# Patient Record
Sex: MALE | Race: OTHER | HISPANIC OR LATINO | Employment: FULL TIME | URBAN - METROPOLITAN AREA
[De-identification: names, ages, dates, MRNs, and addresses within clinical notes are randomized per-mention and may not be internally consistent; named-entity substitution may affect disease eponyms.]

---

## 2017-10-18 ENCOUNTER — GENERIC CONVERSION - ENCOUNTER (OUTPATIENT)
Dept: OTHER | Facility: OTHER | Age: 57
End: 2017-10-18

## 2018-01-18 NOTE — MISCELLANEOUS
Please contact our office at your convenience  It is important that we speak to you  REGARDING:   Porfavor contacte a nuestra oficina  Es muy  importante que hablemos con usted   SOBRE:          Scheduling an Appointment/ Programar roman Izzy          Rescheduling an Appointment/ Re-programar  roman Izzy     Cancelling an Appointment/Cancelar villalobos Izzy     Your Lab/ X-ray Results/Resultados         Updating your Phone number or Address/ Actualizar villalobos [employer]           X Verify your Primary Providor/ Verificar villalobos Proveedor primario     Other/Otro:    Please Contact Our Office to Schedule Your Appointment  It  is Very Important That You See Your Provider for your  Routine Medical Care  If you are seeing a New Providor,  Please Contact our Office so we can update our Records  Thank You  Comuníquese con nuestra oficina para programar villalobos izzy  Es Muy Importante que usted meg villalobos proveedor  para villalobos   [address] [facility] Floor de [place]  Si usted esta viendo un Proveedor  [place], [place] con [place] Antolin oficina para actualizar   nuestro registros  Linsey

## 2019-02-27 LAB
LEFT EYE DIABETIC RETINOPATHY: NORMAL
RIGHT EYE DIABETIC RETINOPATHY: NORMAL

## 2019-03-02 ENCOUNTER — HOSPITAL ENCOUNTER (EMERGENCY)
Facility: HOSPITAL | Age: 59
Discharge: HOME | End: 2019-03-02
Attending: EMERGENCY MEDICINE
Payer: COMMERCIAL

## 2019-03-02 VITALS
DIASTOLIC BLOOD PRESSURE: 83 MMHG | WEIGHT: 174.16 LBS | OXYGEN SATURATION: 98 % | SYSTOLIC BLOOD PRESSURE: 152 MMHG | TEMPERATURE: 98.2 F | BODY MASS INDEX: 28.11 KG/M2 | HEART RATE: 81 BPM | RESPIRATION RATE: 17 BRPM

## 2019-03-02 DIAGNOSIS — G89.29 CHRONIC PAIN OF LEFT LOWER EXTREMITY: Primary | ICD-10-CM

## 2019-03-02 DIAGNOSIS — M79.605 CHRONIC PAIN OF LEFT LOWER EXTREMITY: Primary | ICD-10-CM

## 2019-03-02 DIAGNOSIS — F41.9 ANXIETY: ICD-10-CM

## 2019-03-02 PROCEDURE — 99283 EMERGENCY DEPT VISIT LOW MDM: CPT

## 2019-03-02 RX ORDER — HYDROXYZINE HYDROCHLORIDE 25 MG/1
25 TABLET, FILM COATED ORAL ONCE
Status: COMPLETED | OUTPATIENT
Start: 2019-03-02 | End: 2019-03-02

## 2019-03-02 RX ORDER — ACETAMINOPHEN 325 MG/1
650 TABLET ORAL ONCE
Status: COMPLETED | OUTPATIENT
Start: 2019-03-02 | End: 2019-03-02

## 2019-03-02 RX ORDER — HYDROXYZINE HYDROCHLORIDE 25 MG/1
25 TABLET, FILM COATED ORAL EVERY 6 HOURS PRN
Qty: 10 TABLET | Refills: 0 | Status: SHIPPED | OUTPATIENT
Start: 2019-03-02 | End: 2019-04-03 | Stop reason: SDUPTHER

## 2019-03-02 RX ADMIN — HYDROXYZINE HYDROCHLORIDE 25 MG: 25 TABLET ORAL at 09:39

## 2019-03-02 RX ADMIN — ACETAMINOPHEN 650 MG: 325 TABLET, FILM COATED ORAL at 09:39
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
## 2019-03-02 NOTE — ED NOTES
Dr Manav Solis spoke with pts sister and will be coming in to take pt home     Chiki Willard, [address] [facility]  03/02/19 5793

## 2019-04-03 PROBLEM — D50.0 IRON DEFICIENCY ANEMIA DUE TO CHRONIC BLOOD LOSS: Status: ACTIVE | Noted: 2019-03-01

## 2019-04-03 PROBLEM — Z79.01 CHRONIC ANTICOAGULATION: Status: ACTIVE | Noted: 2019-03-01

## 2019-04-03 PROBLEM — S22.081D CLOSED STABLE BURST FRACTURE OF TWELFTH THORACIC VERTEBRA WITH ROUTINE HEALING: Status: ACTIVE | Noted: 2019-03-01

## 2019-04-03 PROBLEM — S72.22XS CLOSED LEFT SUBTROCHANTERIC FEMUR FRACTURE, SEQUELA: Status: ACTIVE | Noted: 2019-03-01

## 2019-04-03 PROBLEM — S72.002A CLOSED FRACTURE OF LEFT HIP (HCC): Status: ACTIVE | Noted: 2018-06-06

## 2019-04-03 PROBLEM — F32.9 MAJOR DEPRESSION, CHRONIC: Status: ACTIVE | Noted: 2019-03-01
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
## 2019-04-03 PROBLEM — E11.3552 STABLE PROLIFERATIVE DIABETIC RETINOPATHY OF LEFT EYE ASSOCIATED WITH TYPE 2 DIABETES MELLITUS (HCC): Status: ACTIVE | Noted: 2019-03-01

## 2019-04-03 PROBLEM — N40.0 BENIGN PROSTATIC HYPERPLASIA WITHOUT LOWER URINARY TRACT SYMPTOMS: Status: ACTIVE | Noted: 2019-03-01

## 2019-04-03 PROBLEM — E78.2 MIXED HYPERLIPIDEMIA: Status: ACTIVE | Noted: 2019-03-01

## 2019-04-03 PROBLEM — K21.9 GERD WITHOUT ESOPHAGITIS: Status: ACTIVE | Noted: 2019-03-01

## 2019-04-04 ENCOUNTER — TELEPHONE (OUTPATIENT)
Dept: FAMILY MEDICINE CLINIC | Facility: CLINIC | Age: 59
End: 2019-04-04

## 2019-04-04 ENCOUNTER — TELEPHONE (OUTPATIENT)
Dept: OTHER | Facility: OTHER | Age: 59
End: 2019-04-04

## 2019-04-05 ENCOUNTER — TELEPHONE (OUTPATIENT)
Dept: FAMILY MEDICINE CLINIC | Facility: CLINIC | Age: 59
End: 2019-04-05

## 2019-04-05 ENCOUNTER — TELEPHONE (OUTPATIENT)
Dept: PAIN MEDICINE | Facility: CLINIC | Age: 59
End: 2019-04-05

## 2019-04-05 ENCOUNTER — TRANSCRIBE ORDERS (OUTPATIENT)
Dept: LAB | Facility: HOSPITAL | Age: 59
End: 2019-04-05

## 2019-04-05 ENCOUNTER — OFFICE VISIT (OUTPATIENT)
Dept: LAB | Facility: HOSPITAL | Age: 59
End: 2019-04-05
Payer: COMMERCIAL

## 2019-04-05 DIAGNOSIS — E78.00 PURE HYPERCHOLESTEROLEMIA: ICD-10-CM

## 2019-04-05 DIAGNOSIS — Z98.84 BARIATRIC SURGERY STATUS: ICD-10-CM

## 2019-04-05 DIAGNOSIS — F11.20 OPIOID TYPE DEPENDENCE, CONTINUOUS (HCC): ICD-10-CM

## 2019-04-05 DIAGNOSIS — E66.3 SEVERELY OVERWEIGHT: ICD-10-CM

## 2019-04-05 DIAGNOSIS — E29.1 3-OXO-5 ALPHA-STEROID DELTA 4-DEHYDROGENASE DEFICIENCY: ICD-10-CM

## 2019-04-05 DIAGNOSIS — F11.20 OPIOID TYPE DEPENDENCE, CONTINUOUS (HCC): Primary | ICD-10-CM

## 2019-04-05 DIAGNOSIS — E11.649 UNCONTROLLED TYPE 2 DIABETES MELLITUS WITH HYPOGLYCEMIA, UNSPECIFIED HYPOGLYCEMIA COMA STATUS (HCC): ICD-10-CM

## 2019-04-05 DIAGNOSIS — I10 ESSENTIAL HYPERTENSION, MALIGNANT: ICD-10-CM

## 2019-04-05 LAB
ATRIAL RATE: 67 BPM
P AXIS: 61 DEGREES
PR INTERVAL: 136 MS
QRS AXIS: 63 DEGREES
QRSD INTERVAL: 94 MS
QT INTERVAL: 420 MS
QTC INTERVAL: 443 MS
T WAVE AXIS: 69 DEGREES
VENTRICULAR RATE: 67 BPM

## 2019-04-05 PROCEDURE — 93005 ELECTROCARDIOGRAM TRACING: CPT

## 2019-04-05 PROCEDURE — 93010 ELECTROCARDIOGRAM REPORT: CPT | Performed by: INTERNAL MEDICINE

## 2019-04-10 ENCOUNTER — APPOINTMENT (OUTPATIENT)
Dept: LAB | Facility: HOSPITAL | Age: 59
End: 2019-04-10
Payer: COMMERCIAL

## 2019-04-10 DIAGNOSIS — E29.1 3-OXO-5 ALPHA-STEROID DELTA 4-DEHYDROGENASE DEFICIENCY: ICD-10-CM

## 2019-04-10 DIAGNOSIS — I10 ESSENTIAL HYPERTENSION, MALIGNANT: ICD-10-CM

## 2019-04-10 DIAGNOSIS — Z98.84 BARIATRIC SURGERY STATUS: ICD-10-CM

## 2019-04-10 DIAGNOSIS — F11.20 OPIOID TYPE DEPENDENCE, CONTINUOUS (HCC): ICD-10-CM

## 2019-04-10 DIAGNOSIS — E11.65 UNCONTROLLED TYPE 2 DIABETES MELLITUS WITH HYPERGLYCEMIA (HCC): Primary | ICD-10-CM

## 2019-04-10 DIAGNOSIS — E11.649 UNCONTROLLED TYPE 2 DIABETES MELLITUS WITH HYPOGLYCEMIA, UNSPECIFIED HYPOGLYCEMIA COMA STATUS (HCC): ICD-10-CM

## 2019-04-10 DIAGNOSIS — E78.00 PURE HYPERCHOLESTEROLEMIA: ICD-10-CM

## 2019-04-10 DIAGNOSIS — E66.3 SEVERELY OVERWEIGHT: ICD-10-CM

## 2019-04-10 LAB
25(OH)D3 SERPL-MCNC: 16.5 NG/ML (ref 30–100)
ALBUMIN SERPL BCP-MCNC: 3.5 G/DL (ref 3.5–5)
ALP SERPL-CCNC: 129 U/L (ref 46–116)
ALT SERPL W P-5'-P-CCNC: 22 U/L (ref 12–78)
ANION GAP SERPL CALCULATED.3IONS-SCNC: 3 MMOL/L (ref 4–13)
AST SERPL W P-5'-P-CCNC: 11 U/L (ref 5–45)
BASOPHILS # BLD AUTO: 0.03 THOUSANDS/ΜL (ref 0–0.1)
BASOPHILS NFR BLD AUTO: 1 % (ref 0–1)
BILIRUB SERPL-MCNC: 0.51 MG/DL (ref 0.2–1)
BUN SERPL-MCNC: 11 MG/DL (ref 5–25)
CALCIUM SERPL-MCNC: 8.5 MG/DL (ref 8.3–10.1)
CHLORIDE SERPL-SCNC: 99 MMOL/L (ref 100–108)
CHOLEST SERPL-MCNC: 132 MG/DL (ref 50–200)
CO2 SERPL-SCNC: 32 MMOL/L (ref 21–32)
CREAT SERPL-MCNC: 0.87 MG/DL (ref 0.6–1.3)
CREAT UR-MCNC: 214 MG/DL
EOSINOPHIL # BLD AUTO: 0.07 THOUSAND/ΜL (ref 0–0.61)
EOSINOPHIL NFR BLD AUTO: 1 % (ref 0–6)
ERYTHROCYTE [DISTWIDTH] IN BLOOD BY AUTOMATED COUNT: 12.9 % (ref 11.6–15.1)
EST. AVERAGE GLUCOSE BLD GHB EST-MCNC: 177 MG/DL
GFR SERPL CREATININE-BSD FRML MDRD: 96 ML/MIN/1.73SQ M
GLUCOSE P FAST SERPL-MCNC: 188 MG/DL (ref 65–99)
HBA1C MFR BLD: 7.8 % (ref 4.2–6.3)
HCT VFR BLD AUTO: 37 % (ref 36.5–49.3)
HDLC SERPL-MCNC: 73 MG/DL (ref 40–60)
HGB BLD-MCNC: 12.2 G/DL (ref 12–17)
IMM GRANULOCYTES # BLD AUTO: 0.02 THOUSAND/UL (ref 0–0.2)
IMM GRANULOCYTES NFR BLD AUTO: 0 % (ref 0–2)
LDLC SERPL CALC-MCNC: 51 MG/DL (ref 0–100)
LYMPHOCYTES # BLD AUTO: 0.93 THOUSANDS/ΜL (ref 0.6–4.47)
LYMPHOCYTES NFR BLD AUTO: 14 % (ref 14–44)
MCH RBC QN AUTO: 28.1 PG (ref 26.8–34.3)
MCHC RBC AUTO-ENTMCNC: 33 G/DL (ref 31.4–37.4)
MCV RBC AUTO: 85 FL (ref 82–98)
MICROALBUMIN UR-MCNC: 4950 MG/L (ref 0–20)
MICROALBUMIN/CREAT 24H UR: 2313 MG/G CREATININE (ref 0–30)
MONOCYTES # BLD AUTO: 0.29 THOUSAND/ΜL (ref 0.17–1.22)
MONOCYTES NFR BLD AUTO: 4 % (ref 4–12)
NEUTROPHILS # BLD AUTO: 5.2 THOUSANDS/ΜL (ref 1.85–7.62)
NEUTS SEG NFR BLD AUTO: 80 % (ref 43–75)
NONHDLC SERPL-MCNC: 59 MG/DL
NRBC BLD AUTO-RTO: 0 /100 WBCS
PLATELET # BLD AUTO: 278 THOUSANDS/UL (ref 149–390)
PMV BLD AUTO: 11.2 FL (ref 8.9–12.7)
POTASSIUM SERPL-SCNC: 4.7 MMOL/L (ref 3.5–5.3)
PROT SERPL-MCNC: 7.7 G/DL (ref 6.4–8.2)
RBC # BLD AUTO: 4.34 MILLION/UL (ref 3.88–5.62)
SODIUM SERPL-SCNC: 134 MMOL/L (ref 136–145)
TRIGL SERPL-MCNC: 40 MG/DL
TSH SERPL DL<=0.05 MIU/L-ACNC: 0.54 UIU/ML (ref 0.36–3.74)
WBC # BLD AUTO: 6.54 THOUSAND/UL (ref 4.31–10.16)

## 2019-04-10 PROCEDURE — 85025 COMPLETE CBC W/AUTO DIFF WBC: CPT

## 2019-04-10 PROCEDURE — 82043 UR ALBUMIN QUANTITATIVE: CPT

## 2019-04-10 PROCEDURE — 82306 VITAMIN D 25 HYDROXY: CPT

## 2019-04-10 PROCEDURE — 84443 ASSAY THYROID STIM HORMONE: CPT

## 2019-04-10 PROCEDURE — 80053 COMPREHEN METABOLIC PANEL: CPT

## 2019-04-10 PROCEDURE — 36415 COLL VENOUS BLD VENIPUNCTURE: CPT

## 2019-04-10 PROCEDURE — 82570 ASSAY OF URINE CREATININE: CPT

## 2019-04-10 PROCEDURE — 80061 LIPID PANEL: CPT

## 2019-04-10 PROCEDURE — 83036 HEMOGLOBIN GLYCOSYLATED A1C: CPT

## 2019-05-03 ENCOUNTER — OFFICE VISIT (OUTPATIENT)
Dept: FAMILY MEDICINE CLINIC | Facility: CLINIC | Age: 59
End: 2019-05-03
Payer: COMMERCIAL

## 2019-05-03 ENCOUNTER — TELEPHONE (OUTPATIENT)
Dept: FAMILY MEDICINE CLINIC | Facility: CLINIC | Age: 59
End: 2019-05-03

## 2019-05-03 VITALS
SYSTOLIC BLOOD PRESSURE: 150 MMHG | HEART RATE: 86 BPM | HEIGHT: 67 IN | WEIGHT: 170.8 LBS | BODY MASS INDEX: 26.81 KG/M2 | DIASTOLIC BLOOD PRESSURE: 92 MMHG | OXYGEN SATURATION: 97 % | RESPIRATION RATE: 12 BRPM

## 2019-05-03 DIAGNOSIS — S72.22XS CLOSED LEFT SUBTROCHANTERIC FEMUR FRACTURE, SEQUELA: ICD-10-CM

## 2019-05-03 DIAGNOSIS — E11.65 UNCONTROLLED TYPE 2 DIABETES MELLITUS WITH HYPERGLYCEMIA (HCC): Primary | ICD-10-CM

## 2019-05-03 DIAGNOSIS — Z12.11 SCREENING FOR COLON CANCER: ICD-10-CM

## 2019-05-03 DIAGNOSIS — E78.2 MIXED HYPERLIPIDEMIA: ICD-10-CM

## 2019-05-03 DIAGNOSIS — M48.07 SPINAL STENOSIS OF LUMBOSACRAL REGION: ICD-10-CM

## 2019-05-03 DIAGNOSIS — N40.0 BENIGN PROSTATIC HYPERPLASIA WITHOUT LOWER URINARY TRACT SYMPTOMS: ICD-10-CM

## 2019-05-03 DIAGNOSIS — F41.9 ANXIETY: ICD-10-CM

## 2019-05-03 DIAGNOSIS — E11.3552 STABLE PROLIFERATIVE DIABETIC RETINOPATHY OF LEFT EYE ASSOCIATED WITH TYPE 2 DIABETES MELLITUS (HCC): ICD-10-CM

## 2019-05-03 DIAGNOSIS — I10 BENIGN ESSENTIAL HYPERTENSION: ICD-10-CM

## 2019-05-03 DIAGNOSIS — F31.9 BIPOLAR AFFECTIVE DISORDER, REMISSION STATUS UNSPECIFIED (HCC): ICD-10-CM

## 2019-05-03 PROBLEM — F11.10 NARCOTIC ABUSE (HCC): Status: ACTIVE | Noted: 2018-06-06

## 2019-05-03 PROCEDURE — 3008F BODY MASS INDEX DOCD: CPT | Performed by: NURSE PRACTITIONER

## 2019-05-03 PROCEDURE — 99214 OFFICE O/P EST MOD 30 MIN: CPT | Performed by: NURSE PRACTITIONER

## 2019-05-03 PROCEDURE — 1036F TOBACCO NON-USER: CPT | Performed by: NURSE PRACTITIONER

## 2019-05-03 RX ORDER — HYDROXYZINE HYDROCHLORIDE 25 MG/1
25 TABLET, FILM COATED ORAL EVERY 6 HOURS PRN
Qty: 90 TABLET | Refills: 1 | Status: SHIPPED | OUTPATIENT
Start: 2019-05-03 | End: 2020-01-03 | Stop reason: SDUPTHER

## 2019-05-03 RX ORDER — SEMAGLUTIDE 1.34 MG/ML
INJECTION, SOLUTION SUBCUTANEOUS
Refills: 2 | COMMUNITY
Start: 2019-04-24

## 2019-05-06 ENCOUNTER — TELEPHONE (OUTPATIENT)
Dept: FAMILY MEDICINE CLINIC | Facility: CLINIC | Age: 59
End: 2019-05-06

## 2019-05-14 ENCOUNTER — TELEPHONE (OUTPATIENT)
Dept: FAMILY MEDICINE CLINIC | Facility: CLINIC | Age: 59
End: 2019-05-14

## 2019-05-14 ENCOUNTER — TELEPHONE (OUTPATIENT)
Dept: OTHER | Facility: OTHER | Age: 59
End: 2019-05-14

## 2019-05-15 ENCOUNTER — TELEPHONE (OUTPATIENT)
Dept: OTHER | Facility: OTHER | Age: 59
End: 2019-05-15

## 2019-06-13 ENCOUNTER — TELEPHONE (OUTPATIENT)
Dept: FAMILY MEDICINE CLINIC | Facility: CLINIC | Age: 59
End: 2019-06-13

## 2019-07-12 ENCOUNTER — TRANSCRIBE ORDERS (OUTPATIENT)
Dept: LAB | Facility: HOSPITAL | Age: 59
End: 2019-07-12

## 2019-07-12 ENCOUNTER — APPOINTMENT (OUTPATIENT)
Dept: LAB | Facility: HOSPITAL | Age: 59
End: 2019-07-12
Payer: COMMERCIAL

## 2019-07-12 DIAGNOSIS — Z79.899 ENCOUNTER FOR LONG-TERM (CURRENT) USE OF OTHER MEDICATIONS: ICD-10-CM

## 2019-07-12 DIAGNOSIS — Z79.899 ENCOUNTER FOR LONG-TERM (CURRENT) USE OF OTHER MEDICATIONS: Primary | ICD-10-CM

## 2019-07-12 LAB
ALBUMIN SERPL BCP-MCNC: 3.6 G/DL (ref 3.5–5)
ALP SERPL-CCNC: 92 U/L (ref 46–116)
ALT SERPL W P-5'-P-CCNC: 23 U/L (ref 12–78)
ANION GAP SERPL CALCULATED.3IONS-SCNC: 4 MMOL/L (ref 4–13)
AST SERPL W P-5'-P-CCNC: 13 U/L (ref 5–45)
BASOPHILS # BLD AUTO: 0.04 THOUSANDS/ΜL (ref 0–0.1)
BASOPHILS NFR BLD AUTO: 0 % (ref 0–1)
BILIRUB SERPL-MCNC: 0.25 MG/DL (ref 0.2–1)
BUN SERPL-MCNC: 23 MG/DL (ref 5–25)
CALCIUM SERPL-MCNC: 8.7 MG/DL (ref 8.3–10.1)
CHLORIDE SERPL-SCNC: 99 MMOL/L (ref 100–108)
CHOLEST SERPL-MCNC: 127 MG/DL (ref 50–200)
CO2 SERPL-SCNC: 31 MMOL/L (ref 21–32)
CREAT SERPL-MCNC: 0.85 MG/DL (ref 0.6–1.3)
EOSINOPHIL # BLD AUTO: 0.06 THOUSAND/ΜL (ref 0–0.61)
EOSINOPHIL NFR BLD AUTO: 1 % (ref 0–6)
ERYTHROCYTE [DISTWIDTH] IN BLOOD BY AUTOMATED COUNT: 13.4 % (ref 11.6–15.1)
GFR SERPL CREATININE-BSD FRML MDRD: 97 ML/MIN/1.73SQ M
GLUCOSE P FAST SERPL-MCNC: 109 MG/DL (ref 65–99)
HCT VFR BLD AUTO: 38.5 % (ref 36.5–49.3)
HDLC SERPL-MCNC: 60 MG/DL (ref 40–60)
HGB BLD-MCNC: 12.2 G/DL (ref 12–17)
IMM GRANULOCYTES # BLD AUTO: 0.07 THOUSAND/UL (ref 0–0.2)
IMM GRANULOCYTES NFR BLD AUTO: 1 % (ref 0–2)
LDLC SERPL CALC-MCNC: 59 MG/DL (ref 0–100)
LYMPHOCYTES # BLD AUTO: 1.57 THOUSANDS/ΜL (ref 0.6–4.47)
LYMPHOCYTES NFR BLD AUTO: 14 % (ref 14–44)
MCH RBC QN AUTO: 28 PG (ref 26.8–34.3)
MCHC RBC AUTO-ENTMCNC: 31.7 G/DL (ref 31.4–37.4)
MCV RBC AUTO: 89 FL (ref 82–98)
MONOCYTES # BLD AUTO: 0.9 THOUSAND/ΜL (ref 0.17–1.22)
MONOCYTES NFR BLD AUTO: 8 % (ref 4–12)
NEUTROPHILS # BLD AUTO: 8.36 THOUSANDS/ΜL (ref 1.85–7.62)
NEUTS SEG NFR BLD AUTO: 76 % (ref 43–75)
NONHDLC SERPL-MCNC: 67 MG/DL
NRBC BLD AUTO-RTO: 0 /100 WBCS
PLATELET # BLD AUTO: 284 THOUSANDS/UL (ref 149–390)
PMV BLD AUTO: 11.5 FL (ref 8.9–12.7)
POTASSIUM SERPL-SCNC: 4.7 MMOL/L (ref 3.5–5.3)
PROT SERPL-MCNC: 7.4 G/DL (ref 6.4–8.2)
RBC # BLD AUTO: 4.35 MILLION/UL (ref 3.88–5.62)
SODIUM SERPL-SCNC: 134 MMOL/L (ref 136–145)
T4 SERPL-MCNC: 9.9 UG/DL (ref 4.7–13.3)
TRIGL SERPL-MCNC: 40 MG/DL
TSH SERPL DL<=0.05 MIU/L-ACNC: 1.66 UIU/ML (ref 0.36–3.74)
VALPROATE SERPL-MCNC: 82 UG/ML (ref 50–100)
WBC # BLD AUTO: 11 THOUSAND/UL (ref 4.31–10.16)

## 2019-07-12 PROCEDURE — 36415 COLL VENOUS BLD VENIPUNCTURE: CPT

## 2019-07-12 PROCEDURE — 85025 COMPLETE CBC W/AUTO DIFF WBC: CPT

## 2019-07-12 PROCEDURE — 80061 LIPID PANEL: CPT

## 2019-07-12 PROCEDURE — 80053 COMPREHEN METABOLIC PANEL: CPT

## 2019-07-12 PROCEDURE — 80164 ASSAY DIPROPYLACETIC ACD TOT: CPT

## 2019-07-12 PROCEDURE — 80307 DRUG TEST PRSMV CHEM ANLYZR: CPT

## 2019-07-12 PROCEDURE — 84443 ASSAY THYROID STIM HORMONE: CPT

## 2019-07-12 PROCEDURE — 84436 ASSAY OF TOTAL THYROXINE: CPT

## 2019-07-16 LAB
AMPHETAMINES UR QL SCN: NEGATIVE NG/ML
BARBITURATES UR QL SCN: NEGATIVE NG/ML
BENZODIAZ UR QL: NEGATIVE NG/ML
BZE UR QL: NEGATIVE NG/ML
CANNABINOIDS UR QL SCN: NEGATIVE NG/ML
METHADONE UR QL SCN: POSITIVE
OPIATES UR QL: NEGATIVE NG/ML
PCP UR QL: NEGATIVE NG/ML
PROPOXYPH UR QL SCN: NEGATIVE NG/ML

## 2019-07-18 ENCOUNTER — HOSPITAL ENCOUNTER (EMERGENCY)
Facility: HOSPITAL | Age: 59
Discharge: HOME | End: 2019-07-18
Attending: EMERGENCY MEDICINE
Payer: COMMERCIAL

## 2019-07-18 VITALS
SYSTOLIC BLOOD PRESSURE: 143 MMHG | BODY MASS INDEX: 26.63 KG/M2 | TEMPERATURE: 99.1 F | HEART RATE: 88 BPM | DIASTOLIC BLOOD PRESSURE: 75 MMHG | WEIGHT: 170 LBS | RESPIRATION RATE: 18 BRPM | OXYGEN SATURATION: 97 %

## 2019-07-18 DIAGNOSIS — M54.9 CHRONIC BACK PAIN: Primary | ICD-10-CM

## 2019-07-18 DIAGNOSIS — G89.29 CHRONIC BACK PAIN: Primary | ICD-10-CM

## 2019-07-18 PROCEDURE — 99283 EMERGENCY DEPT VISIT LOW MDM: CPT

## 2019-07-18 PROCEDURE — 99283 EMERGENCY DEPT VISIT LOW MDM: CPT | Performed by: EMERGENCY MEDICINE

## 2019-07-18 RX ORDER — NAPROXEN 250 MG/1
500 TABLET ORAL ONCE
Status: COMPLETED | OUTPATIENT
Start: 2019-07-18 | End: 2019-07-18

## 2019-07-18 RX ORDER — DIAZEPAM 2 MG/1
2 TABLET ORAL ONCE
Status: COMPLETED | OUTPATIENT
Start: 2019-07-18 | End: 2019-07-18

## 2019-07-18 RX ADMIN — NAPROXEN 500 MG: 250 TABLET ORAL at 18:13

## 2019-07-18 RX ADMIN — DIAZEPAM 2 MG: 2 TABLET ORAL at 18:13
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
## 2019-07-18 NOTE — ED PROVIDER NOTES
History  Chief Complaint   Patient presents with    Back Pain     Patient presents to ED after "pain management shots (to lower back) on Monday are not providing any relief" PT c/o headaches, upper/lower back pain, not being able to sleep, and fevers     61-year-old male with a past medical history significant for chronic back pain presents today reporting a worsening of his normal back pain  Saw pain management 4 days ago and had injections   States he was okay Monday and Tuesday, but started having worsening pain yesterday  Reports having trouble sleeping  Has been taking Aleve without relief  History provided by:  Patient  Back Pain   Location:  Generalized  Quality:  Aching and stiffness  Stiffness is present:  Unable to specify  Radiates to:  Does not radiate  Pain severity:  Severe  Pain is:  Same all the time  Duration:  2 days  Timing:  Constant  Progression:  Waxing and waning  Chronicity:  Chronic  Context comment:  See HPI  Worsened by: Movement  Ineffective treatments:  NSAIDs  Associated symptoms: no abdominal pain, no abdominal swelling, no bladder incontinence, no bowel incontinence, no chest pain, no fever, no numbness, no paresthesias, no perianal numbness and no weakness    Risk factors: not obese        Prior to Admission Medications   Prescriptions Last Dose Informant Patient Reported? Taking?    Continuous Blood Gluc Sensor (FREESTYLE STEVEN 14 DAY SENSOR) MISC   No Yes   Si applicator by Does not apply route every 14 (fourteen) days   ONE TOUCH ULTRA TEST test strip   No Yes   Sig: Test twice daily   ONETOUCH DELICA LANCETS 26H MISC   Yes Yes   Sig: USE TWICE A DAY (ICD 10 E11 9)   OZEMPIC 1 MG/DOSE SOPN   Yes No   Sig: INJECT 1MG SUBCUTANEOUS EVERY WEEK   UNKNOWN TO PATIENT   Yes Yes   amphetamine-dextroamphetamine (ADDERALL XR) 30 MG 24 hr capsule Not Taking at Unknown time  Yes No   Sig: Take 60 mg by mouth   amphetamine-dextroamphetamine (ADDERALL) 30 MG tablet Not Taking at Unknown time  Yes No   Sig:     apixaban (ELIQUIS) 5 mg Not Taking at Unknown time  Yes No   Sig: Take by mouth   atorvastatin (LIPITOR) 10 mg tablet   No No   Sig: Take 1 tablet (10 mg total) by mouth daily for 30 days   cholecalciferol (VITAMIN D3) 1,000 units tablet   No Yes   Sig: Take 1 tablet (1,000 Units total) by mouth daily for 30 days   clonazePAM (KlonoPIN) 2 mg tablet Not Taking at Unknown time  Yes No   Sig: Take 1 tablet by mouth 3 (three) times a day   divalproex sodium (DEPAKOTE) 500 mg EC tablet   No Yes   Sig: Take 1 tablet (500 mg total) by mouth every 8 (eight) hours   ferrous sulfate 325 (65 Fe) mg tablet   No Yes   Sig: Take 1 tablet (325 mg total) by mouth 3 (three) times a day for 30 days   fluticasone (FLONASE) 50 mcg/act nasal spray   No Yes   Si spray into each nostril daily   glipiZIDE (GLUCOTROL) 5 mg tablet   No No   Sig: Take 1 tablet (5 mg total) by mouth daily for 30 days   glycerin-hypromellose- (ARTIFICIAL TEARS) 0 2-0 2-1 % SOLN Not Taking at Unknown time  Yes No   Sig: Apply to eye   hydrOXYzine HCL (ATARAX) 25 mg tablet   No No   Sig: Take 1 tablet (25 mg total) by mouth every 6 (six) hours as needed for anxiety for up to 30 days   insulin glargine (LANTUS SOLOSTAR) 100 units/mL injection pen Not Taking at Unknown time  Yes No   Sig: Inject under the skin   insulin glargine (LANTUS) 100 units/mL subcutaneous injection Not Taking at Unknown time  Yes No   Sig: Inject 24 Units under the skin   insulin lispro (HUMALOG) 100 units/mL injection Not Taking at Unknown time  Yes No   Sig: Inject 12 Units under the skin   lidocaine (LIDODERM) 5 % Not Taking at Unknown time  Yes No   Sig: APPLY ONE PATCH TO AFFECTED AREA(S) DAILY AS NEEDED FOR PAIN   lidocaine (LIDODERM) 5 % Not Taking at Unknown time  No No   Sig: Apply 1 patch topically daily Remove & Discard patch within 12 hours or as directed by MD   Patient not taking: Reported on 2019   liraglutide (VICTOZA) injection   No No   Sig: Inject 0 6mg daily for one week then increase to 1 2mg daily   lisinopril (ZESTRIL) 5 mg tablet   No No   Sig: Take 1 tablet (5 mg total) by mouth daily for 30 days   melatonin 3 mg   No Yes   Sig: Take 2 tablets (6 mg total) by mouth daily at bedtime   mirtazapine (REMERON) 30 mg tablet   No Yes   Sig: Take 1 tablet (30 mg total) by mouth daily at bedtime for 30 days   omeprazole (PriLOSEC) 40 MG capsule   No Yes   Sig: Take 1 capsule (40 mg total) by mouth daily for 30 days   oxyCODONE (ROXICODONE) 30 MG immediate release tablet Not Taking at Unknown time  Yes No   Sig: Take 30 mg by mouth every 6 (six) hours   rivaroxaban (XARELTO) 20 mg tablet Not Taking at Unknown time  Yes No   Sig: Take 20 mg by mouth   rivaroxaban (XARELTO) 20 mg tablet Not Taking at Unknown time  No No   Sig: Take 1 tablet (20 mg total) by mouth daily with breakfast   Patient not taking: Reported on 7/18/2019   senna (SENOKOT) 8 6 mg   No Yes   Sig: Take 1 tablet (8 6 mg total) by mouth daily at bedtime for 30 days   tamsulosin (FLOMAX) 0 4 mg   No Yes   Sig: Take 1 capsule (0 4 mg total) by mouth daily at bedtime for 30 days   venlafaxine 37 5 mg 24 hr tablet Unknown at Unknown time  No No   Sig: Take 1 tablet (37 5 mg total) by mouth daily with breakfast      Facility-Administered Medications: None       Past Medical History:   Diagnosis Date    Anxiety     Depression     [facility]     Hypertension        Past Surgical History:   Procedure Laterality Date    FEMUR FRACTURE SURGERY Right     GASTRIC BYPASS  11/08/2011    Managed by: Eugenie Stauffer (General Surgery)    HERNIA REPAIR  02/13/2013    Incisional hernia repair Managed by: Eugenie Stauffer (General Surgery)    TONSILLECTOMY  2010       Family History   Problem Relation Age of Onset    Diabetes Mother     Hypertension Mother     Hypertension Father      I have reviewed and agree with the history as documented      Social History Tobacco Use    Smoking status: Never Smoker    Smokeless tobacco: Never Used   Substance Use Topics    Alcohol use: Never     Frequency: Never    Drug use: Never        Review of Systems   Constitutional: Negative for diaphoresis, fatigue and fever  HENT: Negative for congestion  Eyes: Negative for visual disturbance  Respiratory: Negative for chest tightness and shortness of breath  Cardiovascular: Negative for chest pain  Gastrointestinal: Negative for abdominal pain and bowel incontinence  Genitourinary: Negative for bladder incontinence and difficulty urinating  Musculoskeletal: Positive for back pain  Neurological: Negative for dizziness, weakness, numbness and paresthesias  Psychiatric/Behavioral: Negative for confusion  Physical Exam  Physical Exam   Constitutional: He is oriented to person, place, and time  He appears well-developed and well-nourished  HENT:   Head: Normocephalic and atraumatic  Musculoskeletal: Normal range of motion  He exhibits no edema  Patient reports pain along the entire spine  No focal tenderness  Every where I touch he reports pain  Neurological: He is alert and oriented to person, place, and time  Skin: Skin is warm and dry  Capillary refill takes less than 2 seconds  Psychiatric: He has a normal mood and affect  Nursing note and vitals reviewed        Vital Signs  ED Triage Vitals [07/18/19 1729]   Temperature Pulse Respirations Blood Pressure SpO2   99 1 °F (37 3 °C) 85 18 143/75 96 %      Temp Source Heart Rate Source Patient Position - Orthostatic VS BP Location FiO2 (%)   Oral Monitor Lying Right arm --      Pain Score       --           Vitals:    07/18/19 1729 07/18/19 1730   BP: 143/75 143/75   Pulse: 85 88   Patient Position - Orthostatic VS: Lying          Visual Acuity      ED Medications  Medications   diazepam (VALIUM) tablet 2 mg (2 mg Oral Given 7/18/19 1813)   naproxen (NAPROSYN) tablet 500 mg (500 mg Oral Given 7/18/19 1813)       Diagnostic Studies  Results Reviewed     None                 No orders to display              Procedures  Procedures       ED Course                               MDM  Number of Diagnoses or Management Options  Chronic back pain: new and does not require workup  Diagnosis management comments: 10:43 PM  Late entry - patient sees pain management and a history of addiction with previous drug seeking behavior  For this reason I have opted not to prescribe narcotic pain medications  Gave a dose of valium in the ED here and recommended f/u with pain management as outpatient  Risk of Complications, Morbidity, and/or Mortality  Presenting problems: low  Diagnostic procedures: low  Management options: low    Patient Progress  Patient progress: stable      Disposition  Final diagnoses:   Chronic back pain     Time reflects when diagnosis was documented in both MDM as applicable and the Disposition within this note     Time User Action Codes Description Comment    7/18/2019  6:09 PM Bertrand Abdul Add [M54 9,  G89 29] Chronic back pain       ED Disposition     ED Disposition Condition Date/Time Comment    Discharge Stable Thu Jul 18, 2019  6:09 PM Brian Tirado discharge to home/self care              Follow-up Information     Follow up With Specialties Details Why Contact Info    your Pain management doctor  Call in 1 day            Discharge Medication List as of 7/18/2019  6:09 PM      CONTINUE these medications which have NOT CHANGED    Details   cholecalciferol (VITAMIN D3) 1,000 units tablet Take 1 tablet (1,000 Units total) by mouth daily for 30 days, Starting Wed 4/3/2019, Until Thu 7/18/2019, Normal      Continuous Blood Gluc Sensor (FREESTYLE STEVEN 14 DAY SENSOR) MISC 1 applicator by Does not apply route every 14 (fourteen) days, Starting Wed 4/3/2019, Normal      divalproex sodium (DEPAKOTE) 500 mg EC tablet Take 1 tablet (500 mg total) by mouth every 8 (eight) hours, Starting Wed 4/3/2019, Normal      ferrous sulfate 325 (65 Fe) mg tablet Take 1 tablet (325 mg total) by mouth 3 (three) times a day for 30 days, Starting Wed 4/3/2019, Until Thu 7/18/2019, Normal      fluticasone (FLONASE) 50 mcg/act nasal spray 1 spray into each nostril daily, Starting Wed 4/3/2019, Normal      melatonin 3 mg Take 2 tablets (6 mg total) by mouth daily at bedtime, Starting Wed 4/3/2019, Normal      mirtazapine (REMERON) 30 mg tablet Take 1 tablet (30 mg total) by mouth daily at bedtime for 30 days, Starting Wed 4/3/2019, Until Thu 7/18/2019, Normal      omeprazole (PriLOSEC) 40 MG capsule Take 1 capsule (40 mg total) by mouth daily for 30 days, Starting Wed 4/3/2019, Until Thu 7/18/2019, Normal      ONE TOUCH ULTRA TEST test strip Test twice daily, Normal      ONETOUCH DELICA LANCETS 39O MISC USE TWICE A DAY (ICD 10 E11 9), Historical Med      senna (SENOKOT) 8 6 mg Take 1 tablet (8 6 mg total) by mouth daily at bedtime for 30 days, Starting Wed 4/3/2019, Until Thu 7/18/2019, Normal      tamsulosin (FLOMAX) 0 4 mg Take 1 capsule (0 4 mg total) by mouth daily at bedtime for 30 days, Starting Wed 4/3/2019, Until Thu 7/18/2019, Normal      UNKNOWN TO PATIENT Historical Med      amphetamine-dextroamphetamine (ADDERALL XR) 30 MG 24 hr capsule Take 60 mg by mouth, Historical Med      amphetamine-dextroamphetamine (ADDERALL) 30 MG tablet  , Historical Med      apixaban (ELIQUIS) 5 mg Take by mouth, Starting Sun 2/1/2015, Historical Med      atorvastatin (LIPITOR) 10 mg tablet Take 1 tablet (10 mg total) by mouth daily for 30 days, Starting Wed 4/3/2019, Until Fri 5/3/2019, Normal      clonazePAM (KlonoPIN) 2 mg tablet Take 1 tablet by mouth 3 (three) times a day, Starting Thu 1/29/2015, Historical Med      glipiZIDE (GLUCOTROL) 5 mg tablet Take 1 tablet (5 mg total) by mouth daily for 30 days, Starting Wed 4/3/2019, Until Fri 5/3/2019, Normal      glycerin-hypromellose- (ARTIFICIAL TEARS) 0 2-0 2-1 % SOLN Apply to eye, Historical Med      hydrOXYzine HCL (ATARAX) 25 mg tablet Take 1 tablet (25 mg total) by mouth every 6 (six) hours as needed for anxiety for up to 30 days, Starting Fri 5/3/2019, Until Sun 6/2/2019, Normal      insulin glargine (LANTUS SOLOSTAR) 100 units/mL injection pen Inject under the skin, Historical Med      insulin glargine (LANTUS) 100 units/mL subcutaneous injection Inject 24 Units under the skin, Historical Med      insulin lispro (HUMALOG) 100 units/mL injection Inject 12 Units under the skin, Historical Med      !! lidocaine (LIDODERM) 5 % APPLY ONE PATCH TO AFFECTED AREA(S) DAILY AS NEEDED FOR PAIN, Historical Med      !! lidocaine (LIDODERM) 5 % Apply 1 patch topically daily Remove & Discard patch within 12 hours or as directed by MD, Starting Wed 4/3/2019, Normal      liraglutide (VICTOZA) injection Inject 0 6mg daily for one week then increase to 1 2mg daily, Normal      lisinopril (ZESTRIL) 5 mg tablet Take 1 tablet (5 mg total) by mouth daily for 30 days, Starting Wed 4/3/2019, Until Fri 5/3/2019, Normal      oxyCODONE (ROXICODONE) 30 MG immediate release tablet Take 30 mg by mouth every 6 (six) hours, Starting Thu 1/29/2015, Historical Med      OZEMPIC 1 MG/DOSE SOPN INJECT 1MG SUBCUTANEOUS EVERY WEEK, Historical Med      !! rivaroxaban (XARELTO) 20 mg tablet Take 20 mg by mouth, Starting Wed 6/20/2018, Historical Med      !! rivaroxaban (XARELTO) 20 mg tablet Take 1 tablet (20 mg total) by mouth daily with breakfast, Starting Wed 4/3/2019, Normal      venlafaxine 37 5 mg 24 hr tablet Take 1 tablet (37 5 mg total) by mouth daily with breakfast, Starting Wed 4/3/2019, Normal       !! - Potential duplicate medications found  Please discuss with provider  No discharge procedures on file      ED Provider  Electronically Signed by           Eden Koo DO  07/18/19 7836 Judy Johnson DO  07/18/19 0359

## 2019-07-23 ENCOUNTER — OFFICE VISIT (OUTPATIENT)
Dept: FAMILY MEDICINE CLINIC | Facility: CLINIC | Age: 59
End: 2019-07-23
Payer: COMMERCIAL

## 2019-07-23 ENCOUNTER — APPOINTMENT (OUTPATIENT)
Dept: LAB | Facility: HOSPITAL | Age: 59
End: 2019-07-23
Payer: COMMERCIAL

## 2019-07-23 VITALS
BODY MASS INDEX: 24.96 KG/M2 | HEIGHT: 67 IN | HEART RATE: 94 BPM | DIASTOLIC BLOOD PRESSURE: 76 MMHG | SYSTOLIC BLOOD PRESSURE: 122 MMHG | WEIGHT: 159 LBS | RESPIRATION RATE: 17 BRPM | OXYGEN SATURATION: 98 % | TEMPERATURE: 98.4 F

## 2019-07-23 DIAGNOSIS — I48.91 ATRIAL FIBRILLATION, UNSPECIFIED TYPE (HCC): ICD-10-CM

## 2019-07-23 DIAGNOSIS — G89.4 CHRONIC PAIN DISORDER: ICD-10-CM

## 2019-07-23 DIAGNOSIS — E11.65 UNCONTROLLED TYPE 2 DIABETES MELLITUS WITH HYPERGLYCEMIA (HCC): Primary | ICD-10-CM

## 2019-07-23 DIAGNOSIS — E55.9 VITAMIN D DEFICIENCY: ICD-10-CM

## 2019-07-23 DIAGNOSIS — Z12.11 SCREENING FOR COLON CANCER: ICD-10-CM

## 2019-07-23 DIAGNOSIS — F31.9 BIPOLAR AFFECTIVE DISORDER, REMISSION STATUS UNSPECIFIED (HCC): ICD-10-CM

## 2019-07-23 DIAGNOSIS — B37.0 THRUSH: ICD-10-CM

## 2019-07-23 DIAGNOSIS — J30.1 SEASONAL ALLERGIC RHINITIS DUE TO POLLEN: ICD-10-CM

## 2019-07-23 DIAGNOSIS — R29.6 FALLS FREQUENTLY: ICD-10-CM

## 2019-07-23 DIAGNOSIS — L97.929 CHRONIC ULCER OF LOWER EXTREMITY, LEFT, WITH UNSPECIFIED SEVERITY (HCC): ICD-10-CM

## 2019-07-23 DIAGNOSIS — E11.3552 STABLE PROLIFERATIVE DIABETIC RETINOPATHY OF LEFT EYE ASSOCIATED WITH TYPE 2 DIABETES MELLITUS (HCC): ICD-10-CM

## 2019-07-23 DIAGNOSIS — Q30.9 NOSE ANOMALY: ICD-10-CM

## 2019-07-23 PROCEDURE — 3008F BODY MASS INDEX DOCD: CPT | Performed by: FAMILY MEDICINE

## 2019-07-23 PROCEDURE — 99205 OFFICE O/P NEW HI 60 MIN: CPT | Performed by: FAMILY MEDICINE

## 2019-07-23 PROCEDURE — 80307 DRUG TEST PRSMV CHEM ANLYZR: CPT

## 2019-07-23 RX ORDER — FLUTICASONE PROPIONATE 50 MCG
1 SPRAY, SUSPENSION (ML) NASAL DAILY
Qty: 1 BOTTLE | Refills: 5 | Status: SHIPPED | OUTPATIENT
Start: 2019-07-23 | End: 2019-12-04 | Stop reason: SDUPTHER

## 2019-07-23 RX ORDER — ALPRAZOLAM 2 MG/1
2 TABLET ORAL 3 TIMES DAILY PRN
COMMUNITY
End: 2020-01-03 | Stop reason: ALTCHOICE

## 2019-07-23 NOTE — ASSESSMENT & PLAN NOTE
Will work on obtaining records from his current eye care provider  Discussed with patient that he should have yearly diabetic eye exams

## 2019-07-23 NOTE — ASSESSMENT & PLAN NOTE
Patient is established with the pain clinic  Directed patient to continue to follow with the pain clinic  Discussed that narcotic medications will not be prescribed by this clinic  Also discussed that management of his chronic pain could involve multiple modalities, none of which are narcotic medications  Encouraged patient to pursue other treatment options, such as physical therapy

## 2019-07-23 NOTE — ASSESSMENT & PLAN NOTE
Patient referred to PT for evaluation    An order for a cane was sent to the pharmacy per patient request

## 2019-07-23 NOTE — PATIENT INSTRUCTIONS
Vitamin D Deficiency   AMBULATORY CARE:   Vitamin D deficiency  is a low level of vitamin D in your body  Vitamin D helps your body absorb calcium from foods  Your body makes vitamin D when your skin is exposed to sunlight  You can also get vitamin D from certain foods such as fish, eggs, and meat  Most of the vitamin D in your body comes from sunlight exposure  Common symptoms include the following:  Low levels of vitamin D can lead to weak and brittle bones that are more likely to fracture  You may not have any signs and symptoms, or you may have any of the following:  · Bone pain or discomfort in your lower back, pelvis, or legs    · Muscle aches and weakness    · Low back pain in women    · Poor growth, irritability, and frequent respiratory tract infections in infants    · Deformed bones and slow growth in children  Contact your healthcare provider for the following symptoms:   · Continued or worsening symptoms    · Nausea, vomiting, or a headache after possibly taking too much of a vitamin D supplement    · Questions or concerns about your condition or care  Treatment for vitamin D deficiency  includes high doses of vitamin D for 8 to 12 weeks to increase your levels  Your levels will then be rechecked  If your levels are still low, you will need to take vitamin D supplements for another 8 weeks  After your levels have gone back to normal, you may need to continue to take a vitamin D supplement  Amount of vitamin D do you need each day:  The amount of vitamin D you need depends on your age  You may need more than the recommended amounts below if you take certain medicines or you are obese  Ask your healthcare provider how much vitamin D you need  · Infants up to 1 year of age: 0 international units (IU)    · Children 1 year and older: 600 IU    · Adults aged 23to 79years old: 600 IU    · Adults older than 70 years: 800 IU  Prevent vitamin D deficiency:   · Eat foods that are high in vitamin D    Fatty fish such as mackerel, canned tuna and sardines, and salmon are good sources of vitamin D  Certain foods such as milk, juice, and cereal are fortified with vitamin D      · Give your  infant a vitamin D supplement  of 400 IU each day  · Take vitamin D supplements as directed  High doses of vitamin D can be toxic  Your healthcare provider will tell you how much vitamin D you should take each day  Vitamin D is best absorbed when taken with food  · Expose your skin to sunlight as directed  Ask your healthcare provider how you can safely expose your skin to sunlight and for how long  Too much exposure to sunlight can cause skin cancer  Follow up with your healthcare provider as directed:  Write down your questions so you remember to ask them during your visits  © 2017 [facility] Information is for End User's use only and may not be sold, redistributed or otherwise used for commercial purposes  All illustrations and images included in CareNotes® are the copyrighted property of A [employer] M [employer]  or [employer]  The above information is an  only  It is not intended as medical advice for individual conditions or treatments  Talk to your doctor, nurse or pharmacist before following any medical regimen to see if it is safe and effective for you

## 2019-07-23 NOTE — ASSESSMENT & PLAN NOTE
Lab Results   Component Value Date    HGBA1C 7 8 (H) 04/10/2019       Referral to endocrinology ordered;  Continue management per endocrinology's orders

## 2019-07-23 NOTE — ASSESSMENT & PLAN NOTE
Patient is established with psychiatry for this issue  Patient wants a prescription for Xanax    Discussed with patient that this medication should be addressed by his psychiatrist

## 2019-07-23 NOTE — ASSESSMENT & PLAN NOTE
Referred to dermatology for evaluation  Discussed with patient that there may be no treatment options for the leg discolorations that bother him

## 2019-07-24 ENCOUNTER — VBI (OUTPATIENT)
Dept: ADMINISTRATIVE | Facility: OTHER | Age: 59
End: 2019-07-24

## 2019-07-24 NOTE — TELEPHONE ENCOUNTER
Rickey Lara MD  [address]             Good Evening,     Patient reports he has his eye exams done by Dr Andree Cash at the [facility] in [place] obtain records of his diabetic eye exam so that we may update his records  Thanks! Dr Valeria Dance      07/24/2019 07:38 AM Phone (Johanna Bocanegra) [facility] (Provider) [phone] Remove   Left Message - LM asking for patient most recent diabetic retinopathy screening report   (1st attempt)      By Jair Kellogg MA

## 2019-07-29 ENCOUNTER — TELEPHONE (OUTPATIENT)
Dept: FAMILY MEDICINE CLINIC | Facility: CLINIC | Age: 59
End: 2019-07-29

## 2019-07-29 DIAGNOSIS — R29.6 FALLS FREQUENTLY: ICD-10-CM

## 2019-07-29 NOTE — TELEPHONE ENCOUNTER
Per [place] a cane script will need to be printed to be sent to [facility] , or [facility]  Please contact member to let him know where this was sent

## 2019-08-09 ENCOUNTER — APPOINTMENT (EMERGENCY)
Dept: CT IMAGING | Facility: HOSPITAL | Age: 59
End: 2019-08-09
Payer: COMMERCIAL

## 2019-08-09 ENCOUNTER — HOSPITAL ENCOUNTER (EMERGENCY)
Facility: HOSPITAL | Age: 59
Discharge: HOME | End: 2019-08-09
Attending: EMERGENCY MEDICINE
Payer: COMMERCIAL

## 2019-08-09 VITALS
DIASTOLIC BLOOD PRESSURE: 72 MMHG | SYSTOLIC BLOOD PRESSURE: 111 MMHG | BODY MASS INDEX: 24.95 KG/M2 | HEIGHT: 67 IN | RESPIRATION RATE: 18 BRPM | HEART RATE: 66 BPM | TEMPERATURE: 97.8 F | WEIGHT: 158.95 LBS | OXYGEN SATURATION: 96 %

## 2019-08-09 DIAGNOSIS — T40.601A NARCOTIC OVERDOSE (HCC): Primary | ICD-10-CM

## 2019-08-09 LAB
ALBUMIN SERPL BCP-MCNC: 3.2 G/DL (ref 3.5–5)
ALP SERPL-CCNC: 72 U/L (ref 46–116)
ALT SERPL W P-5'-P-CCNC: 29 U/L (ref 12–78)
AMPHETAMINES SERPL QL SCN: NEGATIVE
ANION GAP SERPL CALCULATED.3IONS-SCNC: 6 MMOL/L (ref 4–13)
APAP SERPL-MCNC: <2 UG/ML (ref 10–20)
APTT PPP: 26 SECONDS (ref 23–37)
AST SERPL W P-5'-P-CCNC: 19 U/L (ref 5–45)
ATRIAL RATE: 75 BPM
BARBITURATES UR QL: NEGATIVE
BASOPHILS # BLD AUTO: 0.05 THOUSANDS/ΜL (ref 0–0.1)
BASOPHILS NFR BLD AUTO: 1 % (ref 0–1)
BENZODIAZ UR QL: POSITIVE
BILIRUB SERPL-MCNC: 0.2 MG/DL (ref 0.2–1)
BUN SERPL-MCNC: 16 MG/DL (ref 5–25)
CALCIUM SERPL-MCNC: 8.1 MG/DL (ref 8.3–10.1)
CHLORIDE SERPL-SCNC: 105 MMOL/L (ref 100–108)
CO2 SERPL-SCNC: 28 MMOL/L (ref 21–32)
COCAINE UR QL: NEGATIVE
CREAT SERPL-MCNC: 0.87 MG/DL (ref 0.6–1.3)
EOSINOPHIL # BLD AUTO: 0.19 THOUSAND/ΜL (ref 0–0.61)
EOSINOPHIL NFR BLD AUTO: 3 % (ref 0–6)
ERYTHROCYTE [DISTWIDTH] IN BLOOD BY AUTOMATED COUNT: 14.1 % (ref 11.6–15.1)
ETHANOL SERPL-MCNC: <3 MG/DL (ref 0–3)
GFR SERPL CREATININE-BSD FRML MDRD: 96 ML/MIN/1.73SQ M
GLUCOSE SERPL-MCNC: 180 MG/DL (ref 65–140)
GLUCOSE SERPL-MCNC: 189 MG/DL (ref 65–140)
HCT VFR BLD AUTO: 33.1 % (ref 36.5–49.3)
HGB BLD-MCNC: 10.9 G/DL (ref 12–17)
IMM GRANULOCYTES # BLD AUTO: 0.03 THOUSAND/UL (ref 0–0.2)
IMM GRANULOCYTES NFR BLD AUTO: 0 % (ref 0–2)
INR PPP: 0.98 (ref 0.84–1.19)
LYMPHOCYTES # BLD AUTO: 1.45 THOUSANDS/ΜL (ref 0.6–4.47)
LYMPHOCYTES NFR BLD AUTO: 20 % (ref 14–44)
MCH RBC QN AUTO: 29 PG (ref 26.8–34.3)
MCHC RBC AUTO-ENTMCNC: 32.9 G/DL (ref 31.4–37.4)
MCV RBC AUTO: 88 FL (ref 82–98)
METHADONE UR QL: POSITIVE
MONOCYTES # BLD AUTO: 0.64 THOUSAND/ΜL (ref 0.17–1.22)
MONOCYTES NFR BLD AUTO: 9 % (ref 4–12)
NEUTROPHILS # BLD AUTO: 5.03 THOUSANDS/ΜL (ref 1.85–7.62)
NEUTS SEG NFR BLD AUTO: 67 % (ref 43–75)
NRBC BLD AUTO-RTO: 0 /100 WBCS
OPIATES UR QL SCN: NEGATIVE
P AXIS: 74 DEGREES
PCP UR QL: NEGATIVE
PLATELET # BLD AUTO: 213 THOUSANDS/UL (ref 149–390)
PMV BLD AUTO: 10.5 FL (ref 8.9–12.7)
POTASSIUM SERPL-SCNC: 4.1 MMOL/L (ref 3.5–5.3)
PR INTERVAL: 140 MS
PROT SERPL-MCNC: 6.1 G/DL (ref 6.4–8.2)
PROTHROMBIN TIME: 12.7 SECONDS (ref 11.6–14.5)
QRS AXIS: 84 DEGREES
QRSD INTERVAL: 94 MS
QT INTERVAL: 410 MS
QTC INTERVAL: 457 MS
RBC # BLD AUTO: 3.76 MILLION/UL (ref 3.88–5.62)
SALICYLATES SERPL-MCNC: <3 MG/DL (ref 3–20)
SODIUM SERPL-SCNC: 139 MMOL/L (ref 136–145)
T WAVE AXIS: 81 DEGREES
THC UR QL: NEGATIVE
VALPROATE SERPL-MCNC: <3 UG/ML (ref 50–100)
VENTRICULAR RATE: 75 BPM
WBC # BLD AUTO: 7.39 THOUSAND/UL (ref 4.31–10.16)

## 2019-08-09 PROCEDURE — 80053 COMPREHEN METABOLIC PANEL: CPT | Performed by: EMERGENCY MEDICINE

## 2019-08-09 PROCEDURE — 99284 EMERGENCY DEPT VISIT MOD MDM: CPT | Performed by: EMERGENCY MEDICINE

## 2019-08-09 PROCEDURE — 85730 THROMBOPLASTIN TIME PARTIAL: CPT | Performed by: EMERGENCY MEDICINE

## 2019-08-09 PROCEDURE — 80320 DRUG SCREEN QUANTALCOHOLS: CPT | Performed by: EMERGENCY MEDICINE

## 2019-08-09 PROCEDURE — 70450 CT HEAD/BRAIN W/O DYE: CPT

## 2019-08-09 PROCEDURE — 80329 ANALGESICS NON-OPIOID 1 OR 2: CPT | Performed by: EMERGENCY MEDICINE

## 2019-08-09 PROCEDURE — 93010 ELECTROCARDIOGRAM REPORT: CPT | Performed by: INTERNAL MEDICINE

## 2019-08-09 PROCEDURE — 93005 ELECTROCARDIOGRAM TRACING: CPT

## 2019-08-09 PROCEDURE — 80164 ASSAY DIPROPYLACETIC ACD TOT: CPT | Performed by: EMERGENCY MEDICINE

## 2019-08-09 PROCEDURE — 82948 REAGENT STRIP/BLOOD GLUCOSE: CPT

## 2019-08-09 PROCEDURE — 36415 COLL VENOUS BLD VENIPUNCTURE: CPT | Performed by: EMERGENCY MEDICINE

## 2019-08-09 PROCEDURE — 80307 DRUG TEST PRSMV CHEM ANLYZR: CPT | Performed by: EMERGENCY MEDICINE

## 2019-08-09 PROCEDURE — 85610 PROTHROMBIN TIME: CPT | Performed by: EMERGENCY MEDICINE

## 2019-08-09 PROCEDURE — 99285 EMERGENCY DEPT VISIT HI MDM: CPT

## 2019-08-09 PROCEDURE — 85025 COMPLETE CBC W/AUTO DIFF WBC: CPT | Performed by: EMERGENCY MEDICINE

## 2019-08-09 RX ORDER — NALOXONE HYDROCHLORIDE 4 MG/.1ML
1 SPRAY NASAL AS NEEDED
Qty: 1 EACH | Refills: 0 | Status: SHIPPED | OUTPATIENT
Start: 2019-08-09 | End: 2020-01-03 | Stop reason: ALTCHOICE

## 2019-08-09 NOTE — ED NOTES
Pt responds more to verbal stimuli  Still falling right back to sleep        Kim Stokes RN  08/09/19 1200

## 2019-08-09 NOTE — ED NOTES
Pt presents to ED via EMS from the methadone clinic  Pt was given 160 0mgs of methadone today  Pt was harder to arouse following then he normally is  Upon arrival pt is lethargic but responds to name  Vitals signs obtained by tech  Pt put in room 5 jaleesa close to nurses station        Christi Tay RN  08/09/19 1795

## 2019-08-09 NOTE — ED PROVIDER NOTES
History  Chief Complaint   Patient presents with    Altered Mental Status     Pt presents to ED via EMS from the methadone clinic following his dose of methadone d/t pt being harder to arouse than normal  Pt upon arrival is sleepy but arousable  This is a 61-year-old male who presents via ambulance from the methadone clinic for evaluation lethargy  He was given his methadone at 8:30 a m  This morning 160 mg  Patient was noted to be lethargic more than usual and he was sent here for evaluation there does not appear to be any overt injury he is arousable to verbal stimuli oriented to person and place but not time without any focal motor deficits  It does appear that he is on Xarelto for atrial fibrillation  Denies taking any extra medication      History provided by:  Patient and EMS personnel  Medical Problem   Location:  Generalized  Quality:  Lethargy  Severity:  Unable to specify  Onset quality:  Unable to specify  Timing:  Constant  Progression:  Unchanged  Chronicity:  Recurrent  Context:  Lethargy presents from methadone clinic after receiving his normal dose  Relieved by:  Nothing      Prior to Admission Medications   Prescriptions Last Dose Informant Patient Reported? Taking? ALPRAZolam (XANAX) 2 MG tablet   Yes No   Sig: Take 2 mg by mouth 3 (three) times a day as needed for anxiety   Continuous Blood Gluc Sensor (FREESTYLE STEVEN 14 DAY SENSOR) MISC   No No   Si applicator by Does not apply route every 14 (fourteen) days   Misc   Devices (CANE) MISC   No No   Sig: by Does not apply route daily Dx:  Frequent falls   ONE TOUCH ULTRA TEST test strip   No No   Sig: Test twice daily   Patient not taking: Reported on    [facility] MISC   Yes No   Sig: USE TWICE A DAY (ICD 10 E11 9)   OZEMPIC 1 MG/DOSE SOPN   Yes No   Sig: INJECT 1MG SUBCUTANEOUS EVERY WEEK   UNKNOWN TO PATIENT   Yes No   amphetamine-dextroamphetamine (ADDERALL XR) 30 MG 24 hr capsule   Yes No   Sig: Take 60 mg by mouth   amphetamine-dextroamphetamine (ADDERALL) 30 MG tablet   Yes No   Sig:     apixaban (ELIQUIS) 5 mg   Yes No   Sig: Take by mouth   atorvastatin (LIPITOR) 10 mg tablet   No No   Sig: Take 1 tablet (10 mg total) by mouth daily for 30 days   cholecalciferol (VITAMIN D3) 1,000 units tablet   No No   Sig: Take 1 tablet (1,000 Units total) by mouth daily for 30 days   clonazePAM (KlonoPIN) 2 mg tablet   Yes No   Sig: Take 1 tablet by mouth 3 (three) times a day   divalproex sodium (DEPAKOTE) 500 mg EC tablet   No No   Sig: Take 1 tablet (500 mg total) by mouth every 8 (eight) hours   fluticasone (FLONASE) 50 mcg/act nasal spray   No No   Si spray into each nostril daily   glipiZIDE (GLUCOTROL) 5 mg tablet   No No   Sig: Take 1 tablet (5 mg total) by mouth daily for 30 days   glycerin-hypromellose- (ARTIFICIAL TEARS) 0 2-0 2-1 % SOLN   Yes No   Sig: Apply to eye   hydrOXYzine HCL (ATARAX) 25 mg tablet   No No   Sig: Take 1 tablet (25 mg total) by mouth every 6 (six) hours as needed for anxiety for up to 30 days   insulin glargine (LANTUS SOLOSTAR) 100 units/mL injection pen   Yes No   Sig: Inject under the skin   insulin glargine (LANTUS) 100 units/mL subcutaneous injection   Yes No   Sig: Inject 24 Units under the skin   insulin lispro (HUMALOG) 100 units/mL injection   Yes No   Sig: Inject 12 Units under the skin   lidocaine (LIDODERM) 5 %   No No   Sig: Apply 1 patch topically daily Remove & Discard patch within 12 hours or as directed by MD   liraglutide (VICTOZA) injection   No No   Sig: Inject 0 6mg daily for one week then increase to 1 2mg daily   lisinopril (ZESTRIL) 5 mg tablet   No No   Sig: Take 1 tablet (5 mg total) by mouth daily for 30 days   melatonin 3 mg   No No   Sig: Take 2 tablets (6 mg total) by mouth daily at bedtime   mirtazapine (REMERON) 30 mg tablet   No No   Sig: Take 1 tablet (30 mg total) by mouth daily at bedtime for 30 days   omeprazole (PriLOSEC) 40 MG capsule   No No Sig: Take 1 capsule (40 mg total) by mouth daily for 30 days   oxyCODONE (ROXICODONE) 30 MG immediate release tablet   Yes No   Sig: Take 15 mg by mouth every 6 (six) hours    rivaroxaban (XARELTO) 20 mg tablet   Yes No   Sig: Take 20 mg by mouth   senna (SENOKOT) 8 6 mg   No No   Sig: Take 1 tablet (8 6 mg total) by mouth daily at bedtime for 30 days   tamsulosin (FLOMAX) 0 4 mg   No No   Sig: Take 1 capsule (0 4 mg total) by mouth daily at bedtime for 30 days   venlafaxine 37 5 mg 24 hr tablet   No No   Sig: Take 1 tablet (37 5 mg total) by mouth daily with breakfast      Facility-Administered Medications: None       Past Medical History:   Diagnosis Date    Anxiety     Depression     Diabetes mellitus ([facility] [address] )     Hypertension        Past Surgical History:   Procedure Laterality Date    FEMUR FRACTURE SURGERY Right     GASTRIC BYPASS  11/08/2011    Managed by: True Piña (General Surgery)   [address]  02/13/2013    Incisional hernia repair Managed by: True Piña (General Surgery)    TONSILLECTOMY  2010       Family History   Problem Relation Age of Onset    Diabetes Mother     Hypertension Mother     Hypertension Father      I have reviewed and agree with the history as documented  Social History     Tobacco Use    Smoking status: Never Smoker    Smokeless tobacco: Never Used   Substance Use Topics    Alcohol use: Never     Frequency: Never    Drug use: Never        Review of Systems   Unable to perform ROS: Mental status change       Physical Exam  Physical Exam   Constitutional: He appears well-developed and well-nourished  No distress  HENT:   Head: Normocephalic and atraumatic  Right Ear: External ear normal    Left Ear: External ear normal    Nose: Nose normal    Mouth/Throat: Oropharynx is clear and moist    Eyes: Right eye exhibits no discharge  Left eye exhibits no discharge  No scleral icterus  Pupils 1 mm bilateral with dystonic gaze   Neck: Neck supple   No JVD present  No tracheal deviation present  Cardiovascular: Normal rate, regular rhythm and intact distal pulses  Pulmonary/Chest: Effort normal and breath sounds normal  No stridor  No respiratory distress  He has no wheezes  He has no rales  Abdominal: Soft  Bowel sounds are normal  He exhibits no distension  There is no tenderness  There is no rebound and no guarding  Musculoskeletal: Normal range of motion  He exhibits no edema, tenderness or deformity  Neurological: No sensory deficit  Lethargic but arousable to verbal stimuli without any focal motor deficits oriented to person and place but not time  Garbled speech but understandable  Weston coma Scale is 13   3 for eyes 4 for verbal and fixed 6 for motor   Skin: Skin is warm and dry  No rash noted  He is not diaphoretic  Psychiatric:   Lethargic but arousable interactive and answering questions appropriately   Nursing note and vitals reviewed        Vital Signs  ED Triage Vitals [08/09/19 0959]   Temperature Pulse Respirations Blood Pressure SpO2   97 8 °F (36 6 °C) 81 20 126/81 95 %      Temp Source Heart Rate Source Patient Position - Orthostatic VS BP Location FiO2 (%)   Tympanic Monitor Lying Right arm --      Pain Score       --           Vitals:    08/09/19 1245 08/09/19 1300 08/09/19 1315 08/09/19 1330   BP: 111/72 110/70 116/76 111/72   Pulse: 68 68 67 66   Patient Position - Orthostatic VS:             Visual Acuity  Visual Acuity      Most Recent Value   L Pupil Size (mm)  1   R Pupil Size (mm)  1          ED Medications  Medications - No data to display    Diagnostic Studies  Results Reviewed     Procedure Component Value Units Date/Time    Rapid drug screen, urine [[id number]]  (Abnormal) Collected:  08/09/19 1150    Lab Status:  Final result Specimen:  Urine, Catheter Updated:  08/09/19 1203     Amph/Meth UR Negative     Barbiturate Ur Negative     Benzodiazepine Urine Positive     Cocaine Urine Negative     Methadone Urine Positive Opiate Urine Negative     PCP Ur Negative     THC Urine Negative    Narrative:       Presumptive report  If requested, specimen will be sent to reference lab for confirmation  FOR MEDICAL PURPOSES ONLY  IF CONFIRMATION NEEDED PLEASE CONTACT THE LAB WITHIN 5 DAYS  Drug Screen Cutoff Levels:  AMPHETAMINE/METHAMPHETAMINES  1000 ng/mL  BARBITURATES     200 ng/mL  BENZODIAZEPINES     200 ng/mL  COCAINE      300 ng/mL  METHADONE      300 ng/mL  OPIATES      300 ng/mL  PHENCYCLIDINE     25 ng/mL  THC       50 ng/mL      Valproic acid level, total [[id number]]  (Abnormal) Collected:  08/09/19 1023    Lab Status:  Final result Specimen:  Blood from Arm, Right Updated:  08/09/19 1107     Valproic Acid, Total <3 ug/mL     Ethanol [[id number]]  (Normal) Collected:  08/09/19 1023    Lab Status:  Final result Specimen:  Blood from Arm, Right Updated:  08/09/19 1059     Ethanol Lvl <3 mg/dL     Salicylate level [[id number]]  (Abnormal) Collected:  08/09/19 1023    Lab Status:  Final result Specimen:  Blood from Arm, Right Updated:  94/00/00 0914     Salicylate Lvl <3 mg/dL     Acetaminophen level-If concentration is detectable, please discuss with medical  on call   [[id number]]  (Abnormal) Collected:  08/09/19 1023    Lab Status:  Final result Specimen:  Blood from Arm, Right Updated:  08/09/19 1053     Acetaminophen Level <2 ug/mL     Comprehensive metabolic panel [[id number]]  (Abnormal) Collected:  08/09/19 1023    Lab Status:  Final result Specimen:  Blood from Arm, Right Updated:  08/09/19 1051     Sodium 139 mmol/L      Potassium 4 1 mmol/L      Chloride 105 mmol/L      CO2 28 mmol/L      ANION GAP 6 mmol/L      BUN 16 mg/dL      Creatinine 0 87 mg/dL      Glucose 180 mg/dL      Calcium 8 1 mg/dL      AST 19 U/L      ALT 29 U/L      Alkaline Phosphatase 72 U/L      Total Protein 6 1 g/dL      Albumin 3 2 g/dL      Total Bilirubin 0 20 mg/dL      eGFR 96 ml/min/1 73sq m     Narrative:       National Kidney Disease Foundation guidelines for Chronic Kidney Disease (CKD):     Stage 1 with normal or high GFR (GFR > 90 mL/min/1 73 square meters)    Stage 2 Mild CKD (GFR = 60-89 mL/min/1 73 square meters)    Stage 3A Moderate CKD (GFR = 45-59 mL/min/1 73 square meters)    Stage 3B Moderate CKD (GFR = 30-44 mL/min/1 73 square meters)    Stage 4 Severe CKD (GFR = 15-29 mL/min/1 73 square meters)    Stage 5 End Stage CKD (GFR <15 mL/min/1 73 square meters)  Note: GFR calculation is accurate only with a steady state creatinine    Protime-INR [[id number]]  (Normal) Collected:  08/09/19 1023    Lab Status:  Final result Specimen:  Blood from Arm, Right Updated:  08/09/19 1048     Protime 12 7 seconds      INR 0 98    APTT [[id number]]  (Normal) Collected:  08/09/19 1023    Lab Status:  Final result Specimen:  Blood from Arm, Right Updated:  08/09/19 1048     PTT 26 seconds     CBC and differential [[id number]]  (Abnormal) Collected:  08/09/19 1023    Lab Status:  Final result Specimen:  Blood from Arm, Right Updated:  08/09/19 1034     WBC 7 39 Thousand/uL      RBC 3 76 Million/uL      Hemoglobin 10 9 g/dL      Hematocrit 33 1 %      MCV 88 fL      MCH 29 0 pg      MCHC 32 9 g/dL      RDW 14 1 %      MPV 10 5 fL      Platelets 869 Thousands/uL      nRBC 0 /100 WBCs      Neutrophils Relative 67 %      Immat GRANS % 0 %      Lymphocytes Relative 20 %      Monocytes Relative 9 %      Eosinophils Relative 3 %      Basophils Relative 1 %      Neutrophils Absolute 5 03 Thousands/µL      Immature Grans Absolute 0 03 Thousand/uL      Lymphocytes Absolute 1 45 Thousands/µL      Monocytes Absolute 0 64 Thousand/µL      Eosinophils Absolute 0 19 Thousand/µL      Basophils Absolute 0 05 Thousands/µL     Fingerstick Glucose (POCT) [[id number]]  (Abnormal) Collected:  08/09/19 1021    Lab Status:  Final result Updated:  08/09/19 1022     POC Glucose 189 mg/dl                  CT head without contrast   Final Result by Phi Cutler MD (08/09 1146)      No acute intracranial abnormality  Workstation performed: [id number]                    Procedures  ECG 12 Lead Documentation Only  Date/Time: 8/9/2019 12:13 PM  Performed by: Ginny Mcdonald DO  Authorized by: Ginny Mcdonald DO     ECG reviewed by me, the ED Provider: yes    Patient location:  ED  Rate:     ECG rate:  75  Rhythm:     Rhythm: sinus rhythm    Conduction:     Conduction: normal    T waves:     T waves: normal             ED Course  ED Course as of Aug 09 1353   Fri Aug 09, 2019   1139 Condition unchanged still arousable to verbal stimuli      1344 Patient now awake alert interactive and ready for discharge                                  MDM  Number of Diagnoses or Management Options  Diagnosis management comments: Altered mental status after taking methadone may be secondary to the medication but patient is on Xarelto will check CT scan to rule out intracranial hemorrhage no over sign of trauma  Amount and/or Complexity of Data Reviewed  Clinical lab tests: ordered  Tests in the radiology section of CPT®: ordered        Disposition  Final diagnoses:   Narcotic overdose ([address] )     Time reflects when diagnosis was documented in both MDM as applicable and the Disposition within this note     Time User Action Codes Description Comment    8/9/2019  1:51 PM Jesús Yin 939 overdose [facility])       ED Disposition     ED Disposition Condition Date/Time Comment    Discharge Stable Fri Aug 9, 2019  1:51 PM Annella Goodpasture discharge to home/self care  Follow-up Information     Follow up With Specialties Details Why Servando Granger MD Family Medicine In 1 week Follow-up on medications and also with the methadone clinic [address] [facility]  [phone]            Patient's Medications   Discharge Prescriptions    NALOXONE HCL (NARCAN) 4 MG/0 1ML LIQD    0 1 mL (4 mg total) into each nostril as needed (For narcotic overdose)       Start Date: 8/9/2019  End Date: --       Order Dose: 4 mg       Quantity: 1 each    Refills: 0     No discharge procedures on file      ED Provider  Electronically Signed by           Rajat Marcial DO  08/09/19 6579

## 2019-08-09 NOTE — ED NOTES
Pt awake and alert  Trying to contact the [employer] bus to get a ride home        Francisco Javier Ashraf RN  08/09/19 9896

## 2019-09-11 ENCOUNTER — OFFICE VISIT (OUTPATIENT)
Dept: FAMILY MEDICINE CLINIC | Facility: CLINIC | Age: 59
End: 2019-09-11
Payer: COMMERCIAL

## 2019-09-11 VITALS
SYSTOLIC BLOOD PRESSURE: 110 MMHG | HEART RATE: 88 BPM | DIASTOLIC BLOOD PRESSURE: 60 MMHG | RESPIRATION RATE: 17 BRPM | HEIGHT: 67 IN | OXYGEN SATURATION: 96 % | TEMPERATURE: 97.6 F | BODY MASS INDEX: 24.64 KG/M2 | WEIGHT: 157 LBS

## 2019-09-11 DIAGNOSIS — K62.3 RECTAL PROLAPSE: Primary | ICD-10-CM

## 2019-09-11 DIAGNOSIS — Z23 NEED FOR IMMUNIZATION AGAINST INFLUENZA: ICD-10-CM

## 2019-09-11 DIAGNOSIS — K59.03 DRUG-INDUCED CONSTIPATION: ICD-10-CM

## 2019-09-11 PROCEDURE — 90471 IMMUNIZATION ADMIN: CPT

## 2019-09-11 PROCEDURE — 3008F BODY MASS INDEX DOCD: CPT | Performed by: FAMILY MEDICINE

## 2019-09-11 PROCEDURE — 99213 OFFICE O/P EST LOW 20 MIN: CPT | Performed by: FAMILY MEDICINE

## 2019-09-11 PROCEDURE — 90682 RIV4 VACC RECOMBINANT DNA IM: CPT

## 2019-09-11 RX ORDER — POLYETHYLENE GLYCOL 3350 17 G/17G
17 POWDER, FOR SOLUTION ORAL DAILY
Qty: 578 G | Refills: 1 | Status: SHIPPED | OUTPATIENT
Start: 2019-09-11

## 2019-09-11 NOTE — PATIENT INSTRUCTIONS
Rectal Prolapse   AMBULATORY CARE:   A rectal prolapse  is a condition that causes your rectum to come through the anus  The rectum is the end of your bowel  A prolapse may happen during your bowel movement  A prolapse may happen more often in women after childbirth or who are older than 50 years  Call 911 for any of the following:   · You have trouble breathing  · Your heart is beating faster than usual   Seek care immediately if:   · You have severe pain in your abdomen  · Your abdomen looks bigger than usual      · You see larger amounts of blood in your bowel movement than before  · Blood from your rectum soaks through your underwear  Contact your healthcare provider if:   · You have a fever  · You have nausea or are vomiting  · You see larger amounts of blood in your bowel movement than before  · You have questions or concerns about your condition or care  Common symptoms include the following:   · Pain or discomfort during a bowel movement    · A swollen, red mass coming from your anus    · Bleeding or mucus from your rectum or in your stool    · A small amount of blood in your bowel movement    · Feeling like you still need to have a bowel movement after you use the bathroom    · Trouble controlling your bowel movements  Treatment for a rectal prolapse:  Rectal prolapse may get better without treatment  Medicine may be prescribed to decrease the risk of a rectal prolapse  You may need to have your rectum placed back inside of your anus  You may need surgery or injections if other treatments do not work  How to do a manual reduction:  Manual reduction is a procedure you can do to place your rectum back inside of the anus  Your healthcare provider will show you how to do a manual reduction  You may need a family member to help you with manual reduction  The following are general steps to follow  Your healthcare provider may give you specific steps to follow    · Your healthcare provider may tell you to apply sugar to your rectum before manual reduction  This may help decrease the swelling of your rectum, and make it easier to put back inside your anus  Ask your healthcare provider about applying sugar to your rectum  · Lie on your back with your knees bent  · Wash your hands and put on gloves  Lubricate your glove with petroleum jelly  · Hold your rectum on both sides of the anus  Gently apply firm, steady pressure on your rectum and push it into your anus  You may need to apply pressure for several minutes if the bowel is swollen  Inspect your anus  You can use a mirror or have your family member inspect your anus  You should not see the rectum  If a prolapse happens again, you can repeat manual reduction  · You can hold the rectum in place with gauze and tape across your buttocks  Before you apply gauze, place a quarter size amount of petroleum jelly on the gauze  The petroleum jelly will prevent the gauze from sticking to your rectum  Remove the gauze as directed by your healthcare provider  Prevent a rectal prolapse:   · Eat more high-fiber foods  This may help decrease constipation by adding bulk and softness to your bowel movements  Your healthcare provider can help you create a meal plan that includes high-fiber foods  High fiber foods include fruit, vegetables, whole-grain breads, and cooked beans  · Increase the amount of liquid you drink  Liquids can help keep your bowel movements soft and prevent constipation  Ask your healthcare provider how much liquid you should drink each day  · Exercise your pelvic muscles  Kegel exercises strengthen the pelvic muscles  These exercises involve tightening and relaxing vaginal and rectal muscles  Kegel exercises can make the rectal muscles stronger and improve bowel control  Ask your healthcare provider for more information on how to do kegel exercises  · Do not sit for long amounts of time    You may put too much pressure on your anus  Pressure on your anus may cause a rectal prolapse  Follow up with your healthcare provider as directed:  Write down your questions so you remember to ask them during your visits  © 2017 [employer] [employer] Information is for End User's use only and may not be sold, redistributed or otherwise used for commercial purposes  All illustrations and images included in CareNotes® are the copyrighted property of A D A M [employer] Inc  or [employer]  The above information is an  only  It is not intended as medical advice for individual conditions or treatments  Talk to your doctor, nurse or pharmacist before following any medical regimen to see if it is safe and effective for you

## 2019-09-11 NOTE — PROGRESS NOTES
Assessment/Plan:    Rectal prolapse  Patient appears to have a partial rectal prolapse  Will refer to Colorectal Surgery for further evaluation and management  Drug-induced constipation  Will start Miralax for his constipation  Diagnoses and all orders for this visit:    Rectal prolapse  -     Ambulatory referral to Colorectal Surgery; Future    Need for immunization against influenza  -     influenza vaccine, [id number], quadrivalent, recombinant, PF, 0 5 mL, for patients 18 yr+ (FLUBLOK)    Drug-induced constipation  -     polyethylene glycol (GLYCOLAX) powder; Take 17 g by mouth daily          Subjective: Patient is here for rectal bleeding   Patient would like flu vaccine      Patient ID: Kaylee Corado is a 64 y o  male  Patient presents to clinic today for an acute visit  He notes to have had bright red blood per rectum for the past month  He has noted blood in the toilet and some staining in his underwear  He does have a history of constipation due to taking iron supplements and pain medications  The following portions of the patient's history were reviewed and updated as appropriate: allergies, current medications, past family history, past medical history, past social history, past surgical history and problem list     Review of Systems   Constitutional: Negative for fever  Gastrointestinal: Positive for blood in stool and constipation  Objective:      /60 (BP Location: Left arm, Patient Position: Sitting, Cuff Size: Standard)   Pulse 88   Temp 97 6 °F (36 4 °C) (Oral)   Resp 17   Ht 5' 7" (1 702 m)   Wt 71 2 kg (157 lb)   SpO2 96%   BMI 24 59 kg/m²          Physical Exam   Constitutional: He is oriented to person, place, and time  He appears well-developed and well-nourished  He is cooperative  HENT:   Head: Normocephalic and atraumatic     Right Ear: Hearing and external ear normal    Left Ear: Hearing and external ear normal    Eyes: Conjunctivae and lids are normal    Cardiovascular: Normal rate  Pulmonary/Chest: Effort normal    Genitourinary:   Genitourinary Comments: Patient appears to have rectal tissue at the opening of the anus  Musculoskeletal: Normal range of motion  Neurological: He is alert and oriented to person, place, and time  Gait normal    Skin: Skin is warm and dry  Psychiatric: He has a normal mood and affect  His speech is normal and behavior is normal    Nursing note and vitals reviewed

## 2019-09-11 NOTE — ASSESSMENT & PLAN NOTE
Patient appears to have a partial rectal prolapse  Will refer to Colorectal Surgery for further evaluation and management

## 2019-09-13 DIAGNOSIS — K62.3 RECTAL PROLAPSE: Primary | ICD-10-CM

## 2019-09-16 ENCOUNTER — TELEPHONE (OUTPATIENT)
Dept: INTERNAL MEDICINE CLINIC | Facility: CLINIC | Age: 59
End: 2019-09-16

## 2019-09-16 NOTE — TELEPHONE ENCOUNTER
patient called to schedule appt with colon rectal specialist  dx rectal prolapse  there's an order from PCP       Morenitai requested that I task her with the info  --

## 2019-09-17 NOTE — TELEPHONE ENCOUNTER
I scheduled this patient for Monday 9/23 with Dr Willy Laurent  I spoke to the patient and made him aware

## 2019-09-23 ENCOUNTER — TELEPHONE (OUTPATIENT)
Dept: DERMATOLOGY | Facility: CLINIC | Age: 59
End: 2019-09-23

## 2019-09-23 ENCOUNTER — CONSULT (OUTPATIENT)
Dept: MULTI SPECIALTY CLINIC | Facility: CLINIC | Age: 59
End: 2019-09-23

## 2019-09-23 VITALS
WEIGHT: 164.9 LBS | HEIGHT: 67 IN | BODY MASS INDEX: 25.88 KG/M2 | DIASTOLIC BLOOD PRESSURE: 64 MMHG | TEMPERATURE: 97.9 F | HEART RATE: 88 BPM | SYSTOLIC BLOOD PRESSURE: 104 MMHG

## 2019-09-23 DIAGNOSIS — K64.8 HEMORRHOIDS, INTERNAL: Primary | Chronic | ICD-10-CM

## 2019-09-23 DIAGNOSIS — K62.3 RECTAL PROLAPSE: ICD-10-CM

## 2019-09-23 PROCEDURE — 46221 LIGATION OF HEMORRHOID(S): CPT | Performed by: SURGERY

## 2019-09-23 PROCEDURE — 99244 OFF/OP CNSLTJ NEW/EST MOD 40: CPT | Performed by: SURGERY

## 2019-09-23 PROCEDURE — G0102 PROSTATE CA SCREENING; DRE: HCPCS | Performed by: SURGERY

## 2019-09-23 RX ORDER — PEN NEEDLE, DIABETIC 31 GX5/16"
NEEDLE, DISPOSABLE MISCELLANEOUS
Refills: 5 | COMMUNITY
Start: 2019-09-12

## 2019-09-23 NOTE — PROGRESS NOTES
Assessment/Plan:    Hemorrhoids, internal  Right anterior hemorrhoid banded in the office  Will schedule patient for colonoscopy  Will schedule follow up after the colonoscopy  Continue multiple stool softeners  Avoid sitting and strain on the toilet       Diagnoses and all orders for this visit:    Hemorrhoids, internal    Rectal prolapse  -     Ambulatory referral to Colorectal Surgery    Other orders  -     GLOBAL EASE INJECT PEN NEEDLES 31G X 8 MM MISC; USE AS DIRECTED TO INJECT INSULIN DAILY          Subjective:      Patient ID: Juanita Roberts is a 64 y o  male  HPI   65 yo male who presents with 3 weeks of pain and some bright red blood per rectum with stooling  Patient has a history of hemorrhoids that were previously managed with stool softeners, diet, and habit changes  Patient is a chronic opioid user, currently on methadone  States he spend about 20 minutes at a time on the toilet stooling  Also endorses itching  The following portions of the patient's history were reviewed and updated as appropriate: allergies, current medications, past family history, past medical history, past social history, past surgical history and problem list     Review of Systems    10/14 systems reviewed and negative except those mentioned in the HPI  Objective:  /64 (BP Location: Right arm, Patient Position: Sitting, Cuff Size: Adult)   Pulse 88   Temp 97 9 °F (36 6 °C) (Oral)   Ht 5' 7" (1 702 m)   Wt 74 8 kg (164 lb 14 5 oz)   BMI 25 83 kg/m²          Physical Exam   Constitutional: He is oriented to person, place, and time  He appears well-developed and well-nourished  HENT:   Head: Normocephalic and atraumatic  Nose: Nose normal    Mouth/Throat: Oropharynx is clear and moist    Eyes: Pupils are equal, round, and reactive to light  Conjunctivae and EOM are normal    Neck: Normal range of motion  Neck supple  No JVD present  No tracheal deviation present     Cardiovascular: Normal rate and regular rhythm  Pulmonary/Chest: Effort normal and breath sounds normal  No respiratory distress  Abdominal: Soft  He exhibits no distension  There is no tenderness  Genitourinary: Prostate normal    Genitourinary Comments: Easily reducible protruding hemorrhoids noted, no active bleeding   Musculoskeletal: He exhibits no edema, tenderness or deformity  Neurological: He is alert and oriented to person, place, and time  Skin: Skin is warm and dry  Psychiatric: He has a normal mood and affect  His behavior is normal  Judgment and thought content normal    Nursing note and vitals reviewed  Bed side procedure: Hemorrhoid Banding    Provider: Dr Johan Scott  Assistant: Jasvir Bear MD    Procedure description:  Patient was placed in the kneeling position  An anoscope was placed into the anus revealing a large right anterior internal hemorrhoid  A rubber band was placed over the internal hemorrhoid with minor bleeding  Everything was hemostatic at the end of the procedure  Patient tolerated the procedure well without any complications

## 2019-09-23 NOTE — TELEPHONE ENCOUNTER
Called patient from the waiting list; Left message on voicemail to return my call if they still would like an appointment     Called patient from the waiting list; Left message on voicemail to return my call if they still would like an appointment

## 2019-09-30 ENCOUNTER — TELEPHONE (OUTPATIENT)
Dept: GASTROENTEROLOGY | Facility: CLINIC | Age: 59
End: 2019-09-30

## 2019-10-25 ENCOUNTER — TELEPHONE (OUTPATIENT)
Dept: FAMILY MEDICINE CLINIC | Facility: CLINIC | Age: 59
End: 2019-10-25

## 2019-10-25 DIAGNOSIS — H53.9 VISUAL DISTURBANCE: Primary | ICD-10-CM

## 2019-10-25 NOTE — TELEPHONE ENCOUNTER
Patient called and stated woke up today and was seeing black spots   He made a appt to go see Dr Komal Vidales at [facility] in [place] today   Pt needs a ref for doctor

## 2019-11-11 ENCOUNTER — OFFICE VISIT (OUTPATIENT)
Dept: MULTI SPECIALTY CLINIC | Facility: CLINIC | Age: 59
End: 2019-11-11

## 2019-11-11 VITALS
BODY MASS INDEX: 27.2 KG/M2 | HEIGHT: 67 IN | TEMPERATURE: 98 F | DIASTOLIC BLOOD PRESSURE: 78 MMHG | SYSTOLIC BLOOD PRESSURE: 100 MMHG | OXYGEN SATURATION: 96 % | WEIGHT: 173.28 LBS | HEART RATE: 85 BPM

## 2019-11-11 DIAGNOSIS — K64.8 HEMORRHOIDS, INTERNAL: Chronic | ICD-10-CM

## 2019-11-11 DIAGNOSIS — E11.65 UNCONTROLLED TYPE 2 DIABETES MELLITUS WITH HYPERGLYCEMIA (HCC): Primary | ICD-10-CM

## 2019-11-11 PROCEDURE — 99213 OFFICE O/P EST LOW 20 MIN: CPT | Performed by: COLON & RECTAL SURGERY

## 2019-11-11 RX ADMIN — Medication 16 G: at 14:58

## 2019-11-11 NOTE — PROGRESS NOTES
Office Visit - Colorectal Surgery  Maco Fraser MRN: [id number]  Encounter: [id number]    Assessment and Plan    Problem List Items Addressed This Visit        Digestive    Hemorrhoids, internal (Chronic)       Endocrine    Diabetes type 2, uncontrolled ([address] ) - Primary        Patient with symptomatic hemorrhoidal disease s/p rubber band ligation of R internal hemorrhoidal column  Symptoms have improved but still with some blood on toilet paper     -Recommend repeat colonoscopy given rectal bleeding and patient age  Patient will call to schedule at his convenience  -Continue high fiber diet and adequate fluid hydration  -Continue follow up with PCP for other chronic medical issues    Patient also has history of diabetes and states that he did not eat lunch today  His blood glucose was 63 in the office today and he complained of lightheadedness  He was given three glucose tabs and recheck blood glucose was 103  His hypoglycemia symptoms resolved prior to being released from the clinic  Chief Complaint:  Maco Fraser is a 64 y o  male who presents for Follow-up (Rectal Prolapse)    Subjective  The patient was seen on 9/23/19 in the Colorectal surgery clinic for prolapsing and bleeding internal hemorrhoids  He underwent rubber banding of the right anterior column which was large  He had some pain for two days after the procedure but this has resolved  He still sees some intermittent blood on the toilet paper with bowel movements  He continues with fiber supplementation and adequate hydration  He denies prolapsing or itching  He was instructed to follow up for a colonoscopy but prefers to follow up with a different surgeon for personal reasons  He states that his weight fluctuates but denies night sweats or fevers  He states that his last colonoscopy was about 6 years ago at [facility] and states that the exam was normal with no polyps removed  Prior history of gastric bypass       Past Medical History  Past Medical History: Diagnosis Date    Anxiety     Depression     Diabetes mellitus ([facility] [address] )     Hemorrhoids, internal 9/23/2019    Hypertension        Past Surgical History  Past Surgical History:   Procedure Laterality Date    FEMUR FRACTURE SURGERY Right     GASTRIC BYPASS  11/08/2011    Managed by: Robin Hair (General Surgery)   [address]  02/13/2013    Incisional hernia repair Managed by: Robin Hair (General Surgery)    TONSILLECTOMY  2010       Family History  Family History   Problem Relation Age of Onset    Diabetes Mother     Hypertension Mother     Hypertension Father        Social History  Social History     Socioeconomic History    Marital status: Legally      Spouse name: Not on file    Number of children: Not on file    Years of education: Not on file    Highest education level: Not on file   Occupational History    Not on file   Social Needs    Financial resource strain: Not hard at all   [employer] insecurity:     Worry: Never true     Inability: Never true   [employer] needs:     Medical: No     Non-medical: No   Tobacco Use    Smoking status: Never Smoker    Smokeless tobacco: Never Used   Substance and Sexual Activity    Alcohol use: Never     Frequency: Never    Drug use: Never    Sexual activity: Not Currently   Lifestyle    Physical activity:     Days per week: 0 days     Minutes per session: 0 min    Stress:  Only a little   Relationships    Social connections:     Talks on phone: Patient refused     Gets together: Patient refused     Attends Worship service: Patient refused     Active member of club or organization: Patient refused     Attends meetings of clubs or organizations: Patient refused     Relationship status: Patient refused    Intimate partner violence:     Fear of current or ex partner: Patient refused     Emotionally abused: Patient refused     Physically abused: Patient refused     Forced sexual activity: Patient refused   Other Topics Concern    Not on file   Social History Narrative    Not on file        Medications  Current Outpatient Medications on File Prior to Visit   Medication Sig Dispense Refill    Continuous Blood Gluc Sensor (FREESTYLE STEVEN 14 DAY SENSOR) MISC 1 applicator by Does not apply route every 14 (fourteen) days 2 each 5    divalproex sodium (DEPAKOTE) 500 mg EC tablet Take 1 tablet (500 mg total) by mouth every 8 (eight) hours 30 tablet 2    fluticasone (FLONASE) 50 mcg/act nasal spray 1 spray into each nostril daily 1 Bottle 5    GLOBAL EASE INJECT PEN NEEDLES 31G X 8 MM MISC USE AS DIRECTED TO INJECT INSULIN DAILY  5    glycerin-hypromellose- (ARTIFICIAL TEARS) 0 2-0 2-1 % SOLN Apply to eye      Misc   Devices (CANE) MISC by Does not apply route daily Dx:  Frequent falls 1 each 0    ONE TOUCH ULTRA TEST test strip Test twice daily 30 each 5    ONETOUCH DELICA LANCETS 72J MISC USE TWICE A DAY (ICD 10 E11 9)  3    OZEMPIC 1 MG/DOSE SOPN INJECT 1MG SUBCUTANEOUS EVERY WEEK  2    polyethylene glycol (GLYCOLAX) powder Take 17 g by mouth daily 578 g 1    ALPRAZolam (XANAX) 2 MG tablet Take 2 mg by mouth 3 (three) times a day as needed for anxiety      amphetamine-dextroamphetamine (ADDERALL XR) 30 MG 24 hr capsule Take 60 mg by mouth      apixaban (ELIQUIS) 5 mg Take by mouth      atorvastatin (LIPITOR) 10 mg tablet Take 1 tablet (10 mg total) by mouth daily for 30 days 30 tablet 5    cholecalciferol (VITAMIN D3) 1,000 units tablet Take 1 tablet (1,000 Units total) by mouth daily for 30 days 30 tablet 5    clonazePAM (KlonoPIN) 2 mg tablet Take 1 tablet by mouth 3 (three) times a day      glipiZIDE (GLUCOTROL) 5 mg tablet Take 1 tablet (5 mg total) by mouth daily for 30 days 30 tablet 5    hydrOXYzine HCL (ATARAX) 25 mg tablet Take 1 tablet (25 mg total) by mouth every 6 (six) hours as needed for anxiety for up to 30 days 90 tablet 1    insulin glargine (LANTUS SOLOSTAR) 100 units/mL injection pen Inject under the skin      insulin glargine (LANTUS) 100 units/mL subcutaneous injection Inject 24 Units under the skin      insulin lispro (HUMALOG) 100 units/mL injection Inject 12 Units under the skin      lidocaine (LIDODERM) 5 % Apply 1 patch topically daily Remove & Discard patch within 12 hours or as directed by MD (Patient not taking: Reported on 11/11/2019) 30 patch 1    liraglutide (VICTOZA) injection Inject 0 6mg daily for one week then increase to 1 2mg daily (Patient not taking: Reported on 11/11/2019) 5 pen 2    lisinopril (ZESTRIL) 5 mg tablet Take 1 tablet (5 mg total) by mouth daily for 30 days 30 tablet 5    melatonin 3 mg Take 2 tablets (6 mg total) by mouth daily at bedtime (Patient not taking: Reported on 11/11/2019) 60 tablet 1    mirtazapine (REMERON) 30 mg tablet Take 1 tablet (30 mg total) by mouth daily at bedtime for 30 days 30 tablet 5    Naloxone HCl (NARCAN) 4 MG/0 1ML LIQD 0 1 mL (4 mg total) into each nostril as needed (For narcotic overdose) (Patient not taking: Reported on 11/11/2019) 1 each 0    omeprazole (PriLOSEC) 40 MG capsule Take 1 capsule (40 mg total) by mouth daily for 30 days 30 capsule 5    oxyCODONE (ROXICODONE) 30 MG immediate release tablet Take 15 mg by mouth every 6 (six) hours       rivaroxaban (XARELTO) 20 mg tablet Take 20 mg by mouth      senna (SENOKOT) 8 6 mg Take 1 tablet (8 6 mg total) by mouth daily at bedtime for 30 days 60 each 1    tamsulosin (FLOMAX) 0 4 mg Take 1 capsule (0 4 mg total) by mouth daily at bedtime for 30 days 30 capsule 5    UNKNOWN TO PATIENT       venlafaxine 37 5 mg 24 hr tablet Take 1 tablet (37 5 mg total) by mouth daily with breakfast 30 tablet 2     No current facility-administered medications on file prior to visit  Allergies  Allergies   Allergen Reactions    Aspirin Shortness Of Breath    Penicillins        Review of Systems   Constitutional: Negative for activity change and appetite change     Eyes: Negative for discharge and itching  Respiratory: Negative for cough and shortness of breath  Cardiovascular: Negative for chest pain and leg swelling  Gastrointestinal: Positive for blood in stool  Negative for abdominal distention, abdominal pain and rectal pain  Genitourinary: Negative for dysuria  Musculoskeletal: Negative for back pain  Objective  Vitals:    11/11/19 1316   BP: 100/78   Pulse: 85   Temp: 98 °F (36 7 °C)   SpO2: 96%       Physical Exam   Constitutional: He is oriented to person, place, and time  He appears well-developed and well-nourished  HENT:   Head: Normocephalic and atraumatic  Eyes: Pupils are equal, round, and reactive to light  EOM are normal    Neck: No tracheal deviation present  No thyromegaly present  Cardiovascular: Normal rate and regular rhythm  Pulmonary/Chest: Effort normal  No respiratory distress  Abdominal: Soft  He exhibits no distension  There is no tenderness  Genitourinary:   Genitourinary Comments: Normal tone, no blood in stool, no masses palpated  Musculoskeletal: He exhibits no edema  Neurological: He is alert and oriented to person, place, and time  Skin: Skin is warm and dry

## 2019-11-13 ENCOUNTER — LAB (OUTPATIENT)
Dept: LAB | Facility: HOSPITAL | Age: 59
End: 2019-11-13
Payer: COMMERCIAL

## 2019-11-13 ENCOUNTER — TRANSCRIBE ORDERS (OUTPATIENT)
Dept: LAB | Facility: HOSPITAL | Age: 59
End: 2019-11-13

## 2019-11-13 DIAGNOSIS — E11.649 UNCONTROLLED TYPE 2 DIABETES MELLITUS WITH HYPOGLYCEMIA, UNSPECIFIED HYPOGLYCEMIA COMA STATUS (HCC): ICD-10-CM

## 2019-11-13 DIAGNOSIS — E11.649 UNCONTROLLED TYPE 2 DIABETES MELLITUS WITH HYPOGLYCEMIA, UNSPECIFIED HYPOGLYCEMIA COMA STATUS (HCC): Primary | ICD-10-CM

## 2019-11-13 LAB
25(OH)D3 SERPL-MCNC: 21.8 NG/ML (ref 30–100)
ALBUMIN SERPL BCP-MCNC: 3.5 G/DL (ref 3.5–5)
ALP SERPL-CCNC: 79 U/L (ref 46–116)
ALT SERPL W P-5'-P-CCNC: 19 U/L (ref 12–78)
ANION GAP SERPL CALCULATED.3IONS-SCNC: 4 MMOL/L (ref 4–13)
AST SERPL W P-5'-P-CCNC: 19 U/L (ref 5–45)
BASOPHILS # BLD AUTO: 0.07 THOUSANDS/ΜL (ref 0–0.1)
BASOPHILS NFR BLD AUTO: 1 % (ref 0–1)
BILIRUB SERPL-MCNC: 0.35 MG/DL (ref 0.2–1)
BUN SERPL-MCNC: 15 MG/DL (ref 5–25)
CALCIUM SERPL-MCNC: 9 MG/DL (ref 8.3–10.1)
CHLORIDE SERPL-SCNC: 105 MMOL/L (ref 100–108)
CO2 SERPL-SCNC: 32 MMOL/L (ref 21–32)
CREAT SERPL-MCNC: 1.01 MG/DL (ref 0.6–1.3)
CREAT UR-MCNC: 98.3 MG/DL
EOSINOPHIL # BLD AUTO: 0.35 THOUSAND/ΜL (ref 0–0.61)
EOSINOPHIL NFR BLD AUTO: 5 % (ref 0–6)
ERYTHROCYTE [DISTWIDTH] IN BLOOD BY AUTOMATED COUNT: 12.3 % (ref 11.6–15.1)
EST. AVERAGE GLUCOSE BLD GHB EST-MCNC: 163 MG/DL
GFR SERPL CREATININE-BSD FRML MDRD: 82 ML/MIN/1.73SQ M
GLUCOSE P FAST SERPL-MCNC: 91 MG/DL (ref 65–99)
HBA1C MFR BLD: 7.3 % (ref 4.2–6.3)
HCT VFR BLD AUTO: 38.5 % (ref 36.5–49.3)
HGB BLD-MCNC: 11.9 G/DL (ref 12–17)
IMM GRANULOCYTES # BLD AUTO: 0.02 THOUSAND/UL (ref 0–0.2)
IMM GRANULOCYTES NFR BLD AUTO: 0 % (ref 0–2)
LYMPHOCYTES # BLD AUTO: 1.4 THOUSANDS/ΜL (ref 0.6–4.47)
LYMPHOCYTES NFR BLD AUTO: 19 % (ref 14–44)
MCH RBC QN AUTO: 28.1 PG (ref 26.8–34.3)
MCHC RBC AUTO-ENTMCNC: 30.9 G/DL (ref 31.4–37.4)
MCV RBC AUTO: 91 FL (ref 82–98)
MICROALBUMIN UR-MCNC: 274 MG/L (ref 0–20)
MICROALBUMIN/CREAT 24H UR: 279 MG/G CREATININE (ref 0–30)
MONOCYTES # BLD AUTO: 0.68 THOUSAND/ΜL (ref 0.17–1.22)
MONOCYTES NFR BLD AUTO: 9 % (ref 4–12)
NEUTROPHILS # BLD AUTO: 4.68 THOUSANDS/ΜL (ref 1.85–7.62)
NEUTS SEG NFR BLD AUTO: 66 % (ref 43–75)
NRBC BLD AUTO-RTO: 0 /100 WBCS
PLATELET # BLD AUTO: 226 THOUSANDS/UL (ref 149–390)
PMV BLD AUTO: 10.8 FL (ref 8.9–12.7)
POTASSIUM SERPL-SCNC: 4 MMOL/L (ref 3.5–5.3)
PROT SERPL-MCNC: 7.5 G/DL (ref 6.4–8.2)
RBC # BLD AUTO: 4.23 MILLION/UL (ref 3.88–5.62)
SODIUM SERPL-SCNC: 141 MMOL/L (ref 136–145)
WBC # BLD AUTO: 7.2 THOUSAND/UL (ref 4.31–10.16)

## 2019-11-13 PROCEDURE — 82306 VITAMIN D 25 HYDROXY: CPT

## 2019-11-13 PROCEDURE — 83036 HEMOGLOBIN GLYCOSYLATED A1C: CPT

## 2019-11-13 PROCEDURE — 82570 ASSAY OF URINE CREATININE: CPT

## 2019-11-13 PROCEDURE — 84402 ASSAY OF FREE TESTOSTERONE: CPT

## 2019-11-13 PROCEDURE — 84403 ASSAY OF TOTAL TESTOSTERONE: CPT

## 2019-11-13 PROCEDURE — 82043 UR ALBUMIN QUANTITATIVE: CPT

## 2019-11-13 PROCEDURE — 85025 COMPLETE CBC W/AUTO DIFF WBC: CPT

## 2019-11-13 PROCEDURE — 36415 COLL VENOUS BLD VENIPUNCTURE: CPT

## 2019-11-13 PROCEDURE — 80053 COMPREHEN METABOLIC PANEL: CPT

## 2019-11-14 LAB
TESTOST FREE SERPL-MCNC: 1.7 PG/ML (ref 7.2–24)
TESTOST SERPL-MCNC: 292 NG/DL (ref 264–916)

## 2019-11-19 LAB
LEFT EYE DIABETIC RETINOPATHY: NORMAL
RIGHT EYE DIABETIC RETINOPATHY: NORMAL

## 2019-11-19 PROCEDURE — 2022F DILAT RTA XM EVC RTNOPTHY: CPT | Performed by: NURSE PRACTITIONER

## 2019-11-20 ENCOUNTER — APPOINTMENT (OUTPATIENT)
Dept: LAB | Facility: HOSPITAL | Age: 59
End: 2019-11-20
Payer: COMMERCIAL

## 2019-11-20 ENCOUNTER — TRANSCRIBE ORDERS (OUTPATIENT)
Dept: LAB | Facility: HOSPITAL | Age: 59
End: 2019-11-20

## 2019-11-20 DIAGNOSIS — E29.1 3-OXO-5 ALPHA-STEROID DELTA 4-DEHYDROGENASE DEFICIENCY: ICD-10-CM

## 2019-11-20 DIAGNOSIS — F19.20 COMBINATIONS OF DRUG DEPENDENCE EXCLUDING OPIOID TYPE DRUG, CONTINUOUS (HCC): ICD-10-CM

## 2019-11-20 DIAGNOSIS — E55.9 AVITAMINOSIS D: ICD-10-CM

## 2019-11-20 DIAGNOSIS — Z79.899 ENCOUNTER FOR LONG-TERM (CURRENT) USE OF OTHER MEDICATIONS: ICD-10-CM

## 2019-11-20 DIAGNOSIS — E29.1 3-OXO-5 ALPHA-STEROID DELTA 4-DEHYDROGENASE DEFICIENCY: Primary | ICD-10-CM

## 2019-11-20 LAB
25(OH)D3 SERPL-MCNC: 32.7 NG/ML (ref 30–100)
ALBUMIN SERPL BCP-MCNC: 3.3 G/DL (ref 3.5–5)
ALP SERPL-CCNC: 66 U/L (ref 46–116)
ALT SERPL W P-5'-P-CCNC: 17 U/L (ref 12–78)
ANION GAP SERPL CALCULATED.3IONS-SCNC: 7 MMOL/L (ref 4–13)
AST SERPL W P-5'-P-CCNC: 15 U/L (ref 5–45)
BASOPHILS # BLD AUTO: 0.05 THOUSANDS/ΜL (ref 0–0.1)
BASOPHILS NFR BLD AUTO: 1 % (ref 0–1)
BILIRUB SERPL-MCNC: 0.24 MG/DL (ref 0.2–1)
BUN SERPL-MCNC: 11 MG/DL (ref 5–25)
CALCIUM SERPL-MCNC: 8.7 MG/DL (ref 8.3–10.1)
CHLORIDE SERPL-SCNC: 107 MMOL/L (ref 100–108)
CHOLEST SERPL-MCNC: 85 MG/DL (ref 50–200)
CO2 SERPL-SCNC: 29 MMOL/L (ref 21–32)
CREAT SERPL-MCNC: 0.97 MG/DL (ref 0.6–1.3)
EOSINOPHIL # BLD AUTO: 0.45 THOUSAND/ΜL (ref 0–0.61)
EOSINOPHIL NFR BLD AUTO: 7 % (ref 0–6)
ERYTHROCYTE [DISTWIDTH] IN BLOOD BY AUTOMATED COUNT: 12.3 % (ref 11.6–15.1)
EST. AVERAGE GLUCOSE BLD GHB EST-MCNC: 160 MG/DL
FOLATE SERPL-MCNC: >20 NG/ML (ref 3.1–17.5)
FSH SERPL-ACNC: 9.5 MIU/ML (ref 0.7–10.8)
GFR SERPL CREATININE-BSD FRML MDRD: 86 ML/MIN/1.73SQ M
GLUCOSE P FAST SERPL-MCNC: 46 MG/DL (ref 65–99)
HBA1C MFR BLD: 7.2 % (ref 4.2–6.3)
HCT VFR BLD AUTO: 32.2 % (ref 36.5–49.3)
HDLC SERPL-MCNC: 59 MG/DL
HGB BLD-MCNC: 9.9 G/DL (ref 12–17)
IMM GRANULOCYTES # BLD AUTO: 0.02 THOUSAND/UL (ref 0–0.2)
IMM GRANULOCYTES NFR BLD AUTO: 0 % (ref 0–2)
LDLC SERPL CALC-MCNC: 21 MG/DL (ref 0–100)
LH SERPL-ACNC: 3.5 MIU/ML (ref 1.2–10.6)
LYMPHOCYTES # BLD AUTO: 1.45 THOUSANDS/ΜL (ref 0.6–4.47)
LYMPHOCYTES NFR BLD AUTO: 22 % (ref 14–44)
MCH RBC QN AUTO: 28.3 PG (ref 26.8–34.3)
MCHC RBC AUTO-ENTMCNC: 30.7 G/DL (ref 31.4–37.4)
MCV RBC AUTO: 92 FL (ref 82–98)
MONOCYTES # BLD AUTO: 0.64 THOUSAND/ΜL (ref 0.17–1.22)
MONOCYTES NFR BLD AUTO: 10 % (ref 4–12)
NEUTROPHILS # BLD AUTO: 4.01 THOUSANDS/ΜL (ref 1.85–7.62)
NEUTS SEG NFR BLD AUTO: 60 % (ref 43–75)
NONHDLC SERPL-MCNC: 26 MG/DL
NRBC BLD AUTO-RTO: 0 /100 WBCS
PLATELET # BLD AUTO: 200 THOUSANDS/UL (ref 149–390)
PMV BLD AUTO: 10.8 FL (ref 8.9–12.7)
POTASSIUM SERPL-SCNC: 4.6 MMOL/L (ref 3.5–5.3)
PROLACTIN SERPL-MCNC: 17.6 NG/ML (ref 2.5–17.4)
PROT SERPL-MCNC: 7 G/DL (ref 6.4–8.2)
RBC # BLD AUTO: 3.5 MILLION/UL (ref 3.88–5.62)
SODIUM SERPL-SCNC: 143 MMOL/L (ref 136–145)
T4 FREE SERPL-MCNC: 1.03 NG/DL (ref 0.76–1.46)
TRIGL SERPL-MCNC: 25 MG/DL
TSH SERPL DL<=0.05 MIU/L-ACNC: 1.59 UIU/ML (ref 0.36–3.74)
VIT B12 SERPL-MCNC: 486 PG/ML (ref 100–900)
WBC # BLD AUTO: 6.62 THOUSAND/UL (ref 4.31–10.16)

## 2019-11-20 PROCEDURE — 80307 DRUG TEST PRSMV CHEM ANLYZR: CPT

## 2019-11-20 PROCEDURE — 80061 LIPID PANEL: CPT

## 2019-11-20 PROCEDURE — 83002 ASSAY OF GONADOTROPIN (LH): CPT

## 2019-11-20 PROCEDURE — 82607 VITAMIN B-12: CPT

## 2019-11-20 PROCEDURE — 82746 ASSAY OF FOLIC ACID SERUM: CPT

## 2019-11-20 PROCEDURE — 84146 ASSAY OF PROLACTIN: CPT

## 2019-11-20 PROCEDURE — 82306 VITAMIN D 25 HYDROXY: CPT

## 2019-11-20 PROCEDURE — 36415 COLL VENOUS BLD VENIPUNCTURE: CPT

## 2019-11-20 PROCEDURE — 84439 ASSAY OF FREE THYROXINE: CPT

## 2019-11-20 PROCEDURE — 84402 ASSAY OF FREE TESTOSTERONE: CPT

## 2019-11-20 PROCEDURE — 83001 ASSAY OF GONADOTROPIN (FSH): CPT

## 2019-11-20 PROCEDURE — 85025 COMPLETE CBC W/AUTO DIFF WBC: CPT

## 2019-11-20 PROCEDURE — 80053 COMPREHEN METABOLIC PANEL: CPT

## 2019-11-20 PROCEDURE — 84403 ASSAY OF TOTAL TESTOSTERONE: CPT

## 2019-11-20 PROCEDURE — 83036 HEMOGLOBIN GLYCOSYLATED A1C: CPT

## 2019-11-20 PROCEDURE — 84443 ASSAY THYROID STIM HORMONE: CPT

## 2019-11-21 DIAGNOSIS — G89.4 CHRONIC PAIN DISORDER: ICD-10-CM

## 2019-11-21 LAB
TESTOST FREE SERPL-MCNC: 1.7 PG/ML (ref 7.2–24)
TESTOST SERPL-MCNC: 195 NG/DL (ref 264–916)

## 2019-11-21 RX ORDER — LIDOCAINE 50 MG/G
1 PATCH TOPICAL DAILY
Qty: 30 PATCH | Refills: 1 | OUTPATIENT
Start: 2019-11-21

## 2019-11-21 NOTE — TELEPHONE ENCOUNTER
I did not address this medication with the patient  The record also seems to indicate that he has not been using this medication  Decline to prescribe

## 2019-12-04 ENCOUNTER — OFFICE VISIT (OUTPATIENT)
Dept: FAMILY MEDICINE CLINIC | Facility: CLINIC | Age: 59
End: 2019-12-04
Payer: COMMERCIAL

## 2019-12-04 VITALS
HEART RATE: 100 BPM | RESPIRATION RATE: 17 BRPM | OXYGEN SATURATION: 98 % | TEMPERATURE: 98 F | BODY MASS INDEX: 26.84 KG/M2 | HEIGHT: 67 IN | SYSTOLIC BLOOD PRESSURE: 120 MMHG | WEIGHT: 171 LBS | DIASTOLIC BLOOD PRESSURE: 80 MMHG

## 2019-12-04 DIAGNOSIS — J30.1 SEASONAL ALLERGIC RHINITIS DUE TO POLLEN: Primary | ICD-10-CM

## 2019-12-04 DIAGNOSIS — F41.9 ANXIETY: ICD-10-CM

## 2019-12-04 DIAGNOSIS — R15.1 FECAL SMEARING: ICD-10-CM

## 2019-12-04 DIAGNOSIS — F31.9 BIPOLAR AFFECTIVE DISORDER, REMISSION STATUS UNSPECIFIED (HCC): ICD-10-CM

## 2019-12-04 PROBLEM — E29.1 TESTICULAR HYPOGONADISM: Status: ACTIVE | Noted: 2019-11-15

## 2019-12-04 PROBLEM — Z98.84 S/P GASTRIC BYPASS: Status: ACTIVE | Noted: 2019-11-15

## 2019-12-04 PROBLEM — B37.0 THRUSH: Status: RESOLVED | Noted: 2019-07-23 | Resolved: 2019-12-04

## 2019-12-04 PROCEDURE — 99214 OFFICE O/P EST MOD 30 MIN: CPT | Performed by: FAMILY MEDICINE

## 2019-12-04 PROCEDURE — 3008F BODY MASS INDEX DOCD: CPT | Performed by: FAMILY MEDICINE

## 2019-12-04 PROCEDURE — 1036F TOBACCO NON-USER: CPT | Performed by: FAMILY MEDICINE

## 2019-12-04 RX ORDER — MENTHOL 5.4 MG/1
LOZENGE ORAL 3 TIMES DAILY
Qty: 100 EACH | Refills: 2 | Status: SHIPPED | OUTPATIENT
Start: 2019-12-04 | End: 2019-12-04 | Stop reason: SDUPTHER

## 2019-12-04 RX ORDER — UNDERPADS 23" X 36"
EACH MISCELLANEOUS 3 TIMES DAILY
Qty: 90 EACH | Refills: 1 | Status: SHIPPED | OUTPATIENT
Start: 2019-12-04 | End: 2019-12-04 | Stop reason: SDUPTHER

## 2019-12-04 RX ORDER — FLUTICASONE PROPIONATE 50 MCG
1 SPRAY, SUSPENSION (ML) NASAL DAILY
Qty: 1 BOTTLE | Refills: 5 | Status: SHIPPED | OUTPATIENT
Start: 2019-12-04 | End: 2021-03-05

## 2019-12-04 RX ORDER — HYDROXYZINE PAMOATE 25 MG/1
25 CAPSULE ORAL 3 TIMES DAILY PRN
Qty: 30 CAPSULE | Refills: 0 | Status: SHIPPED | OUTPATIENT
Start: 2019-12-04 | End: 2020-05-01 | Stop reason: ALTCHOICE

## 2019-12-04 RX ORDER — MENTHOL 5.4 MG/1
LOZENGE ORAL AS NEEDED
Qty: 100 EACH | Refills: 5 | Status: SHIPPED | OUTPATIENT
Start: 2019-12-04

## 2019-12-04 RX ORDER — UNDERPADS 23" X 36"
EACH MISCELLANEOUS AS NEEDED
Qty: 90 EACH | Refills: 5 | Status: SHIPPED | OUTPATIENT
Start: 2019-12-04

## 2019-12-04 NOTE — PROGRESS NOTES
Assessment/Plan:    Fecal smearing  Incontinence briefs and wipes ordered per his request     Bipolar disorder ([facility] )  Patient complains of anxiety, depressed mood, and trouble sleeping  Discussed that these symptoms are likely part of the Bipolar Disorder that his psychiatrist is treated  Directed patient to follow-up with his psychiatrist for further evaluation and management of these complaints  Also discussed that if he was unable to see his psychiatrist, he may need to consider going to the hospital for inpatient management of his Bipolar Disorder  Anxiety  Will treat his anxiety symptoms with Vistaril 25 mg TID PRN  Discussed that I would not prescribe Valium as he requested  Also discussed that his psychiatrist has been prescribing clonazepam, so he will need to follow-up with that clinic for further prescription refills of that medication  Allergic rhinitis  Continue Flonase  Diagnoses and all orders for this visit:    Seasonal allergic rhinitis due to pollen  -     fluticasone (FLONASE) 50 mcg/act nasal spray; 1 spray into each nostril daily    Anxiety  -     hydrOXYzine pamoate (VISTARIL) 25 mg capsule; Take 1 capsule (25 mg total) by mouth 3 (three) times a day as needed for itching    Fecal smearing  -     Discontinue: Incontinence Supply Disposable (INCONTINENCE BRIEF MEDIUM) MISC; by Does not apply route 3 (three) times a day  -     Incontinence Supply Disposable (INCONTINENCE BRIEF MEDIUM) MISC; by Does not apply route 3 (three) times a day  -     Incontinence Supply Disposable (RA WIPES FLUSHABLE/MOIST) MISC; by Does not apply route 3 (three) times a day    Bipolar affective disorder, remission status unspecified ([facility] )          Subjective:      Patient ID: Teo Ponce is a 64 y o  male  Patient presents to clinic for an acute visit  He reports that he is having a lot of anxiety  He notes that his boiler system at his house is broken, so he has had to move in with his mother    He took his dog and cat with him, even though she is not allowed to have pets at her apartment  He reports he talked with his therapist earlier today, but does not have an appointment with his psychiatrist until next week  Patient complains of multiple aches and pains which he attributes to laying on a sofa or a cot in his mother's apartment  He notes to have fecal smearing, and would like diapers and wipes to manage that issue  He is scheduled for his colonoscopy next month  Patient wants a pain medication, but not Oxycontin  He was seeing pain management in the past   He received an injection in the back, which did not help so he stopped going to that clinic  Patient was seen in the ER on 8/9/19 for an overdose at the methadone clinic, and it is unclear if he is still a patient at the methadone clinic  The following portions of the patient's history were reviewed and updated as appropriate: allergies, current medications, past family history, past medical history, past social history, past surgical history and problem list     Review of Systems   Cardiovascular: Positive for chest pain  Gastrointestinal: Positive for abdominal pain (reports having abdominal pain due to mesh in the abdomen)  Positive for fecal smearing   Musculoskeletal: Positive for back pain  Psychiatric/Behavioral: Positive for dysphoric mood and sleep disturbance (not sleeping at night)  The patient is nervous/anxious  Objective:      /80 (BP Location: Left arm, Patient Position: Sitting, Cuff Size: Standard)   Pulse 100   Temp 98 °F (36 7 °C) (Tympanic)   Resp 17   Ht 5' 7" (1 702 m)   Wt 77 6 kg (171 lb)   SpO2 98%   BMI 26 78 kg/m²          Physical Exam   Constitutional: He is oriented to person, place, and time  He appears well-developed  He appears cachectic  No distress  HENT:   Head: Normocephalic and atraumatic     Right Ear: Hearing and external ear normal    Left Ear: Hearing and external ear normal    Eyes: Conjunctivae and lids are normal    Cardiovascular: Normal rate  Pulmonary/Chest: Effort normal    Musculoskeletal: Normal range of motion  Neurological: He is alert and oriented to person, place, and time  Gait normal    Skin: Skin is warm and dry  Psychiatric: He has a normal mood and affect  His speech is normal  He is slowed  Nursing note and vitals reviewed  BMI Counseling: Body mass index is 26 78 kg/m²  The BMI is above normal  Nutrition recommendations include encouraging healthy choices of fruits and vegetables  Exercise recommendations include exercising 3-5 times per week

## 2019-12-04 NOTE — ASSESSMENT & PLAN NOTE
Will treat his anxiety symptoms with Vistaril 25 mg TID PRN  Discussed that I would not prescribe Valium as he requested  Also discussed that his psychiatrist has been prescribing clonazepam, so he will need to follow-up with that clinic for further prescription refills of that medication

## 2019-12-04 NOTE — PATIENT INSTRUCTIONS
Anxiety, Ambulatory Care   GENERAL INFORMATION:   Anxiety  is a condition that causes you to feel excessive worry, uneasiness, or fear  Family or work stress, smoking, caffeine, and alcohol can increase your risk for anxiety  Certain medicines or health conditions can also increase your risk  Anxiety may begin gradually and can become a long-term condition if it is not managed or treated  Common symptoms include the following:   · Fatigue or muscle tightness     · Shaking, restlessness, or irritability     · Problems focusing     · Trouble sleeping     · Feeling jumpy, easily startled, or dizzy     · Rapid heartbeat or shortness of breath  Seek immediate care for the following symptoms:   · Chest pain, tightness, or heaviness that may spread to your shoulders, arms, jaw, neck, or back    · Feeling like hurting yourself or someone else    · Dizziness or feeling lightheaded or faint  Treatment for anxiety  may include medicines to help you feel calm and relaxed, and decrease your symptoms  Healthcare providers will treat any medical conditions that may be causing your symptoms  Manage anxiety:   · Go to counseling as directed  Cognitive behavioral therapy can help you understand and change how you react to events that trigger your symptoms  · Find ways to manage your symptoms  Activities such as exercise, meditation, or listening to music can help you relax  · Practice deep breathing  Breathing can change how your body reacts to stress  Focus on taking slow, deep breaths several times a day, or during an anxiety attack  Breathe in through your nose, and out through your mouth  · Avoid caffeine  Caffeine can make your symptoms worse  Avoid foods or drinks that are meant to increase your energy level  · Limit or avoid alcohol  Ask your healthcare provider if alcohol is safe for you  You may not be able to drink alcohol if you take certain anxiety or depression medicines   Limit alcohol to 1 drink per day if you are a woman  Limit alcohol to 2 drinks per day if you are a man  A drink of alcohol is 12 ounces of beer, 5 ounces of wine, or 1½ ounces of liquor  Follow up with your healthcare provider as directed:  Write down your questions so you remember to ask them during your visits  CARE AGREEMENT:   You have the right to help plan your care  Learn about your health condition and how it may be treated  Discuss treatment options with your caregivers to decide what care you want to receive  You always have the right to refuse treatment  The above information is an  only  It is not intended as medical advice for individual conditions or treatments  Talk to your doctor, nurse or pharmacist before following any medical regimen to see if it is safe and effective for you  © 2014 [address] is for End User's use only and may not be sold, redistributed or otherwise used for commercial purposes  All illustrations and images included in CareNotes® are the copyrighted property of [employer]  or Steven Baldwin

## 2019-12-04 NOTE — ASSESSMENT & PLAN NOTE
Patient complains of anxiety, depressed mood, and trouble sleeping  Discussed that these symptoms are likely part of the Bipolar Disorder that his psychiatrist is treated  Directed patient to follow-up with his psychiatrist for further evaluation and management of these complaints  Also discussed that if he was unable to see his psychiatrist, he may need to consider going to the hospital for inpatient management of his Bipolar Disorder

## 2019-12-05 ENCOUNTER — TELEPHONE (OUTPATIENT)
Dept: FAMILY MEDICINE CLINIC | Facility: CLINIC | Age: 59
End: 2019-12-05

## 2019-12-30 ENCOUNTER — TELEPHONE (OUTPATIENT)
Dept: PSYCHIATRY | Facility: CLINIC | Age: 59
End: 2019-12-30

## 2019-12-31 ENCOUNTER — OFFICE VISIT (OUTPATIENT)
Dept: FAMILY MEDICINE CLINIC | Facility: CLINIC | Age: 59
End: 2019-12-31
Payer: COMMERCIAL

## 2019-12-31 VITALS
HEART RATE: 88 BPM | DIASTOLIC BLOOD PRESSURE: 70 MMHG | BODY MASS INDEX: 27.94 KG/M2 | HEIGHT: 67 IN | RESPIRATION RATE: 16 BRPM | TEMPERATURE: 98.7 F | WEIGHT: 178 LBS | SYSTOLIC BLOOD PRESSURE: 100 MMHG | OXYGEN SATURATION: 95 %

## 2019-12-31 DIAGNOSIS — E11.649 UNCONTROLLED TYPE 2 DIABETES MELLITUS WITH HYPOGLYCEMIA WITHOUT COMA (HCC): ICD-10-CM

## 2019-12-31 DIAGNOSIS — I48.91 ATRIAL FIBRILLATION, UNSPECIFIED TYPE (HCC): ICD-10-CM

## 2019-12-31 DIAGNOSIS — F11.10 NARCOTIC ABUSE (HCC): ICD-10-CM

## 2019-12-31 DIAGNOSIS — F31.9 BIPOLAR AFFECTIVE DISORDER, REMISSION STATUS UNSPECIFIED (HCC): ICD-10-CM

## 2019-12-31 DIAGNOSIS — Z01.818 PRE-OPERATIVE GENERAL PHYSICAL EXAMINATION: Primary | ICD-10-CM

## 2019-12-31 DIAGNOSIS — G89.4 CHRONIC PAIN DISORDER: ICD-10-CM

## 2019-12-31 PROCEDURE — 93000 ELECTROCARDIOGRAM COMPLETE: CPT | Performed by: FAMILY MEDICINE

## 2019-12-31 PROCEDURE — 99243 OFF/OP CNSLTJ NEW/EST LOW 30: CPT | Performed by: FAMILY MEDICINE

## 2019-12-31 RX ORDER — QUETIAPINE FUMARATE 100 MG/1
100 TABLET, FILM COATED ORAL
Refills: 0 | COMMUNITY
Start: 2019-12-05

## 2019-12-31 RX ORDER — ATORVASTATIN CALCIUM 10 MG/1
10 TABLET, FILM COATED ORAL
COMMUNITY
Start: 2019-12-05 | End: 2021-10-11

## 2019-12-31 RX ORDER — QUETIAPINE FUMARATE 50 MG/1
TABLET, FILM COATED ORAL
Refills: 0 | COMMUNITY
Start: 2019-12-05

## 2019-12-31 RX ORDER — TADALAFIL 20 MG/1
20 TABLET ORAL
COMMUNITY
Start: 2019-11-15 | End: 2020-05-01 | Stop reason: ALTCHOICE

## 2019-12-31 RX ORDER — EMPAGLIFLOZIN, METFORMIN HYDROCHLORIDE 12.5; 1 MG/1; MG/1
1 TABLET, EXTENDED RELEASE ORAL 2 TIMES DAILY
Refills: 0 | COMMUNITY
Start: 2019-12-20

## 2019-12-31 RX ORDER — TESTOSTERONE CYPIONATE 200 MG/ML
INJECTION INTRAMUSCULAR
Refills: 1 | COMMUNITY
Start: 2019-12-28

## 2019-12-31 RX ORDER — ERGOCALCIFEROL (VITAMIN D2) 1250 MCG
50000 CAPSULE ORAL
COMMUNITY
Start: 2019-12-05 | End: 2020-02-27

## 2019-12-31 NOTE — ASSESSMENT & PLAN NOTE
Patient followed by an [facility] pain management clinic, and is reported to be taking oxycodone for chronic pain management  Review of PDMP does not indicate any pharmacy dispenses of oxycodone over the past year

## 2019-12-31 NOTE — ASSESSMENT & PLAN NOTE
Patient noted to have history of narcotic abuse, currently on pain management with oxycodone from an outside clinic

## 2019-12-31 NOTE — ASSESSMENT & PLAN NOTE
Patient has a history of atrial fibrillation, and has been prescribed oral anticoagulation in the past   Patient denies being on oral anticoagulation currently, and has not seen his cardiologist in some time  ECG at visit today is normal sinus rhythm  Will refer to cardiology for pre-operative cardiac clearance

## 2019-12-31 NOTE — PROGRESS NOTES
Subjective:      Stella Urbano is a 64 y o  male who presents to the office today for a preoperative consultation at the request of surgeon Dr Vicente Jamison who plans on performing right cataract surgery on January 8  This consultation is requested for the specific conditions prompting preoperative evaluation (i e  because of potential affect on operative risk): cardiovascular complications  Planned anesthesia is MAC  The patient has the following known anesthesia issues: none  Patient has a bleeding risk of: no recent abnormal bleeding, no remote history of abnormal bleeding and no use of Ca-channel blockers  Patient does not have objections to receiving blood products if needed      The following portions of the patient's history were reviewed and updated as appropriate: allergies, current medications, past family history, past medical history, past social history, past surgical history and problem list     Review of Systems  A comprehensive review of systems was negative except for: Gastrointestinal: positive for constipation       Objective:      Physical Exam  /70 (BP Location: Left arm, Patient Position: Sitting, Cuff Size: Adult)   Pulse 88   Temp 98 7 °F (37 1 °C) (Oral)   Resp 16   Ht 5' 7" (1 702 m)   Wt 80 7 kg (178 lb)   SpO2 95%   BMI 27 88 kg/m²   General appearance: cooperative, no distress and slowed mentation  Head: Normocephalic, without obvious abnormality, atraumatic  Eyes: negative findings: lids and lashes normal, conjunctivae and sclerae normal and pupils equal, round, reactive to light and accomodation  Ears: normal TM's and external ear canals both ears  Nose: no discharge  Throat: normal findings: lips normal without lesions, buccal mucosa normal and oropharynx pink & moist without lesions or evidence of thrush and abnormal findings: dentition: poor  Neck: no adenopathy, supple, symmetrical, trachea midline and thyroid not enlarged, symmetric, no tenderness/mass/nodules  Lungs: clear to auscultation bilaterally  Heart: regular rate and rhythm, S1, S2 normal, no murmur, click, rub or gallop  Abdomen: soft, non-tender; bowel sounds normal; no masses,  no organomegaly  Extremities: extremities normal, warm and well-perfused; no cyanosis, clubbing, or edema  Skin: Skin color, texture, turgor normal  No rashes or lesions  Lymph nodes: Cervical adenopathy: none  Neurologic: Grossly normal    Predictors of intubation difficulty:   Morbid obesity? no   Anatomically abnormal facies? no   Prominent incisors? no   Receding mandible? no   Short, thick neck? no   Neck range of motion: normal   Dentition: multiple missing teeth and dental caries noted    Cardiographics  ECG: normal sinus rhythm, no blocks or conduction defects, no ischemic changes  Echocardiogram: not done    Imaging  Chest x-ray: not done     Lab Review   BMP and CBC ordered  Assessment:      64 y o  male with planned surgery as above  Known risk factors for perioperative complications: Diabetes mellitus    Difficulty with intubation is not anticipated  Cardiac Risk Estimation: per the Revised Cardiac Risk Index (Circ  100:[id number], 1999), the patient's risk factors for cardiac complications include on insulin, putting him in: to be determined by cardiology    Current medications which may produce withdrawal symptoms if withheld perioperatively: clonazepam (confirmed on PDMP) and oxycodone (not confirmed on PDMP)  Plan:      1  Preoperative workup as follows: cardiac clearance by cardiology  2  Change in medication regimen before surgery: per cardiology recommendations  3  Prophylaxis for cardiac events with perioperative beta-blockers: per cardiology    4  Invasive hemodynamic monitoring perioperatively: at the discretion of anesthesiologist   5  Deep vein thrombosis prophylaxis postoperatively:regimen to be chosen by surgical team   6  Surveillance for postoperative MI with ECG immediately postoperatively and on postoperative days 1 and 2 AND troponin levels 24 hours postoperatively and on day 4 or hospital discharge (whichever comes first): at the discretion of anesthesiologist   7  Other measures: Careful attention to perioperative glycemic control (having hypogylcemic episodes)  Delay of surgery until cleared by cardiology  Patient is not cleared for surgery at this time  Patient has a history of atrial fibrillation, and is noted in the record to be on oral anticoagulation  Patient currently denies taking oral anticoagulation, and ECG is current normal sinus rhythm  Will await pre-op cardiac clearance by cardiology  Patient also noted to have hypoglycemia at 59 this morning noted by his home monitor  Patient notes that he skipped breakfast this morning as being the cause of his hypoglycemia  Patient is on insulin therapy managed by endocrinology  He did eat candy to improve his blood sugar in the office  Close monitoring of his blood sugars during the procedure is recommended

## 2019-12-31 NOTE — ASSESSMENT & PLAN NOTE
Managed by psychiatry  Continue current medications  Patient currently noted to be on a chronic benzodiazepine as part of his management, and confirmed on the PDMP

## 2020-01-03 ENCOUNTER — CONSULT (OUTPATIENT)
Dept: CARDIOLOGY CLINIC | Facility: CLINIC | Age: 60
End: 2020-01-03
Payer: COMMERCIAL

## 2020-01-03 VITALS
BODY MASS INDEX: 26.78 KG/M2 | HEART RATE: 64 BPM | OXYGEN SATURATION: 96 % | HEIGHT: 68 IN | SYSTOLIC BLOOD PRESSURE: 140 MMHG | WEIGHT: 176.7 LBS | DIASTOLIC BLOOD PRESSURE: 74 MMHG

## 2020-01-03 DIAGNOSIS — Z98.84 S/P GASTRIC BYPASS: ICD-10-CM

## 2020-01-03 DIAGNOSIS — Z01.810 PRE-OPERATIVE CARDIOVASCULAR EXAMINATION: Primary | ICD-10-CM

## 2020-01-03 DIAGNOSIS — Z86.79 HISTORY OF ATRIAL FIBRILLATION: ICD-10-CM

## 2020-01-03 PROCEDURE — 99243 OFF/OP CNSLTJ NEW/EST LOW 30: CPT | Performed by: INTERNAL MEDICINE

## 2020-01-03 NOTE — PROGRESS NOTES
[facility] [place] [address]  [phone]                                              Cardiology [address], 64 y o  male  YOB: 1960  MRN: [id number] Encounter: [id number]      PCP - Catina Prater MD    Assessment and Plan  1  Preoperative evaluation  2  H/o Atrial fibrillation   · Echo - 6/7/2018 - [facility] health report) - LVEF 60-65%, no significant WMA, LA normal, no significant valvular abnormalities  · Previously on coumadin and then xarelto  · Eventually saw Vito Crowe in [place], who did did a heart monitor which was negative for Afib, and his anticoagulation was reportedly discotninued after this  3  Hyperlipidemia  4  Insomnia  5  Diabetes Mellitus Type 2   · Was uncontrolled prior to gastric bypass  · A1c now much improved 7 2  6  Hepatitis C  7  S/p gastric bypass (11/8/2011)  · Lost about 120 lbs since then  · Highest weight 280 lbs    Plan  Pre-operative cardiovascular evaluation  · Planned procedure: Cataract surgery  · Active cardiac complaints: None  · Cardiac co-morbidities: h/o atrial fibrillation  · Other medical co-morbidities: None  · Functional status: Active, can walk around on level ground without symptoms   Works at [employer] - 4 days a week, and has to walk and lift a lot there, and has no problems with it  · No h/o CAD, heart failure  · Vitals - reviewed and stable  · EKG - 8/9/19- NSR, normal ECG  · Echo - 6/7/2018 - [facility] health report) - LVEF 60-65%, no significant WMA, LA normal, no significant valvular abnormalities  · He will be at low cardiac risk for an low risk surgery  · He is medically as optimized as able from the cardiac standpoint    H/o atrial fibrillation  · Had asymptomatic episode while he was obese and had sleep apnea  · Now s/p gastric bypass - 120 lb weight loss, tonsillectomy for sleep apnea  · 2 ECGs from 2019 at [facility] have been NSR, and he has not had any symptomatic episodes of Afib  · He had eliquis and xarelto listed on his med list, and patient did not bring any medications or list, but insists that he is not taking any "blood thinners" or any of these medications currently  · His anticoagulation was reportedly previously discontinued by a Dr Simpson in [place], and he has not been on any xarelto or eliquis for at least 2 years  · I offered him the option to do a 2 week patch monitor to confirm absence of atrial fibrillation, in order to safely stay off anticoagulation, but patient prefers to hold off for now       History of Present Illness   31-year-old gentleman with history of atrial fibrillation, comes in as a new patient for preoperative cardiac evaluation prior to cataract surgery  He was originally diagnosed with the atrial fibrillation back in 2011 at [facility], and this was apparently incidentally discovered on an ECG during routine visit  He was placed on diltiazem and warfarin at that time, and subsequently this anticoagulation was changed to Xarelto  At that time, he was morbidly obese and had sleep apnea  He underwent tonsillectomy for his sleep apnea, laparoscopic Maisha-en-Y gastric bypass, and lost about 120 lb  He has since then not had any further episodes of atrial fibrillation  He subsequently followed up with cardiologist in [place], who reportedly did the heart monitor which was negative for atrial fibrillation  As a result his anticoagulation was discontinued, and he has not taken any anticoagulation for at least the last 2 years  He has no active cardiac complaints at present  No chest pain, no shortness of breath, no palpitations, no dizziness or lightheadedness  No syncope  He had a divorce about 9 months ago (early 2019), and has some says stress related to the same  He previously used to be on oxycodone, and high dose anti anxiety medications, which he has been finally able to come off       He is scheduled to have a cataract surgery, but given his previously reported atrial fibrillation and being on anticoagulation, he was referred to see Cardiology for  Preoperative cardiovascular evaluation    Historical Information   Past Medical History:   Diagnosis Date    Anxiety     Depression     Diabetes mellitus ([address] )     Hemorrhoids, internal 9/23/2019    Hypertension      Past Surgical History:   Procedure Laterality Date    FEMUR FRACTURE SURGERY Right     GASTRIC BYPASS  11/08/2011    Managed by: Tashi Hennessy (General Surgery)   [address]  02/13/2013    Incisional hernia repair Managed by: Tashi Hennessy (General Surgery)    TONSILLECTOMY  2010     Family History   Problem Relation Age of Onset    Diabetes Mother     Hypertension Mother     Hypertension Father     Autoimmune disease Son      Current Outpatient Medications on File Prior to Visit   Medication Sig Dispense Refill    ALPRAZolam (XANAX) 2 MG tablet Take 2 mg by mouth 3 (three) times a day as needed for anxiety      amphetamine-dextroamphetamine (ADDERALL XR) 30 MG 24 hr capsule Take 60 mg by mouth      apixaban (ELIQUIS) 5 mg Take by mouth      atorvastatin (LIPITOR) 10 mg tablet Take 10 mg by mouth      cholecalciferol (VITAMIN D3) 1,000 units tablet Take 1 tablet (1,000 Units total) by mouth daily for 30 days 30 tablet 5    clonazePAM (KlonoPIN) 2 mg tablet Take 1 tablet by mouth 3 (three) times a day      Continuous Blood Gluc Sensor (FREESTYLE STEVEN 14 DAY SENSOR) MIS 1 applicator by Does not apply route every 14 (fourteen) days 2 each 5    divalproex sodium (DEPAKOTE) 500 mg EC tablet Take 1 tablet (500 mg total) by mouth every 8 (eight) hours 30 tablet 2    ergocalciferol (ERGOCALCIFEROL) 1 25 MG (68758 UT) capsule Take 50,000 Units by mouth      fluticasone (FLONASE) 50 mcg/act nasal spray 1 spray into each nostril daily 1 Bottle 5    glipiZIDE (GLUCOTROL) 5 mg tablet Take 1 tablet (5 mg total) by mouth daily for 30 days 30 tablet 5    GLOBAL EASE INJECT PEN NEEDLES 31G X 8 MM MISC USE AS DIRECTED TO INJECT INSULIN DAILY  5    glycerin-hypromellose- (ARTIFICIAL TEARS) 0 2-0 2-1 % SOLN Apply to eye      hydrOXYzine HCL (ATARAX) 25 mg tablet Take 1 tablet (25 mg total) by mouth every 6 (six) hours as needed for anxiety for up to 30 days 90 tablet 1    hydrOXYzine pamoate (VISTARIL) 25 mg capsule Take 1 capsule (25 mg total) by mouth 3 (three) times a day as needed for itching 30 capsule 0    Incontinence Supply Disposable (INCONTINENCE BRIEF MEDIUM) MISC by Does not apply route as needed (fecal soiling) 90 each 5    Incontinence Supply Disposable (RA WIPES FLUSHABLE/MOIST) MISC by Does not apply route as needed (fecal soiling) 100 each 5    insulin glargine (LANTUS SOLOSTAR) 100 units/mL injection pen Inject under the skin      insulin glargine (LANTUS) 100 units/mL subcutaneous injection Inject 24 Units under the skin      insulin lispro (HUMALOG) 100 units/mL injection Inject 12 Units under the skin      liraglutide (VICTOZA) injection Inject 0 6mg daily for one week then increase to 1 2mg daily (Patient not taking: Reported on 11/11/2019) 5 pen 2    lisinopril (ZESTRIL) 5 mg tablet Take 1 tablet (5 mg total) by mouth daily for 30 days 30 tablet 5    mirtazapine (REMERON) 30 mg tablet Take 1 tablet (30 mg total) by mouth daily at bedtime for 30 days 30 tablet 5    Misc   Devices (CANE) MISC by Does not apply route daily Dx:  Frequent falls 1 each 0    Naloxone HCl (NARCAN) 4 MG/0 1ML LIQD 0 1 mL (4 mg total) into each nostril as needed (For narcotic overdose) (Patient not taking: Reported on 11/11/2019) 1 each 0    omeprazole (PriLOSEC) 40 MG capsule Take 1 capsule (40 mg total) by mouth daily for 30 days 30 capsule 5    ONE TOUCH ULTRA TEST test strip Test twice daily 30 each 5    ONETOUCH DELICA LANCETS 64A MISC USE TWICE A DAY (ICD 10 E11 9)  3    oxyCODONE (ROXICODONE) 30 MG immediate release tablet Take 15 mg by mouth every 6 (six) hours       OZEMPIC 1 MG/DOSE SOPN INJECT 1MG SUBCUTANEOUS EVERY WEEK  2    polyethylene glycol (GLYCOLAX) powder Take 17 g by mouth daily 578 g 1    QUEtiapine (SEROquel) 100 mg tablet Take 100 mg by mouth daily at bedtime  0    QUEtiapine (SEROquel) 50 mg tablet TAKE 1 TABLET BY MOUTH IN THE MORNING AND 1 AT NOON  0    senna (SENOKOT) 8 6 mg Take 1 tablet (8 6 mg total) by mouth daily at bedtime for 30 days 60 each 1    SYNJARDY XR 12 5-1000 MG TB24 Take 1 tablet by mouth 2 (two) times a day  0    tadalafil (CIALIS) 20 MG tablet Take 20 mg by mouth      tamsulosin (FLOMAX) 0 4 mg Take 1 capsule (0 4 mg total) by mouth daily at bedtime for 30 days 30 capsule 5    testosterone cypionate (DEPO-TESTOSTERONE) 200 mg/mL SOLN INJECT 1CC INTRAMUSCULARLY EVERY 2 WEEKS  1    UNKNOWN TO PATIENT       venlafaxine 37 5 mg 24 hr tablet Take 1 tablet (37 5 mg total) by mouth daily with breakfast (Patient not taking: Reported on 12/31/2019) 30 tablet 2    [DISCONTINUED] atorvastatin (LIPITOR) 10 mg tablet Take 1 tablet (10 mg total) by mouth daily for 30 days 30 tablet 5    [DISCONTINUED] rivaroxaban (XARELTO) 20 mg tablet Take 20 mg by mouth       No current facility-administered medications on file prior to visit        Allergies   Allergen Reactions    Aspirin Shortness Of Breath    Penicillins      Social History     Socioeconomic History    Marital status: Legally      Spouse name: None    Number of children: None    Years of education: None    Highest education level: None   Occupational History    None   Social Needs    Financial resource strain: Not hard at all   [place] insecurity:     Worry: Never true     Inability: Never true   fintonic needs:     Medical: No     Non-medical: No   Tobacco Use    Smoking status: Never Smoker    Smokeless tobacco: Never Used   Substance and Sexual Activity    Alcohol use: Never     Frequency: Never    Drug use: Never    Sexual activity: Not Currently   Lifestyle    Physical activity:     Days per week: 0 days     Minutes per session: 0 min    Stress: Only a little   Relationships    Social connections:     Talks on phone: Patient refused     Gets together: Patient refused     Attends Hindu service: Patient refused     Active member of club or organization: Patient refused     Attends meetings of clubs or organizations: Patient refused     Relationship status: Patient refused    Intimate partner violence:     Fear of current or ex partner: Patient refused     Emotionally abused: Patient refused     Physically abused: Patient refused     Forced sexual activity: Patient refused   Other Topics Concern    None   Social History Narrative    None        Review of Systems  All other systems negative, except as noted in HPI    Vitals:  Vitals:    01/03/20 1444   BP: 140/74   BP Location: Left arm   Patient Position: Sitting   Cuff Size: Standard   Pulse: 64   SpO2: 96%   Weight: 80 2 kg (176 lb 11 2 oz)   Height: 5' 8" (1 727 m)     BMI - Body mass index is 26 87 kg/m²  Wt Readings from Last 7 Encounters:   01/03/20 80 2 kg (176 lb 11 2 oz)   12/31/19 80 7 kg (178 lb)   12/04/19 77 6 kg (171 lb)   11/11/19 78 6 kg (173 lb 4 5 oz)   09/23/19 74 8 kg (164 lb 14 5 oz)   09/11/19 71 2 kg (157 lb)   08/09/19 72 1 kg (158 lb 15 2 oz)       Physical Exam   Constitutional: He is oriented to person, place, and time  He appears well-developed and well-nourished  No distress  HENT:   Head: Normocephalic and atraumatic  Eyes: No scleral icterus  Neck: Neck supple  No JVD present  Cardiovascular: Normal rate, regular rhythm and normal heart sounds  Exam reveals no gallop and no friction rub  No murmur heard  Pulmonary/Chest: Effort normal and breath sounds normal  No respiratory distress  He has no wheezes  He has no rales  He exhibits no tenderness  Abdominal: Soft  He exhibits no distension   There is no tenderness  Musculoskeletal: He exhibits no edema  Neurological: He is alert and oriented to person, place, and time  Skin: Skin is warm  Psychiatric: He has a normal mood and affect  Vitals reviewed  Labs:  CBC:   Lab Results   Component Value Date    WBC 6 62 11/20/2019    RBC 3 50 (L) 11/20/2019    HGB 9 9 (L) 11/20/2019    HCT 32 2 (L) 11/20/2019    MCV 92 11/20/2019     11/20/2019    RDW 12 3 11/20/2019       CMP:   Lab Results   Component Value Date     01/29/2015    K 4 6 11/20/2019     11/20/2019    CO2 29 11/20/2019    ANIONGAP 2 (L) 01/29/2015    BUN 11 11/20/2019    CREATININE 0 97 11/20/2019    EGFR 86 11/20/2019    GLUCOSE 222 (H) 01/29/2015    CALCIUM 8 7 11/20/2019    AST 15 11/20/2019    ALT 17 11/20/2019    ALKPHOS 66 11/20/2019    PROT 7 7 01/29/2015    BILITOT 0 27 01/29/2015       Magnesium:  No results found for: MG    Lipid Profile:   Lab Results   Component Value Date    CHOL 109 05/21/2014    HDL 59 11/20/2019    TRIG 25 11/20/2019    LDLCALC 21 11/20/2019       Thyroid Studies:   Lab Results   Component Value Date    IPF8PFSGBLRR 1 590 11/20/2019    FREET4 1 03 11/20/2019    T5YIFZR 9 9 07/12/2019       No components found for: [facility]    Lab Results   Component Value Date    INR 0 98 08/09/2019    INR 1 11 01/29/2015    INR 5 20 (HH) 05/21/2014   5    Imaging: No results found  Cardiac testing:   No results found for this or any previous visit  No results found for this or any previous visit  No results found for this or any previous visit  No results found for this or any previous visit

## 2020-01-03 NOTE — H&P (VIEW-ONLY)
[facility] [address]  [phone]                                              Cardiology [address], 64 y o  male  YOB: 1960  MRN: [id number] Encounter: [id number]      PCP - Mary Coburn MD    Assessment and Plan  1  Preoperative evaluation  2  H/o Atrial fibrillation   · Echo - 6/7/2018 - [facility] health report) - LVEF 60-65%, no significant WMA, LA normal, no significant valvular abnormalities  · Previously on coumadin and then xarelto  · Eventually saw Lenny Rausch in [place], who did did a heart monitor which was negative for Afib, and his anticoagulation was reportedly discotninued after this  3  Hyperlipidemia  4  Insomnia  5  Diabetes Mellitus Type 2   · Was uncontrolled prior to gastric bypass  · A1c now much improved 7 2  6  Hepatitis C  7  S/p gastric bypass (11/8/2011)  · Lost about 120 lbs since then  · Highest weight 280 lbs    Plan  Pre-operative cardiovascular evaluation  · Planned procedure: Cataract surgery  · Active cardiac complaints: None  · Cardiac co-morbidities: h/o atrial fibrillation  · Other medical co-morbidities: None  · Functional status: Active, can walk around on level ground without symptoms   Works at [employer] - 4 days a week, and has to walk and lift a lot there, and has no problems with it  · No h/o CAD, heart failure  · Vitals - reviewed and stable  · EKG - 8/9/19- NSR, normal ECG  · Echo - 6/7/2018 - [facility] health report) - LVEF 60-65%, no significant WMA, LA normal, no significant valvular abnormalities  · He will be at low cardiac risk for an low risk surgery  · He is medically as optimized as able from the cardiac standpoint    H/o atrial fibrillation  · Had asymptomatic episode while he was obese and had sleep apnea  · Now s/p gastric bypass - 120 lb weight loss, tonsillectomy for sleep apnea  · 2 ECGs from 2019 at [facility] [place] 2] have been NSR, and he has not had any symptomatic episodes of Afib  · He had eliquis and xarelto listed on his med list, and patient did not bring any medications or list, but insists that he is not taking any "blood thinners" or any of these medications currently  · His anticoagulation was reportedly previously discontinued by a Dr Simpson in [place], and he has not been on any xarelto or eliquis for at least 2 years  · I offered him the option to do a 2 week patch monitor to confirm absence of atrial fibrillation, in order to safely stay off anticoagulation, but patient prefers to hold off for now       History of Present Illness   61-year-old gentleman with history of atrial fibrillation, comes in as a new patient for preoperative cardiac evaluation prior to cataract surgery  He was originally diagnosed with the atrial fibrillation back in 2011 at [facility], and this was apparently incidentally discovered on an ECG during routine visit  He was placed on diltiazem and warfarin at that time, and subsequently this anticoagulation was changed to Xarelto  At that time, he was morbidly obese and had sleep apnea  He underwent tonsillectomy for his sleep apnea, laparoscopic Maisha-en-Y gastric bypass, and lost about 120 lb  He has since then not had any further episodes of atrial fibrillation  He subsequently followed up with cardiologist in [place], who reportedly did the heart monitor which was negative for atrial fibrillation  As a result his anticoagulation was discontinued, and he has not taken any anticoagulation for at least the last 2 years  He has no active cardiac complaints at present  No chest pain, no shortness of breath, no palpitations, no dizziness or lightheadedness  No syncope  He had a divorce about 9 months ago (early 2019), and has some says stress related to the same  He previously used to be on oxycodone, and high dose anti anxiety medications, which he has been finally able to come off       He is scheduled to have a cataract surgery, but given his previously reported atrial fibrillation and being on anticoagulation, he was referred to see Cardiology for  Preoperative cardiovascular evaluation    Historical Information   Past Medical History:   Diagnosis Date    Anxiety     Depression     Diabetes mellitus ([address] )     Hemorrhoids, internal 9/23/2019    Hypertension      Past Surgical History:   Procedure Laterality Date    FEMUR FRACTURE SURGERY Right     GASTRIC BYPASS  11/08/2011    Managed by: Cherie Lim (General Surgery)   [address]  02/13/2013    Incisional hernia repair Managed by: Cherie Lim (General Surgery)    TONSILLECTOMY  2010     Family History   Problem Relation Age of Onset    Diabetes Mother     Hypertension Mother     Hypertension Father     Autoimmune disease Son      Current Outpatient Medications on File Prior to Visit   Medication Sig Dispense Refill    ALPRAZolam (XANAX) 2 MG tablet Take 2 mg by mouth 3 (three) times a day as needed for anxiety      amphetamine-dextroamphetamine (ADDERALL XR) 30 MG 24 hr capsule Take 60 mg by mouth      apixaban (ELIQUIS) 5 mg Take by mouth      atorvastatin (LIPITOR) 10 mg tablet Take 10 mg by mouth      cholecalciferol (VITAMIN D3) 1,000 units tablet Take 1 tablet (1,000 Units total) by mouth daily for 30 days 30 tablet 5    clonazePAM (KlonoPIN) 2 mg tablet Take 1 tablet by mouth 3 (three) times a day      Continuous Blood Gluc Sensor (FREESTYLE STEVEN 14 DAY SENSOR) MIS 1 applicator by Does not apply route every 14 (fourteen) days 2 each 5    divalproex sodium (DEPAKOTE) 500 mg EC tablet Take 1 tablet (500 mg total) by mouth every 8 (eight) hours 30 tablet 2    ergocalciferol (ERGOCALCIFEROL) 1 25 MG (96415 UT) capsule Take 50,000 Units by mouth      fluticasone (FLONASE) 50 mcg/act nasal spray 1 spray into each nostril daily 1 Bottle 5    glipiZIDE (GLUCOTROL) 5 mg tablet Take 1 tablet (5 mg total) by mouth daily for 30 days 30 tablet 5    GLOBAL EASE INJECT PEN NEEDLES 31G X 8 MM MISC USE AS DIRECTED TO INJECT INSULIN DAILY  5    glycerin-hypromellose- (ARTIFICIAL TEARS) 0 2-0 2-1 % SOLN Apply to eye      hydrOXYzine HCL (ATARAX) 25 mg tablet Take 1 tablet (25 mg total) by mouth every 6 (six) hours as needed for anxiety for up to 30 days 90 tablet 1    hydrOXYzine pamoate (VISTARIL) 25 mg capsule Take 1 capsule (25 mg total) by mouth 3 (three) times a day as needed for itching 30 capsule 0    Incontinence Supply Disposable (INCONTINENCE BRIEF MEDIUM) MISC by Does not apply route as needed (fecal soiling) 90 each 5    Incontinence Supply Disposable (RA WIPES FLUSHABLE/MOIST) MISC by Does not apply route as needed (fecal soiling) 100 each 5    insulin glargine (LANTUS SOLOSTAR) 100 units/mL injection pen Inject under the skin      insulin glargine (LANTUS) 100 units/mL subcutaneous injection Inject 24 Units under the skin      insulin lispro (HUMALOG) 100 units/mL injection Inject 12 Units under the skin      liraglutide (VICTOZA) injection Inject 0 6mg daily for one week then increase to 1 2mg daily (Patient not taking: Reported on 11/11/2019) 5 pen 2    lisinopril (ZESTRIL) 5 mg tablet Take 1 tablet (5 mg total) by mouth daily for 30 days 30 tablet 5    mirtazapine (REMERON) 30 mg tablet Take 1 tablet (30 mg total) by mouth daily at bedtime for 30 days 30 tablet 5    Misc   Devices (CANE) MISC by Does not apply route daily Dx:  Frequent falls 1 each 0    Naloxone HCl (NARCAN) 4 MG/0 1ML LIQD 0 1 mL (4 mg total) into each nostril as needed (For narcotic overdose) (Patient not taking: Reported on 11/11/2019) 1 each 0    omeprazole (PriLOSEC) 40 MG capsule Take 1 capsule (40 mg total) by mouth daily for 30 days 30 capsule 5    ONE TOUCH ULTRA TEST test strip Test twice daily 30 each 5    ONETOUCH DELICA LANCETS 78D MISC USE TWICE A DAY (ICD 10 E11 9)  3    oxyCODONE (ROXICODONE) 30 MG immediate release tablet Take 15 mg by mouth every 6 (six) hours       OZEMPIC 1 MG/DOSE SOPN INJECT 1MG SUBCUTANEOUS EVERY WEEK  2    polyethylene glycol (GLYCOLAX) powder Take 17 g by mouth daily 578 g 1    QUEtiapine (SEROquel) 100 mg tablet Take 100 mg by mouth daily at bedtime  0    QUEtiapine (SEROquel) 50 mg tablet TAKE 1 TABLET BY MOUTH IN THE MORNING AND 1 AT NOON  0    senna (SENOKOT) 8 6 mg Take 1 tablet (8 6 mg total) by mouth daily at bedtime for 30 days 60 each 1    SYNJARDY XR 12 5-1000 MG TB24 Take 1 tablet by mouth 2 (two) times a day  0    tadalafil (CIALIS) 20 MG tablet Take 20 mg by mouth      tamsulosin (FLOMAX) 0 4 mg Take 1 capsule (0 4 mg total) by mouth daily at bedtime for 30 days 30 capsule 5    testosterone cypionate (DEPO-TESTOSTERONE) 200 mg/mL SOLN INJECT 1CC INTRAMUSCULARLY EVERY 2 WEEKS  1    UNKNOWN TO PATIENT       venlafaxine 37 5 mg 24 hr tablet Take 1 tablet (37 5 mg total) by mouth daily with breakfast (Patient not taking: Reported on 12/31/2019) 30 tablet 2    [DISCONTINUED] atorvastatin (LIPITOR) 10 mg tablet Take 1 tablet (10 mg total) by mouth daily for 30 days 30 tablet 5    [DISCONTINUED] rivaroxaban (XARELTO) 20 mg tablet Take 20 mg by mouth       No current facility-administered medications on file prior to visit        Allergies   Allergen Reactions    Aspirin Shortness Of Breath    Penicillins      Social History     Socioeconomic History    Marital status: Legally      Spouse name: None    Number of children: None    Years of education: None    Highest education level: None   Occupational History    None   Social Needs    Financial resource strain: Not hard at all   [employer] insecurity:     Worry: Never true     Inability: Never true   meets needs:     Medical: No     Non-medical: No   Tobacco Use    Smoking status: Never Smoker    Smokeless tobacco: Never Used   Substance and Sexual Activity    Alcohol use: Never     Frequency: Never    Drug use: Never    Sexual activity: Not Currently   Lifestyle    Physical activity:     Days per week: 0 days     Minutes per session: 0 min    Stress: Only a little   Relationships    Social connections:     Talks on phone: Patient refused     Gets together: Patient refused     Attends Jain service: Patient refused     Active member of club or organization: Patient refused     Attends meetings of clubs or organizations: Patient refused     Relationship status: Patient refused    Intimate partner violence:     Fear of current or ex partner: Patient refused     Emotionally abused: Patient refused     Physically abused: Patient refused     Forced sexual activity: Patient refused   Other Topics Concern    None   Social History Narrative    None        Review of Systems  All other systems negative, except as noted in HPI    Vitals:  Vitals:    01/03/20 1444   BP: 140/74   BP Location: Left arm   Patient Position: Sitting   Cuff Size: Standard   Pulse: 64   SpO2: 96%   Weight: 80 2 kg (176 lb 11 2 oz)   Height: 5' 8" (1 727 m)     BMI - Body mass index is 26 87 kg/m²  Wt Readings from Last 7 Encounters:   01/03/20 80 2 kg (176 lb 11 2 oz)   12/31/19 80 7 kg (178 lb)   12/04/19 77 6 kg (171 lb)   11/11/19 78 6 kg (173 lb 4 5 oz)   09/23/19 74 8 kg (164 lb 14 5 oz)   09/11/19 71 2 kg (157 lb)   08/09/19 72 1 kg (158 lb 15 2 oz)       Physical Exam   Constitutional: He is oriented to person, place, and time  He appears well-developed and well-nourished  No distress  HENT:   Head: Normocephalic and atraumatic  Eyes: No scleral icterus  Neck: Neck supple  No JVD present  Cardiovascular: Normal rate, regular rhythm and normal heart sounds  Exam reveals no gallop and no friction rub  No murmur heard  Pulmonary/Chest: Effort normal and breath sounds normal  No respiratory distress  He has no wheezes  He has no rales  He exhibits no tenderness  Abdominal: Soft  He exhibits no distension   There is no tenderness  Musculoskeletal: He exhibits no edema  Neurological: He is alert and oriented to person, place, and time  Skin: Skin is warm  Psychiatric: He has a normal mood and affect  Vitals reviewed  Labs:  CBC:   Lab Results   Component Value Date    WBC 6 62 11/20/2019    RBC 3 50 (L) 11/20/2019    HGB 9 9 (L) 11/20/2019    HCT 32 2 (L) 11/20/2019    MCV 92 11/20/2019     11/20/2019    RDW 12 3 11/20/2019       CMP:   Lab Results   Component Value Date     01/29/2015    K 4 6 11/20/2019     11/20/2019    CO2 29 11/20/2019    ANIONGAP 2 (L) 01/29/2015    BUN 11 11/20/2019    CREATININE 0 97 11/20/2019    EGFR 86 11/20/2019    GLUCOSE 222 (H) 01/29/2015    CALCIUM 8 7 11/20/2019    AST 15 11/20/2019    ALT 17 11/20/2019    ALKPHOS 66 11/20/2019    PROT 7 7 01/29/2015    BILITOT 0 27 01/29/2015       Magnesium:  No results found for: MG    Lipid Profile:   Lab Results   Component Value Date    CHOL 109 05/21/2014    HDL 59 11/20/2019    TRIG 25 11/20/2019    LDLCALC 21 11/20/2019       Thyroid Studies:   Lab Results   Component Value Date    IMF8PZFMXHKB 1 590 11/20/2019    FREET4 1 03 11/20/2019    X7JMXQO 9 9 07/12/2019       No components found for: [facility]    Lab Results   Component Value Date    INR 0 98 08/09/2019    INR 1 11 01/29/2015    INR 5 20 (HH) 05/21/2014   5    Imaging: No results found  Cardiac testing:   No results found for this or any previous visit  No results found for this or any previous visit  No results found for this or any previous visit  No results found for this or any previous visit

## 2020-01-06 ENCOUNTER — TELEPHONE (OUTPATIENT)
Dept: PSYCHIATRY | Facility: CLINIC | Age: 60
End: 2020-01-06

## 2020-01-28 ENCOUNTER — TELEPHONE (OUTPATIENT)
Dept: OTHER | Facility: OTHER | Age: 60
End: 2020-01-28

## 2020-01-28 NOTE — TELEPHONE ENCOUNTER
Pt is very upset because he believed that the appt that was made yesterday was scheduled for today and the system is showing 2/3/20   Pt will call back when office is opened to see if he can come in today

## 2020-01-30 ENCOUNTER — ANESTHESIA EVENT (OUTPATIENT)
Dept: GASTROENTEROLOGY | Facility: HOSPITAL | Age: 60
End: 2020-01-30

## 2020-01-31 ENCOUNTER — HOSPITAL ENCOUNTER (OUTPATIENT)
Dept: GASTROENTEROLOGY | Facility: HOSPITAL | Age: 60
Setting detail: OUTPATIENT SURGERY
Discharge: HOME | End: 2020-01-31
Attending: COLON & RECTAL SURGERY | Admitting: COLON & RECTAL SURGERY
Payer: COMMERCIAL

## 2020-01-31 ENCOUNTER — ANESTHESIA (OUTPATIENT)
Dept: GASTROENTEROLOGY | Facility: HOSPITAL | Age: 60
End: 2020-01-31

## 2020-01-31 VITALS
BODY MASS INDEX: 25.76 KG/M2 | OXYGEN SATURATION: 98 % | TEMPERATURE: 98.3 F | HEART RATE: 72 BPM | SYSTOLIC BLOOD PRESSURE: 105 MMHG | WEIGHT: 170 LBS | RESPIRATION RATE: 17 BRPM | HEIGHT: 68 IN | DIASTOLIC BLOOD PRESSURE: 63 MMHG

## 2020-01-31 DIAGNOSIS — K62.3 RECTAL PROLAPSE: ICD-10-CM

## 2020-01-31 DIAGNOSIS — K64.8 HEMORRHOIDS, INTERNAL: Chronic | ICD-10-CM

## 2020-01-31 LAB — GLUCOSE SERPL-MCNC: 88 MG/DL (ref 65–140)

## 2020-01-31 PROCEDURE — 82948 REAGENT STRIP/BLOOD GLUCOSE: CPT

## 2020-01-31 PROCEDURE — 45378 DIAGNOSTIC COLONOSCOPY: CPT | Performed by: COLON & RECTAL SURGERY

## 2020-01-31 RX ORDER — SODIUM CHLORIDE 9 MG/ML
125 INJECTION, SOLUTION INTRAVENOUS CONTINUOUS
Status: DISCONTINUED | OUTPATIENT
Start: 2020-01-31 | End: 2020-02-04 | Stop reason: HOSPADM

## 2020-01-31 RX ORDER — PROPOFOL 10 MG/ML
INJECTION, EMULSION INTRAVENOUS CONTINUOUS PRN
Status: DISCONTINUED | OUTPATIENT
Start: 2020-01-31 | End: 2020-01-31 | Stop reason: SURG

## 2020-01-31 RX ORDER — LIDOCAINE HYDROCHLORIDE 10 MG/ML
INJECTION, SOLUTION EPIDURAL; INFILTRATION; INTRACAUDAL; PERINEURAL AS NEEDED
Status: DISCONTINUED | OUTPATIENT
Start: 2020-01-31 | End: 2020-01-31 | Stop reason: SURG

## 2020-01-31 RX ORDER — PROPOFOL 10 MG/ML
INJECTION, EMULSION INTRAVENOUS AS NEEDED
Status: DISCONTINUED | OUTPATIENT
Start: 2020-01-31 | End: 2020-01-31 | Stop reason: SURG

## 2020-01-31 RX ADMIN — SODIUM CHLORIDE 125 ML/HR: 0.9 INJECTION, SOLUTION INTRAVENOUS at 07:22

## 2020-01-31 RX ADMIN — LIDOCAINE HYDROCHLORIDE 50 MG: 10 INJECTION, SOLUTION EPIDURAL; INFILTRATION; INTRACAUDAL; PERINEURAL at 08:06

## 2020-01-31 RX ADMIN — PROPOFOL 40 MG: 10 INJECTION, EMULSION INTRAVENOUS at 08:06

## 2020-01-31 RX ADMIN — PROPOFOL 50 MCG/KG/MIN: 10 INJECTION, EMULSION INTRAVENOUS at 08:06

## 2020-01-31 NOTE — ANESTHESIA PREPROCEDURE EVALUATION
Review of Systems/Medical History  Patient summary reviewed  Chart reviewed  No history of anesthetic complications     Cardiovascular  EKG reviewed, Hyperlipidemia, Hypertension ,    Pulmonary  Negative pulmonary ROS        GI/Hepatic    GERD , Liver disease , Hepatitis (history) C,        Negative  ROS        Endo/Other  Diabetes type 2 Insulin,      GYN       Hematology  Negative hematology ROS      Musculoskeletal  Negative musculoskeletal ROS        Neurology  Seizures ,     Psychology   Psychiatric history, Anxiety, Depression ,              Physical Exam    Airway    Mallampati score: I         Dental   Comment: Mult missing no loose,     Cardiovascular  Cardiovascular exam normal    Pulmonary  Pulmonary exam normal     Other Findings        Anesthesia Plan  ASA Score- 3     Anesthesia Type- IV sedation with anesthesia with ASA Monitors  Additional Monitors:   Airway Plan:     Comment: Discussed with pt the possibility under sedation to have recall or mild discomfort        Plan Factors-    Induction- intravenous  Postoperative Plan-     Informed Consent- Anesthetic plan and risks discussed with patient  I personally reviewed this patient with the CRNA  Discussed and agreed on the Anesthesia Plan with the PEDRO Wick

## 2020-01-31 NOTE — INTERVAL H&P NOTE
Colonoscopy today as discussed at clinic visit      Vitals:    01/31/20 0713   BP: 122/76   Pulse: 66   Resp: 16   Temp: 98 3 °F (36 8 °C)   SpO2: 97%

## 2020-01-31 NOTE — ANESTHESIA POSTPROCEDURE EVALUATION
Post-Op Assessment Note    CV Status:  Stable  Pain Score: 0    Pain management: adequate     Mental Status:  Alert and awake   Hydration Status:  Euvolemic   PONV Controlled:  Controlled   Airway Patency:  Patent   Post Op Vitals Reviewed: Yes      Staff: CRNA, Anesthesiologist   Comments: BG 88 on fingerstick; lethargic pre and post procedure, with appropriate response to questioning          BP   108/63   Temp      Pulse     Resp   15   SpO2   97

## 2020-02-03 ENCOUNTER — TELEPHONE (OUTPATIENT)
Dept: FAMILY MEDICINE CLINIC | Facility: CLINIC | Age: 60
End: 2020-02-03

## 2020-02-14 ENCOUNTER — OFFICE VISIT (OUTPATIENT)
Dept: FAMILY MEDICINE CLINIC | Facility: CLINIC | Age: 60
End: 2020-02-14
Payer: COMMERCIAL

## 2020-02-14 ENCOUNTER — TELEPHONE (OUTPATIENT)
Dept: FAMILY MEDICINE CLINIC | Facility: CLINIC | Age: 60
End: 2020-02-14

## 2020-02-14 VITALS
OXYGEN SATURATION: 97 % | WEIGHT: 183 LBS | RESPIRATION RATE: 17 BRPM | TEMPERATURE: 98.2 F | DIASTOLIC BLOOD PRESSURE: 72 MMHG | HEIGHT: 68 IN | HEART RATE: 78 BPM | SYSTOLIC BLOOD PRESSURE: 118 MMHG | BODY MASS INDEX: 27.74 KG/M2

## 2020-02-14 DIAGNOSIS — G89.4 CHRONIC PAIN DISORDER: ICD-10-CM

## 2020-02-14 DIAGNOSIS — E11.9 TYPE 2 DIABETES MELLITUS WITHOUT COMPLICATION, WITH LONG-TERM CURRENT USE OF INSULIN (HCC): Primary | ICD-10-CM

## 2020-02-14 DIAGNOSIS — Z79.4 TYPE 2 DIABETES MELLITUS WITHOUT COMPLICATION, WITH LONG-TERM CURRENT USE OF INSULIN (HCC): Primary | ICD-10-CM

## 2020-02-14 LAB — SL AMB POCT HEMOGLOBIN AIC: 6.9 (ref ?–6.5)

## 2020-02-14 PROCEDURE — 3008F BODY MASS INDEX DOCD: CPT | Performed by: FAMILY MEDICINE

## 2020-02-14 PROCEDURE — 99214 OFFICE O/P EST MOD 30 MIN: CPT | Performed by: FAMILY MEDICINE

## 2020-02-14 PROCEDURE — 3074F SYST BP LT 130 MM HG: CPT | Performed by: FAMILY MEDICINE

## 2020-02-14 PROCEDURE — 1036F TOBACCO NON-USER: CPT | Performed by: FAMILY MEDICINE

## 2020-02-14 PROCEDURE — 3044F HG A1C LEVEL LT 7.0%: CPT | Performed by: FAMILY MEDICINE

## 2020-02-14 PROCEDURE — 3044F HG A1C LEVEL LT 7.0%: CPT | Performed by: NURSE PRACTITIONER

## 2020-02-14 PROCEDURE — 83036 HEMOGLOBIN GLYCOSYLATED A1C: CPT | Performed by: FAMILY MEDICINE

## 2020-02-14 PROCEDURE — 3078F DIAST BP <80 MM HG: CPT | Performed by: FAMILY MEDICINE

## 2020-02-14 PROCEDURE — 82570 ASSAY OF URINE CREATININE: CPT | Performed by: FAMILY MEDICINE

## 2020-02-14 PROCEDURE — 82043 UR ALBUMIN QUANTITATIVE: CPT | Performed by: FAMILY MEDICINE

## 2020-02-14 RX ORDER — DIVALPROEX SODIUM 500 MG/1
500 TABLET, EXTENDED RELEASE ORAL EVERY 12 HOURS
COMMUNITY
Start: 2020-01-29

## 2020-02-14 RX ORDER — LINACLOTIDE 72 UG/1
1 CAPSULE, GELATIN COATED ORAL DAILY
COMMUNITY
Start: 2020-01-29 | End: 2021-03-16

## 2020-02-14 RX ORDER — DOXEPIN HYDROCHLORIDE 25 MG/1
CAPSULE ORAL
COMMUNITY
Start: 2020-01-29

## 2020-02-14 RX ORDER — CLONAZEPAM 0.5 MG/1
TABLET ORAL
COMMUNITY
Start: 2020-02-12 | End: 2020-04-08 | Stop reason: SDUPTHER

## 2020-02-14 NOTE — ASSESSMENT & PLAN NOTE
Lab Results   Component Value Date    HGBA1C 6 9 (A) 02/14/2020     Followed by endocrinology  Foot exam completed today

## 2020-02-14 NOTE — PATIENT INSTRUCTIONS
Foot Care for People with Diabetes   AMBULATORY CARE:   What you need to know about foot care:   · Foot care helps protect your feet and prevent foot ulcers or sores  Long-term high blood sugar levels can damage the blood vessels and nerves in your legs and feet  This damage makes it hard to feel pressure, pain, temperature, and touch  You may not be able to feel a cut or sore, or shoes that are too tight  Foot care is needed to prevent serious problems, such as an infection or amputation  · Diabetes may cause your toes to become crooked or curved under  These changes may affect the way you walk and can lead to increased pressure on your foot  The pressure can decrease blood flow to your feet  Lack of blood flow increases your risk for a foot ulcer  Do not ignore small problems, such as dry skin or small wounds  These can become life-threatening over time without proper care  Contact your healthcare provider if:   · Your feet become numb, weak, or hard to move  · You have pus draining from a sore on your foot  · You have a wound on your foot that gets bigger, deeper, or does not heal      · You see blisters, cuts, scratches, calluses, or sores on your foot  · You have a fever, and your feet become red, warm, and swollen  · Your toenails become thick, curled, or yellow  · You find it hard to check your feet because your vision is poor  · You have questions or concerns about your condition or care  How to care for your feet:   · Check your feet each day  Look at your whole foot, including the bottom, and between and under your toes  Check for wounds, corns, and calluses  Use a mirror to see the bottom of your feet  The skin on your feet may be shiny, tight, or darker than normal  Your feet may also be cold and pale  Feel your feet by running your hands along the tops, bottoms, sides, and between your toes   Redness, swelling, and warmth are signs of blood flow problems that can lead to a foot ulcer  Do not try to remove corns or calluses yourself  · Wash your feet each day with soap and warm water  Do not use hot water, because this can injure your foot  Dry your feet gently with a towel after you wash them  Dry between and under your toes  · Apply lotion or a moisturizer on your dry feet  Ask your healthcare provider what lotions are best to use  Do not put lotion or moisturizer between your toes  · Cut your toenails correctly  File or cut your toenails straight across  Use a soft brush to clean around your toenails  If your toenails are very thick, you may need to have a healthcare provider or specialist cut them  · Protect your feet  Do not walk barefoot or wear your shoes without socks  Check your shoes for rocks or other objects that can hurt your feet  Wear cotton socks to help keep your feet dry  Wear socks without toe seams, or wear them with the seams inside out  Change your socks each day  Do not wear socks that are dirty or damp  · Wear shoes that fit well  Wear shoes that do not rub against any area of your feet  Your shoes should be ½ to ¾ inch (1 to 2 centimeters) longer than your feet  Your shoes should also have extra space around the widest part of your feet  Walking or athletic shoes with laces or straps that adjust are best  Ask your healthcare provider for help to choose shoes that fit you best  Ask him if you need to wear an insert, orthotic, or bandage on your feet  · Go to your follow-up visits  Your healthcare provider will do a foot exam at least once a year  You may need a foot exam more often if you have nerve damage, foot deformities, or ulcers  He will check for nerve damage and how well you can feel your feet  He will check your shoes to see if they fit well  Follow up with your healthcare provider or foot specialist as directed: You will need to have your feet checked at least once a year   You may need a foot exam more often if you have nerve damage, foot deformities, or ulcers  Write down your questions so you remember to ask them during your visits  © 2017 [employer] Information is for End User's use only and may not be sold, redistributed or otherwise used for commercial purposes  All illustrations and images included in CareNotes® are the copyrighted property of A [employer] M [employer]  or [employer]  The above information is an  only  It is not intended as medical advice for individual conditions or treatments  Talk to your doctor, nurse or pharmacist before following any medical regimen to see if it is safe and effective for you

## 2020-02-14 NOTE — ASSESSMENT & PLAN NOTE
Patient goes to a Methadone Clinic  At the visit in the office today, his mentation is significantly slowed  Patient noted a desire to reinstate his drivers license  Discussed with patient that at this time I could not support a return to driving  Will refer patient for  evaluation  At that point, patient reported he would like to obtain a new PCP  Discussed that patient was welcome to obtain a new PCP that he would be more comfortable with seeing

## 2020-02-14 NOTE — TELEPHONE ENCOUNTER
At check out patient stated Dr Rosa Harmon "is not for him" and requested a list of pcp's that he can call to schedule an appt  Please remove Dr Rosa Harmon as his PCP

## 2020-02-14 NOTE — PROGRESS NOTES
Assessment/Plan:    Chronic pain disorder  Patient goes to a Methadone Clinic  At the visit in the office today, his mentation is significantly slowed  Patient noted a desire to reinstate his drivers license  Discussed with patient that at this time I could not support a return to driving  Will refer patient for  evaluation  At that point, patient reported he would like to obtain a new PCP  Discussed that patient was welcome to obtain a new PCP that he would be more comfortable with seeing  Type 2 diabetes mellitus without complication, with long-term current use of insulin ([facility])    Lab Results   Component Value Date    HGBA1C 6 9 (A) 02/14/2020     Followed by endocrinology  Foot exam completed today  Diagnoses and all orders for this visit:    Type 2 diabetes mellitus without complication, with long-term current use of insulin ([facility])  -     POCT hemoglobin A1c  -     Microalbumin / creatinine urine ratio    Chronic pain disorder  -     Ambulatory referral to ot  adaptability evaluation; Future    Other orders  -     clonazePAM (KlonoPIN) 0 5 mg tablet  -     divalproex sodium (DEPAKOTE ER) 500 mg 24 hr tablet  -     doxepin (SINEquan) 25 mg capsule  -     LINZESS 72 MCG CAPS; Take 1 capsule by mouth daily          Subjective:      Patient ID: Tasha Kimbrough is a 64 y o  male  Patient presents to clinic today for a follow-up visit  He reports that his Methadone Clinic in [facility] sent him to see his PCP for a letter so that he can have oral drug testing  Patient notes to have difficulty leaving a urine specimen for drug screening at he Methadone Clinic  Patient reports he is tapering down on methadone 5 mg weekly with the Methadone Clinic  He would like a pain medication for his left hip, but that request was declined today  Patient also plans to get a 's license in April  Diabetes   He presents for his follow-up diabetic visit  He has type 2 diabetes mellitus   Current diabetic treatment includes insulin injections  An ACE inhibitor/angiotensin II receptor blocker is not being taken  He does not see a podiatrist Eye exam is not current  The following portions of the patient's history were reviewed and updated as appropriate: allergies, current medications, past family history, past medical history, past social history, past surgical history and problem list     Review of Systems   Musculoskeletal: Positive for gait problem (uses walker)  Skin: Negative for wound  Objective:      /72 (BP Location: Left arm, Patient Position: Sitting, Cuff Size: Standard)   Pulse 78   Temp 98 2 °F (36 8 °C) (Oral)   Resp 17   Ht 5' 8" (1 727 m)   Wt 83 kg (183 lb)   SpO2 97%   BMI 27 83 kg/m²          Physical Exam   Constitutional: He is oriented to person, place, and time  He appears well-developed and well-nourished  He is cooperative  HENT:   Head: Normocephalic and atraumatic  Right Ear: Hearing and external ear normal    Left Ear: Hearing and external ear normal    Eyes: Conjunctivae and lids are normal    Cardiovascular: Normal rate  Pulses are weak pulses  Pulses:       Dorsalis pedis pulses are 1+ on the right side, and 1+ on the left side  Posterior tibial pulses are 1+ on the right side, and 1+ on the left side  Pulmonary/Chest: Effort normal    Musculoskeletal: Normal range of motion  Feet:    Feet:   Right Foot:   Skin Integrity: Negative for ulcer, skin breakdown, erythema, warmth, callus or dry skin  Left Foot:   Skin Integrity: Negative for ulcer, skin breakdown, erythema, warmth, callus or dry skin  Neurological: He is alert and oriented to person, place, and time  Gait normal    Skin: Skin is warm and dry  Psychiatric: He has a normal mood and affect  His speech is normal and behavior is normal    Nursing note and vitals reviewed  BMI Counseling: Body mass index is 27 83 kg/m²   The BMI is above normal  Nutrition recommendations include encouraging healthy choices of fruits and vegetables and moderation in carbohydrate intake  Diabetic Foot Exam    Patient's shoes and socks removed  Right Foot/Ankle   Right Foot Inspection  Skin Exam: skin normal and skin intact no dry skin, no warmth, no callus, no erythema, no maceration, no abnormal color, no pre-ulcer, no ulcer and no callus                            Sensory       Monofilament testing: diminished  Vascular  Capillary refills: < 3 seconds  The right DP pulse is 1+  The right PT pulse is 1+  Left Foot/Ankle  Left Foot Inspection  Skin Exam: skin normal and skin intactno dry skin, no warmth, no erythema, no maceration, normal color, no pre-ulcer, no ulcer and no callus                                         Sensory       Monofilament: diminished  Vascular  Capillary refills: < 3 seconds  The left DP pulse is 1+  The left PT pulse is 1+  Assign Risk Category:  No deformity present;  Loss of protective sensation; Weak pulses       Risk: 2

## 2020-02-15 LAB
CREAT UR-MCNC: 74.7 MG/DL
MICROALBUMIN UR-MCNC: 91.3 MG/L (ref 0–20)
MICROALBUMIN/CREAT 24H UR: 122 MG/G CREATININE (ref 0–30)

## 2020-02-15 PROCEDURE — 3060F POS MICROALBUMINURIA REV: CPT | Performed by: NURSE PRACTITIONER

## 2020-03-18 ENCOUNTER — TELEPHONE (OUTPATIENT)
Dept: FAMILY MEDICINE CLINIC | Facility: CLINIC | Age: 60
End: 2020-03-18

## 2020-03-18 RX ORDER — CLONAZEPAM 0.5 MG/1
0.5 TABLET ORAL 3 TIMES DAILY
Qty: 90 TABLET | Refills: 0 | OUTPATIENT
Start: 2020-03-18

## 2020-03-18 NOTE — TELEPHONE ENCOUNTER
Please advise    Patient called and said the mental health clinic on [address] in [place] closed their doors on Tuesday and he said he does not have any clonozapam 0 5% takes 3 times daily  He went to the office and the door has a sign to call [address] if needed, so when he did they told him to call his PCP    I called [facility] the voicemail is full unable to leave message

## 2020-03-18 NOTE — TELEPHONE ENCOUNTER
Recommend patient contact his psychiatrist's office for further instructions on this medication refill

## 2020-03-18 NOTE — TELEPHONE ENCOUNTER
Pt psych dept is closed for 3 weeks and he is completely out of meds  Can someone please help pt  He is in a lot of distress

## 2020-04-06 RX ORDER — CLONAZEPAM 2 MG/1
2 TABLET ORAL 3 TIMES DAILY
Refills: 0 | OUTPATIENT
Start: 2020-04-06

## 2020-04-07 RX ORDER — CLONAZEPAM 0.5 MG/1
0.5 TABLET ORAL 3 TIMES DAILY
Qty: 135 TABLET | Refills: 0 | Status: CANCELLED | OUTPATIENT
Start: 2020-04-07

## 2020-04-08 ENCOUNTER — TELEPHONE (OUTPATIENT)
Dept: FAMILY MEDICINE CLINIC | Facility: CLINIC | Age: 60
End: 2020-04-08

## 2020-04-08 DIAGNOSIS — F31.9 BIPOLAR AFFECTIVE DISORDER, REMISSION STATUS UNSPECIFIED (HCC): Primary | ICD-10-CM

## 2020-04-08 RX ORDER — CLONAZEPAM 0.5 MG/1
0.5 TABLET ORAL 3 TIMES DAILY
Qty: 21 TABLET | Refills: 0 | Status: SHIPPED | OUTPATIENT
Start: 2020-04-08 | End: 2021-03-16

## 2020-04-30 ENCOUNTER — ANESTHESIA EVENT (OUTPATIENT)
Dept: GASTROENTEROLOGY | Facility: HOSPITAL | Age: 60
End: 2020-04-30

## 2020-05-01 ENCOUNTER — HOSPITAL ENCOUNTER (OUTPATIENT)
Dept: GASTROENTEROLOGY | Facility: HOSPITAL | Age: 60
Setting detail: OUTPATIENT SURGERY
Discharge: HOME | End: 2020-05-01
Attending: COLON & RECTAL SURGERY | Admitting: COLON & RECTAL SURGERY
Payer: COMMERCIAL

## 2020-05-01 ENCOUNTER — ANESTHESIA (OUTPATIENT)
Dept: GASTROENTEROLOGY | Facility: HOSPITAL | Age: 60
End: 2020-05-01

## 2020-05-01 VITALS
SYSTOLIC BLOOD PRESSURE: 122 MMHG | WEIGHT: 183 LBS | HEART RATE: 88 BPM | DIASTOLIC BLOOD PRESSURE: 77 MMHG | OXYGEN SATURATION: 97 % | BODY MASS INDEX: 27.74 KG/M2 | RESPIRATION RATE: 16 BRPM | HEIGHT: 68 IN | TEMPERATURE: 98 F

## 2020-05-01 DIAGNOSIS — K64.8 HEMORRHOIDS, INTERNAL: ICD-10-CM

## 2020-05-01 DIAGNOSIS — K62.3 RECTAL PROLAPSE: ICD-10-CM

## 2020-05-01 DIAGNOSIS — R15.1 FECAL SMEARING: ICD-10-CM

## 2020-05-01 PROCEDURE — 88305 TISSUE EXAM BY PATHOLOGIST: CPT | Performed by: PATHOLOGY

## 2020-05-01 PROCEDURE — 45380 COLONOSCOPY AND BIOPSY: CPT | Performed by: COLON & RECTAL SURGERY

## 2020-05-01 RX ORDER — LIDOCAINE HYDROCHLORIDE 10 MG/ML
INJECTION, SOLUTION EPIDURAL; INFILTRATION; INTRACAUDAL; PERINEURAL AS NEEDED
Status: DISCONTINUED | OUTPATIENT
Start: 2020-05-01 | End: 2020-05-01 | Stop reason: SURG

## 2020-05-01 RX ORDER — PROPOFOL 10 MG/ML
INJECTION, EMULSION INTRAVENOUS CONTINUOUS PRN
Status: DISCONTINUED | OUTPATIENT
Start: 2020-05-01 | End: 2020-05-01 | Stop reason: SURG

## 2020-05-01 RX ORDER — SODIUM CHLORIDE 9 MG/ML
125 INJECTION, SOLUTION INTRAVENOUS CONTINUOUS
Status: DISCONTINUED | OUTPATIENT
Start: 2020-05-01 | End: 2020-05-05 | Stop reason: HOSPADM

## 2020-05-01 RX ORDER — SODIUM CHLORIDE 9 MG/ML
INJECTION, SOLUTION INTRAVENOUS CONTINUOUS PRN
Status: DISCONTINUED | OUTPATIENT
Start: 2020-05-01 | End: 2020-05-01 | Stop reason: SURG

## 2020-05-01 RX ORDER — PROPOFOL 10 MG/ML
INJECTION, EMULSION INTRAVENOUS AS NEEDED
Status: DISCONTINUED | OUTPATIENT
Start: 2020-05-01 | End: 2020-05-01 | Stop reason: SURG

## 2020-05-01 RX ADMIN — LIDOCAINE HYDROCHLORIDE 50 MG: 10 INJECTION, SOLUTION EPIDURAL; INFILTRATION; INTRACAUDAL; PERINEURAL at 08:48

## 2020-05-01 RX ADMIN — PROPOFOL 100 MCG/KG/MIN: 10 INJECTION, EMULSION INTRAVENOUS at 08:48

## 2020-05-01 RX ADMIN — PROPOFOL 100 MG: 10 INJECTION, EMULSION INTRAVENOUS at 08:48

## 2020-05-01 RX ADMIN — SODIUM CHLORIDE: 0.9 INJECTION, SOLUTION INTRAVENOUS at 08:48

## 2020-07-02 ENCOUNTER — OFFICE VISIT (OUTPATIENT)
Dept: FAMILY MEDICINE CLINIC | Facility: CLINIC | Age: 60
End: 2020-07-02
Payer: COMMERCIAL

## 2020-07-02 VITALS
OXYGEN SATURATION: 98 % | WEIGHT: 173 LBS | BODY MASS INDEX: 26.22 KG/M2 | DIASTOLIC BLOOD PRESSURE: 60 MMHG | RESPIRATION RATE: 17 BRPM | HEART RATE: 82 BPM | TEMPERATURE: 98.2 F | HEIGHT: 68 IN | SYSTOLIC BLOOD PRESSURE: 140 MMHG

## 2020-07-02 DIAGNOSIS — F11.10 NARCOTIC ABUSE (HCC): ICD-10-CM

## 2020-07-02 DIAGNOSIS — Z04.89 EXAMINATION FOR MEDICOLEGAL REASON: Primary | ICD-10-CM

## 2020-07-02 DIAGNOSIS — F31.9 BIPOLAR AFFECTIVE DISORDER, REMISSION STATUS UNSPECIFIED (HCC): ICD-10-CM

## 2020-07-02 PROCEDURE — 99213 OFFICE O/P EST LOW 20 MIN: CPT | Performed by: FAMILY MEDICINE

## 2020-07-02 PROCEDURE — 3077F SYST BP >= 140 MM HG: CPT | Performed by: FAMILY MEDICINE

## 2020-07-02 PROCEDURE — 3044F HG A1C LEVEL LT 7.0%: CPT | Performed by: FAMILY MEDICINE

## 2020-07-02 PROCEDURE — 3008F BODY MASS INDEX DOCD: CPT | Performed by: FAMILY MEDICINE

## 2020-07-02 PROCEDURE — 3078F DIAST BP <80 MM HG: CPT | Performed by: FAMILY MEDICINE

## 2020-07-02 PROCEDURE — 1036F TOBACCO NON-USER: CPT | Performed by: FAMILY MEDICINE

## 2020-07-02 PROCEDURE — 3060F POS MICROALBUMINURIA REV: CPT | Performed by: FAMILY MEDICINE

## 2020-07-02 NOTE — PROGRESS NOTES
Assessment/Plan:    Examination for medicolegal reason  Patient presents with a Substance Abuse form from [facility] that he would like to be completed to reinstate his 's license  Patient was referred for  evaluation with OT about 5 months ago, but has yet to complete that evaluation  Patient is on clonazepam and methadone, both which would impair his driving ability  Patient appears to be drowsy during evaluation today  Directed patient to complete the  evaluation testing with OT, and will determine further management at that time  Narcotic abuse [facility])  Patient follows with a methadone clinic  He notes his methadone provider refused to fill out the Substance Abuse form from [facility]  Bipolar disorder [facility])  Patient obtains clonazepam from his psychiatrist for his bipolar disorder  Patient reports his psychiatrist refused to fill out the Substance Abuse form for [facility]  Diagnoses and all orders for this visit:    Examination for medicolegal reason    Bipolar affective disorder, remission status unspecified ([facility] [address] )    Narcotic abuse ([facility] [address] )          Subjective:      Patient ID: Romero Washington is a 64 y o  male  Patient presents to clinic today with a Substance Abuse form from the [facility] that he would like to have completed to obtain his 's license  Patient is on clonazepam 0 5 mg recently changed from TID to BID by his psychiatrist   Patient also is being treated by a methadone clinic, and confirms he is still a patient at the methadone clinic  Patient's sister presents with him today, and she would like his license reinstated as driving this patient to and from work is stressful for her  Patient was seen in February 2020 and expressed a desire to obtain his license at that visit  Patient's mentation was significantly slowed at that visit    He was referred to OT for 's Evaluation, but has not completed that referral       The following portions of the patient's history were reviewed and updated as appropriate: allergies, current medications, past family history, past medical history, past social history, past surgical history and problem list     Review of Systems   Eyes: Negative for visual disturbance (recently completed cataract surgery)  Musculoskeletal: Positive for arthralgias (on chronic pain management at methadone clinic)  Objective:      /60 (BP Location: Right arm, Patient Position: Sitting, Cuff Size: Standard)   Pulse 82   Temp 98 2 °F (36 8 °C) (Tympanic)   Resp 17   Ht 5' 8" (1 727 m)   Wt 78 5 kg (173 lb)   SpO2 98%   BMI 26 30 kg/m²          Physical Exam   Constitutional: He is oriented to person, place, and time  He appears well-developed and well-nourished  He is cooperative  No distress  HENT:   Head: Normocephalic and atraumatic  Right Ear: Hearing and external ear normal    Left Ear: Hearing and external ear normal    Eyes: Conjunctivae and lids are normal    Cardiovascular: Normal rate  Pulmonary/Chest: Effort normal  No respiratory distress  Musculoskeletal: Normal range of motion  Neurological: He is alert and oriented to person, place, and time  Gait normal    Skin: Skin is warm and dry  Psychiatric: He has a normal mood and affect  His speech is normal  He is slowed  Nursing note and vitals reviewed

## 2020-07-02 NOTE — ASSESSMENT & PLAN NOTE
Patient follows with a methadone clinic  He notes his methadone provider refused to fill out the Substance Abuse form from [place] [employer]

## 2020-07-02 NOTE — ASSESSMENT & PLAN NOTE
Patient presents with a Substance Abuse form from [facility] that he would like to be completed to reinstate his 's license  Patient was referred for  evaluation with OT about 5 months ago, but has yet to complete that evaluation  Patient is on clonazepam and methadone, both which would impair his driving ability  Patient appears to be drowsy during evaluation today  Directed patient to complete the  evaluation testing with OT, and will determine further management at that time

## 2020-07-02 NOTE — ASSESSMENT & PLAN NOTE
Patient obtains clonazepam from his psychiatrist for his bipolar disorder  Patient reports his psychiatrist refused to fill out the Substance Abuse form for [employer]

## 2020-07-06 ENCOUNTER — EVALUATION (OUTPATIENT)
Dept: PHYSICAL THERAPY | Facility: CLINIC | Age: 60
End: 2020-07-06
Payer: COMMERCIAL

## 2020-07-06 DIAGNOSIS — R29.6 FALLS FREQUENTLY: Primary | ICD-10-CM

## 2020-07-06 PROCEDURE — 97162 PT EVAL MOD COMPLEX 30 MIN: CPT | Performed by: PHYSICAL THERAPIST
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
## 2020-07-14 DIAGNOSIS — Z02.4 DRIVER'S PERMIT PHYSICAL EXAMINATION: Primary | ICD-10-CM
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
## 2020-08-12 ENCOUNTER — OFFICE VISIT (OUTPATIENT)
Dept: OCCUPATIONAL THERAPY | Facility: CLINIC | Age: 60
End: 2020-08-12
Payer: COMMERCIAL

## 2020-08-12 DIAGNOSIS — F11.10 NARCOTIC ABUSE (HCC): Primary | ICD-10-CM

## 2020-08-12 PROCEDURE — 96125 COGNITIVE TEST BY HC PRO: CPT

## 2020-08-12 PROCEDURE — 97165 OT EVAL LOW COMPLEX 30 MIN: CPT

## 2020-08-28 ENCOUNTER — OFFICE VISIT (OUTPATIENT)
Dept: FAMILY MEDICINE CLINIC | Facility: CLINIC | Age: 60
End: 2020-08-28
Payer: COMMERCIAL

## 2020-08-28 VITALS
SYSTOLIC BLOOD PRESSURE: 130 MMHG | HEIGHT: 68 IN | OXYGEN SATURATION: 98 % | RESPIRATION RATE: 17 BRPM | TEMPERATURE: 98.5 F | WEIGHT: 165 LBS | BODY MASS INDEX: 25.01 KG/M2 | DIASTOLIC BLOOD PRESSURE: 80 MMHG | HEART RATE: 68 BPM

## 2020-08-28 DIAGNOSIS — F11.10 NARCOTIC ABUSE (HCC): Primary | ICD-10-CM

## 2020-08-28 DIAGNOSIS — Z04.89 EXAMINATION FOR MEDICOLEGAL REASON: ICD-10-CM

## 2020-08-28 DIAGNOSIS — F31.9 BIPOLAR AFFECTIVE DISORDER, REMISSION STATUS UNSPECIFIED (HCC): ICD-10-CM

## 2020-08-28 DIAGNOSIS — E11.649 UNCONTROLLED TYPE 2 DIABETES MELLITUS WITH HYPOGLYCEMIA WITHOUT COMA (HCC): ICD-10-CM

## 2020-08-28 PROCEDURE — 1036F TOBACCO NON-USER: CPT | Performed by: NURSE PRACTITIONER

## 2020-08-28 PROCEDURE — 3060F POS MICROALBUMINURIA REV: CPT | Performed by: NURSE PRACTITIONER

## 2020-08-28 PROCEDURE — 3079F DIAST BP 80-89 MM HG: CPT | Performed by: NURSE PRACTITIONER

## 2020-08-28 PROCEDURE — 3044F HG A1C LEVEL LT 7.0%: CPT | Performed by: NURSE PRACTITIONER

## 2020-08-28 PROCEDURE — 99214 OFFICE O/P EST MOD 30 MIN: CPT | Performed by: NURSE PRACTITIONER

## 2020-08-28 PROCEDURE — 3008F BODY MASS INDEX DOCD: CPT | Performed by: NURSE PRACTITIONER

## 2020-08-28 PROCEDURE — 3075F SYST BP GE 130 - 139MM HG: CPT | Performed by: NURSE PRACTITIONER

## 2020-08-28 NOTE — ASSESSMENT & PLAN NOTE
Managed by Psychiatry, Dr Cheryl Hollins  Pt reports being on 3 medications via Dr Hubert Gibson but can only remember Clonazepam

## 2020-08-28 NOTE — ASSESSMENT & PLAN NOTE
Lab Results   Component Value Date    HGBA1C 6 9 (A) 02/14/2020   Managed by Endrocinology, Dr Suzy Snyder, last seen 2/2020

## 2020-08-28 NOTE — PROGRESS NOTES
Assessment/Plan:    Diabetes type 2, uncontrolled ([facility] [address] )    Lab Results   Component Value Date    HGBA1C 6 9 (A) 02/14/2020   Managed by Endrocinology, Dr Yael Benz, last seen 2/2020    Bipolar disorder [facility])  Managed by Psychiatry, Dr Derald Holter  Pt reports being on 3 medications via Dr Michelle Ribera but can only remember Clonazepam     Examination for medicolegal reason  Recent Fit To Drive Evaluation by OT on 8/12/2020 which indicated:  75% likelihood of failing an on road driving evaluation and it is therefore, recommended that you cease driving immediately   52% probability of creating a hazardous situation on a specialized road test  Full report in chart   Based on these results, I do not feel it is appropriate to complete any forms for [facility] today which would clear him to drive  He would like to be retested and I explained that our OT department will not retest for 3 months after the original test   He would like to look into the driving evaluation at [facility] which he is welcome to do so    Narcotic abuse ([facility] )  Managed by a local Methadone Clinic        Diagnoses and all orders for this visit:    Narcotic abuse [facility])  -     Ambulatory referral to ot  adaptability evaluation; Future    Examination for medicolegal reason  -     Ambulatory referral to ot  adaptability evaluation; Future    Uncontrolled type 2 diabetes mellitus with hypoglycemia without coma ([facility] )  -     Ambulatory referral to ot  adaptability evaluation; Future    Bipolar affective disorder, remission status unspecified ([facility] )  -     Ambulatory referral to ot  adaptability evaluation; Future          Subjective:      Patient ID: Jocy Wharton is a 64 y o  male  HPI     Pt presents by himself today to review results of his 's Evaluation completed by our OT services on 8/12/2020    The patient received a Substance Abuse form from [employer] that he requested to be completed by Dr Edi Storey to obtain his 's license  He is currently being treated at a Methadone Clinic  He states he currently takes Methadone 180 mg daily  He also follow with a Psychiatrist, Dr Emilie Wilcox for Bipolar Disorder  He states his Psychiatrist is currently prescribing him 3 medications but he can only remember one which is Clonazepam   Per the [facility] site, he last had Clonazepam 0 5 mg tablets filled on 8/24/2020, #14 tabs for 7 days  Debbie Youssef has a history of narcotic overdose, most recently August of 2019 (see ED notes in chart for details)  Looking back in his chart, it is documented that he has had slow mentation at several office visits  On his recent driving evaluation, his scores are as followed:  75% likelihood of failing an on road driving evaluation and it is therefore, recommended that you cease driving immediately   52% probability of creating a hazardous situation on a specialized road test    Today, Debbie Youssef tells me that he feels the instructor of the test did not do a "good job" at explaining what he was supposed to do during the test and he would like to be re-tested    He recently started working nightshift at [employer] and depends on his sister or [employer] for transportation     The following portions of the patient's history were reviewed and updated as appropriate: allergies, current medications, past family history, past medical history, past social history, past surgical history and problem list     Review of Systems   Constitutional: Negative for chills, fatigue and fever  Respiratory: Negative for cough, shortness of breath and wheezing  Cardiovascular: Negative for chest pain, palpitations and leg swelling  Gastrointestinal: Negative for abdominal pain, blood in stool, constipation, diarrhea and nausea  Genitourinary: Negative for dysuria  Musculoskeletal: Positive for arthralgias  Negative for myalgias  Skin: Negative for rash and wound     Neurological: Negative for dizziness, weakness, numbness and headaches  Psychiatric/Behavioral: Negative for dysphoric mood, sleep disturbance and suicidal ideas  The patient is not nervous/anxious  Objective:      /80 (BP Location: Left arm, Patient Position: Sitting, Cuff Size: Standard)   Pulse 68   Temp 98 5 °F (36 9 °C) (Oral)   Resp 17   Ht 5' 8" (1 727 m)   Wt 74 8 kg (165 lb)   SpO2 98%   BMI 25 09 kg/m²          Physical Exam  Constitutional:       General: He is not in acute distress  Appearance: He is well-developed  He is not ill-appearing, toxic-appearing or diaphoretic  HENT:      Head: Normocephalic and atraumatic  Eyes:      Conjunctiva/sclera: Conjunctivae normal       Pupils: Pupils are equal, round, and reactive to light  Neck:      Musculoskeletal: Normal range of motion and neck supple  Thyroid: No thyromegaly  Cardiovascular:      Rate and Rhythm: Normal rate and regular rhythm  Heart sounds: Normal heart sounds  No murmur  Pulmonary:      Effort: Pulmonary effort is normal  No respiratory distress  Breath sounds: Normal breath sounds  No wheezing  Abdominal:      General: Bowel sounds are normal  There is no distension  Palpations: Abdomen is soft  Tenderness: There is no abdominal tenderness  Musculoskeletal: Normal range of motion  Lymphadenopathy:      Cervical: No cervical adenopathy  Skin:     General: Skin is warm and dry  Neurological:      Mental Status: He is oriented to person, place, and time  He is lethargic  Cranial Nerves: Cranial nerves are intact  Sensory: Sensation is intact  Motor: Motor function is intact  Comments: Pt has somewhat garbled speech but I am able to understand him  He appears lethargic, with eyes closing during our conversation and head nodding, but he is easily arousable and does engage in our conversation    He does answer questions appropriately     Psychiatric:         Mood and Affect: Mood normal          Behavior: Behavior is slowed  Thought Content:  Thought content normal

## 2020-08-28 NOTE — ASSESSMENT & PLAN NOTE
Recent Fit To Drive Evaluation by OT on 8/12/2020 which indicated:  75% likelihood of failing an on road driving evaluation and it is therefore, recommended that you cease driving immediately   52% probability of creating a hazardous situation on a specialized road test  Full report in chart   Based on these results, I do not feel it is appropriate to complete any forms for [facility] today which would clear him to drive    He would like to be retested and I explained that our OT department will not retest for 3 months after the original test   He would like to look into the driving evaluation at [facility] which he is welcome to do so

## 2020-10-30 DIAGNOSIS — F31.9 BIPOLAR AFFECTIVE DISORDER, REMISSION STATUS UNSPECIFIED (HCC): ICD-10-CM

## 2020-10-30 RX ORDER — CLONAZEPAM 0.5 MG/1
0.5 TABLET ORAL 3 TIMES DAILY
Qty: 21 TABLET | Refills: 0 | Status: CANCELLED | OUTPATIENT
Start: 2020-10-30 | End: 2020-11-06

## 2020-11-04 ENCOUNTER — TELEPHONE (OUTPATIENT)
Dept: FAMILY MEDICINE CLINIC | Facility: CLINIC | Age: 60
End: 2020-11-04

## 2020-11-18 ENCOUNTER — LAB (OUTPATIENT)
Dept: LAB | Facility: HOSPITAL | Age: 60
End: 2020-11-18
Payer: COMMERCIAL

## 2020-11-18 ENCOUNTER — TRANSCRIBE ORDERS (OUTPATIENT)
Dept: LAB | Facility: HOSPITAL | Age: 60
End: 2020-11-18

## 2020-11-18 DIAGNOSIS — E11.65 UNCONTROLLED TYPE 2 DIABETES MELLITUS WITH HYPERGLYCEMIA (HCC): Primary | ICD-10-CM

## 2020-11-18 DIAGNOSIS — E78.5 DYSLIPIDEMIA: ICD-10-CM

## 2020-11-18 DIAGNOSIS — E55.9 VITAMIN D DEFICIENCY: ICD-10-CM

## 2020-11-18 DIAGNOSIS — Z12.5 SPECIAL SCREENING, PROSTATE CANCER: ICD-10-CM

## 2020-11-18 DIAGNOSIS — E29.1 HYPOGONADISM MALE: ICD-10-CM

## 2020-11-18 DIAGNOSIS — Z01.818 PRE-OPERATIVE GENERAL PHYSICAL EXAMINATION: ICD-10-CM

## 2020-11-18 DIAGNOSIS — E11.65 UNCONTROLLED TYPE 2 DIABETES MELLITUS WITH HYPERGLYCEMIA (HCC): ICD-10-CM

## 2020-11-18 LAB
25(OH)D3 SERPL-MCNC: 63.1 NG/ML (ref 30–100)
ALBUMIN SERPL BCP-MCNC: 3.4 G/DL (ref 3.5–5)
ALP SERPL-CCNC: 95 U/L (ref 46–116)
ALT SERPL W P-5'-P-CCNC: 36 U/L (ref 12–78)
ANION GAP SERPL CALCULATED.3IONS-SCNC: 1 MMOL/L (ref 4–13)
AST SERPL W P-5'-P-CCNC: 17 U/L (ref 5–45)
BASOPHILS # BLD AUTO: 0.04 THOUSANDS/ΜL (ref 0–0.1)
BASOPHILS NFR BLD AUTO: 1 % (ref 0–1)
BILIRUB SERPL-MCNC: 0.27 MG/DL (ref 0.2–1)
BUN SERPL-MCNC: 11 MG/DL (ref 5–25)
CALCIUM ALBUM COR SERPL-MCNC: 9.6 MG/DL (ref 8.3–10.1)
CALCIUM SERPL-MCNC: 9.1 MG/DL (ref 8.3–10.1)
CHLORIDE SERPL-SCNC: 105 MMOL/L (ref 100–108)
CHOLEST SERPL-MCNC: 121 MG/DL (ref 50–200)
CO2 SERPL-SCNC: 35 MMOL/L (ref 21–32)
CREAT SERPL-MCNC: 0.78 MG/DL (ref 0.6–1.3)
EOSINOPHIL # BLD AUTO: 0.78 THOUSAND/ΜL (ref 0–0.61)
EOSINOPHIL NFR BLD AUTO: 12 % (ref 0–6)
ERYTHROCYTE [DISTWIDTH] IN BLOOD BY AUTOMATED COUNT: 13.9 % (ref 11.6–15.1)
EST. AVERAGE GLUCOSE BLD GHB EST-MCNC: 157 MG/DL
GFR SERPL CREATININE-BSD FRML MDRD: 99 ML/MIN/1.73SQ M
GLUCOSE P FAST SERPL-MCNC: 69 MG/DL (ref 65–99)
HBA1C MFR BLD: 7.1 %
HCT VFR BLD AUTO: 34.1 % (ref 36.5–49.3)
HDLC SERPL-MCNC: 84 MG/DL
HGB BLD-MCNC: 10.9 G/DL (ref 12–17)
IMM GRANULOCYTES # BLD AUTO: 0.01 THOUSAND/UL (ref 0–0.2)
IMM GRANULOCYTES NFR BLD AUTO: 0 % (ref 0–2)
LDLC SERPL CALC-MCNC: 30 MG/DL (ref 0–100)
LYMPHOCYTES # BLD AUTO: 1.83 THOUSANDS/ΜL (ref 0.6–4.47)
LYMPHOCYTES NFR BLD AUTO: 29 % (ref 14–44)
MCH RBC QN AUTO: 28.2 PG (ref 26.8–34.3)
MCHC RBC AUTO-ENTMCNC: 32 G/DL (ref 31.4–37.4)
MCV RBC AUTO: 88 FL (ref 82–98)
MONOCYTES # BLD AUTO: 0.54 THOUSAND/ΜL (ref 0.17–1.22)
MONOCYTES NFR BLD AUTO: 9 % (ref 4–12)
NEUTROPHILS # BLD AUTO: 3.11 THOUSANDS/ΜL (ref 1.85–7.62)
NEUTS SEG NFR BLD AUTO: 49 % (ref 43–75)
NONHDLC SERPL-MCNC: 37 MG/DL
NRBC BLD AUTO-RTO: 0 /100 WBCS
PLATELET # BLD AUTO: 283 THOUSANDS/UL (ref 149–390)
PMV BLD AUTO: 10.8 FL (ref 8.9–12.7)
POTASSIUM SERPL-SCNC: 4.6 MMOL/L (ref 3.5–5.3)
PROT SERPL-MCNC: 7 G/DL (ref 6.4–8.2)
PSA SERPL-MCNC: 1.4 NG/ML (ref 0–4)
RBC # BLD AUTO: 3.86 MILLION/UL (ref 3.88–5.62)
SODIUM SERPL-SCNC: 141 MMOL/L (ref 136–145)
TRIGL SERPL-MCNC: 33 MG/DL
TSH SERPL DL<=0.05 MIU/L-ACNC: 0.96 UIU/ML (ref 0.36–3.74)
WBC # BLD AUTO: 6.31 THOUSAND/UL (ref 4.31–10.16)

## 2020-11-18 PROCEDURE — 80053 COMPREHEN METABOLIC PANEL: CPT

## 2020-11-18 PROCEDURE — 84402 ASSAY OF FREE TESTOSTERONE: CPT

## 2020-11-18 PROCEDURE — 85025 COMPLETE CBC W/AUTO DIFF WBC: CPT

## 2020-11-18 PROCEDURE — 84443 ASSAY THYROID STIM HORMONE: CPT

## 2020-11-18 PROCEDURE — 84403 ASSAY OF TOTAL TESTOSTERONE: CPT

## 2020-11-18 PROCEDURE — 83036 HEMOGLOBIN GLYCOSYLATED A1C: CPT

## 2020-11-18 PROCEDURE — 80061 LIPID PANEL: CPT

## 2020-11-18 PROCEDURE — 36415 COLL VENOUS BLD VENIPUNCTURE: CPT

## 2020-11-18 PROCEDURE — 82306 VITAMIN D 25 HYDROXY: CPT

## 2020-11-18 PROCEDURE — G0103 PSA SCREENING: HCPCS

## 2020-11-20 LAB
TESTOST FREE SERPL-MCNC: 10.1 PG/ML (ref 7.2–24)
TESTOST SERPL-MCNC: 367 NG/DL (ref 264–916)

## 2021-01-06 ENCOUNTER — HOSPITAL ENCOUNTER (EMERGENCY)
Facility: HOSPITAL | Age: 61
Discharge: LEFT BEFORE DISCHARGE | End: 2021-01-07
Attending: EMERGENCY MEDICINE | Admitting: EMERGENCY MEDICINE
Payer: OTHER MISCELLANEOUS

## 2021-01-06 DIAGNOSIS — M54.2 NECK PAIN: ICD-10-CM
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
## 2021-01-06 DIAGNOSIS — M25.559 HIP PAIN: ICD-10-CM

## 2021-01-06 PROCEDURE — 99284 EMERGENCY DEPT VISIT MOD MDM: CPT

## 2021-01-07 ENCOUNTER — APPOINTMENT (EMERGENCY)
Dept: CT IMAGING | Facility: HOSPITAL | Age: 61
End: 2021-01-07
Payer: OTHER MISCELLANEOUS

## 2021-01-07 ENCOUNTER — HOSPITAL ENCOUNTER (EMERGENCY)
Facility: HOSPITAL | Age: 61
Discharge: HOME | End: 2021-01-07
Attending: EMERGENCY MEDICINE | Admitting: EMERGENCY MEDICINE
Payer: OTHER MISCELLANEOUS

## 2021-01-07 VITALS
SYSTOLIC BLOOD PRESSURE: 147 MMHG | RESPIRATION RATE: 18 BRPM | HEART RATE: 69 BPM | BODY MASS INDEX: 25.85 KG/M2 | DIASTOLIC BLOOD PRESSURE: 80 MMHG | WEIGHT: 170 LBS | TEMPERATURE: 98 F | OXYGEN SATURATION: 98 %

## 2021-01-07 VITALS
RESPIRATION RATE: 18 BRPM | WEIGHT: 169.97 LBS | BODY MASS INDEX: 25.84 KG/M2 | HEART RATE: 73 BPM | DIASTOLIC BLOOD PRESSURE: 76 MMHG | TEMPERATURE: 98.5 F | SYSTOLIC BLOOD PRESSURE: 155 MMHG | OXYGEN SATURATION: 100 %
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
## 2021-01-07 DIAGNOSIS — M50.30 DEGENERATIVE DISC DISEASE, CERVICAL: ICD-10-CM
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
## 2021-01-07 DIAGNOSIS — M51.36 DEGENERATIVE DISC DISEASE, LUMBAR: ICD-10-CM

## 2021-01-07 PROCEDURE — 72131 CT LUMBAR SPINE W/O DYE: CPT

## 2021-01-07 PROCEDURE — NC001 PR NO CHARGE: Performed by: EMERGENCY MEDICINE

## 2021-01-07 PROCEDURE — 99284 EMERGENCY DEPT VISIT MOD MDM: CPT

## 2021-01-07 PROCEDURE — 72125 CT NECK SPINE W/O DYE: CPT

## 2021-01-07 PROCEDURE — 70450 CT HEAD/BRAIN W/O DYE: CPT

## 2021-01-07 PROCEDURE — 99284 EMERGENCY DEPT VISIT MOD MDM: CPT | Performed by: PHYSICIAN ASSISTANT

## 2021-01-07 RX ORDER — IBUPROFEN 400 MG/1
400 TABLET ORAL ONCE
Status: DISCONTINUED | OUTPATIENT
Start: 2021-01-07 | End: 2021-01-07 | Stop reason: HOSPADM

## 2021-01-07 RX ORDER — ACETAMINOPHEN 325 MG/1
975 TABLET ORAL ONCE
Status: DISCONTINUED | OUTPATIENT
Start: 2021-01-07 | End: 2021-01-07 | Stop reason: HOSPADM

## 2021-01-07 RX ORDER — ACETAMINOPHEN 325 MG/1
650 TABLET ORAL EVERY 6 HOURS PRN
Qty: 16 TABLET | Refills: 0 | Status: SHIPPED | OUTPATIENT
Start: 2021-01-07

## 2021-01-07 RX ORDER — LIDOCAINE 50 MG/G
2 PATCH TOPICAL ONCE
Status: DISCONTINUED | OUTPATIENT
Start: 2021-01-07 | End: 2021-01-07 | Stop reason: HOSPADM

## 2021-01-07 RX ORDER — OXYCODONE HYDROCHLORIDE 5 MG/1
5 TABLET ORAL ONCE
Status: COMPLETED | OUTPATIENT
Start: 2021-01-07 | End: 2021-01-07

## 2021-01-07 RX ORDER — CAPSAICIN 0.07 G/100G
CREAM TOPICAL 3 TIMES DAILY
Qty: 28.3 G | Refills: 0 | Status: SHIPPED | OUTPATIENT
Start: 2021-01-07

## 2021-01-07 RX ADMIN — LIDOCAINE 5% 2 PATCH: 700 PATCH TOPICAL at 02:39

## 2021-01-07 RX ADMIN — OXYCODONE HYDROCHLORIDE 5 MG: 5 TABLET ORAL at 02:37

## 2021-01-07 NOTE — ED PROVIDER NOTES
History  Chief Complaint   Patient presents with    Head Injury     pt states he was at work lost his balance and hit the L side of his head on the wall  c/o head, neck, and back pain  - thinners, LOC      51-year-old male presents after fall at work  Patient reports he was on the last step when he tripped and fell falling into a wall  Patient struck his left temporal head against the wall and states he collided at an awkward angle  Patient did not fall to the ground, he states a bystander prevented him from this  Patient was helped to the ground and EMS was called  He denies loss of consciousness, nausea or vomiting episodes  He presently is complaining of left-sided head pain, neck pain, low back pain, left hip pain  He denies chest pain, dyspnea, abdominal pain, nausea, vomiting, numbness or tingling  Prior to Admission Medications   Prescriptions Last Dose Informant Patient Reported? Taking? Continuous Blood Gluc Sensor (FREESTYLE STEVEN 14 DAY SENSOR) MIS   No No   Si applicator by Does not apply route every 14 (fourteen) days   GLOBAL EASE INJECT PEN NEEDLES 31G X 8 MM MISC   Yes No   Sig: USE AS DIRECTED TO INJECT INSULIN DAILY   Incontinence Supply Disposable (INCONTINENCE BRIEF MEDIUM) MISC   No No   Sig: by Does not apply route as needed (fecal soiling)   Incontinence Supply Disposable (RA WIPES FLUSHABLE/MOIST) MISC   No No   Sig: by Does not apply route as needed (fecal soiling)   LINZESS 72 MCG CAPS   Yes No   Sig: Take 1 capsule by mouth daily   Misc   Devices (CANE) MISC   No No   Sig: by Does not apply route daily Dx:  Frequent falls   ONE TOUCH ULTRA TEST test strip   No No   Sig: Test twice daily   ONETOUCH DELICA LANCETS 84F MISC   Yes No   Sig: USE TWICE A DAY (ICD 10 E11 9)   OZEMPIC 1 MG/DOSE SOPN   Yes No   Sig: INJECT 1MG SUBCUTANEOUS EVERY WEEK   QUEtiapine (SEROquel) 100 mg tablet   Yes No   Sig: Take 100 mg by mouth daily at bedtime   QUEtiapine (SEROquel) 50 mg tablet   Yes No   Sig: TAKE 1 TABLET BY MOUTH IN THE MORNING AND 1 AT NOON   SYNJARDY XR 12 5-1000 MG TB24   Yes No   Sig: Take 1 tablet by mouth 2 (two) times a day   UNKNOWN TO PATIENT   Yes No   atorvastatin (LIPITOR) 10 mg tablet   Yes No   Sig: Take 10 mg by mouth   cholecalciferol (VITAMIN D3) 1,000 units tablet   No No   Sig: Take 1 tablet (1,000 Units total) by mouth daily for 30 days   clonazePAM (KlonoPIN) 0 5 mg tablet   No No   Sig: Take 1 tablet (0 5 mg total) by mouth 3 (three) times a day for 7 days   divalproex sodium (DEPAKOTE ER) 500 mg 24 hr tablet   Yes No   Si mg every 12 (twelve) hours    divalproex sodium (DEPAKOTE) 500 mg EC tablet   No No   Sig: Take 1 tablet (500 mg total) by mouth every 8 (eight) hours   doxepin (SINEquan) 25 mg capsule   Yes No   ergocalciferol (ERGOCALCIFEROL) 1 25 MG (33137 UT) capsule   Yes No   Sig: Take 50,000 Units by mouth   fluticasone (FLONASE) 50 mcg/act nasal spray   No No   Si spray into each nostril daily   glipiZIDE (GLUCOTROL) 5 mg tablet   No No   Sig: Take 1 tablet (5 mg total) by mouth daily for 30 days   glycerin-hypromellose- (ARTIFICIAL TEARS) 0 2-0 2-1 % SOLN   Yes No   Sig: Apply to eye   insulin glargine (LANTUS SOLOSTAR) 100 units/mL injection pen   Yes No   Sig: Inject under the skin   insulin glargine (LANTUS) 100 units/mL subcutaneous injection   Yes No   Sig: Inject 24 Units under the skin   insulin lispro (HUMALOG) 100 units/mL injection   Yes No   Sig: Inject 12 Units under the skin   liraglutide (VICTOZA) injection   No No   Sig: Inject 0 6mg daily for one week then increase to 1 2mg daily   mirtazapine (REMERON) 30 mg tablet   No No   Sig: Take 1 tablet (30 mg total) by mouth daily at bedtime for 30 days   omeprazole (PriLOSEC) 40 MG capsule   No No   Sig: Take 1 capsule (40 mg total) by mouth daily for 30 days   polyethylene glycol (GLYCOLAX) powder   No No   Sig: Take 17 g by mouth daily   polyethylene glycol (GOLYTELY) 4000 mL solution   No No   Sig: Take 4,000 mL by mouth once for 1 dose As directed for colonoscopy prep   senna (SENOKOT) 8 6 mg   No No   Sig: Take 1 tablet (8 6 mg total) by mouth daily at bedtime for 30 days   tamsulosin (FLOMAX) 0 4 mg   No No   Sig: Take 1 capsule (0 4 mg total) by mouth daily at bedtime for 30 days   testosterone cypionate (DEPO-TESTOSTERONE) 200 mg/mL SOLN   Yes No   Sig: INJECT 1CC INTRAMUSCULARLY EVERY 2 WEEKS   venlafaxine 37 5 mg 24 hr tablet   No No   Sig: Take 1 tablet (37 5 mg total) by mouth daily with breakfast   Patient not taking: Reported on 12/31/2019      Facility-Administered Medications: None       Past Medical History:   Diagnosis Date    Anxiety     Depression     Diabetes mellitus ([facility] [address] )     Hemorrhoids, internal 9/23/2019    Hypertension        Past Surgical History:   Procedure Laterality Date    FEMUR FRACTURE SURGERY Right     GASTRIC BYPASS  11/08/2011    Managed by: Justin Tong (General Surgery)   [address]  02/13/2013    Incisional hernia repair Managed by: Justin Tong (General Surgery)    TONSILLECTOMY  2010       Family History   Problem Relation Age of Onset    Diabetes Mother     Hypertension Mother     Hypertension Father     Autoimmune disease Son      I have reviewed and agree with the history as documented  E-Cigarette/Vaping     E-Cigarette/Vaping Substances     Social History     Tobacco Use    Smoking status: Never Smoker    Smokeless tobacco: Never Used   Substance Use Topics    Alcohol use: Never     Frequency: Never    Drug use: Never        Review of Systems   Eyes: Negative for visual disturbance  Respiratory: Negative for shortness of breath  Cardiovascular: Negative for chest pain  Gastrointestinal: Negative for abdominal pain, nausea and vomiting  Musculoskeletal: Positive for arthralgias and neck pain  Skin: Negative for wound  Neurological: Positive for headaches   Negative for syncope, weakness and numbness  All other systems reviewed and are negative  Physical Exam  ED Triage Vitals [01/07/21 0014]   Temperature Pulse Respirations Blood Pressure SpO2   98 °F (36 7 °C) 69 18 147/80 98 %      Temp src Heart Rate Source Patient Position - Orthostatic VS BP Location FiO2 (%)   -- -- -- -- --      Pain Score       --             Orthostatic Vital Signs  Vitals:    01/07/21 0014   BP: 147/80   Pulse: 69       Physical Exam  Vitals signs reviewed  Constitutional:       General: He is not in acute distress  Appearance: Normal appearance  He is not ill-appearing, toxic-appearing or diaphoretic  HENT:      Head: Normocephalic and atraumatic  Right Ear: External ear normal       Left Ear: External ear normal       Nose: Nose normal    Eyes:      General:         Right eye: No discharge  Left eye: No discharge  Extraocular Movements: Extraocular movements intact  Neck:      Musculoskeletal: Muscular tenderness present  Cardiovascular:      Rate and Rhythm: Normal rate and regular rhythm  Pulses: Normal pulses  Comments: Radial, PT pulses 2/4  Pulmonary:      Effort: Pulmonary effort is normal  No respiratory distress  Breath sounds: Normal breath sounds  Abdominal:      General: There is no distension  Palpations: Abdomen is soft  Tenderness: There is no abdominal tenderness  There is no right CVA tenderness, left CVA tenderness, guarding or rebound  Musculoskeletal:         General: Tenderness (midline c-spine tenderness, midline lumbar tenderness, left pelvic tenderness) present  No deformity or signs of injury  Comments: Able to b/l raise arms, able to lift b/l legs 2in off bed, able to flex b/l knees    Skin:     General: Skin is warm  Comments: No erythema or ecchymosis to back, chest, abdomen   Neurological:      General: No focal deficit present  Mental Status: He is alert and oriented to person, place, and time        Comments: No gross CN deficit, weakness         ED Medications  Medications - No data to display    Diagnostic Studies  Results Reviewed     None                 No orders to display         Procedures  Procedures      ED Course                                       MDM  Number of Diagnoses or Management Options  Fall:   Hip pain:   Neck pain:   Diagnosis management comments: 81-year-old male presents after fall  Patient is complaining of head pain, neck pain, low back pain, left hip pain  Plan to CT image these areas  Patient was ordered Tylenol and Motrin  Patient eloped due to in adequate pain medicine  Disposition  Final diagnoses:   Fall   Neck pain   Hip pain     Time reflects when diagnosis was documented in both MDM as applicable and the Disposition within this note     Time User Action Codes Description Comment    1/7/2021  1:29 AM Yamile Susan Add [B32  XXXA] Fall     1/7/2021  1:29 AM Yamile Susan Add [R51 9] Head pain     1/7/2021  1:29 AM Yamile Susan Add [M54 2] Neck pain     1/7/2021  1:29 AM Yamile Susan Add [M25 559] Hip pain     1/7/2021  1:29 AM Yamile Susan Remove [R51 9] Head pain       ED Disposition     ED Disposition Condition Date/Time Comment    Left from Room after Provider Exam  Thu Jan 7, 2021  1:29 AM       Follow-up Information    None         Discharge Medication List as of 1/7/2021  1:31 AM      CONTINUE these medications which have NOT CHANGED    Details   atorvastatin (LIPITOR) 10 mg tablet Take 10 mg by mouth, Starting Thu 12/5/2019, Until Fri 12/4/2020, Historical Med      cholecalciferol (VITAMIN D3) 1,000 units tablet Take 1 tablet (1,000 Units total) by mouth daily for 30 days, Starting Wed 4/3/2019, Until Thu 7/18/2019, Normal      clonazePAM (KlonoPIN) 0 5 mg tablet Take 1 tablet (0 5 mg total) by mouth 3 (three) times a day for 7 days, Starting Wed 4/8/2020, Until Wed 4/15/2020, Normal      Continuous Blood Gluc Sensor (FREESTYLE STEVEN 14 DAY SENSOR) MIS 1 applicator by Does not apply route every 14 (fourteen) days, Starting Wed 4/3/2019, Normal      divalproex sodium (DEPAKOTE ER) 500 mg 24 hr tablet 500 mg every 12 (twelve) hours , Starting Wed 1/29/2020, Historical Med      divalproex sodium (DEPAKOTE) 500 mg EC tablet Take 1 tablet (500 mg total) by mouth every 8 (eight) hours, Starting Wed 4/3/2019, Normal      doxepin (SINEquan) 25 mg capsule Starting Wed 1/29/2020, Historical Med      ergocalciferol (ERGOCALCIFEROL) 1 25 MG (56607 UT) capsule Take 50,000 Units by mouth, Starting Thu 12/5/2019, Until Thu 2/27/2020, Historical Med      fluticasone (FLONASE) 50 mcg/act nasal spray 1 spray into each nostril daily, Starting Wed 12/4/2019, Normal      glipiZIDE (GLUCOTROL) 5 mg tablet Take 1 tablet (5 mg total) by mouth daily for 30 days, Starting Wed 4/3/2019, Until Fri 5/3/2019, Normal      GLOBAL EASE INJECT PEN NEEDLES 31G X 8 MM MISC USE AS DIRECTED TO INJECT INSULIN DAILY, Historical Med      glycerin-hypromellose- (ARTIFICIAL TEARS) 0 2-0 2-1 % SOLN Apply to eye, Historical Med      !! Incontinence Supply Disposable (INCONTINENCE BRIEF MEDIUM) MISC by Does not apply route as needed (fecal soiling), Starting Wed 12/4/2019, Print      !!  Incontinence Supply Disposable (RA WIPES FLUSHABLE/MOIST) MISC by Does not apply route as needed (fecal soiling), Starting Wed 12/4/2019, Print      insulin glargine (LANTUS SOLOSTAR) 100 units/mL injection pen Inject under the skin, Historical Med      insulin glargine (LANTUS) 100 units/mL subcutaneous injection Inject 24 Units under the skin, Historical Med      insulin lispro (HUMALOG) 100 units/mL injection Inject 12 Units under the skin, Historical Med      LINZESS 72 MCG CAPS Take 1 capsule by mouth daily, Starting Wed 1/29/2020, Historical Med      liraglutide (VICTOZA) injection Inject 0 6mg daily for one week then increase to 1 2mg daily, Normal      mirtazapine (REMERON) 30 mg tablet Take 1 tablet (30 mg total) by mouth daily at bedtime for 30 days, Starting Wed 4/3/2019, Until Thu 7/18/2019, Normal      Misc  Devices (CANE) MISC by Does not apply route daily Dx:  Frequent falls, Starting Mon 7/29/2019, Print      omeprazole (PriLOSEC) 40 MG capsule Take 1 capsule (40 mg total) by mouth daily for 30 days, Starting Wed 4/3/2019, Until Thu 7/18/2019, Normal      ONE TOUCH ULTRA TEST test strip Test twice daily, Normal      ONETOUCH DELICA LANCETS 38A MISC USE TWICE A DAY (ICD 10 E11 9), Historical Med      OZEMPIC 1 MG/DOSE SOPN INJECT 1MG SUBCUTANEOUS EVERY WEEK, Historical Med      polyethylene glycol (GLYCOLAX) powder Take 17 g by mouth daily, Starting Wed 9/11/2019, Normal      polyethylene glycol (GOLYTELY) 4000 mL solution Take 4,000 mL by mouth once for 1 dose As directed for colonoscopy prep, Starting Thu 2/27/2020, Normal      !! QUEtiapine (SEROquel) 100 mg tablet Take 100 mg by mouth daily at bedtime, Starting Thu 12/5/2019, Historical Med      !! QUEtiapine (SEROquel) 50 mg tablet TAKE 1 TABLET BY MOUTH IN THE MORNING AND 1 AT NOON, Historical Med      senna (SENOKOT) 8 6 mg Take 1 tablet (8 6 mg total) by mouth daily at bedtime for 30 days, Starting Wed 4/3/2019, Until Thu 7/18/2019, Normal      SYNJARDY XR 12 5-1000 MG TB24 Take 1 tablet by mouth 2 (two) times a day, Starting Fri 12/20/2019, Historical Med      tamsulosin (FLOMAX) 0 4 mg Take 1 capsule (0 4 mg total) by mouth daily at bedtime for 30 days, Starting Wed 4/3/2019, Until Thu 7/18/2019, Normal      testosterone cypionate (DEPO-TESTOSTERONE) 200 mg/mL SOLN INJECT 1CC INTRAMUSCULARLY EVERY 2 WEEKS, Historical Med      UNKNOWN TO PATIENT Historical Med      venlafaxine 37 5 mg 24 hr tablet Take 1 tablet (37 5 mg total) by mouth daily with breakfast, Starting Wed 4/3/2019, Normal       !! - Potential duplicate medications found  Please discuss with provider  No discharge procedures on file      PDMP Review       Value Time User    PDMP Reviewed Yes 1/7/2021  3:23 AM Clemetgabriel Walker, RAMESH           ED Provider  Attending physically available and evaluated Kartik Jay  CIARA managed the patient along with the ED Attending      Electronically Signed by         Mae Teague DO  01/07/21 0510
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
## 2021-01-07 NOTE — ED NOTES
Pt eloped from hospital, pt stated that "he is leaving this [facility] and going to [facility]     Camille Mckeon, MICHAEL  01/07/21 6565

## 2021-01-07 NOTE — ED NOTES
Pt beligerent, cursing, and yelling laying on stretcher  Pt stating "I hate this place, I dont want to be here   [facility]  01/07/21 3900

## 2021-01-07 NOTE — Clinical Note
Jose Francisco Irby was seen and treated in our emergency department on 1/7/2021  If continued symptoms, must be cleared by Family Doctor/Orthopedics    Diagnosis:     David    He may return on this date: 01/11/2021         If you have any questions or concerns, please don't hesitate to call        Emily Mcknight PA-C    ______________________________           _______________          _______________  Hospital Representative                              Date                                Time

## 2021-01-08 ENCOUNTER — OCCMED (OUTPATIENT)
Dept: URGENT CARE | Age: 61
End: 2021-01-08
Payer: OTHER MISCELLANEOUS

## 2021-01-08 ENCOUNTER — APPOINTMENT (OUTPATIENT)
Dept: RADIOLOGY | Age: 61
End: 2021-01-08
Payer: OTHER MISCELLANEOUS

## 2021-01-08 ENCOUNTER — TELEPHONE (OUTPATIENT)
Dept: URGENT CARE | Age: 61
End: 2021-01-08

## 2021-01-08 DIAGNOSIS — T14.90XA INJURY: ICD-10-CM

## 2021-01-08 DIAGNOSIS — T14.90XA INJURY: Primary | ICD-10-CM

## 2021-01-08 PROCEDURE — 99213 OFFICE O/P EST LOW 20 MIN: CPT | Performed by: PREVENTIVE MEDICINE

## 2021-01-08 PROCEDURE — 71101 X-RAY EXAM UNILAT RIBS/CHEST: CPT

## 2021-01-08 PROCEDURE — 72072 X-RAY EXAM THORAC SPINE 3VWS: CPT

## 2021-01-13 ENCOUNTER — LAB (OUTPATIENT)
Dept: LAB | Facility: HOSPITAL | Age: 61
End: 2021-01-13
Payer: COMMERCIAL

## 2021-01-13 ENCOUNTER — APPOINTMENT (OUTPATIENT)
Dept: URGENT CARE | Age: 61
End: 2021-01-13
Payer: OTHER MISCELLANEOUS

## 2021-01-13 DIAGNOSIS — E29.1 MALE HYPOGONADISM: ICD-10-CM

## 2021-01-13 DIAGNOSIS — E13.69 OTHER SPECIFIED DIABETES MELLITUS WITH OTHER SPECIFIED COMPLICATION, UNSPECIFIED WHETHER LONG TERM INSULIN USE (HCC): Primary | ICD-10-CM

## 2021-01-13 LAB
ALBUMIN SERPL BCP-MCNC: 3.7 G/DL (ref 3.5–5)
ALP SERPL-CCNC: 95 U/L (ref 46–116)
ALT SERPL W P-5'-P-CCNC: 43 U/L (ref 12–78)
ANION GAP SERPL CALCULATED.3IONS-SCNC: 4 MMOL/L (ref 4–13)
AST SERPL W P-5'-P-CCNC: 18 U/L (ref 5–45)
BASOPHILS # BLD AUTO: 0.07 THOUSANDS/ΜL (ref 0–0.1)
BASOPHILS NFR BLD AUTO: 1 % (ref 0–1)
BILIRUB SERPL-MCNC: 0.37 MG/DL (ref 0.2–1)
BUN SERPL-MCNC: 16 MG/DL (ref 5–25)
CALCIUM SERPL-MCNC: 9.3 MG/DL (ref 8.3–10.1)
CHLORIDE SERPL-SCNC: 106 MMOL/L (ref 100–108)
CO2 SERPL-SCNC: 31 MMOL/L (ref 21–32)
CREAT SERPL-MCNC: 0.89 MG/DL (ref 0.6–1.3)
EOSINOPHIL # BLD AUTO: 0.57 THOUSAND/ΜL (ref 0–0.61)
EOSINOPHIL NFR BLD AUTO: 7 % (ref 0–6)
ERYTHROCYTE [DISTWIDTH] IN BLOOD BY AUTOMATED COUNT: 13.5 % (ref 11.6–15.1)
EST. AVERAGE GLUCOSE BLD GHB EST-MCNC: 143 MG/DL
GFR SERPL CREATININE-BSD FRML MDRD: 94 ML/MIN/1.73SQ M
GLUCOSE P FAST SERPL-MCNC: 114 MG/DL (ref 65–99)
HBA1C MFR BLD: 6.6 %
HCT VFR BLD AUTO: 36.2 % (ref 36.5–49.3)
HGB BLD-MCNC: 11.5 G/DL (ref 12–17)
IMM GRANULOCYTES # BLD AUTO: 0.02 THOUSAND/UL (ref 0–0.2)
IMM GRANULOCYTES NFR BLD AUTO: 0 % (ref 0–2)
LYMPHOCYTES # BLD AUTO: 2.17 THOUSANDS/ΜL (ref 0.6–4.47)
LYMPHOCYTES NFR BLD AUTO: 28 % (ref 14–44)
MCH RBC QN AUTO: 27.8 PG (ref 26.8–34.3)
MCHC RBC AUTO-ENTMCNC: 31.8 G/DL (ref 31.4–37.4)
MCV RBC AUTO: 88 FL (ref 82–98)
MONOCYTES # BLD AUTO: 0.71 THOUSAND/ΜL (ref 0.17–1.22)
MONOCYTES NFR BLD AUTO: 9 % (ref 4–12)
NEUTROPHILS # BLD AUTO: 4.31 THOUSANDS/ΜL (ref 1.85–7.62)
NEUTS SEG NFR BLD AUTO: 55 % (ref 43–75)
NRBC BLD AUTO-RTO: 0 /100 WBCS
PLATELET # BLD AUTO: 200 THOUSANDS/UL (ref 149–390)
PMV BLD AUTO: 12.5 FL (ref 8.9–12.7)
POTASSIUM SERPL-SCNC: 4 MMOL/L (ref 3.5–5.3)
PROT SERPL-MCNC: 7.4 G/DL (ref 6.4–8.2)
RBC # BLD AUTO: 4.13 MILLION/UL (ref 3.88–5.62)
SODIUM SERPL-SCNC: 141 MMOL/L (ref 136–145)
TESTOST SERPL-MCNC: 163 NG/DL (ref 95–948)
WBC # BLD AUTO: 7.85 THOUSAND/UL (ref 4.31–10.16)

## 2021-01-13 PROCEDURE — 36415 COLL VENOUS BLD VENIPUNCTURE: CPT

## 2021-01-13 PROCEDURE — 99213 OFFICE O/P EST LOW 20 MIN: CPT | Performed by: PREVENTIVE MEDICINE

## 2021-01-13 PROCEDURE — 83036 HEMOGLOBIN GLYCOSYLATED A1C: CPT

## 2021-01-13 PROCEDURE — 80053 COMPREHEN METABOLIC PANEL: CPT

## 2021-01-13 PROCEDURE — 85025 COMPLETE CBC W/AUTO DIFF WBC: CPT

## 2021-01-13 PROCEDURE — 84403 ASSAY OF TOTAL TESTOSTERONE: CPT

## 2021-01-18 ENCOUNTER — OFFICE VISIT (OUTPATIENT)
Dept: OBGYN CLINIC | Facility: HOSPITAL | Age: 61
End: 2021-01-18
Payer: OTHER MISCELLANEOUS

## 2021-01-18 VITALS
HEIGHT: 68 IN | HEART RATE: 91 BPM | SYSTOLIC BLOOD PRESSURE: 175 MMHG | BODY MASS INDEX: 25.61 KG/M2 | WEIGHT: 169 LBS | DIASTOLIC BLOOD PRESSURE: 76 MMHG

## 2021-01-18 DIAGNOSIS — S22.000A COMPRESSION FRACTURE OF BODY OF THORACIC VERTEBRA (HCC): Primary | ICD-10-CM

## 2021-01-18 PROCEDURE — 99204 OFFICE O/P NEW MOD 45 MIN: CPT | Performed by: ORTHOPAEDIC SURGERY

## 2021-01-18 NOTE — PROGRESS NOTES
64 y o male presents for evaluation for back pain  Patient sustained a fall at work on January 7, 2021  He was subsequently seen evaluated emergency department diagnosed with T12 and L1 compression fractures  Patient also was diagnosed with concomitant rib fractures  Patient is currently complaining of pain diffusely throughout his thoracic and lumbar spine  Denies any radiculopathy  Denies any associated numbness or tingling  Patient works as a [employer]  Pain is made worse with direct palpation affected area with increased activity  Pain improved somewhat at rest   Patient does have history of methadone use  Review of Systems  Review of systems negative unless otherwise specified in HPI      Past Medical History  Past Medical History:   Diagnosis Date    Anxiety     Depression     Diabetes mellitus ([facility] )     Hemorrhoids, internal 9/23/2019    Hypertension        Past Surgical History  Past Surgical History:   Procedure Laterality Date    FEMUR FRACTURE SURGERY Right     GASTRIC BYPASS  11/08/2011    Managed by: Estrada Jose (General Surgery)   [address]  02/13/2013    Incisional hernia repair Managed by: Estrada Jose (General Surgery)    TONSILLECTOMY  2010       Current Medications  Current Outpatient Medications on File Prior to Visit   Medication Sig Dispense Refill    acetaminophen (TYLENOL) 325 mg tablet Take 2 tablets (650 mg total) by mouth every 6 (six) hours as needed for mild pain (pain) 16 tablet 0    capsicum (ZOSTRIX) 0 075 % topical cream Apply topically 3 (three) times a day 28 3 g 0    Continuous Blood Gluc Sensor (FREESTYLE STEVEN 14 DAY SENSOR) MISC 1 applicator by Does not apply route every 14 (fourteen) days 2 each 5    divalproex sodium (DEPAKOTE ER) 500 mg 24 hr tablet 500 mg every 12 (twelve) hours       divalproex sodium (DEPAKOTE) 500 mg EC tablet Take 1 tablet (500 mg total) by mouth every 8 (eight) hours 30 tablet 2    doxepin (SINEquan) 25 mg capsule  fluticasone (FLONASE) 50 mcg/act nasal spray 1 spray into each nostril daily 1 Bottle 5    GLOBAL EASE INJECT PEN NEEDLES 31G X 8 MM MISC USE AS DIRECTED TO INJECT INSULIN DAILY  5    glycerin-hypromellose- (ARTIFICIAL TEARS) 0 2-0 2-1 % SOLN Apply to eye      Incontinence Supply Disposable (INCONTINENCE BRIEF MEDIUM) MISC by Does not apply route as needed (fecal soiling) 90 each 5    Incontinence Supply Disposable (RA WIPES FLUSHABLE/MOIST) MISC by Does not apply route as needed (fecal soiling) 100 each 5    insulin glargine (LANTUS SOLOSTAR) 100 units/mL injection pen Inject under the skin      insulin glargine (LANTUS) 100 units/mL subcutaneous injection Inject 24 Units under the skin      insulin lispro (HUMALOG) 100 units/mL injection Inject 12 Units under the skin      LINZESS 72 MCG CAPS Take 1 capsule by mouth daily      liraglutide (VICTOZA) injection Inject 0 6mg daily for one week then increase to 1 2mg daily 5 pen 2    Misc   Devices (CANE) MISC by Does not apply route daily Dx:  Frequent falls 1 each 0    ONE TOUCH ULTRA TEST test strip Test twice daily 30 each 5    ONETOUCH DELICA LANCETS 94W MISC USE TWICE A DAY (ICD 10 E11 9)  3    OZEMPIC 1 MG/DOSE SOPN INJECT 1MG SUBCUTANEOUS EVERY WEEK  2    polyethylene glycol (GLYCOLAX) powder Take 17 g by mouth daily 578 g 1    QUEtiapine (SEROquel) 100 mg tablet Take 100 mg by mouth daily at bedtime  0    QUEtiapine (SEROquel) 50 mg tablet TAKE 1 TABLET BY MOUTH IN THE MORNING AND 1 AT NOON  0    SYNJARDY XR 12 5-1000 MG TB24 Take 1 tablet by mouth 2 (two) times a day  0    testosterone cypionate (DEPO-TESTOSTERONE) 200 mg/mL SOLN INJECT 1CC INTRAMUSCULARLY EVERY 2 WEEKS  1    UNKNOWN TO PATIENT       venlafaxine 37 5 mg 24 hr tablet Take 1 tablet (37 5 mg total) by mouth daily with breakfast 30 tablet 2    atorvastatin (LIPITOR) 10 mg tablet Take 10 mg by mouth      cholecalciferol (VITAMIN D3) 1,000 units tablet Take 1 tablet (1,000 Units total) by mouth daily for 30 days 30 tablet 5    clonazePAM (KlonoPIN) 0 5 mg tablet Take 1 tablet (0 5 mg total) by mouth 3 (three) times a day for 7 days 21 tablet 0    ergocalciferol (ERGOCALCIFEROL) 1 25 MG (82353 UT) capsule Take 50,000 Units by mouth      glipiZIDE (GLUCOTROL) 5 mg tablet Take 1 tablet (5 mg total) by mouth daily for 30 days 30 tablet 5    mirtazapine (REMERON) 30 mg tablet Take 1 tablet (30 mg total) by mouth daily at bedtime for 30 days 30 tablet 5    omeprazole (PriLOSEC) 40 MG capsule Take 1 capsule (40 mg total) by mouth daily for 30 days 30 capsule 5    polyethylene glycol (GOLYTELY) 4000 mL solution Take 4,000 mL by mouth once for 1 dose As directed for colonoscopy prep 4000 mL 0    senna (SENOKOT) 8 6 mg Take 1 tablet (8 6 mg total) by mouth daily at bedtime for 30 days 60 each 1    tamsulosin (FLOMAX) 0 4 mg Take 1 capsule (0 4 mg total) by mouth daily at bedtime for 30 days 30 capsule 5     No current facility-administered medications on file prior to visit          Recent Labs [facility])  0   Lab Value Date/Time    HCT 36 2 (L) 01/13/2021 0640    HCT 38 0 01/29/2015 1342    HGB 11 5 (L) 01/13/2021 0640    HGB 12 2 01/29/2015 1342    WBC 7 85 01/13/2021 0640    WBC 9 29 01/29/2015 1342    INR 0 98 08/09/2019 1023    INR 1 11 01/29/2015 1342    GLUCOSE 222 (H) 01/29/2015 1342    HGBA1C 6 6 (H) 01/13/2021 0640    HGBA1C 7 3 (H) 03/01/2019 1611         Physical exam  · General: Awake, Alert, Oriented  · Eyes: Pupils equal, round and reactive to light  · Heart: regular rate and rhythm  · Lungs: No audible wheezing  · Abdomen: soft  Back exam  · Significant tenderness palpation midline and para spinal musculature thoracic and lumbar spine  · Strength equal bilateral lower extremities L2-S1  · Sensation intact to light touch and equal bilateral lower extremities L2-S1  · Distal extremity warm well perfused    Procedure  none    Imaging  CT of lumbar spine as well as x-rays of thoracic spine reviewed with patient today's visit  This reveals compression fractures the level of L1 and T15      53-year-old male with significant midline back pain and radiographs of age indeterminate L1 and T12 compression fractures  MRI of thoracic and lumbar spine will be ordered to evaluate the acuity of these fractures  Patient was also provided with a work note indicating no work for the next 4 weeks  Patient was also fitted with a TLSO brace that he was instructed to wear for comfort  Plan to follow up in office in 4 weeks for repeat evaluation

## 2021-01-18 NOTE — LETTER
January 18, 2021     Patient: Munir Holman   YOB: 1960   Date of Visit: 1/18/2021       To Whom it May Concern:    Munir Holman is under my professional care  He was seen in my office on 1/18/2021  He may not return to work for 4 weeks  If you have any questions or concerns, please don't hesitate to call           Sincerely,          Ronal Ortiz MD        CC: Munir Montalvoibaldi

## 2021-01-19 ENCOUNTER — TELEPHONE (OUTPATIENT)
Dept: OBGYN CLINIC | Facility: HOSPITAL | Age: 61
End: 2021-01-19

## 2021-01-19 NOTE — TELEPHONE ENCOUNTER
Nurse  called in requesting OVN/Work note to be faxed       Fax per request to #[phone] att: Jane Mahan

## 2021-01-27 ENCOUNTER — HOSPITAL ENCOUNTER (OUTPATIENT)
Dept: RADIOLOGY | Facility: HOSPITAL | Age: 61
Discharge: HOME | End: 2021-01-27
Payer: COMMERCIAL

## 2021-01-27 DIAGNOSIS — S22.000A COMPRESSION FRACTURE OF BODY OF THORACIC VERTEBRA (HCC): ICD-10-CM

## 2021-01-27 PROCEDURE — 72148 MRI LUMBAR SPINE W/O DYE: CPT

## 2021-01-27 PROCEDURE — G1004 CDSM NDSC: HCPCS

## 2021-01-27 PROCEDURE — 72146 MRI CHEST SPINE W/O DYE: CPT

## 2021-02-10 ENCOUNTER — TELEPHONE (OUTPATIENT)
Dept: OBGYN CLINIC | Facility: HOSPITAL | Age: 61
End: 2021-02-10

## 2021-02-10 NOTE — TELEPHONE ENCOUNTER
Yes we can accommodate  - I placed an updated letter in his chart    Regarding pain medications - Dr Brennen Andujar stated at last visit --I defer current pain management to his physician who manages his methadone

## 2021-02-10 NOTE — TELEPHONE ENCOUNTER
Dr Mary Spann    [phone]    Patient is requesting a letter to be out of work until 3/7 due to pain in his ribs  He has a follow up appt on 3/1  Please advise  Patient will  in the office  He is requesting something for the pain  Please call into [facility] on file

## 2021-02-16 ENCOUNTER — TELEPHONE (OUTPATIENT)
Dept: OBGYN CLINIC | Facility: HOSPITAL | Age: 61
End: 2021-02-16

## 2021-02-16 NOTE — TELEPHONE ENCOUNTER
Tried calling pt to r/s his appt time for his appt with dr godwin for 3/1 @ 1230pm  Doctor has a meeting at that time  I have some morning appts  If pt calls back, please relay message and call clinical area to r/s time

## 2021-02-22 ENCOUNTER — TELEPHONE (OUTPATIENT)
Dept: OBGYN CLINIC | Facility: HOSPITAL | Age: 61
End: 2021-02-22

## 2021-02-22 ENCOUNTER — APPOINTMENT (OUTPATIENT)
Dept: URGENT CARE | Age: 61
End: 2021-02-22
Payer: OTHER MISCELLANEOUS

## 2021-02-22 PROCEDURE — 99214 OFFICE O/P EST MOD 30 MIN: CPT | Performed by: PREVENTIVE MEDICINE

## 2021-02-22 NOTE — TELEPHONE ENCOUNTER
patient is calling back wanting to speak to you  Says that he got a voicemail from you about his MRI

## 2021-02-26 ENCOUNTER — TELEPHONE (OUTPATIENT)
Dept: OBGYN CLINIC | Facility: HOSPITAL | Age: 61
End: 2021-02-26

## 2021-02-26 NOTE — TELEPHONE ENCOUNTER
Dr Elyse Watts    [phone]    Patient is returning a call from "Taurus Soto"  regarding a rib binder  Please advise

## 2021-03-03 DIAGNOSIS — J30.1 SEASONAL ALLERGIC RHINITIS DUE TO POLLEN: ICD-10-CM

## 2021-03-05 RX ORDER — FLUTICASONE PROPIONATE 50 MCG
SPRAY, SUSPENSION (ML) NASAL
Qty: 16 G | Refills: 1 | Status: SHIPPED | OUTPATIENT
Start: 2021-03-05 | End: 2021-05-24

## 2021-03-08 ENCOUNTER — APPOINTMENT (OUTPATIENT)
Dept: URGENT CARE | Age: 61
End: 2021-03-08
Payer: OTHER MISCELLANEOUS

## 2021-03-08 PROCEDURE — 99213 OFFICE O/P EST LOW 20 MIN: CPT | Performed by: PREVENTIVE MEDICINE

## 2021-03-16 ENCOUNTER — OFFICE VISIT (OUTPATIENT)
Dept: FAMILY MEDICINE CLINIC | Facility: CLINIC | Age: 61
End: 2021-03-16
Payer: COMMERCIAL

## 2021-03-16 VITALS
WEIGHT: 165 LBS | BODY MASS INDEX: 25.01 KG/M2 | RESPIRATION RATE: 17 BRPM | SYSTOLIC BLOOD PRESSURE: 142 MMHG | HEART RATE: 94 BPM | OXYGEN SATURATION: 98 % | TEMPERATURE: 99 F | DIASTOLIC BLOOD PRESSURE: 76 MMHG | HEIGHT: 68 IN

## 2021-03-16 DIAGNOSIS — F31.9 BIPOLAR AFFECTIVE DISORDER, REMISSION STATUS UNSPECIFIED (HCC): ICD-10-CM

## 2021-03-16 DIAGNOSIS — E11.9 TYPE 2 DIABETES MELLITUS WITHOUT COMPLICATION, WITH LONG-TERM CURRENT USE OF INSULIN (HCC): Primary | ICD-10-CM

## 2021-03-16 DIAGNOSIS — Z79.4 TYPE 2 DIABETES MELLITUS WITHOUT COMPLICATION, WITH LONG-TERM CURRENT USE OF INSULIN (HCC): Primary | ICD-10-CM

## 2021-03-16 DIAGNOSIS — G89.4 CHRONIC PAIN DISORDER: ICD-10-CM

## 2021-03-16 DIAGNOSIS — K76.89 LIVER CYST: ICD-10-CM

## 2021-03-16 DIAGNOSIS — I10 BENIGN ESSENTIAL HYPERTENSION: ICD-10-CM

## 2021-03-16 PROCEDURE — 99214 OFFICE O/P EST MOD 30 MIN: CPT | Performed by: NURSE PRACTITIONER

## 2021-03-16 NOTE — PROGRESS NOTES
Assessment/Plan:    Type 2 diabetes mellitus without complication, with long-term current use of insulin ([facility])    Lab Results   Component Value Date    HGBA1C 6 6 (H) 01/13/2021   Stable, managed by Endocrinology at [facility] [address] 9 98 mg weekly, Pioglitazone 15 mg daily, Srynjardy 12 5/1000mg BID  Unable to urinate today for a urine microalbumin   He does have lab work ordered via [place] [id number] exam performed today  Eye exam is schedule for April per pt     Benign essential hypertension  Pt is somewhat confused about his medications   I did ask him to bring his medications with him to his next OV here  It appears he may have been on BP meds at some point, but nothing currently (?)  BP was improved on retake   I did ask him to monitor his BP at home, call with readings in one week  Follow a low sodium diet   If BP is consistently elevated, would lean toward starting a low dose ACEI    Bipolar disorder ([facility] )  Stable and managed by Psychiatry, Dr Jamie Samayoa     Chronic pain disorder  Pt is currently following with a local Methadone Clinic       Diagnoses and all orders for this visit:    Type 2 diabetes mellitus without complication, with long-term current use of insulin ([facility] )    Benign essential hypertension    Bipolar affective disorder, remission status unspecified ([facility] )    Chronic pain disorder    Liver cyst  Comments:  Incidential finding on lumbar MRI 1/2021, U/S ordered for further evaluation   Orders:  -     US liver; Future          Subjective:      Patient ID: Maco Fraser is a 64 y o  male      HPI     Pt presents by himself today for a routine follow up     Bipolar disorder- follows with Psychiatry, Angelica Paredes   He feels his mood has been relatively stable, family is supportive  Denies SI/HI    Narcotic abuse- managed by a local Methadone clinic    Pt also currently follows with [address] [facility]ology, Dr Emi Jackson, last seen 2/16/2021 for DMT2 with microalbuminuria on long term use of insulin, hypogonadism, vitamin D deficiency   Currently taking Trulicity 0 02 mg weekly, Pioglitazone 15 mg daily, Srynjardy 12 5/1000mg BID  A1C at 6 6  Dr Sinan Romano- eye exam is schedule for April of 2021   Due for a diabetic food exam and urine microalbumin (pt reports he is unable to urinate at this time but states he does have lab work to be completed for Endo)    Pt with chronic back pain- following with Ortho, Dr Rogers Washington  MRI of the lumbar spine from 1/27/2021 with compression abnormalities at the thoracolumbar junction which are chronic, scattered spondylotic changes, T2 hyperintensities in the liver and left kidney may represent cysts, cholelithiasis     The following portions of the patient's history were reviewed and updated as appropriate: allergies, current medications, past family history, past medical history, past social history, past surgical history and problem list     Review of Systems   Constitutional: Negative for chills and fever  HENT: Negative for ear pain and sore throat  Eyes: Negative for pain and visual disturbance  Respiratory: Negative for cough and shortness of breath  Cardiovascular: Negative for chest pain and palpitations  Gastrointestinal: Negative for abdominal pain and vomiting  Genitourinary: Negative for dysuria and hematuria  Musculoskeletal: Positive for back pain and gait problem  Negative for arthralgias  Skin: Negative for color change and rash  Neurological: Negative for seizures and syncope  Hematological: Negative for adenopathy  Does not bruise/bleed easily  Psychiatric/Behavioral: Positive for dysphoric mood  Negative for self-injury, sleep disturbance and suicidal ideas  The patient is nervous/anxious  All other systems reviewed and are negative          Objective:      /76   Pulse 94   Temp 99 °F (37 2 °C) (Tympanic)   Resp 17   Ht 5' 8" (1 727 m)   Wt 74 8 kg (165 lb)   SpO2 98%   BMI 25 09 kg/m²          Physical Exam  Constitutional:       General: He is not in acute distress  Appearance: He is well-developed  He is not ill-appearing, toxic-appearing or diaphoretic  HENT:      Head: Normocephalic and atraumatic  Eyes:      Extraocular Movements: Extraocular movements intact  Conjunctiva/sclera: Conjunctivae normal       Pupils: Pupils are equal, round, and reactive to light  Neck:      Musculoskeletal: Normal range of motion and neck supple  No neck rigidity or muscular tenderness  Thyroid: No thyromegaly  Vascular: No carotid bruit  Cardiovascular:      Rate and Rhythm: Normal rate and regular rhythm  Pulses: no weak pulses     Heart sounds: Normal heart sounds  No murmur  Pulmonary:      Effort: Pulmonary effort is normal  No respiratory distress  Breath sounds: Normal breath sounds  No wheezing  Abdominal:      General: Bowel sounds are normal  There is no distension  Palpations: Abdomen is soft  Tenderness: There is no abdominal tenderness  Musculoskeletal: Normal range of motion  Comments: Uses a cane for ambulation    Feet:      Right foot:      Skin integrity: No ulcer, skin breakdown, erythema, warmth, callus or dry skin  Left foot:      Skin integrity: No ulcer, skin breakdown, erythema, warmth, callus or dry skin  Lymphadenopathy:      Cervical: No cervical adenopathy  Skin:     General: Skin is warm and dry  Neurological:      General: No focal deficit present  Mental Status: He is alert and oriented to person, place, and time  Psychiatric:         Mood and Affect: Mood normal          Behavior: Behavior normal          Thought Content: Thought content normal          Judgment: Judgment normal        Patient's shoes and socks removed  Right Foot/Ankle   Right Foot Inspection  Skin Exam: skin normal and skin intact no dry skin, no warmth, no callus, no erythema, no maceration, no abnormal color, no pre-ulcer, no ulcer and no callus Toe Exam: ROM and strength within normal limits  Sensory       Monofilament testing: intact  Vascular  Capillary refills: < 3 seconds      Left Foot/Ankle  Left Foot Inspection  Skin Exam: skin normal and skin intactno dry skin, no warmth, no erythema, no maceration, normal color, no pre-ulcer, no ulcer and no callus                         Toe Exam: ROM and strength within normal limits                   Sensory       Monofilament: intact  Vascular  Capillary refills: < 3 seconds    Assign Risk Category:  No deformity present; No loss of protective sensation; No weak pulses       Risk: 0  BMI Counseling: Body mass index is 25 09 kg/m²  The BMI is above normal  Nutrition recommendations include encouraging healthy choices of fruits and vegetables and moderation in carbohydrate intake  Exercise recommendations include moderate physical activity 150 minutes/week

## 2021-03-16 NOTE — ASSESSMENT & PLAN NOTE
Pt is somewhat confused about his medications   I did ask him to bring his medications with him to his next OV here  It appears he may have been on BP meds at some point, but nothing currently (?)  BP was improved on retake   I did ask him to monitor his BP at home, call with readings in one week  Follow a low sodium diet   If BP is consistently elevated, would lean toward starting a low dose ACEI

## 2021-03-16 NOTE — ASSESSMENT & PLAN NOTE
Lab Results   Component Value Date    HGBA1C 6 6 (H) 01/13/2021   Stable, managed by Endocrinology at [facility]  Continue Trulicity 5 98 mg weekly, Pioglitazone 15 mg daily, Srynjardy 12 5/1000mg BID  Unable to urinate today for a urine microalbumin   He does have lab work ordered via Endo   Foot exam performed today  Eye exam is schedule for April per pt

## 2021-03-22 ENCOUNTER — OFFICE VISIT (OUTPATIENT)
Dept: BARIATRICS | Facility: CLINIC | Age: 61
End: 2021-03-22
Payer: COMMERCIAL

## 2021-03-22 ENCOUNTER — HOSPITAL ENCOUNTER (OUTPATIENT)
Dept: RADIOLOGY | Facility: HOSPITAL | Age: 61
Discharge: HOME | End: 2021-03-22
Payer: COMMERCIAL

## 2021-03-22 VITALS
BODY MASS INDEX: 24.37 KG/M2 | WEIGHT: 164.5 LBS | DIASTOLIC BLOOD PRESSURE: 60 MMHG | SYSTOLIC BLOOD PRESSURE: 122 MMHG | HEART RATE: 62 BPM | TEMPERATURE: 98.4 F | HEIGHT: 69 IN

## 2021-03-22 DIAGNOSIS — Z48.815 ENCOUNTER FOR SURGICAL AFTERCARE FOLLOWING SURGERY OF DIGESTIVE SYSTEM: Primary | ICD-10-CM

## 2021-03-22 DIAGNOSIS — Z98.84 BARIATRIC SURGERY STATUS: ICD-10-CM

## 2021-03-22 DIAGNOSIS — K91.2 POSTSURGICAL MALABSORPTION: ICD-10-CM

## 2021-03-22 DIAGNOSIS — I48.91 ATRIAL FIBRILLATION, UNSPECIFIED TYPE (HCC): ICD-10-CM

## 2021-03-22 DIAGNOSIS — E11.9 TYPE 2 DIABETES MELLITUS WITHOUT COMPLICATION (HCC): ICD-10-CM

## 2021-03-22 DIAGNOSIS — K76.89 LIVER CYST: ICD-10-CM

## 2021-03-22 DIAGNOSIS — F31.9 BIPOLAR AFFECTIVE DISORDER, REMISSION STATUS UNSPECIFIED (HCC): ICD-10-CM

## 2021-03-22 DIAGNOSIS — G89.4 CHRONIC PAIN DISORDER: ICD-10-CM

## 2021-03-22 PROCEDURE — 3008F BODY MASS INDEX DOCD: CPT | Performed by: PHYSICIAN ASSISTANT

## 2021-03-22 PROCEDURE — 99203 OFFICE O/P NEW LOW 30 MIN: CPT | Performed by: PHYSICIAN ASSISTANT

## 2021-03-22 PROCEDURE — 76705 ECHO EXAM OF ABDOMEN: CPT

## 2021-03-22 NOTE — PATIENT INSTRUCTIONS
· Follow-up in with Dr Malaika Frias  We kindly ask that your arrive 15 minutes before your scheduled appointment time with your provider to allow our staff to room you, get your vital signs and update your chart  · Get lab work done  Please call the office if you need a script  It is recommended to check with your insurance BEFORE getting labs done to make sure they are covered by your policy  · Call our office if you have any problems with abdominal pain especially associated with fever, chills, nausea, vomiting or any other concerns  · All  Post-bariatric surgery patients should be aware that very small quantities of any alcohol can cause impairment and it is very possible not to feel the effect  The effect can be in the system for several hours  It is also a stomach irritant  · It is advised to AVOID alcohol, Nonsteroidal antiinflammatory drugs (NSAIDS) and nicotine of all forms   Any of these can cause stomach irritation/pain  · Discussed the effects of alcohol on a bariatric patient and the increased impairment risk  · Keep up the good work!

## 2021-03-22 NOTE — PROGRESS NOTES
Assessment/Plan:     Patient ID: Brandee Campbell is a 64 y o  male  Bariatric Surgery Status    -s/p Maisha-En-Y Gastric Bypass with Dr Mago Yuen 11/8/2011  ALSO S/P INCISIONAL HERNIA REPAIR IN 2013  Presents to the office today for routine follow up  Returns for overdue follow up today stating he feels that the mesh placed during incisional hernia repair feels "torn" and he is having incontinence with his urine and stools  Not really complaining of pain only minimal discomfort  States wants the mesh "replaced" if possible  Will have patient follow up to discuss with surgeon  · Continued/Maintain healthy weight loss with good nutrition intakes  · Adequate hydration with at least 64oz  fluid intake  · Follow diet as discussed  · Follow vitamin and mineral recommendations as reviewed with you  · Exercise as tolerated  · Colonoscopy referral made: UTD    · Follow-up in with Dr Mago Yuen  We kindly ask that your arrive 15 minutes before your scheduled appointment time with your provider to allow our staff to room you, get your vital signs and update your chart  · Get lab work done  Please call the office if you need a script  It is recommended to check with your insurance BEFORE getting labs done to make sure they are covered by your policy  · Call our office if you have any problems with abdominal pain especially associated with fever, chills, nausea, vomiting or any other concerns  · All  Post-bariatric surgery patients should be aware that very small quantities of any alcohol can cause impairment and it is very possible not to feel the effect  The effect can be in the system for several hours  It is also a stomach irritant  · It is advised to AVOID alcohol, Nonsteroidal antiinflammatory drugs (NSAIDS) and nicotine of all forms   Any of these can cause stomach irritation/pain  · Discussed the effects of alcohol on a bariatric patient and the increased impairment risk       · Keep up the good work! Postsurgical Malabsorption   -At risk for malabsorption of vitamins/minerals secondary to malabsorption and restriction of intake from bariatric surgery  -NOT Currently taking adequate postop bariatric surgery vitamin supplementation  -CBC< CMP vit D within last few months ; will add remainder bariatric labs   - provided handouts with bariatric vitamin recommendations   -Patient received education about the importance of adhering to a lifelong supplementation regimen to avoid vitamin/mineral deficiencies     Chronic pain disorder  - currently in [facility] clinic     Type 2 diabetes  - following and managed by endo      Diagnoses and all orders for this visit:    Encounter for surgical aftercare following surgery of digestive system  -     Zinc; Future  -     Vitamin B12; Future  -     Vitamin B1, whole blood; Future  -     Vitamin A; Future  -     PTH, intact; Future  -     Ferritin; Future  -     Folate; Future    Bariatric surgery status  -     Zinc; Future  -     Vitamin B12; Future  -     Vitamin B1, whole blood; Future  -     Vitamin A; Future  -     PTH, intact; Future  -     Ferritin; Future  -     Folate; Future    Postsurgical malabsorption  -     Zinc; Future  -     Vitamin B12; Future  -     Vitamin B1, whole blood; Future  -     Vitamin A; Future  -     PTH, intact; Future  -     Ferritin; Future  -     Folate; Future    Type 2 diabetes mellitus without complication (HCC)  -     Zinc; Future  -     Vitamin B12; Future  -     Vitamin B1, whole blood; Future  -     Vitamin A; Future  -     PTH, intact; Future  -     Ferritin; Future  -     Folate; Future    Bipolar affective disorder, remission status unspecified ([facility] )    At[facility] fibrillation, unspecified type ([facility] )    Chronic pain disorder         Subjective:      Patient ID: Martha Parra is a 64 y o  male  -s/p Maisha-En-Y Gastric Bypass with Dr Klaus De La Cruz 11/8/2011  ALSO S/P INCISIONAL HERNIA REPAIR IN 2013   Presents to the office today for routine follow up  Initial: Over 300  Current: 164 5  EWL: (Weight loss is ahead of schedule at this post surgical period )  Patel: 155  Current BMI is Body mass index is 24 29 kg/m²  · Tolerating a regular diet-yes  · Eating at least 60 grams of protein per day-eats meats and chicken   · Following 30/60 minute rule with liquids-no  · Drinking at least 64 ounces of fluid per day-yes  · Drinking carbonated beverages-no  · Sufficient exercise-no  · Using NSAIDs regularly-no  · Using nicotine-no  · Using alcohol-no  · Supplements: vit d + vit c + mvi (inconsistently)    · EWL is 104%, which places the patient ahead of schedule for expected post surgical weight loss at this time  The following portions of the patient's history were reviewed and updated as appropriate: allergies, current medications, past family history, past medical history, past social history, past surgical history and problem list     Review of Systems   Constitutional: Negative  Respiratory: Negative  Cardiovascular: Negative  Gastrointestinal: Positive for diarrhea  Incontinence of stools   Neurological: Negative  Psychiatric/Behavioral: Negative  Hx bipolar managed on meds by psychiatry          Objective:    /60 (BP Location: Left arm, Patient Position: Sitting, Cuff Size: Standard)   Pulse 62   Temp 98 4 °F (36 9 °C) (Tympanic)   Ht 5' 9" (1 753 m)   Wt 74 6 kg (164 lb 8 oz)   BMI 24 29 kg/m²      Physical Exam  Vitals signs and nursing note reviewed  Constitutional:       Appearance: Normal appearance  Comments: Ambulates with cane   HENT:      Head: Normocephalic and atraumatic  Eyes:      Extraocular Movements: Extraocular movements intact  Pupils: Pupils are equal, round, and reactive to light  Cardiovascular:      Rate and Rhythm: Normal rate and regular rhythm  Pulmonary:      Effort: Pulmonary effort is normal       Breath sounds: Normal breath sounds     Abdominal: Tenderness: There is no abdominal tenderness  Comments: Healed laparoscopic scars from previous abdominal surgeries    Musculoskeletal: Normal range of motion  Skin:     General: Skin is warm and dry  Neurological:      General: No focal deficit present  Mental Status: He is alert and oriented to person, place, and time     Psychiatric:         Mood and Affect: Mood normal

## 2021-03-23 ENCOUNTER — APPOINTMENT (OUTPATIENT)
Dept: URGENT CARE | Age: 61
End: 2021-03-23
Payer: OTHER MISCELLANEOUS

## 2021-03-23 PROCEDURE — 99213 OFFICE O/P EST LOW 20 MIN: CPT | Performed by: PREVENTIVE MEDICINE

## 2021-03-28 DIAGNOSIS — K76.89 LIVER CYST: Primary | ICD-10-CM

## 2021-04-07 ENCOUNTER — APPOINTMENT (OUTPATIENT)
Dept: LAB | Facility: HOSPITAL | Age: 61
End: 2021-04-07
Payer: COMMERCIAL

## 2021-04-07 DIAGNOSIS — K91.2 POSTSURGICAL MALABSORPTION: ICD-10-CM

## 2021-04-07 DIAGNOSIS — E11.9 TYPE 2 DIABETES MELLITUS WITHOUT COMPLICATION (HCC): ICD-10-CM

## 2021-04-07 DIAGNOSIS — Z48.815 ENCOUNTER FOR SURGICAL AFTERCARE FOLLOWING SURGERY OF DIGESTIVE SYSTEM: ICD-10-CM

## 2021-04-07 DIAGNOSIS — Z98.84 BARIATRIC SURGERY STATUS: ICD-10-CM

## 2021-04-07 LAB
FERRITIN SERPL-MCNC: 8 NG/ML (ref 8–388)
FOLATE SERPL-MCNC: 18.6 NG/ML (ref 3.1–17.5)
PTH-INTACT SERPL-MCNC: 59.8 PG/ML (ref 18.4–80.1)
VIT B12 SERPL-MCNC: 322 PG/ML (ref 100–900)

## 2021-04-07 PROCEDURE — 84425 ASSAY OF VITAMIN B-1: CPT

## 2021-04-07 PROCEDURE — 82607 VITAMIN B-12: CPT

## 2021-04-07 PROCEDURE — 84590 ASSAY OF VITAMIN A: CPT

## 2021-04-07 PROCEDURE — 84630 ASSAY OF ZINC: CPT

## 2021-04-07 PROCEDURE — 82746 ASSAY OF FOLIC ACID SERUM: CPT

## 2021-04-07 PROCEDURE — 83970 ASSAY OF PARATHORMONE: CPT

## 2021-04-07 PROCEDURE — 82728 ASSAY OF FERRITIN: CPT

## 2021-04-07 PROCEDURE — 36415 COLL VENOUS BLD VENIPUNCTURE: CPT

## 2021-04-08 ENCOUNTER — OFFICE VISIT (OUTPATIENT)
Dept: BARIATRICS | Facility: CLINIC | Age: 61
End: 2021-04-08
Payer: COMMERCIAL

## 2021-04-08 VITALS
RESPIRATION RATE: 20 BRPM | BODY MASS INDEX: 26.22 KG/M2 | HEART RATE: 85 BPM | WEIGHT: 177 LBS | TEMPERATURE: 85 F | DIASTOLIC BLOOD PRESSURE: 68 MMHG | SYSTOLIC BLOOD PRESSURE: 142 MMHG | HEIGHT: 69 IN

## 2021-04-08 DIAGNOSIS — Z98.84 BARIATRIC SURGERY STATUS: Primary | ICD-10-CM

## 2021-04-08 PROCEDURE — 99213 OFFICE O/P EST LOW 20 MIN: CPT | Performed by: SURGERY

## 2021-04-08 NOTE — PROGRESS NOTES
OFFICE VISIT - BARIATRIC SURGERY  Katia Logan 64 y o  male MRN: [id number]  Unit/Bed#:  Encounter: [id number]      HPI:  Katia Logan is a 64 y o  male status post lap RYGB by Dr Filipe Huitron on 11/8/2011, followed by incisional hernia repair with mesh of a periumbilical port site hernia in 2013  Comes to the office today  for follow-up  Subjective     The patient  Is doing well from a bariatric standpoint  At the time of her surgery the patient was 272, and was able to drop down as low as 155  Today, the weight is 177 with a BMI of 26 14  The EWL is 93%  he is tolerating his diet and denies any GI symptoms  However he had an accident at work and since he feels that his mesh is broken  He also started having urinary and stool incontinence  Review of Systems   Constitutional: Negative for chills and fever  HENT: Negative for ear pain and sore throat  Eyes: Negative for pain and visual disturbance  Respiratory: Negative for cough and shortness of breath  Cardiovascular: Negative for chest pain and palpitations  Gastrointestinal: Negative for abdominal pain and vomiting  Genitourinary: Negative for dysuria and hematuria  Musculoskeletal: Negative for arthralgias and back pain  Skin: Negative for color change and rash  Neurological: Negative for seizures and syncope  All other systems reviewed and are negative        Historical Information   Past Medical History:   Diagnosis Date    Anxiety     Depression     Diabetes mellitus ([facility] [address] )     Hemorrhoids, internal 9/23/2019    Hypertension      Past Surgical History:   Procedure Laterality Date    FEMUR FRACTURE SURGERY Right     GASTRIC BYPASS  11/08/2011    Managed by: Claudean Jubilee (General Surgery)    HERNIA REPAIR  02/13/2013    Incisional hernia repair Managed by: Claudean Jubilee (General Surgery)    TONSILLECTOMY  2010     Social History   Social History     Substance and Sexual Activity   Alcohol Use Never    Frequency: Never     Social History     Substance and Sexual Activity   Drug Use Never     Social History     Tobacco Use   Smoking Status Never Smoker   Smokeless Tobacco Never Used       Objective       Current Vitals:   Blood Pressure: 142/68 (04/08/21 1045)  Pulse: 85 (04/08/21 1045)  Temperature: (!) 85 °F (29 4 °C) (04/08/21 1045)  Temp Source: Tympanic (04/08/21 1045)  Respirations: 20 (04/08/21 1045)  Height: 5' 9" (175 3 cm) (04/08/21 1045)  Weight - Scale: 80 3 kg (177 lb) (04/08/21 1045)    Invasive Devices     None                 Physical Exam  Constitutional:       Appearance: Normal appearance  HENT:      Head: Normocephalic  Cardiovascular:      Rate and Rhythm: Normal rate and regular rhythm  Pulmonary:      Effort: Pulmonary effort is normal       Breath sounds: Normal breath sounds  Abdominal:      General: Abdomen is flat  Palpations: Abdomen is soft  Tenderness: There is no abdominal tenderness  There is no guarding or rebound  Hernia: No hernia is present  Comments: Incisions healed  No evidence of hernia recurrence  Neurological:      Mental Status: He is alert  Pathology, and Other Studies: I have personally reviewed pertinent reports  Assessment/PLAN:    Usama Vaughn is a 64 y o  male status post lap RYGB by Dr Ashvin Mahmood on 11/8/2011, followed by incisional hernia repair with mesh of a periumbilical port site hernia in 2013  Comes to the office today  for follow-up  The patient  Is doing well from a bariatric standpoint  At the time of her surgery the patient was 272, and was able to drop down as low as 155  Today, the weight is 177 with a BMI of 26 14  The EWL is 93%  he is tolerating his diet and denies any GI symptoms  However he had an accident at work and since he feels that his mesh is broken  He also started having urinary and stool incontinence  On exam there is no evidence of hernia recurrence        Will get a CT scan abdomen pelvis  Will see him in the office afterwards             Aaron Chambers MD  Bariatric Surgery Fellow  4/8/2021  10:53 AM

## 2021-04-10 LAB — ZINC SERPL-MCNC: 82 UG/DL (ref 44–115)

## 2021-04-12 ENCOUNTER — OFFICE VISIT (OUTPATIENT)
Dept: OBGYN CLINIC | Facility: HOSPITAL | Age: 61
End: 2021-04-12
Payer: OTHER MISCELLANEOUS

## 2021-04-12 VITALS
BODY MASS INDEX: 26.22 KG/M2 | DIASTOLIC BLOOD PRESSURE: 73 MMHG | SYSTOLIC BLOOD PRESSURE: 147 MMHG | WEIGHT: 177 LBS | HEART RATE: 70 BPM | HEIGHT: 69 IN

## 2021-04-12 DIAGNOSIS — M51.36 DDD (DEGENERATIVE DISC DISEASE), LUMBAR: ICD-10-CM

## 2021-04-12 DIAGNOSIS — M51.34 THORACIC DEGENERATIVE DISC DISEASE: Primary | ICD-10-CM

## 2021-04-12 PROBLEM — M51.369 DDD (DEGENERATIVE DISC DISEASE), LUMBAR: Status: ACTIVE | Noted: 2021-04-12

## 2021-04-12 PROCEDURE — 99213 OFFICE O/P EST LOW 20 MIN: CPT | Performed by: ORTHOPAEDIC SURGERY

## 2021-04-12 NOTE — PROGRESS NOTES
64 y o male presents for follow up  evaluation for back pain  Patient sustained a fall at work on January 7, 2021  Patient is currently complaining of pain mostly to the  thoracic spine  Today notes some numbness to the ulnar aspect of the hand  Patient works as a [employer]  Pain is made worse with direct palpation affected area with increased activity  Pain improved somewhat at rest as well as with use of his brace  Patient does have history of methadone use  Review of Systems  Review of systems negative unless otherwise specified in HPI      Past Medical History  Past Medical History:   Diagnosis Date    Anxiety     Depression     Diabetes mellitus ([facility] [address] )     Hemorrhoids, internal 9/23/2019    Hypertension        Past Surgical History  Past Surgical History:   Procedure Laterality Date    FEMUR FRACTURE SURGERY Right     GASTRIC BYPASS  11/08/2011    Managed by: Nirav De La Fuente (General Surgery)   [address]  02/13/2013    Incisional hernia repair Managed by: Nirav De La Fuente (General Surgery)    TONSILLECTOMY  2010       Current Medications  Current Outpatient Medications on File Prior to Visit   Medication Sig Dispense Refill    acetaminophen (TYLENOL) 325 mg tablet Take 2 tablets (650 mg total) by mouth every 6 (six) hours as needed for mild pain (pain) 16 tablet 0    capsicum (ZOSTRIX) 0 075 % topical cream Apply topically 3 (three) times a day 28 3 g 0    Continuous Blood Gluc Sensor (FREESTYLE STEVEN 14 DAY SENSOR) MIS 1 applicator by Does not apply route every 14 (fourteen) days 2 each 5    divalproex sodium (DEPAKOTE ER) 500 mg 24 hr tablet 500 mg every 12 (twelve) hours       divalproex sodium (DEPAKOTE) 500 mg EC tablet Take 1 tablet (500 mg total) by mouth every 8 (eight) hours 30 tablet 2    doxepin (SINEquan) 25 mg capsule       fluticasone (FLONASE) 50 mcg/act nasal spray instill 1 spray into each nostril once daily 16 g 1    GLOBAL EASE INJECT PEN NEEDLES 31G X 8 MM MISC USE AS DIRECTED TO INJECT INSULIN DAILY  5    glycerin-hypromellose- (ARTIFICIAL TEARS) 0 2-0 2-1 % SOLN Apply to eye      Incontinence Supply Disposable (INCONTINENCE BRIEF MEDIUM) MISC by Does not apply route as needed (fecal soiling) 90 each 5    Incontinence Supply Disposable (RA WIPES FLUSHABLE/MOIST) MISC by Does not apply route as needed (fecal soiling) 100 each 5    insulin glargine (LANTUS SOLOSTAR) 100 units/mL injection pen Inject under the skin      insulin glargine (LANTUS) 100 units/mL subcutaneous injection Inject 24 Units under the skin      insulin lispro (HUMALOG) 100 units/mL injection Inject 12 Units under the skin      Misc  Devices (CANE) MISC by Does not apply route daily Dx:  Frequent falls 1 each 0    ONE TOUCH ULTRA TEST test strip Test twice daily 30 each 5    ONETOUCH DELICA LANCETS 58M MISC USE TWICE A DAY (ICD 10 E11 9)  3    OZEMPIC 1 MG/DOSE SOPN INJECT 1MG SUBCUTANEOUS EVERY WEEK  2    polyethylene glycol (GLYCOLAX) powder Take 17 g by mouth daily 578 g 1    QUEtiapine (SEROquel) 100 mg tablet Take 100 mg by mouth daily at bedtime  0    QUEtiapine (SEROquel) 50 mg tablet TAKE 1 TABLET BY MOUTH IN THE MORNING AND 1 AT NOON  0    SYNJARDY XR 12 5-1000 MG TB24 Take 1 tablet by mouth 2 (two) times a day  0    testosterone cypionate (DEPO-TESTOSTERONE) 200 mg/mL SOLN INJECT 1CC INTRAMUSCULARLY EVERY 2 WEEKS  1    UNKNOWN TO PATIENT       atorvastatin (LIPITOR) 10 mg tablet Take 10 mg by mouth      ergocalciferol (ERGOCALCIFEROL) 1 25 MG (59733 UT) capsule Take 50,000 Units by mouth      polyethylene glycol (GOLYTELY) 4000 mL solution Take 4,000 mL by mouth once for 1 dose As directed for colonoscopy prep 4000 mL 0     No current facility-administered medications on file prior to visit          Recent Labs [facility]  0   Lab Value Date/Time    HCT 36 2 (L) 01/13/2021 0640    HCT 38 0 01/29/2015 1342    HGB 11 5 (L) 01/13/2021 0640    HGB 12 2 01/29/2015 1342    WBC 7 85 01/13/2021 0640    WBC 9 29 01/29/2015 1342    INR 0 98 08/09/2019 1023    INR 1 11 01/29/2015 1342    GLUCOSE 222 (H) 01/29/2015 1342    HGBA1C 6 6 (H) 01/13/2021 0640    HGBA1C 7 3 (H) 03/01/2019 1611         Physical exam  · General: Awake, Alert, Oriented  · Eyes: Pupils equal, round and reactive to light  · Heart: regular rate and rhythm  · Lungs: No audible wheezing  · Abdomen: soft  Back exam  · Significant tenderness palpation midline and para spinal musculature thoracic and to a lesser extent the lumbar spine  · Strength equal bilateral lower extremities L2-S1  · Sensation intact to light touch and equal bilateral lower extremities L2-S1  · Distal extremity warm well perfused  ·       Procedure  none    Imaging  CT of lumbar spine as well as x-rays of thoracic spine reviewed with patient today's visit  This reveals compression fractures the level of L1 and T12      MRI reveals no acute fractures of the thoracic or lumbar spine       A/P:    Patient with chronic degenerative changes of the thoracic and lumbar spine without acute fracture    Patient may discontinue brace    Pain management referral    PT    Patient may follow up as needed

## 2021-04-13 ENCOUNTER — TELEPHONE (OUTPATIENT)
Dept: PAIN MEDICINE | Facility: CLINIC | Age: 61
End: 2021-04-13

## 2021-04-14 ENCOUNTER — TRANSCRIBE ORDERS (OUTPATIENT)
Dept: RADIOLOGY | Facility: HOSPITAL | Age: 61
End: 2021-04-14

## 2021-04-14 ENCOUNTER — HOSPITAL ENCOUNTER (OUTPATIENT)
Dept: RADIOLOGY | Facility: HOSPITAL | Age: 61
Discharge: HOME | End: 2021-04-14
Payer: COMMERCIAL

## 2021-04-14 DIAGNOSIS — Z98.84 BARIATRIC SURGERY STATUS: ICD-10-CM

## 2021-04-14 LAB — VIT B1 BLD-SCNC: 118.1 NMOL/L (ref 66.5–200)

## 2021-04-14 PROCEDURE — 74177 CT ABD & PELVIS W/CONTRAST: CPT

## 2021-04-14 PROCEDURE — G1004 CDSM NDSC: HCPCS

## 2021-04-14 RX ADMIN — IOHEXOL 100 ML: 350 INJECTION, SOLUTION INTRAVENOUS at 07:27

## 2021-04-16 LAB — VIT A SERPL-MCNC: 33.1 UG/DL (ref 20.1–62)

## 2021-04-27 ENCOUNTER — APPOINTMENT (OUTPATIENT)
Dept: URGENT CARE | Age: 61
End: 2021-04-27
Payer: OTHER MISCELLANEOUS

## 2021-04-27 PROCEDURE — 99213 OFFICE O/P EST LOW 20 MIN: CPT | Performed by: PREVENTIVE MEDICINE

## 2021-04-29 ENCOUNTER — OFFICE VISIT (OUTPATIENT)
Dept: BARIATRICS | Facility: CLINIC | Age: 61
End: 2021-04-29
Payer: COMMERCIAL

## 2021-04-29 VITALS
WEIGHT: 178 LBS | HEART RATE: 80 BPM | DIASTOLIC BLOOD PRESSURE: 60 MMHG | SYSTOLIC BLOOD PRESSURE: 110 MMHG | BODY MASS INDEX: 26.36 KG/M2 | HEIGHT: 69 IN | TEMPERATURE: 97.4 F

## 2021-04-29 DIAGNOSIS — R10.9 ABDOMINAL PAIN: Primary | ICD-10-CM

## 2021-04-29 PROCEDURE — 99213 OFFICE O/P EST LOW 20 MIN: CPT | Performed by: SURGERY

## 2021-04-29 PROCEDURE — 3008F BODY MASS INDEX DOCD: CPT | Performed by: SURGERY

## 2021-04-29 PROCEDURE — 1036F TOBACCO NON-USER: CPT | Performed by: SURGERY

## 2021-04-29 NOTE — LETTER
April 29, 2021     Rishabh Restrepo MD  [address]    Patient: Eran Piedra   YOB: 1960   Date of Visit: 4/29/2021       Dear Dr Prema Aquino: Thank you for referring Earn Piedra to me for evaluation  Below are my notes for this consultation  If you have questions, please do not hesitate to call me  I look forward to following your patient along with you  Sincerely,        Lenny Jones MD        CC: No Recipients  Fatoumata Ortiz MD  4/29/2021  9:45 AM  Sign when Signing Visit    OFFICE VISIT - [address] 64 y o  male MRN: [id number]  Unit/Bed#:  Encounter: [id number]      HPI:  Eran Piedra is a 64 y o  male Eran Piedra is a 64 y o  male status post lap RYGB by Dr Lashawn Caldwell on 11/8/2011, followed by incisional hernia repair with mesh of a periumbilical port site hernia in 2013  Comes to the office today to review CT result      Subjective      The patient  Is doing well from a bariatric standpoint  At the time of her surgery the patient was 272, and was able to drop down as low as 155  Today, the weight is 178 with a BMI of 26 29  The EWL is 93%       he is tolerating his diet and denies any GI symptoms        However he had an accident at work and since he feels that his mesh is broken  He also started having urinary and stool incontinence  CT showed that the mesh is intact  He has however several lesions in the liver, adrenal and lungs that need follow-up  The patient is still saying "there is something not right with the mesh"      Review of Systems    Historical Information   Past Medical History:   Diagnosis Date    Anxiety     Depression     Diabetes mellitus ([facility] [address] )     Hemorrhoids, internal 9/23/2019    Hypertension      Past Surgical History:   Procedure Laterality Date    FEMUR FRACTURE SURGERY Right     GASTRIC BYPASS  11/08/2011    Managed by: Lenny Jones (General Surgery)   [address]  02/13/2013 Incisional hernia repair Managed by: Jose G Less (General Surgery)    TONSILLECTOMY  2010     Social History   Social History     Substance and Sexual Activity   Alcohol Use Never    Frequency: Never     Social History     Substance and Sexual Activity   Drug Use Never     Social History     Tobacco Use   Smoking Status Never Smoker   Smokeless Tobacco Never Used       Objective       Current Vitals:   Blood Pressure: 110/60 (04/29/21 0917)  Pulse: 80 (04/29/21 0917)  Temperature: (!) 97 4 °F (36 3 °C) (04/29/21 0917)  Temp Source: Tympanic (04/29/21 0917)  Height: 5' 9" (175 3 cm) (04/29/21 0917)  Weight - Scale: 80 7 kg (178 lb) (04/29/21 0917)    Invasive Devices     None                 Physical Exam  Constitutional:       Appearance: Normal appearance  HENT:      Head: Atraumatic  Neck:      Musculoskeletal: Normal range of motion  Cardiovascular:      Rate and Rhythm: Normal rate and regular rhythm  Pulmonary:      Effort: Pulmonary effort is normal       Breath sounds: Normal breath sounds  Abdominal:      General: Abdomen is flat  Palpations: Abdomen is soft  Tenderness: There is no abdominal tenderness  There is no guarding or rebound  Hernia: No hernia is present  Skin:     General: Skin is warm  Neurological:      Mental Status: He is alert and oriented to person, place, and time  Psychiatric:         Mood and Affect: Mood normal            Pathology, and Other Studies: I have personally reviewed pertinent reports  Assessment/PLAN:    Jd Lucia is a 64 y o  male status post lap RYGB by Dr Wiliam Bowser on 11/8/2011, followed by incisional hernia repair with mesh of a periumbilical port site hernia in 2013  Comes to the office today to review CT result      Subjective      The patient  Is doing well from a bariatric standpoint  At the time of her surgery the patient was 272, and was able to drop down as low as 155  Today, the weight is 178 with a BMI of 26 29  The EWL is 93%       he is tolerating his diet and denies any GI symptoms        However he had an accident at work and since he feels that his mesh is broken  He also started having urinary and stool incontinence  CT showed that the mesh is intact  He has however several lesions in the liver, adrenal and lungs that need follow-up  The patient is still saying "there is something not right with the mesh"     ---------------------------------------------------    The patient's workup thus far was reviewed, and includes:    CT:    There is a tiny fat-containing umbilical hernia with the neck of the hernia sac only about 5 mm diameter  The mesh otherwise appears intact without evidence of significant additional herniation elsewhere along the anterior abdominal wall      There is an enhancing right hepatic lobe mass which is 1 6 cm and is indeterminate in etiology  This was previously thought to represent a complex cyst on ultrasound, but it is clearly an enhanced solid lesion  Consider follow-up hepatic MRI for   further workup      There is a 12 mm right adrenal nodule  Although its imaging features are indeterminate, it does not have suspicious imaging features (heterogeneity, necrosis, irregular margins), therefore this is likely benign, and can be followed by non-contrast   abdomen CT or MRI in 12 months  If patient has history of adrenal hyperfunction, consider biochemical evaluation       Adrenal recommendation based on institutional consensus and Journal of [employer] of Radiology 2017;14:2280-3647     Incidental tiny right lung nodules  Based on current Fleischner Society 2017 Guidelines on incidental pulmonary nodule, no routine follow-up is needed if the patient is considered low risk for lung cancer    If the patient is considered high risk for lung   cancer, 12 month follow-up non-contrast chest CT is recommended      The study was marked in EPIC for significant notification         --------------------------------------------------------------------    We told him the mesh is intact and what he is feeling is probably not from the mesh  He would like to have a 2nd opinion  We will refer him for a second opinion  We also told him to follow-up with his PCP regarding the incidental CT findings                Jina Riggs MD  Bariatric Surgery Fellow  4/29/2021  9:24 AM

## 2021-04-29 NOTE — PROGRESS NOTES
OFFICE VISIT - BARIATRIC SURGERY  Stephanie Felder 64 y o  male MRN: [id number]  Unit/Bed#:  Encounter: [id number]      HPI:  Stephanie Felder is a 64 y o  male Stephanie Felder is a 64 y o  male status post lap RYGB by Dr Kasandra Vergara on 11/8/2011, followed by incisional hernia repair with mesh of a periumbilical port site hernia in 2013  Comes to the office today to review CT result      Subjective      The patient  Is doing well from a bariatric standpoint  At the time of her surgery the patient was 272, and was able to drop down as low as 155  Today, the weight is 178 with a BMI of 26 29  The EWL is 93%       he is tolerating his diet and denies any GI symptoms        However he had an accident at work and since he feels that his mesh is broken  He also started having urinary and stool incontinence  CT showed that the mesh is intact  He has however several lesions in the liver, adrenal and lungs that need follow-up  The patient is still saying "there is something not right with the mesh"      Review of Systems    Historical Information   Past Medical History:   Diagnosis Date    Anxiety     Depression     Diabetes mellitus ([address] )     Hemorrhoids, internal 9/23/2019    Hypertension      Past Surgical History:   Procedure Laterality Date    FEMUR FRACTURE SURGERY Right     GASTRIC BYPASS  11/08/2011    Managed by: Eusebia Ely (General Surgery)    HERNIA REPAIR  02/13/2013    Incisional hernia repair Managed by: Eusebia Ely (General Surgery)    TONSILLECTOMY  2010     Social History   Social History     Substance and Sexual Activity   Alcohol Use Never    Frequency: Never     Social History     Substance and Sexual Activity   Drug Use Never     Social History     Tobacco Use   Smoking Status Never Smoker   Smokeless Tobacco Never Used       Objective       Current Vitals:   Blood Pressure: 110/60 (04/29/21 0917)  Pulse: 80 (04/29/21 0917)  Temperature: (!) 97 4 °F (36 3 °C) (04/29/21 0917)  Temp Source: Tympanic (04/29/21 0917)  Height: 5' 9" (175 3 cm) (04/29/21 0917)  Weight - Scale: 80 7 kg (178 lb) (04/29/21 0917)    Invasive Devices     None                 Physical Exam  Constitutional:       Appearance: Normal appearance  HENT:      Head: Atraumatic  Neck:      Musculoskeletal: Normal range of motion  Cardiovascular:      Rate and Rhythm: Normal rate and regular rhythm  Pulmonary:      Effort: Pulmonary effort is normal       Breath sounds: Normal breath sounds  Abdominal:      General: Abdomen is flat  Palpations: Abdomen is soft  Tenderness: There is no abdominal tenderness  There is no guarding or rebound  Hernia: No hernia is present  Skin:     General: Skin is warm  Neurological:      Mental Status: He is alert and oriented to person, place, and time  Psychiatric:         Mood and Affect: Mood normal            Pathology, and Other Studies: I have personally reviewed pertinent reports  Assessment/PLAN:    Prachi Hooker is a 64 y o  male status post lap RYGB by Dr Kathy Lott on 11/8/2011, followed by incisional hernia repair with mesh of a periumbilical port site hernia in 2013  Comes to the office today to review CT result      Subjective      The patient  Is doing well from a bariatric standpoint  At the time of her surgery the patient was 272, and was able to drop down as low as 155  Today, the weight is 178 with a BMI of 26 29  The EWL is 93%       he is tolerating his diet and denies any GI symptoms        However he had an accident at work and since he feels that his mesh is broken  He also started having urinary and stool incontinence  CT showed that the mesh is intact  He has however several lesions in the liver, adrenal and lungs that need follow-up      The patient is still saying "there is something not right with the mesh"     ---------------------------------------------------    The patient's workup thus far was reviewed, and includes:    CT:    There is a tiny fat-containing umbilical hernia with the neck of the hernia sac only about 5 mm diameter  The mesh otherwise appears intact without evidence of significant additional herniation elsewhere along the anterior abdominal wall      There is an enhancing right hepatic lobe mass which is 1 6 cm and is indeterminate in etiology  This was previously thought to represent a complex cyst on ultrasound, but it is clearly an enhanced solid lesion  Consider follow-up hepatic MRI for   further workup      There is a 12 mm right adrenal nodule  Although its imaging features are indeterminate, it does not have suspicious imaging features (heterogeneity, necrosis, irregular margins), therefore this is likely benign, and can be followed by non-contrast   abdomen CT or MRI in 12 months  If patient has history of adrenal hyperfunction, consider biochemical evaluation       Adrenal recommendation based on institutional consensus and Journal of [facility] of Radiology 2017;14:9428-3529     Incidental tiny right lung nodules  Based on current Fleischner Society 2017 Guidelines on incidental pulmonary nodule, no routine follow-up is needed if the patient is considered low risk for lung cancer  If the patient is considered high risk for lung   cancer, 12 month follow-up non-contrast chest CT is recommended      The study was marked in EPIC for significant notification         --------------------------------------------------------------------    We told him the mesh is intact and what he is feeling is probably not from the mesh  He would like to have a 2nd opinion  We will refer him for a second opinion  We also told him to follow-up with his PCP regarding the incidental CT findings                Chris Barnes MD  Bariatric Surgery Fellow  4/29/2021  9:24 AM

## 2021-05-14 ENCOUNTER — TRANSCRIBE ORDERS (OUTPATIENT)
Dept: LAB | Facility: HOSPITAL | Age: 61
End: 2021-05-14

## 2021-05-14 ENCOUNTER — OFFICE VISIT (OUTPATIENT)
Dept: LAB | Facility: HOSPITAL | Age: 61
End: 2021-05-14
Payer: COMMERCIAL

## 2021-05-14 DIAGNOSIS — R94.31 NONSPECIFIC ABNORMAL ELECTROCARDIOGRAM (ECG) (EKG): Primary | ICD-10-CM

## 2021-05-14 DIAGNOSIS — R94.31 NONSPECIFIC ABNORMAL ELECTROCARDIOGRAM (ECG) (EKG): ICD-10-CM

## 2021-05-14 LAB
ATRIAL RATE: 65 BPM
P AXIS: 48 DEGREES
PR INTERVAL: 116 MS
QRS AXIS: 63 DEGREES
QRSD INTERVAL: 104 MS
QT INTERVAL: 428 MS
QTC INTERVAL: 445 MS
T WAVE AXIS: 59 DEGREES
VENTRICULAR RATE: 65 BPM

## 2021-05-14 PROCEDURE — 93010 ELECTROCARDIOGRAM REPORT: CPT | Performed by: INTERNAL MEDICINE

## 2021-05-14 PROCEDURE — 93005 ELECTROCARDIOGRAM TRACING: CPT

## 2021-05-24 DIAGNOSIS — J30.1 SEASONAL ALLERGIC RHINITIS DUE TO POLLEN: ICD-10-CM

## 2021-05-24 RX ORDER — FLUTICASONE PROPIONATE 50 MCG
SPRAY, SUSPENSION (ML) NASAL
Qty: 16 G | Refills: 1 | Status: SHIPPED | OUTPATIENT
Start: 2021-05-24 | End: 2021-07-27

## 2021-05-25 ENCOUNTER — OFFICE VISIT (OUTPATIENT)
Dept: FAMILY MEDICINE CLINIC | Facility: CLINIC | Age: 61
End: 2021-05-25
Payer: COMMERCIAL

## 2021-05-25 VITALS
TEMPERATURE: 98.5 F | HEIGHT: 69 IN | WEIGHT: 176.4 LBS | BODY MASS INDEX: 26.13 KG/M2 | HEART RATE: 72 BPM | SYSTOLIC BLOOD PRESSURE: 120 MMHG | DIASTOLIC BLOOD PRESSURE: 74 MMHG | RESPIRATION RATE: 18 BRPM | OXYGEN SATURATION: 96 %

## 2021-05-25 DIAGNOSIS — R21 RASH AND NONSPECIFIC SKIN ERUPTION: ICD-10-CM

## 2021-05-25 DIAGNOSIS — R21 RASH AND NONSPECIFIC SKIN ERUPTION: Primary | ICD-10-CM

## 2021-05-25 PROCEDURE — 99214 OFFICE O/P EST MOD 30 MIN: CPT | Performed by: FAMILY MEDICINE

## 2021-05-25 RX ORDER — PERMETHRIN 50 MG/G
CREAM TOPICAL ONCE
Qty: 60 G | Refills: 0 | Status: SHIPPED | OUTPATIENT
Start: 2021-05-25 | End: 2021-05-25 | Stop reason: SDUPTHER

## 2021-05-25 RX ORDER — CLONAZEPAM 0.5 MG/1
TABLET ORAL
COMMUNITY
Start: 2021-05-24

## 2021-05-25 RX ORDER — PERMETHRIN 50 MG/G
CREAM TOPICAL ONCE
Qty: 60 G | Refills: 0 | Status: SHIPPED | OUTPATIENT
Start: 2021-05-25 | End: 2021-05-25

## 2021-05-25 NOTE — PATIENT INSTRUCTIONS
Use benadryl as needed at night  Do not use clonopin if using benadryl  Trial treatment of scabies  Make an appt with dermatology

## 2021-05-25 NOTE — PROGRESS NOTES
Assessment/Plan:    No problem-specific Assessment & Plan notes found for this encounter  Diagnoses and all orders for this visit:    Rash and nonspecific skin eruption  -     Ambulatory referral to Dermatology; Future  -     Discontinue: permethrin (ELIMITE) 5 % cream; Apply topically once for 1 dose    Other orders  -     clonazePAM (KlonoPIN) 0 5 mg tablet          Subjective:      Patient ID: Marisol Arguelles is a 63 y o  male with a ho narcotic abuse on methadone currently, depression, DM2,     HPI  Here for a rash that started 6 weeks ago as vesicular lesions  The rash is pruritic and worse at night  The rash is only present on the lower extremities and is now scabbed over from scratching  Denies contact with plants or iritants  The following portions of the patient's history were reviewed and updated as appropriate: allergies, current medications, past family history, past medical history, past social history, past surgical history and problem list     Review of Systems   Constitutional: Negative for activity change, appetite change, fever and unexpected weight change  HENT: Negative for ear pain, postnasal drip and rhinorrhea  Eyes: Negative for photophobia and pain  Respiratory: Negative for cough, shortness of breath and wheezing  Cardiovascular: Negative for chest pain, palpitations and leg swelling  Gastrointestinal: Negative for abdominal pain, blood in stool, nausea and vomiting  Endocrine: Negative for polydipsia and polyuria  Genitourinary: Negative for difficulty urinating, hematuria and urgency  Musculoskeletal: Negative for myalgias  Skin: Positive for rash  Neurological: Negative for dizziness  Psychiatric/Behavioral: Negative for confusion and sleep disturbance           PHQ-9 Depression Screening    PHQ-9:   Frequency of the following problems over the past two weeks:               Objective:      /74 (BP Location: Left arm, Patient Position: Sitting, Cuff Size: Adult)   Pulse 72   Temp 98 5 °F (36 9 °C) (Tympanic)   Resp 18   Ht 5' 9" (1 753 m)   Wt 80 kg (176 lb 6 4 oz)   SpO2 96%   BMI 26 05 kg/m²          Physical Exam  Constitutional:       General: He is not in acute distress  Appearance: He is well-developed  He is not diaphoretic  HENT:      Head: Normocephalic and atraumatic  Right Ear: External ear normal       Left Ear: External ear normal       Nose: Nose normal    Eyes:      Conjunctiva/sclera: Conjunctivae normal       Pupils: Pupils are equal, round, and reactive to light  Pulmonary:      Effort: Pulmonary effort is normal  No respiratory distress  Skin:     Findings: Rash present  Neurological:      Mental Status: He is alert and oriented to person, place, and time     Psychiatric:         Behavior: Behavior normal

## 2021-05-27 ENCOUNTER — TELEPHONE (OUTPATIENT)
Dept: FAMILY MEDICINE CLINIC | Facility: CLINIC | Age: 61
End: 2021-05-27

## 2021-05-27 NOTE — TELEPHONE ENCOUNTER
Zayda Be is asking for a larger tube of elimite 5% cream he said he is almost done with it and the derm at the clinic is scheduling out in November,  I gave him 2 other phone numbers to call for a sooner appointment he just needs to find someone to take him

## 2021-05-27 NOTE — TELEPHONE ENCOUNTER
[place] is a one time application  You are supposed to apply it from the neck down once and leave it on for 10 hours then wash it off  Has he done this?

## 2021-06-02 ENCOUNTER — CONSULT (OUTPATIENT)
Dept: SURGERY | Facility: CLINIC | Age: 61
End: 2021-06-02
Payer: COMMERCIAL

## 2021-06-02 VITALS
DIASTOLIC BLOOD PRESSURE: 82 MMHG | HEIGHT: 69 IN | SYSTOLIC BLOOD PRESSURE: 146 MMHG | TEMPERATURE: 97.2 F | WEIGHT: 179 LBS | RESPIRATION RATE: 16 BRPM | HEART RATE: 71 BPM | BODY MASS INDEX: 26.51 KG/M2

## 2021-06-02 DIAGNOSIS — R10.9 ABDOMINAL PAIN: ICD-10-CM

## 2021-06-02 DIAGNOSIS — Z87.19 STATUS POST LAPAROSCOPIC HERNIA REPAIR: Primary | ICD-10-CM

## 2021-06-02 DIAGNOSIS — Z98.890 STATUS POST LAPAROSCOPIC HERNIA REPAIR: Primary | ICD-10-CM

## 2021-06-02 PROCEDURE — 99213 OFFICE O/P EST LOW 20 MIN: CPT | Performed by: SURGERY

## 2021-06-02 NOTE — PROGRESS NOTES
Assessment/Plan:    Status post laparoscopic hernia repair   Laparoscopic repair of incisional hernia in 2013 with composex mesh    Abdominal pain   I reviewed the images of his CT scan and I do not see any evidence of recurrent hernia  The CT questions a small fat containing umbilical hernia but I believe that this fatty nodule has been  There prior to his incisional hernia repair in 2013  I believe the edge the mesh does overlap the umbilical fascial defect  The umbilical hernia is not reducible on exam   In addition this is not the source of his pain  His pain is left upper quadrant radiating across his abdomen  As his symptoms all started with his fall this may be referred pain or radiating pain from his spine  There is no indication for surgical intervention at this time  I recommend he follow-up with his primary care physician for for long-term care as well as for follow-up on the other incidental findings on his CT scan  He can consider re-evaluation by [facility] although he had minimal success per the patient  Follow-up p r n  Diagnoses and all orders for this visit:    Status post laparoscopic hernia repair    Abdominal pain  -     Ambulatory referral to General Surgery          Subjective:      Patient ID: Kane Vasquez is a 63 y o  male  Mr Augustin Becerra   Is a 53-year-old gentleman that is here for evaluation of abdominal pain  He has a significant history of a gastric bypass in 2011 followed by a laparoscopic repair of  A trocar incisional hernia with mesh in 2013  After surgery he had no complaints and no history of abdominal pain  He had an accident at work early January February and had a significant fall  He had some rib fractures at that time  Since then he has been having chronic back pain as well as pain in his left abdominal wall  Describes the pain almost as radiating around lateral cross the medial   He does not feel any significant bulging    He had a CT scan is here for evaluation  Unusually he has been having some symptoms of incontinence as well as nausea vomiting but these are intermittent and do not appear to be obstructive in nature  The following portions of the patient's history were reviewed and updated as appropriate: allergies, current medications, past family history, past medical history, past social history, past surgical history and problem list     Review of Systems   Constitutional: Positive for appetite change  Negative for chills and fever  HENT: Negative for ear pain and sore throat  Eyes: Negative for pain and visual disturbance  Respiratory: Negative for cough and shortness of breath  Cardiovascular: Negative for chest pain and palpitations  Gastrointestinal: Positive for abdominal pain, diarrhea and vomiting  Genitourinary: Negative for dysuria and hematuria  Musculoskeletal: Positive for arthralgias, back pain and gait problem  Skin: Negative for color change and rash  Neurological: Negative for seizures and syncope  Psychiatric/Behavioral: Negative for agitation and behavioral problems  All other systems reviewed and are negative  Objective:      /82   Pulse 71   Temp (!) 97 2 °F (36 2 °C)   Resp 16   Ht 5' 9" (1 753 m)   Wt 81 2 kg (179 lb)   BMI 26 43 kg/m²          Physical Exam  Vitals signs and nursing note reviewed  Constitutional:       General: He is not in acute distress  Appearance: He is well-developed  He is not diaphoretic  HENT:      Head: Normocephalic and atraumatic  Eyes:      Pupils: Pupils are equal, round, and reactive to light  Neck:      Musculoskeletal: Normal range of motion and neck supple  Cardiovascular:      Rate and Rhythm: Normal rate and regular rhythm  Heart sounds: Normal heart sounds  No murmur  Pulmonary:      Effort: Pulmonary effort is normal  No respiratory distress  Breath sounds: Normal breath sounds  No wheezing     Abdominal: General: Bowel sounds are normal       Palpations: Abdomen is soft  Comments: There was no evidence of recurrent hernia  He has a small fatty nodule at his umbilicus but it is not reducible  He is tender along his left lower ribs   Musculoskeletal:      Comments:  Some unsteadiness requiring walking with a cane   Skin:     General: Skin is warm and dry  Neurological:      Mental Status: He is alert and oriented to person, place, and time     Psychiatric:         Behavior: Behavior normal

## 2021-06-02 NOTE — ASSESSMENT & PLAN NOTE
I reviewed the images of his CT scan and I do not see any evidence of recurrent hernia  The CT questions a small fat containing umbilical hernia but I believe that this fatty nodule has been  There prior to his incisional hernia repair in 2013  I believe the edge the mesh does overlap the umbilical fascial defect  The umbilical hernia is not reducible on exam   In addition this is not the source of his pain  His pain is left upper quadrant radiating across his abdomen  As his symptoms all started with his fall this may be referred pain or radiating pain from his spine  There is no indication for surgical intervention at this time  I recommend he follow-up with his primary care physician for for long-term care as well as for follow-up on the other incidental findings on his CT scan  He can consider re-evaluation by Spine and Pain Management although he had minimal success per the patient  Follow-up p r n

## 2021-06-05 DIAGNOSIS — R21 RASH AND NONSPECIFIC SKIN ERUPTION: ICD-10-CM

## 2021-06-06 RX ORDER — PERMETHRIN 50 MG/G
CREAM TOPICAL ONCE
Qty: 60 G | Refills: 0 | OUTPATIENT
Start: 2021-06-06 | End: 2021-06-06

## 2021-06-08 ENCOUNTER — OFFICE VISIT (OUTPATIENT)
Dept: FAMILY MEDICINE CLINIC | Facility: CLINIC | Age: 61
End: 2021-06-08
Payer: COMMERCIAL

## 2021-06-08 VITALS
RESPIRATION RATE: 18 BRPM | HEIGHT: 69 IN | OXYGEN SATURATION: 96 % | BODY MASS INDEX: 26.25 KG/M2 | SYSTOLIC BLOOD PRESSURE: 150 MMHG | HEART RATE: 83 BPM | TEMPERATURE: 99.1 F | DIASTOLIC BLOOD PRESSURE: 72 MMHG | WEIGHT: 177.2 LBS

## 2021-06-08 DIAGNOSIS — R91.8 LUNG NODULES: ICD-10-CM

## 2021-06-08 DIAGNOSIS — E27.8 ADRENAL NODULE (HCC): ICD-10-CM

## 2021-06-08 DIAGNOSIS — E11.649 UNCONTROLLED TYPE 2 DIABETES MELLITUS WITH HYPOGLYCEMIA WITHOUT COMA (HCC): ICD-10-CM

## 2021-06-08 DIAGNOSIS — K76.9 LIVER LESION, RIGHT LOBE: Primary | ICD-10-CM

## 2021-06-08 PROBLEM — E27.9 ADRENAL NODULE (HCC): Status: ACTIVE | Noted: 2021-06-08

## 2021-06-08 LAB
CREAT UR-MCNC: 68.1 MG/DL
MICROALBUMIN UR-MCNC: 860 MG/L (ref 0–20)
MICROALBUMIN/CREAT 24H UR: 1263 MG/G CREATININE (ref 0–30)

## 2021-06-08 PROCEDURE — 99214 OFFICE O/P EST MOD 30 MIN: CPT | Performed by: NURSE PRACTITIONER

## 2021-06-08 PROCEDURE — 1036F TOBACCO NON-USER: CPT | Performed by: NURSE PRACTITIONER

## 2021-06-08 PROCEDURE — 3062F POS MACROALBUMINURIA REV: CPT | Performed by: NURSE PRACTITIONER

## 2021-06-08 PROCEDURE — 82043 UR ALBUMIN QUANTITATIVE: CPT | Performed by: NURSE PRACTITIONER

## 2021-06-08 PROCEDURE — 3008F BODY MASS INDEX DOCD: CPT | Performed by: NURSE PRACTITIONER

## 2021-06-08 PROCEDURE — 82570 ASSAY OF URINE CREATININE: CPT | Performed by: NURSE PRACTITIONER

## 2021-06-08 RX ORDER — TRAZODONE HYDROCHLORIDE 50 MG/1
TABLET ORAL
COMMUNITY
Start: 2021-06-04

## 2021-06-08 RX ORDER — ROPINIROLE 1 MG/1
TABLET, FILM COATED ORAL
COMMUNITY
Start: 2021-06-04

## 2021-06-08 RX ORDER — DULAGLUTIDE 1.5 MG/.5ML
INJECTION, SOLUTION SUBCUTANEOUS
COMMUNITY
Start: 2021-05-28

## 2021-06-08 NOTE — ASSESSMENT & PLAN NOTE
CT 4/14/21 revealed incidental tiny right lung nodules (3-4 mm subpleural RLL nodule, 2 mm subpleural RML nodule), no follow up needed if pt is considered low risk for lung cancer    If pt is considered high risk for lung cancer, 12 month follow up non contrast chest CT recommended   Pt is a former smoker, smoked 20+ years 1 ppd, quit 10 years ago   CT chest ordered for 12 months

## 2021-06-08 NOTE — ASSESSMENT & PLAN NOTE
CT scan 4/14/21 revealed an enhancing right hepatic lobe mass 1 6 cm    Previously though to represent a complex cyst on US, but is clearly an enhanced solid lesion  MRI of the liver ordered today

## 2021-06-08 NOTE — ASSESSMENT & PLAN NOTE
Incidental finding on CT scan 4/14/21- 12mm right adrenal nodule, can be followed by non-contrast abdomen CT or MRI in 12 months  MRI order placed today

## 2021-06-08 NOTE — PROGRESS NOTES
Assessment/Plan:    Adrenal nodule ([address] )  Incidental finding on CT scan 4/14/21- 12mm right adrenal nodule, can be followed by non-contrast abdomen CT or MRI in 12 months  MRI order placed today    Liver lesion, right lobe  CT scan 4/14/21 revealed an enhancing right hepatic lobe mass 1 6 cm  Previously though to represent a complex cyst on US, but is clearly an enhanced solid lesion  MRI of the liver ordered today     Lung nodules  CT 4/14/21 revealed incidental tiny right lung nodules (3-4 mm subpleural RLL nodule, 2 mm subpleural RML nodule), no follow up needed if pt is considered low risk for lung cancer  If pt is considered high risk for lung cancer, 12 month follow up non contrast chest CT recommended   Pt is a former smoker, smoked 20+ years 1 ppd, quit 10 years ago   CT chest ordered for 12 months        Diagnoses and all orders for this visit:    Liver lesion, right lobe  -     MRI abdomen w wo contrast; Future    Lung nodules  -     CT chest wo contrast; Future    Adrenal nodule ([address] )  -     MRI abdomen wo contrast; Future  -     MRI abdomen wo contrast    Uncontrolled type 2 diabetes mellitus with hypoglycemia without coma (HCC)  -     Cancel: POCT urine microalbumin/creatinine  -     Microalbumin / creatinine urine ratio    Other orders  -     Trulicity 1 5 LJ/0 9RT SOPN; INJECT 1 5MG SUBCUTANEOUS ONCE EVERY WEEK  -     rOPINIRole (REQUIP) 1 mg tablet  -     traZODone (DESYREL) 50 mg tablet          Subjective:      Patient ID: Sylvia Ellison is a 63 y o  male  HPI     Patient had a CT scan of the abdomen pelvis with contrast on 04/14/2021 status post bariatric surgery, abdominal pain, hernia suspected  Results revealed a tiny fat containing umbilical hernia  Mesh otherwise appears intact without evidence of significant additional herniation  Enhancing right hepatic lobe mass 1 6 cm  Previously though to represent a complex cyst on US, but is clearly an enhanced solid lesion    Consider hepatic MRI   12mm right adrenal nodule, can be followed by non-contrast abdomen CT or MRI in 12 months  Incidental tiny right lung nodules (3-4 mm subpleural RLL nodule, 2 mm subpleural RML nodule), no follow up needed if pt is considered low risk for lung cancer  If pt is considered high risk for lung cancer, 12 month follow up non contrast chest CT recommended     Pt denies alcohol use, no regular use of Tylenol   Pt does follow with a Methadone Clinic locally     He is a former smoker, smoked 1 ppd for 20+ years, quit 10 years ago     Denies coughing, SOB, wheezing, N/V/D, abdominal pain or distention, weight loss, appetite change, fatigue, F/C    The following portions of the patient's history were reviewed and updated as appropriate: allergies, current medications, past family history, past medical history, past social history, past surgical history and problem list     Review of Systems   Constitutional: Negative for chills and fever  HENT: Negative for ear pain and sore throat  Eyes: Negative for pain and visual disturbance  Respiratory: Negative for cough and shortness of breath  Cardiovascular: Negative for chest pain and palpitations  Gastrointestinal: Negative for abdominal distention, abdominal pain, anal bleeding, blood in stool, constipation, diarrhea, nausea, rectal pain and vomiting  Genitourinary: Negative for dysuria and hematuria  Musculoskeletal: Positive for arthralgias, back pain and gait problem  Skin: Negative for color change and rash  Neurological: Negative for seizures and syncope  Hematological: Negative for adenopathy  Does not bruise/bleed easily  Psychiatric/Behavioral: Negative for dysphoric mood, self-injury, sleep disturbance and suicidal ideas  The patient is not nervous/anxious  All other systems reviewed and are negative          Objective:      /72 (BP Location: Left arm, Patient Position: Sitting, Cuff Size: Adult)   Pulse 83   Temp 99 1 °F (37 3 °C) (Tympanic)   Resp 18   Ht 5' 9" (1 753 m)   Wt 80 4 kg (177 lb 3 2 oz)   SpO2 96%   BMI 26 17 kg/m²          Physical Exam  Constitutional:       General: He is not in acute distress  Appearance: He is well-developed  He is not ill-appearing, toxic-appearing or diaphoretic  HENT:      Head: Normocephalic and atraumatic  Eyes:      Extraocular Movements: Extraocular movements intact  Conjunctiva/sclera: Conjunctivae normal       Pupils: Pupils are equal, round, and reactive to light  Neck:      Musculoskeletal: Normal range of motion and neck supple  No neck rigidity or muscular tenderness  Thyroid: No thyromegaly  Vascular: No carotid bruit  Cardiovascular:      Rate and Rhythm: Normal rate and regular rhythm  Heart sounds: Normal heart sounds  No murmur  Pulmonary:      Effort: Pulmonary effort is normal  No respiratory distress  Breath sounds: Normal breath sounds  No wheezing  Abdominal:      General: Bowel sounds are normal  There is no distension  Palpations: Abdomen is soft  There is no hepatomegaly, splenomegaly or mass  Tenderness: There is no abdominal tenderness  There is no left CVA tenderness, guarding or rebound  Hernia: No hernia is present  Musculoskeletal: Normal range of motion  Right lower leg: No edema  Left lower leg: No edema  Comments: Antalgic gait, uses a cane for ambulation    Lymphadenopathy:      Cervical: No cervical adenopathy  Skin:     General: Skin is warm and dry  Neurological:      General: No focal deficit present  Mental Status: He is alert and oriented to person, place, and time  Psychiatric:         Mood and Affect: Mood normal          Behavior: Behavior normal          Thought Content:  Thought content normal          Judgment: Judgment normal

## 2021-07-01 ENCOUNTER — TELEPHONE (OUTPATIENT)
Dept: OTHER | Facility: OTHER | Age: 61
End: 2021-07-01

## 2021-07-01 ENCOUNTER — TELEPHONE (OUTPATIENT)
Dept: FAMILY MEDICINE CLINIC | Facility: CLINIC | Age: 61
End: 2021-07-01

## 2021-07-01 NOTE — TELEPHONE ENCOUNTER
Patient went to have an MRI done today at the [facility]  He was notified that he needs to have blood work done before he can have the MRI  The MRI was rescheduled to 7/24/2021  Please call the patient to advise

## 2021-07-01 NOTE — TELEPHONE ENCOUNTER
Patient called and needs BUN and quarantine done for his MRI   He is scheduled for the July 24 for MRI

## 2021-07-13 ENCOUNTER — OFFICE VISIT (OUTPATIENT)
Dept: FAMILY MEDICINE CLINIC | Facility: CLINIC | Age: 61
End: 2021-07-13
Payer: COMMERCIAL

## 2021-07-13 VITALS
TEMPERATURE: 97.4 F | DIASTOLIC BLOOD PRESSURE: 62 MMHG | WEIGHT: 182 LBS | HEART RATE: 80 BPM | RESPIRATION RATE: 17 BRPM | SYSTOLIC BLOOD PRESSURE: 130 MMHG | BODY MASS INDEX: 26.96 KG/M2 | OXYGEN SATURATION: 98 % | HEIGHT: 69 IN

## 2021-07-13 DIAGNOSIS — S22.000D COMPRESSION FRACTURE OF THORACIC VERTEBRA WITH ROUTINE HEALING, UNSPECIFIED THORACIC VERTEBRAL LEVEL, SUBSEQUENT ENCOUNTER: Primary | ICD-10-CM

## 2021-07-13 PROCEDURE — 99213 OFFICE O/P EST LOW 20 MIN: CPT | Performed by: NURSE PRACTITIONER

## 2021-07-13 PROCEDURE — 1036F TOBACCO NON-USER: CPT | Performed by: NURSE PRACTITIONER

## 2021-07-13 PROCEDURE — 3008F BODY MASS INDEX DOCD: CPT | Performed by: NURSE PRACTITIONER

## 2021-07-13 NOTE — PROGRESS NOTES
Assessment/Plan:    Compression fracture of thoracic vertebra- s/p fall while at work on 1/6/21  ED notes and Ortho notes reviewed  Cleared from Ortho on 4/12/21  Pt feels he can only return to work on light duty at this time  I explained that our office is unable to assess this and we cannot complete his forms  I did refer him to [facility] for a Functional Capacity test to determine if light duty would be appropriate      Diagnoses and all orders for this visit:    Compression fracture of thoracic vertebra with routine healing, unspecified thoracic vertebral level, subsequent encounter  -     Ambulatory Referral to Functional Capacity Evaluation; Future          Subjective:      Patient ID: Connor Juarez is a 63 y o  male  HPI     Pt presents by himself today with forms to be completed  He is requesting our office complete forms stating he has limited physical capabilities and he would like work accomodations for such    He had a fall while at work on 1/6/21 (ED notes in chart), diagnosed with T12 and L1 compression fractures  He was evaluated by Dr Leland Quinteros following his fall  He was placed in a TLSO brace  The patient states a physician through his Workers compensation has kept him out of work since his fall on 1/6/21 and has now cleared him to return to work (full duty)  The pt feels he can only return to light duty     Last evaluated by Dr Leland Marshall 4/12/21  MRI from that time did not demonstrate any evidence of acute compression fracture in the thoracic or lumbar spine but did reveal evidence of multilevel lumbar DDD as well as stenosis at L3-4 and L4-5  He was advised to discontinue the TLSO brace at that time     He reports persisting mid back pain bilaterally, no radiation  Pain is worse with bending, twisting, moving  Denies numbness or tingling down his legs  No bowel or bladder incontinence    No urinary retention     The following portions of the patient's history were reviewed and updated as appropriate: allergies, current medications, past family history, past medical history, past social history, past surgical history and problem list     Review of Systems   Constitutional: Negative for chills and fever  HENT: Negative for ear pain and sore throat  Eyes: Negative for pain and visual disturbance  Respiratory: Negative for cough and shortness of breath  Cardiovascular: Negative for chest pain and palpitations  Gastrointestinal: Negative for abdominal pain and vomiting  Genitourinary: Negative for dysuria and hematuria  Musculoskeletal: Positive for arthralgias and back pain  Negative for gait problem  Skin: Negative for color change and rash  Neurological: Negative for seizures, syncope and numbness  Hematological: Negative for adenopathy  Does not bruise/bleed easily  Psychiatric/Behavioral:        Follows with Psychiatry    All other systems reviewed and are negative  Objective:      /62 (BP Location: Left arm, Patient Position: Sitting, Cuff Size: Adult)   Pulse 80   Temp (!) 97 4 °F (36 3 °C) (Tympanic)   Resp 17   Ht 5' 9" (1 753 m)   Wt 82 6 kg (182 lb)   SpO2 98%   BMI 26 88 kg/m²          Physical Exam  Constitutional:       General: He is not in acute distress  Appearance: He is well-developed  He is not diaphoretic  HENT:      Head: Normocephalic and atraumatic  Eyes:      Conjunctiva/sclera: Conjunctivae normal       Pupils: Pupils are equal, round, and reactive to light  Neck:      Thyroid: No thyromegaly  Cardiovascular:      Rate and Rhythm: Normal rate and regular rhythm  Heart sounds: Normal heart sounds  No murmur heard  Pulmonary:      Effort: Pulmonary effort is normal  No respiratory distress  Breath sounds: Normal breath sounds  No wheezing  Abdominal:      General: Bowel sounds are normal  There is no distension  Palpations: Abdomen is soft  Tenderness: There is no abdominal tenderness  Musculoskeletal:         General: Normal range of motion  Cervical back: Normal range of motion and neck supple  Thoracic back: Tenderness (lower B/L thoracic region with tenderness ) present  No swelling, edema, deformity, lacerations, spasms or bony tenderness  Normal range of motion  Lymphadenopathy:      Cervical: No cervical adenopathy  Skin:     General: Skin is warm and dry  Neurological:      General: No focal deficit present  Mental Status: He is alert and oriented to person, place, and time  Psychiatric:         Mood and Affect: Mood normal          Behavior: Behavior normal          Thought Content:  Thought content normal          Judgment: Judgment normal

## 2021-07-27 DIAGNOSIS — J30.1 SEASONAL ALLERGIC RHINITIS DUE TO POLLEN: ICD-10-CM

## 2021-07-27 RX ORDER — FLUTICASONE PROPIONATE 50 MCG
SPRAY, SUSPENSION (ML) NASAL
Qty: 16 G | Refills: 1 | Status: SHIPPED | OUTPATIENT
Start: 2021-07-27 | End: 2022-02-17

## 2021-09-30 ENCOUNTER — HOSPITAL ENCOUNTER (EMERGENCY)
Facility: HOSPITAL | Age: 61
Discharge: HOME | End: 2021-09-30
Attending: EMERGENCY MEDICINE | Admitting: EMERGENCY MEDICINE
Payer: COMMERCIAL

## 2021-09-30 VITALS
HEIGHT: 68 IN | WEIGHT: 190.7 LBS | TEMPERATURE: 98.3 F | DIASTOLIC BLOOD PRESSURE: 85 MMHG | HEART RATE: 83 BPM | RESPIRATION RATE: 18 BRPM | OXYGEN SATURATION: 99 % | BODY MASS INDEX: 28.9 KG/M2 | SYSTOLIC BLOOD PRESSURE: 190 MMHG

## 2021-09-30 DIAGNOSIS — R53.83 FATIGUE: Primary | ICD-10-CM

## 2021-09-30 LAB — GLUCOSE SERPL-MCNC: 371 MG/DL (ref 65–140)

## 2021-09-30 PROCEDURE — 82948 REAGENT STRIP/BLOOD GLUCOSE: CPT

## 2021-09-30 PROCEDURE — 99282 EMERGENCY DEPT VISIT SF MDM: CPT | Performed by: EMERGENCY MEDICINE

## 2021-09-30 PROCEDURE — 99283 EMERGENCY DEPT VISIT LOW MDM: CPT

## 2021-10-06 ENCOUNTER — HOSPITAL ENCOUNTER (OUTPATIENT)
Facility: HOSPITAL | Age: 61
Setting detail: OBSERVATION
Discharge: LEFT BEFORE DISCHARGE | End: 2021-10-06
Attending: EMERGENCY MEDICINE | Admitting: INTERNAL MEDICINE
Payer: COMMERCIAL

## 2021-10-06 ENCOUNTER — APPOINTMENT (EMERGENCY)
Dept: CT IMAGING | Facility: HOSPITAL | Age: 61
End: 2021-10-06
Payer: COMMERCIAL

## 2021-10-06 ENCOUNTER — APPOINTMENT (EMERGENCY)
Dept: RADIOLOGY | Facility: HOSPITAL | Age: 61
End: 2021-10-06
Attending: EMERGENCY MEDICINE
Payer: COMMERCIAL

## 2021-10-06 ENCOUNTER — APPOINTMENT (EMERGENCY)
Dept: RADIOLOGY | Facility: HOSPITAL | Age: 61
End: 2021-10-06
Payer: COMMERCIAL

## 2021-10-06 VITALS
RESPIRATION RATE: 18 BRPM | SYSTOLIC BLOOD PRESSURE: 159 MMHG | TEMPERATURE: 97.6 F | BODY MASS INDEX: 29.55 KG/M2 | DIASTOLIC BLOOD PRESSURE: 103 MMHG | HEART RATE: 60 BPM | WEIGHT: 195 LBS | OXYGEN SATURATION: 98 % | HEIGHT: 68 IN

## 2021-10-06 DIAGNOSIS — R73.9 HYPERGLYCEMIA: ICD-10-CM

## 2021-10-06 DIAGNOSIS — F19.10 POLYSUBSTANCE ABUSE (HCC): ICD-10-CM

## 2021-10-06 DIAGNOSIS — T40.3X1A ACCIDENTAL METHADONE OVERDOSE, INITIAL ENCOUNTER (HCC): Primary | ICD-10-CM

## 2021-10-06 DIAGNOSIS — D64.9 ANEMIA: ICD-10-CM

## 2021-10-06 LAB
ALBUMIN SERPL BCP-MCNC: 3 G/DL (ref 3.5–5)
ALP SERPL-CCNC: 132 U/L (ref 46–116)
ALT SERPL W P-5'-P-CCNC: 77 U/L (ref 12–78)
AMPHETAMINES SERPL QL SCN: NEGATIVE
ANION GAP SERPL CALCULATED.3IONS-SCNC: 5 MMOL/L (ref 4–13)
APAP SERPL-MCNC: <2 UG/ML (ref 10–20)
AST SERPL W P-5'-P-CCNC: 105 U/L (ref 5–45)
BACTERIA UR QL AUTO: NORMAL /HPF
BARBITURATES UR QL: NEGATIVE
BASOPHILS # BLD AUTO: 0.04 THOUSANDS/ΜL (ref 0–0.1)
BASOPHILS NFR BLD AUTO: 1 % (ref 0–1)
BENZODIAZ UR QL: POSITIVE
BILIRUB SERPL-MCNC: 0.2 MG/DL (ref 0.2–1)
BILIRUB UR QL STRIP: NEGATIVE
BUN SERPL-MCNC: 13 MG/DL (ref 5–25)
CALCIUM ALBUM COR SERPL-MCNC: 8.5 MG/DL (ref 8.3–10.1)
CALCIUM SERPL-MCNC: 7.7 MG/DL (ref 8.3–10.1)
CHLORIDE SERPL-SCNC: 101 MMOL/L (ref 100–108)
CLARITY UR: CLEAR
CO2 SERPL-SCNC: 32 MMOL/L (ref 21–32)
COCAINE UR QL: NEGATIVE
COLOR UR: YELLOW
CREAT SERPL-MCNC: 0.91 MG/DL (ref 0.6–1.3)
EOSINOPHIL # BLD AUTO: 0.77 THOUSAND/ΜL (ref 0–0.61)
EOSINOPHIL NFR BLD AUTO: 13 % (ref 0–6)
ERYTHROCYTE [DISTWIDTH] IN BLOOD BY AUTOMATED COUNT: 14.9 % (ref 11.6–15.1)
ETHANOL SERPL-MCNC: <3 MG/DL (ref 0–3)
GFR SERPL CREATININE-BSD FRML MDRD: 92 ML/MIN/1.73SQ M
GLUCOSE SERPL-MCNC: 320 MG/DL (ref 65–140)
GLUCOSE SERPL-MCNC: 355 MG/DL (ref 65–140)
GLUCOSE UR STRIP-MCNC: ABNORMAL MG/DL
HCT VFR BLD AUTO: 33 % (ref 36.5–49.3)
HGB BLD-MCNC: 9.9 G/DL (ref 12–17)
HGB UR QL STRIP.AUTO: NEGATIVE
IMM GRANULOCYTES # BLD AUTO: 0.05 THOUSAND/UL (ref 0–0.2)
IMM GRANULOCYTES NFR BLD AUTO: 1 % (ref 0–2)
KETONES UR STRIP-MCNC: NEGATIVE MG/DL
LEUKOCYTE ESTERASE UR QL STRIP: ABNORMAL
LYMPHOCYTES # BLD AUTO: 1.75 THOUSANDS/ΜL (ref 0.6–4.47)
LYMPHOCYTES NFR BLD AUTO: 29 % (ref 14–44)
MCH RBC QN AUTO: 25.2 PG (ref 26.8–34.3)
MCHC RBC AUTO-ENTMCNC: 30 G/DL (ref 31.4–37.4)
MCV RBC AUTO: 84 FL (ref 82–98)
METHADONE UR QL: POSITIVE
MONOCYTES # BLD AUTO: 0.49 THOUSAND/ΜL (ref 0.17–1.22)
MONOCYTES NFR BLD AUTO: 8 % (ref 4–12)
MUCOUS THREADS UR QL AUTO: NORMAL
NEUTROPHILS # BLD AUTO: 2.89 THOUSANDS/ΜL (ref 1.85–7.62)
NEUTS SEG NFR BLD AUTO: 48 % (ref 43–75)
NITRITE UR QL STRIP: NEGATIVE
NON-SQ EPI CELLS URNS QL MICRO: NORMAL /HPF
NRBC BLD AUTO-RTO: 0 /100 WBCS
OPIATES UR QL SCN: NEGATIVE
OXYCODONE+OXYMORPHONE UR QL SCN: NEGATIVE
PCP UR QL: NEGATIVE
PH UR STRIP.AUTO: 5.5 [PH]
PLATELET # BLD AUTO: 210 THOUSANDS/UL (ref 149–390)
PMV BLD AUTO: 12.4 FL (ref 8.9–12.7)
POTASSIUM SERPL-SCNC: 4.1 MMOL/L (ref 3.5–5.3)
PROT SERPL-MCNC: 6.4 G/DL (ref 6.4–8.2)
PROT UR STRIP-MCNC: ABNORMAL MG/DL
RBC # BLD AUTO: 3.93 MILLION/UL (ref 3.88–5.62)
RBC #/AREA URNS AUTO: NORMAL /HPF
SALICYLATES SERPL-MCNC: <3 MG/DL (ref 3–20)
SODIUM SERPL-SCNC: 138 MMOL/L (ref 136–145)
SP GR UR STRIP.AUTO: 1.02 (ref 1–1.03)
THC UR QL: POSITIVE
UROBILINOGEN UR QL STRIP.AUTO: 0.2 E.U./DL
WBC # BLD AUTO: 5.99 THOUSAND/UL (ref 4.31–10.16)
WBC #/AREA URNS AUTO: NORMAL /HPF

## 2021-10-06 PROCEDURE — 80179 DRUG ASSAY SALICYLATE: CPT | Performed by: EMERGENCY MEDICINE

## 2021-10-06 PROCEDURE — 80053 COMPREHEN METABOLIC PANEL: CPT | Performed by: EMERGENCY MEDICINE

## 2021-10-06 PROCEDURE — 82948 REAGENT STRIP/BLOOD GLUCOSE: CPT

## 2021-10-06 PROCEDURE — 80143 DRUG ASSAY ACETAMINOPHEN: CPT | Performed by: EMERGENCY MEDICINE

## 2021-10-06 PROCEDURE — G1004 CDSM NDSC: HCPCS

## 2021-10-06 PROCEDURE — 80307 DRUG TEST PRSMV CHEM ANLYZR: CPT | Performed by: EMERGENCY MEDICINE

## 2021-10-06 PROCEDURE — 70450 CT HEAD/BRAIN W/O DYE: CPT

## 2021-10-06 PROCEDURE — 71045 X-RAY EXAM CHEST 1 VIEW: CPT

## 2021-10-06 PROCEDURE — 81001 URINALYSIS AUTO W/SCOPE: CPT | Performed by: EMERGENCY MEDICINE

## 2021-10-06 PROCEDURE — 85025 COMPLETE CBC W/AUTO DIFF WBC: CPT | Performed by: EMERGENCY MEDICINE

## 2021-10-06 PROCEDURE — 36415 COLL VENOUS BLD VENIPUNCTURE: CPT | Performed by: EMERGENCY MEDICINE

## 2021-10-06 PROCEDURE — 82077 ASSAY SPEC XCP UR&BREATH IA: CPT | Performed by: EMERGENCY MEDICINE

## 2021-10-06 PROCEDURE — 99284 EMERGENCY DEPT VISIT MOD MDM: CPT

## 2021-10-06 PROCEDURE — 99285 EMERGENCY DEPT VISIT HI MDM: CPT | Performed by: EMERGENCY MEDICINE

## 2021-10-06 RX ORDER — GABAPENTIN 300 MG/1
CAPSULE ORAL
COMMUNITY
Start: 2021-07-23

## 2021-10-11 ENCOUNTER — HOSPITAL ENCOUNTER (EMERGENCY)
Facility: HOSPITAL | Age: 61
Discharge: HOME | End: 2021-10-11
Attending: EMERGENCY MEDICINE
Payer: COMMERCIAL

## 2021-10-11 VITALS
SYSTOLIC BLOOD PRESSURE: 145 MMHG | DIASTOLIC BLOOD PRESSURE: 84 MMHG | HEART RATE: 89 BPM | RESPIRATION RATE: 18 BRPM | BODY MASS INDEX: 28.63 KG/M2 | HEIGHT: 68 IN | TEMPERATURE: 97.8 F | OXYGEN SATURATION: 98 % | WEIGHT: 188.93 LBS

## 2021-10-11 DIAGNOSIS — F13.10 BENZODIAZEPINE ABUSE (HCC): Primary | ICD-10-CM

## 2021-10-11 LAB — ETHANOL EXG-MCNC: 0 MG/DL

## 2021-10-11 PROCEDURE — 99284 EMERGENCY DEPT VISIT MOD MDM: CPT | Performed by: EMERGENCY MEDICINE

## 2021-10-11 PROCEDURE — 99284 EMERGENCY DEPT VISIT MOD MDM: CPT

## 2021-10-11 PROCEDURE — 82075 ASSAY OF BREATH ETHANOL: CPT | Performed by: EMERGENCY MEDICINE

## 2021-10-11 RX ORDER — ACETAMINOPHEN 325 MG/1
650 TABLET ORAL ONCE
Status: COMPLETED | OUTPATIENT
Start: 2021-10-11 | End: 2021-10-11

## 2021-10-11 RX ORDER — IBUPROFEN 600 MG/1
600 TABLET ORAL ONCE
Status: COMPLETED | OUTPATIENT
Start: 2021-10-11 | End: 2021-10-11

## 2021-10-11 RX ADMIN — ACETAMINOPHEN 650 MG: 325 TABLET, FILM COATED ORAL at 21:01

## 2021-10-11 RX ADMIN — IBUPROFEN 600 MG: 600 TABLET ORAL at 12:49

## 2021-10-11 RX ADMIN — IBUPROFEN 600 MG: 600 TABLET ORAL at 21:01

## 2021-10-12 ENCOUNTER — HOSPITAL ENCOUNTER (EMERGENCY)
Facility: HOSPITAL | Age: 61
Discharge: HOME | End: 2021-10-12
Attending: EMERGENCY MEDICINE | Admitting: EMERGENCY MEDICINE
Payer: COMMERCIAL

## 2021-10-12 VITALS
HEART RATE: 72 BPM | OXYGEN SATURATION: 96 % | DIASTOLIC BLOOD PRESSURE: 102 MMHG | TEMPERATURE: 97.2 F | SYSTOLIC BLOOD PRESSURE: 118 MMHG | RESPIRATION RATE: 16 BRPM

## 2021-10-12 DIAGNOSIS — Z79.899 CHRONIC PRESCRIPTION BENZODIAZEPINE USE: ICD-10-CM

## 2021-10-12 DIAGNOSIS — E11.65 HYPERGLYCEMIA DUE TO DIABETES MELLITUS (HCC): ICD-10-CM

## 2021-10-12 DIAGNOSIS — Z79.891 CHRONIC PRESCRIPTION OPIATE USE: Primary | ICD-10-CM

## 2021-10-12 LAB
AMPHETAMINES SERPL QL SCN: NEGATIVE
ANION GAP SERPL CALCULATED.3IONS-SCNC: 3 MMOL/L (ref 4–13)
APAP SERPL-MCNC: <2 UG/ML (ref 10–20)
APTT PPP: 27 SECONDS (ref 23–37)
ATRIAL RATE: 82 BPM
BACTERIA UR QL AUTO: ABNORMAL /HPF
BARBITURATES UR QL: NEGATIVE
BASOPHILS # BLD AUTO: 0.04 THOUSANDS/ΜL (ref 0–0.1)
BASOPHILS NFR BLD AUTO: 1 % (ref 0–1)
BENZODIAZ UR QL: POSITIVE
BILIRUB UR QL STRIP: NEGATIVE
BUN SERPL-MCNC: 19 MG/DL (ref 5–25)
CALCIUM SERPL-MCNC: 8.1 MG/DL (ref 8.3–10.1)
CHLORIDE SERPL-SCNC: 102 MMOL/L (ref 100–108)
CLARITY UR: CLEAR
CO2 SERPL-SCNC: 34 MMOL/L (ref 21–32)
COCAINE UR QL: NEGATIVE
COLOR UR: YELLOW
CREAT SERPL-MCNC: 0.85 MG/DL (ref 0.6–1.3)
EOSINOPHIL # BLD AUTO: 0.46 THOUSAND/ΜL (ref 0–0.61)
EOSINOPHIL NFR BLD AUTO: 6 % (ref 0–6)
ERYTHROCYTE [DISTWIDTH] IN BLOOD BY AUTOMATED COUNT: 14.7 % (ref 11.6–15.1)
ETHANOL SERPL-MCNC: <3 MG/DL (ref 0–3)
FLUAV RNA RESP QL NAA+PROBE: NEGATIVE
FLUBV RNA RESP QL NAA+PROBE: NEGATIVE
GFR SERPL CREATININE-BSD FRML MDRD: 95 ML/MIN/1.73SQ M
GLUCOSE SERPL-MCNC: 191 MG/DL (ref 65–140)
GLUCOSE UR STRIP-MCNC: ABNORMAL MG/DL
HCT VFR BLD AUTO: 34.4 % (ref 36.5–49.3)
HGB BLD-MCNC: 10.5 G/DL (ref 12–17)
HGB UR QL STRIP.AUTO: NEGATIVE
HYALINE CASTS #/AREA URNS LPF: ABNORMAL /LPF
IMM GRANULOCYTES # BLD AUTO: 0.02 THOUSAND/UL (ref 0–0.2)
IMM GRANULOCYTES NFR BLD AUTO: 0 % (ref 0–2)
INR PPP: 0.96 (ref 0.84–1.19)
KETONES UR STRIP-MCNC: NEGATIVE MG/DL
LEUKOCYTE ESTERASE UR QL STRIP: ABNORMAL
LYMPHOCYTES # BLD AUTO: 1.58 THOUSANDS/ΜL (ref 0.6–4.47)
LYMPHOCYTES NFR BLD AUTO: 22 % (ref 14–44)
MCH RBC QN AUTO: 25.5 PG (ref 26.8–34.3)
MCHC RBC AUTO-ENTMCNC: 30.5 G/DL (ref 31.4–37.4)
MCV RBC AUTO: 84 FL (ref 82–98)
METHADONE UR QL: POSITIVE
MONOCYTES # BLD AUTO: 0.56 THOUSAND/ΜL (ref 0.17–1.22)
MONOCYTES NFR BLD AUTO: 8 % (ref 4–12)
NEUTROPHILS # BLD AUTO: 4.62 THOUSANDS/ΜL (ref 1.85–7.62)
NEUTS SEG NFR BLD AUTO: 63 % (ref 43–75)
NITRITE UR QL STRIP: NEGATIVE
NON-SQ EPI CELLS URNS QL MICRO: ABNORMAL /HPF
NRBC BLD AUTO-RTO: 0 /100 WBCS
OPIATES UR QL SCN: NEGATIVE
OXYCODONE+OXYMORPHONE UR QL SCN: NEGATIVE
P AXIS: 79 DEGREES
PCP UR QL: NEGATIVE
PH UR STRIP.AUTO: 6 [PH]
PLATELET # BLD AUTO: 188 THOUSANDS/UL (ref 149–390)
PMV BLD AUTO: 12 FL (ref 8.9–12.7)
POTASSIUM SERPL-SCNC: 4.5 MMOL/L (ref 3.5–5.3)
PR INTERVAL: 144 MS
PROT UR STRIP-MCNC: ABNORMAL MG/DL
PROTHROMBIN TIME: 12.7 SECONDS (ref 11.6–14.5)
QRS AXIS: 83 DEGREES
QRSD INTERVAL: 96 MS
QT INTERVAL: 422 MS
QTC INTERVAL: 493 MS
RBC # BLD AUTO: 4.12 MILLION/UL (ref 3.88–5.62)
RBC #/AREA URNS AUTO: ABNORMAL /HPF
RSV RNA RESP QL NAA+PROBE: NEGATIVE
SALICYLATES SERPL-MCNC: <3 MG/DL (ref 3–20)
SARS-COV-2 RNA RESP QL NAA+PROBE: NEGATIVE
SODIUM SERPL-SCNC: 139 MMOL/L (ref 136–145)
SP GR UR STRIP.AUTO: 1.02 (ref 1–1.03)
T WAVE AXIS: 75 DEGREES
THC UR QL: POSITIVE
TROPONIN I SERPL-MCNC: <0.02 NG/ML
UROBILINOGEN UR QL STRIP.AUTO: 0.2 E.U./DL
VENTRICULAR RATE: 82 BPM
WBC # BLD AUTO: 7.28 THOUSAND/UL (ref 4.31–10.16)
WBC #/AREA URNS AUTO: ABNORMAL /HPF

## 2021-10-12 PROCEDURE — 82077 ASSAY SPEC XCP UR&BREATH IA: CPT | Performed by: EMERGENCY MEDICINE

## 2021-10-12 PROCEDURE — 84484 ASSAY OF TROPONIN QUANT: CPT | Performed by: EMERGENCY MEDICINE

## 2021-10-12 PROCEDURE — 36415 COLL VENOUS BLD VENIPUNCTURE: CPT | Performed by: EMERGENCY MEDICINE

## 2021-10-12 PROCEDURE — 0241U HB NFCT DS VIR RESP RNA 4 TRGT: CPT | Performed by: EMERGENCY MEDICINE

## 2021-10-12 PROCEDURE — 85610 PROTHROMBIN TIME: CPT | Performed by: EMERGENCY MEDICINE

## 2021-10-12 PROCEDURE — 99284 EMERGENCY DEPT VISIT MOD MDM: CPT | Performed by: EMERGENCY MEDICINE

## 2021-10-12 PROCEDURE — 80307 DRUG TEST PRSMV CHEM ANLYZR: CPT | Performed by: EMERGENCY MEDICINE

## 2021-10-12 PROCEDURE — 85730 THROMBOPLASTIN TIME PARTIAL: CPT | Performed by: EMERGENCY MEDICINE

## 2021-10-12 PROCEDURE — 93010 ELECTROCARDIOGRAM REPORT: CPT | Performed by: INTERNAL MEDICINE

## 2021-10-12 PROCEDURE — 85025 COMPLETE CBC W/AUTO DIFF WBC: CPT | Performed by: EMERGENCY MEDICINE

## 2021-10-12 PROCEDURE — 81001 URINALYSIS AUTO W/SCOPE: CPT | Performed by: EMERGENCY MEDICINE

## 2021-10-12 PROCEDURE — 80048 BASIC METABOLIC PNL TOTAL CA: CPT | Performed by: EMERGENCY MEDICINE

## 2021-10-12 PROCEDURE — 80143 DRUG ASSAY ACETAMINOPHEN: CPT | Performed by: EMERGENCY MEDICINE

## 2021-10-12 PROCEDURE — 96374 THER/PROPH/DIAG INJ IV PUSH: CPT

## 2021-10-12 PROCEDURE — 99285 EMERGENCY DEPT VISIT HI MDM: CPT

## 2021-10-12 PROCEDURE — 80179 DRUG ASSAY SALICYLATE: CPT | Performed by: EMERGENCY MEDICINE

## 2021-10-12 PROCEDURE — 93005 ELECTROCARDIOGRAM TRACING: CPT

## 2021-10-12 RX ORDER — NALOXONE HYDROCHLORIDE 1 MG/ML
0.4 INJECTION INTRAMUSCULAR; INTRAVENOUS; SUBCUTANEOUS ONCE
Status: COMPLETED | OUTPATIENT
Start: 2021-10-12 | End: 2021-10-12

## 2021-10-12 RX ADMIN — NALOXONE HYDROCHLORIDE 0.4 MG: 1 INJECTION PARENTERAL at 14:16

## 2021-12-19 ENCOUNTER — NURSE TRIAGE (OUTPATIENT)
Dept: OTHER | Facility: OTHER | Age: 61
End: 2021-12-19

## 2021-12-19 DIAGNOSIS — Z20.828 SARS-ASSOCIATED CORONAVIRUS EXPOSURE: Primary | ICD-10-CM

## 2021-12-19 PROCEDURE — 87636 SARSCOV2 & INF A&B AMP PRB: CPT | Performed by: NURSE PRACTITIONER

## 2021-12-23 ENCOUNTER — TELEMEDICINE (OUTPATIENT)
Dept: FAMILY MEDICINE CLINIC | Facility: CLINIC | Age: 61
End: 2021-12-23
Payer: COMMERCIAL

## 2021-12-23 DIAGNOSIS — U07.1 COVID-19: Primary | ICD-10-CM

## 2021-12-23 PROCEDURE — 99212 OFFICE O/P EST SF 10 MIN: CPT | Performed by: FAMILY MEDICINE

## 2022-02-17 DIAGNOSIS — J30.1 SEASONAL ALLERGIC RHINITIS DUE TO POLLEN: ICD-10-CM

## 2022-02-17 RX ORDER — FLUTICASONE PROPIONATE 50 MCG
SPRAY, SUSPENSION (ML) NASAL
Qty: 16 G | Refills: 1 | Status: SHIPPED | OUTPATIENT
Start: 2022-02-17 | End: 2022-04-13

## 2022-04-13 DIAGNOSIS — J30.1 SEASONAL ALLERGIC RHINITIS DUE TO POLLEN: ICD-10-CM

## 2022-04-13 RX ORDER — FLUTICASONE PROPIONATE 50 MCG
SPRAY, SUSPENSION (ML) NASAL
Qty: 16 G | Refills: 1 | Status: SHIPPED | OUTPATIENT
Start: 2022-04-13 | End: 2022-06-10

## 2022-05-03 ENCOUNTER — TELEPHONE (OUTPATIENT)
Dept: FAMILY MEDICINE CLINIC | Facility: CLINIC | Age: 62
End: 2022-05-03

## 2022-05-05 ENCOUNTER — TELEPHONE (OUTPATIENT)
Dept: FAMILY MEDICINE CLINIC | Facility: CLINIC | Age: 62
End: 2022-05-05

## 2022-05-05 NOTE — TELEPHONE ENCOUNTER
Called and left a message for patient to call back to schedule a follow up appointment  Chalino Berman

## 2022-05-11 ENCOUNTER — TELEPHONE (OUTPATIENT)
Dept: OTHER | Facility: OTHER | Age: 62
End: 2022-05-11

## 2022-05-12 ENCOUNTER — TELEPHONE (OUTPATIENT)
Dept: PSYCHIATRY | Facility: CLINIC | Age: 62
End: 2022-05-12

## 2022-05-17 ENCOUNTER — TELEPHONE (OUTPATIENT)
Dept: FAMILY MEDICINE CLINIC | Facility: CLINIC | Age: 62
End: 2022-05-17

## 2022-05-28 NOTE — ED ATTENDING ATTESTATION
1/6/2021  IAsa MD, saw and evaluated the patient  I have discussed the patient with the resident/non-physician practitioner and agree with the resident's/non-physician practitioner's findings, Plan of Care, and MDM as documented in the resident's/non-physician practitioner's note, except where noted  All available labs and Radiology studies were reviewed  I was present for key portions of any procedure(s) performed by the resident/non-physician practitioner and I was immediately available to provide assistance  At this point I agree with the current assessment done in the Emergency Department    I have conducted an independent evaluation of this patient a history and physical is as follows:    ED Course     Patient eloped prior to my exam    Critical Care Time  Procedures no strength deficits were identified

## 2022-06-10 DIAGNOSIS — J30.1 SEASONAL ALLERGIC RHINITIS DUE TO POLLEN: ICD-10-CM

## 2022-06-10 RX ORDER — FLUTICASONE PROPIONATE 50 MCG
SPRAY, SUSPENSION (ML) NASAL
Qty: 16 G | Refills: 1 | Status: SHIPPED | OUTPATIENT
Start: 2022-06-10

## 2022-07-17 ENCOUNTER — TELEPHONE (OUTPATIENT)
Dept: OTHER | Facility: OTHER | Age: 62
End: 2022-07-17

## 2022-07-18 ENCOUNTER — TELEPHONE (OUTPATIENT)
Dept: FAMILY MEDICINE CLINIC | Facility: CLINIC | Age: 62
End: 2022-07-18

## 2022-07-18 RX ORDER — CLONAZEPAM 0.5 MG/1
TABLET ORAL
Status: CANCELLED | OUTPATIENT
Start: 2022-07-18

## 2022-07-18 NOTE — TELEPHONE ENCOUNTER
Called and spoke with patient, he says he has been vomiting blood, and having rectal bleeding  Patient was advised to go to the ER for evaluation  Patient stated that he is unable to go today, but he will go tomorrow  Patient is requesting a refill on the clonazepam stating that he is also having issues with his mental health

## 2022-07-19 NOTE — TELEPHONE ENCOUNTER
I agree about the ED evaluation  In regards to the Clonazepam, he was previously getting this from Psychiatry, Dr Jose Rivas  Our office has never filled this and he would need to contact his Psychiatrist for any refills    He was also in the ED 1/2022 for an overdose and I would not recommend Clonazepam for him

## 2022-08-12 ENCOUNTER — HOSPITAL ENCOUNTER (EMERGENCY)
Facility: HOSPITAL | Age: 62
Discharge: HOME | End: 2022-08-12
Attending: EMERGENCY MEDICINE
Payer: COMMERCIAL

## 2022-08-12 VITALS
RESPIRATION RATE: 18 BRPM | SYSTOLIC BLOOD PRESSURE: 159 MMHG | OXYGEN SATURATION: 98 % | DIASTOLIC BLOOD PRESSURE: 64 MMHG | HEART RATE: 85 BPM

## 2022-08-12 DIAGNOSIS — Z59.00 HOMELESSNESS: Primary | ICD-10-CM

## 2022-08-12 PROCEDURE — 99283 EMERGENCY DEPT VISIT LOW MDM: CPT

## 2022-08-12 PROCEDURE — 99282 EMERGENCY DEPT VISIT SF MDM: CPT

## 2022-08-12 SDOH — ECONOMIC STABILITY - HOUSING INSECURITY: HOMELESSNESS UNSPECIFIED: Z59.00

## 2022-08-12 NOTE — DISCHARGE INSTRUCTIONS
Schedule an appointment with your primary care doctor/NP for evaluation regarding medical conditions  Return to the ER if you develop chest pain, shortness of breath, abdominal pain, fevers, thoughts of hurting yourself or others, suicidal or homicidal thoughts

## 2022-08-12 NOTE — ED PROVIDER NOTES
History  Chief Complaint   Patient presents with   [facility]     Per EMS patient was at methadone clinic and was sent to ED for "placement" Patient is experiencing homelessness  Denies SI/HI or other psych complaints     62 y/o male with PMH chronic neck pain, DM2, HTN, anxiety, depression, methadone usage presents to the ER today from his methadone clinic  Patient was sent for "homelessness" and not having a place to live  Patient states he was evicted from his living space last week  He did receive his methadone today prior to arrival  Patient denies suicidal ideation, homicidal ideation, thoughts of hurting himself or others, hallucinations, delusions  He denies any chest pain, shortness of breath, abdominal pain, fevers, confusion, feeling faint or dizzy, nausea, vomiting  History provided by:  Patient   used: No        Prior to Admission Medications   Prescriptions Last Dose Informant Patient Reported? Taking? Continuous Blood Gluc Sensor (FREESTYLE STEVEN 14 DAY SENSOR) MISC  Self No No   Si applicator by Does not apply route every 14 (fourteen) days   GLOBAL EASE INJECT PEN NEEDLES 31G X 8 MM MISC  Self Yes No   Sig: USE AS DIRECTED TO INJECT INSULIN DAILY   Incontinence Supply Disposable (INCONTINENCE BRIEF MEDIUM) MISC  Self No No   Sig: by Does not apply route as needed (fecal soiling)   Incontinence Supply Disposable (RA WIPES FLUSHABLE/MOIST) MISC  Self No No   Sig: by Does not apply route as needed (fecal soiling)   Misc   Devices (CANE) MISC  Self No No   Sig: by Does not apply route daily Dx:  Frequent falls   ONE TOUCH ULTRA TEST test strip  Self No No   Sig: Test twice daily   ONETOUCH DELICA LANCETS 76F MISC  Self Yes No   Sig: USE TWICE A DAY (ICD 10 E11 9)   OZEMPIC 1 MG/DOSE SOPN  Self Yes No   Sig: INJECT 1MG SUBCUTANEOUS EVERY WEEK   QUEtiapine (SEROquel) 100 mg tablet  Self Yes No   Sig: Take 100 mg by mouth daily at bedtime   Patient not taking: Reported on 2021   QUEtiapine (SEROquel) 50 mg tablet  Self Yes No   Sig: TAKE 1 TABLET BY MOUTH IN THE MORNING AND 1 AT NOON   Patient not taking: Reported on 2021   SYNJARDY XR 12 5-1000 MG TB24  Self Yes No   Sig: Take 1 tablet by mouth 2 (two) times a day   Trulicity 1 5 VZ/0 0DQ SOPN  Self Yes No   Sig: INJECT 1 5MG SUBCUTANEOUS ONCE EVERY WEEK   UNKNOWN TO PATIENT  Self Yes No   acetaminophen (TYLENOL) 325 mg tablet  Self No No   Sig: Take 2 tablets (650 mg total) by mouth every 6 (six) hours as needed for mild pain (pain)   Patient not taking: Reported on 2021   capsicum (ZOSTRIX) 0 075 % topical cream  Self No No   Sig: Apply topically 3 (three) times a day   clonazePAM (KlonoPIN) 0 5 mg tablet  Self Yes No   divalproex sodium (DEPAKOTE ER) 500 mg 24 hr tablet  Self Yes No   Si mg every 12 (twelve) hours    divalproex sodium (DEPAKOTE) 500 mg EC tablet  Self No No   Sig: Take 1 tablet (500 mg total) by mouth every 8 (eight) hours   doxepin (SINEquan) 25 mg capsule  Self Yes No   Patient not taking: Reported on 2021   ergocalciferol (ERGOCALCIFEROL) 1 25 MG (45263 UT) capsule   Yes No   Sig: Take 50,000 Units by mouth   fluticasone (FLONASE) 50 mcg/act nasal spray   No No   Sig: instill 1 spray into each nostril twice a day   gabapentin (NEURONTIN) 300 mg capsule   Yes No   glycerin-hypromellose- (ARTIFICIAL TEARS) 0 2-0 2-1 % SOLN  Self Yes No   Sig: Apply to eye   insulin glargine (LANTUS SOLOSTAR) 100 units/mL injection pen  Self Yes No   Sig: Inject under the skin   insulin glargine (LANTUS) 100 units/mL subcutaneous injection  Self Yes No   Sig: Inject 24 Units under the skin   insulin lispro (HUMALOG) 100 units/mL injection  Self Yes No   Sig: Inject 12 Units under the skin   polyethylene glycol (GLYCOLAX) powder  Self No No   Sig: Take 17 g by mouth daily   polyethylene glycol (GOLYTELY) 4000 mL solution   No No   Sig: Take 4,000 mL by mouth once for 1 dose As directed for colonoscopy prep   rOPINIRole (REQUIP) 1 mg tablet  Self Yes No   Patient not taking: Reported on 9/30/2021   testosterone cypionate (DEPO-TESTOSTERONE) 200 mg/mL SOLN  Self Yes No   Sig: INJECT 1CC INTRAMUSCULARLY EVERY 2 WEEKS   Patient not taking: Reported on 9/30/2021   traZODone (DESYREL) 50 mg tablet  Self Yes No   Patient not taking: Reported on 9/30/2021      Facility-Administered Medications: None       Past Medical History:   Diagnosis Date    Anxiety     Depression     Diabetes mellitus ([facility] [address] )     Drug use     Hemorrhoids, internal 9/23/2019    Hypertension        Past Surgical History:   Procedure Laterality Date    FEMUR FRACTURE SURGERY Right     GASTRIC BYPASS  11/08/2011    Managed by: Kailyn Boothe (General Surgery)    HERNIA REPAIR  02/13/2013    Incisional hernia repair Managed by: Kailyn Boothe (General Surgery)    TONSILLECTOMY  2010       Family History   Problem Relation Age of Onset    Diabetes Mother     Hypertension Mother     Hypertension Father     Autoimmune disease Son      I have reviewed and agree with the history as documented  E-Cigarette/Vaping    E-Cigarette Use Never User      E-Cigarette/Vaping Substances     Social History     Tobacco Use    Smoking status: Never Smoker    Smokeless tobacco: Never Used   Vaping Use    Vaping Use: Never used   Substance Use Topics    Alcohol use: Never    Drug use: Not Currently       Review of Systems   Constitutional: Negative for chills and fever  HENT: Negative for congestion, rhinorrhea and sore throat  Respiratory: Negative for chest tightness and shortness of breath  Cardiovascular: Negative for chest pain  Gastrointestinal: Negative for abdominal pain, nausea and vomiting  Skin: Negative for color change  Neurological: Negative for headaches  Psychiatric/Behavioral: Negative for behavioral problems, confusion, hallucinations, sleep disturbance and suicidal ideas     All other systems reviewed and are negative  Physical Exam  Physical Exam  Vitals and nursing note reviewed  Constitutional:       General: He is awake  Appearance: Normal appearance  He is well-developed  HENT:      Head: Normocephalic and atraumatic  Right Ear: Tympanic membrane, ear canal and external ear normal       Left Ear: Tympanic membrane, ear canal and external ear normal       Nose: Nose normal       Mouth/Throat:      Mouth: Mucous membranes are moist       Pharynx: Oropharynx is clear  No oropharyngeal exudate or posterior oropharyngeal erythema  Eyes:      General: No scleral icterus  Extraocular Movements: Extraocular movements intact  Conjunctiva/sclera: Conjunctivae normal       Pupils: Pupils are equal, round, and reactive to light  Comments: Pupils constricted bilaterally   Cardiovascular:      Rate and Rhythm: Normal rate and regular rhythm  Heart sounds: Normal heart sounds, S1 normal and S2 normal  No murmur heard  No gallop  Pulmonary:      Effort: Pulmonary effort is normal       Breath sounds: Normal breath sounds  No wheezing, rhonchi or rales  Abdominal:      General: Abdomen is flat  Palpations: Abdomen is soft  Tenderness: There is no abdominal tenderness  There is no guarding or rebound  Musculoskeletal:         General: Normal range of motion  Cervical back: Normal range of motion  Skin:     General: Skin is warm and dry  Neurological:      General: No focal deficit present  Mental Status: He is alert  Psychiatric:         Attention and Perception: Attention and perception normal          Mood and Affect: Mood normal          Behavior: Behavior normal  Behavior is cooperative           Vital Signs  ED Triage Vitals [08/12/22 0847]   Temp Pulse Respirations Blood Pressure SpO2   -- 85 18 159/64 98 %      Temp src Heart Rate Source Patient Position - Orthostatic VS BP Location FiO2 (%)   -- Monitor Sitting Right arm --      Pain Score       No Pain           Vitals:    08/12/22 0847   BP: 159/64   Pulse: 85   Patient Position - Orthostatic VS: Sitting         Visual Acuity      ED Medications  Medications - No data to display    Diagnostic Studies  Results Reviewed     None                 No orders to display              Procedures  Procedures         ED Course                               SBIRT 22yo+    Flowsheet Row Most Recent Value   SBIRT (23 yo +)    In order to provide better care to our patients, we are screening all of our patients for alcohol and drug use  Would it be okay to ask you these screening questions? Yes Filed at: 08/12/2022 8437   Initial Alcohol Screen: US AUDIT-C     1  How often do you have a drink containing alcohol? 0 Filed at: 08/12/2022 0852   2  How many drinks containing alcohol do you have on a typical day you are drinking? 0 Filed at: 08/12/2022 0852   3a  Male UNDER 65: How often do you have five or more drinks on one occasion? 0 Filed at: 08/12/2022 0852   3b  FEMALE Any Age, or MALE 65+: How often do you have 4 or more drinks on one occassion? 0 Filed at: 08/12/2022 0852   Audit-C Score 0 Filed at: 08/12/2022 1629   BEATRIZ: How many times in the past year have you    Used an illegal drug or used a prescription medication for non-medical reasons? Never Filed at: 08/12/2022 8396                    MDM  Number of Diagnoses or Management Options  Homelessness: new and does not require workup  Diagnosis management comments: 62 y/o male here via ambulance from methadone clinic for "homelessness " Patient became homeless a week ago after being evicted from his living space  He presented to the methadone clinic today and they called the ambulance for this  Patient did receive his methadone prior to arrival  Patient denies suicidal ideation, homicidal ideation, thoughts of hurting himself or others, delusions, hallucinations  Pt has diabetes type 2 and chronic neck pain but has no medical complaints today   No testing indicated at this time  Vitals stable in ER  PE shows constricted pupils consistent with methadone usage, otherwise PE benign  Patient was given housing shelter resources and was instructed on how to call them  The number was placed in the patients phone  He was instructed to schedule an appointment with his doctor for medical evaluation and was given strict return to ER precautions both verbally and in discharge papers  Patient agreed to this plan  Disposition  Final diagnoses:   Homelessness     Time reflects when diagnosis was documented in both MDM as applicable and the Disposition within this note     Time User Action Codes Description Comment    8/12/2022  8:54 AM Artur Perez Add [Z59 00] Homelessness       ED Disposition     ED Disposition   Discharge    Condition   Stable    Date/Time   Fri Aug 12, 2022  8:54 AM    Comment   Gillian Chambers discharge to home/self care  Follow-up Information     Follow up With Specialties Details Why Contact Info Jonathan Saravia [address], [address] Nurse Practitioner Call  to schedule an appointment for evaluation regarding medical conditions [facility] [address] Emergency Department Emergency Medicine Go to  if you develop chest pain, shortness of breath, abdominal pain, fevers, thoughts of hurting yourself or others, suicidal or homicidal thoughts [address] [address]  [address] [facility] Emergency Department, [address], [place] Gabriela Erickson [address]          Patient's Medications   Discharge Prescriptions    No medications on file       No discharge procedures on file      PDMP Review       Value Time User    PDMP Reviewed  Yes 10/6/2021 12:21 PM Reyna Soto MD          ED Provider  Electronically Signed by           Luis Andujar PA-C  08/12/22 1394

## 2022-08-17 ENCOUNTER — HOSPITAL ENCOUNTER (EMERGENCY)
Facility: HOSPITAL | Age: 62
End: 2022-08-17
Attending: EMERGENCY MEDICINE
Payer: COMMERCIAL

## 2022-08-17 VITALS
RESPIRATION RATE: 16 BRPM | HEIGHT: 68 IN | DIASTOLIC BLOOD PRESSURE: 68 MMHG | HEART RATE: 57 BPM | TEMPERATURE: 97.4 F | WEIGHT: 170 LBS | BODY MASS INDEX: 25.76 KG/M2 | OXYGEN SATURATION: 98 % | SYSTOLIC BLOOD PRESSURE: 129 MMHG

## 2022-08-17 DIAGNOSIS — R62.51 FAILURE TO THRIVE (CHILD): ICD-10-CM

## 2022-08-17 DIAGNOSIS — Z78.9 UNABLE TO CARE FOR SELF: ICD-10-CM

## 2022-08-17 DIAGNOSIS — F11.90 METHADONE USE: Primary | ICD-10-CM

## 2022-08-17 DIAGNOSIS — F41.9 ANXIETY: ICD-10-CM

## 2022-08-17 DIAGNOSIS — Z59.00 HOMELESSNESS: ICD-10-CM

## 2022-08-17 DIAGNOSIS — R45.851 SUICIDAL THOUGHTS: ICD-10-CM

## 2022-08-17 LAB
ALBUMIN SERPL BCP-MCNC: 3.1 G/DL (ref 3.5–5)
ALP SERPL-CCNC: 105 U/L (ref 46–116)
ALT SERPL W P-5'-P-CCNC: 30 U/L (ref 12–78)
AMPHETAMINES SERPL QL SCN: NEGATIVE
ANION GAP SERPL CALCULATED.3IONS-SCNC: 3 MMOL/L (ref 4–13)
AST SERPL W P-5'-P-CCNC: 27 U/L (ref 5–45)
BACTERIA UR QL AUTO: NORMAL /HPF
BARBITURATES UR QL: NEGATIVE
BASE EXCESS BLDA CALC-SCNC: 4 MMOL/L (ref -2–3)
BENZODIAZ UR QL: NEGATIVE
BETA-HYDROXYBUTYRATE: 0 MMOL/L
BILIRUB SERPL-MCNC: 0.3 MG/DL (ref 0.2–1)
BILIRUB UR QL STRIP: NEGATIVE
BUN SERPL-MCNC: 10 MG/DL (ref 5–25)
CA-I BLD-SCNC: 1.05 MMOL/L (ref 1.12–1.32)
CALCIUM ALBUM COR SERPL-MCNC: 9 MG/DL (ref 8.3–10.1)
CALCIUM SERPL-MCNC: 8.3 MG/DL (ref 8.3–10.1)
CHLORIDE SERPL-SCNC: 101 MMOL/L (ref 96–108)
CLARITY UR: CLEAR
CO2 SERPL-SCNC: 32 MMOL/L (ref 21–32)
COCAINE UR QL: NEGATIVE
COLOR UR: YELLOW
CREAT SERPL-MCNC: 1.09 MG/DL (ref 0.6–1.3)
ERYTHROCYTE [DISTWIDTH] IN BLOOD BY AUTOMATED COUNT: 15.8 % (ref 11.6–15.1)
GFR SERPL CREATININE-BSD FRML MDRD: 73 ML/MIN/1.73SQ M
GLUCOSE SERPL-MCNC: 246 MG/DL (ref 65–140)
GLUCOSE SERPL-MCNC: 375 MG/DL (ref 65–140)
GLUCOSE SERPL-MCNC: 375 MG/DL (ref 65–140)
GLUCOSE SERPL-MCNC: 394 MG/DL (ref 65–140)
GLUCOSE UR STRIP-MCNC: ABNORMAL MG/DL
HCO3 BLDA-SCNC: 27.8 MMOL/L (ref 24–30)
HCT VFR BLD AUTO: 33.9 % (ref 36.5–49.3)
HCT VFR BLD CALC: 33 % (ref 36.5–49.3)
HGB BLD-MCNC: 10.5 G/DL (ref 12–17)
HGB BLDA-MCNC: 11.2 G/DL (ref 12–17)
HGB UR QL STRIP.AUTO: NEGATIVE
KETONES UR STRIP-MCNC: NEGATIVE MG/DL
LEUKOCYTE ESTERASE UR QL STRIP: NEGATIVE
MCH RBC QN AUTO: 25.5 PG (ref 26.8–34.3)
MCHC RBC AUTO-ENTMCNC: 31 G/DL (ref 31.4–37.4)
MCV RBC AUTO: 82 FL (ref 82–98)
METHADONE UR QL: NEGATIVE
NITRITE UR QL STRIP: NEGATIVE
NON-SQ EPI CELLS URNS QL MICRO: NORMAL /HPF
OPIATES UR QL SCN: NEGATIVE
OXYCODONE+OXYMORPHONE UR QL SCN: NEGATIVE
PCO2 BLD: 29 MMOL/L (ref 21–32)
PCO2 BLD: 40.1 MM HG (ref 42–50)
PCP UR QL: NEGATIVE
PH BLD: 7.45 [PH] (ref 7.3–7.4)
PH UR STRIP.AUTO: 5.5 [PH]
PLATELET # BLD AUTO: 224 THOUSANDS/UL (ref 149–390)
PMV BLD AUTO: 11.2 FL (ref 8.9–12.7)
PO2 BLD: 57 MM HG (ref 35–45)
POTASSIUM BLD-SCNC: 4.2 MMOL/L (ref 3.5–5.3)
POTASSIUM SERPL-SCNC: 4.2 MMOL/L (ref 3.5–5.3)
PROT SERPL-MCNC: 7 G/DL (ref 6.4–8.4)
PROT UR STRIP-MCNC: ABNORMAL MG/DL
RBC # BLD AUTO: 4.12 MILLION/UL (ref 3.88–5.62)
RBC #/AREA URNS AUTO: NORMAL /HPF
SAO2 % BLD FROM PO2: 90 % (ref 60–85)
SARS-COV-2 RNA RESP QL NAA+PROBE: NEGATIVE
SODIUM BLD-SCNC: 136 MMOL/L (ref 136–145)
SODIUM SERPL-SCNC: 136 MMOL/L (ref 135–147)
SP GR UR STRIP.AUTO: 1.02 (ref 1–1.03)
SPECIMEN SOURCE: ABNORMAL
THC UR QL: NEGATIVE
UROBILINOGEN UR QL STRIP.AUTO: 0.2 E.U./DL
VALPROATE SERPL-MCNC: <3 UG/ML (ref 50–100)
WBC # BLD AUTO: 6.66 THOUSAND/UL (ref 4.31–10.16)
WBC #/AREA URNS AUTO: NORMAL /HPF

## 2022-08-17 PROCEDURE — 99285 EMERGENCY DEPT VISIT HI MDM: CPT

## 2022-08-17 PROCEDURE — 81001 URINALYSIS AUTO W/SCOPE: CPT | Performed by: EMERGENCY MEDICINE

## 2022-08-17 PROCEDURE — 80164 ASSAY DIPROPYLACETIC ACD TOT: CPT | Performed by: EMERGENCY MEDICINE

## 2022-08-17 PROCEDURE — 96360 HYDRATION IV INFUSION INIT: CPT

## 2022-08-17 PROCEDURE — 84295 ASSAY OF SERUM SODIUM: CPT

## 2022-08-17 PROCEDURE — 82330 ASSAY OF CALCIUM: CPT

## 2022-08-17 PROCEDURE — 80053 COMPREHEN METABOLIC PANEL: CPT | Performed by: EMERGENCY MEDICINE

## 2022-08-17 PROCEDURE — 82948 REAGENT STRIP/BLOOD GLUCOSE: CPT

## 2022-08-17 PROCEDURE — 80307 DRUG TEST PRSMV CHEM ANLYZR: CPT | Performed by: EMERGENCY MEDICINE

## 2022-08-17 PROCEDURE — 85014 HEMATOCRIT: CPT

## 2022-08-17 PROCEDURE — 93005 ELECTROCARDIOGRAM TRACING: CPT

## 2022-08-17 PROCEDURE — 84132 ASSAY OF SERUM POTASSIUM: CPT

## 2022-08-17 PROCEDURE — U0003 INFECTIOUS AGENT DETECTION BY NUCLEIC ACID (DNA OR RNA); SEVERE ACUTE RESPIRATORY SYNDROME CORONAVIRUS 2 (SARS-COV-2) (CORONAVIRUS DISEASE [COVID-19]), AMPLIFIED PROBE TECHNIQUE, MAKING USE OF HIGH THROUGHPUT TECHNOLOGIES AS DESCRIBED BY CMS-2020-01-R: HCPCS | Performed by: EMERGENCY MEDICINE

## 2022-08-17 PROCEDURE — 36415 COLL VENOUS BLD VENIPUNCTURE: CPT | Performed by: EMERGENCY MEDICINE

## 2022-08-17 PROCEDURE — 99285 EMERGENCY DEPT VISIT HI MDM: CPT | Performed by: EMERGENCY MEDICINE

## 2022-08-17 PROCEDURE — U0005 INFEC AGEN DETEC AMPLI PROBE: HCPCS | Performed by: EMERGENCY MEDICINE

## 2022-08-17 PROCEDURE — 85027 COMPLETE CBC AUTOMATED: CPT | Performed by: EMERGENCY MEDICINE

## 2022-08-17 PROCEDURE — 82010 KETONE BODYS QUAN: CPT | Performed by: EMERGENCY MEDICINE

## 2022-08-17 PROCEDURE — 82803 BLOOD GASES ANY COMBINATION: CPT

## 2022-08-17 PROCEDURE — 82947 ASSAY GLUCOSE BLOOD QUANT: CPT

## 2022-08-17 RX ORDER — ACETAMINOPHEN 325 MG/1
650 TABLET ORAL EVERY 6 HOURS PRN
Status: DISCONTINUED | OUTPATIENT
Start: 2022-08-17 | End: 2022-08-17 | Stop reason: HOSPADM

## 2022-08-17 RX ORDER — CLONAZEPAM 0.5 MG/1
1 TABLET ORAL 2 TIMES DAILY
Status: DISCONTINUED | OUTPATIENT
Start: 2022-08-17 | End: 2022-08-17 | Stop reason: HOSPADM

## 2022-08-17 RX ORDER — GABAPENTIN 300 MG/1
300 CAPSULE ORAL DAILY
Status: DISCONTINUED | OUTPATIENT
Start: 2022-08-17 | End: 2022-08-17 | Stop reason: HOSPADM

## 2022-08-17 RX ORDER — SODIUM CHLORIDE 9 MG/ML
3 INJECTION INTRAVENOUS
Status: DISCONTINUED | OUTPATIENT
Start: 2022-08-17 | End: 2022-08-17 | Stop reason: HOSPADM

## 2022-08-17 RX ORDER — FLUTICASONE PROPIONATE 50 MCG
1 SPRAY, SUSPENSION (ML) NASAL DAILY
Status: DISCONTINUED | OUTPATIENT
Start: 2022-08-17 | End: 2022-08-17 | Stop reason: HOSPADM

## 2022-08-17 RX ORDER — METHADONE HYDROCHLORIDE 10 MG/ML
110 CONCENTRATE ORAL DAILY
Status: DISCONTINUED | OUTPATIENT
Start: 2022-08-18 | End: 2022-08-17 | Stop reason: HOSPADM

## 2022-08-17 RX ORDER — METHADONE HYDROCHLORIDE 10 MG/5ML
110 SOLUTION ORAL DAILY
COMMUNITY

## 2022-08-17 RX ORDER — CLONAZEPAM 0.5 MG/1
0.5 TABLET ORAL
Status: DISCONTINUED | OUTPATIENT
Start: 2022-08-17 | End: 2022-08-17

## 2022-08-17 RX ORDER — LORAZEPAM 1 MG/1
1 TABLET ORAL EVERY 6 HOURS PRN
Status: DISCONTINUED | OUTPATIENT
Start: 2022-08-17 | End: 2022-08-17 | Stop reason: HOSPADM

## 2022-08-17 RX ORDER — VALPROIC ACID 250 MG/1
500 CAPSULE, LIQUID FILLED ORAL EVERY 12 HOURS SCHEDULED
Status: DISCONTINUED | OUTPATIENT
Start: 2022-08-17 | End: 2022-08-17 | Stop reason: HOSPADM

## 2022-08-17 RX ADMIN — VALPROIC ACID 500 MG: 250 CAPSULE, LIQUID FILLED ORAL at 20:45

## 2022-08-17 RX ADMIN — LORAZEPAM 1 MG: 1 TABLET ORAL at 16:17

## 2022-08-17 RX ADMIN — GABAPENTIN 300 MG: 300 CAPSULE ORAL at 09:06

## 2022-08-17 RX ADMIN — FLUTICASONE PROPIONATE 1 SPRAY: 50 SPRAY, METERED NASAL at 09:31

## 2022-08-17 RX ADMIN — CLONAZEPAM 1 MG: 0.5 TABLET ORAL at 09:06

## 2022-08-17 RX ADMIN — METFORMIN HYDROCHLORIDE 1000 MG: 500 TABLET ORAL at 17:10

## 2022-08-17 RX ADMIN — CLONAZEPAM 1 MG: 0.5 TABLET ORAL at 17:10

## 2022-08-17 RX ADMIN — SODIUM CHLORIDE 1000 ML: 0.9 INJECTION, SOLUTION INTRAVENOUS at 08:09

## 2022-08-17 SDOH — ECONOMIC STABILITY - HOUSING INSECURITY: HOMELESSNESS UNSPECIFIED: Z59.00

## 2022-08-17 NOTE — ED NOTES
PT came to the ED from [facility] for an evaluation  PT stated he lost his wife from Covid last year and since her passing and his life has fallen apart  He lost his home, job and ties to his family  PT stated he started using oxy and heroin to cope  PT stated he had thoughts to kill himself by jumping in the water and drown  PT said he suicidal thought in the past but did not give any specifics  PT denied any Homicidal ideations  PT stated he has Bi-polar and feels helpless  PT stated he has a poor appetite due to not having means to buy food  PT sleeps an average of 2-3 hours a night  PT appears to be anxious  PT denies any auditory and visual hallucinations  PT stated he is diabetic and has back pain   PT is currently homeless and unemployed and wants to seek treatment  PT has agreed to sign a 201

## 2022-08-17 NOTE — ED NOTES
Insurance Authorization for admission:   Phone call placed to [employer]  Phone number: [phone]  Spoke to [facility]  3 days approved  Level of care: Inpatient dual   Review on pending  Authorization # pending  EVS (Eligibility Verification System) called - [phone]  Automated system indicates: **    Insurance Authorization for Transportation:    Phone call placed to **  Phone number **  Spoke to **     Authorization #: **

## 2022-08-17 NOTE — ED NOTES
Spoke to Jammie from [facility] [address] [facility] admissions and she has no record of acceptance for this PT

## 2022-08-17 NOTE — ED NOTES
[address]am Vergara spoke to [facility] to get additional information about the PT  [facility] stated he was sent to the ED for evaluation  [facility] stated Pt and her have been working with [facility] to get treatment

## 2022-08-17 NOTE — ED CARE HANDOFF
Emergency Department Sign Out Note        Sign out and transfer of care from Dr Rodrigue Amador  See Separate Emergency Department note  The patient, Sylvia Bias, was evaluated by the previous provider for homelessness and SI                                 ED Course as of 08/17/22 2044   Wed Aug 17, 2022   1656 Patient care signed out from Dr Rodrigue Amador  Patient is a 64 yo M who p/w homelessness, SI, medically cleared, home meds ordered, 201 signed, bed search in progress        Procedures  MDM        Disposition  Final diagnoses:   Methadone use   Failure to thrive (child)   Unable to care for self   Homelessness   Anxiety   Suicidal thoughts     Time reflects when diagnosis was documented in both MDM as applicable and the Disposition within this note     Time User Action Codes Description Comment    8/17/2022 10:21 AM Nayla Hoops Add [F11 90] Methadone use     8/17/2022 10:21 AM Nayla Hoops Add [R62 51] Failure to thrive (child)     8/17/2022 10:21 AM Bruna Fontan [Z78 9] Unable to care for self     8/17/2022 10:21 AM Nayla Hoops Add [Z59 00] Homelessness     8/17/2022 10:21 AM Nayla Hoops Add [F41 9] Anxiety     8/17/2022  5:28 PM Nayla Hoops Add [N93 458] Suicidal thoughts       ED Disposition     ED Disposition   Transfer to [address] [place] [address]   --    Date/Time   Wed Aug 17, 2022  8:42 PM    Comment   Sylvia Bias has been medically cleared and is pending crisis jonatan DE LA ROSA Documentation    Shelli Gamez Most Recent Value   Patient Condition The patient has been stabilized such that within reasonable medical probability, no material deterioration of the patient condition or the condition of the unborn child(monica) is likely to result from the transfer   Reason for Transfer Level of Care needed not available at this facility   Benefits of Transfer Specialized equipment and/or services available at the receiving facility (Include comment)________________________   Risks of Transfer Potential for delay in receiving treatment   Accepting Physician Jayy Cramer   Sending MD Dr Salvador Vickers   Provider Certification The patient is stable for psychiatric transfer because they are medically stable, and is protected from harming him/herself or others during transport      RN Documentation    72 Jayy Pardo      Follow-up Information    None       Patient's Medications   Discharge Prescriptions    No medications on file     No discharge procedures on file         ED Provider  Electronically Signed by     Familia Haywood,   08/17/22 [place]  [address] , DO  08/17/22 2048

## 2022-08-17 NOTE — ED NOTES
Spoke with PT to get background information on why he is here  PT stated a person from the Methadone clinic told him to come to the ED for a covid test and then he could be sent to [facility] [address] [facility] where pt stated there is a bed available for him  PT gave me permission to contact [facility] to verify his bed status

## 2022-08-17 NOTE — ED PROVIDER NOTES
History  Chief Complaint   Patient presents with    Homeless     Pt arrives from methadone clinic after having his dose today, pt reports he's supposed to go to [facility] for admission and needs covid test prior to transport there  Pt reports he is currently homeless and takes a bus from [place] to clinic daily for his dose  Pt denies SI/HI  Pt states he fell asleep @ 0300 while sitting on a staircase and fell down approx  5 steps  Denies LOC, denies head strike, denies thinners, pain to left knee and chest        Homeless, past medical history diabetes, chronic pain, fell yesterday but no significant injuries  Brought in by ambulance from the local methadone clinic after receiving methadone this morning  Patient has been homeless  They recommended that he go to [facility] but they needed to do a COVID swab 1st   He denies any SI or HI  He is depressed  Prior to Admission Medications   Prescriptions Last Dose Informant Patient Reported? Taking? Continuous Blood Gluc Sensor (FREESTYLE STEVEN 14 DAY SENSOR) MISC  Self No No   Si applicator by Does not apply route every 14 (fourteen) days   GLOBAL EASE INJECT PEN NEEDLES 31G X 8 MM MISC  Self Yes No   Sig: USE AS DIRECTED TO INJECT INSULIN DAILY   Incontinence Supply Disposable (INCONTINENCE BRIEF MEDIUM) MISC  Self No No   Sig: by Does not apply route as needed (fecal soiling)   Incontinence Supply Disposable (RA WIPES FLUSHABLE/MOIST) MISC  Self No No   Sig: by Does not apply route as needed (fecal soiling)   Misc   Devices (CANE) MISC  Self No No   Sig: by Does not apply route daily Dx:  Frequent falls   ONE TOUCH ULTRA TEST test strip  Self No No   Sig: Test twice daily   ONETOUCH DELICA LANCETS 19G MISC  Self Yes No   Sig: USE TWICE A DAY (ICD 10 E11 9)   OZEMPIC 1 MG/DOSE SOPN  Self Yes No   Sig: INJECT 1MG SUBCUTANEOUS EVERY WEEK   QUEtiapine (SEROquel) 100 mg tablet  Self Yes No   Sig: Take 100 mg by mouth daily at bedtime   Patient not taking: Reported on 2021   QUEtiapine (SEROquel) 50 mg tablet  Self Yes No   Sig: TAKE 1 TABLET BY MOUTH IN THE MORNING AND 1 AT NOON   Patient not taking: Reported on 2021   SYNJARDY XR 12 5-1000 MG TB24  Self Yes No   Sig: Take 1 tablet by mouth 2 (two) times a day   Trulicity 1 5 LJ/5 3QO SOPN  Self Yes No   Sig: INJECT 1 5MG SUBCUTANEOUS ONCE EVERY WEEK   UNKNOWN TO PATIENT  Self Yes No   acetaminophen (TYLENOL) 325 mg tablet  Self No No   Sig: Take 2 tablets (650 mg total) by mouth every 6 (six) hours as needed for mild pain (pain)   Patient not taking: Reported on 2021   capsicum (ZOSTRIX) 0 075 % topical cream  Self No No   Sig: Apply topically 3 (three) times a day   clonazePAM (KlonoPIN) 0 5 mg tablet 2022 at Unknown time Self Yes Yes   divalproex sodium (DEPAKOTE ER) 500 mg 24 hr tablet  Self Yes No   Si mg every 12 (twelve) hours    divalproex sodium (DEPAKOTE) 500 mg EC tablet  Self No No   Sig: Take 1 tablet (500 mg total) by mouth every 8 (eight) hours   doxepin (SINEquan) 25 mg capsule  Self Yes No   Patient not taking: Reported on 2021   ergocalciferol (ERGOCALCIFEROL) 1 25 MG (44420 UT) capsule   Yes No   Sig: Take 50,000 Units by mouth   fluticasone (FLONASE) 50 mcg/act nasal spray   No No   Sig: instill 1 spray into each nostril twice a day   gabapentin (NEURONTIN) 300 mg capsule   Yes No   glycerin-hypromellose- (ARTIFICIAL TEARS) 0 2-0 2-1 % SOLN  Self Yes No   Sig: Apply to eye   insulin glargine (LANTUS SOLOSTAR) 100 units/mL injection pen  Self Yes No   Sig: Inject under the skin   insulin glargine (LANTUS) 100 units/mL subcutaneous injection  Self Yes No   Sig: Inject 24 Units under the skin   insulin lispro (HUMALOG) 100 units/mL injection  Self Yes No   Sig: Inject 12 Units under the skin   methadone (DOLOPHINE) 10 MG/5ML solution 2022 at Unknown time  Yes Yes   Sig: Take 110 mg by mouth in the morning   polyethylene glycol (GLYCOLAX) powder  Self No No   Sig: Take 17 g by mouth daily   polyethylene glycol (GOLYTELY) 4000 mL solution   No No   Sig: Take 4,000 mL by mouth once for 1 dose As directed for colonoscopy prep   rOPINIRole (REQUIP) 1 mg tablet  Self Yes No   Patient not taking: Reported on 9/30/2021   testosterone cypionate (DEPO-TESTOSTERONE) 200 mg/mL SOLN  Self Yes No   Sig: INJECT 1CC INTRAMUSCULARLY EVERY 2 WEEKS   Patient not taking: Reported on 9/30/2021   traZODone (DESYREL) 50 mg tablet Past Week at Unknown time Self Yes Yes      Facility-Administered Medications: None       Past Medical History:   Diagnosis Date    Anxiety     Depression     Diabetes mellitus ([facility] [address] )     Drug use     Hemorrhoids, internal 9/23/2019    Hypertension        Past Surgical History:   Procedure Laterality Date    FEMUR FRACTURE SURGERY Right     GASTRIC BYPASS  11/08/2011    Managed by: Jo Ann Escalante (General Surgery)    HERNIA REPAIR  02/13/2013    Incisional hernia repair Managed by: Jo Ann Escalante (General Surgery)    TONSILLECTOMY  2010       Family History   Problem Relation Age of Onset    Diabetes Mother     Hypertension Mother     Hypertension Father     Autoimmune disease Son      I have reviewed and agree with the history as documented  E-Cigarette/Vaping    E-Cigarette Use Never User      E-Cigarette/Vaping Substances    Nicotine No     THC No     CBD No     Flavoring No     Other No     Unknown No      Social History     Tobacco Use    Smoking status: Never Smoker    Smokeless tobacco: Never Used   Vaping Use    Vaping Use: Never used   Substance Use Topics    Alcohol use: Never    Drug use: Not Currently       Review of Systems   Constitutional: Negative for chills and fever  HENT: Negative for rhinorrhea and sore throat  Respiratory: Negative for shortness of breath  Cardiovascular: Negative for chest pain  Gastrointestinal: Negative for constipation, diarrhea, nausea and vomiting     Genitourinary: Negative for dysuria and frequency  Skin: Negative for rash  All other systems reviewed and are negative  Physical Exam  Physical Exam  Vitals and nursing note reviewed  Constitutional:       Appearance: He is well-developed  HENT:      Head: Normocephalic and atraumatic  Right Ear: External ear normal       Left Ear: External ear normal       Nose: Nose normal    Eyes:      Conjunctiva/sclera: Conjunctivae normal       Pupils: Pupils are equal, round, and reactive to light  Cardiovascular:      Rate and Rhythm: Normal rate and regular rhythm  Heart sounds: Normal heart sounds  Pulmonary:      Effort: Pulmonary effort is normal  No respiratory distress  Breath sounds: Normal breath sounds  No wheezing  Abdominal:      General: Bowel sounds are normal  There is no distension  Palpations: Abdomen is soft  Tenderness: There is no abdominal tenderness  Musculoskeletal:         General: No deformity  Normal range of motion  Cervical back: Normal range of motion and neck supple  No spinous process tenderness  Skin:     General: Skin is warm and dry  Findings: No rash  Neurological:      General: No focal deficit present  Mental Status: He is alert and oriented to person, place, and time  GCS: GCS eye subscore is 4  GCS verbal subscore is 5  GCS motor subscore is 6  Sensory: No sensory deficit  Psychiatric:         Mood and Affect: Mood is anxious  Speech: Speech normal          Behavior: Behavior is cooperative  Thought Content: Thought content does not include suicidal ideation           Vital Signs  ED Triage Vitals [08/17/22 0740]   Temperature Pulse Respirations Blood Pressure SpO2   (!) 97 4 °F (36 3 °C) 88 16 (!) 147/114 98 %      Temp Source Heart Rate Source Patient Position - Orthostatic VS BP Location FiO2 (%)   Temporal Monitor Lying Left arm --      Pain Score       7           Vitals:    08/17/22 0850 08/17/22 0900 08/17/22 0909 08/17/22 0920   BP:  129/68     Pulse: 69 64 (!) 52 57   Patient Position - Orthostatic VS:             Visual Acuity      ED Medications  Medications   sodium chloride (PF) 0 9 % injection 3 mL (has no administration in time range)   acetaminophen (TYLENOL) tablet 650 mg (has no administration in time range)   fluticasone (FLONASE) 50 mcg/act nasal spray 1 spray (1 spray Each Nare Given 8/17/22 0931)   gabapentin (NEURONTIN) capsule 300 mg (300 mg Oral Given 8/17/22 0906)   clonazePAM (KlonoPIN) tablet 1 mg (1 mg Oral Given 8/17/22 1710)   valproic acid (DEPAKENE) capsule 500 mg (has no administration in time range)   methadone (DOLOPHINE) oral concentrated solution 110 mg (has no administration in time range)   metFORMIN (GLUCOPHAGE) tablet 1,000 mg (1,000 mg Oral Given 8/17/22 1710)   LORazepam (ATIVAN) tablet 1 mg (1 mg Oral Given 8/17/22 1617)   sodium chloride 0 9 % bolus 1,000 mL (0 mL Intravenous Stopped 8/17/22 0909)       Diagnostic Studies  Results Reviewed     Procedure Component Value Units Date/Time    Fingerstick Glucose (POCT) [[id number]]  (Abnormal) Collected: 08/17/22 1223    Lab Status: Final result Updated: 08/17/22 1224     POC Glucose 246 mg/dl     Valproic acid level, total [[id number]]  (Abnormal) Collected: 08/17/22 0805    Lab Status: Final result Specimen: Blood from Arm, Right Updated: 08/17/22 0907     Valproic Acid, Total <3 ug/mL     Rapid drug screen, urine [[id number]]  (Normal) Collected: 08/17/22 0829    Lab Status: Final result Specimen: Urine, Clean Catch Updated: 08/17/22 0906     Amph/Meth UR Negative     Barbiturate Ur Negative     Benzodiazepine Urine Negative     Cocaine Urine Negative     Methadone Urine Negative     Opiate Urine Negative     PCP Ur Negative     THC Urine Negative     Oxycodone Urine Negative    Narrative:      FOR MEDICAL PURPOSES ONLY  IF CONFIRMATION NEEDED PLEASE CONTACT THE LAB WITHIN 5 DAYS      Drug Screen Cutoff Levels:  AMPHETAMINE/METHAMPHETAMINES  1000 ng/mL  BARBITURATES     200 ng/mL  BENZODIAZEPINES     200 ng/mL  COCAINE      300 ng/mL  METHADONE      300 ng/mL  OPIATES      300 ng/mL  PHENCYCLIDINE     25 ng/mL  THC       50 ng/mL  OXYCODONE      100 ng/mL    COVID only [[id number]]  (Normal) Collected: 08/17/22 0805    Lab Status: Final result Specimen: Nares from Nasopharyngeal Swab Updated: 08/17/22 0904     SARS-CoV-2 Negative    Narrative:      FOR PEDIATRIC PATIENTS - copy/paste COVID Guidelines URL to browser: [email or web address]  [email or web address]    SARS-CoV-2 assay is a Nucleic Acid Amplification assay intended for the  qualitative detection of nucleic acid from SARS-CoV-2 in nasopharyngeal  swabs  Results are for the presumptive identification of SARS-CoV-2 RNA  Positive results are indicative of infection with SARS-CoV-2, the virus  causing COVID-19, but do not rule out bacterial infection or co-infection  with other viruses  Laboratories within the [place] and its  territories are required to report all positive results to the appropriate  public health authorities  Negative results do not preclude SARS-CoV-2  infection and should not be used as the sole basis for treatment or other  patient management decisions  Negative results must be combined with  clinical observations, patient history, and epidemiological information  This test has not been FDA cleared or approved  This test has been authorized by FDA under an Emergency Use Authorization  (EUA)  This test is only authorized for the duration of time the  declaration that circumstances exist justifying the authorization of the  emergency use of an in vitro diagnostic tests for detection of SARS-CoV-2  virus and/or diagnosis of COVID-19 infection under section 564(b)(1) of  the Act, 21 U  S C  072HWA-9(F)(2), unless the authorization is terminated  or revoked sooner   The test has been validated but independent review by FDA  and CLIA is pending  Test performed using [employer] Gene[employer]pert: This RT-PCR assay targets N2,  a region unique to SARS-CoV-2  A conserved region in the E-gene was chosen  for pan-Sarbecovirus detection which includes SARS-CoV-2      Urine Microscopic [[id number]]  (Normal) Collected: 08/17/22 0829    Lab Status: Final result Specimen: Urine, Clean Catch Updated: 08/17/22 0855     RBC, UA None Seen /hpf      WBC, UA 2-4 /hpf      Epithelial Cells Occasional /hpf      Bacteria, UA Occasional /hpf     UA w Reflex to Microscopic w Reflex to Culture [[id number]]  (Abnormal) Collected: 08/17/22 0829    Lab Status: Final result Specimen: Urine, Clean Catch Updated: 08/17/22 0854     Color, UA Yellow     Clarity, UA Clear     Specific Gravity, UA 1 025     pH, UA 5 5     Leukocytes, UA Negative     Nitrite, UA Negative     Protein,  (2+) mg/dl      Glucose,  (1/2%) mg/dl      Ketones, UA Negative mg/dl      Urobilinogen, UA 0 2 E U /dl      Bilirubin, UA Negative     Occult Blood, UA Negative    Comprehensive metabolic panel [[id number]]  (Abnormal) Collected: 08/17/22 0805    Lab Status: Final result Specimen: Blood from Arm, Right Updated: 08/17/22 0837     Sodium 136 mmol/L      Potassium 4 2 mmol/L      Chloride 101 mmol/L      CO2 32 mmol/L      ANION GAP 3 mmol/L      BUN 10 mg/dL      Creatinine 1 09 mg/dL      Glucose 375 mg/dL      Calcium 8 3 mg/dL      Corrected Calcium 9 0 mg/dL      AST 27 U/L      ALT 30 U/L      Alkaline Phosphatase 105 U/L      Total Protein 7 0 g/dL      Albumin 3 1 g/dL      Total Bilirubin 0 30 mg/dL      eGFR 73 ml/min/1 73sq m     Narrative:      [place] guidelines for Chronic Kidney Disease (CKD):     Stage 1 with normal or high GFR (GFR > 90 mL/min/1 73 square meters)    Stage 2 Mild CKD (GFR = 60-89 mL/min/1 73 square meters)    Stage 3A Moderate CKD (GFR = 45-59 mL/min/1 73 square meters)    Stage 3B Moderate CKD (GFR = 30-44 mL/min/1 73 square meters)    Stage 4 Severe CKD (GFR = 15-29 mL/min/1 73 square meters)    Stage 5 End Stage CKD (GFR <15 mL/min/1 73 square meters)  Note: GFR calculation is accurate only with a steady state creatinine    Beta Hydroxybutyrate [[id number]]  (Normal) Collected: 08/17/22 0805    Lab Status: Final result Specimen: Blood from Arm, Right Updated: 08/17/22 0828     BETA-HYDROXYBUTYRATE 0 0 mmol/L     POCT Blood Gas (CG8+) [[id number]]  (Abnormal) Collected: 08/17/22 0815    Lab Status: Final result Specimen: Venous Updated: 08/17/22 0819     ph, Raulito ISTAT 7 449     pCO2, Raulito i-STAT 40 1 mm HG      pO2, Raulito i-STAT 57 0 mm HG      BE, i-STAT 4 mmol/L      HCO3, Raulito i-STAT 27 8 mmol/L      CO2, i-STAT 29 mmol/L      O2 Sat, i-STAT 90 %      SODIUM, I-STAT 136 mmol/l      Potassium, i-STAT 4 2 mmol/L      Calcium, Ionized i-STAT 1 05 mmol/L      Hct, i-STAT 33 %      Hgb, i-STAT 11 2 g/dl      Glucose, i-STAT 375 mg/dl      Specimen Type VENOUS    CBC [[id number]]  (Abnormal) Collected: 08/17/22 0805    Lab Status: Final result Specimen: Blood from Arm, Right Updated: 08/17/22 0813     WBC 6 66 Thousand/uL      RBC 4 12 Million/uL      Hemoglobin 10 5 g/dL      Hematocrit 33 9 %      MCV 82 fL      MCH 25 5 pg      MCHC 31 0 g/dL      RDW 15 8 %      Platelets 198 Thousands/uL      MPV 11 2 fL     POCT alcohol breath test [[id number]]     Lab Status: No result     Fingerstick Glucose (POCT) [[id number]]  (Abnormal) Collected: 08/17/22 0756    Lab Status: Final result Updated: 08/17/22 0757     POC Glucose 394 mg/dl                  No orders to display              Procedures  ECG 12 Lead Documentation Only    Date/Time: 8/17/2022 3:25 PM  Performed by: Taina Manning DO  Authorized by: Taina Manning DO     Indications / Diagnosis:  Elder homeless  ECG reviewed by me, the ED Provider: yes    Patient location:  ED  Previous ECG:     Previous ECG:  Compared to current    Comparison ECG info:  2021 Similarity:  No change  Interpretation:     Interpretation: normal    Quality:     Tracing quality:  Limited by artifact  Rate:     ECG rate:  72    ECG rate assessment: normal    Rhythm:     Rhythm: sinus rhythm    Ectopy:     Ectopy: none    QRS:     QRS axis:  Normal    QRS intervals:  Normal  Conduction:     Conduction: normal    ST segments:     ST segments:  Normal  T waves:     T waves: normal               ED Course  ED Course as of 08/17/22 1729   Wed Aug 17, 2022   0419 Patient states he gets insulin every Monday Ozempic   0850 Patient medically cleared for inpatient psychiatric care   0916 Patient requesting valium   1021 Reviewed with Jayme Freitas, patient's health coverage approved him for inpatient 3 days dual diagnosis - Emily Pace will work on bed search   [phone] Per staff, Patient having suicidal thoughts of drowning in a puddle  He signed a 201 with this complaint    Bed search     sign out to DAVID Laguerre, suicidal, homeless, routine meds and diet ordered, signed 201, bed search                                        MDM  Number of Diagnoses or Management Options  Anxiety: established and worsening  Failure to thrive (child): new and requires workup  Homelessness: established and worsening  Methadone use: established and worsening  Suicidal thoughts: new and requires workup  Unable to care for self: new and requires workup     Amount and/or Complexity of Data Reviewed  Clinical lab tests: ordered and reviewed  Discuss the patient with other providers: yes    Patient Progress  Patient progress: improved      Disposition  Final diagnoses:   Methadone use   Failure to thrive (child)   Unable to care for self   Homelessness   Anxiety   Suicidal thoughts     Time reflects when diagnosis was documented in both MDM as applicable and the Disposition within this note     Time User Action Codes Description Comment    8/17/2022 10:21 AM Evelio Mooney Add [F11 90] Methadone use     8/17/2022 10:21 AM Reji Forbes Bgcarla Marialuisa [R62 51] Failure to thrive (child)     8/17/2022 10:21 AM Sarabia Picking Add [Z78 9] Unable to care for self     8/17/2022 10:21 AM Sarabia Picking Add [Z59 00] Homelessness     8/17/2022 10:21 AM Sarabia Picking Add [F41 9] Anxiety     8/17/2022  5:28 PM Sarabia Picking Add [R34 727] Suicidal thoughts       ED Disposition     None      Follow-up Information    None         Patient's Medications   Discharge Prescriptions    No medications on file       No discharge procedures on file      PDMP Review       Value Time User    PDMP Reviewed  Yes 10/6/2021 12:21 PM Jun Meyer MD          ED Provider  Electronically Signed by           Megan Hooper DO  08/17/22 7088

## 2022-08-17 NOTE — ED NOTES
Bed Search    Jordon Kellogg) Clinicals faxed  Tammy Almonte no answer  Sima Montaño) no bed  Elliot Murphy) information given waiting for call back  [facility] Alon[facility]) no beds

## 2022-08-17 NOTE — ED NOTES
Patient is accepted at [facility]   Patient is accepted by Marika Mc   Transportation is arranged with [facility]       Transportation is scheduled forTBD   Patient may go to the floor at 8/18/22 anytime           Estrella Barrera

## 2022-08-17 NOTE — ED NOTES
PT gave verbal consent to have his medical records faxed from [facility] to [facility] ED    [facility] Alyssa Noe [phone]) was contacted and will fax the records,

## 2022-08-17 NOTE — ED NOTES
Bed Search Follow Up:    Neptali Peña denied due to medical acuity  [place]- left voicemail, requesting a return call

## 2022-08-17 NOTE — ED NOTES
Called and spoke to CECI  ([phone] [id number]) from [employer] to get information on PT  Nikita Hernadez stated [facility] has him already done the precert and is in agreement for inpatient mental health dual dx

## 2022-08-18 NOTE — EMTALA/ACUTE CARE TRANSFER
Drew Schmidt [facility] EMERGENCY DEPARTMENT  [address]  [facility] [address]  Dept: [phone]      KGZVUT TRANSFER CONSENT    NAME Usama Vaughn                                         1960                              MRN [id number]    I have been informed of my rights regarding examination, treatment, and transfer   by Dr Maricarmen Acosta DO    Benefits: Specialized equipment and/or services available at the receiving facility (Include comment)________________________    Risks: Potential for delay in receiving treatment      Consent for Transfer:  I acknowledge that my medical condition has been evaluated and explained to me by the emergency department physician or other qualified medical person and/or my attending physician, who has recommended that I be transferred to the service of  Accepting Physician: Dr Tawanda Finley at [facility] Name, [address] : [place]any Bar  The above potential benefits of such transfer, the potential risks associated with such transfer, and the probable risks of not being transferred have been explained to me, and I fully understand them  The doctor has explained that, in my case, the benefits of transfer outweigh the risks  I agree to be transferred  I authorize the performance of emergency medical procedures and treatments upon me in both transit and upon arrival at the receiving facility  Additionally, I authorize the release of any and all medical records to the receiving facility and request they be transported with me, if possible  I understand that the safest mode of transportation during a medical emergency is an ambulance and that the Hospital advocates the use of this mode of transport  Risks of traveling to the receiving facility by car, including absence of medical control, life sustaining equipment, such as oxygen, and medical personnel has been explained to me and I fully understand them      ([address]  [  ]  I consent to the stated transfer and to be transported by ambulance/helicopter  [  ]  I consent to the stated transfer, but refuse transportation by ambulance and accept full responsibility for my transportation by car  I understand the risks of non-ambulance transfers and I exonerate the Hospital and its staff from any deterioration in my condition that results from this refusal     X___________________________________________    DATE  22  TIME________  Signature of patient or legally responsible individual signing on patient behalf           RELATIONSHIP TO PATIENT_________________________          Provider Certification    NAME Carey Alfred                                         1960                              MRN [id number]    A medical screening exam was performed on the above named patient  Based on the examination:    Condition Necessitating Transfer The primary encounter diagnosis was Methadone use  Diagnoses of Failure to thrive (child), Unable to care for self, Homelessness, Anxiety, and Suicidal thoughts were also pertinent to this visit      Patient Condition: The patient has been stabilized such that within reasonable medical probability, no material deterioration of the patient condition or the condition of the unborn child(monica) is likely to result from the transfer    Reason for Transfer: Level of Care needed not available at this facility    Transfer Requirements: [facility]   · Space available and qualified personnel available for treatment as acknowledged by    · Agreed to accept transfer and to provide appropriate medical treatment as acknowledged by       Dr George Velasquez  · Appropriate medical records of the examination and treatment of the patient are provided at the time of transfer   [address], [address] _______  · Transfer will be performed by qualified personnel from    and appropriate transfer equipment as required, including the use of necessary and appropriate life support measures  Provider Certification: I have examined the patient and explained the following risks and benefits of being transferred/refusing transfer to the patient/family:  The patient is stable for psychiatric transfer because they are medically stable, and is protected from harming him/herself or others during transport      Based on these reasonable risks and benefits to the patient and/or the unborn child(monica), and based upon the information available at the time of the patients examination, I certify that the medical benefits reasonably to be expected from the provision of appropriate medical treatments at another medical facility outweigh the increasing risks, if any, to the individuals medical condition, and in the case of labor to the unborn child, from effecting the transfer      X____________________________________________ DATE 08/17/22        TIME_______      ORIGINAL - SEND TO MEDICAL RECORDS   COPY - SEND WITH PATIENT DURING TRANSFER

## 2022-08-19 LAB
ATRIAL RATE: 72 BPM
P AXIS: 73 DEGREES
PR INTERVAL: 142 MS
QRS AXIS: 78 DEGREES
QRSD INTERVAL: 90 MS
QT INTERVAL: 422 MS
QTC INTERVAL: 462 MS
T WAVE AXIS: 76 DEGREES
VENTRICULAR RATE: 72 BPM

## 2022-08-19 PROCEDURE — 93010 ELECTROCARDIOGRAM REPORT: CPT | Performed by: INTERNAL MEDICINE

## 2023-01-10 ENCOUNTER — HOSPITAL ENCOUNTER (EMERGENCY)
Facility: HOSPITAL | Age: 63
Discharge: HOME | End: 2023-01-10
Attending: EMERGENCY MEDICINE

## 2023-01-10 VITALS
RESPIRATION RATE: 18 BRPM | SYSTOLIC BLOOD PRESSURE: 167 MMHG | OXYGEN SATURATION: 100 % | DIASTOLIC BLOOD PRESSURE: 92 MMHG | HEART RATE: 91 BPM | TEMPERATURE: 98.2 F

## 2023-01-10 DIAGNOSIS — F19.10 POLYSUBSTANCE ABUSE (HCC): ICD-10-CM

## 2023-01-10 DIAGNOSIS — F41.1 GENERALIZED ANXIETY DISORDER: ICD-10-CM

## 2023-01-10 DIAGNOSIS — Z76.0 MEDICATION REFILL: Primary | ICD-10-CM

## 2023-01-10 RX ORDER — CLONAZEPAM 0.5 MG/1
0.5 TABLET ORAL 3 TIMES DAILY
Qty: 42 TABLET | Refills: 0 | Status: SHIPPED | OUTPATIENT
Start: 2023-01-10 | End: 2023-01-24

## 2023-01-10 RX ORDER — DIVALPROEX SODIUM 500 MG/1
500 TABLET, DELAYED RELEASE ORAL EVERY 12 HOURS SCHEDULED
Qty: 28 TABLET | Refills: 0 | Status: SHIPPED | OUTPATIENT
Start: 2023-01-10 | End: 2023-01-17 | Stop reason: SDUPTHER

## 2023-01-10 NOTE — ED ATTENDING ATTESTATION
1/10/2023  ICharlotte MD, saw and evaluated the patient  I have discussed the patient with the resident/non-physician practitioner and agree with the resident's/non-physician practitioner's findings, Plan of Care, and MDM as documented in the resident's/non-physician practitioner's note, except where noted  All available labs and Radiology studies were reviewed  I was present for key portions of any procedure(s) performed by the resident/non-physician practitioner and I was immediately available to provide assistance  At this point I agree with the current assessment done in the Emergency Department  I have conducted an independent evaluation of this patient a history and physical is as follows:    Patient presents to the emergency department requesting refill of his Depakote and clonazepam prescriptions  The patient was discharged from his most recent facility with 2-week supply of medications with plans to see another provider but that appointment was pushed back 2 weeks  The patient denies any complaints but states she has been on Depakote and clonazepam for greater than 10 years and is concerned about the effects of not being able to continue his medications  The patient denies any medical complaint whatsoever and states he feels well  The patient denies depression, suicidal ideation, homicidal ideation, or any other behavioral problems  The patient states he is living with a friend and is doing well overall  Physical exam demonstrates a male in no acute distress  HEENT exam is normal   Lungs are clear with good breath sounds  The heart had a regular rate and rhythm  Abdomen soft and nontender  There is no focal neurologic deficit  The patient has good insight and judgment has no suicidal homicidal ideation  There is no evidence of hallucinations or delusions    ED Course         Critical Care Time  Procedures

## 2023-01-10 NOTE — ED PROVIDER NOTES
History  Chief Complaint   Patient presents with   • Medication Refill     Pt reports he was discharged from [facility] weeks ago with 2 weeks worth of medications; pt is requesting a refill of clonazepam and depakote because he cannot get into a psych appointment for another 2 weeks     82-year-old man recently discharged from [facility] 2 weeks ago for polysubstance abuse presents requesting medication refill  Patient was given 2 weeks of 500 mg twice daily Depakote and 0 5 mg clonazepam 3 times daily for his substance abuse  He was scheduled for follow-up appointment last week but his appointment was canceled and rescheduled for 2 weeks from now  He is here requesting refill these medications  He is feeling shaky and concerned about going into withdrawal and relapsing on drug use  Otherwise, he is feeling well  Prior to Admission Medications   Prescriptions Last Dose Informant Patient Reported? Taking? Continuous Blood Gluc Sensor (FREESTYLE STEVEN 14 DAY SENSOR) MISC   No No   Si applicator by Does not apply route every 14 (fourteen) days   GLOBAL EASE INJECT PEN NEEDLES 31G X 8 MM MISC   Yes No   Sig: USE AS DIRECTED TO INJECT INSULIN DAILY   Incontinence Supply Disposable (INCONTINENCE BRIEF MEDIUM) MISC   No No   Sig: by Does not apply route as needed (fecal soiling)   Incontinence Supply Disposable (RA WIPES FLUSHABLE/MOIST) MISC   No No   Sig: by Does not apply route as needed (fecal soiling)   Misc   Devices (CANE) MISC   No No   Sig: by Does not apply route daily Dx:  Frequent falls   ONE TOUCH ULTRA TEST test strip   No No   Sig: Test twice daily   ONETOUCH DELICA LANCETS 52A MISC   Yes No   Sig: USE TWICE A DAY (ICD 10 E11 9)   OZEMPIC 1 MG/DOSE SOPN   Yes No   Sig: INJECT 1MG SUBCUTANEOUS EVERY WEEK   QUEtiapine (SEROquel) 100 mg tablet   Yes No   Sig: Take 100 mg by mouth daily at bedtime   Patient not taking: Reported on 2021   QUEtiapine (SEROquel) 50 mg tablet   Yes No   Sig: TAKE 1 TABLET BY MOUTH IN THE MORNING AND 1 AT NOON   Patient not taking: Reported on 2021   SYNJARDY XR 12 5-1000 MG TB24   Yes No   Sig: Take 1 tablet by mouth 2 (two) times a day   Trulicity 1 5  7CO SOPN   Yes No   Sig: INJECT 1 5MG SUBCUTANEOUS ONCE EVERY WEEK   UNKNOWN TO PATIENT   Yes No   acetaminophen (TYLENOL) 325 mg tablet   No No   Sig: Take 2 tablets (650 mg total) by mouth every 6 (six) hours as needed for mild pain (pain)   Patient not taking: Reported on 2021   capsicum (ZOSTRIX) 0 075 % topical cream   No No   Sig: Apply topically 3 (three) times a day   clonazePAM (KlonoPIN) 0 5 mg tablet   Yes No   divalproex sodium (DEPAKOTE ER) 500 mg 24 hr tablet   Yes No   Si mg every 12 (twelve) hours    divalproex sodium (DEPAKOTE) 500 mg EC tablet   No No   Sig: Take 1 tablet (500 mg total) by mouth every 8 (eight) hours   doxepin (SINEquan) 25 mg capsule   Yes No   Patient not taking: Reported on 2021   ergocalciferol (ERGOCALCIFEROL) 1 25 MG (70924 UT) capsule   Yes No   Sig: Take 50,000 Units by mouth   fluticasone (FLONASE) 50 mcg/act nasal spray   No No   Sig: instill 1 spray into each nostril twice a day   gabapentin (NEURONTIN) 300 mg capsule   Yes No   glycerin-hypromellose- (ARTIFICIAL TEARS) 0 2-0 2-1 % SOLN   Yes No   Sig: Apply to eye   insulin glargine (LANTUS SOLOSTAR) 100 units/mL injection pen   Yes No   Sig: Inject under the skin   insulin glargine (LANTUS) 100 units/mL subcutaneous injection   Yes No   Sig: Inject 24 Units under the skin   insulin lispro (HUMALOG) 100 units/mL injection   Yes No   Sig: Inject 12 Units under the skin   methadone (DOLOPHINE) 10 MG/5ML solution   Yes No   Sig: Take 110 mg by mouth in the morning   polyethylene glycol (GLYCOLAX) powder   No No   Sig: Take 17 g by mouth daily   polyethylene glycol (GOLYTELY) 4000 mL solution   No No   Sig: Take 4,000 mL by mouth once for 1 dose As directed for colonoscopy prep   rOPINIRole (REQUIP) 1 mg tablet   Yes No   Patient not taking: Reported on 9/30/2021   testosterone cypionate (DEPO-TESTOSTERONE) 200 mg/mL SOLN   Yes No   Sig: INJECT 1CC INTRAMUSCULARLY EVERY 2 WEEKS   Patient not taking: Reported on 9/30/2021   traZODone (DESYREL) 50 mg tablet   Yes No      Facility-Administered Medications: None       Past Medical History:   Diagnosis Date   • Anxiety    • Depression    • Diabetes mellitus ([facility] [address] )    • Drug use    • Hemorrhoids, internal 9/23/2019   • Hypertension        Past Surgical History:   Procedure Laterality Date   • FEMUR FRACTURE SURGERY Right    • GASTRIC BYPASS  11/08/2011    Managed by: Terry Wood (General Surgery)   • HERNIA REPAIR  02/13/2013    Incisional hernia repair Managed by: Terry Wood (General Surgery)   • TONSILLECTOMY  2010       Family History   Problem Relation Age of Onset   • Diabetes Mother    • Hypertension Mother    • Hypertension Father    • Autoimmune disease Son      I have reviewed and agree with the history as documented  E-Cigarette/Vaping   • E-Cigarette Use Never User      E-Cigarette/Vaping Substances   • Nicotine No    • THC No    • CBD No    • Flavoring No    • Other No    • Unknown No      Social History     Tobacco Use   • Smoking status: Never   • Smokeless tobacco: Never   Vaping Use   • Vaping Use: Never used   Substance Use Topics   • Alcohol use: Never   • Drug use: Not Currently        Review of Systems   Constitutional: Negative for chills and fever  HENT: Negative for ear pain and sore throat  Eyes: Negative for pain and visual disturbance  Respiratory: Negative for cough and shortness of breath  Cardiovascular: Negative for chest pain and palpitations  Gastrointestinal: Negative for abdominal pain, constipation, diarrhea and vomiting  Genitourinary: Negative for dysuria and hematuria  Musculoskeletal: Negative for arthralgias and back pain  Skin: Negative for color change and rash     Neurological: Negative for seizures, syncope and light-headedness  Psychiatric/Behavioral: Negative for agitation and confusion  Physical Exam  ED Triage Vitals [01/10/23 0745]   Temperature Pulse Respirations Blood Pressure SpO2   98 2 °F (36 8 °C) 91 18 167/92 100 %      Temp Source Heart Rate Source Patient Position - Orthostatic VS BP Location FiO2 (%)   Tympanic Monitor Sitting Right arm --      Pain Score       --             Orthostatic Vital Signs  Vitals:    01/10/23 0745   BP: 167/92   Pulse: 91   Patient Position - Orthostatic VS: Sitting       Physical Exam  Vitals and nursing note reviewed  Constitutional:       Appearance: Normal appearance  HENT:      Head: Normocephalic and atraumatic  Right Ear: External ear normal       Left Ear: External ear normal    Pulmonary:      Effort: Pulmonary effort is normal  No respiratory distress  Musculoskeletal:         General: Normal range of motion  Cervical back: Normal range of motion and neck supple  Skin:     General: Skin is warm and dry  Neurological:      General: No focal deficit present  Mental Status: He is alert and oriented to person, place, and time  Psychiatric:         Mood and Affect: Mood normal          Behavior: Behavior normal          ED Medications  Medications - No data to display    Diagnostic Studies  Results Reviewed     None                 No orders to display         Procedures  Procedures      ED Course           Medical Decision Making  Presents for medication refill  No evidence of withdrawal my exam   Will prescribe 2 weeks of Depakote and clonazepam   Patient needs to follow-up with narendra for further refills  Patient instructed that the emergency department cannot be continually prescribing these medications  He will follow-up with outpatient mental health or PCP  Patient in agreement with plan and questions were answered  Verbalized understanding of return precautions      Portions or all of this note were generated using voice recognition software  Occasional wrong word or "sound a like" substitutions may have occurred due to the inherent limitations of voice recognition software  Please interpret any errors within the intended context of the whole sentence or idea  Generalized anxiety disorder: self-limited or minor problem  Medication refill: self-limited or minor problem  Polysubstance abuse [facility]): chronic illness or injury  Risk  Prescription drug management  Disposition  Final diagnoses:   Medication refill   Polysubstance abuse ([facility] [address] )   Generalized anxiety disorder     Time reflects when diagnosis was documented in both MDM as applicable and the Disposition within this note     Time User Action Codes Description Comment    1/10/2023  8:23 AM Aixa Montesair Add [Z76 0] Medication refill     1/10/2023  8:24 AM Aixa Montesair Add [F19 10] Polysubstance abuse ([facility] [address] )     1/10/2023  8:31 AM Adan Galvan Gunjan Add [F41 1] Generalized anxiety disorder       ED Disposition     ED Disposition   Discharge    Condition   Stable    Date/Time   Tue Basil 10, 2023  8:24 AM    Mehran Magana discharge to home/self care                 Follow-up Information     Follow up With Specialties Details Why [address] [facility], [address] [place] [address], Nurse Practitioner Schedule an appointment as soon as possible for a visit in 1 week  [address] [place] [address] 52 [address] [facility]  [phone]            Discharge Medication List as of 1/10/2023  8:33 AM      CONTINUE these medications which have CHANGED    Details   clonazePAM (KlonoPIN) 0 5 mg tablet Take 1 tablet (0 5 mg total) by mouth 3 (three) times a day for 14 days, Starting Tue 1/10/2023, Until Tue 1/24/2023, Normal      divalproex sodium (Depakote) 500 mg EC tablet Take 1 tablet (500 mg total) by mouth every 12 (twelve) hours, Starting Tue 1/10/2023, Normal         CONTINUE these medications which have NOT CHANGED    Details   acetaminophen (TYLENOL) 325 mg tablet Take 2 tablets (650 mg total) by mouth every 6 (six) hours as needed for mild pain (pain), Starting Thu 1/7/2021, Normal      capsicum (ZOSTRIX) 0 075 % topical cream Apply topically 3 (three) times a day, Starting Thu 1/7/2021, Normal      Continuous Blood Gluc Sensor (FREESTYLE STEVEN 14 DAY SENSOR) MISC 1 applicator by Does not apply route every 14 (fourteen) days, Starting Wed 4/3/2019, Normal      divalproex sodium (DEPAKOTE ER) 500 mg 24 hr tablet 500 mg every 12 (twelve) hours , Starting Wed 1/29/2020, Historical Med      doxepin (SINEquan) 25 mg capsule Starting Wed 1/29/2020, Historical Med      ergocalciferol (ERGOCALCIFEROL) 1 25 MG (05799 UT) capsule Take 50,000 Units by mouth, Starting Thu 12/5/2019, Until Thu 2/27/2020, Historical Med      fluticasone (FLONASE) 50 mcg/act nasal spray instill 1 spray into each nostril twice a day, Normal      gabapentin (NEURONTIN) 300 mg capsule Starting Fri 7/23/2021, Historical Med      GLOBAL EASE INJECT PEN NEEDLES 31G X 8 MM MISC USE AS DIRECTED TO INJECT INSULIN DAILY, Historical Med      glycerin-hypromellose- (ARTIFICIAL TEARS) 0 2-0 2-1 % SOLN Apply to eye, Historical Med      !! Incontinence Supply Disposable (INCONTINENCE BRIEF MEDIUM) MISC by Does not apply route as needed (fecal soiling), Starting Wed 12/4/2019, Print      !! Incontinence Supply Disposable (RA WIPES FLUSHABLE/MOIST) MISC by Does not apply route as needed (fecal soiling), Starting Wed 12/4/2019, Print      insulin glargine (LANTUS SOLOSTAR) 100 units/mL injection pen Inject under the skin, Historical Med      insulin glargine (LANTUS) 100 units/mL subcutaneous injection Inject 24 Units under the skin, Historical Med      insulin lispro (HUMALOG) 100 units/mL injection Inject 12 Units under the skin, Historical Med      methadone (DOLOPHINE) 10 MG/5ML solution Take 110 mg by mouth in the morning, Historical Med      Misc   Devices (CANE) MISC by Does not apply route daily Dx:  Frequent falls, Starting Mon 7/29/2019, Print      ONE TOUCH ULTRA TEST test strip Test twice daily, Normal      ONETOUCH DELICA LANCETS 64A MISC USE TWICE A DAY (ICD 10 E11 9), Historical Med      OZEMPIC 1 MG/DOSE SOPN INJECT 1MG SUBCUTANEOUS EVERY WEEK, Historical Med      polyethylene glycol (GLYCOLAX) powder Take 17 g by mouth daily, Starting Wed 9/11/2019, Normal      polyethylene glycol (GOLYTELY) 4000 mL solution Take 4,000 mL by mouth once for 1 dose As directed for colonoscopy prep, Starting Thu 2/27/2020, Normal      !! QUEtiapine (SEROquel) 100 mg tablet Take 100 mg by mouth daily at bedtime, Starting Thu 12/5/2019, Historical Med      !! QUEtiapine (SEROquel) 50 mg tablet TAKE 1 TABLET BY MOUTH IN THE MORNING AND 1 AT NOON, Historical Med      rOPINIRole (REQUIP) 1 mg tablet Starting Fri 6/4/2021, Historical Med      SYNJARDY XR 12 5-1000 MG TB24 Take 1 tablet by mouth 2 (two) times a day, Starting Fri 12/20/2019, Historical Med      testosterone cypionate (DEPO-TESTOSTERONE) 200 mg/mL SOLN INJECT 1CC INTRAMUSCULARLY EVERY 2 WEEKS, Historical Med      traZODone (DESYREL) 50 mg tablet Starting Fri 6/4/2021, Historical Med      Trulicity 1 5 QT/4 7GW SOPN INJECT 1 5MG SUBCUTANEOUS ONCE EVERY WEEK, Historical Med      UNKNOWN TO PATIENT Historical Med       !! - Potential duplicate medications found  Please discuss with provider  No discharge procedures on file  PDMP Review       Value Time User    PDMP Reviewed  Yes 10/6/2021 12:21 PM Francine Rodriguez MD           ED Provider  Attending physically available and evaluated Marco East I managed the patient along with the ED Attending      Electronically Signed by         Dioni Craig MD  01/10/23 3855

## 2023-01-10 NOTE — DISCHARGE INSTRUCTIONS
Please go to your scheduled follow up appointment to obtain additional refills of your prescribed medications

## 2023-01-17 ENCOUNTER — HOSPITAL ENCOUNTER (EMERGENCY)
Facility: HOSPITAL | Age: 63
Discharge: HOME | End: 2023-01-17
Attending: EMERGENCY MEDICINE

## 2023-01-17 VITALS
TEMPERATURE: 97.3 F | RESPIRATION RATE: 18 BRPM | BODY MASS INDEX: 25.85 KG/M2 | DIASTOLIC BLOOD PRESSURE: 78 MMHG | WEIGHT: 170 LBS | SYSTOLIC BLOOD PRESSURE: 172 MMHG | HEART RATE: 88 BPM | OXYGEN SATURATION: 97 %

## 2023-01-17 DIAGNOSIS — Z76.0 MEDICATION REFILL: Primary | ICD-10-CM

## 2023-01-17 RX ORDER — DIVALPROEX SODIUM 500 MG/1
500 TABLET, DELAYED RELEASE ORAL EVERY 12 HOURS SCHEDULED
Qty: 28 TABLET | Refills: 0 | Status: SHIPPED | OUTPATIENT
Start: 2023-01-17 | End: 2023-01-23

## 2023-01-17 NOTE — ED PROVIDER NOTES
History  Chief Complaint   Patient presents with   • Medication Refill     Pt states he has ran out out of his prx Depakote and clonazepam  Pt's primary physician is on maternity leave  Patient patient is a 68-year-old male coming in for medication refills for his Depakote and Klonopin  Patient was seen here 7 days ago regular complaint  Patient states that he attempted to go see his family doctor, however she is out on leave and is admitted to refill his medications  Patient's family doctor is through [place] [phone], so this part of this did not make sense  Patient also had a refill for 2 weeks of Depakote and Klonopin as per chart review, and patient's that he will run out of medications on Friday, which will only be 10 days after he was seen  Given 14 days of medication refill per chart review, as well as checking drug monitoring system  Patient show me that he only has 10 Klonopin left, though based on refill as well as time, patient should have 21 Klonopin left  History provided by:  Patient   used: No    Medication Refill  Medications/supplies requested:  Klonopin and depakoke   Reason for request:  Medications ran out  Medications taken before: no    Patient has complete original prescription information: yes        Prior to Admission Medications   Prescriptions Last Dose Informant Patient Reported? Taking? Continuous Blood Gluc Sensor (FREESTYLE STEVEN 14 DAY SENSOR) MISC   No No   Si applicator by Does not apply route every 14 (fourteen) days   GLOBAL EASE INJECT PEN NEEDLES 31G X 8 MM MISC   Yes No   Sig: USE AS DIRECTED TO INJECT INSULIN DAILY   Incontinence Supply Disposable (INCONTINENCE BRIEF MEDIUM) MISC   No No   Sig: by Does not apply route as needed (fecal soiling)   Incontinence Supply Disposable (RA WIPES FLUSHABLE/MOIST) MISC   No No   Sig: by Does not apply route as needed (fecal soiling)   Misc   Devices (CANE) MISC   No No   Sig: by Does not apply route daily Dx:  Frequent falls   ONE TOUCH ULTRA TEST test strip   No No   Sig: Test twice daily   ONETOUCH DELICA LANCETS 69Q MISC   Yes No   Sig: USE TWICE A DAY (ICD 10 E11 9)   OZEMPIC 1 MG/DOSE SOPN   Yes No   Sig: INJECT 1MG SUBCUTANEOUS EVERY WEEK   QUEtiapine (SEROquel) 100 mg tablet   Yes No   Sig: Take 100 mg by mouth daily at bedtime   Patient not taking: Reported on 2021   QUEtiapine (SEROquel) 50 mg tablet   Yes No   Sig: TAKE 1 TABLET BY MOUTH IN THE MORNING AND 1 AT NOON   Patient not taking: Reported on 2021   SYNJARDY XR 12 5-1000 MG TB24   Yes No   Sig: Take 1 tablet by mouth 2 (two) times a day   Trulicity 1 5 LG/2 7PC SOPN   Yes No   Sig: INJECT 1 5MG SUBCUTANEOUS ONCE EVERY WEEK   UNKNOWN TO PATIENT   Yes No   acetaminophen (TYLENOL) 325 mg tablet   No No   Sig: Take 2 tablets (650 mg total) by mouth every 6 (six) hours as needed for mild pain (pain)   Patient not taking: Reported on 2021   capsicum (ZOSTRIX) 0 075 % topical cream   No No   Sig: Apply topically 3 (three) times a day   clonazePAM (KlonoPIN) 0 5 mg tablet   No No   Sig: Take 1 tablet (0 5 mg total) by mouth 3 (three) times a day for 14 days   divalproex sodium (DEPAKOTE ER) 500 mg 24 hr tablet   Yes No   Si mg every 12 (twelve) hours    divalproex sodium (Depakote) 500 mg EC tablet   No No   Sig: Take 1 tablet (500 mg total) by mouth every 12 (twelve) hours   divalproex sodium (Depakote) 500 mg EC tablet   No Yes   Sig: Take 1 tablet (500 mg total) by mouth every 12 (twelve) hours for 14 days   doxepin (SINEquan) 25 mg capsule   Yes No   Patient not taking: Reported on 2021   ergocalciferol (ERGOCALCIFEROL) 1 25 MG (30125 UT) capsule   Yes No   Sig: Take 50,000 Units by mouth   fluticasone (FLONASE) 50 mcg/act nasal spray   No No   Sig: instill 1 spray into each nostril twice a day   gabapentin (NEURONTIN) 300 mg capsule   Yes No   glycerin-hypromellose- (ARTIFICIAL TEARS) 0 2-0 2-1 % SOLN   Yes No Sig: Apply to eye   insulin glargine (LANTUS SOLOSTAR) 100 units/mL injection pen   Yes No   Sig: Inject under the skin   insulin glargine (LANTUS) 100 units/mL subcutaneous injection   Yes No   Sig: Inject 24 Units under the skin   insulin lispro (HUMALOG) 100 units/mL injection   Yes No   Sig: Inject 12 Units under the skin   methadone (DOLOPHINE) 10 MG/5ML solution   Yes No   Sig: Take 110 mg by mouth in the morning   polyethylene glycol (GLYCOLAX) powder   No No   Sig: Take 17 g by mouth daily   polyethylene glycol (GOLYTELY) 4000 mL solution   No No   Sig: Take 4,000 mL by mouth once for 1 dose As directed for colonoscopy prep   rOPINIRole (REQUIP) 1 mg tablet   Yes No   Patient not taking: Reported on 9/30/2021   testosterone cypionate (DEPO-TESTOSTERONE) 200 mg/mL SOLN   Yes No   Sig: INJECT 1CC INTRAMUSCULARLY EVERY 2 WEEKS   Patient not taking: Reported on 9/30/2021   traZODone (DESYREL) 50 mg tablet   Yes No      Facility-Administered Medications: None       Past Medical History:   Diagnosis Date   • Anxiety    • Depression    • Diabetes mellitus ([facility] [address] )    • Drug use    • Hemorrhoids, internal 9/23/2019   • Hypertension        Past Surgical History:   Procedure Laterality Date   • FEMUR FRACTURE SURGERY Right    • GASTRIC BYPASS  11/08/2011    Managed by: Torres Mccarty (General Surgery)   • HERNIA REPAIR  02/13/2013    Incisional hernia repair Managed by: Torres Mccarty (General Surgery)   • TONSILLECTOMY  2010       Family History   Problem Relation Age of Onset   • Diabetes Mother    • Hypertension Mother    • Hypertension Father    • Autoimmune disease Son      I have reviewed and agree with the history as documented      E-Cigarette/Vaping   • E-Cigarette Use Never User      E-Cigarette/Vaping Substances   • Nicotine No    • THC No    • CBD No    • Flavoring No    • Other No    • Unknown No      Social History     Tobacco Use   • Smoking status: Never   • Smokeless tobacco: Never   Vaping Use   • Vaping Use: Never used   Substance Use Topics   • Alcohol use: Never   • Drug use: Not Currently       Review of Systems   Respiratory: Negative for chest tightness and shortness of breath  Cardiovascular: Negative for chest pain  Gastrointestinal: Negative for nausea and vomiting  Neurological: Negative for dizziness, seizures and headaches  Physical Exam  Physical Exam  Vitals reviewed  Constitutional:       Appearance: Normal appearance  He is normal weight  HENT:      Head: Normocephalic and atraumatic  Right Ear: External ear normal       Left Ear: External ear normal       Nose: Nose normal    Eyes:      Conjunctiva/sclera: Conjunctivae normal    Cardiovascular:      Rate and Rhythm: Normal rate  Pulmonary:      Effort: Pulmonary effort is normal    Musculoskeletal:         General: Normal range of motion  Cervical back: Normal range of motion  Skin:     General: Skin is warm and dry  Neurological:      Mental Status: He is alert  Vital Signs  ED Triage Vitals [01/17/23 1128]   Temperature Pulse Respirations Blood Pressure SpO2   (!) 97 3 °F (36 3 °C) 88 18 (!) 172/78 97 %      Temp Source Heart Rate Source Patient Position - Orthostatic VS BP Location FiO2 (%)   Tympanic Monitor Sitting Left arm --      Pain Score       No Pain           Vitals:    01/17/23 1128   BP: (!) 172/78   Pulse: 88   Patient Position - Orthostatic VS: Sitting         Visual Acuity      ED Medications  Medications - No data to display    Diagnostic Studies  Results Reviewed     None                 No orders to display              Procedures  Procedures         ED Course                                             Medical Decision Making  Refills of his Depakote and Klonopin  I explained to patient that I would not refill his Klonopin due to it being a controlled substance as well as him recently having refilled for 14 days, and there are 11 pills missing    Patient is either abusing ideations, or diverting them  I did refill patient's Depakote, and encouraged patient to follow-up with his family doctor for refills, as there will be another provider covering his PCP    Medication refill: complicated acute illness or injury  Amount and/or Complexity of Data Reviewed  External Data Reviewed: notes  Details: Last ED visit      Risk  Prescription drug management  Disposition  Final diagnoses:   Medication refill     Time reflects when diagnosis was documented in both MDM as applicable and the Disposition within this note     Time User Action Codes Description Comment    1/17/2023 11:38 AM Michael Voss Add [Z76 0] Medication refill       ED Disposition     ED Disposition   Discharge    Condition   Stable    Date/Time   Tue Jan 17, 2023 11:38 AM    [address] discharge to home/self care                 Follow-up Information     Follow up With Specialties Details Why Contact Info Jonathan Saravia 2, [address] Nurse Practitioner   [address]  [address] [address] [address]  [address] [place] [address]  [phone]       [address] [place] [address] Emergency Department Emergency Medicine  As needed, If symptoms worsen [address] [id number]  [address] Emergency Department, [address] [place] [address], [address]          Discharge Medication List as of 1/17/2023 11:40 AM      CONTINUE these medications which have CHANGED    Details   divalproex sodium (Depakote) 500 mg EC tablet Take 1 tablet (500 mg total) by mouth every 12 (twelve) hours for 14 days, Starting Tue 1/17/2023, Until Tue 1/31/2023, Normal         CONTINUE these medications which have NOT CHANGED    Details   acetaminophen (TYLENOL) 325 mg tablet Take 2 tablets (650 mg total) by mouth every 6 (six) hours as needed for mild pain (pain), Starting Thu 1/7/2021, Normal      capsicum (ZOSTRIX) 0 075 % topical cream Apply topically 3 (three) times a day, Starting Thu 1/7/2021, Normal      clonazePAM (KlonoPIN) 0 5 mg tablet Take 1 tablet (0 5 mg total) by mouth 3 (three) times a day for 14 days, Starting Tue 1/10/2023, Until Tue 1/24/2023, Normal      Continuous Blood Gluc Sensor (FREESTYLE STEVEN 14 DAY SENSOR) MISC 1 applicator by Does not apply route every 14 (fourteen) days, Starting Wed 4/3/2019, Normal      divalproex sodium (DEPAKOTE ER) 500 mg 24 hr tablet 500 mg every 12 (twelve) hours , Starting Wed 1/29/2020, Historical Med      doxepin (SINEquan) 25 mg capsule Starting Wed 1/29/2020, Historical Med      ergocalciferol (ERGOCALCIFEROL) 1 25 MG (23053 UT) capsule Take 50,000 Units by mouth, Starting Thu 12/5/2019, Until Thu 2/27/2020, Historical Med      fluticasone (FLONASE) 50 mcg/act nasal spray instill 1 spray into each nostril twice a day, Normal      gabapentin (NEURONTIN) 300 mg capsule Starting Fri 7/23/2021, Historical Med      GLOBAL EASE INJECT PEN NEEDLES 31G X 8 MM MISC USE AS DIRECTED TO INJECT INSULIN DAILY, Historical Med      glycerin-hypromellose- (ARTIFICIAL TEARS) 0 2-0 2-1 % SOLN Apply to eye, Historical Med      !! Incontinence Supply Disposable (INCONTINENCE BRIEF MEDIUM) MISC by Does not apply route as needed (fecal soiling), Starting Wed 12/4/2019, Print      !! Incontinence Supply Disposable (RA WIPES FLUSHABLE/MOIST) MISC by Does not apply route as needed (fecal soiling), Starting Wed 12/4/2019, Print      insulin glargine (LANTUS SOLOSTAR) 100 units/mL injection pen Inject under the skin, Historical Med      insulin glargine (LANTUS) 100 units/mL subcutaneous injection Inject 24 Units under the skin, Historical Med      insulin lispro (HUMALOG) 100 units/mL injection Inject 12 Units under the skin, Historical Med      methadone (DOLOPHINE) 10 MG/5ML solution Take 110 mg by mouth in the morning, Historical Med      Misc   Devices (CANE) MISC by Does not apply route daily Dx:  Frequent falls, Starting Mon 7/29/2019, Print      ONE TOUCH ULTRA TEST test strip Test twice daily, Normal      ONETOUCH DELICA LANCETS 37Q MISC USE TWICE A DAY (ICD 10 E11 9), Historical Med      OZEMPIC 1 MG/DOSE SOPN INJECT 1MG SUBCUTANEOUS EVERY WEEK, Historical Med      polyethylene glycol (GLYCOLAX) powder Take 17 g by mouth daily, Starting Wed 9/11/2019, Normal      polyethylene glycol (GOLYTELY) 4000 mL solution Take 4,000 mL by mouth once for 1 dose As directed for colonoscopy prep, Starting Thu 2/27/2020, Normal      !! QUEtiapine (SEROquel) 100 mg tablet Take 100 mg by mouth daily at bedtime, Starting Thu 12/5/2019, Historical Med      !! QUEtiapine (SEROquel) 50 mg tablet TAKE 1 TABLET BY MOUTH IN THE MORNING AND 1 AT NOON, Historical Med      rOPINIRole (REQUIP) 1 mg tablet Starting Fri 6/4/2021, Historical Med      SYNJARDY XR 12 5-1000 MG TB24 Take 1 tablet by mouth 2 (two) times a day, Starting Fri 12/20/2019, Historical Med      testosterone cypionate (DEPO-TESTOSTERONE) 200 mg/mL SOLN INJECT 1CC INTRAMUSCULARLY EVERY 2 WEEKS, Historical Med      traZODone (DESYREL) 50 mg tablet Starting Fri 6/4/2021, Historical Med      Trulicity 1 5 KE/8 9PD SOPN INJECT 1 5MG SUBCUTANEOUS ONCE EVERY WEEK, Historical Med      UNKNOWN TO PATIENT Historical Med       !! - Potential duplicate medications found  Please discuss with provider  No discharge procedures on file      PDMP Review       Value Time User    PDMP Reviewed  Yes 10/6/2021 12:21 PM Ana Terrell MD          ED Provider  Electronically Signed by           Morales Aly PA-C  01/17/23 2992

## 2023-01-17 NOTE — Clinical Note
Zac Varner was seen and treated in our emergency department on 1/17/2023  No restrictions            Diagnosis:     David    He may return on this date: 01/18/2023         If you have any questions or concerns, please don't hesitate to call        Homero Hobbs PA-C    ______________________________           _______________          _______________  Hospital Representative                              Date                                Time

## 2023-01-23 ENCOUNTER — OFFICE VISIT (OUTPATIENT)
Dept: FAMILY MEDICINE CLINIC | Facility: CLINIC | Age: 63
End: 2023-01-23

## 2023-01-23 VITALS
OXYGEN SATURATION: 96 % | HEIGHT: 68 IN | RESPIRATION RATE: 16 BRPM | SYSTOLIC BLOOD PRESSURE: 133 MMHG | TEMPERATURE: 99 F | DIASTOLIC BLOOD PRESSURE: 66 MMHG | BODY MASS INDEX: 24.61 KG/M2 | WEIGHT: 162.4 LBS | HEART RATE: 77 BPM

## 2023-01-23 DIAGNOSIS — Z13.0 SCREENING FOR DEFICIENCY ANEMIA: ICD-10-CM

## 2023-01-23 DIAGNOSIS — F31.9 BIPOLAR AFFECTIVE DISORDER, REMISSION STATUS UNSPECIFIED (HCC): ICD-10-CM

## 2023-01-23 DIAGNOSIS — Z02.83 ENCOUNTER FOR DRUG SCREENING: ICD-10-CM

## 2023-01-23 DIAGNOSIS — R80.9 TYPE 2 DIABETES MELLITUS WITH MICROALBUMINURIA, WITHOUT LONG-TERM CURRENT USE OF INSULIN (HCC): Primary | ICD-10-CM

## 2023-01-23 DIAGNOSIS — J30.1 SEASONAL ALLERGIC RHINITIS DUE TO POLLEN: ICD-10-CM

## 2023-01-23 DIAGNOSIS — F11.10 NARCOTIC ABUSE (HCC): ICD-10-CM

## 2023-01-23 DIAGNOSIS — E11.29 TYPE 2 DIABETES MELLITUS WITH MICROALBUMINURIA, WITHOUT LONG-TERM CURRENT USE OF INSULIN (HCC): Primary | ICD-10-CM

## 2023-01-23 DIAGNOSIS — E11.65 UNCONTROLLED TYPE 2 DIABETES MELLITUS WITH HYPERGLYCEMIA (HCC): ICD-10-CM

## 2023-01-23 DIAGNOSIS — Z13.29 SCREENING FOR HYPOTHYROIDISM: ICD-10-CM

## 2023-01-23 LAB
CREAT UR-MCNC: 203 MG/DL
MICROALBUMIN UR-MCNC: 1080 MG/L (ref 0–20)
MICROALBUMIN/CREAT 24H UR: 532 MG/G CREATININE (ref 0–30)
SL AMB POCT HEMOGLOBIN AIC: 9.4 (ref ?–6.5)

## 2023-01-23 RX ORDER — FOLIC ACID 1 MG/1
TABLET ORAL
COMMUNITY
Start: 2022-11-28

## 2023-01-23 RX ORDER — FLUTICASONE PROPIONATE 50 MCG
1 SPRAY, SUSPENSION (ML) NASAL DAILY
Qty: 16 G | Refills: 5 | Status: SHIPPED | OUTPATIENT
Start: 2023-01-23

## 2023-01-23 RX ORDER — MOXIFLOXACIN HYDROCHLORIDE 400 MG/1
TABLET ORAL
COMMUNITY
Start: 2022-11-29 | End: 2023-01-23

## 2023-01-23 RX ORDER — DOCUSATE SODIUM 100 MG/1
CAPSULE, LIQUID FILLED ORAL
COMMUNITY
Start: 2022-10-24

## 2023-01-23 RX ORDER — MELOXICAM 7.5 MG/1
TABLET ORAL
COMMUNITY
Start: 2022-11-06

## 2023-01-23 RX ORDER — LANOLIN ALCOHOL/MO/W.PET/CERES
CREAM (GRAM) TOPICAL
COMMUNITY
Start: 2022-11-22

## 2023-01-23 RX ORDER — FERROUS SULFATE 325(65) MG
TABLET ORAL
COMMUNITY
Start: 2022-11-28

## 2023-01-23 RX ORDER — DIVALPROEX SODIUM 500 MG/1
500 TABLET, EXTENDED RELEASE ORAL EVERY 12 HOURS
Qty: 60 TABLET | Refills: 5 | Status: SHIPPED | OUTPATIENT
Start: 2023-01-23 | End: 2023-02-22

## 2023-01-23 RX ORDER — DOXEPIN HYDROCHLORIDE 150 MG/1
CAPSULE ORAL
COMMUNITY
Start: 2022-10-21

## 2023-01-23 RX ORDER — PEN NEEDLE, DIABETIC 31 GX5/16"
NEEDLE, DISPOSABLE MISCELLANEOUS
Refills: 0 | Status: CANCELLED | OUTPATIENT
Start: 2023-01-23

## 2023-01-23 NOTE — TELEPHONE ENCOUNTER
Medication Refill Request     Name  ONE TOUCH ULTRA TEST test strip  Dose/Frequency Test twice daily  Quantity 30 each  Verified pharmacy   [x]  Verified ordering Provider   [x]  Does patient have enough for the next 3 days? Yes [] No [x]    Medication Re fill Request     Name [facility] LANCETS 59P MISC  Dose/Frequency USE TWICE A DAY (ICD 10 E11 9)  Quantity   Verified pharmacy   [x]  Verified ordering Provider   [x]  Does patient have enough for the next 3 days? Yes [] No [x]    Medication Refill Request     Name GLOBAL EASE INJECT PEN NEEDLES 31G X 8 MM MISC  Dose/Frequency USE AS DIRECTED TO INJECT INSULIN DAILY  Quantity   Verified pharmacy   [x]  Verified ordering Provider   [x]  Does patient have enough for the next 3 days? Yes [] No [x]    Pharmacist also stated that patient will need a glucometer

## 2023-01-23 NOTE — PROGRESS NOTES
Assessment/Plan:    Bipolar disorder ([facility] [address] )  Given a refill of depakote  Type 2 diabetes mellitus with microalbuminuria, without long-term current use of insulin ([facility])    Lab Results   Component Value Date    HGBA1C 9 4 (A) 01/23/2023   Pt will reestablish care with endocrinology  Given a refill of lantus to restart at 15 and metformin  Fu in 3m  Narcotic abuse ([facility] [address] )  Pt is drug seeking today  He says he was discharged from the rehab on clonazepam and doesn't have an appt with psych until 2 months from now so he needs our office to fill this medication in the interim  Educated that I will not fill this medication until I have records  Most of the appointment today consisted of this same conversation, over and over again  A urine drug screen was collected today in the odd chance that he was actually discharged from a drug rehab on clonazepam         Diagnoses and all orders for this visit:    Type 2 diabetes mellitus with microalbuminuria, without long-term current use of insulin ([facility])  -     POCT hemoglobin A1c  -     Microalbumin / creatinine urine ratio  -     insulin glargine (LANTUS SOLOSTAR) 100 units/mL injection pen; Inject 15 Units under the skin daily at bedtime  -     metFORMIN (GLUCOPHAGE) 850 mg tablet; Take 1 tablet (850 mg total) by mouth daily with breakfast  -     Lipid panel; Future  -     Comprehensive metabolic panel; Future    Screening for hypothyroidism  -     TSH, 3rd generation with Free T4 reflex; Future    Screening for deficiency anemia  -     CBC and differential; Future    Seasonal allergic rhinitis due to pollen  -     fluticasone (FLONASE) 50 mcg/act nasal spray; 1 spray into each nostril daily    Bipolar affective disorder, remission status unspecified ([facility])  -     divalproex sodium (DEPAKOTE ER) 500 mg 24 hr tablet;  Take 1 tablet (500 mg total) by mouth every 12 (twelve) hours    Encounter for drug screening  -     [facility] All Prescribed Meds and Special Instructions  -     Amphetamines, Methamphetamines  -     Butalbital  -     Phenobarbital  -     Secobarbital  -     Temazepam  -     Alprazolam  -     Clonazepam  -     Diazepam  -     Lorazepam  -     Oxazepam  -     Gabapentin  -     Pregabalin  -     Cocaine  -     Heroin  -     Buprenorphine  -     Levorphanol  -     Meperidine  -     Naltrexone  -     Fentanyl  -     Methadone  -     Oxycodone  -     Oxymorphone  -     Tapentadol  -     THC  -     Tramadol  -     Codeine, Hydrocodone, Hydropmorphone, Morphine  -     Bath Salts  -     Ethyl Glucuronide/Ethyl Sulfate  -     Kratom  -     Spice  -     Methylphenidate  -     Phentermine  -     Validity Oxidant  -     Validity Creatinine  -     Validity pH  -     Validity Specific    Narcotic abuse (HCC)    Other orders  -     docusate sodium (COLACE) 100 mg capsule  -     doxepin (SINEquan) 150 MG capsule;  (Patient not taking: Reported on 1/23/2023)  -     FeroSul 325 (65 Fe) MG tablet;  (Patient not taking: Reported on 8/28/9234)  -     folic acid (FOLVITE) 1 mg tablet  -     melatonin 3 mg  -     Discontinue: metFORMIN (GLUCOPHAGE) 850 mg tablet  -     meloxicam (MOBIC) 7 5 mg tablet  -     Discontinue: moxifloxacin (AVELOX) 400 MG tablet;  (Patient not taking: Reported on 1/23/2023)        Subjective: Medication refill     Patient ID: Chantell Ours is a 63 y o  male with drug abuse issues, history of hepatitis C, diabetes mellitus type 2, hypogonadism, atrial fibrillation, hypertension, BPH, ADHD, bipolar disorder, pain chronic pain disorder, insomnia, hyperlipidemia, history of gastric bypass    HPI  The patient's PCP is out on maternity leave  He was last seen by our office July 2021 after a fall  The patient previously went to a methadone clinic  He has not reestablished care since he was discharged from drug rehab  He denies currently taking methadone  He is very adamantly demanding a prescription for clonazepam today    He was previously getting this from Psychiatry, Dr Kirby Rae  Our office has never filled this  He was in the ED 1/2022 and 11/27/22 for an overdose  The last refill for clonazepam was September 25, 2021  He was previously prescribed clonazepam 0 5 mg twice daily  He was seen in the emergency room for medication refills and was given clonazepam 0 5 mg 3 times daily on 1/10/23  Pt reports he was at a inpatient rehab at [facility] in [facility] for alcohol abuse for 5 5 months  He was discharged 12/8/22  He is requesting depakote 500 bid, flonase, clonazepam 0 5tid, metformin 850 qd  He is going to be set up with psychiatry in 2 months and he wants our office to refill clonazepam until then  POCT a1c 9 4  He ran out of metformin, novalog, levemir        The following portions of the patient's history were reviewed and updated as appropriate: allergies, current medications, past family history, past medical history, past social history, past surgical history and problem list     Review of Systems   Constitutional: Negative for activity change, appetite change, fever and unexpected weight change  HENT: Negative for ear pain, postnasal drip and rhinorrhea  Eyes: Negative for photophobia and pain  Respiratory: Negative for cough, shortness of breath and wheezing  Cardiovascular: Negative for chest pain, palpitations and leg swelling  Gastrointestinal: Negative for abdominal pain, blood in stool, nausea and vomiting  Endocrine: Negative for polydipsia and polyuria  Genitourinary: Negative for difficulty urinating, hematuria and urgency  Musculoskeletal: Negative for myalgias  Skin: Negative for rash  Neurological: Negative for dizziness  Psychiatric/Behavioral: Negative for confusion           PHQ-2/9 Depression Screening         [unfilled]    Objective:      /66 (BP Location: Left arm, Patient Position: Sitting, Cuff Size: Adult)   Pulse 77   Temp 99 °F (37 2 °C) (Tympanic)   Resp 16   Ht 5' 8" (1 727 m) Wt 73 7 kg (162 lb 6 4 oz)   SpO2 96%   BMI 24 69 kg/m²          Physical Exam  Constitutional:       Appearance: He is well-developed  HENT:      Head: Normocephalic and atraumatic  Right Ear: External ear normal       Left Ear: External ear normal       Mouth/Throat:      Pharynx: No oropharyngeal exudate  Eyes:      Conjunctiva/sclera: Conjunctivae normal       Pupils: Pupils are equal, round, and reactive to light  Cardiovascular:      Rate and Rhythm: Normal rate and regular rhythm  Heart sounds: Normal heart sounds  No murmur heard  No friction rub  No gallop  Pulmonary:      Effort: Pulmonary effort is normal  No respiratory distress  Breath sounds: Normal breath sounds  No wheezing or rales  Chest:      Chest wall: No tenderness  Abdominal:      General: Bowel sounds are normal  There is no distension  Palpations: Abdomen is soft  There is no mass  Tenderness: There is no abdominal tenderness  There is no rebound  Musculoskeletal:         General: Normal range of motion  Cervical back: Normal range of motion and neck supple  Skin:     General: Skin is warm and dry  Neurological:      Mental Status: He is alert and oriented to person, place, and time

## 2023-01-24 DIAGNOSIS — E11.65 UNCONTROLLED TYPE 2 DIABETES MELLITUS WITH HYPERGLYCEMIA (HCC): ICD-10-CM

## 2023-01-24 RX ORDER — FLASH GLUCOSE SENSOR
1 KIT MISCELLANEOUS
Qty: 2 EACH | Refills: 5 | Status: SHIPPED | OUTPATIENT
Start: 2023-01-24

## 2023-01-24 RX ORDER — PEN NEEDLE, DIABETIC 31 GX5/16"
NEEDLE, DISPOSABLE MISCELLANEOUS
Qty: 100 EACH | Refills: 5 | Status: SHIPPED | OUTPATIENT
Start: 2023-01-24

## 2023-01-25 LAB
6MAM UR QL CFM: NEGATIVE NG/ML
7AMINOCLONAZEPAM UR QL CFM: NORMAL NG/ML
A-OH ALPRAZ UR QL CFM: NEGATIVE NG/ML
ACCEPTABLE CREAT UR QL: NORMAL MG/DL
ACCEPTIBLE SP GR UR QL: NORMAL
AMPHET UR QL CFM: NEGATIVE NG/ML
BUPRENORPHINE UR QL CFM: NEGATIVE NG/ML
BUTALBITAL UR QL CFM: NEGATIVE NG/ML
BZE UR QL CFM: NEGATIVE NG/ML
CODEINE UR QL CFM: NEGATIVE NG/ML
EDDP UR QL CFM: ABNORMAL NG/ML
ETHYL GLUCURONIDE UR QL CFM: NEGATIVE NG/ML
ETHYL SULFATE UR QL SCN: NEGATIVE NG/ML
EUTYLONE UR QL: NEGATIVE NG/ML
FENTANYL UR QL CFM: NEGATIVE NG/ML
GLIADIN IGG SER IA-ACNC: NEGATIVE NG/ML
HYDROCODONE UR QL CFM: NEGATIVE NG/ML
HYDROMORPHONE UR QL CFM: NEGATIVE NG/ML
LORAZEPAM UR QL CFM: NEGATIVE NG/ML
ME-PHENIDATE UR QL CFM: NEGATIVE NG/ML
MEPERIDINE UR QL CFM: NEGATIVE NG/ML
METHADONE UR QL CFM: ABNORMAL NG/ML
METHAMPHET UR QL CFM: NEGATIVE NG/ML
MORPHINE UR QL CFM: NEGATIVE NG/ML
NALTREXONE UR QL CFM: NEGATIVE NG/ML
NITRITE UR QL: NORMAL UG/ML
NORBUPRENORPHINE UR QL CFM: NEGATIVE NG/ML
NORDIAZEPAM UR QL CFM: NEGATIVE NG/ML
NORFENTANYL UR QL CFM: NEGATIVE NG/ML
NORHYDROCODONE UR QL CFM: NEGATIVE NG/ML
NORMEPERIDINE UR QL CFM: NEGATIVE NG/ML
NOROXYCODONE UR QL CFM: NEGATIVE NG/ML
OXAZEPAM UR QL CFM: NEGATIVE NG/ML
OXYCODONE UR QL CFM: NEGATIVE NG/ML
OXYMORPHONE UR QL CFM: NEGATIVE NG/ML
OXYMORPHONE UR QL CFM: NEGATIVE NG/ML
PARA-FLUOROFENTANYL QUANTIFICATION: NORMAL NG/ML
PHENOBARB UR QL CFM: NEGATIVE NG/ML
RESULT ALL_PRESCRIBED MEDS AND SPECIAL INSTRUCTIONS: NORMAL
SECOBARBITAL UR QL CFM: NEGATIVE NG/ML
SL AMB 4-ANPP QUANTIFICATION: NORMAL NG/ML
SL AMB 5F-ADB-M7 METABOLITE QUANTIFICATION: NEGATIVE NG/ML
SL AMB 7-OH-MITRAGYNINE (KRATOM ALKALOID) QUANTIFICATION: NEGATIVE NG/ML
SL AMB AB-FUBINACA-M3 METABOLITE QUANTIFICATION: NEGATIVE NG/ML
SL AMB ACETYL FENTANYL QUANTIFICATION: NORMAL NG/ML
SL AMB ACETYL NORFENTANYL QUANTIFICATION: NORMAL NG/ML
SL AMB ACRYL FENTANYL QUANTIFICATION: NORMAL NG/ML
SL AMB CARFENTANIL QUANTIFICATION: NORMAL NG/ML
SL AMB CTHC (MARIJUANA METABOLITE) QUANTIFICATION: ABNORMAL NG/ML
SL AMB DEXTRORPHAN (DEXTROMETHORPHAN METABOLITE) QUANT: NEGATIVE NG/ML
SL AMB GABAPENTIN QUANTIFICATION: ABNORMAL
SL AMB JWH018 METABOLITE QUANTIFICATION: NEGATIVE NG/ML
SL AMB JWH073 METABOLITE QUANTIFICATION: NEGATIVE NG/ML
SL AMB MDMB-FUBINACA-M1 METABOLITE QUANTIFICATION: NEGATIVE NG/ML
SL AMB METHYLONE QUANTIFICATION: NEGATIVE NG/ML
SL AMB N-DESMETHYL-TRAMADOL QUANTIFICATION: NEGATIVE NG/ML
SL AMB PHENTERMINE QUANTIFICATION: NEGATIVE NG/ML
SL AMB PREGABALIN QUANTIFICATION: NEGATIVE
SL AMB RCS4 METABOLITE QUANTIFICATION: NEGATIVE NG/ML
SL AMB RITALINIC ACID QUANTIFICATION: NEGATIVE NG/ML
SMOOTH MUSCLE AB TITR SER IF: NEGATIVE NG/ML
SPECIMEN DRAWN SERPL: NEGATIVE NG/ML
SPECIMEN PH ACCEPTABLE UR: NORMAL
TAPENTADOL UR QL CFM: NEGATIVE NG/ML
TEMAZEPAM UR QL CFM: NEGATIVE NG/ML
TEMAZEPAM UR QL CFM: NEGATIVE NG/ML
TRAMADOL UR QL CFM: NEGATIVE NG/ML
URATE/CREAT 24H UR: NEGATIVE NG/ML

## 2023-01-26 ENCOUNTER — HOSPITAL ENCOUNTER (EMERGENCY)
Facility: HOSPITAL | Age: 63
Discharge: HOME | End: 2023-01-26
Attending: EMERGENCY MEDICINE

## 2023-01-26 VITALS
OXYGEN SATURATION: 99 % | HEIGHT: 68 IN | DIASTOLIC BLOOD PRESSURE: 81 MMHG | BODY MASS INDEX: 25.76 KG/M2 | SYSTOLIC BLOOD PRESSURE: 185 MMHG | WEIGHT: 170 LBS | TEMPERATURE: 98.3 F | HEART RATE: 87 BPM | RESPIRATION RATE: 18 BRPM

## 2023-01-26 DIAGNOSIS — Z76.0 ENCOUNTER FOR MEDICATION REFILL: Primary | ICD-10-CM

## 2023-01-26 DIAGNOSIS — E11.29 TYPE 2 DIABETES MELLITUS WITH MICROALBUMINURIA, WITHOUT LONG-TERM CURRENT USE OF INSULIN (HCC): Primary | ICD-10-CM

## 2023-01-26 DIAGNOSIS — R80.9 TYPE 2 DIABETES MELLITUS WITH MICROALBUMINURIA, WITHOUT LONG-TERM CURRENT USE OF INSULIN (HCC): Primary | ICD-10-CM

## 2023-01-26 RX ORDER — ONDANSETRON 4 MG/1
4 TABLET, ORALLY DISINTEGRATING ORAL ONCE
Status: DISCONTINUED | OUTPATIENT
Start: 2023-01-26 | End: 2023-01-27 | Stop reason: HOSPADM

## 2023-01-26 RX ORDER — FLASH GLUCOSE SENSOR
1 KIT MISCELLANEOUS
Qty: 2 EACH | Refills: 5 | Status: SHIPPED | OUTPATIENT
Start: 2023-01-26 | End: 2023-01-31 | Stop reason: SDUPTHER

## 2023-01-26 RX ORDER — FLASH GLUCOSE SCANNING READER
1 EACH MISCELLANEOUS
Qty: 1 EACH | Refills: 0 | Status: SHIPPED | OUTPATIENT
Start: 2023-01-26 | End: 2023-01-31 | Stop reason: SDUPTHER

## 2023-01-26 NOTE — TELEPHONE ENCOUNTER
I explained to the patient that I would not prescribe clonazepam because it was not on his discharge medication list from the rehab facility  The last time he was prescribed clonazepam was about a year ago from his psychiatrist  He was to continue methadone per discharge paperwork  At the last appointment he stated he was no longer going to the methadone clinic  A drug screen was done that day that was positive for clonazepam which he received as a temporary script from the ED, methadone and marijuana  He will not be receiving clonazepam or any other controlled medication from this office

## 2023-01-26 NOTE — TELEPHONE ENCOUNTER
Patient called asking for a prescription for the Freestyle lebre to be sent to the pharmacy, patient is also asking for a prescription for Klonopin I did tell patient that you would not be able prescribe this medication   Patient stated " I am just going to go to the hospital and act crazy because this is a mental illness medication "

## 2023-01-27 DIAGNOSIS — R80.9 TYPE 2 DIABETES MELLITUS WITH MICROALBUMINURIA, WITHOUT LONG-TERM CURRENT USE OF INSULIN (HCC): Primary | ICD-10-CM

## 2023-01-27 DIAGNOSIS — E11.29 TYPE 2 DIABETES MELLITUS WITH MICROALBUMINURIA, WITHOUT LONG-TERM CURRENT USE OF INSULIN (HCC): Primary | ICD-10-CM

## 2023-01-27 NOTE — ED PROVIDER NOTES
History  Chief Complaint   Patient presents with   • Medication Refill     Patient states he is here 0 5mg clonazepam TID  Carine Pineda is a 63year old male presenting for a medication refill  The patient is requesting that we refill his Klonopin prescription, the patient takes 0 5 mg 3 times daily  He describes that he has been taking Klonopin for over 11 years, he states that he was initially prescribed it by psychiatry, unfortunately he says his psychiatric facility closed, he has been getting prescriptions from a family medicine doctor, he says that his doctor is on maternity leave currently and he cannot get any refills  Per chart review however it appears that family medicine simply did not want to prescribe the medication to the patient given history of substance abuse  He states concerns about withdrawal symptoms, patient has previously had diarrhea, vomiting, tremors when he does not take his Klonopin  He says that he is trying to establish care with another psych facility in the area but has been unsuccessful  He has no other concerns at this time  History provided by:  Patient   used: No        Prior to Admission Medications   Prescriptions Last Dose Informant Patient Reported? Taking?    Continuous Blood Gluc  (FreeStyle Grace 14 Day [place]) STEVE   No No   Si Device by Device route 3 (three) times a day before meals   Continuous Blood Gluc Sensor (FreeStyle Grace 14 Day Sensor) MISC   No No   Sig: Use 1 applicator every 14 (fourteen) days   Continuous Blood Gluc Sensor (FreeStyle Grace 14 Day Sensor) MISC   No No   Sig: Inject 1 Device under the skin every 14 (fourteen) days   FeroSul 325 (65 Fe) MG tablet   Yes No   Patient not taking: Reported on 2023   GLOBAL EASE INJECT PEN NEEDLES 31G X 8 MM MISC   Yes No   Sig: USE AS DIRECTED TO INJECT INSULIN DAILY   Incontinence Supply Disposable (INCONTINENCE BRIEF MEDIUM) MISC   No No   Sig: by Does not apply route as needed (fecal soiling)   Incontinence Supply Disposable (RA WIPES FLUSHABLE/MOIST) MISC   No No   Sig: by Does not apply route as needed (fecal soiling)   Insulin Pen Needle (B-D UF III MINI PEN NEEDLES) 31G X 5 MM MISC   No No   Sig: Inject 15 Units under the skin daily at bedtime   Misc   Devices (CANE) MISC   No No   Sig: by Does not apply route daily Dx:  Frequent falls   ONE TOUCH ULTRA TEST test strip   No No   Sig: Test twice daily   Patient not taking: Reported on 3349   [facility] LANCETS 42N MISC   Yes No   Sig: USE TWICE A DAY (ICD 10 E11 9)   SYNJARDY XR 12 5-1000 MG TB24   Yes No   Sig: Take 1 tablet by mouth 2 (two) times a day   Patient not taking: Reported on 2023   UNKNOWN TO PATIENT   Yes No   acetaminophen (TYLENOL) 325 mg tablet   No No   Sig: Take 2 tablets (650 mg total) by mouth every 6 (six) hours as needed for mild pain (pain)   Patient not taking: Reported on 2021   capsicum (ZOSTRIX) 0 075 % topical cream   No No   Sig: Apply topically 3 (three) times a day   Patient not taking: Reported on 2023   clonazePAM (KlonoPIN) 0 5 mg tablet   No No   Sig: Take 1 tablet (0 5 mg total) by mouth 3 (three) times a day for 14 days   divalproex sodium (DEPAKOTE ER) 500 mg 24 hr tablet   No No   Sig: Take 1 tablet (500 mg total) by mouth every 12 (twelve) hours   docusate sodium (COLACE) 100 mg capsule   Yes No   doxepin (SINEquan) 150 MG capsule   Yes No   Patient not taking: Reported on 2023   ergocalciferol (ERGOCALCIFEROL) 1 25 MG (36131 UT) capsule   Yes No   Sig: Take 50,000 Units by mouth   fluticasone (FLONASE) 50 mcg/act nasal spray   No No   Si spray into each nostril daily   folic acid (FOLVITE) 1 mg tablet   Yes No   gabapentin (NEURONTIN) 300 mg capsule   Yes No   glycerin-hypromellose- (ARTIFICIAL TEARS) 0 2-0 2-1 % SOLN   Yes No   Sig: Apply to eye   insulin glargine (LANTUS SOLOSTAR) 100 units/mL injection pen   No No   Sig: Inject 15 Units under the skin daily at bedtime   insulin lispro (HumaLOG) 100 units/mL injection   Yes No   Sig: Inject 12 Units under the skin   Patient not taking: Reported on 1/23/2023   melatonin 3 mg   Yes No   meloxicam (MOBIC) 7 5 mg tablet   Yes No   metFORMIN (GLUCOPHAGE) 850 mg tablet   No No   Sig: Take 1 tablet (850 mg total) by mouth daily with breakfast   polyethylene glycol (GLYCOLAX) powder   No No   Sig: Take 17 g by mouth daily   polyethylene glycol (GOLYTELY) 4000 mL solution   No No   Sig: Take 4,000 mL by mouth once for 1 dose As directed for colonoscopy prep   testosterone cypionate (DEPO-TESTOSTERONE) 200 mg/mL SOLN   Yes No   Sig: INJECT 1CC INTRAMUSCULARLY EVERY 2 WEEKS   Patient not taking: No sig reported   traZODone (DESYREL) 50 mg tablet   Yes No      Facility-Administered Medications: None       Past Medical History:   Diagnosis Date   • Anxiety    • Depression    • Diabetes mellitus ([facility] [address] )    • Drug use    • Hemorrhoids, internal 9/23/2019   • Hypertension        Past Surgical History:   Procedure Laterality Date   • FEMUR FRACTURE SURGERY Right    • GASTRIC BYPASS  11/08/2011    Managed by: John Becerra (General Surgery)   • HERNIA REPAIR  02/13/2013    Incisional hernia repair Managed by: John Becerra (General Surgery)   • TONSILLECTOMY  2010       Family History   Problem Relation Age of Onset   • Diabetes Mother    • Hypertension Mother    • Hypertension Father    • Autoimmune disease Son      I have reviewed and agree with the history as documented  E-Cigarette/Vaping   • E-Cigarette Use Never User      E-Cigarette/Vaping Substances   • Nicotine No    • THC No    • CBD No    • Flavoring No    • Other No    • Unknown No      Social History     Tobacco Use   • Smoking status: Never   • Smokeless tobacco: Never   Vaping Use   • Vaping Use: Never used   Substance Use Topics   • Alcohol use: Never   • Drug use: Not Currently        Review of Systems   Constitutional: Negative for chills and fever  HENT: Negative for ear pain and sore throat  Eyes: Negative for pain and visual disturbance  Respiratory: Negative for cough and shortness of breath  Cardiovascular: Negative for chest pain and palpitations  Gastrointestinal: Negative for abdominal pain, nausea and vomiting  Genitourinary: Negative for dysuria and hematuria  Musculoskeletal: Negative for arthralgias and back pain  Skin: Negative for color change and rash  Neurological: Negative for tremors, seizures and syncope  All other systems reviewed and are negative  Physical Exam  ED Triage Vitals [01/26/23 1813]   Temperature Pulse Respirations Blood Pressure SpO2   98 3 °F (36 8 °C) 87 18 (!) 185/81 99 %      Temp Source Heart Rate Source Patient Position - Orthostatic VS BP Location FiO2 (%)   Oral Monitor Sitting Right arm --      Pain Score       No Pain             Orthostatic Vital Signs  Vitals:    01/26/23 1813   BP: (!) 185/81   Pulse: 87   Patient Position - Orthostatic VS: Sitting       Physical Exam  Vitals and nursing note reviewed  Constitutional:       General: He is not in acute distress  Appearance: Normal appearance  HENT:      Head: Normocephalic and atraumatic  Right Ear: External ear normal       Left Ear: External ear normal       Mouth/Throat:      Mouth: Mucous membranes are moist       Pharynx: Oropharynx is clear  Eyes:      General: No scleral icterus  Conjunctiva/sclera: Conjunctivae normal    Cardiovascular:      Rate and Rhythm: Normal rate and regular rhythm  Pulses: Normal pulses  Heart sounds: Normal heart sounds  Pulmonary:      Effort: Pulmonary effort is normal  No respiratory distress  Breath sounds: Normal breath sounds  Abdominal:      General: Abdomen is flat  There is no distension  Tenderness: There is no abdominal tenderness  Musculoskeletal:         General: No tenderness or signs of injury  Cervical back: Neck supple  No rigidity  Skin:     General: Skin is warm  Coloration: Skin is not jaundiced  Findings: No erythema or rash  Neurological:      General: No focal deficit present  Mental Status: He is alert  Mental status is at baseline  Psychiatric:         Mood and Affect: Mood normal          Behavior: Behavior normal          ED Medications  Medications   ondansetron (ZOFRAN-ODT) dispersible tablet 4 mg (4 mg Oral Not Given 1/26/23 2222)       Diagnostic Studies  Results Reviewed     None                 No orders to display         Procedures  Procedures      ED [address] Coburn is a 63year old male presenting for a medication refill  Patient was asking for a refill of his Klonopin  Patient has a history of substance abuse, it appears that his family doctor did not want to prescribe him these medications due to concerns of his prior substance abuse  Patient was not exhibiting any withdrawal symptoms  He was hypertensive, otherwise vital signs were unremarkable  Physical exam was unremarkable  At this time, we did not feel comfortable refilling his Klonopin and advised that he follow-up with psychiatry  Patient became agitated after he was told this  Encounter for medication refill: acute illness or injury  Risk  Prescription drug management  Disposition  Final diagnoses:   Encounter for medication refill     Time reflects when diagnosis was documented in both MDM as applicable and the Disposition within this note     Time User Action Codes Description Comment    1/26/2023 10:25 PM Trang Dash Add [Z76 0] Encounter for medication refill       ED Disposition     ED Disposition   Discharge    Condition   Stable    Date/Time   Thu Jan 26, 2023 10:25 PM    [address] discharge to home/self care                 Follow-up Information     Follow up With Specialties Details Why [address], STEVE Family Medicine, Nurse Practitioner   [address] [facility] [address] [facility]  [phone]            Discharge Medication List as of 1/26/2023 10:35 PM      CONTINUE these medications which have NOT CHANGED    Details   acetaminophen (TYLENOL) 325 mg tablet Take 2 tablets (650 mg total) by mouth every 6 (six) hours as needed for mild pain (pain), Starting Thu 1/7/2021, Normal      capsicum (ZOSTRIX) 0 075 % topical cream Apply topically 3 (three) times a day, Starting Thu 1/7/2021, Normal      clonazePAM (KlonoPIN) 0 5 mg tablet Take 1 tablet (0 5 mg total) by mouth 3 (three) times a day for 14 days, Starting Tue 1/10/2023, Until Tue 1/24/2023, Normal      Continuous Blood Gluc  (FreeStyle Grace 14 Day [place]) STEVE 1 Device by Device route 3 (three) times a day before meals, Starting Thu 1/26/2023, Normal      !! Continuous Blood Gluc Sensor (FreeStyle Grace 14 Day Sensor) MISC Use 1 applicator every 14 (fourteen) days, Starting Tue 1/24/2023, Normal      !!  Continuous Blood Gluc Sensor (FreeStyle Grace 14 Day Sensor) MISC Inject 1 Device under the skin every 14 (fourteen) days, Starting Thu 1/26/2023, Normal      divalproex sodium (DEPAKOTE ER) 500 mg 24 hr tablet Take 1 tablet (500 mg total) by mouth every 12 (twelve) hours, Starting Mon 1/23/2023, Until Wed 2/22/2023, Normal      docusate sodium (COLACE) 100 mg capsule Starting Mon 10/24/2022, Historical Med      doxepin (SINEquan) 150 MG capsule Starting Fri 10/21/2022, Historical Med      ergocalciferol (ERGOCALCIFEROL) 1 25 MG (65551 UT) capsule Take 50,000 Units by mouth, Starting Thu 12/5/2019, Until Thu 2/27/2020, Historical Med      FeroSul 325 (65 Fe) MG tablet Starting Mon 11/28/2022, Historical Med      fluticasone (FLONASE) 50 mcg/act nasal spray 1 spray into each nostril daily, Starting Mon 3/88/0454, Normal      folic acid (FOLVITE) 1 mg tablet Starting Mon 11/28/2022, Historical Med      gabapentin (NEURONTIN) 300 mg capsule Starting Fri 7/23/2021, Historical Med      !! GLOBAL EASE INJECT PEN NEEDLES 31G X 8 MM MISC USE AS DIRECTED TO INJECT INSULIN DAILY, Historical Med      glycerin-hypromellose- (ARTIFICIAL TEARS) 0 2-0 2-1 % SOLN Apply to eye, Historical Med      !! Incontinence Supply Disposable (INCONTINENCE BRIEF MEDIUM) MISC by Does not apply route as needed (fecal soiling), Starting Wed 12/4/2019, Print      !! Incontinence Supply Disposable (RA WIPES FLUSHABLE/MOIST) MISC by Does not apply route as needed (fecal soiling), Starting Wed 12/4/2019, Print      insulin glargine (LANTUS SOLOSTAR) 100 units/mL injection pen Inject 15 Units under the skin daily at bedtime, Starting Mon 1/23/2023, Normal      insulin lispro (HumaLOG) 100 units/mL injection Inject 12 Units under the skin, Historical Med      !! Insulin Pen Needle (B-D UF III MINI PEN NEEDLES) 31G X 5 MM MISC Inject 15 Units under the skin daily at bedtime, Normal      melatonin 3 mg Starting Tue 11/22/2022, Historical Med      meloxicam (MOBIC) 7 5 mg tablet Starting Sun 11/6/2022, Historical Med      metFORMIN (GLUCOPHAGE) 850 mg tablet Take 1 tablet (850 mg total) by mouth daily with breakfast, Starting Mon 1/23/2023, Normal      Misc   Devices (CANE) MISC by Does not apply route daily Dx:  Frequent falls, Starting Mon 7/29/2019, Print      ONE TOUCH ULTRA TEST test strip Test twice daily, Normal      ONETOUCH DELICA LANCETS 70F MISC USE TWICE A DAY (ICD 10 E11 9), Historical Med      polyethylene glycol (GLYCOLAX) powder Take 17 g by mouth daily, Starting Wed 9/11/2019, Normal      polyethylene glycol (GOLYTELY) 4000 mL solution Take 4,000 mL by mouth once for 1 dose As directed for colonoscopy prep, Starting Thu 2/27/2020, Normal      SYNJARDY XR 12 5-1000 MG TB24 Take 1 tablet by mouth 2 (two) times a day, Starting Fri 12/20/2019, Historical Med      testosterone cypionate (DEPO-TESTOSTERONE) 200 mg/mL SOLN INJECT 1CC INTRAMUSCULARLY EVERY 2 WEEKS, Historical Med traZODone (DESYREL) 50 mg tablet Starting Fri 6/4/2021, Historical Med      UNKNOWN TO PATIENT Historical Med       !! - Potential duplicate medications found  Please discuss with provider  No discharge procedures on file  PDMP Review       Value Time User    PDMP Reviewed  Yes 1/18/2023 10:58 AM Dino Stern MD           ED Provider  Attending physically available and evaluated Lauro King  I managed the patient along with the ED Attending      Electronically Signed by         Castro Chacon DO  01/26/23 4574

## 2023-01-27 NOTE — ED ATTENDING ATTESTATION
1/26/2023  I, Bay Kuhn MD, saw and evaluated the patient  I have discussed the patient with the resident/non-physician practitioner and agree with the resident's/non-physician practitioner's findings, Plan of Care, and MDM as documented in the resident's/non-physician practitioner's note, except where noted  All available labs and Radiology studies were reviewed  I was present for key portions of any procedure(s) performed by the resident/non-physician practitioner and I was immediately available to provide assistance  At this point I agree with the current assessment done in the Emergency Department  I have conducted an independent evaluation of this patient a history and physical is as follows:    74-year-old male presented requesting clonazepam refill  States he was prescribed it after he got out of [facility] but has not had a psychiatry visit yet to continue his prescription  He was seen in the [facility] ED about 2 weeks ago and got a 2-week prescription  Per PDMP he has not had clonazepam filled for a year  He is ED charts noting he has a history of benzodiazepine abuse and has had a DUI related to this and was mandated to go to rehab for benzodiazepine abuse  I do not feel it is appropriate to refill clonazepam for him at this time  Patient discharged  Provided Lyft home      ED Course         Critical Care Time  Procedures

## 2023-01-29 PROBLEM — S22.081D CLOSED STABLE BURST FRACTURE OF TWELFTH THORACIC VERTEBRA WITH ROUTINE HEALING: Status: RESOLVED | Noted: 2019-03-01 | Resolved: 2023-01-29

## 2023-01-29 PROBLEM — R10.9 ABDOMINAL PAIN: Status: RESOLVED | Noted: 2021-06-02 | Resolved: 2023-01-29

## 2023-01-29 PROBLEM — S72.002A CLOSED FRACTURE OF LEFT HIP (HCC): Status: RESOLVED | Noted: 2018-06-06 | Resolved: 2023-01-29

## 2023-01-29 PROBLEM — Z98.890 STATUS POST LAPAROSCOPIC HERNIA REPAIR: Status: RESOLVED | Noted: 2021-06-02 | Resolved: 2023-01-29

## 2023-01-29 PROBLEM — R29.6 FALLS FREQUENTLY: Status: RESOLVED | Noted: 2019-07-23 | Resolved: 2023-01-29

## 2023-01-29 PROBLEM — R15.1 FECAL SMEARING: Status: RESOLVED | Noted: 2019-12-04 | Resolved: 2023-01-29

## 2023-01-29 PROBLEM — Z87.19 STATUS POST LAPAROSCOPIC HERNIA REPAIR: Status: RESOLVED | Noted: 2021-06-02 | Resolved: 2023-01-29
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
## 2023-01-29 PROBLEM — S72.22XS CLOSED LEFT SUBTROCHANTERIC FEMUR FRACTURE, SEQUELA: Status: RESOLVED | Noted: 2019-03-01 | Resolved: 2023-01-29

## 2023-01-29 PROBLEM — Q30.9 NOSE ANOMALY: Status: RESOLVED | Noted: 2019-07-23 | Resolved: 2023-01-29

## 2023-01-29 PROBLEM — U07.1 COVID-19: Status: RESOLVED | Noted: 2021-12-23 | Resolved: 2023-01-29

## 2023-01-29 RX ORDER — FLASH GLUCOSE SENSOR
KIT MISCELLANEOUS
Qty: 14 EACH | Refills: 5 | Status: SHIPPED | OUTPATIENT
Start: 2023-01-29

## 2023-01-29 RX ORDER — FLASH GLUCOSE SCANNING READER
EACH MISCELLANEOUS
Qty: 1 EACH | Refills: 0 | Status: SHIPPED | OUTPATIENT
Start: 2023-01-29

## 2023-01-29 NOTE — ASSESSMENT & PLAN NOTE
Lab Results   Component Value Date    HGBA1C 9 4 (A) 01/23/2023   Pt will reestablish care with endocrinology  Given a refill of lantus to restart at 15 and metformin  Fu in

## 2023-01-30 RX ORDER — LANCETS 33 GAUGE
EACH MISCELLANEOUS
Refills: 0 | OUTPATIENT
Start: 2023-01-30

## 2023-01-31 DIAGNOSIS — R80.9 TYPE 2 DIABETES MELLITUS WITH MICROALBUMINURIA, WITHOUT LONG-TERM CURRENT USE OF INSULIN (HCC): ICD-10-CM

## 2023-01-31 DIAGNOSIS — E11.29 TYPE 2 DIABETES MELLITUS WITH MICROALBUMINURIA, WITHOUT LONG-TERM CURRENT USE OF INSULIN (HCC): ICD-10-CM

## 2023-01-31 RX ORDER — FLASH GLUCOSE SENSOR
1 KIT MISCELLANEOUS
Qty: 2 EACH | Refills: 5 | Status: SHIPPED | OUTPATIENT
Start: 2023-01-31

## 2023-01-31 RX ORDER — FLASH GLUCOSE SCANNING READER
1 EACH MISCELLANEOUS
Qty: 1 EACH | Refills: 0 | Status: SHIPPED | OUTPATIENT
Start: 2023-01-31

## 2023-02-17 ENCOUNTER — APPOINTMENT (OUTPATIENT)
Dept: LAB | Facility: CLINIC | Age: 63
End: 2023-02-17

## 2023-02-17 DIAGNOSIS — Z13.0 SCREENING FOR DEFICIENCY ANEMIA: ICD-10-CM

## 2023-02-17 DIAGNOSIS — E11.29 TYPE 2 DIABETES MELLITUS WITH MICROALBUMINURIA, WITHOUT LONG-TERM CURRENT USE OF INSULIN (HCC): ICD-10-CM

## 2023-02-17 DIAGNOSIS — R80.9 TYPE 2 DIABETES MELLITUS WITH MICROALBUMINURIA, WITHOUT LONG-TERM CURRENT USE OF INSULIN (HCC): ICD-10-CM

## 2023-02-17 DIAGNOSIS — Z13.29 SCREENING FOR HYPOTHYROIDISM: ICD-10-CM

## 2023-02-17 LAB
ALBUMIN SERPL BCP-MCNC: 3.2 G/DL (ref 3.5–5)
ALP SERPL-CCNC: 60 U/L (ref 46–116)
ALT SERPL W P-5'-P-CCNC: 10 U/L (ref 12–78)
ANION GAP SERPL CALCULATED.3IONS-SCNC: 5 MMOL/L (ref 4–13)
AST SERPL W P-5'-P-CCNC: 10 U/L (ref 5–45)
BASOPHILS # BLD AUTO: 0.04 THOUSANDS/ÂΜL (ref 0–0.1)
BASOPHILS NFR BLD AUTO: 1 % (ref 0–1)
BILIRUB SERPL-MCNC: 0.28 MG/DL (ref 0.2–1)
BUN SERPL-MCNC: 16 MG/DL (ref 5–25)
CALCIUM ALBUM COR SERPL-MCNC: 8.8 MG/DL (ref 8.3–10.1)
CALCIUM SERPL-MCNC: 8.2 MG/DL (ref 8.3–10.1)
CHLORIDE SERPL-SCNC: 103 MMOL/L (ref 96–108)
CHOLEST SERPL-MCNC: 110 MG/DL
CO2 SERPL-SCNC: 32 MMOL/L (ref 21–32)
CREAT SERPL-MCNC: 0.78 MG/DL (ref 0.6–1.3)
EOSINOPHIL # BLD AUTO: 0.97 THOUSAND/ÂΜL (ref 0–0.61)
EOSINOPHIL NFR BLD AUTO: 14 % (ref 0–6)
ERYTHROCYTE [DISTWIDTH] IN BLOOD BY AUTOMATED COUNT: 13.8 % (ref 11.6–15.1)
GFR SERPL CREATININE-BSD FRML MDRD: 98 ML/MIN/1.73SQ M
GLUCOSE P FAST SERPL-MCNC: 149 MG/DL (ref 65–99)
HCT VFR BLD AUTO: 29.6 % (ref 36.5–49.3)
HDLC SERPL-MCNC: 67 MG/DL
HGB BLD-MCNC: 8.8 G/DL (ref 12–17)
IMM GRANULOCYTES # BLD AUTO: 0.02 THOUSAND/UL (ref 0–0.2)
IMM GRANULOCYTES NFR BLD AUTO: 0 % (ref 0–2)
LDLC SERPL CALC-MCNC: 38 MG/DL (ref 0–100)
LYMPHOCYTES # BLD AUTO: 1 THOUSANDS/ÂΜL (ref 0.6–4.47)
LYMPHOCYTES NFR BLD AUTO: 14 % (ref 14–44)
MCH RBC QN AUTO: 26.5 PG (ref 26.8–34.3)
MCHC RBC AUTO-ENTMCNC: 29.7 G/DL (ref 31.4–37.4)
MCV RBC AUTO: 89 FL (ref 82–98)
MONOCYTES # BLD AUTO: 0.55 THOUSAND/ÂΜL (ref 0.17–1.22)
MONOCYTES NFR BLD AUTO: 8 % (ref 4–12)
NEUTROPHILS # BLD AUTO: 4.42 THOUSANDS/ÂΜL (ref 1.85–7.62)
NEUTS SEG NFR BLD AUTO: 63 % (ref 43–75)
NONHDLC SERPL-MCNC: 43 MG/DL
NRBC BLD AUTO-RTO: 0 /100 WBCS
PLATELET # BLD AUTO: 212 THOUSANDS/UL (ref 149–390)
PMV BLD AUTO: 11.8 FL (ref 8.9–12.7)
POTASSIUM SERPL-SCNC: 4.3 MMOL/L (ref 3.5–5.3)
PROT SERPL-MCNC: 6.9 G/DL (ref 6.4–8.4)
RBC # BLD AUTO: 3.32 MILLION/UL (ref 3.88–5.62)
SODIUM SERPL-SCNC: 140 MMOL/L (ref 135–147)
TRIGL SERPL-MCNC: 26 MG/DL
TSH SERPL DL<=0.05 MIU/L-ACNC: 0.94 UIU/ML (ref 0.45–4.5)
WBC # BLD AUTO: 7 THOUSAND/UL (ref 4.31–10.16)

## 2023-02-28 DIAGNOSIS — E11.65 UNCONTROLLED TYPE 2 DIABETES MELLITUS WITH HYPERGLYCEMIA (HCC): Primary | ICD-10-CM

## 2023-03-09 ENCOUNTER — OFFICE VISIT (OUTPATIENT)
Dept: FAMILY MEDICINE CLINIC | Facility: CLINIC | Age: 63
End: 2023-03-09

## 2023-03-09 VITALS
HEIGHT: 68 IN | RESPIRATION RATE: 16 BRPM | BODY MASS INDEX: 26.83 KG/M2 | DIASTOLIC BLOOD PRESSURE: 60 MMHG | OXYGEN SATURATION: 95 % | HEART RATE: 68 BPM | WEIGHT: 177 LBS | TEMPERATURE: 97.5 F | SYSTOLIC BLOOD PRESSURE: 102 MMHG

## 2023-03-09 DIAGNOSIS — R05.3 CHRONIC COUGH: Primary | ICD-10-CM

## 2023-03-09 DIAGNOSIS — F19.10 SUBSTANCE ABUSE (HCC): ICD-10-CM

## 2023-03-09 RX ORDER — PIOGLITAZONEHYDROCHLORIDE 15 MG/1
TABLET ORAL
COMMUNITY
Start: 2023-02-28

## 2023-03-09 RX ORDER — FINERENONE 10 MG/1
TABLET, FILM COATED ORAL
COMMUNITY
Start: 2023-02-28

## 2023-03-09 RX ORDER — DULAGLUTIDE 1.5 MG/.5ML
INJECTION, SOLUTION SUBCUTANEOUS
COMMUNITY
Start: 2023-02-28

## 2023-03-09 NOTE — ASSESSMENT & PLAN NOTE
Educated that we will not be providing benzodiazepines and do not agree with his usage  He states he will get it off the streets then

## 2023-03-09 NOTE — PROGRESS NOTES
Assessment/Plan:    Substance abuse ([facility] )  Educated that we will not be providing benzodiazepines and do not agree with his usage  He states he will get it off the streets then  Diagnoses and all orders for this visit:    Chronic cough  Comments:  Check xray  If normal will get CT scan  Orders:  -     XR chest 1 view; Future    Substance abuse ([facility] )    Other orders  -     Trulicity 1 5 ND/1 9PQ injection; INJECT 1 5MG SUBCUTANEOUS ONCE EVERY WEEK (Patient not taking: Reported on 3/9/2023)  -     Kerendia 10 MG TABS; TAKE 10 MG BY MOUTH EVERY MORNING  -     pioglitazone (ACTOS) 15 mg tablet; TAKE ONE TABLET (15 MG TOTAL) BY MOUTH DAILY  (Patient not taking: Reported on 3/9/2023)        Subjective: discuss sugars     Patient ID: Cha Guerra is a 63 y o  male with a ho substance abuse, afib, bipolar disorder, htn, hld, DM2, ho gastric bypass, ho hep c, adrenal nodule    HPI  Pt is here to discuss his sugars  He was last here requesting clonazepam after being released from rehab and an A1c was elevated at 9 4  He was sent to endocrinology and was last seen last week  He was restarted on trulicity and lantus was increased to 18u  Metformin was switched to synjardy 12 5/1000 bid  He was also started on testosterone  He is also concerned about an abnormal xray  I have no records of this abnormal xray  States he has had a chronic cough for several months  He is also requesting clonazepam  He states he gets sick when he uses methadone without clonazepam and he is only being provided scripts from different clinics week by week and would like a steady script provided by one office  He cannot provide names of these clinics  He is obviously intoxicated today and is slurring his words more than his usual baseline slurring  Each time he comes in he claims that this office has prescribed him this medication for years and it wrong to stop a medication that he needs   Checked PDMP and there have been no controlled scrips sent from our office in the system and he was last given a script from psychiatry 9/2021  He was recently in rehab and was not continued on this medication during the treatment period or discharged on this medication  He was informed again that our office will never fill him a benzodiazepine script due to his history of drug abuse  He is also using marijuana as per the most recent urine drug screen  The following portions of the patient's history were reviewed and updated as appropriate: allergies, current medications, past family history, past medical history, past social history, past surgical history and problem list     Review of Systems   Constitutional: Negative for activity change, appetite change, fever and unexpected weight change  HENT: Negative for ear pain, postnasal drip and rhinorrhea  Eyes: Negative for photophobia and pain  Respiratory: Positive for cough  Negative for shortness of breath and wheezing  Cardiovascular: Negative for chest pain, palpitations and leg swelling  Gastrointestinal: Negative for abdominal pain, blood in stool, nausea and vomiting  Endocrine: Negative for polydipsia and polyuria  Genitourinary: Negative for difficulty urinating, hematuria and urgency  Musculoskeletal: Negative for myalgias  Skin: Negative for rash  Neurological: Negative for dizziness  Psychiatric/Behavioral: Negative for confusion and sleep disturbance  PHQ-2/9 Depression Screening         [unfilled]    Objective:      /60 (BP Location: Left arm, Patient Position: Sitting, Cuff Size: Adult)   Pulse 68   Temp 97 5 °F (36 4 °C) (Tympanic)   Resp 16   Ht 5' 8" (1 727 m)   Wt 80 3 kg (177 lb)   SpO2 95%   BMI 26 91 kg/m²          Physical Exam  Constitutional:       Appearance: He is well-developed  HENT:      Head: Normocephalic and atraumatic        Right Ear: External ear normal       Left Ear: External ear normal       Mouth/Throat:      Pharynx: No oropharyngeal exudate  Eyes:      Conjunctiva/sclera: Conjunctivae normal       Pupils: Pupils are equal, round, and reactive to light  Cardiovascular:      Rate and Rhythm: Normal rate and regular rhythm  Heart sounds: Normal heart sounds  No murmur heard  No friction rub  No gallop  Pulmonary:      Effort: Pulmonary effort is normal  No respiratory distress  Breath sounds: Normal breath sounds  No wheezing or rales  Chest:      Chest wall: No tenderness  Abdominal:      General: Bowel sounds are normal  There is no distension  Palpations: Abdomen is soft  There is no mass  Tenderness: There is no abdominal tenderness  There is no rebound  Musculoskeletal:         General: Normal range of motion  Cervical back: Normal range of motion and neck supple  Skin:     General: Skin is warm and dry  Neurological:      Mental Status: He is alert and oriented to person, place, and time

## 2023-03-14 ENCOUNTER — HOSPITAL ENCOUNTER (OUTPATIENT)
Dept: RADIOLOGY | Facility: HOSPITAL | Age: 63
Discharge: HOME | End: 2023-03-14

## 2023-03-14 DIAGNOSIS — R05.3 CHRONIC COUGH: ICD-10-CM

## 2023-03-17 DIAGNOSIS — R05.3 CHRONIC COUGH: Primary | ICD-10-CM

## 2023-03-17 NOTE — TELEPHONE ENCOUNTER
Pt called in   Placed on wait list for talk therapy and med mgmt Area H Indication Text: Tumors in this location are included in Area H (eyelids, eyebrows, nose, lips, chin, ear, pre-auricular, post-auricular, temple, genitalia, hands, feet, ankles and areola).  Tissue conservation is critical in these anatomic locations.

## 2023-03-17 NOTE — RESULT ENCOUNTER NOTE
Please call patient with normal results  The x-ray of the lung was normal so I ordered further imaging with a CAT scan to investigate the chronic cough more thoroughly  Please provide the number for central scheduling

## 2023-03-27 ENCOUNTER — TELEPHONE (OUTPATIENT)
Dept: PSYCHIATRY | Facility: CLINIC | Age: 63
End: 2023-03-27

## 2023-03-27 NOTE — TELEPHONE ENCOUNTER
Patient has been added to the Medication Management and Talk Therapy wait list     Confirmed [facility]: [facility]  Location Preference: [facility]  Provider Preference: NO  Virtual: Yes [] No [x]    Outside resource packet mailed out

## 2023-04-04 ENCOUNTER — OFFICE VISIT (OUTPATIENT)
Dept: FAMILY MEDICINE CLINIC | Facility: CLINIC | Age: 63
End: 2023-04-04

## 2023-04-04 VITALS
HEIGHT: 68 IN | DIASTOLIC BLOOD PRESSURE: 68 MMHG | TEMPERATURE: 98 F | SYSTOLIC BLOOD PRESSURE: 120 MMHG | BODY MASS INDEX: 27.22 KG/M2 | OXYGEN SATURATION: 99 % | WEIGHT: 179.6 LBS | RESPIRATION RATE: 16 BRPM | HEART RATE: 97 BPM

## 2023-04-04 DIAGNOSIS — M51.36 DDD (DEGENERATIVE DISC DISEASE), LUMBAR: Primary | ICD-10-CM

## 2023-04-04 NOTE — PROGRESS NOTES
"Name: Dinorah Blair      : 1960      MRN: [id number]  Encounter Provider: STEVE Morales  Encounter Date: 2023   Encounter department: [address]     1  DDD (degenerative disc disease), lumbar  Assessment & Plan:  Pt has seen Dr Jennifer Boucher in the past for his DDD and he was referred back to Dr Jennifer Boucher today  Updated lumbar Xrays ordered  I recommended PT as well but pt declined this option   Moist heat to lower back   OTC Tylenol prn, max of 3g/24 hours  Pt requesting narcotics which were not filled today  Again, patient presents with drug seeking behavior at all visits with our office and has a h/o drug abuse, is currently on Methadone     Orders:  -     XR spine lumbar minimum 4 views non injury; Future; Expected date: 2023  -     Ambulatory Referral to Orthopedic Surgery; Future         Subjective     HPI     Pt presents by himself today for an acute visit   C/o back pain    Of note, pat had a fall  while at work and was evaluated at [address] [place] [address] ED at that time  Diagnosed with T12 and L1 compression fractures  He was evaluated by [facility], Dr Jennifer Boucher following his fall  He was placed in a TLSO brace  Last evaluated by Dr Jennifer Boucher 21  MRI from that time did not demonstrate any evidence of acute compression fracture in the thoracic or lumbar spine but did reveal evidence of multilevel lumbar DDD as well as stenosis at L3-4 and L4-5  He was advised to discontinue the TLSO brace at that time     Mickey Davis tells me he has had \"other injuries to his back\" after his work injury but can't give me details    He c/o upper and lower mid back pain  Again, can't describe this other than telling me he can't sleep because of the pain  No relief with Tylenol  Has not tried anything else  Has no interest in physical therapy     Denies numbness or tingling in his legs  He does use a cane but denies any worsening leg weakness    No urinary or bowel " incontinence, no urinary retention     Pt has a h/o drug abuse, openly tells me that no provider will give him Clonazepam so he buys this off the streets  Pt with a h/o drug abuse, he was recently in rehab, currently on Methadone  Currently uses marijuana as well       Review of Systems   Constitutional: Negative for chills and fever  HENT: Negative for ear pain and sore throat  Eyes: Negative for pain and visual disturbance  Respiratory: Negative for cough and shortness of breath  Cardiovascular: Negative for chest pain and palpitations  Gastrointestinal: Negative for abdominal pain and vomiting  Genitourinary: Negative for dysuria and hematuria  Musculoskeletal: Positive for back pain and gait problem  Negative for arthralgias, joint swelling, myalgias, neck pain and neck stiffness  Skin: Negative for color change and rash  Neurological: Negative for seizures, syncope, weakness and numbness  Hematological: Negative for adenopathy  Does not bruise/bleed easily  All other systems reviewed and are negative        Past Medical History:   Diagnosis Date   • Anxiety    • Depression    • Diabetes mellitus ([facility] [address] )    • Drug use    • Hemorrhoids, internal 9/23/2019   • Hypertension      Past Surgical History:   Procedure Laterality Date   • FEMUR FRACTURE SURGERY Right    • GASTRIC BYPASS  11/08/2011    Managed by: [facility] (General Surgery)   • HERNIA REPAIR  02/13/2013    Incisional hernia repair Managed by: [facility] (General Surgery)   • TONSILLECTOMY  2010     Family History   Problem Relation Age of Onset   • Diabetes Mother    • Hypertension Mother    • Hypertension Father    • Autoimmune disease Son      Social History     Socioeconomic History   • Marital status: Legally      Spouse name: Not on file   • Number of children: Not on file   • Years of education: Not on file   • Highest education level: Not on file   Occupational History   • Not on file   Tobacco Use   • Smoking status: Never   • Smokeless tobacco: Never   Vaping Use   • Vaping Use: Never used   Substance and Sexual Activity   • Alcohol use: Never   • Drug use: Not Currently   • Sexual activity: Not Currently   Other Topics Concern   • Not on file   Social History Narrative   • Not on file     Social Determinants of Health     Financial Resource Strain: Not on file   Food Insecurity: Not on file   Transportation Needs: Not on file   Physical Activity: Not on file   Stress: Not on file   Social Connections: Not on file   Intimate Partner Violence: Not on file   Housing Stability: Not on file     Current Outpatient Medications on File Prior to Visit   Medication Sig   • fluticasone (FLONASE) 50 mcg/act nasal spray 1 spray into each nostril daily   • gabapentin (NEURONTIN) 300 mg capsule    • GLOBAL EASE INJECT PEN NEEDLES 31G X 8 MM MISC USE AS DIRECTED TO INJECT INSULIN DAILY   • glycerin-hypromellose- (ARTIFICIAL TEARS) 0 2-0 2-1 % SOLN Apply to eye   • Incontinence Supply Disposable (INCONTINENCE BRIEF MEDIUM) MISC by Does not apply route as needed (fecal soiling)   • Incontinence Supply Disposable (RA WIPES FLUSHABLE/MOIST) MISC by Does not apply route as needed (fecal soiling)   • insulin glargine (LANTUS SOLOSTAR) 100 units/mL injection pen Inject 15 Units under the skin daily at bedtime (Patient taking differently: Inject 18 Units under the skin daily at bedtime)   • Insulin Pen Needle (B-D UF III MINI PEN NEEDLES) 31G X 5 MM MISC Inject 15 Units under the skin daily at bedtime   • metFORMIN (GLUCOPHAGE) 850 mg tablet Take 1 tablet (850 mg total) by mouth daily with breakfast   • Misc   Devices (CANE) MISC by Does not apply route daily Dx:  Frequent falls   • SYNJARDY XR 12 5-1000 MG TB24 Take 1 tablet by mouth 2 (two) times a day   • acetaminophen (TYLENOL) 325 mg tablet Take 2 tablets (650 mg total) by mouth every 6 (six) hours as needed for mild pain (pain) (Patient not taking: Reported on 9/30/2021) • capsicum (ZOSTRIX) 0 075 % topical cream Apply topically 3 (three) times a day (Patient not taking: Reported on 4/4/2023)   • clonazePAM (KlonoPIN) 0 5 mg tablet Take 1 tablet (0 5 mg total) by mouth 3 (three) times a day for 14 days   • Continuous Blood Gluc  (FreeStyle Grace 14 Day [place]) STEVE 1 Device by Device route 3 (three) times a day before meals (Patient not taking: Reported on 4/4/2023)   • Continuous Blood Gluc  (FreeStyle [place] 2 [place]) STEVE 1 Device by Device route 3 (three) times a day before meals (Patient not taking: Reported on 4/4/2023)   • Continuous Blood Gluc Sensor (FreeStyle Grace 14 Day Sensor) MISC Use 1 applicator every 14 (fourteen) days (Patient not taking: Reported on 4/4/2023)   • Continuous Blood Gluc Sensor (FreeStyle Grace 14 Day Sensor) MISC Inject 1 Device under the skin every 14 (fourteen) days (Patient not taking: Reported on 4/4/2023)   • Continuous Blood Gluc Sensor (FreeStyle Grace 2 Sensor) MISC 1 Device by Device route 3 (three) times a day before meals (Patient not taking: Reported on 4/4/2023)   • divalproex sodium (DEPAKOTE ER) 500 mg 24 hr tablet Take 1 tablet (500 mg total) by mouth every 12 (twelve) hours   • docusate sodium (COLACE) 100 mg capsule  (Patient not taking: Reported on 4/4/2023)   • doxepin (SINEquan) 150 MG capsule  (Patient not taking: Reported on 4/4/2023)   • ergocalciferol (ERGOCALCIFEROL) 1 25 MG (78621 UT) capsule Take 50,000 Units by mouth   • FeroSul 325 (65 Fe) MG tablet  (Patient not taking: Reported on 3/53/9953)   • folic acid (FOLVITE) 1 mg tablet  (Patient not taking: Reported on 3/9/2023)   • insulin lispro (HumaLOG) 100 units/mL injection Inject 12 Units under the skin (Patient not taking: Reported on 1/23/2023)   • Kerendia 10 MG TABS TAKE 10 MG BY MOUTH EVERY MORNING     • melatonin 3 mg  (Patient not taking: Reported on 4/4/2023)   • meloxicam (MOBIC) 7 5 mg tablet  (Patient not taking: Reported on 3/9/2023)   • ONE "TOUCH ULTRA TEST test strip Test twice daily (Patient not taking: Reported on 1/23/2023)   • ONETOUCH DELICA LANCETS 51R MISC USE TWICE A DAY (ICD 10 E11 9) (Patient not taking: Reported on 4/4/2023)   • pioglitazone (ACTOS) 15 mg tablet TAKE ONE TABLET (15 MG TOTAL) BY MOUTH DAILY  (Patient not taking: Reported on 3/9/2023)   • polyethylene glycol (GLYCOLAX) powder Take 17 g by mouth daily (Patient not taking: Reported on 4/4/2023)   • polyethylene glycol (GOLYTELY) 4000 mL solution Take 4,000 mL by mouth once for 1 dose As directed for colonoscopy prep   • testosterone cypionate (DEPO-TESTOSTERONE) 200 mg/mL SOLN INJECT 1CC INTRAMUSCULARLY EVERY 2 WEEKS (Patient not taking: Reported on 4/4/2023)   • traZODone (DESYREL) 50 mg tablet  (Patient not taking: Reported on 3/4/5215)   • Trulicity 1 5 SR/2 7AU injection INJECT 1 5MG SUBCUTANEOUS ONCE EVERY WEEK (Patient not taking: Reported on 3/9/2023)   • UNKNOWN TO PATIENT      Allergies   Allergen Reactions   • Aspirin Shortness Of Breath   • Penicillins      Immunization History   Administered Date(s) Administered   • INFLUENZA 10/10/2011, 12/03/2012, 01/26/2017, 08/17/2020   • Influenza, injectable, quadrivalent, preservative free 0 5 mL 10/16/2019, 08/17/2020   • Influenza, recombinant, quadrivalent,injectable, preservative free 09/11/2019   • Influenza, seasonal, injectable 11/01/2013   • Pneumococcal Polysaccharide PPV23 10/07/2010, 01/26/2017, 05/05/2019   • Tdap 06/06/2018   • Zoster Vaccine Recombinant 03/12/2019, 10/16/2019       Objective     /68 (BP Location: Left arm, Patient Position: Sitting, Cuff Size: Adult)   Pulse 97   Temp 98 °F (36 7 °C) (Oral)   Resp 16   Ht 5' 8\" (1 727 m)   Wt 81 5 kg (179 lb 9 6 oz)   SpO2 99%   BMI 27 31 kg/m²     Physical Exam  Constitutional:       General: He is not in acute distress  Appearance: He is well-developed  He is not diaphoretic  HENT:      Head: Normocephalic and atraumatic     Eyes:      " Conjunctiva/sclera: Conjunctivae normal       Pupils: Pupils are equal, round, and reactive to light  Neck:      Thyroid: No thyromegaly  Cardiovascular:      Rate and Rhythm: Normal rate and regular rhythm  Heart sounds: Normal heart sounds  No murmur heard  Pulmonary:      Effort: Pulmonary effort is normal  No respiratory distress  Breath sounds: Normal breath sounds  No wheezing  Abdominal:      General: Bowel sounds are normal  There is no distension  Palpations: Abdomen is soft  Tenderness: There is no abdominal tenderness  Musculoskeletal:      Cervical back: Normal, normal range of motion and neck supple  Thoracic back: Normal       Lumbar back: No swelling, deformity or lacerations  Decreased range of motion (with hyperflexion)  Negative right straight leg raise test and negative left straight leg raise test       Comments: B/L LE strength 4/5, normal sensation to light touch    Lymphadenopathy:      Cervical: No cervical adenopathy  Skin:     General: Skin is warm and dry  Neurological:      General: No focal deficit present  Mental Status: He is alert and oriented to person, place, and time         STEVE Lezama

## 2023-04-04 NOTE — ASSESSMENT & PLAN NOTE
Pt has seen Dr Jennifer Boucher in the past for his DDD and he was referred back to Dr Jennifer Boucher today  Updated lumbar Xrays ordered  I recommended PT as well but pt declined this option   Moist heat to lower back   OTC Tylenol prn, max of 3g/24 hours  Pt requesting narcotics which were not filled today    Again, patient presents with drug seeking behavior at all visits with our office and has a h/o drug abuse, is currently on Methadone

## 2023-04-18 DIAGNOSIS — G56.92 NEUROPATHY OF LEFT HAND: Primary | ICD-10-CM

## 2023-04-22 ENCOUNTER — OFFICE VISIT (OUTPATIENT)
Dept: LAB | Facility: CLINIC | Age: 63
End: 2023-04-22

## 2023-04-22 DIAGNOSIS — R94.31 NONSPECIFIC ABNORMAL ELECTROCARDIOGRAM (ECG) (EKG): ICD-10-CM

## 2023-04-25 LAB
ATRIAL RATE: 72 BPM
P AXIS: 65 DEGREES
PR INTERVAL: 132 MS
QRS AXIS: 74 DEGREES
QRSD INTERVAL: 96 MS
QT INTERVAL: 412 MS
QTC INTERVAL: 451 MS
T WAVE AXIS: 70 DEGREES
VENTRICULAR RATE: 72 BPM

## 2023-05-24 ENCOUNTER — OFFICE VISIT (OUTPATIENT)
Dept: FAMILY MEDICINE CLINIC | Facility: CLINIC | Age: 63
End: 2023-05-24

## 2023-05-24 VITALS
RESPIRATION RATE: 16 BRPM | HEIGHT: 68 IN | DIASTOLIC BLOOD PRESSURE: 80 MMHG | OXYGEN SATURATION: 98 % | TEMPERATURE: 98.1 F | WEIGHT: 169.6 LBS | HEART RATE: 77 BPM | BODY MASS INDEX: 25.7 KG/M2 | SYSTOLIC BLOOD PRESSURE: 144 MMHG

## 2023-05-24 DIAGNOSIS — Z12.12 ENCOUNTER FOR SCREENING FOR COLORECTAL MALIGNANT NEOPLASM: ICD-10-CM

## 2023-05-24 DIAGNOSIS — Z00.00 ROUTINE PHYSICAL EXAMINATION: Primary | ICD-10-CM

## 2023-05-24 DIAGNOSIS — Z12.11 ENCOUNTER FOR SCREENING FOR COLORECTAL MALIGNANT NEOPLASM: ICD-10-CM

## 2023-05-24 DIAGNOSIS — F41.1 GENERALIZED ANXIETY DISORDER: ICD-10-CM

## 2023-05-24 DIAGNOSIS — R80.9 TYPE 2 DIABETES MELLITUS WITH MICROALBUMINURIA, WITHOUT LONG-TERM CURRENT USE OF INSULIN (HCC): ICD-10-CM

## 2023-05-24 DIAGNOSIS — Z12.5 SCREENING FOR PROSTATE CANCER: ICD-10-CM

## 2023-05-24 DIAGNOSIS — Z11.4 SCREENING FOR HIV (HUMAN IMMUNODEFICIENCY VIRUS): ICD-10-CM

## 2023-05-24 DIAGNOSIS — F19.10 SUBSTANCE ABUSE (HCC): ICD-10-CM

## 2023-05-24 DIAGNOSIS — T14.8XXA OPEN WOUND OF SKIN: ICD-10-CM

## 2023-05-24 DIAGNOSIS — E11.29 TYPE 2 DIABETES MELLITUS WITH MICROALBUMINURIA, WITHOUT LONG-TERM CURRENT USE OF INSULIN (HCC): ICD-10-CM

## 2023-05-24 NOTE — ASSESSMENT & PLAN NOTE
Pt has an appointment to re-establish care with [facility] next week  Letter for support animal provided today

## 2023-05-24 NOTE — ASSESSMENT & PLAN NOTE
Pt openly admits to purchasing Clonazepam off the streets  Strongly advised against this, offered assistance but pt declined

## 2023-05-24 NOTE — LETTER
May 24, 2023     Patient: Brionna Bae  YOB: 1960  Date of Visit: 5/24/2023      To Whom it May Concern:    Brionna Bae is under my professional care  Kolby Hoskins was seen in my office on 5/24/2023  We feel that a support animal (dog) would be beneficial to Kolby Hoskins for his mental health disorders  If you have any questions or concerns, please don't hesitate to call           Sincerely,          STEVE Lang        CC: No Recipients

## 2023-05-24 NOTE — PROGRESS NOTES
[facility]    NAME: Sean Santos  AGE: 66 y o  SEX: male  : 1960     DATE: 2023     Assessment and Plan:     Problem List Items Addressed This Visit        Endocrine    Type 2 diabetes mellitus with microalbuminuria, without long-term current use of insulin ([facility] [address] )       Lab Results   Component Value Date    HGBA1C 9 4 (A) 2023     Due for his three month follow up with [facility]  Pt notes he is only taking Metformin but that his Endocrinologist is aware of this  Overdue for a diabetic eye exam which I reminded him of today             Other    Generalized anxiety disorder     Pt has an appointment to re-establish care with [facility] next week  Letter for support animal provided today          Substance abuse ([facility] )     Pt openly admits to purchasing Clonazepam off the streets  Strongly advised against this, offered assistance but pt declined         Other Visit Diagnoses     Routine physical examination    -  Primary    Open wound of skin        Relevant Medications    bacitracin-neomycin-polymyxin b-hydrocortisone 1 % ointment    Screening for HIV (human immunodeficiency virus)        Relevant Orders    : HIV 1/2 AB/AG w Reflex [facility] for 2 yr old and above    Screening for prostate cancer        Relevant Orders    PSA, Total Screen    Encounter for screening for colorectal malignant neoplasm        Relevant Orders    Ambulatory referral for colonoscopy          Immunizations and preventive care screenings were discussed with patient today  Appropriate education was printed on patient's after visit summary  Discussed risks and benefits of prostate cancer screening  We discussed the controversial history of PSA screening for prostate cancer in the [place] as well as the risk of over detection and over treatment of prostate cancer by way of PSA screening    The patient understands that PSA blood testing is "an imperfect way to screen for prostate cancer and that elevated PSA levels in the blood may also be caused by infection, inflammation, prostatic trauma or manipulation, urological procedures, or by benign prostatic enlargement  The role of the digital rectal examination in prostate cancer screening was also discussed and I discussed with him that there is large interobserver variability in the findings of digital rectal examination  Counseling:  Alcohol/drug use: discussed moderation in alcohol intake, the recommendations for healthy alcohol use, and avoidance of illicit drug use  Dental Health: discussed importance of regular tooth brushing, flossing, and dental visits  Injury prevention: discussed safety/seat belts, safety helmets, smoke detectors, carbon dioxide detectors, and smoking near bedding or upholstery  Sexual health: discussed sexually transmitted diseases, partner selection, use of condoms, avoidance of unintended pregnancy, and contraceptive alternatives  · Exercise: the importance of regular exercise/physical activity was discussed  Recommend exercise 3-5 times per week for at least 30 minutes  No follow-ups on file  Chief Complaint:     Chief Complaint   Patient presents with   • Physical Exam      History of Present Illness:     Adult Annual Physical   Patient here for a comprehensive physical exam  The patient reports problems - pt is requesting a letter for a support dog  Pt with depression and anxiety, currently has a 12year old dog who he would like to be his support dog  He feels his depression and anxiety are improved when around his dog    Pt follows with Endocrinology- due for an appointment  He tells me he is not taking Trulicity or his insulin but that his endocrinologist is aware of this (?)    He is overdue for a diabetic eye exam and colonoscopy     Today, pt notes itchy skin \"all over\"  No changes to lotions, soaps, detergents, medications    Pt notes he " scratches his skin and then eventually brakes the skin and little wounds form which he then picks at  No redness or drainage that he has noticed  He tells me he still follows with the Methadone clinic but is also buying Clonazepam off the streets     Diet and Physical Activity  · Diet/Nutrition: poor diet  · Exercise: no formal exercise  Depression Screening  PHQ-2/9 Depression Screening         General Health  · Sleep: sleeps well  · Hearing: normal - bilateral   · Vision: no vision problems  · Dental: no dental visits for >1 year   Health  · Symptoms include: none     Review of Systems:     Review of Systems   Constitutional: Negative for activity change, appetite change, chills and fever  HENT: Negative for ear pain and sore throat  Eyes: Negative for pain and visual disturbance  Respiratory: Negative for cough, shortness of breath and wheezing  Cardiovascular: Negative for chest pain and palpitations  Gastrointestinal: Negative for abdominal pain, constipation, diarrhea, nausea and vomiting  Genitourinary: Negative for dysuria and hematuria  Musculoskeletal: Positive for arthralgias, back pain and gait problem  Skin: Negative for color change, rash and wound  Neurological: Negative for seizures and syncope  Hematological: Negative for adenopathy  Does not bruise/bleed easily  Psychiatric/Behavioral: Positive for dysphoric mood  The patient is nervous/anxious  All other systems reviewed and are negative       Past Medical History:     Past Medical History:   Diagnosis Date   • Anxiety    • Depression    • Diabetes mellitus ([facility] [address] )    • Drug use    • Hemorrhoids, internal 9/23/2019   • Hypertension       Past Surgical History:     Past Surgical History:   Procedure Laterality Date   • FEMUR FRACTURE SURGERY Right    • GASTRIC BYPASS  11/08/2011    Managed by: Wendi Andujar (General Surgery)   • HERNIA REPAIR  02/13/2013    Incisional hernia repair Managed by: Venancio Colorado Jeremy (General Surgery)   • TONSILLECTOMY  2010      Family History:     Family History   Problem Relation Age of Onset   • Diabetes Mother    • Hypertension Mother    • Hypertension Father    • Autoimmune disease Son       Social History:     Social History     Socioeconomic History   • Marital status: Legally      Spouse name: None   • Number of children: None   • Years of education: None   • Highest education level: None   Occupational History   • None   Tobacco Use   • Smoking status: Never   • Smokeless tobacco: Never   Vaping Use   • Vaping Use: Never used   Substance and Sexual Activity   • Alcohol use: Never   • Drug use: Not Currently   • Sexual activity: Not Currently   Other Topics Concern   • None   Social History Narrative   • None     Social Determinants of Health     Financial Resource Strain: Not on file   Food Insecurity: Not on file   Transportation Needs: Not on file   Physical Activity: Inactive (3/16/2021)    Exercise Vital Sign    • Days of Exercise per Week: 0 days    • Minutes of Exercise per Session: 0 min   Stress: Not on file   Social Connections: Not on file   Intimate Partner Violence: Not on file   Housing Stability: Not on file      Current Medications:     Current Outpatient Medications   Medication Sig Dispense Refill   • bacitracin-neomycin-polymyxin b-hydrocortisone 1 % ointment Apply topically 2 (two) times a day 15 g 0   • Continuous Blood Gluc  (FreeStyle Grace 14 Day Franklin) STEVE 1 Device by Device route 3 (three) times a day before meals 1 each 0   • Continuous Blood Gluc  (FreeStyle Grace 2 Franklin) STEVE 1 Device by Device route 3 (three) times a day before meals 1 each 0   • fluticasone (FLONASE) 50 mcg/act nasal spray 1 spray into each nostril daily 16 g 5   • GLOBAL EASE INJECT PEN NEEDLES 31G X 8 MM MISC USE AS DIRECTED TO INJECT INSULIN DAILY  5   • Incontinence Supply Disposable (INCONTINENCE BRIEF MEDIUM) MISC by Does not apply route as needed (fecal soiling) 90 each 5   • Incontinence Supply Disposable (RA WIPES FLUSHABLE/MOIST) MISC by Does not apply route as needed (fecal soiling) 100 each 5   • Insulin Pen Needle (B-D UF III MINI PEN NEEDLES) 31G X 5 MM MISC Inject 15 Units under the skin daily at bedtime 100 each 5   • metFORMIN (GLUCOPHAGE) 850 mg tablet Take 1 tablet (850 mg total) by mouth daily with breakfast 90 tablet 1   • Misc   Devices (CANE) MISC by Does not apply route daily Dx:  Frequent falls 1 each 0   • acetaminophen (TYLENOL) 325 mg tablet Take 2 tablets (650 mg total) by mouth every 6 (six) hours as needed for mild pain (pain) (Patient not taking: Reported on 9/30/2021) 16 tablet 0   • capsicum (ZOSTRIX) 0 075 % topical cream Apply topically 3 (three) times a day (Patient not taking: Reported on 4/4/2023) 28 3 g 0   • clonazePAM (KlonoPIN) 0 5 mg tablet Take 1 tablet (0 5 mg total) by mouth 3 (three) times a day for 14 days 42 tablet 0   • Continuous Blood Gluc Sensor (FreeStyle Grace 14 Day Sensor) MISC Use 1 applicator every 14 (fourteen) days (Patient not taking: Reported on 4/4/2023) 2 each 5   • Continuous Blood Gluc Sensor (FreeStyle Grace 14 Day Sensor) MISC Inject 1 Device under the skin every 14 (fourteen) days (Patient not taking: Reported on 4/4/2023) 2 each 5   • Continuous Blood Gluc Sensor (FreeStyle Grace 2 Sensor) MISC 1 Device by Device route 3 (three) times a day before meals (Patient not taking: Reported on 4/4/2023) 14 each 5   • divalproex sodium (DEPAKOTE ER) 500 mg 24 hr tablet Take 1 tablet (500 mg total) by mouth every 12 (twelve) hours 60 tablet 5   • docusate sodium (COLACE) 100 mg capsule  (Patient not taking: Reported on 4/4/2023)     • doxepin (SINEquan) 150 MG capsule  (Patient not taking: Reported on 4/4/2023)     • ergocalciferol (ERGOCALCIFEROL) 1 25 MG (31771 UT) capsule Take 50,000 Units by mouth     • FeroSul 325 (65 Fe) MG tablet  (Patient not taking: Reported on 6/09/6060)     • folic acid (FOLVITE) 1 mg tablet  (Patient not taking: Reported on 3/9/2023)     • gabapentin (NEURONTIN) 300 mg capsule  (Patient not taking: Reported on 5/24/2023)     • glycerin-hypromellose- (ARTIFICIAL TEARS) 0 2-0 2-1 % SOLN Apply to eye (Patient not taking: Reported on 5/24/2023)     • insulin glargine (LANTUS SOLOSTAR) 100 units/mL injection pen Inject 15 Units under the skin daily at bedtime (Patient not taking: Reported on 5/24/2023) 15 mL 2   • insulin lispro (HumaLOG) 100 units/mL injection Inject 12 Units under the skin (Patient not taking: Reported on 1/23/2023)     • Kerendia 10 MG TABS TAKE 10 MG BY MOUTH EVERY MORNING  (Patient not taking: Reported on 5/24/2023)     • melatonin 3 mg  (Patient not taking: Reported on 4/4/2023)     • meloxicam (MOBIC) 7 5 mg tablet  (Patient not taking: Reported on 3/9/2023)     • ONE TOUCH ULTRA TEST test strip Test twice daily (Patient not taking: Reported on 1/23/2023) 30 each 5   • ONETOUCH DELICA LANCETS 24P MISC USE TWICE A DAY (ICD 10 E11 9) (Patient not taking: Reported on 4/4/2023)  3   • pioglitazone (ACTOS) 15 mg tablet TAKE ONE TABLET (15 MG TOTAL) BY MOUTH DAILY   (Patient not taking: Reported on 3/9/2023)     • polyethylene glycol (GLYCOLAX) powder Take 17 g by mouth daily (Patient not taking: Reported on 4/4/2023) 578 g 1   • polyethylene glycol (GOLYTELY) 4000 mL solution Take 4,000 mL by mouth once for 1 dose As directed for colonoscopy prep 4000 mL 0   • SYNJARDY XR 12 5-1000 MG TB24 Take 1 tablet by mouth 2 (two) times a day (Patient not taking: Reported on 5/24/2023)  0   • testosterone cypionate (DEPO-TESTOSTERONE) 200 mg/mL SOLN INJECT 1CC INTRAMUSCULARLY EVERY 2 WEEKS (Patient not taking: Reported on 4/4/2023)  1   • traZODone (DESYREL) 50 mg tablet  (Patient not taking: Reported on 2/8/6607)     • Trulicity 1 5 JR/8 2KW injection INJECT 1 5MG SUBCUTANEOUS ONCE EVERY WEEK (Patient not taking: Reported on 3/9/2023)     • UNKNOWN TO PATIENT        No "current facility-administered medications for this visit  Allergies: Allergies   Allergen Reactions   • Aspirin Shortness Of Breath   • Penicillins       Physical Exam:     /80 (BP Location: Left arm, Patient Position: Sitting, Cuff Size: Adult)   Pulse 77   Temp 98 1 °F (36 7 °C) (Oral)   Resp 16   Ht 5' 8\" (1 727 m)   Wt 76 9 kg (169 lb 9 6 oz)   SpO2 98%   BMI 25 79 kg/m²     Physical Exam  Vitals and nursing note reviewed  Constitutional:       General: He is not in acute distress  Appearance: He is well-developed  He is not ill-appearing, toxic-appearing or diaphoretic  HENT:      Head: Normocephalic and atraumatic  Eyes:      Extraocular Movements: Extraocular movements intact  Conjunctiva/sclera: Conjunctivae normal       Pupils: Pupils are equal, round, and reactive to light  Cardiovascular:      Rate and Rhythm: Normal rate and regular rhythm  Pulses: no weak pulses          Dorsalis pedis pulses are 2+ on the right side and 2+ on the left side  Heart sounds: Normal heart sounds  No murmur heard  Pulmonary:      Effort: Pulmonary effort is normal  No respiratory distress  Breath sounds: Normal breath sounds  Abdominal:      General: Bowel sounds are normal  There is no distension  Palpations: Abdomen is soft  Tenderness: There is no abdominal tenderness  Musculoskeletal:         General: No swelling  Normal range of motion  Cervical back: Normal range of motion and neck supple  No rigidity or tenderness  Comments: Using a cane for ambulation    Feet:      Right foot:      Skin integrity: No ulcer, skin breakdown, erythema, warmth, callus or dry skin  Left foot:      Skin integrity: No ulcer, skin breakdown, erythema, warmth, callus or dry skin  Lymphadenopathy:      Cervical: No cervical adenopathy  Skin:     General: Skin is warm and dry  Capillary Refill: Capillary refill takes less than 2 seconds        Comments: " Skin with multiple scattered small wounds, approximately 1 cm x 2 cm  Appear to be from scratch marks  Most are scabbed  No erythema, warmth or drainage    Neurological:      General: No focal deficit present  Mental Status: He is alert and oriented to person, place, and time  Psychiatric:         Mood and Affect: Mood normal           Patient's shoes and socks removed  Right Foot/Ankle   Right Foot Inspection  Skin Exam: skin normal and skin intact  No dry skin, no warmth, no callus, no erythema, no maceration, no abnormal color, no pre-ulcer, no ulcer and no callus  Sensory   Monofilament testing: intact    Vascular  Capillary refills: < 3 seconds  The right DP pulse is 2+  Left Foot/Ankle  Left Foot Inspection  Skin Exam: skin normal and skin intact  No dry skin, no warmth, no erythema, no maceration, normal color, no pre-ulcer, no ulcer and no callus  Sensory   Monofilament testing: intact    Vascular  Capillary refills: < 3 seconds  The left DP pulse is 2+       Assign Risk Category  No deformity present  No loss of protective sensation  No weak pulses  Risk: 0      Keenan Martin, [address]

## 2023-05-24 NOTE — ASSESSMENT & PLAN NOTE
Lab Results   Component Value Date    HGBA1C 9 4 (A) 01/23/2023     Due for his three month follow up with [facility]  Pt notes he is only taking Metformin but that his Endocrinologist is aware of this  Overdue for a diabetic eye exam which I reminded him of today

## 2023-06-14 ENCOUNTER — PREP FOR PROCEDURE (OUTPATIENT)
Dept: GASTROENTEROLOGY | Facility: CLINIC | Age: 63
End: 2023-06-14

## 2023-06-14 ENCOUNTER — TELEPHONE (OUTPATIENT)
Dept: GASTROENTEROLOGY | Facility: CLINIC | Age: 63
End: 2023-06-14

## 2023-06-14 DIAGNOSIS — Z12.11 SCREEN FOR COLON CANCER: Primary | ICD-10-CM

## 2023-06-14 NOTE — TELEPHONE ENCOUNTER
Scheduled date (as of today): 7/26    Physician performing Colonoscopy : Dr Alexys De      Location : [facility]           Clearances:  PT PASS OA

## 2023-06-22 ENCOUNTER — TELEPHONE (OUTPATIENT)
Dept: FAMILY MEDICINE CLINIC | Facility: CLINIC | Age: 63
End: 2023-06-22
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
## 2023-06-23 DIAGNOSIS — T14.8XXA OPEN WOUND OF SKIN: ICD-10-CM

## 2023-06-25 RX ORDER — NEOMYCIN SULFATE, POLYMYXIN B SULFATE, BACITRACIN ZINC, HYDROCORTISONE 3.5; 10000; 400; 1 MG/G; [USP'U]/G; [USP'U]/G; MG/G
OINTMENT OPHTHALMIC
Qty: 7 G | Refills: 1 | Status: SHIPPED | OUTPATIENT
Start: 2023-06-25

## 2023-06-28 DIAGNOSIS — T14.8XXA OPEN WOUND OF SKIN: ICD-10-CM

## 2023-06-28 RX ORDER — BACITRACIN ZINC AND POLYMYXIN B SULFATE 500; 1000 [USP'U]/G; [USP'U]/G
OINTMENT TOPICAL 2 TIMES DAILY
Qty: 15 G | Refills: 0 | Status: SHIPPED | OUTPATIENT
Start: 2023-06-28

## 2023-06-29 ENCOUNTER — TRANSCRIBE ORDERS (OUTPATIENT)
Dept: LAB | Facility: CLINIC | Age: 63
End: 2023-06-29

## 2023-06-29 ENCOUNTER — APPOINTMENT (OUTPATIENT)
Dept: LAB | Facility: CLINIC | Age: 63
End: 2023-06-29
Payer: COMMERCIAL

## 2023-06-29 DIAGNOSIS — F31.32 MODERATE DEPRESSED BIPOLAR I DISORDER (HCC): Primary | ICD-10-CM

## 2023-06-29 DIAGNOSIS — Z12.5 SCREENING FOR PROSTATE CANCER: ICD-10-CM

## 2023-06-29 DIAGNOSIS — F31.32 MODERATE DEPRESSED BIPOLAR I DISORDER (HCC): ICD-10-CM

## 2023-06-29 DIAGNOSIS — Z11.4 SCREENING FOR HIV (HUMAN IMMUNODEFICIENCY VIRUS): ICD-10-CM

## 2023-06-29 LAB
ALBUMIN SERPL BCP-MCNC: 3.2 G/DL (ref 3.5–5)
ALP SERPL-CCNC: 126 U/L (ref 46–116)
ALT SERPL W P-5'-P-CCNC: 79 U/L (ref 12–78)
ANION GAP SERPL CALCULATED.3IONS-SCNC: -3 MMOL/L
AST SERPL W P-5'-P-CCNC: 67 U/L (ref 5–45)
BASOPHILS # BLD AUTO: 0.05 THOUSANDS/ÂΜL (ref 0–0.1)
BASOPHILS NFR BLD AUTO: 1 % (ref 0–1)
BILIRUB SERPL-MCNC: 0.21 MG/DL (ref 0.2–1)
BUN SERPL-MCNC: 15 MG/DL (ref 5–25)
CALCIUM ALBUM COR SERPL-MCNC: 9.3 MG/DL (ref 8.3–10.1)
CALCIUM SERPL-MCNC: 8.7 MG/DL (ref 8.3–10.1)
CHLORIDE SERPL-SCNC: 110 MMOL/L (ref 96–108)
CHOLEST SERPL-MCNC: 112 MG/DL
CO2 SERPL-SCNC: 34 MMOL/L (ref 21–32)
CREAT SERPL-MCNC: 1.07 MG/DL (ref 0.6–1.3)
EOSINOPHIL # BLD AUTO: 1.16 THOUSAND/ÂΜL (ref 0–0.61)
EOSINOPHIL NFR BLD AUTO: 18 % (ref 0–6)
ERYTHROCYTE [DISTWIDTH] IN BLOOD BY AUTOMATED COUNT: 15.7 % (ref 11.6–15.1)
GFR SERPL CREATININE-BSD FRML MDRD: 74 ML/MIN/1.73SQ M
GLUCOSE P FAST SERPL-MCNC: 181 MG/DL (ref 65–99)
HCT VFR BLD AUTO: 30.1 % (ref 36.5–49.3)
HDLC SERPL-MCNC: 72 MG/DL
HGB BLD-MCNC: 9.2 G/DL (ref 12–17)
HIV 1+2 AB+HIV1 P24 AG SERPL QL IA: NORMAL
HIV 2 AB SERPL QL IA: NORMAL
HIV1 AB SERPL QL IA: NORMAL
HIV1 P24 AG SERPL QL IA: NORMAL
IMM GRANULOCYTES # BLD AUTO: 0.01 THOUSAND/UL (ref 0–0.2)
IMM GRANULOCYTES NFR BLD AUTO: 0 % (ref 0–2)
LDLC SERPL CALC-MCNC: 33 MG/DL (ref 0–100)
LYMPHOCYTES # BLD AUTO: 1.2 THOUSANDS/ÂΜL (ref 0.6–4.47)
LYMPHOCYTES NFR BLD AUTO: 19 % (ref 14–44)
MCH RBC QN AUTO: 26.4 PG (ref 26.8–34.3)
MCHC RBC AUTO-ENTMCNC: 30.6 G/DL (ref 31.4–37.4)
MCV RBC AUTO: 87 FL (ref 82–98)
MONOCYTES # BLD AUTO: 0.63 THOUSAND/ÂΜL (ref 0.17–1.22)
MONOCYTES NFR BLD AUTO: 10 % (ref 4–12)
NEUTROPHILS # BLD AUTO: 3.27 THOUSANDS/ÂΜL (ref 1.85–7.62)
NEUTS SEG NFR BLD AUTO: 52 % (ref 43–75)
NONHDLC SERPL-MCNC: 40 MG/DL
NRBC BLD AUTO-RTO: 0 /100 WBCS
PLATELET # BLD AUTO: 210 THOUSANDS/UL (ref 149–390)
PMV BLD AUTO: 11.4 FL (ref 8.9–12.7)
POTASSIUM SERPL-SCNC: 4.8 MMOL/L (ref 3.5–5.3)
PROT SERPL-MCNC: 6.8 G/DL (ref 6.4–8.4)
PSA SERPL-MCNC: 1.24 NG/ML (ref 0–4)
RBC # BLD AUTO: 3.48 MILLION/UL (ref 3.88–5.62)
SODIUM SERPL-SCNC: 141 MMOL/L (ref 135–147)
TRIGL SERPL-MCNC: 37 MG/DL
TSH SERPL DL<=0.05 MIU/L-ACNC: 0.94 UIU/ML (ref 0.45–4.5)
WBC # BLD AUTO: 6.32 THOUSAND/UL (ref 4.31–10.16)

## 2023-06-29 PROCEDURE — 80307 DRUG TEST PRSMV CHEM ANLYZR: CPT

## 2023-06-29 PROCEDURE — 84443 ASSAY THYROID STIM HORMONE: CPT

## 2023-06-29 PROCEDURE — 36415 COLL VENOUS BLD VENIPUNCTURE: CPT

## 2023-06-29 PROCEDURE — G0103 PSA SCREENING: HCPCS

## 2023-06-29 PROCEDURE — 80053 COMPREHEN METABOLIC PANEL: CPT

## 2023-06-29 PROCEDURE — 80061 LIPID PANEL: CPT

## 2023-06-29 PROCEDURE — 85025 COMPLETE CBC W/AUTO DIFF WBC: CPT

## 2023-06-29 PROCEDURE — 87389 HIV-1 AG W/HIV-1&-2 AB AG IA: CPT

## 2023-07-07 LAB
AMPHETAMINES UR QL SCN: NEGATIVE NG/ML
BARBITURATES UR QL SCN: NEGATIVE NG/ML
BENZODIAZ UR QL: POSITIVE
BZE UR QL: NEGATIVE NG/ML
CANNABINOIDS UR QL SCN: POSITIVE
METHADONE UR QL SCN: POSITIVE
OPIATES UR QL: NEGATIVE NG/ML
PCP UR QL: NEGATIVE NG/ML
PROPOXYPH UR QL SCN: NEGATIVE NG/ML

## 2023-07-12 ENCOUNTER — TELEPHONE (OUTPATIENT)
Dept: GASTROENTEROLOGY | Facility: CLINIC | Age: 63
End: 2023-07-12

## 2023-07-12 NOTE — TELEPHONE ENCOUNTER
Spoke with patient reminding him of his 7/26 colonoscopy with Dr. Marla Dias. He has a , will be called day prior with arrival time and I am mailing him the Miralax, Dulcolax prep. He states he can't drink all that liquid(Golytely).

## 2023-07-20 ENCOUNTER — NURSE TRIAGE (OUTPATIENT)
Dept: OTHER | Facility: OTHER | Age: 63
End: 2023-07-20

## 2023-07-20 NOTE — TELEPHONE ENCOUNTER
If patient is refusing GoLytely prep, would recommend 2-day MiraLAX prep. Recommend full MiraLAX prep the night before procedure in addition to a half of MiraLAX prep 2 nights before procedure. He should be on clear liquids for 1.5 days prior to procedure.   Thank you

## 2023-07-20 NOTE — TELEPHONE ENCOUNTER
Patient with questions regarding colonoscopy prep. Please follow up.   Reason for Disposition  • Colonoscopy, questions about    Protocols used: COLONOSCOPY SYMPTOMS AND QUESTIONS-ADULT-

## 2023-07-20 NOTE — TELEPHONE ENCOUNTER
Regarding: Colonoscopy Prep Question  ----- Message from Kasi Wray sent at 7/20/2023  8:35 AM EDT -----  " I am having a Colonoscopy on July 26, and I want to use the Gatorade instead of the other solution.  I can not drink the other stuff."

## 2023-07-25 ENCOUNTER — HOSPITAL ENCOUNTER (OUTPATIENT)
Facility: HOSPITAL | Age: 63
Setting detail: OBSERVATION
Discharge: LEFT BEFORE DISCHARGE | End: 2023-07-26
Attending: EMERGENCY MEDICINE | Admitting: INTERNAL MEDICINE
Payer: COMMERCIAL

## 2023-07-25 ENCOUNTER — APPOINTMENT (EMERGENCY)
Dept: RADIOLOGY | Facility: HOSPITAL | Age: 63
End: 2023-07-25
Payer: COMMERCIAL

## 2023-07-25 DIAGNOSIS — F11.10 OPIOID ABUSE (HCC): Primary | ICD-10-CM

## 2023-07-25 PROBLEM — G93.40 ENCEPHALOPATHY: Status: ACTIVE | Noted: 2023-07-25

## 2023-07-25 PROBLEM — F11.20 METHADONE DEPENDENCE (HCC): Status: ACTIVE | Noted: 2023-07-25

## 2023-07-25 LAB
ANION GAP SERPL CALCULATED.3IONS-SCNC: 3 MMOL/L
BASOPHILS # BLD AUTO: 0.04 THOUSANDS/ÂΜL (ref 0–0.1)
BASOPHILS NFR BLD AUTO: 1 % (ref 0–1)
BUN SERPL-MCNC: 19 MG/DL (ref 5–25)
CALCIUM SERPL-MCNC: 8.5 MG/DL (ref 8.3–10.1)
CHLORIDE SERPL-SCNC: 110 MMOL/L (ref 96–108)
CO2 SERPL-SCNC: 30 MMOL/L (ref 21–32)
CREAT SERPL-MCNC: 0.99 MG/DL (ref 0.6–1.3)
EOSINOPHIL # BLD AUTO: 1.17 THOUSAND/ÂΜL (ref 0–0.61)
EOSINOPHIL NFR BLD AUTO: 14 % (ref 0–6)
ERYTHROCYTE [DISTWIDTH] IN BLOOD BY AUTOMATED COUNT: 15.7 % (ref 11.6–15.1)
EST. AVERAGE GLUCOSE BLD GHB EST-MCNC: 206 MG/DL
GFR SERPL CREATININE-BSD FRML MDRD: 81 ML/MIN/1.73SQ M
GLUCOSE SERPL-MCNC: 160 MG/DL (ref 65–140)
GLUCOSE SERPL-MCNC: 224 MG/DL (ref 65–140)
GLUCOSE SERPL-MCNC: 278 MG/DL (ref 65–140)
GLUCOSE SERPL-MCNC: 83 MG/DL (ref 65–140)
GLUCOSE SERPL-MCNC: 96 MG/DL (ref 65–140)
HBA1C MFR BLD: 8.8 %
HCT VFR BLD AUTO: 28.5 % (ref 36.5–49.3)
HGB BLD-MCNC: 8.9 G/DL (ref 12–17)
IMM GRANULOCYTES # BLD AUTO: 0.03 THOUSAND/UL (ref 0–0.2)
IMM GRANULOCYTES NFR BLD AUTO: 0 % (ref 0–2)
LYMPHOCYTES # BLD AUTO: 1.11 THOUSANDS/ÂΜL (ref 0.6–4.47)
LYMPHOCYTES NFR BLD AUTO: 13 % (ref 14–44)
MCH RBC QN AUTO: 26.2 PG (ref 26.8–34.3)
MCHC RBC AUTO-ENTMCNC: 31.2 G/DL (ref 31.4–37.4)
MCV RBC AUTO: 84 FL (ref 82–98)
MONOCYTES # BLD AUTO: 0.46 THOUSAND/ÂΜL (ref 0.17–1.22)
MONOCYTES NFR BLD AUTO: 5 % (ref 4–12)
NEUTROPHILS # BLD AUTO: 5.76 THOUSANDS/ÂΜL (ref 1.85–7.62)
NEUTS SEG NFR BLD AUTO: 67 % (ref 43–75)
NRBC BLD AUTO-RTO: 0 /100 WBCS
PLATELET # BLD AUTO: 212 THOUSANDS/UL (ref 149–390)
PMV BLD AUTO: 11.8 FL (ref 8.9–12.7)
POTASSIUM SERPL-SCNC: 4.2 MMOL/L (ref 3.5–5.3)
RBC # BLD AUTO: 3.4 MILLION/UL (ref 3.88–5.62)
SODIUM SERPL-SCNC: 143 MMOL/L (ref 135–147)
WBC # BLD AUTO: 8.57 THOUSAND/UL (ref 4.31–10.16)

## 2023-07-25 PROCEDURE — 96375 TX/PRO/DX INJ NEW DRUG ADDON: CPT

## 2023-07-25 PROCEDURE — 83036 HEMOGLOBIN GLYCOSYLATED A1C: CPT | Performed by: INTERNAL MEDICINE

## 2023-07-25 PROCEDURE — 93005 ELECTROCARDIOGRAM TRACING: CPT

## 2023-07-25 PROCEDURE — 99223 1ST HOSP IP/OBS HIGH 75: CPT | Performed by: INTERNAL MEDICINE

## 2023-07-25 PROCEDURE — G1004 CDSM NDSC: HCPCS

## 2023-07-25 PROCEDURE — 70450 CT HEAD/BRAIN W/O DYE: CPT

## 2023-07-25 PROCEDURE — 99291 CRITICAL CARE FIRST HOUR: CPT | Performed by: EMERGENCY MEDICINE

## 2023-07-25 PROCEDURE — 82948 REAGENT STRIP/BLOOD GLUCOSE: CPT

## 2023-07-25 PROCEDURE — 96365 THER/PROPH/DIAG IV INF INIT: CPT

## 2023-07-25 PROCEDURE — 96366 THER/PROPH/DIAG IV INF ADDON: CPT

## 2023-07-25 PROCEDURE — 85025 COMPLETE CBC W/AUTO DIFF WBC: CPT

## 2023-07-25 PROCEDURE — 99285 EMERGENCY DEPT VISIT HI MDM: CPT

## 2023-07-25 PROCEDURE — 80048 BASIC METABOLIC PNL TOTAL CA: CPT

## 2023-07-25 PROCEDURE — 36415 COLL VENOUS BLD VENIPUNCTURE: CPT

## 2023-07-25 RX ORDER — DIVALPROEX SODIUM 500 MG/1
500 TABLET, DELAYED RELEASE ORAL EVERY 12 HOURS SCHEDULED
Status: DISCONTINUED | OUTPATIENT
Start: 2023-07-25 | End: 2023-07-26 | Stop reason: HOSPADM

## 2023-07-25 RX ORDER — NALOXONE HYDROCHLORIDE 0.4 MG/ML
0.1 INJECTION, SOLUTION INTRAMUSCULAR; INTRAVENOUS; SUBCUTANEOUS ONCE
Status: DISCONTINUED | OUTPATIENT
Start: 2023-07-25 | End: 2023-07-25

## 2023-07-25 RX ORDER — ACETAMINOPHEN 325 MG/1
650 TABLET ORAL EVERY 6 HOURS PRN
Status: DISCONTINUED | OUTPATIENT
Start: 2023-07-25 | End: 2023-07-26 | Stop reason: HOSPADM

## 2023-07-25 RX ORDER — CLONAZEPAM 1 MG/1
1 TABLET ORAL EVERY 12 HOURS SCHEDULED
Status: DISCONTINUED | OUTPATIENT
Start: 2023-07-25 | End: 2023-07-26 | Stop reason: HOSPADM

## 2023-07-25 RX ORDER — NALOXONE HYDROCHLORIDE 0.4 MG/ML
0.4 INJECTION, SOLUTION INTRAMUSCULAR; INTRAVENOUS; SUBCUTANEOUS ONCE
Status: COMPLETED | OUTPATIENT
Start: 2023-07-25 | End: 2023-07-25

## 2023-07-25 RX ORDER — INSULIN LISPRO 100 [IU]/ML
1-6 INJECTION, SOLUTION INTRAVENOUS; SUBCUTANEOUS
Status: DISCONTINUED | OUTPATIENT
Start: 2023-07-25 | End: 2023-07-26 | Stop reason: HOSPADM

## 2023-07-25 RX ORDER — ENOXAPARIN SODIUM 100 MG/ML
40 INJECTION SUBCUTANEOUS DAILY
Status: DISCONTINUED | OUTPATIENT
Start: 2023-07-26 | End: 2023-07-26 | Stop reason: HOSPADM

## 2023-07-25 RX ORDER — ONDANSETRON 2 MG/ML
4 INJECTION INTRAMUSCULAR; INTRAVENOUS EVERY 6 HOURS PRN
Status: DISCONTINUED | OUTPATIENT
Start: 2023-07-25 | End: 2023-07-26 | Stop reason: HOSPADM

## 2023-07-25 RX ORDER — INSULIN GLARGINE 100 [IU]/ML
8 INJECTION, SOLUTION SUBCUTANEOUS
Status: DISCONTINUED | OUTPATIENT
Start: 2023-07-25 | End: 2023-07-26 | Stop reason: HOSPADM

## 2023-07-25 RX ORDER — NALOXONE HYDROCHLORIDE 0.4 MG/ML
INJECTION, SOLUTION INTRAMUSCULAR; INTRAVENOUS; SUBCUTANEOUS
Status: COMPLETED
Start: 2023-07-25 | End: 2023-07-25

## 2023-07-25 RX ORDER — LORAZEPAM 2 MG/ML
0.5 INJECTION INTRAMUSCULAR ONCE
Status: COMPLETED | OUTPATIENT
Start: 2023-07-25 | End: 2023-07-25

## 2023-07-25 RX ORDER — DULOXETIN HYDROCHLORIDE 30 MG/1
30 CAPSULE, DELAYED RELEASE ORAL DAILY
Status: DISCONTINUED | OUTPATIENT
Start: 2023-07-25 | End: 2023-07-25

## 2023-07-25 RX ORDER — DULOXETIN HYDROCHLORIDE 30 MG/1
30 CAPSULE, DELAYED RELEASE ORAL DAILY
Status: DISCONTINUED | OUTPATIENT
Start: 2023-07-26 | End: 2023-07-26 | Stop reason: HOSPADM

## 2023-07-25 RX ADMIN — INSULIN LISPRO 4 UNITS: 100 INJECTION, SOLUTION INTRAVENOUS; SUBCUTANEOUS at 16:24

## 2023-07-25 RX ADMIN — NALXONE HYDROCHLORIDE 0.4 MG: 0.4 INJECTION INTRAMUSCULAR; INTRAVENOUS; SUBCUTANEOUS at 14:05

## 2023-07-25 RX ADMIN — NALOXONE HYDROCHLORIDE 0.4 MG: 0.4 INJECTION, SOLUTION INTRAMUSCULAR; INTRAVENOUS; SUBCUTANEOUS at 12:31

## 2023-07-25 RX ADMIN — NALXONE HYDROCHLORIDE 0.4 MG: 0.4 INJECTION INTRAMUSCULAR; INTRAVENOUS; SUBCUTANEOUS at 12:31

## 2023-07-25 RX ADMIN — INSULIN GLARGINE 8 UNITS: 100 INJECTION, SOLUTION SUBCUTANEOUS at 21:09

## 2023-07-25 RX ADMIN — LORAZEPAM 0.5 MG: 2 INJECTION INTRAMUSCULAR; INTRAVENOUS at 14:57

## 2023-07-25 NOTE — ASSESSMENT & PLAN NOTE
· Poorly controlled based on last hemoglobin A1c  · We will recheck hemoglobin A1c  · Hold home metformin  · Lantus 8 units nightly  · ISS for correctional coverage  · Hypoglycemia protocol  · Consistent carb diet

## 2023-07-25 NOTE — ASSESSMENT & PLAN NOTE
· Chronically on methadone due to opioid dependence  · Clinic: [address] [place] [address]  · Patient reports dosage of 165 mg daily. This will need to be confirmed in the morning.

## 2023-07-25 NOTE — H&P
[address] [place] [address]  H&P  Name: Ezio Brown. 66 y.o. male I MRN: [id number]  Unit/Bed#: QCB I Date of Admission: 7/25/2023   Date of Service: 7/25/2023 I Hospital Day: 0      Assessment/Plan   * Encephalopathy  Assessment & Plan  · Toxic toxic metabolic encephalopathy secondary to polypharmacy  · Patient did respond to Narcan x2 by EMS and in the ER, however he believes that his somnolence was mostly due to trazodone which apparently is a new med for him. · We will monitor closely overnight and provide Narcan as needed  · Hold trazodone    Methadone dependence ([address])  Assessment & Plan  · Chronically on methadone due to opioid dependence  · Clinic: [address] [place] [address] in [address] [place] [address]  · Patient reports dosage of 165 mg daily. This will need to be confirmed in the morning. Type 2 diabetes mellitus with microalbuminuria, without long-term current use of insulin ([facility])  Assessment & Plan  · Poorly controlled based on last hemoglobin A1c  · We will recheck hemoglobin A1c  · Hold home metformin  · Lantus 8 units nightly  · ISS for correctional coverage  · Hypoglycemia protocol  · Consistent carb diet    Major depression, chronic  Assessment & Plan  · Continue Cymbalta    Bipolar disorder ([address])  Assessment & Plan  · History of bipolar disorder and opioid dependence  · Follows with psychiatry as an outpatient  · Homeless: Resides at [facility]    · Continue home med regimen including Depakote, Cymbalta, clonazepam      VTE Pharmacologic Prophylaxis:   Moderate Risk (Score 3-4) - Pharmacological DVT Prophylaxis Ordered: enoxaparin (Lovenox). Code Status: Level 1 - Full Code   Discussion with family: Updated  (sister) at bedside. Anticipated Length of Stay: Patient will be admitted on an observation basis with an anticipated length of stay of less than 2 midnights secondary to encephalopathy.     Total Time Spent on Date of Encounter in care of patient: 55 minutes This time was spent on one or more of the following: performing physical exam; counseling and coordination of care; obtaining or reviewing history; documenting in the medical record; reviewing/ordering tests, medications or procedures; communicating with other healthcare professionals and discussing with patient's family/caregivers. Chief Complaint: somnolence    History of Present Illness:  Miriam Ferreira is a 66 y.o. male with a PMH of atrial fibrillation not on any meds, anxiety/depression, DM 2, HTN, bipolar disorder, homelessness, who presents with encephalopathy. Is chronically on methadone and went to his clinic this morning as he usually does. However after returning home to the homeless shelter where he resides he was found somnolent and not responsive. EMS was called and administered Narcan. He did awaken and was brought to the emergency room. After about 90 minutes in the emergency room he again became somnolent, was administered Narcan and his mental status improved. At the time of my evaluation patient is resting comfortably in bed eating a meal.  He was alert and oriented x3 without complaints. He tells me that he did not use any other opioids and took his typical methadone dosage. He states he did take a trazodone this morning which is a relatively new med for him and thinks that is what caused his somnolence. We will plan to observe the patient overnight, patient agreeable. All questions and concerns addressed. Patient sister at bedside. REVIEW OF SYSTEMS  General Denies fevers or chills. Denies generalized weakness or fatigue. HEENT Denies hearing or vision changes. Cardiovascular Denies chest pain. Denies LE swelling. Denies palpitations. Denies dyspnea on exertion. Respiratory Denies cough. Denies difficulty breathing. Denies shortness of breath. Genitourinary Denies hematuria. Denies dysuria. Denies difficulty voiding. Denies incontinence. Gastrointestinal Denies nausea, vomiting, or diarrhea. Denies hematochezia, melena, or hematemesis. Musculoskeletal Denies arthralgias or myalgias. Denies joint swelling. Psychiatric  Denies changes in mood. Denies anxiety or depression. Neurologic Denies headache. Denies lightheadedness/dizziness. Denies numbness/tingling. Denies weakness. Endocrine Denies weight loss or weight gain. Denies excessive thirst, sweating, urination. Past Medical and Surgical History:   Past Medical History:   Diagnosis Date   • Anxiety    • Depression    • Diabetes mellitus ([address])    • Drug use    • Hemorrhoids, internal 9/23/2019   • Hypertension        Past Surgical History:   Procedure Laterality Date   • FEMUR FRACTURE SURGERY Right    • GASTRIC BYPASS  11/08/2011    Managed by: Georgie Orourke (General Surgery)   • HERNIA REPAIR  02/13/2013    Incisional hernia repair Managed by: Georgie Orourke (General Surgery)   • TONSILLECTOMY  2010       Meds/Allergies:  Prior to Admission medications    Medication Sig Start Date End Date Taking?  Authorizing Provider   acetaminophen (TYLENOL) 325 mg tablet Take 2 tablets (650 mg total) by mouth every 6 (six) hours as needed for mild pain (pain)  Patient not taking: Reported on 9/30/2021 1/7/21   Anita Henley PA-C   bacitracin-polymyxin b (POLYSPORIN) ointment Apply topically 2 (two) times a day 6/28/23   STEVE Jewell   capsicum (ZOSTRIX) 0.075 % topical cream Apply topically 3 (three) times a day  Patient not taking: Reported on 4/4/2023 1/7/21   Anita Henley PA-C   clonazePAM (KlonoPIN) 0.5 mg tablet Take 1 tablet (0.5 mg total) by mouth 3 (three) times a day for 14 days 1/10/23 1/24/23  Renato Duane, MD   Continuous Blood Gluc  (FreeStyle [place] 14 Day [place]) STEVE 1 Device by Device route 3 (three) times a day before meals 1/31/23   Zoraida Arteaga MD   Continuous Blood Gluc  (FreeStyle [place] 2 [place]) STEVE 1 Device by Device route 3 (three) times a day before meals 1/29/23   Babar Izaguirre MD   Continuous Blood Gluc Sensor (FreeStyle [place] 14 Day Sensor) MISC Use 1 applicator every 14 (fourteen) days  Patient not taking: Reported on 4/4/2023 1/24/23   Babar Izaguirre MD   Continuous Blood Gluc Sensor (FreeStyle [place] 14 Day Sensor) MISC Inject 1 Device under the skin every 14 (fourteen) days  Patient not taking: Reported on 4/4/2023 1/31/23   Babar Izaguirre MD   Continuous Blood Gluc Sensor (FreeStyle [place] 2 Sensor) MISC 1 Device by Device route 3 (three) times a day before meals  Patient not taking: Reported on 4/4/2023 1/29/23   Babar Izaguirre MD   divalproex sodium (DEPAKOTE ER) 500 mg 24 hr tablet Take 1 tablet (500 mg total) by mouth every 12 (twelve) hours 1/23/23 2/22/23  Babar Izaguirre MD   docusate sodium (COLACE) 100 mg capsule  10/24/22   Historical Provider, MD   doxepin (SINEquan) 150 MG capsule  10/21/22   Historical Provider, MD   ergocalciferol (ERGOCALCIFEROL) 1.25 MG (49800 UT) capsule Take 50,000 Units by mouth 12/5/19 2/27/20  Historical Provider, MD   FeroSul 325 (65 Fe) MG tablet  11/28/22   Historical Provider, MD   fluticasone (FLONASE) 50 mcg/act nasal spray 1 spray into each nostril daily 1/23/23   Babar Izaguirre MD   folic acid (FOLVITE) 1 mg tablet  11/28/22   Historical Provider, MD   gabapentin (NEURONTIN) 300 mg capsule  7/23/21   Historical Provider, MD   GLOBAL EASE INJECT PEN NEEDLES 31G X 8 MM MISC USE AS DIRECTED TO INJECT INSULIN DAILY 9/12/19   Historical Provider, MD   glycerin-hypromellose- (ARTIFICIAL TEARS) 0.2-0.2-1 % SOLN Apply to eye  Patient not taking: Reported on 5/24/2023    Historical Provider, MD   Incontinence Supply Disposable (INCONTINENCE BRIEF MEDIUM) MISC by Does not apply route as needed (fecal soiling) 12/4/19   Jose El MD   Incontinence Supply Disposable (RA WIPES FLUSHABLE/MOIST) MISC by Does not apply route as needed (fecal soiling) 12/4/19   Sara Ibrahim MD   insulin glargine (LANTUS SOLOSTAR) 100 units/mL injection pen Inject 15 Units under the skin daily at bedtime  Patient not taking: Reported on 5/24/2023 1/23/23   Guevara Crenshaw MD   insulin lispro (HumaLOG) 100 units/mL injection Inject 12 Units under the skin  Patient not taking: Reported on 1/23/2023    Historical Provider, MD   Insulin Pen Needle (B-D UF III MINI PEN NEEDLES) 31G X 5 MM MISC Inject 15 Units under the skin daily at bedtime 1/24/23   Guevara Crenshaw MD   Kerendia 10 MG TABS TAKE 10 MG BY MOUTH EVERY MORNING. Patient not taking: Reported on 5/24/2023 2/28/23   Historical Provider, MD   melatonin 3 mg  11/22/22   Historical Provider, MD   meloxicam (MOBIC) 7.5 mg tablet  11/6/22   Historical Provider, MD   metFORMIN (GLUCOPHAGE) 850 mg tablet Take 1 tablet (850 mg total) by mouth daily with breakfast 1/23/23   Guevara Crenshaw MD   Misc. Devices (CANE) MISC by Does not apply route daily Dx:  Frequent falls 7/29/19   Sara Ibrahim MD   neomycin-bacitracin-polymyxin-hydrocortisone (CORTISPORIN) 1 % ophthalmic ointment APPLY TO AFFECTED AREA TWICE A DAY FOR TOPICAL USE (EYE OINTMENT MAY BE USED TOPICALLY) 6/25/23   STEVE Hilton   ONE TOUCH ULTRA TEST test strip Test twice daily  Patient not taking: Reported on 1/23/2023 4/3/19   STEVE Zafar   ONETOUCH DELICA LANCETS 12A MISC USE TWICE A DAY (ICD 10 E11.9)  Patient not taking: Reported on 4/4/2023 3/13/19   Historical Provider, MD   pioglitazone (ACTOS) 15 mg tablet TAKE ONE TABLET (15 MG TOTAL) BY MOUTH DAILY.   Patient not taking: Reported on 3/9/2023 2/28/23   Historical Provider, MD   polyethylene glycol (GLYCOLAX) powder Take 17 g by mouth daily  Patient not taking: Reported on 4/4/2023 9/11/19   Sara Ibrahim MD   polyethylene glycol (GOLYTELY) 4000 mL solution Take 4,000 mL by mouth once for 1 dose As directed for colonoscopy prep 2/27/20 2/27/20  [facility], MD   SYNJARDY XR 12.5-1000 MG TB24 Take 1 tablet by mouth 2 (two) times a day  Patient not taking: Reported on 5/24/2023 12/20/19   Historical Provider, MD   testosterone cypionate (DEPO-TESTOSTERONE) 200 mg/mL SOLN INJECT 1CC INTRAMUSCULARLY EVERY 2 WEEKS  Patient not taking: Reported on 4/4/2023 12/28/19   Historical Provider, MD   traZODone (DESYREL) 50 mg tablet  6/4/21   Historical Provider, MD   Trulicity 1.5 FW/4.0JI injection INJECT 1.5MG SUBCUTANEOUS ONCE EVERY WEEK  Patient not taking: Reported on 3/9/2023 2/28/23   Historical Provider, MD   UNKNOWN TO PATIENT     Historical Provider, MD   bacitracin-neomycin-polymyxin b-hydrocortisone 1 % ointment Apply topically 2 (two) times a day 5/24/23 6/25/23  STEVE Vang     I have reviewed home medications with patient personally. Allergies: Allergies   Allergen Reactions   • Aspirin Shortness Of Breath   • Penicillins        Social History:  Marital Status: Legally    Occupation: homeless  Patient Pre-hospital Living Situation: homeless, resides at [facility]  Patient Pre-hospital Level of Mobility: walks  Patient Pre-hospital Diet Restrictions: none  Substance Use History:   Social History     Substance and Sexual Activity   Alcohol Use Never     Social History     Tobacco Use   Smoking Status Never   Smokeless Tobacco Never     Social History     Substance and Sexual Activity   Drug Use Yes    Comment: Methadone clinic       Family History:  Family History   Problem Relation Age of Onset   • Diabetes Mother    • Hypertension Mother    • Hypertension Father    • Autoimmune disease Son        Physical Exam:     Vitals:   Blood Pressure: 131/77 (07/25/23 1500)  Pulse: 73 (07/25/23 1500)  Respirations: 20 (07/25/23 1500)  SpO2: 98 % (07/25/23 1500)    PHYSICAL EXAM:    Vitals signs reviewed  Constitutional   Awake and cooperative. NAD. Head/Neck   Normocephalic. Atraumatic. HEENT   No scleral icterus. EOMI. Heart   Regular rate and rhythm. No murmers. Lungs   Clear to auscultation bilaterally. Respirations unlaboured. Abdomen   Soft. Nontender. Nondistended. Skin   Skin color normal. No rashes. Extremities   No deformities. No peripheral edema. Neuro   Alert and oriented. No new deficits. Psych   Mood stable. Affect normal.         Additional Data:     Lab Results:  Results from last 7 days   Lab Units 07/25/23  1459   WBC Thousand/uL 8.57   HEMOGLOBIN g/dL 8.9*   HEMATOCRIT % 28.5*   PLATELETS Thousands/uL 212   NEUTROS PCT % 67   LYMPHS PCT % 13*   MONOS PCT % 5   EOS PCT % 14*     Results from last 7 days   Lab Units 07/25/23  1459   SODIUM mmol/L 143   POTASSIUM mmol/L 4.2   CHLORIDE mmol/L 110*   CO2 mmol/L 30   BUN mg/dL 19   CREATININE mg/dL 0.99   ANION GAP mmol/L 3   CALCIUM mg/dL 8.5   GLUCOSE RANDOM mg/dL 224*         Results from last 7 days   Lab Units 07/25/23  1259   POC GLUCOSE mg/dl 83               Lines/Drains:  Invasive Devices     Peripheral Intravenous Line  Duration           Peripheral IV 07/25/23 Left Antecubital <1 day                    Imaging: Personally reviewed the following imaging: CT head  CT head without contrast    (Results Pending)       EKG and Other Studies Reviewed on Admission:   · EKG: Sinus rhythm. ** Please Note: This note has been constructed using a voice recognition system.  **

## 2023-07-25 NOTE — ASSESSMENT & PLAN NOTE
· Toxic toxic metabolic encephalopathy secondary to polypharmacy  · Patient did respond to Narcan x2 by EMS and in the ER, however he believes that his somnolence was mostly due to trazodone which apparently is a new med for him.   · We will monitor closely overnight and provide Narcan as needed  · Hold trazodone

## 2023-07-25 NOTE — ED PROVIDER NOTES
History  Chief Complaint   Patient presents with   • Altered Mental Status     Pt had witnessed episode of syncope at [facility], per EMS, in and out of consciousness. Now minimally responsive to sternal rub, snoring during triage. 0.4 mg Narcan given via IV. HPI     Patient is a 81-year-old male with past medical history of hypertension, hyperlipidemia, opioid abuse currently on methadone, anxiety, depression, presented to the ED via EMS for unresponsiveness. Per EMS report, patient was at the methadone clinic receiving his methadone. He went back to the homeless shelter that he staying at, at that time became unresponsive and had a syncopal event. Unclear if patient struck his head. No anticoagulation use per chart review. EMS arrived and administered 0.4 mg of Narcan IV, and patient became more alert and responsive. On arrival, began to get somnolent again, requiring my immediate medical attention. Prior to Admission Medications   Prescriptions Last Dose Informant Patient Reported? Taking?    Continuous Blood Gluc  (FreeStyle Grace 14 Day Winigan) STEVE   No No   Si Device by Device route 3 (three) times a day before meals   Continuous Blood Gluc  (FreeStyle Sharps 2 Winigan) STEVE   No No   Si Device by Device route 3 (three) times a day before meals   Continuous Blood Gluc Sensor (FreeStyle Grace 14 Day Sensor) [facility]   No No   Sig: Use 1 applicator every 14 (fourteen) days   Patient not taking: Reported on 2023   Continuous Blood Gluc Sensor (FreeStyle Grace 14 Day Sensor) MISC   No No   Sig: Inject 1 Device under the skin every 14 (fourteen) days   Patient not taking: Reported on 2023   Continuous Blood Gluc Sensor (FreeStyle Grace 2 Sensor) MISC   No No   Si Device by Device route 3 (three) times a day before meals   Patient not taking: Reported on 2023   FeroSul 325 (65 Fe) MG tablet   Yes No   Patient not taking: Reported on 2023   GLOBAL EASE INJECT PEN NEEDLES 31G X 8 MM MISC  Self Yes No   Sig: USE AS DIRECTED TO INJECT INSULIN DAILY   Incontinence Supply Disposable (INCONTINENCE BRIEF MEDIUM) MISC  Self No No   Sig: by Does not apply route as needed (fecal soiling)   Incontinence Supply Disposable (RA WIPES FLUSHABLE/MOIST) MISC  Self No No   Sig: by Does not apply route as needed (fecal soiling)   Insulin Pen Needle (B-D UF III MINI PEN NEEDLES) 31G X 5 MM MISC   No No   Sig: Inject 15 Units under the skin daily at bedtime   Kerendia 10 MG TABS   Yes No   Sig: TAKE 10 MG BY MOUTH EVERY MORNING. Patient not taking: Reported on 5/24/2023   Misc.  Devices (CANE) MISC  Self No No   Sig: by Does not apply route daily Dx:  Frequent falls   ONE TOUCH ULTRA TEST test strip  Self No No   Sig: Test twice daily   Patient not taking: Reported on 1/44/1287   [facility] LANCETS 75A MISC  Self Yes No   Sig: USE TWICE A DAY (ICD 10 E11.9)   Patient not taking: Reported on 4/4/2023   SYNJARDY XR 12.5-1000 MG TB24  Self Yes No   Sig: Take 1 tablet by mouth 2 (two) times a day   Patient not taking: Reported on 0/94/4516   Trulicity 1.5 RA/2.1KA injection   Yes No   Sig: INJECT 1.5MG SUBCUTANEOUS ONCE EVERY WEEK   Patient not taking: Reported on 3/9/2023   UNKNOWN TO PATIENT  Self Yes No   acetaminophen (TYLENOL) 325 mg tablet  Self No No   Sig: Take 2 tablets (650 mg total) by mouth every 6 (six) hours as needed for mild pain (pain)   Patient not taking: Reported on 9/30/2021   bacitracin-polymyxin b (POLYSPORIN) ointment   No No   Sig: Apply topically 2 (two) times a day   capsicum (ZOSTRIX) 0.075 % topical cream  Self No No   Sig: Apply topically 3 (three) times a day   Patient not taking: Reported on 4/4/2023   clonazePAM (KlonoPIN) 0.5 mg tablet   No No   Sig: Take 1 tablet (0.5 mg total) by mouth 3 (three) times a day for 14 days   divalproex sodium (DEPAKOTE ER) 500 mg 24 hr tablet   No No   Sig: Take 1 tablet (500 mg total) by mouth every 12 (twelve) hours docusate sodium (COLACE) 100 mg capsule   Yes No   Patient not taking: Reported on 2023   doxepin (SINEquan) 150 MG capsule   Yes No   Patient not taking: Reported on 2023   ergocalciferol (ERGOCALCIFEROL) 1.25 MG (77192 UT) capsule   Yes No   Sig: Take 50,000 Units by mouth   fluticasone (FLONASE) 50 mcg/act nasal spray   No No   Si spray into each nostril daily   folic acid (FOLVITE) 1 mg tablet   Yes No   Patient not taking: Reported on 3/9/2023   gabapentin (NEURONTIN) 300 mg capsule   Yes No   Patient not taking: Reported on 2023   glycerin-hypromellose- (ARTIFICIAL TEARS) 0.2-0.2-1 % SOLN  Self Yes No   Sig: Apply to eye   Patient not taking: Reported on 2023   insulin glargine (LANTUS SOLOSTAR) 100 units/mL injection pen   No No   Sig: Inject 15 Units under the skin daily at bedtime   Patient not taking: Reported on 2023   insulin lispro (HumaLOG) 100 units/mL injection  Self Yes No   Sig: Inject 12 Units under the skin   Patient not taking: Reported on 2023   melatonin 3 mg   Yes No   Patient not taking: Reported on 2023   meloxicam (MOBIC) 7.5 mg tablet   Yes No   Patient not taking: Reported on 3/9/2023   metFORMIN (GLUCOPHAGE) 850 mg tablet   No No   Sig: Take 1 tablet (850 mg total) by mouth daily with breakfast   neomycin-bacitracin-polymyxin-hydrocortisone (CORTISPORIN) 1 % ophthalmic ointment   No No   Sig: APPLY TO AFFECTED AREA TWICE A DAY FOR TOPICAL USE (EYE OINTMENT MAY BE USED TOPICALLY)   pioglitazone (ACTOS) 15 mg tablet   Yes No   Sig: TAKE ONE TABLET (15 MG TOTAL) BY MOUTH DAILY.    Patient not taking: Reported on 3/9/2023   polyethylene glycol (GLYCOLAX) powder  Self No No   Sig: Take 17 g by mouth daily   Patient not taking: Reported on 2023   polyethylene glycol (GOLYTELY) 4000 mL solution   No No   Sig: Take 4,000 mL by mouth once for 1 dose As directed for colonoscopy prep   testosterone cypionate (DEPO-TESTOSTERONE) 200 mg/mL SOLN  Self Yes No   Sig: INJECT 1CC INTRAMUSCULARLY EVERY 2 WEEKS   Patient not taking: Reported on 4/4/2023   traZODone (DESYREL) 50 mg tablet  Self Yes No   Patient not taking: Reported on 3/9/2023      Facility-Administered Medications: None       Past Medical History:   Diagnosis Date   • Anxiety    • Depression    • Diabetes mellitus ([address])    • Drug use    • Hemorrhoids, internal 9/23/2019   • Hypertension        Past Surgical History:   Procedure Laterality Date   • FEMUR FRACTURE SURGERY Right    • GASTRIC BYPASS  11/08/2011    Managed by: Rao Rivera (General Surgery)   • HERNIA REPAIR  02/13/2013    Incisional hernia repair Managed by: Rao Rivera (General Surgery)   • TONSILLECTOMY  2010       Family History   Problem Relation Age of Onset   • Diabetes Mother    • Hypertension Mother    • Hypertension Father    • Autoimmune disease Son      I have reviewed and agree with the history as documented.     E-Cigarette/Vaping   • E-Cigarette Use Never User      E-Cigarette/Vaping Substances   • Nicotine No    • THC No    • CBD No    • Flavoring No    • Other No    • Unknown No      Social History     Tobacco Use   • Smoking status: Never   • Smokeless tobacco: Never   Vaping Use   • Vaping Use: Never used   Substance Use Topics   • Alcohol use: Never   • Drug use: Yes     Comment: Methadone clinic        Review of Systems   Unable to perform ROS: Acuity of condition       Physical Exam  ED Triage Vitals   Temp Pulse Respirations Blood Pressure SpO2   -- 07/25/23 1234 07/25/23 1234 07/25/23 1234 07/25/23 1234    (!) 50 12 132/67 94 %      Temp src Heart Rate Source Patient Position - Orthostatic VS BP Location FiO2 (%)   -- 07/25/23 1234 07/25/23 1234 07/25/23 1234 --    Monitor Lying Right arm       Pain Score       07/25/23 1300       No Pain             Orthostatic Vital Signs  Vitals:    07/25/23 1234 07/25/23 1300 07/25/23 1500   BP: 132/67 (!) 191/101 131/77   Pulse: (!) 50 81 73   Patient Position - Orthostatic VS: Lying Lying Lying       Physical Exam  Vitals and nursing note reviewed. Constitutional:       General: He is in acute distress. Appearance: He is well-developed. Comments: Patient somnolent appearing, snoring respirations, eyes are closed. No spontaneous movement of the extremities. HENT:      Head: Normocephalic and atraumatic. Comments: No signs of external trauma. Eyes:      Conjunctiva/sclera: Conjunctivae normal.      Comments: Pupils are pinpoint bilaterally. Cardiovascular:      Rate and Rhythm: Normal rate and regular rhythm. Heart sounds: No murmur heard. Pulmonary:      Effort: Pulmonary effort is normal.      Comments: Snoring respirations, breath sounds diminished bilaterally. Abdominal:      Palpations: Abdomen is soft. Tenderness: There is no abdominal tenderness. Comments: No abdominal bruising. Musculoskeletal:         General: No swelling. Skin:     General: Skin is warm and dry. Capillary Refill: Capillary refill takes less than 2 seconds. Neurological:      Comments: Patient required sternal rubbing to get a response. When awake able to answer questions but quickly falls asleep. Moving all extremities without difficulty. Speech is clear when awake, mild slurring as he falls asleep.     Strength 5/5 bilateral upper extremities and lower extremities    No facial droop   Psychiatric:         Mood and Affect: Mood normal.         ED Medications  Medications   ondansetron (ZOFRAN) injection 4 mg (has no administration in time range)   acetaminophen (TYLENOL) tablet 650 mg (has no administration in time range)   enoxaparin (LOVENOX) subcutaneous injection 40 mg (has no administration in time range)   insulin lispro (HumaLOG) 100 units/mL subcutaneous injection 1-6 Units (4 Units Subcutaneous Given 7/25/23 8154)   insulin lispro (HumaLOG) 100 units/mL subcutaneous injection 1-6 Units (has no administration in time range)   naloxone (NARCAN) 0.04 mg/mL syringe 0.04 mg (has no administration in time range)   clonazePAM (KlonoPIN) tablet 1 mg (has no administration in time range)   DULoxetine (CYMBALTA) delayed release capsule 30 mg (has no administration in time range)   insulin glargine (LANTUS) subcutaneous injection 8 Units 0.08 mL (has no administration in time range)   divalproex sodium (DEPAKOTE) DR tablet 500 mg (has no administration in time range)   naloxone [facility]) injection 0.4 mg (0.4 mg Intravenous Given 7/25/23 1405)   LORazepam (ATIVAN) injection 0.5 mg (0.5 mg Intravenous Given 7/25/23 1457)   naloxone (NARCAN) injection 0.4 mg (0.4 mg Intravenous Given 7/25/23 1231)       Diagnostic Studies  Results Reviewed     Procedure Component Value Units Date/Time    Fingerstick Glucose (POCT) [[id number]]  (Abnormal) Collected: 07/25/23 1742    Lab Status: Final result Updated: 07/25/23 1743     POC Glucose 160 mg/dl     Fingerstick Glucose (POCT) [[id number]]  (Abnormal) Collected: 07/25/23 1546    Lab Status: Final result Updated: 07/25/23 1547     POC Glucose 278 mg/dl     Basic metabolic panel [[id number]]  (Abnormal) Collected: 07/25/23 1459    Lab Status: Final result Specimen: Blood from Arm, Left Updated: 07/25/23 1527     Sodium 143 mmol/L      Potassium 4.2 mmol/L      Chloride 110 mmol/L      CO2 30 mmol/L      ANION GAP 3 mmol/L      BUN 19 mg/dL      Creatinine 0.99 mg/dL      Glucose 224 mg/dL      Calcium 8.5 mg/dL      eGFR 81 ml/min/1.73sq m     Narrative:      [place] guidelines for Chronic Kidney Disease (CKD):   •  Stage 1 with normal or high GFR (GFR > 90 mL/min/1.73 square meters)  •  Stage 2 Mild CKD (GFR = 60-89 mL/min/1.73 square meters)  •  Stage 3A Moderate CKD (GFR = 45-59 mL/min/1.73 square meters)  •  Stage 3B Moderate CKD (GFR = 30-44 mL/min/1.73 square meters)  •  Stage 4 Severe CKD (GFR = 15-29 mL/min/1.73 square meters)  •  Stage 5 End Stage CKD (GFR <15 mL/min/1.73 square meters)  Note: GFR calculation is accurate only with a steady state creatinine    Hemoglobin A1C [[id number]]     Lab Status: No result Specimen: Blood     CBC and differential [[id number]]  (Abnormal) Collected: 07/25/23 1459    Lab Status: Final result Specimen: Blood from Arm, Left Updated: 07/25/23 1512     WBC 8.57 Thousand/uL      RBC 3.40 Million/uL      Hemoglobin 8.9 g/dL      Hematocrit 28.5 %      MCV 84 fL      MCH 26.2 pg      MCHC 31.2 g/dL      RDW 15.7 %      MPV 11.8 fL      Platelets 186 Thousands/uL      nRBC 0 /100 WBCs      Neutrophils Relative 67 %      Immat GRANS % 0 %      Lymphocytes Relative 13 %      Monocytes Relative 5 %      Eosinophils Relative 14 %      Basophils Relative 1 %      Neutrophils Absolute 5.76 Thousands/µL      Immature Grans Absolute 0.03 Thousand/uL      Lymphocytes Absolute 1.11 Thousands/µL      Monocytes Absolute 0.46 Thousand/µL      Eosinophils Absolute 1.17 Thousand/µL      Basophils Absolute 0.04 Thousands/µL     Fingerstick Glucose (POCT) [[id number]]  (Normal) Collected: 07/25/23 1259    Lab Status: Final result Updated: 07/25/23 1404     POC Glucose 83 mg/dl                  CT head without contrast   Final Result by Dafne Snyder MD (07/25 1615)      No acute intracranial abnormality. Workstation performed: [id number]               Procedures  Procedures      ED Course  ED Course as of 07/25/23 1805   Tue Jul 25, 2023   1305 EKG: NSR at 76 bpm, normal axis, normal intervals, no acute ST-T changes, Qtc 456 as interpreted by me   1320 Patient observed to be ambulatory to the bathroom with steady gait. Returned to room and is watching television while eating a sandwich. Will continue to observe. 1434 Patient required additional 0.4 Narcan IV as he fell asleep with pudding in his hand and required repeat prompting wake up.  Patient did     Patient is a 62-year-old male with past medical history of opioid abuse, currently on methadone, presents to the ED for unresponsive episode. Suspect opioid overdose. Patient required and received my immediate medical attention. He received Narcan in route to the ED with temporary response. On my evaluation, patient is somnolent with snoring respirations. He is maintaining his airway and oxygen saturation is 97% on room air. Patient required sternal rubbing to get an awake response. No signs of external trauma. Pinpoint pupils noted. Patient moving all extremities when awake without difficulty. No lateralizing signs. Will administer Narcan 1 mg IV. After Narcan administration, patient returned to baseline consciousness, pupils were equal round and reactive to light, patient is interactive on exam, no longer somnolent. He states that he goes to the methadone clinic. No recent changes to his dosage and he denies using more methadone than he is prescribed or administered. Patient denies any fentanyl or heroin use today as well. He does note that he has trazodone and Klonopin at home, but only admits to taking 1 tab of each after he received his methadone earlier this afternoon. EKG documented above. Patient has no specific complaints, asking for something to eat. Will administer p.o. fluids and food. Will watch closely as patient could have a relapse of his opioid intoxication. We will add a CT of the head to rule out acute injury as patient did have a syncopal episode but is not currently on anticoagulation. Unclear if he hit his head or not. On reevaluation, patient is tolerating food. He is ambulatory in the ED to the bathroom. We will continue to watch closely. Reevaluation, patient is sleeping. He has fallen asleep with snack in his hand. Oxygen saturation is normal.  Administered additional Narcan 0.4 mg IV and patient returned to full consciousness. At this time, patient's sister has arrived in the ED.   Nabil with patient and sister at bedside that he should be admitted for further evaluation and treatment, especially since he has required multiple doses of Narcan to maintain a conscious state. Patient agrees with that plan. Case was discussed with [facility] attending; arrangements made for admission. MDM      Disposition  Final diagnoses:   Opioid abuse ([address])     Time reflects when diagnosis was documented in both MDM as applicable and the Disposition within this note     Time User Action Codes Description Comment    7/25/2023  2:27 PM Frank Villar [F11.10] Opioid abuse [facility])       ED Disposition     ED Disposition   Admit    Condition   Stable    Date/Time   Tue Jul 25, 2023  3:12 PM    Comment   Case discussed with Dr. Irwin Dillon; arrangements made for admission. Follow-up Information    None         Patient's Medications   Discharge Prescriptions    No medications on file     No discharge procedures on file. PDMP Review       Value Time User    PDMP Reviewed  Yes 1/18/2023 10:58 AM Katerina Collins MD           ED Provider  Attending physically available and evaluated Ezio Brown. . I managed the patient along with the ED Attending.     Electronically Signed by         Marlen Magdaleno DO  07/25/23 0553

## 2023-07-25 NOTE — ASSESSMENT & PLAN NOTE
· History of bipolar disorder and opioid dependence  · Follows with psychiatry as an outpatient  · Homeless: Resides at [facility]    · Continue home med regimen including Depakote, Cymbalta, clonazepam

## 2023-07-26 ENCOUNTER — TRANSITIONAL CARE MANAGEMENT (OUTPATIENT)
Dept: FAMILY MEDICINE CLINIC | Facility: CLINIC | Age: 63
End: 2023-07-26

## 2023-07-26 VITALS
RESPIRATION RATE: 18 BRPM | SYSTOLIC BLOOD PRESSURE: 141 MMHG | HEART RATE: 53 BPM | OXYGEN SATURATION: 94 % | TEMPERATURE: 97.9 F | DIASTOLIC BLOOD PRESSURE: 67 MMHG

## 2023-07-26 LAB
ANION GAP SERPL CALCULATED.3IONS-SCNC: 3 MMOL/L
ATRIAL RATE: 76 BPM
BUN SERPL-MCNC: 15 MG/DL (ref 5–25)
CALCIUM SERPL-MCNC: 8.5 MG/DL (ref 8.3–10.1)
CHLORIDE SERPL-SCNC: 108 MMOL/L (ref 96–108)
CO2 SERPL-SCNC: 27 MMOL/L (ref 21–32)
CREAT SERPL-MCNC: 0.78 MG/DL (ref 0.6–1.3)
ERYTHROCYTE [DISTWIDTH] IN BLOOD BY AUTOMATED COUNT: 15.8 % (ref 11.6–15.1)
GFR SERPL CREATININE-BSD FRML MDRD: 97 ML/MIN/1.73SQ M
GLUCOSE SERPL-MCNC: 154 MG/DL (ref 65–140)
GLUCOSE SERPL-MCNC: 161 MG/DL (ref 65–140)
HCT VFR BLD AUTO: 32.1 % (ref 36.5–49.3)
HGB BLD-MCNC: 9.8 G/DL (ref 12–17)
MCH RBC QN AUTO: 25.7 PG (ref 26.8–34.3)
MCHC RBC AUTO-ENTMCNC: 30.5 G/DL (ref 31.4–37.4)
MCV RBC AUTO: 84 FL (ref 82–98)
P AXIS: 67 DEGREES
PLATELET # BLD AUTO: 236 THOUSANDS/UL (ref 149–390)
PMV BLD AUTO: 11.7 FL (ref 8.9–12.7)
POTASSIUM SERPL-SCNC: 4.1 MMOL/L (ref 3.5–5.3)
PR INTERVAL: 134 MS
QRS AXIS: 83 DEGREES
QRSD INTERVAL: 94 MS
QT INTERVAL: 406 MS
QTC INTERVAL: 456 MS
RBC # BLD AUTO: 3.81 MILLION/UL (ref 3.88–5.62)
SODIUM SERPL-SCNC: 138 MMOL/L (ref 135–147)
T WAVE AXIS: 77 DEGREES
VENTRICULAR RATE: 76 BPM
WBC # BLD AUTO: 9.11 THOUSAND/UL (ref 4.31–10.16)

## 2023-07-26 PROCEDURE — 80048 BASIC METABOLIC PNL TOTAL CA: CPT | Performed by: INTERNAL MEDICINE

## 2023-07-26 PROCEDURE — 85027 COMPLETE CBC AUTOMATED: CPT | Performed by: INTERNAL MEDICINE

## 2023-07-26 PROCEDURE — 82948 REAGENT STRIP/BLOOD GLUCOSE: CPT

## 2023-07-26 PROCEDURE — 93010 ELECTROCARDIOGRAM REPORT: CPT | Performed by: INTERNAL MEDICINE

## 2023-07-26 RX ADMIN — CLONAZEPAM 1 MG: 1 TABLET ORAL at 00:47

## 2023-07-26 RX ADMIN — CLONAZEPAM 1 MG: 1 TABLET ORAL at 08:33

## 2023-07-26 NOTE — NURSING NOTE
Patient wanted to leave [facility] due to not receiving his methadone. Multiple unsuccessful attempts were made by staff and patient to reach the clinic. Patient was picked up and escorted out by sister and left before speaking to a physician.

## 2023-07-26 NOTE — UTILIZATION REVIEW
Initial Clinical Review    Admission: Date/Time/Statement:   Admission Orders (From admission, onward)     Ordered        07/25/23 1512  Place in Observation  Once                      Orders Placed This Encounter   Procedures   • Place in Observation     Standing Status:   Standing     Number of Occurrences:   1     Order Specific Question:   Level of Care     Answer:   Med Surg [16]     ED Arrival Information     Expected   -    Arrival   7/25/2023 12:22    Acuity   Emergent            Means of arrival   Ambulance    Escorted by   ABEBE Diaz)    Service   Hospitalist    Admission type   Emergency            Arrival complaint   syncope           Chief Complaint   Patient presents with   • Altered Mental Status     Pt had witnessed episode of syncope at [facility], per EMS, in and out of consciousness. Now minimally responsive to sternal rub, snoring during triage. 0.4 mg Narcan given via IV. Initial Presentation: 66 y.o. male to ED presents for encephalopathy. Pt chronically on methadone and  went to his clinic this morning as he usually does. However after returning home to the homeless shelter where he resides he was found somnolent and not responsive. EMS was called and administered Narcan. He did wake up and brought to ED. Approx. After 90 mins in ED he became somnolent again and given Narcan, mental status improved. AAO x3. Per pt, he did not use any other opioids and took his typical methadone dosage. He states he did take a trazodone this morning which is a relatively new med for him and thinks that is what caused his somnolence. PMH for atrial fibrillation not on any meds, anxiety/depression, DM 2, HTN, bipolar disorder, homelessness. Admit Observation level of care for Encephalopathy. Toxic toxic metabolic encephalopathy secondary to polypharmacy. S/p Narcan x2. Hold Trazodone.      ED Triage Vitals   Temperature Pulse Respirations Blood Pressure SpO2   07/25/23 2317 07/25/23 1234 07/25/23 1234 07/25/23 1234 07/25/23 1234   97.9 °F (36.6 °C) (!) 50 12 132/67 94 %      Temp src Heart Rate Source Patient Position - Orthostatic VS BP Location FiO2 (%)   -- 07/25/23 1234 07/25/23 1234 07/25/23 1234 --    Monitor Lying Right arm       Pain Score       07/25/23 1300       No Pain          Wt Readings from Last 1 Encounters:   05/24/23 76.9 kg (169 lb 9.6 oz)     Additional Vital Signs:   07/26/23 07:02:04 -- 53   Abnormal  -- 141/67 92 94 % -- --   07/26/23 0655 -- -- -- -- -- -- None (Room air) --   07/25/23 23:17:58 97.9 °F (36.6 °C) 61 -- 140/96 111 95 % -- --   07/25/23 23:17:42 97.9 °F (36.6 °C) -- -- 140/96 111 -- -- --   07/25/23 20:36:31 -- 84 -- 135/87 103 96 % -- --   07/25/23 1920 -- 72 18 149/71 -- 99 % None (Room air) Sitting   07/25/23 1500 -- 73 20 131/77 96 98 % None (Room air) Lying     Pertinent Labs/Diagnostic Test Results:   CT head without contrast   Final Result by Benson Barber MD (07/25 1615)      No acute intracranial abnormality.                   Workstation performed: [id number]               Results from last 7 days   Lab Units 07/26/23  0546 07/25/23  1459   WBC Thousand/uL 9.11 8.57   HEMOGLOBIN g/dL 9.8* 8.9*   HEMATOCRIT % 32.1* 28.5*   PLATELETS Thousands/uL 236 212   NEUTROS ABS Thousands/µL  --  5.76         Results from last 7 days   Lab Units 07/26/23  0546 07/25/23  1459   SODIUM mmol/L 138 143   POTASSIUM mmol/L 4.1 4.2   CHLORIDE mmol/L 108 110*   CO2 mmol/L 27 30   ANION GAP mmol/L 3 3   BUN mg/dL 15 19   CREATININE mg/dL 0.78 0.99   EGFR ml/min/1.73sq m 97 81   CALCIUM mg/dL 8.5 8.5         Results from last 7 days   Lab Units 07/26/23  0547 07/25/23  2106 07/25/23  1742 07/25/23  1546 07/25/23  1259   POC GLUCOSE mg/dl 161* 96 160* 278* 83     Results from last 7 days   Lab Units 07/26/23  0546 07/25/23  1459   GLUCOSE RANDOM mg/dL 154* 224*         Results from last 7 days   Lab Units 07/25/23  1459   HEMOGLOBIN A1C % 8.8*   EAG mg/dl 206 BETA-HYDROXYBUTYRATE   Date Value Ref Range Status   08/17/2022 0.0 <0.6 mmol/L Final                                                                                                                                            ED Treatment:   Medication Administration from 07/25/2023 1222 to 07/25/2023 2026       Date/Time Order Dose Route Action Action by Comments     07/25/2023 1405 EDT naloxone (NARCAN) injection 0.4 mg 0.4 mg Intravenous Given Creston Yadira, RN --     07/25/2023 1457 EDT LORazepam (ATIVAN) injection 0.5 mg 0.5 mg Intravenous Given [place] --     07/25/2023 1231 EDT naloxone (NARCAN) injection 0.4 mg 0.4 mg Intravenous Given Creston Yadira, RN --     07/25/2023 1624 EDT insulin lispro (HumaLOG) 100 units/mL subcutaneous injection 1-6 Units 4 Units Subcutaneous Given [place] --        Past Medical History:   Diagnosis Date   • Anxiety    • Depression    • Diabetes mellitus ([facility])    • Drug use    • Hemorrhoids, internal 9/23/2019   • Hypertension      Present on Admission:  • Bipolar disorder ([facility])  • Major depression, chronic  • Type 2 diabetes mellitus with microalbuminuria, without long-term current use of insulin ([facility])  • Methadone dependence ([facility])  • Encephalopathy      Admitting Diagnosis: Syncope [R55]  Opioid abuse ([facility]) [F11.10]  Age/Sex: 66 y.o. male     Admission Orders:  Scheduled Medications:  clonazePAM, 1 mg, Oral, Q12H [address]  divalproex sodium, 500 mg, Oral, Q12H MELISA  DULoxetine, 30 mg, Oral, Daily  enoxaparin, 40 mg, Subcutaneous, Daily  insulin glargine, 8 Units, Subcutaneous, HS  insulin lispro, 1-6 Units, Subcutaneous, TID AC  insulin lispro, 1-6 Units, Subcutaneous, HS      Continuous IV Infusions:     PRN Meds:  acetaminophen, 650 mg, Oral, Q6H PRN  naloxone, 0.04 mg, Intravenous, Q1MIN PRN  ondansetron, 4 mg, Intravenous, Q6H PRN        None    Network Utilization Review Department  ATTENTION: Please call with any questions or concerns to [phone] and carefully listen to the prompts so that you are directed to the right person. All voicemails are confidential.  Altagracia Collins all requests for admission clinical reviews, approved or denied determinations and any other requests to dedicated fax number below belonging to the campus where the patient is receiving treatment.  List of dedicated fax numbers for the Facilities:  [place] DENIALS (Administrative/Medical Necessity) [phone] [address] [facility] (Maternity/NICU/Pediatrics) [phone]   [address] [place] [address] [phone]   [facility] [address] [facility] [phone]   [address] [facility] [address] [facility] [address] [place] [address] [phone]   [address] [address] [phone]

## 2023-07-26 NOTE — QUICK NOTE
Received a Tiger text from the nursing record patient's methadone. As per the Abe Cote text patient wants his methadone otherwise he is going to leave AMA. At that time I informed the nursing that I was dealing with an emergent situation on another floor and I will be addressing the situation as soon as I can.   In about 20 minutes later bedside nurse texted me states that the patient left the hospital.

## 2023-07-26 NOTE — ASSESSMENT & PLAN NOTE
· Chronically on methadone due to opioid dependence  · Clinic: [address] [place] [address] in [address] (no answer this AM, called multiple times per patient request)  · Patient reports dosage of 165 mg daily. This will need to be confirmed in the morning.

## 2023-07-26 NOTE — ASSESSMENT & PLAN NOTE
· Toxic toxic metabolic encephalopathy secondary to polypharmacy  · Patient did respond to Narcan x2 by EMS and in the ER, however he believes that his somnolence was mostly due to trazodone which apparently is a new med for him.   · We will monitor closely overnight and provide Narcan as needed  · Hold trazodone  · Per most recent PCP notes was buying klonopin off the streets

## 2023-07-27 NOTE — ED ATTENDING ATTESTATION
7/25/2023  I, Maria Guadalupe Steiner DO, saw and evaluated the patient. I have discussed the patient with the resident/non-physician practitioner and agree with the resident's/non-physician practitioner's findings, Plan of Care, and MDM as documented in the resident's/non-physician practitioner's note, except where noted. All available labs and Radiology studies were reviewed. I was present for key portions of any procedure(s) performed by the resident/non-physician practitioner and I was immediately available to provide assistance. At this point I agree with the current assessment done in the Emergency Department. I have conducted an independent evaluation of this patient a history and physical is as follows:    63yo male presents with altered mental status. Pt was at homeless shelter and became unresponsive. PT had gone earlier to get his methadone at the methadone clinic and reports no change in dose. Pt received narcan prehospital and improved. On arrival he became somnolent again and was given additional narcan. He reports he took his benzo and does not usually take the benzo and methadone so close together but does take both on the same day. Pt poor historian. On exam - NAD, sleepy, heart [place], no resp distress, no obvious sign of injury. Plan - observe in the ED. Suspect this is related to substance use. If requires more narcan will need admit. Pt became more sleepy in the ED, fell asleep with pudding in his hand. Given additional narcan, CT head ordered and plan for admit.    ED Course         Critical Care Time  CriticalCare Time    Date/Time: 7/27/2023 3:07 PM    Performed by: Maria Guadalupe Steiner DO  Authorized by: Maria Guadalupe Steiner DO    Critical care provider statement:     Critical care time (minutes):  32    Critical care time was exclusive of:  Separately billable procedures and treating other patients and teaching time    Critical care was necessary to treat or prevent imminent or life-threatening deterioration of the following conditions:  Toxidrome    Critical care was time spent personally by me on the following activities:  Obtaining history from patient or surrogate, evaluation of patient's response to treatment, examination of patient and re-evaluation of patient's condition    I assumed direction of critical care for this patient from another provider in my specialty: no

## 2023-08-09 ENCOUNTER — APPOINTMENT (EMERGENCY)
Dept: CT IMAGING | Facility: HOSPITAL | Age: 63
DRG: 135 | End: 2023-08-09
Payer: COMMERCIAL

## 2023-08-09 ENCOUNTER — HOSPITAL ENCOUNTER (INPATIENT)
Facility: HOSPITAL | Age: 63
LOS: 2 days | Discharge: HOME | DRG: 135 | End: 2023-08-11
Attending: EMERGENCY MEDICINE | Admitting: SURGERY
Payer: COMMERCIAL

## 2023-08-09 ENCOUNTER — APPOINTMENT (EMERGENCY)
Dept: RADIOLOGY | Facility: HOSPITAL | Age: 63
DRG: 135 | End: 2023-08-09
Payer: COMMERCIAL
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
## 2023-08-09 DIAGNOSIS — S22.42XA CLOSED FRACTURE OF MULTIPLE RIBS OF LEFT SIDE, INITIAL ENCOUNTER: ICD-10-CM

## 2023-08-09 PROBLEM — G89.11 ACUTE PAIN DUE TO TRAUMA: Status: ACTIVE | Noted: 2023-08-09

## 2023-08-09 LAB
ANION GAP SERPL CALCULATED.3IONS-SCNC: 2 MMOL/L
BASOPHILS # BLD AUTO: 0.03 THOUSANDS/ÂΜL (ref 0–0.1)
BASOPHILS NFR BLD AUTO: 1 % (ref 0–1)
BUN SERPL-MCNC: 17 MG/DL (ref 5–25)
CALCIUM SERPL-MCNC: 8.5 MG/DL (ref 8.4–10.2)
CHLORIDE SERPL-SCNC: 102 MMOL/L (ref 96–108)
CO2 SERPL-SCNC: 33 MMOL/L (ref 21–32)
CREAT SERPL-MCNC: 0.81 MG/DL (ref 0.6–1.3)
EOSINOPHIL # BLD AUTO: 0.59 THOUSAND/ÂΜL (ref 0–0.61)
EOSINOPHIL NFR BLD AUTO: 9 % (ref 0–6)
ERYTHROCYTE [DISTWIDTH] IN BLOOD BY AUTOMATED COUNT: 16 % (ref 11.6–15.1)
GFR SERPL CREATININE-BSD FRML MDRD: 95 ML/MIN/1.73SQ M
GLUCOSE SERPL-MCNC: 102 MG/DL (ref 65–140)
GLUCOSE SERPL-MCNC: 117 MG/DL (ref 65–140)
GLUCOSE SERPL-MCNC: 184 MG/DL (ref 65–140)
GLUCOSE SERPL-MCNC: 247 MG/DL (ref 65–140)
HCT VFR BLD AUTO: 28.8 % (ref 36.5–49.3)
HGB BLD-MCNC: 8.8 G/DL (ref 12–17)
IMM GRANULOCYTES # BLD AUTO: 0.02 THOUSAND/UL (ref 0–0.2)
IMM GRANULOCYTES NFR BLD AUTO: 0 % (ref 0–2)
LYMPHOCYTES # BLD AUTO: 0.83 THOUSANDS/ÂΜL (ref 0.6–4.47)
LYMPHOCYTES NFR BLD AUTO: 13 % (ref 14–44)
MCH RBC QN AUTO: 26 PG (ref 26.8–34.3)
MCHC RBC AUTO-ENTMCNC: 30.6 G/DL (ref 31.4–37.4)
MCV RBC AUTO: 85 FL (ref 82–98)
MONOCYTES # BLD AUTO: 0.49 THOUSAND/ÂΜL (ref 0.17–1.22)
MONOCYTES NFR BLD AUTO: 7 % (ref 4–12)
NEUTROPHILS # BLD AUTO: 4.66 THOUSANDS/ÂΜL (ref 1.85–7.62)
NEUTS SEG NFR BLD AUTO: 70 % (ref 43–75)
NRBC BLD AUTO-RTO: 0 /100 WBCS
PLATELET # BLD AUTO: 177 THOUSANDS/UL (ref 149–390)
PMV BLD AUTO: 11.4 FL (ref 8.9–12.7)
POTASSIUM SERPL-SCNC: 4.1 MMOL/L (ref 3.5–5.3)
RBC # BLD AUTO: 3.38 MILLION/UL (ref 3.88–5.62)
SODIUM SERPL-SCNC: 137 MMOL/L (ref 135–147)
WBC # BLD AUTO: 6.62 THOUSAND/UL (ref 4.31–10.16)

## 2023-08-09 PROCEDURE — 74177 CT ABD & PELVIS W/CONTRAST: CPT

## 2023-08-09 PROCEDURE — G1004 CDSM NDSC: HCPCS

## 2023-08-09 PROCEDURE — 99284 EMERGENCY DEPT VISIT MOD MDM: CPT

## 2023-08-09 PROCEDURE — NC001 PR NO CHARGE: Performed by: EMERGENCY MEDICINE

## 2023-08-09 PROCEDURE — 99283 EMERGENCY DEPT VISIT LOW MDM: CPT | Performed by: NURSE PRACTITIONER

## 2023-08-09 PROCEDURE — 73552 X-RAY EXAM OF FEMUR 2/>: CPT

## 2023-08-09 PROCEDURE — 36415 COLL VENOUS BLD VENIPUNCTURE: CPT | Performed by: EMERGENCY MEDICINE

## 2023-08-09 PROCEDURE — 71045 X-RAY EXAM CHEST 1 VIEW: CPT

## 2023-08-09 PROCEDURE — 71260 CT THORAX DX C+: CPT

## 2023-08-09 PROCEDURE — 94668 MNPJ CHEST WALL SBSQ: CPT

## 2023-08-09 PROCEDURE — 99222 1ST HOSP IP/OBS MODERATE 55: CPT | Performed by: SURGERY

## 2023-08-09 PROCEDURE — 96374 THER/PROPH/DIAG INJ IV PUSH: CPT

## 2023-08-09 PROCEDURE — 70450 CT HEAD/BRAIN W/O DYE: CPT

## 2023-08-09 PROCEDURE — 72125 CT NECK SPINE W/O DYE: CPT

## 2023-08-09 PROCEDURE — 85025 COMPLETE CBC W/AUTO DIFF WBC: CPT | Performed by: EMERGENCY MEDICINE

## 2023-08-09 PROCEDURE — 80048 BASIC METABOLIC PNL TOTAL CA: CPT | Performed by: EMERGENCY MEDICINE

## 2023-08-09 PROCEDURE — 99285 EMERGENCY DEPT VISIT HI MDM: CPT | Performed by: EMERGENCY MEDICINE

## 2023-08-09 PROCEDURE — 96376 TX/PRO/DX INJ SAME DRUG ADON: CPT

## 2023-08-09 PROCEDURE — 82948 REAGENT STRIP/BLOOD GLUCOSE: CPT

## 2023-08-09 RX ORDER — HYDROMORPHONE HCL/PF 1 MG/ML
0.5 SYRINGE (ML) INJECTION ONCE
Status: COMPLETED | OUTPATIENT
Start: 2023-08-09 | End: 2023-08-09

## 2023-08-09 RX ORDER — INSULIN LISPRO 100 [IU]/ML
4 INJECTION, SOLUTION INTRAVENOUS; SUBCUTANEOUS
Status: DISCONTINUED | OUTPATIENT
Start: 2023-08-09 | End: 2023-08-11 | Stop reason: HOSPADM

## 2023-08-09 RX ORDER — INSULIN LISPRO 100 [IU]/ML
1-6 INJECTION, SOLUTION INTRAVENOUS; SUBCUTANEOUS
Status: DISCONTINUED | OUTPATIENT
Start: 2023-08-09 | End: 2023-08-11 | Stop reason: HOSPADM

## 2023-08-09 RX ORDER — LIDOCAINE 50 MG/G
1 PATCH TOPICAL ONCE
Status: COMPLETED | OUTPATIENT
Start: 2023-08-09 | End: 2023-08-09

## 2023-08-09 RX ORDER — METHADONE HYDROCHLORIDE 5 MG/1
165 TABLET ORAL DAILY
COMMUNITY

## 2023-08-09 RX ORDER — INSULIN LISPRO 100 [IU]/ML
1-5 INJECTION, SOLUTION INTRAVENOUS; SUBCUTANEOUS
Status: DISCONTINUED | OUTPATIENT
Start: 2023-08-09 | End: 2023-08-11 | Stop reason: HOSPADM

## 2023-08-09 RX ORDER — ENOXAPARIN SODIUM 100 MG/ML
30 INJECTION SUBCUTANEOUS EVERY 12 HOURS SCHEDULED
Status: DISCONTINUED | OUTPATIENT
Start: 2023-08-09 | End: 2023-08-11 | Stop reason: HOSPADM

## 2023-08-09 RX ORDER — FLUTICASONE PROPIONATE 50 MCG
1 SPRAY, SUSPENSION (ML) NASAL DAILY
Status: DISCONTINUED | OUTPATIENT
Start: 2023-08-09 | End: 2023-08-11 | Stop reason: HOSPADM

## 2023-08-09 RX ORDER — DOCUSATE SODIUM 100 MG/1
100 CAPSULE, LIQUID FILLED ORAL 2 TIMES DAILY
Status: DISCONTINUED | OUTPATIENT
Start: 2023-08-09 | End: 2023-08-11 | Stop reason: HOSPADM

## 2023-08-09 RX ORDER — INSULIN LISPRO 100 [IU]/ML
4 INJECTION, SOLUTION INTRAVENOUS; SUBCUTANEOUS
Status: DISCONTINUED | OUTPATIENT
Start: 2023-08-10 | End: 2023-08-11 | Stop reason: HOSPADM

## 2023-08-09 RX ORDER — HYDROMORPHONE HCL/PF 1 MG/ML
1 SYRINGE (ML) INJECTION EVERY 2 HOUR PRN
Status: DISCONTINUED | OUTPATIENT
Start: 2023-08-09 | End: 2023-08-09

## 2023-08-09 RX ORDER — INSULIN GLARGINE 100 [IU]/ML
12 INJECTION, SOLUTION SUBCUTANEOUS
Status: DISCONTINUED | OUTPATIENT
Start: 2023-08-09 | End: 2023-08-11 | Stop reason: HOSPADM

## 2023-08-09 RX ORDER — POLYETHYLENE GLYCOL 3350 17 G/17G
17 POWDER, FOR SOLUTION ORAL DAILY PRN
Status: DISCONTINUED | OUTPATIENT
Start: 2023-08-09 | End: 2023-08-11 | Stop reason: HOSPADM

## 2023-08-09 RX ORDER — KETOROLAC TROMETHAMINE 30 MG/ML
15 INJECTION, SOLUTION INTRAMUSCULAR; INTRAVENOUS ONCE
Status: COMPLETED | OUTPATIENT
Start: 2023-08-09 | End: 2023-08-09

## 2023-08-09 RX ORDER — HYDROMORPHONE HCL/PF 1 MG/ML
1 SYRINGE (ML) INJECTION
Status: DISCONTINUED | OUTPATIENT
Start: 2023-08-09 | End: 2023-08-10

## 2023-08-09 RX ORDER — LIDOCAINE 50 MG/G
1 PATCH TOPICAL DAILY
Status: DISCONTINUED | OUTPATIENT
Start: 2023-08-10 | End: 2023-08-11 | Stop reason: HOSPADM

## 2023-08-09 RX ORDER — METHOCARBAMOL 500 MG/1
500 TABLET, FILM COATED ORAL EVERY 6 HOURS SCHEDULED
Status: DISCONTINUED | OUTPATIENT
Start: 2023-08-10 | End: 2023-08-11 | Stop reason: HOSPADM

## 2023-08-09 RX ORDER — OXYCODONE HYDROCHLORIDE 10 MG/1
10 TABLET ORAL EVERY 4 HOURS PRN
Status: DISCONTINUED | OUTPATIENT
Start: 2023-08-09 | End: 2023-08-10

## 2023-08-09 RX ORDER — ACETAMINOPHEN 325 MG/1
975 TABLET ORAL EVERY 8 HOURS SCHEDULED
Status: DISCONTINUED | OUTPATIENT
Start: 2023-08-09 | End: 2023-08-11 | Stop reason: HOSPADM

## 2023-08-09 RX ORDER — SENNOSIDES 8.6 MG
2 TABLET ORAL DAILY
Status: DISCONTINUED | OUTPATIENT
Start: 2023-08-09 | End: 2023-08-11 | Stop reason: HOSPADM

## 2023-08-09 RX ORDER — ONDANSETRON 2 MG/ML
4 INJECTION INTRAMUSCULAR; INTRAVENOUS EVERY 4 HOURS PRN
Status: DISCONTINUED | OUTPATIENT
Start: 2023-08-09 | End: 2023-08-11 | Stop reason: HOSPADM

## 2023-08-09 RX ORDER — GABAPENTIN 100 MG/1
100 CAPSULE ORAL 3 TIMES DAILY
Status: DISCONTINUED | OUTPATIENT
Start: 2023-08-09 | End: 2023-08-11 | Stop reason: HOSPADM

## 2023-08-09 RX ADMIN — HYDROMORPHONE HYDROCHLORIDE 0.5 MG: 1 INJECTION, SOLUTION INTRAMUSCULAR; INTRAVENOUS; SUBCUTANEOUS at 11:08

## 2023-08-09 RX ADMIN — HYDROMORPHONE HYDROCHLORIDE 0.5 MG: 1 INJECTION, SOLUTION INTRAMUSCULAR; INTRAVENOUS; SUBCUTANEOUS at 09:27

## 2023-08-09 RX ADMIN — ENOXAPARIN SODIUM 30 MG: 30 INJECTION SUBCUTANEOUS at 22:41

## 2023-08-09 RX ADMIN — KETOROLAC TROMETHAMINE 15 MG: 30 INJECTION, SOLUTION INTRAMUSCULAR at 22:41

## 2023-08-09 RX ADMIN — HYDROMORPHONE HYDROCHLORIDE 1 MG: 1 INJECTION, SOLUTION INTRAMUSCULAR; INTRAVENOUS; SUBCUTANEOUS at 14:02

## 2023-08-09 RX ADMIN — OXYCODONE HYDROCHLORIDE 15 MG: 10 TABLET ORAL at 15:08

## 2023-08-09 RX ADMIN — HYDROMORPHONE HYDROCHLORIDE 1 MG: 1 INJECTION, SOLUTION INTRAMUSCULAR; INTRAVENOUS; SUBCUTANEOUS at 21:58

## 2023-08-09 RX ADMIN — LIDOCAINE 1 PATCH: 50 PATCH CUTANEOUS at 11:09

## 2023-08-09 RX ADMIN — OXYCODONE HYDROCHLORIDE 15 MG: 10 TABLET ORAL at 21:33

## 2023-08-09 RX ADMIN — IOHEXOL 100 ML: 350 INJECTION, SOLUTION INTRAVENOUS at 10:32

## 2023-08-09 RX ADMIN — DOCUSATE SODIUM 100 MG: 100 CAPSULE, LIQUID FILLED ORAL at 15:11

## 2023-08-09 RX ADMIN — STANDARDIZED SENNA CONCENTRATE 17.2 MG: 8.6 TABLET ORAL at 15:08

## 2023-08-09 RX ADMIN — INSULIN GLARGINE 12 UNITS: 100 INJECTION, SOLUTION SUBCUTANEOUS at 22:00

## 2023-08-09 RX ADMIN — INSULIN LISPRO 4 UNITS: 100 INJECTION, SOLUTION INTRAVENOUS; SUBCUTANEOUS at 15:14

## 2023-08-09 RX ADMIN — ENOXAPARIN SODIUM 30 MG: 30 INJECTION SUBCUTANEOUS at 15:11

## 2023-08-09 RX ADMIN — ACETAMINOPHEN 975 MG: 325 TABLET, FILM COATED ORAL at 15:07

## 2023-08-09 RX ADMIN — HYDROMORPHONE HYDROCHLORIDE 1 MG: 1 INJECTION, SOLUTION INTRAMUSCULAR; INTRAVENOUS; SUBCUTANEOUS at 17:36

## 2023-08-09 NOTE — ASSESSMENT & PLAN NOTE
- Patient with stable appearing right-sided lung nodules noted on CT imaging incidentally. - Discussed with patient and recommended continued outpatient follow-up with PCP.

## 2023-08-09 NOTE — ASSESSMENT & PLAN NOTE
- Patient with chronic methadone dependence and history of prior opioid abuse. - Attempted to contact patient's methadone prescriber at [facility] in [facility] with no answer at this time. - APS consulted to assist with pain management.

## 2023-08-09 NOTE — ED PROVIDER NOTES
History  Chief Complaint   Patient presents with   • Rib Pain     Pt reports fall down stairs yesterday, approx 5 steps, +headstrike -thinners/LOC, c/o left sided rib pain, hx chronic back pain c/o back pain as well       History provided by:  Patient   used: No      Patient is a 57-year-old male presenting to emergency department after he fell down steps yesterday. His left side of his chest wall, hit his head. Denies LOC. Not on blood thinners. Complaining of back pain, chest pain, abdominal pain, headache. No nausea vomiting. No fevers or chills. Patient is on methadone. Prior to Admission Medications   Prescriptions Last Dose Informant Patient Reported? Taking? Continuous Blood Gluc  (FreeStyle Grace 14 Day [place]) STEVE   No No   Si Device by Device route 3 (three) times a day before meals   Continuous Blood Gluc  (FreeStyle Grace 2 [place]) STEVE   No No   Si Device by Device route 3 (three) times a day before meals   GLOBAL EASE INJECT PEN NEEDLES 31G X 8 MM MISC  Self Yes No   Sig: USE AS DIRECTED TO INJECT INSULIN DAILY   Incontinence Supply Disposable (INCONTINENCE BRIEF MEDIUM) MISC  Self No No   Sig: by Does not apply route as needed (fecal soiling)   Incontinence Supply Disposable (RA WIPES FLUSHABLE/MOIST) MISC  Self No No   Sig: by Does not apply route as needed (fecal soiling)   Insulin Pen Needle (B-D UF III MINI PEN NEEDLES) 31G X 5 MM MISC   No No   Sig: Inject 15 Units under the skin daily at bedtime   Misc.  Devices (CANE) MISC  Self No No   Sig: by Does not apply route daily Dx:  Frequent falls   UNKNOWN TO PATIENT  Self Yes No   bacitracin-polymyxin b (POLYSPORIN) ointment   No No   Sig: Apply topically 2 (two) times a day   clonazePAM (KlonoPIN) 0.5 mg tablet   No No   Sig: Take 1 tablet (0.5 mg total) by mouth 3 (three) times a day for 14 days   divalproex sodium (DEPAKOTE ER) 500 mg 24 hr tablet   No No   Sig: Take 1 tablet (500 mg total) by mouth every 12 (twelve) hours   ergocalciferol (ERGOCALCIFEROL) 1.25 MG (91673 UT) capsule   Yes No   Sig: Take 50,000 Units by mouth   fluticasone (FLONASE) 50 mcg/act nasal spray   No No   Si spray into each nostril daily   metFORMIN (GLUCOPHAGE) 850 mg tablet 2023 at 0530  No Yes   Sig: Take 1 tablet (850 mg total) by mouth daily with breakfast   Patient taking differently: Take 850 mg by mouth 2 (two) times a day with meals   methadone (DOLOPHINE) 5 mg tablet 2023 at 0530  Yes Yes   Sig: Take 165 mg by mouth daily   neomycin-bacitracin-polymyxin-hydrocortisone (CORTISPORIN) 1 % ophthalmic ointment   No No   Sig: APPLY TO AFFECTED AREA TWICE A DAY FOR TOPICAL USE (EYE OINTMENT MAY BE USED TOPICALLY)   polyethylene glycol (GOLYTELY) 4000 mL solution   No No   Sig: Take 4,000 mL by mouth once for 1 dose As directed for colonoscopy prep      Facility-Administered Medications: None       Past Medical History:   Diagnosis Date   • Anxiety    • Depression    • Diabetes mellitus ([address])    • Drug use    • Hemorrhoids, internal 2019   • Hypertension        Past Surgical History:   Procedure Laterality Date   • FEMUR FRACTURE SURGERY Right    • GASTRIC BYPASS  2011    Managed by: Paz Paulino (General Surgery)   • HERNIA REPAIR  2013    Incisional hernia repair Managed by: Paz Paulino (General Surgery)   • TONSILLECTOMY         Family History   Problem Relation Age of Onset   • Diabetes Mother    • Hypertension Mother    • Hypertension Father    • Autoimmune disease Son      I have reviewed and agree with the history as documented.     E-Cigarette/Vaping   • E-Cigarette Use Never User      E-Cigarette/Vaping Substances   • Nicotine No    • THC No    • CBD No    • Flavoring No    • Other No    • Unknown No      Social History     Tobacco Use   • Smoking status: Never   • Smokeless tobacco: Never   Vaping Use   • Vaping Use: Never used   Substance Use Topics   • Alcohol use: Never   • Drug use: Yes     Comment: Methadone clinic       Review of Systems   Constitutional: Negative for chills, diaphoresis and fever. HENT: Negative for congestion and sore throat. Respiratory: Negative for cough, shortness of breath, wheezing and stridor. Cardiovascular: Negative for chest pain, palpitations and leg swelling. Gastrointestinal: Negative for abdominal pain, blood in stool, diarrhea, nausea and vomiting. Genitourinary: Negative for dysuria, frequency and urgency. Musculoskeletal: Negative for neck pain and neck stiffness. Skin: Negative for pallor and rash. Neurological: Negative for dizziness, syncope, weakness, light-headedness and headaches. All other systems reviewed and are negative. Physical Exam  Physical Exam  Vitals reviewed. Constitutional:       Appearance: Normal appearance. He is well-developed. HENT:      Head: Normocephalic and atraumatic. Eyes:      Extraocular Movements: Extraocular movements intact. Pupils: Pupils are equal, round, and reactive to light. Cardiovascular:      Rate and Rhythm: Normal rate and regular rhythm. Heart sounds: Normal heart sounds. Pulmonary:      Effort: Pulmonary effort is normal. No respiratory distress. Breath sounds: Normal breath sounds. Abdominal:      General: Bowel sounds are normal.      Palpations: Abdomen is soft. Tenderness: There is abdominal tenderness. Musculoskeletal:         General: Tenderness present. No swelling. Normal range of motion. Cervical back: Normal range of motion and neck supple. Comments: Tenderness over the left lower chest wall left upper quadrant of the abdomen   Skin:     General: Skin is warm and dry. Capillary Refill: Capillary refill takes less than 2 seconds. Neurological:      General: No focal deficit present. Mental Status: He is alert and oriented to person, place, and time.          Vital Signs  ED Triage Vitals [08/09/23 0838] Temperature Pulse Respirations Blood Pressure SpO2   97.8 °F (36.6 °C) 61 20 148/70 100 %      Temp Source Heart Rate Source Patient Position - Orthostatic VS BP Location FiO2 (%)   Oral Monitor Sitting Right arm --      Pain Score       9           Vitals:    08/09/23 1600 08/09/23 1700 08/09/23 1730 08/09/23 1830   BP: 154/67 155/78 (!) 213/94 (!) 192/91   Pulse: 57 55 (!) 54 58   Patient Position - Orthostatic VS: Sitting Sitting Sitting Lying         Visual Acuity      ED Medications  Medications   lidocaine (LIDODERM) 5 % patch 1 patch (1 patch Topical Medication Applied 8/9/23 1109)   fluticasone (FLONASE) 50 mcg/act nasal spray 1 spray (has no administration in time range)   docusate sodium (COLACE) capsule 100 mg (100 mg Oral Not Given 8/9/23 1812)   senna (SENOKOT) tablet 17.2 mg (17.2 mg Oral Given 8/9/23 1508)   polyethylene glycol (MIRALAX) packet 17 g (has no administration in time range)   enoxaparin (LOVENOX) subcutaneous injection 30 mg (30 mg Subcutaneous Given 8/9/23 1511)   insulin lispro (HumaLOG) 100 units/mL subcutaneous injection 4 Units (has no administration in time range)   insulin lispro (HumaLOG) 100 units/mL subcutaneous injection 4 Units (4 Units Subcutaneous Given 8/9/23 1514)   insulin lispro (HumaLOG) 100 units/mL subcutaneous injection 4 Units (has no administration in time range)   insulin glargine (LANTUS) subcutaneous injection 12 Units 0.12 mL (has no administration in time range)   insulin lispro (HumaLOG) 100 units/mL subcutaneous injection 1-5 Units (has no administration in time range)   insulin lispro (HumaLOG) 100 units/mL subcutaneous injection 1-6 Units ( Subcutaneous Not Given 8/9/23 1608)   lidocaine (LIDODERM) 5 % patch 1 patch (has no administration in time range)   acetaminophen (TYLENOL) tablet 975 mg (975 mg Oral Given 8/9/23 1507)   gabapentin (NEURONTIN) capsule 100 mg (100 mg Oral Not Given 8/9/23 1506)   naloxone (NARCAN) 0.04 mg/mL syringe 0.04 mg (has no administration in time range)   ondansetron (ZOFRAN) injection 4 mg (has no administration in time range)   oxyCODONE (ROXICODONE) immediate release tablet 10 mg ( Oral See Alternative 8/9/23 1508)     Or   oxyCODONE (ROXICODONE) IR tablet 15 mg (15 mg Oral Given 8/9/23 1508)   HYDROmorphone (DILAUDID) injection 1 mg (1 mg Intravenous Given 8/9/23 1736)   methadone (DOLOPHINE) tablet 165 mg (has no administration in time range)   HYDROmorphone (DILAUDID) injection 0.5 mg (0.5 mg Intravenous Given 8/9/23 0927)   iohexol (OMNIPAQUE) 350 MG/ML injection (SINGLE-DOSE) 100 mL (100 mL Intravenous Given 8/9/23 1032)   HYDROmorphone (DILAUDID) injection 0.5 mg (0.5 mg Intravenous Given 8/9/23 1108)       Diagnostic Studies  Results Reviewed     Procedure Component Value Units Date/Time    Fingerstick Glucose (POCT) [[id number]]  (Normal) Collected: 08/09/23 1810    Lab Status: Final result Updated: 08/09/23 1810     POC Glucose 102 mg/dl     Fingerstick Glucose (POCT) [[id number]]  (Normal) Collected: 08/09/23 1443    Lab Status: Final result Updated: 08/09/23 1444     POC Glucose 117 mg/dl     Hemoglobin A1c w/EAG Estimation (Orders if not completed within the last 90 days) [[id number]]     Lab Status: No result Specimen: Blood     Platelet count [[id number]]     Lab Status: No result Specimen: Blood     Basic metabolic panel [[id number]]  (Abnormal) Collected: 08/09/23 0928    Lab Status: Final result Specimen: Blood from Arm, Left Updated: 08/09/23 1000     Sodium 137 mmol/L      Potassium 4.1 mmol/L      Chloride 102 mmol/L      CO2 33 mmol/L      ANION GAP 2 mmol/L      BUN 17 mg/dL      Creatinine 0.81 mg/dL      Glucose 247 mg/dL      Calcium 8.5 mg/dL      eGFR 95 ml/min/1.73sq m     Narrative:      [place] guidelines for Chronic Kidney Disease (CKD):   •  Stage 1 with normal or high GFR (GFR > 90 mL/min/1.73 square meters)  •  Stage 2 Mild CKD (GFR = 60-89 mL/min/1.73 square meters)  •  Stage 3A Moderate CKD (GFR = 45-59 mL/min/1.73 square meters)  •  Stage 3B Moderate CKD (GFR = 30-44 mL/min/1.73 square meters)  •  Stage 4 Severe CKD (GFR = 15-29 mL/min/1.73 square meters)  •  Stage 5 End Stage CKD (GFR <15 mL/min/1.73 square meters)  Note: GFR calculation is accurate only with a steady state creatinine    CBC and differential [[id number]]  (Abnormal) Collected: 08/09/23 0928    Lab Status: Final result Specimen: Blood from Arm, Left Updated: 08/09/23 0943     WBC 6.62 Thousand/uL      RBC 3.38 Million/uL      Hemoglobin 8.8 g/dL      Hematocrit 28.8 %      MCV 85 fL      MCH 26.0 pg      MCHC 30.6 g/dL      RDW 16.0 %      MPV 11.4 fL      Platelets 960 Thousands/uL      nRBC 0 /100 WBCs      Neutrophils Relative 70 %      Immat GRANS % 0 %      Lymphocytes Relative 13 %      Monocytes Relative 7 %      Eosinophils Relative 9 %      Basophils Relative 1 %      Neutrophils Absolute 4.66 Thousands/µL      Immature Grans Absolute 0.02 Thousand/uL      Lymphocytes Absolute 0.83 Thousands/µL      Monocytes Absolute 0.49 Thousand/µL      Eosinophils Absolute 0.59 Thousand/µL      Basophils Absolute 0.03 Thousands/µL                  CT head without contrast   Final Result by Loyda Slater MD (08/09 1047)      No acute intracranial abnormality. Workstation performed: [id number]         CT cervical spine without contrast   Final Result by Loyda Slater MD (08/09 1103)      No cervical spine fracture or traumatic malalignment. Workstation performed: [id number]         CT chest abdomen pelvis w contrast   Final Result by Loyda Slater MD (08/09 1133)      Nondisplaced fractures of the left 9th through 11th ribs. No evidence of pneumothorax or pleural effusion. No other evidence of acute posttraumatic abnormality in the chest, abdomen or pelvis. Mild emphysema. Several stable sub-5 mm right lung nodules noted. Stable 1.4 cm right adrenal nodule.  Although its imaging features on this study is indeterminate, because this lesion has been stable for > 1 year, it is considered benign. However, please note that for adrenal nodule > 1cm, endocrinology    consult/biochemical evaluation is suggested to rule out functioning adenoma, if not already performed. Adrenal recommendation based on institutional consensus and Journal of [employer] of Radiology 2017;14:3372-3901         Cholelithiasis. The study was marked in [facility] for immediate notification. Workstation performed: [id number]         XR chest 1 view portable   Final Result by Yoana Gaines MD (08/09 1042)      No acute cardiopulmonary disease. Workstation performed: [id number]         XR femur 2 views LEFT   Final Result by Yoana Gaines MD (08/09 1044)      No acute osseous abnormality. Workstation performed: [id number]         XR chest pa & lateral    (Results Pending)              Procedures  Procedures         ED Course                                             Medical Decision Making  61-year-old with fall with left rib fractures. Will scan chest abdomen pelvis to evaluate for fractures and intra-abdominal injury. CT head and C-spine to evaluate for intracranial and C-spine injury. Pain control. Amount and/or Complexity of Data Reviewed  Labs: ordered. Radiology: ordered. Risk  Prescription drug management. Disposition  Final diagnoses:   Fall, initial encounter   Closed fracture of multiple ribs of left side, initial encounter     Time reflects when diagnosis was documented in both MDM as applicable and the Disposition within this note     Time User Action Codes Description Comment    8/9/2023 12:21 PM Faby Reyna Add [A46. XXXA] Fall, initial encounter     8/9/2023 12:21 PM Liza Ramon Add [S22.42XA] Closed fracture of multiple ribs of left side, initial encounter       ED Disposition     None      Follow-up Information     Follow up With Specialties Details Why Contact Info Additional [address] [place] [address] Drive, [address], [address], Nurse Practitioner Schedule an appointment as soon as possible for a visit in 2 week(s) Please follow-up with your primary care provider in the next 2 weeks to review your hospital encounter and incidental findings. [address] [place] [address] Trauma Surgery Follow up Please follow-up in the trauma clinic for reevaluation of your rib fractures. Please call with any questions or concerns. [address],[facility], [address], [address] [phone]          Patient's Medications   Discharge Prescriptions    No medications on file       No discharge procedures on file.     PDMP Review       Value Time User    PDMP Reviewed  Yes 8/9/2023 12:17 PM Lauryn Baird PA-C          ED Provider  Electronically Signed by           Freedom Díaz MD  08/09/23 3593

## 2023-08-09 NOTE — ASSESSMENT & PLAN NOTE
- Status post fall with the below noted injuries. - Fall precautions.  - PT and OT evaluation and treatment as indicated. - Case Management consultation for disposition planning.

## 2023-08-09 NOTE — ASSESSMENT & PLAN NOTE
- Patient with stable appearing adrenal nodule noted incidentally on CT imaging.  - Discussed with patient and recommended continued outpatient follow-up with PCP as well as consideration for endocrinology consultation for further workup.

## 2023-08-09 NOTE — H&P
[address] [facility]  H&P  Name: Garland Hanson. 66 y.o. male I MRN: [id number]  Unit/Bed#: ED-38 I Date of Admission: 8/9/2023   Date of Service: 8/9/2023 I Hospital Day: 0      Assessment/Plan   Fall  Assessment & Plan  - Status post fall with the below noted injuries. - Fall precautions.  - PT and OT evaluation and treatment as indicated. - Case Management consultation for disposition planning. * Closed fracture of multiple ribs of left side  Assessment & Plan  - Multiple left-sided rib fractures (9-11), present on admission.  - Continue rib fracture protocol.  - Continue to encourage incentive spirometer use and adequate pulmonary hygiene. Currently pulling 1,600 mL on I.S.  - PIC score is 6.  - Continue multimodal analgesic regimen. Await APS evaluation and recommendations. - Need to confirm home Methadone dosing.  - Supplemental oxygen via nasal cannula as needed to maintain saturations greater than or equal to 94%. - Repeat chest x-ray on 8/10/2023.   - PT and OT evaluation and treatment as indicated. - Outpatient follow-up in the trauma clinic for re-evaluation in approximately 2 weeks. Acute pain due to trauma  Assessment & Plan  - Acute pain due to trauma secondary to known rib fractures. - Multimodal analgesia. - Resume methadone once dosing confirmed. - APS consult to assist with pain management. - Bowel regimen while on opioid therapy. Methadone dependence [facility])  Assessment & Plan  - Patient with chronic methadone dependence and history of prior opioid abuse. - Attempted to contact patient's methadone prescriber at [facility] in [facility] with no answer at this time. - APS consulted to assist with pain management.       Adrenal nodule [facility])  Assessment & Plan  - Patient with stable appearing adrenal nodule noted incidentally on CT imaging.  - Discussed with patient and recommended continued outpatient follow-up with PCP as well as consideration for endocrinology consultation for further workup. Lung nodules  Assessment & Plan  - Patient with stable appearing right-sided lung nodules noted on CT imaging incidentally. - Discussed with patient and recommended continued outpatient follow-up with PCP. Type 2 diabetes mellitus with microalbuminuria, without long-term current use of insulin (HCC)  Assessment & Plan  Lab Results   Component Value Date    HGBA1C 8.8 (H) 07/25/2023       No results for input(s): "POCGLU" in the last 72 hours. Blood Sugar Average: Last 72 hrs:     - Patient with chronic history of type 2 diabetes mellitus. - Initiate subcutaneous insulin regimen during hospital encounter.  - Resume home medication therapy on discharge as appropriate. - Hypoglycemia protocol as needed. - Outpatient follow-up with PCP. Benign essential hypertension  Assessment & Plan  - Patient with chronic history of hypertension.  - Continue current medication regimen.  - Outpatient follow-up with PCP. Trauma Alert: Evaluation; trauma team notified at 11:45 AM via text, arrived at 11:56AM   Model of Arrival: [facility]    Trauma Team: Attending Dr. Blanc  and AP Dg Lim PA-C  Consultants: MYLES     History of Present Illness     Chief Complaint: "My left side hurts."  Mechanism:Fall     HPI:    Curtis Rodriguez. is a 66 y.o. male who presents with moderate to severe left lower chest wall pain. He notes that he fell down 3-4 stairs in the dark when the power was out the other night while trying to go to the bathroom. He landed on his left side and experienced significant pain resulting in difficulty moving and taking deep breaths. He did strike his head, but denied losing consciousness. He offers no other complaints at this time. .    Review of Systems   Constitutional: Negative. Negative for activity change, appetite change, chills, fatigue and fever. HENT: Negative.   Negative for ear pain, facial swelling, nosebleeds and voice change. Eyes: Negative. Negative for photophobia, pain, redness and visual disturbance. Respiratory: Negative. Negative for cough, chest tightness, shortness of breath and wheezing. Cardiovascular: Positive for chest pain (Left chest wall pain) and leg swelling (Bilateral lower leg swelling). Negative for palpitations. Gastrointestinal: Negative. Negative for abdominal distention, abdominal pain, nausea and vomiting. Endocrine: Negative. Genitourinary: Negative. Negative for dysuria, flank pain, frequency and hematuria. Musculoskeletal: Negative. Negative for arthralgias, back pain, myalgias and neck pain. Skin: Negative. Negative for color change, pallor, rash and wound. Allergic/Immunologic: Negative. Neurological: Negative. Negative for dizziness, syncope, weakness, light-headedness, numbness and headaches. Hematological: Negative. Psychiatric/Behavioral: Negative. Negative for agitation, confusion, self-injury and sleep disturbance. The patient is not nervous/anxious. 12-point, complete review of systems was reviewed and negative except as stated above.      Historical Information     Past Medical History:   Diagnosis Date   • Anxiety    • Depression    • Diabetes mellitus ([address])    • Drug use    • Hemorrhoids, internal 9/23/2019   • Hypertension      Past Surgical History:   Procedure Laterality Date   • FEMUR FRACTURE SURGERY Right    • GASTRIC BYPASS  11/08/2011    Managed by: Shaw Petty (General Surgery)   • HERNIA REPAIR  02/13/2013    Incisional hernia repair Managed by: Shaw Petty (General Surgery)   • TONSILLECTOMY  2010        Social History     Tobacco Use   • Smoking status: Never   • Smokeless tobacco: Never   Vaping Use   • Vaping Use: Never used   Substance Use Topics   • Alcohol use: Never   • Drug use: Yes     Comment: Methadone clinic     Immunization History   Administered Date(s) Administered   • INFLUENZA 10/10/2011, 12/03/2012, 01/26/2017, 08/17/2020   • Influenza, injectable, quadrivalent, preservative free 0.5 mL 10/16/2019, 08/17/2020   • Influenza, recombinant, quadrivalent,injectable, preservative free 09/11/2019   • Influenza, seasonal, injectable 11/01/2013   • Pneumococcal Polysaccharide PPV23 10/07/2010, 01/26/2017, 05/05/2019   • Tdap 06/06/2018   • Zoster Vaccine Recombinant 03/12/2019, 10/16/2019     Last Tetanus: Unknown  Family History: Non-contributory     Meds/Allergies   all current active meds have been reviewed and current meds:   Current Facility-Administered Medications   Medication Dose Route Frequency   • acetaminophen (TYLENOL) tablet 975 mg  975 mg Oral Q8H [facility]   • docusate sodium (COLACE) capsule 100 mg  100 mg Oral BID   • enoxaparin (LOVENOX) subcutaneous injection 30 mg  30 mg Subcutaneous Q12H MELISA   • fluticasone (FLONASE) 50 mcg/act nasal spray 1 spray  1 spray Nasal Daily   • gabapentin (NEURONTIN) capsule 100 mg  100 mg Oral TID   • insulin glargine (LANTUS) subcutaneous injection 12 Units 0.12 mL  12 Units Subcutaneous HS   • insulin lispro (HumaLOG) 100 units/mL subcutaneous injection 1-5 Units  1-5 Units Subcutaneous HS   • insulin lispro (HumaLOG) 100 units/mL subcutaneous injection 1-6 Units  1-6 Units Subcutaneous TID AC   • [START ON 8/10/2023] insulin lispro (HumaLOG) 100 units/mL subcutaneous injection 4 Units  4 Units Subcutaneous Daily With Breakfast   • insulin lispro (HumaLOG) 100 units/mL subcutaneous injection 4 Units  4 Units Subcutaneous Daily With Lunch   • insulin lispro (HumaLOG) 100 units/mL subcutaneous injection 4 Units  4 Units Subcutaneous Daily With Dinner   • lidocaine (LIDODERM) 5 % patch 1 patch  1 patch Topical Once   • [START ON 8/10/2023] lidocaine (LIDODERM) 5 % patch 1 patch  1 patch Topical Daily   • naloxone (NARCAN) 0.04 mg/mL syringe 0.04 mg  0.04 mg Intravenous Q1MIN PRN   • ondansetron (ZOFRAN) injection 4 mg  4 mg Intravenous Q4H PRN   • polyethylene glycol (MIRALAX) packet 17 g 17 g Oral Daily PRN   • senna (SENOKOT) tablet 17.2 mg  2 tablet Oral Daily        Allergies   Allergen Reactions   • Aspirin Shortness Of Breath   • Penicillins        Objective   Initial Vitals:   Temperature: 97.8 °F (36.6 °C) (08/09/23 0838)  Pulse: 61 (08/09/23 0838)  Respirations: 20 (08/09/23 1356)  Blood Pressure: 148/70 (08/09/23 6700)    Primary Survey:   Airway:        Status: patent;        Pre-hospital Interventions: none        Hospital Interventions: none  Breathing:        Pre-hospital Interventions: none       Effort: normal       Right breath sounds: normal       Left breath sounds: normal  Circulation:        Rhythm: regular       Rate: regular   Right Pulses Left Pulses    R radial: 2+  R femoral: 2+  R pedal: 2+  R carotid: 2+  R popliteal: 2+ L radial: 2+  L femoral: 2+  L pedal: 2+  L carotid: 2+  L popliteal: 2+   Disability:        GCS: Eye: 4; Verbal: 5 Motor: 6 Total: 15       Right Pupil: round;  reactive         Left Pupil:  round;  reactive      R Motor Strength L Motor Strength    R : 5/5  R dorsiflex: 5/5  R plantarflex: 5/5 L : 5/5  L dorsiflex: 5/5  L plantarflex: 5/5        Sensory:  No sensory deficit  Exposure:       Completed: Yes      Secondary Survey:  Physical Exam  Vitals and nursing note reviewed. Exam conducted with a chaperone present. Constitutional:       General: He is awake. He is not in acute distress. Appearance: Normal appearance. He is normal weight. He is not ill-appearing, toxic-appearing or diaphoretic. Interventions: He is not intubated. Cervical collar in place. HENT:      Head: Normocephalic and atraumatic. No abrasion, contusion or laceration. Jaw: There is normal jaw occlusion. Right Ear: Hearing and external ear normal. No swelling or tenderness. Left Ear: Hearing and external ear normal. No swelling or tenderness.       Nose: Nose normal. No nasal deformity, septal deviation, signs of injury, laceration or nasal tenderness. Mouth/Throat:      Mouth: Mucous membranes are moist.      Pharynx: Oropharynx is clear. Eyes:      General: Lids are normal. Vision grossly intact. Extraocular Movements: Extraocular movements intact. Conjunctiva/sclera: Conjunctivae normal.   Cardiovascular:      Rate and Rhythm: Normal rate and regular rhythm. Pulses:           Carotid pulses are 2+ on the right side and 2+ on the left side. Radial pulses are 2+ on the right side and 2+ on the left side. Femoral pulses are 2+ on the right side and 2+ on the left side. Dorsalis pedis pulses are 2+ on the right side and 2+ on the left side. Heart sounds: Normal heart sounds. No murmur heard. No friction rub. No gallop. Pulmonary:      Effort: Pulmonary effort is normal. No tachypnea, bradypnea, accessory muscle usage, prolonged expiration, respiratory distress or retractions. He is not intubated. Breath sounds: Normal breath sounds and air entry. No stridor or decreased air movement. No decreased breath sounds, wheezing, rhonchi or rales. Chest:      Chest wall: Tenderness (Left lower lateral chest wall tenderness) present. No lacerations, deformity, swelling or crepitus. Abdominal:      General: Abdomen is flat. Bowel sounds are normal. There is no distension. Palpations: Abdomen is soft. Tenderness: There is no abdominal tenderness. There is no guarding or rebound. Musculoskeletal:         General: No swelling, tenderness or deformity. Normal range of motion. Cervical back: Normal range of motion and neck supple. No swelling, deformity, signs of trauma, lacerations or tenderness. No spinous process tenderness or muscular tenderness. Thoracic back: No swelling, deformity, signs of trauma, lacerations or tenderness. Lumbar back: No swelling, deformity, signs of trauma, lacerations or tenderness. Right lower leg: Edema present. Left lower leg: Edema present. Comments: No midline cervical, thoracic or lumbar spine tenderness, step-offs or deformities. No paraspinal muscular tenderness in the neck or back. Normal range of motion in all 4 extremities without pain, tenderness or deformity. Skin:     General: Skin is warm and dry. Capillary Refill: Capillary refill takes less than 2 seconds. Coloration: Skin is not jaundiced or pale. Findings: No abrasion, bruising, ecchymosis, erythema, laceration, lesion, rash or wound. Neurological:      General: No focal deficit present. Mental Status: He is alert and oriented to person, place, and time. Mental status is at baseline. GCS: GCS eye subscore is 4. GCS verbal subscore is 5. GCS motor subscore is 6. Sensory: Sensation is intact. No sensory deficit. Motor: Motor function is intact. No weakness. Psychiatric:         Mood and Affect: Mood normal.         Behavior: Behavior is cooperative. Invasive Devices     Peripheral Intravenous Line  Duration           Peripheral IV 08/09/23 Left;Ventral (anterior) Forearm <1 day              Lab Results: I have personally reviewed all pertinent laboratory/test results from 08/09/23, including the preceding 24 hours. Recent Labs     08/09/23  0928   WBC 6.62   HGB 8.8*   HCT 28.8*      SODIUM 137   K 4.1      CO2 33*   BUN 17   CREATININE 0.81   GLUC 247*       Imaging Results: I have personally reviewed pertinent images saved in PACS. CT scan findings (and other pertinent positive findings on images) were discussed with radiology.  My interpretation of the images/reports are as follows:  Chest Xray(s): negative for acute findings   FAST exam(s): N/A   CT Scan(s): positive for acute findings: Nondisplaced fractures of the left ninth through 11th ribs   Additional Xray(s): negative for acute findings     Other Studies: N/A    Code Status: Level 1 - Full Code  Advance Directive and Living Will:      Power of : POLST:    I have spent 40 minutes with Patient  today in which greater than 50% of this time was spent in counseling/coordination of care regarding Diagnostic results, Instructions for management, Patient and family education, Impressions, Counseling / Coordination of care, Documenting in the medical record, Reviewing / ordering tests, medicine, procedures  , Obtaining or reviewing history   and Communicating with other healthcare professionals .

## 2023-08-09 NOTE — ASSESSMENT & PLAN NOTE
Multimodal analgesia  · Tylenol 975 mg every 8 hours scheduled  · Gabapentin 100 mg 3 times a day  · Estimated Creatinine Clearance: 92.7 mL/min (by C-G formula based on SCr of 0.81 mg/dL). · Lidocaine patch daily, on for 12 hours off for 12 hours  · Robaxin 500mg every 6 hours scheduled  · Clonazepam 1 mg twice a day, home medication  · Oxycodone to 5 mg every 4 hours as needed for moderate pain  · Oxycodone to 10 mg every 4 hours as needed for severe pain  · D/C IV Dilaudid  · Naloxone as needed for opioid reversal/respiratory depression    Bowel regimen  · Colace 100 mg twice a day  · Senokot daily  · MiraLAX daily as needed for constipation    Interventional pain modalities:  · Will hold off on peripheral block/epidural at this time as respiratory status remains is stable. SpO2 95% on room air; incentive spirometer 2000ml.   · DVT prophylaxis subcutaneous Lovenox, okay to continue as no interventional pain treatments are needed

## 2023-08-09 NOTE — ASSESSMENT & PLAN NOTE
- Acute pain due to trauma secondary to known rib fractures. - Multimodal analgesia. - Resume methadone once dosing confirmed. - APS consult to assist with pain management. - Bowel regimen while on opioid therapy.

## 2023-08-09 NOTE — CONSULTS
Consultation - Acute Pain Service  Jenny Siddiqui 66 y.o. male MRN: [id number]  Unit/Bed#: ED-38 Encounter: [id number]               Jenny Siddiqui is a 66 y.o. male who presents status post fall with left-sided rib fractures 9-11. Acute pain due to trauma  Assessment & Plan  Multimodal analgesia  · Tylenol 975 mg every 8 hours scheduled  · Gabapentin 100 mg 3 times a day  · Estimated Creatinine Clearance: 92.7 mL/min (by C-G formula based on SCr of 0.81 mg/dL). · Lidocaine patch daily, on for 12 hours off for 12 hours  · Start oxycodone 10 mg every 4 hours as needed for moderate pain  · Start oxycodone 15 mg every 4 hours as needed for severe pain  · Start Dilaudid 1 mg IV every 2 hours as needed for breakthrough pain  · Plan to de-escalate as needed opioid regimen in the next 24 hours  · Naloxone as needed for opioid reversal/respiratory depression    Bowel regimen  · Colace 100 mg twice a day  · Senokot daily  · MiraLAX daily as needed for constipation    Interventional pain modalities:  · Will hold off on peripheral block/epidural at this time as respiratory status is stable. SpO2 99% on room air; incentive spirometer 1250-1500ml. · DVT prophylaxis subcutaneous Lovenox, okay to continue as would not anticipate that the patient would need an epidural in the next 24 hours    Methadone dependence [facility])  Assessment & Plan  Methadone clinic: [address]  Phone number [phone]  Personally called clinic to verify methadone dose; confirmed patient is on methadone 165 mg daily  Patient was at methadone clinic on 8/8/2023 and received his dose; he also received 2 take-home doses for 8/9/2023 and 8/10/2023  Okay to restart methadone 165 mg daily starting tomorrow      APS will continue to follow. Please contact Acute Pain Service - via [employer] from [phone] with additional questions or concerns. See Yue or Patricia for additional contacts and after hours information. History of Present Illness    Admit Date:  8/9/2023  Hospital Day:  0 days  Primary Service:  Trauma  Attending Provider:  Joseline Blanc DO  Physician Requesting Consult: Joseline Blanc DO  Reason for Consult / Principal Problem: Acute pain due to trauma  HPI: Eliecer Sims is a 66y.o. year old male who presents as post fall with left-sided rib fractures 9-11. Rib Fracture Evaluation:  Chest tube: none  Respiratory Co-morbidities: none  SpO2:   SPO2 RA Rest    Flowsheet Row ED from 8/9/2023 in [address] [place] [address] Emergency Department   SpO2 99 %   SpO2 Activity At Rest   O2 Device None (Room air)   O2 Flow Rate --                                                                           Incentive Spirometer:   1250-1500ml  Platelet Count:   Results from last 7 days   Lab Units 08/09/23  0928   PLATELETS Thousands/uL 177     Coags:     Home anticoagulants: None  VTE covered by:  enoxaparin, Subcutaneous      Current pain location(s): Pain Score: 10  Pain Location/Orientation: Orientation: Left, Location: Rib Cage  Pain Scale: Pain Assessment Tool: 0-10  Current Analgesic regimen: Patient resting in bed, reporting left-sided chest wall and back pain 9-10 out of 10 which is described as sharp and worse with deep inspiration or movement. Minimal relief with previous doses of IV Dilaudid. I have reviewed the patient's controlled substance dispensing history in the Prescription Drug Monitoring Program in compliance with the [facility] regulations before prescribing any controlled substances. Inpatient consult to Acute Pain Service (see Comments)  Consult performed by: STEVE Lane  Consult ordered by: aMrilu Schrader PA-C          Review of Systems   Respiratory: Positive for shortness of breath. Cardiovascular: Positive for chest pain. Gastrointestinal: Negative for constipation, nausea and vomiting. Musculoskeletal: Positive for back pain and neck pain.    Neurological: Positive for dizziness and weakness. Negative for headaches. All other systems reviewed and are negative.       Historical Information   Past Medical History:   Diagnosis Date   • Anxiety    • Depression    • Diabetes mellitus ([address])    • Drug use    • Hemorrhoids, internal 9/23/2019   • Hypertension      Past Surgical History:   Procedure Laterality Date   • FEMUR FRACTURE SURGERY Right    • GASTRIC BYPASS  11/08/2011    Managed by: Rao Rivera (General Surgery)   • HERNIA REPAIR  02/13/2013    Incisional hernia repair Managed by: Rao Rivera (General Surgery)   • TONSILLECTOMY  2010     Social History   Social History     Substance and Sexual Activity   Alcohol Use Never     Social History     Substance and Sexual Activity   Drug Use Yes    Comment: Methadone clinic     Social History     Tobacco Use   Smoking Status Never   Smokeless Tobacco Never     Family History: non-contributory    Meds/Allergies   all current active meds have been reviewed, current meds:   Current Facility-Administered Medications   Medication Dose Route Frequency   • acetaminophen (TYLENOL) tablet 975 mg  975 mg Oral Q8H [facility]   • docusate sodium (COLACE) capsule 100 mg  100 mg Oral BID   • enoxaparin (LOVENOX) subcutaneous injection 30 mg  30 mg Subcutaneous Q12H [facility]   • fluticasone (FLONASE) 50 mcg/act nasal spray 1 spray  1 spray Nasal Daily   • gabapentin (NEURONTIN) capsule 100 mg  100 mg Oral TID   • HYDROmorphone (DILAUDID) injection 1 mg  1 mg Intravenous Q2H PRN   • insulin glargine (LANTUS) subcutaneous injection 12 Units 0.12 mL  12 Units Subcutaneous HS   • insulin lispro (HumaLOG) 100 units/mL subcutaneous injection 1-5 Units  1-5 Units Subcutaneous HS   • insulin lispro (HumaLOG) 100 units/mL subcutaneous injection 1-6 Units  1-6 Units Subcutaneous TID AC   • [START ON 8/10/2023] insulin lispro (HumaLOG) 100 units/mL subcutaneous injection 4 Units  4 Units Subcutaneous Daily With Breakfast   • insulin lispro (HumaLOG) 100 units/mL subcutaneous injection 4 Units  4 Units Subcutaneous Daily With Lunch   • insulin lispro (HumaLOG) 100 units/mL subcutaneous injection 4 Units  4 Units Subcutaneous Daily With Dinner   • lidocaine (LIDODERM) 5 % patch 1 patch  1 patch Topical Once   • [START ON 8/10/2023] lidocaine (LIDODERM) 5 % patch 1 patch  1 patch Topical Daily   • naloxone (NARCAN) 0.04 mg/mL syringe 0.04 mg  0.04 mg Intravenous Q1MIN PRN   • ondansetron (ZOFRAN) injection 4 mg  4 mg Intravenous Q4H PRN   • oxyCODONE (ROXICODONE) immediate release tablet 10 mg  10 mg Oral Q4H PRN    Or   • oxyCODONE (ROXICODONE) IR tablet 15 mg  15 mg Oral Q4H PRN   • polyethylene glycol (MIRALAX) packet 17 g  17 g Oral Daily PRN   • senna (SENOKOT) tablet 17.2 mg  2 tablet Oral Daily    and PTA meds:   Prior to Admission Medications   Prescriptions Last Dose Informant Patient Reported? Taking? Continuous Blood Gluc  (FreeStyle Grace 14 Day [place]) STEVE   No No   Si Device by Device route 3 (three) times a day before meals   Continuous Blood Gluc  (FreeStyle [place] 2 [place]) STEVE   No No   Si Device by Device route 3 (three) times a day before meals   GLOBAL EASE INJECT PEN NEEDLES 31G X 8 MM MISC  Self Yes No   Sig: USE AS DIRECTED TO INJECT INSULIN DAILY   Incontinence Supply Disposable (INCONTINENCE BRIEF MEDIUM) MISC  Self No No   Sig: by Does not apply route as needed (fecal soiling)   Incontinence Supply Disposable (RA WIPES FLUSHABLE/MOIST) MISC  Self No No   Sig: by Does not apply route as needed (fecal soiling)   Insulin Pen Needle (B-D UF III MINI PEN NEEDLES) 31G X 5 MM MISC   No No   Sig: Inject 15 Units under the skin daily at bedtime   Misc.  Devices (CANE) MISC  Self No No   Sig: by Does not apply route daily Dx:  Frequent falls   UNKNOWN TO PATIENT  Self Yes No   bacitracin-polymyxin b (POLYSPORIN) ointment   No No   Sig: Apply topically 2 (two) times a day   clonazePAM (KlonoPIN) 0.5 mg tablet   No No   Sig: Take 1 tablet (0.5 mg total) by mouth 3 (three) times a day for 14 days   divalproex sodium (DEPAKOTE ER) 500 mg 24 hr tablet   No No   Sig: Take 1 tablet (500 mg total) by mouth every 12 (twelve) hours   ergocalciferol (ERGOCALCIFEROL) 1.25 MG (19637 UT) capsule   Yes No   Sig: Take 50,000 Units by mouth   fluticasone (FLONASE) 50 mcg/act nasal spray   No No   Si spray into each nostril daily   metFORMIN (GLUCOPHAGE) 850 mg tablet 2023 at 0530  No Yes   Sig: Take 1 tablet (850 mg total) by mouth daily with breakfast   Patient taking differently: Take 850 mg by mouth 2 (two) times a day with meals   methadone (DOLOPHINE) 5 mg tablet 2023 at 0530  Yes Yes   Sig: Take 165 mg by mouth daily   neomycin-bacitracin-polymyxin-hydrocortisone (CORTISPORIN) 1 % ophthalmic ointment   No No   Sig: APPLY TO AFFECTED AREA TWICE A DAY FOR TOPICAL USE (EYE OINTMENT MAY BE USED TOPICALLY)   polyethylene glycol (GOLYTELY) 4000 mL solution   No No   Sig: Take 4,000 mL by mouth once for 1 dose As directed for colonoscopy prep      Facility-Administered Medications: None       Allergies   Allergen Reactions   • Aspirin Shortness Of Breath   • Penicillins        Objective   Vitals:    23 1045 23 1130 23 1300 23 1330   BP: 145/70 168/77 166/83 (!) 185/79   BP Location: Right arm Right arm Right arm Right arm   Pulse: 57 (!) 51 56 56   Resp: 20 18 20 18   Temp:       TempSrc:       SpO2: 98% 100% 100% 99%   Height:    5' 8" (1.727 m)       No intake or output data in the 24 hours ending 23 1424    Physical Exam  Vitals reviewed. Constitutional:       General: He is awake. He is not in acute distress. Appearance: Normal appearance. He is not ill-appearing, toxic-appearing or diaphoretic. HENT:      Head: Normocephalic and atraumatic. Nose: Nose normal. No congestion or rhinorrhea.       Mouth/Throat:      Mouth: Mucous membranes are moist.   Eyes:      Extraocular Movements: Extraocular movements intact. Cardiovascular:      Rate and Rhythm: Regular rhythm. Bradycardia present. Pulmonary:      Effort: Pulmonary effort is normal. No tachypnea, bradypnea or respiratory distress. Breath sounds: Normal breath sounds. No wheezing, rhonchi or rales. Abdominal:      General: Bowel sounds are normal. There is no distension. Palpations: Abdomen is soft. Tenderness: There is no abdominal tenderness. Skin:     General: Skin is warm and dry. Coloration: Skin is not pale. Findings: No rash. Neurological:      Mental Status: He is alert and oriented to person, place, and time. Mental status is at baseline. Psychiatric:         Attention and Perception: Attention normal.         Mood and Affect: Mood is anxious. Speech: Speech normal.         Behavior: Behavior normal. Behavior is not agitated. Behavior is cooperative. Lab Results:  Estimated Creatinine Clearance: 92.7 mL/min (by C-G formula based on SCr of 0.81 mg/dL).   Lab Results   Component Value Date    WBC 6.62 08/09/2023    WBC 9.29 01/29/2015    HGB 8.8 (L) 08/09/2023    HGB 12.2 01/29/2015    HCT 28.8 (L) 08/09/2023    HCT 38.0 01/29/2015     08/09/2023     01/29/2015         Component Value Date/Time     01/29/2015 1342    K 4.1 08/09/2023 0928    K 5.0 01/29/2015 1342     08/09/2023 0928     01/29/2015 1342    CO2 33 (H) 08/09/2023 0928    CO2 29 08/17/2022 0815    BUN 17 08/09/2023 0928    BUN 18 01/29/2015 1342    CREATININE 0.81 08/09/2023 0928    CREATININE 1.01 01/29/2015 1342         Component Value Date/Time    CALCIUM 8.5 08/09/2023 0928    CALCIUM 9.2 01/29/2015 1342    ALKPHOS 126 (H) 06/29/2023 0709    ALKPHOS 108 01/29/2015 1342    AST 67 (H) 06/29/2023 0709    AST 14 01/29/2015 1342    ALT 79 (H) 06/29/2023 0709    ALT 21 01/29/2015 1342    BILITOT 0.27 01/29/2015 1342    TP 6.8 06/29/2023 0709    ALB 3.2 (L) 06/29/2023 0709    ALB 3.6 01/29/2015 1342 Imaging Studies/EKG: I have personally reviewed pertinent reports. Counseling / Coordination of Care  Total floor / unit time spent today 55 minutes. Greater than 50% of total time was spent with the patient and / or family counseling and / or coordination of care. A description of the counseling / coordination of care: Chart review included past medical history, current and home medications, laboratory values, imaging reports and progress notes. Analgesic regimen was reviewed with patient was inclusive of opioid and nonopioid and pain medications. Patient provided me with the name of his methadone clinic and provided consent that it was okay to call his clinic to verify dose. Treatment plan was reviewed with bedside nursing staff and primary care service. Please note that the APS provides consultative services regarding pain management only. With the exception of ketamine and epidural infusions and except when indicated, final decisions regarding starting or changing doses of analgesic medications are at the discretion of the consulting service.   STEVE Constantino  Acute Pain Service

## 2023-08-09 NOTE — ASSESSMENT & PLAN NOTE
Lab Results   Component Value Date    HGBA1C 8.8 (H) 07/25/2023       No results for input(s): "POCGLU" in the last 72 hours. Blood Sugar Average: Last 72 hrs:     - Patient with chronic history of type 2 diabetes mellitus. - Initiate subcutaneous insulin regimen during hospital encounter.  - Resume home medication therapy on discharge as appropriate. - Hypoglycemia protocol as needed. - Outpatient follow-up with PCP.

## 2023-08-09 NOTE — ED NOTES
Patient transported to [facility], [address] [place] [address]  08/09/23 5071

## 2023-08-09 NOTE — ASSESSMENT & PLAN NOTE
- Patient with chronic history of hypertension.  - Continue current medication regimen.  - Outpatient follow-up with PCP.

## 2023-08-09 NOTE — ED NOTES
Per pt methadone prescribed/distributed by [facility] in [place]. Provider left message with clinic. Pt states that he takes methadone 165mg daily in the morning. Last dose was yesterday per pt.      Lizzy Dye RN  08/09/23 3121

## 2023-08-09 NOTE — INCIDENTAL FINDINGS
The following findings require follow up:  Radiographic finding     Findings and Follow up required:       1) Several stable sub-5 mm right lung nodules noted incidentally on your trauma imaging. A malignancy (cancer) cannot be completely excluded based on trauma imaging alone. Recommend short-term outpatient follow-up with primary care provider to review the finding and for further surveillance as indicated. 2) A stable 1.4 cm right adrenal gland nodule was incidentally identified on your trauma imaging. Although its imaging features on this study is indeterminate, because this lesion has been stable for > 1 year, it is considered benign. However, please note that for adrenal nodule > 1cm, endocrinology consult/biochemical evaluation is suggested to rule out functioning adenoma, if not already performed. Adrenal recommendation based on institutional consensus and Journal of [employer] of Radiology 2017;14:6950-9994. A malignancy (cancer) cannot be completely excluded based on trauma imaging alone. Recommend short-term outpatient follow-up with primary care provider and/or Endocrinology to review the finding and for further surveillance as indicated. Follow up should be done within 2 week(s)    The above noted incidental findings were discussed with the patient. The patient demonstrated understanding of the findings and the follow-up recommendations.     Please notify the following clinician to assist with the follow up:   Dr. Denise Lane

## 2023-08-09 NOTE — ASSESSMENT & PLAN NOTE
- Multiple left-sided rib fractures (9-11), present on admission.  - Continue rib fracture protocol.  - Continue to encourage incentive spirometer use and adequate pulmonary hygiene. Currently pulling 1,600 mL on I.S.  - PIC score is 6.  - Continue multimodal analgesic regimen. Await APS evaluation and recommendations. - Need to confirm home Methadone dosing.  - Supplemental oxygen via nasal cannula as needed to maintain saturations greater than or equal to 94%. - Repeat chest x-ray on 8/10/2023.   - PT and OT evaluation and treatment as indicated. - Outpatient follow-up in the trauma clinic for re-evaluation in approximately 2 weeks.

## 2023-08-09 NOTE — ASSESSMENT & PLAN NOTE
Methadone clinic: [address] [facility] [address]  Phone number [phone]  Personally called clinic to verify methadone dose; confirmed patient is on methadone 165 mg daily  Patient was at methadone clinic on 8/8/2023 and received his dose; he also received 2 take-home doses for 8/9/2023 and 8/10/2023  Continue methadone 165 mg daily

## 2023-08-10 ENCOUNTER — APPOINTMENT (INPATIENT)
Dept: RADIOLOGY | Facility: HOSPITAL | Age: 63
DRG: 135 | End: 2023-08-10
Payer: COMMERCIAL

## 2023-08-10 LAB
ANION GAP SERPL CALCULATED.3IONS-SCNC: 2 MMOL/L
APTT PPP: 34 SECONDS (ref 23–37)
BUN SERPL-MCNC: 13 MG/DL (ref 5–25)
CALCIUM SERPL-MCNC: 8.4 MG/DL (ref 8.4–10.2)
CHLORIDE SERPL-SCNC: 103 MMOL/L (ref 96–108)
CO2 SERPL-SCNC: 34 MMOL/L (ref 21–32)
CREAT SERPL-MCNC: 0.75 MG/DL (ref 0.6–1.3)
ERYTHROCYTE [DISTWIDTH] IN BLOOD BY AUTOMATED COUNT: 15.8 % (ref 11.6–15.1)
GFR SERPL CREATININE-BSD FRML MDRD: 99 ML/MIN/1.73SQ M
GLUCOSE SERPL-MCNC: 105 MG/DL (ref 65–140)
GLUCOSE SERPL-MCNC: 111 MG/DL (ref 65–140)
GLUCOSE SERPL-MCNC: 117 MG/DL (ref 65–140)
GLUCOSE SERPL-MCNC: 76 MG/DL (ref 65–140)
HCT VFR BLD AUTO: 28.8 % (ref 36.5–49.3)
HGB BLD-MCNC: 8.9 G/DL (ref 12–17)
INR PPP: 1.04 (ref 0.84–1.19)
MCH RBC QN AUTO: 25.9 PG (ref 26.8–34.3)
MCHC RBC AUTO-ENTMCNC: 30.9 G/DL (ref 31.4–37.4)
MCV RBC AUTO: 84 FL (ref 82–98)
PLATELET # BLD AUTO: 163 THOUSANDS/UL (ref 149–390)
PMV BLD AUTO: 11 FL (ref 8.9–12.7)
POTASSIUM SERPL-SCNC: 3.5 MMOL/L (ref 3.5–5.3)
PROTHROMBIN TIME: 14.2 SECONDS (ref 11.6–14.5)
RBC # BLD AUTO: 3.43 MILLION/UL (ref 3.88–5.62)
SODIUM SERPL-SCNC: 139 MMOL/L (ref 135–147)
WBC # BLD AUTO: 6.7 THOUSAND/UL (ref 4.31–10.16)

## 2023-08-10 PROCEDURE — 97167 OT EVAL HIGH COMPLEX 60 MIN: CPT

## 2023-08-10 PROCEDURE — 97116 GAIT TRAINING THERAPY: CPT

## 2023-08-10 PROCEDURE — 82948 REAGENT STRIP/BLOOD GLUCOSE: CPT

## 2023-08-10 PROCEDURE — 99232 SBSQ HOSP IP/OBS MODERATE 35: CPT | Performed by: PHYSICIAN ASSISTANT

## 2023-08-10 PROCEDURE — 71046 X-RAY EXAM CHEST 2 VIEWS: CPT

## 2023-08-10 PROCEDURE — 80048 BASIC METABOLIC PNL TOTAL CA: CPT | Performed by: PHYSICIAN ASSISTANT

## 2023-08-10 PROCEDURE — 85730 THROMBOPLASTIN TIME PARTIAL: CPT | Performed by: PHYSICIAN ASSISTANT

## 2023-08-10 PROCEDURE — 97163 PT EVAL HIGH COMPLEX 45 MIN: CPT

## 2023-08-10 PROCEDURE — 99232 SBSQ HOSP IP/OBS MODERATE 35: CPT | Performed by: NURSE PRACTITIONER

## 2023-08-10 PROCEDURE — NC001 PR NO CHARGE: Performed by: NURSE PRACTITIONER

## 2023-08-10 PROCEDURE — 85610 PROTHROMBIN TIME: CPT | Performed by: PHYSICIAN ASSISTANT

## 2023-08-10 PROCEDURE — 85027 COMPLETE CBC AUTOMATED: CPT | Performed by: PHYSICIAN ASSISTANT

## 2023-08-10 RX ORDER — LANOLIN ALCOHOL/MO/W.PET/CERES
6 CREAM (GRAM) TOPICAL
Status: DISCONTINUED | OUTPATIENT
Start: 2023-08-10 | End: 2023-08-11 | Stop reason: HOSPADM

## 2023-08-10 RX ORDER — HYDROMORPHONE HCL/PF 1 MG/ML
0.5 SYRINGE (ML) INJECTION EVERY 2 HOUR PRN
Status: DISCONTINUED | OUTPATIENT
Start: 2023-08-10 | End: 2023-08-11

## 2023-08-10 RX ORDER — OXYCODONE HYDROCHLORIDE 5 MG/1
5 TABLET ORAL EVERY 4 HOURS PRN
Status: DISCONTINUED | OUTPATIENT
Start: 2023-08-10 | End: 2023-08-11 | Stop reason: HOSPADM

## 2023-08-10 RX ORDER — CLONAZEPAM 1 MG/1
1 TABLET ORAL 2 TIMES DAILY
Status: DISCONTINUED | OUTPATIENT
Start: 2023-08-10 | End: 2023-08-11 | Stop reason: HOSPADM

## 2023-08-10 RX ORDER — HYDROMORPHONE HCL/PF 1 MG/ML
1 SYRINGE (ML) INJECTION EVERY 2 HOUR PRN
Status: DISCONTINUED | OUTPATIENT
Start: 2023-08-10 | End: 2023-08-10

## 2023-08-10 RX ORDER — OXYCODONE HYDROCHLORIDE 10 MG/1
10 TABLET ORAL EVERY 4 HOURS PRN
Status: DISCONTINUED | OUTPATIENT
Start: 2023-08-10 | End: 2023-08-11 | Stop reason: HOSPADM

## 2023-08-10 RX ADMIN — STANDARDIZED SENNA CONCENTRATE 17.2 MG: 8.6 TABLET ORAL at 10:04

## 2023-08-10 RX ADMIN — HYDROMORPHONE HYDROCHLORIDE 0.5 MG: 1 INJECTION, SOLUTION INTRAMUSCULAR; INTRAVENOUS; SUBCUTANEOUS at 22:21

## 2023-08-10 RX ADMIN — LIDOCAINE 1 PATCH: 700 PATCH TOPICAL at 10:04

## 2023-08-10 RX ADMIN — HYDROMORPHONE HYDROCHLORIDE 1 MG: 1 INJECTION, SOLUTION INTRAMUSCULAR; INTRAVENOUS; SUBCUTANEOUS at 04:40

## 2023-08-10 RX ADMIN — OXYCODONE HYDROCHLORIDE 10 MG: 10 TABLET ORAL at 17:47

## 2023-08-10 RX ADMIN — Medication 6 MG: at 01:00

## 2023-08-10 RX ADMIN — INSULIN LISPRO 4 UNITS: 100 INJECTION, SOLUTION INTRAVENOUS; SUBCUTANEOUS at 10:06

## 2023-08-10 RX ADMIN — METHADONE HYDROCHLORIDE 165 MG: 10 TABLET ORAL at 05:30

## 2023-08-10 RX ADMIN — DOCUSATE SODIUM 100 MG: 100 CAPSULE, LIQUID FILLED ORAL at 17:48

## 2023-08-10 RX ADMIN — HYDROMORPHONE HYDROCHLORIDE 0.5 MG: 1 INJECTION, SOLUTION INTRAMUSCULAR; INTRAVENOUS; SUBCUTANEOUS at 12:48

## 2023-08-10 RX ADMIN — ACETAMINOPHEN 975 MG: 325 TABLET, FILM COATED ORAL at 13:50

## 2023-08-10 RX ADMIN — HYDROMORPHONE HYDROCHLORIDE 1 MG: 1 INJECTION, SOLUTION INTRAMUSCULAR; INTRAVENOUS; SUBCUTANEOUS at 00:31

## 2023-08-10 RX ADMIN — FLUTICASONE PROPIONATE 1 SPRAY: 50 SPRAY, METERED NASAL at 10:06

## 2023-08-10 RX ADMIN — METHOCARBAMOL TABLETS 500 MG: 500 TABLET, COATED ORAL at 17:48

## 2023-08-10 RX ADMIN — ENOXAPARIN SODIUM 30 MG: 30 INJECTION SUBCUTANEOUS at 22:21

## 2023-08-10 RX ADMIN — GABAPENTIN 100 MG: 100 CAPSULE ORAL at 22:21

## 2023-08-10 RX ADMIN — CLONAZEPAM 1 MG: 1 TABLET ORAL at 17:47

## 2023-08-10 RX ADMIN — METHOCARBAMOL TABLETS 500 MG: 500 TABLET, COATED ORAL at 05:30

## 2023-08-10 RX ADMIN — INSULIN LISPRO 4 UNITS: 100 INJECTION, SOLUTION INTRAVENOUS; SUBCUTANEOUS at 17:47

## 2023-08-10 RX ADMIN — OXYCODONE HYDROCHLORIDE 10 MG: 10 TABLET ORAL at 10:21

## 2023-08-10 RX ADMIN — GABAPENTIN 100 MG: 100 CAPSULE ORAL at 16:14

## 2023-08-10 RX ADMIN — ENOXAPARIN SODIUM 30 MG: 30 INJECTION SUBCUTANEOUS at 10:04

## 2023-08-10 RX ADMIN — METHOCARBAMOL TABLETS 500 MG: 500 TABLET, COATED ORAL at 23:54

## 2023-08-10 RX ADMIN — METHOCARBAMOL TABLETS 500 MG: 500 TABLET, COATED ORAL at 12:39

## 2023-08-10 RX ADMIN — HYDROMORPHONE HYDROCHLORIDE 1 MG: 1 INJECTION, SOLUTION INTRAMUSCULAR; INTRAVENOUS; SUBCUTANEOUS at 02:45

## 2023-08-10 RX ADMIN — ACETAMINOPHEN 975 MG: 325 TABLET, FILM COATED ORAL at 05:30

## 2023-08-10 RX ADMIN — ACETAMINOPHEN 975 MG: 325 TABLET, FILM COATED ORAL at 22:21

## 2023-08-10 RX ADMIN — CLONAZEPAM 1 MG: 1 TABLET ORAL at 10:04

## 2023-08-10 RX ADMIN — DOCUSATE SODIUM 100 MG: 100 CAPSULE, LIQUID FILLED ORAL at 10:04

## 2023-08-10 RX ADMIN — INSULIN LISPRO 4 UNITS: 100 INJECTION, SOLUTION INTRAVENOUS; SUBCUTANEOUS at 12:39

## 2023-08-10 RX ADMIN — GABAPENTIN 100 MG: 100 CAPSULE ORAL at 10:04

## 2023-08-10 RX ADMIN — METHOCARBAMOL TABLETS 500 MG: 500 TABLET, COATED ORAL at 00:31

## 2023-08-10 RX ADMIN — Medication 6 MG: at 22:21

## 2023-08-10 RX ADMIN — INSULIN GLARGINE 12 UNITS: 100 INJECTION, SOLUTION SUBCUTANEOUS at 22:21

## 2023-08-10 NOTE — PLAN OF CARE
Problem: Potential for Falls  Goal: Patient will remain free of falls  Description: INTERVENTIONS:  - Educate patient/family on patient safety including physical limitations  - Instruct patient to call for assistance with activity   - Consult OT/PT to assist with strengthening/mobility   - Keep Call bell within reach  - Keep bed low and locked with side rails adjusted as appropriate  - Keep care items and personal belongings within reach  - Initiate and maintain comfort rounds  - Make Fall Risk Sign visible to staff  - Offer Toileting every 2 Hours, in advance of need  - Initiate/Maintain bed alarm  - Obtain necessary fall risk management equipment: alarms  - Apply yellow socks and bracelet for high fall risk patients  - Consider moving patient to room near nurses station  Outcome: Progressing     Problem: MOBILITY - ADULT  Goal: Maintain or return to baseline ADL function  Description: INTERVENTIONS:  -  Assess patient's ability to carry out ADLs; assess patient's baseline for ADL function and identify physical deficits which impact ability to perform ADLs (bathing, care of mouth/teeth, toileting, grooming, dressing, etc.)  - Assess/evaluate cause of self-care deficits   - Assess range of motion  - Assess patient's mobility; develop plan if impaired  - Assess patient's need for assistive devices and provide as appropriate  - Encourage maximum independence but intervene and supervise when necessary  - Involve family in performance of ADLs  - Assess for home care needs following discharge   - Consider OT consult to assist with ADL evaluation and planning for discharge  - Provide patient education as appropriate  Outcome: Progressing  Goal: Maintains/Returns to pre admission functional level  Description: INTERVENTIONS:  - Perform BMAT or MOVE assessment daily.   - Set and communicate daily mobility goal to care team and patient/family/caregiver.    - Collaborate with rehabilitation services on mobility goals if consulted  - Perform Range of Motion 3 times a day. - Reposition patient every 2 hours.   - Dangle patient 3 times a day  - Stand patient 3 times a day  - Ambulate patient 3 times a day  - Out of bed to chair 3 times a day   - Out of bed for meals 3 times a day  - Out of bed for toileting  - Record patient progress and toleration of activity level   Outcome: Progressing     Problem: Prexisting or High Potential for Compromised Skin Integrity  Goal: Skin integrity is maintained or improved  Description: INTERVENTIONS:  - Identify patients at risk for skin breakdown  - Assess and monitor skin integrity  - Assess and monitor nutrition and hydration status  - Monitor labs   - Assess for incontinence   - Turn and reposition patient  - Assist with mobility/ambulation  - Relieve pressure over bony prominences  - Avoid friction and shearing  - Provide appropriate hygiene as needed including keeping skin clean and dry  - Evaluate need for skin moisturizer/barrier cream  - Collaborate with interdisciplinary team   - Patient/family teaching  - Consider wound care consult   Outcome: Progressing

## 2023-08-10 NOTE — ASSESSMENT & PLAN NOTE
Lab Results   Component Value Date    HGBA1C 8.8 (H) 07/25/2023       Recent Labs     08/09/23  1443 08/09/23  1810 08/09/23  2137   POCGLU 117 102 184*       Blood Sugar Average: Last 72 hrs:  (P) [id number]   - Patient with chronic history of type 2 diabetes mellitus. - Initiate subcutaneous insulin regimen during hospital encounter.  - Resume home medication therapy on discharge as appropriate. - Hypoglycemia protocol as needed. - Outpatient follow-up with PCP.

## 2023-08-10 NOTE — ASSESSMENT & PLAN NOTE
- Patient with chronic methadone dependence and history of prior opioid abuse. - Appreciate APS evaluation and recommendations. - Continue multimodal analgesic regimen with de-escalation of IV Dilaudid & Oxy IR with resumption of Methadone on 8/10/2023.   - Home Methadone dosing confirmed by APS on 8/9/2023; patient will have at least 1 dose of Methadone at home on discharge per his Methadone clinic.   - Continue bowel regimen while on opioid therapy. - Continue outpatient follow-up with chronic methadone prescriber on discharge.

## 2023-08-10 NOTE — ASSESSMENT & PLAN NOTE
- Multiple left-sided rib fractures (9-11), present on admission.  - Continue rib fracture protocol.  - Continue to encourage incentive spirometer use and adequate pulmonary hygiene. Currently pulling 1,600 mL on I.S.  - PIC score is 6.  - Appreciate APS evaluation and recommendations. - Continue multimodal analgesic regimen with de-escalation of IV Dilaudid & Oxy IR with resumption of Methadone on 8/10/2023.   - Home Methadone dosing confirmed by APS on 8/9/2023; patient will have at least 1 dose of Methadone at home on discharge per his Methadone clinic.   - Continue bowel regimen while on opioid therapy. - Supplemental oxygen via nasal cannula as needed to maintain saturations greater than or equal to 94%. - Repeat chest x-ray on 8/10/2023.   - PT and OT evaluation and treatment as indicated. - Outpatient follow-up in the trauma clinic for re-evaluation in approximately 2 weeks.

## 2023-08-10 NOTE — PROGRESS NOTES
Progress Note - Acute Pain Service    Griffin Oh 66 y.o. male MRN: [id number]  Unit/Bed#: W -01 Encounter: [id number]      Griffin Oh is a 66 y.o. male who presents status post fall with left-sided rib fractures 9-11. Acute pain due to trauma  Assessment & Plan  Multimodal analgesia  · Tylenol 975 mg every 8 hours scheduled  · Gabapentin 100 mg 3 times a day  · Estimated Creatinine Clearance: 92.7 mL/min (by C-G formula based on SCr of 0.81 mg/dL). · Lidocaine patch daily, on for 12 hours off for 12 hours  · Robaxin 500mg every 6 hours scheduled  · Clonazepam 1 mg twice a day, home medication  · Decrease oxycodone to 5 mg every 4 hours as needed for moderate pain  · Decrease oxycodone to 10 mg every 4 hours as needed for severe pain  · Decrease Dilaudid to 0.5mg IV every 2 hours as needed for breakthrough pain  · D/C IV opioids tomorrow  · Naloxone as needed for opioid reversal/respiratory depression    Bowel regimen  · Colace 100 mg twice a day  · Senokot daily  · MiraLAX daily as needed for constipation    Interventional pain modalities:  · Will hold off on peripheral block/epidural at this time as respiratory status is stable. SpO2 100% on room air; incentive spirometer 1750ml. · DVT prophylaxis subcutaneous Lovenox, okay to continue as would not anticipate that the patient would need an epidural/intervention in the next 24 hours    Methadone dependence [facility])  Assessment & Plan  Methadone clinic: [facility]  Phone number [phone]  Personally called clinic to verify methadone dose; confirmed patient is on methadone 165 mg daily  Patient was at methadone clinic on 8/8/2023 and received his dose; he also received 2 take-home doses for 8/9/2023 and 8/10/2023  Continue methadone 165 mg daily     Treatment plan reviewed with bedside nursing staff and primary care service. APS will continue to follow.  Please contact Acute Pain Service - via TigerText from [phone] with additional questions or concerns. See Yue or Patricia for additional contacts and after hours information. Pain History  Current pain location(s):  Pain Score: 10  Pain Location/Orientation: Orientation: Left, Location: Rib Cage  Pain Scale: Pain Assessment Tool: 0-10  24 hour history: Sitting in chair, continues to report right sided chest wall pain. Denied respiratory distress/shortness of breath. Despite high pain scores he does report that he feels mildly better today compared to yesterday. Tolerating current analgesic regimen, no headaches, dizziness, nausea or vomiting. As needed oxycodone and IV Dilaudid provided mild improvement in pain.  states last night he was unable to sleep and was requesting Valium    Opioid requirement previous 24 hours: Oxycodone 15 mg and IV Dilaudid 4 mg    Meds/Allergies   all current active meds have been reviewed, current meds:   Current Facility-Administered Medications   Medication Dose Route Frequency   • acetaminophen (TYLENOL) tablet 975 mg  975 mg Oral Q8H [facility]   • clonazePAM (KlonoPIN) tablet 1 mg  1 mg Oral BID   • docusate sodium (COLACE) capsule 100 mg  100 mg Oral BID   • enoxaparin (LOVENOX) subcutaneous injection 30 mg  30 mg Subcutaneous Q12H MELISA   • fluticasone (FLONASE) 50 mcg/act nasal spray 1 spray  1 spray Nasal Daily   • gabapentin (NEURONTIN) capsule 100 mg  100 mg Oral TID   • HYDROmorphone (DILAUDID) injection 0.5 mg  0.5 mg Intravenous Q2H PRN   • insulin glargine (LANTUS) subcutaneous injection 12 Units 0.12 mL  12 Units Subcutaneous HS   • insulin lispro (HumaLOG) 100 units/mL subcutaneous injection 1-5 Units  1-5 Units Subcutaneous HS   • insulin lispro (HumaLOG) 100 units/mL subcutaneous injection 1-6 Units  1-6 Units Subcutaneous TID AC   • insulin lispro (HumaLOG) 100 units/mL subcutaneous injection 4 Units  4 Units Subcutaneous Daily With Breakfast   • insulin lispro (HumaLOG) 100 units/mL subcutaneous injection 4 Units  4 Units Subcutaneous Daily With Lunch   • insulin lispro (HumaLOG) 100 units/mL subcutaneous injection 4 Units  4 Units Subcutaneous Daily With Dinner   • lidocaine (LIDODERM) 5 % patch 1 patch  1 patch Topical Daily   • melatonin tablet 6 mg  6 mg Oral HS   • methadone (DOLOPHINE) tablet 165 mg  165 mg Oral Daily   • methocarbamol (ROBAXIN) tablet 500 mg  500 mg Oral Q6H MELISA   • naloxone (NARCAN) 0.04 mg/mL syringe 0.04 mg  0.04 mg Intravenous Q1MIN PRN   • ondansetron (ZOFRAN) injection 4 mg  4 mg Intravenous Q4H PRN   • oxyCODONE (ROXICODONE) IR tablet 5 mg  5 mg Oral Q4H PRN    Or   • oxyCODONE (ROXICODONE) immediate release tablet 10 mg  10 mg Oral Q4H PRN   • polyethylene glycol (MIRALAX) packet 17 g  17 g Oral Daily PRN   • senna (SENOKOT) tablet 17.2 mg  2 tablet Oral Daily    and PTA meds:   Prior to Admission Medications   Prescriptions Last Dose Informant Patient Reported? Taking? Continuous Blood Gluc  (FreeStyle Grace 14 Day [place]) STEVE   No No   Si Device by Device route 3 (three) times a day before meals   Continuous Blood Gluc  (FreeStyle Grace 2 [place]) STEVE   No No   Si Device by Device route 3 (three) times a day before meals   GLOBAL EASE INJECT PEN NEEDLES 31G X 8 MM MISC  Self Yes No   Sig: USE AS DIRECTED TO INJECT INSULIN DAILY   Incontinence Supply Disposable (INCONTINENCE BRIEF MEDIUM) MISC  Self No No   Sig: by Does not apply route as needed (fecal soiling)   Incontinence Supply Disposable (RA WIPES FLUSHABLE/MOIST) MISC  Self No No   Sig: by Does not apply route as needed (fecal soiling)   Insulin Pen Needle (B-D UF III MINI PEN NEEDLES) 31G X 5 MM MISC   No No   Sig: Inject 15 Units under the skin daily at bedtime   Misc.  Devices (CANE) MISC  Self No No   Sig: by Does not apply route daily Dx:  Frequent falls   UNKNOWN TO PATIENT  Self Yes No   bacitracin-polymyxin b (POLYSPORIN) ointment   No No   Sig: Apply topically 2 (two) times a day   clonazePAM (KlonoPIN) 0.5 mg tablet   No No   Sig: Take 1 tablet (0.5 mg total) by mouth 3 (three) times a day for 14 days   divalproex sodium (DEPAKOTE ER) 500 mg 24 hr tablet   No No   Sig: Take 1 tablet (500 mg total) by mouth every 12 (twelve) hours   ergocalciferol (ERGOCALCIFEROL) 1.25 MG (77134 UT) capsule   Yes No   Sig: Take 50,000 Units by mouth   fluticasone (FLONASE) 50 mcg/act nasal spray   No No   Si spray into each nostril daily   metFORMIN (GLUCOPHAGE) 850 mg tablet 2023 at 0530  No Yes   Sig: Take 1 tablet (850 mg total) by mouth daily with breakfast   Patient taking differently: Take 850 mg by mouth 2 (two) times a day with meals   methadone (DOLOPHINE) 5 mg tablet 2023 at 0530  Yes Yes   Sig: Take 165 mg by mouth daily   neomycin-bacitracin-polymyxin-hydrocortisone (CORTISPORIN) 1 % ophthalmic ointment   No No   Sig: APPLY TO AFFECTED AREA TWICE A DAY FOR TOPICAL USE (EYE OINTMENT MAY BE USED TOPICALLY)   polyethylene glycol (GOLYTELY) 4000 mL solution   No No   Sig: Take 4,000 mL by mouth once for 1 dose As directed for colonoscopy prep      Facility-Administered Medications: None       Allergies   Allergen Reactions   • Aspirin Shortness Of Breath   • Penicillins        Objective        Vitals:    23 1920 23 2137 08/10/23 0246 08/10/23 0537   BP: 156/71 152/78 142/75 157/83   BP Location:       Pulse: 63 57 (!) 51 56   Resp: 18 16     Temp: 97.6 °F (36.4 °C) 98.4 °F (36.9 °C) 98.2 °F (36.8 °C) 98.4 °F (36.9 °C)   TempSrc:       SpO2: 97% 95% 95% 100%   Height:             Physical Exam  Vitals reviewed. Constitutional:       General: He is awake. He is not in acute distress. Appearance: Normal appearance. He is not ill-appearing, toxic-appearing or diaphoretic. HENT:      Head: Normocephalic and atraumatic. Nose: Nose normal. No congestion or rhinorrhea. Mouth/Throat:      Mouth: Mucous membranes are moist.   Eyes:      Extraocular Movements: Extraocular movements intact. Cardiovascular:      Rate and Rhythm: Bradycardia present. Pulmonary:      Effort: Pulmonary effort is normal. No tachypnea, bradypnea or respiratory distress. Breath sounds: Normal breath sounds. No wheezing, rhonchi or rales. Skin:     General: Skin is warm and dry. Coloration: Skin is not pale. Findings: No rash. Neurological:      Mental Status: He is alert and oriented to person, place, and time. Mental status is at baseline. Psychiatric:         Attention and Perception: Attention normal.         Mood and Affect: Mood is anxious. Speech: Speech normal.         Behavior: Behavior normal. Behavior is cooperative. Lab Results:   Estimated Creatinine Clearance: 100.1 mL/min (by C-G formula based on SCr of 0.75 mg/dL). Lab Results   Component Value Date    WBC 6.70 08/10/2023    WBC 9.29 01/29/2015    HGB 8.9 (L) 08/10/2023    HGB 12.2 01/29/2015    HCT 28.8 (L) 08/10/2023    HCT 38.0 01/29/2015     08/10/2023     01/29/2015         Component Value Date/Time     01/29/2015 1342    K 3.5 08/10/2023 0445    K 5.0 01/29/2015 1342     08/10/2023 0445     01/29/2015 1342    CO2 34 (H) 08/10/2023 0445    CO2 29 08/17/2022 0815    BUN 13 08/10/2023 0445    BUN 18 01/29/2015 1342    CREATININE 0.75 08/10/2023 0445    CREATININE 1.01 01/29/2015 1342         Component Value Date/Time    CALCIUM 8.4 08/10/2023 0445    CALCIUM 9.2 01/29/2015 1342    ALKPHOS 126 (H) 06/29/2023 0709    ALKPHOS 108 01/29/2015 1342    AST 67 (H) 06/29/2023 0709    AST 14 01/29/2015 1342    ALT 79 (H) 06/29/2023 0709    ALT 21 01/29/2015 1342    BILITOT 0.27 01/29/2015 1342    TP 6.8 06/29/2023 0709    ALB 3.2 (L) 06/29/2023 0709    ALB 3.6 01/29/2015 1342       Imaging Studies/EKG: I have personally reviewed pertinent reports. Counseling / Coordination of Care  Total floor / unit time spent today 15 minutes minutes.  Greater than 50% of total time was spent with the patient and / or family counseling and / or coordination of care. A description of the counseling / coordination of care: Chart review included progress notes, vital signs, imaging reports and medications. Discussed ongoing pain management which was inclusive of opioid and nonopioid medications with patient, family, bedside nursing staff and primary care service. Please note that the APS provides consultative services regarding pain management only. With the exception of ketamine and epidural infusions and except when indicated, final decisions regarding starting or changing doses of analgesic medications are at the discretion of the consulting service.     STEVE Heaton   Acute Pain Service

## 2023-08-10 NOTE — PLAN OF CARE
Problem: PHYSICAL THERAPY ADULT  Goal: Performs mobility at highest level of function for planned discharge setting. See evaluation for individualized goals. Description: Treatment/Interventions: Functional transfer training, LE strengthening/ROM, Therapeutic exercise, Elevations, Endurance training, Cognitive reorientation, Patient/family training, Equipment eval/education, Bed mobility, Gait training, Compensatory technique education  Equipment Recommended: Jeffery Waddell       See flowsheet documentation for full assessment, interventions and recommendations. 8/10/2023 1455 by Shaye Schneider PT  Note: Prognosis: Fair  Problem List: Decreased endurance, Impaired balance, Decreased mobility, Decreased cognition, Impaired judgement, Decreased safety awareness, Pain  Assessment: Andrey Lujan is a 66 y.o. Male who [facility] on 8[facility]23 due to rib pain s/p fall and diagnosis of closed fx of multiple ribs of L side. Orders for PT eval and treat received, w/ activity orders of OOB to chair. Comorbidities affecting pt's functional mobility at time of evaluation include: HTN, DM, methadone dependence, bipolar disorder, NINFA. Personal factors affecting DC include: lives in multi story house, ambulating w/ assistive device, stairs to enter home, inability to navigate level surfaces w/o external assistance, decreased cognition, limited home support and positive fall history. At baseline, pt mobilizes independently w/ SPC, and w/ 3-4 fall(s) in the previous 6 months. Upon evaluation, pt presents w/ the following deficits: impaired balance, impaired cognition, decreased safety awareness, decreased endurance/activity tolerance, pain affecting mobility and gait deviations. Pt currently requires supervision for transfers, supervision regressing to min Ax1 w/ SPC for ambulation.  Pt's clinical presentation is unstable/unpredictable due to abnormal lab values, need for increased assistance w/ functional mobility compared to baseline, pain affecting mobility tolerance, need for input for mobility technique, need for input for task focus, recent drastic decline in mobility status, recent h/o falls, ongoing medical management. From a PT/mobility standpoint given the above findings, DC recommendation is: Post-acute inpatient rehabilitation vs HHPT pending pain management and progress w/ functional mobility. During current admission, pt will benefit from continued skilled inpatient PT in the acute care setting in order to address the above deficits and to maximize function and mobility prior to DC from acute care. Barriers to Discharge: Inaccessible home environment, Decreased caregiver support     PT Discharge Recommendation: Post acute rehabilitation services (vs HHPT pending pain management, progress w/ functional mobility)    See flowsheet documentation for full assessment.

## 2023-08-10 NOTE — OCCUPATIONAL THERAPY NOTE
Occupational Therapy Evaluation     Patient Name: Griffin Rogers. Today's Date: 8/10/2023  Problem List  Principal Problem:    Closed fracture of multiple ribs of left side  Active Problems:    Benign essential hypertension    Type 2 diabetes mellitus with microalbuminuria, without long-term current use of insulin (HCC)    Lung nodules    Adrenal nodule (HCC)    Methadone dependence ([address])    Fall    Acute pain due to trauma    Past Medical History  Past Medical History:   Diagnosis Date    Anxiety     Depression     Diabetes mellitus ([address])     Drug use     Hemorrhoids, internal 9/23/2019    Hypertension      Past Surgical History  Past Surgical History:   Procedure Laterality Date    FEMUR FRACTURE SURGERY Right     GASTRIC BYPASS  11/08/2011    Managed by: Michael Brown (General Surgery)    HERNIA REPAIR  02/13/2013    Incisional hernia repair Managed by: Michael Brown (General Surgery)    TONSILLECTOMY  2010             08/10/23 1133   OT Last Visit   OT Visit Date 08/10/23   Note Type   Note type Evaluation   Pain Assessment   Pain Assessment Tool 0-10   Pain Score 10 - Worst Possible Pain   Pain Location/Orientation Orientation: Left; Location: Rib Cage   Pain Onset/Description Onset: Ongoing   Effect of Pain on Daily Activities limits activity tolerance, functional mobility   Hospital Pain Intervention(s) Repositioned; Ambulation/increased activity; Emotional support   Restrictions/Precautions   Weight Bearing Precautions Per Order No   Other Precautions Cognitive; Bed Alarm; Chair Alarm; Fall Risk;Pain   Home Living   Type of Home House  (rents a room)   Home Layout Multi-level;Bed/bath upstairs  (6[address]7 [address] without HR, 2nd floor bathroom, 3rd floor bedroom. FOS between)   stickapps   (no DME per pt)   Bathroom Accessibility Accessible  (via FOS)   Home Equipment Walker;Cane  (SPC used at baseline.  Rollator)   Prior Function   Level of [place] Independent with functional mobility; Independent with ADLs; Needs assistance with IADLS   Lives With Alone   Receives Help From Family  (sister, mother)   IADLs Independent with meal prep; Independent with medication management; Family/Friend/Other provides transportation  (uses public bus for transportation)   Falls in the last 6 months 1 to 4  ("at least 3 or 4"; 1 leading to admission)   Vocational On disability  (SSI)   Comments Pt is a questionable historian at time of eval. Communication c CM for confirmation of home set up/available supports. Pt reports his sister lives locally. Utilizes bus, (per EMR, pt previously failed fitness to drive assessment in [id number]). Attends methadone clinic daily. Walks to grocery store   Lifestyle   Autonomy At baseline, pt is (I) with ADLs, mod (I) c SPC. (-) driving. utilizes public transportation. Lives alone. + falls (-) working   Reciprocal Relationships sister, mother   Service to Others on disability   General   Additional Pertinent History Pt admitted s/p fall down stairs while walking to the bathroom. L 9-11 rib fractures, all other imaging (-). PMHx; substance abuse, DM2, lung and adrenal nodules, lumbar and thoracic DDD, macular degeneration, bipolar disorder , AFIB   Family/Caregiver Present   (sister present during evaluation, however asleep. Mother leaving room as evaluation beginning)   Subjective   Subjective "thats my Mom, she doesn't mess around"   ADL   Where Assessed Chair  (bathroom, chair)   Eating Assistance 6  Modified independent   [address] 5  Supervision/Setup    [address] [facility] 5  Supervision/Setup   [address] [place] [address] 5  Supervision/Setup   LB [address] [address] 4  Minimal Assistance   LB Dressing Deficit Don/doff L sock; Increased time to complete;Setup;Verbal cueing;Supervision/safety  (A to doff L sock d/t pain, donned sock, managed R sock without assistance.)   Toileting Assistance  5  Supervision/Setup   Toileting Deficit Grab bar use   Functional Assistance 5  Supervision/Setup  (intermittent CGA)   Additional Comments Did not observe UB bathing, LB bathing and UB dressing at time of evaluation, with use of clinical reasoning, pt's performance throughout evaluation indicates that pt may be able to perform these tasks at the levels listed above   Bed Mobility   Additional Comments seated OOB in recliner upon arrival, end of session   Transfers   Sit to Stand 5  Supervision   Additional items Assist x 1; Increased time required;Verbal cues   Stand to Sit 5  Supervision   Additional items Assist x 1; Increased time required;Verbal cues   Toilet transfer 5  Supervision   Additional items Assist x 1; Increased time required;Standard toilet  (GB)   Additional Comments SPC used during transfers. Functional Mobility   Functional Mobility 4  Minimal assistance  (initial supervision, transitions to CGA d/t fatigue , pain.)   Additional Comments Functional mobility housheold distance within room, bathroom. Initial use of SPC, transitions to use of RW d/t fatigue. Intermittent CGA for safety   Additional items SPC;Rolling walker   Balance   Static Sitting Fair +   Dynamic Sitting Fair   Static Standing Fair -   Dynamic Standing Fair -   Activity Tolerance   Activity Tolerance Patient tolerated treatment well   Medical Staff Made Aware Care coordination julio ng. JACOB Kenney. Nurse Made Aware RN pre/post session   RUE Assessment   RUE Assessment WFL  (Full AROM, MMT 4+/5 based on functional assessment)   LUE Assessment   LUE Assessment WFL  (Full AROM, MMT 4+/5 based on functional assessment)   Hand Function   Gross Motor Coordination Impaired   Fine Motor Coordination Impaired   Sensation   Light Touch No apparent deficits   Cognition   Overall Cognitive Status Impaired  (however pt appears to be at / near baseline status.)   Arousal/Participation Alert; Cooperative   Attention Attends with cues to redirect   Orientation Level Oriented X4  (generally oriented to month/year)   Memory Decreased recall of recent events  (questionable recall of recent events, timeline.)   Following Commands Follows one step commands with increased time or repetition   Comments pt agreeable to OT session. Questionable insight to deficits, problem solving and safety awareness. Higher level deficits noted. Assessment   Limitation Decreased ADL status; Decreased UE strength;Decreased Safe judgement during ADL;Decreased endurance;Decreased cognition;Decreased high-level ADLs; Decreased self-care trans  (pain, limited functional reach, decreased dynamic balance and trunk control)   Prognosis Good   Assessment Patient is a 66 y.o. male seen for OT evaluation at [address] [place] [address] following admission on 8/9/2023  s/p Closed fracture of multiple ribs of left side. Please see above for comprehensive list of comorbidities and significant PMHx impacting functional performance. At baseline, pt is (I) with ADL/IADLs, mod (I) c SPC. Upon initial evaluation, pt appears to be performing below baseline functional status. Occupational performance is affected by the following deficits: endurance ,  decreased muscular strength , decreased dynamic balance impacting functional reach, decreased activity tolerance , impaired judgement and problem solving  and (+) pain . Personal/Environmental factors impacting D/C include: (+) Hx of falls , decreased caregiver status , steps to enter/navigate the home and Assistance needed for ADLs and functional mobility. Patient would benefit from OT services within the acute care setting to maximize level of functional independence in the following areas self-care transfers, functional mobility and ADLs.   From OT standpoint, recommendation at time of D/C would be return to previous environment with home health rehabilitation pending anticipated progress c fxnl mobility, LB dressing d/t decreased caregiver status . Goals   Patient Goals for reduced pain. to talk to his DrMarychuy (AP notified)   Plan   Treatment Interventions ADL retraining;Functional transfer training; Endurance training;Patient/family training;Equipment evaluation/education; Compensatory technique education;Continued evaluation   Goal Expiration Date 08/20/23   OT Treatment Day 0   OT Frequency 3-5x/wk   Recommendation   OT Discharge Recommendation Home with home health rehabilitation  (pending anticipated progress c LB dressing, functional mobility d/t decreased caregiver status at home)   Equipment Recommended Bedside commode   Commode Type Standard   AM-PAC Daily Activity Inpatient   Lower Body Dressing 3   Bathing 3   Toileting 3   Upper Body Dressing 3   Grooming 3   Eating 4   Daily Activity Raw Score 19   Daily Activity Standardized Score (Calc for Raw Score >=11) 40.22   -PAC Applied Cognition Inpatient   Following a Speech/Presentation 3   Understanding Ordinary Conversation 4   Taking Medications 3   Remembering Where Things Are Placed or Put Away 4   Remembering List of 4-5 Errands 3   Taking Care of Complicated Tasks 2   Applied Cognition Raw Score 19   Applied Cognition Standardized Score 39.77   End of Consult   Education Provided Yes   Patient Position at End of Consult Bedside chair;Bed/Chair alarm activated; All needs within reach   Nurse Communication Nurse aware of consult   End of Consult Comments The patient's raw score on the AM-PAC Daily Activity Inpatient Short Form is 19. A raw score of greater than or equal to 19 suggests the patient may benefit from discharge to home. Please refer to the recommendation of the Occupational Therapist for safe discharge planning. Goals established on initial evaluation in order to achieve goal of maximizing functional independence in ADL tasks      Pt will complete grooming tasks Mod independent   at sink for increased ADL independence within 10 days.      Pt will complete LB ADLs Mod independent   for increased ADL independence within 10 days. Pt will complete toileting Mod independent   with use of DME for increased ADL independence within 10 days. Pt will demonstrate proper body mechanics to complete self-care transfers and functional mobility to/from bathroom with Mod independent  and use of LRAD for increased safety and functional independence within 10 days. Pt will demonstrate standing tolerance of 6 min with Mod independent  and use of LRAD for increased activity tolerance during ADL/IADL tasks within 10 days. Pt will complete bed mobility Mod independent  for increased independence in repositioning, pressure offloading, and managing comfort. Pt will demonstrate proper body mechanics and fall prevention strategies during 100% of tx sessions for increased safety awareness during ADL/IADLs    Pt will demonstrate activity tolerance of 30 min in therapeutic tasks for increased participation in meaningful activities upon D/C. Pt will participate in ongoing cognitive assessments to assist with safe D/C planning and supervision/assistance recommendations. Pt benefited from co-session of skilled OT and PT therapists in order to most appropriately address functional deficits d/t evolving medical status  and decreased activity tolerance. OT/PT objectives were addressed separately; please see PT note for specific goal areas targeted.     Venancio Valiente, OT

## 2023-08-10 NOTE — PHYSICAL THERAPY NOTE
PHYSICAL THERAPY EVALUATION NOTE          Patient Name: Glen Rosa. Today's Date: 8/10/2023          AGE:   66 y.o. Mrn:   [id number]  ADMIT DX:  Closed fracture of multiple ribs of left side, initial encounter [S22.42XA]    Past Medical History:  Past Medical History:   Diagnosis Date    Anxiety     Depression     Diabetes mellitus ([address])     Drug use     Hemorrhoids, internal 2019    Hypertension        Past Surgical History:  Past Surgical History:   Procedure Laterality Date    FEMUR FRACTURE SURGERY Right     GASTRIC BYPASS  2011    Managed by: Kale Wong (General Surgery)    HERNIA REPAIR  2013    Incisional hernia repair Managed by: Kale Wong (General Surgery)    TONSILLECTOMY       Length Of Stay: 1        PHYSICAL THERAPY EVALUATION:    Patient's identity confirmed via 2 patient identifiers (full name and ) at start of session       08/10/23 1132   PT Last Visit   PT Visit Date 08/10/23   Note Type   Note type Evaluation   Pain Assessment   Pain Assessment Tool 0-10   Pain Score 10 - Worst Possible Pain   Pain Location/Orientation Orientation: Left; Location: Rib Cage   Pain Onset/Description Onset: Ongoing   Effect of Pain on Daily Activities limits overall tolerance to mobility   Hospital Pain Intervention(s) Repositioned; Ambulation/increased activity; Emotional support   Restrictions/Precautions   Weight Bearing Precautions Per Order No   Other Precautions Cognitive; Chair Alarm; Bed Alarm;Pain; Fall Risk   Home Living   Type of Home House   Home Layout Multi-level  ([address], bathroom on 2nd floor, bedroom on 3rd, w/ full flight of stairs between)   [employer]   (none per pt)   Home Equipment Cane;Walker  [facility], rollator walker)   Prior Function   Level of [place] Independent with functional mobility; Independent with ADLs; Needs assistance with IADLS   Lives With Alone Report received from Jevon Sam RN, care assumed. Receives Help From Family  (sister, mother)   IADLs Independent with meal prep; Independent with medication management; Family/Friend/Other provides transportation  (uses public bus for transportation)   Falls in the last 6 months 1 to 4  ("at least 3 or 4")   Comments Pt poor/unreliable historian. Reports he rents a room in a house w/ his bedroom located on the 3rd floor, bathroom on the 2nd. States he ambulates ind w/ SPC and is ind w/ ADLs. Will need to clarify accuracy of home set-up and available assistance w/ CM   General   Family/Caregiver Present Yes  (sister sleeping in bed)   Cognition   Overall Cognitive Status Impaired   Arousal/Participation Cooperative   Orientation Level Oriented X4  (generally oriented to month/year)   Following Commands Follows one step commands with increased time or repetition   Comments Pt ID via name and ; pt agreeable to PT eval and mobility. Pt pleasant throughout   RLE Assessment   RLE Assessment WFL  (grossly assessed w/ functional mobility)   LLE Assessment   LLE Assessment WFL  (grossly assessed w/ functional mobility)   Bed Mobility   Additional Comments pt OOB in chair upon arrival, returned to chair at end of session   Transfers   Sit to Stand 5  Supervision   Additional items Assist x 1; Armrests; Increased time required;Verbal cues   Stand to Sit 5  Supervision   Additional items Assist x 1; Armrests; Increased time required;Verbal cues   Toilet transfer 5  Supervision   Additional items Assist x 1; Armrests; Increased time required;Standard toilet  (ues of R grab bar)   Ambulation/Elevation   Gait pattern Improper Weight shift; Wide RAYMON; Decreased foot clearance; Short stride; Excessively slow   Gait Assistance 4  Minimal assist  (supervision regressing to min Ax1 due to gait degredation, pain)   Additional items Assist x 1;Verbal cues   Assistive Device Straight cane  (in RUE)   Distance 45'   Balance   Static Sitting Fair +   Dynamic Sitting Fair   Static Standing Fair - Dynamic Standing Fair -   Ambulatory Poor +  (w/ SPC)   Activity Tolerance   Activity Tolerance Patient limited by pain   Medical Staff Made Aware Pt benefited from PT/OT care coordination w/ OT Bonnie due to cognitive/behavioral impairments and to allow for challenge of pt's activity tolerance, PT and OT goals were addressed individually during session   Nurse Made Aware MICHAEL Munoz Marrow   Assessment   Prognosis Fair   Problem List Decreased endurance; Impaired balance;Decreased mobility; Decreased cognition; Impaired judgement;Decreased safety awareness;Pain   Assessment Benjamin Srivastava is a 66 y.o. Male who [facility] on 8/9[facility]23 due to rib pain s/p fall and diagnosis of closed fx of multiple ribs of L side. Orders for PT eval and treat received, w/ activity orders of OOB to chair. Comorbidities affecting pt's functional mobility at time of evaluation include: HTN, DM, methadone dependence, bipolar disorder, NINFA. Personal factors affecting DC include: lives in multi story house, ambulating w/ assistive device, stairs to enter home, inability to navigate level surfaces w/o external assistance, decreased cognition, limited home support and positive fall history. At baseline, pt mobilizes independently w/ SPC, and w/ 3-4 fall(s) in the previous 6 months. Upon evaluation, pt presents w/ the following deficits: impaired balance, impaired cognition, decreased safety awareness, decreased endurance/activity tolerance, pain affecting mobility and gait deviations. Pt currently requires supervision for transfers, supervision regressing to min Ax1 w/ SPC for ambulation. Pt's clinical presentation is unstable/unpredictable due to abnormal lab values, need for increased assistance w/ functional mobility compared to baseline, pain affecting mobility tolerance, need for input for mobility technique, need for input for task focus, recent drastic decline in mobility status, recent h/o falls, ongoing medical management.  From a PT/mobility standpoint given the above findings, DC recommendation is: Post-acute inpatient rehabilitation vs HHPT pending pain management and progress w/ functional mobility. During current admission, pt will benefit from continued skilled inpatient PT in the acute care setting in order to address the above deficits and to maximize function and mobility prior to DC from acute care. Barriers to Discharge Inaccessible home environment;Decreased caregiver support   Goals   Patient Goals to get medication to sleep   STG Expiration Date 08/20/23   Short Term Goal #1 Pt will: perform bed mobility w/ mod I to decrease pt's burden of care and increase pt's independence w/ repositioning in bed; perform transfers w/ mod I to promote increased OOB mobility; ambulate at least 200' w/ LRAD and mod I to increase pt's ambulatory endurance/tolerance; negotiate at least 12 stair(s) w/ UE support and mod I to facilitate pt returning to previous living environment; increase all balance ratings by at least 1 grade to decrease pt's risk of falls   PT Treatment Day 1  (PT tx note below)   Plan   Treatment/Interventions Functional transfer training;LE strengthening/ROM; Therapeutic exercise;Elevations; Endurance training;Cognitive reorientation;Patient/family training;Equipment eval/education; Bed mobility;Gait training; Compensatory technique education   PT Frequency 3-5x/wk   Recommendation   PT Discharge Recommendation Post acute rehabilitation services  (vs HHPT pending pain management, progress w/ functional mobility)   Equipment Recommended Walker   AM-PAC Basic Mobility Inpatient   Turning in Flat Bed Without Bedrails 3   Lying on Back to Sitting on Edge of Flat Bed Without Bedrails 3   Moving Bed to Chair 3   Standing Up From Chair Using Arms 3   Walk in Room 3   Climb 3-5 Stairs With Railing 2   Basic Mobility Inpatient Raw Score 17   Basic Mobility Standardized Score 39.67   Additional Treatment Session   Start Time 1145   End Time 1156   Treatment Assessment Additional PT intervention provided. Pt agreeable to trial of RW for increased ambulatory tolerance/balance. Using RW, pt ambulated an additional 20'x2 w/ min Ax1 (CGA/steadying assist). Pt continues to be functioning below baseline level, and remains limited in functional mobility due to pain, impaired balance, impaired cognition, impaired safety awareness. Pt will continue benefit from PT to promote independence w/ functional mobility and progress towards set goals. Continue to recommend DC post acute rehabilitation services vs HHPT pending progress w/ functional mobility and pain management when medically cleared. Equipment Use RW   Additional Treatment Day 1   End of Consult   Patient Position at End of Consult Bedside chair;Bed/Chair alarm activated; All needs within reach       The patient's AM-PAC Basic Mobility Inpatient Short Form Raw Score is 17. A Raw score of greater than 16 suggests the patient may benefit from discharge to home. Please also refer to the recommendation of the Physical Therapist for safe discharge planning.     Pt will benefit from skilled inpatient PT during this admission in order to facilitate progress towards goals and to maximize functional independence prior to [facility] rec: post acute rehab vs HHPT pending progress w/ functional mobility and pain management        Olayinka Al, PT, DPT  08/10/23

## 2023-08-10 NOTE — UTILIZATION REVIEW
Initial Clinical Review    Admission: Date/Time/Statement:   Admission Orders (From admission, onward)     Ordered        08/09/23 1234  Inpatient Admission  Once                      Orders Placed This Encounter   Procedures   • Inpatient Admission     Standing Status:   Standing     Number of Occurrences:   1     Order Specific Question:   Level of Care     Answer:   Med Surg [16]     Order Specific Question:   Bed Type     Answer:   Trauma [7]     Order Specific Question:   Estimated length of stay     Answer:   More than 2 Midnights     Order Specific Question:   Certification     Answer:   I certify that inpatient services are medically necessary for this patient for a duration of greater than two midnights. See H&P and MD Progress Notes for additional information about the patient's course of treatment. ED Arrival Information     Expected   -    Arrival   8/9/2023 08:29    Acuity   Urgent            Means of arrival   Walk-In    Escorted by   Self    Service   Trauma    Admission type   Emergency            Arrival complaint   FALL- RIB PAIN           Chief Complaint   Patient presents with   • Rib Pain     Pt reports fall down stairs yesterday, approx 5 steps, +headstrike -thinners/LOC, c/o left sided rib pain, hx chronic back pain c/o back pain as well       Initial Presentation: 66 y.o. male with hx DM, HTN, methadone dependencewith prior opioid abuse who presents to ED from home with moderate to severe left lower chest wall pain. Pt reports he fell down 3-4 stairs in the dark when the power was out the other night while trying to go to the bathroom. He landed on his left side and experienced significant pain resulting in difficulty moving and taking deep breaths. +head strike, no LOC. On exam, GCS 15, +2 distal pulses . Left lower lateral chest wall tenderness. Normal breath sounds . Pullinh 1600 ml on spirometry. PIC -6 .  Labs hgb 8.8. Imaging shows L 9-11 rib fractures.  Pt admitted as Inpatient by trauma service with multiple closed L rib fractures s/p fall, acute pain   . Plan - PT/OT. APS consult. Multimodal pain control. Spirometry. Supplemental O2 to keep sat >/+ 94 %. Repeat CXR 8/10. APS consult- Multi modal pain control with scheduled Tylenol. Gabapentin, lido patch. PRN Oxycodone, Start PRN IV Dilaudid. Bowel regiime . hold off on peripheral block/epidural at this time as respiratory status is stable. confirmed patient is on methadone 165 mg daily . Patient was at methadone clinic on 8/8/2023 and received his dose; he also received 2 take-home doses for 8/9/2023 and 8/10/2023  Okay to restart methadone 165 mg daily starting tomorrow. Date: 8/10  Day 2:    De-escalation of IV Dilaudid & Oxy IR with resumption of Methadone today. Pt reports pain, significant L chest wall, pain is worse with deep breaths and coughing as well as movement. No crepitus. Reaching 1000 ml on spirometry . Per nsg- pain 9-10/10 today. Receiving PRN IV Dilaudid   . Superficial abrasion on the posterior scalp without tenderness. CXR repeated today  , the known non displaced L sided rib fx 9-11 not well visualized .       ED Triage Vitals [08/09/23 0838]   Temperature Pulse Respirations Blood Pressure SpO2   97.8 °F (36.6 °C) 61 20 148/70 100 %      Temp Source Heart Rate Source Patient Position - Orthostatic VS BP Location FiO2 (%)   Oral Monitor Sitting Right arm --      Pain Score       9          Wt Readings from Last 1 Encounters:   05/24/23 76.9 kg (169 lb 9.6 oz)     Additional Vital Signs:   Date/Time Temp Pulse Resp BP MAP (mmHg) SpO2 O2 Device Patient Position - Orthostatic VS   08/10/23 05:37:49 98.4 °F (36.9 °C) 56 -- 157/83 108 100 % -- --   08/10/23 02:46:16 98.2 °F (36.8 °C) 51 Abnormal  -- 142/75 97 95 % -- --   08/09/23 21:37:22 98.4 °F (36.9 °C) 57 16 152/78 103 95 % -- --   08/09/23 19:20:47 97.6 °F (36.4 °C) 63 18 156/71 99 97 % -- --   08/09/23 1830 -- 58 18 192/91 Abnormal  131 96 % None (Room air) Lying 08/09/23 1730 -- 54 Abnormal  20 213/94 Abnormal  135 94 % None (Room air) Sitting   08/09/23 1700 -- 55 18 155/78 108 99 % None (Room air) Sitting   08/09/23 1600 -- 57 20 154/67 97 99 % None (Room air) Sitting   08/09/23 1506 -- 56 18 196/86 Abnormal  123 100 % None (Room air) Lying   08/09/23 1430 -- 55 18 176/81 Abnormal  116 100 % None (Room air) Lying   08/09/23 1420 -- 52 Abnormal  20 -- -- 100 % -- --   08/09/23 1330 -- 56 18 185/79 Abnormal  113 99 % None (Room air) Sitting   08/09/23 1300 -- 56 20 166/83 114 100 % None (Room air) Sitting   08/09/23 1130 -- 51 Abnormal  18 168/77 111 100 % None (Room air) Sitting   08/09/23 1045 -- 57 20 145/70 -- 98 % None (Room air) Sitting   08/09/23 0945 -- 52 Abnormal  18 152/69 99 97 % None (Room air)      Date and Time R Radial Pulse L Radial Pulse R Pedal Pulse L Pedal Pulse   08/10/23 0101 +2 +2 +2 +2         Pertinent Labs/Diagnostic Test Results:   XR chest pa & lateral   Final Result by Sayra Arana MD (08/10 4560)      No acute cardiopulmonary disease. No displaced rib fractures identified                  Workstation performed: [id number]         CT head without contrast   Final Result by Edwina Camacho MD (08/09 1047)      No acute intracranial abnormality. Workstation performed: [id number]         CT cervical spine without contrast   Final Result by Edwina Camacho MD (08/09 1103)      No cervical spine fracture or traumatic malalignment. Workstation performed: [id number]         CT chest abdomen pelvis w contrast   Final Result by Edwina Camacho MD (08/09 1133)      Nondisplaced fractures of the left 9th through 11th ribs. No evidence of pneumothorax or pleural effusion. No other evidence of acute posttraumatic abnormality in the chest, abdomen or pelvis. Mild emphysema. Several stable sub-5 mm right lung nodules noted. Stable 1.4 cm right adrenal nodule.  Although its imaging features on this study is indeterminate, because this lesion has been stable for > 1 year, it is considered benign. However, please note that for adrenal nodule > 1cm, endocrinology    consult/biochemical evaluation is suggested to rule out functioning adenoma, if not already performed. Adrenal recommendation based on institutional consensus and Journal of [employer] of Radiology 2017;14:8651-8581         Cholelithiasis. The study was marked in [facility] for immediate notification. Workstation performed: [id number]         XR chest 1 view portable   Final Result by Jefferson Morley MD (08/09 1042)      No acute cardiopulmonary disease. Workstation performed: [id number]         XR femur 2 views LEFT   Final Result by Jefferson Morley MD (08/09 1044)      No acute osseous abnormality.             Workstation performed: [id number]               Results from last 7 days   Lab Units 08/10/23  0445 08/09/23  0928   WBC Thousand/uL 6.70 6.62   HEMOGLOBIN g/dL 8.9* 8.8*   HEMATOCRIT % 28.8* 28.8*   PLATELETS Thousands/uL 163 177   NEUTROS ABS Thousands/µL  --  4.66         Results from last 7 days   Lab Units 08/10/23  0445 08/09/23  0928   SODIUM mmol/L 139 137   POTASSIUM mmol/L 3.5 4.1   CHLORIDE mmol/L 103 102   CO2 mmol/L 34* 33*   ANION GAP mmol/L 2 2   BUN mg/dL 13 17   CREATININE mg/dL 0.75 0.81   EGFR ml/min/1.73sq m 99 95   CALCIUM mg/dL 8.4 8.5         Results from last 7 days   Lab Units 08/09/23  2137 08/09/23  1810 08/09/23  1443   POC GLUCOSE mg/dl 184* 102 117     Results from last 7 days   Lab Units 08/10/23  0445 08/09/23  0928   GLUCOSE RANDOM mg/dL 105 247*             BETA-HYDROXYBUTYRATE   Date Value Ref Range Status   08/17/2022 0.0 <0.6 mmol/L Final                    Results from last 7 days   Lab Units 08/10/23  0445   PROTIME seconds 14.2   INR  1.04   PTT seconds 34                 ED Treatment:   Medication Administration from 08/09/2023 0829 to 08/09/2023 3236 Date/Time Order Dose Route Action     08/09/2023 0927 EDT HYDROmorphone (DILAUDID) injection 0.5 mg 0.5 mg Intravenous Given     08/09/2023 1032 EDT iohexol (OMNIPAQUE) 350 MG/ML injection (SINGLE-DOSE) 100 mL 100 mL Intravenous Given     08/09/2023 1108 EDT HYDROmorphone (DILAUDID) injection 0.5 mg 0.5 mg Intravenous Given     08/09/2023 1109 EDT lidocaine (LIDODERM) 5 % patch 1 patch 1 patch Topical Medication Applied     08/09/2023 1511 EDT docusate sodium (COLACE) capsule 100 mg 100 mg Oral Given     08/09/2023 1508 EDT senna (SENOKOT) tablet 17.2 mg 17.2 mg Oral Given     08/09/2023 1511 EDT enoxaparin (LOVENOX) subcutaneous injection 30 mg 30 mg Subcutaneous Given     08/09/2023 1514 EDT insulin lispro (HumaLOG) 100 units/mL subcutaneous injection 4 Units 4 Units Subcutaneous Given     08/09/2023 1830 EDT insulin lispro (HumaLOG) 100 units/mL subcutaneous injection 4 Units 0 Units Subcutaneous Hold     08/09/2023 1507 EDT acetaminophen (TYLENOL) tablet 975 mg 975 mg Oral Given     08/09/2023 1508 EDT oxyCODONE (ROXICODONE) immediate release tablet 10 mg -- Oral See Alternative     08/09/2023 1736 EDT HYDROmorphone (DILAUDID) injection 1 mg 1 mg Intravenous Given     08/09/2023 1402 EDT HYDROmorphone (DILAUDID) injection 1 mg 1 mg Intravenous Given     08/09/2023 1508 EDT oxyCODONE (ROXICODONE) IR tablet 15 mg 15 mg Oral Given        Past Medical History:   Diagnosis Date   • Anxiety    • Depression    • Diabetes mellitus ([address])    • Drug use    • Hemorrhoids, internal 9/23/2019   • Hypertension      Present on Admission:  • Fall  • Adrenal nodule ([address])  • Benign essential hypertension  • Lung nodules  • Methadone dependence (HCC)  • Type 2 diabetes mellitus with microalbuminuria, without long-term current use of insulin (HCC)  • Closed fracture of multiple ribs of left side  • Acute pain due to trauma      Admitting Diagnosis: Closed fracture of multiple ribs of left side, initial encounter [S22.42XA]  Age/Sex: 66 y.o. male  Admission Orders:  Scheduled Medications:  acetaminophen, 975 mg, Oral, Q8H [address]  clonazePAM, 1 mg, Oral, BID  docusate sodium, 100 mg, Oral, BID  enoxaparin, 30 mg, Subcutaneous, Q12H MELISA  fluticasone, 1 spray, Nasal, Daily  gabapentin, 100 mg, Oral, TID  insulin glargine, 12 Units, Subcutaneous, HS  insulin lispro, 1-5 Units, Subcutaneous, HS  insulin lispro, 1-6 Units, Subcutaneous, TID AC  insulin lispro, 4 Units, Subcutaneous, Daily With Breakfast  insulin lispro, 4 Units, Subcutaneous, Daily With Lunch  insulin lispro, 4 Units, Subcutaneous, Daily With Dinner  lidocaine, 1 patch, Topical, Daily  melatonin, 6 mg, Oral, HS  methadone, 165 mg, Oral, Daily  methocarbamol, 500 mg, Oral, Q6H MELISA  senna, 2 tablet, Oral, Daily      Continuous IV Infusions:     PRN Meds:  HYDROmorphone, 0.5 mg, Intravenous, Q2H PRN  naloxone, 0.04 mg, Intravenous, Q1MIN PRN  ondansetron, 4 mg, Intravenous, Q4H PRN  oxyCODONE, 5 mg, Oral, Q4H PRN   Or  oxyCODONE, 10 mg, Oral, Q4H PRN  polyethylene glycol, 17 g, Oral, Daily PRN    oxyCODONE (ROXICODONE) IR tablet 15 mg  Dose: 15 mg  Freq: Every 4 hours PRN Route: PO  PRN Reason: severe pain  Start: 08/09/23 1350 End: 08/10/23 0651 x1 8/8  HYDROmorphone (DILAUDID) injection 1 mg  Dose: 1 mg  Freq: Every 1 hour PRN Route: IV  PRN Reason: breakthrough pain  PRN Comment: Breakthrough pain  Start: 08/09/23 2145 End: 08/10/23 0113 x1 8/8, x1 8/9  HYDROmorphone (DILAUDID) injection 1 mg  Dose: 1 mg  Freq: Every 2 hour PRN Route: IV  PRN Reason: breakthrough pain  PRN Comment: Breakthrough pain  Start: 08/10/23 0115 End: 08/10/23 0651 x2 8/9        rib fx monitoring   SCD   OOB to chair TID   O2 to keep sat at least 92 %    IP CONSULT TO ACUTE PAIN SERVICE  IP CONSULT TO CASE MANAGEMENT  IP CONSULT TO ACUTE PAIN SERVICE    Network Utilization Review Department  ATTENTION: Please call with any questions or concerns to [phone] and carefully listen to the prompts so that you are directed to the right person. All voicemails are confidential.  Aretha Marquez all requests for admission clinical reviews, approved or denied determinations and any other requests to dedicated fax number below belonging to the campus where the patient is receiving treatment.  List of dedicated fax numbers for the Facilities:  [place] [facility] (Administrative/Medical Necessity) [phone] [facility] [phone]   [address] [place] [address] Drive [phone]   [facility] [address] [facility] [phone]   [facility] [id number] [place] [address] [phone]   [address] [address] [phone]

## 2023-08-10 NOTE — PROGRESS NOTES
[facility]  Progress Note  Name: Eduardo Parekh I  MRN: [id number]  Unit/Bed#: W -01 I Date of Admission: 8/9/2023   Date of Service: 8/10/2023 I Hospital Day: 1    Assessment/Plan   Fall  Assessment & Plan  - Status post fall with the below noted injuries. - Fall precautions.  - PT and OT evaluation and treatment as indicated. - Case Management consultation for disposition planning. * Closed fracture of multiple ribs of left side  Assessment & Plan  - Multiple left-sided rib fractures (9-11), present on admission.  - Continue rib fracture protocol.  - Continue to encourage incentive spirometer use and adequate pulmonary hygiene. Currently pulling 1,600 mL on I.S.  - PIC score is 6.  - Appreciate APS evaluation and recommendations. - Continue multimodal analgesic regimen with de-escalation of IV Dilaudid & Oxy IR with resumption of Methadone on 8/10/2023.   - Home Methadone dosing confirmed by APS on 8/9/2023; patient will have at least 1 dose of Methadone at home on discharge per his Methadone clinic.   - Continue bowel regimen while on opioid therapy. - Supplemental oxygen via nasal cannula as needed to maintain saturations greater than or equal to 94%. - Repeat chest x-ray on 8/10/2023.   - PT and OT evaluation and treatment as indicated. - Outpatient follow-up in the trauma clinic for re-evaluation in approximately 2 weeks. Acute pain due to trauma  Assessment & Plan  - Acute pain due to trauma secondary to known rib fractures. - Appreciate APS evaluation and recommendations. - Continue multimodal analgesic regimen with de-escalation of IV Dilaudid & Oxy IR with resumption of Methadone on 8/10/2023.   - Home Methadone dosing confirmed by APS on 8/9/2023; patient will have at least 1 dose of Methadone at home on discharge per his Methadone clinic.   - Continue bowel regimen while on opioid therapy.   - Resume short-term outpatient follow-up with chronic methadone prescriber on discharge. Methadone dependence [facility])  Assessment & Plan  - Patient with chronic methadone dependence and history of prior opioid abuse. - Appreciate APS evaluation and recommendations. - Continue multimodal analgesic regimen with de-escalation of IV Dilaudid & Oxy IR with resumption of Methadone on 8/10/2023.   - Home Methadone dosing confirmed by APS on 8/9/2023; patient will have at least 1 dose of Methadone at home on discharge per his Methadone clinic.   - Continue bowel regimen while on opioid therapy. - Continue outpatient follow-up with chronic methadone prescriber on discharge. Adrenal nodule [facility])  Assessment & Plan  - Patient with stable appearing adrenal nodule noted incidentally on CT imaging.  - Discussed with patient and recommended continued outpatient follow-up with PCP as well as consideration for endocrinology consultation for further workup. Lung nodules  Assessment & Plan  - Patient with stable appearing right-sided lung nodules noted on CT imaging incidentally. - Discussed with patient and recommended continued outpatient follow-up with PCP. Type 2 diabetes mellitus with microalbuminuria, without long-term current use of insulin [facility])  Assessment & Plan  Lab Results   Component Value Date    HGBA1C 8.8 (H) 07/25/2023       Recent Labs     08/09/23  1443 08/09/23  1810 08/09/23  2137   POCGLU 117 102 184*       Blood Sugar Average: Last 72 hrs:  (P) [id number]   - Patient with chronic history of type 2 diabetes mellitus. - Initiate subcutaneous insulin regimen during hospital encounter.  - Resume home medication therapy on discharge as appropriate. - Hypoglycemia protocol as needed. - Outpatient follow-up with PCP. Benign essential hypertension  Assessment & Plan  - Patient with chronic history of hypertension.  - Continue current medication regimen.  - Outpatient follow-up with PCP.              TRAUMA TERTIARY SURVEY NOTE    VTE Prophylaxis:Sequential compression device (Venodyne)  and Enoxaparin (Lovenox)     Disposition: Continue current level of care. Await repeat chest x-ray and therapy evaluation. Case management following for disposition planning. Code status:  Level 1 - Full Code    Consultants: IP CONSULT TO ACUTE PAIN SERVICE  IP CONSULT TO CASE MANAGEMENT  IP CONSULT TO ACUTE PAIN SERVICE    Subjective   Transfer from: N/A    Mechanism of Injury:Fall     Chief Complaint: "It really hurts."    HPI/Last 24 hour events: Patient continues to complain of significant left chest wall pain. He notes the pain is a little better since his methadone was resumed this morning, but overall remains pretty uncomfortable. He notes the pain is worse with deep breaths and coughing as well as movement. He has no other complaints or new complaints this morning. Objective   Vitals:   Temp:  [97.6 °F (36.4 °C)-98.4 °F (36.9 °C)] 98.4 °F (36.9 °C)  HR:  [51-63] 56  Resp:  [16-20] 16  BP: (142-213)/(67-94) 157/83    I/O       08/08 0701  08/09 0700 08/09 0701  08/10 0700    Urine  1000    Total Output  1000    Net  -1000                 Physical Exam:   GENERAL APPEARANCE: Patient in no acute distress. HEENT: NC, tiny superficial abrasion on the posterior scalp without tenderness; EOMs intact; Mucous membranes moist  NECK / BACK: No midline cervical, thoracic or lumbar spine tenderness, step-offs or deformities. No paraspinal muscular tenderness in the neck or back. CV: Regular rate and rhythm; no murmur/gallops/rubs appreciated. CHEST / LUNGS: Clear to auscultation; no wheezes/rales/rhonci. Moderate left lower lateral and anterior chest wall tenderness without crepitus or deformity. ABD: NABS; soft; non-distended; non-tender. : Voiding spontaneously. EXT: +2 pulses bilaterally upper & lower extremities; no edema. Normal range of motion in all 4 extremities without pain, tenderness or deformity.   The patient has some chronically decreased range of motion in the left hip with chronic pain due to arthritis without acute change. NEURO: GCS 15; no focal neurologic deficits; neurovascularly intact. SKIN: Warm, dry and well perfused; no rash; no jaundice. Invasive Devices     Peripheral Intravenous Line  Duration           Peripheral IV 08/09/23 Left;Ventral (anterior) Forearm <1 day                      PIC Score  PIC Pain Score: 1 (8/10/2023  5:30 AM)  PIC Incentive Spirometry Score: 4 (8/10/2023  4:40 AM)  PIC Cough Description: 2 (8/10/2023  4:40 AM)  PIC Total Score: 7 (8/10/2023  4:40 AM)       If the Total PIC Score </=5, did you consult APS and evaluate patient for further intervention?: yes      Pain:    Incentive Spirometry  Cough  3 = Controlled  4 = Above goal volume 3 = Strong  2 = Moderate  3 = Goal to alert volume 2 = Weak  1 = Severe  2 = Below alert volume 1 = Absent     1 = Unable to perform IS      Lab Results:   Results: I have personally reviewed all pertinent laboratory/tests results, BMP/CMP:   Lab Results   Component Value Date    SODIUM 139 08/10/2023    K 3.5 08/10/2023     08/10/2023    CO2 34 (H) 08/10/2023    BUN 13 08/10/2023    CREATININE 0.75 08/10/2023    CALCIUM 8.4 08/10/2023    EGFR 99 08/10/2023    and CBC:   Lab Results   Component Value Date    WBC 6.70 08/10/2023    HGB 8.9 (L) 08/10/2023    HCT 28.8 (L) 08/10/2023    MCV 84 08/10/2023     08/10/2023    RBC 3.43 (L) 08/10/2023    MCH 25.9 (L) 08/10/2023    MCHC 30.9 (L) 08/10/2023    RDW 15.8 (H) 08/10/2023    MPV 11.0 08/10/2023    NRBC 0 08/09/2023       Imaging Results: I have personally reviewed pertinent reports. and I have personally reviewed pertinent films in PACS  Chest Xray(s): negative for acute findings   FAST exam(s): N/A   CT Scan(s): positive for acute findings: Nondisplaced fractures of the left 9th through 11th ribs. No evidence of pneumothorax or pleural effusion.  No other evidence of acute posttraumatic abnormality in the chest, abdomen or pelvis.    Additional Xray(s): N/A     Other Studies: N/A      Doe Montoya PA-C  8/10/2023 07:14 AM

## 2023-08-10 NOTE — PLAN OF CARE
Problem: OCCUPATIONAL THERAPY ADULT  Goal: Performs self-care activities at highest level of function for planned discharge setting. See evaluation for individualized goals. Description: Treatment Interventions: ADL retraining, Functional transfer training, Endurance training, Patient/family training, Equipment evaluation/education, Compensatory technique education, Continued evaluation  Equipment Recommended: Bedside commode       See flowsheet documentation for full assessment, interventions and recommendations. 8/10/2023 1449 by Daisy Arias OT  Note: Limitation: Decreased ADL status, Decreased UE strength, Decreased Safe judgement during ADL, Decreased endurance, Decreased cognition, Decreased high-level ADLs, Decreased self-care trans (pain, limited functional reach, decreased dynamic balance and trunk control)  Prognosis: Good  Assessment: Patient is a 66 y.o. male seen for OT evaluation at [address] [place] [address] following admission on 8/9/2023  s/p Closed fracture of multiple ribs of left side. Please see above for comprehensive list of comorbidities and significant PMHx impacting functional performance. At baseline, pt is (I) with ADL/IADLs, mod (I) c SPC. Upon initial evaluation, pt appears to be performing below baseline functional status. Occupational performance is affected by the following deficits: endurance ,  decreased muscular strength , decreased dynamic balance impacting functional reach, decreased activity tolerance , impaired judgement and problem solving  and (+) pain . Personal/Environmental factors impacting D/C include: (+) Hx of falls , decreased caregiver status , steps to enter/navigate the home and Assistance needed for ADLs and functional mobility. Patient would benefit from OT services within the acute care setting to maximize level of functional independence in the following areas self-care transfers, functional mobility and ADLs.   From OT standpoint, recommendation at time of D/C would be return to previous environment with home health rehabilitation pending anticipated progress c fxnl mobility, LB dressing d/t decreased caregiver status . OT Discharge Recommendation: Home with home health rehabilitation (pending anticipated progress c LB dressing, functional mobility d/t decreased caregiver status at home)       8/10/2023 1449 by Richie Marie OT  Note: Limitation: Decreased ADL status, Decreased UE strength, Decreased Safe judgement during ADL, Decreased endurance, Decreased cognition, Decreased high-level ADLs, Decreased self-care trans (pain, limited functional reach, decreased dynamic balance and trunk control)  Prognosis: Good  Assessment: Patient is a 66 y.o. male seen for OT evaluation at [address] following admission on 8/9/2023  s/p Closed fracture of multiple ribs of left side. Please see above for comprehensive list of comorbidities and significant PMHx impacting functional performance. At baseline, pt is (I) with ADL/IADLs, mod (I) c SPC. Upon initial evaluation, pt appears to be performing below baseline functional status. Occupational performance is affected by the following deficits: endurance ,  decreased muscular strength , decreased dynamic balance impacting functional reach, decreased activity tolerance , impaired judgement and problem solving  and (+) pain . Personal/Environmental factors impacting D/C include: (+) Hx of falls , decreased caregiver status , steps to enter/navigate the home and Assistance needed for ADLs and functional mobility. Patient would benefit from OT services within the acute care setting to maximize level of functional independence in the following areas self-care transfers, functional mobility and ADLs.   From OT standpoint, recommendation at time of D/C would be return to previous environment with home health rehabilitation pending anticipated progress c fxnl mobility, LB dressing d/t decreased caregiver status .      OT Discharge Recommendation: Home with home health rehabilitation (pending anticipated progress c LB dressing, functional mobility d/t decreased caregiver status at home)     Tyson Aguirre OT'

## 2023-08-10 NOTE — ASSESSMENT & PLAN NOTE
- Acute pain due to trauma secondary to known rib fractures. - Appreciate APS evaluation and recommendations. - Continue multimodal analgesic regimen with de-escalation of IV Dilaudid & Oxy IR with resumption of Methadone on 8/10/2023.   - Home Methadone dosing confirmed by APS on 8/9/2023; patient will have at least 1 dose of Methadone at home on discharge per his Methadone clinic.   - Continue bowel regimen while on opioid therapy. - Resume short-term outpatient follow-up with chronic methadone prescriber on discharge.

## 2023-08-10 NOTE — PLAN OF CARE
Problem: Potential for Falls  Goal: Patient will remain free of falls  Description: INTERVENTIONS:  - Educate patient/family on patient safety including physical limitations  - Instruct patient to call for assistance with activity   - Consult OT/PT to assist with strengthening/mobility   - Keep Call bell within reach  - Keep bed low and locked with side rails adjusted as appropriate  - Keep care items and personal belongings within reach  - Initiate and maintain comfort rounds  - Make Fall Risk Sign visible to staff  - Offer Toileting every  Hours, in advance of need  - Initiate/Maintain alarm  - Obtain necessary fall risk management equipment:   - Apply yellow socks and bracelet for high fall risk patients  - Consider moving patient to room near nurses station  Outcome: Progressing     Problem: MOBILITY - ADULT  Goal: Maintain or return to baseline ADL function  Description: INTERVENTIONS:  -  Assess patient's ability to carry out ADLs; assess patient's baseline for ADL function and identify physical deficits which impact ability to perform ADLs (bathing, care of mouth/teeth, toileting, grooming, dressing, etc.)  - Assess/evaluate cause of self-care deficits   - Assess range of motion  - Assess patient's mobility; develop plan if impaired  - Assess patient's need for assistive devices and provide as appropriate  - Encourage maximum independence but intervene and supervise when necessary  - Involve family in performance of ADLs  - Assess for home care needs following discharge   - Consider OT consult to assist with ADL evaluation and planning for discharge  - Provide patient education as appropriate  Outcome: Progressing  Goal: Maintains/Returns to pre admission functional level  Description: INTERVENTIONS:  - Perform BMAT or MOVE assessment daily.   - Set and communicate daily mobility goal to care team and patient/family/caregiver.    - Collaborate with rehabilitation services on mobility goals if consulted  - Perform Range of Motion  times a day. - Reposition patient every hours.   - Dangle patient  times a day  - Stand patient  times a day  - Ambulate patient  times a day  - Out of bed to chair  times a day   - Out of bed for meals  times a day  - Out of bed for toileting  - Record patient progress and toleration of activity level   Outcome: Progressing     Problem: Prexisting or High Potential for Compromised Skin Integrity  Goal: Skin integrity is maintained or improved  Description: INTERVENTIONS:  - Identify patients at risk for skin breakdown  - Assess and monitor skin integrity  - Assess and monitor nutrition and hydration status  - Monitor labs   - Assess for incontinence   - Turn and reposition patient  - Assist with mobility/ambulation  - Relieve pressure over bony prominences  - Avoid friction and shearing  - Provide appropriate hygiene as needed including keeping skin clean and dry  - Evaluate need for skin moisturizer/barrier cream  - Collaborate with interdisciplinary team   - Patient/family teaching  - Consider wound care consult   Outcome: Progressing

## 2023-08-10 NOTE — UTILIZATION REVIEW
NOTIFICATION OF INPATIENT ADMISSION   AUTHORIZATION REQUEST   SERVICING FACILITY:   [address] [place] [address]  Tax ID: [id number]  NPI: [id number]   ATTENDING PROVIDER:  Attending Name and NPI#: José Miguel Cora [[id number]]  Address: [address] [place] [address]  Phone: [phone]     ADMISSION INFORMATION:  Place of Service: Inpatient [facility]  Place of Service Code: 21  Inpatient Admission Date/Time: 8/9/23 12:34 PM  Discharge Date/Time: No discharge date for patient encounter. Admitting Diagnosis Code/Description:  Closed fracture of multiple ribs of left side, initial encounter [S22.42XA]     UTILIZATION REVIEW CONTACT:  Shyann Nelson Utilization   Network Utilization Review Department  Phone: [phone]  Fax: [phone]  Email: [email or web address]  Contact for approvals/pending authorizations, clinical reviews, and discharge. PHYSICIAN ADVISORY SERVICES:  Medical Necessity Denial & [id number] Review  Phone: [phone]  Fax: [phone]  Email: [email or web address]

## 2023-08-11 ENCOUNTER — TRANSITIONAL CARE MANAGEMENT (OUTPATIENT)
Dept: FAMILY MEDICINE CLINIC | Facility: CLINIC | Age: 63
End: 2023-08-11

## 2023-08-11 VITALS
DIASTOLIC BLOOD PRESSURE: 83 MMHG | HEIGHT: 68 IN | SYSTOLIC BLOOD PRESSURE: 157 MMHG | RESPIRATION RATE: 18 BRPM | BODY MASS INDEX: 25.79 KG/M2 | HEART RATE: 56 BPM | TEMPERATURE: 97.4 F | OXYGEN SATURATION: 96 %

## 2023-08-11 LAB
GLUCOSE SERPL-MCNC: 71 MG/DL (ref 65–140)
GLUCOSE SERPL-MCNC: 99 MG/DL (ref 65–140)

## 2023-08-11 PROCEDURE — NC001 PR NO CHARGE: Performed by: PHYSICIAN ASSISTANT

## 2023-08-11 PROCEDURE — 99232 SBSQ HOSP IP/OBS MODERATE 35: CPT | Performed by: NURSE PRACTITIONER

## 2023-08-11 PROCEDURE — 99238 HOSP IP/OBS DSCHRG MGMT 30/<: CPT | Performed by: PHYSICIAN ASSISTANT

## 2023-08-11 PROCEDURE — 82948 REAGENT STRIP/BLOOD GLUCOSE: CPT

## 2023-08-11 PROCEDURE — 97116 GAIT TRAINING THERAPY: CPT

## 2023-08-11 RX ORDER — METHOCARBAMOL 500 MG/1
500 TABLET, FILM COATED ORAL EVERY 6 HOURS SCHEDULED
Qty: 56 TABLET | Refills: 0 | Status: SHIPPED | OUTPATIENT
Start: 2023-08-11 | End: 2023-08-25

## 2023-08-11 RX ORDER — OXYCODONE HYDROCHLORIDE 5 MG/1
2.5-5 TABLET ORAL EVERY 4 HOURS PRN
Qty: 20 TABLET | Refills: 0 | Status: SHIPPED | OUTPATIENT
Start: 2023-08-11 | End: 2023-08-14

## 2023-08-11 RX ORDER — SENNOSIDES 8.6 MG
17.2 TABLET ORAL DAILY
Qty: 10 TABLET | Refills: 0 | Status: SHIPPED | OUTPATIENT
Start: 2023-08-12 | End: 2023-08-17

## 2023-08-11 RX ORDER — ACETAMINOPHEN 325 MG/1
650 TABLET ORAL EVERY 4 HOURS PRN
Refills: 0
Start: 2023-08-11

## 2023-08-11 RX ORDER — LIDOCAINE 4 G/G
1 PATCH TOPICAL DAILY
Refills: 0
Start: 2023-08-11

## 2023-08-11 RX ORDER — DOCUSATE SODIUM 100 MG/1
100 CAPSULE, LIQUID FILLED ORAL 2 TIMES DAILY
Qty: 10 CAPSULE | Refills: 0 | Status: SHIPPED | OUTPATIENT
Start: 2023-08-11 | End: 2023-08-16

## 2023-08-11 RX ORDER — POLYETHYLENE GLYCOL 3350 17 G/17G
17 POWDER, FOR SOLUTION ORAL DAILY
Qty: 85 G | Refills: 0 | Status: SHIPPED | OUTPATIENT
Start: 2023-08-11 | End: 2023-08-16

## 2023-08-11 RX ADMIN — METHOCARBAMOL TABLETS 500 MG: 500 TABLET, COATED ORAL at 06:53

## 2023-08-11 RX ADMIN — FLUTICASONE PROPIONATE 1 SPRAY: 50 SPRAY, METERED NASAL at 09:20

## 2023-08-11 RX ADMIN — CLONAZEPAM 1 MG: 1 TABLET ORAL at 00:44

## 2023-08-11 RX ADMIN — OXYCODONE HYDROCHLORIDE 10 MG: 10 TABLET ORAL at 09:17

## 2023-08-11 RX ADMIN — POLYETHYLENE GLYCOL 3350 17 G: 17 POWDER, FOR SOLUTION ORAL at 09:25

## 2023-08-11 RX ADMIN — STANDARDIZED SENNA CONCENTRATE 17.2 MG: 8.6 TABLET ORAL at 09:17

## 2023-08-11 RX ADMIN — GABAPENTIN 100 MG: 100 CAPSULE ORAL at 09:17

## 2023-08-11 RX ADMIN — METHOCARBAMOL TABLETS 500 MG: 500 TABLET, COATED ORAL at 12:07

## 2023-08-11 RX ADMIN — DOCUSATE SODIUM 100 MG: 100 CAPSULE, LIQUID FILLED ORAL at 09:17

## 2023-08-11 RX ADMIN — METHYLNALTREXONE BROMIDE 12 MG: 12 INJECTION, SOLUTION SUBCUTANEOUS at 12:07

## 2023-08-11 RX ADMIN — ENOXAPARIN SODIUM 30 MG: 30 INJECTION SUBCUTANEOUS at 09:17

## 2023-08-11 RX ADMIN — LIDOCAINE 1 PATCH: 700 PATCH TOPICAL at 09:16

## 2023-08-11 RX ADMIN — METHADONE HYDROCHLORIDE 165 MG: 10 TABLET ORAL at 06:45

## 2023-08-11 RX ADMIN — INSULIN LISPRO 4 UNITS: 100 INJECTION, SOLUTION INTRAVENOUS; SUBCUTANEOUS at 09:20

## 2023-08-11 RX ADMIN — INSULIN LISPRO 4 UNITS: 100 INJECTION, SOLUTION INTRAVENOUS; SUBCUTANEOUS at 12:09

## 2023-08-11 RX ADMIN — HYDROMORPHONE HYDROCHLORIDE 0.5 MG: 1 INJECTION, SOLUTION INTRAMUSCULAR; INTRAVENOUS; SUBCUTANEOUS at 03:30

## 2023-08-11 RX ADMIN — ACETAMINOPHEN 975 MG: 325 TABLET, FILM COATED ORAL at 06:53

## 2023-08-11 RX ADMIN — CLONAZEPAM 1 MG: 1 TABLET ORAL at 06:45

## 2023-08-11 RX ADMIN — OXYCODONE HYDROCHLORIDE 5 MG: 5 TABLET ORAL at 13:39

## 2023-08-11 RX ADMIN — OXYCODONE HYDROCHLORIDE 10 MG: 10 TABLET ORAL at 02:30

## 2023-08-11 NOTE — PLAN OF CARE
Problem: Potential for Falls  Goal: Patient will remain free of falls  Description: INTERVENTIONS:  - Educate patient/family on patient safety including physical limitations  - Instruct patient to call for assistance with activity   - Consult OT/PT to assist with strengthening/mobility   - Keep Call bell within reach  - Keep bed low and locked with side rails adjusted as appropriate  - Keep care items and personal belongings within reach  - Initiate and maintain comfort rounds  - Make Fall Risk Sign visible to staff  - Offer Toileting every 2 Hours, in advance of need  - Initiate/Maintain bed alarm  - Obtain necessary fall risk management equipment: alarms  - Apply yellow socks and bracelet for high fall risk patients  - Consider moving patient to room near nurses station  Outcome: Adequate for Discharge     Problem: MOBILITY - ADULT  Goal: Maintain or return to baseline ADL function  Description: INTERVENTIONS:  -  Assess patient's ability to carry out ADLs; assess patient's baseline for ADL function and identify physical deficits which impact ability to perform ADLs (bathing, care of mouth/teeth, toileting, grooming, dressing, etc.)  - Assess/evaluate cause of self-care deficits   - Assess range of motion  - Assess patient's mobility; develop plan if impaired  - Assess patient's need for assistive devices and provide as appropriate  - Encourage maximum independence but intervene and supervise when necessary  - Involve family in performance of ADLs  - Assess for home care needs following discharge   - Consider OT consult to assist with ADL evaluation and planning for discharge  - Provide patient education as appropriate  Outcome: Adequate for Discharge  Goal: Maintains/Returns to pre admission functional level  Description: INTERVENTIONS:  - Perform BMAT or MOVE assessment daily.   - Set and communicate daily mobility goal to care team and patient/family/caregiver.    - Collaborate with rehabilitation services on mobility goals if consulted  - Perform Range of Motion 3 times a day. - Reposition patient every 2 hours.   - Dangle patient 3 times a day  - Stand patient 3 times a day  - Ambulate patient 3 times a day  - Out of bed to chair 3 times a day   - Out of bed for meals 3 times a day  - Out of bed for toileting  - Record patient progress and toleration of activity level   Outcome: Adequate for Discharge     Problem: Prexisting or High Potential for Compromised Skin Integrity  Goal: Skin integrity is maintained or improved  Description: INTERVENTIONS:  - Identify patients at risk for skin breakdown  - Assess and monitor skin integrity  - Assess and monitor nutrition and hydration status  - Monitor labs   - Assess for incontinence   - Turn and reposition patient  - Assist with mobility/ambulation  - Relieve pressure over bony prominences  - Avoid friction and shearing  - Provide appropriate hygiene as needed including keeping skin clean and dry  - Evaluate need for skin moisturizer/barrier cream  - Collaborate with interdisciplinary team   - Patient/family teaching  - Consider wound care consult   Outcome: Adequate for Discharge

## 2023-08-11 NOTE — ASSESSMENT & PLAN NOTE
- Multiple left-sided rib fractures (9-11), present on admission.  - Continue rib fracture protocol.  - Continue to encourage incentive spirometer use and adequate pulmonary hygiene. Currently pulling 1,600 mL on I.S.  - PIC score is 6.  - Appreciate APS evaluation and recommendations. - Continue multimodal analgesic regimen with discontinuation of IV Dilaudid & de-escalation of Oxy IR with resumption of Methadone on 8/10/2023.   - Home Methadone dosing confirmed by APS on 8/9/2023; patient will have at 2 doses of Methadone at home on discharge per his Methadone clinic. He can resume pickup of his methadone dosing per routine on Monday, 8/13/2023. - I discussed this with Daren Player from [facility] on 8/11/2023.   - Continue bowel regimen while on opioid therapy. - Supplemental oxygen via nasal cannula as needed to maintain saturations greater than or equal to 94%. - Repeat chest x-ray on 8/10/2023.   - PT and OT evaluation and treatment as indicated. - Outpatient follow-up in the trauma clinic for re-evaluation in approximately 2 weeks.

## 2023-08-11 NOTE — ASSESSMENT & PLAN NOTE
- Status post fall with the below noted injuries. - Fall precautions.  - PT and OT evaluation and treatment as indicated. - Case Management consultation for disposition planning. 

## 2023-08-11 NOTE — NURSING NOTE
Patient d/c'd to home, transported by mother and sister. IV removed and all d/c paperwork given and explained. All belongings taken with patient.

## 2023-08-11 NOTE — DISCHARGE SUMMARY
[address]  Discharge- Kerri Jackson. 1960, 66 y.o. male MRN: [id number]  Unit/Bed#: W -01 Encounter: [id number]  Primary Care Provider: STEVE Gomez   Date and time admitted to hospital: 8/9/2023  8:57 AM    Fall  Assessment & Plan  - Status post fall with the below noted injuries. - Fall precautions.  - PT and OT evaluation and treatment as indicated. - Case Management consultation for disposition planning. * Closed fracture of multiple ribs of left side  Assessment & Plan  - Multiple left-sided rib fractures (9-11), present on admission.  - Continue rib fracture protocol.  - Continue to encourage incentive spirometer use and adequate pulmonary hygiene. Currently pulling 1,600 mL on I.S.  - PIC score is 6.  - Appreciate APS evaluation and recommendations. - Continue multimodal analgesic regimen with discontinuation of IV Dilaudid & de-escalation of Oxy IR with resumption of Methadone on 8/10/2023.   - Home Methadone dosing confirmed by APS on 8/9/2023; patient will have at 2 doses of Methadone at home on discharge per his Methadone clinic. He can resume pickup of his methadone dosing per routine on Monday, 8/13/2023. - I discussed this with Chris Vega from [address] [place] [address] on 8/11/2023.   - Continue bowel regimen while on opioid therapy. - Supplemental oxygen via nasal cannula as needed to maintain saturations greater than or equal to 94%. - Repeat chest x-ray on 8/10/2023.   - PT and OT evaluation and treatment as indicated. - Outpatient follow-up in the trauma clinic for re-evaluation in approximately 2 weeks. Acute pain due to trauma  Assessment & Plan  - Acute pain due to trauma secondary to known rib fractures. - Appreciate APS evaluation and recommendations.    - Continue multimodal analgesic regimen with discontinuation of IV Dilaudid & de-escalation of Oxy IR with resumption of Methadone on 8/10/2023.   - Home Methadone dosing confirmed by APS on 8/9/2023; patient will have at 2 doses of Methadone at home on discharge per his Methadone clinic. He can resume pickup of his methadone dosing per routine on Monday, 8/13/2023. - I discussed this with Hannah Appiah from [address] [place] [address] on 8/11/2023.   - Continue bowel regimen while on opioid therapy. - Resume short-term outpatient follow-up with chronic methadone prescriber on discharge. Methadone dependence [facility])  Assessment & Plan  - Patient with chronic methadone dependence and history of prior opioid abuse. - Appreciate APS evaluation and recommendations. - Continue multimodal analgesic regimen with discontinuation of IV Dilaudid & de-escalation of Oxy IR with resumption of Methadone on 8/10/2023.   - Home Methadone dosing confirmed by APS on 8/9/2023; patient will have at 2 doses of Methadone at home on discharge per his Methadone clinic. He can resume pickup of his methadone dosing per routine on Monday, 8/13/2023. - I discussed this with Hannah Appiah from [address] [place] [address] on 8/11/2023.   - Continue bowel regimen while on opioid therapy. - Continue outpatient follow-up with chronic methadone prescriber on discharge. Adrenal nodule [facility])  Assessment & Plan  - Patient with stable appearing adrenal nodule noted incidentally on CT imaging.  - Discussed with patient and recommended continued outpatient follow-up with PCP as well as consideration for endocrinology consultation for further workup. Lung nodules  Assessment & Plan  - Patient with stable appearing right-sided lung nodules noted on CT imaging incidentally. - Discussed with patient and recommended continued outpatient follow-up with PCP.     Type 2 diabetes mellitus with microalbuminuria, without long-term current use of insulin [facility])  Assessment & Plan  Lab Results   Component Value Date    HGBA1C 8.8 (H) 07/25/2023       Recent Labs     08/10/23  1111 08/10/23  1551 08/10/23  2132 08/11/23  0756 POCGLU 117 76 111 99       Blood Sugar Average: Last 72 hrs:  (P) 807.3674187192338096     - Patient with chronic history of type 2 diabetes mellitus. - Initiate subcutaneous insulin regimen during hospital encounter.  - Resume home medication therapy on discharge as appropriate. - Hypoglycemia protocol as needed. - Outpatient follow-up with PCP. Benign essential hypertension  Assessment & Plan  - Patient with chronic history of hypertension.  - Continue current medication regimen.  - Outpatient follow-up with PCP. Discharge Summary - Trauma Service   Jenny Quinteros. 66 y.o. male MRN: [id number]  Unit/Bed#: W -01 Encounter: [id number]    Admission Date: 8/9/2023     Discharge Date: 8/11/2023     Admitting Diagnosis: Closed fracture of multiple ribs of left side, initial encounter [S22.42XA]    Discharge Diagnosis: See above. Attending and Service: Dr. Natasha Sohemaker, Acute Care Surgical Services. Consulting Physician(s): MYLES. Imaging and Procedures Performed:     XR chest pa & lateral    Result Date: 8/10/2023  Impression: No acute cardiopulmonary disease. No displaced rib fractures identified Workstation performed: [id number]     CT chest abdomen pelvis w contrast    Result Date: 8/9/2023  Impression: Nondisplaced fractures of the left 9th through 11th ribs. No evidence of pneumothorax or pleural effusion. No other evidence of acute posttraumatic abnormality in the chest, abdomen or pelvis. Mild emphysema. Several stable sub-5 mm right lung nodules noted. Stable 1.4 cm right adrenal nodule. Although its imaging features on this study is indeterminate, because this lesion has been stable for > 1 year, it is considered benign. However, please note that for adrenal nodule > 1cm, endocrinology consult/biochemical evaluation is suggested to rule out functioning adenoma, if not already performed.  Adrenal recommendation based on institutional consensus and Journal of [employer] of Radiology 2017;14:7349-3433 Cholelithiasis. The study was marked in [facility] for immediate notification. Workstation performed: [id number]     CT cervical spine without contrast    Result Date: 8/9/2023  Impression: No cervical spine fracture or traumatic malalignment. Workstation performed: [id number]     CT head without contrast    Result Date: 8/9/2023  Impression: No acute intracranial abnormality. Workstation performed: [id number]     XR femur 2 views LEFT    Result Date: 8/9/2023  Impression: No acute osseous abnormality. Workstation performed: [id number]     XR chest 1 view portable    Result Date: 8/9/2023  Impression: No acute cardiopulmonary disease. Workstation performed: [address] Course: Omkar Hussein is a 82-year-old male who presented to the emergency department complaining of left side pain following a fall down 3-4 stairs when the power went out. He landed on his left side and experienced significant pain resulting in difficulty moving and taking deep breaths. During his ER workup, he was found to have multiple left-sided rib fractures. On his initial trauma evaluation, his primary survey was unremarkable. On secondary survey, he was afebrile with normal vital signs; he had left chest wall tenderness without crepitus or deformity; he did have bilateral lower extremity edema; the remainder of his exam is unremarkable. His initial workup included labs and the above and imaging studies. He was admitted to the trauma service status post fall down stairs with multiple left-sided rib fractures, acute pain due to trauma in setting of chronic pain and methadone dependence as well as medical comorbidities including type 2 diabetes mellitus and benign hypertension. Additionally, he was found to have incidental findings including adrenal nodule and lung nodules that were discussed with him and recommendations for follow-up were provided to them.   He was placed on the rib fracture protocol, provided incentive spirometer and instructed on use as well as adequate pulmonary hygiene. APS was consulted and assisted with his pain management throughout his hospital encounter. His methadone clinic was contacted and a plan was established for both inpatient and post discharge pain management and follow-up. PT and OT did evaluate him during his hospital encounter and cleared for discharge home. Case management assisted with disposition planning. The patient was deemed stable for discharge on 8/11/2023. For further details of his hospital encounter, please see his complete medical records. On discharge, the patient is instructed to follow-up with the patient's primary care provider to review the events of the patient's recent hospitalization. The patient is instructed to follow-up in the Trauma Clinic as scheduled on 8/24/2023 at 2:15 PM.  The patient should follow the provided discharge instructions. Condition at Discharge: stable     Discharge instructions/Information to patient and family:   See after visit summary for information provided to patient and family. Provisions for Follow-Up Care:  See after visit summary for information related to follow-up care and any pertinent home health orders. Disposition: See After Visit Summary for discharge disposition information. Planned Readmission: No    Discharge Statement   I spent 25 minutes discharging the patient. This time was spent on the day of discharge. I had direct contact with the patient on the day of discharge. Additional documentation is required if more than 30 minutes were spent on discharge. Discharge Medications:  See after visit summary for reconciled discharge medications provided to patient and family.       Cassidy Carpio PA-C  8/11/2023  11:02 AM

## 2023-08-11 NOTE — ASSESSMENT & PLAN NOTE
Lab Results   Component Value Date    HGBA1C 8.8 (H) 07/25/2023       Recent Labs     08/10/23  1111 08/10/23  1551 08/10/23  2132 08/11/23  0756   POCGLU 117 76 111 99       Blood Sugar Average: Last 72 hrs:  (P) [id number]     - Patient with chronic history of type 2 diabetes mellitus. - Initiate subcutaneous insulin regimen during hospital encounter.  - Resume home medication therapy on discharge as appropriate. - Hypoglycemia protocol as needed. - Outpatient follow-up with PCP.

## 2023-08-11 NOTE — PLAN OF CARE
Problem: PHYSICAL THERAPY ADULT  Goal: Performs mobility at highest level of function for planned discharge setting. See evaluation for individualized goals. Description: Treatment/Interventions: Functional transfer training, LE strengthening/ROM, Therapeutic exercise, Elevations, Endurance training, Cognitive reorientation, Patient/family training, Equipment eval/education, Bed mobility, Gait training, Compensatory technique education  Equipment Recommended: Bailey Fitch       See flowsheet documentation for full assessment, interventions and recommendations. Outcome: Adequate for Discharge  Note: Prognosis: Good  Problem List: Decreased strength, Impaired balance, Decreased mobility, Impaired judgement, Decreased safety awareness, Pain  Assessment: Pt seen for PT treatment 8/11/2023 with focus on: gait training & elevations training. Pt presents seated OOB in recliner chair, reports 8/10 pain in L ribs, and is agreeable to participate in session. Pt participates in gait training (amb of 300ft and negotiation of 14 steps up/down with SPC and S), with intervention focusing on goal of increased gait tolerance and improved independence for return to home. Overall pt displays good improvement in activity tolerance and independence, however continues to perform below their baseline level of functional mobility due to pain and impaired balance. Pt to most appropriately benefit from increased social supports upon d/c from the acute care setting. Continue skilled PT POC 1-2 addt'l visit as able and appropriate in order to progress towards therapy goals and maximize independence for return to home. Barriers to Discharge: None (pt reports his sister and mother are able to assist on d/c.)     PT Discharge Recommendation: No rehabilitation needs    See flowsheet documentation for full assessment.

## 2023-08-11 NOTE — PROGRESS NOTES
[address] [facility]  Progress Note  Name: Jimbo Raymundo I  MRN: [id number]  Unit/Bed#: W -01 I Date of Admission: 8/9/2023   Date of Service: 8/11/2023 I Hospital Day: 2    Assessment/Plan   Fall  Assessment & Plan  - Status post fall with the below noted injuries. - Fall precautions.  - PT and OT evaluation and treatment as indicated. - Case Management consultation for disposition planning. * Closed fracture of multiple ribs of left side  Assessment & Plan  - Multiple left-sided rib fractures (9-11), present on admission.  - Continue rib fracture protocol.  - Continue to encourage incentive spirometer use and adequate pulmonary hygiene. Currently pulling 1,600 mL on I.S.  - PIC score is 6.  - Appreciate APS evaluation and recommendations. - Continue multimodal analgesic regimen with de-escalation of IV Dilaudid & Oxy IR with resumption of Methadone on 8/10/2023.   - Home Methadone dosing confirmed by APS on 8/9/2023; patient will have at least 1 dose of Methadone at home on discharge per his Methadone clinic.   - Continue bowel regimen while on opioid therapy. - Supplemental oxygen via nasal cannula as needed to maintain saturations greater than or equal to 94%. - Repeat chest x-ray on 8/10/2023.   - PT and OT evaluation and treatment as indicated. - Outpatient follow-up in the trauma clinic for re-evaluation in approximately 2 weeks. Acute pain due to trauma  Assessment & Plan  - Acute pain due to trauma secondary to known rib fractures. - Appreciate APS evaluation and recommendations. - Continue multimodal analgesic regimen with de-escalation of IV Dilaudid & Oxy IR with resumption of Methadone on 8/10/2023.   - Home Methadone dosing confirmed by APS on 8/9/2023; patient will have at least 1 dose of Methadone at home on discharge per his Methadone clinic.   - Continue bowel regimen while on opioid therapy.   - Resume short-term outpatient follow-up with chronic methadone prescriber on discharge. Methadone dependence [facility])  Assessment & Plan  - Patient with chronic methadone dependence and history of prior opioid abuse. - Appreciate APS evaluation and recommendations. - Continue multimodal analgesic regimen with de-escalation of IV Dilaudid & Oxy IR with resumption of Methadone on 8/10/2023.   - Home Methadone dosing confirmed by APS on 8/9/2023; patient will have at least 1 dose of Methadone at home on discharge per his Methadone clinic.   - Continue bowel regimen while on opioid therapy. - Continue outpatient follow-up with chronic methadone prescriber on discharge. Adrenal nodule [facility])  Assessment & Plan  - Patient with stable appearing adrenal nodule noted incidentally on CT imaging.  - Discussed with patient and recommended continued outpatient follow-up with PCP as well as consideration for endocrinology consultation for further workup. Lung nodules  Assessment & Plan  - Patient with stable appearing right-sided lung nodules noted on CT imaging incidentally. - Discussed with patient and recommended continued outpatient follow-up with PCP. Type 2 diabetes mellitus with microalbuminuria, without long-term current use of insulin [facility])  Assessment & Plan  Lab Results   Component Value Date    HGBA1C 8.8 (H) 07/25/2023       Recent Labs     08/09/23  2137 08/10/23  1111 08/10/23  1551 08/10/23  2132   POCGLU 184* 117 76 111       Blood Sugar Average: Last 72 hrs:  (P) [id number]     - Patient with chronic history of type 2 diabetes mellitus. - Initiate subcutaneous insulin regimen during hospital encounter.  - Resume home medication therapy on discharge as appropriate. - Hypoglycemia protocol as needed. - Outpatient follow-up with PCP. Benign essential hypertension  Assessment & Plan  - Patient with chronic history of hypertension.  - Continue current medication regimen.  - Outpatient follow-up with PCP. Bowel Regimen:  On Colace, MiraLAX and senna. VTE Prophylaxis:Sequential compression device (Venodyne)  and Enoxaparin (Lovenox)     Disposition: Continue current level of care. Continue therapy evaluation and treatment as indicated. Case management following for disposition planning. Subjective   Chief Complaint: "I am so sore."    Subjective: Patient continues to complain of left chest wall pain. He did get some rest overnight and overall the pain is slightly improved with his current medication regimen. He notes the pain is still worse with sneezing, coughing, movement and deep breath, but is breathing adequately. He has no new complaints today. He is tolerating oral intake, but does note he would like something to help him with a bowel movement. Objective   Vitals:   Temp:  [98.4 °F (36.9 °C)-98.6 °F (37 °C)] 98.4 °F (36.9 °C)  HR:  [50-63] 63  Resp:  [18] 18  BP: (136-164)/(59-81) 164/81    I/O       08/09 0701  08/10 0700 08/10 0701  08/11 0700 08/11 0701  08/12 0700    P. O.  480     Total Intake  480     Urine 1000      Total Output 1000      Net -1000 +480                   Physical Exam:   GENERAL APPEARANCE: Patient in no acute distress. HEENT: NC, superficial scalp abrasion healing appropriately without tenderness; EOMs intact; Mucous membranes moist  CV: Regular rate and rhythm; no murmur/gallops/rubs appreciated. CHEST / LUNGS: Clear to auscultation; no wheezes/rales/rhonci. Stable to improving left lateral lower chest wall tenderness without crepitus or deformities. ABD: NABS; soft; non-distended; non-tender. : Voiding spontaneously. EXT: +2 pulses bilaterally upper & lower extremities; no edema. NEURO: GCS 15; no focal neurologic deficits; neurovascularly intact. SKIN: Warm, dry and well perfused; no rash; no jaundice.     Invasive Devices     Peripheral Intravenous Line  Duration           Peripheral IV 08/09/23 Left;Ventral (anterior) Forearm 1 day                 PIC Score  PIC Pain Score: 3 (8/11/2023  6:53 AM)  PIC Incentive Spirometry Score: 4 (8/11/2023  4:00 AM)  PIC Cough Description: 3 (8/11/2023  4:00 AM)  PIC Total Score: 9 (8/11/2023  4:00 AM)       If the Total PIC Score </=5, did you consult APS and evaluate patient for further intervention?: yes      Pain:    Incentive Spirometry  Cough  3 = Controlled  4 = Above goal volume 3 = Strong  2 = Moderate  3 = Goal to alert volume 2 = Weak  1 = Severe  2 = Below alert volume 1 = Absent     1 = Unable to perform IS         Lab Results: Results: I have personally reviewed all pertinent laboratory/tests results, BMP/CMP: No results found for: "SODIUM", "K", "CL", "CO2", "ANIONGAP", "BUN", "CREATININE", "GLUCOSE", "CALCIUM", "AST", "ALT", "ALKPHOS", "PROT", "BILITOT", "EGFR" and CBC: No results found for: "WBC", "HGB", "HCT", "MCV", "PLT", "ADJUSTEDWBC", "RBC", "MCH", "MCHC", "RDW", "MPV", "NRBC"  Imaging: I have personally reviewed pertinent reports.    and I have personally reviewed pertinent films in PACS   Other Studies: N/A      Marianela Vaughn PA-C  8/11/2023  08:19 AM

## 2023-08-11 NOTE — PROGRESS NOTES
Progress Note - Acute Pain Service    Adelso Hunter 66 y.o. male MRN: [id number]  Unit/Bed#: W -01 Encounter: [id number]      Adelso Hunter is a 66 y.o. male who presents status post fall with left-sided rib fractures 9-11. Acute pain due to trauma  Assessment & Plan  Multimodal analgesia  · Tylenol 975 mg every 8 hours scheduled  · Gabapentin 100 mg 3 times a day  · Estimated Creatinine Clearance: 92.7 mL/min (by C-G formula based on SCr of 0.81 mg/dL). · Lidocaine patch daily, on for 12 hours off for 12 hours  · Robaxin 500mg every 6 hours scheduled  · Clonazepam 1 mg twice a day, home medication  · Oxycodone to 5 mg every 4 hours as needed for moderate pain  · Oxycodone to 10 mg every 4 hours as needed for severe pain  · D/C IV Dilaudid  · Naloxone as needed for opioid reversal/respiratory depression    Bowel regimen  · Colace 100 mg twice a day  · Senokot daily  · MiraLAX daily as needed for constipation    Interventional pain modalities:  · Will hold off on peripheral block/epidural at this time as respiratory status remains is stable. SpO2 95% on room air; incentive spirometer 2000ml. · DVT prophylaxis subcutaneous Lovenox, okay to continue as no interventional pain treatments are needed    Methadone dependence [facility])  Assessment & Plan  Methadone clinic: [address] [place] [address]  Phone number [phone]  Personally called clinic to verify methadone dose; confirmed patient is on methadone 165 mg daily  Patient was at methadone clinic on 8/8/2023 and received his dose; he also received 2 take-home doses for 8/9/2023 and 8/10/2023  Continue methadone 165 mg daily     Pain stable on current analgesic regimen. Okay for discharge from a pain management standpoint. Would recommend to continue multimodal analgesic regimen, okay for short course of oxycodone at time of discharge.   Patient will continue his methadone daily and follow-up with his methadone clinic as scheduled. Treatment plan reviewed with bedside nursing staff and primary care service. APS will sign off at this time. Thank you for the consult. All opioids and other analgesics to be written at discretion of primary team. Please contact Acute Pain Service - via [employer] from [phone] with additional questions or concerns. See [employer] or Patricia for additional contacts and after hours information. Pain History  Current pain location(s):  Pain Score: 8  Pain Location/Orientation: Orientation: Left, Location: Rib Cage  Pain Scale: Pain Assessment Tool: 0-10  24 hour history: Patient sitting in chair, no acute distress. Continues to report pain on the left side of his chest which is mild at rest and increases with movement or deep inspiration. Tolerating current analgesic regimen. As needed oxycodone provides mild improvement in pain. Denied headache, dizziness, nausea or vomiting. Reports bowel movement 2 days ago.     Opioid requirement previous 24 hours: oxycodone 40mg; IV Dilaudid 1.5mg    Meds/Allergies   all current active meds have been reviewed and current meds:   Current Facility-Administered Medications   Medication Dose Route Frequency   • acetaminophen (TYLENOL) tablet 975 mg  975 mg Oral Q8H [address]   • clonazePAM (KlonoPIN) tablet 1 mg  1 mg Oral BID   • docusate sodium (COLACE) capsule 100 mg  100 mg Oral BID   • enoxaparin (LOVENOX) subcutaneous injection 30 mg  30 mg Subcutaneous Q12H MELISA   • fluticasone (FLONASE) 50 mcg/act nasal spray 1 spray  1 spray Nasal Daily   • gabapentin (NEURONTIN) capsule 100 mg  100 mg Oral TID   • insulin glargine (LANTUS) subcutaneous injection 12 Units 0.12 mL  12 Units Subcutaneous HS   • insulin lispro (HumaLOG) 100 units/mL subcutaneous injection 1-5 Units  1-5 Units Subcutaneous HS   • insulin lispro (HumaLOG) 100 units/mL subcutaneous injection 1-6 Units  1-6 Units Subcutaneous TID AC   • insulin lispro (HumaLOG) 100 units/mL subcutaneous injection 4 Units  4 Units Subcutaneous Daily With Breakfast   • insulin lispro (HumaLOG) 100 units/mL subcutaneous injection 4 Units  4 Units Subcutaneous Daily With Lunch   • insulin lispro (HumaLOG) 100 units/mL subcutaneous injection 4 Units  4 Units Subcutaneous Daily With Dinner   • lidocaine (LIDODERM) 5 % patch 1 patch  1 patch Topical Daily   • melatonin tablet 6 mg  6 mg Oral HS   • methadone (DOLOPHINE) tablet 165 mg  165 mg Oral Daily   • methocarbamol (ROBAXIN) tablet 500 mg  500 mg Oral Q6H [address]   • naloxone (NARCAN) 0.04 mg/mL syringe 0.04 mg  0.04 mg Intravenous Q1MIN PRN   • ondansetron (ZOFRAN) injection 4 mg  4 mg Intravenous Q4H PRN   • oxyCODONE (ROXICODONE) IR tablet 5 mg  5 mg Oral Q4H PRN    Or   • oxyCODONE (ROXICODONE) immediate release tablet 10 mg  10 mg Oral Q4H PRN   • polyethylene glycol (MIRALAX) packet 17 g  17 g Oral Daily PRN   • senna (SENOKOT) tablet 17.2 mg  2 tablet Oral Daily       Allergies   Allergen Reactions   • Aspirin Shortness Of Breath   • Penicillins        Objective        Vitals:    08/10/23 1112 08/10/23 1551 08/10/23 2132 08/11/23 0758   BP: 148/79 136/59 164/81 156/79   Pulse: 60 (!) 50 63 62   Resp: 18 18 18 18   Temp: 98.5 °F (36.9 °C) 98.6 °F (37 °C) 98.4 °F (36.9 °C) 97.8 °F (36.6 °C)   TempSrc:       SpO2: 96% 97% 97% 95%   Height:             Physical Exam  Vitals reviewed. Constitutional:       General: He is awake. He is not in acute distress. Appearance: Normal appearance. He is not ill-appearing, toxic-appearing or diaphoretic. HENT:      Head: Normocephalic and atraumatic. Nose: Nose normal. No congestion or rhinorrhea. Mouth/Throat:      Mouth: Mucous membranes are moist.   Eyes:      Extraocular Movements: Extraocular movements intact. Cardiovascular:      Rate and Rhythm: Bradycardia present. Pulmonary:      Effort: Pulmonary effort is normal. No tachypnea, bradypnea or respiratory distress. Breath sounds: Normal breath sounds.  No wheezing, rhonchi or rales. Skin:     General: Skin is warm and dry. Coloration: Skin is not pale. Findings: No rash. Neurological:      Mental Status: He is alert and oriented to person, place, and time. Mental status is at baseline. Psychiatric:         Attention and Perception: Attention normal.         Mood and Affect: Mood normal.         Speech: Speech normal.         Behavior: Behavior normal. Behavior is cooperative. Lab Results:   Estimated Creatinine Clearance: 100.1 mL/min (by C-G formula based on SCr of 0.75 mg/dL). Lab Results   Component Value Date    WBC 6.70 08/10/2023    WBC 9.29 01/29/2015    HGB 8.9 (L) 08/10/2023    HGB 12.2 01/29/2015    HCT 28.8 (L) 08/10/2023    HCT 38.0 01/29/2015     08/10/2023     01/29/2015         Component Value Date/Time     01/29/2015 1342    K 3.5 08/10/2023 0445    K 5.0 01/29/2015 1342     08/10/2023 0445     01/29/2015 1342    CO2 34 (H) 08/10/2023 0445    CO2 29 08/17/2022 0815    BUN 13 08/10/2023 0445    BUN 18 01/29/2015 1342    CREATININE 0.75 08/10/2023 0445    CREATININE 1.01 01/29/2015 1342         Component Value Date/Time    CALCIUM 8.4 08/10/2023 0445    CALCIUM 9.2 01/29/2015 1342    ALKPHOS 126 (H) 06/29/2023 0709    ALKPHOS 108 01/29/2015 1342    AST 67 (H) 06/29/2023 0709    AST 14 01/29/2015 1342    ALT 79 (H) 06/29/2023 0709    ALT 21 01/29/2015 1342    BILITOT 0.27 01/29/2015 1342    TP 6.8 06/29/2023 0709    ALB 3.2 (L) 06/29/2023 0709    ALB 3.6 01/29/2015 1342       Counseling / Coordination of Care  Total floor / unit time spent today 15 minutes minutes. Greater than 50% of total time was spent with the patient and / or family counseling and / or coordination of care. A description of the counseling / coordination of care: Chart review included vital signs, medications, progress notes. Treatment plan reviewed with patient which was inclusive of opioid and nonopioid medications. Reviewed treatment plan and discharge recommendations with patient, bedside nursing staff and primary care service. Please note that the APS provides consultative services regarding pain management only. With the exception of ketamine and epidural infusions and except when indicated, final decisions regarding starting or changing doses of analgesic medications are at the discretion of the consulting service.     Claudette Larger, CRNP   Acute Pain Service

## 2023-08-11 NOTE — PHYSICAL THERAPY NOTE
PHYSICAL THERAPY TREATMENT  DATE: 08/11/23  TIME: 5328-5869    NAME:  Dallas Westbrook. AGE:   66 y.o. Mrn:   [id number]  Length Of Stay: 2    ADMIT DX:  Closed fracture of multiple ribs of left side, initial encounter [S22.42XA]    Past Medical History:   Diagnosis Date    Anxiety     Depression     Diabetes mellitus ([address])     Drug use     Hemorrhoids, internal 9/23/2019    Hypertension      Past Surgical History:   Procedure Laterality Date    FEMUR FRACTURE SURGERY Right     GASTRIC BYPASS  11/08/2011    Managed by: Nadege Davis (General Surgery)    HERNIA REPAIR  02/13/2013    Incisional hernia repair Managed by: Nadege Davis (General Surgery)    TONSILLECTOMY  2010       Performed at least 2 patient identifiers during session: Name, ID bracelet, and Epic photo     08/11/23 0956   PT Last Visit   PT Visit Date 08/11/23   Note Type   Note Type Treatment   Pain Assessment   Pain Assessment Tool 0-10   Pain Score 8   Pain Location/Orientation Orientation: Left; Location: Rib Cage   Pain Onset/Description Onset: Ongoing; Descriptor: Aching;Descriptor: Discomfort   Effect of Pain on Daily Activities limits comfort, limits gait speed   Patient's Stated Pain Goal No pain   Hospital Pain Intervention(s) Repositioned; Ambulation/increased activity; Emotional support  (pt declines cold pack)   Multiple Pain Sites No   Restrictions/Precautions   Weight Bearing Precautions Per Order No   Other Precautions Cognitive; Chair Alarm; Bed Alarm; Fall Risk;Pain   General   Chart Reviewed Yes   Additional Pertinent History Pt admitted 8/9/23 s/p fall down steps resulting in L closed fractures of multiple ribs. Response to Previous Treatment Patient reporting fatigue but able to participate. Family/Caregiver Present No   Cognition   Overall Cognitive Status Impaired  (however pt appears to be at / near baseline status.)   Arousal/Participation Alert; Cooperative   Attention Attends with cues to redirect   Orientation Level Oriented X4   Memory Decreased recall of recent events   Following Commands Follows multistep commands with increased time or repetition   Subjective   Subjective "I'm used to pain like this. I used to fight."   Self Selected Walking Speed   SSWS Trial 1 (Seconds) 29.3 Seconds   SSWS Trial 2 (Seconds) 19.08 Seconds   SSWS Trial 3 (Seconds) 18.52 Seconds   SSWS Average Time (Seconds) 22.3 seconds   SSWS Average Score (m/sec) 0.2 m/sec   Bed Mobility   Additional Comments Bed mobility NT on this date as pt presents and was left seated OOB in recliner chair. Transfers   Sit to Stand 6  Modified independent   Additional items Increased time required;Armrests   Stand to Sit 6  Modified independent   Additional items Increased time required;Armrests   Stand pivot 5  Supervision   Additional items Assist x 1;Verbal cues; Increased time required  [facility])   Additional Comments increased time needed for transfers due to rib pain   Ambulation/Elevation   Gait pattern Decreased foot clearance; Short stride; Excessively slow; Step through pattern   Gait Assistance 5  Supervision   Additional items Assist x 1;Verbal cues   Assistive Device Straight cane   Distance 300ft   Stair Management Assistance 5  Supervision   Additional items Assist x 1;Verbal cues; Increased time required  (cue for step patterning/cane sequencing, utilization of handrail in R UEto avoid increased pain on L side (B reports B HR))   Stair Management Technique One rail R;Foreward;Nonreciprocal;With cane   Number of Stairs 14  (14 steps up and down)   Ambulation/Elevation Additional Comments Gait speed average of 0.2m/s. Initially excessively slow, however improves with increased distance.    Balance   Static Sitting Good   Dynamic Sitting Fair +   Static Standing Fair +   Dynamic Standing Fair   Ambulatory Fair   Endurance Deficit   Endurance Deficit Yes   Activity Tolerance   Activity Tolerance Patient limited by pain   Medical Staff Made Aware Spoke w/ CM [address] [facility] with RN Petra   Assessment   Prognosis Good   Problem List Decreased strength; Impaired balance;Decreased mobility; Impaired judgement;Decreased safety awareness;Pain   Assessment Pt seen for PT treatment 8/11/2023 with focus on: gait training & elevations training. Pt presents seated OOB in recliner chair, reports 8/10 pain in L ribs, and is agreeable to participate in session. Pt participates in gait training (amb of 300ft and negotiation of 14 steps up/down with SPC and S), with intervention focusing on goal of increased gait tolerance and improved independence for return to home. Overall pt displays good improvement in activity tolerance and independence, however continues to perform below their baseline level of functional mobility due to pain and impaired balance. Pt to most appropriately benefit from increased social supports upon d/c from the acute care setting. Continue skilled PT POC 1-2 addt'l visit as able and appropriate in order to progress towards therapy goals and maximize independence for return to home. Barriers to Discharge None  (pt reports his sister and mother are able to assist on d/c.)   Goals   Patient Goals to get pain medication for when he goes home   STG Expiration Date 08/20/23   Short Term Goal #1 Pt will: perform bed mobility w/ mod I to decrease pt's burden of care and increase pt's independence w/ repositioning in bed; perform transfers w/ mod I to promote increased OOB mobility; ambulate at least 200' w/ LRAD and mod I to increase pt's ambulatory endurance/tolerance; negotiate at least 12 stair(s) w/ UE support and mod I to facilitate pt returning to previous living environment; increase all balance ratings by at least 1 grade to decrease pt's risk of falls   PT Treatment Day 2   Plan   Treatment/Interventions LE strengthening/ROM; Elevations; Therapeutic exercise;Patient/family training;Equipment eval/education; Bed mobility;Gait training;Spoke to case management;Spoke to nursing   Progress Improving as expected   PT Frequency 2-3x/wk   Recommendation   PT Discharge Recommendation No rehabilitation needs   AM-PAC Basic Mobility Inpatient   Turning in Flat Bed Without Bedrails 3   Lying on Back to Sitting on Edge of Flat Bed Without Bedrails 3   Moving Bed to Chair 4   Standing Up From Chair Using Arms 4   Walk in Room 3   Climb 3-5 Stairs With Railing 3   Basic Mobility Inpatient Raw Score 20   Basic Mobility Standardized Score 43.99   Highest Level Of Mobility   JH-HLM Goal 6: Walk 10 steps or more   JH-HLM Achieved 8: Walk 250 feet ot more   Education   Education Provided Mobility training;Assistive device   Patient Explanation/teachback used;Demonstrates verbal understanding   End of Consult   Patient Position at End of Consult Bedside chair;Bed/Chair alarm activated; All needs within reach   End of Consult Comments Based on patient's [facility] Level of Mobility scores today, patient currently has a goal of JH-HLM Levels: 8: WALK 250 FEET OR MORE, to be completed with RN staffing each shift, in order to improve overall activity tolerance and mobility, combat hospital related deconditioning, and maximize outcomes for d/c from the acute care setting. Gait Speed Interpretation:  Gain of 0.1 m/s is a predictor of well-being in those w/ abnormal walking speed compared to age matched peers  <0.7m/s is at an increased risk for falls  Household ambulator: <0.4 m/s  Limited community ambulator: 0.4-0.8 m/s  [employer] ambulator: 0.8-1.2 m/s  Able to safely cross streets: >1.2 m/s      The patient's AM-PAC Basic Mobility Inpatient Short Form Raw Score is 20. A Raw score of greater than 16 suggests the patient may benefit from discharge to home. Please also refer to the recommendation of the Physical Therapist for safe discharge planning.           Kita Lion, PT, DPT   Available via [employer]  NPI # [id number]  [place] License - [id number]  6/34/8228

## 2023-08-11 NOTE — DISCHARGE INSTR - AVS FIRST PAGE
Traumatic Rib Fracture Discharge Instructions: Your rib fractures will take time to heal. Rib fractures typically take at least 6-8 weeks to heal and may take longer. Activity:  - Walking and normal light activities are encouraged. Normal daily activities including climbing steps are okay. - Avoid lifting greater than 10 pounds, any strenuous activities and/or exercise, and contact sports until cleared by the trauma service. - Continue using the incentive spirometer at least 10 times every hour while awake. Return to work:    - You may return to work once you are cleared by the trauma service. Medications:    - You should continue your current medication regimen after discharge unless otherwise instructed. Please refer to your discharge medication list for further details. - Please take the pain medications as directed. - You are encouraged to use non-narcotic pain medications first and whenever possible. Reserve the use of narcotic pain medication for moderate to severe pain not controlled by non-narcotic medications.  - No driving while taking narcotic pain medications. - You may become constipated, especially if taking pain medications. You may take any over the counter stool softeners or laxatives as needed. Examples: Milk of Magnesia, Colace, Senna. Additional Instructions:  - If you have any questions or concerns after discharge please call the office.  - Call office or return to ER if fever greater than 101, chills, worsening/uncontrollable pain, develop productive cough, increasing shortness of breath, and/or difficulty breathing.

## 2023-08-11 NOTE — ASSESSMENT & PLAN NOTE
- Acute pain due to trauma secondary to known rib fractures. - Appreciate APS evaluation and recommendations. - Continue multimodal analgesic regimen with discontinuation of IV Dilaudid & de-escalation of Oxy IR with resumption of Methadone on 8/10/2023.   - Home Methadone dosing confirmed by APS on 8/9/2023; patient will have at 2 doses of Methadone at home on discharge per his Methadone clinic. He can resume pickup of his methadone dosing per routine on Monday, 8/13/2023. - I discussed this with Rayray Alfredo from [address] [place] [address] on 8/11/2023.   - Continue bowel regimen while on opioid therapy. - Resume short-term outpatient follow-up with chronic methadone prescriber on discharge.

## 2023-08-11 NOTE — ASSESSMENT & PLAN NOTE
- Patient with chronic methadone dependence and history of prior opioid abuse. - Appreciate APS evaluation and recommendations. - Continue multimodal analgesic regimen with discontinuation of IV Dilaudid & de-escalation of Oxy IR with resumption of Methadone on 8/10/2023.   - Home Methadone dosing confirmed by APS on 8/9/2023; patient will have at 2 doses of Methadone at home on discharge per his Methadone clinic. He can resume pickup of his methadone dosing per routine on Monday, 8/13/2023. - I discussed this with Marisol Barba from [address] [place] [address] on 8/11/2023.   - Continue bowel regimen while on opioid therapy. - Continue outpatient follow-up with chronic methadone prescriber on discharge.

## 2023-08-11 NOTE — ASSESSMENT & PLAN NOTE
Lab Results   Component Value Date    HGBA1C 8.8 (H) 07/25/2023       Recent Labs     08/09/23  2137 08/10/23  1111 08/10/23  1551 08/10/23  2132   POCGLU 184* 117 76 111       Blood Sugar Average: Last 72 hrs:  (P) [id number]     - Patient with chronic history of type 2 diabetes mellitus. - Initiate subcutaneous insulin regimen during hospital encounter.  - Resume home medication therapy on discharge as appropriate. - Hypoglycemia protocol as needed. - Outpatient follow-up with PCP.

## 2023-08-14 DIAGNOSIS — Z71.89 COORDINATION OF COMPLEX CARE: Primary | ICD-10-CM

## 2023-08-14 NOTE — UTILIZATION REVIEW
NOTIFICATION OF ADMISSION DISCHARGE   This is a Notification of Discharge from [address] [place] [address]. Please be advised that this patient has been discharge from our facility. Below you will find the admission and discharge date and time including the patient’s disposition. UTILIZATION REVIEW CONTACT:  Raymond Nuñez MA  Utilization   Network Utilization Review Department  Phone: [phone] x carefully listen to the prompts. All voicemails are confidential.  Email: [email or web address]     ADMISSION INFORMATION  PRESENTATION DATE: 8/9/2023  8:57 AM  OBERVATION ADMISSION DATE:   INPATIENT ADMISSION DATE: 8/9/23 12:34 PM   DISCHARGE DATE: 8/11/2023  2:55 PM   DISPOSITION:Home/Self Care    IMPORTANT INFORMATION:  Send all requests for admission clinical reviews, approved or denied determinations and any other requests to dedicated fax number below belonging to the campus where the patient is receiving treatment.  List of dedicated fax numbers:  [place] DENIALS (Administrative/Medical Necessity) [phone] [address] [facility] (Maternity/NICU/Pediatrics) [phone]   [facility] [phone]   [facility] [phone] [address] [facility] [phone]   [address] [place] [address] [phone]   [facility] [phone]   [address] [place] [address] [address] [facility] [phone]   [address] [facility] [phone]   [address] [facility] [phone]   [address] [facility] [address] [facility] [phone]

## 2023-08-15 ENCOUNTER — PATIENT OUTREACH (OUTPATIENT)
Dept: FAMILY MEDICINE CLINIC | Facility: CLINIC | Age: 63
End: 2023-08-15

## 2023-08-15 NOTE — PROGRESS NOTES
Spoke with Karel. Reports he is doing okay since discharge from hospital. Using his incentive spirometer Reviewed his trauma appointment on 8/24/23.  No questions about discharge instructions Has all of his medications and his bowels are moving Declines further outreach at this time

## 2023-08-24 ENCOUNTER — OFFICE VISIT (OUTPATIENT)
Dept: SURGERY | Facility: CLINIC | Age: 63
End: 2023-08-24
Payer: COMMERCIAL

## 2023-08-24 VITALS
WEIGHT: 163.4 LBS | RESPIRATION RATE: 16 BRPM | TEMPERATURE: 97.2 F | HEIGHT: 68 IN | HEART RATE: 102 BPM | DIASTOLIC BLOOD PRESSURE: 76 MMHG | BODY MASS INDEX: 24.77 KG/M2 | SYSTOLIC BLOOD PRESSURE: 119 MMHG | OXYGEN SATURATION: 97 %

## 2023-08-24 DIAGNOSIS — S22.42XD CLOSED FRACTURE OF MULTIPLE RIBS OF LEFT SIDE WITH ROUTINE HEALING, SUBSEQUENT ENCOUNTER: Primary | ICD-10-CM

## 2023-08-24 PROCEDURE — 99212 OFFICE O/P EST SF 10 MIN: CPT | Performed by: STUDENT IN AN ORGANIZED HEALTH CARE EDUCATION/TRAINING PROGRAM

## 2023-08-24 NOTE — ASSESSMENT & PLAN NOTE
Patient sustained 3 rib fractures (left 9 through 11) from a fall on August 9. He was hospitalized from August 9 through August 11.  -Patient has history of chemical dependency and was to follow-up with methadone clinic upon discharge  -Today is here for follow-up  -Patient states he is still having pain, he states he need oxycodone and Ativan. - Discussed with patient that he was discharged on Methadone. He states he was discharged on Oxycodone yet his med rec does not show this. - Discussed with patient that he should use tylenol, lidocaine patches which he states pharmacy never gave him. I did prescribe methanol lotion/tapan  - Discussed with patient to continue current treatment.   _ Take Melatonin at dinner time for sleep  - F/U PCP

## 2023-08-24 NOTE — LETTER
August 24, 2023     Patient: Cain Hallman. YOB: 1960  Date of Visit: 8/24/2023      To Whom it May Concern:    Hernan Lowery is under my professional care. Annita Boudreaux was seen in my office on 8/24/2023. David unable to travel up to [facility] due to Rib fractures for 1-2 weeks. If you have any questions or concerns, please don't hesitate to call.          Sincerely,      Will ANIYA Newman_STEVE Crandall        CC: No Recipients

## 2023-08-24 NOTE — PROGRESS NOTES
Office Visit - General Surgery  Verito Riggs. MRN: [id number]  Encounter: [id number]    Assessment and Plan  Problem List Items Addressed This Visit        Musculoskeletal and Integument    Closed fracture of multiple ribs of left side - Primary     Patient sustained 3 rib fractures (left 9 through 11) from a fall on August 9. He was hospitalized from August 9 through August 11.  -Patient has history of chemical dependency and was to follow-up with methadone clinic upon discharge  -Today is here for follow-up  -Patient states he is still having pain, he states he need oxycodone and Ativan. - Discussed with patient that he was discharged on Methadone. He states he was discharged on Oxycodone yet his med rec does not show this. - Discussed with patient that he should use tylenol, lidocaine patches which he states pharmacy never gave him. I did prescribe methanol lotion/tapan  - Discussed with patient to continue current treatment. _ Take Melatonin at dinner time for sleep  - F/U PCP         Relevant Medications    benzocaine-menthol-lanolin-aloe (DERMOPLAST) 20-0.5 % topical spray       Chief Complaint:  Verito Riggs. is a 66 y.o. male who presents for Follow-up (F/u rib fx. Still having significant pain.)    Subjective  This 29-year-old male who came to the clinic for follow-up for his rib fractures on the left 9 through 11. Patient was admitted to the hospital from August 9 through August 11. He states that his rib pain is worse than it was prior. He appears to be breathing comfortably. His lung sounds were clear. His oxygen saturations in the office was 99%. The patient would like a prescription for oxycodone for pain. We discussed that he was discharged on methadone and there was strict follow-up in methadone clinic that he was to do. He states that he has to get up at 4:30 in the morning to catch the bus to get to the clinic.   He would like excuse for a week or 2 so that he does not have to travel so far due to the pain. We did give him a slip for decreased travel for 1 to 2 weeks. He should continue with his methadone and Tylenol as a prescription was given for menthol lotion as well.     Past Medical History:   Diagnosis Date   • Anxiety    • Depression    • Diabetes mellitus ([address])    • Drug use    • Hemorrhoids, internal 9/23/2019   • Hypertension        Past Surgical History:   Procedure Laterality Date   • FEMUR FRACTURE SURGERY Right    • GASTRIC BYPASS  11/08/2011    Managed by: Angelo Varner (General Surgery)   • HERNIA REPAIR  02/13/2013    Incisional hernia repair Managed by: Angelo Varner (General Surgery)   • TONSILLECTOMY  2010       Family History   Problem Relation Age of Onset   • Diabetes Mother    • Hypertension Mother    • Hypertension Father    • Autoimmune disease Son        Social History     Tobacco Use   • Smoking status: Never   • Smokeless tobacco: Never   Vaping Use   • Vaping Use: Never used   Substance Use Topics   • Alcohol use: Never   • Drug use: Yes     Comment: Methadone clinic        Medications  Current Outpatient Medications on File Prior to Visit   Medication Sig Dispense Refill   • acetaminophen (TYLENOL) 325 mg tablet Take 2 tablets (650 mg total) by mouth every 4 (four) hours as needed for mild pain  0   • bacitracin-polymyxin b (POLYSPORIN) ointment Apply topically 2 (two) times a day 15 g 0   • Continuous Blood Gluc  (FreeStyle Grace 14 Day Everly) STEVE 1 Device by Device route 3 (three) times a day before meals 1 each 0   • Continuous Blood Gluc  (FreeStyle Grace 2 Everly) STEVE 1 Device by Device route 3 (three) times a day before meals 1 each 0   • fluticasone (FLONASE) 50 mcg/act nasal spray 1 spray into each nostril daily 16 g 5   • GLOBAL EASE INJECT PEN NEEDLES 31G X 8 MM MISC USE AS DIRECTED TO INJECT INSULIN DAILY  5   • Incontinence Supply Disposable (INCONTINENCE BRIEF MEDIUM) MISC by Does not apply route as needed (fecal soiling) 90 each 5   • Incontinence Supply Disposable (RA WIPES FLUSHABLE/MOIST) MISC by Does not apply route as needed (fecal soiling) 100 each 5   • Insulin Pen Needle (B-D UF III MINI PEN NEEDLES) 31G X 5 MM MISC Inject 15 Units under the skin daily at bedtime 100 each 5   • Lidocaine 4 % PTCH Apply 1 patch topically daily  0   • metFORMIN (GLUCOPHAGE) 850 mg tablet Take 1 tablet (850 mg total) by mouth daily with breakfast (Patient taking differently: Take 850 mg by mouth 2 (two) times a day with meals) 90 tablet 1   • methadone (DOLOPHINE) 5 mg tablet Take 165 mg by mouth daily     • methocarbamol (ROBAXIN) 500 mg tablet Take 1 tablet (500 mg total) by mouth every 6 (six) hours for 14 days 56 tablet 0   • Misc.  Devices (CANE) MISC by Does not apply route daily Dx:  Frequent falls 1 each 0   • neomycin-bacitracin-polymyxin-hydrocortisone (CORTISPORIN) 1 % ophthalmic ointment APPLY TO AFFECTED AREA TWICE A DAY FOR TOPICAL USE (EYE OINTMENT MAY BE USED TOPICALLY) 7 g 1   • clonazePAM (KlonoPIN) 0.5 mg tablet Take 1 tablet (0.5 mg total) by mouth 3 (three) times a day for 14 days 42 tablet 0   • divalproex sodium (DEPAKOTE ER) 500 mg 24 hr tablet Take 1 tablet (500 mg total) by mouth every 12 (twelve) hours 60 tablet 5   • docusate sodium (COLACE) 100 mg capsule Take 1 capsule (100 mg total) by mouth 2 (two) times a day for 5 days 10 capsule 0   • ergocalciferol (ERGOCALCIFEROL) 1.25 MG (17232 UT) capsule Take 50,000 Units by mouth     • polyethylene glycol (GOLYTELY) 4000 mL solution Take 4,000 mL by mouth once for 1 dose As directed for colonoscopy prep 4000 mL 0   • polyethylene glycol (MIRALAX) 17 g packet Take 17 g by mouth daily for 5 days 85 g 0   • senna (SENOKOT) 8.6 mg Take 2 tablets (17.2 mg total) by mouth daily for 5 days Do not start before August 12, 2023. 10 tablet 0   • UNKNOWN TO PATIENT      • [DISCONTINUED] bacitracin-neomycin-polymyxin b-hydrocortisone 1 % ointment Apply topically 2 (two) times a day 15 g 0     No current facility-administered medications on file prior to visit. Allergies  Allergies   Allergen Reactions   • Aspirin Shortness Of Breath   • Penicillins        Review of Systems   Constitutional: Positive for fatigue. Respiratory: Negative. Cardiovascular: Negative. Neurological: Negative. Objective  Vitals:    08/24/23 1325   BP: 119/76   Pulse: 102   Resp: 16   Temp: (!) 97.2 °F (36.2 °C)   SpO2: 97%       Physical Exam  Constitutional:       Appearance: Normal appearance. HENT:      Head: Atraumatic. Cardiovascular:      Rate and Rhythm: Normal rate and regular rhythm. Heart sounds: No murmur heard. No gallop. Pulmonary:      Effort: Pulmonary effort is normal. No respiratory distress. Breath sounds: Normal breath sounds. No wheezing or rales. Abdominal:      Palpations: Abdomen is soft. Neurological:      General: No focal deficit present. Mental Status: He is alert.    Psychiatric:         Behavior: Behavior normal.

## 2023-09-09 NOTE — ASSESSMENT & PLAN NOTE
Pt is drug seeking today  He says he was discharged from the rehab on clonazepam and doesn't have an appt with psych until 2 months from now so he needs our office to fill this medication in the interim  Educated that I will not fill this medication until I have records  Most of the appointment today consisted of this same conversation, over and over again     A urine drug screen was collected today in the odd chance that he was actually discharged from a drug rehab on clonazepam  on the discharge service for the patient. I have reviewed and made amendments to the documentation where necessary.

## 2023-09-14 ENCOUNTER — HOSPITAL ENCOUNTER (OUTPATIENT)
Facility: HOSPITAL | Age: 63
Setting detail: OBSERVATION
Discharge: HOME | End: 2023-09-14
Attending: EMERGENCY MEDICINE | Admitting: INTERNAL MEDICINE
Payer: COMMERCIAL

## 2023-09-14 ENCOUNTER — APPOINTMENT (EMERGENCY)
Dept: CT IMAGING | Facility: HOSPITAL | Age: 63
End: 2023-09-14
Payer: COMMERCIAL

## 2023-09-14 VITALS
TEMPERATURE: 98.1 F | OXYGEN SATURATION: 98 % | DIASTOLIC BLOOD PRESSURE: 74 MMHG | SYSTOLIC BLOOD PRESSURE: 140 MMHG | HEART RATE: 52 BPM | RESPIRATION RATE: 17 BRPM

## 2023-09-14 DIAGNOSIS — H54.7 REDUCED VISUAL ACUITY: ICD-10-CM

## 2023-09-14 DIAGNOSIS — I48.91 ATRIAL FIBRILLATION, UNSPECIFIED TYPE (HCC): ICD-10-CM

## 2023-09-14 DIAGNOSIS — H53.9 VISUAL CHANGES: ICD-10-CM

## 2023-09-14 DIAGNOSIS — F11.20 METHADONE DEPENDENCE (HCC): ICD-10-CM

## 2023-09-14 DIAGNOSIS — R42 DIZZINESS: ICD-10-CM

## 2023-09-14 DIAGNOSIS — I65.21 INTERNAL CAROTID ARTERY STENOSIS, RIGHT: Primary | ICD-10-CM

## 2023-09-14 DIAGNOSIS — Z78.9 COMPLEX MEDICAL CONDITION: ICD-10-CM

## 2023-09-14 PROBLEM — E13.319 RETINOPATHY DUE TO SECONDARY DM (HCC): Status: ACTIVE | Noted: 2023-09-14

## 2023-09-14 PROBLEM — R00.1 BRADYCARDIA: Status: ACTIVE | Noted: 2023-09-14

## 2023-09-14 PROBLEM — R91.1 LUNG NODULE: Status: ACTIVE | Noted: 2021-06-08

## 2023-09-14 LAB
ALBUMIN SERPL BCP-MCNC: 3.1 G/DL (ref 3.5–5)
ALP SERPL-CCNC: 81 U/L (ref 34–104)
ALT SERPL W P-5'-P-CCNC: 35 U/L (ref 7–52)
ANION GAP SERPL CALCULATED.3IONS-SCNC: 2 MMOL/L
AST SERPL W P-5'-P-CCNC: 82 U/L (ref 13–39)
BASOPHILS # BLD AUTO: 0.03 THOUSANDS/ÂΜL (ref 0–0.1)
BASOPHILS NFR BLD AUTO: 1 % (ref 0–1)
BILIRUB SERPL-MCNC: 0.32 MG/DL (ref 0.2–1)
BNP SERPL-MCNC: 30 PG/ML (ref 0–100)
BUN SERPL-MCNC: 14 MG/DL (ref 5–25)
CALCIUM ALBUM COR SERPL-MCNC: 8.9 MG/DL (ref 8.3–10.1)
CALCIUM SERPL-MCNC: 8.2 MG/DL (ref 8.4–10.2)
CHLORIDE SERPL-SCNC: 104 MMOL/L (ref 96–108)
CHOLEST SERPL-MCNC: 107 MG/DL
CO2 SERPL-SCNC: 33 MMOL/L (ref 21–32)
CREAT SERPL-MCNC: 1.02 MG/DL (ref 0.6–1.3)
EOSINOPHIL # BLD AUTO: 0.84 THOUSAND/ÂΜL (ref 0–0.61)
EOSINOPHIL NFR BLD AUTO: 14 % (ref 0–6)
ERYTHROCYTE [DISTWIDTH] IN BLOOD BY AUTOMATED COUNT: 16 % (ref 11.6–15.1)
FERRITIN SERPL-MCNC: 7 NG/ML (ref 24–336)
FOLATE SERPL-MCNC: 21.6 NG/ML
GFR SERPL CREATININE-BSD FRML MDRD: 78 ML/MIN/1.73SQ M
GLUCOSE SERPL-MCNC: 274 MG/DL (ref 65–140)
HCT VFR BLD AUTO: 27.4 % (ref 36.5–49.3)
HDLC SERPL-MCNC: 56 MG/DL
HGB BLD-MCNC: 8.5 G/DL (ref 12–17)
IMM GRANULOCYTES # BLD AUTO: 0.01 THOUSAND/UL (ref 0–0.2)
IMM GRANULOCYTES NFR BLD AUTO: 0 % (ref 0–2)
LDLC SERPL CALC-MCNC: 44 MG/DL (ref 0–100)
LYMPHOCYTES # BLD AUTO: 1.01 THOUSANDS/ÂΜL (ref 0.6–4.47)
LYMPHOCYTES NFR BLD AUTO: 17 % (ref 14–44)
MCH RBC QN AUTO: 26.4 PG (ref 26.8–34.3)
MCHC RBC AUTO-ENTMCNC: 31 G/DL (ref 31.4–37.4)
MCV RBC AUTO: 85 FL (ref 82–98)
MONOCYTES # BLD AUTO: 0.44 THOUSAND/ÂΜL (ref 0.17–1.22)
MONOCYTES NFR BLD AUTO: 8 % (ref 4–12)
NEUTROPHILS # BLD AUTO: 3.52 THOUSANDS/ÂΜL (ref 1.85–7.62)
NEUTS SEG NFR BLD AUTO: 60 % (ref 43–75)
NRBC BLD AUTO-RTO: 0 /100 WBCS
PLATELET # BLD AUTO: 152 THOUSANDS/UL (ref 149–390)
PMV BLD AUTO: 12.5 FL (ref 8.9–12.7)
POTASSIUM SERPL-SCNC: 3.7 MMOL/L (ref 3.5–5.3)
PROT SERPL-MCNC: 5.7 G/DL (ref 6.4–8.4)
RBC # BLD AUTO: 3.22 MILLION/UL (ref 3.88–5.62)
SODIUM SERPL-SCNC: 139 MMOL/L (ref 135–147)
T4 FREE SERPL-MCNC: 1.09 NG/DL (ref 0.61–1.12)
TRIGL SERPL-MCNC: 37 MG/DL
TSH SERPL DL<=0.05 MIU/L-ACNC: 0.28 UIU/ML (ref 0.45–4.5)
VIT B12 SERPL-MCNC: 221 PG/ML (ref 180–914)
WBC # BLD AUTO: 5.85 THOUSAND/UL (ref 4.31–10.16)

## 2023-09-14 PROCEDURE — 84439 ASSAY OF FREE THYROXINE: CPT

## 2023-09-14 PROCEDURE — 36415 COLL VENOUS BLD VENIPUNCTURE: CPT | Performed by: PHYSICIAN ASSISTANT

## 2023-09-14 PROCEDURE — 70496 CT ANGIOGRAPHY HEAD: CPT

## 2023-09-14 PROCEDURE — 99223 1ST HOSP IP/OBS HIGH 75: CPT | Performed by: INTERNAL MEDICINE

## 2023-09-14 PROCEDURE — 99284 EMERGENCY DEPT VISIT MOD MDM: CPT

## 2023-09-14 PROCEDURE — G1004 CDSM NDSC: HCPCS

## 2023-09-14 PROCEDURE — 70498 CT ANGIOGRAPHY NECK: CPT

## 2023-09-14 PROCEDURE — 99285 EMERGENCY DEPT VISIT HI MDM: CPT | Performed by: EMERGENCY MEDICINE

## 2023-09-14 PROCEDURE — 82746 ASSAY OF FOLIC ACID SERUM: CPT

## 2023-09-14 PROCEDURE — 85025 COMPLETE CBC W/AUTO DIFF WBC: CPT | Performed by: PHYSICIAN ASSISTANT

## 2023-09-14 PROCEDURE — 82728 ASSAY OF FERRITIN: CPT

## 2023-09-14 PROCEDURE — 84443 ASSAY THYROID STIM HORMONE: CPT

## 2023-09-14 PROCEDURE — 82607 VITAMIN B-12: CPT

## 2023-09-14 PROCEDURE — 80061 LIPID PANEL: CPT

## 2023-09-14 PROCEDURE — 83880 ASSAY OF NATRIURETIC PEPTIDE: CPT

## 2023-09-14 PROCEDURE — 80053 COMPREHEN METABOLIC PANEL: CPT | Performed by: PHYSICIAN ASSISTANT

## 2023-09-14 PROCEDURE — 83540 ASSAY OF IRON: CPT

## 2023-09-14 PROCEDURE — 83550 IRON BINDING TEST: CPT

## 2023-09-14 RX ORDER — ENOXAPARIN SODIUM 100 MG/ML
40 INJECTION SUBCUTANEOUS DAILY
Status: DISCONTINUED | OUTPATIENT
Start: 2023-09-15 | End: 2023-09-14 | Stop reason: HOSPADM

## 2023-09-14 RX ORDER — ACETAMINOPHEN 325 MG/1
650 TABLET ORAL EVERY 6 HOURS PRN
Status: DISCONTINUED | OUTPATIENT
Start: 2023-09-14 | End: 2023-09-14 | Stop reason: HOSPADM

## 2023-09-14 RX ORDER — ATORVASTATIN CALCIUM 40 MG/1
40 TABLET, FILM COATED ORAL
Status: DISCONTINUED | OUTPATIENT
Start: 2023-09-14 | End: 2023-09-14 | Stop reason: HOSPADM

## 2023-09-14 RX ORDER — CLONAZEPAM 1 MG/1
1 TABLET ORAL 2 TIMES DAILY PRN
COMMUNITY
Start: 2023-08-22

## 2023-09-14 RX ORDER — DULOXETIN HYDROCHLORIDE 60 MG/1
60 CAPSULE, DELAYED RELEASE ORAL DAILY
Status: DISCONTINUED | OUTPATIENT
Start: 2023-09-15 | End: 2023-09-14 | Stop reason: HOSPADM

## 2023-09-14 RX ORDER — DULOXETIN HYDROCHLORIDE 60 MG/1
60 CAPSULE, DELAYED RELEASE ORAL DAILY
COMMUNITY
Start: 2023-08-22

## 2023-09-14 RX ORDER — INSULIN LISPRO 100 [IU]/ML
1-5 INJECTION, SOLUTION INTRAVENOUS; SUBCUTANEOUS
Status: DISCONTINUED | OUTPATIENT
Start: 2023-09-14 | End: 2023-09-14 | Stop reason: HOSPADM

## 2023-09-14 RX ORDER — METHOCARBAMOL 500 MG/1
500 TABLET, FILM COATED ORAL EVERY 6 HOURS PRN
Status: DISCONTINUED | OUTPATIENT
Start: 2023-09-14 | End: 2023-09-14 | Stop reason: HOSPADM

## 2023-09-14 RX ORDER — CLONAZEPAM 1 MG/1
1 TABLET ORAL 2 TIMES DAILY PRN
Status: DISCONTINUED | OUTPATIENT
Start: 2023-09-14 | End: 2023-09-14 | Stop reason: HOSPADM

## 2023-09-14 RX ORDER — TETRACAINE HYDROCHLORIDE 5 MG/ML
1 SOLUTION OPHTHALMIC ONCE
Status: COMPLETED | OUTPATIENT
Start: 2023-09-14 | End: 2023-09-14

## 2023-09-14 RX ORDER — MAGNESIUM HYDROXIDE/ALUMINUM HYDROXICE/SIMETHICONE 120; 1200; 1200 MG/30ML; MG/30ML; MG/30ML
30 SUSPENSION ORAL EVERY 6 HOURS PRN
Status: DISCONTINUED | OUTPATIENT
Start: 2023-09-14 | End: 2023-09-14 | Stop reason: HOSPADM

## 2023-09-14 RX ORDER — FLUTICASONE PROPIONATE 50 MCG
1 SPRAY, SUSPENSION (ML) NASAL DAILY
Status: DISCONTINUED | OUTPATIENT
Start: 2023-09-15 | End: 2023-09-14 | Stop reason: HOSPADM

## 2023-09-14 RX ORDER — LANOLIN ALCOHOL/MO/W.PET/CERES
6 CREAM (GRAM) TOPICAL
Status: DISCONTINUED | OUTPATIENT
Start: 2023-09-14 | End: 2023-09-14 | Stop reason: HOSPADM

## 2023-09-14 RX ORDER — POLYETHYLENE GLYCOL 3350 17 G/17G
17 POWDER, FOR SOLUTION ORAL DAILY PRN
Status: DISCONTINUED | OUTPATIENT
Start: 2023-09-14 | End: 2023-09-14 | Stop reason: HOSPADM

## 2023-09-14 RX ORDER — DOCUSATE SODIUM 100 MG/1
100 CAPSULE, LIQUID FILLED ORAL 2 TIMES DAILY
Status: DISCONTINUED | OUTPATIENT
Start: 2023-09-14 | End: 2023-09-14 | Stop reason: HOSPADM

## 2023-09-14 RX ADMIN — DOCUSATE SODIUM 100 MG: 100 CAPSULE, LIQUID FILLED ORAL at 17:39

## 2023-09-14 RX ADMIN — FLUORESCEIN SODIUM 1 STRIP: 1 STRIP OPHTHALMIC at 10:47

## 2023-09-14 RX ADMIN — METHOCARBAMOL 500 MG: 500 TABLET ORAL at 17:38

## 2023-09-14 RX ADMIN — ACETAMINOPHEN 650 MG: 325 TABLET, FILM COATED ORAL at 17:39

## 2023-09-14 RX ADMIN — TETRACAINE HYDROCHLORIDE 1 DROP: 5 SOLUTION OPHTHALMIC at 10:47

## 2023-09-14 RX ADMIN — CLONAZEPAM 1 MG: 1 TABLET ORAL at 17:40

## 2023-09-14 RX ADMIN — IOHEXOL 85 ML: 350 INJECTION, SOLUTION INTRAVENOUS at 11:54

## 2023-09-14 RX ADMIN — ATORVASTATIN CALCIUM 40 MG: 40 TABLET, FILM COATED ORAL at 17:38

## 2023-09-14 NOTE — ED ATTENDING ATTESTATION
9/14/2023  I, Bridget Goodman MD, saw and evaluated the patient. I have discussed the patient with the resident/non-physician practitioner and agree with the resident's/non-physician practitioner's findings, Plan of Care, and MDM as documented in the resident's/non-physician practitioner's note, except where noted. All available labs and Radiology studies were reviewed. I was present for key portions of any procedure(s) performed by the resident/non-physician practitioner and I was immediately available to provide assistance. At this point I agree with the current assessment done in the Emergency Department. I have conducted an independent evaluation of this patient a history and physical is as follows:    46-year-old male with a history of hypertension, hyperlipidemia, diabetes, atrial fibrillation not on anticoagulation, opioid use disorder on methadone with a recent admission for fall at the beginning of August with rib fractures presenting for evaluation of decreased visual acuity in his right eye. Patient is a poor historian but sister states that started yesterday. Patient states he has black lines in his vision with some mild photosensitivity and blurry vision. Wears reading glasses but does not wear contacts or other corrective lenses. Denies associated fever, chills, upper respiratory symptoms, eye discharge, headache, nausea, vomiting, chest pain, shortness of breath. Has a history of diabetic retinopathy. Denies trauma or injury to the area. Patient states symptoms have been progressively worsening since yesterday. Patient is nodding off during history and exam.    Please see resident documentation for histories and review of systems. Exam: All signs and nursing notes reviewed  General: Somnolent but arouses to voice, no acute distress  HEENT: Normocephalic, atraumatic, pupils are 1 mm bilaterally but reactive, EOMI, no external ocular changes noted.   Tonometry unable to be performed due to equipment failure. No fluorescein uptake on slit-lamp examination. Anterior chamber is quiet. Decrease visual fields in the periphery of the right eye. Patient cannot count fingers on his right eye  Neck: Supple  Heart: Regular rate and rhythm  Lungs: Clear to auscultation bilaterally  Abdomen: Soft, nontender, nondistended  Extremities: No swelling or deformity  Skin: Warm, dry  Neuro: Strength in the bilateral upper extremities is grossly intact and equal bilaterally    ED Course  ED Course as of 09/14/23 1604   Thu Sep 14, 2023   1129 Hemoglobin(!): 8.5   1129 WBC: 5.85   1200 Glucose, Random(!): 274   1200 AST(!): 82   1200 ALT: 35   1218 Per ophtho: known diabetic retinopathy and vitreous hemorrhage, ok for discharge with follow up in office from their perspective   1319 CTA head and neck with and without contrast  IMPRESSION:  CT head: No acute intracranial abnormality.     CTA of the head: No high-grade stenosis or aneurysm involving the [place].     CTA of the neck: Short segment luminal narrowing involving the right proximal internal carotid artery with less than 50% narrowing, likely due to atheromatous plaque versus web and less likely dissection flap. Short interval follow-up is recommended to   document stability. Otherwise no high-grade stenosis, dissection or aneurysm involving the left carotid or bilateral vertebral arteries.     Nodular and groundglass opacity within the right upper lobe, similar to 9/9/2023, however not seen on CT chest from 10/5/2013. Findings may represent infectious or inflammatory process although given background of emphysema 6 to 8-week follow-up is   recommended to ensure resolution.     Diffuse skin thickening and inflammatory fat stranding throughout the soft tissues of the neck, which may be sequelae of volume overload and less likely cellulitis given generalized appearance. Correlate with history.    1350 Discussed CTA results with stroke neurology given concern for vessel occlusion as cause of his symptoms; recommend formal ophtho exam to evaluate for CRAO/CRVO to decide if further stroke workup is warranted     POC Ocular US    Date/Time: 2023 11:01 AM    Performed by: Carrie Valdez MD  Authorized by: Carrie Valdez MD    Patient location:  ED  Performed by: Attending  Procedure details:     Exam Type:  Diagnostic and educational    Indications: visual change and floaters      Assessment for: retinal detachment and vitreous hemorrhage      Eye(s) assessed:  Right eye    Structures visualized: anterior chamber, posterior chamber, lens and optic nerve      Image quality: limited diagnostic      Image availability:  Images available in PACS  Right eye findings:     Foreign body: not identified      Retinal contour: normal      Lens: normal      Vitreous body: indeterminate    Interpretation:     Ocular US impressions: indeterminate          ED course/medical decision m[age]-year-old male presenting for evaluation of right-sided vision changes. Differential diagnosis includes retinal detachment, vitreous hemorrhage, uveitis, mass lesion, stroke, CRAO, CRVO. Neurological examination is intact aside from visual fields in the right eye, which is the affected eye. Low suspicion for anterior eye pathology given normal saline examination. Unable to visualize vessels with the panoptic in the room due to significant miosis; suspect this is secondary to patient's recent methadone administration. Bedside point-of-care ultrasound performed which was negative for retinal detachment but indeterminate for vitreous hemorrhage. Unable to measure pressures due to equipment failure but low suspicion for glaucoma. No evidence of corneal pathology or anterior chamber abnormalities.   Discussed with ophthalmology, who recommended discharge with follow-up in the office due to patient's history of diabetic retinopathy and vitreous hemorrhage. Basic laboratory studies were also obtained, which show anemia and hyperglycemia but no other gross derangements. Given clinical concern for stroke with numerous risk factors, CTA of the head and neck was obtained, which is negative for acute abnormality but does show focal changes in the right internal carotid artery. Clinically, I am concerned for CRAO versus CRVO or stroke. Discussed these findings with neurology, who recommended formal ophthalmologic evaluation with a dilated eye exam to evaluate for these etiologies. Therefore, patient will be admitted to medicine for further evaluation and management to include neurology and ophthalmology consultations. Patient is in agreement with this plan.     Diagnosis: Right-sided vision loss, internal carotid artery stenosis  Disposition: Admission

## 2023-09-14 NOTE — ASSESSMENT & PLAN NOTE
With history in 2011, does have known anemia but denies taking iron supplements  · Noted, avoid NSAIDs
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
## 2023-09-14 NOTE — ASSESSMENT & PLAN NOTE
Reports he takes Methadone 165 mg daily. Gets it from the [address] [place] [address] Way in [place]. This was confirmed with his sister whom he lives with over the phone. · Unable to contact Clinic this afternoon/closed-no answer. Suggest calling in the AM to confirm dosing and compliance  · Denies other drug use/ETOH use however per outpatient PCP notes patient admitted to buying Klonopin off the streets. · Does report taking Ativan however do not see this on PDMP/other medication lists. Patient did have Klonopin 1 mg tablets twice daily filled per PDMP on 8/22/2023 for a month course, Qnty: 60 tablets. Does report taking this for anxiety. Will continue here PRN.

## 2023-09-14 NOTE — ASSESSMENT & PLAN NOTE
CTA of head and neck notes nodular groundglass opacities in the right upper lobe, similar to 9/9/2023. However not seen on CT chest from 10/5/2013. · Findings may represent infection inflammatory process although given background of edema 16-week follow-up is recommended to ensure resolution  · Patient is aware of this finding. · No clinical signs of infection. Will obtain CXR/BNP as there is question of volume overload.  Patient is on room air without cardiopulm sx at rest/time of exam.

## 2023-09-14 NOTE — ASSESSMENT & PLAN NOTE
Patient not on any antihypertensives  · Blood pressures are with sub optimal control  · Monitor BP while inpatient, may benefit from additional agent

## 2023-09-14 NOTE — ASSESSMENT & PLAN NOTE
Hemoglobin range 8-9, stable at 8.5  · Denies dark/bloody stools, hematuria  · Does not take iron supplement, does have history of gastric bypass in 2011  · Will obtain iron panel/B12 and folate here

## 2023-09-14 NOTE — ASSESSMENT & PLAN NOTE
With a fall on August 9, 2023, he sustained 3 left rib fractures. He was recently seen by trauma Dr on 8/2023. Office note reviewed  · No fall PTA.  However with worsening weakness will consult PT/OT for formal eval  · Fall precautions

## 2023-09-14 NOTE — ASSESSMENT & PLAN NOTE
Lab Results   Component Value Date    HGBA1C 8.8 (H) 07/25/2023       No results for input(s): "POCGLU" in the last 72 hours. Blood Sugar Average: Last 72 hrs:     Overdue for his endocrinology follow up per outpatient PCP note. · Pre hospital regimen notes Lantus and Metformin- patient only admits to using Metformin  · Concern for compliance  · Will repeat HgbA1C here  · Will start SSI and diabetic diet  · Monitor blood sugars and adjust insulin prn.  Continue Accu-Cheks and hypoglycemia protocol

## 2023-09-14 NOTE — CONSULTS
[address], [place]. Patient name Dorene Nance. Age: 66 y.o.  : 1960  MRN: [id number]    Hospital:     [facility]  Date of Consultation:2023  Referred by:Elayne Álvarez MD  Reason for Consult:  Floaters/ lines and loss vision right  Chief complaint: recurrent bleeding right eye  History of illness: Diabetic pseudophakic with known history proliferative diabetic retinopathy s/p cat surg ou/lasers/vitrectomy  Relevant past ocular history/ Eye  meds:  Relevant chart review findings from below:   PMH/Chart reviewed in Medical record:   Past Medical History:   Diagnosis Date   • Anxiety    • Depression    • Diabetes mellitus ([address])    • Drug use    • Hemorrhoids, internal 2019   • Hypertension      Past Surgical History:   Procedure Laterality Date   • FEMUR FRACTURE SURGERY Right    • GASTRIC BYPASS  2011    Managed by: Dianne Laurent (General Surgery)   • HERNIA REPAIR  2013    Incisional hernia repair Managed by: Dianne Laurent (General Surgery)   • TONSILLECTOMY        Social History    Social History     Substance and Sexual Activity   Alcohol Use Never     Social History     Tobacco Use   Smoking Status Never   Smokeless Tobacco Never     Social History     Substance and Sexual Activity   Drug Use Yes    Comment: Methadone clinic     [email or web address]  Allergies: Allergies   Allergen Reactions   • Aspirin Shortness Of Breath   • Penicillins      Ros: Reviewed in Medical Record  S. H.shoulder  F.H.    Family History   Problem Relation Age of Onset   • Diabetes Mother    • Hypertension Mother    • Hypertension Father    • Autoimmune disease Son      Dilated: 1% Mydriacyl  Mental status: ____AAO   Radiologic studiesCTA NECK AND BRAIN WITH AND WITHOUT CONTRAST     INDICATION: Vision loss, monocular  recent fall, generalized weakness, unsteadiness, R eye decreased visual acuity     COMPARISON:   CT head and CT of the cervical spine from 2023, CT chest abdomen pelvis from 9/9/2023     TECHNIQUE:  Routine CT imaging of the Brain without contrast.  Post contrast imaging was performed after administration of iodinated contrast through the neck and brain. Post contrast axial 0.625 mm images timed to opacify the arterial system.     3D rendering was performed on an independent workstation. MIP reconstructions performed. Coronal reconstructions were performed of the noncontrast portion of the brain.     Radiation dose length product (DLP) for this visit:  2269 mGy-cm . This examination, like all CT scans performed in the [facility], was performed utilizing techniques to minimize radiation dose exposure, including the use of iterative   reconstruction and automated exposure control.     IV Contrast:  85 mL of iohexol (OMNIPAQUE)     IMAGE QUALITY:   Diagnostic     FINDINGS:  NONCONTRAST BRAIN  PARENCHYMA: Decreased attenuation is noted in periventricular and subcortical white matter demonstrating an appearance that is statistically most likely to represent mild microangiopathic change.     No CT signs of acute infarction. No intracranial mass, mass effect or midline shift. No acute parenchymal hemorrhage.     VENTRICLES AND EXTRA-AXIAL SPACES:  Normal for the patient's age.     VISUALIZED ORBITS: Sequelae of bilateral cataract surgery.     PARANASAL SINUSES: Normal visualized paranasal sinuses.     CERVICAL VASCULATURE  AORTIC ARCH AND GREAT VESSELS:  Normal aortic arch and great vessel origins. Normal visualized subclavian vessels.     RIGHT VERTEBRAL ARTERY CERVICAL SEGMENT:  Normal origin. The vessel is normal in caliber throughout the neck.     LEFT VERTEBRAL ARTERY CERVICAL SEGMENT:  Normal origin. The vessel is normal in caliber throughout the neck.     RIGHT EXTRACRANIAL CAROTID SEGMENT:  Normal caliber common carotid artery. Normal bifurcation and cervical internal carotid artery.  Short segment luminal narrowing within the proximal internal carotid artery ([id number]) with associated less than 50%   narrowing and may be related to atheromatous plaque or web and less likely dissection flap.     LEFT EXTRACRANIAL CAROTID SEGMENT:  Normal caliber common carotid artery. Normal bifurcation and cervical internal carotid artery. No stenosis or dissection.     NASCET criteria was used to determine the degree of internal carotid artery diameter stenosis.       INTRACRANIAL VASCULATURE  INTERNAL CAROTID ARTERIES: Scattered atherosclerotic plaque involving the intracranial portions of the internal carotid arteries without high-grade stenosis. Normal ophthalmic artery origins. Normal ICA terminus.     ANTERIOR CIRCULATION:  Symmetric A1 segments and anterior cerebral arteries with normal enhancement. Normal anterior communicating artery.     MIDDLE CEREBRAL ARTERY CIRCULATION:  M1 segment and middle cerebral artery branches demonstrate normal enhancement bilaterally.     DISTAL VERTEBRAL ARTERIES:  Normal distal vertebral arteries. Hypoplastic left posterior inferior cerebellar artery. Normal artery. Normal vertebral basilar junction.     BASILAR ARTERY:  Basilar artery is normal in caliber. Normal superior cerebellar arteries.     POSTERIOR CEREBRAL ARTERIES: Both posterior cerebral arteries arises from the basilar tip. Both arteries demonstrate normal enhancement. Normal posterior communicating arteries.     VENOUS STRUCTURES:  Normal.        NON VASCULAR ANATOMY  BONY STRUCTURES: Mild degenerative changes of the cervical spine. No acute osseous abnormalities.     SOFT TISSUES OF THE NECK: Diffuse inflammatory stranding and skin thickening throughout the neck, which may be sequelae of volume overload.     THORACIC INLET: Emphysema. Nodular and groundglass opacity within the right upper lobe, unchanged since 9/9/2023. .     IMPRESSION:  CT head: No acute intracranial abnormality.     CTA of the head: No high-grade stenosis or aneurysm involving the [place].     CTA of the neck: Short segment luminal narrowing involving the right proximal internal carotid artery with less than 50% narrowing, likely due to atheromatous plaque versus web and less likely dissection flap. Short interval follow-up is recommended to   document stability. Otherwise no high-grade stenosis, dissection or aneurysm involving the left carotid or bilateral vertebral arteries.     Nodular and groundglass opacity within the right upper lobe, similar to 9/9/2023, however not seen on CT chest from 10/5/2013. Findings may represent infectious or inflammatory process although given background of emphysema 6 to 8-week follow-up is   recommended to ensure resolution.     Diffuse skin thickening and inflammatory fat stranding throughout the soft tissues of the neck, which may be sequelae of volume overload and less likely cellulitis given generalized appearance. Correlate with history.   :          Vasc near ph    Right 20/ CF 1 '           Left    20/50  T  tp15       /16  [place]. Visual Fields Normal decreased all quads right lef tnl  External Normal   Pupils Round symmetric miotic even after drops  Motility Versions Full   Anterior Segment: Conjunctiva Normal   Sclera Intact   Cornea (epithel,stroma,endothel) Clear   Anterior chamber Deep and Clear  lens AMALIA ou  Posterior Segment:  Vitreous  : vitreous hemorrhage right /limited view no def RD noted Clear left  Optic nerve:  Disc sharp, 0.3 cup  left  Macula parafoveal rpe  Vessels attenuated left   Periphery; laser prp left   Dx: proliferative diabetic retinopathy ou, vitreous hemorrhage right  Pseudophakia ou    Rx: Follow up in office after discharge.  May require intravitreal antivegf injections and/or posterior vitrectomy when medically stable    Follow up:  call office [phone]   after discharge  Bryanna Carpio MD

## 2023-09-14 NOTE — ASSESSMENT & PLAN NOTE
With history of anxiety/bipolar which was maintained with Depakote. · PDMP reviewed  · Reports compliance with Depakote however it seems medication ran out at beginning of this year, patient also reports he cannot always see what he takes. External pharmacy notes Klonopin and Cymbalta, will continue here at those recent prescription doses. · Also reports Caplyta and Trazodone outpatient. Trazodone refill .    · Will need follow up with psychiatry outpatient and med refills

## 2023-09-14 NOTE — ASSESSMENT & PLAN NOTE
With noted bradycardia in emergency room. Asymptomatic.    · Obtain TSH with T4 and EKG  · Patient not on any AV chris blockers  · Monitor on telemetry

## 2023-09-14 NOTE — ASSESSMENT & PLAN NOTE
Lipid panel 6/2023 reviewed, well controlled  · Repeat lipid panel here  · Start Lipitor 40 mg until repeat labs/further seen by neurology

## 2023-09-14 NOTE — H&P
[address] [facility]  H&P  Name: Darline Mendoza. 66 y.o. male I MRN: [id number]  Unit/Bed#: S -01 I Date of Admission: 9/14/2023   Date of Service: 9/14/2023 I Hospital Day: 0      Assessment/Plan   * Visual changes  Assessment & Plan  Patient with PMH of DM retinopathy and insulin noncompliance/poor historian, HTN, HLD, atrial fibrillation not on AC, presents with sudden onset of decrease visual acuity in right eye x 1 day after a walk outside with associated dizziness, weakness/unsteadiness, and shortness of breath. No LOC/fall/other cardiac symptoms/ numbness/tingling/slurred speech or dysphagia. Does report he was helping his sister with the trunk of her car and got hit in the face which may have led to "eye scratches". No pain/facial trauma. · Slit lamp performed in ED. Does have significant history of eye surgery and reports this has happened before but it was fixed. Poor historian with names/dates of these procedures. · Reports last eye exam was approximately 1 year ago. Is well overdue for eye exam per outpatient PCP note 5/2023. · CTA head and neck negative for acute intracranial findings, notes R Proximal ICA stenosis <50%  · Ophthalmology consulted for formal eye exam to rule out CRAO/CVO, coming in tonight  · Neurology consulted to rule out stroke. Defer further imaging to them. · PT/OT/speech consults  · Obtain HgbA1C, lipid panel, echocardiogram  · Monitor overnight on telemetry, patient does have history of atrial fibrillation  · Reports SOB with aspirin. Will refuse to take. Will start Lipitor 40 mg here and defer further antiplatelet regimen to neurology if deemed appropriate. Bradycardia  Assessment & Plan  With noted bradycardia in emergency room. Asymptomatic.    · Obtain TSH with T4 and EKG  · Patient not on any AV chris blockers  · Monitor on telemetry    Closed fracture of multiple ribs of left side  Assessment & Plan  From recent fall Aug 2023  · Recently seen by trauma doctor 8/2023  · No complaints of pain bedside, on room air  · Follow up CXR  · Tylenol, Robaxin, lidocaine prn    [address]  With a fall on August 9, 2023, he sustained 3 left rib fractures. He was recently seen by trauma Dr on 8/2023. Office note reviewed  · No fall PTA. However with worsening weakness will consult PT/OT for formal eval  · Fall precautions    Methadone dependence [facility])  Assessment & Plan  Reports he takes Methadone 165 mg daily. Gets it from the [address] [place] [address] [address] in [place]. This was confirmed with his sister whom he lives with over the phone. · Unable to contact Clinic this afternoon/closed-no answer. Suggest calling in the AM to confirm dosing and compliance  · Denies other drug use/ETOH use however per outpatient PCP notes patient admitted to buying Klonopin off the streets. · Does report taking Ativan however do not see this on PDMP/other medication lists. Patient did have Klonopin 1 mg tablets twice daily filled per PDMP on 8/22/2023 for a month course, Qnty: 60 tablets. Does report taking this for anxiety. Will continue here PRN. Lung nodule  Assessment & Plan  CTA of head and neck notes nodular groundglass opacities in the right upper lobe, similar to 9/9/2023. However not seen on CT chest from 10/5/2013. · Findings may represent infection inflammatory process although given background of edema 16-week follow-up is recommended to ensure resolution  · Patient is aware of this finding. · No clinical signs of infection. Will obtain CXR/BNP as there is question of volume overload.  Patient is on room air without cardiopulm sx at rest/time of exam.    S/P gastric bypass  Assessment & Plan  With history in 2011, does have known anemia but denies taking iron supplements  · Noted, avoid NSAIDs    Type 2 diabetes mellitus with ophthalmic complication, with long-term current use of insulin [facility])  Assessment & Plan  Lab Results   Component Value Date    HGBA1C 8.8 (H) 07/25/2023       No results for input(s): "POCGLU" in the last 72 hours. Blood Sugar Average: Last 72 hrs:     Overdue for his endocrinology follow up per outpatient PCP note. · Pre hospital regimen notes Lantus and Metformin- patient only admits to using Metformin  · Concern for compliance  · Will repeat HgbA1C here  · Will start SSI and diabetic diet  · Monitor blood sugars and adjust insulin prn. Continue Accu-Cheks and hypoglycemia protocol    Complex medical condition  Assessment & Plan  With complex medical care, non compliance and difficult living situation. Does live with sister but she reports that it is hard at times. Outpatient PCP gave referral to [facility] for further help  · Will consult CM while here  · Will need appropriate medicine reconciliation come discharge as patient reports "running out" of medications or "not taking" them    Mixed hyperlipidemia  Assessment & Plan  Lipid panel 6/2023 reviewed, well controlled  · Repeat lipid panel here  · Start Lipitor 40 mg until repeat labs/further seen by neurology    Iron deficiency anemia due to chronic blood loss  Assessment & Plan  Hemoglobin range 8-9, stable at 8.5  · Denies dark/bloody stools, hematuria  · Does not take iron supplement, does have history of gastric bypass in 2011  · Will obtain iron panel/B12 and folate here    Benign essential hypertension  Assessment & Plan  Patient not on any antihypertensives  · Blood pressures are with sub optimal control  · Monitor BP while inpatient, may benefit from additional agent    Generalized anxiety disorder  Assessment & Plan  With history of anxiety/bipolar which was maintained with Depakote. · PDMP reviewed  · Reports compliance with Depakote however it seems medication ran out at beginning of this year, patient also reports he cannot always see what he takes. External pharmacy notes Klonopin and Cymbalta, will continue here at those recent prescription doses. · Also reports Caplyta and Trazodone outpatient. Trazodone refill . · Will need follow up with psychiatry outpatient and med refills    Atrial fibrillation [facility])  Assessment & Plan  With history of atrial fibrillation, not on rate controlling medications or AC  · Does have bradycardia. Obtain EKG/TSH  · Denies cardiopulmonary sx at rest. Did endure some SOB yesterday with associated visual change and dizziness. · Monitor on telemetry  · Obtain orthostatics  · F/u echocardiogram    VTE Pharmacologic Prophylaxis: VTE Score: 4 Moderate Risk (Score 3-4) - Pharmacological DVT Prophylaxis Ordered: enoxaparin (Lovenox). Code Status: Level 1 - Full Code Level 1 Full Code  Discussion with family: Updated  (sister) via phone. Anticipated Length of Stay: Patient will be admitted on an observation basis with an anticipated length of stay of less than 2 midnights secondary to visual changes/stroke work up. Total Time Spent on Date of Encounter in care of patient: 85 mins. This time was spent on one or more of the following: performing physical exam; counseling and coordination of care; obtaining or reviewing history; documenting in the medical record; reviewing/ordering tests, medications or procedures; communicating with other healthcare professionals and discussing with patient's family/caregivers. Chief Complaint: "My eye"    History of Present Illness:  Teresa Driscoll is a 66 y.o. male with a PMH of diabetes with medication noncompliance, hypertension, hyperlipidemia, recent fall with left rib fractures in 2023, generalized anxiety and bipolar disorder, atrial fibrillation not on anticoagulant or AV blockers who presents with acute onset of decreased visual acuity in his right eye. Patient reports he was walking outside and all of a sudden had worsening blurry with "black lines ", he then also felt dizzy like the room was spinning and got weak and unsteady.   He was walking his dog and grabbed onto the door handle of the house until this improved. He did have some shortness of breath with this but denies any other chest pain, heart palpitations or lightheadedness. He does report having visual changes in the past but has had surgery for this in the lines of gone away. He is not followed with the eye doctor or endocrinologist recently. He is also not taking his insulin and only reports taking metformin at home. Denies any known trauma to the eye but does report that he was helping his sister prior to this in the trunk of the car came down and hit the outside of his eye. He sustained no other injury to his face or loss of consciousness. Denied any specific points of numbness, tingling or weakness in his extremities. Denies any difficulty swallowing or slurred speech. He does wear dentures which led to difficult speech during bedside exam.  He denies any fever, chills, current chest pain or pressure, shortness of breath, heart palpitations, dizziness, abdominal pain, constipation, blood in his urine or stool. He denies any burning with urination, cough or runny nose. He denies any alcohol use, smoking or tobacco use. He denies any recreational drugs or taking drugs off the street/over-the-counter. He is compliant with his methadone clinic and reports he takes the bus to go there early in the morning twice a week. Last time he was there was Monday. He lives at home with his sister and uses a cane from time to time. Reviewing of her exam patient reports that his right eye is still blurry but feels that the black lines are improving. He does note some pain in the right eye but cannot pinpoint it. Patient is overall poor historian. Appears he does not truly know what medications he is taking. He does share that it is hard to say what medications he takes at home. I did call his sister whom he lives with and she reports that it has been hard to help with him.     Review of Systems:  Review of Systems   Constitutional: Negative for appetite change, chills and fever. HENT: Negative for rhinorrhea, sore throat and trouble swallowing. Eyes: Positive for pain and visual disturbance. Negative for discharge and itching. Respiratory: Negative for cough, shortness of breath and wheezing. Cardiovascular: Negative for chest pain, palpitations and leg swelling. Gastrointestinal: Negative for abdominal pain, blood in stool, constipation, diarrhea, nausea and vomiting. Genitourinary: Negative for decreased urine volume, difficulty urinating, dysuria and hematuria. Skin: Negative for rash. Neurological: Positive for dizziness and weakness. Negative for seizures, syncope, facial asymmetry, speech difficulty, light-headedness, numbness and headaches. Psychiatric/Behavioral: Positive for sleep disturbance. Negative for confusion. The patient is nervous/anxious. All other systems reviewed and are negative. Past Medical and Surgical History:   Past Medical History:   Diagnosis Date   • Anxiety    • Depression    • Diabetes mellitus ([address])    • Drug use    • Hemorrhoids, internal 9/23/2019   • Hypertension        Past Surgical History:   Procedure Laterality Date   • FEMUR FRACTURE SURGERY Right    • GASTRIC BYPASS  11/08/2011    Managed by: Kendrick Santacruz (General Surgery)   • HERNIA REPAIR  02/13/2013    Incisional hernia repair Managed by: Kendrick Santacruz (General Surgery)   • TONSILLECTOMY  2010       Meds/Allergies:  Prior to Admission medications    Medication Sig Start Date End Date Taking?  Authorizing Provider   acetaminophen (TYLENOL) 325 mg tablet Take 2 tablets (650 mg total) by mouth every 4 (four) hours as needed for mild pain 8/11/23   Danni Regalado PA-C   bacitracin-polymyxin b (POLYSPORIN) ointment Apply topically 2 (two) times a day 6/28/23   STEVE Maguire   benzocaine-menthol-lanolin-aloe (DERMOPLAST) 20-0.5 % topical spray Apply 1 Application topically 2 (two) times a day as needed for mild pain for up to 10 days 8/24/23 9/3/23  Mine Prost, CRNP   clonazePAM (KlonoPIN) 0.5 mg tablet Take 1 tablet (0.5 mg total) by mouth 3 (three) times a day for 14 days 1/10/23 1/24/23  Melania Lim MD   Continuous Blood Gluc  (FreeStyle [place] 14 Day [place]illi[place]) STEVE 1 Device by Device route 3 (three) times a day before meals 1/31/23   Kirk Lucia MD   Continuous Blood Gluc  (FreeStyle [place] 2 Brilliant) STEVE 1 Device by Device route 3 (three) times a day before meals 1/29/23   Kirk Lucia MD   divalproex sodium (DEPAKOTE ER) 500 mg 24 hr tablet Take 1 tablet (500 mg total) by mouth every 12 (twelve) hours 1/23/23 2/22/23  Kirk Lucia MD   docusate sodium (COLACE) 100 mg capsule Take 1 capsule (100 mg total) by mouth 2 (two) times a day for 5 days 8/11/23 8/16/23  Key Arce PA-C   ergocalciferol (ERGOCALCIFEROL) 1.25 MG (23647 UT) capsule Take 50,000 Units by mouth 12/5/19 2/27/20  Historical Provider, MD   fluticasone (FLONASE) 50 mcg/act nasal spray 1 spray into each nostril daily 1/23/23   Kirk Lucia MD   GLOBAL EASE INJECT PEN NEEDLES 31G X 8 MM MISC USE AS DIRECTED TO INJECT INSULIN DAILY 9/12/19   Historical Provider, MD   Incontinence Supply Disposable (INCONTINENCE BRIEF MEDIUM) MISC by Does not apply route as needed (fecal soiling) 12/4/19   Isabelle Green MD   Incontinence Supply Disposable (RA WIPES FLUSHABLE/MOIST) MISC by Does not apply route as needed (fecal soiling) 12/4/19   Isabelle Green MD   Insulin Pen Needle (B-D UF III MINI PEN NEEDLES) 31G X 5 MM MISC Inject 15 Units under the skin daily at bedtime 1/24/23   Kirk Lucia MD   Lidocaine 4 % [facility] Apply 1 patch topically daily 8/11/23   Key Arce PA-C   metFORMIN (GLUCOPHAGE) 850 mg tablet Take 1 tablet (850 mg total) by mouth daily with breakfast  Patient taking differently: Take 850 mg by mouth 2 (two) times a day with meals 1/23/23   Andree Watson MD   methadone (DOLOPHINE) 5 mg tablet Take 165 mg by mouth daily    Historical Provider, MD   methocarbamol (ROBAXIN) 500 mg tablet Take 1 tablet (500 mg total) by mouth every 6 (six) hours for 14 days 8/11/23 8/25/23  Maura Hernandez PA-C   Misc. Devices (CANE) MISC by Does not apply route daily Dx:  Frequent falls 7/29/19   Herve George MD   neomycin-bacitracin-polymyxin-hydrocortisone (CORTISPORIN) 1 % ophthalmic ointment APPLY TO AFFECTED AREA TWICE A DAY FOR TOPICAL USE (EYE OINTMENT MAY BE USED TOPICALLY) 6/25/23   STEVE He   polyethylene glycol (GOLYTELY) 4000 mL solution Take 4,000 mL by mouth once for 1 dose As directed for colonoscopy prep 2/27/20 2/27/20  Pauline Taylor MD   polyethylene glycol (MIRALAX) 17 g packet Take 17 g by mouth daily for 5 days 8/11/23 8/16/23  Maura Hernandez PA-C   senna (SENOKOT) 8.6 mg Take 2 tablets (17.2 mg total) by mouth daily for 5 days Do not start before August 12, 2023. 8/12/23 8/17/23  Maura Hernandez PA-C   UNKNOWN TO PATIENT     Historical Provider, MD nagelacin-neomycin-polymyxin b-hydrocortisone 1 % ointment Apply topically 2 (two) times a day 5/24/23 6/25/23  STEVE He     I have reviewed home medications with patient personally. Allergies:    Allergies   Allergen Reactions   • Aspirin Shortness Of Breath   • Penicillins        Social History:  Marital Status: Legally    Occupation:   Patient Pre-hospital Living Situation: Home, With other family member: sister  Patient Pre-hospital Level of Mobility: walks with cane  Patient Pre-hospital Diet Restrictions: Diabetic diet  Substance Use History:   Social History     Substance and Sexual Activity   Alcohol Use Never     Social History     Tobacco Use   Smoking Status Never   Smokeless Tobacco Never     Social History     Substance and Sexual Activity   Drug Use Yes    Comment: Methadone clinic Family History:  Family History   Problem Relation Age of Onset   • Diabetes Mother    • Hypertension Mother    • Hypertension Father    • Autoimmune disease Son        Physical Exam:     Vitals:   Blood Pressure: 140/74 (09/14/23 1620)  Pulse: (!) 52 (09/14/23 1620)  Temperature: 98.1 °F (36.7 °C) (09/14/23 0934)  Temp Source: Oral (09/14/23 0934)  Respirations: 17 (09/14/23 1620)  SpO2: 98 % (09/14/23 1620)    Physical Exam  Vitals and nursing note reviewed. Constitutional:       General: He is not in acute distress. Appearance: Normal appearance. He is well-developed. He is not toxic-appearing or diaphoretic. HENT:      Head: Normocephalic and atraumatic. Comments: No tenderness to palpation of scalp/facial bones. Right Ear: External ear normal.      Left Ear: External ear normal.      Nose: No rhinorrhea. Mouth/Throat:      Pharynx: Oropharynx is clear. Eyes:      General:         Right eye: No discharge. Left eye: No discharge. Extraocular Movements: Extraocular movements intact. Conjunctiva/sclera: Conjunctivae normal.      Pupils: Pupils are equal, round, and reactive to light. Cardiovascular:      Rate and Rhythm: Bradycardia present. Heart sounds: No murmur heard. Comments: HR in 50's  Pulmonary:      Effort: Pulmonary effort is normal. No respiratory distress. Comments: Coarse breath sounds b/l. No conversational dyspnea. On room air  Abdominal:      General: Bowel sounds are normal. There is no distension. Palpations: Abdomen is soft. Tenderness: There is no abdominal tenderness. There is no guarding or rebound. Musculoskeletal:      Right lower leg: Edema present. Left lower leg: Edema present. Skin:     General: Skin is warm and dry. Neurological:      Mental Status: He is alert and oriented to person, place, and time. Mental status is at baseline. Cranial Nerves: No dysarthria or facial asymmetry.       Comments: Mild dysarthria however patient has poor dentition and dentures are not in. No noted facial or focal deficit. Moving all 4 extremities spontaneously. Tongue protrudes midline. Face is symmetrical.       Additional Data:     Lab Results:  Results from last 7 days   Lab Units 09/14/23  1111   WBC Thousand/uL 5.85   HEMOGLOBIN g/dL 8.5*   HEMATOCRIT % 27.4*   PLATELETS Thousands/uL 152   NEUTROS PCT % 60   LYMPHS PCT % 17   MONOS PCT % 8   EOS PCT % 14*     Results from last 7 days   Lab Units 09/14/23  1111   SODIUM mmol/L 139   POTASSIUM mmol/L 3.7   CHLORIDE mmol/L 104   CO2 mmol/L 33*   BUN mg/dL 14   CREATININE mg/dL 1.02   ANION GAP mmol/L 2   CALCIUM mg/dL 8.2*   ALBUMIN g/dL 3.1*   TOTAL BILIRUBIN mg/dL 0.32   ALK PHOS U/L 81   ALT U/L 35   AST U/L 82*   GLUCOSE RANDOM mg/dL 274*     Lines/Drains:  Invasive Devices     Peripheral Intravenous Line  Duration           Peripheral IV 09/14/23 Left Antecubital <1 day              Imaging: Reviewed radiology reports from this admission including: CTA head neck  CTA head and neck with and without contrast   Final Result by Interface, Radiology Results In (09/14 1311)   CT head: No acute intracranial abnormality. CTA of the head: No high-grade stenosis or aneurysm involving the [place]. CTA of the neck: Short segment luminal narrowing involving the right proximal internal carotid artery with less than 50% narrowing, likely due to atheromatous plaque versus web and less likely dissection flap. Short interval follow-up is recommended to    document stability. Otherwise no high-grade stenosis, dissection or aneurysm involving the left carotid or bilateral vertebral arteries. Nodular and groundglass opacity within the right upper lobe, similar to 9/9/2023, however not seen on CT chest from 10/5/2013.  Findings may represent infectious or inflammatory process although given background of emphysema 6 to 8-week follow-up is    recommended to ensure resolution. Diffuse skin thickening and inflammatory fat stranding throughout the soft tissues of the neck, which may be sequelae of volume overload and less likely cellulitis given generalized appearance. Correlate with history. The study was marked in [facility] for immediate notification. Workstation performed: [id number]         XR chest pa & lateral    (Results Pending)       EKG and Other Studies Reviewed on Admission:   · EKG: Obtaining EKG. ** Please Note: This note has been constructed using a voice recognition system.  **

## 2023-09-14 NOTE — ASSESSMENT & PLAN NOTE
With history of atrial fibrillation, not on rate controlling medications or AC  · Does have bradycardia. Obtain EKG/TSH  · Denies cardiopulmonary sx at rest. Did endure some SOB yesterday with associated visual change and dizziness.    · Monitor on telemetry  · Obtain orthostatics  · F/u echocardiogram

## 2023-09-14 NOTE — ASSESSMENT & PLAN NOTE
From recent fall Aug 2023  · Recently seen by trauma doctor 8/2023  · No complaints of pain bedside, on room air  · Follow up CXR  · Tylenol, Robaxin, lidocaine prn

## 2023-09-14 NOTE — ED PROVIDER NOTES
History  Chief Complaint   Patient presents with   • Decreased Visual Acuity     Pt presents to the ED with decreased visual acuity out of right eye. Pt reports started to see black lines out of right eye. Pt reports admitted for fall x 2 weeks ago. 65 yo M pmhx of HTN, HLD, DM with diabetic retinopathy, macular degeneration, afib not AC, opoid use disorder on methadone, gastric bypass, who presents for evaluation of decreased visual acuity of R eye. Patient is a poor historian and accompanied by sister. Patient states around 2 pm yesterday he was sitting in a car when he noticed "black lines" in his vision. Patient states symptoms have progressively worsened and reports decreased visual acuity, blurry vision, and photosensitivity of R eye. Also reports generalized weakness and feeling unsteady. Patient also states he has been slightly dizzy since yesterday. Denies fall, LOC. Denies eye pain, trauma to eye, foreign body sensation, HA, speech difficulties, focal weakness, nausea/vomiting, paresthesias. Patient reports a hx of temporary sudden onset vision loss in R eye ~10 years ago and was told he had diabetic retinopathy. Patient did have a previous ophthalmologist but does not follow with one currently. Of note, patient was recently admitted / s/p fall down stairs with trauma to head - he was found to have L sided rib fx 9-11 and had negative HCT at the time. Denies SOB, chest pain, pleuritic pain, abdominal pain, other complaints today. Patient is drowsy throughout interview, states he did get his methadone earlier today. Prior to Admission Medications   Prescriptions Last Dose Informant Patient Reported? Taking?    Continuous Blood Gluc  (FreeStyle Grace 14 Day [place]) STEVE   No No   Si Device by Device route 3 (three) times a day before meals   Continuous Blood Gluc  (FreeStyle Grace 2 [place]) STEVE   No No   Si Device by Device route 3 (three) times a day before meals DULoxetine (CYMBALTA) 60 mg delayed release capsule   Yes No   Sig: Take 60 mg by mouth in the morning   GLOBAL EASE INJECT PEN NEEDLES 31G X 8 MM MISC  Self Yes No   Sig: USE AS DIRECTED TO INJECT INSULIN DAILY   Incontinence Supply Disposable (INCONTINENCE BRIEF MEDIUM) MISC  Self No No   Sig: by Does not apply route as needed (fecal soiling)   Incontinence Supply Disposable (RA WIPES FLUSHABLE/MOIST) MISC  Self No No   Sig: by Does not apply route as needed (fecal soiling)   Insulin Pen Needle (B-D UF III MINI PEN NEEDLES) 31G X 5 MM MISC   No No   Sig: Inject 15 Units under the skin daily at bedtime   Lidocaine 4 % PTCH   No No   Sig: Apply 1 patch topically daily   Misc.  Devices (CANE) MISC  Self No No   Sig: by Does not apply route daily Dx:  Frequent falls   UNKNOWN TO PATIENT  Self Yes No   acetaminophen (TYLENOL) 325 mg tablet   No No   Sig: Take 2 tablets (650 mg total) by mouth every 4 (four) hours as needed for mild pain   bacitracin-polymyxin b (POLYSPORIN) ointment   No No   Sig: Apply topically 2 (two) times a day   benzocaine-menthol-lanolin-aloe (DERMOPLAST) 20-0.5 % topical spray   No No   Sig: Apply 1 Application topically 2 (two) times a day as needed for mild pain for up to 10 days   clonazePAM (KlonoPIN) 0.5 mg tablet   No No   Sig: Take 1 tablet (0.5 mg total) by mouth 3 (three) times a day for 14 days   clonazePAM (KlonoPIN) 1 mg tablet   Yes No   Sig: Take 1 mg by mouth 2 (two) times a day as needed   divalproex sodium (DEPAKOTE ER) 500 mg 24 hr tablet   No No   Sig: Take 1 tablet (500 mg total) by mouth every 12 (twelve) hours   docusate sodium (COLACE) 100 mg capsule   No No   Sig: Take 1 capsule (100 mg total) by mouth 2 (two) times a day for 5 days   ergocalciferol (ERGOCALCIFEROL) 1.25 MG (63052 UT) capsule   Yes No   Sig: Take 50,000 Units by mouth   fluticasone (FLONASE) 50 mcg/act nasal spray   No No   Si spray into each nostril daily   metFORMIN (GLUCOPHAGE) 850 mg tablet   No No   Sig: Take 1 tablet (850 mg total) by mouth daily with breakfast   Patient taking differently: Take 850 mg by mouth 2 (two) times a day with meals   methadone (DOLOPHINE) 5 mg tablet   Yes No   Sig: Take 165 mg by mouth daily   methocarbamol (ROBAXIN) 500 mg tablet   No No   Sig: Take 1 tablet (500 mg total) by mouth every 6 (six) hours for 14 days   neomycin-bacitracin-polymyxin-hydrocortisone (CORTISPORIN) 1 % ophthalmic ointment   No No   Sig: APPLY TO AFFECTED AREA TWICE A DAY FOR TOPICAL USE (EYE OINTMENT MAY BE USED TOPICALLY)   polyethylene glycol (GOLYTELY) 4000 mL solution   No No   Sig: Take 4,000 mL by mouth once for 1 dose As directed for colonoscopy prep   polyethylene glycol (MIRALAX) 17 g packet   No No   Sig: Take 17 g by mouth daily for 5 days   senna (SENOKOT) 8.6 mg   No No   Sig: Take 2 tablets (17.2 mg total) by mouth daily for 5 days Do not start before August 12, 2023. Facility-Administered Medications: None       Past Medical History:   Diagnosis Date   • Anxiety    • Depression    • Diabetes mellitus ([address])    • Drug use    • Hemorrhoids, internal 9/23/2019   • Hypertension        Past Surgical History:   Procedure Laterality Date   • FEMUR FRACTURE SURGERY Right    • GASTRIC BYPASS  11/08/2011    Managed by: Shelley Valdovinos (General Surgery)   • HERNIA REPAIR  02/13/2013    Incisional hernia repair Managed by: Shelley Valdovinos (General Surgery)   • TONSILLECTOMY  2010       Family History   Problem Relation Age of Onset   • Diabetes Mother    • Hypertension Mother    • Hypertension Father    • Autoimmune disease Son      I have reviewed and agree with the history as documented.     E-Cigarette/Vaping   • E-Cigarette Use Never User      E-Cigarette/Vaping Substances   • Nicotine No    • THC No    • CBD No    • Flavoring No    • Other No    • Unknown No      Social History     Tobacco Use   • Smoking status: Never   • Smokeless tobacco: Never   Vaping Use   • Vaping Use: Never used Substance Use Topics   • Alcohol use: Never   • Drug use: Yes     Comment: Methadone clinic        Review of Systems   All other systems reviewed and are negative. Physical Exam  ED Triage Vitals [09/14/23 0934]   Temperature Pulse Respirations Blood Pressure SpO2   98.1 °F (36.7 °C) 80 16 112/58 97 %      Temp Source Heart Rate Source Patient Position - Orthostatic VS BP Location FiO2 (%)   Oral Monitor Sitting Right arm --      Pain Score       --             Orthostatic Vital Signs  Vitals:    09/14/23 1430 09/14/23 1500 09/14/23 1530 09/14/23 1620   BP: 141/72 148/74 155/86 140/74   Pulse: (!) 42 (!) 41 (!) 53 (!) 52   Patient Position - Orthostatic VS: Sitting Lying Lying Sitting       Physical Exam  Vitals reviewed. Constitutional:       General: He is awake. He is not in acute distress. Appearance: Normal appearance. He is not ill-appearing, toxic-appearing or diaphoretic. Comments: Drowsy   HENT:      Head: Normocephalic and atraumatic. Nose: Nose normal.   Eyes:      General: Lids are normal. Lids are everted, no foreign bodies appreciated. Gaze aligned appropriately. Visual field deficit present. Right eye: No foreign body, discharge or hordeolum. Left eye: No foreign body, discharge or hordeolum. Extraocular Movements: Extraocular movements intact. Right eye: Normal extraocular motion and no nystagmus. Left eye: Normal extraocular motion and no nystagmus. Conjunctiva/sclera: Conjunctivae normal.      Right eye: Right conjunctiva is not injected. No chemosis, exudate or hemorrhage. Left eye: Left conjunctiva is not injected. No chemosis, exudate or hemorrhage. Pupils: Pupils are equal, round, and reactive to light. Pupils are equal.      Right eye: No corneal abrasion or fluorescein uptake. Jorge exam negative. Left eye: No corneal abrasion or fluorescein uptake. Jorge exam negative.      Slit lamp exam:     Right eye: Anterior chamber quiet. Left eye: Anterior chamber quiet. Comments: Pupils 1mm bilaterally, PERRL    Cardiovascular:      Rate and Rhythm: Normal rate and regular rhythm. Heart sounds: Normal heart sounds. No murmur heard. No gallop. Pulmonary:      Effort: Pulmonary effort is normal. No respiratory distress. Breath sounds: Normal breath sounds. No wheezing, rhonchi or rales. Abdominal:      General: Bowel sounds are normal. There is no distension. Palpations: Abdomen is soft. Tenderness: There is no abdominal tenderness. Musculoskeletal:         General: Normal range of motion. Cervical back: Neck supple. Skin:     General: Skin is warm. Neurological:      General: No focal deficit present. Mental Status: He is oriented to person, place, and time. Mental status is at baseline. Cranial Nerves: No cranial nerve deficit. Sensory: No sensory deficit. Motor: No weakness. Coordination: Coordination normal.   Psychiatric:         Mood and Affect: Mood normal.         Behavior: Behavior is cooperative.          ED Medications  Medications   DULoxetine (CYMBALTA) delayed release capsule 60 mg (has no administration in time range)   fluticasone (FLONASE) 50 mcg/act nasal spray 1 spray (has no administration in time range)   methocarbamol (ROBAXIN) tablet 500 mg (has no administration in time range)   docusate sodium (COLACE) capsule 100 mg (has no administration in time range)   polyethylene glycol (MIRALAX) packet 17 g (has no administration in time range)   clonazePAM (KlonoPIN) tablet 1 mg (has no administration in time range)   enoxaparin (LOVENOX) subcutaneous injection 40 mg (has no administration in time range)   acetaminophen (TYLENOL) tablet 650 mg (has no administration in time range)   aluminum-magnesium hydroxide-simethicone (MAALOX) oral suspension 30 mL (has no administration in time range)   melatonin tablet 6 mg (has no administration in time range)   insulin lispro (HumaLOG) 100 units/mL subcutaneous injection 1-5 Units (has no administration in time range)   insulin lispro (HumaLOG) 100 units/mL subcutaneous injection 1-5 Units (has no administration in time range)   atorvastatin (LIPITOR) tablet 40 mg (has no administration in time range)   tetracaine 0.5 % ophthalmic solution 1 drop (1 drop Both Eyes Given by Other 9/14/23 1047)   fluorescein sodium sterile ophthalmic strip 1 strip (1 strip Right Eye Given by Other 9/14/23 1047)   iohexol (OMNIPAQUE) 350 MG/ML injection (MULTI-DOSE) 85 mL (85 mL Intravenous Given 9/14/23 1154)       Diagnostic Studies  Results Reviewed     Procedure Component Value Units Date/Time    Comprehensive metabolic panel [[id number]]  (Abnormal) Collected: 09/14/23 1111    Lab Status: Final result Specimen: Blood from Arm, Left Updated: 09/14/23 1141     Sodium 139 mmol/L      Potassium 3.7 mmol/L      Chloride 104 mmol/L      CO2 33 mmol/L      ANION GAP 2 mmol/L      BUN 14 mg/dL      Creatinine 1.02 mg/dL      Glucose 274 mg/dL      Calcium 8.2 mg/dL      Corrected Calcium 8.9 mg/dL      AST 82 U/L      ALT 35 U/L      Alkaline Phosphatase 81 U/L      Total Protein 5.7 g/dL      Albumin 3.1 g/dL      Total Bilirubin 0.32 mg/dL      eGFR 78 ml/min/1.73sq m     Narrative:      [facility] guidelines for Chronic Kidney Disease (CKD):   •  Stage 1 with normal or high GFR (GFR > 90 mL/min/1.73 square meters)  •  Stage 2 Mild CKD (GFR = 60-89 mL/min/1.73 square meters)  •  Stage 3A Moderate CKD (GFR = 45-59 mL/min/1.73 square meters)  •  Stage 3B Moderate CKD (GFR = 30-44 mL/min/1.73 square meters)  •  Stage 4 Severe CKD (GFR = 15-29 mL/min/1.73 square meters)  •  Stage 5 End Stage CKD (GFR <15 mL/min/1.73 square meters)  Note: GFR calculation is accurate only with a steady state creatinine    CBC and differential [[id number]]  (Abnormal) Collected: 09/14/23 1111    Lab Status: Final result Specimen: Blood from Arm, Left Updated: 09/14/23 1122     WBC 5.85 Thousand/uL      RBC 3.22 Million/uL      Hemoglobin 8.5 g/dL      Hematocrit 27.4 %      MCV 85 fL      MCH 26.4 pg      MCHC 31.0 g/dL      RDW 16.0 %      MPV 12.5 fL      Platelets 245 Thousands/uL      nRBC 0 /100 WBCs      Neutrophils Relative 60 %      Immat GRANS % 0 %      Lymphocytes Relative 17 %      Monocytes Relative 8 %      Eosinophils Relative 14 %      Basophils Relative 1 %      Neutrophils Absolute 3.52 Thousands/µL      Immature Grans Absolute 0.01 Thousand/uL      Lymphocytes Absolute 1.01 Thousands/µL      Monocytes Absolute 0.44 Thousand/µL      Eosinophils Absolute 0.84 Thousand/µL      Basophils Absolute 0.03 Thousands/µL                  CTA head and neck with and without contrast   Final Result by Interface, Radiology Results In (09/14 1311)   CT head: No acute intracranial abnormality. CTA of the head: No high-grade stenosis or aneurysm involving the [place]. CTA of the neck: Short segment luminal narrowing involving the right proximal internal carotid artery with less than 50% narrowing, likely due to atheromatous plaque versus web and less likely dissection flap. Short interval follow-up is recommended to    document stability. Otherwise no high-grade stenosis, dissection or aneurysm involving the left carotid or bilateral vertebral arteries. Nodular and groundglass opacity within the right upper lobe, similar to 9/9/2023, however not seen on CT chest from 10/5/2013. Findings may represent infectious or inflammatory process although given background of emphysema 6 to 8-week follow-up is    recommended to ensure resolution. Diffuse skin thickening and inflammatory fat stranding throughout the soft tissues of the neck, which may be sequelae of volume overload and less likely cellulitis given generalized appearance. Correlate with history. The study was marked in [facility] for immediate notification. Workstation performed: [id number]         XR chest pa & lateral    (Results Pending)         Procedures  Procedures      ED Course                                       Medical Decision Making  65 yo M pmhx of HTN, HLD, DM with diabetic retinopathy, macular degeneration, afib not AC, opoid use disorder on methadone, gastric bypass, who presents for evaluation of decreased visual acuity of R eye. Differentials include CVA, retinal detachment, vitreous hemorrhage, foreign body, CRAO/CRVO, mass lesion. CBC demonstrates stable Hgb at 8.5. CMP demonstrates glucose at 274, otherwise grossly wnl. CTA head/neck without acute abnormalities, however demonstrates ~50% R proximal ICA stenosis. POC ocular US negative for acute abnormalities. No fluorescein uptake on slit-lamp exam. Unable to measure eye pressures due to equipment failure. Patient has +R visual field deficit, otherwise neurologic exam intact. Concern for CRAO/CRVO vs CVA. Discussed with neurology, patient to be admitted to inpatient for formal ophthalmology exam and further evaluation by neurology/ophthalmology. Amount and/or Complexity of Data Reviewed  Labs: ordered. Radiology: ordered. Risk  Prescription drug management. Decision regarding hospitalization.             Disposition  Final diagnoses:   Internal carotid artery stenosis, right   Reduced visual acuity     Time reflects when diagnosis was documented in both MDM as applicable and the Disposition within this note     Time User Action Codes Description Comment    9/14/2023  2:00 PM Shaquille Wood, [address] [place] [address] [I65.21] Internal carotid artery stenosis, right     9/14/2023  2:00 PM Jeiman Salazarsert Add [H54.7] Reduced visual acuity     9/14/2023  5:04 PM Michael Muniz Add [H53.9] Visual changes     9/14/2023  5:04 PM Michael Muniz Add [I48.91] Atrial fibrillation, unspecified type ([address])     9/14/2023  5:04 PM Michael Muniz Add [R42] Dizziness     9/14/2023  5:07 PM Michael Muniz Add [F11.20] Methadone dependence ([address])     9/14/2023  5:07 PM Deborra Holes Add [Z78.9] Complex medical condition       ED Disposition     ED Disposition   Admit    Condition   Stable    Date/Time   u Sep 14, 2023  2:18 PM    Comment   Case was discussed with Dr. Cydney Burnett and the patient's admission status was agreed to be Admission Status: observation status to the service of Dr. Faina Castanon. Follow-up Information    None         Current Discharge Medication List      CONTINUE these medications which have NOT CHANGED    Details   acetaminophen (TYLENOL) 325 mg tablet Take 2 tablets (650 mg total) by mouth every 4 (four) hours as needed for mild pain  Refills: 0    Associated Diagnoses: Closed fracture of multiple ribs of left side, initial encounter      bacitracin-polymyxin b (POLYSPORIN) ointment Apply topically 2 (two) times a day  Qty: 15 g, Refills: 0    Associated Diagnoses: Open wound of skin      benzocaine-menthol-lanolin-aloe (DERMOPLAST) 20-0.5 % topical spray Apply 1 Application topically 2 (two) times a day as needed for mild pain for up to 10 days  Qty: 56 g, Refills: 0    Associated Diagnoses: Closed fracture of multiple ribs of left side with routine healing, subsequent encounter      clonazePAM (KlonoPIN) 1 mg tablet Take 1 mg by mouth 2 (two) times a day as needed      !! Continuous Blood Gluc  (FreeStyle Grace 14 Day [place]) STEVE 1 Device by Device route 3 (three) times a day before meals  Qty: 1 each, Refills: 0    Associated Diagnoses: Type 2 diabetes mellitus with microalbuminuria, without long-term current use of insulin ([address])      ! !  Continuous Blood Gluc  (FreeStyle Marissa [place] 2 [place]) STEVE 1 Device by Device route 3 (three) times a day before meals  Qty: 1 each, Refills: 0    Associated Diagnoses: Type 2 diabetes mellitus with microalbuminuria, without long-term current use of insulin ([facility])      divalproex sodium (DEPAKOTE ER) 500 mg 24 hr tablet Take 1 tablet (500 mg total) by mouth every 12 (twelve) hours  Qty: 60 tablet, Refills: 5    Associated Diagnoses: Bipolar affective disorder, remission status unspecified (HCC)      docusate sodium (COLACE) 100 mg capsule Take 1 capsule (100 mg total) by mouth 2 (two) times a day for 5 days  Qty: 10 capsule, Refills: 0    Associated Diagnoses: Closed fracture of multiple ribs of left side, initial encounter      DULoxetine (CYMBALTA) 60 mg delayed release capsule Take 60 mg by mouth in the morning      ergocalciferol (ERGOCALCIFEROL) 1.25 MG (82561 UT) capsule Take 50,000 Units by mouth      fluticasone (FLONASE) 50 mcg/act nasal spray 1 spray into each nostril daily  Qty: 16 g, Refills: 5    Associated Diagnoses: Seasonal allergic rhinitis due to pollen      !! GLOBAL EASE INJECT PEN NEEDLES 31G X 8 MM MISC USE AS DIRECTED TO INJECT INSULIN DAILY  Refills: 5      !! Incontinence Supply Disposable (INCONTINENCE BRIEF MEDIUM) MISC by Does not apply route as needed (fecal soiling)  Qty: 90 each, Refills: 5    Associated Diagnoses: Fecal smearing      !! Incontinence Supply Disposable (RA WIPES FLUSHABLE/MOIST) MISC by Does not apply route as needed (fecal soiling)  Qty: 100 each, Refills: 5    Associated Diagnoses: Fecal smearing      !!  Insulin Pen Needle (B-D UF III MINI PEN NEEDLES) 31G X 5 MM MISC Inject 15 Units under the skin daily at bedtime  Qty: 100 each, Refills: 5    Associated Diagnoses: Uncontrolled type 2 diabetes mellitus with hyperglycemia (HCC)      Lidocaine 4 % PTCH Apply 1 patch topically daily  Refills: 0    Associated Diagnoses: Closed fracture of multiple ribs of left side, initial encounter      metFORMIN (GLUCOPHAGE) 850 mg tablet Take 1 tablet (850 mg total) by mouth daily with breakfast  Qty: 90 tablet, Refills: 1    Associated Diagnoses: Type 2 diabetes mellitus with microalbuminuria, without long-term current use of insulin ([facility])      methadone (DOLOPHINE) 5 mg tablet Take 165 mg by mouth daily methocarbamol (ROBAXIN) 500 mg tablet Take 1 tablet (500 mg total) by mouth every 6 (six) hours for 14 days  Qty: 56 tablet, Refills: 0    Associated Diagnoses: Closed fracture of multiple ribs of left side, initial encounter      Misc. Devices (CANE) MISC by Does not apply route daily Dx:  Frequent falls  Qty: 1 each, Refills: 0    Associated Diagnoses: Falls frequently      neomycin-bacitracin-polymyxin-hydrocortisone (CORTISPORIN) 1 % ophthalmic ointment APPLY TO AFFECTED AREA TWICE A DAY FOR TOPICAL USE (EYE OINTMENT MAY BE USED TOPICALLY)  Qty: 7 g, Refills: 1    Comments: DX Code Needed  PLEASE SEND LARGER AMOUNT TO DISPENSE PATIENT USING 1 TUBE IN 3 DAYS. Associated Diagnoses: Open wound of skin      polyethylene glycol (GOLYTELY) 4000 mL solution Take 4,000 mL by mouth once for 1 dose As directed for colonoscopy prep  Qty: 4000 mL, Refills: 0    Associated Diagnoses: Encounter for screening colonoscopy      polyethylene glycol (MIRALAX) 17 g packet Take 17 g by mouth daily for 5 days  Qty: 85 g, Refills: 0    Associated Diagnoses: Closed fracture of multiple ribs of left side, initial encounter      senna (SENOKOT) 8.6 mg Take 2 tablets (17.2 mg total) by mouth daily for 5 days Do not start before August 12, 2023. Qty: 10 tablet, Refills: 0    Associated Diagnoses: Closed fracture of multiple ribs of left side, initial encounter      UNKNOWN TO PATIENT        ! ! - Potential duplicate medications found. Please discuss with provider. No discharge procedures on file. PDMP Review       Value Time User    PDMP Reviewed  Yes 9/14/2023  4:34 PM Adolfo Workman PA-C           ED Provider  Attending physically available and evaluated Kay Grant. . I managed the patient along with the ED Attending.     Electronically Signed by         Cleopatra Adams DO  09/14/23 0083

## 2023-09-15 LAB
IRON SATN MFR SERPL: 11 % (ref 15–50)
IRON SERPL-MCNC: 41 UG/DL (ref 50–212)
TIBC SERPL-MCNC: 358 UG/DL (ref 250–450)
UIBC SERPL-MCNC: 317 UG/DL (ref 155–355)

## 2023-09-18 ENCOUNTER — TRANSITIONAL CARE MANAGEMENT (OUTPATIENT)
Dept: FAMILY MEDICINE CLINIC | Facility: CLINIC | Age: 63
End: 2023-09-18

## 2023-09-18 LAB
LEFT EYE DIABETIC RETINOPATHY: POSITIVE
RIGHT EYE DIABETIC RETINOPATHY: POSITIVE

## 2023-09-22 ENCOUNTER — HOSPITAL ENCOUNTER (EMERGENCY)
Facility: HOSPITAL | Age: 63
Discharge: HOME | End: 2023-09-22
Attending: EMERGENCY MEDICINE
Payer: COMMERCIAL

## 2023-09-22 VITALS
DIASTOLIC BLOOD PRESSURE: 72 MMHG | BODY MASS INDEX: 24.78 KG/M2 | SYSTOLIC BLOOD PRESSURE: 148 MMHG | RESPIRATION RATE: 18 BRPM | OXYGEN SATURATION: 100 % | TEMPERATURE: 97.6 F | WEIGHT: 163 LBS | HEART RATE: 60 BPM

## 2023-09-22 DIAGNOSIS — F11.20 METHADONE DEPENDENCE (HCC): Primary | ICD-10-CM

## 2023-09-22 PROCEDURE — 99284 EMERGENCY DEPT VISIT MOD MDM: CPT | Performed by: EMERGENCY MEDICINE

## 2023-09-22 PROCEDURE — 99282 EMERGENCY DEPT VISIT SF MDM: CPT

## 2023-09-22 RX ADMIN — METHADONE HYDROCHLORIDE 165 MG: 10 TABLET ORAL at 18:43

## 2023-09-22 NOTE — DISCHARGE INSTRUCTIONS
Please follow up with your clinic for further doses of your methadone and refills of your other medications. Please return to the ED with any further concerns such as fevers, vomiting, or other concerning symptoms.

## 2023-09-22 NOTE — ED PROVIDER NOTES
History  Chief Complaint   Patient presents with   • Medication Refill     Pt normally takes methadone and ran out of his prescription. Having nausea     19-year-old male with history of opioid use disorder, on methadone, presents emergency department because he was unable to get to his methadone clinic. He normally takes 165 mg daily. His clinic is the [facility]. He last got methadone yesterday. Endorses chills, nausea, body aches. Denies fever, emesis, cp, sob, or other complaints. Denies recent drug/alcohol use. Prior to Admission Medications   Prescriptions Last Dose Informant Patient Reported? Taking? Continuous Blood Gluc  (FreeStyle Grace 14 Day [place]) STEVE   No No   Si Device by Device route 3 (three) times a day before meals   Continuous Blood Gluc  (FreeStyle Grace 2 [place]) STEVE   No No   Si Device by Device route 3 (three) times a day before meals   DULoxetine (CYMBALTA) 60 mg delayed release capsule   Yes No   Sig: Take 60 mg by mouth in the morning   GLOBAL EASE INJECT PEN NEEDLES 31G X 8 MM MISC  Self Yes No   Sig: USE AS DIRECTED TO INJECT INSULIN DAILY   Incontinence Supply Disposable (INCONTINENCE BRIEF MEDIUM) MISC  Self No No   Sig: by Does not apply route as needed (fecal soiling)   Incontinence Supply Disposable (RA WIPES FLUSHABLE/MOIST) MISC  Self No No   Sig: by Does not apply route as needed (fecal soiling)   Insulin Pen Needle (B-D UF III MINI PEN NEEDLES) 31G X 5 MM MISC   No No   Sig: Inject 15 Units under the skin daily at bedtime   Lidocaine 4 % PTCH   No No   Sig: Apply 1 patch topically daily   Misc.  Devices (CANE) MISC  Self No No   Sig: by Does not apply route daily Dx:  Frequent falls   UNKNOWN TO PATIENT  Self Yes No   acetaminophen (TYLENOL) 325 mg tablet   No No   Sig: Take 2 tablets (650 mg total) by mouth every 4 (four) hours as needed for mild pain   bacitracin-polymyxin b (POLYSPORIN) ointment   No No   Sig: Apply topically 2 (two) times a day   benzocaine-menthol-lanolin-aloe (DERMOPLAST) 20-0.5 % topical spray   No No   Sig: Apply 1 Application topically 2 (two) times a day as needed for mild pain for up to 10 days   clonazePAM (KlonoPIN) 0.5 mg tablet   No No   Sig: Take 1 tablet (0.5 mg total) by mouth 3 (three) times a day for 14 days   clonazePAM (KlonoPIN) 1 mg tablet   Yes No   Sig: Take 1 mg by mouth 2 (two) times a day as needed   divalproex sodium (DEPAKOTE ER) 500 mg 24 hr tablet   No No   Sig: Take 1 tablet (500 mg total) by mouth every 12 (twelve) hours   docusate sodium (COLACE) 100 mg capsule   No No   Sig: Take 1 capsule (100 mg total) by mouth 2 (two) times a day for 5 days   ergocalciferol (ERGOCALCIFEROL) 1.25 MG (21003 UT) capsule   Yes No   Sig: Take 50,000 Units by mouth   fluticasone (FLONASE) 50 mcg/act nasal spray   No No   Si spray into each nostril daily   metFORMIN (GLUCOPHAGE) 850 mg tablet   No No   Sig: Take 1 tablet (850 mg total) by mouth daily with breakfast   Patient taking differently: Take 850 mg by mouth 2 (two) times a day with meals   methadone (DOLOPHINE) 5 mg tablet   Yes No   Sig: Take 165 mg by mouth daily   methocarbamol (ROBAXIN) 500 mg tablet   No No   Sig: Take 1 tablet (500 mg total) by mouth every 6 (six) hours for 14 days   neomycin-bacitracin-polymyxin-hydrocortisone (CORTISPORIN) 1 % ophthalmic ointment   No No   Sig: APPLY TO AFFECTED AREA TWICE A DAY FOR TOPICAL USE (EYE OINTMENT MAY BE USED TOPICALLY)   polyethylene glycol (GOLYTELY) 4000 mL solution   No No   Sig: Take 4,000 mL by mouth once for 1 dose As directed for colonoscopy prep   polyethylene glycol (MIRALAX) 17 g packet   No No   Sig: Take 17 g by mouth daily for 5 days   senna (SENOKOT) 8.6 mg   No No   Sig: Take 2 tablets (17.2 mg total) by mouth daily for 5 days Do not start before 2023.       Facility-Administered Medications: None       Past Medical History:   Diagnosis Date   • Anxiety    • Depression • Diabetes mellitus ([address])    • Drug use    • Hemorrhoids, internal 9/23/2019   • Hypertension        Past Surgical History:   Procedure Laterality Date   • FEMUR FRACTURE SURGERY Right    • GASTRIC BYPASS  11/08/2011    Managed by: Jeannine Potter (General Surgery)   • HERNIA REPAIR  02/13/2013    Incisional hernia repair Managed by: Jeannine Potter (General Surgery)   • TONSILLECTOMY  2010       Family History   Problem Relation Age of Onset   • Diabetes Mother    • Hypertension Mother    • Hypertension Father    • Autoimmune disease Son      I have reviewed and agree with the history as documented. E-Cigarette/Vaping   • E-Cigarette Use Never User      E-Cigarette/Vaping Substances   • Nicotine No    • THC No    • CBD No    • Flavoring No    • Other No    • Unknown No      Social History     Tobacco Use   • Smoking status: Never   • Smokeless tobacco: Never   Vaping Use   • Vaping Use: Never used   Substance Use Topics   • Alcohol use: Never   • Drug use: Yes     Comment: Methadone clinic        Review of Systems   All other systems reviewed and are negative. Physical Exam  ED Triage Vitals   Temperature Pulse Respirations Blood Pressure SpO2   09/22/23 1602 09/22/23 1602 09/22/23 1602 09/22/23 1602 09/22/23 1602   97.6 °F (36.4 °C) 60 18 148/72 100 %      Temp Source Heart Rate Source Patient Position - Orthostatic VS BP Location FiO2 (%)   09/22/23 1602 09/22/23 1602 09/22/23 1602 09/22/23 1602 --   Temporal Monitor Sitting Left arm       Pain Score       09/22/23 1843       Med Not Given for Pain - for MAR use only             Orthostatic Vital Signs  Vitals:    09/22/23 1602   BP: 148/72   Pulse: 60   Patient Position - Orthostatic VS: Sitting       Physical Exam  Vitals and nursing note reviewed. Constitutional:       General: He is not in acute distress. Appearance: He is well-developed. HENT:      Head: Normocephalic and atraumatic.    Eyes:      Conjunctiva/sclera: Conjunctivae normal. Cardiovascular:      Rate and Rhythm: Normal rate and regular rhythm. Heart sounds: No murmur heard. Pulmonary:      Effort: Pulmonary effort is normal. No respiratory distress. Breath sounds: Normal breath sounds. Abdominal:      Palpations: Abdomen is soft. Tenderness: There is no abdominal tenderness. Musculoskeletal:         General: No swelling. Cervical back: Neck supple. Skin:     General: Skin is warm and dry. Capillary Refill: Capillary refill takes less than 2 seconds. Neurological:      Mental Status: He is alert. Psychiatric:         Mood and Affect: Mood normal.         ED Medications  Medications   methadone (DOLOPHINE) tablet 165 mg (165 mg Oral Given 9/22/23 1843)       Diagnostic Studies  Results Reviewed     None                 No orders to display         Procedures  Procedures      ED Course                                       Medical Decision Making  72-year-old male presenting emergency department due to missed methadone clinic appointment today. I called the clinic that he attends for this, but it was closed. I was able to get through to an  who said they reached out to the on-call provider. However, after an hour of voiding the on-call provider did not reach back out. So the chart was reviewed and it was found that patient had received this dose when hospitalized earlier this month. Patient does have symptoms consistent with opioid withdrawal so will give him the dose. Patient had improvement in symptoms after his dose of methadone and was discharged with referral to social work for further assistance with making appointments.           Disposition  Final diagnoses:   Methadone dependence ([address])     Time reflects when diagnosis was documented in both MDM as applicable and the Disposition within this note     Time User Action Codes Description Comment    9/22/2023  5:24 PM Felix Villar [F11.20] Methadone dependence [facility])       ED Disposition     ED Disposition   Discharge    Condition   Stable    Date/Time   Fri Sep 22, 2023  5:24 PM    Comment   Edy Martínez. discharge to home/self care. Follow-up Information     Follow up With Specialties Details Why [address], [address] [facility], Nurse Practitioner   [address] [address] [facility] [address]  [address]  [phone]            Discharge Medication List as of 9/22/2023  5:44 PM      CONTINUE these medications which have NOT CHANGED    Details   acetaminophen (TYLENOL) 325 mg tablet Take 2 tablets (650 mg total) by mouth every 4 (four) hours as needed for mild pain, Starting Fri 8/11/2023, No Print      bacitracin-polymyxin b (POLYSPORIN) ointment Apply topically 2 (two) times a day, Starting Wed 6/28/2023, Normal      benzocaine-menthol-lanolin-aloe (DERMOPLAST) 20-0.5 % topical spray Apply 1 Application topically 2 (two) times a day as needed for mild pain for up to 10 days, Starting Thu 8/24/2023, Until Sun 9/3/2023 at 2359, Normal      clonazePAM (KlonoPIN) 1 mg tablet Take 1 mg by mouth 2 (two) times a day as needed, Starting Tue 8/22/2023, Historical Med      !! Continuous Blood Gluc  (FreeStyle Grace 14 Day [place]) STEVE 1 Device by Device route 3 (three) times a day before meals, Starting Tue 1/31/2023, Normal      !!  Continuous Blood Gluc  (FreeStyle [place] 2 [place]) STEVE 1 Device by Device route 3 (three) times a day before meals, Normal      divalproex sodium (DEPAKOTE ER) 500 mg 24 hr tablet Take 1 tablet (500 mg total) by mouth every 12 (twelve) hours, Starting Mon 1/23/2023, Until Wed 2/22/2023, Normal      docusate sodium (COLACE) 100 mg capsule Take 1 capsule (100 mg total) by mouth 2 (two) times a day for 5 days, Starting Fri 8/11/2023, Until Wed 8/16/2023, Normal      DULoxetine (CYMBALTA) 60 mg delayed release capsule Take 60 mg by mouth in the morning, Starting Tue 8/22/2023, Historical Med      ergocalciferol (ERGOCALCIFEROL) 1.25 MG (62759 UT) capsule Take 50,000 Units by mouth, Starting Thu 12/5/2019, Until Thu 2/27/2020, Historical Med      fluticasone (FLONASE) 50 mcg/act nasal spray 1 spray into each nostril daily, Starting Mon 1/23/2023, Normal      !! GLOBAL EASE INJECT PEN NEEDLES 31G X 8 MM MISC USE AS DIRECTED TO INJECT INSULIN DAILY, Historical Med      !! Incontinence Supply Disposable (INCONTINENCE BRIEF MEDIUM) MISC by Does not apply route as needed (fecal soiling), Starting Wed 12/4/2019, Print      !! Incontinence Supply Disposable (RA WIPES FLUSHABLE/MOIST) MISC by Does not apply route as needed (fecal soiling), Starting Wed 12/4/2019, Print      !! Insulin Pen Needle (B-D UF III MINI PEN NEEDLES) 31G X 5 MM MISC Inject 15 Units under the skin daily at bedtime, Normal      Lidocaine 4 % PTCH Apply 1 patch topically daily, Starting Fri 8/11/2023, No Print      metFORMIN (GLUCOPHAGE) 850 mg tablet Take 1 tablet (850 mg total) by mouth daily with breakfast, Starting Mon 1/23/2023, Normal      methadone (DOLOPHINE) 5 mg tablet Take 165 mg by mouth daily, Historical Med      methocarbamol (ROBAXIN) 500 mg tablet Take 1 tablet (500 mg total) by mouth every 6 (six) hours for 14 days, Starting Fri 8/11/2023, Until Fri 8/25/2023, Normal      Misc.  Devices (CANE) MISC by Does not apply route daily Dx:  Frequent falls, Starting Mon 7/29/2019, Print      neomycin-bacitracin-polymyxin-hydrocortisone (CORTISPORIN) 1 % ophthalmic ointment APPLY TO AFFECTED AREA TWICE A DAY FOR TOPICAL USE (EYE OINTMENT MAY BE USED TOPICALLY), Normal      polyethylene glycol (GOLYTELY) 4000 mL solution Take 4,000 mL by mouth once for 1 dose As directed for colonoscopy prep, Starting Thu 2/27/2020, Normal      polyethylene glycol (MIRALAX) 17 g packet Take 17 g by mouth daily for 5 days, Starting Fri 8/11/2023, Until Wed 8/16/2023, Normal      senna (SENOKOT) 8.6 mg Take 2 tablets (17.2 mg total) by mouth daily for 5 days Do not start before August 12, 2023., Starting Sat 8/12/2023, Until Thu 8/17/2023, Normal      UNKNOWN TO PATIENT Historical Med       !! - Potential duplicate medications found. Please discuss with provider. No discharge procedures on file. PDMP Review       Value Time User    PDMP Reviewed  Yes 9/14/2023  4:34 PM Juliane Reddy PA-C           ED Provider  Attending physically available and evaluated Wil Ash. . I managed the patient along with the ED Attending.     Electronically Signed by         Timur Correa MD  09/24/23 4179

## 2023-09-22 NOTE — ED ATTENDING ATTESTATION
9/22/2023  IMinerva MD, saw and evaluated the patient. I have discussed the patient with the resident/non-physician practitioner and agree with the resident's/non-physician practitioner's findings, Plan of Care, and MDM as documented in the resident's/non-physician practitioner's note, except where noted. All available labs and Radiology studies were reviewed. I was present for key portions of any procedure(s) performed by the resident/non-physician practitioner and I was immediately available to provide assistance. At this point I agree with the current assessment done in the Emergency Department. I have conducted an independent evaluation of this patient a history and physical is as follows:    ED Course     82-year-old male on chronic methadone maintenance, reported dose by patient is 165 mg daily, review of the medical record reveals that the patient was being treated with 165 mg dose when he was recently admitted to [facility], presenting to the emergency department for methadone treatment because he was unable to get to his appointment this morning. The patient states that he goes to the methadone clinic 3 times a week and they give him a dose or 2 to take home for the interim days. Patient reports that the ride that was arranged to take him to the methadone clinic this morning never showed up. Patient states he attempted to call his own  but never got a call back. Call was placed to the methadone clinic. Patient will be treated with a dose, however will have to return to the emergency department for repeat dosing over the weekend given that we do not have a  to arrange for a ride to the methadone clinic for tomorrow. Patient's methadone clinic is in [place].     Critical Care Time  Procedures

## 2023-09-25 ENCOUNTER — HOSPITAL ENCOUNTER (EMERGENCY)
Facility: HOSPITAL | Age: 63
Discharge: HOME | End: 2023-09-25
Attending: EMERGENCY MEDICINE
Payer: COMMERCIAL

## 2023-09-25 VITALS
DIASTOLIC BLOOD PRESSURE: 91 MMHG | TEMPERATURE: 98.8 F | RESPIRATION RATE: 18 BRPM | OXYGEN SATURATION: 99 % | SYSTOLIC BLOOD PRESSURE: 155 MMHG | HEART RATE: 88 BPM

## 2023-09-25 DIAGNOSIS — F11.90 METHADONE USE: Primary | ICD-10-CM

## 2023-09-25 PROCEDURE — 99282 EMERGENCY DEPT VISIT SF MDM: CPT

## 2023-09-25 PROCEDURE — 99283 EMERGENCY DEPT VISIT LOW MDM: CPT | Performed by: EMERGENCY MEDICINE

## 2023-09-25 NOTE — ED ATTENDING ATTESTATION
9/25/2023  IDahlia DO, saw and evaluated the patient. I have discussed the patient with the resident/non-physician practitioner and agree with the resident's/non-physician practitioner's findings, Plan of Care, and MDM as documented in the resident's/non-physician practitioner's note, except where noted. All available labs and Radiology studies were reviewed. I was present for key portions of any procedure(s) performed by the resident/non-physician practitioner and I was immediately available to provide assistance. At this point I agree with the current assessment done in the Emergency Department. I have conducted an independent evaluation of this patient a history and physical is as follows:    82-year-old male presents requesting back to methadone clinic. Patient states he took his doses over the weekend as directed and needs to go today to get his dose. Has been going to methadone clinic for past 8 years. Denies all complaints at this time. On exam- no acute distress, heart regular, no respiratory distress.   Plan- get patient ride to methadone clinic    ED Course         Critical Care Time  Procedures

## 2023-09-25 NOTE — DISCHARGE INSTRUCTIONS
You were seen and evaluated in the emergency department for arrangement of transport to methadone clinic. Vitally range. Please take your medications as prescribed.   If your symptoms worsen or persist please return to the emergency department for further evaluation and management please follow-up with your PCP within 48 hours

## 2023-09-25 NOTE — ED PROVIDER NOTES
History  Chief Complaint   Patient presents with   • Personal Problem     Wants ride to methadone clinic. Now stating he feels a seizure coming on. Insists all he needs is his methadone. Patient is a 37-year-old male presenting for evaluation for medical problem. States that he takes methadone daily and was given prescription of 2 doses to get him through the weekend. States that he cannot get a ride to the methadone clinic and is requesting a ride to the methadone clinic. Has no complaints. Triage note mentions that he states he felt a seizure coming on he says that he has a history of seizure disorder but denies any complaints currently. Prior to Admission Medications   Prescriptions Last Dose Informant Patient Reported? Taking? Continuous Blood Gluc  (FreeStyle Grace 14 Day [place]) STEVE   No No   Si Device by Device route 3 (three) times a day before meals   Continuous Blood Gluc  (FreeStyle Grace 2 [place]) STEVE   No No   Si Device by Device route 3 (three) times a day before meals   DULoxetine (CYMBALTA) 60 mg delayed release capsule   Yes No   Sig: Take 60 mg by mouth in the morning   GLOBAL EASE INJECT PEN NEEDLES 31G X 8 MM MISC  Self Yes No   Sig: USE AS DIRECTED TO INJECT INSULIN DAILY   Incontinence Supply Disposable (INCONTINENCE BRIEF MEDIUM) MISC  Self No No   Sig: by Does not apply route as needed (fecal soiling)   Incontinence Supply Disposable (RA WIPES FLUSHABLE/MOIST) MISC  Self No No   Sig: by Does not apply route as needed (fecal soiling)   Insulin Pen Needle (B-D UF III MINI PEN NEEDLES) 31G X 5 MM MISC   No No   Sig: Inject 15 Units under the skin daily at bedtime   Lidocaine 4 % PTCH   No No   Sig: Apply 1 patch topically daily   Misc.  Devices (CANE) MISC  Self No No   Sig: by Does not apply route daily Dx:  Frequent falls   UNKNOWN TO PATIENT  Self Yes No   acetaminophen (TYLENOL) 325 mg tablet   No No   Sig: Take 2 tablets (650 mg total) by mouth every 4 (four) hours as needed for mild pain   bacitracin-polymyxin b (POLYSPORIN) ointment   No No   Sig: Apply topically 2 (two) times a day   benzocaine-menthol-lanolin-aloe (DERMOPLAST) 20-0.5 % topical spray   No No   Sig: Apply 1 Application topically 2 (two) times a day as needed for mild pain for up to 10 days   clonazePAM (KlonoPIN) 0.5 mg tablet   No No   Sig: Take 1 tablet (0.5 mg total) by mouth 3 (three) times a day for 14 days   clonazePAM (KlonoPIN) 1 mg tablet   Yes No   Sig: Take 1 mg by mouth 2 (two) times a day as needed   divalproex sodium (DEPAKOTE ER) 500 mg 24 hr tablet   No No   Sig: Take 1 tablet (500 mg total) by mouth every 12 (twelve) hours   docusate sodium (COLACE) 100 mg capsule   No No   Sig: Take 1 capsule (100 mg total) by mouth 2 (two) times a day for 5 days   ergocalciferol (ERGOCALCIFEROL) 1.25 MG (64999 UT) capsule   Yes No   Sig: Take 50,000 Units by mouth   fluticasone (FLONASE) 50 mcg/act nasal spray   No No   Si spray into each nostril daily   metFORMIN (GLUCOPHAGE) 850 mg tablet   No No   Sig: Take 1 tablet (850 mg total) by mouth daily with breakfast   Patient taking differently: Take 850 mg by mouth 2 (two) times a day with meals   methadone (DOLOPHINE) 5 mg tablet   Yes No   Sig: Take 165 mg by mouth daily   methocarbamol (ROBAXIN) 500 mg tablet   No No   Sig: Take 1 tablet (500 mg total) by mouth every 6 (six) hours for 14 days   neomycin-bacitracin-polymyxin-hydrocortisone (CORTISPORIN) 1 % ophthalmic ointment   No No   Sig: APPLY TO AFFECTED AREA TWICE A DAY FOR TOPICAL USE (EYE OINTMENT MAY BE USED TOPICALLY)   polyethylene glycol (GOLYTELY) 4000 mL solution   No No   Sig: Take 4,000 mL by mouth once for 1 dose As directed for colonoscopy prep   polyethylene glycol (MIRALAX) 17 g packet   No No   Sig: Take 17 g by mouth daily for 5 days   senna (SENOKOT) 8.6 mg   No No   Sig: Take 2 tablets (17.2 mg total) by mouth daily for 5 days Do not start before , 2023.      Facility-Administered Medications: None       Past Medical History:   Diagnosis Date   • Anxiety    • Depression    • Diabetes mellitus ([address])    • Drug use    • Hemorrhoids, internal 9/23/2019   • Hypertension        Past Surgical History:   Procedure Laterality Date   • FEMUR FRACTURE SURGERY Right    • GASTRIC BYPASS  11/08/2011    Managed by: Nathaly Sprague (General Surgery)   • HERNIA REPAIR  02/13/2013    Incisional hernia repair Managed by: Nathaly Sprague (General Surgery)   • TONSILLECTOMY  2010       Family History   Problem Relation Age of Onset   • Diabetes Mother    • Hypertension Mother    • Hypertension Father    • Autoimmune disease Son      I have reviewed and agree with the history as documented. E-Cigarette/Vaping   • E-Cigarette Use Never User      E-Cigarette/Vaping Substances   • Nicotine No    • THC No    • CBD No    • Flavoring No    • Other No    • Unknown No      Social History     Tobacco Use   • Smoking status: Never   • Smokeless tobacco: Never   Vaping Use   • Vaping Use: Never used   Substance Use Topics   • Alcohol use: Never   • Drug use: Yes     Comment: Methadone clinic        Review of Systems   Constitutional: Negative for chills and fever. HENT: Negative for congestion. Respiratory: Negative for cough and shortness of breath. Cardiovascular: Negative for chest pain. Gastrointestinal: Negative for abdominal pain, nausea and vomiting. Musculoskeletal: Negative for back pain and neck pain. Skin: Negative for rash. Neurological: Negative for weakness, numbness and headaches. All other systems reviewed and are negative.       Physical Exam  ED Triage Vitals [09/25/23 0646]   Temperature Pulse Respirations Blood Pressure SpO2   98.8 °F (37.1 °C) 88 18 155/91 99 %      Temp Source Heart Rate Source Patient Position - Orthostatic VS BP Location FiO2 (%)   Temporal Monitor Sitting Left arm --      Pain Score       --             Orthostatic Vital Signs  Vitals:    09/25/23 0646   BP: 155/91   Pulse: 88   Patient Position - Orthostatic VS: Sitting       Physical Exam  Vitals and nursing note reviewed. Constitutional:       General: He is not in acute distress. Appearance: Normal appearance. He is well-developed. He is not ill-appearing. HENT:      Head: Normocephalic and atraumatic. Mouth/Throat:      Mouth: Mucous membranes are moist.   Eyes:      Extraocular Movements: Extraocular movements intact. Conjunctiva/sclera: Conjunctivae normal.   Cardiovascular:      Rate and Rhythm: Normal rate and regular rhythm. Heart sounds: No murmur heard. Pulmonary:      Effort: Pulmonary effort is normal. No respiratory distress. Breath sounds: Normal breath sounds. Abdominal:      Palpations: Abdomen is soft. Tenderness: There is no abdominal tenderness. Musculoskeletal:         General: No swelling. Cervical back: Neck supple. Skin:     General: Skin is warm and dry. Capillary Refill: Capillary refill takes less than 2 seconds. Neurological:      General: No focal deficit present. Mental Status: He is alert and oriented to person, place, and time. Psychiatric:         Mood and Affect: Mood normal.         ED Medications  Medications - No data to display    Diagnostic Studies  Results Reviewed     None                 No orders to display         Procedures  Procedures      ED Course                                       Medical Decision Making  Patient is a 60-year-old male presenting for evaluation for help getting a ride to the methadone clinic. Patient is hemodynamically stable last took his dose of methadone yesterday has no medical complaints is merely seeking a ride to the methadone clinic as they will fill his prescription for months that he does not have to continually return. Will obtain [facility] ride for the patient    Patient is hemodynamically stable and cleared for discharge.   Return precautions given patient verbalized understanding          Disposition  Final diagnoses:   Methadone use     Time reflects when diagnosis was documented in both MDM as applicable and the Disposition within this note     Time User Action Codes Description Comment    9/25/2023  7:20 AM Lauryn Chang Add [F11.90] Methadone use       ED Disposition     ED Disposition   Discharge    Condition   Stable    Date/Time   Mon Sep 25, 2023  7:20 AM    [address]. discharge to home/self care. Follow-up Information     Follow up With Specialties Details Why [address] [facility], [address] [facility], Nurse Practitioner Call in 2 days  [address] [facility]  [phone]            Discharge Medication List as of 9/25/2023  7:21 AM      CONTINUE these medications which have NOT CHANGED    Details   acetaminophen (TYLENOL) 325 mg tablet Take 2 tablets (650 mg total) by mouth every 4 (four) hours as needed for mild pain, Starting Fri 8/11/2023, No Print      bacitracin-polymyxin b (POLYSPORIN) ointment Apply topically 2 (two) times a day, Starting Wed 6/28/2023, Normal      benzocaine-menthol-lanolin-aloe (DERMOPLAST) 20-0.5 % topical spray Apply 1 Application topically 2 (two) times a day as needed for mild pain for up to 10 days, Starting Thu 8/24/2023, Until Sun 9/3/2023 at 2359, Normal      clonazePAM (KlonoPIN) 1 mg tablet Take 1 mg by mouth 2 (two) times a day as needed, Starting Tue 8/22/2023, Historical Med      !! Continuous Blood Gluc  (FreeStyle Grace 14 Day [place]) STEVE 1 Device by Device route 3 (three) times a day before meals, Starting Tue 1/31/2023, Normal      !!  Continuous Blood Gluc  (FreeStyle [place] 2 [place]) STEVE 1 Device by Device route 3 (three) times a day before meals, Normal      divalproex sodium (DEPAKOTE ER) 500 mg 24 hr tablet Take 1 tablet (500 mg total) by mouth every 12 (twelve) hours, Starting Mon 1/23/2023, Until Wed 2/22/2023, Normal      docusate sodium (COLACE) 100 mg capsule Take 1 capsule (100 mg total) by mouth 2 (two) times a day for 5 days, Starting Fri 8/11/2023, Until Wed 8/16/2023, Normal      DULoxetine (CYMBALTA) 60 mg delayed release capsule Take 60 mg by mouth in the morning, Starting Tue 8/22/2023, Historical Med      ergocalciferol (ERGOCALCIFEROL) 1.25 MG (04895 UT) capsule Take 50,000 Units by mouth, Starting Thu 12/5/2019, Until Thu 2/27/2020, Historical Med      fluticasone (FLONASE) 50 mcg/act nasal spray 1 spray into each nostril daily, Starting Mon 1/23/2023, Normal      !! GLOBAL EASE INJECT PEN NEEDLES 31G X 8 MM MISC USE AS DIRECTED TO INJECT INSULIN DAILY, Historical Med      !! Incontinence Supply Disposable (INCONTINENCE BRIEF MEDIUM) MISC by Does not apply route as needed (fecal soiling), Starting Wed 12/4/2019, Print      !! Incontinence Supply Disposable (RA WIPES FLUSHABLE/MOIST) MISC by Does not apply route as needed (fecal soiling), Starting Wed 12/4/2019, Print      !! Insulin Pen Needle (B-D UF III MINI PEN NEEDLES) 31G X 5 MM MISC Inject 15 Units under the skin daily at bedtime, Normal      Lidocaine 4 % PTCH Apply 1 patch topically daily, Starting Fri 8/11/2023, No Print      metFORMIN (GLUCOPHAGE) 850 mg tablet Take 1 tablet (850 mg total) by mouth daily with breakfast, Starting Mon 1/23/2023, Normal      methadone (DOLOPHINE) 5 mg tablet Take 165 mg by mouth daily, Historical Med      methocarbamol (ROBAXIN) 500 mg tablet Take 1 tablet (500 mg total) by mouth every 6 (six) hours for 14 days, Starting Fri 8/11/2023, Until Fri 8/25/2023, Normal      Misc.  Devices (CANE) MISC by Does not apply route daily Dx:  Frequent falls, Starting Mon 7/29/2019, Print      neomycin-bacitracin-polymyxin-hydrocortisone (CORTISPORIN) 1 % ophthalmic ointment APPLY TO AFFECTED AREA TWICE A DAY FOR TOPICAL USE (EYE OINTMENT MAY BE USED TOPICALLY), Normal      polyethylene glycol (GOLYTELY) 4000 mL solution Take 4,000 mL by mouth once for 1 dose As directed for colonoscopy prep, Starting Thu 2/27/2020, Normal      polyethylene glycol (MIRALAX) 17 g packet Take 17 g by mouth daily for 5 days, Starting Fri 8/11/2023, Until Wed 8/16/2023, Normal      senna (SENOKOT) 8.6 mg Take 2 tablets (17.2 mg total) by mouth daily for 5 days Do not start before August 12, 2023., Starting Sat 8/12/2023, Until Thu 8/17/2023, Normal      UNKNOWN TO PATIENT Historical Med       !! - Potential duplicate medications found. Please discuss with provider. No discharge procedures on file. PDMP Review       Value Time User    PDMP Reviewed  Yes 9/14/2023  4:34 PM Shira Pollock PA-C           ED Provider  Attending physically available and evaluated Lisa Owen. . I managed the patient along with the ED Attending.     Electronically Signed by         Raine Gallardo DO  09/27/23 1863

## 2023-09-27 ENCOUNTER — TELEPHONE (OUTPATIENT)
Dept: PSYCHIATRY | Facility: CLINIC | Age: 63
End: 2023-09-27

## 2023-09-27 NOTE — LETTER
Dear Darline Mendoza. :    We are contacting you because your name is currently included on the [address] [place] [address] wait-list for Talk Therapy and/or Medication Management. (Please [place] which services are needed)     In our efforts to provide the highest quality care, [facility] has begun the process of upgrading our behavioral health systems to increase efficiency and expedite delivery of services. As part of this process, we ask you to please confirm your continued interest in the services above. If you are no longer interested or in need, please awilda “No” in the area below. If you are still interested and in need, please awilda “Yes” and provide your most current demographic and insurance information within 15 days. If we do not receive confirmation from you by 2023 your information will not be included in the system upgrade and your place on the waitlist will be lost.     Thank you in advance for your patience and understanding and we apologize for any inconvenience this may cause. Patient Name and :    Still in need of services: Yes or No     Current Address:     Phone#:     Best time to receive a call: Insurance Carrier:      Policy/ID#: Group#: Insurance Services Phone#:      What is your current presenting problem? Open to virtual talk therapy: Yes or No      We will call you to do an Intake when an appointment becomes available. You can send this information back to us in any of the ways below:    Email: [email or web address]. Jhon Rincon  Fax#:  [phone]  Mail:   [address] [place] [address], [address] [place] [address]

## 2023-10-06 ENCOUNTER — TELEPHONE (OUTPATIENT)
Dept: FAMILY MEDICINE CLINIC | Facility: CLINIC | Age: 63
End: 2023-10-06

## 2023-10-06 NOTE — TELEPHONE ENCOUNTER
Patient called and left this message:     Yes, good afternoon. This. It's Corena Lowell. I wrote down the appointment, but then I lost my place and I can't find it. Or would you call me and give me the date of the appointment, the early appointment that you got me? Thank you. I think it's easier for Tuesday or Thursday. I don't. I can't remember quite. OK. Thank you. Right. OK. I'll. Returned patient's call, left a detailed message on secured voicemail with appointment day and time.

## 2023-10-18 ENCOUNTER — OFFICE VISIT (OUTPATIENT)
Dept: FAMILY MEDICINE CLINIC | Facility: CLINIC | Age: 63
End: 2023-10-18

## 2023-10-18 VITALS
RESPIRATION RATE: 16 BRPM | SYSTOLIC BLOOD PRESSURE: 127 MMHG | HEART RATE: 76 BPM | HEIGHT: 68 IN | BODY MASS INDEX: 24.43 KG/M2 | OXYGEN SATURATION: 98 % | WEIGHT: 161.2 LBS | DIASTOLIC BLOOD PRESSURE: 72 MMHG | TEMPERATURE: 98.3 F

## 2023-10-18 DIAGNOSIS — D50.9 IRON DEFICIENCY ANEMIA, UNSPECIFIED IRON DEFICIENCY ANEMIA TYPE: ICD-10-CM

## 2023-10-18 DIAGNOSIS — R07.89 ATYPICAL CHEST PAIN: Primary | ICD-10-CM

## 2023-10-18 RX ORDER — LUMATEPERONE 42 MG/1
42 CAPSULE ORAL
COMMUNITY
Start: 2023-10-17

## 2023-10-18 RX ORDER — TRAZODONE HYDROCHLORIDE 100 MG/1
100 TABLET ORAL
COMMUNITY
Start: 2023-09-19

## 2023-10-18 RX ORDER — ZOLPIDEM TARTRATE 10 MG/1
10 TABLET ORAL
COMMUNITY
Start: 2023-10-17

## 2023-10-18 NOTE — ASSESSMENT & PLAN NOTE
Noted in chart when reviewing previous lab work from ED visit   Referred to Hematology for iron infusions

## 2023-10-18 NOTE — PROGRESS NOTES
Name: Chung Bailey. : 1960      MRN: [id number]  Encounter Provider: STEVE Reynolds  Encounter Date: 10/18/2023   Encounter department: [address]     1. Atypical chest pain    2. Iron deficiency anemia, unspecified iron deficiency anemia type  Assessment & Plan:  Noted in chart when reviewing previous lab work from ED visit   Referred to Hematology for iron infusions     Orders:  -     Ambulatory Referral to Hematology / Oncology; Future       Pt is a very poor historian. Tells me his Methadone clinic is requesting an EKG for him. His chest pain does not seem to be cardiac in nature. Possibly musculoskeletal vs anxiety induced. I again provided him with our Psychiatry phone number as they have been trying to reach him to schedule an evaluation. EKG completed today with sinus rhythm, no ST-T wave changes, no acute ischemia. Offered patient a referral to Cardiology for thoroughness but he declined this option. ED precautions reviewed     Subjective     HPI    Pt presents by himself today for an acute visit  Pt with multiple recent ED visits for opioid abuse, methadone use    He tells met that his Methadone Clinic is requesting an EKG for him  He is quite a poor historian and is unable to verbalize exactly why they are requesting an EKG  Of note, pt stops in between sentences, closes his eyes and appears to be falling asleep  Later in the visit, he tells me he was getting chest pain only at night and maybe one to two times per week. No aggravating factors that he is aware of. Chest pain improved with "walking it off". No associated symptoms. Duration of chest pain during episodes: "I don't know, it comes and goes at night". Pt points to his entire chest when describing the pain    I noticed in his chart, recent lab work from an ED visit on 23 with an iron level of 41, TIBC 358, iron sat 11%, ferritin 7.   He tells me he was unaware of this     Review of Systems   Constitutional:  Negative for chills and fever. HENT:  Negative for ear pain and sore throat. Eyes:  Negative for pain and visual disturbance. Respiratory:  Negative for cough, chest tightness, shortness of breath and wheezing. Cardiovascular:  Positive for chest pain. Negative for palpitations and leg swelling. Gastrointestinal:  Negative for abdominal pain, blood in stool, constipation, diarrhea, nausea and vomiting. Genitourinary:  Negative for dysuria and hematuria. Musculoskeletal:  Negative for arthralgias and back pain. Skin:  Negative for color change and rash. Neurological:  Negative for seizures and syncope. Hematological:  Negative for adenopathy. Does not bruise/bleed easily. Psychiatric/Behavioral:  Positive for dysphoric mood. Negative for self-injury, sleep disturbance and suicidal ideas. The patient is nervous/anxious. All other systems reviewed and are negative.       Past Medical History:   Diagnosis Date    Anxiety     Depression     Diabetes mellitus ([address])     Drug use     Hemorrhoids, internal 9/23/2019    Hypertension      Past Surgical History:   Procedure Laterality Date    FEMUR FRACTURE SURGERY Right     GASTRIC BYPASS  11/08/2011    Managed by: Marisol Stephens (General Surgery)    HERNIA REPAIR  02/13/2013    Incisional hernia repair Managed by: Marisol Jamiltingham (General Surgery)    TONSILLECTOMY  2010     Family History   Problem Relation Age of Onset    Diabetes Mother     Hypertension Mother     Hypertension Father     Autoimmune disease Son      Social History     Socioeconomic History    Marital status: Legally      Spouse name: None    Number of children: None    Years of education: None    Highest education level: None   Occupational History    None   Tobacco Use    Smoking status: Never    Smokeless tobacco: Never   Vaping Use    Vaping Use: Never used   Substance and Sexual Activity    Alcohol use: Never    Drug use: Yes     Comment: Methadone clinic    Sexual activity: Not Currently   Other Topics Concern    None   Social History Narrative    None     Social Determinants of Health     Financial Resource Strain: Low Risk  (3/16/2021)    Overall Financial Resource Strain (CARDIA)     Difficulty of Paying Living Expenses: Not hard at all   Food Insecurity: No Food Insecurity (3/16/2021)    Hunger Vital Sign     Worried About Running Out of Food in the Last Year: Never true     [address] in the Last Year: Never true   Transportation Needs: No Transportation Needs (3/16/2021)    PRAPARE - Transportation     Lack of Transportation (Medical): No     Lack of Transportation (Non-Medical): No   Physical Activity: Inactive (3/16/2021)    Exercise Vital Sign     Days of Exercise per Week: 0 days     Minutes of Exercise per Session: 0 min   Stress: No Stress Concern Present (3/16/2021)    [facility]     Feeling of Stress :  Only a little   Social Connections: Unknown (3/16/2021)    Social Connection and Isolation Panel [NHANES]     Frequency of Communication with Friends and Family: Patient refused     Frequency of Social Gatherings with Friends and Family: Patient refused     Attends Congregation Services: Patient refused     Active Member of Clubs or Organizations: Patient refused     Attends Club or Organization Meetings: Patient refused     Marital Status: Patient refused   Intimate Partner Violence: Unknown (3/16/2021)    Humiliation, Afraid, Rape, and Kick questionnaire     Fear of Current or Ex-Partner: Patient refused     Emotionally Abused: Patient refused     Physically Abused: Patient refused     Sexually Abused: Patient refused   Housing Stability: Not on file     Current Outpatient Medications on File Prior to Visit   Medication Sig    clonazePAM (KlonoPIN) 1 mg tablet Take 1 mg by mouth 2 (two) times a day as needed    DULoxetine (CYMBALTA) 60 mg delayed release capsule Take 60 mg by mouth in the morning    fluticasone (FLONASE) 50 mcg/act nasal spray 1 spray into each nostril daily    GLOBAL EASE INJECT PEN NEEDLES 31G X 8 MM MISC USE AS DIRECTED TO INJECT INSULIN DAILY    Incontinence Supply Disposable (INCONTINENCE BRIEF MEDIUM) MISC by Does not apply route as needed (fecal soiling)    Incontinence Supply Disposable (RA WIPES FLUSHABLE/MOIST) MISC by Does not apply route as needed (fecal soiling)    Insulin Pen Needle (B-D UF III MINI PEN NEEDLES) 31G X 5 MM MISC Inject 15 Units under the skin daily at bedtime    Lidocaine 4 % PTCH Apply 1 patch topically daily    metFORMIN (GLUCOPHAGE) 850 mg tablet Take 1 tablet (850 mg total) by mouth daily with breakfast (Patient taking differently: Take 850 mg by mouth 2 (two) times a day with meals)    methadone (DOLOPHINE) 5 mg tablet Take 165 mg by mouth daily    Misc.  Devices (CANE) MISC by Does not apply route daily Dx:  Frequent falls    acetaminophen (TYLENOL) 325 mg tablet Take 2 tablets (650 mg total) by mouth every 4 (four) hours as needed for mild pain (Patient not taking: Reported on 10/18/2023)    bacitracin-polymyxin b (POLYSPORIN) ointment Apply topically 2 (two) times a day (Patient not taking: Reported on 10/18/2023)    benzocaine-menthol-lanolin-aloe (DERMOPLAST) 20-0.5 % topical spray Apply 1 Application topically 2 (two) times a day as needed for mild pain for up to 10 days    Caplyta 42 MG CAPS capsule Take 42 mg by mouth daily at bedtime (Patient not taking: Reported on 10/18/2023)    clonazePAM (KlonoPIN) 0.5 mg tablet Take 1 tablet (0.5 mg total) by mouth 3 (three) times a day for 14 days    Continuous Blood Gluc  (FreeStyle [place] 14 Day [place]) STEVE 1 Device by Device route 3 (three) times a day before meals (Patient not taking: Reported on 10/18/2023)    Continuous Blood Gluc  (FreeStyle [place] 2 [place]) STEVE 1 Device by Device route 3 (three) times a day before meals (Patient not taking: Reported on 10/18/2023)    divalproex sodium (DEPAKOTE ER) 500 mg 24 hr tablet Take 1 tablet (500 mg total) by mouth every 12 (twelve) hours    docusate sodium (COLACE) 100 mg capsule Take 1 capsule (100 mg total) by mouth 2 (two) times a day for 5 days    ergocalciferol (ERGOCALCIFEROL) 1.25 MG (50608 UT) capsule Take 50,000 Units by mouth    methocarbamol (ROBAXIN) 500 mg tablet Take 1 tablet (500 mg total) by mouth every 6 (six) hours for 14 days    neomycin-bacitracin-polymyxin-hydrocortisone (CORTISPORIN) 1 % ophthalmic ointment APPLY TO AFFECTED AREA TWICE A DAY FOR TOPICAL USE (EYE OINTMENT MAY BE USED TOPICALLY) (Patient not taking: Reported on 10/18/2023)    polyethylene glycol (GOLYTELY) 4000 mL solution Take 4,000 mL by mouth once for 1 dose As directed for colonoscopy prep    polyethylene glycol (MIRALAX) 17 g packet Take 17 g by mouth daily for 5 days    senna (SENOKOT) 8.6 mg Take 2 tablets (17.2 mg total) by mouth daily for 5 days Do not start before August 12, 2023.     traZODone (DESYREL) 100 mg tablet Take 100 mg by mouth daily at bedtime (Patient not taking: Reported on 10/18/2023)    UNKNOWN TO PATIENT     zolpidem (AMBIEN) 10 mg tablet Take 10 mg by mouth daily at bedtime (Patient not taking: Reported on 10/18/2023)    [DISCONTINUED] bacitracin-neomycin-polymyxin b-hydrocortisone 1 % ointment Apply topically 2 (two) times a day     Allergies   Allergen Reactions    Aspirin Shortness Of Breath    Penicillins      Immunization History   Administered Date(s) Administered    INFLUENZA 10/10/2011, 12/03/2012, 01/26/2017, 08/17/2020    Influenza, injectable, quadrivalent, preservative free 0.5 mL 10/16/2019, 08/17/2020    Influenza, recombinant, quadrivalent,injectable, preservative free 09/11/2019    Influenza, seasonal, injectable 11/01/2013    Pneumococcal Polysaccharide PPV23 10/07/2010, 01/26/2017, 05/05/2019    Tdap 06/06/2018    Zoster Vaccine Recombinant 03/12/2019, 10/16/2019 Objective     /72 (BP Location: Left arm, Patient Position: Sitting, Cuff Size: Adult)   Pulse 76   Temp 98.3 °F (36.8 °C) (Temporal)   Resp 16   Ht 5' 8" (1.727 m)   Wt 73.1 kg (161 lb 3.2 oz)   SpO2 98%   BMI 24.51 kg/m²     Physical Exam  Constitutional:       General: He is not in acute distress. Appearance: He is well-developed. He is not ill-appearing, toxic-appearing or diaphoretic. HENT:      Head: Normocephalic and atraumatic. Eyes:      Extraocular Movements: Extraocular movements intact. Conjunctiva/sclera: Conjunctivae normal.      Pupils: Pupils are equal, round, and reactive to light. Neck:      Thyroid: No thyromegaly. Cardiovascular:      Rate and Rhythm: Normal rate and regular rhythm. Heart sounds: Normal heart sounds. No murmur heard. Pulmonary:      Effort: Pulmonary effort is normal. No respiratory distress. Breath sounds: Normal breath sounds. No wheezing. Abdominal:      General: Bowel sounds are normal. There is no distension. Palpations: Abdomen is soft. Tenderness: There is no abdominal tenderness. Musculoskeletal:         General: No swelling or tenderness. Normal range of motion. Cervical back: Normal range of motion and neck supple. No rigidity or tenderness. Lymphadenopathy:      Cervical: No cervical adenopathy. Skin:     General: Skin is warm and dry. Neurological:      General: No focal deficit present. Mental Status: He is alert and oriented to person, place, and time. Psychiatric:         Mood and Affect: Mood normal.         Behavior: Behavior normal.         Thought Content:  Thought content normal.         Judgment: Judgment normal.       STEVE Pringle

## 2023-10-19 ENCOUNTER — TELEPHONE (OUTPATIENT)
Dept: FAMILY MEDICINE CLINIC | Facility: CLINIC | Age: 63
End: 2023-10-19

## 2023-10-19 ENCOUNTER — TELEPHONE (OUTPATIENT)
Dept: HEMATOLOGY ONCOLOGY | Facility: CLINIC | Age: 63
End: 2023-10-19

## 2023-10-19 NOTE — TELEPHONE ENCOUNTER
Patient called and left this message:     Yes, good morning. This is Jackie Clavin I was there yesterday for an appointment. I showed the doctor whole bunch of rashes and scratches and stuff that are coming out all over my body. I'm surprised she didn't even give me a cream, but I need a large amount of that same cream that she's been giving me. But I need a large tube because they're giving me these little tubes.

## 2023-10-19 NOTE — TELEPHONE ENCOUNTER
I called Neil Carrasco in response to a referral that was received for patient to establish care with Hematology. Outreach was made to schedule a consultation. I left a voicemail explaining the reason for my call and advised patient to call [facility] at [phone]. Another attempt will be made to contact patient.

## 2023-10-23 DIAGNOSIS — T14.8XXA OPEN WOUND OF SKIN: ICD-10-CM

## 2023-10-23 RX ORDER — BACITRACIN ZINC AND POLYMYXIN B SULFATE 500; 1000 [USP'U]/G; [USP'U]/G
OINTMENT TOPICAL 2 TIMES DAILY
Qty: 30 G | Refills: 1 | Status: SHIPPED | OUTPATIENT
Start: 2023-10-23 | End: 2023-11-29 | Stop reason: SDUPTHER

## 2023-10-24 ENCOUNTER — TELEPHONE (OUTPATIENT)
Dept: HEMATOLOGY ONCOLOGY | Facility: CLINIC | Age: 63
End: 2023-10-24

## 2023-10-24 NOTE — TELEPHONE ENCOUNTER
I called Arben Griffin in response to a referral that was received for patient to establish care with Hematology. Outreach was made to schedule a consultation. A consultation was scheduled for patient during this call.  Patient is scheduled on 11/22/23 at 9:00Am with Yesica Peguero at the [facility]

## 2023-10-26 ENCOUNTER — TELEPHONE (OUTPATIENT)
Dept: INTERNAL MEDICINE CLINIC | Facility: CLINIC | Age: 63
End: 2023-10-26

## 2023-10-26 NOTE — TELEPHONE ENCOUNTER
David stopped in the office. The cream you ordered is working but the tube is small and he has it on multiple areas. Can you prescribe and give a larger tube? He has spots on multiple areas. Spoke with patient after reviewing medication list and I informed him you send medication to pharmacy on Monday. He was not aware. Thank you.

## 2023-11-22 ENCOUNTER — OFFICE VISIT (OUTPATIENT)
Dept: HEMATOLOGY ONCOLOGY | Facility: CLINIC | Age: 63
End: 2023-11-22
Payer: COMMERCIAL

## 2023-11-22 ENCOUNTER — TELEPHONE (OUTPATIENT)
Dept: HEMATOLOGY ONCOLOGY | Facility: CLINIC | Age: 63
End: 2023-11-22

## 2023-11-22 VITALS
DIASTOLIC BLOOD PRESSURE: 62 MMHG | TEMPERATURE: 98 F | WEIGHT: 172 LBS | HEIGHT: 68 IN | OXYGEN SATURATION: 96 % | SYSTOLIC BLOOD PRESSURE: 98 MMHG | HEART RATE: 96 BPM | BODY MASS INDEX: 26.07 KG/M2 | RESPIRATION RATE: 16 BRPM

## 2023-11-22 DIAGNOSIS — D50.8 IRON DEFICIENCY ANEMIA SECONDARY TO INADEQUATE DIETARY IRON INTAKE: ICD-10-CM

## 2023-11-22 DIAGNOSIS — D50.9 IRON DEFICIENCY ANEMIA, UNSPECIFIED IRON DEFICIENCY ANEMIA TYPE: ICD-10-CM

## 2023-11-22 DIAGNOSIS — Z98.84 S/P GASTRIC BYPASS: Primary | ICD-10-CM

## 2023-11-22 DIAGNOSIS — E53.8 B12 DEFICIENCY: ICD-10-CM

## 2023-11-22 PROCEDURE — 99243 OFF/OP CNSLTJ NEW/EST LOW 30: CPT | Performed by: PHYSICIAN ASSISTANT

## 2023-11-22 RX ORDER — CYANOCOBALAMIN 1000 UG/ML
1000 INJECTION, SOLUTION INTRAMUSCULAR; SUBCUTANEOUS ONCE
OUTPATIENT
Start: 2023-11-29 | End: 2023-11-29

## 2023-11-22 RX ORDER — SODIUM CHLORIDE 9 MG/ML
20 INJECTION, SOLUTION INTRAVENOUS ONCE
OUTPATIENT
Start: 2023-11-29

## 2023-11-22 NOTE — TELEPHONE ENCOUNTER
While we try to accommodate patient requests, our priority is to schedule treatment according to Doctor's orders and site availability. Does the Provider use the intake sheet or checkout note? What would be a preferred day of the week that would work best for your infusion appointment? Thursday   Do you prefer mornings or afternoons for your appointments? Mid mornings   Are there any days or dates that do not work for your schedule, including any upcoming vacations? We are going to try our best to schedule you at the infusion center closest to your home. In the event that we are unable to what would be your next preferred infusion site or sites? 1. [facility]   Patient will wait for availability does not want to travel to another location. Do you have transportation to take you to all of your appointments?  Yes

## 2023-11-22 NOTE — PROGRESS NOTES
Oncology Outpatient Consult Note  Princess Chapin. 66 y.o. male MRN: @ Encounter: [id number]        Date:  11/22/2023      Assessment/ Plan:    Anemia. B12 and iron deficiency in a patient who has undergone gastric bypass surgery. September 14, 2023 Hemoglobin 8.5, MCV 85    B12 221; iron saturation 11%, ferritin 7    Discussed that given his prior history of gastric bypass surgery, he does not absorb these vitamins well. We discussed parenteral supplementation. Potential side effects of IV iron could include but may not be limited to:  change in taste, diarrhea, muscle cramps, nausea or vomiting, pain in the arms or legs, pain or burning sensation in the injection site, allergic reaction. The patient verbalized understanding and wishes to proceed. Venofer 300 mg and B12 1000 mcg IM weekly x 5 requested. Follow-up in 6 months with repeat labs. 2.  Reports skin rash. Discussed possibility of dermatology evaluation. Patient to f/u with PCP who had discussed as well. HPI:  Princess Chapin. 'Nidia Wu' is a 66 y.o. seen for initial consultation 11/22/2023 at the referral of [place], [place]* accompanied by his sister, [facility], regarding anemia. Past medical history of ADD, bipolar disorder, macular degeneration, hyperlipidemia, chronic left leg ulcer, vitamin D deficiency, narcotic abuse, methadone use, osteoarthritis, hypertension, atrial fibrillation on chronic anticoagulation, allergic rhinitis. He is status post lap RYGB by Dr. Shabana Sanchez on 11/8/2011, followed by incisional hernia repair with mesh of a periumbilical port site hernia in 2013     Patient is a poor historian. 11/2019 B12 level 486, hemoglobin 11.9    11/2020 hemoglobin 10.9    April 2021 B12 322, ferritin 8 August 2022 hemoglobin 10.5    February 2023 hemoglobin 8.8      8/9/23 CT C/A/P s/p fall compared to 4/14/2021 CT A/P- Nondisplaced fractures of the left 9th through 11th ribs.  Several stable sub-5 mm right lung nodules noted. Stable 1.4 cm right adrenal nodule. Although its imaging features on this study is indeterminate, because this lesion has been stable for > 1 year, it is considered benign. September 14, 2023 B12 221; iron saturation 11%, ferritin 7    Hemoglobin 8.5, MCV 85, white blood cell count 5.85, normal differential, platelets 597    Total protein 5.7, albumin 3.1, calcium 8.2, glucose 274, AST 82    He underwent colonoscopy May 2020 by Dr. Jose Cruz Suarez. One benign-appearing polyp measuring smaller than 5 mm in the transverse colon; performed complete en bloc removal by cold biopsy     Repeat colonoscopy was requested June 2023    Denies any melena, hematochezia, hematuria or other source of bleeding. Review of Systems   Constitutional:  Positive for appetite change (decreased appetite) and fatigue. Negative for chills, diaphoresis, fever and unexpected weight change. HENT:   Negative for mouth sores, nosebleeds, sore throat, tinnitus and voice change. Eyes:  Negative for eye problems. Respiratory:  Negative for chest tightness, cough, shortness of breath and wheezing. Cardiovascular:  Negative for chest pain, leg swelling and palpitations. Gastrointestinal:  Positive for abdominal pain. Negative for abdominal distention, blood in stool, constipation, diarrhea, nausea, rectal pain and vomiting. Endocrine: Negative for hot flashes. Genitourinary: Negative. Musculoskeletal:  Positive for arthralgias, back pain and gait problem. Negative for myalgias. Skin:  Positive for rash. Negative for itching. Neurological:  Positive for dizziness and gait problem. Negative for headaches, light-headedness and numbness. Hematological:  Negative for adenopathy. Psychiatric/Behavioral:  Positive for sleep disturbance. Negative for confusion. The patient is not nervous/anxious.          Test Results:      Labs:   Lab Results   Component Value Date    HGB 8.5 (L) 09/14/2023    HCT 27.4 (L) 09/14/2023    MCV 85 09/14/2023     09/14/2023    WBC 5.85 09/14/2023    NRBC 0 09/14/2023     Lab Results   Component Value Date     01/29/2015    K 3.7 09/14/2023     09/14/2023    CO2 33 (H) 09/14/2023    ANIONGAP 2 (L) 01/29/2015    BUN 14 09/14/2023    CREATININE 1.02 09/14/2023    GLUCOSE 375 (H) 08/17/2022    GLUF 181 (H) 06/29/2023    CALCIUM 8.2 (L) 09/14/2023    CORRECTEDCA 8.9 09/14/2023    AST 82 (H) 09/14/2023    ALT 35 09/14/2023    ALKPHOS 81 09/14/2023    PROT 7.7 01/29/2015    BILITOT 0.27 01/29/2015    EGFR 78 09/14/2023           Imaging: . No results found.           Active Problems:   Patient Active Problem List   Diagnosis    ADD (attention deficit disorder) without hyperactivity    Allergic rhinitis    Atrial fibrillation (HCC)    Benign prostatic hyperplasia without lower urinary tract symptoms    Bipolar disorder (HCC)    Chronic anticoagulation    Generalized anxiety disorder    GERD without esophagitis    Benign essential hypertension    Insomnia    Iron deficiency anemia    Macular degeneration    Major depression, chronic    Mixed hyperlipidemia    Complex medical condition    Spinal stenosis    Stable proliferative diabetic retinopathy of left eye associated with type 2 diabetes mellitus (HCC)    Type 2 diabetes mellitus with ophthalmic complication, with long-term current use of insulin (HCC)    Ulcer of leg, chronic, left (HCC)    Vitamin D deficiency    History of hepatitis C    Narcotic abuse (HCC)    Rectal prolapse    Drug-induced constipation    Hemorrhoids, internal    S/P gastric bypass    Hypogonadism male    Pre-operative general physical examination    Examination for medicolegal reason    Thoracic degenerative disc disease    DDD (degenerative disc disease), lumbar    Liver lesion, right lobe    Lung nodule    Adrenal nodule (HCC)    Methadone dependence ([address])    Encephalopathy    Fall    Closed fracture of multiple ribs of left side    Acute pain due to trauma    Bradycardia    Retinopathy due to secondary DM ([address])    Visual changes       Past Medical History:   Past Medical History:   Diagnosis Date    Anxiety     Depression     Diabetes mellitus ([address])     Drug use     Hemorrhoids, internal 9/23/2019    Hypertension        Surgical History:   Past Surgical History:   Procedure Laterality Date    FEMUR FRACTURE SURGERY Right     GASTRIC BYPASS  11/08/2011    Managed by: Nasir Butler (General Surgery)    HERNIA REPAIR  02/13/2013    Incisional hernia repair Managed by: Nasir Butler (General Surgery)    TONSILLECTOMY  2010       Family History:    Family History   Problem Relation Age of Onset    Diabetes Mother     Hypertension Mother     Hypertension Father     Autoimmune disease Son        Cancer-related family history is not on file.     Social History:   Social History     Socioeconomic History    Marital status: Legally      Spouse name: Not on file    Number of children: Not on file    Years of education: Not on file    Highest education level: Not on file   Occupational History    Not on file   Tobacco Use    Smoking status: Never    Smokeless tobacco: Never   Vaping Use    Vaping Use: Never used   Substance and Sexual Activity    Alcohol use: Never    Drug use: Yes     Comment: Methadone clinic    Sexual activity: Not Currently   Other Topics Concern    Not on file   Social History Narrative    Not on file     Social Determinants of Health     Financial Resource Strain: Low Risk  (3/16/2021)    Overall Financial Resource Strain (CARDIA)     Difficulty of Paying Living Expenses: Not hard at all   Food Insecurity: No Food Insecurity (3/16/2021)    Hunger Vital Sign     Worried About Running Out of Food in the Last Year: Never true     [address] in the Last Year: Never true   Transportation Needs: No Transportation Needs (3/16/2021)    PRAPARE - Transportation     Lack of Transportation (Medical): No     Lack of Transportation (Non-Medical): No   Physical Activity: Inactive (3/16/2021)    Exercise Vital Sign     Days of Exercise per Week: 0 days     Minutes of Exercise per Session: 0 min   Stress: No Stress Concern Present (3/16/2021)    [facility]     Feeling of Stress :  Only a little   Social Connections: Unknown (3/16/2021)    Social Connection and Isolation Panel [NHANES]     Frequency of Communication with Friends and Family: Patient refused     Frequency of Social Gatherings with Friends and Family: Patient refused     Attends Taoist Services: Patient refused     Active Member of Clubs or Organizations: Patient refused     Attends Club or Organization Meetings: Patient refused     Marital Status: Patient refused   Intimate Partner Violence: Unknown (3/16/2021)    Humiliation, Afraid, Rape, and Kick questionnaire     Fear of Current or Ex-Partner: Patient refused     Emotionally Abused: Patient refused     Physically Abused: Patient refused     Sexually Abused: Patient refused   Housing Stability: Not on file       Current Medications:   Current Outpatient Medications   Medication Sig Dispense Refill    acetaminophen (TYLENOL) 325 mg tablet Take 2 tablets (650 mg total) by mouth every 4 (four) hours as needed for mild pain  0    bacitracin-polymyxin b (POLYSPORIN) ointment Apply topically 2 (two) times a day 30 g 1    clonazePAM (KlonoPIN) 1 mg tablet Take 1 mg by mouth 2 (two) times a day as needed      DULoxetine (CYMBALTA) 60 mg delayed release capsule Take 60 mg by mouth in the morning      fluticasone (FLONASE) 50 mcg/act nasal spray 1 spray into each nostril daily 16 g 5    metFORMIN (GLUCOPHAGE) 850 mg tablet Take 1 tablet (850 mg total) by mouth daily with breakfast (Patient taking differently: Take 850 mg by mouth 2 (two) times a day with meals) 90 tablet 1    methadone (DOLOPHINE) 5 mg tablet Take 165 mg by mouth daily      zolpidem (AMBIEN) 10 mg tablet Take 10 mg by mouth daily at bedtime      benzocaine-menthol-lanolin-aloe (DERMOPLAST) 20-0.5 % topical spray Apply 1 Application topically 2 (two) times a day as needed for mild pain for up to 10 days 56 g 0    Caplyta 42 MG CAPS capsule Take 42 mg by mouth daily at bedtime (Patient not taking: Reported on 10/18/2023)      clonazePAM (KlonoPIN) 0.5 mg tablet Take 1 tablet (0.5 mg total) by mouth 3 (three) times a day for 14 days 42 tablet 0    Continuous Blood Gluc  (FreeStyle Grace 14 Day [place]) STEVE 1 Device by Device route 3 (three) times a day before meals (Patient not taking: Reported on 10/18/2023) 1 each 0    Continuous Blood Gluc  (FreeStyle [place] 2 [place]) STEVE 1 Device by Device route 3 (three) times a day before meals (Patient not taking: Reported on 10/18/2023) 1 each 0    divalproex sodium (DEPAKOTE ER) 500 mg 24 hr tablet Take 1 tablet (500 mg total) by mouth every 12 (twelve) hours 60 tablet 5    docusate sodium (COLACE) 100 mg capsule Take 1 capsule (100 mg total) by mouth 2 (two) times a day for 5 days 10 capsule 0    ergocalciferol (ERGOCALCIFEROL) 1.25 MG (52686 UT) capsule Take 50,000 Units by mouth      GLOBAL EASE INJECT PEN NEEDLES 31G X 8 MM MISC USE AS DIRECTED TO INJECT INSULIN DAILY  5    Incontinence Supply Disposable (INCONTINENCE BRIEF MEDIUM) MISC by Does not apply route as needed (fecal soiling) 90 each 5    Incontinence Supply Disposable (RA WIPES FLUSHABLE/MOIST) MISC by Does not apply route as needed (fecal soiling) 100 each 5    Insulin Pen Needle (B-D UF III MINI PEN NEEDLES) 31G X 5 MM MISC Inject 15 Units under the skin daily at bedtime 100 each 5    Lidocaine 4 % PTCH Apply 1 patch topically daily (Patient not taking: Reported on 11/22/2023)  0    methocarbamol (ROBAXIN) 500 mg tablet Take 1 tablet (500 mg total) by mouth every 6 (six) hours for 14 days 56 tablet 0    Misc.  Devices (CANE) MISC by Does not apply route daily Dx:  Frequent falls 1 each 0    polyethylene glycol (GOLYTELY) 4000 mL solution Take 4,000 mL by mouth once for 1 dose As directed for colonoscopy prep 4000 mL 0    polyethylene glycol (MIRALAX) 17 g packet Take 17 g by mouth daily for 5 days 85 g 0    senna (SENOKOT) 8.6 mg Take 2 tablets (17.2 mg total) by mouth daily for 5 days Do not start before August 12, 2023. 10 tablet 0    traZODone (DESYREL) 100 mg tablet Take 100 mg by mouth daily at bedtime (Patient not taking: Reported on 10/18/2023)      UNKNOWN TO PATIENT        No current facility-administered medications for this visit. Allergies: Allergies   Allergen Reactions    Aspirin Shortness Of Breath    Penicillins          Physical Exam:    Body surface area is 1.92 meters squared. Ht Readings from Last 3 Encounters:   11/22/23 5' 8" (1.727 m)   10/18/23 5' 8" (1.727 m)   08/24/23 5' 8" (1.727 m)       Wt Readings from Last 3 Encounters:   11/22/23 78 kg (172 lb)   10/18/23 73.1 kg (161 lb 3.2 oz)   09/22/23 73.9 kg (163 lb)        Temp Readings from Last 3 Encounters:   11/22/23 98 °F (36.7 °C) (Temporal)   10/18/23 98.3 °F (36.8 °C) (Temporal)   09/25/23 98.8 °F (37.1 °C) (Temporal)        BP Readings from Last 3 Encounters:   11/22/23 98/62   10/18/23 127/72   09/25/23 155/91         Pulse Readings from Last 3 Encounters:   11/22/23 96   10/18/23 76   09/25/23 88          Physical Exam    Physical Exam  Vitals reviewed. Constitutional:       General: He is not in acute distress. Appearance: He is well-developed. He is not diaphoretic. HENT:      Head: Normocephalic and atraumatic. Eyes:      Conjunctiva/sclera: Conjunctivae normal.   Neck:      Trachea: No tracheal deviation. Cardiovascular:      Rate and Rhythm: Normal rate and regular rhythm. Heart sounds: No murmur heard. No friction rub. No gallop. Pulmonary:      Effort: Pulmonary effort is normal. No respiratory distress. Breath sounds: Normal breath sounds. No wheezing or rales.    Chest:      Chest wall: No tenderness. Abdominal:      General: There is no distension. Palpations: Abdomen is soft. Tenderness: There is no abdominal tenderness. Musculoskeletal:      Cervical back: Normal range of motion and neck supple. Lymphadenopathy:      Cervical: No cervical adenopathy. Skin:     General: Skin is warm and dry. Coloration: Skin is not pale. Findings: No erythema. Neurological:      Mental Status: He is oriented to person, place, and time and easily aroused. He is lethargic. Psychiatric:         Behavior: Behavior normal.         Thought Content:  Thought content normal.         Judgment: Judgment normal.             Emergency Contacts:    Extended Emergency Contact Information  Primary Emergency Contact: Hunter Yunior  Address: [address] [place] [address] of [address] [facility] Phone: [phone]  Mobile Phone: [phone]  Relation: Sister

## 2023-11-22 NOTE — TELEPHONE ENCOUNTER
Please schedule Venofer for patient. Thank you. *Please call his sister, Gildardo Haji at [phone] for infusion appointments.

## 2023-11-22 NOTE — TELEPHONE ENCOUNTER
Called wei x3, per  "unable to complete your call at this time please call again later." Will call Filomena Arias again later today to go over preferences, if call is unable to be completed I will call patient directly.

## 2023-11-29 ENCOUNTER — OFFICE VISIT (OUTPATIENT)
Dept: FAMILY MEDICINE CLINIC | Facility: CLINIC | Age: 63
End: 2023-11-29
Payer: COMMERCIAL

## 2023-11-29 VITALS
BODY MASS INDEX: 26.07 KG/M2 | RESPIRATION RATE: 15 BRPM | OXYGEN SATURATION: 99 % | HEIGHT: 68 IN | DIASTOLIC BLOOD PRESSURE: 72 MMHG | HEART RATE: 62 BPM | WEIGHT: 172 LBS | SYSTOLIC BLOOD PRESSURE: 140 MMHG

## 2023-11-29 DIAGNOSIS — M51.36 DDD (DEGENERATIVE DISC DISEASE), LUMBAR: ICD-10-CM

## 2023-11-29 DIAGNOSIS — F11.20 METHADONE DEPENDENCE (HCC): ICD-10-CM

## 2023-11-29 DIAGNOSIS — T14.8XXA OPEN WOUND OF SKIN: ICD-10-CM

## 2023-11-29 DIAGNOSIS — R21 SKIN RASH: Primary | ICD-10-CM

## 2023-11-29 DIAGNOSIS — S22.42XA CLOSED FRACTURE OF MULTIPLE RIBS OF LEFT SIDE, INITIAL ENCOUNTER: ICD-10-CM

## 2023-11-29 PROCEDURE — 99214 OFFICE O/P EST MOD 30 MIN: CPT | Performed by: NURSE PRACTITIONER

## 2023-11-29 RX ORDER — BACITRACIN ZINC AND POLYMYXIN B SULFATE 500; 1000 [USP'U]/G; [USP'U]/G
OINTMENT TOPICAL 2 TIMES DAILY
Qty: 30 G | Refills: 1 | Status: SHIPPED | OUTPATIENT
Start: 2023-11-29

## 2023-11-29 RX ORDER — DOXEPIN HYDROCHLORIDE 100 MG/1
100 CAPSULE ORAL
COMMUNITY
Start: 2023-11-14

## 2023-11-29 RX ORDER — METHOCARBAMOL 500 MG/1
500 TABLET, FILM COATED ORAL EVERY 8 HOURS PRN
Qty: 20 TABLET | Refills: 0 | Status: SHIPPED | OUTPATIENT
Start: 2023-11-29

## 2023-11-29 NOTE — ASSESSMENT & PLAN NOTE
I agreed to fill Robaxin 500 mg #20 tablets for prn use  Pt will need to follow up with Ortho and/or Pain Management if he continues with back pain and spasms  He declined a PT referral today   Recommended moist heat to back

## 2023-11-29 NOTE — PROGRESS NOTES
Name: Lisa Owen. : 1960      MRN: [id number]  Encounter Provider: STEVE Pringle  Encounter Date: 2023   Encounter department: [address] [place]     1. Skin rash  Comments:  Schedule Derm eval.  Avoid hot water with bathing. Recommended Eucerin cream daily. Do not scratch/pick at skin  Orders:  -     Ambulatory Referral to Dermatology; Future    2. Open wound of skin  -     bacitracin-polymyxin b (POLYSPORIN) ointment; Apply topically 2 (two) times a day    3. Closed fracture of multiple ribs of left side, initial encounter  -     methocarbamol (ROBAXIN) 500 mg tablet; Take 1 tablet (500 mg total) by mouth every 8 (eight) hours as needed for muscle spasms    4. DDD (degenerative disc disease), lumbar  Assessment & Plan:  I agreed to fill Robaxin 500 mg #20 tablets for prn use  Pt will need to follow up with Ortho and/or Pain Management if he continues with back pain and spasms  He declined a PT referral today   Recommended moist heat to back       5. Methadone dependence ([address])  Assessment & Plan:  Managed by Alyssa HANSEN    Pt presents by himself today to have a [employer] service paperwork completed. Pt would like the bus to pick him up directly at his home due to his chronic back pain/DDD. He uses a cane for ambulation and feels he cannot stand at a bus stop for longer than a few minutes waiting for a bus    Pt also c/o a chronic and diffuse rash on his body (arms, legs, torso). He notes he frequently gets scabs and then picks at these. Unclear what the trigger of the rash is. Denies changes to soaps, lotions, creams, medications, food. He has used Bacitracin ointment in the past with some relief    Lastly, pt is requesting a refill on Robaxin that he originally received in 2023 in the ED following a fall. He does have chronic back pain/ DDD. He has seen Ortho in the past, not recently.   Of note, pt currently follows with a Methadone clinic and gets Ambien and Clonazepam from his Psychiatrist     Review of Systems   Constitutional:  Negative for chills and fever. HENT:  Negative for ear pain and sore throat. Eyes:  Negative for pain and visual disturbance. Respiratory:  Negative for cough, shortness of breath and wheezing. Cardiovascular:  Negative for chest pain, palpitations and leg swelling. Gastrointestinal:  Negative for abdominal pain, blood in stool, constipation, diarrhea, nausea and vomiting. Genitourinary:  Negative for dysuria and hematuria. Musculoskeletal:  Positive for arthralgias, back pain and gait problem. Negative for joint swelling, myalgias, neck pain and neck stiffness. Skin:  Positive for rash. Negative for color change. Neurological:  Negative for seizures and syncope. Hematological:  Negative for adenopathy. Does not bruise/bleed easily. Psychiatric/Behavioral:  Positive for dysphoric mood and sleep disturbance. Negative for self-injury and suicidal ideas. The patient is nervous/anxious. All other systems reviewed and are negative.       Past Medical History:   Diagnosis Date    Anxiety     Depression     Diabetes mellitus ([address])     Drug use     Hemorrhoids, internal 9/23/2019    Hypertension      Past Surgical History:   Procedure Laterality Date    FEMUR FRACTURE SURGERY Right     GASTRIC BYPASS  11/08/2011    Managed by: Gordon Miramontes (General Surgery)    HERNIA REPAIR  02/13/2013    Incisional hernia repair Managed by: Gordon Miramontes (General Surgery)    TONSILLECTOMY  2010     Family History   Problem Relation Age of Onset    Diabetes Mother     Hypertension Mother     Hypertension Father     Autoimmune disease Son      Social History     Socioeconomic History    Marital status: Legally      Spouse name: Not on file    Number of children: Not on file    Years of education: Not on file    Highest education level: Not on file Occupational History    Not on file   Tobacco Use    Smoking status: Never    Smokeless tobacco: Never   Vaping Use    Vaping Use: Never used   Substance and Sexual Activity    Alcohol use: Never    Drug use: Yes     Comment: Methadone clinic    Sexual activity: Not Currently   Other Topics Concern    Not on file   Social History Narrative    Not on file     Social Determinants of Health     Financial Resource Strain: Low Risk  (3/16/2021)    Overall Financial Resource Strain (CARDIA)     Difficulty of Paying Living Expenses: Not hard at all   Food Insecurity: No Food Insecurity (3/16/2021)    Hunger Vital Sign     Worried About Running Out of Food in the Last Year: Never true     [address] in the Last Year: Never true   Transportation Needs: No Transportation Needs (3/16/2021)    PRAPARE - Transportation     Lack of Transportation (Medical): No     Lack of Transportation (Non-Medical): No   Physical Activity: Inactive (3/16/2021)    Exercise Vital Sign     Days of Exercise per Week: 0 days     Minutes of Exercise per Session: 0 min   Stress: No Stress Concern Present (3/16/2021)    [facility]     Feeling of Stress :  Only a little   Social Connections: Unknown (3/16/2021)    Social Connection and Isolation Panel [NHANES]     Frequency of Communication with Friends and Family: Patient refused     Frequency of Social Gatherings with Friends and Family: Patient refused     Attends [employer]ism Services: Patient refused     Active Member of Clubs or Organizations: Patient refused     Attends Club or Organization Meetings: Patient refused     Marital Status: Patient refused   Intimate Partner Violence: Unknown (3/16/2021)    Humiliation, Afraid, Rape, and Kick questionnaire     Fear of Current or Ex-Partner: Patient refused     Emotionally Abused: Patient refused     Physically Abused: Patient refused     Sexually Abused: Patient refused   Housing Stability: Not on file     Current Outpatient Medications on File Prior to Visit   Medication Sig    doxepin (SINEquan) 100 mg capsule Take 100 mg by mouth daily at bedtime    acetaminophen (TYLENOL) 325 mg tablet Take 2 tablets (650 mg total) by mouth every 4 (four) hours as needed for mild pain    benzocaine-menthol-lanolin-aloe (DERMOPLAST) 20-0.5 % topical spray Apply 1 Application topically 2 (two) times a day as needed for mild pain for up to 10 days    Caplyta 42 MG CAPS capsule Take 42 mg by mouth daily at bedtime (Patient not taking: Reported on 10/18/2023)    clonazePAM (KlonoPIN) 0.5 mg tablet Take 1 tablet (0.5 mg total) by mouth 3 (three) times a day for 14 days    clonazePAM (KlonoPIN) 1 mg tablet Take 1 mg by mouth 2 (two) times a day as needed    Continuous Blood Gluc  (FreeStyle Rg[place] 14 Day [place]) STEVE 1 Device by Device route 3 (three) times a day before meals (Patient not taking: Reported on 10/18/2023)    Continuous Blood Gluc  (FreeStyle [place] 2 [place]) STEVE 1 Device by Device route 3 (three) times a day before meals (Patient not taking: Reported on 10/18/2023)    divalproex sodium (DEPAKOTE ER) 500 mg 24 hr tablet Take 1 tablet (500 mg total) by mouth every 12 (twelve) hours    docusate sodium (COLACE) 100 mg capsule Take 1 capsule (100 mg total) by mouth 2 (two) times a day for 5 days    DULoxetine (CYMBALTA) 60 mg delayed release capsule Take 60 mg by mouth in the morning    ergocalciferol (ERGOCALCIFEROL) 1.25 MG (77022 UT) capsule Take 50,000 Units by mouth    fluticasone (FLONASE) 50 mcg/act nasal spray 1 spray into each nostril daily    GLOBAL EASE INJECT PEN NEEDLES 31G X 8 MM MISC USE AS DIRECTED TO INJECT INSULIN DAILY    Incontinence Supply Disposable (INCONTINENCE BRIEF MEDIUM) MISC by Does not apply route as needed (fecal soiling)    Incontinence Supply Disposable (RA WIPES FLUSHABLE/MOIST) MISC by Does not apply route as needed (fecal soiling)    Insulin Pen Needle (B-D UF III MINI PEN NEEDLES) 31G X 5 MM MISC Inject 15 Units under the skin daily at bedtime    Lidocaine 4 % PTCH Apply 1 patch topically daily (Patient not taking: Reported on 11/22/2023)    metFORMIN (GLUCOPHAGE) 850 mg tablet Take 1 tablet (850 mg total) by mouth daily with breakfast (Patient taking differently: Take 850 mg by mouth 2 (two) times a day with meals)    methadone (DOLOPHINE) 5 mg tablet Take 165 mg by mouth daily    Misc. Devices (CANE) MISC by Does not apply route daily Dx:  Frequent falls    polyethylene glycol (GOLYTELY) 4000 mL solution Take 4,000 mL by mouth once for 1 dose As directed for colonoscopy prep    polyethylene glycol (MIRALAX) 17 g packet Take 17 g by mouth daily for 5 days    senna (SENOKOT) 8.6 mg Take 2 tablets (17.2 mg total) by mouth daily for 5 days Do not start before August 12, 2023.     traZODone (DESYREL) 100 mg tablet Take 100 mg by mouth daily at bedtime (Patient not taking: Reported on 10/18/2023)    UNKNOWN TO PATIENT     zolpidem (AMBIEN) 10 mg tablet Take 10 mg by mouth daily at bedtime    [DISCONTINUED] bacitracin-neomycin-polymyxin b-hydrocortisone 1 % ointment Apply topically 2 (two) times a day    [DISCONTINUED] bacitracin-polymyxin b (POLYSPORIN) ointment Apply topically 2 (two) times a day    [DISCONTINUED] methocarbamol (ROBAXIN) 500 mg tablet Take 1 tablet (500 mg total) by mouth every 6 (six) hours for 14 days     Allergies   Allergen Reactions    Aspirin Shortness Of Breath    Penicillins      Immunization History   Administered Date(s) Administered    INFLUENZA 10/10/2011, 12/03/2012, 01/26/2017, 08/17/2020    Influenza, injectable, quadrivalent, preservative free 0.5 mL 10/16/2019, 08/17/2020    Influenza, recombinant, quadrivalent,injectable, preservative free 09/11/2019    Influenza, seasonal, injectable 11/01/2013    Pneumococcal Polysaccharide PPV23 10/07/2010, 01/26/2017, 05/05/2019    Tdap 06/06/2018    Zoster Vaccine Recombinant 03/12/2019, 10/16/2019       Objective     /72   Pulse 62   Resp 15   Ht 5' 8" (1.727 m)   Wt 78 kg (172 lb)   SpO2 99%   BMI 26.15 kg/m²     Physical Exam  Constitutional:       General: He is not in acute distress. Appearance: He is well-developed. He is not ill-appearing, toxic-appearing or diaphoretic. HENT:      Head: Normocephalic and atraumatic. Eyes:      Conjunctiva/sclera: Conjunctivae normal.      Pupils: Pupils are equal, round, and reactive to light. Neck:      Thyroid: No thyromegaly. Cardiovascular:      Rate and Rhythm: Normal rate and regular rhythm. Heart sounds: Normal heart sounds. No murmur heard. Pulmonary:      Effort: Pulmonary effort is normal. No respiratory distress. Breath sounds: Normal breath sounds. No wheezing. Abdominal:      General: Bowel sounds are normal. There is no distension. Palpations: Abdomen is soft. Tenderness: There is no abdominal tenderness. Musculoskeletal:         General: Normal range of motion. Cervical back: Normal range of motion and neck supple. No rigidity or tenderness. Lymphadenopathy:      Cervical: No cervical adenopathy. Skin:     General: Skin is warm and dry. Comments: Several diffuse scratch marks and scabs over all extremities (worse on arms) and upper torso. No drainage or surrounding erythema    Neurological:      General: No focal deficit present. Mental Status: He is alert and oriented to person, place, and time. Psychiatric:         Attention and Perception: He does not perceive visual hallucinations. Mood and Affect: Mood is not anxious. Affect is flat.       Comments: Pt frequently closes his eyes during visit while speaking        STEVE Rizo

## 2023-12-01 ENCOUNTER — TELEPHONE (OUTPATIENT)
Dept: FAMILY MEDICINE CLINIC | Facility: CLINIC | Age: 63
End: 2023-12-01

## 2023-12-05 ENCOUNTER — DOCUMENTATION (OUTPATIENT)
Dept: HEMATOLOGY ONCOLOGY | Facility: CLINIC | Age: 63
End: 2023-12-05

## 2023-12-05 NOTE — TELEPHONE ENCOUNTER
Message was left today    Jennifer Singer [birth year], [id number], [phone] This is regarding the muscle relaxer gabapentin tablets, not capsules, tablet. That's what I mean. I called and left a message for the set and I guess he hasn't gotten back to me. But nothing has been done. OK, please give me a call. Thank you. Bye.       Please advise

## 2023-12-05 NOTE — PROGRESS NOTES
Oncology Finance Advocacy Intake and Intervention  Oncology Finance Counselor/Advocate placed call to patient. This writer informed patient that this writer is here to assist patient with billing questions, financial assistance, payment/payment plans, quotes, copayment assistance, insurance optimization, and insurance navigation.    This writer conducted a thorough benefit review of copayment, deductible, and out of pocket cost. This information is documented below and has been reviewed with patient.     Copayment: NONE  Deductible: NONE  Out of Pocket Cost: NONE  Insurance optimization (Limited benefit vs self-pay): PT HAS INS  Patient assistance status: NONE  Free Drug Applications: NONE  Oral Chemo Application: NONE  BIN#:  PCN#:  GRP#:  Copay:$  Interventions:  Pt has active [employer]  Called pt & went over the ins benefits. He thanked me for the call & the info  Added to careteam        Information above was review thoroughly with patient and patient was advise of possible assistance programs/interventions. If any question arise patient can contact this writer at below information. This information was given to patient at time of contact.      Caterina Gilbert  Phone:[phone]  Email: [email or web address]

## 2023-12-06 NOTE — TELEPHONE ENCOUNTER
Please let pt know that I do not see Gabapentin on his medication list.  Gabapentin is also not a muscle relaxer, it is for nerve pain. I also looked in his chart under past medications and I still do not see it listed at all. Was this possibly prescribed by a different provider?

## 2023-12-07 NOTE — TELEPHONE ENCOUNTER
Called and spoke with patient, he says he was prescribed this medication while he was in rehab at [facility]. Patient says this medication helps him relax, and helps him sleep.

## 2023-12-08 ENCOUNTER — HOSPITAL ENCOUNTER (OUTPATIENT)
Dept: INFUSION CENTER | Facility: HOSPITAL | Age: 63
End: 2023-12-08
Attending: INTERNAL MEDICINE
Payer: COMMERCIAL

## 2023-12-08 VITALS
HEART RATE: 51 BPM | TEMPERATURE: 97.4 F | RESPIRATION RATE: 18 BRPM | DIASTOLIC BLOOD PRESSURE: 68 MMHG | SYSTOLIC BLOOD PRESSURE: 123 MMHG

## 2023-12-08 DIAGNOSIS — E53.8 B12 DEFICIENCY: Primary | ICD-10-CM

## 2023-12-08 DIAGNOSIS — D50.8 IRON DEFICIENCY ANEMIA SECONDARY TO INADEQUATE DIETARY IRON INTAKE: ICD-10-CM

## 2023-12-08 PROCEDURE — 96372 THER/PROPH/DIAG INJ SC/IM: CPT

## 2023-12-08 PROCEDURE — 96365 THER/PROPH/DIAG IV INF INIT: CPT

## 2023-12-08 PROCEDURE — 96366 THER/PROPH/DIAG IV INF ADDON: CPT

## 2023-12-08 RX ORDER — CYANOCOBALAMIN 1000 UG/ML
1000 INJECTION, SOLUTION INTRAMUSCULAR; SUBCUTANEOUS ONCE
OUTPATIENT
Start: 2023-12-15 | End: 2023-12-15

## 2023-12-08 RX ORDER — SODIUM CHLORIDE 9 MG/ML
20 INJECTION, SOLUTION INTRAVENOUS ONCE
OUTPATIENT
Start: 2023-12-15

## 2023-12-08 RX ORDER — SODIUM CHLORIDE 9 MG/ML
20 INJECTION, SOLUTION INTRAVENOUS ONCE
Status: COMPLETED | OUTPATIENT
Start: 2023-12-08 | End: 2023-12-08

## 2023-12-08 RX ORDER — CYANOCOBALAMIN 1000 UG/ML
1000 INJECTION, SOLUTION INTRAMUSCULAR; SUBCUTANEOUS ONCE
Status: COMPLETED | OUTPATIENT
Start: 2023-12-08 | End: 2023-12-08

## 2023-12-08 RX ADMIN — CYANOCOBALAMIN 1000 MCG: 1000 INJECTION, SOLUTION INTRAMUSCULAR at 10:53

## 2023-12-08 RX ADMIN — SODIUM CHLORIDE 20 ML/HR: 0.9 INJECTION, SOLUTION INTRAVENOUS at 09:14

## 2023-12-08 RX ADMIN — IRON SUCROSE 300 MG: 20 INJECTION, SOLUTION INTRAVENOUS at 09:14

## 2023-12-08 NOTE — PROGRESS NOTES
David Vu.  tolerated treatment well with no complications. Dorene Nance. is aware of future appt on 12/14 at 1000. AVS printed and given to Dorene Nance.:   No (Declined by Dorene Nance.) pt given calendar of appts.

## 2023-12-09 ENCOUNTER — NURSE TRIAGE (OUTPATIENT)
Dept: OTHER | Facility: OTHER | Age: 63
End: 2023-12-09

## 2023-12-09 NOTE — TELEPHONE ENCOUNTER
Regarding: Gabepentin refill request  ----- Message from Nam aGlarza sent at 12/9/2023  1:54 PM EST -----  Pt calling requesting a refill for gabapentin (NEURONTIN) capsule 100 mg. Pt states he is completely out and has been trying to get a refill all week.

## 2023-12-09 NOTE — TELEPHONE ENCOUNTER
Pt states just had appointment and told provider wrong medication name. Would like a new script for Gabapentin. Recommended to call office Monday when open.

## 2023-12-09 NOTE — TELEPHONE ENCOUNTER
Reason for Disposition  • Prescription request for new medicine (not a refill)    Answer Assessment - Initial Assessment Questions  1. NAME of MEDICATION: "What medicine are you calling about?"      Gabapentin    2. QUESTION: "What is your question?" (e.g., medication refill, side effect)      Asking for a new prescription. Was on this about a year ago after discharged from hospital and would like to start taking again.     Protocols used: Medication Question Call-ADULT-

## 2023-12-11 NOTE — TELEPHONE ENCOUNTER
This is not something I would feel comfortable prescribing/ managing for him given the current medication regimen he is taking. Gabapentin is also not a sleep-aid. If he would like to see Pain Management for chronic pain, I can certainly place a referral for him. Composite Graft Text: The defect edges were debeveled with a #15 scalpel blade.  Given the location of the defect, shape of the defect, the proximity to free margins and the fact the defect was full thickness a composite graft was deemed most appropriate.  The defect was outline and then transferred to the donor site.  A full thickness graft was then excised from the donor site. The graft was then placed in the primary defect, oriented appropriately and then sutured into place.  The secondary defect was then repaired using a primary closure.

## 2023-12-21 ENCOUNTER — HOSPITAL ENCOUNTER (OUTPATIENT)
Dept: INFUSION CENTER | Facility: HOSPITAL | Age: 63
Discharge: HOME | End: 2023-12-21
Attending: INTERNAL MEDICINE
Payer: COMMERCIAL

## 2023-12-21 VITALS
DIASTOLIC BLOOD PRESSURE: 80 MMHG | RESPIRATION RATE: 18 BRPM | HEART RATE: 70 BPM | TEMPERATURE: 97 F | SYSTOLIC BLOOD PRESSURE: 152 MMHG

## 2023-12-21 DIAGNOSIS — D50.8 IRON DEFICIENCY ANEMIA SECONDARY TO INADEQUATE DIETARY IRON INTAKE: ICD-10-CM

## 2023-12-21 DIAGNOSIS — E53.8 B12 DEFICIENCY: Primary | ICD-10-CM

## 2023-12-21 PROCEDURE — 96365 THER/PROPH/DIAG IV INF INIT: CPT

## 2023-12-21 PROCEDURE — 96372 THER/PROPH/DIAG INJ SC/IM: CPT

## 2023-12-21 RX ORDER — CYANOCOBALAMIN 1000 UG/ML
1000 INJECTION, SOLUTION INTRAMUSCULAR; SUBCUTANEOUS ONCE
Status: COMPLETED | OUTPATIENT
Start: 2023-12-21 | End: 2023-12-21

## 2023-12-21 RX ORDER — SODIUM CHLORIDE 9 MG/ML
20 INJECTION, SOLUTION INTRAVENOUS ONCE
Status: COMPLETED | OUTPATIENT
Start: 2023-12-21 | End: 2023-12-21

## 2023-12-21 RX ORDER — CYANOCOBALAMIN 1000 UG/ML
1000 INJECTION, SOLUTION INTRAMUSCULAR; SUBCUTANEOUS ONCE
Status: CANCELLED | OUTPATIENT
Start: 2023-12-28 | End: 2023-12-22

## 2023-12-21 RX ORDER — SODIUM CHLORIDE 9 MG/ML
20 INJECTION, SOLUTION INTRAVENOUS ONCE
Status: CANCELLED | OUTPATIENT
Start: 2023-12-28

## 2023-12-21 RX ADMIN — IRON SUCROSE 300 MG: 20 INJECTION, SOLUTION INTRAVENOUS at 10:21

## 2023-12-21 RX ADMIN — CYANOCOBALAMIN 1000 MCG: 1000 INJECTION, SOLUTION INTRAMUSCULAR at 10:22

## 2023-12-21 RX ADMIN — SODIUM CHLORIDE 20 ML/HR: 0.9 INJECTION, SOLUTION INTRAVENOUS at 10:21

## 2023-12-21 NOTE — PROGRESS NOTES
David Skelton Jr.  tolerated treatment well with no complications.      David Skelton Jr. is aware of future appt on 12/28/24 at 1030.     AVS printed and given to David Skelton Jr.:  Yes

## 2023-12-21 NOTE — PLAN OF CARE
Problem: Potential for Falls  Goal: Patient will remain free of falls  Description: INTERVENTIONS:  - Educate patient/family on patient safety including physical limitations  - Instruct patient to call for assistance with activity   - Consult OT/PT to assist with strengthening/mobility   - Keep Call bell within reach  - Keep bed low and locked with side rails adjusted as appropriate  - Keep care items and personal belongings within reach  - Initiate and maintain comfort rounds  - Make Fall Risk Sign visible to staff  - Apply yellow socks and bracelet for high fall risk patients  - Consider moving patient to room near nurses station  Outcome: Progressing

## 2023-12-22 ENCOUNTER — HOSPITAL ENCOUNTER (EMERGENCY)
Facility: HOSPITAL | Age: 63
Discharge: HOME | End: 2023-12-22
Attending: EMERGENCY MEDICINE
Payer: COMMERCIAL

## 2023-12-22 ENCOUNTER — APPOINTMENT (EMERGENCY)
Dept: RADIOLOGY | Facility: HOSPITAL | Age: 63
End: 2023-12-22
Payer: COMMERCIAL

## 2023-12-22 VITALS
SYSTOLIC BLOOD PRESSURE: 164 MMHG | RESPIRATION RATE: 16 BRPM | HEART RATE: 64 BPM | OXYGEN SATURATION: 98 % | DIASTOLIC BLOOD PRESSURE: 76 MMHG | TEMPERATURE: 98.3 F
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
## 2023-12-22 DIAGNOSIS — S39.012A STRAIN OF LUMBAR REGION, INITIAL ENCOUNTER: Primary | ICD-10-CM

## 2023-12-22 PROCEDURE — 99283 EMERGENCY DEPT VISIT LOW MDM: CPT

## 2023-12-22 PROCEDURE — 96372 THER/PROPH/DIAG INJ SC/IM: CPT

## 2023-12-22 PROCEDURE — 99284 EMERGENCY DEPT VISIT MOD MDM: CPT | Performed by: EMERGENCY MEDICINE

## 2023-12-22 PROCEDURE — 72100 X-RAY EXAM L-S SPINE 2/3 VWS: CPT

## 2023-12-22 RX ORDER — GABAPENTIN 300 MG/1
300 CAPSULE ORAL 3 TIMES DAILY
Qty: 42 CAPSULE | Refills: 0 | Status: SHIPPED | OUTPATIENT
Start: 2023-12-22 | End: 2023-12-27 | Stop reason: ALTCHOICE

## 2023-12-22 RX ORDER — METHOCARBAMOL 500 MG/1
500 TABLET, FILM COATED ORAL 2 TIMES DAILY
Qty: 10 TABLET | Refills: 0 | Status: SHIPPED | OUTPATIENT
Start: 2023-12-22 | End: 2023-12-22 | Stop reason: ALTCHOICE

## 2023-12-22 RX ORDER — KETOROLAC TROMETHAMINE 30 MG/ML
15 INJECTION, SOLUTION INTRAMUSCULAR; INTRAVENOUS ONCE
Status: COMPLETED | OUTPATIENT
Start: 2023-12-22 | End: 2023-12-22

## 2023-12-22 RX ADMIN — KETOROLAC TROMETHAMINE 15 MG: 30 INJECTION, SOLUTION INTRAMUSCULAR; INTRAVENOUS at 20:03

## 2023-12-27 RX ORDER — GABAPENTIN 600 MG/1
300 TABLET ORAL 3 TIMES DAILY
Qty: 15 TABLET | Refills: 0 | Status: SHIPPED | OUTPATIENT
Start: 2023-12-27 | End: 2024-01-06

## 2023-12-28 ENCOUNTER — HOSPITAL ENCOUNTER (OUTPATIENT)
Dept: INFUSION CENTER | Facility: HOSPITAL | Age: 63
Discharge: HOME | End: 2023-12-28
Attending: INTERNAL MEDICINE
Payer: COMMERCIAL

## 2023-12-28 VITALS
TEMPERATURE: 97.5 F | HEART RATE: 64 BPM | RESPIRATION RATE: 18 BRPM | DIASTOLIC BLOOD PRESSURE: 78 MMHG | SYSTOLIC BLOOD PRESSURE: 150 MMHG

## 2023-12-28 DIAGNOSIS — D50.8 IRON DEFICIENCY ANEMIA SECONDARY TO INADEQUATE DIETARY IRON INTAKE: ICD-10-CM

## 2023-12-28 DIAGNOSIS — E53.8 B12 DEFICIENCY: Primary | ICD-10-CM

## 2023-12-28 PROCEDURE — 96366 THER/PROPH/DIAG IV INF ADDON: CPT

## 2023-12-28 PROCEDURE — 96365 THER/PROPH/DIAG IV INF INIT: CPT

## 2023-12-28 PROCEDURE — 96372 THER/PROPH/DIAG INJ SC/IM: CPT

## 2023-12-28 RX ORDER — SODIUM CHLORIDE 9 MG/ML
20 INJECTION, SOLUTION INTRAVENOUS ONCE
Status: COMPLETED | OUTPATIENT
Start: 2023-12-28 | End: 2023-12-28

## 2023-12-28 RX ORDER — SODIUM CHLORIDE 9 MG/ML
20 INJECTION, SOLUTION INTRAVENOUS ONCE
Status: CANCELLED | OUTPATIENT
Start: 2024-01-05

## 2023-12-28 RX ORDER — CYANOCOBALAMIN 1000 UG/ML
1000 INJECTION, SOLUTION INTRAMUSCULAR; SUBCUTANEOUS ONCE
Status: COMPLETED | OUTPATIENT
Start: 2023-12-28 | End: 2023-12-28

## 2023-12-28 RX ORDER — CYANOCOBALAMIN 1000 UG/ML
1000 INJECTION, SOLUTION INTRAMUSCULAR; SUBCUTANEOUS ONCE
Status: CANCELLED | OUTPATIENT
Start: 2024-01-05 | End: 2024-01-04

## 2023-12-28 RX ADMIN — IRON SUCROSE 300 MG: 20 INJECTION, SOLUTION INTRAVENOUS at 10:18

## 2023-12-28 RX ADMIN — CYANOCOBALAMIN 1000 MCG: 1000 INJECTION, SOLUTION INTRAMUSCULAR at 10:19

## 2023-12-28 RX ADMIN — SODIUM CHLORIDE 20 ML/HR: 0.9 INJECTION, SOLUTION INTRAVENOUS at 10:17

## 2023-12-28 NOTE — PROGRESS NOTES
David Skelton Jr.  tolerated treatment well with no complications.      David Skelton Jr. is aware of future appt on 1/5/24 at 1130.     AVS printed and given to David Skelton Jr.

## 2024-01-05 ENCOUNTER — HOSPITAL ENCOUNTER (OUTPATIENT)
Dept: INFUSION CENTER | Facility: HOSPITAL | Age: 64
End: 2024-01-05
Attending: INTERNAL MEDICINE
Payer: COMMERCIAL

## 2024-01-05 ENCOUNTER — TELEPHONE (OUTPATIENT)
Dept: FAMILY MEDICINE CLINIC | Facility: CLINIC | Age: 64
End: 2024-01-05

## 2024-01-05 VITALS
RESPIRATION RATE: 20 BRPM | TEMPERATURE: 97 F | HEART RATE: 65 BPM | DIASTOLIC BLOOD PRESSURE: 77 MMHG | SYSTOLIC BLOOD PRESSURE: 155 MMHG

## 2024-01-05 DIAGNOSIS — S39.012A STRAIN OF LUMBAR REGION, INITIAL ENCOUNTER: ICD-10-CM

## 2024-01-05 DIAGNOSIS — E53.8 B12 DEFICIENCY: Primary | ICD-10-CM

## 2024-01-05 DIAGNOSIS — D50.8 IRON DEFICIENCY ANEMIA SECONDARY TO INADEQUATE DIETARY IRON INTAKE: ICD-10-CM

## 2024-01-05 PROCEDURE — 96365 THER/PROPH/DIAG IV INF INIT: CPT

## 2024-01-05 PROCEDURE — 96372 THER/PROPH/DIAG INJ SC/IM: CPT

## 2024-01-05 PROCEDURE — 96366 THER/PROPH/DIAG IV INF ADDON: CPT

## 2024-01-05 RX ORDER — CYANOCOBALAMIN 1000 UG/ML
1000 INJECTION, SOLUTION INTRAMUSCULAR; SUBCUTANEOUS ONCE
Status: COMPLETED | OUTPATIENT
Start: 2024-01-05 | End: 2024-01-05

## 2024-01-05 RX ORDER — CYANOCOBALAMIN 1000 UG/ML
1000 INJECTION, SOLUTION INTRAMUSCULAR; SUBCUTANEOUS ONCE
OUTPATIENT
Start: 2024-01-11 | End: 2024-01-11

## 2024-01-05 RX ORDER — SODIUM CHLORIDE 9 MG/ML
20 INJECTION, SOLUTION INTRAVENOUS ONCE
Status: COMPLETED | OUTPATIENT
Start: 2024-01-05 | End: 2024-01-05

## 2024-01-05 RX ORDER — SODIUM CHLORIDE 9 MG/ML
20 INJECTION, SOLUTION INTRAVENOUS ONCE
OUTPATIENT
Start: 2024-01-11

## 2024-01-05 RX ADMIN — CYANOCOBALAMIN 1000 MCG: 1000 INJECTION, SOLUTION INTRAMUSCULAR at 10:35

## 2024-01-05 RX ADMIN — SODIUM CHLORIDE 20 ML/HR: 0.9 INJECTION, SOLUTION INTRAVENOUS at 10:34

## 2024-01-05 RX ADMIN — IRON SUCROSE 300 MG: 20 INJECTION, SOLUTION INTRAVENOUS at 10:35

## 2024-01-05 NOTE — PLAN OF CARE
Problem: Potential for Falls  Goal: Patient will remain free of falls  Description: INTERVENTIONS:  - Educate patient/family on patient safety including physical limitations  - Instruct patient to call for assistance with activity   - Consult OT/PT to assist with strengthening/mobility   - Keep Call bell within reach  - Keep bed low and locked with side rails adjusted as appropriate  - Keep care items and personal belongings within reach  - Initiate and maintain comfort rounds  - Consider moving patient to room near nurses station  Outcome: Progressing

## 2024-01-05 NOTE — TELEPHONE ENCOUNTER
Patient called and left this message:     Yes, good afternoon. My name is David Skelton. I'm one of doctor Dio's  patients. I'm calling to see if there's a open cancellation so that I can get in. I've been to the ER twice and have gotten gabapentin 600 milligram tablets on multiple occasions, and I'm looking for my primary physician to give me the same medication. Please give me a call, [phone]. My birthday is May 11th, 1960 and I live at [address] [place] [address]. [place], [place] [address]. [place]. Please call me at earliest convenience. Thank you.

## 2024-01-05 NOTE — PROGRESS NOTES
David Skelton Jr.  tolerated treatment well with no complications.      David Skelton Jr. is aware of future appt on 1/12/24 at 1130.     AVS printed and given to David Skelton Jr.

## 2024-01-05 NOTE — LETTER
[facility]  [facility] [address] [facility] [address]  Dept: [phone]    January 5, 2024     Patient: David Skelton Jr.   YOB: 1960   Date of Visit: 1/5/2024       To Whom it May Concern:    David Skelton is under my professional care. He was seen in the infusion center on 12/8/23, 12/21/23, 12/28/23, and 1/5/2024. He received intravenous Venofer and intramuscular Vitamin B12 on these days as prescribed.     If you have any questions or concerns, please don't hesitate to call.         Sincerely,          Sadia Loaiza RN

## 2024-01-09 NOTE — TELEPHONE ENCOUNTER
Patient is calling again because he would like a gabapentin prescribed- he was prescribed it in the ER and his back is still bothering him.  Please follow-up with patient; thank you!

## 2024-01-10 DIAGNOSIS — M51.36 DDD (DEGENERATIVE DISC DISEASE), LUMBAR: Primary | ICD-10-CM

## 2024-01-10 RX ORDER — GABAPENTIN 300 MG/1
300 CAPSULE ORAL 3 TIMES DAILY
Qty: 90 CAPSULE | Refills: 0 | Status: SHIPPED | OUTPATIENT
Start: 2024-01-10 | End: 2024-01-19 | Stop reason: ALTCHOICE

## 2024-01-12 ENCOUNTER — HOSPITAL ENCOUNTER (OUTPATIENT)
Dept: INFUSION CENTER | Facility: HOSPITAL | Age: 64
End: 2024-01-12
Attending: INTERNAL MEDICINE
Payer: COMMERCIAL

## 2024-01-12 ENCOUNTER — TELEPHONE (OUTPATIENT)
Dept: FAMILY MEDICINE CLINIC | Facility: CLINIC | Age: 64
End: 2024-01-12

## 2024-01-12 VITALS
HEART RATE: 76 BPM | RESPIRATION RATE: 18 BRPM | TEMPERATURE: 97.2 F | DIASTOLIC BLOOD PRESSURE: 67 MMHG | SYSTOLIC BLOOD PRESSURE: 124 MMHG

## 2024-01-12 DIAGNOSIS — D50.8 IRON DEFICIENCY ANEMIA SECONDARY TO INADEQUATE DIETARY IRON INTAKE: ICD-10-CM

## 2024-01-12 DIAGNOSIS — E53.8 B12 DEFICIENCY: Primary | ICD-10-CM

## 2024-01-12 PROCEDURE — 96366 THER/PROPH/DIAG IV INF ADDON: CPT

## 2024-01-12 PROCEDURE — 96372 THER/PROPH/DIAG INJ SC/IM: CPT

## 2024-01-12 PROCEDURE — 96365 THER/PROPH/DIAG IV INF INIT: CPT

## 2024-01-12 RX ORDER — CYANOCOBALAMIN 1000 UG/ML
1000 INJECTION, SOLUTION INTRAMUSCULAR; SUBCUTANEOUS ONCE
Status: COMPLETED | OUTPATIENT
Start: 2024-01-12 | End: 2024-01-12

## 2024-01-12 RX ORDER — SODIUM CHLORIDE 9 MG/ML
20 INJECTION, SOLUTION INTRAVENOUS ONCE
Status: COMPLETED | OUTPATIENT
Start: 2024-01-12 | End: 2024-01-12

## 2024-01-12 RX ORDER — CYANOCOBALAMIN 1000 UG/ML
1000 INJECTION, SOLUTION INTRAMUSCULAR; SUBCUTANEOUS ONCE
Status: CANCELLED | OUTPATIENT
Start: 2024-01-12 | End: 2024-01-12

## 2024-01-12 RX ORDER — SODIUM CHLORIDE 9 MG/ML
20 INJECTION, SOLUTION INTRAVENOUS ONCE
Status: CANCELLED | OUTPATIENT
Start: 2024-01-12

## 2024-01-12 RX ADMIN — SODIUM CHLORIDE 20 ML/HR: 0.9 INJECTION, SOLUTION INTRAVENOUS at 11:32

## 2024-01-12 RX ADMIN — IRON SUCROSE 300 MG: 20 INJECTION, SOLUTION INTRAVENOUS at 11:32

## 2024-01-12 RX ADMIN — CYANOCOBALAMIN 1000 MCG: 1000 INJECTION, SOLUTION INTRAMUSCULAR; SUBCUTANEOUS at 11:34

## 2024-01-12 NOTE — TELEPHONE ENCOUNTER
Patient called requesting that the prescription for the Gabapentin 300 mg capsule be changed to the tablets. He says the capsule gives him diarrhea.

## 2024-01-12 NOTE — PROGRESS NOTES
David Skelton Jr.  tolerated treatment well with no complications.      David Skelton Jr. is finished with his tx plan.     AVS Declined by David Skelton Jr.

## 2024-01-14 ENCOUNTER — HOSPITAL ENCOUNTER (EMERGENCY)
Facility: HOSPITAL | Age: 64
Discharge: HOME | End: 2024-01-14
Attending: EMERGENCY MEDICINE
Payer: COMMERCIAL

## 2024-01-14 VITALS
OXYGEN SATURATION: 99 % | SYSTOLIC BLOOD PRESSURE: 150 MMHG | RESPIRATION RATE: 22 BRPM | TEMPERATURE: 98 F | DIASTOLIC BLOOD PRESSURE: 71 MMHG | HEART RATE: 78 BPM

## 2024-01-14 DIAGNOSIS — G89.29 CHRONIC BILATERAL LOW BACK PAIN, UNSPECIFIED WHETHER SCIATICA PRESENT: Primary | ICD-10-CM

## 2024-01-14 DIAGNOSIS — M54.50 CHRONIC BILATERAL LOW BACK PAIN, UNSPECIFIED WHETHER SCIATICA PRESENT: Primary | ICD-10-CM

## 2024-01-14 DIAGNOSIS — M51.36 DDD (DEGENERATIVE DISC DISEASE), LUMBAR: ICD-10-CM

## 2024-01-14 DIAGNOSIS — Z76.5 DRUG-SEEKING BEHAVIOR: ICD-10-CM

## 2024-01-14 PROCEDURE — 99283 EMERGENCY DEPT VISIT LOW MDM: CPT

## 2024-01-14 PROCEDURE — 99284 EMERGENCY DEPT VISIT MOD MDM: CPT | Performed by: EMERGENCY MEDICINE

## 2024-01-14 RX ORDER — METHADONE HYDROCHLORIDE 10 MG/1
40 TABLET ORAL ONCE
Status: COMPLETED | OUTPATIENT
Start: 2024-01-14 | End: 2024-01-14

## 2024-01-14 RX ORDER — METHADONE HYDROCHLORIDE 10 MG/1
180 TABLET ORAL ONCE
Status: DISCONTINUED | OUTPATIENT
Start: 2024-01-14 | End: 2024-01-14

## 2024-01-14 RX ADMIN — METHADONE HYDROCHLORIDE 40 MG: 10 TABLET ORAL at 12:36

## 2024-01-14 NOTE — ED PROVIDER NOTES
History  Chief Complaint   Patient presents with    Back Pain     Pt presents to the ED with reports of increased back pain, reports multiple hx of back problems. Reports difficulty sleeping, has been on gabapentin.        Back Pain  Associated symptoms: no abdominal pain, no chest pain, no dysuria and no fever        63-year-old male, past medical history below, presenting to the emergency department with ongoing chronic lower lumbar spine pain.  He states he was unable to get to his methadone clinic today so presented to the emergency department for assistance with analgesia.  He is also requesting gabapentin tablets as compared to capsules as he states the tablets are ineffective.  He denies any other systemic signs or symptoms per ROS.  He states that he has a scheduled appointment with pain management tomorrow. Pt denies trauma, unintentional weight loss or night sweats, extremity weakness or change in sensation, history of CA, IVDA or steroid use, fever, saddle anesthesia, change in bowel or bladder including incontinence.       Prior to Admission Medications   Prescriptions Last Dose Informant Patient Reported? Taking?   Caplyta 42 MG CAPS capsule   Yes No   Sig: Take 42 mg by mouth daily at bedtime   Continuous Blood Gluc  (FreeStyle Grace 14 Day [place]) STEVE   No No   Si Device by Device route 3 (three) times a day before meals   Continuous Blood Gluc  (FreeStyle Grace 2 [place]) STEVE   No No   Si Device by Device route 3 (three) times a day before meals   DULoxetine (CYMBALTA) 60 mg delayed release capsule   Yes No   Sig: Take 60 mg by mouth in the morning   Patient not taking: Reported on 2023   GLOBAL EASE INJECT PEN NEEDLES 31G X 8 MM MISC  Self Yes No   Sig: USE AS DIRECTED TO INJECT INSULIN DAILY   Incontinence Supply Disposable (INCONTINENCE BRIEF MEDIUM) MISC  Self No No   Sig: by Does not apply route as needed (fecal soiling)   Incontinence Supply Disposable (RA WIPES  FLUSHABLE/MOIST) MISC  Self No No   Sig: by Does not apply route as needed (fecal soiling)   Insulin Pen Needle (B-D UF III MINI PEN NEEDLES) 31G X 5 MM MISC   No No   Sig: Inject 15 Units under the skin daily at bedtime   Misc. Devices (CANE) MISC  Self No No   Sig: by Does not apply route daily Dx:  Frequent falls   UNKNOWN TO PATIENT  Self Yes No   acetaminophen (TYLENOL) 325 mg tablet   No No   Sig: Take 2 tablets (650 mg total) by mouth every 4 (four) hours as needed for mild pain   bacitracin-polymyxin b (POLYSPORIN) ointment   No No   Sig: Apply topically 2 (two) times a day   Patient not taking: Reported on 2023   clonazePAM (KlonoPIN) 0.5 mg tablet   No No   Sig: Take 1 tablet (0.5 mg total) by mouth 3 (three) times a day for 14 days   clonazePAM (KlonoPIN) 1 mg tablet   Yes No   Sig: Take 1 mg by mouth 2 (two) times a day as needed   divalproex sodium (DEPAKOTE ER) 500 mg 24 hr tablet   No No   Sig: Take 1 tablet (500 mg total) by mouth every 12 (twelve) hours   docusate sodium (COLACE) 100 mg capsule   No No   Sig: Take 1 capsule (100 mg total) by mouth 2 (two) times a day for 5 days   doxepin (SINEquan) 100 mg capsule   Yes No   Sig: Take 100 mg by mouth daily at bedtime   Patient not taking: Reported on 2023   ergocalciferol (ERGOCALCIFEROL) 1.25 MG (37191 UT) capsule   Yes No   Sig: Take 50,000 Units by mouth   fluticasone (FLONASE) 50 mcg/act nasal spray   No No   Si spray into each nostril daily   gabapentin (NEURONTIN) 300 mg capsule   No No   Sig: Take 1 capsule (300 mg total) by mouth 3 (three) times a day   metFORMIN (GLUCOPHAGE) 850 mg tablet   No No   Sig: Take 1 tablet (850 mg total) by mouth daily with breakfast   Patient taking differently: Take 850 mg by mouth 2 (two) times a day with meals   methadone (DOLOPHINE) 5 mg tablet   Yes No   Sig: Take 165 mg by mouth daily   methocarbamol (ROBAXIN) 500 mg tablet   No No   Sig: Take 1 tablet (500 mg total) by mouth every 8 (eight)  hours as needed for muscle spasms   zolpidem (AMBIEN) 10 mg tablet   Yes No   Sig: Take 10 mg by mouth daily at bedtime   Patient not taking: Reported on 12/8/2023      Facility-Administered Medications: None       Past Medical History:   Diagnosis Date    Anxiety     Depression     Diabetes mellitus (HCC)     Drug use     Hemorrhoids, internal 9/23/2019    Hypertension        Past Surgical History:   Procedure Laterality Date    FEMUR FRACTURE SURGERY Right     GASTRIC BYPASS  11/08/2011    Managed by: Braden Luna (General Surgery)    HERNIA REPAIR  02/13/2013    Incisional hernia repair Managed by: Braden Luna (General Surgery)    TONSILLECTOMY  2010       Family History   Problem Relation Age of Onset    Diabetes Mother     Hypertension Mother     Hypertension Father     Autoimmune disease Son      I have reviewed and agree with the history as documented.    E-Cigarette/Vaping    E-Cigarette Use Never User      E-Cigarette/Vaping Substances    Nicotine No     THC No     CBD No     Flavoring No     Other No     Unknown No      Social History     Tobacco Use    Smoking status: Never    Smokeless tobacco: Never   Vaping Use    Vaping status: Never Used   Substance Use Topics    Alcohol use: Never    Drug use: Yes     Comment: Methadone clinic       Review of Systems   Constitutional:  Negative for chills and fever.   HENT:  Negative for ear pain and sore throat.    Eyes:  Negative for pain and visual disturbance.   Respiratory:  Negative for cough and shortness of breath.    Cardiovascular:  Negative for chest pain and palpitations.   Gastrointestinal:  Negative for abdominal pain and vomiting.   Genitourinary:  Negative for dysuria and hematuria.   Musculoskeletal:  Positive for back pain. Negative for arthralgias.   Skin:  Negative for color change and rash.   Neurological:  Negative for seizures and syncope.   All other systems reviewed and are negative.      Physical Exam  Physical Exam  Vitals and  nursing note reviewed.   Constitutional:       General: He is not in acute distress.     Appearance: He is well-developed.   HENT:      Head: Normocephalic and atraumatic.   Eyes:      Conjunctiva/sclera: Conjunctivae normal.   Cardiovascular:      Rate and Rhythm: Normal rate and regular rhythm.      Heart sounds: No murmur heard.  Pulmonary:      Effort: Pulmonary effort is normal. No respiratory distress.      Breath sounds: Normal breath sounds.   Abdominal:      Palpations: Abdomen is soft.      Tenderness: There is no abdominal tenderness.   Musculoskeletal:         General: No swelling.      Cervical back: Neck supple.   Skin:     General: Skin is warm and dry.      Capillary Refill: Capillary refill takes less than 2 seconds.   Neurological:      Mental Status: He is alert.      Comments: AAOX4. CN intact. Gross vision intact all 4 quadrants. Cerebellar signs with finger to nose and heel to shin intact. All extremities 5/5 strength. Gross sensation appreciated face and extremities.      Psychiatric:         Mood and Affect: Mood normal.         Vital Signs  ED Triage Vitals   Temperature Pulse Respirations Blood Pressure SpO2   01/14/24 0900 01/14/24 0852 01/14/24 0852 01/14/24 0852 01/14/24 0852   98 °F (36.7 °C) 78 22 150/71 99 %      Temp Source Heart Rate Source Patient Position - Orthostatic VS BP Location FiO2 (%)   01/14/24 0852 01/14/24 0852 01/14/24 0852 01/14/24 0852 --   Oral Monitor Sitting Right arm       Pain Score       --                  Vitals:    01/14/24 0852   BP: 150/71   Pulse: 78   Patient Position - Orthostatic VS: Sitting         Visual Acuity      ED Medications  Medications   methadone (DOLOPHINE) tablet 40 mg (40 mg Oral Given 1/14/24 1236)       Diagnostic Studies  Results Reviewed       None                   No orders to display              Procedures  Procedures         ED Course                                             Medical Decision Making  63-year-old male presenting  to the emergency department with request for methadone and further analgesia as a compared to his lower back pain.    He initially stated that he was unable to get a ride today in order to present to his methadone clinic for his regularly scheduled dose.  I spoke with his methadone clinic and they are in fact closed today.  Additionally, the clinician on-call at the methadone clinic explained to me that the patient has multiple days of methadone prescribed to him that he should have in his possession.  He is not yet due for his next dosing.  Patient provided 40 of methadone in the emergency department to prevent withdrawal should he be without methadone today as he is stating.    Secondly, patient requesting gabapentin tablets versus capsules.  He initially states that it is secondary to secondary ill effects of the capsules however in discussion with pharmacy it was later found that the tablets were of a higher strength than he was already prescribed.  I reviewed this with the patient and he became agitated regarding being provided these tablets from the emergency department.  I explained to him that they are not available on the hospital formulary.    All of the after mentioned is to be addressed with pain management tomorrow.  No change in the patient's back pain to indicate further emergent imaging.  The information regarding methadone was received after the patient was discharged from the emergency department.  As such, I am concerned that the patient is exhibiting drug-seeking behavior.  Prior to the patient's departure, I did explain to him the proper channels to have his pain addressed and he verbalized understood.  He was ambulatory out of the emergency department with minimal splinting and use of a cane which is his baseline.    Problems Addressed:  Chronic bilateral low back pain, unspecified whether sciatica present: chronic illness or injury    Risk  Prescription drug management.  Diagnosis or treatment  significantly limited by social determinants of health.             Disposition  Final diagnoses:   Chronic bilateral low back pain, unspecified whether sciatica present   Drug-seeking behavior     Time reflects when diagnosis was documented in both MDM as applicable and the Disposition within this note       Time User Action Codes Description Comment    1/14/2024 12:09 PM Tremaine Ambriz [M54.50,  G89.29] Chronic bilateral low back pain, unspecified whether sciatica present     1/14/2024 12:10 PM Tremaine Ambriz [M51.36] DDD (degenerative disc disease), lumbar     1/14/2024  3:20 PM Tremaine Ambriz [Z76.5] Drug-seeking behavior           ED Disposition       ED Disposition   Discharge    Condition   Stable    Date/Time   Sun Jan 14, 2024 12:09 PM    Comment   David Skelton Jr. discharge to home/self care.                   Follow-up Information       Follow up With Specialties Details Why Contact Info    [facility], Nurse Practitioner Call  As needed [address] [place] [address]  [phone]              Discharge Medication List as of 1/14/2024 12:15 PM        CONTINUE these medications which have NOT CHANGED    Details   acetaminophen (TYLENOL) 325 mg tablet Take 2 tablets (650 mg total) by mouth every 4 (four) hours as needed for mild pain, Starting Fri 8/11/2023, No Print      bacitracin-polymyxin b (POLYSPORIN) ointment Apply topically 2 (two) times a day, Starting Wed 11/29/2023, Normal      Caplyta 42 MG CAPS capsule Take 42 mg by mouth daily at bedtime, Starting Tue 10/17/2023, Historical Med      clonazePAM (KlonoPIN) 1 mg tablet Take 1 mg by mouth 2 (two) times a day as needed, Starting Tue 8/22/2023, Historical Med      !! Continuous Blood Gluc  (FreeStyle Grace 14 Day [employer]) STEVE 1 Device by Device route 3 (three) times a day before meals, Starting Tue 1/31/2023, Normal      !! Continuous Blood Gluc  (FreeStyle Grace 2 [employer]) STEVE 1  Device by Device route 3 (three) times a day before meals, Normal      divalproex sodium (DEPAKOTE ER) 500 mg 24 hr tablet Take 1 tablet (500 mg total) by mouth every 12 (twelve) hours, Starting Mon 1/23/2023, Until Wed 2/22/2023, Normal      docusate sodium (COLACE) 100 mg capsule Take 1 capsule (100 mg total) by mouth 2 (two) times a day for 5 days, Starting Fri 8/11/2023, Until Wed 8/16/2023, Normal      doxepin (SINEquan) 100 mg capsule Take 100 mg by mouth daily at bedtime, Starting Tue 11/14/2023, Historical Med      DULoxetine (CYMBALTA) 60 mg delayed release capsule Take 60 mg by mouth in the morning, Starting Tue 8/22/2023, Historical Med      ergocalciferol (ERGOCALCIFEROL) 1.25 MG (09543 UT) capsule Take 50,000 Units by mouth, Starting Thu 12/5/2019, Until Thu 2/27/2020, Historical Med      fluticasone (FLONASE) 50 mcg/act nasal spray 1 spray into each nostril daily, Starting Mon 1/23/2023, Normal      gabapentin (NEURONTIN) 300 mg capsule Take 1 capsule (300 mg total) by mouth 3 (three) times a day, Starting Wed 1/10/2024, Until Fri 2/9/2024, Normal      !! GLOBAL EASE INJECT PEN NEEDLES 31G X 8 MM MISC USE AS DIRECTED TO INJECT INSULIN DAILY, Historical Med      !! Incontinence Supply Disposable (INCONTINENCE BRIEF MEDIUM) MISC by Does not apply route as needed (fecal soiling), Starting Wed 12/4/2019, Print      !! Incontinence Supply Disposable (RA WIPES FLUSHABLE/MOIST) MISC by Does not apply route as needed (fecal soiling), Starting Wed 12/4/2019, Print      !! Insulin Pen Needle (B-D UF III MINI PEN NEEDLES) 31G X 5 MM MISC Inject 15 Units under the skin daily at bedtime, Normal      metFORMIN (GLUCOPHAGE) 850 mg tablet Take 1 tablet (850 mg total) by mouth daily with breakfast, Starting Mon 1/23/2023, Normal      methadone (DOLOPHINE) 5 mg tablet Take 165 mg by mouth daily, Historical Med      methocarbamol (ROBAXIN) 500 mg tablet Take 1 tablet (500 mg total) by mouth every 8 (eight) hours as  needed for muscle spasms, Starting Wed 11/29/2023, Normal      Misc. Devices (CANE) MISC by Does not apply route daily Dx:  Frequent falls, Starting Mon 7/29/2019, Print      UNKNOWN TO PATIENT Historical Med      zolpidem (AMBIEN) 10 mg tablet Take 10 mg by mouth daily at bedtime, Starting Tue 10/17/2023, Historical Med       !! - Potential duplicate medications found. Please discuss with provider.          No discharge procedures on file.    PDMP Review         Value Time User    PDMP Reviewed  Yes 9/14/2023  4:34 PM Hong Nelson PA-C            ED Provider  Electronically Signed by             Tremaine Ambriz DO  01/14/24 6027

## 2024-01-15 ENCOUNTER — HOSPITAL ENCOUNTER (OUTPATIENT)
Dept: RADIOLOGY | Facility: HOSPITAL | Age: 64
Discharge: HOME | End: 2024-01-15
Attending: ORTHOPAEDIC SURGERY
Payer: COMMERCIAL

## 2024-01-15 ENCOUNTER — TELEPHONE (OUTPATIENT)
Dept: FAMILY MEDICINE CLINIC | Facility: CLINIC | Age: 64
End: 2024-01-15

## 2024-01-15 ENCOUNTER — OFFICE VISIT (OUTPATIENT)
Dept: OBGYN CLINIC | Facility: HOSPITAL | Age: 64
End: 2024-01-15
Payer: COMMERCIAL

## 2024-01-15 VITALS
SYSTOLIC BLOOD PRESSURE: 149 MMHG | BODY MASS INDEX: 26.06 KG/M2 | HEIGHT: 68 IN | HEART RATE: 75 BPM | WEIGHT: 171.96 LBS | DIASTOLIC BLOOD PRESSURE: 76 MMHG

## 2024-01-15 DIAGNOSIS — R52 PAIN: Primary | ICD-10-CM

## 2024-01-15 DIAGNOSIS — M51.36 DDD (DEGENERATIVE DISC DISEASE), LUMBAR: ICD-10-CM

## 2024-01-15 DIAGNOSIS — R52 PAIN: ICD-10-CM

## 2024-01-15 PROCEDURE — 72100 X-RAY EXAM L-S SPINE 2/3 VWS: CPT

## 2024-01-15 PROCEDURE — 99214 OFFICE O/P EST MOD 30 MIN: CPT | Performed by: ORTHOPAEDIC SURGERY
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
## 2024-01-15 NOTE — TELEPHONE ENCOUNTER
Patient called complaining of of diarrhea, he says he is unable to take the Gabapentin capsules he need it in tablet form. Patient says he is at the emergency room, he want to know if you can change the Gabapentin to tablets.

## 2024-01-18 ENCOUNTER — TELEPHONE (OUTPATIENT)
Dept: FAMILY MEDICINE CLINIC | Facility: CLINIC | Age: 64
End: 2024-01-18

## 2024-01-18 NOTE — TELEPHONE ENCOUNTER
Patient called back today and asked if the gabapentin can be changed to tablets because the capsules give him diarrhea  causing him to go to ER.  He's very upset he called about 5 times in a row to complain and request new rx for gabapentin tablets not capsules

## 2024-01-19 ENCOUNTER — TELEPHONE (OUTPATIENT)
Dept: FAMILY MEDICINE CLINIC | Facility: CLINIC | Age: 64
End: 2024-01-19

## 2024-01-19 DIAGNOSIS — M51.36 DDD (DEGENERATIVE DISC DISEASE), LUMBAR: Primary | ICD-10-CM

## 2024-01-19 RX ORDER — GABAPENTIN 600 MG/1
600 TABLET ORAL 2 TIMES DAILY
Qty: 60 TABLET | Refills: 2 | Status: SHIPPED | OUTPATIENT
Start: 2024-01-19

## 2024-01-26 ENCOUNTER — TELEPHONE (OUTPATIENT)
Dept: FAMILY MEDICINE CLINIC | Facility: CLINIC | Age: 64
End: 2024-01-26

## 2024-02-05 ENCOUNTER — HOSPITAL ENCOUNTER (EMERGENCY)
Facility: HOSPITAL | Age: 64
Discharge: HOME | End: 2024-02-05
Attending: EMERGENCY MEDICINE
Payer: COMMERCIAL

## 2024-02-05 VITALS
RESPIRATION RATE: 22 BRPM | SYSTOLIC BLOOD PRESSURE: 116 MMHG | OXYGEN SATURATION: 98 % | DIASTOLIC BLOOD PRESSURE: 94 MMHG | HEART RATE: 98 BPM | TEMPERATURE: 97.5 F

## 2024-02-05 DIAGNOSIS — T50.901A ACCIDENTAL DRUG OVERDOSE, INITIAL ENCOUNTER: Primary | ICD-10-CM

## 2024-02-05 PROCEDURE — 99282 EMERGENCY DEPT VISIT SF MDM: CPT | Performed by: EMERGENCY MEDICINE

## 2024-02-05 PROCEDURE — 99284 EMERGENCY DEPT VISIT MOD MDM: CPT

## 2024-02-05 PROCEDURE — 93005 ELECTROCARDIOGRAM TRACING: CPT

## 2024-02-05 RX ORDER — NALOXONE HYDROCHLORIDE 1 MG/ML
1 INJECTION PARENTERAL ONCE
Status: COMPLETED | OUTPATIENT
Start: 2024-02-05 | End: 2024-02-05

## 2024-02-05 NOTE — DISCHARGE INSTRUCTIONS
Discuss with your methadone clinic regarding decreasing your dosing. Take your medications as prescribed. Avoid any other sedating medications today.

## 2024-02-05 NOTE — ED PROVIDER NOTES
History  Chief Complaint   Patient presents with    Overdose - Accidental     Patient coming in via EMS from home. Patient methadone prescription was increased according to patient and states this was accidental. Patient denies any SI/HI. 4mg narcan given by ems. Patient only complaining of anxiety      Patient presents for evaluation.  Due to patient's accidental overdose at home.  Patient states that he did not sleep last night so he took 2 of his Klonopin this morning.  Patient then proceeded to go to the methadone clinic where he states that they increased his dose by 10 mg.  It is unclear who called EMS but patient was given 4 mg of Narcan and had improvement in his mental status.  Patient denies any intentional overdose or any other medications.  He denies any homicidal or suicidal ideation.  No additional complaints.      History provided by:  Patient  Overdose - Accidental  Ingested substance:  Prescription medication  Time since incident:  2 hours  Associated symptoms: diarrhea    Associated symptoms: no chest pain, no nausea and no vomiting        Prior to Admission Medications   Prescriptions Last Dose Informant Patient Reported? Taking?   Caplyta 42 MG CAPS capsule   Yes No   Sig: Take 42 mg by mouth daily at bedtime   Continuous Blood Gluc  (FreeStyle Grace 14 Day [place]) STEVE   No No   Si Device by Device route 3 (three) times a day before meals   Continuous Blood Gluc  (FreeStyle Grace 2 [place]) STEVE   No No   Si Device by Device route 3 (three) times a day before meals   DULoxetine (CYMBALTA) 60 mg delayed release capsule   Yes No   Sig: Take 60 mg by mouth in the morning   Patient not taking: Reported on 2023   GLOBAL EASE INJECT PEN NEEDLES 31G X 8 MM MISC  Self Yes No   Sig: USE AS DIRECTED TO INJECT INSULIN DAILY   Incontinence Supply Disposable (INCONTINENCE BRIEF MEDIUM) MISC  Self No No   Sig: by Does not apply route as needed (fecal soiling)   Incontinence Supply  Disposable (RA WIPES FLUSHABLE/MOIST) MISC  Self No No   Sig: by Does not apply route as needed (fecal soiling)   Insulin Pen Needle (B-D UF III MINI PEN NEEDLES) 31G X 5 MM MISC   No No   Sig: Inject 15 Units under the skin daily at bedtime   Misc. Devices (CANE) MISC  Self No No   Sig: by Does not apply route daily Dx:  Frequent falls   UNKNOWN TO PATIENT  Self Yes No   acetaminophen (TYLENOL) 325 mg tablet   No No   Sig: Take 2 tablets (650 mg total) by mouth every 4 (four) hours as needed for mild pain   bacitracin-polymyxin b (POLYSPORIN) ointment   No No   Sig: Apply topically 2 (two) times a day   Patient not taking: Reported on 2023   clonazePAM (KlonoPIN) 1 mg tablet   Yes No   Sig: Take 1 mg by mouth 2 (two) times a day as needed   divalproex sodium (DEPAKOTE ER) 500 mg 24 hr tablet   No No   Sig: Take 1 tablet (500 mg total) by mouth every 12 (twelve) hours   docusate sodium (COLACE) 100 mg capsule   No No   Sig: Take 1 capsule (100 mg total) by mouth 2 (two) times a day for 5 days   doxepin (SINEquan) 100 mg capsule   Yes No   Sig: Take 100 mg by mouth daily at bedtime   Patient not taking: Reported on 2023   ergocalciferol (ERGOCALCIFEROL) 1.25 MG (28782 UT) capsule   Yes No   Sig: Take 50,000 Units by mouth   fluticasone (FLONASE) 50 mcg/act nasal spray   No No   Si spray into each nostril daily   gabapentin (NEURONTIN) 600 MG tablet   No No   Sig: Take 1 tablet (600 mg total) by mouth 2 (two) times a day   metFORMIN (GLUCOPHAGE) 850 mg tablet   No No   Sig: Take 1 tablet (850 mg total) by mouth daily with breakfast   Patient taking differently: Take 850 mg by mouth 2 (two) times a day with meals   methadone (DOLOPHINE) 5 mg tablet   Yes No   Sig: Take 165 mg by mouth daily   methocarbamol (ROBAXIN) 500 mg tablet   No No   Sig: Take 1 tablet (500 mg total) by mouth every 8 (eight) hours as needed for muscle spasms   zolpidem (AMBIEN) 10 mg tablet   Yes No   Sig: Take 10 mg by mouth daily  at bedtime   Patient not taking: Reported on 12/8/2023      Facility-Administered Medications: None       Past Medical History:   Diagnosis Date    Anxiety     Depression     Diabetes mellitus (HCC)     Drug use     Hemorrhoids, internal 9/23/2019    Hypertension        Past Surgical History:   Procedure Laterality Date    FEMUR FRACTURE SURGERY Right     GASTRIC BYPASS  11/08/2011    Managed by: Braden Luna (General Surgery)    HERNIA REPAIR  02/13/2013    Incisional hernia repair Managed by: Braden Luna (General Surgery)    TONSILLECTOMY  2010       Family History   Problem Relation Age of Onset    Diabetes Mother     Hypertension Mother     Hypertension Father     Autoimmune disease Son      I have reviewed and agree with the history as documented.    E-Cigarette/Vaping    E-Cigarette Use Never User      E-Cigarette/Vaping Substances    Nicotine No     THC No     CBD No     Flavoring No     Other No     Unknown No      Social History     Tobacco Use    Smoking status: Never    Smokeless tobacco: Never   Vaping Use    Vaping status: Never Used   Substance Use Topics    Alcohol use: Never    Drug use: Yes     Comment: Methadone clinic       Review of Systems   Constitutional:  Negative for chills and fever.   Cardiovascular:  Negative for chest pain.   Gastrointestinal:  Positive for diarrhea. Negative for nausea and vomiting.   Skin:  Negative for rash.   Psychiatric/Behavioral:          Anxious   All other systems reviewed and are negative.      Physical Exam  Physical Exam  Vitals and nursing note reviewed.   Constitutional:       General: He is not in acute distress.     Appearance: Normal appearance.   HENT:      Head: Normocephalic and atraumatic.      Nose: Nose normal.   Eyes:      Extraocular Movements: Extraocular movements intact.      Pupils: Pupils are equal, round, and reactive to light.   Cardiovascular:      Rate and Rhythm: Normal rate.   Pulmonary:      Effort: Pulmonary effort is normal.  No respiratory distress.   Musculoskeletal:         General: No tenderness. Normal range of motion.      Cervical back: Normal range of motion and neck supple.   Skin:     General: Skin is warm and dry.   Neurological:      General: No focal deficit present.      Mental Status: He is alert and oriented to person, place, and time.   Psychiatric:         Mood and Affect: Mood normal.         Behavior: Behavior normal.         Vital Signs  ED Triage Vitals [02/05/24 0802]   Temperature Pulse Respirations Blood Pressure SpO2   97.5 °F (36.4 °C) 98 22 116/94 98 %      Temp Source Heart Rate Source Patient Position - Orthostatic VS BP Location FiO2 (%)   Oral Monitor Sitting Left arm --      Pain Score       No Pain           Vitals:    02/05/24 0802   BP: 116/94   Pulse: 98   Patient Position - Orthostatic VS: Sitting         Visual Acuity      ED Medications  Medications   naloxone (FOR EMS ONLY) (NARCAN) 2 MG/2ML injection 2 mg (0 mg Does not apply Given to EMS 2/5/24 0801)       Diagnostic Studies  Results Reviewed       None                   No orders to display              Procedures  Procedures         ED Course                               SBIRT 22yo+      Flowsheet Row Most Recent Value   Initial Alcohol Screen: US AUDIT-C     1. How often do you have a drink containing alcohol? 0 Filed at: 02/05/2024 0810   2. How many drinks containing alcohol do you have on a typical day you are drinking?  0 Filed at: 02/05/2024 0810   3a. Male UNDER 65: How often do you have five or more drinks on one occasion? 0 Filed at: 02/05/2024 0810   3b. FEMALE Any Age, or MALE 65+: How often do you have 4 or more drinks on one occassion? 0 Filed at: 02/05/2024 0810   Audit-C Score 0 Filed at: 02/05/2024 0810   BEATRIZ: How many times in the past year have you...    Used an illegal drug or used a prescription medication for non-medical reasons? Never Filed at: 02/05/2024 0810                      Medical Decision  Making  Risk  Prescription drug management.           Disposition  Final diagnoses:   None     ED Disposition       None          Follow-up Information    None         Patient's Medications   Discharge Prescriptions    No medications on file       No discharge procedures on file.    PDMP Review         Value Time User    PDMP Reviewed  Yes 9/14/2023  4:34 PM Hong Nelson PA-C            ED Provider  Electronically Signed by

## 2024-02-05 NOTE — CERTIFIED RECOVERY SPECIALIST
Certified  Note    Patient name: David Skelton Jr.  Location: ED 01/ED 01  [place]: [facility]  Attending:  Eugenio Hernandez MD MRN [id number]  : 1960  Age: 63 y.o.    Sex: male Date 2024         Substance Use History:     Social History     Substance and Sexual Activity   Alcohol Use Never        Social History     Substance and Sexual Activity   Drug Use Yes    Comment: Methadone clinic       CRS attempted contact.  Recovery resources left.  Will continue following to engage      Lily Diane

## 2024-02-06 LAB
ATRIAL RATE: 107 BPM
P AXIS: 76 DEGREES
PR INTERVAL: 140 MS
QRS AXIS: 87 DEGREES
QRSD INTERVAL: 96 MS
QT INTERVAL: 354 MS
QTC INTERVAL: 472 MS
T WAVE AXIS: 65 DEGREES
VENTRICULAR RATE: 107 BPM

## 2024-02-06 PROCEDURE — 93010 ELECTROCARDIOGRAM REPORT: CPT | Performed by: INTERNAL MEDICINE

## 2024-02-07 ENCOUNTER — APPOINTMENT (OUTPATIENT)
Dept: LAB | Facility: CLINIC | Age: 64
End: 2024-02-07
Payer: COMMERCIAL

## 2024-02-07 DIAGNOSIS — F11.20 OPIOID TYPE DEPENDENCE, CONTINUOUS (HCC): ICD-10-CM

## 2024-02-07 PROCEDURE — 80307 DRUG TEST PRSMV CHEM ANLYZR: CPT

## 2024-02-07 PROCEDURE — 36415 COLL VENOUS BLD VENIPUNCTURE: CPT

## 2024-02-08 ENCOUNTER — APPOINTMENT (OUTPATIENT)
Dept: LAB | Facility: CLINIC | Age: 64
End: 2024-02-08
Payer: COMMERCIAL

## 2024-02-08 DIAGNOSIS — F11.20 OPIOID TYPE DEPENDENCE, CONTINUOUS (HCC): ICD-10-CM

## 2024-02-08 PROCEDURE — 36415 COLL VENOUS BLD VENIPUNCTURE: CPT

## 2024-02-08 PROCEDURE — 80307 DRUG TEST PRSMV CHEM ANLYZR: CPT

## 2024-02-11 ENCOUNTER — NURSE TRIAGE (OUTPATIENT)
Dept: OTHER | Facility: OTHER | Age: 64
End: 2024-02-11

## 2024-02-11 DIAGNOSIS — J30.1 SEASONAL ALLERGIC RHINITIS DUE TO POLLEN: Primary | ICD-10-CM

## 2024-02-11 RX ORDER — FLUTICASONE PROPIONATE 50 MCG
1 SPRAY, SUSPENSION (ML) NASAL DAILY
Qty: 16 G | Refills: 0 | Status: SHIPPED | OUTPATIENT
Start: 2024-02-11

## 2024-02-11 NOTE — TELEPHONE ENCOUNTER
"Reason for Disposition  • [1] Caller requesting a prescription renewal (no refills left), no triage required, AND [2] triager able to renew prescription per department policy    Answer Assessment - Initial Assessment Questions  1. DRUG NAME: \"What medicine do you need to have refilled?\"      fluticasone (FLONASE) 50 mcg/act nasal spray    2. REFILLS REMAINING: \"How many refills are remaining?\" (Note: The label on the medicine or pill bottle will show how many refills are remaining. If there are no refills remaining, then a renewal may be needed.)      5    3. EXPIRATION DATE: \"What is the expiration date?\" (Note: The label states when the prescription will , and thus can no longer be refilled.)      N/A    4. PRESCRIBING HCP: \"Who prescribed it?\" Reason: If prescribed by specialist, call should be referred to that group.       Karen Schneider MD    Protocols used: Medication Refill and Renewal Call-ADULT-    "

## 2024-02-11 NOTE — TELEPHONE ENCOUNTER
"Regarding: medication refill  ----- Message from Giselle Chopra sent at 2/11/2024  9:13 AM EST -----  Pt stated \"I need a refill on my nasal spray. I dont have any more to last me the weekend\"    Medication Refill Request     Name fluticasone (FLONASE) 50 mcg/act nasal spray    Dose/Frequency  1 spray into each nostril daily  Quantity 16 g  Verified pharmacy   [X ][facility]   [address] [place] [address] [phone]    "

## 2024-02-11 NOTE — TELEPHONE ENCOUNTER
Reason for call:   [x] Refill   [] Prior Auth  [] Other:     Office:   [x] PCP/Provider -   [] Specialty/Provider -     Medication: Flonase    Dose/Frequency: 1 spray into each nostril daily    Quantity: N/A    Pharmacy: [facility]    Does the patient have enough for 3 days?   [] Yes   [x] No - Send as HP to POD

## 2024-02-12 LAB
METHADONE SERPL-MCNC: 403 NG/ML (ref 100–400)
METHADONE SERPL-MCNC: 768 NG/ML (ref 100–400)

## 2024-02-21 PROBLEM — Z04.89 EXAMINATION FOR MEDICOLEGAL REASON: Status: RESOLVED | Noted: 2020-07-02 | Resolved: 2024-02-21

## 2024-02-27 ENCOUNTER — APPOINTMENT (OUTPATIENT)
Dept: LAB | Facility: CLINIC | Age: 64
End: 2024-02-27
Payer: COMMERCIAL

## 2024-02-27 DIAGNOSIS — F11.20 OPIOID TYPE DEPENDENCE, CONTINUOUS (HCC): ICD-10-CM

## 2024-02-27 PROCEDURE — 80307 DRUG TEST PRSMV CHEM ANLYZR: CPT

## 2024-02-27 PROCEDURE — 36415 COLL VENOUS BLD VENIPUNCTURE: CPT

## 2024-02-28 ENCOUNTER — APPOINTMENT (OUTPATIENT)
Dept: LAB | Facility: CLINIC | Age: 64
End: 2024-02-28
Payer: COMMERCIAL

## 2024-02-28 ENCOUNTER — TELEPHONE (OUTPATIENT)
Dept: FAMILY MEDICINE CLINIC | Facility: CLINIC | Age: 64
End: 2024-02-28

## 2024-02-28 DIAGNOSIS — F11.20 OPIOID TYPE DEPENDENCE, CONTINUOUS (HCC): ICD-10-CM

## 2024-02-28 PROCEDURE — 80307 DRUG TEST PRSMV CHEM ANLYZR: CPT

## 2024-02-28 PROCEDURE — 36415 COLL VENOUS BLD VENIPUNCTURE: CPT

## 2024-02-28 NOTE — TELEPHONE ENCOUNTER
Patient called and left a message stating that when he urinates its bloody. Returned patient's call, something is wrong with his phone unable to leave as message. Will try calling again later.

## 2024-02-29 ENCOUNTER — TELEPHONE (OUTPATIENT)
Dept: FAMILY MEDICINE CLINIC | Facility: CLINIC | Age: 64
End: 2024-02-29

## 2024-02-29 NOTE — TELEPHONE ENCOUNTER
Patent called with complaints of  urinating blood for 2 weeks with a little burning sensation, referred to ER, he refused.   We have no available appointments today and tomorrow I told him I will add him to my cancellation list for Friday

## 2024-03-01 ENCOUNTER — OFFICE VISIT (OUTPATIENT)
Dept: FAMILY MEDICINE CLINIC | Facility: CLINIC | Age: 64
End: 2024-03-01
Payer: COMMERCIAL

## 2024-03-01 VITALS
BODY MASS INDEX: 27.71 KG/M2 | OXYGEN SATURATION: 97 % | WEIGHT: 182.8 LBS | SYSTOLIC BLOOD PRESSURE: 161 MMHG | DIASTOLIC BLOOD PRESSURE: 77 MMHG | HEART RATE: 61 BPM | TEMPERATURE: 98.1 F | RESPIRATION RATE: 16 BRPM | HEIGHT: 68 IN

## 2024-03-01 DIAGNOSIS — R31.21 ASYMPTOMATIC MICROSCOPIC HEMATURIA: Primary | ICD-10-CM

## 2024-03-01 DIAGNOSIS — R35.1 NOCTURIA: ICD-10-CM

## 2024-03-01 LAB
AMORPH URATE CRY URNS QL MICRO: ABNORMAL
BACTERIA UR QL AUTO: ABNORMAL /HPF
BILIRUB UR QL STRIP: NEGATIVE
CLARITY UR: ABNORMAL
COLOR UR: ABNORMAL
GLUCOSE UR STRIP-MCNC: ABNORMAL MG/DL
HGB UR QL STRIP.AUTO: NEGATIVE
KETONES UR STRIP-MCNC: NEGATIVE MG/DL
LEUKOCYTE ESTERASE UR QL STRIP: ABNORMAL
METHADONE SERPL-MCNC: 286 NG/ML (ref 100–400)
METHADONE SERPL-MCNC: 736 NG/ML (ref 100–400)
MUCOUS THREADS UR QL AUTO: ABNORMAL
NITRITE UR QL STRIP: NEGATIVE
NON-SQ EPI CELLS URNS QL MICRO: ABNORMAL /HPF
PH UR STRIP.AUTO: 6 [PH]
PROT UR STRIP-MCNC: ABNORMAL MG/DL
RBC #/AREA URNS AUTO: ABNORMAL /HPF
SL AMB  POCT GLUCOSE, UA: ABNORMAL
SL AMB LEUKOCYTE ESTERASE,UA: ABNORMAL
SL AMB POCT BILIRUBIN,UA: ABNORMAL
SL AMB POCT BLOOD,UA: NEGATIVE
SL AMB POCT CLARITY,UA: CLEAR
SL AMB POCT COLOR,UA: ABNORMAL
SL AMB POCT KETONES,UA: 5
SL AMB POCT NITRITE,UA: NEGATIVE
SL AMB POCT PH,UA: 5.5
SL AMB POCT SPECIFIC GRAVITY,UA: 1.03
SL AMB POCT URINE PROTEIN: ABNORMAL
SL AMB POCT UROBILINOGEN: 0.2
SP GR UR STRIP.AUTO: 1.02 (ref 1–1.03)
UROBILINOGEN UR STRIP-ACNC: <2 MG/DL
WBC #/AREA URNS AUTO: ABNORMAL /HPF

## 2024-03-01 PROCEDURE — 81002 URINALYSIS NONAUTO W/O SCOPE: CPT | Performed by: FAMILY MEDICINE

## 2024-03-01 PROCEDURE — 99213 OFFICE O/P EST LOW 20 MIN: CPT | Performed by: FAMILY MEDICINE

## 2024-03-01 PROCEDURE — 81001 URINALYSIS AUTO W/SCOPE: CPT | Performed by: FAMILY MEDICINE

## 2024-03-01 RX ORDER — DULAGLUTIDE 1.5 MG/.5ML
INJECTION, SOLUTION SUBCUTANEOUS
COMMUNITY
Start: 2024-02-26

## 2024-03-01 RX ORDER — FINERENONE 10 MG/1
TABLET, FILM COATED ORAL
COMMUNITY
Start: 2024-02-26

## 2024-03-01 RX ORDER — EMPAGLIFLOZIN AND METFORMIN HYDROCHLORIDE 12.5; 1 MG/1; MG/1
TABLET ORAL
COMMUNITY
Start: 2024-02-26

## 2024-03-01 RX ORDER — BENZTROPINE MESYLATE 2 MG/1
TABLET ORAL
COMMUNITY
Start: 2024-02-29

## 2024-03-01 NOTE — PROGRESS NOTES
Name: David Skelton Jr.      : 1960      MRN: [id number]  Encounter Provider: Casey Mckeon MD  Encounter Date: 3/1/2024   Encounter department: [facility]    Assessment & Plan     1. Asymptomatic microscopic hematuria  -     US kidney and bladder with pvr; Future; Expected date: 2024  -     Basic metabolic panel; Future; Expected date: 2024  -     PSA, Total Screen; Future  -     CK; Future  -     Sedimentation rate, automated; Future; Expected date: 2024  -     UA w Reflex to Microscopic w Reflex to Culture; Future    2. Nocturia  -     US kidney and bladder with pvr; Future; Expected date: 2024  -     Basic metabolic panel; Future; Expected date: 2024  -     PSA, Total Screen; Future  -     UA w Reflex to Microscopic w Reflex to Culture; Future      Regarding his hematuria of 2 weeks patient has no acute symptoms.  His urine dip is positive for 3+ proteins, specific gravity 1.030 and 1+ glucose with 1+ leuk esterase but negative for blood.  His clinical course and urine dip was discussed with patient.  Patient will hydrate well.  Patient's urine will be sent for formal urinalysis culture and sensitivity.  Patient was also ordered the labs above and he will do today.  Patient educated and advised to go to the emergency room if he has any symptoms of hematuria dysuria fever chills nausea vomiting and or any symptoms that he does not feel comfortable with.  Regarding his current history of a possible hematuria that he may have seen and history of nocturia patient is also ordered a PSA to his workup above.  RTO for his neck scheduled visit.         Subjective      63-year-old male complaining of blood or discoloration to his urine for about 2 weeks now.  Off-and-on and no specific reason to have causes.  Patient denies any penile discharge or any rashes, no nausea vomiting abdominal pain or flank pain.  No history of trauma falls or overexerting  himself and exercise and or any other activity.  Patient is sexually active with 1 person.  Patient denies tobacco.  And patient no longer works.  He is a retired .  Patient systolic blood pressure little elevated because patient is a little bit nervous about his symptoms but denies any acute cardiovascular or neuro signs or symptoms.      Review of Systems   Constitutional:  Negative for activity change, appetite change, chills, diaphoresis, fatigue, fever and unexpected weight change.   Eyes:  Negative for visual disturbance.   Respiratory:  Negative for cough and shortness of breath.    Cardiovascular:  Negative for chest pain and palpitations.   Gastrointestinal:  Negative for abdominal pain, blood in stool, constipation, diarrhea, nausea and vomiting.   Genitourinary:  Positive for hematuria. Negative for dysuria, flank pain, frequency and genital sores.   Musculoskeletal:  Negative for back pain.   Skin:  Negative for rash.   Neurological:  Negative for dizziness and headaches.   Psychiatric/Behavioral:  Negative for dysphoric mood. The patient is not nervous/anxious.        Current Outpatient Medications on File Prior to Visit   Medication Sig   • acetaminophen (TYLENOL) 325 mg tablet Take 2 tablets (650 mg total) by mouth every 4 (four) hours as needed for mild pain   • Caplyta 42 MG CAPS capsule Take 42 mg by mouth daily at bedtime   • clonazePAM (KlonoPIN) 1 mg tablet Take 1 mg by mouth 2 (two) times a day as needed   • fluticasone (FLONASE) 50 mcg/act nasal spray 1 spray into each nostril daily   • gabapentin (NEURONTIN) 600 MG tablet Take 1 tablet (600 mg total) by mouth 2 (two) times a day   • methadone (DOLOPHINE) 5 mg tablet Take 165 mg by mouth daily   • methocarbamol (ROBAXIN) 500 mg tablet Take 1 tablet (500 mg total) by mouth every 8 (eight) hours as needed for muscle spasms   • Misc. Devices (CANE) MISC by Does not apply route daily Dx:  Frequent falls   • bacitracin-polymyxin b  (POLYSPORIN) ointment Apply topically 2 (two) times a day (Patient not taking: Reported on 12/8/2023)   • benztropine (COGENTIN) 2 mg tablet  (Patient not taking: Reported on 3/1/2024)   • Continuous Blood Gluc  (FreeStyle Grace 14 Day [place]stone) STEVE 1 Device by Device route 3 (three) times a day before meals (Patient not taking: Reported on 3/1/2024)   • Continuous Blood Gluc  (FreeStyle Grace 2 [place]stone) STEVE 1 Device by Device route 3 (three) times a day before meals (Patient not taking: Reported on 3/1/2024)   • divalproex sodium (DEPAKOTE ER) 500 mg 24 hr tablet Take 1 tablet (500 mg total) by mouth every 12 (twelve) hours   • docusate sodium (COLACE) 100 mg capsule Take 1 capsule (100 mg total) by mouth 2 (two) times a day for 5 days   • doxepin (SINEquan) 100 mg capsule Take 100 mg by mouth daily at bedtime (Patient not taking: Reported on 12/8/2023)   • DULoxetine (CYMBALTA) 60 mg delayed release capsule Take 60 mg by mouth in the morning (Patient not taking: Reported on 12/8/2023)   • ergocalciferol (ERGOCALCIFEROL) 1.25 MG (90097 UT) capsule Take 50,000 Units by mouth   • GLOBAL EASE INJECT PEN NEEDLES 31G X 8 MM MISC USE AS DIRECTED TO INJECT INSULIN DAILY (Patient not taking: Reported on 3/1/2024)   • Incontinence Supply Disposable (INCONTINENCE BRIEF MEDIUM) MISC by Does not apply route as needed (fecal soiling) (Patient not taking: Reported on 3/1/2024)   • Incontinence Supply Disposable (RA WIPES FLUSHABLE/MOIST) MISC by Does not apply route as needed (fecal soiling) (Patient not taking: Reported on 3/1/2024)   • Insulin Pen Needle (B-D UF III MINI PEN NEEDLES) 31G X 5 MM MISC Inject 15 Units under the skin daily at bedtime (Patient not taking: Reported on 3/1/2024)   • Kerendia 10 MG TABS TAKE 10 MG BY MOUTH EVERY MORNING. (Patient not taking: Reported on 3/1/2024)   • metFORMIN (GLUCOPHAGE) 850 mg tablet Take 1 tablet (850 mg total) by mouth daily with breakfast (Patient not taking:  "Reported on 3/1/2024)   • Synjardy 12.5-1000 MG TABS TAKE ONE TABLET BY MOUTH 2 (TWO) TIMES A DAY. (Patient not taking: Reported on 3/1/2024)   • Trulicity 1.5 MG/0.5ML injection INJECT 1.5MG SUBCUTANEOUS ONCE EVERY WEEK (Patient not taking: Reported on 3/1/2024)   • UNKNOWN TO PATIENT    • zolpidem (AMBIEN) 10 mg tablet Take 10 mg by mouth daily at bedtime (Patient not taking: Reported on 12/8/2023)   • [DISCONTINUED] bacitracin-neomycin-polymyxin b-hydrocortisone 1 % ointment Apply topically 2 (two) times a day       Objective     /77 (BP Location: Left arm, Patient Position: Sitting, Cuff Size: Adult)   Pulse 61   Temp 98.1 °F (36.7 °C) (Temporal)   Resp 16   Ht 5' 8\" (1.727 m)   Wt 82.9 kg (182 lb 12.8 oz)   SpO2 97%   BMI 27.79 kg/m²     Physical Exam  Vitals reviewed.   Constitutional:       General: He is not in acute distress.     Appearance: Normal appearance. He is not ill-appearing.   HENT:      Head: Normocephalic and atraumatic.   Eyes:      Extraocular Movements: Extraocular movements intact.      Conjunctiva/sclera: Conjunctivae normal.   Neck:      Vascular: No carotid bruit.   Cardiovascular:      Rate and Rhythm: Normal rate and regular rhythm.   Pulmonary:      Effort: Pulmonary effort is normal.      Breath sounds: Normal breath sounds.   Abdominal:      General: Bowel sounds are normal.      Palpations: Abdomen is soft.      Tenderness: There is no abdominal tenderness. There is no right CVA tenderness or left CVA tenderness.   Musculoskeletal:      Right lower leg: No edema.      Left lower leg: No edema.      Comments: No calf tenderness bilateral.   Skin:     General: Skin is warm.   Neurological:      General: No focal deficit present.      Mental Status: He is alert and oriented to person, place, and time.   Psychiatric:         Mood and Affect: Mood normal.         Behavior: Behavior normal.       Casey Mckeon MD    "

## 2024-03-05 ENCOUNTER — APPOINTMENT (OUTPATIENT)
Dept: LAB | Facility: CLINIC | Age: 64
End: 2024-03-05
Payer: COMMERCIAL

## 2024-03-05 DIAGNOSIS — R31.21 ASYMPTOMATIC MICROSCOPIC HEMATURIA: ICD-10-CM

## 2024-03-05 DIAGNOSIS — R35.1 NOCTURIA: ICD-10-CM

## 2024-03-05 LAB
ANION GAP SERPL CALCULATED.3IONS-SCNC: 6 MMOL/L
BUN SERPL-MCNC: 13 MG/DL (ref 5–25)
CALCIUM SERPL-MCNC: 8.7 MG/DL (ref 8.4–10.2)
CHLORIDE SERPL-SCNC: 102 MMOL/L (ref 96–108)
CK SERPL-CCNC: 133 U/L (ref 39–308)
CO2 SERPL-SCNC: 36 MMOL/L (ref 21–32)
CREAT SERPL-MCNC: 0.98 MG/DL (ref 0.6–1.3)
ERYTHROCYTE [SEDIMENTATION RATE] IN BLOOD: 28 MM/HOUR (ref 0–19)
GFR SERPL CREATININE-BSD FRML MDRD: 82 ML/MIN/1.73SQ M
GLUCOSE P FAST SERPL-MCNC: 135 MG/DL (ref 65–99)
POTASSIUM SERPL-SCNC: 4.5 MMOL/L (ref 3.5–5.3)
PSA SERPL-MCNC: 1.04 NG/ML (ref 0–4)
SODIUM SERPL-SCNC: 144 MMOL/L (ref 135–147)

## 2024-03-05 PROCEDURE — 80048 BASIC METABOLIC PNL TOTAL CA: CPT

## 2024-03-05 PROCEDURE — 36415 COLL VENOUS BLD VENIPUNCTURE: CPT

## 2024-03-05 PROCEDURE — G0103 PSA SCREENING: HCPCS

## 2024-03-05 PROCEDURE — 85652 RBC SED RATE AUTOMATED: CPT

## 2024-03-05 PROCEDURE — 82550 ASSAY OF CK (CPK): CPT

## 2024-03-06 NOTE — ED NOTES
Bed search cont       [facility] - faxed [phone] - faxed clinicals  [place] - left message with answering service  [facility] - d/c beds for tomorrow, but unable to accomodate methadone  [facility] - no beds  [facility] - no beds EMERGENCY DEPARTMENT ENCOUNTER      CHIEF COMPLAINT    Chief Complaint   Patient presents with    Derm Problem       HPI    Gris JORGE Dalton is a 31 year old female who presents to the emergency department with rash to her vaginal area. Patient states recently has STD testing peformed and her IGG was postiive for HSV, but she has never had an outbreak. Since the testing he notes shaving and developing what she feels are razor bumps and is concerned the rash may represent HSV. Denies any painful lip lesions or vaginal discharge. History obtained from the patient.     Chart reviewed. 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   I have reviewed the nurse's notes    ALLERGIES    ALLERGIES:   Allergen Reactions    Seasonal Other (See Comments)     Watery eyes and sneezing       CURRENT MEDICATIONS    No current facility-administered medications for this encounter.     Current Outpatient Medications   Medication Sig Dispense Refill    fluconazole (DIFLUCAN) 150 MG tablet Take 1 tablet today and take second tablet in 72 hours 2 tablet 0    norelgestromin-ethinyl estradiol (Xulane) 150-35 MCG/24HR patch Apply 1 patch every Week. After 3 weeks remove patch for 1 week then resume. 9 each 3    albuterol (ProAir HFA) 108 (90 Base) MCG/ACT inhaler Inhale 2 puffs into your lungs every 4 hours as needed for Shortness of Breath or Wheezing. MUST SEE MD FOR REFILLS 25.5 g 3    urea (CARMOL 20) 20 % cream Apply 1 application topically as needed for Dry Skin. 120 g 0    cetirizine (ZyrTEC) 5 MG tablet Take 1 tablet by mouth daily. 30 tablet 1    tretinoin (RETIN-A) 0.05 % cream Apply a pea-sized amount to face at bedtime. Start every OTHER night and increase as tolerated. 40 g 3    vitamin D, Cholecalciferol, 10 mcg (400 units) capsule Take 1 capsule by mouth daily. (Patient not taking: Reported on 2/15/2024) 150 capsule 3    albuterol (VENTOLIN) (2.5 MG/3ML) 0.083% nebulizer solution Take 3 mLs by nebulization every 6 hours as needed for Wheezing. 500 mL 3     fluticasone-salmeterol (Advair Diskus) 250-50 MCG/ACT inhaler Inhale 1 puff into your lungs in the morning and 1 puff before bedtime. 180 each 3       PAST MEDICAL HISTORY    Past Medical History:   Diagnosis Date    Asthma     Environmental allergies     Fibroids     RAD (reactive airway disease)        SURGICAL HISTORY    Past Surgical History:   Procedure Laterality Date    Eye surgery      Past surgical history      None       SOCIAL HISTORY    Social History     Tobacco Use    Smoking status: Never     Passive exposure: Never    Smokeless tobacco: Never   Vaping Use    Vaping Use: never used   Substance Use Topics    Alcohol use: No     Alcohol/week: 0.0 standard drinks of alcohol    Drug use: No       FAMILY HISTORY    Family History   Problem Relation Age of Onset    Cancer, Colon Mother        REVIEW OF SYSTEMS    Constitutional:  Denies fever or chills.   HENT:  Denies nasal congestion or sore throat.   Respiratory:  Denies cough or shortness of breath.   Cardiovascular:  Denies chest pain or palpitations.   GI:  Denies abdominal pain. No nausea or vomiting. No diarrhea.  Musculoskeletal:  Denies back pain or joint pain.   Skin:  Denies rash.   Neurologic:  Denies headache. No weakness. No paresthesias.     PHYSICAL EXAM    Vitals:    03/05/24 2024 03/05/24 2308   BP: 133/71 129/74   BP Location:  RUE - Right upper extremity   Patient Position: Sitting/High-Kaur's Sitting/High-Kaur's   Pulse: 96 87   Resp: 18 16   Temp: 98 °F (36.7 °C)    TempSrc: Oral    SpO2: 99% 99%   Weight: 78 kg (171 lb 15.3 oz)    Height: 5' 2\" (1.575 m)    LMP: 02/07/2024       Constitutional:  Well developed, well nourished. No acute distress, non-toxic appearance.   Eyes:  extraocular movements grossly intact with conjunctiva appearing unremarkable. No periorbital swelling.  HENT:  Atraumatic/normocephalic. External ears normal. Nose normal. Oropharynx moist.  Neck:  Supple. Normal range of motion.  Respiratory:  No  respiratory distress.  Normal work of breathing  Cardiovascular:  Normal rate, normal rhythm.   GI:  Nondistended  Musculoskeletal:  Normal range of motion, no obvious deformities.   Skin:  Warm and dry. Well hydrated.  Clustered grouping of 5 excoriated lesions over the right inferior vulvar area.  No streaking erythema, soft tissue swelling or fluctuance noted.  Neurologic:  Alert & oriented x 3. Normal speech and coordination. No focal deficits noted.   Psychiatric:  Speech and behavior appropriate.       MEDICAL DECISION MAKING      31 year old female presents to the emergency department with complaints of rash to vulvar area.  History of recent HSV blood testing with positive IgG although patient denies having an outbreak in the past.  Does report having \"razor bumps\" in the same area before now has a recurrence.  Is concerned this may represent HSV but is unsure.  On exam has a cluster of lesions that look like they could be unroofed blisters.  Swab obtained.  No signs of folliculitis or cellulitis.  Follow up with PCP.  Patient will see her results in level appy and follow up with her doctor.  Return with any new or worsening symptoms.  She did not want empiric antivirals at this time.      IMPRESSION:  1. Vulvar rash            Current Discharge Medication List          Patient discharged in stable condition.        Rose Mary Anne MD  03/05/24 [id number]

## 2024-03-09 ENCOUNTER — HOSPITAL ENCOUNTER (OUTPATIENT)
Dept: RADIOLOGY | Facility: HOSPITAL | Age: 64
Discharge: HOME | End: 2024-03-09
Attending: FAMILY MEDICINE
Payer: COMMERCIAL

## 2024-03-09 DIAGNOSIS — R35.1 NOCTURIA: ICD-10-CM

## 2024-03-09 DIAGNOSIS — R31.21 ASYMPTOMATIC MICROSCOPIC HEMATURIA: ICD-10-CM

## 2024-03-09 PROCEDURE — 76770 US EXAM ABDO BACK WALL COMP: CPT

## 2024-03-28 ENCOUNTER — HOSPITAL ENCOUNTER (EMERGENCY)
Facility: HOSPITAL | Age: 64
Discharge: HOME | End: 2024-03-28
Attending: EMERGENCY MEDICINE
Payer: COMMERCIAL

## 2024-03-28 VITALS
HEART RATE: 70 BPM | OXYGEN SATURATION: 98 % | RESPIRATION RATE: 17 BRPM | TEMPERATURE: 96.9 F | SYSTOLIC BLOOD PRESSURE: 150 MMHG | DIASTOLIC BLOOD PRESSURE: 89 MMHG

## 2024-03-28 DIAGNOSIS — R11.2 NAUSEA & VOMITING: Primary | ICD-10-CM

## 2024-03-28 LAB
ATRIAL RATE: 86 BPM
P AXIS: 73 DEGREES
PR INTERVAL: 136 MS
QRS AXIS: 81 DEGREES
QRSD INTERVAL: 88 MS
QT INTERVAL: 378 MS
QTC INTERVAL: 452 MS
T WAVE AXIS: 68 DEGREES
VENTRICULAR RATE: 86 BPM

## 2024-03-28 PROCEDURE — 96374 THER/PROPH/DIAG INJ IV PUSH: CPT

## 2024-03-28 PROCEDURE — 93010 ELECTROCARDIOGRAM REPORT: CPT | Performed by: INTERNAL MEDICINE

## 2024-03-28 PROCEDURE — 93005 ELECTROCARDIOGRAM TRACING: CPT

## 2024-03-28 PROCEDURE — 99283 EMERGENCY DEPT VISIT LOW MDM: CPT

## 2024-03-28 PROCEDURE — 99284 EMERGENCY DEPT VISIT MOD MDM: CPT | Performed by: EMERGENCY MEDICINE

## 2024-03-28 RX ORDER — ONDANSETRON 2 MG/ML
4 INJECTION INTRAMUSCULAR; INTRAVENOUS ONCE
Status: COMPLETED | OUTPATIENT
Start: 2024-03-28 | End: 2024-03-28

## 2024-03-28 RX ORDER — ONDANSETRON 4 MG/1
4 TABLET, ORALLY DISINTEGRATING ORAL EVERY 6 HOURS PRN
Qty: 30 TABLET | Refills: 0 | Status: SHIPPED | OUTPATIENT
Start: 2024-03-28

## 2024-03-28 RX ORDER — MAGNESIUM HYDROXIDE/ALUMINUM HYDROXICE/SIMETHICONE 120; 1200; 1200 MG/30ML; MG/30ML; MG/30ML
30 SUSPENSION ORAL ONCE
Status: COMPLETED | OUTPATIENT
Start: 2024-03-28 | End: 2024-03-28

## 2024-03-28 RX ADMIN — ONDANSETRON 4 MG: 2 INJECTION INTRAMUSCULAR; INTRAVENOUS at 01:37

## 2024-03-28 RX ADMIN — ALUMINUM HYDROXIDE, MAGNESIUM HYDROXIDE, AND DIMETHICONE 30 ML: 200; 20; 200 SUSPENSION ORAL at 03:07

## 2024-03-28 NOTE — ED ATTENDING ATTESTATION
3/28/2024  I, Sp Sanderson DO, saw and evaluated the patient. I have discussed the patient with the resident/non-physician practitioner and agree with the resident's/non-physician practitioner's findings, Plan of Care, and MDM as documented in the resident's/non-physician practitioner's note, except where noted. All available labs and Radiology studies were reviewed.  I was present for key portions of any procedure(s) performed by the resident/non-physician practitioner and I was immediately available to provide assistance.       At this point I agree with the current assessment done in the Emergency Department.  I have conducted an independent evaluation of this patient a history and physical is as follows: This is a 63-year-old male who presents emergency department with history of methadone use who presents with noted nausea and vomiting earlier this evening the patient states he missed taking his dose and took it a little late at which point he had some nausea and vomiting.  Patient has no diarrhea no fevers or chills, multiple rounds of antiemetics given here in the emergency department with noted improvement of symptoms and able to tolerate p.o., the plan will be for outpatient management and follow-up likely gastritis, gastroenteritis, viral syndrome, no abdominal tenderness on examination to be concerned with noted intra-abdominal pathology.    ED Course         Critical Care Time  Procedures

## 2024-03-28 NOTE — ED PROVIDER NOTES
History  Chief Complaint   Patient presents with    Vomiting     Pt reports taking his methadone off schedule and started vomiting after taking it      HPI    Prior to Admission Medications   Prescriptions Last Dose Informant Patient Reported? Taking?   Caplyta 42 MG CAPS capsule   Yes No   Sig: Take 42 mg by mouth daily at bedtime   Continuous Blood Gluc  (FreeStyle Grace 14 Day [place]) STEVE   No No   Si Device by Device route 3 (three) times a day before meals   Patient not taking: Reported on 3/1/2024   Continuous Blood Gluc  (FreeStyle Grace 2 [place]) STEVE   No No   Si Device by Device route 3 (three) times a day before meals   Patient not taking: Reported on 3/1/2024   DULoxetine (CYMBALTA) 60 mg delayed release capsule   Yes No   Sig: Take 60 mg by mouth in the morning   Patient not taking: Reported on 2023   GLOBAL EASE INJECT PEN NEEDLES 31G X 8 MM MISC  Self Yes No   Sig: USE AS DIRECTED TO INJECT INSULIN DAILY   Patient not taking: Reported on 3/1/2024   Incontinence Supply Disposable (INCONTINENCE BRIEF MEDIUM) MISC  Self No No   Sig: by Does not apply route as needed (fecal soiling)   Patient not taking: Reported on 3/1/2024   Incontinence Supply Disposable (RA WIPES FLUSHABLE/MOIST) MISC  Self No No   Sig: by Does not apply route as needed (fecal soiling)   Patient not taking: Reported on 3/1/2024   Insulin Pen Needle (B-D UF III MINI PEN NEEDLES) 31G X 5 MM MISC   No No   Sig: Inject 15 Units under the skin daily at bedtime   Patient not taking: Reported on 3/1/2024   Kerendia 10 MG TABS   Yes No   Sig: TAKE 10 MG BY MOUTH EVERY MORNING.   Patient not taking: Reported on 3/1/2024   Misc. Devices (CANE) MISC  Self No No   Sig: by Does not apply route daily Dx:  Frequent falls   Synjardy 12.5-1000 MG TABS   Yes No   Sig: TAKE ONE TABLET BY MOUTH 2 (TWO) TIMES A DAY.   Patient not taking: Reported on 3/1/2024   Trulicity 1.5 MG/0.5ML injection   Yes No   Sig: INJECT 1.5MG  SUBCUTANEOUS ONCE EVERY WEEK   Patient not taking: Reported on 3/1/2024   UNKNOWN TO PATIENT  Self Yes No   acetaminophen (TYLENOL) 325 mg tablet   No No   Sig: Take 2 tablets (650 mg total) by mouth every 4 (four) hours as needed for mild pain   bacitracin-polymyxin b (POLYSPORIN) ointment   No No   Sig: Apply topically 2 (two) times a day   Patient not taking: Reported on 2023   benztropine (COGENTIN) 2 mg tablet   Yes No   Patient not taking: Reported on 3/1/2024   clonazePAM (KlonoPIN) 1 mg tablet   Yes No   Sig: Take 1 mg by mouth 2 (two) times a day as needed   divalproex sodium (DEPAKOTE ER) 500 mg 24 hr tablet   No No   Sig: Take 1 tablet (500 mg total) by mouth every 12 (twelve) hours   docusate sodium (COLACE) 100 mg capsule   No No   Sig: Take 1 capsule (100 mg total) by mouth 2 (two) times a day for 5 days   doxepin (SINEquan) 100 mg capsule   Yes No   Sig: Take 100 mg by mouth daily at bedtime   Patient not taking: Reported on 2023   ergocalciferol (ERGOCALCIFEROL) 1.25 MG (10355 UT) capsule   Yes No   Sig: Take 50,000 Units by mouth   fluticasone (FLONASE) 50 mcg/act nasal spray   No No   Si spray into each nostril daily   gabapentin (NEURONTIN) 600 MG tablet   No No   Sig: Take 1 tablet (600 mg total) by mouth 2 (two) times a day   metFORMIN (GLUCOPHAGE) 850 mg tablet   No No   Sig: Take 1 tablet (850 mg total) by mouth daily with breakfast   Patient not taking: Reported on 3/1/2024   methadone (DOLOPHINE) 5 mg tablet   Yes No   Sig: Take 165 mg by mouth daily   methocarbamol (ROBAXIN) 500 mg tablet   No No   Sig: Take 1 tablet (500 mg total) by mouth every 8 (eight) hours as needed for muscle spasms   zolpidem (AMBIEN) 10 mg tablet   Yes No   Sig: Take 10 mg by mouth daily at bedtime   Patient not taking: Reported on 2023      Facility-Administered Medications: None       Past Medical History:   Diagnosis Date    Anxiety     Depression     Diabetes mellitus (HCC)     Drug use      Hemorrhoids, internal 9/23/2019    Hypertension        Past Surgical History:   Procedure Laterality Date    FEMUR FRACTURE SURGERY Right     GASTRIC BYPASS  11/08/2011    Managed by: Braden Luna (General Surgery)    HERNIA REPAIR  02/13/2013    Incisional hernia repair Managed by: Braden Luna (General Surgery)    TONSILLECTOMY  2010       Family History   Problem Relation Age of Onset    Diabetes Mother     Hypertension Mother     Hypertension Father     Autoimmune disease Son      I have reviewed and agree with the history as documented.    E-Cigarette/Vaping    E-Cigarette Use Never User      E-Cigarette/Vaping Substances    Nicotine No     THC No     CBD No     Flavoring No     Other No     Unknown No      Social History     Tobacco Use    Smoking status: Never    Smokeless tobacco: Never   Vaping Use    Vaping status: Never Used   Substance Use Topics    Alcohol use: Never    Drug use: Yes     Comment: Methadone clinic        Review of Systems    Physical Exam  ED Triage Vitals [03/28/24 0056]   Temperature Pulse Respirations Blood Pressure SpO2   (!) 96.9 °F (36.1 °C) 105 20 150/89 98 %      Temp Source Heart Rate Source Patient Position - Orthostatic VS BP Location FiO2 (%)   Tympanic Monitor Lying Right arm --      Pain Score       --             Orthostatic Vital Signs  Vitals:    03/28/24 0056   BP: 150/89   Pulse: 105   Patient Position - Orthostatic VS: Lying       Physical Exam    ED Medications  Medications - No data to display    Diagnostic Studies  Results Reviewed       None                   No orders to display         Procedures  Procedures      ED Course                                       MDM      Disposition  Final diagnoses:   None     ED Disposition       None          Follow-up Information    None         Patient's Medications   Discharge Prescriptions    No medications on file     No discharge procedures on file.    PDMP Review         Value Time User    PDMP Reviewed  Yes  9/14/2023  4:34 PM Hong Nelson PA-C             ED Provider  Attending physically available and evaluated David Skelton Jr.. I managed the patient along with the ED Attending.    Electronically Signed by         "discussion on patient workup.    All labs reviewed and utilized in the medical decision making process  All radiology studies independently viewed by me and interpreted by the radiologist.  I reviewed all testing with the patient.     Upon re-evaluation patient resting comfortably, tolerating PO.    Disposition: I have reviewed the patient's vital signs, nursing notes, and other relevant tests/information. I had a detailed discussion with the patient regarding the history, exam findings, and any diagnostic results.   Plan to discharge home in stable condition, follow up with PCP.  Discussed with patient who is agreeable to plan.  I discussed discharge instructions, need for follow-up, and oral return precautions for what to return for in addition to the written return precautions and discharge instructions, specifically highlighting areas of special concern.  The patient verbalized understanding of the discharge instructions and warnings that would necessitate return to the Emergency Department including continued vomiting.  All questions the patient had were answered prior to discharge to the best of my ability.       Portions of the record may have been created with voice recognition software. Occasional wrong word or \"sound a like\" substitutions may have occurred due to the inherent limitations of voice recognition software. Read the chart carefully and recognize, using context, where substitutions have occurred.        Disposition  Final diagnoses:   Nausea & vomiting     Time reflects when diagnosis was documented in both MDM as applicable and the Disposition within this note       Time User Action Codes Description Comment    3/28/2024  3:25 AM Opal Yancey Add [R11.2] Nausea & vomiting           ED Disposition       ED Disposition   Discharge    Condition   Stable    Date/Time   Thu Mar 28, 2024  3:24 AM    Comment   David Skelton Jr. discharge to home/self care.                   Follow-up Information  "      Follow up With Specialties Details Why Contact Info    Casey Mckeon MD Family Medicine Schedule an appointment as soon as possible for a visit in 1 week  [address] [place] [address] [address]  [place] [address] [phone]              Discharge Medication List as of 3/28/2024  3:26 AM        START taking these medications    Details   aluminum-magnesium hydroxide 200-200 MG/5ML suspension Take 15 mL by mouth every 6 (six) hours as needed for heartburn, Starting u 3/28/2024, Normal      ondansetron (ZOFRAN-ODT) 4 mg disintegrating tablet Take 1 tablet (4 mg total) by mouth every 6 (six) hours as needed for vomiting, Starting u 3/28/2024, Normal           CONTINUE these medications which have NOT CHANGED    Details   acetaminophen (TYLENOL) 325 mg tablet Take 2 tablets (650 mg total) by mouth every 4 (four) hours as needed for mild pain, Starting Fri 8/11/2023, No Print      bacitracin-polymyxin b (POLYSPORIN) ointment Apply topically 2 (two) times a day, Starting Wed 11/29/2023, Normal      benztropine (COGENTIN) 2 mg tablet Historical Med      Caplyta 42 MG CAPS capsule Take 42 mg by mouth daily at bedtime, Starting Tue 10/17/2023, Historical Med      clonazePAM (KlonoPIN) 1 mg tablet Take 1 mg by mouth 2 (two) times a day as needed, Starting Tue 8/22/2023, Historical Med      !! Continuous Blood Gluc  (FreeStyle Grace 14 Day [place]) STEVE 1 Device by Device route 3 (three) times a day before meals, Starting Tue 1/31/2023, Normal      !! Continuous Blood Gluc  (FreeStyle Grace 2 [place]) STEVE 1 Device by Device route 3 (three) times a day before meals, Normal      divalproex sodium (DEPAKOTE ER) 500 mg 24 hr tablet Take 1 tablet (500 mg total) by mouth every 12 (twelve) hours, Starting Mon 1/23/2023, Until Wed 2/22/2023, Normal      docusate sodium (COLACE) 100 mg capsule Take 1 capsule (100 mg total) by mouth 2 (two) times a day for 5 days, Starting Fri 8/11/2023, Until Wed  8/16/2023, Normal      doxepin (SINEquan) 100 mg capsule Take 100 mg by mouth daily at bedtime, Starting Tue 11/14/2023, Historical Med      DULoxetine (CYMBALTA) 60 mg delayed release capsule Take 60 mg by mouth in the morning, Starting Tue 8/22/2023, Historical Med      ergocalciferol (ERGOCALCIFEROL) 1.25 MG (64031 UT) capsule Take 50,000 Units by mouth, Starting Thu 12/5/2019, Until Thu 2/27/2020, Historical Med      fluticasone (FLONASE) 50 mcg/act nasal spray 1 spray into each nostril daily, Starting Sun 2/11/2024, Normal      gabapentin (NEURONTIN) 600 MG tablet Take 1 tablet (600 mg total) by mouth 2 (two) times a day, Starting Fri 1/19/2024, Normal      !! GLOBAL EASE INJECT PEN NEEDLES 31G X 8 MM MISC USE AS DIRECTED TO INJECT INSULIN DAILY, Historical Med      !! Incontinence Supply Disposable (INCONTINENCE BRIEF MEDIUM) MISC by Does not apply route as needed (fecal soiling), Starting Wed 12/4/2019, Print      !! Incontinence Supply Disposable (RA WIPES FLUSHABLE/MOIST) MISC by Does not apply route as needed (fecal soiling), Starting Wed 12/4/2019, Print      !! Insulin Pen Needle (B-D UF III MINI PEN NEEDLES) 31G X 5 MM MISC Inject 15 Units under the skin daily at bedtime, Normal      Kerendia 10 MG TABS TAKE 10 MG BY MOUTH EVERY MORNING., Historical Med      metFORMIN (GLUCOPHAGE) 850 mg tablet Take 1 tablet (850 mg total) by mouth daily with breakfast, Starting Mon 1/23/2023, Normal      methadone (DOLOPHINE) 5 mg tablet Take 165 mg by mouth daily, Historical Med      methocarbamol (ROBAXIN) 500 mg tablet Take 1 tablet (500 mg total) by mouth every 8 (eight) hours as needed for muscle spasms, Starting Wed 11/29/2023, Normal      Misc. Devices (CANE) MISC by Does not apply route daily Dx:  Frequent falls, Starting Mon 7/29/2019, Print      Synjardy 12.5-1000 MG TABS TAKE ONE TABLET BY MOUTH 2 (TWO) TIMES A DAY., Historical Med      Trulicity 1.5 MG/0.5ML injection INJECT 1.5MG SUBCUTANEOUS ONCE EVERY  WEEK, Historical Med      UNKNOWN TO PATIENT Historical Med      zolpidem (AMBIEN) 10 mg tablet Take 10 mg by mouth daily at bedtime, Starting Tue 10/17/2023, Historical Med       !! - Potential duplicate medications found. Please discuss with provider.        No discharge procedures on file.    PDMP Review         Value Time User    PDMP Reviewed  Yes 9/14/2023  4:34 PM Hong Nelson PA-C             ED Provider  Attending physically available and evaluated David Skelton Jr.. I managed the patient along with the ED Attending.    Electronically Signed by           Opal Yancey DO  04/01/24 0652

## 2024-03-28 NOTE — DISCHARGE INSTRUCTIONS
You were seen in the emergency department today for nausea and vomiting.    Follow up with your primary care provider.     Take Zofran and Maalox as prescribed.     Return to the emergency department for any new or concerning symptoms including worsening vomiting, pain.     Thank you for choosing [facility] for your care today.

## 2024-03-31 ENCOUNTER — HOSPITAL ENCOUNTER (EMERGENCY)
Facility: HOSPITAL | Age: 64
Discharge: HOME | End: 2024-04-01
Attending: EMERGENCY MEDICINE
Payer: COMMERCIAL

## 2024-03-31 VITALS
HEART RATE: 96 BPM | SYSTOLIC BLOOD PRESSURE: 140 MMHG | DIASTOLIC BLOOD PRESSURE: 89 MMHG | OXYGEN SATURATION: 97 % | TEMPERATURE: 98.1 F | RESPIRATION RATE: 15 BRPM

## 2024-03-31 DIAGNOSIS — R41.82 ALTERED MENTAL STATUS: Primary | ICD-10-CM

## 2024-03-31 LAB — GLUCOSE SERPL-MCNC: 141 MG/DL (ref 65–140)

## 2024-03-31 PROCEDURE — 99284 EMERGENCY DEPT VISIT MOD MDM: CPT | Performed by: EMERGENCY MEDICINE

## 2024-03-31 PROCEDURE — 82948 REAGENT STRIP/BLOOD GLUCOSE: CPT

## 2024-03-31 PROCEDURE — 99285 EMERGENCY DEPT VISIT HI MDM: CPT

## 2024-04-01 LAB
ALBUMIN SERPL BCP-MCNC: 3.8 G/DL (ref 3.5–5)
ALP SERPL-CCNC: 111 U/L (ref 34–104)
ALT SERPL W P-5'-P-CCNC: 43 U/L (ref 7–52)
ANION GAP SERPL CALCULATED.3IONS-SCNC: 7 MMOL/L (ref 4–13)
APAP SERPL-MCNC: <2 UG/ML (ref 10–20)
AST SERPL W P-5'-P-CCNC: 23 U/L (ref 13–39)
BACTERIA UR QL AUTO: ABNORMAL /HPF
BASOPHILS # BLD AUTO: 0.05 THOUSANDS/ÂΜL (ref 0–0.1)
BASOPHILS NFR BLD AUTO: 1 % (ref 0–1)
BILIRUB SERPL-MCNC: 0.44 MG/DL (ref 0.2–1)
BILIRUB UR QL STRIP: NEGATIVE
BUDDING YEAST: PRESENT
BUN SERPL-MCNC: 23 MG/DL (ref 5–25)
CALCIUM SERPL-MCNC: 8.3 MG/DL (ref 8.4–10.2)
CHLORIDE SERPL-SCNC: 103 MMOL/L (ref 96–108)
CLARITY UR: CLEAR
CO2 SERPL-SCNC: 29 MMOL/L (ref 21–32)
COLOR UR: YELLOW
CREAT SERPL-MCNC: 0.96 MG/DL (ref 0.6–1.3)
EOSINOPHIL # BLD AUTO: 0.5 THOUSAND/ÂΜL (ref 0–0.61)
EOSINOPHIL NFR BLD AUTO: 6 % (ref 0–6)
ERYTHROCYTE [DISTWIDTH] IN BLOOD BY AUTOMATED COUNT: 13.5 % (ref 11.6–15.1)
ETHANOL SERPL-MCNC: <10 MG/DL
GFR SERPL CREATININE-BSD FRML MDRD: 84 ML/MIN/1.73SQ M
GLUCOSE SERPL-MCNC: 142 MG/DL (ref 65–140)
GLUCOSE UR STRIP-MCNC: NEGATIVE MG/DL
HCT VFR BLD AUTO: 33.9 % (ref 36.5–49.3)
HGB BLD-MCNC: 10.8 G/DL (ref 12–17)
HGB UR QL STRIP.AUTO: NEGATIVE
HYALINE CASTS #/AREA URNS LPF: ABNORMAL /LPF
IMM GRANULOCYTES # BLD AUTO: 0.02 THOUSAND/UL (ref 0–0.2)
IMM GRANULOCYTES NFR BLD AUTO: 0 % (ref 0–2)
KETONES UR STRIP-MCNC: NEGATIVE MG/DL
LEUKOCYTE ESTERASE UR QL STRIP: ABNORMAL
LYMPHOCYTES # BLD AUTO: 1.08 THOUSANDS/ÂΜL (ref 0.6–4.47)
LYMPHOCYTES NFR BLD AUTO: 13 % (ref 14–44)
MCH RBC QN AUTO: 28.3 PG (ref 26.8–34.3)
MCHC RBC AUTO-ENTMCNC: 31.9 G/DL (ref 31.4–37.4)
MCV RBC AUTO: 89 FL (ref 82–98)
MONOCYTES # BLD AUTO: 0.51 THOUSAND/ÂΜL (ref 0.17–1.22)
MONOCYTES NFR BLD AUTO: 6 % (ref 4–12)
MUCOUS THREADS UR QL AUTO: ABNORMAL
NEUTROPHILS # BLD AUTO: 6.35 THOUSANDS/ÂΜL (ref 1.85–7.62)
NEUTS SEG NFR BLD AUTO: 74 % (ref 43–75)
NITRITE UR QL STRIP: NEGATIVE
NON-SQ EPI CELLS URNS QL MICRO: ABNORMAL /HPF
NRBC BLD AUTO-RTO: 0 /100 WBCS
PH UR STRIP.AUTO: 5.5 [PH]
PLATELET # BLD AUTO: 182 THOUSANDS/UL (ref 149–390)
PMV BLD AUTO: 11.5 FL (ref 8.9–12.7)
POTASSIUM SERPL-SCNC: 3.9 MMOL/L (ref 3.5–5.3)
PROT SERPL-MCNC: 6.6 G/DL (ref 6.4–8.4)
PROT UR STRIP-MCNC: ABNORMAL MG/DL
RBC # BLD AUTO: 3.81 MILLION/UL (ref 3.88–5.62)
RBC #/AREA URNS AUTO: ABNORMAL /HPF
SALICYLATES SERPL-MCNC: <5 MG/DL (ref 3–20)
SODIUM SERPL-SCNC: 139 MMOL/L (ref 135–147)
SP GR UR STRIP.AUTO: 1.03 (ref 1–1.03)
TSH SERPL DL<=0.05 MIU/L-ACNC: 0.46 UIU/ML (ref 0.45–4.5)
UROBILINOGEN UR STRIP-ACNC: <2 MG/DL
WBC # BLD AUTO: 8.51 THOUSAND/UL (ref 4.31–10.16)
WBC #/AREA URNS AUTO: ABNORMAL /HPF
WBC CLUMPS # UR AUTO: PRESENT /UL

## 2024-04-01 PROCEDURE — 36415 COLL VENOUS BLD VENIPUNCTURE: CPT

## 2024-04-01 PROCEDURE — 87086 URINE CULTURE/COLONY COUNT: CPT

## 2024-04-01 PROCEDURE — 81001 URINALYSIS AUTO W/SCOPE: CPT

## 2024-04-01 PROCEDURE — 82077 ASSAY SPEC XCP UR&BREATH IA: CPT

## 2024-04-01 PROCEDURE — 80143 DRUG ASSAY ACETAMINOPHEN: CPT

## 2024-04-01 PROCEDURE — 84443 ASSAY THYROID STIM HORMONE: CPT

## 2024-04-01 PROCEDURE — 80179 DRUG ASSAY SALICYLATE: CPT

## 2024-04-01 PROCEDURE — 85025 COMPLETE CBC W/AUTO DIFF WBC: CPT

## 2024-04-01 PROCEDURE — 80053 COMPREHEN METABOLIC PANEL: CPT

## 2024-04-01 NOTE — ED ATTENDING ATTESTATION
3/31/2024  I, Dillon Jackson MD, saw and evaluated the patient. I have discussed the patient with the resident/non-physician practitioner and agree with the resident's/non-physician practitioner's findings, Plan of Care, and MDM as documented in the resident's/non-physician practitioner's note, except where noted. All available labs and Radiology studies were reviewed.  I was present for key portions of any procedure(s) performed by the resident/non-physician practitioner and I was immediately available to provide assistance.       At this point I agree with the current assessment done in the Emergency Department.  I have conducted an independent evaluation of this patient a history and physical is as follows:    ED Course     Emergency Department Note- David Skelton Jr. 63 y.o. male MRN: [id number]    Unit/Bed#: Z5HB Encounter: [id number]    David Skelton Jr. is a 63 y.o. male who presents with   Chief Complaint   Patient presents with    Behavior Problem     Per EMS, pt was using methadone and walking into other people's houses Police/EMS brought him for evaluation. Pt denies any ETOH or illegal drug use, denies any rx medications         History of Present Illness   HPI:  David Skelton Jr. is a 63 y.o. male who presents for evaluation of:  And appropriate behavior.  Patient is reportedly on methadone therapy.  Patient was reportedly walking into their peoples houses this evening.  Patient denies alcohol or illicit drug use today.  He denies any ingestions to harm himself.  He denies any pain, falls, head trauma, headache, nausea, and vomiting.    Review of Systems   Constitutional:  Negative for fatigue and fever.   HENT:  Negative for congestion and sore throat.    Respiratory:  Negative for cough and shortness of breath.    Cardiovascular:  Negative for chest pain and palpitations.   Gastrointestinal:  Negative for abdominal pain and nausea.   Genitourinary:  Negative for flank pain and frequency.   Neurological:   Negative for light-headedness and headaches.   Psychiatric/Behavioral:  Negative for dysphoric mood and hallucinations.    All other systems reviewed and are negative.      Historical Information   Past Medical History:   Diagnosis Date    Anxiety     Depression     Diabetes mellitus (HCC)     Drug use     Hemorrhoids, internal 2019    Hypertension      Past Surgical History:   Procedure Laterality Date    FEMUR FRACTURE SURGERY Right     GASTRIC BYPASS  2011    Managed by: Braden Luna (General Surgery)    HERNIA REPAIR  2013    Incisional hernia repair Managed by: Braden Luna (General Surgery)    TONSILLECTOMY       Social History   Social History     Substance and Sexual Activity   Alcohol Use Never     Social History     Substance and Sexual Activity   Drug Use Yes    Comment: Methadone clinic     Social History     Tobacco Use   Smoking Status Never   Smokeless Tobacco Never     Family History:   Family History   Problem Relation Age of Onset    Diabetes Mother     Hypertension Mother     Hypertension Father     Autoimmune disease Son        Meds/Allergies   PTA meds:   Prior to Admission Medications   Prescriptions Last Dose Informant Patient Reported? Taking?   Caplyta 42 MG CAPS capsule   Yes No   Sig: Take 42 mg by mouth daily at bedtime   Continuous Blood Gluc  (FreeStyle Grace 14 Day [place]) STEVE   No No   Si Device by Device route 3 (three) times a day before meals   Patient not taking: Reported on 3/1/2024   Continuous Blood Gluc  (FreeStyle Grace 2 [place]) STEVE   No No   Si Device by Device route 3 (three) times a day before meals   Patient not taking: Reported on 3/1/2024   DULoxetine (CYMBALTA) 60 mg delayed release capsule   Yes No   Sig: Take 60 mg by mouth in the morning   Patient not taking: Reported on 2023   GLOBAL EASE INJECT PEN NEEDLES 31G X 8 MM MISC  Self Yes No   Sig: USE AS DIRECTED TO INJECT INSULIN DAILY   Patient not taking:  Reported on 3/1/2024   Incontinence Supply Disposable (INCONTINENCE BRIEF MEDIUM) MISC  Self No No   Sig: by Does not apply route as needed (fecal soiling)   Patient not taking: Reported on 3/1/2024   Incontinence Supply Disposable (RA WIPES FLUSHABLE/MOIST) MISC  Self No No   Sig: by Does not apply route as needed (fecal soiling)   Patient not taking: Reported on 3/1/2024   Insulin Pen Needle (B-D UF III MINI PEN NEEDLES) 31G X 5 MM MISC   No No   Sig: Inject 15 Units under the skin daily at bedtime   Patient not taking: Reported on 3/1/2024   Kerendia 10 MG TABS   Yes No   Sig: TAKE 10 MG BY MOUTH EVERY MORNING.   Patient not taking: Reported on 3/1/2024   Misc. Devices (CANE) MISC  Self No No   Sig: by Does not apply route daily Dx:  Frequent falls   Synjardy 12.5-1000 MG TABS   Yes No   Sig: TAKE ONE TABLET BY MOUTH 2 (TWO) TIMES A DAY.   Patient not taking: Reported on 3/1/2024   Trulicity 1.5 MG/0.5ML injection   Yes No   Sig: INJECT 1.5MG SUBCUTANEOUS ONCE EVERY WEEK   Patient not taking: Reported on 3/1/2024   UNKNOWN TO PATIENT  Self Yes No   acetaminophen (TYLENOL) 325 mg tablet   No No   Sig: Take 2 tablets (650 mg total) by mouth every 4 (four) hours as needed for mild pain   aluminum-magnesium hydroxide 200-200 MG/5ML suspension   No No   Sig: Take 15 mL by mouth every 6 (six) hours as needed for heartburn   bacitracin-polymyxin b (POLYSPORIN) ointment   No No   Sig: Apply topically 2 (two) times a day   Patient not taking: Reported on 12/8/2023   benztropine (COGENTIN) 2 mg tablet   Yes No   Patient not taking: Reported on 3/1/2024   clonazePAM (KlonoPIN) 1 mg tablet   Yes No   Sig: Take 1 mg by mouth 2 (two) times a day as needed   divalproex sodium (DEPAKOTE ER) 500 mg 24 hr tablet   No No   Sig: Take 1 tablet (500 mg total) by mouth every 12 (twelve) hours   docusate sodium (COLACE) 100 mg capsule   No No   Sig: Take 1 capsule (100 mg total) by mouth 2 (two) times a day for 5 days   doxepin  (SINEquan) 100 mg capsule   Yes No   Sig: Take 100 mg by mouth daily at bedtime   Patient not taking: Reported on 2023   ergocalciferol (ERGOCALCIFEROL) 1.25 MG (65728 UT) capsule   Yes No   Sig: Take 50,000 Units by mouth   fluticasone (FLONASE) 50 mcg/act nasal spray   No No   Si spray into each nostril daily   gabapentin (NEURONTIN) 600 MG tablet   No No   Sig: Take 1 tablet (600 mg total) by mouth 2 (two) times a day   metFORMIN (GLUCOPHAGE) 850 mg tablet   No No   Sig: Take 1 tablet (850 mg total) by mouth daily with breakfast   Patient not taking: Reported on 3/1/2024   methadone (DOLOPHINE) 5 mg tablet   Yes No   Sig: Take 165 mg by mouth daily   methocarbamol (ROBAXIN) 500 mg tablet   No No   Sig: Take 1 tablet (500 mg total) by mouth every 8 (eight) hours as needed for muscle spasms   ondansetron (ZOFRAN-ODT) 4 mg disintegrating tablet   No No   Sig: Take 1 tablet (4 mg total) by mouth every 6 (six) hours as needed for vomiting   zolpidem (AMBIEN) 10 mg tablet   Yes No   Sig: Take 10 mg by mouth daily at bedtime   Patient not taking: Reported on 2023      Facility-Administered Medications: None     Allergies   Allergen Reactions    Aspirin Shortness Of Breath    Penicillins        Objective   First Vitals:   Blood Pressure: 140/89 (24)  Pulse: 96 (24)  Temperature: 98.1 °F (36.7 °C) (24)  Temp Source: Oral (24)  Respirations: 15 (24)  SpO2: 97 % (24)    Current Vitals:   Blood Pressure: 140/89 (24)  Pulse: 96 (24)  Temperature: 98.1 °F (36.7 °C) (24)  Temp Source: Oral (24)  Respirations: 15 (24)  SpO2: 97 % (24)    No intake or output data in the 24 hours ending 243    Invasive Devices       None                   Physical Exam  Vitals and nursing note reviewed.   Constitutional:       General: He is not in acute distress.     Appearance: Normal  "appearance. He is well-developed.   HENT:      Head: Normocephalic and atraumatic.      Right Ear: External ear normal.      Left Ear: External ear normal.      Nose: Nose normal.      Mouth/Throat:      Pharynx: No oropharyngeal exudate.   Eyes:      Conjunctiva/sclera: Conjunctivae normal.      Pupils: Pupils are equal, round, and reactive to light.   Cardiovascular:      Rate and Rhythm: Normal rate and regular rhythm.   Pulmonary:      Effort: Pulmonary effort is normal. No respiratory distress.   Abdominal:      General: Abdomen is flat. There is no distension.      Palpations: Abdomen is soft.   Musculoskeletal:         General: No deformity. Normal range of motion.      Cervical back: Normal range of motion and neck supple.   Skin:     General: Skin is warm and dry.      Capillary Refill: Capillary refill takes less than 2 seconds.   Neurological:      Mental Status: He is alert. He is disoriented.      Motor: No weakness.      Coordination: Coordination normal.      Comments: Oriented to name and place   Psychiatric:         Cognition and Memory: Memory is impaired. He exhibits impaired recent memory and impaired remote memory.      Comments: Decision-making capacity, thought content, and judgment are impaired secondary to acute encephalopathy           Medical Decision Makin.  Acute episode of confusion tonight: Glucometer check rule out hypoglycemia; ECG rule out arrhythmia    Recent Results (from the past 36 hour(s))   Fingerstick Glucose (POCT)    Collection Time: 24 10:32 PM   Result Value Ref Range    POC Glucose 141 (H) 65 - 140 mg/dl     No orders to display         Portions of the record may have been created with voice recognition software. Occasional wrong word or \"sound a like\" substitutions may have occurred due to the inherent limitations of voice recognition software.  Read the chart carefully and recognize, using context, where substitutions have occurred.        Critical Care " Time  Procedures

## 2024-04-01 NOTE — ED NOTES
Pt refusing blood work and unwilling to cooperate with nurses at this time     Romina Jenkins, MICHAEL  04/01/24 0052

## 2024-04-01 NOTE — ED PROVIDER NOTES
History  Chief Complaint   Patient presents with    Behavior Problem     Per EMS, pt was using methadone and walking into other people's houses Police/EMS brought him for evaluation. Pt denies any ETOH or illegal drug use, denies any rx medications     HPI    64 y/o M presenting for concern of behavior issue. Pt reportedly found by police wandering into other people's houses. Pt does not recall doing such a thing. States he lives in [place], lives alone with his dog. He has no focal complaints. On interview pt appears drowsy and comes in and out of conversation and sleep. Pt denies etoh, drug use. Takes methadone.     Prior to Admission Medications   Prescriptions Last Dose Informant Patient Reported? Taking?   Caplyta 42 MG CAPS capsule   Yes No   Sig: Take 42 mg by mouth daily at bedtime   Continuous Blood Gluc  (FreeStyle Grace 14 Day [place]) STEVE   No No   Si Device by Device route 3 (three) times a day before meals   Patient not taking: Reported on 3/1/2024   Continuous Blood Gluc  (FreeStyle Grace 2 [place]) STEVE   No No   Si Device by Device route 3 (three) times a day before meals   Patient not taking: Reported on 3/1/2024   DULoxetine (CYMBALTA) 60 mg delayed release capsule   Yes No   Sig: Take 60 mg by mouth in the morning   Patient not taking: Reported on 2023   GLOBAL EASE INJECT PEN NEEDLES 31G X 8 MM MISC  Self Yes No   Sig: USE AS DIRECTED TO INJECT INSULIN DAILY   Patient not taking: Reported on 3/1/2024   Incontinence Supply Disposable (INCONTINENCE BRIEF MEDIUM) MISC  Self No No   Sig: by Does not apply route as needed (fecal soiling)   Patient not taking: Reported on 3/1/2024   Incontinence Supply Disposable (RA WIPES FLUSHABLE/MOIST) MISC  Self No No   Sig: by Does not apply route as needed (fecal soiling)   Patient not taking: Reported on 3/1/2024   Insulin Pen Needle (B-D UF III MINI PEN NEEDLES) 31G X 5 MM MISC   No No   Sig: Inject 15 Units under the skin daily  at bedtime   Patient not taking: Reported on 3/1/2024   Kerendia 10 MG TABS   Yes No   Sig: TAKE 10 MG BY MOUTH EVERY MORNING.   Patient not taking: Reported on 3/1/2024   Misc. Devices (CANE) MISC  Self No No   Sig: by Does not apply route daily Dx:  Frequent falls   Synjardy 12.5-1000 MG TABS   Yes No   Sig: TAKE ONE TABLET BY MOUTH 2 (TWO) TIMES A DAY.   Patient not taking: Reported on 3/1/2024   Trulicity 1.5 MG/0.5ML injection   Yes No   Sig: INJECT 1.5MG SUBCUTANEOUS ONCE EVERY WEEK   Patient not taking: Reported on 3/1/2024   UNKNOWN TO PATIENT  Self Yes No   acetaminophen (TYLENOL) 325 mg tablet   No No   Sig: Take 2 tablets (650 mg total) by mouth every 4 (four) hours as needed for mild pain   aluminum-magnesium hydroxide 200-200 MG/5ML suspension   No No   Sig: Take 15 mL by mouth every 6 (six) hours as needed for heartburn   bacitracin-polymyxin b (POLYSPORIN) ointment   No No   Sig: Apply topically 2 (two) times a day   Patient not taking: Reported on 2023   benztropine (COGENTIN) 2 mg tablet   Yes No   Patient not taking: Reported on 3/1/2024   clonazePAM (KlonoPIN) 1 mg tablet   Yes No   Sig: Take 1 mg by mouth 2 (two) times a day as needed   divalproex sodium (DEPAKOTE ER) 500 mg 24 hr tablet   No No   Sig: Take 1 tablet (500 mg total) by mouth every 12 (twelve) hours   docusate sodium (COLACE) 100 mg capsule   No No   Sig: Take 1 capsule (100 mg total) by mouth 2 (two) times a day for 5 days   doxepin (SINEquan) 100 mg capsule   Yes No   Sig: Take 100 mg by mouth daily at bedtime   Patient not taking: Reported on 2023   ergocalciferol (ERGOCALCIFEROL) 1.25 MG (19939 UT) capsule   Yes No   Sig: Take 50,000 Units by mouth   fluticasone (FLONASE) 50 mcg/act nasal spray   No No   Si spray into each nostril daily   gabapentin (NEURONTIN) 600 MG tablet   No No   Sig: Take 1 tablet (600 mg total) by mouth 2 (two) times a day   metFORMIN (GLUCOPHAGE) 850 mg tablet   No No   Sig: Take 1 tablet  (850 mg total) by mouth daily with breakfast   Patient not taking: Reported on 3/1/2024   methadone (DOLOPHINE) 5 mg tablet   Yes No   Sig: Take 165 mg by mouth daily   methocarbamol (ROBAXIN) 500 mg tablet   No No   Sig: Take 1 tablet (500 mg total) by mouth every 8 (eight) hours as needed for muscle spasms   ondansetron (ZOFRAN-ODT) 4 mg disintegrating tablet   No No   Sig: Take 1 tablet (4 mg total) by mouth every 6 (six) hours as needed for vomiting   zolpidem (AMBIEN) 10 mg tablet   Yes No   Sig: Take 10 mg by mouth daily at bedtime   Patient not taking: Reported on 12/8/2023      Facility-Administered Medications: None       Past Medical History:   Diagnosis Date    Anxiety     Depression     Diabetes mellitus (HCC)     Drug use     Hemorrhoids, internal 9/23/2019    Hypertension        Past Surgical History:   Procedure Laterality Date    FEMUR FRACTURE SURGERY Right     GASTRIC BYPASS  11/08/2011    Managed by: Barden Luna (General Surgery)    HERNIA REPAIR  02/13/2013    Incisional hernia repair Managed by: Braden Luna (General Surgery)    TONSILLECTOMY  2010       Family History   Problem Relation Age of Onset    Diabetes Mother     Hypertension Mother     Hypertension Father     Autoimmune disease Son      I have reviewed and agree with the history as documented.    E-Cigarette/Vaping    E-Cigarette Use Never User      E-Cigarette/Vaping Substances    Nicotine No     THC No     CBD No     Flavoring No     Other No     Unknown No      Social History     Tobacco Use    Smoking status: Never    Smokeless tobacco: Never   Vaping Use    Vaping status: Never Used   Substance Use Topics    Alcohol use: Never    Drug use: Yes     Comment: Methadone clinic        Review of Systems   Constitutional:  Negative for chills and fever.   HENT:  Negative for ear pain and sore throat.    Eyes:  Negative for pain and visual disturbance.   Respiratory:  Negative for cough and shortness of breath.    Cardiovascular:   Negative for chest pain and palpitations.   Gastrointestinal:  Negative for abdominal pain and vomiting.   Genitourinary:  Negative for dysuria and hematuria.   Musculoskeletal:  Negative for arthralgias and back pain.   Skin:  Negative for color change and rash.   Neurological:  Negative for seizures and syncope.   Psychiatric/Behavioral:  Positive for behavioral problems.    All other systems reviewed and are negative.      Physical Exam  ED Triage Vitals [03/31/24 2222]   Temperature Pulse Respirations Blood Pressure SpO2   98.1 °F (36.7 °C) 96 15 140/89 97 %      Temp Source Heart Rate Source Patient Position - Orthostatic VS BP Location FiO2 (%)   Oral Monitor Lying Left arm --      Pain Score       No Pain             Orthostatic Vital Signs  Vitals:    03/31/24 2222   BP: 140/89   Pulse: 96   Patient Position - Orthostatic VS: Lying       Physical Exam  Vitals and nursing note reviewed.   Constitutional:       General: He is not in acute distress.     Appearance: He is well-developed. He is not toxic-appearing.   HENT:      Head: Normocephalic and atraumatic.      Right Ear: External ear normal.      Left Ear: External ear normal.      Nose: Nose normal.      Mouth/Throat:      Pharynx: Oropharynx is clear. No oropharyngeal exudate or posterior oropharyngeal erythema.   Eyes:      Extraocular Movements: Extraocular movements intact.      Conjunctiva/sclera: Conjunctivae normal.      Pupils: Pupils are equal, round, and reactive to light.   Cardiovascular:      Rate and Rhythm: Normal rate and regular rhythm.      Pulses: Normal pulses.      Heart sounds: Normal heart sounds. No murmur heard.     No friction rub. No gallop.   Pulmonary:      Effort: Pulmonary effort is normal. No respiratory distress.      Breath sounds: Normal breath sounds. No wheezing, rhonchi or rales.   Abdominal:      General: Abdomen is flat.      Palpations: Abdomen is soft.      Tenderness: There is no abdominal tenderness. There is  no guarding or rebound.   Musculoskeletal:         General: Normal range of motion.      Cervical back: Normal range of motion. No rigidity.      Right lower leg: No edema.      Left lower leg: No edema.   Skin:     General: Skin is warm and dry.      Capillary Refill: Capillary refill takes less than 2 seconds.   Neurological:      General: No focal deficit present.      Mental Status: He is alert.      Comments: Oriented x2, unable to state full address on initial evaluation. Pt has mild dysarthria         ED Medications  Medications - No data to display    Diagnostic Studies  Results Reviewed       Procedure Component Value Units Date/Time    Urine Microscopic [[id number]]  (Abnormal) Collected: 04/01/24 0109    Lab Status: Final result Specimen: Urine, Clean Catch Updated: 04/01/24 0209     RBC, UA 2-4 /hpf      WBC, UA 10-20 /hpf      Epithelial Cells Occasional /hpf      Bacteria, UA Occasional /hpf      MUCUS THREADS Occasional     Hyaline Casts, UA 5-10 /lpf      WBC Clumps Present     Budding Yeast Present    Urine culture [[id number]] Collected: 04/01/24 0109    Lab Status: In process Specimen: Urine, Clean Catch Updated: 04/01/24 0208    UA w Reflex to Microscopic w Reflex to Culture [[id number]]  (Abnormal) Collected: 04/01/24 0109    Lab Status: Final result Specimen: Urine, Clean Catch Updated: 04/01/24 0157     Color, UA Yellow     Clarity, UA Clear     Specific Gravity, UA 1.026     pH, UA 5.5     Leukocytes, UA Moderate     Nitrite, UA Negative     Protein,  (3+) mg/dl      Glucose, UA Negative mg/dl      Ketones, UA Negative mg/dl      Urobilinogen, UA <2.0 mg/dl      Bilirubin, UA Negative     Occult Blood, UA Negative    Comprehensive metabolic panel [[id number]]  (Abnormal) Collected: 04/01/24 0117    Lab Status: Final result Specimen: Blood from Arm, Right Updated: 04/01/24 0148     Sodium 139 mmol/L      Potassium 3.9 mmol/L      Chloride 103 mmol/L      CO2 29 mmol/L      ANION GAP 7 mmol/L       BUN 23 mg/dL      Creatinine 0.96 mg/dL      Glucose 142 mg/dL      Calcium 8.3 mg/dL      AST 23 U/L      ALT 43 U/L      Alkaline Phosphatase 111 U/L      Total Protein 6.6 g/dL      Albumin 3.8 g/dL      Total Bilirubin 0.44 mg/dL      eGFR 84 ml/min/1.73sq m     Narrative:      National Kid[employer] Foundation guidelines for Chronic Kidney Disease (CKD):     Stage 1 with normal or high GFR (GFR > 90 mL/min/1.73 square meters)    Stage 2 Mild CKD (GFR = 60-89 mL/min/1.73 square meters)    Stage 3A Moderate CKD (GFR = 45-59 mL/min/1.73 square meters)    Stage 3B Moderate CKD (GFR = 30-44 mL/min/1.73 square meters)    Stage 4 Severe CKD (GFR = 15-29 mL/min/1.73 square meters)    Stage 5 End Stage CKD (GFR <15 mL/min/1.73 square meters)  Note: GFR calculation is accurate only with a steady state creatinine    Salicylate level [[id number]]  (Normal) Collected: 04/01/24 0117    Lab Status: Final result Specimen: Blood from Arm, Right Updated: 04/01/24 0148     Salicylate Lvl <5 mg/dL     Acetaminophen level-If concentration is detectable, please discuss with medical  on call. [[id number]]  (Abnormal) Collected: 04/01/24 0117    Lab Status: Final result Specimen: Blood from Arm, Right Updated: 04/01/24 0148     Acetaminophen Level <2 ug/mL     TSH, 3rd generation with Free T4 reflex [[id number]]  (Normal) Collected: 04/01/24 0021    Lab Status: Final result Specimen: Blood from Arm, Right Updated: 04/01/24 0103     TSH 3RD GENERATON 0.462 uIU/mL     Ethanol [[id number]]  (Normal) Collected: 04/01/24 0021    Lab Status: Final result Specimen: Blood from Arm, Right Updated: 04/01/24 0051     Ethanol Lvl <10 mg/dL     CBC and differential [[id number]]  (Abnormal) Collected: 04/01/24 0021    Lab Status: Final result Specimen: Blood from Arm, Right Updated: 04/01/24 0030     WBC 8.51 Thousand/uL      RBC 3.81 Million/uL      Hemoglobin 10.8 g/dL      Hematocrit 33.9 %      MCV 89 fL      MCH 28.3 pg      MCHC  31.9 g/dL      RDW 13.5 %      MPV 11.5 fL      Platelets 182 Thousands/uL      nRBC 0 /100 WBCs      Neutrophils Relative 74 %      Immature Grans % 0 %      Lymphocytes Relative 13 %      Monocytes Relative 6 %      Eosinophils Relative 6 %      Basophils Relative 1 %      Neutrophils Absolute 6.35 Thousands/µL      Absolute Immature Grans 0.02 Thousand/uL      Absolute Lymphocytes 1.08 Thousands/µL      Absolute Monocytes 0.51 Thousand/µL      Eosinophils Absolute 0.50 Thousand/µL      Basophils Absolute 0.05 Thousands/µL     Fingerstick Glucose (POCT) [[id number]]  (Abnormal) Collected: 03/31/24 2232    Lab Status: Final result Specimen: Blood Updated: 03/31/24 2233     POC Glucose 141 mg/dl                    No orders to display         Procedures  Procedures      ED Course  ED Course as of 04/01/24 0217   Sun Mar 31, 2024   2234 POC Glucose(!): 141   Mon Apr 01, 2024   0103 Pt seems more oriented and alert at this time. Requesting patients for lab results. Will cancel CT head as pt seems improved    0213 Epithelial Cells: Occasional  No sx of uti, occasional epis and white cells, likely contaminant will not treat, culture in process                                       Medical Decision Making  64 y/o M presenting for behavior problem. VSS. Initial evaluation concerning for possible intoxication. Initial plan for BS check and EKG and monitoring. BS normal, EKG nonischemic, no arrhythmia. During ED visit, pt was seemingly more encephalopathic and decision made for more aggressive w/u including labs, coma panel, ct head. While obtaining labs, pt became upset and states he needs to leave. He does appear more oriented, able to state his address, states needs to be at his house for a lyft to pick him up to go to methadone clinic in [place]. Pts labs are wnl with no acute findings. Pt walks without difficulty. Return precautions given and pt stable for discharge at this time.     Amount and/or Complexity of Data  Reviewed  Labs: ordered. Decision-making details documented in ED Course.          Disposition  Final diagnoses:   Altered mental status     Time reflects when diagnosis was documented in both MDM as applicable and the Disposition within this note       Time User Action Codes Description Comment    4/1/2024  1:47 AM Esteban Sinclair Add [R41.82] Altered mental status           ED Disposition       ED Disposition   Discharge    Condition   Stable    Date/Time   Mon Apr 1, 2024  1:49 AM    Comment   David Skelton Jr. discharge to home/self care.                   Follow-up Information       Follow up With Specialties Details Why Contact Info    Casey Mckeon MD Family Medicine Schedule an appointment as soon as possible for a visit   [address] [place] [address]  [place] [address]  [phone]              Patient's Medications   Discharge Prescriptions    No medications on file     No discharge procedures on file.    PDMP Review         Value Time User    PDMP Reviewed  Yes 9/14/2023  4:34 PM Hong Nelson PA-C             ED Provider  Attending physically available and evaluated David Skelton Jr.. I managed the patient along with the ED Attending.    Electronically Signed by           Esteban Sinlcair MD  04/01/24 0217

## 2024-04-03 LAB — BACTERIA UR CULT: NORMAL

## 2024-04-07 ENCOUNTER — HOSPITAL ENCOUNTER (EMERGENCY)
Facility: HOSPITAL | Age: 64
Discharge: HOME | End: 2024-04-07
Attending: EMERGENCY MEDICINE
Payer: COMMERCIAL

## 2024-04-07 VITALS
DIASTOLIC BLOOD PRESSURE: 83 MMHG | RESPIRATION RATE: 18 BRPM | TEMPERATURE: 97.9 F | SYSTOLIC BLOOD PRESSURE: 179 MMHG | HEART RATE: 72 BPM | OXYGEN SATURATION: 98 %

## 2024-04-07 DIAGNOSIS — Z76.0 MEDICATION REFILL: Primary | ICD-10-CM

## 2024-04-07 PROCEDURE — 99284 EMERGENCY DEPT VISIT MOD MDM: CPT | Performed by: EMERGENCY MEDICINE

## 2024-04-07 RX ORDER — METHADONE HYDROCHLORIDE 10 MG/1
190 TABLET ORAL ONCE
Status: COMPLETED | OUTPATIENT
Start: 2024-04-07 | End: 2024-04-07

## 2024-04-07 RX ADMIN — METHADONE HYDROCHLORIDE 190 MG: 10 TABLET ORAL at 10:34

## 2024-04-07 NOTE — DISCHARGE INSTRUCTIONS
You have been seen in the emergency department for evaluation of a medication refill. We were able to give you a one time dose for today. Please follow up with your clinic tomorrow and be sure to discuss need for refills in the future with them.    EOMI; PERRL; no drainage or redness

## 2024-04-07 NOTE — ED ATTENDING ATTESTATION
4/7/2024  I, Tish Archer MD, saw and evaluated the patient. I have discussed the patient with the resident/non-physician practitioner and agree with the resident's/non-physician practitioner's findings, Plan of Care, and MDM as documented in the resident's/non-physician practitioner's note, except where noted. All available labs and Radiology studies were reviewed.  I was present for key portions of any procedure(s) performed by the resident/non-physician practitioner and I was immediately available to provide assistance.       At this point I agree with the current assessment done in the Emergency Department.  I have conducted an independent evaluation of this patient a history and physical is as follows:  Patient here because he does not have enough methadone to get him to tomorrow.  Patient states that he had a GI illness and was seen over a week ago.  Patient had an episode of vomiting where he vomited his medicine earlier this week.  He moved up his doses, but now is 1 dose short.  Patient has an appointment for tomorrow.  He takes 190 mg of methadone.  On exam the patient is anxious.  He is appropriate.  He is hypertensive.  He is not tachycardic.  He is not actively vomiting and has no ongoing diarrhea.  Otherwise benign exam.  Impression: Missed use of methadone.  Patient was seen in the emergency department for vomiting and diarrhea, had an episode of vomiting, presents with all empty bottles.  I believe the patient likely did in fact vomit his methadone, and discussed with the patient that I will give him a dose of his methadone today, but that he should not generally come to the emergency department for methadone, but rather follow-up with his prescribing practitioner  ED Course         Critical Care Time  Procedures

## 2024-04-07 NOTE — ED PROVIDER NOTES
History  Chief Complaint   Patient presents with    Medication Refill     Patient threw up methadone and needs something to hold him over until the morning      Patient is a 63-year-old male, past medical history significant for opioid use disorder, in remission on methadone for over 20 years presenting for evaluation of a medication refill.  Patient states that on Wednesday he started with vomiting and diarrhea. Patient states that he vomited up his methadone and had to take an early dose. For this reason, patient ran out and does not have a methadone dose for today. Patient requesting a one time dose of medication until he is able to get his refills tomorrow at the clinic. Patient still with vomiting and diarrhea but does not want to be evaluated for this as it is improving, declines need for medications at this time.           Prior to Admission Medications   Prescriptions Last Dose Informant Patient Reported? Taking?   Caplyta 42 MG CAPS capsule   Yes No   Sig: Take 42 mg by mouth daily at bedtime   Continuous Blood Gluc  (FreeStyle Grace 14 Day [place]) STEVE   No No   Si Device by Device route 3 (three) times a day before meals   Patient not taking: Reported on 3/1/2024   Continuous Blood Gluc  (FreeStyle Grace 2 [place]) STEVE   No No   Si Device by Device route 3 (three) times a day before meals   Patient not taking: Reported on 3/1/2024   DULoxetine (CYMBALTA) 60 mg delayed release capsule   Yes No   Sig: Take 60 mg by mouth in the morning   Patient not taking: Reported on 2023   GLOBAL EASE INJECT PEN NEEDLES 31G X 8 MM MISC  Self Yes No   Sig: USE AS DIRECTED TO INJECT INSULIN DAILY   Patient not taking: Reported on 3/1/2024   Incontinence Supply Disposable (INCONTINENCE BRIEF MEDIUM) MISC  Self No No   Sig: by Does not apply route as needed (fecal soiling)   Patient not taking: Reported on 3/1/2024   Incontinence Supply Disposable (RA WIPES FLUSHABLE/MOIST) MISC  Self No No    Sig: by Does not apply route as needed (fecal soiling)   Patient not taking: Reported on 3/1/2024   Insulin Pen Needle (B-D UF III MINI PEN NEEDLES) 31G X 5 MM MISC   No No   Sig: Inject 15 Units under the skin daily at bedtime   Patient not taking: Reported on 3/1/2024   Kerendia 10 MG TABS   Yes No   Sig: TAKE 10 MG BY MOUTH EVERY MORNING.   Patient not taking: Reported on 3/1/2024   Misc. Devices (CANE) MISC  Self No No   Sig: by Does not apply route daily Dx:  Frequent falls   Synjardy 12.5-1000 MG TABS   Yes No   Sig: TAKE ONE TABLET BY MOUTH 2 (TWO) TIMES A DAY.   Patient not taking: Reported on 3/1/2024   Trulicity 1.5 MG/0.5ML injection   Yes No   Sig: INJECT 1.5MG SUBCUTANEOUS ONCE EVERY WEEK   Patient not taking: Reported on 3/1/2024   UNKNOWN TO PATIENT  Self Yes No   acetaminophen (TYLENOL) 325 mg tablet   No No   Sig: Take 2 tablets (650 mg total) by mouth every 4 (four) hours as needed for mild pain   aluminum-magnesium hydroxide 200-200 MG/5ML suspension   No No   Sig: Take 15 mL by mouth every 6 (six) hours as needed for heartburn   bacitracin-polymyxin b (POLYSPORIN) ointment   No No   Sig: Apply topically 2 (two) times a day   Patient not taking: Reported on 12/8/2023   benztropine (COGENTIN) 2 mg tablet   Yes No   Patient not taking: Reported on 3/1/2024   clonazePAM (KlonoPIN) 1 mg tablet   Yes No   Sig: Take 1 mg by mouth 2 (two) times a day as needed   divalproex sodium (DEPAKOTE ER) 500 mg 24 hr tablet   No No   Sig: Take 1 tablet (500 mg total) by mouth every 12 (twelve) hours   docusate sodium (COLACE) 100 mg capsule   No No   Sig: Take 1 capsule (100 mg total) by mouth 2 (two) times a day for 5 days   doxepin (SINEquan) 100 mg capsule   Yes No   Sig: Take 100 mg by mouth daily at bedtime   Patient not taking: Reported on 12/8/2023   ergocalciferol (ERGOCALCIFEROL) 1.25 MG (01613 UT) capsule   Yes No   Sig: Take 50,000 Units by mouth   fluticasone (FLONASE) 50 mcg/act nasal spray   No No    Si spray into each nostril daily   gabapentin (NEURONTIN) 600 MG tablet   No No   Sig: Take 1 tablet (600 mg total) by mouth 2 (two) times a day   metFORMIN (GLUCOPHAGE) 850 mg tablet   No No   Sig: Take 1 tablet (850 mg total) by mouth daily with breakfast   Patient not taking: Reported on 3/1/2024   methadone (DOLOPHINE) 5 mg tablet   Yes No   Sig: Take 165 mg by mouth daily   methocarbamol (ROBAXIN) 500 mg tablet   No No   Sig: Take 1 tablet (500 mg total) by mouth every 8 (eight) hours as needed for muscle spasms   ondansetron (ZOFRAN-ODT) 4 mg disintegrating tablet   No No   Sig: Take 1 tablet (4 mg total) by mouth every 6 (six) hours as needed for vomiting   zolpidem (AMBIEN) 10 mg tablet   Yes No   Sig: Take 10 mg by mouth daily at bedtime   Patient not taking: Reported on 2023      Facility-Administered Medications: None       Past Medical History:   Diagnosis Date    Anxiety     Depression     Diabetes mellitus (HCC)     Drug use     Hemorrhoids, internal 2019    Hypertension        Past Surgical History:   Procedure Laterality Date    FEMUR FRACTURE SURGERY Right     GASTRIC BYPASS  2011    Managed by: Braden Luna (General Surgery)    HERNIA REPAIR  2013    Incisional hernia repair Managed by: Braden Luna (General Surgery)    TONSILLECTOMY  2010       Family History   Problem Relation Age of Onset    Diabetes Mother     Hypertension Mother     Hypertension Father     Autoimmune disease Son      I have reviewed and agree with the history as documented.    E-Cigarette/Vaping    E-Cigarette Use Never User      E-Cigarette/Vaping Substances    Nicotine No     THC No     CBD No     Flavoring No     Other No     Unknown No      Social History     Tobacco Use    Smoking status: Never    Smokeless tobacco: Never   Vaping Use    Vaping status: Never Used   Substance Use Topics    Alcohol use: Never    Drug use: Yes     Comment: Methadone clinic        Review of Systems    Constitutional:  Negative for chills, fatigue and fever.   HENT:  Negative for congestion.    Respiratory:  Negative for cough, chest tightness and shortness of breath.    Cardiovascular:  Negative for chest pain.   Gastrointestinal:  Positive for diarrhea and vomiting.   Genitourinary:  Negative for difficulty urinating.   Skin:  Negative for color change.   Neurological:  Negative for dizziness, syncope, weakness, numbness and headaches.       Physical Exam  ED Triage Vitals   Temperature Pulse Respirations Blood Pressure SpO2   04/07/24 0938 04/07/24 0938 04/07/24 0938 04/07/24 0939 04/07/24 0938   97.9 °F (36.6 °C) 72 18 (!) 179/83 98 %      Temp Source Heart Rate Source Patient Position - Orthostatic VS BP Location FiO2 (%)   04/07/24 0938 04/07/24 0938 04/07/24 0938 04/07/24 0938 --   Temporal Monitor Sitting Left arm       Pain Score       04/07/24 0938       10 - Worst Possible Pain             Orthostatic Vital Signs  Vitals:    04/07/24 0938 04/07/24 0939   BP:  (!) 179/83   Pulse: 72    Patient Position - Orthostatic VS: Sitting        Physical Exam  Vitals and nursing note reviewed.   Constitutional:       General: He is not in acute distress.     Appearance: Normal appearance. He is not ill-appearing or toxic-appearing.   HENT:      Head: Normocephalic and atraumatic.      Nose: No congestion.      Mouth/Throat:      Mouth: Mucous membranes are moist.   Eyes:      General: No scleral icterus.     Extraocular Movements: Extraocular movements intact.      Comments: 2mm equal, reactive   Cardiovascular:      Rate and Rhythm: Normal rate and regular rhythm.      Pulses: Normal pulses.      Heart sounds: Normal heart sounds. No murmur heard.  Pulmonary:      Effort: Pulmonary effort is normal. No respiratory distress.      Breath sounds: Normal breath sounds. No wheezing or rhonchi.   Abdominal:      General: Abdomen is flat. There is no distension.      Palpations: Abdomen is soft.      Tenderness: There  is no abdominal tenderness. There is no guarding or rebound.   Musculoskeletal:         General: No swelling or tenderness. Normal range of motion.      Cervical back: Normal range of motion.      Right lower leg: No edema.      Left lower leg: No edema.   Skin:     Capillary Refill: Capillary refill takes less than 2 seconds.   Neurological:      General: No focal deficit present.      Mental Status: He is alert.      Cranial Nerves: No cranial nerve deficit.      Sensory: No sensory deficit.      Motor: No weakness.      Gait: Gait normal.   Psychiatric:         Mood and Affect: Mood normal.         Behavior: Behavior normal.         ED Medications  Medications   methadone (DOLOPHINE) tablet 190 mg (190 mg Oral Given 4/7/24 1034)       Diagnostic Studies  Results Reviewed       None                   No orders to display         Procedures  Procedures      ED Course  ED Course as of 04/07/24 1958   Sun Apr 07, 2024   1000 Patient seen and evaluated by me  Patient here requesting a dose of methadone to hold him over till tomorrow morning as he has run out of his methadone due to vomiting 1 upper earlier this week.  Patient overall well-appearing, does not have otherwise complaints that he is wishing to be evaluated or treated for today.  Will plan for one-time dose of patient's home dose of methadone and discharge.   1024 Patient discharged at this time, given return precautions and follow-up information.  Patient reports understanding, all questions answered.                             SBIRT 20yo+      Flowsheet Row Most Recent Value   Initial Alcohol Screen: US AUDIT-C     1. How often do you have a drink containing alcohol? 0 Filed at: 04/07/2024 1020   2. How many drinks containing alcohol do you have on a typical day you are drinking?  0 Filed at: 04/07/2024 1020   3a. Male UNDER 65: How often do you have five or more drinks on one occasion? 0 Filed at: 04/07/2024 1020   3b. FEMALE Any Age, or MALE 65+: How  often do you have 4 or more drinks on one occassion? 0 Filed at: 04/07/2024 1020   Audit-C Score 0 Filed at: 04/07/2024 1020   BEATRIZ: How many times in the past year have you...    Used an illegal drug or used a prescription medication for non-medical reasons? Never Filed at: 04/07/2024 1020                  Medical Decision Making  Please see ED course above regarding details of the MDM    Risk  Prescription drug management.          Disposition  Final diagnoses:   Medication refill     Time reflects when diagnosis was documented in both MDM as applicable and the Disposition within this note       Time User Action Codes Description Comment    4/7/2024 10:23 AM Argentina Ureña Add [Z76.0] Medication refill           ED Disposition       ED Disposition   Discharge    Condition   Stable    Date/Time   Sun Apr 7, 2024 1023    Comment   David Skelton Jr. discharge to home/self care.                   Follow-up Information    None         Discharge Medication List as of 4/7/2024 10:24 AM        CONTINUE these medications which have NOT CHANGED    Details   acetaminophen (TYLENOL) 325 mg tablet Take 2 tablets (650 mg total) by mouth every 4 (four) hours as needed for mild pain, Starting Fri 8/11/2023, No Print      aluminum-magnesium hydroxide 200-200 MG/5ML suspension Take 15 mL by mouth every 6 (six) hours as needed for heartburn, Starting Thu 3/28/2024, Normal      bacitracin-polymyxin b (POLYSPORIN) ointment Apply topically 2 (two) times a day, Starting Wed 11/29/2023, Normal      benztropine (COGENTIN) 2 mg tablet Historical Med      Caplyta 42 MG CAPS capsule Take 42 mg by mouth daily at bedtime, Starting Tue 10/17/2023, Historical Med      clonazePAM (KlonoPIN) 1 mg tablet Take 1 mg by mouth 2 (two) times a day as needed, Starting Tue 8/22/2023, Historical Med      !! Continuous Blood Gluc  (FreeStyle Grace 14 Day [place]) STEVE 1 Device by Device route 3 (three) times a day before meals, Starting Tue  1/31/2023, Normal      !! Continuous Blood Gluc  (FreeStyle Grace 2 [place]) STEVE 1 Device by Device route 3 (three) times a day before meals, Normal      divalproex sodium (DEPAKOTE ER) 500 mg 24 hr tablet Take 1 tablet (500 mg total) by mouth every 12 (twelve) hours, Starting Mon 1/23/2023, Until Wed 2/22/2023, Normal      docusate sodium (COLACE) 100 mg capsule Take 1 capsule (100 mg total) by mouth 2 (two) times a day for 5 days, Starting Fri 8/11/2023, Until Wed 8/16/2023, Normal      doxepin (SINEquan) 100 mg capsule Take 100 mg by mouth daily at bedtime, Starting Tue 11/14/2023, Historical Med      DULoxetine (CYMBALTA) 60 mg delayed release capsule Take 60 mg by mouth in the morning, Starting Tue 8/22/2023, Historical Med      ergocalciferol (ERGOCALCIFEROL) 1.25 MG (14221 UT) capsule Take 50,000 Units by mouth, Starting Thu 12/5/2019, Until Thu 2/27/2020, Historical Med      fluticasone (FLONASE) 50 mcg/act nasal spray 1 spray into each nostril daily, Starting Sun 2/11/2024, Normal      gabapentin (NEURONTIN) 600 MG tablet Take 1 tablet (600 mg total) by mouth 2 (two) times a day, Starting Fri 1/19/2024, Normal      !! GLOBAL EASE INJECT PEN NEEDLES 31G X 8 MM MISC USE AS DIRECTED TO INJECT INSULIN DAILY, Historical Med      !! Incontinence Supply Disposable (INCONTINENCE BRIEF MEDIUM) MISC by Does not apply route as needed (fecal soiling), Starting Wed 12/4/2019, Print      !! Incontinence Supply Disposable (RA WIPES FLUSHABLE/MOIST) MISC by Does not apply route as needed (fecal soiling), Starting Wed 12/4/2019, Print      !! Insulin Pen Needle (B-D UF III MINI PEN NEEDLES) 31G X 5 MM MISC Inject 15 Units under the skin daily at bedtime, Normal      Kerendia 10 MG TABS TAKE 10 MG BY MOUTH EVERY MORNING., Historical Med      metFORMIN (GLUCOPHAGE) 850 mg tablet Take 1 tablet (850 mg total) by mouth daily with breakfast, Starting Mon 1/23/2023, Normal      methadone (DOLOPHINE) 5 mg tablet Take 165 mg  by mouth daily, Historical Med      methocarbamol (ROBAXIN) 500 mg tablet Take 1 tablet (500 mg total) by mouth every 8 (eight) hours as needed for muscle spasms, Starting Wed 11/29/2023, Normal      Misc. Devices (CANE) MISC by Does not apply route daily Dx:  Frequent falls, Starting Mon 7/29/2019, Print      ondansetron (ZOFRAN-ODT) 4 mg disintegrating tablet Take 1 tablet (4 mg total) by mouth every 6 (six) hours as needed for vomiting, Starting Thu 3/28/2024, Normal      Synjardy 12.5-1000 MG TABS TAKE ONE TABLET BY MOUTH 2 (TWO) TIMES A DAY., Historical Med      Trulicity 1.5 MG/0.5ML injection INJECT 1.5MG SUBCUTANEOUS ONCE EVERY WEEK, Historical Med      UNKNOWN TO PATIENT Historical Med      zolpidem (AMBIEN) 10 mg tablet Take 10 mg by mouth daily at bedtime, Starting Tue 10/17/2023, Historical Med       !! - Potential duplicate medications found. Please discuss with provider.        No discharge procedures on file.    PDMP Review         Value Time User    PDMP Reviewed  Yes 9/14/2023  4:34 PM Hong Nelson PA-C             ED Provider  Attending physically available and evaluated David Oneilmargaret Osorio. I managed the patient along with the ED Attending.    Electronically Signed by           Argentina Ureña MD  04/07/24 1958

## 2024-04-18 DIAGNOSIS — J30.1 SEASONAL ALLERGIC RHINITIS DUE TO POLLEN: ICD-10-CM

## 2024-04-18 RX ORDER — FLUTICASONE PROPIONATE 50 MCG
1 SPRAY, SUSPENSION (ML) NASAL DAILY
Qty: 16 G | Refills: 0 | Status: SHIPPED | OUTPATIENT
Start: 2024-04-18

## 2024-04-20 ENCOUNTER — NURSE TRIAGE (OUTPATIENT)
Dept: OTHER | Facility: OTHER | Age: 64
End: 2024-04-20

## 2024-04-20 NOTE — TELEPHONE ENCOUNTER
Pt called requesting an earlier appointment related to having cancer.  Pt denies any acute symptoms.  Is requesting the office to reach out to him.

## 2024-04-25 ENCOUNTER — TELEPHONE (OUTPATIENT)
Age: 64
End: 2024-04-25

## 2024-04-25 ENCOUNTER — TELEPHONE (OUTPATIENT)
Dept: FAMILY MEDICINE CLINIC | Facility: CLINIC | Age: 64
End: 2024-04-25

## 2024-04-25 ENCOUNTER — OFFICE VISIT (OUTPATIENT)
Dept: FAMILY MEDICINE CLINIC | Facility: CLINIC | Age: 64
End: 2024-04-25
Payer: COMMERCIAL

## 2024-04-25 VITALS
RESPIRATION RATE: 16 BRPM | TEMPERATURE: 98.1 F | HEIGHT: 68 IN | BODY MASS INDEX: 26.49 KG/M2 | SYSTOLIC BLOOD PRESSURE: 142 MMHG | WEIGHT: 174.8 LBS | OXYGEN SATURATION: 97 % | HEART RATE: 79 BPM | DIASTOLIC BLOOD PRESSURE: 68 MMHG

## 2024-04-25 DIAGNOSIS — I10 BENIGN ESSENTIAL HYPERTENSION: ICD-10-CM

## 2024-04-25 DIAGNOSIS — F41.9 ANXIETY: Primary | ICD-10-CM

## 2024-04-25 PROCEDURE — 99214 OFFICE O/P EST MOD 30 MIN: CPT | Performed by: FAMILY MEDICINE

## 2024-04-25 RX ORDER — ALPRAZOLAM 0.5 MG/1
0.5 TABLET ORAL DAILY PRN
Qty: 15 TABLET | Refills: 0 | Status: SHIPPED | OUTPATIENT
Start: 2024-04-25 | End: 2024-04-25 | Stop reason: CLARIF

## 2024-04-25 RX ORDER — ALPRAZOLAM 0.5 MG/1
0.5 TABLET ORAL DAILY PRN
Qty: 15 TABLET | Refills: 0 | Status: SHIPPED | OUTPATIENT
Start: 2024-04-25

## 2024-04-25 RX ORDER — NALOXONE HYDROCHLORIDE 4 MG/.1ML
SPRAY NASAL
Qty: 1 EACH | Refills: 2 | Status: SHIPPED | OUTPATIENT
Start: 2024-04-25 | End: 2025-04-25

## 2024-04-25 RX ORDER — PALIPERIDONE 6 MG/1
6 TABLET, EXTENDED RELEASE ORAL
COMMUNITY
Start: 2024-03-27

## 2024-04-25 NOTE — PROGRESS NOTES
Name: David Skelton Jr.      : 1960      MRN: [id number]  Encounter Provider: Casey Mckeon MD  Encounter Date: 2024   Encounter department: [facility]    Assessment & Plan     1. Anxiety  -     ALPRAZolam (XANAX) 0.5 mg tablet; Take 1 tablet (0.5 mg total) by mouth daily as needed for anxiety  -     Ambulatory referral to Psych Services; Future  -     naloxone (NARCAN) 4 mg/0.1 mL nasal spray; Administer 1 spray into a nostril. If no response after 2-3 minutes, give another dose in the other nostril using a new spray.    2. Benign essential hypertension      Regarding his anxiety,  discussed with patient.  Patient counseled.  Patient told that this be the first and only time I give him some Xanax until he gets to his psychiatrist and then they decide what he should be on for his current symptoms.  Patient understood and agreed.  Patient also advised that if he wants his sister to be able to talk to his psychiatrist on his behalf that he should talk to his psychiatrist and fill out what ever they need him to fill out so she can then have access to his records.  And that is if he wants her to.  Patient understood and will do.  Denies SI HI.   Regarding his hypertension patient's blood pressure is better today.  Advise a low-salt low condiment diet.  Hydrate well.  Continue his current therapy for his blood pressure.  RTO for his annual physical as scheduled with me on  already.           Subjective      63-year-old male here for an evaluation for his anxiety.  Patient was seen  for acute mental status changes secondary to him stopping medication that was given to him by psych.  Patient was seen in emergency room once before and once after that visit.  Patient asserts that he used to take Xanax 2 mg 3 times a day to help him with acute anxiety.  He is only on Klonopin once or twice a day.  Patient does see psychiatry.  Patient denies SI HI.  Patient denies  any smoking alcohol or drugs.      Review of Systems   Constitutional:  Negative for chills, fatigue and fever.   Eyes:  Negative for visual disturbance.   Respiratory:  Negative for cough and shortness of breath.    Cardiovascular:  Negative for chest pain and palpitations.   Gastrointestinal:  Negative for abdominal pain, nausea and vomiting.   Genitourinary:  Negative for dysuria.   Skin:  Negative for rash.   Neurological:  Negative for dizziness and headaches.   Psychiatric/Behavioral:  Negative for dysphoric mood, self-injury and suicidal ideas. The patient is nervous/anxious.        Current Outpatient Medications on File Prior to Visit   Medication Sig   • clonazePAM (KlonoPIN) 1 mg tablet Take 1 mg by mouth 2 (two) times a day as needed   • Continuous Blood Gluc  (FreeStyle Grace 14 Day [place]) STEVE 1 Device by Device route 3 (three) times a day before meals   • Continuous Blood Gluc  (FreeStyle Grace 2 [place]) STEVE 1 Device by Device route 3 (three) times a day before meals   • fluticasone (FLONASE) 50 mcg/act nasal spray instill 1 spray into each nostril once daily   • gabapentin (NEURONTIN) 600 MG tablet Take 1 tablet (600 mg total) by mouth 2 (two) times a day   • GLOBAL EASE INJECT PEN NEEDLES 31G X 8 MM MISC    • Incontinence Supply Disposable (INCONTINENCE BRIEF MEDIUM) MISC by Does not apply route as needed (fecal soiling)   • Incontinence Supply Disposable (RA WIPES FLUSHABLE/MOIST) MISC by Does not apply route as needed (fecal soiling)   • Insulin Pen Needle (B-D UF III MINI PEN NEEDLES) 31G X 5 MM MISC Inject 15 Units under the skin daily at bedtime   • metFORMIN (GLUCOPHAGE) 850 mg tablet Take 1 tablet (850 mg total) by mouth daily with breakfast   • methadone (DOLOPHINE) 5 mg tablet Take 165 mg by mouth daily   • Misc. Devices (CANE) MISC by Does not apply route daily Dx:  Frequent falls   • ondansetron (ZOFRAN-ODT) 4 mg disintegrating tablet Take 1 tablet (4 mg total) by mouth  every 6 (six) hours as needed for vomiting   • paliperidone (INVEGA) 6 MG 24 hr tablet Take 6 mg by mouth daily at bedtime   • acetaminophen (TYLENOL) 325 mg tablet Take 2 tablets (650 mg total) by mouth every 4 (four) hours as needed for mild pain (Patient not taking: Reported on 4/25/2024)   • aluminum-magnesium hydroxide 200-200 MG/5ML suspension Take 15 mL by mouth every 6 (six) hours as needed for heartburn (Patient not taking: Reported on 4/25/2024)   • bacitracin-polymyxin b (POLYSPORIN) ointment Apply topically 2 (two) times a day (Patient not taking: Reported on 12/8/2023)   • benztropine (COGENTIN) 2 mg tablet  (Patient not taking: Reported on 3/1/2024)   • Caplyta 42 MG CAPS capsule Take 42 mg by mouth daily at bedtime (Patient not taking: Reported on 4/25/2024)   • divalproex sodium (DEPAKOTE ER) 500 mg 24 hr tablet Take 1 tablet (500 mg total) by mouth every 12 (twelve) hours   • docusate sodium (COLACE) 100 mg capsule Take 1 capsule (100 mg total) by mouth 2 (two) times a day for 5 days   • doxepin (SINEquan) 100 mg capsule Take 100 mg by mouth daily at bedtime (Patient not taking: Reported on 12/8/2023)   • DULoxetine (CYMBALTA) 60 mg delayed release capsule Take 60 mg by mouth in the morning (Patient not taking: Reported on 12/8/2023)   • ergocalciferol (ERGOCALCIFEROL) 1.25 MG (15978 UT) capsule Take 50,000 Units by mouth   • Kerendia 10 MG TABS TAKE 10 MG BY MOUTH EVERY MORNING. (Patient not taking: Reported on 3/1/2024)   • methocarbamol (ROBAXIN) 500 mg tablet Take 1 tablet (500 mg total) by mouth every 8 (eight) hours as needed for muscle spasms (Patient not taking: Reported on 4/25/2024)   • Synjardy 12.5-1000 MG TABS TAKE ONE TABLET BY MOUTH 2 (TWO) TIMES A DAY. (Patient not taking: Reported on 4/25/2024)   • Trulicity 1.5 MG/0.5ML injection INJECT 1.5MG SUBCUTANEOUS ONCE EVERY WEEK (Patient not taking: Reported on 3/1/2024)   • UNKNOWN TO PATIENT    • zolpidem (AMBIEN) 10 mg tablet Take 10 mg  "by mouth daily at bedtime (Patient not taking: Reported on 12/8/2023)   • [DISCONTINUED] bacitracin-neomycin-polymyxin b-hydrocortisone 1 % ointment Apply topically 2 (two) times a day       Objective     /68 (BP Location: Left arm, Patient Position: Sitting, Cuff Size: Adult)   Pulse 79   Temp 98.1 °F (36.7 °C) (Temporal)   Resp 16   Ht 5' 8\" (1.727 m)   Wt 79.3 kg (174 lb 12.8 oz)   SpO2 97%   BMI 26.58 kg/m²     Physical Exam  Vitals reviewed.   Constitutional:       General: He is not in acute distress.     Appearance: Normal appearance. He is not ill-appearing or toxic-appearing.   HENT:      Head: Normocephalic and atraumatic.      Mouth/Throat:      Mouth: Mucous membranes are moist.   Eyes:      Extraocular Movements: Extraocular movements intact.      Conjunctiva/sclera: Conjunctivae normal.   Cardiovascular:      Rate and Rhythm: Normal rate and regular rhythm.   Pulmonary:      Effort: Pulmonary effort is normal.      Breath sounds: Normal breath sounds.   Neurological:      General: No focal deficit present.      Mental Status: He is alert and oriented to person, place, and time.   Psychiatric:         Mood and Affect: Mood normal.         Behavior: Behavior normal.      Comments: His anxiety discussed with patient.  Patient counseled.  Patient told that this be the first and only time I give him some Xanax until he gets to his psychiatrist and then they decide what he should be on for his current symptoms.  Patient understood and agreed.  Patient also advised that if he wants his sister to be able to talk to his psychiatrist on his behalf that he should talk to his psychiatrist and fill out what ever they need him to fill out so she can then have access to his records.  And that is if he wants her to.  Patient understood and will do.  Denies SI HI.       Casey Mckeon MD    "

## 2024-04-25 NOTE — TELEPHONE ENCOUNTER
Patient is on the phone, he was just seen at the practice and a prescription is supposed to send in a prescription, he wants it sent to   [id number] - [place], [place] - [address] , he believes he gave the office the wrong pharmacy. Please review.

## 2024-04-25 NOTE — TELEPHONE ENCOUNTER
Patient called and said he mistaking gave us the wrong pharmacy, he is asking if his prescriptions can be sent to [facility].

## 2024-04-26 ENCOUNTER — TELEPHONE (OUTPATIENT)
Dept: PSYCHIATRY | Facility: CLINIC | Age: 64
End: 2024-04-26
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
## 2024-04-28 ENCOUNTER — HOSPITAL ENCOUNTER (EMERGENCY)
Facility: HOSPITAL | Age: 64
Discharge: HOME | End: 2024-04-28
Attending: EMERGENCY MEDICINE
Payer: COMMERCIAL

## 2024-04-28 ENCOUNTER — NURSE TRIAGE (OUTPATIENT)
Dept: OTHER | Facility: OTHER | Age: 64
End: 2024-04-28

## 2024-04-28 VITALS
TEMPERATURE: 97.8 F | OXYGEN SATURATION: 99 % | HEART RATE: 75 BPM | DIASTOLIC BLOOD PRESSURE: 106 MMHG | SYSTOLIC BLOOD PRESSURE: 193 MMHG | RESPIRATION RATE: 20 BRPM

## 2024-04-28 DIAGNOSIS — Z76.5 DRUG-SEEKING BEHAVIOR: ICD-10-CM

## 2024-04-28 DIAGNOSIS — F41.9 ANXIETY: Primary | ICD-10-CM

## 2024-04-28 PROCEDURE — 99283 EMERGENCY DEPT VISIT LOW MDM: CPT

## 2024-04-28 PROCEDURE — 99284 EMERGENCY DEPT VISIT MOD MDM: CPT | Performed by: EMERGENCY MEDICINE

## 2024-04-28 NOTE — ED PROVIDER NOTES
History  Chief Complaint   Patient presents with    Anxiety     Patient was on alprazolam for 12 years, 2mg, 3 years ago lowered dose to 1mg because it was too strong. Now on 0.5mg and states that it is not working. Provider instructed patient to take additional 0.5mg tablet if needed. Patient started this yesterday and was unable to sleep all night and feels that anxiety is not manageable.     63-year-old male with significant PMH including HTN, DM, anxiety, and depression who presents to the ED via ambulance for anxiousness.  Patient states that on 2024, patient's Xanax dosage was decreased from 1 mg to 0.5 mg by his family doctor.  Patient states yesterday morning he took his 0.5 mg of Xanax and then later needed to take a as needed of 0.5 mg around 3 AM because he was anxious.  Patient currently at bedside does not exhibit any signs of anxiousness with a heart rate of 86 bpm.  He states that last night he was having abdominal cramping, sweaty, and worried about feeding his dog.  Patient also states other factors in his life that make him anxious such as getting an injection in his eye and family not coming to see him. Denies chest pain, SOB, cough, abdominal pain, n/v/d, fever, chills, dizziness, lightheadedness, HA, dysuria, hematuria, hematochezia, or melena.           Prior to Admission Medications   Prescriptions Last Dose Informant Patient Reported? Taking?   ALPRAZolam (XANAX) 0.5 mg tablet   No No   Sig: Take 1 tablet (0.5 mg total) by mouth daily as needed for anxiety   Caplyta 42 MG CAPS capsule  Self Yes No   Sig: Take 42 mg by mouth daily at bedtime   Patient not taking: Reported on 2024   Continuous Blood Gluc  (FreeStyle Grace 14 Day [place]) STEVE  Self No No   Si Device by Device route 3 (three) times a day before meals   Continuous Blood Gluc  (FreeStyle Grace 2 [place]) STEVE  Self No No   Si Device by Device route 3 (three) times a day before meals   DULoxetine  (CYMBALTA) 60 mg delayed release capsule  Self Yes No   Sig: Take 60 mg by mouth in the morning   Patient not taking: Reported on 12/8/2023   GLOBAL EASE INJECT PEN NEEDLES 31G X 8 MM MISC  Self Yes No   Incontinence Supply Disposable (INCONTINENCE BRIEF MEDIUM) MISC  Self No No   Sig: by Does not apply route as needed (fecal soiling)   Incontinence Supply Disposable (RA WIPES FLUSHABLE/MOIST) MISC  Self No No   Sig: by Does not apply route as needed (fecal soiling)   Insulin Pen Needle (B-D UF III MINI PEN NEEDLES) 31G X 5 MM MISC  Self No No   Sig: Inject 15 Units under the skin daily at bedtime   Kerendia 10 MG TABS  Self Yes No   Sig: TAKE 10 MG BY MOUTH EVERY MORNING.   Patient not taking: Reported on 3/1/2024   Misc. Devices (CANE) MISC  Self No No   Sig: by Does not apply route daily Dx:  Frequent falls   Synjardy 12.5-1000 MG TABS  Self Yes No   Sig: TAKE ONE TABLET BY MOUTH 2 (TWO) TIMES A DAY.   Patient not taking: Reported on 4/25/2024   Trulicity 1.5 MG/0.5ML injection  Self Yes No   Sig: INJECT 1.5MG SUBCUTANEOUS ONCE EVERY WEEK   Patient not taking: Reported on 3/1/2024   UNKNOWN TO PATIENT  Self Yes No   acetaminophen (TYLENOL) 325 mg tablet  Self No No   Sig: Take 2 tablets (650 mg total) by mouth every 4 (four) hours as needed for mild pain   Patient not taking: Reported on 4/25/2024   aluminum-magnesium hydroxide 200-200 MG/5ML suspension  Self No No   Sig: Take 15 mL by mouth every 6 (six) hours as needed for heartburn   Patient not taking: Reported on 4/25/2024   bacitracin-polymyxin b (POLYSPORIN) ointment  Self No No   Sig: Apply topically 2 (two) times a day   Patient not taking: Reported on 12/8/2023   benztropine (COGENTIN) 2 mg tablet  Self Yes No   Patient not taking: Reported on 3/1/2024   clonazePAM (KlonoPIN) 1 mg tablet  Self Yes No   Sig: Take 1 mg by mouth 2 (two) times a day as needed   divalproex sodium (DEPAKOTE ER) 500 mg 24 hr tablet   No No   Sig: Take 1 tablet (500 mg total)  by mouth every 12 (twelve) hours   docusate sodium (COLACE) 100 mg capsule   No No   Sig: Take 1 capsule (100 mg total) by mouth 2 (two) times a day for 5 days   doxepin (SINEquan) 100 mg capsule  Self Yes No   Sig: Take 100 mg by mouth daily at bedtime   Patient not taking: Reported on 12/8/2023   ergocalciferol (ERGOCALCIFEROL) 1.25 MG (13847 UT) capsule   Yes No   Sig: Take 50,000 Units by mouth   fluticasone (FLONASE) 50 mcg/act nasal spray  Self No No   Sig: instill 1 spray into each nostril once daily   gabapentin (NEURONTIN) 600 MG tablet  Self No No   Sig: Take 1 tablet (600 mg total) by mouth 2 (two) times a day   metFORMIN (GLUCOPHAGE) 850 mg tablet  Self No No   Sig: Take 1 tablet (850 mg total) by mouth daily with breakfast   methadone (DOLOPHINE) 5 mg tablet  Self Yes No   Sig: Take 165 mg by mouth daily   methocarbamol (ROBAXIN) 500 mg tablet  Self No No   Sig: Take 1 tablet (500 mg total) by mouth every 8 (eight) hours as needed for muscle spasms   Patient not taking: Reported on 4/25/2024   naloxone (NARCAN) 4 mg/0.1 mL nasal spray   No No   Sig: Administer 1 spray into a nostril. If no response after 2-3 minutes, give another dose in the other nostril using a new spray.   ondansetron (ZOFRAN-ODT) 4 mg disintegrating tablet  Self No No   Sig: Take 1 tablet (4 mg total) by mouth every 6 (six) hours as needed for vomiting   paliperidone (INVEGA) 6 MG 24 hr tablet  Self Yes No   Sig: Take 6 mg by mouth daily at bedtime   zolpidem (AMBIEN) 10 mg tablet  Self Yes No   Sig: Take 10 mg by mouth daily at bedtime   Patient not taking: Reported on 12/8/2023      Facility-Administered Medications: None       Past Medical History:   Diagnosis Date    Anxiety     Depression     Diabetes mellitus (HCC)     Drug use     Hemorrhoids, internal 9/23/2019    Hypertension        Past Surgical History:   Procedure Laterality Date    FEMUR FRACTURE SURGERY Right     GASTRIC BYPASS  11/08/2011    Managed by: Braden  Jeremy (General Surgery)    HERNIA REPAIR  02/13/2013    Incisional hernia repair Managed by: Braden Luna (General Surgery)    TONSILLECTOMY  2010       Family History   Problem Relation Age of Onset    Diabetes Mother     Hypertension Mother     Hypertension Father     Autoimmune disease Son      I have reviewed and agree with the history as documented.    E-Cigarette/Vaping    E-Cigarette Use Never User      E-Cigarette/Vaping Substances    Nicotine No     THC No     CBD No     Flavoring No     Other No     Unknown No      Social History     Tobacco Use    Smoking status: Never    Smokeless tobacco: Never   Vaping Use    Vaping status: Never Used   Substance Use Topics    Alcohol use: Never    Drug use: Yes     Comment: Methadone clinic        Review of Systems   Constitutional:  Negative for appetite change, chills, diaphoresis, fatigue and fever.   HENT: Negative.  Negative for congestion, ear discharge, ear pain, postnasal drip, rhinorrhea, sore throat and trouble swallowing.    Eyes: Negative.  Negative for pain and visual disturbance.   Respiratory: Negative.  Negative for cough and shortness of breath.    Cardiovascular: Negative.  Negative for chest pain.   Gastrointestinal: Negative.  Negative for abdominal pain, blood in stool, constipation, diarrhea, nausea and vomiting.   Genitourinary: Negative.  Negative for decreased urine volume, difficulty urinating, dysuria, flank pain and hematuria.   Musculoskeletal: Negative.  Negative for arthralgias and back pain.   Skin: Negative.  Negative for color change and rash.   Neurological: Negative.  Negative for dizziness, syncope, weakness, light-headedness and headaches.   Psychiatric/Behavioral:  The patient is nervous/anxious.        Physical Exam  ED Triage Vitals [04/28/24 1436]   Temperature Pulse Respirations Blood Pressure SpO2   97.8 °F (36.6 °C) 75 20 (!) 193/106 99 %      Temp Source Heart Rate Source Patient Position - Orthostatic VS BP Location  FiO2 (%)   Oral Monitor Lying Left arm --      Pain Score       --             Orthostatic Vital Signs  Vitals:    04/28/24 1436   BP: (!) 193/106   Pulse: 75   Patient Position - Orthostatic VS: Lying       Physical Exam  Constitutional:       Appearance: Normal appearance. He is normal weight.   HENT:      Head: Normocephalic and atraumatic.      Right Ear: External ear normal.      Left Ear: External ear normal.      Nose: Nose normal.      Mouth/Throat:      Pharynx: Oropharynx is clear.   Eyes:      Extraocular Movements: Extraocular movements intact.      Conjunctiva/sclera: Conjunctivae normal.      Pupils: Pupils are equal, round, and reactive to light.   Cardiovascular:      Rate and Rhythm: Normal rate and regular rhythm.      Pulses: Normal pulses.      Heart sounds: Normal heart sounds.      Comments: RRR with +S1 and S2, no murmurs appreciated on exam. Peripheral pulses intact.    Pulmonary:      Effort: Pulmonary effort is normal. No respiratory distress.      Breath sounds: Normal breath sounds. No wheezing, rhonchi or rales.      Comments: CTABL with no abnormal lung sounds such as wheezes or rales appreciated on exam.     Abdominal:      General: Abdomen is flat. Bowel sounds are normal. There is no distension.      Palpations: Abdomen is soft.      Tenderness: There is no abdominal tenderness.      Comments: Soft, non tender, normo-active bowel sounds. Without rigidity, guarding, or distension.     Musculoskeletal:         General: Normal range of motion.      Cervical back: Normal range of motion.      Right lower leg: No edema.      Left lower leg: No edema.   Skin:     General: Skin is warm and dry.   Neurological:      General: No focal deficit present.      Mental Status: He is alert and oriented to person, place, and time. Mental status is at baseline.      Comments: CN grossly intact on visualization. No focal neurologic deficits noted on exam.  5/5 strength in all extremities.  Neurovascularly intact with normal sensation and motor function.           ED Medications  Medications - No data to display    Diagnostic Studies  Results Reviewed       None                   No orders to display         Procedures  Procedures      ED Course                             SBIRT 22yo+      Flowsheet Row Most Recent Value   Initial Alcohol Screen: US AUDIT-C     1. How often do you have a drink containing alcohol? 0 Filed at: 04/28/2024 1438   2. How many drinks containing alcohol do you have on a typical day you are drinking?  0 Filed at: 04/28/2024 1438   3a. Male UNDER 65: How often do you have five or more drinks on one occasion? 0 Filed at: 04/28/2024 1438   3b. FEMALE Any Age, or MALE 65+: How often do you have 4 or more drinks on one occassion? 0 Filed at: 04/28/2024 1438   Audit-C Score 0 Filed at: 04/28/2024 1438   BEATRIZ: How many times in the past year have you...    Used an illegal drug or used a prescription medication for non-medical reasons? Never Filed at: 04/28/2024 1438                  Medical Decision Making  63-year-old male with significant PMH including HTN, DM, anxiety, and depression who presented to the ED via ambulance for anxiousness.  Upon examination at bedside, patient was noted to be in no acute distress at this time was requesting a dose of his Xanax medication.  Patient was explained and reassured that he does not exhibit any symptoms of anxiousness at this time and to follow-up outpatient with his PCP.  Patient was planned for discharge and advised to follow-up outpatient with his PCP and to take his Xanax as prescribed.  Patient was also instructed return to the ED if his symptoms worsen including but not limited to lightheadedness or dizziness, headache, blurry vision, chest pain, shortness of breath, or changes in his behavior.          Disposition  Final diagnoses:   Anxiety   Drug-seeking behavior     Time reflects when diagnosis was documented in both MDM as  applicable and the Disposition within this note       Time User Action Codes Description Comment    4/28/2024  2:48 PM Sandor Kirby [F41.9] Anxiety     4/28/2024  2:51 PM Tremaine Ambriz [Z76.5] Drug-seeking behavior           ED Disposition       ED Disposition   Discharge    Condition   Stable    Date/Time   Sun Apr 28, 2024 1446    Comment   David Skelton Jr. discharge to home/self care.                   Follow-up Information       Follow up With Specialties Details Why Contact Info Additional Information    Casey Mckeon MD Family Medicine Call  As needed [address] [place] [address]   [address]  [facility] [address] [phone]       [facility] Emergency Department Emergency Medicine Go to  If symptoms worsen [address] [place] [id number] [phone] [facility] Emergency Department, [address] [place] [address], [id number]            Patient's Medications   Discharge Prescriptions    No medications on file     No discharge procedures on file.    PDMP Review         Value Time User    PDMP Reviewed  Yes 4/25/2024 12:06 PM Casey Mckeon MD             ED Provider  Attending physically available and evaluated David Skelton Jr.. I managed the patient along with the ED Attending.    Electronically Signed by           Sandor Kirby MD  04/28/24 6515

## 2024-04-28 NOTE — TELEPHONE ENCOUNTER
"Reason for Disposition   [1] Symptoms of anxiety or panic attack AND [2] is a chronic symptom (recurrent or ongoing AND present > 4 weeks)    Answer Assessment - Initial Assessment Questions  1. CONCERN: \"What happened that made you call today?\"      Prescribed xanax is not helping    2. ANXIETY SYMPTOM SCREENING: \"Can you describe how you have been feeling?\"  (e.g., tense, restless, panicky, anxious, keyed up, trouble sleeping, trouble concentrating)      Stress, diarrhea, vomiting - reports this is his normal anxiety symptoms  Current headache 8/10    3. ONSET: \"How long have you been feeling this way?\"      Dx 10 years, chronic issue reports 1.0mg xanax has helped in past    4. RECURRENT: \"Have you felt this way before?\"  If Yes, ask: \"What happened that time?\" \"What helped these feelings go away in the past?\"       Yes    5. RISK OF HARM - SUICIDAL IDEATION:  \"Do you ever have thoughts of hurting or killing yourself?\"  (e.g., yes, no, no but preoccupation with thoughts about death)    - INTENT:  \"Do you have thoughts of hurting or killing yourself right NOW?\" (e.g., yes, no, N/A)    - PLAN: \"Do you have a specific plan for how you would do this?\" (e.g., gun, knife, overdose, no plan, N/A)      N/A    6. RISK OF HARM - HOMICIDAL IDEATION:  \"Do you ever have thoughts of hurting or killing someone else?\"  (e.g., yes, no, no but preoccupation with thoughts about death)    - INTENT:  \"Do you have thoughts of hurting or killing someone right NOW?\" (e.g., yes, no, N/A)    - PLAN: \"Do you have a specific plan for how you would do this?\" (e.g., gun, knife, no plan, N/A)       N/A    7. FUNCTIONAL IMPAIRMENT: \"How have things been going for you overall? Have you had more difficulty than usual doing your normal daily activities?\"  (e.g., better, same, worse; self-care, school, work, interactions)      Worse    8. SUPPORT: \"Who is with you now?\" \"Who do you live with?\" \"Do you have family or friends who you can talk to?\"    " "   By self    9. THERAPIST: \"Do you have a counselor or therapist? Name?\"      Yes    10. STRESSORS: \"Has there been any new stress or recent changes in your life?\"        No new    11. CAFFEINE USE: \"Do you drink caffeinated beverages, and how much each day?\" (e.g., coffee, tea, eddie)        Denies    12. ALCOHOL USE OR SUBSTANCE USE (DRUG USE): \"Do you drink alcohol or use any illegal drugs?\"        Denies    13. OTHER SYMPTOMS: \"Do you have any other physical symptoms right now?\" (e.g., chest pain, palpitations, difficulty breathing, fever)        Nausea, vomiting    14. PREGNANCY: \"Is there any chance you are pregnant?\" \"When was your last menstrual period?\"        N/A    Protocols used: Anxiety and Panic Attack-ADULT-    On call provider recommends urgent care if pt unable to tolerate symptoms.  Pt concerned for being blind and having limited transportation means.  Pt prefers to seek care with PCP \"because that is my doctor now\" instead of going to urgent care.    Please follow up with pt in AM.  "

## 2024-04-28 NOTE — ED ATTENDING ATTESTATION
4/28/2024  ITremaine DO, saw and evaluated the patient. I have discussed the patient with the resident/non-physician practitioner and agree with the resident's/non-physician practitioner's findings, Plan of Care, and MDM as documented in the resident's/non-physician practitioner's note, except where noted. All available labs and Radiology studies were reviewed.  I was present for key portions of any procedure(s) performed by the resident/non-physician practitioner and I was immediately available to provide assistance.       At this point I agree with the current assessment done in the Emergency Department.  I have conducted an independent evaluation of this patient a history and physical is as follows: 63-year-old male, past medical history per resident chart, presenting to the emergency department for request for increase of his benzodiazepines.  Patient notes increased anxiety is due to multiple psychosocial stressors.  Denies SI, HI, hallucinations, copious of other current drugs.    Hypertension, vital signs otherwise stable.  Patient resting comfortably.  Asleep when I entered the room.  Speaking without any evidence of active anxious affect. Alert and oriented x 3.  Speaking coherently.  Ambulatory without any limitations or ataxia, imbalance or clinically significant signs of benzodiazepine intoxication.    63-year-old male presenting to the emergency department with request for increase of his benzodiazepines.  Extensive bedside conversation with patient in regards to further titration of these controlled substances from the emergency department and that it is more appropriate for the writing physician to further manage.  Patient verbalized understanding.  Car ride service to be organized for the patient in order for him to return home.     ED Course         Critical Care Time  Procedures

## 2024-04-28 NOTE — TELEPHONE ENCOUNTER
"Regarding: anxiety / scared of dying  ----- Message from Francy Johnson sent at 4/28/2024 12:37 PM EDT -----  \" I was given Xanax 0.5 on Friday, but I am still feeling very anxious. I was told that I was going to be put in shelter for 30 days, what am I supposed to do if I am blind?\"    Patient is very worried and would like to talk to someone. He is very scared of dying    "

## 2024-04-29 ENCOUNTER — HOSPITAL ENCOUNTER (EMERGENCY)
Facility: HOSPITAL | Age: 64
Discharge: HOME | End: 2024-04-29
Attending: EMERGENCY MEDICINE
Payer: COMMERCIAL

## 2024-04-29 VITALS
HEART RATE: 80 BPM | DIASTOLIC BLOOD PRESSURE: 79 MMHG | TEMPERATURE: 98.3 F | RESPIRATION RATE: 12 BRPM | OXYGEN SATURATION: 100 % | SYSTOLIC BLOOD PRESSURE: 147 MMHG

## 2024-04-29 DIAGNOSIS — T50.901A ACCIDENTAL DRUG OVERDOSE, INITIAL ENCOUNTER: Primary | ICD-10-CM

## 2024-04-29 LAB
ATRIAL RATE: 85 BPM
P AXIS: 62 DEGREES
QRS AXIS: 72 DEGREES
QRSD INTERVAL: 96 MS
QT INTERVAL: 354 MS
QTC INTERVAL: 421 MS
T WAVE AXIS: 72 DEGREES
VENTRICULAR RATE: 85 BPM

## 2024-04-29 PROCEDURE — 93010 ELECTROCARDIOGRAM REPORT: CPT | Performed by: INTERNAL MEDICINE

## 2024-04-29 PROCEDURE — 99283 EMERGENCY DEPT VISIT LOW MDM: CPT | Performed by: EMERGENCY MEDICINE

## 2024-04-29 PROCEDURE — 93005 ELECTROCARDIOGRAM TRACING: CPT

## 2024-04-29 PROCEDURE — 99284 EMERGENCY DEPT VISIT MOD MDM: CPT

## 2024-04-29 RX ORDER — NALOXONE HYDROCHLORIDE 1 MG/ML
1 INJECTION PARENTERAL ONCE
Status: COMPLETED | OUTPATIENT
Start: 2024-04-29 | End: 2024-04-29

## 2024-04-29 NOTE — ED ATTENDING ATTESTATION
4/29/2024  I, Jose Alejandro Gonzalez DO, saw and evaluated the patient. I have discussed the patient with the resident/non-physician practitioner and agree with the resident's/non-physician practitioner's findings, Plan of Care, and MDM as documented in the resident's/non-physician practitioner's note, except where noted. All available labs and Radiology studies were reviewed.  I was present for key portions of any procedure(s) performed by the resident/non-physician practitioner and I was immediately available to provide assistance.       At this point I agree with the current assessment done in the Emergency Department.  I have conducted an independent evaluation of this patient a history and physical is as follows:    Patient is a 63-year-old male with a history of previous opiate abuse currently on methadone, hyperlipidemia, GERD, hypertension, brought in by EMS.  EMS indicates they were called because a neighbor heard a loud thud, found the patient minimally responsive, his pupils were pinpoint, they gave a single dose of 0.4 mg Narcan, and the patient woke up.  Patient says he feels fine, remembers, to the hospital, denies any illicit drug use, denies taking any extra doses of his methadone.  He says he gets his week supply from a clinic and goes there once a week.  He right now is no pain, denies headache, no chest pain, shortness of breath, denies fevers, denies chills, denies numbness, denies tingling, denies any pain anywhere, denies visual changes.  Says the only medications he takes are prescription methadone and prescription clonazepam.    General:  Patient is well-appearing  Head:  Atraumatic  Eyes:  Conjunctiva pink, Extraocular muscle intact, no periorbital ecchymosis, PERRL  ENT:  Mucous membranes are moist, no dental malocclusion, no craniofacial instability, no Fisher signs  Neck:  No midline tenderness or step-offs or deformities  Cardiac:  S1-S2, without murmurs, no chest wall tenderness  Lungs:   Clear to auscultation bilaterally  Abdomen:  Soft, nontender, normal bowel sounds, no CVA tenderness, no tympany, no rigidity, no guarding  Extremities:  No bony tenderness to the bilateral bilateral humeral heads, humerus, elbows, radius, ulna, hands, hips, femurs, knees, tibia, fibula, feet. No pain with passive range of motion at the bilateral shoulders, elbows, wrists, hips, knees, or ankles.  Back: No midline thoracic, lumbar, sacral tenderness, deformities, or step-offs.  Neurologic:  Awake, fluent speech, normal comprehension, AAOx3, No deficit on finger to nose testing, no pronator drift, cranial nerves II through XII are intact, no facial droop, no slurred speech, normal sensation, strength 5/5 in b/l upper & lower extremities.  Skin:  Pink warm and dry  Psychiatric:  Alert, pleasant, cooperative      ED Course  ED Course as of 04/29/24 1323   Mon Apr 29, 2024   1235 ECG performed at 0922 hrs., interpreted by me, sinus rhythm, rate of 85, no acute ischemic or infarctive changes, no ST segment elevation or depression, no acute change from March 28, 2024     Patient reassessed multiple times in the emergency department over 3 hours, no evidence of resedation.  At this point it seems that the patient may have had a opiate overdose.  He has no evidence of life-threatening dysrhythmia.  He has remained asymptomatic, has no headache, no signs of trauma, normal neuroexam and I do not feel requires neuroimaging.  Given his prolonged observation without evidence of resedation, I do not believe he requires admission and is stable for discharge with outpatient follow-up.      MEDICAL DECISION MAKING CODING    Patient presents with acute new problem with:  Threat to life or bodily function    Chronic conditions affecting care: As per HPI    COLLECTION AND INTERPRETATION OF DATA  Additional history obtained from: EMS  I reviewed prior external notes, including April 25, 2024 family medicine office visit    I ordered  each unique test  Tests reviewed personally by me:  ECG: See my ED course    Tests considered but not ordered: See above    RISK  All of the patient's current prescription medications should be continued.    Treatment:  Consideration of admission: See above        Critical Care Time  Procedures

## 2024-05-02 ENCOUNTER — TELEPHONE (OUTPATIENT)
Dept: HEMATOLOGY ONCOLOGY | Facility: CLINIC | Age: 64
End: 2024-05-02

## 2024-05-02 NOTE — TELEPHONE ENCOUNTER
Appointment Change  Cancel, Reschedule, Change to Virtual      Who are you speaking with? Patient   If it is not the patient, is the caller listed on the communication consent form? N/A   Which provider is the appointment scheduled with? Dr. Ramey   When was the original appointment scheduled?    Please list date and time                     05/22/2024 @ 10AM    At which location is the appointment scheduled to take place? [place]   Was the appointment rescheduled?     Was the appointment changed from an in person visit to a virtual visit?    If so, please list the details of the change. Yes, 06/06/2024 @ 1:20PM with Dr. Ramey in [place]    What is the reason for the appointment change? Provider

## 2024-05-02 NOTE — ED PROVIDER NOTES
History  Chief Complaint   Patient presents with    Overdose - Accidental     C/o of being heard by neighbors making loud sounds like he was falling over, found by EMS lethargic, narcan given and pinpoint pupils.      HPI  Patient is a 63 y.o. male with PMHx polysubstance use currently on methadone, HLD, GERD, HTN who presents to the ED via EMS for evaluation of overdose.  Per EMS, patient's neighbors called 911 after neighbor was making loud noise.  Upon EMS arrival, patient was found minimally responsive and was given a single dose of 0.4 mg Narcan with improvement in responsiveness.  Upon arrival to the ED patient states he feels improved, denies any illicit drug use, states he takes his normal dose of methadone.  Patient denies any IV drug use, use of fentanyl or heroin.  States he is otherwise feeling well and has no physical complaints or concerns.    Prior to Admission Medications   Prescriptions Last Dose Informant Patient Reported? Taking?   ALPRAZolam (XANAX) 0.5 mg tablet   No No   Sig: Take 1 tablet (0.5 mg total) by mouth daily as needed for anxiety   Caplyta 42 MG CAPS capsule  Self Yes No   Sig: Take 42 mg by mouth daily at bedtime   Patient not taking: Reported on 2024   Continuous Blood Gluc  (FreeStyle Grace 14 Day [place]) STEVE  Self No No   Si Device by Device route 3 (three) times a day before meals   Continuous Blood Gluc  (FreeStyle Grace 2 [place]) STEVE  Self No No   Si Device by Device route 3 (three) times a day before meals   DULoxetine (CYMBALTA) 60 mg delayed release capsule  Self Yes No   Sig: Take 60 mg by mouth in the morning   Patient not taking: Reported on 2023   GLOBAL EASE INJECT PEN NEEDLES 31G X 8 MM MISC  Self Yes No   Incontinence Supply Disposable (INCONTINENCE BRIEF MEDIUM) MISC  Self No No   Sig: by Does not apply route as needed (fecal soiling)   Incontinence Supply Disposable (RA WIPES FLUSHABLE/MOIST) MISC  Self No No   Sig: by Does not  apply route as needed (fecal soiling)   Insulin Pen Needle (B-D UF III MINI PEN NEEDLES) 31G X 5 MM MISC  Self No No   Sig: Inject 15 Units under the skin daily at bedtime   Kerendia 10 MG TABS  Self Yes No   Sig: TAKE 10 MG BY MOUTH EVERY MORNING.   Patient not taking: Reported on 3/1/2024   Misc. Devices (CANE) MISC  Self No No   Sig: by Does not apply route daily Dx:  Frequent falls   Synjardy 12.5-1000 MG TABS  Self Yes No   Sig: TAKE ONE TABLET BY MOUTH 2 (TWO) TIMES A DAY.   Patient not taking: Reported on 4/25/2024   Trulicity 1.5 MG/0.5ML injection  Self Yes No   Sig: INJECT 1.5MG SUBCUTANEOUS ONCE EVERY WEEK   Patient not taking: Reported on 3/1/2024   UNKNOWN TO PATIENT  Self Yes No   acetaminophen (TYLENOL) 325 mg tablet  Self No No   Sig: Take 2 tablets (650 mg total) by mouth every 4 (four) hours as needed for mild pain   Patient not taking: Reported on 4/25/2024   aluminum-magnesium hydroxide 200-200 MG/5ML suspension  Self No No   Sig: Take 15 mL by mouth every 6 (six) hours as needed for heartburn   Patient not taking: Reported on 4/25/2024   bacitracin-polymyxin b (POLYSPORIN) ointment  Self No No   Sig: Apply topically 2 (two) times a day   Patient not taking: Reported on 12/8/2023   benztropine (COGENTIN) 2 mg tablet  Self Yes No   Patient not taking: Reported on 3/1/2024   clonazePAM (KlonoPIN) 1 mg tablet  Self Yes No   Sig: Take 1 mg by mouth 2 (two) times a day as needed   divalproex sodium (DEPAKOTE ER) 500 mg 24 hr tablet   No No   Sig: Take 1 tablet (500 mg total) by mouth every 12 (twelve) hours   docusate sodium (COLACE) 100 mg capsule   No No   Sig: Take 1 capsule (100 mg total) by mouth 2 (two) times a day for 5 days   doxepin (SINEquan) 100 mg capsule  Self Yes No   Sig: Take 100 mg by mouth daily at bedtime   Patient not taking: Reported on 12/8/2023   ergocalciferol (ERGOCALCIFEROL) 1.25 MG (59103 UT) capsule   Yes No   Sig: Take 50,000 Units by mouth   fluticasone (FLONASE) 50  mcg/act nasal spray  Self No No   Sig: instill 1 spray into each nostril once daily   gabapentin (NEURONTIN) 600 MG tablet  Self No No   Sig: Take 1 tablet (600 mg total) by mouth 2 (two) times a day   metFORMIN (GLUCOPHAGE) 850 mg tablet  Self No No   Sig: Take 1 tablet (850 mg total) by mouth daily with breakfast   methadone (DOLOPHINE) 5 mg tablet  Self Yes No   Sig: Take 165 mg by mouth daily   methocarbamol (ROBAXIN) 500 mg tablet  Self No No   Sig: Take 1 tablet (500 mg total) by mouth every 8 (eight) hours as needed for muscle spasms   Patient not taking: Reported on 4/25/2024   naloxone (NARCAN) 4 mg/0.1 mL nasal spray   No No   Sig: Administer 1 spray into a nostril. If no response after 2-3 minutes, give another dose in the other nostril using a new spray.   ondansetron (ZOFRAN-ODT) 4 mg disintegrating tablet  Self No No   Sig: Take 1 tablet (4 mg total) by mouth every 6 (six) hours as needed for vomiting   paliperidone (INVEGA) 6 MG 24 hr tablet  Self Yes No   Sig: Take 6 mg by mouth daily at bedtime   zolpidem (AMBIEN) 10 mg tablet  Self Yes No   Sig: Take 10 mg by mouth daily at bedtime   Patient not taking: Reported on 12/8/2023      Facility-Administered Medications: None       Past Medical History:   Diagnosis Date    Anxiety     Depression     Diabetes mellitus (HCC)     Drug use     Hemorrhoids, internal 9/23/2019    Hypertension        Past Surgical History:   Procedure Laterality Date    FEMUR FRACTURE SURGERY Right     GASTRIC BYPASS  11/08/2011    Managed by: Braden Luna (General Surgery)    HERNIA REPAIR  02/13/2013    Incisional hernia repair Managed by: Braden Luna (General Surgery)    TONSILLECTOMY  2010       Family History   Problem Relation Age of Onset    Diabetes Mother     Hypertension Mother     Hypertension Father     Autoimmune disease Son      I have reviewed and agree with the history as documented.    E-Cigarette/Vaping    E-Cigarette Use Never User       E-Cigarette/Vaping Substances    Nicotine No     THC No     CBD No     Flavoring No     Other No     Unknown No      Social History     Tobacco Use    Smoking status: Never    Smokeless tobacco: Never   Vaping Use    Vaping status: Never Used   Substance Use Topics    Alcohol use: Never    Drug use: Yes     Comment: Methadone clinic        Review of Systems   Constitutional:  Negative for chills and fever.   HENT:  Negative for congestion and sore throat.    Respiratory:  Negative for shortness of breath.    Cardiovascular:  Negative for chest pain.   Gastrointestinal:  Negative for abdominal pain.   Genitourinary:  Negative for dysuria and hematuria.   Musculoskeletal:  Negative for myalgias.   Skin:  Negative for rash.   Neurological:  Negative for dizziness and headaches.   All other systems reviewed and are negative.      Physical Exam  ED Triage Vitals   Temperature Pulse Respirations Blood Pressure SpO2   04/29/24 0920 04/29/24 0920 04/29/24 0920 04/29/24 0920 04/29/24 0920   98.3 °F (36.8 °C) 88 12 (!) 212/116 100 %      Temp Source Heart Rate Source Patient Position - Orthostatic VS BP Location FiO2 (%)   04/29/24 0920 04/29/24 0920 04/29/24 1100 04/29/24 1100 --   Oral Monitor Lying Right arm       Pain Score       --                    Orthostatic Vital Signs  Vitals:    04/29/24 0920 04/29/24 1100 04/29/24 1200   BP: (!) 212/116 156/86 147/79   Pulse: 88 80 80   Patient Position - Orthostatic VS:  Lying Lying       Physical Exam  Vitals and nursing note reviewed.   Constitutional:       Appearance: He is not diaphoretic.      Comments: Somnolent but easily arousable.   HENT:      Head: Normocephalic and atraumatic.      Right Ear: Tympanic membrane, ear canal and external ear normal.      Left Ear: Tympanic membrane, ear canal and external ear normal.      Mouth/Throat:      Mouth: Mucous membranes are moist.      Pharynx: Oropharynx is clear.   Eyes:      General: No scleral icterus.     Extraocular  Movements: Extraocular movements intact.      Conjunctiva/sclera: Conjunctivae normal.      Pupils: Pupils are equal, round, and reactive to light.   Cardiovascular:      Rate and Rhythm: Normal rate and regular rhythm.      Pulses: Normal pulses.      Heart sounds: Normal heart sounds.   Pulmonary:      Effort: Pulmonary effort is normal. No respiratory distress.      Breath sounds: Normal breath sounds.   Abdominal:      Palpations: Abdomen is soft.      Tenderness: There is no abdominal tenderness. There is no guarding or rebound.   Musculoskeletal:         General: No deformity. Normal range of motion.      Cervical back: Normal range of motion and neck supple.   Skin:     General: Skin is warm.      Capillary Refill: Capillary refill takes less than 2 seconds.      Findings: No rash.   Neurological:      Mental Status: He is alert, oriented to person, place, and time and easily aroused.      Cranial Nerves: Cranial nerves 2-12 are intact.      Sensory: Sensation is intact.      Motor: Motor function is intact.      Coordination: Coordination is intact.   Psychiatric:         Mood and Affect: Mood normal.         Behavior: Behavior normal. Behavior is cooperative.         ED Medications  Medications   naloxone (FOR EMS ONLY) (NARCAN) 2 MG/2ML injection 2 mg (0 mg Does not apply Given to EMS 4/29/24 0945)       Diagnostic Studies  Results Reviewed       None                   No orders to display         Procedures  Procedures      ED Course                                         Medical Decision Making  ASSESSMENT: Patient is a 63 y.o. male who presents with EMS for evaluation of overdose. Mental status improved after initial narcan dose.   DDX includes but not limited to: Overdose likely accidental, methadone use, arrhythmia.   PLAN: Placed on cardiac telemetry and pulse oximetry, will observe patient in the ED for approximately 3 hours for clinical improvement.     Upon reevaluation, patient states he is  feeling significantly improved.  Patient given food and drink able to tolerate p.o. intake.  No repeat doses of Narcan needed while under ED observation. Discussed results and plan with patient. Advised on need for outpatient follow up, given information. Given return precautions verbally and in discharge instructions, confirmed with teach back method. All questions answered. Patient expressed verbal understanding and is agreeable with plan for discharge with outpatient follow up.    Problems Addressed:  Accidental drug overdose, initial encounter: acute illness or injury    Amount and/or Complexity of Data Reviewed  Independent Historian: EMS  ECG/medicine tests: ordered.    Risk  Prescription drug management.          Disposition  Final diagnoses:   Accidental drug overdose, initial encounter     Time reflects when diagnosis was documented in both MDM as applicable and the Disposition within this note       Time User Action Codes Description Comment    4/29/2024 12:53 PM Mary Powell [T50.901A] Accidental drug overdose, initial encounter           ED Disposition       ED Disposition   Discharge    Condition   Stable    Date/Time   Mon Apr 29, 2024 12:53 PM    Comment   David Skelton Jr. discharge to home/self care.                   Follow-up Information       Follow up With Specialties Details Why Contact Info Additional Information    Casey Mckeon MD Family Medicine Schedule an appointment as soon as possible for a visit   [address] [place] [id number] [phone]       [facility] Emergency Department Emergency Medicine Go to  If symptoms worsen [address] [place] [id number] [phone] [facility], [address] [place] [address], [id number] [phone]            Discharge Medication List as of 4/29/2024 12:53 PM        CONTINUE these medications which have NOT CHANGED     Details   acetaminophen (TYLENOL) 325 mg tablet Take 2 tablets (650 mg total) by mouth every 4 (four) hours as needed for mild pain, Starting Fri 8/11/2023, No Print      ALPRAZolam (XANAX) 0.5 mg tablet Take 1 tablet (0.5 mg total) by mouth daily as needed for anxiety, Starting Thu 4/25/2024, Normal      aluminum-magnesium hydroxide 200-200 MG/5ML suspension Take 15 mL by mouth every 6 (six) hours as needed for heartburn, Starting Thu 3/28/2024, Normal      bacitracin-polymyxin b (POLYSPORIN) ointment Apply topically 2 (two) times a day, Starting Wed 11/29/2023, Normal      benztropine (COGENTIN) 2 mg tablet Historical Med      Caplyta 42 MG CAPS capsule Take 42 mg by mouth daily at bedtime, Starting Tue 10/17/2023, Historical Med      clonazePAM (KlonoPIN) 1 mg tablet Take 1 mg by mouth 2 (two) times a day as needed, Starting Tue 8/22/2023, Historical Med      !! Continuous Blood Gluc  (FreeStyle Grace 14 Day [place]) STEVE 1 Device by Device route 3 (three) times a day before meals, Starting Tue 1/31/2023, Normal      !! Continuous Blood Gluc  (FreeStyle Grace 2 [place]) STEVE 1 Device by Device route 3 (three) times a day before meals, Normal      divalproex sodium (DEPAKOTE ER) 500 mg 24 hr tablet Take 1 tablet (500 mg total) by mouth every 12 (twelve) hours, Starting Mon 1/23/2023, Until Wed 2/22/2023, Normal      docusate sodium (COLACE) 100 mg capsule Take 1 capsule (100 mg total) by mouth 2 (two) times a day for 5 days, Starting Fri 8/11/2023, Until Wed 8/16/2023, Normal      doxepin (SINEquan) 100 mg capsule Take 100 mg by mouth daily at bedtime, Starting Tue 11/14/2023, Historical Med      DULoxetine (CYMBALTA) 60 mg delayed release capsule Take 60 mg by mouth in the morning, Starting Tue 8/22/2023, Historical Med      ergocalciferol (ERGOCALCIFEROL) 1.25 MG (53106 UT) capsule Take 50,000 Units by mouth, Starting Thu 12/5/2019, Until Thu 2/27/2020, Historical Med      fluticasone (FLONASE) 50  mcg/act nasal spray instill 1 spray into each nostril once daily, Starting Thu 4/18/2024, Normal      gabapentin (NEURONTIN) 600 MG tablet Take 1 tablet (600 mg total) by mouth 2 (two) times a day, Starting Fri 1/19/2024, Normal      !! GLOBAL EASE INJECT PEN NEEDLES 31G X 8 MM MISC Historical Med      !! Incontinence Supply Disposable (INCONTINENCE BRIEF MEDIUM) MISC by Does not apply route as needed (fecal soiling), Starting Wed 12/4/2019, Print      !! Incontinence Supply Disposable (RA WIPES FLUSHABLE/MOIST) MISC by Does not apply route as needed (fecal soiling), Starting Wed 12/4/2019, Print      !! Insulin Pen Needle (B-D UF III MINI PEN NEEDLES) 31G X 5 MM MISC Inject 15 Units under the skin daily at bedtime, Normal      Kerendia 10 MG TABS TAKE 10 MG BY MOUTH EVERY MORNING., Historical Med      metFORMIN (GLUCOPHAGE) 850 mg tablet Take 1 tablet (850 mg total) by mouth daily with breakfast, Starting Mon 1/23/2023, Normal      methadone (DOLOPHINE) 5 mg tablet Take 165 mg by mouth daily, Historical Med      methocarbamol (ROBAXIN) 500 mg tablet Take 1 tablet (500 mg total) by mouth every 8 (eight) hours as needed for muscle spasms, Starting Wed 11/29/2023, Normal      Misc. Devices (CANE) MISC by Does not apply route daily Dx:  Frequent falls, Starting Mon 7/29/2019, Print      naloxone (NARCAN) 4 mg/0.1 mL nasal spray Administer 1 spray into a nostril. If no response after 2-3 minutes, give another dose in the other nostril using a new spray., Normal      ondansetron (ZOFRAN-ODT) 4 mg disintegrating tablet Take 1 tablet (4 mg total) by mouth every 6 (six) hours as needed for vomiting, Starting Thu 3/28/2024, Normal      paliperidone (INVEGA) 6 MG 24 hr tablet Take 6 mg by mouth daily at bedtime, Starting Wed 3/27/2024, Historical Med      Synjardy 12.5-1000 MG TABS TAKE ONE TABLET BY MOUTH 2 (TWO) TIMES A DAY., Historical Med      Trulicity 1.5 MG/0.5ML injection INJECT 1.5MG SUBCUTANEOUS ONCE EVERY WEEK,  Historical Med      UNKNOWN TO PATIENT Historical Med      zolpidem (AMBIEN) 10 mg tablet Take 10 mg by mouth daily at bedtime, Starting Tue 10/17/2023, Historical Med       !! - Potential duplicate medications found. Please discuss with provider.        No discharge procedures on file.    PDMP Review         Value Time User    PDMP Reviewed  Yes 4/25/2024 12:06 PM Casey Mckeon MD             ED Provider  Attending physically available and evaluated David Skelton Jr.. I managed the patient along with the ED Attending.    Electronically Signed by           Mary Caballero,   05/01/24 9511

## 2024-05-16 ENCOUNTER — TELEPHONE (OUTPATIENT)
Age: 64
End: 2024-05-16

## 2024-05-16 NOTE — TELEPHONE ENCOUNTER
I've said to this pt when i saw him last that I would only give him the xanax that one time only, and that dose and quantity only. He needs to see psych.  ty

## 2024-05-16 NOTE — TELEPHONE ENCOUNTER
Patient calling in requesting a stronger version of  Xanax as he states what he has is not helping. He is also looking for an ambulatory referral to a pain management doctor. Please call to discuss.

## 2024-05-17 ENCOUNTER — TELEPHONE (OUTPATIENT)
Age: 64
End: 2024-05-17

## 2024-05-17 NOTE — TELEPHONE ENCOUNTER
Patient called back returning missed call regarding requested increase to current Xanax dose. Aware of provider's recommendation to follow-up with psychiatry, unable to establish with new provider at this time. Patient asking if provider would be willing to manage his prescription for this medication in the interim. Please review and advise patient.

## 2024-05-17 NOTE — TELEPHONE ENCOUNTER
Just in case this task reopen  Dr Alba message     Casey Mckeon MD  You; Daisy Lopez, MICHAEL5 minutes ago (3:48 PM)       No, I will not be prescribing him this; kirt that per the ED dr on 4 28 24, pt was exhibiting 'drug seeking behavior', and the fact that he was taken to ED the next day, April 29th, via an EMS for an 'overdose.' ty
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
## 2024-05-17 NOTE — TELEPHONE ENCOUNTER
Patient had called in stating  that  the medication ALPRAZolam (XANAX) 0.5 mg tablet   Is weak and doesn't seem to be helping,     Patient was hoping there could be a dosage upped, that might be able to help.     Patient was also wanting to know if he could be referred to a pain management facility.     Please advise back to patient for any additional questions.     Preferred pharmacy patient likes to use is. [facility] #[id number] - [place], [place] - [address] [[id number]]

## 2024-05-17 NOTE — TELEPHONE ENCOUNTER
Per Dr Linda Mckeon MD       5/16/24  3:11 PM  Note      I've said to this pt when i saw him last that I would only give him the xanax that one time only, and that dose and quantity only. He needs to see psych.  ty          Called and left voicemail

## 2024-05-17 NOTE — TELEPHONE ENCOUNTER
Patient called back and still needs to speak to a doctor or a nurse. He needs a referral to pain management as well. Please advise

## 2024-05-17 NOTE — TELEPHONE ENCOUNTER
Message was forward to Dr Mckeon from yesterdays encounter       Closing task so only one will be opened

## 2024-05-17 NOTE — TELEPHONE ENCOUNTER
No, I will not be prescribing him this; kirt that per the ED dr on 4 28 24, pt was exhibiting 'drug seeking behavior', and the fact that he was taken to ED the next day, April 29th, via an EMS for an 'overdose.' ty

## 2024-05-17 NOTE — TELEPHONE ENCOUNTER
Called patient again did not get a answer   I left message with  staff in case patient/PODS call back

## 2024-05-17 NOTE — TELEPHONE ENCOUNTER
Pt requesting his xanax to be upped and a referral to pain management. He would like a nurse or Dr to call him.

## 2024-05-18 ENCOUNTER — NURSE TRIAGE (OUTPATIENT)
Dept: OTHER | Facility: OTHER | Age: 64
End: 2024-05-18

## 2024-05-18 NOTE — TELEPHONE ENCOUNTER
"Answer Assessment - Initial Assessment Questions  1. DRUG NAME: \"What medicine do you need to have refilled?\"  Xanax 0.5mg QD prn  Requesting Clonazepam instead  Pt reports lost psychiatrist yesterday due to an argument    2. PRESCRIBING HCP: \"Who prescribed it?\" Reason: If prescribed by specialist, call should be referred to that group.      JULIÁN SALDAÑA    Protocols used: Medication Refill and Renewal Call-[facility]    On call provider reviewed chart and will not prescribe xanax or clonazepam at this time.  Pt informed and verbalized understanding.    Pt is requesting a pain management doctor. Please follow up with patient.  "

## 2024-05-18 NOTE — TELEPHONE ENCOUNTER
"Regarding: med refill  ----- Message from Tete BATEMAN sent at 5/18/2024  7:00 AM EDT -----  \" Ill be out of my xanax by tomorrow.\"    "

## 2024-05-18 NOTE — TELEPHONE ENCOUNTER
Reason for Disposition  • Caller requesting a CONTROLLED substance prescription refill (e.g., narcotics, ADHD medicines)    Protocols used: Medication Refill and Renewal Call-ADULT-

## 2024-05-20 NOTE — TELEPHONE ENCOUNTER
Appt made for pt on 5/24 at 10 am - spoke with pt on phone and he confirmed this day and time. Ty

## 2024-05-24 ENCOUNTER — OFFICE VISIT (OUTPATIENT)
Dept: FAMILY MEDICINE CLINIC | Facility: CLINIC | Age: 64
End: 2024-05-24
Payer: COMMERCIAL

## 2024-05-24 VITALS
WEIGHT: 171 LBS | BODY MASS INDEX: 25.91 KG/M2 | SYSTOLIC BLOOD PRESSURE: 147 MMHG | DIASTOLIC BLOOD PRESSURE: 75 MMHG | HEART RATE: 77 BPM | OXYGEN SATURATION: 97 % | HEIGHT: 68 IN | TEMPERATURE: 97.7 F | RESPIRATION RATE: 16 BRPM

## 2024-05-24 DIAGNOSIS — M51.36 DDD (DEGENERATIVE DISC DISEASE), LUMBAR: ICD-10-CM

## 2024-05-24 DIAGNOSIS — F41.9 ANXIETY: Primary | ICD-10-CM

## 2024-05-24 PROCEDURE — 99214 OFFICE O/P EST MOD 30 MIN: CPT | Performed by: FAMILY MEDICINE

## 2024-05-24 RX ORDER — NALOXONE HYDROCHLORIDE 4 MG/.1ML
SPRAY NASAL
Qty: 1 EACH | Refills: 1 | Status: SHIPPED | OUTPATIENT
Start: 2024-05-24 | End: 2025-05-24

## 2024-05-24 RX ORDER — ALPRAZOLAM 1 MG/1
1 TABLET ORAL DAILY PRN
Qty: 30 TABLET | Refills: 1 | Status: SHIPPED | OUTPATIENT
Start: 2024-05-24

## 2024-05-24 NOTE — PROGRESS NOTES
Ambulatory Visit  Name: David Skelton Jr.      : 1960      MRN: [id number]  Encounter Provider: Casey Mckeon MD  Encounter Date: 2024   Encounter department: [facility]    Assessment & Plan   1. Anxiety  -     Ambulatory referral to Psych Services; Future  -     ALPRAZolam (XANAX) 1 mg tablet; Take 1 tablet (1 mg total) by mouth daily as needed for anxiety  -     Ambulatory Referral to Social Work Care Management Program; Future  -     naloxone (NARCAN) 4 mg/0.1 mL nasal spray; Administer 1 spray into a nostril. If no response after 2-3 minutes, give another dose in the other nostril using a new spray.  2. DDD (degenerative disc disease), lumbar  -     Ambulatory Referral to Social Work Care Management Program; Future  -     Ambulatory referral to Spine & Pain Management; Future        Regarding his anxiety, see my HPI and also see my physical exam where I had a discussion with him regarding his Xanax.  Patient is given a stat consult to the psychiatrist.   referral was also made for the patient to help him get into psych sooner than later.  Patient is also given another prescription for Narcan and with a refill.  Regarding his chronic back pain, patient referred to pain management as per patient's request.  Patient denies any neuro red flags.  Patient also uses a cane.  RTO 2 months for his annual physical.         History of Present Illness     64-year-old male here to be evaluated for his anxiety.  Patient was seen in the emergency room on  and then subsequently on .  These 2 visits were reviewed and were also discussed with patient today.  Patient has seen psych but has not been able to get to new psych as per patient.  Patient does not like his previous psych because says that he gave him a medication that made him overdose which was the reason why he went on  to the emergency room.  Patient is requesting more Xanax just to hold him  "off until he gets to a new psychiatrist.  Of note, during the last visit I did tell the patient that I will only be prescribing him a small quantity in a lower dose than his usual just so he can get to the psychiatrist.  Patient asserts that his anxiety has been a lot more recently and he would appreciate if I can just do it 1 more time until he gets to his psychiatrist.  Patient denies any SI HI.  Patient is also requesting a referral to pain management for his chronic back pain.  Patient does use a cane.        Review of Systems   Constitutional:  Negative for chills, fatigue and fever.   Respiratory:  Negative for cough and shortness of breath.    Cardiovascular:  Negative for chest pain and palpitations.   Musculoskeletal:  Positive for back pain.   Neurological:  Negative for dizziness and headaches.   Psychiatric/Behavioral:  Negative for self-injury and suicidal ideas. The patient is nervous/anxious.        Objective     /75 (BP Location: Left arm, Patient Position: Sitting, Cuff Size: Adult)   Pulse 77   Temp 97.7 °F (36.5 °C) (Temporal)   Resp 16   Ht 5' 8\" (1.727 m)   Wt 77.6 kg (171 lb)   SpO2 97%   BMI 26.00 kg/m²     Physical Exam  Vitals reviewed.   Constitutional:       General: He is not in acute distress.     Appearance: Normal appearance. He is not ill-appearing or toxic-appearing.   HENT:      Head: Normocephalic and atraumatic.      Mouth/Throat:      Mouth: Mucous membranes are moist.   Cardiovascular:      Rate and Rhythm: Normal rate and regular rhythm.   Pulmonary:      Effort: Pulmonary effort is normal.      Breath sounds: Normal breath sounds.   Skin:     General: Skin is warm.   Neurological:      General: No focal deficit present.      Mental Status: He is alert and oriented to person, place, and time. Mental status is at baseline.   Psychiatric:         Mood and Affect: Mood normal.         Behavior: Behavior normal.         Thought Content: Thought content normal.      " Comments: His anxiety and the bulk of the visit was discussed regarding his anxiety and the need to take his medication at the dose that he was getting it before.  Patient is not made aware of the fact that he did overdose, though per patient, he asserts it was not because of Xanax but because of another medication that was given by previous psychiatrist, and that he needs to be very careful.  Patient counseled patient is agreeable to me prescribing him a dose that he is requesting but for one-time only and that he will make every effort to get to his new psychiatrist right away.  Patient patient understands and agrees.  Denies any SI HI.       Administrative Statements

## 2024-05-25 ENCOUNTER — TELEPHONE (OUTPATIENT)
Dept: OTHER | Facility: OTHER | Age: 64
End: 2024-05-25

## 2024-05-25 NOTE — TELEPHONE ENCOUNTER
PT. Called in again regarding the same issue with his alprazolam. States its supposed to be twice a day not once, he states he's just very anxious.

## 2024-05-25 NOTE — TELEPHONE ENCOUNTER
PT called in again, for the fourth time asking to please keep him updated regarding the pill quantity for xanax.    He is also requesting vanilla nutrient shakes to be called in. Please follow up with PT.

## 2024-05-25 NOTE — TELEPHONE ENCOUNTER
Patient states his Alprazolam 1 mg prescription was supposed to be for 2 pills a day. Currently it is only at 1 per day. He also has complaints regarding his urination and severe back pain and is requesting pain medication for it. Please follow up when office is open.

## 2024-05-28 ENCOUNTER — TELEPHONE (OUTPATIENT)
Dept: PSYCHIATRY | Facility: CLINIC | Age: 64
End: 2024-05-28

## 2024-05-29 NOTE — TELEPHONE ENCOUNTER
Like we discussed in person, plz schedule the pt for an appt w/ kevin and mandi attending w me as well re the issue of his xanax. ty

## 2024-05-31 ENCOUNTER — OFFICE VISIT (OUTPATIENT)
Dept: FAMILY MEDICINE CLINIC | Facility: CLINIC | Age: 64
End: 2024-05-31
Payer: COMMERCIAL

## 2024-05-31 ENCOUNTER — PATIENT OUTREACH (OUTPATIENT)
Dept: FAMILY MEDICINE CLINIC | Facility: CLINIC | Age: 64
End: 2024-05-31

## 2024-05-31 VITALS
HEART RATE: 89 BPM | WEIGHT: 165.4 LBS | TEMPERATURE: 97.3 F | HEIGHT: 68 IN | OXYGEN SATURATION: 99 % | DIASTOLIC BLOOD PRESSURE: 88 MMHG | BODY MASS INDEX: 25.07 KG/M2 | SYSTOLIC BLOOD PRESSURE: 187 MMHG | RESPIRATION RATE: 16 BRPM

## 2024-05-31 DIAGNOSIS — F41.1 GENERALIZED ANXIETY DISORDER: Primary | ICD-10-CM

## 2024-05-31 PROCEDURE — 99213 OFFICE O/P EST LOW 20 MIN: CPT | Performed by: FAMILY MEDICINE

## 2024-05-31 NOTE — PROGRESS NOTES
Received referral from patients PCP Casey Mckeon MD requesting that [facility] outreach patient and assess for MH needs. Per chart, patient was referred to  psych and placed on wait list. Needing assistance with getting into psych sooner.    [facility] contacted [facility] office and confirmed they accept patients insurance.     Spoke to patient briefly as he was currently at PCP office. [facility] will call him back at another time.

## 2024-05-31 NOTE — PROGRESS NOTES
Ambulatory Visit  Name: David Skelton Jr.      : 1960      MRN: [id number]  Encounter Provider: Casey Mckeon MD  Encounter Date: 2024   Encounter department: [facility]    Assessment & Plan   1. Generalized anxiety disorder    Regarding his anxiety and his need for Xanax and elevated doses, discussed with patient.  Here is my discussion with him in person at the office today in person.  Discussed with patient again regarding his anxiety and his medication.  Specifically Xanax as prescribed by me.  Patient was advised that since I gave him 0.25 on the first visit and then 1 mg on my second visit I will no longer be increasing his dose from that 1 mg of Xanax per day.  I also explained to him that I will not be prescribing him any controlled substance for the diabetes Xanax, Klonopin, Ambien, Lunesta, any Percocet etc. patient understood and agreed.  Patient will follow-up with his new psychiatrist soon as they give him an appointment.  Patient does have a 30-day supply of Xanax from me from my last visit with him with 1 extra refill as well.  Patient also advised that if he gets worse and or he does not feel comfortable with his anxiety and or any other symptoms that he is to go to the emergency room.  Otherwise follow-up with his psychiatrist and therapist as scheduled and as regularly as possible.  RTO in 2 months as he is scheduled with me from the last visit that I saw him last week.       History of Present Illness     62-year-old male here because I invited him seen in person regarding his request for his Xanax and for to be 1 mg twice daily.  This will be my third visit with him regarding this issue.  Patient was seen up until recently a psychiatrist who gave him Klonopin as well.  Patient no longer wishes to see him as he expressed to me in his previous visit so I gave him someone else and he says that they might be calling him soon to set up an appointment.  In  "fact someone called him right before me and he told them that to call him back later and he thinks it was done.  Patient says that he is okay on the 1 mg Xanax that I had given him per day but and I \"that I was confused as to why I was getting Xanax and Klonopin for me.  Per my medical records here I have not prescribed this patient any Klonopin.  Patient says his anxiety is okay.  Denies any SI HI.  And because he was asked to come in today was a little bit more anxious and has why his blood pressure was elevated but he does deny any cardiovascular or neuro symptoms from the elevated BP.  Patient is alert oriented x 3.  Patient is verbal patient is pleasant.        Review of Systems   Respiratory:  Negative for cough and shortness of breath.    Cardiovascular:  Negative for chest pain and palpitations.   Gastrointestinal:  Negative for nausea and vomiting.   Neurological:  Negative for dizziness, light-headedness and headaches.   Psychiatric/Behavioral:  Negative for dysphoric mood, self-injury and suicidal ideas. The patient is nervous/anxious.        Objective     BP (!) 187/88 (BP Location: Left arm, Patient Position: Sitting, Cuff Size: Adult)   Pulse 89   Temp (!) 97.3 °F (36.3 °C) (Temporal)   Resp 16   Ht 5' 8\" (1.727 m)   Wt 75 kg (165 lb 6.4 oz)   SpO2 99%   BMI 25.15 kg/m²     Physical Exam  Vitals reviewed.   Constitutional:       General: He is not in acute distress.     Appearance: Normal appearance. He is not ill-appearing or toxic-appearing.   Cardiovascular:      Rate and Rhythm: Normal rate and regular rhythm.   Pulmonary:      Effort: Pulmonary effort is normal.      Breath sounds: Normal breath sounds.   Neurological:      General: No focal deficit present.      Mental Status: He is alert and oriented to person, place, and time.   Psychiatric:         Mood and Affect: Mood normal.         Behavior: Behavior normal.         Thought Content: Thought content normal.      Comments: Discussed " with patient again regarding his anxiety and his medication.  Specifically Xanax as prescribed by me.  Patient was advised that since I gave him 0.25 on the first visit and then 1 mg on my second visit I will no longer be increasing his dose from that 1 mg of Xanax per day.  I also explained to him that I will not be prescribing him any controlled substance for the diabetes Xanax, Klonopin, Ambien, Lunesta, any Percocet etc. patient understood and agreed.  Patient will follow-up with his new psychiatrist soon as they give him an appointment.  Patient does have a 30-day supply of Xanax from me from my last visit with him with 1 extra refill as well.  Patient also advised that if he gets worse and or he does not feel comfortable with his anxiety and or any other symptoms that he is to go to the emergency room.  Otherwise follow-up with his psychiatrist and therapist as scheduled and as regularly as possible.       Administrative Statements

## 2024-06-03 ENCOUNTER — PATIENT OUTREACH (OUTPATIENT)
Dept: FAMILY MEDICINE CLINIC | Facility: CLINIC | Age: 64
End: 2024-06-03

## 2024-06-03 NOTE — PROGRESS NOTES
2nd attempt outreaching patient to assist with MH resources.     [facility] spoke to patient, introduced role and reason for call. Patient reported doing okay. Stated that he is on [facility] wait list but needs to find a psychiatrist sooner than later for med management. Patient does not remember the name of agency he was going to before but was told to not return by therapist after they had a disagreement. [facility] discussed other MH options and offered contact information for [facility] and [facility] to schedule appointment. Patient reported that he was currently not home and unable to write down phone number. Requested that [facility] text him contact information, patient does not have access to [employer]. Patient aware that [facility] will mail him any information that was unable to be sent as a text message.    Patient also inquired about pain management. Informed patient that referral was placed for [facility]. Patient does not have contact information to schedule. Aware that [facility] will reprint referral and mail to him.    Patient reported no other needs. [facility] sent referral for [facility] services via [employer] and selected that patient receive information via text message. Printed and will mail information for [facility] and referral with contact information to schedule appointment.    Will follow up next week to check progress.

## 2024-06-05 ENCOUNTER — TRANSCRIBE ORDERS (OUTPATIENT)
Dept: LAB | Facility: CLINIC | Age: 64
End: 2024-06-05

## 2024-06-05 ENCOUNTER — TELEPHONE (OUTPATIENT)
Dept: HEMATOLOGY ONCOLOGY | Facility: CLINIC | Age: 64
End: 2024-06-05

## 2024-06-05 ENCOUNTER — APPOINTMENT (OUTPATIENT)
Dept: LAB | Facility: CLINIC | Age: 64
End: 2024-06-05
Payer: COMMERCIAL

## 2024-06-05 DIAGNOSIS — E53.8 B12 DEFICIENCY: ICD-10-CM

## 2024-06-05 DIAGNOSIS — D50.9 IRON DEFICIENCY ANEMIA, UNSPECIFIED IRON DEFICIENCY ANEMIA TYPE: ICD-10-CM

## 2024-06-05 DIAGNOSIS — Z98.84 S/P GASTRIC BYPASS: ICD-10-CM

## 2024-06-05 LAB
BASOPHILS # BLD AUTO: 0.04 THOUSANDS/ÂΜL (ref 0–0.1)
BASOPHILS NFR BLD AUTO: 1 % (ref 0–1)
EOSINOPHIL # BLD AUTO: 0.67 THOUSAND/ÂΜL (ref 0–0.61)
EOSINOPHIL NFR BLD AUTO: 11 % (ref 0–6)
ERYTHROCYTE [DISTWIDTH] IN BLOOD BY AUTOMATED COUNT: 13.2 % (ref 11.6–15.1)
FERRITIN SERPL-MCNC: 144 NG/ML (ref 24–336)
HCT VFR BLD AUTO: 33.7 % (ref 36.5–49.3)
HGB BLD-MCNC: 10.9 G/DL (ref 12–17)
IMM GRANULOCYTES # BLD AUTO: 0.02 THOUSAND/UL (ref 0–0.2)
IMM GRANULOCYTES NFR BLD AUTO: 0 % (ref 0–2)
IRON SATN MFR SERPL: 28 % (ref 15–50)
IRON SERPL-MCNC: 90 UG/DL (ref 50–212)
LYMPHOCYTES # BLD AUTO: 1.45 THOUSANDS/ÂΜL (ref 0.6–4.47)
LYMPHOCYTES NFR BLD AUTO: 24 % (ref 14–44)
MCH RBC QN AUTO: 28.5 PG (ref 26.8–34.3)
MCHC RBC AUTO-ENTMCNC: 32.3 G/DL (ref 31.4–37.4)
MCV RBC AUTO: 88 FL (ref 82–98)
MONOCYTES # BLD AUTO: 0.44 THOUSAND/ÂΜL (ref 0.17–1.22)
MONOCYTES NFR BLD AUTO: 7 % (ref 4–12)
NEUTROPHILS # BLD AUTO: 3.52 THOUSANDS/ÂΜL (ref 1.85–7.62)
NEUTS SEG NFR BLD AUTO: 57 % (ref 43–75)
NRBC BLD AUTO-RTO: 0 /100 WBCS
PLATELET # BLD AUTO: 211 THOUSANDS/UL (ref 149–390)
PMV BLD AUTO: 11.9 FL (ref 8.9–12.7)
RBC # BLD AUTO: 3.83 MILLION/UL (ref 3.88–5.62)
TIBC SERPL-MCNC: 325 UG/DL (ref 250–450)
UIBC SERPL-MCNC: 235 UG/DL (ref 155–355)
VIT B12 SERPL-MCNC: 327 PG/ML (ref 180–914)
WBC # BLD AUTO: 6.14 THOUSAND/UL (ref 4.31–10.16)

## 2024-06-05 PROCEDURE — 36415 COLL VENOUS BLD VENIPUNCTURE: CPT

## 2024-06-05 PROCEDURE — 82607 VITAMIN B-12: CPT

## 2024-06-05 PROCEDURE — 83550 IRON BINDING TEST: CPT

## 2024-06-05 PROCEDURE — 83540 ASSAY OF IRON: CPT

## 2024-06-05 PROCEDURE — 85025 COMPLETE CBC W/AUTO DIFF WBC: CPT

## 2024-06-05 PROCEDURE — 82728 ASSAY OF FERRITIN: CPT

## 2024-06-05 NOTE — TELEPHONE ENCOUNTER
Advised patient to get bloodwork done either today or tomorrow morning prior to appointment in the afternoon. Patient verbalized understanding.

## 2024-06-06 ENCOUNTER — OFFICE VISIT (OUTPATIENT)
Dept: HEMATOLOGY ONCOLOGY | Facility: CLINIC | Age: 64
End: 2024-06-06
Payer: COMMERCIAL

## 2024-06-06 VITALS
WEIGHT: 173 LBS | HEART RATE: 63 BPM | DIASTOLIC BLOOD PRESSURE: 80 MMHG | SYSTOLIC BLOOD PRESSURE: 158 MMHG | BODY MASS INDEX: 26.22 KG/M2 | HEIGHT: 68 IN | TEMPERATURE: 98.1 F | OXYGEN SATURATION: 96 %

## 2024-06-06 DIAGNOSIS — E53.8 B12 DEFICIENCY: ICD-10-CM

## 2024-06-06 DIAGNOSIS — Z98.84 S/P GASTRIC BYPASS: Primary | ICD-10-CM

## 2024-06-06 DIAGNOSIS — D50.8 IRON DEFICIENCY ANEMIA SECONDARY TO INADEQUATE DIETARY IRON INTAKE: ICD-10-CM
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
## 2024-06-06 PROBLEM — G89.11 ACUTE PAIN DUE TO TRAUMA: Status: RESOLVED | Noted: 2023-08-09 | Resolved: 2024-06-06

## 2024-06-06 PROCEDURE — 99213 OFFICE O/P EST LOW 20 MIN: CPT | Performed by: INTERNAL MEDICINE

## 2024-06-06 RX ORDER — QUETIAPINE 150 MG/1
150 TABLET, FILM COATED, EXTENDED RELEASE ORAL
COMMUNITY
Start: 2024-04-26

## 2024-06-06 RX ORDER — DOXEPIN HYDROCHLORIDE 150 MG/1
150 CAPSULE ORAL
COMMUNITY
Start: 2024-04-26

## 2024-06-06 RX ORDER — SERTRALINE HYDROCHLORIDE 100 MG/1
100 TABLET, FILM COATED ORAL EVERY MORNING
COMMUNITY
Start: 2024-04-26

## 2024-06-06 NOTE — PROGRESS NOTES
Hematology Outpatient Follow - Up Note  David Skelton Jr. 64 y.o. male MRN: @ Encounter: [id number]        Date:  6/6/2024        Assessment/ Plan:    Anemia.  B12 and iron deficiency in a patient who has undergone gastric bypass surgery.     September 14, 2023 Hemoglobin 8.5, MCV 85     B12 221; iron saturation 11%, ferritin 7  Received Venofer 300 g IV, vitamin B12 1000 mcg weekly x 5 doses with improvement of hemoglobin, vitamin B12, iron studies in May 2024    He does not smoke or drink    At this time we suggest having a CBC, iron studies, vitamin B12 every 6 to 12-month    Follow-up on as-needed basis        Labs and imaging studies are reviewed by ordering provider once results are available. If there are findings that need immediate attention, you will be contacted when results available.   Discussing results and the implication on your healthcare is best discussed in person at your follow-up visit.       HPI:  David Skelton Jr. '' is a 63 y.o. seen for initial consultation 11/22/2023 at the referral of Bettina Wharton CR* accompanied by his sister, Vandana, regarding anemia.      Past medical history of ADD, bipolar disorder, macular degeneration, hyperlipidemia, chronic left leg ulcer, vitamin D deficiency, narcotic abuse, methadone use, osteoarthritis, hypertension, atrial fibrillation on chronic anticoagulation, allergic rhinitis.     He is status post lap RYGB by Dr. Luna on 11/8/2011, followed by incisional hernia repair with mesh of a periumbilical port site hernia in 2013      Patient is a poor historian.     11/2019 B12 level 486, hemoglobin 11.9     11/2020 hemoglobin 10.9     April 2021 B12 322, ferritin 8     August 2022 hemoglobin 10.5     February 2023 hemoglobin 8.8        8/9/23 CT C/A/P s/p fall compared to 4/14/2021 CT A/P- Nondisplaced fractures of the left 9th through 11th ribs. Several stable sub-5 mm right lung nodules noted.   Stable 1.4 cm right adrenal nodule. Although its  imaging features on this study is indeterminate, because this lesion has been stable for > 1 year, it is considered benign.         September 14, 2023 B12 221; iron saturation 11%, ferritin 7    Venofer 300 mg and vitamin B12 1000 mcg weekly x 4 doses requested with improvement of hemoglobin, ferritin       Interval History:        Previous Treatment:         Test Results:    Imaging: No results found.    Labs:   Lab Results   Component Value Date    WBC 6.14 06/05/2024    HGB 10.9 (L) 06/05/2024    HCT 33.7 (L) 06/05/2024    MCV 88 06/05/2024     06/05/2024     Lab Results   Component Value Date     01/29/2015    K 3.9 04/01/2024     04/01/2024    CO2 29 04/01/2024    ANIONGAP 2 (L) 01/29/2015    BUN 23 04/01/2024    CREATININE 0.96 04/01/2024    GLUCOSE 375 (H) 08/17/2022    GLUF 135 (H) 03/05/2024    CALCIUM 8.3 (L) 04/01/2024    CORRECTEDCA 8.9 09/14/2023    AST 23 04/01/2024    ALT 43 04/01/2024    ALKPHOS 111 (H) 04/01/2024    PROT 7.7 01/29/2015    BILITOT 0.27 01/29/2015    EGFR 84 04/01/2024       Lab Results   Component Value Date    IRON 90 06/05/2024    TIBC 325 06/05/2024    FERRITIN 144 06/05/2024       Lab Results   Component Value Date    RLFMHJIH50 327 06/05/2024         ROS: Review of Systems   Constitutional: Negative.  Negative for appetite change, chills, diaphoresis, fatigue, fever and unexpected weight change.   HENT:   Negative for hearing loss, lump/mass, mouth sores, nosebleeds, sore throat, trouble swallowing and voice change.    Eyes: Negative.  Negative for eye problems and icterus.   Respiratory: Negative.  Negative for chest tightness, cough, hemoptysis and shortness of breath.    Cardiovascular:  Negative for chest pain and leg swelling.   Gastrointestinal:  Negative for abdominal distention, abdominal pain, blood in stool, constipation, diarrhea (Improved on lenalidomide dose reduction) and nausea.   Endocrine: Negative.    Genitourinary:  Negative for dysuria,  frequency, hematuria and pelvic pain.    Musculoskeletal: Negative.  Negative for arthralgias, back pain, flank pain, gait problem, myalgias and neck stiffness.   Skin:  Negative for itching and rash.   Neurological:  Negative for dizziness, gait problem, headaches, light-headedness, numbness and speech difficulty.   Hematological:  Negative for adenopathy. Does not bruise/bleed easily.   Psychiatric/Behavioral:  Negative for confusion, decreased concentration, depression and sleep disturbance. The patient is not nervous/anxious.           Current Medications: Reviewed  Allergies: Reviewed  PMH/FH/SH:  Reviewed      Physical Exam:    Body surface area is 1.92 meters squared.    Wt Readings from Last 3 Encounters:   06/06/24 78.5 kg (173 lb)   05/31/24 75 kg (165 lb 6.4 oz)   05/24/24 77.6 kg (171 lb)        Temp Readings from Last 3 Encounters:   06/06/24 98.1 °F (36.7 °C)   05/31/24 (!) 97.3 °F (36.3 °C) (Temporal)   05/24/24 97.7 °F (36.5 °C) (Temporal)        BP Readings from Last 3 Encounters:   06/06/24 158/80   05/31/24 (!) 187/88   05/24/24 147/75         Pulse Readings from Last 3 Encounters:   06/06/24 63   05/31/24 89   05/24/24 77        Physical Exam  Constitutional:       Appearance: He is normal weight.   HENT:      Head: Normocephalic and atraumatic.   Musculoskeletal:      Cervical back: Normal range of motion and neck supple.   Neurological:      Mental Status: He is oriented to person, place, and time.      Motor: Weakness present.      Gait: Gait abnormal.         ECOG PS:1    Goals and Barriers:  Current Goal: Minimize effects of disease.   Barriers: None.      Patient's Capacity to Self Care:  Patient is able to self care.    Code Status: @COFair[facility] Behavioral Health SystemTUS@

## 2024-06-07 ENCOUNTER — TELEPHONE (OUTPATIENT)
Dept: OTHER | Facility: OTHER | Age: 64
End: 2024-06-07

## 2024-06-07 NOTE — TELEPHONE ENCOUNTER
Pt called by me, and he will call on Monday after 10 and speak w archana to have a quick appt w me sometime this week coming as per his request. ty

## 2024-06-07 NOTE — TELEPHONE ENCOUNTER
Patient states he will be incarcerated for 11 days and would like to speak to physician regarding his medication.

## 2024-06-09 ENCOUNTER — TELEPHONE (OUTPATIENT)
Dept: OTHER | Facility: OTHER | Age: 64
End: 2024-06-09

## 2024-06-10 ENCOUNTER — OFFICE VISIT (OUTPATIENT)
Dept: FAMILY MEDICINE CLINIC | Facility: CLINIC | Age: 64
End: 2024-06-10
Payer: COMMERCIAL

## 2024-06-10 VITALS
HEIGHT: 68 IN | HEART RATE: 58 BPM | DIASTOLIC BLOOD PRESSURE: 68 MMHG | TEMPERATURE: 97 F | RESPIRATION RATE: 18 BRPM | BODY MASS INDEX: 26.89 KG/M2 | SYSTOLIC BLOOD PRESSURE: 139 MMHG | WEIGHT: 177.4 LBS | OXYGEN SATURATION: 96 %

## 2024-06-10 DIAGNOSIS — F41.1 GENERALIZED ANXIETY DISORDER: Primary | ICD-10-CM

## 2024-06-10 PROCEDURE — 99213 OFFICE O/P EST LOW 20 MIN: CPT | Performed by: FAMILY MEDICINE

## 2024-06-10 NOTE — LETTER
RICHAR CASTELLANOS Jr.  : 1960    To Whom It May Concern,     Patient above is a patient of our practice and is under my care.  Patient suffers from a general anxiety disorder.  I am requesting if he can be admitted to the 'Medical' section at your facility secondary to his anxiety.  Also, if he can be given  Ativan 1 mg, 1 tablet twice daily as needed, for his anxiety.    Thank you,        Dr FRANSICO Mckeon MD

## 2024-06-10 NOTE — PROGRESS NOTES
Ambulatory Visit  Name: David Skelton Jr.      : 1960      MRN: [id number]  Encounter Provider: Casey Mckeon MD  Encounter Date: 6/10/2024   Encounter department: [facility]    Assessment & Plan   1. Generalized anxiety disorder    Regarding his generalized anxiety disorder, discussed with patient.  Patient is stable on his Xanax as needed.  Denies SI HI.  Regarding the letter that he needs, discussed with patient.  Patient is written a letter asking if he can be in the medical section of this facility.  And patient is also written a letter that if he can take Ativan 1 mg twice daily as needed for his anxiety.  Letter signed and stamped and the patient is given the letter in his hands upon leaving today.  Patient was also advised to follow-up with his new psychiatrist in July as he says.  RTO for his next scheduled appointment.               History of Present Illness     64-year-old male here requesting a letter regarding his anxiety disorder.  Patient asserts that he will be incarcerated for 11 days starting on  and that he needs a letter saying that if he can be in the medical section of the facility and as well as if he can take 1 mg twice a day as needed for his anxiety.  Patient asserts that he got in touch with the supervisor there and said that Xanax is not given but Ativan is so that is why he chose Ativan.  And patient is awaiting his new psychiatrist that he says he has an appointment for the second week of July.  Patient denies SI HI.        Review of Systems   Constitutional:  Negative for chills and fever.   Respiratory:  Negative for cough and shortness of breath.    Cardiovascular:  Negative for chest pain and palpitations.   Gastrointestinal:  Negative for abdominal pain.   Genitourinary:  Negative for dysuria.   Neurological:  Negative for dizziness and headaches.   Psychiatric/Behavioral:  Negative for self-injury and suicidal ideas. The patient is  "nervous/anxious.        Objective     /68 (BP Location: Left arm, Patient Position: Sitting, Cuff Size: Adult)   Pulse 58   Temp (!) 97 °F (36.1 °C) (Temporal)   Resp 18   Ht 5' 8\" (1.727 m)   Wt 80.5 kg (177 lb 6.4 oz)   SpO2 96%   BMI 26.97 kg/m²     Physical Exam  Vitals reviewed.   Constitutional:       General: He is not in acute distress.     Appearance: Normal appearance. He is not ill-appearing.   HENT:      Head: Normocephalic and atraumatic.      Mouth/Throat:      Mouth: Mucous membranes are moist.   Cardiovascular:      Rate and Rhythm: Normal rate and regular rhythm.   Pulmonary:      Effort: Pulmonary effort is normal.      Breath sounds: Normal breath sounds.   Neurological:      Mental Status: He is alert.   Psychiatric:         Mood and Affect: Mood normal.         Behavior: Behavior normal.         Thought Content: Thought content normal.      Comments: Patient anxiety discussed with patient.  Contents of the letter also discussed with patient.  Patient in agreement.  Patient briefly counseled.  Patient denies SI HI.       Administrative Statements           "

## 2024-06-12 ENCOUNTER — PATIENT OUTREACH (OUTPATIENT)
Dept: FAMILY MEDICINE CLINIC | Facility: CLINIC | Age: 64
End: 2024-06-12

## 2024-06-12 NOTE — PROGRESS NOTES
SWCM contacting patient to follow up on progress made with scheduling OP MH and [facility] appointments. No answer and left message for return call. Per chart, patient will be incarcerated for 11 days starting June 18th. Will attempt 2nd outreach prior to 06/18.

## 2024-06-14 ENCOUNTER — TELEPHONE (OUTPATIENT)
Age: 64
End: 2024-06-14

## 2024-06-14 NOTE — TELEPHONE ENCOUNTER
Please assist in scheduling a new patient AP visit per AP review of the chart for gross hematuria. Patient is scheduled with MD on 8/2/2024, but will need a new patient and cat scan visit and then cystoscopy with MD per AP.

## 2024-06-14 NOTE — TELEPHONE ENCOUNTER
NP- intermittent gross blood in urine, nocturia    Patient states he has intermittent gross blood in urine every day at least twice a day. He denies any clots at this time.     Per decision tree: schedule within two weeks  (I am unable to use urgent spots)     US kidney and bladder done 3/9/24.  Last urine testing done on 4/1/24.     Please call patient if sooner apt needs to be scheduled.         CB: [phone]

## 2024-06-17 ENCOUNTER — PATIENT OUTREACH (OUTPATIENT)
Dept: FAMILY MEDICINE CLINIC | Facility: CLINIC | Age: 64
End: 2024-06-17

## 2024-06-17 NOTE — TELEPHONE ENCOUNTER
SPOKE WITH FAMILY [facility] AND HE CAN'T COME IN SOONER RIGHT NOW DO TO LEGAL ISSUES. THEY WILL CALL BACK IF THOSE ISSUES GET RESOLVED BEFORE HIS ORIGINAL APPT ON 8/2/24.

## 2024-06-17 NOTE — PROGRESS NOTES
2nd attempt contacting patient to follow up on progress made with scheduling OP MH and [facility] appointments. No answer and left message for return call.     Patient was provided with contact information for [employer] services and [facility]. SWCM will close case and available if patient returns call.

## 2024-06-24 DIAGNOSIS — J30.1 SEASONAL ALLERGIC RHINITIS DUE TO POLLEN: ICD-10-CM

## 2024-06-24 RX ORDER — FLUTICASONE PROPIONATE 50 MCG
1 SPRAY, SUSPENSION (ML) NASAL DAILY
Qty: 16 G | Refills: 5 | Status: SHIPPED | OUTPATIENT
Start: 2024-06-24

## 2024-07-03 ENCOUNTER — OFFICE VISIT (OUTPATIENT)
Dept: FAMILY MEDICINE CLINIC | Facility: CLINIC | Age: 64
End: 2024-07-03
Payer: COMMERCIAL

## 2024-07-03 ENCOUNTER — HOSPITAL ENCOUNTER (EMERGENCY)
Facility: HOSPITAL | Age: 64
Discharge: HOME | End: 2024-07-03
Attending: EMERGENCY MEDICINE | Admitting: EMERGENCY MEDICINE
Payer: COMMERCIAL

## 2024-07-03 VITALS
SYSTOLIC BLOOD PRESSURE: 178 MMHG | OXYGEN SATURATION: 97 % | DIASTOLIC BLOOD PRESSURE: 84 MMHG | HEART RATE: 71 BPM | RESPIRATION RATE: 18 BRPM | TEMPERATURE: 98.6 F

## 2024-07-03 VITALS
TEMPERATURE: 98.3 F | HEIGHT: 68 IN | RESPIRATION RATE: 16 BRPM | HEART RATE: 80 BPM | OXYGEN SATURATION: 97 % | WEIGHT: 177 LBS | SYSTOLIC BLOOD PRESSURE: 150 MMHG | DIASTOLIC BLOOD PRESSURE: 76 MMHG | BODY MASS INDEX: 26.83 KG/M2

## 2024-07-03 DIAGNOSIS — R33.9 URINARY RETENTION WITH INCOMPLETE BLADDER EMPTYING: ICD-10-CM

## 2024-07-03 DIAGNOSIS — Z76.0 MEDICATION REFILL: Primary | ICD-10-CM

## 2024-07-03 DIAGNOSIS — Z79.4 TYPE 2 DIABETES MELLITUS WITH BOTH EYES AFFECTED BY RETINOPATHY AND MACULAR EDEMA, WITH LONG-TERM CURRENT USE OF INSULIN, UNSPECIFIED RETINOPATHY SEVERITY (HCC): ICD-10-CM

## 2024-07-03 DIAGNOSIS — Z12.11 SCREEN FOR COLON CANCER: ICD-10-CM

## 2024-07-03 DIAGNOSIS — E11.311 TYPE 2 DIABETES MELLITUS WITH BOTH EYES AFFECTED BY RETINOPATHY AND MACULAR EDEMA, WITH LONG-TERM CURRENT USE OF INSULIN, UNSPECIFIED RETINOPATHY SEVERITY (HCC): ICD-10-CM

## 2024-07-03 DIAGNOSIS — R29.6 FALLS FREQUENTLY: ICD-10-CM

## 2024-07-03 DIAGNOSIS — Z23 NEED FOR VACCINATION: ICD-10-CM

## 2024-07-03 DIAGNOSIS — N30.00 ACUTE CYSTITIS WITHOUT HEMATURIA: ICD-10-CM

## 2024-07-03 DIAGNOSIS — D50.9 IRON DEFICIENCY ANEMIA, UNSPECIFIED IRON DEFICIENCY ANEMIA TYPE: ICD-10-CM

## 2024-07-03 DIAGNOSIS — Z00.00 MEDICARE ANNUAL WELLNESS VISIT, SUBSEQUENT: Primary | ICD-10-CM

## 2024-07-03 DIAGNOSIS — E55.9 VITAMIN D DEFICIENCY: ICD-10-CM

## 2024-07-03 LAB
BACTERIA UR QL AUTO: ABNORMAL /HPF
BILIRUB UR QL STRIP: NEGATIVE
CLARITY UR: CLEAR
COLOR UR: ABNORMAL
GLUCOSE UR STRIP-MCNC: ABNORMAL MG/DL
HGB UR QL STRIP.AUTO: ABNORMAL
KETONES UR STRIP-MCNC: NEGATIVE MG/DL
LEUKOCYTE ESTERASE UR QL STRIP: ABNORMAL
NITRITE UR QL STRIP: NEGATIVE
NON-SQ EPI CELLS URNS QL MICRO: ABNORMAL /HPF
PH UR STRIP.AUTO: 6 [PH]
PROT UR STRIP-MCNC: ABNORMAL MG/DL
RBC #/AREA URNS AUTO: ABNORMAL /HPF
SP GR UR STRIP.AUTO: 1.01 (ref 1–1.03)
UROBILINOGEN UR STRIP-ACNC: <2 MG/DL
WBC #/AREA URNS AUTO: ABNORMAL /HPF

## 2024-07-03 PROCEDURE — 99284 EMERGENCY DEPT VISIT MOD MDM: CPT | Performed by: EMERGENCY MEDICINE

## 2024-07-03 PROCEDURE — 99214 OFFICE O/P EST MOD 30 MIN: CPT | Performed by: FAMILY MEDICINE

## 2024-07-03 PROCEDURE — 81001 URINALYSIS AUTO W/SCOPE: CPT | Performed by: FAMILY MEDICINE

## 2024-07-03 PROCEDURE — 99281 EMR DPT VST MAYX REQ PHY/QHP: CPT

## 2024-07-03 PROCEDURE — G0439 PPPS, SUBSEQ VISIT: HCPCS | Performed by: FAMILY MEDICINE

## 2024-07-03 RX ORDER — CIPROFLOXACIN 500 MG/1
500 TABLET, FILM COATED ORAL EVERY 12 HOURS SCHEDULED
Qty: 14 TABLET | Refills: 0 | Status: SHIPPED | OUTPATIENT
Start: 2024-07-03 | End: 2024-07-10

## 2024-07-03 NOTE — DISCHARGE INSTRUCTIONS
Mr. Skelton,    You were seen in the [facility] ED for a medication refill of Clonazepam. Given that you have recently filled for prescriptions for of alprazolam, we are unable to give you any more prescriptions of clonazepam at this time.    Please follow up with you family doctor for further evaluation and treatment for your anxiety and depression.    Thank you.

## 2024-07-03 NOTE — ED ATTENDING ATTESTATION
7/3/2024  I, Dillon Jackson MD, saw and evaluated the patient. I have discussed the patient with the resident/non-physician practitioner and agree with the resident's/non-physician practitioner's findings, Plan of Care, and MDM as documented in the resident's/non-physician practitioner's note, except where noted. All available labs and Radiology studies were reviewed.  I was present for key portions of any procedure(s) performed by the resident/non-physician practitioner and I was immediately available to provide assistance.       At this point I agree with the current assessment done in the Emergency Department.  I have conducted an independent evaluation of this patient a history and physical is as follows:    ED Course       Emergency Department Note- David Skelton Jr. 64 y.o. male MRN: [id number]    Unit/Bed#: Z5HA Encounter: [id number]    David Skelton Jr. is a 64 y.o. male who presents with   Chief Complaint   Patient presents with    Medication Refill     Pt ran out of Klonopin on Sunday night and is requesting a refil for the pain in his back.          History of Present Illness   HPI:  David Skelton Jr. is a 64 y.o. male who presents for evaluation of:  Patient states that he ran out of his Klonopin on Sunday.  He typically takes Klonopin in the morning and a Klonopin in the evening and is requesting a refill of his prescription for treating his back pain and his anxiety.  Patient denies illicit drug use.  Patient notes that he has chronic depression and sees a counselor as an outpatient.  He denies any sort of suicidal ideation.  He denies any coingestions today.  Patient denies any recent other benzodiazepine prescriptions.    Review of Systems   Constitutional:  Negative for fatigue and fever.   HENT:  Negative for congestion and sore throat.    Respiratory:  Negative for cough and shortness of breath.    Cardiovascular:  Negative for chest pain and palpitations.   Gastrointestinal:  Negative for abdominal  pain and nausea.   Genitourinary:  Negative for flank pain and frequency.   Neurological:  Negative for light-headedness and headaches.   Psychiatric/Behavioral:  Negative for dysphoric mood and hallucinations.    All other systems reviewed and are negative.      Historical Information   Past Medical History:   Diagnosis Date    Anxiety     Depression     Diabetes mellitus (HCC)     Drug use     Hemorrhoids, internal 2019    Hypertension      Past Surgical History:   Procedure Laterality Date    FEMUR FRACTURE SURGERY Right     GASTRIC BYPASS  2011    Managed by: Braden Luna (General Surgery)    HERNIA REPAIR  2013    Incisional hernia repair Managed by: Braden Luna (General Surgery)    TONSILLECTOMY       Social History   Social History     Substance and Sexual Activity   Alcohol Use Never     Social History     Substance and Sexual Activity   Drug Use Yes    Comment: Methadone clinic     Social History     Tobacco Use   Smoking Status Never   Smokeless Tobacco Never     Family History:   Family History   Problem Relation Age of Onset    Diabetes Mother     Hypertension Mother     Hypertension Father     Autoimmune disease Son        Meds/Allergies   PTA meds:   Prior to Admission Medications   Prescriptions Last Dose Informant Patient Reported? Taking?   ALPRAZolam (XANAX) 1 mg tablet   No No   Sig: Take 1 tablet (1 mg total) by mouth daily as needed for anxiety   Caplyta 42 MG CAPS capsule  Self Yes No   Sig: Take 42 mg by mouth daily at bedtime   Patient not taking: Reported on 2024   Continuous Blood Gluc  (FreeStyle Grace 14 Day Gully) STEVE  Self No No   Si Device by Device route 3 (three) times a day before meals   Patient not taking: Reported on 2024   Continuous Blood Gluc  (FreeStyle Grace 2 Gully) STEVE  Self No No   Si Device by Device route 3 (three) times a day before meals   Patient not taking: Reported on 7/3/2024   DULoxetine (CYMBALTA)  60 mg delayed release capsule  Self Yes No   Sig: Take 60 mg by mouth in the morning   GLOBAL EASE INJECT PEN NEEDLES 31G X 8 MM MISC  Self Yes No   Patient not taking: Reported on 7/3/2024   Incontinence Supply Disposable (INCONTINENCE BRIEF MEDIUM) MISC  Self No No   Sig: by Does not apply route as needed (fecal soiling)   Patient not taking: Reported on 7/3/2024   Incontinence Supply Disposable (RA WIPES FLUSHABLE/MOIST) MISC  Self No No   Sig: by Does not apply route as needed (fecal soiling)   Insulin Pen Needle (B-D UF III MINI PEN NEEDLES) 31G X 5 MM MISC  Self No No   Sig: Inject 15 Units under the skin daily at bedtime   Kerendia 10 MG TABS  Self Yes No   Sig: TAKE 10 MG BY MOUTH EVERY MORNING.   Patient not taking: Reported on 3/1/2024   Misc. Devices (CANE) MISC  Self No No   Sig: by Does not apply route daily Dx:  Frequent falls   QUEtiapine (SEROquel XR) 150 mg 24 hr tablet   Yes No   Sig: Take 150 mg by mouth daily at bedtime   Patient not taking: Reported on 7/3/2024   RSV Pre-Fusion F A&B Vac Rcmb (Abrysvo) SOLR vaccine   No No   Sig: Inject 0.5 mL into a muscle once for 1 dose   Synjardy 12.5-1000 MG TABS  Self Yes No   Sig: TAKE ONE TABLET BY MOUTH 2 (TWO) TIMES A DAY.   Patient not taking: Reported on 7/3/2024   Trulicity 1.5 MG/0.5ML injection  Self Yes No   UNKNOWN TO PATIENT  Self Yes No   acetaminophen (TYLENOL) 325 mg tablet  Self No No   Sig: Take 2 tablets (650 mg total) by mouth every 4 (four) hours as needed for mild pain   aluminum-magnesium hydroxide 200-200 MG/5ML suspension  Self No No   Sig: Take 15 mL by mouth every 6 (six) hours as needed for heartburn   Patient not taking: Reported on 7/3/2024   bacitracin-polymyxin b (POLYSPORIN) ointment  Self No No   Sig: Apply topically 2 (two) times a day   benztropine (COGENTIN) 2 mg tablet  Self Yes No   Patient not taking: Reported on 3/1/2024   ciprofloxacin (CIPRO) 500 mg tablet   No No   Sig: Take 1 tablet (500 mg total) by mouth every  12 (twelve) hours for 7 days   divalproex sodium (DEPAKOTE ER) 500 mg 24 hr tablet   No No   Sig: Take 1 tablet (500 mg total) by mouth every 12 (twelve) hours   docusate sodium (COLACE) 100 mg capsule   No No   Sig: Take 1 capsule (100 mg total) by mouth 2 (two) times a day for 5 days   doxepin (SINEquan) 100 mg capsule  Self Yes No   Sig: Take 100 mg by mouth daily at bedtime   Patient not taking: Reported on 12/8/2023   doxepin (SINEquan) 150 MG capsule   Yes No   Sig: Take 150 mg by mouth daily at bedtime   Patient not taking: Reported on 7/3/2024   ergocalciferol (ERGOCALCIFEROL) 1.25 MG (57907 UT) capsule   Yes No   Sig: Take 50,000 Units by mouth   fluticasone (FLONASE) 50 mcg/act nasal spray   No No   Sig: instill 1 spray into each nostril once daily   gabapentin (NEURONTIN) 600 MG tablet  Self No No   Sig: Take 1 tablet (600 mg total) by mouth 2 (two) times a day   metFORMIN (GLUCOPHAGE) 850 mg tablet  Self No No   Sig: Take 1 tablet (850 mg total) by mouth daily with breakfast   methadone (DOLOPHINE) 5 mg tablet  Self Yes No   Sig: Take 165 mg by mouth daily   methocarbamol (ROBAXIN) 500 mg tablet  Self No No   Sig: Take 1 tablet (500 mg total) by mouth every 8 (eight) hours as needed for muscle spasms   Patient not taking: Reported on 4/25/2024   naloxone (NARCAN) 4 mg/0.1 mL nasal spray   No No   Sig: Administer 1 spray into a nostril. If no response after 2-3 minutes, give another dose in the other nostril using a new spray.   Patient not taking: Reported on 5/24/2024   naloxone (NARCAN) 4 mg/0.1 mL nasal spray   No No   Sig: Administer 1 spray into a nostril. If no response after 2-3 minutes, give another dose in the other nostril using a new spray.   Patient not taking: Reported on 5/31/2024   ondansetron (ZOFRAN-ODT) 4 mg disintegrating tablet  Self No No   Sig: Take 1 tablet (4 mg total) by mouth every 6 (six) hours as needed for vomiting   paliperidone (INVEGA) 6 MG 24 hr tablet  Self Yes No   Sig:  Take 6 mg by mouth daily at bedtime   Patient not taking: Reported on 5/24/2024   sertraline (ZOLOFT) 100 mg tablet   Yes No   Sig: Take 100 mg by mouth every morning   Patient not taking: Reported on 6/10/2024   zolpidem (AMBIEN) 10 mg tablet  Self Yes No   Sig: Take 10 mg by mouth daily at bedtime      Facility-Administered Medications: None     Allergies   Allergen Reactions    Aspirin Shortness Of Breath    Penicillins        Objective   First Vitals:   Blood Pressure: (!) 178/84 (07/03/24 1108)  Pulse: 71 (07/03/24 1108)  Temperature: 98.6 °F (37 °C) (07/03/24 1108)  Temp Source: Temporal (07/03/24 1108)  Respirations: 18 (07/03/24 1108)  SpO2: 97 % (07/03/24 1108)    Current Vitals:   Blood Pressure: (!) 178/84 (07/03/24 1108)  Pulse: 71 (07/03/24 1108)  Temperature: 98.6 °F (37 °C) (07/03/24 1108)  Temp Source: Temporal (07/03/24 1108)  Respirations: 18 (07/03/24 1108)  SpO2: 97 % (07/03/24 1108)    No intake or output data in the 24 hours ending 07/03/24 1203    Invasive Devices       None                   Physical Exam  Vitals and nursing note reviewed.   Constitutional:       General: He is not in acute distress.     Appearance: Normal appearance. He is well-developed.   HENT:      Head: Normocephalic and atraumatic.      Right Ear: External ear normal.      Left Ear: External ear normal.      Nose: Nose normal.      Mouth/Throat:      Pharynx: No oropharyngeal exudate.   Eyes:      Conjunctiva/sclera: Conjunctivae normal.      Pupils: Pupils are equal, round, and reactive to light.   Cardiovascular:      Rate and Rhythm: Normal rate and regular rhythm.   Pulmonary:      Effort: Pulmonary effort is normal. No respiratory distress.   Abdominal:      General: Abdomen is flat. There is no distension.      Palpations: Abdomen is soft.   Musculoskeletal:         General: No deformity. Normal range of motion.      Cervical back: Normal range of motion and neck supple.   Skin:     General: Skin is warm and dry.  "     Capillary Refill: Capillary refill takes less than 2 seconds.   Neurological:      General: No focal deficit present.      Mental Status: He is alert and oriented to person, place, and time. Mental status is at baseline.      Coordination: Coordination normal.   Psychiatric:         Mood and Affect: Mood normal.         Behavior: Behavior normal.         Thought Content: Thought content normal.         Judgment: Judgment normal.           Medical Decision Makin.  Request for Klonopin refill: I have queried the PDMP system; patient got a prescription for alprazolam on 2024 for 15 tablets and patient got a prescription for 30 alprazolam tablets on 2024.  Upon further questioning, the patient now remembers getting his prescriptions filled.  I have encouraged him to get his prescriptions filled through his typical provider.    No results found for this or any previous visit (from the past 36 hour(s)).  No orders to display         Portions of the record may have been created with voice recognition software. Occasional wrong word or \"sound a like\" substitutions may have occurred due to the inherent limitations of voice recognition software.  Read the chart carefully and recognize, using context, where substitutions have occurred.      Critical Care Time  Procedures      "

## 2024-07-03 NOTE — PROGRESS NOTES
Assessment and Plan:     Problem List Items Addressed This Visit     Iron deficiency anemia    Relevant Orders    Comprehensive metabolic panel    Type 2 diabetes mellitus with ophthalmic complication, with long-term current use of insulin (HCC)    Relevant Orders    Ambulatory Referral to Endocrinology    Comprehensive metabolic panel    TSH, 3rd generation with Free T4 reflex    Hemoglobin A1C    Lipid panel    Albumin / creatinine urine ratio    Vitamin D deficiency    Relevant Orders    Vitamin D 25 hydroxy   Other Visit Diagnoses     Medicare annual wellness visit, subsequent    -  Primary    Relevant Orders    CBC and differential    Comprehensive metabolic panel    Lipid panel    Falls frequently        Relevant Orders    Comprehensive metabolic panel    Acute cystitis without hematuria        Relevant Medications    ciprofloxacin (CIPRO) 500 mg tablet    Other Relevant Orders    Comprehensive metabolic panel    UA w Reflex to Microscopic w Reflex to Culture    UA w Reflex to Microscopic w Reflex to Culture    Urinary retention with incomplete bladder emptying        Relevant Orders    Comprehensive metabolic panel    UA w Reflex to Microscopic w Reflex to Culture    PSA, total and free    UA w Reflex to Microscopic w Reflex to Culture    Screen for colon cancer        Relevant Orders    Ambulatory Referral to Gastroenterology    Need for vaccination        Relevant Medications    RSV Pre-Fusion F A&B Vac Rcmb (Abrysvo) SOLR vaccine          Regarding his Medicare annual well visit, patient is given age and diagnosis appropriate evaluation and care.  Patient is ordered the above blood work and urine.  Patient is sent to GI for his colonoscopy.  Patient is send the RSV vaccine to his pharmacy as discussed with patient.  Patient is to take a multivitamin with vitamin D3 daily as well.  Hydrate well.  Regarding his frequent falls history as per patient, discussed with patient.  Patient uses a cane.  He says the  walker makes him fall more and he is requesting a motorized scooter.  Discussed with patient.  And will get the process started on him getting a motorized scooter.  Regarding his chronic arthritis and chronic pain patient is to follow-up with his pain management doctor as well as orthopedic.  General advised given regarding falls and safety as well.  Regarding his acute cystitis, discussed with patient.  Patient urine dip is positive as discussed with patient as well.  Patient will start Cipro 500 twice daily with food for 7 days.  Patient advised to hydrate well.  Patient's urine today will be sent for formal urinalysis culture and sensitivity as well.  Patient advised that if his symptoms change or worsen and or he does not feel comfortable with them then patient to go to the emergency room.  Regarding his urinary retention and incomplete bladder emptying as evident in his sonogram from March of this year, discussed with patient as well.  Patient already has an appointment with a urologist that I had asked him to get which is for August 2 at 11 AM.  Patient asked and we made him a copy of the prescription so he does not forget what and where to go as well.  Regarding his iron deficiency anemia patient is doing well.  His last blood work with the hematologist there was an improvement also.  Discussed with patient and patient advised to follow-up with his hematologist as scheduled.  Regarding his diabetes, patient needs to be more compliant with his diet especially since he is limited in his exercise.  Patient to be continue his current therapy.  Patient is sent for the above blood work and urine.  And patient is sent to his endocrinologist once again for his diabetes.  Regarding his vitamin D deficiency patient advised to supplement.  Will check his lab value with his next blood work as well.  RTO 2 months and do the blood work and urine before.                     Preventive health issues were discussed with  patient, and age appropriate screening tests were ordered as noted in patient's After Visit Summary.  Personalized health advice and appropriate referrals for health education or preventive services given if needed, as noted in patient's After Visit Summary.     History of Present Illness:     Patient presents for a Medicare Wellness Visit    64-year-old male here for his Medicare annual well visit.  Patient is also complaining of urinary pressure for about a month.  Patient denies any nausea vomiting fever chills or abdominal pain or hematuria.  Patient is also requesting a motorized wheelchair because of the history of falls.  Patient uses a cane that is with him today.  Patient says that the walker makes him fall more so he does not use his his walker.  Patient also has a history of urinary retention from his sonogram that I ordered for him back in March.  Since then he has been referred to urology and he has an appointment on August 2 at 11 AM as I informed him today of it as well.  Patient has recently seen his hematologist regarding his anemia and his recent blood work was better than the last.  Patient denies tobacco alcohol or drugs.       Patient Care Team:  Casey Mckeon MD as PCP - General (Family Medicine)  Caterina Gilbert as  (Oncology)  Addison Ramey MD as Medical Oncologist (Hematology)     Review of Systems:     Review of Systems   Constitutional:  Negative for activity change, appetite change, chills, diaphoresis, fatigue, fever and unexpected weight change.   HENT:  Negative for congestion, hearing loss and sore throat.    Eyes:  Positive for visual disturbance.   Respiratory:  Negative for cough and shortness of breath.    Cardiovascular:  Negative for chest pain and palpitations.   Gastrointestinal:  Negative for abdominal pain, blood in stool, constipation, diarrhea, nausea and vomiting.   Genitourinary:  Positive for dysuria and frequency. Negative for flank pain and  hematuria.   Musculoskeletal:  Positive for arthralgias and back pain.   Skin:  Negative for rash.   Neurological:  Negative for dizziness and headaches.   Psychiatric/Behavioral:  Positive for sleep disturbance. Negative for dysphoric mood, self-injury and suicidal ideas. The patient is nervous/anxious.       Problem List:     Patient Active Problem List   Diagnosis   • ADD (attention deficit disorder) without hyperactivity   • Allergic rhinitis   • Atrial fibrillation (HCC)   • Benign prostatic hyperplasia without lower urinary tract symptoms   • Bipolar disorder (HCC)   • Chronic anticoagulation   • Generalized anxiety disorder   • GERD without esophagitis   • Benign essential hypertension   • Insomnia   • Iron deficiency anemia   • Macular degeneration   • Major depression, chronic   • Mixed hyperlipidemia   • Complex medical condition   • Spinal stenosis   • Stable proliferative diabetic retinopathy of left eye associated with type 2 diabetes mellitus (HCC)   • Type 2 diabetes mellitus with ophthalmic complication, with long-term current use of insulin (HCC)   • Ulcer of leg, chronic, left (HCC)   • Vitamin D deficiency   • History of hepatitis C   • Narcotic abuse (HCC)   • Rectal prolapse   • Drug-induced constipation   • Hemorrhoids, internal   • S/P gastric bypass   • Hypogonadism male   • Pre-operative general physical examination   • Thoracic degenerative disc disease   • DDD (degenerative disc disease), lumbar   • Liver lesion, right lobe   • Lung nodule   • Adrenal nodule (HCC)   • Methadone dependence (HCC)   • Encephalopathy   • Closed fracture of multiple ribs of left side   • Bradycardia   • Retinopathy due to secondary DM (HCC)   • Visual changes   • B12 deficiency      Past Medical and Surgical History:     Past Medical History:   Diagnosis Date   • Anxiety    • Depression    • Diabetes mellitus (HCC)    • Drug use    • Hemorrhoids, internal 9/23/2019   • Hypertension      Past Surgical History:    Procedure Laterality Date   • FEMUR FRACTURE SURGERY Right    • GASTRIC BYPASS  11/08/2011    Managed by: Braden Luna (General Surgery)   • HERNIA REPAIR  02/13/2013    Incisional hernia repair Managed by: Braden Luna (General Surgery)   • TONSILLECTOMY  2010      Family History:     Family History   Problem Relation Age of Onset   • Diabetes Mother    • Hypertension Mother    • Hypertension Father    • Autoimmune disease Son       Social History:     Social History     Socioeconomic History   • Marital status: Legally      Spouse name: None   • Number of children: None   • Years of education: None   • Highest education level: None   Occupational History   • None   Tobacco Use   • Smoking status: Never   • Smokeless tobacco: Never   Vaping Use   • Vaping status: Never Used   Substance and Sexual Activity   • Alcohol use: Never   • Drug use: Yes     Comment: Methadone clinic   • Sexual activity: Not Currently   Other Topics Concern   • None   Social History Narrative   • None     Social Determinants of Health     Financial Resource Strain: Low Risk  (3/16/2021)    Overall Financial Resource Strain (CARDIA)    • Difficulty of Paying Living Expenses: Not hard at all   Food Insecurity: No Food Insecurity (7/3/2024)    Hunger Vital Sign    • Worried About Running Out of Food in the Last Year: Never true    • Ran Out of Food in the Last Year: Never true   Transportation Needs: No Transportation Needs (7/3/2024)    PRAPARE - Transportation    • Lack of Transportation (Medical): No    • Lack of Transportation (Non-Medical): No   Physical Activity: Inactive (3/16/2021)    Exercise Vital Sign    • Days of Exercise per Week: 0 days    • Minutes of Exercise per Session: 0 min   Stress: No Stress Concern Present (3/16/2021)    [place] [employer] - Occupational Stress Questionnaire    • Feeling of Stress : Only a little   Social Connections: Unknown (3/16/2021)    Social Connection and  Isolation Panel [NHANES]    • Frequency of Communication with Friends and Family: Patient declined    • Frequency of Social Gatherings with Friends and Family: Patient declined    • Attends Shinto Services: Patient declined    • Active Member of Clubs or Organizations: Patient declined    • Attends Club or Organization Meetings: Patient declined    • Marital Status: Patient declined   Intimate Partner Violence: Unknown (3/16/2021)    Humiliation, Afraid, Rape, and Kick questionnaire    • Fear of Current or Ex-Partner: Patient declined    • Emotionally Abused: Patient declined    • Physically Abused: Patient declined    • Sexually Abused: Patient declined   Housing Stability: Unknown (7/3/2024)    Housing Stability Vital Sign    • Unable to Pay for Housing in the Last Year: No    • Number of Times Moved in the Last Year: Not on file    • Homeless in the Last Year: No      Medications and Allergies:     Current Outpatient Medications   Medication Sig Dispense Refill   • acetaminophen (TYLENOL) 325 mg tablet Take 2 tablets (650 mg total) by mouth every 4 (four) hours as needed for mild pain  0   • ALPRAZolam (XANAX) 1 mg tablet Take 1 tablet (1 mg total) by mouth daily as needed for anxiety 30 tablet 1   • bacitracin-polymyxin b (POLYSPORIN) ointment Apply topically 2 (two) times a day 30 g 1   • ciprofloxacin (CIPRO) 500 mg tablet Take 1 tablet (500 mg total) by mouth every 12 (twelve) hours for 7 days 14 tablet 0   • DULoxetine (CYMBALTA) 60 mg delayed release capsule Take 60 mg by mouth in the morning     • fluticasone (FLONASE) 50 mcg/act nasal spray instill 1 spray into each nostril once daily 16 g 5   • gabapentin (NEURONTIN) 600 MG tablet Take 1 tablet (600 mg total) by mouth 2 (two) times a day 60 tablet 2   • Incontinence Supply Disposable (RA WIPES FLUSHABLE/MOIST) MISC by Does not apply route as needed (fecal soiling) 100 each 5   • Insulin Pen Needle (B-D UF III MINI PEN NEEDLES) 31G X 5 MM MISC Inject  15 Units under the skin daily at bedtime 100 each 5   • metFORMIN (GLUCOPHAGE) 850 mg tablet Take 1 tablet (850 mg total) by mouth daily with breakfast 90 tablet 1   • methadone (DOLOPHINE) 5 mg tablet Take 165 mg by mouth daily     • Misc. Devices (CANE) MISC by Does not apply route daily Dx:  Frequent falls 1 each 0   • ondansetron (ZOFRAN-ODT) 4 mg disintegrating tablet Take 1 tablet (4 mg total) by mouth every 6 (six) hours as needed for vomiting 30 tablet 0   • RSV Pre-Fusion F A&B Vac Rcmb (Abrysvo) SOLR vaccine Inject 0.5 mL into a muscle once for 1 dose 1 each 0   • Trulicity 1.5 MG/0.5ML injection      • UNKNOWN TO PATIENT      • zolpidem (AMBIEN) 10 mg tablet Take 10 mg by mouth daily at bedtime     • aluminum-magnesium hydroxide 200-200 MG/5ML suspension Take 15 mL by mouth every 6 (six) hours as needed for heartburn (Patient not taking: Reported on 7/3/2024) 355 mL 0   • benztropine (COGENTIN) 2 mg tablet  (Patient not taking: Reported on 3/1/2024)     • Caplyta 42 MG CAPS capsule Take 42 mg by mouth daily at bedtime (Patient not taking: Reported on 4/25/2024)     • Continuous Blood Gluc  (FreeStyle [place] 14 Day [place]) STEVE 1 Device by Device route 3 (three) times a day before meals (Patient not taking: Reported on 5/24/2024) 1 each 0   • Continuous Blood Gluc  (FreeStyle [place] 2 [place]) STEVE 1 Device by Device route 3 (three) times a day before meals (Patient not taking: Reported on 7/3/2024) 1 each 0   • divalproex sodium (DEPAKOTE ER) 500 mg 24 hr tablet Take 1 tablet (500 mg total) by mouth every 12 (twelve) hours 60 tablet 5   • docusate sodium (COLACE) 100 mg capsule Take 1 capsule (100 mg total) by mouth 2 (two) times a day for 5 days 10 capsule 0   • doxepin (SINEquan) 100 mg capsule Take 100 mg by mouth daily at bedtime (Patient not taking: Reported on 12/8/2023)     • doxepin (SINEquan) 150 MG capsule Take 150 mg by mouth daily at bedtime (Patient not taking: Reported on  7/3/2024)     • ergocalciferol (ERGOCALCIFEROL) 1.25 MG (90360 UT) capsule Take 50,000 Units by mouth     • GLOBAL EASE INJECT PEN NEEDLES 31G X 8 MM MISC  (Patient not taking: Reported on 7/3/2024)  5   • Incontinence Supply Disposable (INCONTINENCE BRIEF MEDIUM) MISC by Does not apply route as needed (fecal soiling) (Patient not taking: Reported on 7/3/2024) 90 each 5   • Kerendia 10 MG TABS TAKE 10 MG BY MOUTH EVERY MORNING. (Patient not taking: Reported on 3/1/2024)     • methocarbamol (ROBAXIN) 500 mg tablet Take 1 tablet (500 mg total) by mouth every 8 (eight) hours as needed for muscle spasms (Patient not taking: Reported on 4/25/2024) 20 tablet 0   • naloxone (NARCAN) 4 mg/0.1 mL nasal spray Administer 1 spray into a nostril. If no response after 2-3 minutes, give another dose in the other nostril using a new spray. (Patient not taking: Reported on 5/24/2024) 1 each 2   • naloxone (NARCAN) 4 mg/0.1 mL nasal spray Administer 1 spray into a nostril. If no response after 2-3 minutes, give another dose in the other nostril using a new spray. (Patient not taking: Reported on 5/31/2024) 1 each 1   • paliperidone (INVEGA) 6 MG 24 hr tablet Take 6 mg by mouth daily at bedtime (Patient not taking: Reported on 5/24/2024)     • QUEtiapine (SEROquel XR) 150 mg 24 hr tablet Take 150 mg by mouth daily at bedtime (Patient not taking: Reported on 7/3/2024)     • sertraline (ZOLOFT) 100 mg tablet Take 100 mg by mouth every morning (Patient not taking: Reported on 6/10/2024)     • Synjardy 12.5-1000 MG TABS TAKE ONE TABLET BY MOUTH 2 (TWO) TIMES A DAY. (Patient not taking: Reported on 7/3/2024)       No current facility-administered medications for this visit.     Allergies   Allergen Reactions   • Aspirin Shortness Of Breath   • Penicillins       Immunizations:     Immunization History   Administered Date(s) Administered   • COVID-19 J&J ([place]) vaccine 0.5 mL 03/12/2021, 11/22/2021   • COVID-19 Moderna Vac BIVALENT 12 Yr+  IM 0.5 ML 02/28/2023   • COVID-19 Moderna mRNA Vaccine 12 Yr+ 50 mcg/0.5 mL (Spikevax) 11/14/2023   • INFLUENZA 10/10/2011, 12/03/2012, 01/26/2017, 08/17/2020   • Influenza, injectable, quadrivalent, preservative free 0.5 mL 10/16/2019, 08/17/2020   • Influenza, recombinant, quadrivalent,injectable, preservative free 09/11/2019   • Influenza, seasonal, injectable 11/01/2013   • Pneumococcal Polysaccharide PPV23 10/07/2010, 01/26/2017, 05/05/2019   • Tdap 06/06/2018   • Zoster Vaccine Recombinant 03/12/2019, 10/16/2019      Health Maintenance:         Topic Date Due   • Colorectal Cancer Screening  05/01/2023   • HIV Screening  Completed   • Hepatitis C Screening  Discontinued         Topic Date Due   • Hepatitis A Vaccine (1 of 2 - Risk 2-dose series) Never done   • Pneumococcal Vaccine: Pediatrics (0 to 5 Years) and At-Risk Patients (6 to 64 Years) (2 of 2 - PCV) 05/05/2020   • Hepatitis B Vaccine (1 of 3 - Risk 3-dose series) Never done   • Influenza Vaccine (1) 09/01/2024      Medicare Screening Tests and Risk Assessments:     David is here for his Subsequent Wellness visit. Last Medicare Wellness visit information reviewed, patient interviewed and updates made to the record today.      Health Risk Assessment:   Patient feels that their physical health rating is same. Patient is satisfied with their life. Eyesight was rated as much worse. Hearing was rated as same. Patient feels that their emotional and mental health rating is same. Patients states they are often angry. Patient states they are never, rarely unusually tired/fatigued. Pain experienced in the last 7 days has been some. Patient's pain rating has been 7/10. Patient states that he has experienced no weight loss or gain in last 6 months. Patient reports back pain.  Discussed with patient.  Patient to follow-up with his pain management doctor as well.    Depression Screening:   PHQ-9 Score: 19      Fall Risk Screening:   In the past year, patient has  experienced: history of falling in past year    Number of falls: 2 or more  Injured during fall?: No    Feels unsteady when standing or walking?: Yes    Worried about falling?: Yes      Home Safety:  Patient has trouble with stairs inside or outside of their home. Patient has working smoke alarms and has working carbon monoxide detector. Home safety hazards include: none.     Nutrition:   Current diet is Regular.     Medications:   Patient is not currently taking any over-the-counter supplements. Patient is able to manage medications.     Activities of Daily Living (ADLs)/Instrumental Activities of Daily Living (IADLs):   Walk and transfer into and out of bed and chair?: Yes  Dress and groom yourself?: Yes    Bathe or shower yourself?: Yes    Feed yourself? Yes  Do your laundry/housekeeping?: Yes  Manage your money, pay your bills and track your expenses?: Yes  Make your own meals?: Yes    Do your own shopping?: Yes    Previous Hospitalizations:   Any hospitalizations or ED visits within the last 12 months?: Yes    How many hospitalizations have you had in the last year?: 1-2    Advance Care Planning:   Living will: No    Advanced directive: No    ACP document given: Yes      Comments: Discussed with patient.    Cognitive Screening:   Provider or family/friend/caregiver concerned regarding cognition?: No    PREVENTIVE SCREENINGS      Cardiovascular Screening:    General: Screening Not Indicated and History Lipid Disorder      Diabetes Screening:     General: Screening Not Indicated and History Diabetes      Colorectal Cancer Screening:     General: Screening Current      Prostate Cancer Screening:    General: Screening Current      Lung Cancer Screening:     General: Screening Not Indicated      Hepatitis C Screening:    General: Screening Not Indicated and History Hepatitis C    Screening, Brief Intervention, and Referral to Treatment (SBIRT)    Screening  Typical number of drinks in a day: 0  Typical number of  "drinks in a week: 0  Interpretation: Low risk drinking behavior.    AUDIT-C Screenin) How often did you have a drink containing alcohol in the past year? never  2) How many drinks did you have on a typical day when you were drinking in the past year? 0  3) How often did you have 6 or more drinks on one occasion in the past year? never    AUDIT-C Score: 0  Interpretation: Score 0-3 (male): Negative screen for alcohol misuse    Single Item Drug Screening:  How often have you used an illegal drug (including marijuana) or a prescription medication for non-medical reasons in the past year? never    Single Item Drug Screen Score: 0  Interpretation: Negative screen for possible drug use disorder    Review of Current Opioid Use    Opioid Risk Tool (ORT) Interpretation: Complete Opioid Risk Tool (ORT)    Other Counseling Topics:   Car/seat belt/driving safety, skin self-exam, sunscreen and calcium and vitamin D intake and regular weightbearing exercise.     No results found.     Physical Exam:     /76 (BP Location: Left arm, Patient Position: Sitting, Cuff Size: Adult)   Pulse 80   Temp 98.3 °F (36.8 °C) (Temporal)   Resp 16   Ht 5' 8\" (1.727 m)   Wt 80.3 kg (177 lb)   SpO2 97%   BMI 26.91 kg/m²     Physical Exam  Vitals reviewed.   Constitutional:       General: He is not in acute distress.     Appearance: Normal appearance. He is not ill-appearing.   HENT:      Head: Normocephalic and atraumatic.      Right Ear: Tympanic membrane, ear canal and external ear normal.      Left Ear: Tympanic membrane, ear canal and external ear normal.      Mouth/Throat:      Mouth: Mucous membranes are moist.      Pharynx: Oropharynx is clear.   Eyes:      Extraocular Movements: Extraocular movements intact.      Conjunctiva/sclera: Conjunctivae normal.   Neck:      Vascular: No carotid bruit.   Cardiovascular:      Rate and Rhythm: Normal rate and regular rhythm.   Pulmonary:      Effort: Pulmonary effort is normal.      " Breath sounds: Normal breath sounds.   Abdominal:      General: Bowel sounds are normal.      Palpations: Abdomen is soft.      Tenderness: There is no abdominal tenderness. There is no right CVA tenderness or left CVA tenderness.   Musculoskeletal:         General: Tenderness present.      Right lower leg: No edema.      Left lower leg: No edema.      Comments: Positive tenderness throughout the back as per patient.  Not new and not acute also.  No calf tenderness bilateral.   Lymphadenopathy:      Cervical: No cervical adenopathy.   Skin:     General: Skin is warm.      Findings: No rash.   Neurological:      General: No focal deficit present.      Mental Status: He is alert and oriented to person, place, and time. Mental status is at baseline.   Psychiatric:         Mood and Affect: Mood normal.         Behavior: Behavior normal.         Thought Content: Thought content normal.      Comments: His anxiety depression and insomnia also discussed with patient.  Patient denies SI HI.  Patient advised to follow-up with his psychiatrist and therapist as well.          Casey Mckeon MD

## 2024-07-12 ENCOUNTER — TELEPHONE (OUTPATIENT)
Age: 64
End: 2024-07-12

## 2024-07-12 NOTE — TELEPHONE ENCOUNTER
Patient called, spoke to his insurance regarding the power chair and would like to receive a call back from Susie. . Prescription needs to be sent to his insurance . Please advise and contact patient..

## 2024-07-16 ENCOUNTER — TELEPHONE (OUTPATIENT)
Dept: FAMILY MEDICINE CLINIC | Facility: CLINIC | Age: 64
End: 2024-07-16

## 2024-07-16 NOTE — TELEPHONE ENCOUNTER
PATIENT IS SCHEDULED TO SEE DR. HOOVER FROM P[facility] AND BECAUSE HE HAS [facility] THEY WOULD NEED AN AMBULATORY REFERRAL FOR THEIR OFFICE

## 2024-07-17 DIAGNOSIS — M51.36 DDD (DEGENERATIVE DISC DISEASE), LUMBAR: ICD-10-CM

## 2024-07-17 DIAGNOSIS — Z78.9 COMPLEX MEDICAL CONDITION: Primary | ICD-10-CM

## 2024-07-17 RX ORDER — GABAPENTIN 600 MG/1
600 TABLET ORAL 2 TIMES DAILY
Qty: 60 TABLET | Refills: 2 | Status: SHIPPED | OUTPATIENT
Start: 2024-07-17

## 2024-07-17 NOTE — TELEPHONE ENCOUNTER
Patient calling regarding Rx: Cipro. Pt finished medication and still has on going symptoms of urinary retention and pressure.    Please review and advise.  Thank you.

## 2024-07-19 ENCOUNTER — TELEPHONE (OUTPATIENT)
Dept: FAMILY MEDICINE CLINIC | Facility: CLINIC | Age: 64
End: 2024-07-19

## 2024-07-19 DIAGNOSIS — R35.1 NOCTURIA: ICD-10-CM

## 2024-07-19 DIAGNOSIS — R30.0 DYSURIA: Primary | ICD-10-CM

## 2024-07-19 RX ORDER — CIPROFLOXACIN 500 MG/1
500 TABLET, FILM COATED ORAL EVERY 12 HOURS SCHEDULED
Qty: 14 TABLET | Refills: 0 | Status: SHIPPED | OUTPATIENT
Start: 2024-07-19 | End: 2024-07-26

## 2024-07-19 NOTE — TELEPHONE ENCOUNTER
Call pt and tell him that I just re-ordered him the cipro, but I also ordered him a urine and blood test re his UTI symptoms, and to do this today plz. No need to be fasting. And if his s/s worsen, etc, then to the ER plz. ty

## 2024-07-19 NOTE — TELEPHONE ENCOUNTER
Left patient message to advise.  Dr. Mckeon called in an antibiotic for UTI and advised to have labs placed in epic with todays date today.

## 2024-07-24 ENCOUNTER — TELEPHONE (OUTPATIENT)
Dept: PALLIATIVE MEDICINE | Facility: CLINIC | Age: 64
End: 2024-07-24

## 2024-07-24 NOTE — TELEPHONE ENCOUNTER
Pt has an appt tomorrow with Dr. Aragon at 10 am. He called in wanting to know if it was possible to get him in a little later that day because he has another appt to attend before and does not want to be late to Dr. Aragon's appt. Pt stated he will keep the 10 am appt for now until he hears back. Pt would like a call back at [phone]. Thank you.

## 2024-07-25 ENCOUNTER — TELEPHONE (OUTPATIENT)
Dept: FAMILY MEDICINE CLINIC | Facility: CLINIC | Age: 64
End: 2024-07-25

## 2024-07-25 NOTE — TELEPHONE ENCOUNTER
Lm for patient to call back, wanted see if anyone from [employer] called him about the powered wheelchair we ordered on 07/17/2024  
not applicable

## 2024-07-26 ENCOUNTER — CLINICAL SUPPORT (OUTPATIENT)
Dept: PALLIATIVE MEDICINE | Facility: CLINIC | Age: 64
End: 2024-07-26

## 2024-07-26 ENCOUNTER — CONSULT (OUTPATIENT)
Dept: PALLIATIVE MEDICINE | Facility: CLINIC | Age: 64
End: 2024-07-26

## 2024-07-26 VITALS
TEMPERATURE: 97.1 F | HEART RATE: 73 BPM | BODY MASS INDEX: 26.72 KG/M2 | SYSTOLIC BLOOD PRESSURE: 138 MMHG | DIASTOLIC BLOOD PRESSURE: 64 MMHG | WEIGHT: 175.71 LBS | OXYGEN SATURATION: 98 %

## 2024-07-26 DIAGNOSIS — Z86.59 HISTORY OF BEHAVIORAL AND MENTAL HEALTH PROBLEMS: ICD-10-CM

## 2024-07-26 DIAGNOSIS — Z71.89 COUNSELING AND COORDINATION OF CARE: Primary | ICD-10-CM

## 2024-07-26 DIAGNOSIS — G89.21 CHRONIC PAIN DUE TO INJURY: ICD-10-CM

## 2024-07-26 DIAGNOSIS — Z78.9 COMPLEX MEDICAL CONDITION: ICD-10-CM

## 2024-07-26 DIAGNOSIS — R29.6 MULTIPLE FALLS: ICD-10-CM

## 2024-07-26 DIAGNOSIS — Z51.5 PALLIATIVE CARE ENCOUNTER: Primary | ICD-10-CM

## 2024-07-26 PROCEDURE — NC001 PR NO CHARGE
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
## 2024-07-27 PROBLEM — Z86.59 HISTORY OF BEHAVIORAL AND MENTAL HEALTH PROBLEMS: Status: ACTIVE | Noted: 2024-07-27

## 2024-07-27 PROBLEM — Z86.59 HISTORY OF BEHAVIORAL AND MENTAL HEALTH PROBLEMS: Status: RESOLVED | Noted: 2024-07-27 | Resolved: 2024-07-27
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
## 2024-07-30 ENCOUNTER — APPOINTMENT (EMERGENCY)
Dept: RADIOLOGY | Facility: HOSPITAL | Age: 64
DRG: 308 | End: 2024-07-30
Payer: COMMERCIAL

## 2024-07-30 ENCOUNTER — APPOINTMENT (INPATIENT)
Dept: NON INVASIVE DIAGNOSTICS | Facility: HOSPITAL | Age: 64
DRG: 308 | End: 2024-07-30
Payer: COMMERCIAL

## 2024-07-30 ENCOUNTER — HOSPITAL ENCOUNTER (INPATIENT)
Facility: HOSPITAL | Age: 64
LOS: 6 days | Discharge: HOME HEALTH | DRG: 308 | End: 2024-08-05
Attending: EMERGENCY MEDICINE | Admitting: STUDENT IN AN ORGANIZED HEALTH CARE EDUCATION/TRAINING PROGRAM
Payer: COMMERCIAL

## 2024-07-30 DIAGNOSIS — S72.001A CLOSED RIGHT HIP FRACTURE (HCC): Primary | ICD-10-CM

## 2024-07-30 DIAGNOSIS — F31.9 BIPOLAR AFFECTIVE DISORDER, REMISSION STATUS UNSPECIFIED (HCC): ICD-10-CM

## 2024-07-30 DIAGNOSIS — F41.1 GENERALIZED ANXIETY DISORDER: ICD-10-CM

## 2024-07-30 DIAGNOSIS — F32.9 MAJOR DEPRESSION, CHRONIC: ICD-10-CM

## 2024-07-30 DIAGNOSIS — E11.39 TYPE 2 DIABETES MELLITUS WITH OTHER OPHTHALMIC COMPLICATION, WITH LONG-TERM CURRENT USE OF INSULIN (HCC): ICD-10-CM

## 2024-07-30 DIAGNOSIS — F41.9 ANXIETY: ICD-10-CM

## 2024-07-30 DIAGNOSIS — Z79.4 TYPE 2 DIABETES MELLITUS WITH OTHER OPHTHALMIC COMPLICATION, WITH LONG-TERM CURRENT USE OF INSULIN (HCC): ICD-10-CM

## 2024-07-30 DIAGNOSIS — I48.91 ATRIAL FIBRILLATION, UNSPECIFIED TYPE (HCC): ICD-10-CM

## 2024-07-30 DIAGNOSIS — F11.20 METHADONE DEPENDENCE (HCC): ICD-10-CM

## 2024-07-30 LAB
25(OH)D3 SERPL-MCNC: 15.9 NG/ML (ref 30–100)
2HR DELTA HS TROPONIN: 1 NG/L
4HR DELTA HS TROPONIN: 3 NG/L
ABO GROUP BLD: NORMAL
ABO GROUP BLD: NORMAL
ALBUMIN SERPL BCG-MCNC: 3.4 G/DL (ref 3.5–5)
ALP SERPL-CCNC: 86 U/L (ref 34–104)
ALT SERPL W P-5'-P-CCNC: 26 U/L (ref 7–52)
AMMONIA PLAS-SCNC: 26 UMOL/L (ref 18–72)
AMPHETAMINES SERPL QL SCN: NEGATIVE
ANION GAP SERPL CALCULATED.3IONS-SCNC: 5 MMOL/L (ref 4–13)
APAP SERPL-MCNC: <2 UG/ML (ref 10–20)
APTT PPP: 29 SECONDS (ref 23–37)
AST SERPL W P-5'-P-CCNC: 21 U/L (ref 13–39)
BACTERIA UR QL AUTO: ABNORMAL /HPF
BARBITURATES UR QL: NEGATIVE
BASE EX.OXY STD BLDV CALC-SCNC: 92.8 % (ref 60–80)
BASE EXCESS BLDV CALC-SCNC: 4.4 MMOL/L
BASOPHILS # BLD AUTO: 0.04 THOUSANDS/ÂΜL (ref 0–0.1)
BASOPHILS NFR BLD AUTO: 0 % (ref 0–1)
BENZODIAZ UR QL: POSITIVE
BILIRUB SERPL-MCNC: 0.37 MG/DL (ref 0.2–1)
BILIRUB UR QL STRIP: NEGATIVE
BLD GP AB SCN SERPL QL: NEGATIVE
BUN SERPL-MCNC: 13 MG/DL (ref 5–25)
CALCIUM ALBUM COR SERPL-MCNC: 8.7 MG/DL (ref 8.3–10.1)
CALCIUM SERPL-MCNC: 8.2 MG/DL (ref 8.4–10.2)
CARDIAC TROPONIN I PNL SERPL HS: 3 NG/L
CARDIAC TROPONIN I PNL SERPL HS: 4 NG/L
CARDIAC TROPONIN I PNL SERPL HS: 6 NG/L
CHLORIDE SERPL-SCNC: 101 MMOL/L (ref 96–108)
CK SERPL-CCNC: 147 U/L (ref 39–308)
CLARITY UR: CLEAR
CO2 SERPL-SCNC: 31 MMOL/L (ref 21–32)
COCAINE UR QL: NEGATIVE
COLOR UR: COLORLESS
CREAT SERPL-MCNC: 0.89 MG/DL (ref 0.6–1.3)
EOSINOPHIL # BLD AUTO: 1.15 THOUSAND/ÂΜL (ref 0–0.61)
EOSINOPHIL NFR BLD AUTO: 10 % (ref 0–6)
ERYTHROCYTE [DISTWIDTH] IN BLOOD BY AUTOMATED COUNT: 12.9 % (ref 11.6–15.1)
ERYTHROCYTE [SEDIMENTATION RATE] IN BLOOD: 8 MM/HOUR (ref 0–19)
ETHANOL SERPL-MCNC: <10 MG/DL
FENTANYL UR QL SCN: NEGATIVE
GFR SERPL CREATININE-BSD FRML MDRD: 91 ML/MIN/1.73SQ M
GLUCOSE SERPL-MCNC: 239 MG/DL (ref 65–140)
GLUCOSE UR STRIP-MCNC: ABNORMAL MG/DL
HCO3 BLDV-SCNC: 28.7 MMOL/L (ref 24–30)
HCT VFR BLD AUTO: 34.7 % (ref 36.5–49.3)
HGB BLD-MCNC: 11 G/DL (ref 12–17)
HGB UR QL STRIP.AUTO: ABNORMAL
HYDROCODONE UR QL SCN: NEGATIVE
IMM GRANULOCYTES # BLD AUTO: 0.05 THOUSAND/UL (ref 0–0.2)
IMM GRANULOCYTES NFR BLD AUTO: 0 % (ref 0–2)
INR PPP: 1.22 (ref 0.84–1.19)
KETONES UR STRIP-MCNC: NEGATIVE MG/DL
LEUKOCYTE ESTERASE UR QL STRIP: ABNORMAL
LYMPHOCYTES # BLD AUTO: 1.51 THOUSANDS/ÂΜL (ref 0.6–4.47)
LYMPHOCYTES NFR BLD AUTO: 13 % (ref 14–44)
MCH RBC QN AUTO: 28.4 PG (ref 26.8–34.3)
MCHC RBC AUTO-ENTMCNC: 31.7 G/DL (ref 31.4–37.4)
MCV RBC AUTO: 89 FL (ref 82–98)
METHADONE UR QL: POSITIVE
MONOCYTES # BLD AUTO: 0.69 THOUSAND/ÂΜL (ref 0.17–1.22)
MONOCYTES NFR BLD AUTO: 6 % (ref 4–12)
NEUTROPHILS # BLD AUTO: 7.98 THOUSANDS/ÂΜL (ref 1.85–7.62)
NEUTS SEG NFR BLD AUTO: 71 % (ref 43–75)
NITRITE UR QL STRIP: NEGATIVE
NON-SQ EPI CELLS URNS QL MICRO: ABNORMAL /HPF
NRBC BLD AUTO-RTO: 0 /100 WBCS
O2 CT BLDV-SCNC: 14.5 ML/DL
OPIATES UR QL SCN: NEGATIVE
OXYCODONE+OXYMORPHONE UR QL SCN: POSITIVE
PCO2 BLDV: 41.4 MM HG (ref 42–50)
PCP UR QL: NEGATIVE
PH BLDV: 7.46 [PH] (ref 7.3–7.4)
PH UR STRIP.AUTO: 6 [PH]
PLATELET # BLD AUTO: 172 THOUSANDS/UL (ref 149–390)
PLATELET # BLD AUTO: 194 THOUSANDS/UL (ref 149–390)
PMV BLD AUTO: 11.6 FL (ref 8.9–12.7)
PMV BLD AUTO: 11.8 FL (ref 8.9–12.7)
PO2 BLDV: 99.4 MM HG (ref 35–45)
POTASSIUM SERPL-SCNC: 4.5 MMOL/L (ref 3.5–5.3)
PROT SERPL-MCNC: 5.8 G/DL (ref 6.4–8.4)
PROT UR STRIP-MCNC: ABNORMAL MG/DL
PROTHROMBIN TIME: 15.3 SECONDS (ref 11.6–14.5)
PTH-INTACT SERPL-MCNC: 68.6 PG/ML (ref 12–88)
RBC # BLD AUTO: 3.88 MILLION/UL (ref 3.88–5.62)
RBC #/AREA URNS AUTO: ABNORMAL /HPF
RH BLD: POSITIVE
RH BLD: POSITIVE
SALICYLATES SERPL-MCNC: <5 MG/DL (ref 3–20)
SODIUM SERPL-SCNC: 137 MMOL/L (ref 135–147)
SP GR UR STRIP.AUTO: 1.02 (ref 1–1.03)
SPECIMEN EXPIRATION DATE: NORMAL
THC UR QL: POSITIVE
TSH SERPL DL<=0.05 MIU/L-ACNC: 0.83 UIU/ML (ref 0.45–4.5)
UROBILINOGEN UR STRIP-ACNC: <2 MG/DL
WBC # BLD AUTO: 11.42 THOUSAND/UL (ref 4.31–10.16)
WBC #/AREA URNS AUTO: ABNORMAL /HPF

## 2024-07-30 PROCEDURE — 93308 TTE F-UP OR LMTD: CPT

## 2024-07-30 PROCEDURE — 80307 DRUG TEST PRSMV CHEM ANLYZR: CPT

## 2024-07-30 PROCEDURE — 82140 ASSAY OF AMMONIA: CPT

## 2024-07-30 PROCEDURE — 85730 THROMBOPLASTIN TIME PARTIAL: CPT

## 2024-07-30 PROCEDURE — 96374 THER/PROPH/DIAG INJ IV PUSH: CPT

## 2024-07-30 PROCEDURE — 93308 TTE F-UP OR LMTD: CPT | Performed by: INTERNAL MEDICINE

## 2024-07-30 PROCEDURE — 82306 VITAMIN D 25 HYDROXY: CPT

## 2024-07-30 PROCEDURE — 70450 CT HEAD/BRAIN W/O DYE: CPT

## 2024-07-30 PROCEDURE — 99285 EMERGENCY DEPT VISIT HI MDM: CPT

## 2024-07-30 PROCEDURE — 99223 1ST HOSP IP/OBS HIGH 75: CPT | Performed by: EMERGENCY MEDICINE

## 2024-07-30 PROCEDURE — 85025 COMPLETE CBC W/AUTO DIFF WBC: CPT

## 2024-07-30 PROCEDURE — 86900 BLOOD TYPING SEROLOGIC ABO: CPT

## 2024-07-30 PROCEDURE — 80053 COMPREHEN METABOLIC PANEL: CPT

## 2024-07-30 PROCEDURE — 85049 AUTOMATED PLATELET COUNT: CPT

## 2024-07-30 PROCEDURE — 72170 X-RAY EXAM OF PELVIS: CPT

## 2024-07-30 PROCEDURE — 85652 RBC SED RATE AUTOMATED: CPT

## 2024-07-30 PROCEDURE — 86850 RBC ANTIBODY SCREEN: CPT

## 2024-07-30 PROCEDURE — 84484 ASSAY OF TROPONIN QUANT: CPT

## 2024-07-30 PROCEDURE — 93005 ELECTROCARDIOGRAM TRACING: CPT

## 2024-07-30 PROCEDURE — 83970 ASSAY OF PARATHORMONE: CPT

## 2024-07-30 PROCEDURE — 80143 DRUG ASSAY ACETAMINOPHEN: CPT

## 2024-07-30 PROCEDURE — 85610 PROTHROMBIN TIME: CPT

## 2024-07-30 PROCEDURE — 74177 CT ABD & PELVIS W/CONTRAST: CPT

## 2024-07-30 PROCEDURE — 81001 URINALYSIS AUTO W/SCOPE: CPT

## 2024-07-30 PROCEDURE — 80179 DRUG ASSAY SALICYLATE: CPT

## 2024-07-30 PROCEDURE — 86923 COMPATIBILITY TEST ELECTRIC: CPT

## 2024-07-30 PROCEDURE — 82077 ASSAY SPEC XCP UR&BREATH IA: CPT

## 2024-07-30 PROCEDURE — 71260 CT THORAX DX C+: CPT

## 2024-07-30 PROCEDURE — 86901 BLOOD TYPING SEROLOGIC RH(D): CPT

## 2024-07-30 PROCEDURE — 73552 X-RAY EXAM OF FEMUR 2/>: CPT

## 2024-07-30 PROCEDURE — 84443 ASSAY THYROID STIM HORMONE: CPT

## 2024-07-30 PROCEDURE — 71045 X-RAY EXAM CHEST 1 VIEW: CPT

## 2024-07-30 PROCEDURE — 36415 COLL VENOUS BLD VENIPUNCTURE: CPT

## 2024-07-30 PROCEDURE — 82550 ASSAY OF CK (CPK): CPT

## 2024-07-30 PROCEDURE — 82805 BLOOD GASES W/O2 SATURATION: CPT

## 2024-07-30 RX ORDER — GABAPENTIN 300 MG/1
600 CAPSULE ORAL 2 TIMES DAILY
Status: DISCONTINUED | OUTPATIENT
Start: 2024-07-30 | End: 2024-08-05 | Stop reason: HOSPADM

## 2024-07-30 RX ORDER — DULOXETIN HYDROCHLORIDE 60 MG/1
60 CAPSULE, DELAYED RELEASE ORAL DAILY
Status: DISCONTINUED | OUTPATIENT
Start: 2024-07-30 | End: 2024-08-05 | Stop reason: HOSPADM

## 2024-07-30 RX ORDER — ENOXAPARIN SODIUM 100 MG/ML
40 INJECTION SUBCUTANEOUS EVERY 12 HOURS
Status: DISCONTINUED | OUTPATIENT
Start: 2024-07-30 | End: 2024-08-02

## 2024-07-30 RX ORDER — HYDROMORPHONE HCL/PF 1 MG/ML
0.5 SYRINGE (ML) INJECTION ONCE
Status: COMPLETED | OUTPATIENT
Start: 2024-07-30 | End: 2024-07-30

## 2024-07-30 RX ORDER — ALPRAZOLAM 0.5 MG/1
1 TABLET ORAL DAILY PRN
Status: DISCONTINUED | OUTPATIENT
Start: 2024-07-30 | End: 2024-08-01

## 2024-07-30 RX ADMIN — GABAPENTIN 600 MG: 300 CAPSULE ORAL at 21:59

## 2024-07-30 RX ADMIN — IOHEXOL 100 ML: 350 INJECTION, SOLUTION INTRAVENOUS at 17:37

## 2024-07-30 RX ADMIN — HYDROMORPHONE HYDROCHLORIDE 0.5 MG: 1 INJECTION, SOLUTION INTRAMUSCULAR; INTRAVENOUS; SUBCUTANEOUS at 16:06

## 2024-07-30 RX ADMIN — ENOXAPARIN SODIUM 40 MG: 40 INJECTION SUBCUTANEOUS at 18:12

## 2024-07-30 NOTE — ED ATTENDING ATTESTATION
7/30/2024  I, Tish Archer MD, saw and evaluated the patient. I have discussed the patient with the resident/non-physician practitioner and agree with the resident's/non-physician practitioner's findings, Plan of Care, and MDM as documented in the resident's/non-physician practitioner's note, except where noted. All available labs and Radiology studies were reviewed.  I was present for key portions of any procedure(s) performed by the resident/non-physician practitioner and I was immediately available to provide assistance.       At this point I agree with the current assessment done in the Emergency Department.  I have conducted an independent evaluation of this patient a history and physical is as follows:  This is a 64-year-old male who presents via EMS after a fall.  Patient apparently fell this morning.  Unclear how the patient fell.  He was on the ground since that time.  I do not know how EMS was called.  On EMS arrival, the patient was somnolent, intermittently apneic, primarily when not stimulated.  Per EMS, patient had several bradycardic events, and had several short runs of ventricular tachycardia.  He did not desaturate and had adequate blood pressure.  Patient is unreliable as a historian.  He has a history of methadone maintenance therapy.  Further review of systems is difficult to obtain.  On exam the patient is oriented to place and person.  He has no signs of trauma.  He is sleepy, but breathing spontaneously and maintaining his oxygen saturation.  There are no signs of trauma to the head or neck.  His heart is irregular.  He has bigeminy on the monitor.  He has pulses throughout.  His abdomen is soft and nontender.  There are no masses, rebound, or guarding.  Pelvis stable to rock.  He has shortening and external rotation of his right hip.  His feet are neurovascularly intact.  The patient is following commands.  He was fully exposed with no other signs of trauma.MEDICAL DECISION  MAKING    Number and Complexity of Problems  Differential diagnosis: Fall, hip fracture, cardiac dysrhythmia, syncope, rhabdomyolysis, electrolyte disturbance, overdose/substance abuse    Medical Decision Making Data  External documents reviewed: Palliative care note from a few days ago reviewed  My EKG interpretation: Baseline sinus rhythm, frequent ventricular ectopy with paired and triplet complexes  My CT interpretation: No acute bleeding  My X-ray interpretation: Right intertrochanteric hip fracture  My ultrasound interpretation:     CT head wo contrast   Final Result      No intracranial hemorrhage or calvarial fracture.                  Workstation performed: [id number]         XR chest portable   Final Result      No acute cardiopulmonary disease.            Workstation performed: [id number]         XR pelvis ap only 1 or 2 vw   Final Result      Right intertrochanteric fracture with varus angulation and displaced lesser trochanteric moiety. No hip dislocation.      Remainder of the femur is intact. Pelvic ring is intact.      Left proximal femur ORIF with bridging heterotopic bone formation.         Findings concur with the preliminary report by the referring clinician already in PACS and/or our electronic record EPIC.         Computerized Assisted Algorithm (CAA) may have been used to analyze all applicable images.         Workstation performed: [id number]         XR femur 2 vw right   Final Result      Right intertrochanteric fracture with varus angulation and displaced lesser trochanteric moiety. No hip dislocation.      Remainder of the femur is intact. Pelvic ring is intact.      Left proximal femur ORIF with bridging heterotopic bone formation.         Findings concur with the preliminary report by the referring clinician already in PACS and/or our electronic record EPIC.         Computerized Assisted Algorithm (CAA) may have been used to analyze all applicable images.         Workstation performed:  [id number]         CT chest abdomen pelvis w contrast    (Results Pending)       Labs Reviewed   CBC AND DIFFERENTIAL - Abnormal       Result Value Ref Range Status    WBC 11.42 (*) 4.31 - 10.16 Thousand/uL Final    RBC 3.88  3.88 - 5.62 Million/uL Final    Hemoglobin 11.0 (*) 12.0 - 17.0 g/dL Final    Hematocrit 34.7 (*) 36.5 - 49.3 % Final    MCV 89  82 - 98 fL Final    MCH 28.4  26.8 - 34.3 pg Final    MCHC 31.7  31.4 - 37.4 g/dL Final    RDW 12.9  11.6 - 15.1 % Final    MPV 11.6  8.9 - 12.7 fL Final    Platelets 194  149 - 390 Thousands/uL Final    nRBC 0  /100 WBCs Final    Segmented % 71  43 - 75 % Final    Immature Grans % 0  0 - 2 % Final    Lymphocytes % 13 (*) 14 - 44 % Final    Monocytes % 6  4 - 12 % Final    Eosinophils Relative 10 (*) 0 - 6 % Final    Basophils Relative 0  0 - 1 % Final    Absolute Neutrophils 7.98 (*) 1.85 - 7.62 Thousands/µL Final    Absolute Immature Grans 0.05  0.00 - 0.20 Thousand/uL Final    Absolute Lymphocytes 1.51  0.60 - 4.47 Thousands/µL Final    Absolute Monocytes 0.69  0.17 - 1.22 Thousand/µL Final    Eosinophils Absolute 1.15 (*) 0.00 - 0.61 Thousand/µL Final    Basophils Absolute 0.04  0.00 - 0.10 Thousands/µL Final   COMPREHENSIVE METABOLIC PANEL - Abnormal    Sodium 137  135 - 147 mmol/L Final    Potassium 4.5  3.5 - 5.3 mmol/L Final    Chloride 101  96 - 108 mmol/L Final    CO2 31  21 - 32 mmol/L Final    ANION GAP 5  4 - 13 mmol/L Final    BUN 13  5 - 25 mg/dL Final    Creatinine 0.89  0.60 - 1.30 mg/dL Final    Comment: Standardized to IDMS reference method    Glucose 239 (*) 65 - 140 mg/dL Final    Comment: If the patient is fasting, the ADA then defines impaired fasting glucose as > 100 mg/dL and diabetes as > or equal to 123 mg/dL.    Calcium 8.2 (*) 8.4 - 10.2 mg/dL Final    Corrected Calcium 8.7  8.3 - 10.1 mg/dL Final    AST 21  13 - 39 U/L Final    ALT 26  7 - 52 U/L Final    Comment: Specimen collection should occur prior to Sulfasalazine administration due  to the potential for falsely depressed results.     Alkaline Phosphatase 86  34 - 104 U/L Final    Total Protein 5.8 (*) 6.4 - 8.4 g/dL Final    Albumin 3.4 (*) 3.5 - 5.0 g/dL Final    Total Bilirubin 0.37  0.20 - 1.00 mg/dL Final    Comment: Use of this assay is not recommended for patients undergoing treatment with eltrombopag due to the potential for falsely elevated results.  N-acetyl-p-benzoquinone imine (metabolite of Acetaminophen) will generate erroneously low results in samples for patients that have taken an overdose of Acetaminophen.    eGFR 91  ml/min/1.73sq m Final    Narrative:     National Kidney Disease Foundation guidelines for Chronic Kidney Disease (CKD):     Stage 1 with normal or high GFR (GFR > 90 mL/min/1.73 square meters)    Stage 2 Mild CKD (GFR = 60-89 mL/min/1.73 square meters)    Stage 3A Moderate CKD (GFR = 45-59 mL/min/1.73 square meters)    Stage 3B Moderate CKD (GFR = 30-44 mL/min/1.73 square meters)    Stage 4 Severe CKD (GFR = 15-29 mL/min/1.73 square meters)    Stage 5 End Stage CKD (GFR <15 mL/min/1.73 square meters)  Note: GFR calculation is accurate only with a steady state creatinine   ACETAMINOPHEN LEVEL - Abnormal    Acetaminophen Level <2 (*) 10 - 20 ug/mL Final   BLOOD GAS, VENOUS - Abnormal    pH, [place] 7.458 (*) 7.300 - 7.400 Final    pCO2, [place] 41.4 (*) 42.0 - 50.0 mm Hg Final    pO2, [place] 99.4 (*) 35.0 - 45.0 mm Hg Final    HCO3, Ra[place] 28.7  24 - 30 mmol/L Final    Base Excess, Raulito 4.4  mmol/L Final    O2 Content, Raul[place] 14.5  ml/dL Final    O2 HGB, VENOUS 92.8 (*) 60.0 - 80.0 % Final   TSH, 3RD GENERATION - Normal    TSH 3RD GENERATON 0.830  0.450 - 4.500 uIU/mL Final    Comment: Adult TSH (3rd generation) reference range follows the recommended guidelines of the American Thyroid Association, January, 2020.   MEDICAL ALCOHOL - Normal    Ethanol Lvl <10  <10 mg/dL Final   SALICYLATE LEVEL - Normal    Salicylate Lvl <5  3 - 20 mg/dL Final   AMMONIA - Normal    Ammonia 26  18 - 72  "umol/L Final   HS TROPONIN I 0HR - Normal    hs TnI 0hr 3  \"Refer to ACS Flowchart\"- see link ng/L Final    Comment:                                              Initial (time 0) result  If >=50 ng/L, Myocardial injury suggested ;  Type of myocardial injury and treatment strategy  to be determined.  If 5-49 ng/L, a delta result at 2 hours and or 4 hours will be needed to further evaluate.  If <4 ng/L, and chest pain has been >3 hours since onset, patient may qualify for discharge based on the HEART score in the ED.  If <5 ng/L and <3hours since onset of chest pain, a delta result at 2 hours will be needed to further evaluate.    HS Troponin 99th Percentile URL of a Health Population=12 ng/L with a 95% Confidence Interval of 8-18 ng/L.    Second Troponin (time 2 hours)  If calculated delta >= 20 ng/L,  Myocardial injury suggested ; Type of myocardial injury and treatment strategy to be determined.  If 5-49 ng/L and the calculated delta is 5-19 ng/L, consult medical service for evaluation.  Continue evaluation for ischemia on ecg and other possible etiology and repeat hs troponin at 4 hours.  If delta is <5 ng/L at 2 hours, consider discharge based on risk stratification via the HEART score (if in ED), or MERRY risk score in IP/Observation.    HS Troponin 99th Percentile URL of a Health Population=12 ng/L with a 95% Confidence Interval of 8-18 ng/L.   CK - Normal    Total   39 - 308 U/L Final   HS TROPONIN I 2HR - Normal    hs TnI 2hr 4  \"Refer to ACS Flowchart\"- see link ng/L Final    Comment:                                              Initial (time 0) result  If >=50 ng/L, Myocardial injury suggested ;  Type of myocardial injury and treatment strategy  to be determined.  If 5-49 ng/L, a delta result at 2 hours and or 4 hours will be needed to further evaluate.  If <4 ng/L, and chest pain has been >3 hours since onset, patient may qualify for discharge based on the HEART score in the ED.  If <5 ng/L and " <3hours since onset of chest pain, a delta result at 2 hours will be needed to further evaluate.    HS Troponin 99th Percentile URL of a Health Population=12 ng/L with a 95% Confidence Interval of 8-18 ng/L.    Second Troponin (time 2 hours)  If calculated delta >= 20 ng/L,  Myocardial injury suggested ; Type of myocardial injury and treatment strategy to be determined.  If 5-49 ng/L and the calculated delta is 5-19 ng/L, consult medical service for evaluation.  Continue evaluation for ischemia on ecg and other possible etiology and repeat hs troponin at 4 hours.  If delta is <5 ng/L at 2 hours, consider discharge based on risk stratification via the HEART score (if in ED), or MERRY risk score in IP/Observation.    HS Troponin 99th Percentile URL of a Health Population=12 ng/L with a 95% Confidence Interval of 8-18 ng/L.    Delta 2hr hsTnI 1  <20 ng/L Final   RAPID DRUG SCREEN, URINE   UA W REFLEX TO MICROSCOPIC WITH REFLEX TO CULTURE   HS TROPONIN I 4HR   PLATELET COUNT   TYPE AND SCREEN   COMA PANEL    Narrative:     The following orders were created for panel order Coma Panel.  Procedure                               Abnormality         Status                     ---------                               -----------         ------                     Ethanol[[id number]]                      Normal              Final result               Salicylate level[[id number]]             Normal              Final result               Acetaminophen level-If c...[[id number]]  Abnormal            Final result                 Please view results for these tests on the individual orders.       Labs reviewed by me are significant for: Hyperglycemia    Clinical decision rules/scores are significant for:     Discussed case with:   Considered admission for: Hip fracture, altered mental status, fall, dysrhythmia    Treatment and Disposition  ED course: Patient seen and examined, trauma evaluation begun.  Patient with abnormal EKG.  Patient  placed on external defibrillator pads for concern for ventricular ectopy.  X-rays reviewed, patient with intertrochanteric hip fracture.  Will admit to trauma with diagnosis of 1 intertrochanteric hip fracture, 2 fall, 3 cardiac dysrhythmia, for syncope, 5 altered mental status  Shared decision making:   Code status:     ED Course         Critical Care Time  CriticalCare Time    Date/Time: 7/30/2024 2:50 PM    Performed by: Tish Archer MD  Authorized by: Tish Archer MD    Critical care provider statement:     Critical care time (minutes):  56    Critical care time was exclusive of:  Separately billable procedures and treating other patients and teaching time    Critical care was necessary to treat or prevent imminent or life-threatening deterioration of the following conditions:  Circulatory failure, trauma and toxidrome    Critical care was time spent personally by me on the following activities:  Blood draw for specimens, obtaining history from patient or surrogate, development of treatment plan with patient or surrogate, discussions with consultants, discussions with primary provider, evaluation of patient's response to treatment, examination of patient, review of old charts, re-evaluation of patient's condition, ordering and review of radiographic studies, ordering and review of laboratory studies and ordering and performing treatments and interventions

## 2024-07-30 NOTE — H&P
H&P - Trauma   David Skelton Jr. 64 y.o. male MRN: [id number]  Unit/Bed#: ED 25 Encounter: [id number]    Trauma Alert: Other n/a    Model of Arrival:  ED     Trauma Team: Attending Dr. Ullrich, Residents Dr. Arguelles, and Fellow Dr. Palladino  Consultants:     None     Assessment & Plan   Active Problems / Assessment:   Fall  R hip fx  Abn EKG     Plan:   Ortho  Tele  Sd2 admission, upgrade to ICU pending scans  Labs  CIWA  CT CAP w contrast   Cards  ECHO  EKG    History of Present Illness     Chief Complaint: Fall this AM  Mechanism:Fall     HPI:    David Skelton Jr. is a 64 y.o. male with history of iron deficiency anemia, urinary retention, T2DM, anxiety, polysubstance abuse and prior falls who presents after an unwitnessed fall. Patient reports falling in AM and . Per EMS, the patient was intermittently apneic, primarily when not stimulated and had several bradycardic events with several short runs of ventricular tachycardia. They report he did no desaturate and maintained adequate blood pressure. Patient R LE noted to be shortened and externally rotated.     On arrival, patient reports that he fell this morning and does not recall mechanism. He does recall that someone was knocking on his door this afternoon and that is how EMS was contacted.     Review of Systems   Constitutional:  Negative for chills and fever.   HENT:  Positive for dental problem (Patient does not have teeth.). Negative for congestion, trouble swallowing and voice change.    Respiratory:  Negative for cough, chest tightness, shortness of breath and wheezing.    Cardiovascular:  Negative for chest pain.   Gastrointestinal:  Negative for abdominal pain, nausea and vomiting.   Genitourinary:  Negative for difficulty urinating.   Musculoskeletal:         Repeats that RLE is in severe pain.    Neurological:  Negative for tremors, syncope and headaches.   All other systems reviewed and are negative.    12-point, complete review of systems was  reviewed and negative except as stated above.     Historical Information     Past Medical History:   Diagnosis Date    Anxiety     Depression     Diabetes mellitus (HCC)     Drug use     Hemorrhoids, internal 9/23/2019    Hypertension      Past Surgical History:   Procedure Laterality Date    FEMUR FRACTURE SURGERY Right     GASTRIC BYPASS  11/08/2011    Managed by: Braden Luna (General Surgery)    HERNIA REPAIR  02/13/2013    Incisional hernia repair Managed by: Braden Luna (General Surgery)    TONSILLECTOMY  2010        Social History     Tobacco Use    Smoking status: Never    Smokeless tobacco: Never   Vaping Use    Vaping status: Never Used   Substance Use Topics    Alcohol use: Never    Drug use: Yes     Comment: Methadone clinic     Immunization History   Administered Date(s) Administered    COVID-19 J&J ([employer]) vaccine 0.5 mL 03/12/2021, 11/22/2021    COVID-19 Moderna Vac BIVALENT 12 Yr+ IM 0.5 ML 02/28/2023    COVID-19 Moderna mRNA Vaccine 12 Yr+ 50 mcg/0.5 mL (Spikevax) 11/14/2023    INFLUENZA 10/10/2011, 12/03/2012, 01/26/2017, 08/17/2020, 10/31/2023    Influenza, injectable, quadrivalent, preservative free 0.5 mL 10/16/2019, 08/17/2020    Influenza, recombinant, quadrivalent,injectable, preservative free 09/11/2019    Influenza, seasonal, injectable 11/01/2013    Pneumococcal Polysaccharide PPV23 10/07/2010, 01/26/2017, 05/05/2019    Tdap 06/06/2018    Zoster Vaccine Recombinant 03/12/2019, 10/16/2019     Last Tetanus: n/a  Family History: Non-contributory     Meds/Allergies   all current active meds have been reviewed and current meds:   Current Facility-Administered Medications   Medication Dose Route Frequency    HYDROmorphone (DILAUDID) injection 0.5 mg  0.5 mg Intravenous Once        Allergies   Allergen Reactions    Aspirin Shortness Of Breath    Penicillins        Objective   Initial Vitals:   Pulse: 86 (07/30/24 1402)  Respirations: 15 (07/30/24 1402)  Blood Pressure: (!) 229/110  (07/30/24 9661)    Primary Survey:   Airway:        Status: patent;        Pre-hospital Interventions: none        Hospital Interventions: none  Breathing:        Pre-hospital Interventions: none       Effort: normal       Right breath sounds: normal       Left breath sounds: normal  Circulation:        Rhythm: regular       Rate: regular   Right Pulses Left Pulses    R radial: 2+    R pedal: 2+     L radial: 2+    L pedal: 2+       Disability:        GCS: Eye: 4; Verbal: 4 Motor: 6 Total: 14       Right Pupil:       Left Pupil:     R Motor Strength L Motor Strength               Exposure:       Completed: Yes (RLE pain and shortening with ER)      Secondary Survey:  Physical Exam  Vitals and nursing note reviewed.   Constitutional:       General: He is in acute distress.      Appearance: He is ill-appearing. He is not diaphoretic.   HENT:      Head: Normocephalic and atraumatic.      Mouth/Throat:      Mouth: Mucous membranes are moist.   Eyes:      Extraocular Movements: Extraocular movements intact.      Pupils: Pupils are equal, round, and reactive to light.      Comments: EOM intact with constant redirection   Cardiovascular:      Rate and Rhythm: Normal rate and regular rhythm.   Pulmonary:      Effort: Pulmonary effort is normal. No respiratory distress.   Abdominal:      Tenderness: There is no abdominal tenderness. There is no guarding or rebound.   Musculoskeletal:         General: Tenderness, deformity (RLE shortened and externally rotated) and signs of injury present.      Cervical back: Normal range of motion and neck supple.      Right lower leg: No edema.      Left lower leg: No edema.   Skin:     General: Skin is warm and dry.      Findings: No rash.   Neurological:      General: No focal deficit present.      Mental Status: He is alert and oriented to person, place, and time.      Comments: B/L hands tremulous   Psychiatric:         Behavior: Behavior normal.         Invasive Devices        Peripheral Intravenous Line  Duration             Peripheral IV 07/30/24 Distal;Left;Ventral (anterior) Forearm <1 day    Peripheral IV 07/30/24 Dorsal (posterior);Right Hand <1 day                  Lab Results: I have personally reviewed all pertinent laboratory/test results 07/30/24 and in the preceding 24 hours.  Recent Labs     07/30/24  1422 07/30/24  1427   WBC  --  11.42*   HGB  --  11.0*   HCT  --  34.7*   PLT  --  194   SODIUM  --  137   K  --  4.5   CL  --  101   CO2  --  31   BUN  --  13   CREATININE  --  0.89   GLUC  --  239*   AST  --  21   ALT  --  26   ALB  --  3.4*   TBILI  --  0.37   ALKPHOS  --  86   HSTNI0 3  --        Imaging Results: I have personally reviewed pertinent images saved in PACS. CT scan findings (and other pertinent positive findings on images) were discussed with radiology. My interpretation of the images/reports are as follows:  Chest Xray(s): N/A   FAST exam(s): N/A   CT Scan(s): pending   Additional Xray(s): R femoral neck fx     Other Studies: n/a    Code Status: Prior  Advance Directive and Living Will:      Power of :    POLST:

## 2024-07-31 ENCOUNTER — ANESTHESIA EVENT (INPATIENT)
Dept: PERIOP | Facility: HOSPITAL | Age: 64
DRG: 308 | End: 2024-07-31
Payer: COMMERCIAL

## 2024-07-31 ENCOUNTER — ANESTHESIA (INPATIENT)
Dept: PERIOP | Facility: HOSPITAL | Age: 64
DRG: 308 | End: 2024-07-31
Payer: COMMERCIAL

## 2024-07-31 ENCOUNTER — APPOINTMENT (INPATIENT)
Dept: RADIOLOGY | Facility: HOSPITAL | Age: 64
DRG: 308 | End: 2024-07-31
Payer: COMMERCIAL

## 2024-07-31 ENCOUNTER — APPOINTMENT (INPATIENT)
Dept: NON INVASIVE DIAGNOSTICS | Facility: HOSPITAL | Age: 64
DRG: 308 | End: 2024-07-31
Payer: COMMERCIAL

## 2024-07-31 PROBLEM — S72.91XS CLOSED FRACTURE OF RIGHT FEMUR, UNSPECIFIED FRACTURE MORPHOLOGY, SEQUELA: Status: ACTIVE | Noted: 2024-07-31

## 2024-07-31 LAB
ANION GAP SERPL CALCULATED.3IONS-SCNC: 5 MMOL/L (ref 4–13)
AORTIC ROOT: 3.6 CM
APICAL FOUR CHAMBER EJECTION FRACTION: 59 %
ASCENDING AORTA: 3.8 CM
ATRIAL RATE: 220 BPM
ATRIAL RATE: 75 BPM
ATRIAL RATE: 77 BPM
BSA FOR ECHO PROCEDURE: 1.93 M2
BSA FOR ECHO PROCEDURE: 1.93 M2
BUN SERPL-MCNC: 12 MG/DL (ref 5–25)
CALCIUM SERPL-MCNC: 8.3 MG/DL (ref 8.4–10.2)
CHLORIDE SERPL-SCNC: 99 MMOL/L (ref 96–108)
CO2 SERPL-SCNC: 34 MMOL/L (ref 21–32)
CREAT SERPL-MCNC: 0.79 MG/DL (ref 0.6–1.3)
E WAVE DECELERATION TIME: 257 MS
E/A RATIO: 0.78
ERYTHROCYTE [DISTWIDTH] IN BLOOD BY AUTOMATED COUNT: 12.9 % (ref 11.6–15.1)
EST. AVERAGE GLUCOSE BLD GHB EST-MCNC: 260 MG/DL
FRACTIONAL SHORTENING: 5 (ref 28–44)
GFR SERPL CREATININE-BSD FRML MDRD: 96 ML/MIN/1.73SQ M
GLUCOSE SERPL-MCNC: 178 MG/DL (ref 65–140)
GLUCOSE SERPL-MCNC: 203 MG/DL (ref 65–140)
GLUCOSE SERPL-MCNC: 277 MG/DL (ref 65–140)
GLUCOSE SERPL-MCNC: 280 MG/DL (ref 65–140)
GLUCOSE SERPL-MCNC: 316 MG/DL (ref 65–140)
HBA1C MFR BLD: 10.7 %
HCT VFR BLD AUTO: 29.8 % (ref 36.5–49.3)
HGB BLD-MCNC: 9.7 G/DL (ref 12–17)
INTERVENTRICULAR SEPTUM IN DIASTOLE (PARASTERNAL SHORT AXIS VIEW): 1.3 CM
INTERVENTRICULAR SEPTUM: 1.3 CM (ref 0.6–1.1)
LAAS-AP2: 11.3 CM2
LAAS-AP4: 12.1 CM2
LEFT ATRIUM SIZE: 3.4 CM
LEFT ATRIUM VOLUME (MOD BIPLANE): 27 ML
LEFT ATRIUM VOLUME INDEX (MOD BIPLANE): 14 ML/M2
LEFT INTERNAL DIMENSION IN SYSTOLE: 3.6 CM (ref 2.1–4)
LEFT VENTRICULAR INTERNAL DIMENSION IN DIASTOLE: 3.8 CM (ref 3.5–6)
LEFT VENTRICULAR POSTERIOR WALL IN END DIASTOLE: 1 CM
LEFT VENTRICULAR STROKE VOLUME: 11 ML
LVSV (TEICH): 11 ML
MCH RBC QN AUTO: 28.4 PG (ref 26.8–34.3)
MCHC RBC AUTO-ENTMCNC: 32.6 G/DL (ref 31.4–37.4)
MCV RBC AUTO: 87 FL (ref 82–98)
MV E'TISSUE VEL-LAT: 17 CM/S
MV E'TISSUE VEL-SEP: 11 CM/S
MV PEAK A VEL: 1.02 M/S
MV PEAK E VEL: 80 CM/S
MV STENOSIS PRESSURE HALF TIME: 74 MS
MV VALVE AREA P 1/2 METHOD: 2.97
P AXIS: 74 DEGREES
P AXIS: 79 DEGREES
PLATELET # BLD AUTO: 175 THOUSANDS/UL (ref 149–390)
PMV BLD AUTO: 11.7 FL (ref 8.9–12.7)
POTASSIUM SERPL-SCNC: 4.3 MMOL/L (ref 3.5–5.3)
PR INTERVAL: 140 MS
QRS AXIS: 67 DEGREES
QRS AXIS: 76 DEGREES
QRS AXIS: 77 DEGREES
QRSD INTERVAL: 80 MS
QRSD INTERVAL: 84 MS
QRSD INTERVAL: 98 MS
QT INTERVAL: 410 MS
QT INTERVAL: 412 MS
QT INTERVAL: 518 MS
QTC INTERVAL: 466 MS
QTC INTERVAL: 479 MS
QTC INTERVAL: 637 MS
RBC # BLD AUTO: 3.41 MILLION/UL (ref 3.88–5.62)
RIGHT ATRIUM AREA SYSTOLE A4C: 15.2 CM2
RIGHT VENTRICLE ID DIMENSION: 3.6 CM
SL CV LEFT ATRIUM LENGTH A2C: 4 CM
SL CV LV EF: 60
SL CV LV EF: 60
SL CV PED ECHO LEFT VENTRICLE DIASTOLIC VOLUME (MOD BIPLANE) 2D: 64 ML
SL CV PED ECHO LEFT VENTRICLE SYSTOLIC VOLUME (MOD BIPLANE) 2D: 53 ML
SODIUM SERPL-SCNC: 138 MMOL/L (ref 135–147)
T WAVE AXIS: 53 DEGREES
T WAVE AXIS: 77 DEGREES
T WAVE AXIS: 81 DEGREES
TRICUSPID ANNULAR PLANE SYSTOLIC EXCURSION: 2.1 CM
VENTRICULAR RATE: 77 BPM
VENTRICULAR RATE: 82 BPM
VENTRICULAR RATE: 91 BPM
WBC # BLD AUTO: 9.6 THOUSAND/UL (ref 4.31–10.16)

## 2024-07-31 PROCEDURE — 93306 TTE W/DOPPLER COMPLETE: CPT | Performed by: INTERNAL MEDICINE

## 2024-07-31 PROCEDURE — 73501 X-RAY EXAM HIP UNI 1 VIEW: CPT

## 2024-07-31 PROCEDURE — C1769 GUIDE WIRE: HCPCS | Performed by: ORTHOPAEDIC SURGERY

## 2024-07-31 PROCEDURE — 0QH606Z INSERTION OF INTRAMEDULLARY INTERNAL FIXATION DEVICE INTO RIGHT UPPER FEMUR, OPEN APPROACH: ICD-10-PCS | Performed by: ORTHOPAEDIC SURGERY

## 2024-07-31 PROCEDURE — C1713 ANCHOR/SCREW BN/BN,TIS/BN: HCPCS | Performed by: ORTHOPAEDIC SURGERY

## 2024-07-31 PROCEDURE — 36415 COLL VENOUS BLD VENIPUNCTURE: CPT

## 2024-07-31 PROCEDURE — 93005 ELECTROCARDIOGRAM TRACING: CPT

## 2024-07-31 PROCEDURE — 82948 REAGENT STRIP/BLOOD GLUCOSE: CPT

## 2024-07-31 PROCEDURE — NC001 PR NO CHARGE: Performed by: ORTHOPAEDIC SURGERY

## 2024-07-31 PROCEDURE — 30233N1 TRANSFUSION OF NONAUTOLOGOUS RED BLOOD CELLS INTO PERIPHERAL VEIN, PERCUTANEOUS APPROACH: ICD-10-PCS | Performed by: SURGERY

## 2024-07-31 PROCEDURE — 93010 ELECTROCARDIOGRAM REPORT: CPT | Performed by: INTERNAL MEDICINE

## 2024-07-31 PROCEDURE — 85027 COMPLETE CBC AUTOMATED: CPT

## 2024-07-31 PROCEDURE — 99255 IP/OBS CONSLTJ NEW/EST HI 80: CPT | Performed by: ORTHOPAEDIC SURGERY

## 2024-07-31 PROCEDURE — 83036 HEMOGLOBIN GLYCOSYLATED A1C: CPT

## 2024-07-31 PROCEDURE — 99222 1ST HOSP IP/OBS MODERATE 55: CPT | Performed by: INTERNAL MEDICINE

## 2024-07-31 PROCEDURE — 93306 TTE W/DOPPLER COMPLETE: CPT

## 2024-07-31 PROCEDURE — 80048 BASIC METABOLIC PNL TOTAL CA: CPT

## 2024-07-31 PROCEDURE — NC001 PR NO CHARGE: Performed by: STUDENT IN AN ORGANIZED HEALTH CARE EDUCATION/TRAINING PROGRAM

## 2024-07-31 PROCEDURE — NC001 PR NO CHARGE: Performed by: EMERGENCY MEDICINE

## 2024-07-31 PROCEDURE — 27245 TREAT THIGH FRACTURE: CPT | Performed by: ORTHOPAEDIC SURGERY

## 2024-07-31 DEVICE — 11MM/130 DEG TI CANN TFNA 170MM - STERILE
Type: IMPLANTABLE DEVICE | Site: LEG | Status: FUNCTIONAL
Brand: TFN-ADVANCE

## 2024-07-31 DEVICE — TFNA FENESTRATED SCREW 110MM - STERILE
Type: IMPLANTABLE DEVICE | Site: LEG | Status: FUNCTIONAL
Brand: TFN-ADVANCE

## 2024-07-31 DEVICE — LOCKING SCREW FOR IM NAIL Ø 5MM/ 34MM/ XL25/ STERILE: Type: IMPLANTABLE DEVICE | Site: LEG | Status: FUNCTIONAL

## 2024-07-31 RX ORDER — FENTANYL CITRATE/PF 50 MCG/ML
50 SYRINGE (ML) INJECTION
Status: DISCONTINUED | OUTPATIENT
Start: 2024-07-31 | End: 2024-07-31 | Stop reason: HOSPADM

## 2024-07-31 RX ORDER — SODIUM CHLORIDE, SODIUM LACTATE, POTASSIUM CHLORIDE, CALCIUM CHLORIDE 600; 310; 30; 20 MG/100ML; MG/100ML; MG/100ML; MG/100ML
125 INJECTION, SOLUTION INTRAVENOUS CONTINUOUS
Status: DISCONTINUED | OUTPATIENT
Start: 2024-07-31 | End: 2024-07-31

## 2024-07-31 RX ORDER — ONDANSETRON 2 MG/ML
4 INJECTION INTRAMUSCULAR; INTRAVENOUS ONCE AS NEEDED
Status: DISCONTINUED | OUTPATIENT
Start: 2024-07-31 | End: 2024-07-31 | Stop reason: HOSPADM

## 2024-07-31 RX ORDER — CEFAZOLIN SODIUM 2 G/50ML
2000 SOLUTION INTRAVENOUS
Status: DISCONTINUED | OUTPATIENT
Start: 2024-08-01 | End: 2024-08-02

## 2024-07-31 RX ORDER — ACETAMINOPHEN 325 MG/1
975 TABLET ORAL EVERY 8 HOURS SCHEDULED
Status: DISCONTINUED | OUTPATIENT
Start: 2024-07-31 | End: 2024-08-05 | Stop reason: HOSPADM

## 2024-07-31 RX ORDER — ALBUTEROL SULFATE 2.5 MG/3ML
2.5 SOLUTION RESPIRATORY (INHALATION) ONCE AS NEEDED
Status: DISCONTINUED | OUTPATIENT
Start: 2024-07-31 | End: 2024-07-31 | Stop reason: HOSPADM

## 2024-07-31 RX ORDER — MAGNESIUM SULFATE HEPTAHYDRATE 40 MG/ML
2 INJECTION, SOLUTION INTRAVENOUS ONCE
Status: COMPLETED | OUTPATIENT
Start: 2024-07-31 | End: 2024-07-31

## 2024-07-31 RX ORDER — INSULIN LISPRO 100 [IU]/ML
1-6 INJECTION, SOLUTION INTRAVENOUS; SUBCUTANEOUS
Status: DISCONTINUED | OUTPATIENT
Start: 2024-07-31 | End: 2024-08-05 | Stop reason: HOSPADM

## 2024-07-31 RX ORDER — ONDANSETRON 2 MG/ML
INJECTION INTRAMUSCULAR; INTRAVENOUS AS NEEDED
Status: DISCONTINUED | OUTPATIENT
Start: 2024-07-31 | End: 2024-07-31

## 2024-07-31 RX ORDER — CLINDAMYCIN PHOSPHATE 900 MG/50ML
INJECTION, SOLUTION INTRAVENOUS AS NEEDED
Status: DISCONTINUED | OUTPATIENT
Start: 2024-07-31 | End: 2024-07-31

## 2024-07-31 RX ORDER — INSULIN LISPRO 100 [IU]/ML
1-5 INJECTION, SOLUTION INTRAVENOUS; SUBCUTANEOUS
Status: DISCONTINUED | OUTPATIENT
Start: 2024-07-31 | End: 2024-07-31

## 2024-07-31 RX ORDER — CEFAZOLIN SODIUM 1 G/50ML
1000 SOLUTION INTRAVENOUS EVERY 8 HOURS
Status: DISPENSED | OUTPATIENT
Start: 2024-07-31 | End: 2024-08-01

## 2024-07-31 RX ORDER — PHENYLEPHRINE HYDROCHLORIDE 10 MG/ML
INJECTION INTRAVENOUS AS NEEDED
Status: DISCONTINUED | OUTPATIENT
Start: 2024-07-31 | End: 2024-07-31

## 2024-07-31 RX ORDER — HYDROMORPHONE HCL/PF 1 MG/ML
0.5 SYRINGE (ML) INJECTION
Status: DISCONTINUED | OUTPATIENT
Start: 2024-07-31 | End: 2024-07-31 | Stop reason: HOSPADM

## 2024-07-31 RX ORDER — METHADONE HYDROCHLORIDE 10 MG/1
20 TABLET ORAL ONCE
Status: COMPLETED | OUTPATIENT
Start: 2024-07-31 | End: 2024-07-31

## 2024-07-31 RX ORDER — ROCURONIUM BROMIDE 10 MG/ML
INJECTION, SOLUTION INTRAVENOUS AS NEEDED
Status: DISCONTINUED | OUTPATIENT
Start: 2024-07-31 | End: 2024-07-31

## 2024-07-31 RX ORDER — HEPARIN SODIUM 10000 [USP'U]/100ML
3-20 INJECTION, SOLUTION INTRAVENOUS
Status: DISCONTINUED | OUTPATIENT
Start: 2024-07-31 | End: 2024-08-01

## 2024-07-31 RX ORDER — PROPOFOL 10 MG/ML
INJECTION, EMULSION INTRAVENOUS AS NEEDED
Status: DISCONTINUED | OUTPATIENT
Start: 2024-07-31 | End: 2024-07-31

## 2024-07-31 RX ORDER — EPHEDRINE SULFATE 50 MG/ML
INJECTION INTRAVENOUS AS NEEDED
Status: DISCONTINUED | OUTPATIENT
Start: 2024-07-31 | End: 2024-07-31

## 2024-07-31 RX ORDER — SODIUM CHLORIDE, SODIUM LACTATE, POTASSIUM CHLORIDE, CALCIUM CHLORIDE 600; 310; 30; 20 MG/100ML; MG/100ML; MG/100ML; MG/100ML
INJECTION, SOLUTION INTRAVENOUS CONTINUOUS PRN
Status: DISCONTINUED | OUTPATIENT
Start: 2024-07-31 | End: 2024-07-31

## 2024-07-31 RX ORDER — CALCIUM CARBONATE 500 MG/1
1000 TABLET, CHEWABLE ORAL DAILY PRN
Status: DISCONTINUED | OUTPATIENT
Start: 2024-07-31 | End: 2024-08-05 | Stop reason: HOSPADM

## 2024-07-31 RX ORDER — INSULIN LISPRO 100 [IU]/ML
1-6 INJECTION, SOLUTION INTRAVENOUS; SUBCUTANEOUS
Status: DISCONTINUED | OUTPATIENT
Start: 2024-08-01 | End: 2024-08-01

## 2024-07-31 RX ORDER — METHADONE HYDROCHLORIDE 10 MG/ML
10 INJECTION, SOLUTION INTRAMUSCULAR; INTRAVENOUS; SUBCUTANEOUS ONCE
Status: COMPLETED | OUTPATIENT
Start: 2024-07-31 | End: 2024-07-31

## 2024-07-31 RX ORDER — SODIUM CHLORIDE, SODIUM GLUCONATE, SODIUM ACETATE, POTASSIUM CHLORIDE, MAGNESIUM CHLORIDE, SODIUM PHOSPHATE, DIBASIC, AND POTASSIUM PHOSPHATE .53; .5; .37; .037; .03; .012; .00082 G/100ML; G/100ML; G/100ML; G/100ML; G/100ML; G/100ML; G/100ML
1000 INJECTION, SOLUTION INTRAVENOUS ONCE
Status: DISCONTINUED | OUTPATIENT
Start: 2024-07-31 | End: 2024-07-31

## 2024-07-31 RX ORDER — MIDAZOLAM HYDROCHLORIDE 2 MG/2ML
INJECTION, SOLUTION INTRAMUSCULAR; INTRAVENOUS AS NEEDED
Status: DISCONTINUED | OUTPATIENT
Start: 2024-07-31 | End: 2024-07-31

## 2024-07-31 RX ORDER — HYDROMORPHONE HCL IN WATER/PF 6 MG/30 ML
0.2 PATIENT CONTROLLED ANALGESIA SYRINGE INTRAVENOUS EVERY 2 HOUR PRN
Status: DISCONTINUED | OUTPATIENT
Start: 2024-07-31 | End: 2024-08-01

## 2024-07-31 RX ORDER — SODIUM CHLORIDE, SODIUM GLUCONATE, SODIUM ACETATE, POTASSIUM CHLORIDE, MAGNESIUM CHLORIDE, SODIUM PHOSPHATE, DIBASIC, AND POTASSIUM PHOSPHATE .53; .5; .37; .037; .03; .012; .00082 G/100ML; G/100ML; G/100ML; G/100ML; G/100ML; G/100ML; G/100ML
100 INJECTION, SOLUTION INTRAVENOUS CONTINUOUS
Status: DISCONTINUED | OUTPATIENT
Start: 2024-07-31 | End: 2024-07-31

## 2024-07-31 RX ORDER — CALCIUM GLUCONATE 20 MG/ML
2 INJECTION, SOLUTION INTRAVENOUS ONCE
Status: COMPLETED | OUTPATIENT
Start: 2024-07-31 | End: 2024-07-31

## 2024-07-31 RX ORDER — METOPROLOL SUCCINATE 25 MG/1
12.5 TABLET, EXTENDED RELEASE ORAL DAILY
Status: DISCONTINUED | OUTPATIENT
Start: 2024-07-31 | End: 2024-08-05 | Stop reason: HOSPADM

## 2024-07-31 RX ORDER — METHADONE HYDROCHLORIDE 5 MG/1
TABLET ORAL
Status: CANCELLED | OUTPATIENT
Start: 2024-07-31

## 2024-07-31 RX ORDER — METHOCARBAMOL 500 MG/1
500 TABLET, FILM COATED ORAL EVERY 6 HOURS SCHEDULED
Status: DISCONTINUED | OUTPATIENT
Start: 2024-07-31 | End: 2024-08-01

## 2024-07-31 RX ORDER — FENTANYL CITRATE 50 UG/ML
INJECTION, SOLUTION INTRAMUSCULAR; INTRAVENOUS AS NEEDED
Status: DISCONTINUED | OUTPATIENT
Start: 2024-07-31 | End: 2024-07-31

## 2024-07-31 RX ORDER — METHADONE HYDROCHLORIDE 10 MG/1
10 TABLET ORAL ONCE
Status: DISCONTINUED | OUTPATIENT
Start: 2024-07-31 | End: 2024-07-31

## 2024-07-31 RX ORDER — ONDANSETRON 2 MG/ML
4 INJECTION INTRAMUSCULAR; INTRAVENOUS ONCE
Status: COMPLETED | OUTPATIENT
Start: 2024-07-31 | End: 2024-07-31

## 2024-07-31 RX ORDER — DOCUSATE SODIUM 100 MG/1
100 CAPSULE, LIQUID FILLED ORAL 2 TIMES DAILY
Status: DISCONTINUED | OUTPATIENT
Start: 2024-07-31 | End: 2024-08-05 | Stop reason: HOSPADM

## 2024-07-31 RX ORDER — LIDOCAINE HCL/PF 100 MG/5ML
SYRINGE (ML) INJECTION AS NEEDED
Status: DISCONTINUED | OUTPATIENT
Start: 2024-07-31 | End: 2024-07-31

## 2024-07-31 RX ORDER — OXYCODONE HYDROCHLORIDE 5 MG/1
5 TABLET ORAL EVERY 4 HOURS PRN
Status: DISCONTINUED | OUTPATIENT
Start: 2024-07-31 | End: 2024-08-02

## 2024-07-31 RX ORDER — FOLIC ACID 1 MG/1
1 TABLET ORAL DAILY
Status: DISCONTINUED | OUTPATIENT
Start: 2024-08-01 | End: 2024-08-05 | Stop reason: HOSPADM

## 2024-07-31 RX ORDER — KETAMINE HCL IN NACL, ISO-OSM 100MG/10ML
SYRINGE (ML) INJECTION AS NEEDED
Status: DISCONTINUED | OUTPATIENT
Start: 2024-07-31 | End: 2024-07-31

## 2024-07-31 RX ORDER — LANOLIN ALCOHOL/MO/W.PET/CERES
100 CREAM (GRAM) TOPICAL DAILY
Status: DISCONTINUED | OUTPATIENT
Start: 2024-08-01 | End: 2024-08-05 | Stop reason: HOSPADM

## 2024-07-31 RX ORDER — SODIUM CHLORIDE 9 MG/ML
INJECTION, SOLUTION INTRAVENOUS CONTINUOUS PRN
Status: DISCONTINUED | OUTPATIENT
Start: 2024-07-31 | End: 2024-07-31

## 2024-07-31 RX ORDER — DEXAMETHASONE SODIUM PHOSPHATE 10 MG/ML
INJECTION, SOLUTION INTRAMUSCULAR; INTRAVENOUS AS NEEDED
Status: DISCONTINUED | OUTPATIENT
Start: 2024-07-31 | End: 2024-07-31

## 2024-07-31 RX ORDER — ACETAMINOPHEN 10 MG/ML
INJECTION, SOLUTION INTRAVENOUS AS NEEDED
Status: DISCONTINUED | OUTPATIENT
Start: 2024-07-31 | End: 2024-07-31

## 2024-07-31 RX ADMIN — PHENYLEPHRINE HYDROCHLORIDE 100 MCG: 10 INJECTION INTRAVENOUS at 13:19

## 2024-07-31 RX ADMIN — MAGNESIUM SULFATE HEPTAHYDRATE 2 G: 40 INJECTION, SOLUTION INTRAVENOUS at 09:23

## 2024-07-31 RX ADMIN — OXYCODONE HYDROCHLORIDE 5 MG: 5 TABLET ORAL at 10:01

## 2024-07-31 RX ADMIN — LIDOCAINE HYDROCHLORIDE 100 MG: 20 INJECTION INTRAVENOUS at 13:09

## 2024-07-31 RX ADMIN — ACETAMINOPHEN 975 MG: 325 TABLET, FILM COATED ORAL at 05:37

## 2024-07-31 RX ADMIN — METHOCARBAMOL 500 MG: 500 TABLET ORAL at 23:15

## 2024-07-31 RX ADMIN — METHADONE HYDROCHLORIDE 10 MG: 10 INJECTION, SOLUTION INTRAMUSCULAR; INTRAVENOUS; SUBCUTANEOUS at 13:06

## 2024-07-31 RX ADMIN — ALPRAZOLAM 1 MG: 0.5 TABLET ORAL at 05:37

## 2024-07-31 RX ADMIN — SODIUM CHLORIDE, SODIUM GLUCONATE, SODIUM ACETATE, POTASSIUM CHLORIDE, MAGNESIUM CHLORIDE, SODIUM PHOSPHATE, DIBASIC, AND POTASSIUM PHOSPHATE 100 ML/HR: .53; .5; .37; .037; .03; .012; .00082 INJECTION, SOLUTION INTRAVENOUS at 09:20

## 2024-07-31 RX ADMIN — INSULIN LISPRO 4 UNITS: 100 INJECTION, SOLUTION INTRAVENOUS; SUBCUTANEOUS at 23:15

## 2024-07-31 RX ADMIN — PROPOFOL 150 MG: 10 INJECTION, EMULSION INTRAVENOUS at 13:09

## 2024-07-31 RX ADMIN — PHENYLEPHRINE HYDROCHLORIDE 300 MCG: 10 INJECTION INTRAVENOUS at 13:11

## 2024-07-31 RX ADMIN — DEXAMETHASONE SODIUM PHOSPHATE 5 MG: 10 INJECTION, SOLUTION INTRAMUSCULAR; INTRAVENOUS at 13:50

## 2024-07-31 RX ADMIN — OXYCODONE HYDROCHLORIDE 5 MG: 5 TABLET ORAL at 21:05

## 2024-07-31 RX ADMIN — SUGAMMADEX 200 MG: 100 INJECTION, SOLUTION INTRAVENOUS at 14:28

## 2024-07-31 RX ADMIN — ACETAMINOPHEN 1000 MG: 10 INJECTION INTRAVENOUS at 14:03

## 2024-07-31 RX ADMIN — GABAPENTIN 600 MG: 300 CAPSULE ORAL at 18:28

## 2024-07-31 RX ADMIN — SUGAMMADEX 200 MG: 100 INJECTION, SOLUTION INTRAVENOUS at 14:18

## 2024-07-31 RX ADMIN — SODIUM CHLORIDE, SODIUM LACTATE, POTASSIUM CHLORIDE, AND CALCIUM CHLORIDE: .6; .31; .03; .02 INJECTION, SOLUTION INTRAVENOUS at 14:10

## 2024-07-31 RX ADMIN — Medication 20 MG: at 13:09

## 2024-07-31 RX ADMIN — EPHEDRINE SULFATE 10 MG: 50 INJECTION, SOLUTION INTRAVENOUS at 14:12

## 2024-07-31 RX ADMIN — FENTANYL CITRATE 50 MCG: 50 INJECTION INTRAMUSCULAR; INTRAVENOUS at 13:52

## 2024-07-31 RX ADMIN — GABAPENTIN 600 MG: 300 CAPSULE ORAL at 09:26

## 2024-07-31 RX ADMIN — ENOXAPARIN SODIUM 40 MG: 40 INJECTION SUBCUTANEOUS at 05:37

## 2024-07-31 RX ADMIN — DEXMEDETOMIDINE HYDROCHLORIDE 12 MCG: 100 INJECTION, SOLUTION INTRAVENOUS at 13:09

## 2024-07-31 RX ADMIN — HYDROMORPHONE HYDROCHLORIDE 0.2 MG: 0.2 INJECTION, SOLUTION INTRAMUSCULAR; INTRAVENOUS; SUBCUTANEOUS at 07:56

## 2024-07-31 RX ADMIN — INSULIN LISPRO 3 UNITS: 100 INJECTION, SOLUTION INTRAVENOUS; SUBCUTANEOUS at 21:06

## 2024-07-31 RX ADMIN — HEPARIN SODIUM 12 UNITS/KG/HR: 10000 INJECTION, SOLUTION INTRAVENOUS at 02:15

## 2024-07-31 RX ADMIN — METFORMIN HYDROCHLORIDE 850 MG: 850 TABLET, FILM COATED ORAL at 09:26

## 2024-07-31 RX ADMIN — METHADONE HYDROCHLORIDE 20 MG: 10 TABLET ORAL at 23:43

## 2024-07-31 RX ADMIN — MIDAZOLAM 2 MG: 1 INJECTION INTRAMUSCULAR; INTRAVENOUS at 12:55

## 2024-07-31 RX ADMIN — ACETAMINOPHEN 975 MG: 325 TABLET, FILM COATED ORAL at 21:04

## 2024-07-31 RX ADMIN — EPHEDRINE SULFATE 5 MG: 50 INJECTION, SOLUTION INTRAVENOUS at 14:01

## 2024-07-31 RX ADMIN — SODIUM CHLORIDE, SODIUM LACTATE, POTASSIUM CHLORIDE, AND CALCIUM CHLORIDE: .6; .31; .03; .02 INJECTION, SOLUTION INTRAVENOUS at 13:00

## 2024-07-31 RX ADMIN — ONDANSETRON 4 MG: 2 INJECTION INTRAMUSCULAR; INTRAVENOUS at 14:05

## 2024-07-31 RX ADMIN — METOPROLOL SUCCINATE 12.5 MG: 25 TABLET, EXTENDED RELEASE ORAL at 09:26

## 2024-07-31 RX ADMIN — METHOCARBAMOL 500 MG: 500 TABLET ORAL at 18:28

## 2024-07-31 RX ADMIN — CALCIUM GLUCONATE 2 G: 20 INJECTION, SOLUTION INTRAVENOUS at 09:24

## 2024-07-31 RX ADMIN — PHENYLEPHRINE HYDROCHLORIDE 50 MCG/MIN: 10 INJECTION INTRAVENOUS at 13:19

## 2024-07-31 RX ADMIN — CLINDAMYCIN PHOSPHATE 900 MG: 900 INJECTION, SOLUTION INTRAVENOUS at 13:30

## 2024-07-31 RX ADMIN — DOCUSATE SODIUM 100 MG: 100 CAPSULE, LIQUID FILLED ORAL at 18:28

## 2024-07-31 RX ADMIN — ONDANSETRON 4 MG: 2 INJECTION INTRAMUSCULAR; INTRAVENOUS at 05:37

## 2024-07-31 RX ADMIN — ROCURONIUM BROMIDE 40 MG: 10 INJECTION, SOLUTION INTRAVENOUS at 13:09

## 2024-07-31 RX ADMIN — OXYCODONE HYDROCHLORIDE 5 MG: 5 TABLET ORAL at 05:36

## 2024-07-31 RX ADMIN — INSULIN LISPRO 3 UNITS: 100 INJECTION, SOLUTION INTRAVENOUS; SUBCUTANEOUS at 18:34

## 2024-07-31 RX ADMIN — PHENYLEPHRINE HYDROCHLORIDE 100 MCG: 10 INJECTION INTRAVENOUS at 13:54

## 2024-07-31 RX ADMIN — SODIUM CHLORIDE: 0.9 INJECTION, SOLUTION INTRAVENOUS at 13:05

## 2024-07-31 RX ADMIN — Medication 10 MG: at 13:53
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
## 2024-07-31 NOTE — ED NOTES
Patient requesting methadone. Provider is aware. Patient also educated on the use of methadone and opoid prescription painkillers that he is currently getting and how they are meant to counteract each other.      Ness Tinoco RN  07/31/24 [id number]

## 2024-07-31 NOTE — UTILIZATION REVIEW
Initial Clinical Review    Admission: Date/Time/Statement:   Admission Orders (From admission, onward)       Ordered        07/30/24 1710  Inpatient Admission  Once                          Orders Placed This Encounter   Procedures    Inpatient Admission     Standing Status:   Standing     Number of Occurrences:   1     Order Specific Question:   Level of Care     Answer:   Level 2 Stepdown / HOT [14]     Order Specific Question:   Estimated length of stay     Answer:   More than 2 Midnights     Order Specific Question:   Certification     Answer:   I certify that inpatient services are medically necessary for this patient for a duration of greater than two midnights. See H&P and MD Progress Notes for additional information about the patient's course of treatment.     ED Arrival Information       Expected   -    Arrival   7/30/2024 13:53    Acuity   Emergent              Means of arrival   Ambulance    Escorted by   [facility]    Service   Trauma    Admission type   Emergency              Arrival complaint   Bradycardia/fall             Chief Complaint   Patient presents with    Fall     From group home. Pt found down c/o R leg pain. Per EMS, pt seeming lethargic and apneic when not spoken to. Pt denies taking any substance use. EMS reports Frequent PVCs and bradycardic in the 40s.       Initial Presentation: 64 y.o. male   to ER from home via EMS  s/p fall at home (reportedly slipped on a puddle water)   PMH  T2DM   w/long-term insulin use complicated by proliferative diabetic retinopathy and CKD stage II,  adrenal nodule, gastric bypass surgery (November 2011), hypogonadism, vitamin D deficiency, ambulatory dysfunction, erectile dysfunction, anxiety, bipolar disorder, polysubstance abuse on chronic methadone therapy    Per EMS, the patient was intermittently apneic, primarily when not stimulated and had several bradycardic events with several short runs of ventricular tachycardia;  remained oxygenated  on room air and maintained bp.   Pt c/o severe Rt LE pain (noted to be shortened and externally rotated)   IN ER,  pt reports he fell this morning and does not recall mechanism;  poor historian,  GCS 14,   b/l hands tremulous.   repeatedly requesting methadone in ER.    x-rays and a CAT scan of the right hip :  closed [place] intertrochanteric fracture.closed,   Rt proximal femur      7/30   Admit  to trauma service,   INPT  Status,  Level 2 stepdown for trauma workup/management of femur fx.  Cont cardiopulmonary monitoring.  Keep NPO;  IVF hydration  and pain/nausea control.    Serial vs, neuro cks, CIWA Assessments for  withdrawal.  Consult cardiology and ortho.  Obtain echo/ EKG.      Date: 7/31   Day 2: CIWA Scores consistently 0.   Alert/awake.  Impulsive, poor safety awareness and judgement.  Short term memory loss.  C/o ongoing severe Rt LE pain.     7/31  cARDIOLOGY CONSULT  pre op clearance:  EKG shows sinus rhythm.  ECHO w/ no significant wall motion abnormalities, preserved EF. No significant valve disease. No cardiac contraindication proceeding with the surgery. recommend staying off of anticoagulation at this point, particularly given his impending surgery. We can determine postoperatively if there is any indication    Monitor on telemetry after his surgery.     7/31  ORTHO :  Unfortunately, he appears to lack the ability to provide informed consent.  Unfortunately there are no family members around to act as surrogate's  Plan: Initiate two-physician consent, and operative treatment of the right femur can be undertaken  this afternoon         ED Triage Vitals   Temperature Pulse Respirations Blood Pressure SpO2 Pain Score   07/30/24 1610 07/30/24 1402 07/30/24 1402 07/30/24 1415 07/30/24 1402 07/30/24 1606   98.3 °F (36.8 °C) 86 15 (!) 229/110 97 % 10 - Worst Possible Pain     Weight (last 2 days)       Date/Time Weight    07/31/24 0100 79.4 (175)    07/31/24 0000 79.4 (175)            Vital Signs  (last 3 days)       Date/Time Temp Pulse Resp BP MAP (mmHg) SpO2 Nasal Cannula O2 Flow Rate (L/min) Da Coma Scale Score CIWA-Ar Total Pain    07/31/24 1255 -- -- -- -- -- -- -- -- -- 5    07/31/24 1001 -- -- -- -- -- -- -- -- -- 8    07/31/24 0926 -- 78 -- 145/100 -- -- -- -- -- --    07/31/24 0800 -- -- -- -- -- -- -- 15 -- --    07/31/24 0755 -- 86 18 145/100 117 96 % -- -- -- --    07/31/24 0400 -- 82 16 181/101 -- 93 % -- 15 -- 8    07/31/24 0100 -- 65 -- 196/84 -- -- -- -- -- --    07/31/24 0000 -- 65 12 196/84 -- 96 % -- 15 -- --    07/30/24 2200 -- 78 12 188/81 116 95 % -- -- -- --    07/30/24 2000 -- 71 12 208/84 -- 96 % -- 15 -- 8    07/30/24 1910 -- -- -- -- -- -- -- -- 0 --    07/30/24 1900 -- 80 10 177/85 121 97 % -- -- -- --    07/30/24 1815 -- 72 12 179/91 130 95 % -- -- -- --    07/30/24 1800 -- -- -- -- -- -- -- 15 -- --    07/30/24 1719 -- -- -- -- -- -- -- -- -- 8    07/30/24 1610 98.3 °F (36.8 °C) -- -- -- -- -- -- -- -- --    07/30/24 1606 -- -- -- -- -- -- -- -- -- 10 - Worst Possible Pain    07/30/24 1600 -- 84 15 162/71 102 97 % -- -- -- --    07/30/24 1545 -- 80 12 176/103 133 96 % -- -- -- --    07/30/24 1434 -- -- -- -- -- -- -- 14 -- --    07/30/24 1415 -- 76 10 229/110 158 93 % 2 L/min -- -- --    07/30/24 1402 -- 86 15 -- -- 97 % -- -- -- --           CIWA-Ar Score       Row Name 07/30/24 1910             CIWA-Ar    Nausea and Vomiting 0      Tactile Disturbances 0      Tremor 0      Auditory Disturbances 0      Paroxysmal Sweats 0      Visual Disturbances 0      Anxiety 0      Headache, Fullness in Head 0      Agitation 0      Orientation and Clouding of Sensorium 0      CIWA-Ar Total 0                      Pertinent Labs/Diagnostic Test Results:   Radiology:  CT chest abdomen pelvis w contrast   Final Interpretation by Williams Sands MD (07/30 7199)         1. Comminuted right intertrochanteric fracture. No dislocation.   2. No evidence of acute chest trauma.   3. No acute  intra-abdominal or pelvic trauma.   4. Constipation.               Workstation performed: [id number]         CT head wo contrast   Final Interpretation by Guanakito Pike MD (07/30 1537)      No intracranial hemorrhage or calvarial fracture.                  Workstation performed: [id number]         XR chest portable   Final Interpretation by Jennifer Hammonds MD (07/30 1633)      No acute cardiopulmonary disease.            Workstation performed: [id number]         XR pelvis ap only 1 or 2 vw   Final Interpretation by Jennifer Hammonds MD (07/30 1622)      Right intertrochanteric fracture with varus angulation and displaced lesser trochanteric moiety. No hip dislocation.      Remainder of the femur is intact. Pelvic ring is intact.      Left proximal femur ORIF with bridging heterotopic bone formation.         Findings concur with the preliminary report by the referring clinician already in PACS and/or our electronic record EPIC.         Computerized Assisted Algorithm (CAA) may have been used to analyze all applicable images.         Workstation performed: [id number]         XR femur 2 vw right   Final Interpretation by Jennifer Hammonds MD (07/30 1622)      Right intertrochanteric fracture with varus angulation and displaced lesser trochanteric moiety. No hip dislocation.      Remainder of the femur is intact. Pelvic ring is intact.      Left proximal femur ORIF with bridging heterotopic bone formation.         Findings concur with the preliminary report by the referring clinician already in PACS and/or our electronic record EPIC.         Computerized Assisted Algorithm (CAA) may have been used to analyze all applicable images.         Workstation performed: [id number]         XR hip/pelv 1 vw right if performed    (Results Pending)     Cardiology:  No orders to display     GI:  No orders to display           Results from last 7 days   Lab Units 07/31/24  0522 07/30/24  2201 07/30/24  1427   WBC Thousand/uL  9.60  --  11.42*   HEMOGLOBIN g/dL 9.7*  --  11.0*   HEMATOCRIT % 29.8*  --  34.7*   PLATELETS Thousands/uL 175 172 194   TOTAL NEUT ABS Thousands/µL  --   --  7.98*         Results from last 7 days   Lab Units 07/31/24  0522 07/30/24  1427   SODIUM mmol/L 138 137   POTASSIUM mmol/L 4.3 4.5   CHLORIDE mmol/L 99 101   CO2 mmol/L 34* 31   ANION GAP mmol/L 5 5   BUN mg/dL 12 13   CREATININE mg/dL 0.79 0.89   EGFR ml/min/1.73sq m 96 91   CALCIUM mg/dL 8.3* 8.2*     Results from last 7 days   Lab Units 07/30/24  1427   AST U/L 21   ALT U/L 26   ALK PHOS U/L 86   TOTAL PROTEIN g/dL 5.8*   ALBUMIN g/dL 3.4*   TOTAL BILIRUBIN mg/dL 0.37   AMMONIA umol/L 26         Results from last 7 days   Lab Units 07/31/24  0522 07/30/24  1427   GLUCOSE RANDOM mg/dL 178* 239*             BETA-HYDROXYBUTYRATE   Date Value Ref Range Status   08/17/2022 0.0 <0.6 mmol/L Final          Results from last 7 days   Lab Units 07/30/24  1449   PH PABLO  7.458*   PCO2 PABLO mm Hg 41.4*   PO2 PABLO mm Hg 99.4*   HCO3 PABLO mmol/L 28.7   BASE EXC PABLO mmol/L 4.4   O2 CONTENT PABLO ml/dL 14.5   O2 HGB, VENOUS % 92.8*         Results from last 7 days   Lab Units 07/30/24  1427   CK TOTAL U/L 147     Results from last 7 days   Lab Units 07/30/24  1809 07/30/24  1614 07/30/24  1422   HS TNI 0HR ng/L  --   --  3   HS TNI 2HR ng/L  --  4  --    HSTNI D2 ng/L  --  1  --    HS TNI 4HR ng/L 6  --   --    HSTNI D4 ng/L 3  --   --          Results from last 7 days   Lab Units 07/30/24  2201   PROTIME seconds 15.3*   INR  1.22*   PTT seconds 29     Results from last 7 days   Lab Units 07/30/24  1427   TSH 3RD GENERATON uIU/mL 0.830       Results from last 7 days   Lab Units 07/30/24  2201   SED RATE mm/hour 8             Results from last 7 days   Lab Units 07/30/24  1806   CLARITY UA  Clear   COLOR UA  Colorless   SPEC GRAV UA  1.020   PH UA  6.0   GLUCOSE UA mg/dl 500 (1/2%)*   KETONES UA mg/dl Negative   BLOOD UA  Small*   PROTEIN UA mg/dl 100 (2+)*   NITRITE UA   Negative   BILIRUBIN UA  Negative   UROBILINOGEN UA (BE) mg/dl <2.0   LEUKOCYTES UA  Small*   WBC UA /hpf 1-2   RBC UA /hpf 2-4*   BACTERIA UA /hpf Occasional   EPITHELIAL CELLS WET PREP /hpf Occasional             Results from last 7 days   Lab Units 07/30/24  1805   AMPH/METH  Negative   BARBITURATE UR  Negative   BENZODIAZEPINE UR  Positive*   COCAINE UR  Negative   METHADONE URINE  Positive*   OPIATE UR  Negative   PCP UR  Negative   THC UR  Positive*     Results from last 7 days   Lab Units 07/30/24  1427   ETHANOL LVL mg/dL <10   ACETAMINOPHEN LVL ug/mL <2*   SALICYLATE LVL mg/dL <5     ED Treatment-Medication Administration from 07/30/2024 1353 to 07/31/2024 1253         Date/Time Order Dose Route Action     07/30/2024 1606 HYDROmorphone (DILAUDID) injection 0.5 mg 0.5 mg Intravenous Given     07/30/2024 1812 enoxaparin (LOVENOX) subcutaneous injection 40 mg 40 mg Subcutaneous Given     07/31/2024 0537 enoxaparin (LOVENOX) subcutaneous injection 40 mg 40 mg Subcutaneous Given     07/30/2024 1737 iohexol (OMNIPAQUE) 350 MG/ML injection (MULTI-DOSE) 100 mL 100 mL Intravenous Given     07/30/2024 2159 gabapentin (NEURONTIN) capsule 600 mg 600 mg Oral Given     07/31/2024 0926 gabapentin (NEURONTIN) capsule 600 mg 600 mg Oral Given     07/31/2024 0926 metFORMIN (GLUCOPHAGE) tablet 850 mg 850 mg Oral Given     07/31/2024 0537 ALPRAZolam (XANAX) tablet 1 mg 1 mg Oral Given     07/31/2024 0926 metoprolol succinate (TOPROL-XL) 24 hr tablet 12.5 mg 12.5 mg Oral Given     07/31/2024 0215 heparin (porcine) 25,000 units in 0.45% NaCl 250 mL infusion (premix) 12 Units/kg/hr Intravenous New Bag     07/31/2024 0537 acetaminophen (TYLENOL) tablet 975 mg 975 mg Oral Given     07/31/2024 0536 oxyCODONE (ROXICODONE) IR tablet 5 mg 5 mg Oral Given     07/31/2024 1001 oxyCODONE (ROXICODONE) IR tablet 5 mg 5 mg Oral Given     07/31/2024 0756 HYDROmorphone HCl (DILAUDID) injection 0.2 mg 0.2 mg Intravenous Given     07/31/2024 0513  ondansetron (ZOFRAN) injection 4 mg 4 mg Intravenous Given     07/31/2024 0920 multi-electrolyte (PLASMALYTE-A/ISOLYTE-S PH 7.4) IV solution 100 mL/hr Intravenous New Bag     07/31/2024 0924 calcium gluconate 2 g in sodium chloride 0.9% 100 mL (premix) 2 g Intravenous New Bag     07/31/2024 0923 magnesium sulfate 2 g/50 mL IVPB (premix) 2 g 2 g Intravenous New Bag            Past Medical History:   Diagnosis Date    Anxiety     Depression     Diabetes mellitus (HCC)     Drug use     Hemorrhoids, internal 9/23/2019    Hypertension    Admitting Diagnosis: Bradycardia [R00.1]  Closed right hip fracture (HCC) [S72.001A]  Age/Sex: 64 y.o. male  Admission Orders:    scd's ;  PT/OT postop ;  neuro ck q4H;  VS q4H.    Scheduled Medications:  [Transfer Hold] acetaminophen, 975 mg, Oral, Q8H MELISA  [Transfer Hold] DULoxetine, 60 mg, Oral, Daily  [Transfer Hold] enoxaparin, 40 mg, Subcutaneous, Q12H  [Transfer Hold] gabapentin, 600 mg, Oral, BID  [Transfer Hold] metFORMIN, 850 mg, Oral, Daily With Breakfast  [Transfer Hold] metoprolol succinate, 12.5 mg, Oral, Daily      Continuous IV Infusions:  heparin (porcine), 3-20 Units/kg/hr (Order-Specific), Intravenous, Titrated  multi-electrolyte, 100 mL/hr, Intravenous, Continuous      PRN Meds:  [Transfer Hold] ALPRAZolam, 1 mg, Oral, Daily PRN  [Transfer Hold] HYDROmorphone, 0.2 mg, Intravenous, Q2H PRN  [Transfer Hold] oxyCODONE, 2.5 mg, Oral, Q4H PRN   Or  [Transfer Hold] oxyCODONE, 5 mg, Oral, Q4H PRN        IP CONSULT TO ORTHOPEDIC SURGERY  IP CONSULT TO ENDOCRINOLOGY  IP CONSULT TO CARDIOLOGY    Network Utilization Review Department  ATTENTION: Please call with any questions or concerns to [phone] and carefully listen to the prompts so that you are directed to the right person. All voicemails are confidential.   For Discharge needs, contact Care Management DC Support Team at [phone] opt. 2  Send all requests for admission clinical reviews, approved or denied  determinations and any other requests to dedicated fax number below belonging to the campus where the patient is receiving treatment. List of dedicated fax numbers for the Facilities:  FACILITY NAME UR FAX NUMBER   ADMISSION DENIALS (Administrative/Medical Necessity) [phone]   DISCHARGE SUPPORT TEAM (NETWORK) [phone]   PARENT CHILD HEALTH (Maternity/NICU/Pediatrics) [phone]   [facility] [phone]   [facility] [phone]   [facility] [phone]   [facility] [phone]   [facility] [phone]   [facility] [phone]   [facility] [phone]   [facility] [phone]   [facility] [phone]   [facility] [phone]   [facility] [phone]   [facility] [phone]

## 2024-07-31 NOTE — ANESTHESIA POSTPROCEDURE EVALUATION
Post-Op Assessment Note    CV Status:  Stable  Pain Score: 0    Pain management: adequate       Mental Status:  Alert and awake   Hydration Status:  Euvolemic   PONV Controlled:  Controlled   Airway Patency:  Patent     Post Op Vitals Reviewed: Yes    No anethesia notable event occurred.    Staff: CRNA               /74 (07/31/24 1435)    Temp (!) 97.4 °F (36.3 °C) (07/31/24 1435)    Pulse 73 (07/31/24 1435)   Resp 13 (07/31/24 1435)    SpO2 100 % (07/31/24 1435)

## 2024-07-31 NOTE — PROGRESS NOTES
Pt admitted into 604. Admission completed, best attempt via patient provided information. Right leg, x 2 incision (clean,dry,intact), palpable pedal pulse, extremity warm. Complaining of pain in right leg. Old scars noted through Belongings placed in closet. 10 pills (xanax, per pt) sent to pharmacy. Trauma team aware, room searched per request. Security at bedside, nursing supervisor made aware. 3 lighters removed from room, no other medication/contraband. Pt requesting methadone and xanax frequently, per patient receives his medications from [facility] in [place]. Resting in bed, call bell within reach, bed check on.

## 2024-07-31 NOTE — CONSULTS
Consultation - General Cardiology Team 2  David Skelton Jr. 64 y.o. male MRN: [id number]  Unit/Bed#: ED 25 Encounter: [id number]      Inpatient consult to Cardiology  Consult performed by: Angelo Cho MD  Consult ordered by: Radha Keller MD        PCP: Casey Mckeon MD   Outpatient Cardiologist: Not following with outpatient cardiology    History of Present Illness   Physician Requesting Consult: Lauryn Ullrich, DO  Reason for Consult / Principal Problem: Preoperative risk assessment    Assessment and Plan      Assessment:  Preoperative risk assessment  History of atrial fibrillation  Hypertension  Bipolar disorder  Depression/anxiety  Diabetes mellitus type 2  History of substance use    Preoperative risk assessment  -Patient has a prior echo from 2018 with findings:LVEF 60-65% with no significant wall motion abnormalities, LA normal and no significant valvular abnormalities.  No other recent Holter, echo, cardiac catheter results available at this time for review.  -Due to his history of atrial fibrillation and patient reported of having bradycardia and NSVT episodes, a stat echo was ordered and performed.  Does not appear to have wall motion abnormalities with preserved EF in the range of 60 to 65% on my impression.  -From cardiology standpoint, we recommend a low-dose beta-blocker, metoprolol succinate 12.5 mg daily and heparin drip as anticoagulation for his history of atrial fibrillation  -Prior labs and relevant imaging reviewed, patient may proceed with upcoming surgery without further testing or imaging from cardiology standpoint  -Tucker risk of 0.3%, RCRI of 0 points    History of atrial fibrillation  -[id number] score of 2  -Was previously on anticoagulation, does not appear to be currently on it  -We would recommend initiating heparin drip for this, can eventually be switched to a DOAC  -Based on read from prior echo from 2018, patient did not appear to have LA dilatation at the  time  -Started on metoprolol succinate 12.5 mg daily    Hypertension  -Noted to be in systolic ranges of 170-180 mmHg  -Can consider initiation of an ACE inhibitor or an ARB postoperatively from the standpoint    Bipolar disorder  Depression/anxiety  Diabetes mellitus type 2  History of substance use    Case discussed and reviewed with Dr. Rodriguez. Please wait for final recommendations from Dr. Rodriguez. Thank you for involving us in the care of your patient.    Subjective     HPI:   64-year-old male with history of hypertension, bipolar disorder, depression/anxiety, diabetes mellitus type 2, history of substance use, history of atrial fibrillation because of which patient was previously on anticoagulation.  Patient presented to the ED after unwitnessed fall earlier this morning.  He was subsequently brought in by EMS and as per report received from EMS, patient was noted to be intermittently apneic, had several episodes of bradycardia along the way and possibly short runs of NSVT.  Upon arrival to the ED, patient was placed on telemetry and was seen and evaluated by orthopedics team and  is undergoing planning for repair of intertrochanteric fracture.  Cardiology has been consulted for preoperative risk assessment in this patient.      Review of Systems   Constitutional:  Positive for activity change. Negative for chills and fever.   Respiratory:  Negative for chest tightness.    Cardiovascular:  Negative for palpitations.   Gastrointestinal:  Negative for abdominal pain.     Historical Information   Past Medical History:   Diagnosis Date    Anxiety     Depression     Diabetes mellitus (HCC)     Drug use     Hemorrhoids, internal 9/23/2019    Hypertension      Past Surgical History:   Procedure Laterality Date    FEMUR FRACTURE SURGERY Right     GASTRIC BYPASS  11/08/2011    Managed by: Braden Luna (General Surgery)    HERNIA REPAIR  02/13/2013    Incisional hernia repair Managed by: Braden Luna (General Surgery)     TONSILLECTOMY  2010     Social History     Substance and Sexual Activity   Alcohol Use Never     Social History     Substance and Sexual Activity   Drug Use Yes    Comment: Methadone clinic     Social History     Tobacco Use   Smoking Status Never   Smokeless Tobacco Never       Meds/Allergies   Hospital Medications:   Current Facility-Administered Medications   Medication Dose Route Frequency    ALPRAZolam (XANAX) tablet 1 mg  1 mg Oral Daily PRN    DULoxetine (CYMBALTA) delayed release capsule 60 mg  60 mg Oral Daily    enoxaparin (LOVENOX) subcutaneous injection 40 mg  40 mg Subcutaneous Q12H    gabapentin (NEURONTIN) capsule 600 mg  600 mg Oral BID    metFORMIN (GLUCOPHAGE) tablet 850 mg  850 mg Oral Daily With Breakfast     Home Medications: Not in a hospital admission.    Allergies   Allergen Reactions    Aspirin Shortness Of Breath    Penicillins        Objective     Physical Exam  Vitals reviewed.   Constitutional:       General: He is sleeping.      Comments: Seen lying in bed   HENT:      Head: Normocephalic.   Cardiovascular:      Rate and Rhythm: Normal rate and regular rhythm.      Pulses: Normal pulses.      Heart sounds: Normal heart sounds.   Pulmonary:      Effort: Pulmonary effort is normal.      Comments: Decreased breath sounds in lower lung fields  Abdominal:      General: Bowel sounds are normal.      Palpations: Abdomen is soft.   Musculoskeletal:      Comments: No lower extremity edema noted   Skin:     General: Skin is warm and dry.      Capillary Refill: Capillary refill takes less than 2 seconds.         Vitals:  Temp:  [98.3 °F (36.8 °C)] 98.3 °F (36.8 °C)  HR:  [71-86] 78  Resp:  [10-15] 12  BP: (162-229)/() 188/81  Orthostatic Blood Pressures      Flowsheet Row Most Recent Value   Blood Pressure 188/81 filed at 07/30/2024 2200   Patient Position - Orthostatic VS Lying filed at 07/30/2024 2200            Intake and Outputs:  I/O         07/29 0701 07/30 0700 07/30 0701 07/31  0700    Urine  1275    Total Output  1275    Net  -1275                  Labs & Results:          Results from last 7 days   Lab Units 07/30/24  1427   POTASSIUM mmol/L 4.5   CO2 mmol/L 31   CHLORIDE mmol/L 101   BUN mg/dL 13   CREATININE mg/dL 0.89   EGFR ml/min/1.73sq m 91     Results from last 7 days   Lab Units 07/30/24  1427   AST U/L 21   ALT U/L 26   ALK PHOS U/L 86   TOTAL PROTEIN g/dL 5.8*   ALBUMIN g/dL 3.4*   TOTAL BILIRUBIN mg/dL 0.37     Results from last 7 days   Lab Units 07/30/24  2201   PROTIME seconds 15.3*   INR  1.22*     Results from last 7 days   Lab Units 07/30/24  2201 07/30/24  1427   HEMOGLOBIN g/dL  --  11.0*   HEMATOCRIT %  --  34.7*   PLATELETS Thousands/uL 172 194             Current Facility-Administered Medications:     ALPRAZolam (XANAX) tablet 1 mg, 1 mg, Oral, Daily PRN, Reema Vysetty, DO    DULoxetine (CYMBALTA) delayed release capsule 60 mg, 60 mg, Oral, Daily, Reema Vysetty, DO    enoxaparin (LOVENOX) subcutaneous injection 40 mg, 40 mg, Subcutaneous, Q12H, Reema Vysetty, DO, 40 mg at 07/30/24 1812    gabapentin (NEURONTIN) capsule 600 mg, 600 mg, Oral, BID, Reema Vysetty, DO, 600 mg at 07/30/24 2159    metFORMIN (GLUCOPHAGE) tablet 850 mg, 850 mg, Oral, Daily With Breakfast, Reema Vysetty, DO    Current Outpatient Medications:     acetaminophen (TYLENOL) 325 mg tablet, Take 2 tablets (650 mg total) by mouth every 4 (four) hours as needed for mild pain, Disp: , Rfl: 0    ALPRAZolam (XANAX) 1 mg tablet, Take 1 tablet (1 mg total) by mouth daily as needed for anxiety, Disp: 30 tablet, Rfl: 1    aluminum-magnesium hydroxide 200-200 MG/5ML suspension, Take 15 mL by mouth every 6 (six) hours as needed for heartburn (Patient not taking: Reported on 7/3/2024), Disp: 355 mL, Rfl: 0    bacitracin-polymyxin b (POLYSPORIN) ointment, Apply topically 2 (two) times a day, Disp: 30 g, Rfl: 1    benztropine (COGENTIN) 2 mg tablet, , Disp: , Rfl:     Caplyta 42 MG CAPS  capsule, Take 42 mg by mouth daily at bedtime (Patient not taking: Reported on 4/25/2024), Disp: , Rfl:     Continuous Blood Gluc  (FreeStyle Grace 14 Day Nolan) STEVE, 1 Device by Device route 3 (three) times a day before meals (Patient not taking: Reported on 5/24/2024), Disp: 1 each, Rfl: 0    Continuous Blood Gluc  (FreeStyle Grace 2 Nolan) STEVE, 1 Device by Device route 3 (three) times a day before meals (Patient not taking: Reported on 7/3/2024), Disp: 1 each, Rfl: 0    divalproex sodium (DEPAKOTE ER) 500 mg 24 hr tablet, Take 1 tablet (500 mg total) by mouth every 12 (twelve) hours, Disp: 60 tablet, Rfl: 5    docusate sodium (COLACE) 100 mg capsule, Take 1 capsule (100 mg total) by mouth 2 (two) times a day for 5 days, Disp: 10 capsule, Rfl: 0    doxepin (SINEquan) 100 mg capsule, Take 100 mg by mouth daily at bedtime (Patient not taking: Reported on 12/8/2023), Disp: , Rfl:     doxepin (SINEquan) 150 MG capsule, Take 150 mg by mouth daily at bedtime (Patient not taking: Reported on 7/3/2024), Disp: , Rfl:     DULoxetine (CYMBALTA) 60 mg delayed release capsule, Take 60 mg by mouth in the morning, Disp: , Rfl:     ergocalciferol (ERGOCALCIFEROL) 1.25 MG (09104 UT) capsule, Take 50,000 Units by mouth, Disp: , Rfl:     fluticasone (FLONASE) 50 mcg/act nasal spray, instill 1 spray into each nostril once daily, Disp: 16 g, Rfl: 5    gabapentin (NEURONTIN) 600 MG tablet, TAKE 1 TABLET BY MOUTH TWICE DAILY, Disp: 60 tablet, Rfl: 2    GLOBAL EASE INJECT PEN NEEDLES 31G X 8 MM MISC, , Disp: , Rfl: 5    Incontinence Supply Disposable (INCONTINENCE BRIEF MEDIUM) MISC, by Does not apply route as needed (fecal soiling) (Patient not taking: Reported on 7/3/2024), Disp: 90 each, Rfl: 5    Incontinence Supply Disposable (RA WIPES FLUSHABLE/MOIST) MISC, by Does not apply route as needed (fecal soiling), Disp: 100 each, Rfl: 5    Insulin Pen Needle (B-D UF III MINI PEN NEEDLES) 31G X 5 MM MISC, Inject 15  Units under the skin daily at bedtime, Disp: 100 each, Rfl: 5    Kerendia 10 MG TABS, TAKE 10 MG BY MOUTH EVERY MORNING. (Patient not taking: Reported on 3/1/2024), Disp: , Rfl:     metFORMIN (GLUCOPHAGE) 850 mg tablet, Take 1 tablet (850 mg total) by mouth daily with breakfast, Disp: 90 tablet, Rfl: 1    methadone (DOLOPHINE) 5 mg tablet, Take 165 mg by mouth daily, Disp: , Rfl:     methocarbamol (ROBAXIN) 500 mg tablet, Take 1 tablet (500 mg total) by mouth every 8 (eight) hours as needed for muscle spasms, Disp: 20 tablet, Rfl: 0    Misc. Devices (CANE) MISC, by Does not apply route daily Dx:  Frequent falls, Disp: 1 each, Rfl: 0    naloxone (NARCAN) 4 mg/0.1 mL nasal spray, Administer 1 spray into a nostril. If no response after 2-3 minutes, give another dose in the other nostril using a new spray. (Patient not taking: Reported on 5/24/2024), Disp: 1 each, Rfl: 2    naloxone (NARCAN) 4 mg/0.1 mL nasal spray, Administer 1 spray into a nostril. If no response after 2-3 minutes, give another dose in the other nostril using a new spray. (Patient not taking: Reported on 5/31/2024), Disp: 1 each, Rfl: 1    ondansetron (ZOFRAN-ODT) 4 mg disintegrating tablet, Take 1 tablet (4 mg total) by mouth every 6 (six) hours as needed for vomiting, Disp: 30 tablet, Rfl: 0    paliperidone (INVEGA) 6 MG 24 hr tablet, Take 6 mg by mouth daily at bedtime (Patient not taking: Reported on 5/24/2024), Disp: , Rfl:     QUEtiapine (SEROquel XR) 150 mg 24 hr tablet, Take 150 mg by mouth daily at bedtime (Patient not taking: Reported on 7/3/2024), Disp: , Rfl:     sertraline (ZOLOFT) 100 mg tablet, Take 100 mg by mouth every morning (Patient not taking: Reported on 6/10/2024), Disp: , Rfl:     Synjardy 12.5-1000 MG TABS, TAKE ONE TABLET BY MOUTH 2 (TWO) TIMES A DAY. (Patient not taking: Reported on 7/3/2024), Disp: , Rfl:     Trulicity 1.5 MG/0.5ML injection, , Disp: , Rfl:     UNKNOWN TO PATIENT, , Disp: , Rfl:     zolpidem (AMBIEN) 10 mg  tablet, Take 10 mg by mouth daily at bedtime, Disp: , Rfl:     Cardiac Imaging/Monitoring     Telemetry:   Personally reviewed by Angelo Cho MD: Frequent PVCs with underlying sinus rhythm     Previous Cath/PCI:  No results found for this or any previous visit.      Prior Echo:   -Echo from 06/2018 from [facility] with LVEF 60-65% with no significant wall motion abnormalities, LA normal and no significant valvular abnormalities.    Prior Stress Test:  No results found for this or any previous visit.       Recent Device Check:  No results found for any visits on 07/30/24.     EKG:  Encounter Date: 07/30/24   ECG 12 lead   Result Value    Ventricular Rate 91    Atrial Rate 75    AR Interval     QRSD Interval 98    QT Interval 518    QTC Interval 637    P Axis     QRS Axis 67    T Wave [place] 53       Angelo Cho MD  Cardiovascular Disease Fellow, FY-1  [facility]

## 2024-07-31 NOTE — PROGRESS NOTES
"Progress Note - Trauma   David Skelton Jr. 64 y.o. male [id number]   Unit/Bed#: [facility] Encounter: [id number]     ASSESSMENT:   Patient Active Problem List   Diagnosis    ADD (attention deficit disorder) without hyperactivity    Allergic rhinitis    Atrial fibrillation (HCC)    Benign prostatic hyperplasia without lower urinary tract symptoms    Bipolar disorder (HCC)    Chronic anticoagulation    Generalized anxiety disorder    GERD without esophagitis    Benign essential hypertension    Insomnia    Iron deficiency anemia    Macular degeneration    Major depression, chronic    Mixed hyperlipidemia    Complex medical condition    Spinal stenosis    Stable proliferative diabetic retinopathy of left eye associated with type 2 diabetes mellitus (HCC)    Type 2 diabetes mellitus with ophthalmic complication, with long-term current use of insulin (HCC)    Ulcer of leg, chronic, left (HCC)    Vitamin D deficiency    History of hepatitis C    Narcotic abuse (HCC)    Rectal prolapse    Drug-induced constipation    Hemorrhoids, internal    S/P gastric bypass    Hypogonadism male    Pre-operative general physical examination    Thoracic degenerative disc disease    DDD (degenerative disc disease), lumbar    Liver lesion, right lobe    Lung nodule    Adrenal nodule (HCC)    Methadone dependence (HCC)    Encephalopathy    Closed fracture of multiple ribs of left side    Bradycardia    Retinopathy due to secondary DM (HCC)    Visual changes    B12 deficiency      PLAN:  OR today  Confirm methadone dose, reorder  Monitoring  F/u cards recs  Holding hep gtt    Bowel Regimen: n/a  VTE Prophylaxis:Reason for no pharmacologic prophylaxis held for OR      Disposition: SD2    Subjective   Chief Complaint: \"I need my methadone\"    Subjective: Pt seen at bedside. C/o needing his methadone dose, states that he feels he is going to w/d, stating his lips are dry and chapped. C/o RLE pain. Denying any other complaints     Objective   Vitals: " "  Temp:  [97.4 °F (36.3 °C)-98.3 °F (36.8 °C)] 97.4 °F (36.3 °C)  HR:  [65-86] 73  Resp:  [10-18] 13  BP: (145-208)/() 159/74    I/O         07/29 0701  07/30 0700 07/30 0701  07/31 0700 07/31 0701 08/01 0700    I.V. (mL/kg)   1000 (12.6)    Total Intake(mL/kg)   1000 (12.6)    Urine (mL/kg/hr)  1275     Total Output  1275     Net  -1275 +1000                  Physical Exam:   GENERAL APPEARANCE: nad  NEURO: no fnd  HEENT: atnc  CV: rrr  LUNGS: ctab  GI: soft nt  : n/a  MSK: RLE short and ER, ttp  SKIN: warm dry    Invasive Devices       Peripheral Intravenous Line  Duration             Peripheral IV 07/30/24 Distal;Left;Ventral (anterior) Forearm 1 day    Peripheral IV 07/30/24 Dorsal (posterior);Right Hand 1 day                      Lab Results: Results: I have personally reviewed all pertinent laboratory/tests results, BMP/CMP:   Lab Results   Component Value Date    SODIUM 138 07/31/2024    K 4.3 07/31/2024    CL 99 07/31/2024    CO2 34 (H) 07/31/2024    BUN 12 07/31/2024    CREATININE 0.79 07/31/2024    CALCIUM 8.3 (L) 07/31/2024    EGFR 96 07/31/2024   , CBC:   Lab Results   Component Value Date    WBC 9.60 07/31/2024    HGB 9.7 (L) 07/31/2024    HCT 29.8 (L) 07/31/2024    MCV 87 07/31/2024     07/31/2024    RBC 3.41 (L) 07/31/2024    MCH 28.4 07/31/2024    MCHC 32.6 07/31/2024    RDW 12.9 07/31/2024    MPV 11.7 07/31/2024   , Coagulation:   Lab Results   Component Value Date    INR 1.22 (H) 07/30/2024   , Troponin: No results found for: \"TROPONINI\", EtOH: No results found for: \"ETOH\", and UDS: No components found for: \"RAPIDDRUGSCREEN\"  Imaging/EKG Studies: I have personally reviewed pertinent reports.   and I have personally reviewed pertinent films in PACS   Other Studies: n/a     "

## 2024-07-31 NOTE — PROGRESS NOTES
2 physician consent part 2    Agree with Dr. Nelson's documentation. Pt unable to consent himself for procedure, unable to reach patient's emergency contact.    No reasonable alternative in treatment apart from surgery, and delaying operative repair could impart unnecessary morbidity on the patient.  Agree with proceeding with emergent consent

## 2024-07-31 NOTE — ANESTHESIA PREPROCEDURE EVALUATION
Procedure:  INSERTION NAIL IM FEMUR ANTEGRADE (TROCHANTERIC) (Right: Leg Upper)    Right femur fracture  Encephalopathy present on admission  Per cardiology note, not currently on meds for afib, no AC  Lvef normal on stat bedside echo    Per orthopedic surgery note:  Consent to be obtained from sister Vandana Skelton at [phone] - she has been contacted x 4 at the number listed above without answer    Relevant Problems   CARDIO   (+) Atrial fibrillation (HCC)   (+) Benign essential hypertension   (+) Mixed hyperlipidemia      ENDO   (+) Type 2 diabetes mellitus with ophthalmic complication, with long-term current use of insulin (HCC)      GI/HEPATIC   (+) GERD without esophagitis   (+) Liver lesion, right lobe      /RENAL   (+) Benign prostatic hyperplasia without lower urinary tract symptoms      HEMATOLOGY   (+) Iron deficiency anemia      MUSCULOSKELETAL   (+) DDD (degenerative disc disease), lumbar   (+) Thoracic degenerative disc disease      NEURO/PSYCH   (+) Generalized anxiety disorder   (+) Major depression, chronic      Digestive   (+) History of hepatitis C      Behavioral Health   (+) Methadone dependence (HCC)      Surgery/Wound/Pain   (+) S/P gastric bypass      Orthopedic/Musculoskeletal   (+) Spinal stenosis        Physical Exam    Airway    Mallampati score: II  TM Distance: >3 FB  Neck ROM: full     Dental        Cardiovascular      Pulmonary      Other Findings        Anesthesia Plan  ASA Score- 3     Anesthesia Type- general with ASA Monitors.         Additional Monitors:     Airway Plan: ETT.    Comment: Not a candidate for spinal. Received Lovenox injection at 530 am this morning.    Recent labs personally reviewed:  Lab Results       Component                Value               Date                       WBC                      9.60                07/31/2024                 HGB                      9.7 (L)             07/31/2024                 PLT                      175                  07/31/2024            Lab Results       Component                Value               Date                       NA                       136                 01/29/2015                 K                        4.3                 07/31/2024                 BUN                      12                  07/31/2024                 CREATININE               0.79                07/31/2024                 GLUCOSE                  375 (H)             08/17/2022              Lab Results       Component                Value               Date                       PTT                      29                  07/30/2024             Lab Results       Component                Value               Date                       INR                      1.22 (H)            07/30/2024              Blood type O      I, Kristel Cornejo MD, have personally seen and evaluated the patient prior to anesthetic care.  I have reviewed the pre-anesthetic record, medical history, allergies, medications and any other medical records if appropriate to the anesthetic care.  If a CRNA is involved in the case, I have reviewed the CRNA assessment, if present, and agree. I consented the patient for general anesthesia with appropriate airway support as indicated. We reviewed the risks associated including PONV, sore throat, allergic reaction to anesthetics and management plan to address these issues. We discussed the indication and risks associated with any invasive monitors that would be placed. We discussed post op pain control and expectations. We discussed rare complications including hypoxia, perioperative cardiac and neurologic events, and death based on the patient's baseline risk. All questions and concerns were addressed.   .       Plan Factors-Exercise tolerance (METS): >4 METS.    Chart reviewed. EKG reviewed. Imaging results reviewed. Existing labs reviewed. Patient summary reviewed.    Patient is not a current smoker.  Patient did not smoke on day  of surgery.    Obstructive sleep apnea risk education given perioperatively.        Induction- intravenous.    Postoperative Plan-     Perioperative Resuscitation Plan - Level 1 - Full Code.       Informed Consent- Anesthetic plan and risks discussed with patient (2 physician consent obtained, myself and Dr. Hill).  I personally reviewed this patient with the CRNA. Discussed and agreed on the Anesthesia Plan with the CRNA..

## 2024-07-31 NOTE — PROGRESS NOTES
Part 1 of 2 physician consent    This patient is a 64-year-old male who lacks the ability to provide consent.  He has a displaced three-part intertrochanter fracture of the right proximal femur which causes significant pain  His diagnosis is fracture of the right proximal femur  Recommendation is for operative intervention to be performed expediently to reduce pain, and reduce complications associate with delaying care  Ordinarily a competent patient, or power of  would consent to surgery in order to reduce pain and reduce complications associated with delay of treatment of hip fracture surgery which can be significant  On the basis of the above, proceeding with operative intervention of the right proximal femur should be performed in the best interest of the patient    Partha Nelson MD

## 2024-08-01 PROBLEM — R35.1 NOCTURIA ASSOCIATED WITH BENIGN PROSTATIC HYPERPLASIA: Status: ACTIVE | Noted: 2024-08-01

## 2024-08-01 PROBLEM — R31.0 GROSS HEMATURIA: Status: ACTIVE | Noted: 2024-08-01

## 2024-08-01 PROBLEM — N40.1 NOCTURIA ASSOCIATED WITH BENIGN PROSTATIC HYPERPLASIA: Status: ACTIVE | Noted: 2024-08-01

## 2024-08-01 LAB
ABO GROUP BLD BPU: NORMAL
ABO GROUP BLD BPU: NORMAL
ALBUMIN SERPL BCG-MCNC: 3 G/DL (ref 3.5–5)
ALP SERPL-CCNC: 67 U/L (ref 34–104)
ALT SERPL W P-5'-P-CCNC: 17 U/L (ref 7–52)
ANION GAP SERPL CALCULATED.3IONS-SCNC: 3 MMOL/L (ref 4–13)
AST SERPL W P-5'-P-CCNC: 17 U/L (ref 13–39)
BASOPHILS # BLD AUTO: 0.01 THOUSANDS/ÂΜL (ref 0–0.1)
BASOPHILS NFR BLD AUTO: 0 % (ref 0–1)
BILIRUB SERPL-MCNC: 0.37 MG/DL (ref 0.2–1)
BPU ID: NORMAL
BPU ID: NORMAL
BUN SERPL-MCNC: 16 MG/DL (ref 5–25)
CALCIUM ALBUM COR SERPL-MCNC: 8.7 MG/DL (ref 8.3–10.1)
CALCIUM SERPL-MCNC: 7.9 MG/DL (ref 8.4–10.2)
CHLORIDE SERPL-SCNC: 97 MMOL/L (ref 96–108)
CO2 SERPL-SCNC: 33 MMOL/L (ref 21–32)
CREAT SERPL-MCNC: 0.84 MG/DL (ref 0.6–1.3)
CROSSMATCH: NORMAL
CROSSMATCH: NORMAL
EOSINOPHIL # BLD AUTO: 0 THOUSAND/ÂΜL (ref 0–0.61)
EOSINOPHIL NFR BLD AUTO: 0 % (ref 0–6)
ERYTHROCYTE [DISTWIDTH] IN BLOOD BY AUTOMATED COUNT: 12.7 % (ref 11.6–15.1)
GFR SERPL CREATININE-BSD FRML MDRD: 93 ML/MIN/1.73SQ M
GLUCOSE SERPL-MCNC: 242 MG/DL (ref 65–140)
GLUCOSE SERPL-MCNC: 259 MG/DL (ref 65–140)
GLUCOSE SERPL-MCNC: 273 MG/DL (ref 65–140)
GLUCOSE SERPL-MCNC: 282 MG/DL (ref 65–140)
GLUCOSE SERPL-MCNC: 308 MG/DL (ref 65–140)
HCT VFR BLD AUTO: 24.2 % (ref 36.5–49.3)
HGB BLD-MCNC: 7.9 G/DL (ref 12–17)
IMM GRANULOCYTES # BLD AUTO: 0.05 THOUSAND/UL (ref 0–0.2)
IMM GRANULOCYTES NFR BLD AUTO: 1 % (ref 0–2)
LYMPHOCYTES # BLD AUTO: 0.68 THOUSANDS/ÂΜL (ref 0.6–4.47)
LYMPHOCYTES NFR BLD AUTO: 6 % (ref 14–44)
MAGNESIUM SERPL-MCNC: 1.9 MG/DL (ref 1.9–2.7)
MCH RBC QN AUTO: 28.6 PG (ref 26.8–34.3)
MCHC RBC AUTO-ENTMCNC: 32.6 G/DL (ref 31.4–37.4)
MCV RBC AUTO: 88 FL (ref 82–98)
MONOCYTES # BLD AUTO: 0.88 THOUSAND/ÂΜL (ref 0.17–1.22)
MONOCYTES NFR BLD AUTO: 8 % (ref 4–12)
NEUTROPHILS # BLD AUTO: 9.04 THOUSANDS/ÂΜL (ref 1.85–7.62)
NEUTS SEG NFR BLD AUTO: 85 % (ref 43–75)
NRBC BLD AUTO-RTO: 0 /100 WBCS
PLATELET # BLD AUTO: 160 THOUSANDS/UL (ref 149–390)
PMV BLD AUTO: 11.8 FL (ref 8.9–12.7)
POTASSIUM SERPL-SCNC: 4.8 MMOL/L (ref 3.5–5.3)
PROT SERPL-MCNC: 5.5 G/DL (ref 6.4–8.4)
RBC # BLD AUTO: 2.76 MILLION/UL (ref 3.88–5.62)
SODIUM SERPL-SCNC: 133 MMOL/L (ref 135–147)
UNIT DISPENSE STATUS: NORMAL
UNIT DISPENSE STATUS: NORMAL
UNIT PRODUCT CODE: NORMAL
UNIT PRODUCT CODE: NORMAL
UNIT PRODUCT VOLUME: 350 ML
UNIT PRODUCT VOLUME: 350 ML
UNIT RH: NORMAL
UNIT RH: NORMAL
WBC # BLD AUTO: 10.66 THOUSAND/UL (ref 4.31–10.16)

## 2024-08-01 PROCEDURE — 99254 IP/OBS CNSLTJ NEW/EST MOD 60: CPT | Performed by: PSYCHIATRY & NEUROLOGY

## 2024-08-01 PROCEDURE — 80053 COMPREHEN METABOLIC PANEL: CPT

## 2024-08-01 PROCEDURE — 97167 OT EVAL HIGH COMPLEX 60 MIN: CPT

## 2024-08-01 PROCEDURE — 85025 COMPLETE CBC W/AUTO DIFF WBC: CPT

## 2024-08-01 PROCEDURE — 83735 ASSAY OF MAGNESIUM: CPT

## 2024-08-01 PROCEDURE — NC001 PR NO CHARGE: Performed by: ORTHOPAEDIC SURGERY

## 2024-08-01 PROCEDURE — 99232 SBSQ HOSP IP/OBS MODERATE 35: CPT | Performed by: STUDENT IN AN ORGANIZED HEALTH CARE EDUCATION/TRAINING PROGRAM

## 2024-08-01 PROCEDURE — 99254 IP/OBS CNSLTJ NEW/EST MOD 60: CPT | Performed by: ANESTHESIOLOGY

## 2024-08-01 PROCEDURE — 97163 PT EVAL HIGH COMPLEX 45 MIN: CPT

## 2024-08-01 PROCEDURE — 82948 REAGENT STRIP/BLOOD GLUCOSE: CPT

## 2024-08-01 PROCEDURE — 99254 IP/OBS CNSLTJ NEW/EST MOD 60: CPT | Performed by: INTERNAL MEDICINE

## 2024-08-01 PROCEDURE — 99232 SBSQ HOSP IP/OBS MODERATE 35: CPT | Performed by: INTERNAL MEDICINE

## 2024-08-01 PROCEDURE — NC001 PR NO CHARGE: Performed by: ANESTHESIOLOGY

## 2024-08-01 RX ORDER — ERGOCALCIFEROL 1.25 MG/1
50000 CAPSULE ORAL 3 TIMES WEEKLY
Status: DISCONTINUED | OUTPATIENT
Start: 2024-08-01 | End: 2024-08-05 | Stop reason: HOSPADM

## 2024-08-01 RX ORDER — ALPRAZOLAM 0.5 MG/1
0.5 TABLET ORAL DAILY PRN
Status: DISCONTINUED | OUTPATIENT
Start: 2024-08-01 | End: 2024-08-02

## 2024-08-01 RX ORDER — METHADONE HYDROCHLORIDE 10 MG/1
190 TABLET ORAL DAILY
Status: DISCONTINUED | OUTPATIENT
Start: 2024-08-01 | End: 2024-08-02

## 2024-08-01 RX ORDER — CALCIUM CARBONATE 500(1250)
2 TABLET ORAL
Status: DISCONTINUED | OUTPATIENT
Start: 2024-08-02 | End: 2024-08-05 | Stop reason: HOSPADM

## 2024-08-01 RX ORDER — METHOCARBAMOL 500 MG/1
500 TABLET, FILM COATED ORAL EVERY 8 HOURS SCHEDULED
Status: DISCONTINUED | OUTPATIENT
Start: 2024-08-01 | End: 2024-08-02

## 2024-08-01 RX ORDER — INSULIN LISPRO 100 [IU]/ML
1-6 INJECTION, SOLUTION INTRAVENOUS; SUBCUTANEOUS
Status: DISCONTINUED | OUTPATIENT
Start: 2024-08-02 | End: 2024-08-05 | Stop reason: HOSPADM

## 2024-08-01 RX ORDER — INSULIN GLARGINE 100 [IU]/ML
15 INJECTION, SOLUTION SUBCUTANEOUS
Status: DISCONTINUED | OUTPATIENT
Start: 2024-08-01 | End: 2024-08-05 | Stop reason: HOSPADM

## 2024-08-01 RX ORDER — ONDANSETRON 2 MG/ML
4 INJECTION INTRAMUSCULAR; INTRAVENOUS ONCE
Status: COMPLETED | OUTPATIENT
Start: 2024-08-01 | End: 2024-08-01

## 2024-08-01 RX ADMIN — ACETAMINOPHEN 975 MG: 325 TABLET, FILM COATED ORAL at 14:17

## 2024-08-01 RX ADMIN — METHADONE HYDROCHLORIDE 110 MG: 10 TABLET ORAL at 08:07

## 2024-08-01 RX ADMIN — INSULIN LISPRO 4 UNITS: 100 INJECTION, SOLUTION INTRAVENOUS; SUBCUTANEOUS at 21:15

## 2024-08-01 RX ADMIN — ALPRAZOLAM 1 MG: 0.5 TABLET ORAL at 05:52

## 2024-08-01 RX ADMIN — Medication 1 TABLET: at 08:08

## 2024-08-01 RX ADMIN — DULOXETINE HYDROCHLORIDE 60 MG: 60 CAPSULE, DELAYED RELEASE ORAL at 08:13

## 2024-08-01 RX ADMIN — METHOCARBAMOL 500 MG: 500 TABLET ORAL at 21:16

## 2024-08-01 RX ADMIN — GABAPENTIN 600 MG: 300 CAPSULE ORAL at 08:10

## 2024-08-01 RX ADMIN — DOCUSATE SODIUM 100 MG: 100 CAPSULE, LIQUID FILLED ORAL at 17:04

## 2024-08-01 RX ADMIN — ACETAMINOPHEN 975 MG: 325 TABLET, FILM COATED ORAL at 21:16

## 2024-08-01 RX ADMIN — ONDANSETRON 4 MG: 2 INJECTION INTRAMUSCULAR; INTRAVENOUS at 18:55

## 2024-08-01 RX ADMIN — METHADONE HYDROCHLORIDE 80 MG: 10 TABLET ORAL at 09:43

## 2024-08-01 RX ADMIN — INSULIN LISPRO 4 UNITS: 100 INJECTION, SOLUTION INTRAVENOUS; SUBCUTANEOUS at 08:15

## 2024-08-01 RX ADMIN — ERGOCALCIFEROL 50000 UNITS: 1.25 CAPSULE ORAL at 21:18

## 2024-08-01 RX ADMIN — HYDROMORPHONE HYDROCHLORIDE 0.2 MG: 0.2 INJECTION, SOLUTION INTRAMUSCULAR; INTRAVENOUS; SUBCUTANEOUS at 06:21

## 2024-08-01 RX ADMIN — METHOCARBAMOL 500 MG: 500 TABLET ORAL at 05:52

## 2024-08-01 RX ADMIN — CEFAZOLIN SODIUM 1000 MG: 1 SOLUTION INTRAVENOUS at 02:35

## 2024-08-01 RX ADMIN — ACETAMINOPHEN 975 MG: 325 TABLET, FILM COATED ORAL at 05:52

## 2024-08-01 RX ADMIN — INSULIN GLARGINE 15 UNITS: 100 INJECTION, SOLUTION SUBCUTANEOUS at 21:15

## 2024-08-01 RX ADMIN — FOLIC ACID 1 MG: 1 TABLET ORAL at 08:08

## 2024-08-01 RX ADMIN — INSULIN LISPRO 3 UNITS: 100 INJECTION, SOLUTION INTRAVENOUS; SUBCUTANEOUS at 17:05

## 2024-08-01 RX ADMIN — THIAMINE HCL TAB 100 MG 100 MG: 100 TAB at 08:08

## 2024-08-01 RX ADMIN — ENOXAPARIN SODIUM 40 MG: 40 INJECTION SUBCUTANEOUS at 04:22

## 2024-08-01 RX ADMIN — ENOXAPARIN SODIUM 40 MG: 40 INJECTION SUBCUTANEOUS at 16:56

## 2024-08-01 RX ADMIN — OXYCODONE HYDROCHLORIDE 5 MG: 5 TABLET ORAL at 04:18

## 2024-08-01 RX ADMIN — INSULIN LISPRO 3 UNITS: 100 INJECTION, SOLUTION INTRAVENOUS; SUBCUTANEOUS at 12:21

## 2024-08-01 RX ADMIN — METOPROLOL SUCCINATE 12.5 MG: 25 TABLET, EXTENDED RELEASE ORAL at 08:10

## 2024-08-01 NOTE — CONSULTS
Consultation - Behavioral Health   David Skelton Jr. 64 y.o. male MRN: [id number]  Unit/Bed#: [facility] 604-01 Encounter: [id number]      Chief Complaint: I only use Xanax clonazepam and methadone    History of Present Illness   Physician Requesting Consult: Lauryn Ullrich, DO  Reason for Consult / Principal Problem: polypharmacy, Dx: 1. Methadone dependence (HCC) 2. Bipolar affective disorder, remission status unspecified (HCC) 3. Major depression, chronic 4. Generalized anxiety disorder    David Skelton Jr. is a 64 y.o. male with a past medical history of iron deficiency anemia, urinary retention, T2DM, anxiety, polysubstance abuse and prior falls who presents after an unwitnessed fall. He has been evaluated for possible medication management. He states that he feels better. He was not able to sleep because he was not getting his methadone. He has been on methadone for 20 years. He states he has not seen a psychiatrist for over 2 months and the only medication he was taking was clonazepam, xanax and methadone. When I asked him about Depakote, Seroquel and Caplyta, all are mood stabilizers, he states that he never took any of these medications because he did not like the way he felt. He states he is doing okay. His appetite is good. He denies any suicidal or homicidal ideation, plan or intent. He denies any hallucinations. He denies ever being admitted to a psychiatric unit.         Psychiatric Review Of Systems:  sleep: yes  appetite changes: no  weight changes: no  energy/anergy: no  interest/pleasure/anhedonia: no  somatic symptoms: no  anxiety/panic: no  tani: no  guilty/hopeless: no  self injurious behavior/risky behavior: no    Historical Information   Past Psychiatric History:   None  Currently in treatment with none.  Past Suicide attempts: none  Past Violent behavior: none  Past Psychiatric medication trial: xanax, clonazepam, Depakote, Seroquel    Substance Abuse History:  He has a remote history of opioid use.  He has been on methadone for 20 years. He also has been overusing benzodiazepines. He denies alcohol. He had DUIs secondary to the methadone.      I have assessed this patient for substance use within the past 12 months     History of IP/OP rehabilitation program: none  Smoking history: never  Family Psychiatric History:   Denies any family history of mental illness, substance abuse or suicidal attempt    Social History  Education: high school diploma/GED  Learning Disabilities:  none  Marital history:   Living arrangement, social support:  patient lives with his dog.  Occupational History: on permanent disability  Functioning Relationships: good support system.  Other Pertinent History:  He was in the army for 3 years. He has DUIs    Traumatic History:   Abuse:  He denies any.  Other Traumatic Events:  none    Past Medical History:   Diagnosis Date    Anxiety     Depression     Diabetes mellitus (HCC)     Drug use     Hemorrhoids, internal 9/23/2019    Hypertension        Medical Review Of Systems:  Review of Systems - negative except for pain in the right lower extremity. All other review of systems was negative.    Meds/Allergies   all current active meds have been reviewed  Allergies   Allergen Reactions    Aspirin Shortness Of Breath    Penicillins        Objective   Vital signs in last 24 hours:  Temp:  [97.7 °F (36.5 °C)-98.4 °F (36.9 °C)] 98.2 °F (36.8 °C)  HR:  [62-81] 75  Resp:  [12-22] 16  BP: (125-155)/(63-91) 125/68      Intake/Output Summary (Last 24 hours) at 8/1/2024 1436  Last data filed at 8/1/2024 1300  Gross per 24 hour   Intake 1138.33 ml   Output 2450 ml   Net -1311.67 ml       Mental Status Evaluation:  Appearance:  age appropriate and drowsy   Behavior:  cooperative   Speech:  normal pitch and normal volume   Mood:  euthymic   Affect:  mood-congruent   Language: naming objects and repeating phrases   Thought Process:  goal directed   Associations: intact associations   Thought  Content:  normal   Perceptual Disturbances: None   Risk Potential: Suicidal Ideations none, Homicidal Ideations none, and Potential for Aggression No   Sensorium:  person, place, time/date, and situation   Memory:  recent and remote memory grossly intact   Cognition:  recent and remote memory grossly intact   Consciousness:  awake and drowsy     Attention: attention span appeared shorter than expected for age   Intellect: within normal limits   Fund of Knowledge: awareness of current events: fair, past history: fair, and vocabulary: fair   Insight:  fair   Judgment: fair   Muscle Strength and Tone: Within normal limits   Gait/Station: Unable to assess, patient in chair   Motor Activity: no abnormal movements     Lab Results:  I have personally reviewed all pertinent laboratory/tests results.  Labs in last 72 hours:   Recent Labs     07/30/24  1427 07/30/24  2201 08/01/24  0606   WBC 11.42*   < > 10.66*   RBC 3.88   < > 2.76*   HGB 11.0*   < > 7.9*   HCT 34.7*   < > 24.2*      < > 160   RDW 12.9   < > 12.7   NEUTROABS 7.98*  --  9.04*   SODIUM 137   < > 133*   K 4.5   < > 4.8      < > 97   CO2 31   < > 33*   BUN 13   < > 16   CREATININE 0.89   < > 0.84   GLUC 239*   < > 282*   CALCIUM 8.2*   < > 7.9*   AST 21  --  17   ALT 26  --  17   ALKPHOS 86  --  67   TP 5.8*  --  5.5*   ALB 3.4*  --  3.0*   TBILI 0.37  --  0.37   AMMONIA 26  --   --    TIR2JGLVTHTU 0.830  --   --     < > = values in this interval not displayed.       Code Status: )Level 1 - Full Code    Assessment & Plan     Assessment:  David Skelton Jr. is a 64 y.o. male with a past medical history of iron deficiency anemia, urinary retention, T2DM, anxiety, polysubstance abuse and prior falls who presents after an unwitnessed fall. He has been evaluated for possible medication management.  When I evaluated the patient he stated that the only medication that he takes is Xanax clonazepam and methadone, he refused to take any other medication  because he does not believe that he needed and he does not feel good with them.  He denies any hallucinations or paranoid thinking, he denies any suicidal or homicidal ideation plan or intent  Diagnosis:  Mood disorder unspecified type  Opioid dependence  Plan:   Continue medical management  At this time I will not add any more stabilizer patient does not wanted he never took it  Discussed with the primary team  No other intervention at this time I will sign off but call me  back and necessary  Risks, benefits and possible side effects of Medications:   Risks, benefits, and possible side effects of medications explained to patient and patient verbalizes understanding.           Vandana Banuelos MD

## 2024-08-01 NOTE — OCCUPATIONAL THERAPY NOTE
Occupational Therapy Evaluation     Patient Name: David Skelton Jr.  Today's Date: 8/1/2024  Problem List  Principal Problem:    Closed fracture of right femur, unspecified fracture morphology, sequela  Active Problems:    Methadone dependence (HCC)    Past Medical History  Past Medical History:   Diagnosis Date    Anxiety     Depression     Diabetes mellitus (HCC)     Drug use     Hemorrhoids, internal 9/23/2019    Hypertension      Past Surgical History  Past Surgical History:   Procedure Laterality Date    FEMUR FRACTURE SURGERY Right     GASTRIC BYPASS  11/08/2011    Managed by: Braden Luna (General Surgery)    HERNIA REPAIR  02/13/2013    Incisional hernia repair Managed by: Braden Luna (General Surgery)    OH OPTX FEM SHFT FX W/INSJ IMED IMPLT W/WO SCREW Right 7/31/2024    Procedure: INSERTION NAIL IM FEMUR ANTEGRADE (TROCHANTERIC);  Surgeon: Meng Hill MD;  Location: [facility];  Service: Orthopedics    TONSILLECTOMY  2010 08/01/24 1020   OT Last Visit   OT Visit Date 08/01/24   Note Type   Note type Evaluation   Pain Assessment   Pain Assessment Tool 0-10   Pain Score 8   Pain Location/Orientation Orientation: Right;Location: Hip   Hospital Pain Intervention(s) Repositioned;Ambulation/increased activity;Emotional support;Relaxation technique   Restrictions/Precautions   Weight Bearing Precautions Per Order Yes   RUE Weight Bearing Per Order WBAT   LUE Weight Bearing Per Order WBAT   RLE Weight Bearing Per Order WBAT   LLE Weight Bearing Per Order WBAT   Other Precautions Chair Alarm;Bed Alarm;Fall Risk;Pain;WBS   Home Living   Type of Home Apartment   Home Layout One level;Stairs to enter with rails   Home Equipment Walker;Cane   Prior Function   Level of [place] Independent with ADLs;Independent with functional mobility;Independent with IADLS   Lives With Alone   Receives Help From Friend(s)   IADLs Independent with driving;Independent with meal prep;Independent with  "medication management   Falls in the last 6 months 1 to 4   Vocational Unemployed   Lifestyle   Autonomy I adls and mobilty with either SPC or RW- i iadls   Reciprocal Relationships limited support available   Service to Others not working   Intrinsic Gratification mostly sedentary   Subjective   Subjective \"I just had this done yesterday\"   ADL   Eating Assistance 5  Supervision/Setup   Grooming Assistance 5  Supervision/Setup   UB Bathing Assistance 4  Minimal Assistance   LB Bathing Assistance 3  Moderate Assistance   UB Dressing Assistance 4  Minimal Assistance   LB Dressing Assistance 3  Moderate Assistance   Toileting Assistance  3  Moderate Assistance   Bed Mobility   Supine to Sit 3  Moderate assistance   Transfers   Sit to Stand 3  Moderate assistance   Stand to Sit 3  Moderate assistance   Functional Mobility   Functional Mobility 3  Moderate assistance   Additional Comments cues for proper hand placement, safe techniques and carryover with THR Prec   Additional items Rolling walker   Balance   Static Sitting Fair   Dynamic Sitting Fair -   Static Standing Fair -   Dynamic Standing Poor +   Ambulatory Poor   Activity Tolerance   Activity Tolerance Patient limited by fatigue;Patient limited by pain;Treatment limited secondary to medical complications (Comment)   Medical Staff Made Aware PT present for co-eval 2* medical complexity, comorbidities and limited overall tolerance to activities   RUE Assessment   RUE Assessment WFL   LUE Assessment   LUE Assessment WFL   Cognition   Arousal/Participation Alert;Cooperative   Attention Attends with cues to redirect   Orientation Level Oriented to person;Oriented to time;Oriented to place;Oriented to situation   Memory Decreased short term memory;Decreased recall of recent events;Decreased recall of precautions   Following Commands Follows one step commands with increased time or repetition   Assessment   Limitation Decreased ADL status;Decreased endurance;Decreased " self-care trans;Decreased high-level ADLs   Prognosis Good   Assessment Pt is a 64 y.o. male who was admitted to [facility] on 7/30/2024 with Closed fracture of right femur, unspecified fracture morphology, sequela s/p IMN. Patient  has a past medical history of Anxiety, Depression, Diabetes mellitus (HCC), Drug use, Hemorrhoids, internal, and Hypertension.   At baseline pt was completing adls and mobility independently - I iadls. Pt lives alone in 4th floor apt with elevator access. Currently pt requires moderate assist for overall ADLS and moderate assist for functional mobility/transfers. Pt currently presents with impairments in the following categories -steps to enter environment, limited home support, difficulty performing ADLS, difficulty performing IADLS , and environment endurance, standing balance/tolerance, sitting balance/tolerance, insight, safety , judgement , and attention . These impairments, as well as pt's fatigue, pain, orthopedic restricitions , WBS , decreased caregiver support, risk for falls, and home environment  limit pt's ability to safely engage in all baseline areas of occupation, includingbathing, dressing, toileting, functional mobility/transfers, community mobility, laundry , driving, house maintenance, meal prep, cleaning, social participation , and leisure activities  From OT standpoint, recommend level II resources upon D/C. OT will continue to follow to address the below stated goals.   Goals   Patient Goals get better   LTG Time Frame 10-14   Long Term Goal #1 1) Mod I UB/LB adls after setup with use of LHAE PRN  2)  Mod I toileting and clothing management  3) Mod I bed mobility  4) Mod I functional mob/transfers to and from all surfaces with fair+ to good balance/safety   5) Increase activity tolerance to 30-35min for participation in adls and enjoyable activities  6) Assess DME needs   7) Demonstrate good carryover with safe use of AD during functional tasks   8)  Assess DME needs   9) Ongoing functional cognitive assessment to assist with safe d/c recommendations   Plan   Treatment Interventions ADL retraining;Functional transfer training;Endurance training;Cognitive reorientation;Patient/family training;Equipment evaluation/education;Compensatory technique education;Activityengagement   Goal Expiration Date 08/15/24   OT Frequency 2-3x/wk   Discharge Recommendation   Rehab Resource Intensity Level, OT II (Moderate Resource Intensity)   AM-PAC Daily Activity Inpatient   Lower Body Dressing 2   Bathing 2   Toileting 2   Upper Body Dressing 3   Grooming 3   Eating 4   Daily Activity Raw Score 16   Daily Activity Standardized Score (Calc for Raw Score >=11) 35.96   AM-PAC Applied Cognition Inpatient   Following a Speech/Presentation 3   Understanding Ordinary Conversation 4   Taking Medications 3   Remembering Where Things Are Placed or Put Away 3   Remembering List of 4-5 Errands 3   Taking Care of Complicated Tasks 3   Applied Cognition Raw Score 19   Applied Cognition Standardized Score 39.77   End of Consult   Education Provided Yes   Patient Position at End of Consult Bedside chair;Bed/Chair alarm activated;All needs within reach   Nurse Communication Nurse aware of consult     The patient's raw score on the AM-PAC Daily Activity Inpatient Short Form is 16. A raw score of less than 19 suggests the patient may benefit from discharge to post-acute rehabilitation services. Please refer to the recommendation of the Occupational Therapist for safe discharge planning.    Documentation Completed By:    CHARLI Leavitt/L  [employer] Certified - [id number]

## 2024-08-01 NOTE — PROGRESS NOTES
Consult Note - Acute Pain Service    David Skelton Jr. 64 y.o. male MRN: [id number]  Unit/Bed#: [facility] 604-01 Encounter: [id number]      David Skelton Jr. is a 64 y.o. male with PMHx of iron deficiency anemia, urinary retention, T2DM, anxiety, polysubstance abuse and prior falls who presents after an unwitnessed fall. POD 1 R hip IM nail.     Xanax and lighters were removed from patient belongings while he came up to his room on 6th floor.  He went to the ER early July: history of polysubstance abuse who presents today for a refill of clonazepam.  Patient says that without clonazepam he gets anxiety and depression, and has visual/auditory hallucinations.  Patient endorses getting alprazolam in June, but it he says that does make him drowsy and that he needs clonazepam.  On an unrelated note, patient also says that he is going to residential on Monday because he is being charged with a DUI and that he really needs his clonazepam. PDMP shows that he had 35 alprazolam's filled between June 19 and June 20.  Given his age, his reports of visual and auditory hallucinations, and his propensity to falls, and his PDMP history, the ER did not refill his prescription for clonazepam. His PCP also had referred him to outpatient psych.     Pt receives methadone 190 mg PO from a clinic: [facility] [phone].     Pt received IV methadone 10 mg intra-op. He is receiving daily PO methadone as well.     Pt seen in his room. He is OOB to chair, sleeping. He does arouse to communicate when called upon. He does have slurred speech, nursing staff mentions it is his baseline. Encouraged PO intake of liquids and food. Encourage ambulation. Will cut back on meds that may cause oversedation, given he is back on methadone PO dosing, and he is sleepy.     Pt was also seen by palliative care as an outpatient, but palliative care team determined he did not have any palliative care diagnoses and does not qualify to be under their  care    Certified  consult and psych consult ordered by trauma team    * Closed fracture of right femur, unspecified fracture morphology, sequela  Assessment & Plan  POD 1 s/p R hip IM Nail.     Plan:  - continue methadone 190 mg PO QD (called the Clinic to try to confirm the dose)  - continue tylenol 975 mg PO TID. Most recent liver panel with normal levels.   - continue gabapentin 600 mg PO BID (home med)  - modify robaxin to 500 mg PO TID for muscle spasms  - modify xanax to 0.5 mg PO QD PRN anxiety  - discontinue dilaudid 0.2 mg IV Q 2 hrs PRN breakthrough pain  - continue oxycodone 2.5-5 mg PO Q 4 hrs PRN mod-severe pain        APS will continue to follow. Please contact Acute Pain Service - via [employer] from [phone] with additional questions or concerns. See [employer] or Patricia for additional contacts and after hours information.     Pain History  Current pain location(s):  Pain Score: 8  Pain Location/Orientation: Orientation: Right, Location: Hip  Pain Scale: Pain Assessment Tool: 0-10  24 hour history: POD 1 from IM nail procedure    Opioid requirement previous 24 hours: methadone 190 mg PO, oxycodone 10 mg PO, dilaudid 0.2 mg IV    Meds/Allergies   all current active meds have been reviewed, current meds:   Current Facility-Administered Medications   Medication Dose Route Frequency    acetaminophen (TYLENOL) tablet 975 mg  975 mg Oral Q8H MELISA    ALPRAZolam (XANAX) tablet 0.5 mg  0.5 mg Oral Daily PRN    calcium carbonate (TUMS) chewable tablet 1,000 mg  1,000 mg Oral Daily PRN    ceFAZolin (ANCEF) IVPB (premix in dextrose) 2,000 mg 50 mL  2,000 mg Intravenous On Call To OR    docusate sodium (COLACE) capsule 100 mg  100 mg Oral BID    DULoxetine (CYMBALTA) delayed release capsule 60 mg  60 mg Oral Daily    enoxaparin (LOVENOX) subcutaneous injection 40 mg  40 mg Subcutaneous Q12H    folic acid (FOLVITE) tablet 1 mg  1 mg Oral Daily    gabapentin (NEURONTIN) capsule 600 mg  600 mg Oral  BID    heparin (porcine) 25,000 units in 0.45% NaCl 250 mL infusion (premix)  3-20 Units/kg/hr (Order-Specific) Intravenous Titrated    insulin lispro (HumALOG/ADMELOG) 100 units/mL subcutaneous injection 1-6 Units  1-6 Units Subcutaneous TID AC    insulin lispro (HumALOG/ADMELOG) 100 units/mL subcutaneous injection 1-6 Units  1-6 Units Subcutaneous HS    lactated ringers bolus 1,000 mL  1,000 mL Intravenous Once PRN    And    lactated ringers bolus 1,000 mL  1,000 mL Intravenous Once PRN    methadone (DOLOPHINE) tablet 190 mg  190 mg Oral Daily    methocarbamol (ROBAXIN) tablet 500 mg  500 mg Oral Q8H MELISA    metoprolol succinate (TOPROL-XL) 24 hr tablet 12.5 mg  12.5 mg Oral Daily    multivitamin-minerals (CENTRUM) tablet 1 tablet  1 tablet Oral Daily    naloxone (NARCAN) 0.04 mg/mL syringe 0.04 mg  0.04 mg Intravenous Q1MIN PRN    oxyCODONE (ROXICODONE) split tablet 2.5 mg  2.5 mg Oral Q4H PRN    Or    oxyCODONE (ROXICODONE) IR tablet 5 mg  5 mg Oral Q4H PRN    sodium chloride 0.9 % bolus 1,000 mL  1,000 mL Intravenous Once PRN    And    sodium chloride 0.9 % bolus 1,000 mL  1,000 mL Intravenous Once PRN    thiamine tablet 100 mg  100 mg Oral Daily   , and PTA meds:   Prior to Admission Medications   Prescriptions Last Dose Informant Patient Reported? Taking?   ALPRAZolam (XANAX) 1 mg tablet Past Week  No Yes   Sig: Take 1 tablet (1 mg total) by mouth daily as needed for anxiety   Caplyta 42 MG CAPS capsule Unknown Self Yes No   Sig: Take 42 mg by mouth daily at bedtime   Patient not taking: Reported on 2024   Continuous Blood Gluc  (FreeStyle Grace 14 Day [place]) STEVE Not Taking Self No No   Si Device by Device route 3 (three) times a day before meals   Patient not taking: Reported on 2024   Continuous Blood Gluc  (FreeStyle Grace 2 [place]) STEVE Not Taking Self No No   Si Device by Device route 3 (three) times a day before meals   Patient not taking: Reported on 7/3/2024    DULoxetine (CYMBALTA) 60 mg delayed release capsule Unknown Self Yes No   Sig: Take 60 mg by mouth in the morning   GLOBAL EASE INJECT PEN NEEDLES 31G X 8 MM MISC Unknown Self Yes No   Patient not taking: Reported on 7/3/2024   Incontinence Supply Disposable (INCONTINENCE BRIEF MEDIUM) MISC Unknown Self No No   Sig: by Does not apply route as needed (fecal soiling)   Patient not taking: Reported on 7/3/2024   Incontinence Supply Disposable (RA WIPES FLUSHABLE/MOIST) MISC Unknown Self No No   Sig: by Does not apply route as needed (fecal soiling)   Insulin Pen Needle (B-D UF III MINI PEN NEEDLES) 31G X 5 MM MISC Unknown Self No No   Sig: Inject 15 Units under the skin daily at bedtime   Kerendia 10 MG TABS Unknown Self Yes No   Sig: TAKE 10 MG BY MOUTH EVERY MORNING.   Patient not taking: Reported on 3/1/2024   Misc. Devices (CANE) MISC Unknown Self No No   Sig: by Does not apply route daily Dx:  Frequent falls   QUEtiapine (SEROquel XR) 150 mg 24 hr tablet Unknown  Yes No   Sig: Take 150 mg by mouth daily at bedtime   Patient not taking: Reported on 7/3/2024   Synjardy 12.5-1000 MG TABS Unknown Self Yes No   Sig: TAKE ONE TABLET BY MOUTH 2 (TWO) TIMES A DAY.   Patient not taking: Reported on 7/3/2024   Trulicity 1.5 MG/0.5ML injection Unknown Self Yes No   UNKNOWN TO PATIENT  Self Yes No   acetaminophen (TYLENOL) 325 mg tablet Unknown Self No No   Sig: Take 2 tablets (650 mg total) by mouth every 4 (four) hours as needed for mild pain   aluminum-magnesium hydroxide 200-200 MG/5ML suspension Unknown Self No No   Sig: Take 15 mL by mouth every 6 (six) hours as needed for heartburn   Patient not taking: Reported on 7/3/2024   bacitracin-polymyxin b (POLYSPORIN) ointment Unknown Self No No   Sig: Apply topically 2 (two) times a day   benztropine (COGENTIN) 2 mg tablet Not Taking Self Yes No   Patient not taking: Reported on 3/1/2024   divalproex sodium (DEPAKOTE ER) 500 mg 24 hr tablet Unknown  No No   Sig: Take 1  tablet (500 mg total) by mouth every 12 (twelve) hours   docusate sodium (COLACE) 100 mg capsule Unknown  No No   Sig: Take 1 capsule (100 mg total) by mouth 2 (two) times a day for 5 days   doxepin (SINEquan) 100 mg capsule Unknown Self Yes No   Sig: Take 100 mg by mouth daily at bedtime   Patient not taking: Reported on 12/8/2023   doxepin (SINEquan) 150 MG capsule Unknown  Yes No   Sig: Take 150 mg by mouth daily at bedtime   Patient not taking: Reported on 7/3/2024   ergocalciferol (ERGOCALCIFEROL) 1.25 MG (63630 UT) capsule Unknown  Yes No   Sig: Take 50,000 Units by mouth   fluticasone (FLONASE) 50 mcg/act nasal spray Unknown  No No   Sig: instill 1 spray into each nostril once daily   gabapentin (NEURONTIN) 600 MG tablet Unknown  No No   Sig: TAKE 1 TABLET BY MOUTH TWICE DAILY   metFORMIN (GLUCOPHAGE) 850 mg tablet Unknown Self No No   Sig: Take 1 tablet (850 mg total) by mouth daily with breakfast   methadone (DOLOPHINE) 5 mg tablet Unknown Self Yes No   Sig: Take 165 mg by mouth daily   methocarbamol (ROBAXIN) 500 mg tablet Unknown Self No No   Sig: Take 1 tablet (500 mg total) by mouth every 8 (eight) hours as needed for muscle spasms   naloxone (NARCAN) 4 mg/0.1 mL nasal spray Unknown  No No   Sig: Administer 1 spray into a nostril. If no response after 2-3 minutes, give another dose in the other nostril using a new spray.   Patient not taking: Reported on 5/24/2024   naloxone (NARCAN) 4 mg/0.1 mL nasal spray Unknown  No No   Sig: Administer 1 spray into a nostril. If no response after 2-3 minutes, give another dose in the other nostril using a new spray.   Patient not taking: Reported on 5/31/2024   ondansetron (ZOFRAN-ODT) 4 mg disintegrating tablet Unknown Self No No   Sig: Take 1 tablet (4 mg total) by mouth every 6 (six) hours as needed for vomiting   paliperidone (INVEGA) 6 MG 24 hr tablet Unknown Self Yes No   Sig: Take 6 mg by mouth daily at bedtime   Patient not taking: Reported on 5/24/2024    sertraline (ZOLOFT) 100 mg tablet Unknown  Yes No   Sig: Take 100 mg by mouth every morning   Patient not taking: Reported on 6/10/2024   zolpidem (AMBIEN) 10 mg tablet Unknown Self Yes No   Sig: Take 10 mg by mouth daily at bedtime      Facility-Administered Medications: None       Allergies   Allergen Reactions    Aspirin Shortness Of Breath    Penicillins        Objective        Vitals:    07/31/24 2202 08/01/24 0225 08/01/24 0230 08/01/24 0721   BP: 148/75 142/78  130/69   BP Location:    Right arm   Pulse: 69  81 76   Resp: 18   18   Temp: 97.8 °F (36.6 °C)   98.4 °F (36.9 °C)   TempSrc:    Oral   SpO2: 98%  98% 99%   Weight:       Height:             Physical Exam  Constitutional:       Comments: Patient is very sleeping, OOB to chair  Hard to wake, needed to call him a few times loudly   HENT:      Head: Normocephalic and atraumatic.      Nose: Nose normal.      Mouth/Throat:      Mouth: Mucous membranes are moist.   Eyes:      Extraocular Movements: Extraocular movements intact.      Pupils: Pupils are equal, round, and reactive to light.   Cardiovascular:      Rate and Rhythm: Normal rate.      Pulses: Normal pulses.   Pulmonary:      Effort: Pulmonary effort is normal.   Musculoskeletal:      Cervical back: Normal range of motion.      Comments: Moves around well   Skin:     General: Skin is warm.   Neurological:      General: No focal deficit present.      Mental Status: He is oriented to person, place, and time. Mental status is at baseline.   Psychiatric:         Mood and Affect: Mood normal.         Behavior: Behavior normal.         Thought Content: Thought content normal.         Judgment: Judgment normal.         Lab Results:   Estimated Creatinine Clearance: 88.2 mL/min (by C-G formula based on SCr of 0.84 mg/dL).  Lab Results   Component Value Date    WBC 10.66 (H) 08/01/2024    WBC 9.29 01/29/2015    HGB 7.9 (L) 08/01/2024    HGB 12.2 01/29/2015    HCT 24.2 (L) 08/01/2024    HCT 38.0 01/29/2015      08/01/2024     01/29/2015         Component Value Date/Time     01/29/2015 1342    K 4.8 08/01/2024 0606    K 4.6 03/01/2019 1611    CL 97 08/01/2024 0606     03/01/2019 1611    CO2 33 (H) 08/01/2024 0606    CO2 29 08/17/2022 0815    CO2 31 03/01/2019 1611    BUN 16 08/01/2024 0606    BUN 17 03/01/2019 1611    CREATININE 0.84 08/01/2024 0606    CREATININE 1.03 03/01/2019 1611         Component Value Date/Time    CALCIUM 7.9 (L) 08/01/2024 0606    CALCIUM 8.7 03/01/2019 1611    ALKPHOS 67 08/01/2024 0606    ALKPHOS 133 (H) 03/01/2019 1611    AST 17 08/01/2024 0606    AST 16 03/01/2019 1611    ALT 17 08/01/2024 0606    ALT 27 03/01/2019 1611    BILITOT 0.27 01/29/2015 1342    TP 5.5 (L) 08/01/2024 0606    TP 6.9 03/01/2019 1611    ALB 3.0 (L) 08/01/2024 0606    ALB 3.6 03/01/2019 1611       Imaging Studies/EKG: I have personally reviewed pertinent reports.       Counseling / Coordination of Care  Total floor / unit time spent today 20 minutes minutes. Greater than 50% of total time was spent with the patient and / or family counseling and / or coordination of care. A description of the counseling / coordination of care: discussed pain regimen with patient and trauma service    Please note that the APS provides consultative services regarding pain management only.  With the exception of ketamine and epidural infusions and except when indicated, final decisions regarding starting or changing doses of analgesic medications are at the discretion of the consulting service.    Lisset Teran MD   Acute Pain Service

## 2024-08-01 NOTE — Clinical Note
Pt is a 64 y.o. male who was admitted to [facility] on 7/30/2024 with Closed fracture of right femur, unspecified fracture morphology, sequela s/p IMN. Patient  At baseline pt was completing ***. Pt lives ***. Currently pt requires *** for overall ADLS and *** for functional mobility/transfers. Pt currently presents with impairments in the following categories -{tspersonalfactors:05961} {performance deficits:12446}. These impairments, as well as pt's {performance deficits:51956}  limit pt's ability to safely engage in all baseline areas of occupation, including{performancedeficits:41887} From OT standpoint, recommend *** upon D/C. OT will continue to follow to address the below stated goals.

## 2024-08-01 NOTE — PROGRESS NOTES
Progress Note - Orthopedics   David Skelton Jr. 64 y.o. male MRN: [id number]  Unit/Bed#: [facility] 604-01      Subjective:    64 y.o.male POD 0 R hip IM nail. No acute events, no new complaints. Denies fevers, chills, CP, SOB, N/V. Pain well controlled. Patient states he has some numbness in his feet consistent with previously reported symptoms at his outpatient visit with Dr. Mckeon in Jan 2024.    Labs:  0   Lab Value Date/Time    HCT 29.8 (L) 07/31/2024 0522    HCT 34.7 (L) 07/30/2024 1427    HCT 33.7 (L) 06/05/2024 1408    HCT 38.0 01/29/2015 1342    HCT 40.1 05/21/2014 0938    HCT 38.5 02/17/2014 0900    HGB 9.7 (L) 07/31/2024 0522    HGB 11.0 (L) 07/30/2024 1427    HGB 10.9 (L) 06/05/2024 1408    HGB 12.2 01/29/2015 1342    HGB 13.1 05/21/2014 0938    HGB 12.3 02/17/2014 0900    INR 1.22 (H) 07/30/2024 2201    INR 1.11 01/29/2015 1342    WBC 9.60 07/31/2024 0522    WBC 11.42 (H) 07/30/2024 1427    WBC 6.14 06/05/2024 1408    WBC 9.29 01/29/2015 1342    WBC 7.36 05/21/2014 0938    WBC 9.99 02/17/2014 0900    ESR 8 07/30/2024 2201       Meds:    Current Facility-Administered Medications:     acetaminophen (TYLENOL) tablet 975 mg, 975 mg, Oral, Q8H MELISA, Vazquez Estrada MD, 975 mg at 07/31/24 2104    ALPRAZolam (XANAX) tablet 1 mg, 1 mg, Oral, Daily PRN, Vazquez Estrada MD, 1 mg at 07/31/24 0537    calcium carbonate (TUMS) chewable tablet 1,000 mg, 1,000 mg, Oral, Daily PRN, Vazquez Estrada MD    ceFAZolin (ANCEF) IVPB (premix in dextrose) 1,000 mg 50 mL, 1,000 mg, Intravenous, Q8H, Gayle Denson PA-C    [START ON 8/1/2024] ceFAZolin (ANCEF) IVPB (premix in dextrose) 2,000 mg 50 mL, 2,000 mg, Intravenous, On Call To OR, Radha Keller MD    docusate sodium (COLACE) capsule 100 mg, 100 mg, Oral, BID, Vazquez Estrada MD, 100 mg at 07/31/24 1828    DULoxetine (CYMBALTA) delayed release capsule 60 mg, 60 mg, Oral, Daily, Vazquez Estrada MD    enoxaparin  (LOVENOX) subcutaneous injection 40 mg, 40 mg, Subcutaneous, Q12H, Vazquez Estrada MD, 40 mg at 07/31/24 0537    [START ON 8/1/2024] folic acid (FOLVITE) tablet 1 mg, 1 mg, Oral, Daily, Vazquez Estrada MD    gabapentin (NEURONTIN) capsule 600 mg, 600 mg, Oral, BID, Vazquez Estrada MD, 600 mg at 07/31/24 1828    heparin (porcine) 25,000 units in 0.45% NaCl 250 mL infusion (premix), 3-20 Units/kg/hr (Order-Specific), Intravenous, Titrated, Vazquez Estrada MD, Stopped at 07/31/24 0303    HYDROmorphone HCl (DILAUDID) injection 0.2 mg, 0.2 mg, Intravenous, Q2H PRN, Vazquez Estrada MD, 0.2 mg at 07/31/24 0756    insulin lispro (HumALOG/ADMELOG) 100 units/mL subcutaneous injection 1-5 Units, 1-5 Units, Subcutaneous, TID AC, 3 Units at 07/31/24 1834 **AND** [START ON 8/1/2024] Fingerstick Glucose (POCT), , , TID AC, Vazquez Estrada MD    insulin lispro (HumALOG/ADMELOG) 100 units/mL subcutaneous injection 1-5 Units, 1-5 Units, Subcutaneous, HS, Vazquez Estrada MD    lactated ringers bolus 1,000 mL, 1,000 mL, Intravenous, Once PRN **AND** lactated ringers bolus 1,000 mL, 1,000 mL, Intravenous, Once PRN, Gayle Denson PA-C    lactated ringers infusion, 125 mL/hr, Intravenous, Continuous, Vazquez Estrada MD, Last Rate: 125 mL/hr at 07/31/24 1829, 125 mL/hr at 07/31/24 1829    methocarbamol (ROBAXIN) tablet 500 mg, 500 mg, Oral, Q6H MELISA, Vazquez Estrada MD, 500 mg at 07/31/24 1828    metoprolol succinate (TOPROL-XL) 24 hr tablet 12.5 mg, 12.5 mg, Oral, Daily, Vazquez Estrada MD, 12.5 mg at 07/31/24 0926    [START ON 8/1/2024] multivitamin-minerals (CENTRUM) tablet 1 tablet, 1 tablet, Oral, Daily, Vazquez Estrada MD    oxyCODONE (ROXICODONE) split tablet 2.5 mg, 2.5 mg, Oral, Q4H PRN **OR** oxyCODONE (ROXICODONE) IR tablet 5 mg, 5 mg, Oral, Q4H PRN, Vazquez Estrada MD, 5 mg at 07/31/24  "1001    sodium chloride 0.9 % bolus 1,000 mL, 1,000 mL, Intravenous, Once PRN **AND** sodium chloride 0.9 % bolus 1,000 mL, 1,000 mL, Intravenous, Once PRN, Gayle Denson PA-C    [START ON 8/1/2024] thiamine tablet 100 mg, 100 mg, Oral, Daily, Vazquez Estrada MD    Blood Culture:   No results found for: \"BLOODCX\"    Wound Culture:   No results found for: \"WOUNDCULT\"    Ins and Outs:  I/O last 24 hours:  In: 1000 [I.V.:1000]  Out: 675 [Urine:675]          Physical:  Vitals:    07/31/24 2045   BP:    Pulse:    Resp:    Temp:    SpO2: 99%       Musculoskeletal: right Lower Extremity  Surgical dressings are clean, dry, intact without signs of strikethrough or saturation.  Mild tenderness to palpation over the surgical site.  Sensation decreased but intact to sural, saphenous, superficial peroneal, deep peroneal, and tibial distributions consistent with previously noted history of numbness and peripheral neuropathy.  Motor intact ankle plantarflexion/dorsiflexion, EHL/FHL  Extremity is warm and well perfused with PT pulse palpable and capillary refill < 2 seconds.      Assessment:    64 y.o.male POD 0 R hip IM nail. Doing well overall.     Plan:  WBAT RLE  PT/OT  Pain control  DVT ppx -per primary while inpatient, ASA 81 mg twice daily until 28 days after surgery on discharge.  Will monitor for ABLA and administer IVF/prbc as indicated for Greater than 2 gram drop or Hgb < 7  Medical management per primary team  Dispo: Ortho will follow    Jorge Avila MD      Please Secure Chat the [facility] Ortho Floor Role with any Questions or Concerns.    "

## 2024-08-01 NOTE — CERTIFIED RECOVERY SPECIALIST
Certified  Note    Patient name: David Skelton Jr.  Location: [facility] 604/[facility] 604-01  [place]: [facility]  Attending:  Lauryn Ullrich, DO MRN [id number]  : 1960  Age: 64 y.o.    Sex: male Date 2024         Substance Use History:     Social History     Substance and Sexual Activity   Alcohol Use Never        Social History     Substance and Sexual Activity   Drug Use Yes    Comment: Methadone clinic     Time spent with patient 25 minutes   Consult rcvd: Y  Patient seen in: IP  Stage of change:  maintenance     History of withdrawal seizures:  Currently on MOUD:methadone     CRS provided introductions and explanation of service. CRS and patient engaged in conversation of hospitalization. Patient discussed needs he feels would be appropriate. CRS shared understanding.     Patient presented as happy and pleasant to speak with. Patient explains that he was a  and has a lot of wear and tear on his body.     CRS and patient engaged in conversation that supports and encourages recovery.   Patient and CRS discussed aftercare plan. Patient reports he really just wants to go homes to see his dog.     Resources provided               Radha Galvez

## 2024-08-01 NOTE — PROGRESS NOTES
"[facility]  Progress Note  Name: David Osorio I  MRN: [id number]  Unit/Bed#: [facility] 604-01 I Date of Admission: 7/30/2024   Date of Service: 8/1/2024 I Hospital Day: 2    Assessment & Plan   Methadone dependence (HCC)  Assessment & Plan  190mg confirmed dosage  APS following    * Closed fracture of right femur, unspecified fracture morphology, sequela  Assessment & Plan  POD1 R IM nail  Ortho following  analgesia             Bowel Regimen: n/a  VTE Prophylaxis:Enoxaparin (Lovenox)     Disposition: med surg    Subjective   Chief Complaint: \"I need my methadone\"    Subjective: Pt seen at bedside. Upset that he has not had his proper methadone dose given. Also noting some pain and numbness to RLE     Objective   Vitals:   Temp:  [97.4 °F (36.3 °C)-98.4 °F (36.9 °C)] 98.4 °F (36.9 °C)  HR:  [62-81] 76  Resp:  [12-22] 18  BP: (130-159)/(63-91) 130/69    I/O         07/30 0701  07/31 0700 07/31 0701  08/01 0700 08/01 0701  08/02 0700    I.V. (mL/kg)  1658.3 (20.9)     Total Intake(mL/kg)  1658.3 (20.9)     Urine (mL/kg/hr) 1275 1100 (0.6)     Total Output 1275 1100     Net -1275 +558.3                     Physical Exam:   GENERAL APPEARANCE: nad  NEURO: no fnd  HEENT: atnc  CV: rrr  LUNGS: ctab  GI: soft nt  : n/a  MSK: RLE compartments soft, 2+ DP RLE, incisions c/d/i  SKIN: warm dry    Invasive Devices       Peripheral Intravenous Line  Duration             Peripheral IV 07/30/24 Distal;Left;Ventral (anterior) Forearm 1 day    Peripheral IV 07/30/24 Dorsal (posterior);Right Hand 1 day                          Lab Results: Results: I have personally reviewed all pertinent laboratory/tests results, BMP/CMP:   Lab Results   Component Value Date    SODIUM 133 (L) 08/01/2024    K 4.8 08/01/2024    CL 97 08/01/2024    CO2 33 (H) 08/01/2024    BUN 16 08/01/2024    CREATININE 0.84 08/01/2024    CALCIUM 7.9 (L) 08/01/2024    AST 17 08/01/2024    ALT 17 08/01/2024    ALKPHOS 67 " 08/01/2024    EGFR 93 08/01/2024   , and CBC:   Lab Results   Component Value Date    WBC 10.66 (H) 08/01/2024    HGB 7.9 (L) 08/01/2024    HCT 24.2 (L) 08/01/2024    MCV 88 08/01/2024     08/01/2024    RBC 2.76 (L) 08/01/2024    MCH 28.6 08/01/2024    MCHC 32.6 08/01/2024    RDW 12.7 08/01/2024    MPV 11.8 08/01/2024    NRBC 0 08/01/2024     Imaging: I have personally reviewed pertinent reports.     Other Studies: n/a

## 2024-08-01 NOTE — PLAN OF CARE
Problem: Potential for Falls  Goal: Patient will remain free of falls  Description: INTERVENTIONS:  - Educate patient/family on patient safety including physical limitations  - Instruct patient to call for assistance with activity   - Consult OT/PT to assist with strengthening/mobility   - Keep Call bell within reach  - Keep bed low and locked with side rails adjusted as appropriate  - Keep care items and personal belongings within reach  - Initiate and maintain comfort rounds  - Make Fall Risk Sign visible to staff  - Apply yellow socks and bracelet for high fall risk patients  - Consider moving patient to room near nurses station  8/1/2024 1039 by Anel Olea RN  Outcome: Progressing  8/1/2024 1038 by Anel Olea RN  Outcome: Progressing     Problem: PAIN - ADULT  Goal: Verbalizes/displays adequate comfort level or baseline comfort level  Description: Interventions:  - Encourage patient to monitor pain and request assistance  - Assess pain using appropriate pain scale  - Administer analgesics based on type and severity of pain and evaluate response  - Implement non-pharmacological measures as appropriate and evaluate response  - Consider cultural and social influences on pain and pain management  - Notify physician/advanced practitioner if interventions unsuccessful or patient reports new pain  8/1/2024 1039 by Anel Olea RN  Outcome: Progressing  8/1/2024 1038 by Anel Olea RN  Outcome: Progressing     Problem: INFECTION - ADULT  Goal: Absence or prevention of progression during hospitalization  Description: INTERVENTIONS:  - Assess and monitor for signs and symptoms of infection  - Monitor lab/diagnostic results  - Monitor all insertion sites, i.e. indwelling lines, tubes, and drains  - Monitor endotracheal if appropriate and nasal secretions for changes in amount and color  - [place] appropriate cooling/warming therapies per order  - Administer medications as ordered  - Instruct and encourage  patient and family to use good hand hygiene technique  - Identify and instruct in appropriate isolation precautions for identified infection/condition  8/1/2024 1039 by Anel Olea RN  Outcome: Progressing  8/1/2024 1038 by Anel Olea RN  Outcome: Progressing  Goal: Absence of fever/infection during neutropenic period  Description: INTERVENTIONS:  - Monitor WBC    8/1/2024 1039 by Anel Olea RN  Outcome: Progressing  8/1/2024 1038 by Anel Olea RN  Outcome: Progressing     Problem: SAFETY ADULT  Goal: Patient will remain free of falls  Description: INTERVENTIONS:  - Educate patient/family on patient safety including physical limitations  - Instruct patient to call for assistance with activity   - Consult OT/PT to assist with strengthening/mobility   - Keep Call bell within reach  - Keep bed low and locked with side rails adjusted as appropriate  - Keep care items and personal belongings within reach  - Initiate and maintain comfort rounds  - Make Fall Risk Sign visible to staff  - Apply yellow socks and bracelet for high fall risk patients  - Consider moving patient to room near nurses station  8/1/2024 1039 by Anel Olea RN  Outcome: Progressing  8/1/2024 1038 by Anel Olea RN  Outcome: Progressing  Goal: Maintain or return to baseline ADL function  Description: INTERVENTIONS:  -  Assess patient's ability to carry out ADLs; assess patient's baseline for ADL function and identify physical deficits which impact ability to perform ADLs (bathing, care of mouth/teeth, toileting, grooming, dressing, etc.)  - Assess/evaluate cause of self-care deficits   - Assess range of motion  - Assess patient's mobility; develop plan if impaired  - Assess patient's need for assistive devices and provide as appropriate  - Encourage maximum independence but intervene and supervise when necessary  - Involve family in performance of ADLs  - Assess for home care needs following discharge   - Consider OT consult to  assist with ADL evaluation and planning for discharge  - Provide patient education as appropriate  8/1/2024 1039 by Anel Olea RN  Outcome: Progressing  8/1/2024 1038 by Anel Olea RN  Outcome: Progressing  Goal: Maintains/Returns to pre admission functional level  Description: INTERVENTIONS:  - Perform AM-PAC 6 Click Basic Mobility/ Daily Activity assessment daily.  - Set and communicate daily mobility goal to care team and patient/family/caregiver.   - Collaborate with rehabilitation services on mobility goals if consulted  - Perform Range of Motion 3 times a day.  - Reposition patient every 2 hours.  - Dangle patient 3 times a day  - Stand patient 3 times a day  - Ambulate patient 3 times a day  - Out of bed to chair 3 times a day   - Out of bed for meals 3  Problem: DISCHARGE PLANNING  Goal: Discharge to home or other facility with appropriate resources  Description: INTERVENTIONS:  - Identify barriers to discharge w/patient and caregiver  - Arrange for needed discharge resources and transportation as appropriate  - Identify discharge learning needs (meds, wound care, etc.)  - Arrange for interpretive services to assist at discharge as needed  - Refer to Case Management Department for coordinating discharge planning if the patient needs post-hospital services based on physician/advanced practitioner order or complex needs related to functional status, cognitive ability, or social support system  8/1/2024 1039 by Anel Olea RN  Outcome: Progressing  8/1/2024 1038 by Anel Olea RN  Outcome: Progressing     Problem: Knowledge Deficit  Goal: Patient/family/caregiver demonstrates understanding of disease process, treatment plan, medications, and discharge instructions  Description: Complete learning assessment and assess knowledge base.  Interventions:  - Provide teaching at level of understanding  - Provide teaching via preferred learning methods  8/1/2024 1039 by Anel Olea RN  Outcome:  Progressing  8/1/2024 1038 by Anel Olea RN  Outcome: Progressing     Problem: Prexisting or High Potential for Compromised Skin Integrity  Goal: Skin integrity is maintained or improved  Description: INTERVENTIONS:  - Identify patients at risk for skin breakdown  - Assess and monitor skin integrity  - Assess and monitor nutrition and hydration status  - Monitor labs   - Assess for incontinence   - Turn and reposition patient  - Assist with mobility/ambulation  - Relieve pressure over bony prominences  - Avoid friction and shearing  - Provide appropriate hygiene as needed including keeping skin clean and dry  - Evaluate need for skin moisturizer/barrier cream  - Collaborate with interdisciplinary team   - Patient/family teaching  - Consider wound care consult   8/1/2024 1039 by Anel Olea RN  Outcome: Progressing  8/1/2024 1038 by Anel Olea RN  Outcome: Progressing     Problem: HEMATOLOGIC - ADULT  Goal: Maintains hematologic stability  Description: INTERVENTIONS  - Assess for signs and symptoms of bleeding or hemorrhage  - Monitor labs  - Administer supportive blood products/factors as ordered and appropriate  8/1/2024 1039 by Anel Olea RN  Outcome: Progressing  8/1/2024 1038 by Anel Olea RN  Outcome: Progressing     Problem: MUSCULOSKELETAL - ADULT  Goal: Maintain or return mobility to safest level of function  Description: INTERVENTIONS:  - Assess patient's ability to carry out ADLs; assess patient's baseline for ADL function and identify physical deficits which impact ability to perform ADLs (bathing, care of mouth/teeth, toileting, grooming, dressing, etc.)  - Assess/evaluate cause of self-care deficits   - Assess range of motion  - Assess patient's mobility  - Assess patient's need for assistive devices and provide as appropriate  - Encourage maximum independence but intervene and supervise when necessary  - Involve family in performance of ADLs  - Assess for home care needs following  discharge   - Consider OT consult to assist with ADL evaluation and planning for discharge  - Provide patient education as appropriate  8/1/2024 1039 by Anel Olea RN  Outcome: Progressing  8/1/2024 1038 by Anel Olea RN  Outcome: Progressing  Goal: Maintain proper alignment of affected body part  Description: INTERVENTIONS:  - Support, maintain and protect limb and body alignment  - Provide patient/ family with appropriate education  8/1/2024 1039 by Anel Olea RN  Outcome: Progressing  8/1/2024 1038 by Anel Olea RN  Outcome: Progressing     Problem: CARDIOVASCULAR - ADULT  Goal: Maintains optimal cardiac output and hemodynamic stability  Description: INTERVENTIONS:  - Monitor I/O, vital signs and rhythm  - Monitor for S/S and trends of decreased cardiac output  - Administer and titrate ordered vasoactive medications to optimize hemodynamic stability  - Assess quality of pulses, skin color and temperature  - Assess for signs of decreased coronary artery perfusion  - Instruct patient to report change in severity of symptoms  8/1/2024 1039 by Anel Olea RN  Outcome: Progressing  8/1/2024 1038 by Anel Olea RN  Outcome: Progressing  Goal: Absence of cardiac dysrhythmias or at baseline rhythm  Description: INTERVENTIONS:  - Continuous cardiac monitoring, vital signs, obtain 12 lead EKG if ordered  - Administer antiarrhythmic and heart rate control medications as ordered  - Monitor electrolytes and administer replacement therapy as ordered  8/1/2024 1039 by Anel Olea RN  Outcome: Progressing  8/1/2024 1038 by Anel Olea RN  Outcome: Progressing     Problem: SUBSTANCE USE/ABUSE  Goal: Will have no detox symptoms and will verbalize plan for changing substance-related behavior  Description: INTERVENTIONS:  - Monitor physical status and assess for symptoms of withdrawal  - Administer medication as ordered  - Provide emotional support with 1 on 1 interaction with staff  - Encourage recovery  focused program/ addiction education  - Assess for verbalization of changing behaviors related to substance abuse  - Initiate consults and referrals as appropriate (Case Management, Spiritual Care, etc.)  Outcome: Progressing  Goal: By discharge, will develop insight into their chemical dependency and sustain motivation to continue in recovery  Description: INTERVENTIONS:  - Attends all daily group sessions and scheduled AA groups  - Actively practices coping skills through participation in the therapeutic community and adherence to program rules  - Reviews and completes assignments from individual treatment plan  - Assist patient development of understanding of their personal cycle of addiction and relapse triggers  Outcome: Progressing  Goal: By discharge, patient will have ongoing treatment plan addressing chemical dependency  Description: INTERVENTIONS:  - Assist patient with resources and/or appointments for ongoing recovery based living  Outcome: Progressing     Problem: BEHAVIOR  Goal: Pt/Family maintain appropriate behavior and adhere to behavioral management agreement, if implemented  Description: INTERVENTIONS:  - Assess the family dynamic   - Encourage verbalization of thoughts and concerns in a socially appropriate manner  - Assess patient/family's coping skills and non-compliant behavior (including use of illegal substances).  - Utilize positive, consistent limit setting strategies supporting safety of patient, staff and others  - Initiate consult with Case Management, Spiritual Care or other ancillary services as appropriate  - If a patient's/visitor's behavior jeopardizes the safety of the patient, staff, or others, refer to organization procedure.   - Notify Security of behavior or suspected illegal substances which indicate the need for search of the patient and/or belongings  - Encourage participation in the decision making process about a behavioral management agreement; implement if patient meets  criteria  Outcome: Progressing    times a day  - Out of bed for toileting  - Record patient progress and toleration of activity level   8/1/2024 1039 by Anel Olea RN  Outcome: Progressing  8/1/2024 1038 by Anel Olea RN  Outcome: Progressing

## 2024-08-01 NOTE — PROGRESS NOTES
Progress Note - Orthopedics   David Skelton Jr. 64 y.o. male MRN: [id number]  Unit/Bed#: [facility] 604-01      Subjective:    64 y.o.male POD 1 R hip IM nail. No acute events, no new complaints. Denies fevers, chills, CP, SOB, N/V. Pain well controlled. His main concern this AM is his methadone dosing. VSS. Hgb pending this AM.    Labs:  0   Lab Value Date/Time    HCT 29.8 (L) 07/31/2024 0522    HCT 34.7 (L) 07/30/2024 1427    HCT 33.7 (L) 06/05/2024 1408    HCT 38.0 01/29/2015 1342    HCT 40.1 05/21/2014 0938    HCT 38.5 02/17/2014 0900    HGB 9.7 (L) 07/31/2024 0522    HGB 11.0 (L) 07/30/2024 1427    HGB 10.9 (L) 06/05/2024 1408    HGB 12.2 01/29/2015 1342    HGB 13.1 05/21/2014 0938    HGB 12.3 02/17/2014 0900    INR 1.22 (H) 07/30/2024 2201    INR 1.11 01/29/2015 1342    WBC 9.60 07/31/2024 0522    WBC 11.42 (H) 07/30/2024 1427    WBC 6.14 06/05/2024 1408    WBC 9.29 01/29/2015 1342    WBC 7.36 05/21/2014 0938    WBC 9.99 02/17/2014 0900    ESR 8 07/30/2024 2201       Meds:    Current Facility-Administered Medications:     acetaminophen (TYLENOL) tablet 975 mg, 975 mg, Oral, Q8H MELISA, Vazquez Estrada MD, 975 mg at 08/01/24 0552    ALPRAZolam (XANAX) tablet 1 mg, 1 mg, Oral, Daily PRN, Vazquez Estrada MD, 1 mg at 08/01/24 0552    calcium carbonate (TUMS) chewable tablet 1,000 mg, 1,000 mg, Oral, Daily PRN, Vazquez Estrada MD    ceFAZolin (ANCEF) IVPB (premix in dextrose) 1,000 mg 50 mL, 1,000 mg, Intravenous, Q8H, Gayle Denson PA-C, Stopped at 08/01/24 0305    ceFAZolin (ANCEF) IVPB (premix in dextrose) 2,000 mg 50 mL, 2,000 mg, Intravenous, On Call To OR, Radha Keller MD    docusate sodium (COLACE) capsule 100 mg, 100 mg, Oral, BID, Vazquez Estrdaa MD, 100 mg at 07/31/24 1828    DULoxetine (CYMBALTA) delayed release capsule 60 mg, 60 mg, Oral, Daily, Vazquez Estrada MD    enoxaparin (LOVENOX) subcutaneous injection 40 mg, 40 mg, Subcutaneous, Q12H,  Vazquez Estrada MD, 40 mg at 08/01/24 0422    folic acid (FOLVITE) tablet 1 mg, 1 mg, Oral, Daily, Vazquez Estrada MD    gabapentin (NEURONTIN) capsule 600 mg, 600 mg, Oral, BID, Vazquez Estrada MD, 600 mg at 07/31/24 1828    heparin (porcine) 25,000 units in 0.45% NaCl 250 mL infusion (premix), 3-20 Units/kg/hr (Order-Specific), Intravenous, Titrated, Vazquez Estrada MD, Stopped at 07/31/24 0303    HYDROmorphone HCl (DILAUDID) injection 0.2 mg, 0.2 mg, Intravenous, Q2H PRN, Vazquez Estrada MD, 0.2 mg at 07/31/24 0756    insulin lispro (HumALOG/ADMELOG) 100 units/mL subcutaneous injection 1-6 Units, 1-6 Units, Subcutaneous, TID AC **AND** Fingerstick Glucose (POCT), , , TID AC, Vazquez Estrada MD    insulin lispro (HumALOG/ADMELOG) 100 units/mL subcutaneous injection 1-6 Units, 1-6 Units, Subcutaneous, HS, Vazquez Estrada MD, 4 Units at 07/31/24 2315    lactated ringers bolus 1,000 mL, 1,000 mL, Intravenous, Once PRN **AND** lactated ringers bolus 1,000 mL, 1,000 mL, Intravenous, Once PRN, Gayle Denson PA-C    methocarbamol (ROBAXIN) tablet 500 mg, 500 mg, Oral, Q6H MELISA, Vazquez Estrada MD, 500 mg at 08/01/24 0552    metoprolol succinate (TOPROL-XL) 24 hr tablet 12.5 mg, 12.5 mg, Oral, Daily, Vazquez Estrada MD, 12.5 mg at 07/31/24 0926    multivitamin-minerals (CENTRUM) tablet 1 tablet, 1 tablet, Oral, Daily, Vazquez Estrada MD    naloxone (NARCAN) 0.04 mg/mL syringe 0.04 mg, 0.04 mg, Intravenous, Q1MIN PRN, Vazquez Estrada MD    oxyCODONE (ROXICODONE) split tablet 2.5 mg, 2.5 mg, Oral, Q4H PRN **OR** oxyCODONE (ROXICODONE) IR tablet 5 mg, 5 mg, Oral, Q4H PRN, Vazquez Estrada MD, 5 mg at 08/01/24 0418    sodium chloride 0.9 % bolus 1,000 mL, 1,000 mL, Intravenous, Once PRN **AND** sodium chloride 0.9 % bolus 1,000 mL, 1,000 mL, Intravenous, Once PRN, Gayle Denson PA-C     "thiamine tablet 100 mg, 100 mg, Oral, Daily, Vazquez Estrada MD    Blood Culture:   No results found for: \"BLOODCX\"    Wound Culture:   No results found for: \"WOUNDCULT\"    Ins and Outs:  I/O last 24 hours:  In: 1658.3 [I.V.:1658.3]  Out: 1775 [Urine:1775]          Physical:  Vitals:    08/01/24 0230   BP:    Pulse: 81   Resp:    Temp:    SpO2: 98%       Musculoskeletal: right Lower Extremity  Surgical dressings are clean, dry, intact without strike thru  Mild tenderness to palpation over the surgical site.  Sensation decreased but intact to sural, saphenous, superficial peroneal, deep peroneal, and tibial distributions consistent with previously noted history of numbness and peripheral neuropathy. Unchanged.  Motor intact ankle plantarflexion/dorsiflexion, EHL/FHL  Extremity is warm and well perfused with PT pulse palpable and capillary refill < 2 seconds.      Assessment:    64 y.o.male POD 1 R hip IM nail. Doing well overall.     Plan:  WBAT RLE  PT/OT  Pain control  DVT ppx -per primary while inpatient, ASA 81 mg twice daily until 28 days after surgery on discharge.  Will monitor for ABLA and administer IVF/prbc as indicated for Greater than 2 gram drop or Hgb < 7  Medical management per primary team  Dispo: Ortho will follow    Radha Keller MD      Please Secure Chat the [facility] Ortho Floor Role with any Questions or Concerns.    "

## 2024-08-01 NOTE — PHYSICAL THERAPY NOTE
PHYSICAL THERAPY EVALUATION  NAME:  David Skelton Jr.  DATE: 08/01/24    AGE:   64 y.o.  Mrn:   [id number]  ADMIT DX:  Bradycardia [R00.1]  Closed right hip fracture (HCC) [S72.001A]    Past Medical History:   Diagnosis Date    Anxiety     Depression     Diabetes mellitus (HCC)     Drug use     Hemorrhoids, internal 9/23/2019    Hypertension        Past Surgical History:   Procedure Laterality Date    FEMUR FRACTURE SURGERY Right     GASTRIC BYPASS  11/08/2011    Managed by: Braden Luna (General Surgery)    HERNIA REPAIR  02/13/2013    Incisional hernia repair Managed by: Braden Luna (General Surgery)    TONSILLECTOMY  2010       Length Of Stay: 2    PHYSICAL THERAPY EVALUATION:        08/01/24 1019   Note Type   Note type Evaluation   Pain Assessment   Pain Assessment Tool 0-10   Pain Score 8   Pain Location/Orientation Orientation: Right;Location: Hip   Pain Onset/Description Onset: Ongoing;Frequency: Constant/Continuous;Descriptor: Aching   Effect of Pain on Daily Activities increased pain with activity   Patient's Stated Pain Goal No pain   Hospital Pain Intervention(s) Ambulation/increased activity;Repositioned   Restrictions/Precautions   Weight Bearing Precautions Per Order Yes   RLE Weight Bearing Per Order WBAT   Other Precautions Chair Alarm;Bed Alarm;Multiple lines;Fall Risk;Pain   Home Living   Type of Home Apartment  (4th floor apartment with elevator)   Home Layout One level;Stairs to enter with rails  ([place] apartment building)   Home Equipment Walker;Cane  (none as per pt)   Additional Comments Pt reports living alone, but states he has supportive friends   Prior Function   Level of [place] Independent with functional mobility   Lives With Alone   Receives Help From Friend(s)   Falls in the last 6 months 1 to 4   Comments Pt reports the use of a SPC for ambulation PTA   General   Family/Caregiver Present No   Cognition   Arousal/Participation Alert   Orientation Level Oriented to  person;Oriented to place;Oriented to time;Oriented to situation   Memory Decreased recall of precautions   Following Commands Follows one step commands with increased time or repetition   RUE Assessment   RUE Assessment WFL   LUE Assessment   LUE Assessment WFL   RLE Assessment   RLE Assessment X   Strength RLE   RLE Overall Strength 3-/5   LLE Assessment   LLE Assessment WFL   Strength LLE   LLE Overall Strength 4/5   Bed Mobility   Supine to Sit 3  Moderate assistance   Additional items Assist x 1;Increased time required;Verbal cues   Transfers   Sit to Stand 3  Moderate assistance   Additional items Assist x 1;Increased time required;Verbal cues   Stand to Sit 3  Moderate assistance   Additional items Assist x 1;Increased time required;Verbal cues   Additional Comments cues needed for hand placement during transfers   Ambulation/Elevation   Gait pattern Excessively slow;Short stride;Foward flexed;Inconsistent shanell   Gait Assistance 3  Moderate assist   Additional items Assist x 1   Assistive Device Rolling walker   Distance 3ft   Balance   Static Sitting Fair -   Static Standing Poor +   Ambulatory Poor   Endurance Deficit   Endurance Deficit Yes   Endurance Deficit Description fatigue, pain   Activity Tolerance   Activity Tolerance Patient limited by fatigue;Patient limited by pain   Medical Staff Made Aware Prabhakar, OT; Cassie, SPT; OT present for co evaluation due to pts current medical presentation   Nurse Made Aware Pt appropriate to be seen and mobilize per nsg   Assessment   Prognosis Good   Problem List Decreased strength;Decreased range of motion;Decreased endurance;Impaired balance;Decreased mobility;Decreased safety awareness;Pain   Assessment Pt is 64 y.o. male seen for PT evaluation s/p admit to [facility] on 7/30/2024. Two pt identifiers were used to confirm. Pt presented s/p fall.  Pt was admitted with a primary dx of: closed fx of R femur. Pt underwent R hip IM nail which was performed  "on 7/31/24.  PT now consulted for assessment of mobility and d/c needs. Pt with Up as tolerated orders.  Pts current co morbidities affecting treatment include:  has a past medical history of Anxiety, Depression, Diabetes mellitus (HCC), Drug use, Hemorrhoids, internal, and Hypertension. . Pts current clinical presentation is Unstable/ Unpredictable (high complexity) due to Ongoing medical management for primary dx, Increased reliance on more restrictive AD compared to baseline, Decreased activity tolerance compared to baseline, Fall risk, Increased assistance needed from caregiver at current time, Continuous pulse oximetry monitoring , s/p surgical intervention  .  Upon evaluation, pt currently is requiring Mod Ax1 for bed mobility; Mod Ax1 for transfers and Mod Ax1 for ambulation w/ RW. Pt presents at PT eval functioning below baseline and currently w/ overall mobility deficits 2* to: BLE weakness, decreased ROM, impaired balance, decreased endurance, gait deviations, pain, decreased activity tolerance compared to baseline, fall risk. Pt currently at a fall risk 2* to impairments listed above.  Based on the aforementioned PT evaluation, pt will continue to benefit from skilled Acute PT interventions to address stated impairments; to maximize functional mobility; for ongoing pt/ family training; and DME needs. At conclusion of PT session pt returned back in chair and chair alarm engaged with phone and call bell within reach. Pt denies any further questions at this time. PT is currently recommending  level II resource intensity . PT will continue to follow during hospital stay.   Barriers to Discharge Decreased caregiver support   Goals   Patient Goals \" to get better\"   STG Expiration Date 08/15/24   Short Term Goal #1 In 14 days pt will complete: 1) Bed mobility skills with mod I to increase safety and independence as well as decrease caregiver burden. 2) Functional transfers with mod I to promote increased " independence, safety, and QOL. 3) Ambulate 200' using least restrictive AD with mod I without LOB and stable vitals so that pt can negotiate previous living environment safely and promote independence with functional mobility and return to PLOF. 4) Stair training up/ down 3 step/s using rail/s with mod I so that pt can enter/negotiate previous living environment safely and decrease fall risk. 5) Improve balance grades by 1/2 grade to increase safety with all mobility and decrease fall risk.  6) Improve BLE strength by 1/2 grade to help increase overall functional mobility and decrease fall risk.   Plan   Treatment/Interventions Functional transfer training;LE strengthening/ROM;Therapeutic exercise;Endurance training;Elevations;Patient/family training;Equipment eval/education;Bed mobility;Gait training;Spoke to nursing;OT   PT Frequency 3-5x/wk   Discharge Recommendation   Rehab Resource Intensity Level, PT II (Moderate Resource Intensity)   Equipment Recommended Walker   Walker Package Recommended Wheeled walker   AM-PAC Basic Mobility Inpatient   Turning in Flat Bed Without Bedrails 2   Lying on Back to Sitting on Edge of Flat Bed Without Bedrails 2   Moving Bed to Chair 2   Standing Up From Chair Using Arms 2   Walk in Room 2   Climb 3-5 Stairs With Railing 2   Basic Mobility Inpatient Raw Score 12   Basic Mobility Standardized Score 32.23   [facility] Highest Level Of Mobility   -HL Goal 4: Move to chair/commode   -[facility] Achieved 4: Move to chair/commode   Modified [place] Scale   Modified Maddie Scale 4   [place] Index   Feeding 10   Bathing 0   Grooming Score 5   Dressing Score 5   Bladder Score 10   Bowels Score 10   Toilet Use Score 5   Transfers (Bed/Chair) Score 5   Mobility (Level Surface) Score 0   Stairs Score 0   [place] Index Score 50   Portions of the documentation may have been created using voice recognition software.Occasional wrong word or sound alike substitutions may have occurred due to the  inherent limitations of the voice recognition software. Read the chart carefully and recognize, using context, where substitutions have occurred.    Saul Cox, PT, DPT

## 2024-08-01 NOTE — PROGRESS NOTES
Notified Vazquez Estrada MD of pts CIWA score and hard stopped due to previous methadone injection of 10 mg at 1306 on 7/31/2024 when trying to order pt's 4 mg Ativan injection. Vazquez Estrada MD Trauma Resident ordered not to give ativan to pt.

## 2024-08-01 NOTE — PROGRESS NOTES
"Progress Note - Cardiology   David Skelton Jr. 64 y.o. male MRN: [id number]  Unit/Bed#: [facility] 604-01 Encounter: [id number]    Assessment:  Principal Problem:    Closed fracture of right femur, unspecified fracture morphology, sequela  Active Problems:    Methadone dependence (HCC)    History of paroxysmal atrial fibrillation as reported. He is recently been in sinus rhythm. Previous outpatient discussion with cardiology about not being on anticoagulation.    Plan:    Tolerated surgery well from a cardiac standpoint. No plan to reinitiate anticoagulation, as he was not on this as an outpatient. This discussion was previously had with his primary cardiologist.    No changes in medications. No cardiac testing necessary. Outpatient follow-up recommended with primary cardiology electively after discharge.    Cardiology team will sign off. Please call with questions or changes.    Subjective/Objective     Subjective:  Denies any specific complaints to me. Sitting in his recliner eating dinner.    No cardiac issues during his surgery.    Objective:    Vitals: /67   Pulse 69   Temp (!) 97.3 °F (36.3 °C)   Resp 18   Ht 5' 8\" (1.727 m)   Wt 79.4 kg (175 lb)   SpO2 97%   BMI 26.61 kg/m²   Vitals:    07/31/24 0000 07/31/24 0100   Weight: 79.4 kg (175 lb) 79.4 kg (175 lb)     Orthostatic Blood Pressures      Flowsheet Row Most Recent Value   Blood Pressure 124/67 filed at 08/01/2024 1459   Patient Position - Orthostatic VS Lying filed at 08/01/2024 0721              Intake/Output Summary (Last 24 hours) at 8/1/2024 1748  Last data filed at 8/1/2024 1300  Gross per 24 hour   Intake 1138.33 ml   Output 2450 ml   Net -1311.67 ml     Physical Exam:   General appearance: alert and in no acute distress  Head: Normocephalic, without obvious abnormality, atraumatic  Neck: no carotid bruit, no JVD and supple, symmetrical, trachea midline  Lungs: clear to auscultation bilaterally. Normal air entry. Normal effort.  Heart: S1, S2 " normal and no S3 or S4. No murmurs.  Abdomen: soft, non-tender; bowel sounds normal; no masses,  no organomegaly  Extremities: extremities normal, atraumatic, no cyanosis or edema  Pulses: 2+ and symmetric bilaterally  Skin: Skin color, texture, turgor normal. No rashes or lesions  Neurologic: Grossly normal. Alert and oriented.    Medications:    Current Facility-Administered Medications:     acetaminophen (TYLENOL) tablet 975 mg, 975 mg, Oral, Q8H MELISA, Vazquez Estrada MD, 975 mg at 08/01/24 1417    ALPRAZolam (XANAX) tablet 0.5 mg, 0.5 mg, Oral, Daily PRN, Lisset Teran MD    calcium carbonate (TUMS) chewable tablet 1,000 mg, 1,000 mg, Oral, Daily PRN, Vazquez Estrada MD    ceFAZolin (ANCEF) IVPB (premix in dextrose) 2,000 mg 50 mL, 2,000 mg, Intravenous, On Call To OR, Radha Keller MD    docusate sodium (COLACE) capsule 100 mg, 100 mg, Oral, BID, Vazquez Estrada MD, 100 mg at 08/01/24 1704    DULoxetine (CYMBALTA) delayed release capsule 60 mg, 60 mg, Oral, Daily, Vazquez Estrada MD, 60 mg at 08/01/24 0813    enoxaparin (LOVENOX) subcutaneous injection 40 mg, 40 mg, Subcutaneous, Q12H, Vazquez Estrada MD, 40 mg at 08/01/24 1656    ergocalciferol (VITAMIN D2) capsule 50,000 Units, 50,000 Units, Oral, Once per day on Monday Wednesday Friday, Nila Chang DO    folic acid (FOLVITE) tablet 1 mg, 1 mg, Oral, Daily, aVzquez Estrada MD, 1 mg at 08/01/24 0808    gabapentin (NEURONTIN) capsule 600 mg, 600 mg, Oral, BID, Vazquez Estrada MD, 600 mg at 08/01/24 0810    heparin (porcine) 25,000 units in 0.45% NaCl 250 mL infusion (premix), 3-20 Units/kg/hr (Order-Specific), Intravenous, Titrated, Vazquez Estrada MD, Stopped at 07/31/24 0303    insulin glargine (LANTUS) subcutaneous injection 15 Units 0.15 mL, 15 Units, Subcutaneous, HS, Nila Chang DO    insulin lispro (HumALOG/ADMELOG) 100 units/mL  subcutaneous injection 1-6 Units, 1-6 Units, Subcutaneous, TID AC, 3 Units at 08/01/24 1705 **AND** Fingerstick Glucose (POCT), , , TID AC, Vazquez Estrada MD    insulin lispro (HumALOG/ADMELOG) 100 units/mL subcutaneous injection 1-6 Units, 1-6 Units, Subcutaneous, HS, Vazquez Estrada MD, 4 Units at 07/31/24 2315    methadone (DOLOPHINE) tablet 190 mg, 190 mg, Oral, Daily, Vazquez Estrada MD, 80 mg at 08/01/24 0943    methocarbamol (ROBAXIN) tablet 500 mg, 500 mg, Oral, Q8H MELISA, Lisset Teran MD    metoprolol succinate (TOPROL-XL) 24 hr tablet 12.5 mg, 12.5 mg, Oral, Daily, Vazquez Estrada MD, 12.5 mg at 08/01/24 0810    multivitamin-minerals (CENTRUM) tablet 1 tablet, 1 tablet, Oral, Daily, Vazquez Estrada MD, 1 tablet at 08/01/24 0808    naloxone (NARCAN) 0.04 mg/mL syringe 0.04 mg, 0.04 mg, Intravenous, Q1MIN PRN, Vazquez Estrada MD    oxyCODONE (ROXICODONE) split tablet 2.5 mg, 2.5 mg, Oral, Q4H PRN **OR** oxyCODONE (ROXICODONE) IR tablet 5 mg, 5 mg, Oral, Q4H PRN, Vazquez Estrada MD, 5 mg at 08/01/24 0418    thiamine tablet 100 mg, 100 mg, Oral, Daily, Vazquez Estrada MD, 100 mg at 08/01/24 0808    Lab Results:  Results from last 7 days   Lab Units 07/30/24  1427   CK TOTAL U/L 147     Results from last 7 days   Lab Units 08/01/24  0606 07/31/24  0522 07/30/24  2201 07/30/24  1427   WBC Thousand/uL 10.66* 9.60  --  11.42*   HEMOGLOBIN g/dL 7.9* 9.7*  --  11.0*   HEMATOCRIT % 24.2* 29.8*  --  34.7*   PLATELETS Thousands/uL 160 175 172 194         Results from last 7 days   Lab Units 08/01/24  0606 07/31/24  0522 07/30/24  1427   SODIUM mmol/L 133* 138 137   POTASSIUM mmol/L 4.8 4.3 4.5   CHLORIDE mmol/L 97 99 101   CO2 mmol/L 33* 34* 31   BUN mg/dL 16 12 13   CREATININE mg/dL 0.84 0.79 0.89   CALCIUM mg/dL 7.9* 8.3* 8.2*   ALK PHOS U/L 67  --  86   ALT U/L 17  --  26   AST U/L 17  --  21     Results from last 7  days   Lab Units 07/30/24  2201   INR  1.22*   PTT seconds 29     Results from last 7 days   Lab Units 08/01/24  0606   MAGNESIUM mg/dL 1.9

## 2024-08-01 NOTE — ASSESSMENT & PLAN NOTE
POD 5 s/p R hip IM Nail for intertrochanteric proximal femur fx with h/o polysubstance abuse.  Plan:  - continue maintenance home methadone 190 mg PO QD (Clinic confirmed the dose)  - continue tylenol 975 mg PO TID. Most recent liver panel with normal levels.   - continue gabapentin 600 mg PO BID (home med)  - completed short day course of mikel robaxin 700 mg PO TID for muscle spasms earlier this morning  - continue with xanax to 0.5 mg PO BID PRN anxiety; monitoring for oversedation, respiratory depression. Of note, he has an upcoming outpt psych appt to discuss anxiolytic regimen.  - narcan order to combate SE of respiratory depression  -continue with bowel regimen  -IV dilaudid prn for breakthrough previously dc'd    - oxycodone to 5-10  mg PO Q 4 hrs PRN mod-severe pain was increased over the wkd to 10-15 mg PO q4h prn. Per chart review, primary service has switched back to 5-10 mg q4h prn this morning (8/5).            -On APS eval this morning, Pt reported improved pain since the start of the wkd and has been ambulating with a walker on hospital floor during PT sessions; also ambulating w/ walker to the bathroom.            -with anticipated d/c to either [facility] or home w/ outpt rehab, would recommend continuing with the prn q4h 5-10 mg Oxycodone dosing he's currently on until he's re-evaluated on outpt ortho f/u.

## 2024-08-01 NOTE — PLAN OF CARE
Problem: OCCUPATIONAL THERAPY ADULT  Goal: Performs self-care activities at highest level of function for planned discharge setting.  See evaluation for individualized goals.  Description: Treatment Interventions: ADL retraining, Functional transfer training, Endurance training, Cognitive reorientation, Patient/family training, Equipment evaluation/education, Compensatory technique education, Activityengagement          See flowsheet documentation for full assessment, interventions and recommendations.   Note: Limitation: Decreased ADL status, Decreased endurance, Decreased self-care trans, Decreased high-level ADLs  Prognosis: Good  Assessment: Pt is a 64 y.o. male who was admitted to [facility] on 7/30/2024 with Closed fracture of right femur, unspecified fracture morphology, sequela s/p IMN. Patient  has a past medical history of Anxiety, Depression, Diabetes mellitus (HCC), Drug use, Hemorrhoids, internal, and Hypertension.   At baseline pt was completing adls and mobility independently - I iadls. Pt lives alone in 4th floor apt with elevator access. Currently pt requires moderate assist for overall ADLS and moderate assist for functional mobility/transfers. Pt currently presents with impairments in the following categories -steps to enter environment, limited home support, difficulty performing ADLS, difficulty performing IADLS , and environment endurance, standing balance/tolerance, sitting balance/tolerance, insight, safety , judgement , and attention . These impairments, as well as pt's fatigue, pain, orthopedic restricitions , WBS , decreased caregiver support, risk for falls, and home environment  limit pt's ability to safely engage in all baseline areas of occupation, includingbathing, dressing, toileting, functional mobility/transfers, community mobility, laundry , driving, house maintenance, meal prep, cleaning, social participation , and leisure activities  From OT standpoint, recommend  level II resources upon D/C. OT will continue to follow to address the below stated goals.     Rehab Resource Intensity Level, OT: II (Moderate Resource Intensity)

## 2024-08-01 NOTE — PLAN OF CARE
Problem: PHYSICAL THERAPY ADULT  Goal: Performs mobility at highest level of function for planned discharge setting.  See evaluation for individualized goals.  Description: Treatment/Interventions: Functional transfer training, LE strengthening/ROM, Therapeutic exercise, Endurance training, Elevations, Patient/family training, Equipment eval/education, Bed mobility, Gait training, Spoke to nursing, OT  Equipment Recommended: Walker       See flowsheet documentation for full assessment, interventions and recommendations.  Note: Prognosis: Good  Problem List: Decreased strength, Decreased range of motion, Decreased endurance, Impaired balance, Decreased mobility, Decreased safety awareness, Pain  Assessment: Pt is 64 y.o. male seen for PT evaluation s/p admit to [facility] on 7/30/2024. Two pt identifiers were used to confirm. Pt presented s/p fall.  Pt was admitted with a primary dx of: closed fx of R femur. Pt underwent R hip IM nail which was performed on 7/31/24.  PT now consulted for assessment of mobility and d/c needs. Pt with Up as tolerated orders.  Pts current co morbidities affecting treatment include:  has a past medical history of Anxiety, Depression, Diabetes mellitus (HCC), Drug use, Hemorrhoids, internal, and Hypertension. . Pts current clinical presentation is Unstable/ Unpredictable (high complexity) due to Ongoing medical management for primary dx, Increased reliance on more restrictive AD compared to baseline, Decreased activity tolerance compared to baseline, Fall risk, Increased assistance needed from caregiver at current time, Continuous pulse oximetry monitoring , s/p surgical intervention  .  Upon evaluation, pt currently is requiring Mod Ax1 for bed mobility; Mod Ax1 for transfers and Mod Ax1 for ambulation w/ RW. Pt presents at PT eval functioning below baseline and currently w/ overall mobility deficits 2* to: BLE weakness, decreased ROM, impaired balance, decreased endurance,  gait deviations, pain, decreased activity tolerance compared to baseline, fall risk. Pt currently at a fall risk 2* to impairments listed above.  Based on the aforementioned PT evaluation, pt will continue to benefit from skilled Acute PT interventions to address stated impairments; to maximize functional mobility; for ongoing pt/ family training; and DME needs. At conclusion of PT session pt returned back in chair and chair alarm engaged with phone and call bell within reach. Pt denies any further questions at this time. PT is currently recommending  level II resource intensity . PT will continue to follow during hospital stay.  Barriers to Discharge: Decreased caregiver support     Rehab Resource Intensity Level, PT: II (Moderate Resource Intensity)    See flowsheet documentation for full assessment.

## 2024-08-02 PROBLEM — D62 ACUTE BLOOD LOSS ANEMIA (ABLA): Status: ACTIVE | Noted: 2024-08-02

## 2024-08-02 LAB
ANION GAP SERPL CALCULATED.3IONS-SCNC: 2 MMOL/L (ref 4–13)
BUN SERPL-MCNC: 15 MG/DL (ref 5–25)
CALCIUM SERPL-MCNC: 8 MG/DL (ref 8.4–10.2)
CHLORIDE SERPL-SCNC: 96 MMOL/L (ref 96–108)
CO2 SERPL-SCNC: 35 MMOL/L (ref 21–32)
CREAT SERPL-MCNC: 0.84 MG/DL (ref 0.6–1.3)
ERYTHROCYTE [DISTWIDTH] IN BLOOD BY AUTOMATED COUNT: 12.9 % (ref 11.6–15.1)
GFR SERPL CREATININE-BSD FRML MDRD: 93 ML/MIN/1.73SQ M
GLUCOSE SERPL-MCNC: 172 MG/DL (ref 65–140)
GLUCOSE SERPL-MCNC: 184 MG/DL (ref 65–140)
GLUCOSE SERPL-MCNC: 220 MG/DL (ref 65–140)
GLUCOSE SERPL-MCNC: 257 MG/DL (ref 65–140)
GLUCOSE SERPL-MCNC: 266 MG/DL (ref 65–140)
HCT VFR BLD AUTO: 24.1 % (ref 36.5–49.3)
HGB BLD-MCNC: 7.8 G/DL (ref 12–17)
MCH RBC QN AUTO: 28.7 PG (ref 26.8–34.3)
MCHC RBC AUTO-ENTMCNC: 32.4 G/DL (ref 31.4–37.4)
MCV RBC AUTO: 89 FL (ref 82–98)
PLATELET # BLD AUTO: 174 THOUSANDS/UL (ref 149–390)
PMV BLD AUTO: 11.5 FL (ref 8.9–12.7)
POTASSIUM SERPL-SCNC: 4.4 MMOL/L (ref 3.5–5.3)
RBC # BLD AUTO: 2.72 MILLION/UL (ref 3.88–5.62)
SODIUM SERPL-SCNC: 133 MMOL/L (ref 135–147)
WBC # BLD AUTO: 9.03 THOUSAND/UL (ref 4.31–10.16)

## 2024-08-02 PROCEDURE — 80048 BASIC METABOLIC PNL TOTAL CA: CPT | Performed by: STUDENT IN AN ORGANIZED HEALTH CARE EDUCATION/TRAINING PROGRAM

## 2024-08-02 PROCEDURE — 82948 REAGENT STRIP/BLOOD GLUCOSE: CPT

## 2024-08-02 PROCEDURE — 99233 SBSQ HOSP IP/OBS HIGH 50: CPT | Performed by: ANESTHESIOLOGY

## 2024-08-02 PROCEDURE — 85027 COMPLETE CBC AUTOMATED: CPT | Performed by: STUDENT IN AN ORGANIZED HEALTH CARE EDUCATION/TRAINING PROGRAM

## 2024-08-02 PROCEDURE — 99232 SBSQ HOSP IP/OBS MODERATE 35: CPT | Performed by: STUDENT IN AN ORGANIZED HEALTH CARE EDUCATION/TRAINING PROGRAM

## 2024-08-02 PROCEDURE — NC001 PR NO CHARGE: Performed by: ORTHOPAEDIC SURGERY

## 2024-08-02 RX ORDER — OXYCODONE HYDROCHLORIDE 10 MG/1
10 TABLET ORAL EVERY 4 HOURS PRN
Status: DISCONTINUED | OUTPATIENT
Start: 2024-08-02 | End: 2024-08-05

## 2024-08-02 RX ORDER — ALPRAZOLAM 0.5 MG/1
0.5 TABLET ORAL 2 TIMES DAILY PRN
Status: DISCONTINUED | OUTPATIENT
Start: 2024-08-02 | End: 2024-08-02

## 2024-08-02 RX ORDER — METHADONE HYDROCHLORIDE 10 MG/1
190 TABLET ORAL DAILY
Status: DISCONTINUED | OUTPATIENT
Start: 2024-08-03 | End: 2024-08-05 | Stop reason: HOSPADM

## 2024-08-02 RX ORDER — OXYCODONE HYDROCHLORIDE 10 MG/1
10 TABLET ORAL EVERY 4 HOURS PRN
Status: DISCONTINUED | OUTPATIENT
Start: 2024-08-02 | End: 2024-08-02

## 2024-08-02 RX ORDER — OXYCODONE HYDROCHLORIDE 5 MG/1
5 TABLET ORAL EVERY 4 HOURS PRN
Status: DISCONTINUED | OUTPATIENT
Start: 2024-08-02 | End: 2024-08-02

## 2024-08-02 RX ORDER — ENOXAPARIN SODIUM 100 MG/ML
30 INJECTION SUBCUTANEOUS EVERY 12 HOURS
Status: DISCONTINUED | OUTPATIENT
Start: 2024-08-02 | End: 2024-08-05 | Stop reason: HOSPADM

## 2024-08-02 RX ORDER — ALPRAZOLAM 0.5 MG/1
0.5 TABLET ORAL DAILY PRN
Status: DISCONTINUED | OUTPATIENT
Start: 2024-08-03 | End: 2024-08-03

## 2024-08-02 RX ORDER — HYDROMORPHONE HCL/PF 1 MG/ML
1 SYRINGE (ML) INJECTION ONCE
Status: COMPLETED | OUTPATIENT
Start: 2024-08-02 | End: 2024-08-02

## 2024-08-02 RX ORDER — DICYCLOMINE HYDROCHLORIDE 10 MG/1
10 CAPSULE ORAL
Status: DISCONTINUED | OUTPATIENT
Start: 2024-08-02 | End: 2024-08-05 | Stop reason: HOSPADM

## 2024-08-02 RX ORDER — ALPRAZOLAM 0.5 MG/1
1 TABLET ORAL
Status: DISCONTINUED | OUTPATIENT
Start: 2024-08-02 | End: 2024-08-05 | Stop reason: HOSPADM

## 2024-08-02 RX ORDER — METHOCARBAMOL 750 MG/1
750 TABLET, FILM COATED ORAL EVERY 8 HOURS SCHEDULED
Status: COMPLETED | OUTPATIENT
Start: 2024-08-02 | End: 2024-08-05

## 2024-08-02 RX ADMIN — INSULIN LISPRO 2 UNITS: 100 INJECTION, SOLUTION INTRAVENOUS; SUBCUTANEOUS at 21:16

## 2024-08-02 RX ADMIN — Medication 2 TABLET: at 13:37

## 2024-08-02 RX ADMIN — Medication 2 TABLET: at 07:40

## 2024-08-02 RX ADMIN — ALPRAZOLAM 0.5 MG: 0.5 TABLET ORAL at 04:58

## 2024-08-02 RX ADMIN — Medication 2 TABLET: at 18:00

## 2024-08-02 RX ADMIN — DICYCLOMINE HYDROCHLORIDE 10 MG: 10 CAPSULE ORAL at 18:00

## 2024-08-02 RX ADMIN — OXYCODONE HYDROCHLORIDE 5 MG: 5 TABLET ORAL at 02:44

## 2024-08-02 RX ADMIN — ENOXAPARIN SODIUM 40 MG: 40 INJECTION SUBCUTANEOUS at 04:58

## 2024-08-02 RX ADMIN — GABAPENTIN 600 MG: 300 CAPSULE ORAL at 09:28

## 2024-08-02 RX ADMIN — ALPRAZOLAM 1 MG: 0.5 TABLET ORAL at 21:15

## 2024-08-02 RX ADMIN — Medication 1 TABLET: at 09:28

## 2024-08-02 RX ADMIN — ACETAMINOPHEN 975 MG: 325 TABLET, FILM COATED ORAL at 13:37

## 2024-08-02 RX ADMIN — INSULIN GLARGINE 15 UNITS: 100 INJECTION, SOLUTION SUBCUTANEOUS at 21:15

## 2024-08-02 RX ADMIN — ACETAMINOPHEN 975 MG: 325 TABLET, FILM COATED ORAL at 05:07

## 2024-08-02 RX ADMIN — INSULIN LISPRO 3 UNITS: 100 INJECTION, SOLUTION INTRAVENOUS; SUBCUTANEOUS at 18:00

## 2024-08-02 RX ADMIN — METHOCARBAMOL 750 MG: 750 TABLET ORAL at 21:15

## 2024-08-02 RX ADMIN — DOCUSATE SODIUM 100 MG: 100 CAPSULE, LIQUID FILLED ORAL at 09:29

## 2024-08-02 RX ADMIN — METHOCARBAMOL 750 MG: 750 TABLET ORAL at 13:37

## 2024-08-02 RX ADMIN — FOLIC ACID 1 MG: 1 TABLET ORAL at 09:28

## 2024-08-02 RX ADMIN — GABAPENTIN 600 MG: 300 CAPSULE ORAL at 18:00

## 2024-08-02 RX ADMIN — DICYCLOMINE HYDROCHLORIDE 10 MG: 10 CAPSULE ORAL at 21:16

## 2024-08-02 RX ADMIN — DOCUSATE SODIUM 100 MG: 100 CAPSULE, LIQUID FILLED ORAL at 18:00

## 2024-08-02 RX ADMIN — ENOXAPARIN SODIUM 30 MG: 30 INJECTION SUBCUTANEOUS at 18:00

## 2024-08-02 RX ADMIN — HYDROMORPHONE HYDROCHLORIDE 1 MG: 1 INJECTION, SOLUTION INTRAMUSCULAR; INTRAVENOUS; SUBCUTANEOUS at 04:05

## 2024-08-02 RX ADMIN — OXYCODONE HYDROCHLORIDE 10 MG: 10 TABLET ORAL at 14:38

## 2024-08-02 RX ADMIN — INSULIN LISPRO 1 UNITS: 100 INJECTION, SOLUTION INTRAVENOUS; SUBCUTANEOUS at 13:36

## 2024-08-02 RX ADMIN — THIAMINE HCL TAB 100 MG 100 MG: 100 TAB at 09:27

## 2024-08-02 RX ADMIN — ACETAMINOPHEN 975 MG: 325 TABLET, FILM COATED ORAL at 21:15

## 2024-08-02 RX ADMIN — OXYCODONE HYDROCHLORIDE 15 MG: 5 TABLET ORAL at 18:51

## 2024-08-02 RX ADMIN — OXYCODONE HYDROCHLORIDE 5 MG: 5 TABLET ORAL at 10:12

## 2024-08-02 RX ADMIN — INSULIN LISPRO 1 UNITS: 100 INJECTION, SOLUTION INTRAVENOUS; SUBCUTANEOUS at 08:10

## 2024-08-02 RX ADMIN — METHOCARBAMOL 500 MG: 500 TABLET ORAL at 05:07

## 2024-08-02 RX ADMIN — DICYCLOMINE HYDROCHLORIDE 10 MG: 10 CAPSULE ORAL at 13:38

## 2024-08-02 RX ADMIN — METOPROLOL SUCCINATE 12.5 MG: 25 TABLET, EXTENDED RELEASE ORAL at 09:29

## 2024-08-02 RX ADMIN — DICYCLOMINE HYDROCHLORIDE 10 MG: 10 CAPSULE ORAL at 07:40

## 2024-08-02 RX ADMIN — METHADONE HYDROCHLORIDE 190 MG: 10 TABLET ORAL at 07:41

## 2024-08-02 RX ADMIN — OXYCODONE HYDROCHLORIDE 15 MG: 5 TABLET ORAL at 23:06

## 2024-08-02 NOTE — PROGRESS NOTES
Progress Note - Orthopedics   David Skelton Jr. 64 y.o. male MRN: [id number]  Unit/Bed#: [facility] 604-01      Subjective:    64 y.o.male POD 2 R hip IM nail. No acute events, no new complaints. Denies fevers, chills, CP, SOB, N/V. Patient does not complain of any pain to me this AM however nursing notes that he had significant pain overnight. VSS. Hgb pending this AM, was 7.9 yesterday.    Labs:  0   Lab Value Date/Time    HCT 24.2 (L) 08/01/2024 0606    HCT 29.8 (L) 07/31/2024 0522    HCT 34.7 (L) 07/30/2024 1427    HCT 38.0 01/29/2015 1342    HCT 40.1 05/21/2014 0938    HCT 38.5 02/17/2014 0900    HGB 7.9 (L) 08/01/2024 0606    HGB 9.7 (L) 07/31/2024 0522    HGB 11.0 (L) 07/30/2024 1427    HGB 12.2 01/29/2015 1342    HGB 13.1 05/21/2014 0938    HGB 12.3 02/17/2014 0900    INR 1.22 (H) 07/30/2024 2201    INR 1.11 01/29/2015 1342    WBC 10.66 (H) 08/01/2024 0606    WBC 9.60 07/31/2024 0522    WBC 11.42 (H) 07/30/2024 1427    WBC 9.29 01/29/2015 1342    WBC 7.36 05/21/2014 0938    WBC 9.99 02/17/2014 0900    ESR 8 07/30/2024 2201       Meds:    Current Facility-Administered Medications:     acetaminophen (TYLENOL) tablet 975 mg, 975 mg, Oral, Q8H MELISA, Vazquez Estrada MD, 975 mg at 08/02/24 0507    ALPRAZolam (XANAX) tablet 0.5 mg, 0.5 mg, Oral, Daily PRN, Lisset Teran MD, 0.5 mg at 08/02/24 0458    calcium carbonate (OYSTER SHELL,OSCAL) 500 mg tablet 2 tablet, 2 tablet, Oral, TID With Meals, Nila Chang DO    calcium carbonate (TUMS) chewable tablet 1,000 mg, 1,000 mg, Oral, Daily PRN, Vazquez Estrada MD    dicyclomine (BENTYL) capsule 10 mg, 10 mg, Oral, 4x Daily (AC & HS), Reema Kennedy DO    docusate sodium (COLACE) capsule 100 mg, 100 mg, Oral, BID, Vazquez Estrada MD, 100 mg at 08/01/24 1704    DULoxetine (CYMBALTA) delayed release capsule 60 mg, 60 mg, Oral, Daily, Vazquez Estrada MD, 60 mg at 08/01/24 0813    enoxaparin (LOVENOX) subcutaneous injection  40 mg, 40 mg, Subcutaneous, Q12H, Vazquez Estrada MD, 40 mg at 08/02/24 0458    ergocalciferol (VITAMIN D2) capsule 50,000 Units, 50,000 Units, Oral, Once per day on Monday Wednesday Friday, Nila Chagn DO, 50,000 Units at 08/01/24 2118    folic acid (FOLVITE) tablet 1 mg, 1 mg, Oral, Daily, Vazquez Estrada MD, 1 mg at 08/01/24 0808    gabapentin (NEURONTIN) capsule 600 mg, 600 mg, Oral, BID, Vazquez Estrada MD, 600 mg at 08/01/24 0810    insulin glargine (LANTUS) subcutaneous injection 15 Units 0.15 mL, 15 Units, Subcutaneous, HS, Nila Chang DO, 15 Units at 08/01/24 2115    insulin lispro (HumALOG/ADMELOG) 100 units/mL subcutaneous injection 1-6 Units, 1-6 Units, Subcutaneous, HS, Vazquez Estrada MD, 4 Units at 08/01/24 2115    insulin lispro (HumALOG/ADMELOG) 100 units/mL subcutaneous injection 1-6 Units, 1-6 Units, Subcutaneous, TID AC **AND** Fingerstick Glucose (POCT), , , 4x Daily AC and at bedtime, Nila Chang DO    methadone (DOLOPHINE) tablet 190 mg, 190 mg, Oral, Daily, Vazquez Estrada MD, 80 mg at 08/01/24 0943    methocarbamol (ROBAXIN) tablet 500 mg, 500 mg, Oral, Q8H [facility], Lisset Teran MD, 500 mg at 08/02/24 0507    metoprolol succinate (TOPROL-XL) 24 hr tablet 12.5 mg, 12.5 mg, Oral, Daily, Vazquez Estrada MD, 12.5 mg at 08/01/24 0810    multivitamin-minerals (CENTRUM) tablet 1 tablet, 1 tablet, Oral, Daily, Vazquez Estrada MD, 1 tablet at 08/01/24 0808    naloxone (NARCAN) 0.04 mg/mL syringe 0.04 mg, 0.04 mg, Intravenous, Q1MIN PRN, Vazquez Estrada MD    oxyCODONE (ROXICODONE) split tablet 2.5 mg, 2.5 mg, Oral, Q4H PRN **OR** oxyCODONE (ROXICODONE) IR tablet 5 mg, 5 mg, Oral, Q4H PRN, Vazquez Estrada MD, 5 mg at 08/02/24 0244    thiamine tablet 100 mg, 100 mg, Oral, Daily, Vazquez Estrada MD, 100 mg at 08/01/24 0808    Blood Culture:   No results found for:  "\"BLOODCX\"    Wound Culture:   No results found for: \"WOUNDCULT\"    Ins and Outs:  I/O last 24 hours:  In: 1916.3 [P.O.:1258; I.V.:658.3]  Out: 4325 [Urine:4325]          Physical:  Vitals:    08/02/24 0228   BP: 157/75   Pulse: 61   Resp:    Temp: 98.2 °F (36.8 °C)   SpO2: 99%       Musculoskeletal: right Lower Extremity  Surgical dressings are clean, dry, intact without strike thru, unchanged  Mild tenderness to palpation over the surgical site.  Sensation decreased but intact to sural, saphenous, superficial peroneal, deep peroneal, and tibial distributions consistent with previously noted history of numbness and peripheral neuropathy. Unchanged.  Motor intact ankle plantarflexion/dorsiflexion, EHL/FHL  Extremity is warm and well perfused with PT pulse palpable and capillary refill < 2 seconds.      Assessment:    64 y.o.male POD 2 R hip IM nail. Doing well overall.     Plan:  WBAT RLE  PT/OT  Pain control  DVT ppx -per primary while inpatient, ASA 81 mg twice daily until 28 days after surgery on discharge.  Will monitor for ABLA and administer IVF/prbc as indicated for Greater than 2 gram drop or Hgb < 7  Medical management per primary team  Dispo: Ortho will follow    Radha Keller MD      Please Secure Chat the [facility] Ortho Floor Role with any Questions or Concerns.    "

## 2024-08-02 NOTE — PROGRESS NOTES
"Progress Note - Acute Pain Service    David Skelton Jr. 64 y.o. male MRN: [id number]  Unit/Bed#: [facility] 604-01 Encounter: [id number]      David Skelton Jr. is a 64 y.o. male with PMHx of iron deficiency anemia, urinary retention, T2DM, anxiety, polysubstance abuse and prior falls who presents after an unwitnessed fall. POD 2 R hip IM nail.     Since back on methadone maintenance dose As per psych note, he has been on methadone x 20 years, admits to overuse of BZDs and declines use of mood stabilizers. He has admitted to not following up with an outpatient psych resource x 2 months.    methadone clinic contacted, confirmed that he has been on 190 mg PO daily. Of note, the clinic mentioned he has 4 bottles at home of methadone. The clinic asks if we can update them on when he took the last dose of methadone prior to discharge.      Pt seen in his room; he is awake, alert, communicative. He ambulated to the bathroom and now resting, reclined in chair. He does complain of pain and asks right away for a xanax. He says he was used to taking 1 mg xanax BID, and 0.5 mg xanax as needed. He also asks to take \"up to 30 of oxycodone\", \"I had it before\". I discussed concerns with respiratory depression with use of maintenance methadone, xanax and oxycodone (he was very sleepy when I saw him yesterday) and that our goal would be to titrate the medication effectively for pain control. Pt is appreciative and understands.         * Closed fracture of right femur, unspecified fracture morphology, sequela  Assessment & Plan  POD 1 s/p R hip IM Nail. Hx polysubstance abuse. Admits to psych he will only take methadone and xanax. Is aware he will ask for pain medications if needed.    Plan:  - continue methadone 190 mg PO QD (Clinic confirmed the dose)  - continue tylenol 975 mg PO TID. Most recent liver panel with normal levels.   - continue gabapentin 600 mg PO BID (home med)  - modify robaxin to 700 mg PO TID for muscle spasms  - modify " xanax to 0.5 mg PO BID PRN anxiety. Monitor for oversedation, respiratory depression  - discontinue dilaudid 0.2 mg IV Q 2 hrs PRN breakthrough pain  - modify oxycodone to 5-10  mg PO Q 4 hrs PRN mod-severe pain. Would recommend this be the max dosing titrated for this patient, given his tendency to ask for pain meds regularly to nursing staff. Pt with a high level of anxiety component  - keep on SAFETYNET monitoring  - narcan order to combate SE of respiratory depression        APS will continue to follow. Please contact Acute Pain Service - via [employer] from [phone] with additional questions or concerns. See [employer] or Patricia for additional contacts and after hours information.     Pain History  Current pain location(s):  Pain Score: 9  Pain Location/Orientation: Orientation: Right, Location: Leg  Pain Scale: Pain Assessment Tool: 0-10  24 hour history: back on methadone maintenance. Not additional opioid used.    Opioid requirement previous 24 hours: methadone, dilaudid 1 mg IV (one time dose), oxycodone 5 mg PO    Meds/Allergies   all current active meds have been reviewed, current meds:   Current Facility-Administered Medications   Medication Dose Route Frequency    acetaminophen (TYLENOL) tablet 975 mg  975 mg Oral Q8H MELISA    ALPRAZolam (XANAX) tablet 0.5 mg  0.5 mg Oral BID PRN    calcium carbonate (OYSTER SHELL,OSCAL) 500 mg tablet 2 tablet  2 tablet Oral TID With Meals    calcium carbonate (TUMS) chewable tablet 1,000 mg  1,000 mg Oral Daily PRN    dicyclomine (BENTYL) capsule 10 mg  10 mg Oral 4x Daily (AC & HS)    docusate sodium (COLACE) capsule 100 mg  100 mg Oral BID    DULoxetine (CYMBALTA) delayed release capsule 60 mg  60 mg Oral Daily    enoxaparin (LOVENOX) subcutaneous injection 30 mg  30 mg Subcutaneous Q12H    ergocalciferol (VITAMIN D2) capsule 50,000 Units  50,000 Units Oral Once per day on Monday Wednesday Friday    folic acid (FOLVITE) tablet 1 mg  1 mg Oral Daily    gabapentin  (NEURONTIN) capsule 600 mg  600 mg Oral BID    insulin glargine (LANTUS) subcutaneous injection 15 Units 0.15 mL  15 Units Subcutaneous HS    insulin lispro (HumALOG/ADMELOG) 100 units/mL subcutaneous injection 1-6 Units  1-6 Units Subcutaneous HS    insulin lispro (HumALOG/ADMELOG) 100 units/mL subcutaneous injection 1-6 Units  1-6 Units Subcutaneous TID AC    [START ON 8/3/2024] methadone (DOLOPHINE) tablet 190 mg  190 mg Oral Daily    methocarbamol (ROBAXIN) tablet 750 mg  750 mg Oral Q8H MELISA    metoprolol succinate (TOPROL-XL) 24 hr tablet 12.5 mg  12.5 mg Oral Daily    multivitamin-minerals (CENTRUM) tablet 1 tablet  1 tablet Oral Daily    naloxone (NARCAN) 0.04 mg/mL syringe 0.04 mg  0.04 mg Intravenous Q1MIN PRN    oxyCODONE (ROXICODONE) immediate release tablet 10 mg  10 mg Oral Q4H PRN    oxyCODONE (ROXICODONE) IR tablet 5 mg  5 mg Oral Q4H PRN    thiamine tablet 100 mg  100 mg Oral Daily   , and PTA meds:   Prior to Admission Medications   Prescriptions Last Dose Informant Patient Reported? Taking?   ALPRAZolam (XANAX) 1 mg tablet Past Week  No Yes   Sig: Take 1 tablet (1 mg total) by mouth daily as needed for anxiety   Continuous Blood Gluc  (FreeStyle Grace 14 Day [place]) STEVE Not Taking Self No No   Si Device by Device route 3 (three) times a day before meals   Patient not taking: Reported on 2024   Continuous Blood Gluc  (FreeStyle Grace 2 [place]) STEVE Not Taking Self No No   Si Device by Device route 3 (three) times a day before meals   Patient not taking: Reported on 7/3/2024   DULoxetine (CYMBALTA) 60 mg delayed release capsule Unknown Self Yes No   Sig: Take 60 mg by mouth in the morning   GLOBAL EASE INJECT PEN NEEDLES 31G X 8 MM MISC Unknown Self Yes No   Patient not taking: Reported on 7/3/2024   Incontinence Supply Disposable (INCONTINENCE BRIEF MEDIUM) MISC Unknown Self No No   Sig: by Does not apply route as needed (fecal soiling)   Patient not taking: Reported on  7/3/2024   Incontinence Supply Disposable (RA WIPES FLUSHABLE/MOIST) MISC Unknown Self No No   Sig: by Does not apply route as needed (fecal soiling)   Insulin Pen Needle (B-D UF III MINI PEN NEEDLES) 31G X 5 MM MISC Unknown Self No No   Sig: Inject 15 Units under the skin daily at bedtime   Misc. Devices (CANE) MISC Unknown Self No No   Sig: by Does not apply route daily Dx:  Frequent falls   Trulicity 1.5 MG/0.5ML injection Unknown Self Yes No   UNKNOWN TO PATIENT  Self Yes No   acetaminophen (TYLENOL) 325 mg tablet Unknown Self No No   Sig: Take 2 tablets (650 mg total) by mouth every 4 (four) hours as needed for mild pain   aluminum-magnesium hydroxide 200-200 MG/5ML suspension Unknown Self No No   Sig: Take 15 mL by mouth every 6 (six) hours as needed for heartburn   Patient not taking: Reported on 7/3/2024   bacitracin-polymyxin b (POLYSPORIN) ointment Unknown Self No No   Sig: Apply topically 2 (two) times a day   divalproex sodium (DEPAKOTE ER) 500 mg 24 hr tablet Unknown  No No   Sig: Take 1 tablet (500 mg total) by mouth every 12 (twelve) hours   docusate sodium (COLACE) 100 mg capsule Unknown  No No   Sig: Take 1 capsule (100 mg total) by mouth 2 (two) times a day for 5 days   ergocalciferol (ERGOCALCIFEROL) 1.25 MG (52248 UT) capsule Unknown  Yes No   Sig: Take 50,000 Units by mouth   fluticasone (FLONASE) 50 mcg/act nasal spray Unknown  No No   Sig: instill 1 spray into each nostril once daily   gabapentin (NEURONTIN) 600 MG tablet Unknown  No No   Sig: TAKE 1 TABLET BY MOUTH TWICE DAILY   metFORMIN (GLUCOPHAGE) 850 mg tablet Unknown Self No No   Sig: Take 1 tablet (850 mg total) by mouth daily with breakfast   methadone (DOLOPHINE) 5 mg tablet Unknown Self Yes No   Sig: Take 165 mg by mouth daily   methocarbamol (ROBAXIN) 500 mg tablet Unknown Self No No   Sig: Take 1 tablet (500 mg total) by mouth every 8 (eight) hours as needed for muscle spasms   naloxone (NARCAN) 4 mg/0.1 mL nasal spray Unknown  No  No   Sig: Administer 1 spray into a nostril. If no response after 2-3 minutes, give another dose in the other nostril using a new spray.   Patient not taking: Reported on 5/24/2024   naloxone (NARCAN) 4 mg/0.1 mL nasal spray Unknown  No No   Sig: Administer 1 spray into a nostril. If no response after 2-3 minutes, give another dose in the other nostril using a new spray.   Patient not taking: Reported on 5/31/2024   ondansetron (ZOFRAN-ODT) 4 mg disintegrating tablet Unknown Self No No   Sig: Take 1 tablet (4 mg total) by mouth every 6 (six) hours as needed for vomiting   zolpidem (AMBIEN) 10 mg tablet Unknown Self Yes No   Sig: Take 10 mg by mouth daily at bedtime      Facility-Administered Medications: None       Allergies   Allergen Reactions    Aspirin Shortness Of Breath    Penicillins        Objective        Vitals:    08/02/24 0747 08/02/24 0929 08/02/24 1012 08/02/24 1452   BP: 126/77 127/79  135/78   BP Location: Right arm      Pulse: 63 68  70   Resp: 16   20   Temp: (!) 96.8 °F (36 °C)   98.3 °F (36.8 °C)   TempSrc: Axillary      SpO2: 99%  97% 95%   Weight:       Height:             Physical Exam  Constitutional:       General: He is not in acute distress.  HENT:      Head: Normocephalic and atraumatic.      Nose: Nose normal.      Mouth/Throat:      Mouth: Mucous membranes are moist.   Eyes:      Extraocular Movements: Extraocular movements intact.      Pupils: Pupils are equal, round, and reactive to light.   Cardiovascular:      Rate and Rhythm: Normal rate.      Pulses: Normal pulses.   Pulmonary:      Effort: Pulmonary effort is normal.   Musculoskeletal:      Cervical back: Normal range of motion.      Comments: Moves around well  Pain from right hip surgery  Complains of swelling of right hip area   Skin:     General: Skin is warm.   Neurological:      General: No focal deficit present.      Mental Status: He is alert and oriented to person, place, and time.   Psychiatric:         Mood and Affect:  Mood normal.         Lab Results:   Estimated Creatinine Clearance: 88.2 mL/min (by C-G formula based on SCr of 0.84 mg/dL).  Lab Results   Component Value Date    WBC 9.03 08/02/2024    WBC 9.29 01/29/2015    HGB 7.8 (L) 08/02/2024    HGB 12.2 01/29/2015    HCT 24.1 (L) 08/02/2024    HCT 38.0 01/29/2015     08/02/2024     01/29/2015         Component Value Date/Time     01/29/2015 1342    K 4.4 08/02/2024 1011    K 4.6 03/01/2019 1611    CL 96 08/02/2024 1011     03/01/2019 1611    CO2 35 (H) 08/02/2024 1011    CO2 29 08/17/2022 0815    CO2 31 03/01/2019 1611    BUN 15 08/02/2024 1011    BUN 17 03/01/2019 1611    CREATININE 0.84 08/02/2024 1011    CREATININE 1.03 03/01/2019 1611         Component Value Date/Time    CALCIUM 8.0 (L) 08/02/2024 1011    CALCIUM 8.7 03/01/2019 1611    ALKPHOS 67 08/01/2024 0606    ALKPHOS 133 (H) 03/01/2019 1611    AST 17 08/01/2024 0606    AST 16 03/01/2019 1611    ALT 17 08/01/2024 0606    ALT 27 03/01/2019 1611    BILITOT 0.27 01/29/2015 1342    TP 5.5 (L) 08/01/2024 0606    TP 6.9 03/01/2019 1611    ALB 3.0 (L) 08/01/2024 0606    ALB 3.6 03/01/2019 1611       Imaging Studies/EKG: I have personally reviewed pertinent reports.       Counseling / Coordination of Care  Total floor / unit time spent today 20 minutes minutes. Greater than 50% of total time was spent with the patient and / or family counseling and / or coordination of care. A description of the counseling / coordination of care: reviewed pain regimen with patient, nursing staff, trauma service.    Please note that the APS provides consultative services regarding pain management only.  With the exception of ketamine and epidural infusions and except when indicated, final decisions regarding starting or changing doses of analgesic medications are at the discretion of the consulting service.    Lisset Teran MD   Acute Pain Service

## 2024-08-02 NOTE — PLAN OF CARE
Problem: Potential for Falls  Goal: Patient will remain free of falls  Description: INTERVENTIONS:  - Educate patient/family on patient safety including physical limitations  - Instruct patient to call for assistance with activity   - Consult OT/PT to assist with strengthening/mobility   - Keep Call bell within reach  - Keep bed low and locked with side rails adjusted as appropriate  - Keep care items and personal belongings within reach  - Initiate and maintain comfort rounds  - Make Fall Risk Sign visible to staff  - Offer Toileting every 2 Hours, in advance of need  - Initiate/Maintain bed alarm  - Obtain necessary fall risk management equipment  - Apply yellow socks and bracelet for high fall risk patients  - Consider moving patient to room near nurses station  Outcome: Progressing     Problem: PAIN - ADULT  Goal: Verbalizes/displays adequate comfort level or baseline comfort level  Description: Interventions:  - Encourage patient to monitor pain and request assistance  - Assess pain using appropriate pain scale  - Administer analgesics based on type and severity of pain and evaluate response  - Implement non-pharmacological measures as appropriate and evaluate response  - Consider cultural and social influences on pain and pain management  - Notify physician/advanced practitioner if interventions unsuccessful or patient reports new pain  Outcome: Progressing     Problem: INFECTION - ADULT  Goal: Absence or prevention of progression during hospitalization  Description: INTERVENTIONS:  - Assess and monitor for signs and symptoms of infection  - Monitor lab/diagnostic results  - Monitor all insertion sites, i.e. indwelling lines, tubes, and drains  - Monitor endotracheal if appropriate and nasal secretions for changes in amount and color  - [place] appropriate cooling/warming therapies per order  - Administer medications as ordered  - Instruct and encourage patient and family to use good hand hygiene  technique  - Identify and instruct in appropriate isolation precautions for identified infection/condition  Outcome: Progressing  Goal: Absence of fever/infection during neutropenic period  Description: INTERVENTIONS:  - Monitor WBC    Outcome: Progressing     Problem: SAFETY ADULT  Goal: Patient will remain free of falls  Description: INTERVENTIONS:  - Educate patient/family on patient safety including physical limitations  - Instruct patient to call for assistance with activity   - Consult OT/PT to assist with strengthening/mobility   - Keep Call bell within reach  - Keep bed low and locked with side rails adjusted as appropriate  - Keep care items and personal belongings within reach  - Initiate and maintain comfort rounds  - Make Fall Risk Sign visible to staff  - Offer Toileting every 2 Hours, in advance of need  - Initiate/Maintain bed alarm  - Obtain necessary fall risk management equipment  - Apply yellow socks and bracelet for high fall risk patients  - Consider moving patient to room near nurses station  Outcome: Progressing  Goal: Maintain or return to baseline ADL function  Description: INTERVENTIONS:  -  Assess patient's ability to carry out ADLs; assess patient's baseline for ADL function and identify physical deficits which impact ability to perform ADLs (bathing, care of mouth/teeth, toileting, grooming, dressing, etc.)  - Assess/evaluate cause of self-care deficits   - Assess range of motion  - Assess patient's mobility; develop plan if impaired  - Assess patient's need for assistive devices and provide as appropriate  - Encourage maximum independence but intervene and supervise when necessary  - Involve family in performance of ADLs  - Assess for home care needs following discharge   - Consider OT consult to assist with ADL evaluation and planning for discharge  - Provide patient education as appropriate  Outcome: Progressing  Goal: Maintains/Returns to pre admission functional level  Description:  INTERVENTIONS:  - Perform AM-PAC 6 Click Basic Mobility/ Daily Activity assessment daily.  - Set and communicate daily mobility goal to care team and patient/family/caregiver.   - Collaborate with rehabilitation services on mobility goals if consulted  - Perform Range of Motion 3 times a day.  - Reposition patient every 2 hours.  - Dangle patient 3 times a day  - Stand patient 3 times a day  - Ambulate patient 3 times a day  - Out of bed to chair 3 times a day   - Out of bed for meals 3 times a day  - Out of bed for toileting  - Record patient progress and toleration of activity level   Outcome: Progressing     Problem: DISCHARGE PLANNING  Goal: Discharge to home or other facility with appropriate resources  Description: INTERVENTIONS:  - Identify barriers to discharge w/patient and caregiver  - Arrange for needed discharge resources and transportation as appropriate  - Identify discharge learning needs (meds, wound care, etc.)  - Arrange for interpretive services to assist at discharge as needed  - Refer to Case Management Department for coordinating discharge planning if the patient needs post-hospital services based on physician/advanced practitioner order or complex needs related to functional status, cognitive ability, or social support system  Outcome: Progressing     Problem: Knowledge Deficit  Goal: Patient/family/caregiver demonstrates understanding of disease process, treatment plan, medications, and discharge instructions  Description: Complete learning assessment and assess knowledge base.  Interventions:  - Provide teaching at level of understanding  - Provide teaching via preferred learning methods  Outcome: Progressing     Problem: Prexisting or High Potential for Compromised Skin Integrity  Goal: Skin integrity is maintained or improved  Description: INTERVENTIONS:  - Identify patients at risk for skin breakdown  - Assess and monitor skin integrity  - Assess and monitor nutrition and hydration  status  - Monitor labs   - Assess for incontinence   - Turn and reposition patient  - Assist with mobility/ambulation  - Relieve pressure over bony prominences  - Avoid friction and shearing  - Provide appropriate hygiene as needed including keeping skin clean and dry  - Evaluate need for skin moisturizer/barrier cream  - Collaborate with interdisciplinary team   - Patient/family teaching  - Consider wound care consult   Outcome: Progressing     Problem: HEMATOLOGIC - ADULT  Goal: Maintains hematologic stability  Description: INTERVENTIONS  - Assess for signs and symptoms of bleeding or hemorrhage  - Monitor labs  - Administer supportive blood products/factors as ordered and appropriate  Outcome: Progressing     Problem: MUSCULOSKELETAL - ADULT  Goal: Maintain or return mobility to safest level of function  Description: INTERVENTIONS:  - Assess patient's ability to carry out ADLs; assess patient's baseline for ADL function and identify physical deficits which impact ability to perform ADLs (bathing, care of mouth/teeth, toileting, grooming, dressing, etc.)  - Assess/evaluate cause of self-care deficits   - Assess range of motion  - Assess patient's mobility  - Assess patient's need for assistive devices and provide as appropriate  - Encourage maximum independence but intervene and supervise when necessary  - Involve family in performance of ADLs  - Assess for home care needs following discharge   - Consider OT consult to assist with ADL evaluation and planning for discharge  - Provide patient education as appropriate  Outcome: Progressing  Goal: Maintain proper alignment of affected body part  Description: INTERVENTIONS:  - Support, maintain and protect limb and body alignment  - Provide patient/ family with appropriate education  Outcome: Progressing     Problem: CARDIOVASCULAR - ADULT  Goal: Maintains optimal cardiac output and hemodynamic stability  Description: INTERVENTIONS:  - Monitor I/O, vital signs and  rhythm  - Monitor for S/S and trends of decreased cardiac output  - Administer and titrate ordered vasoactive medications to optimize hemodynamic stability  - Assess quality of pulses, skin color and temperature  - Assess for signs of decreased coronary artery perfusion  - Instruct patient to report change in severity of symptoms  Outcome: Progressing  Goal: Absence of cardiac dysrhythmias or at baseline rhythm  Description: INTERVENTIONS:  - Continuous cardiac monitoring, vital signs, obtain 12 lead EKG if ordered  - Administer antiarrhythmic and heart rate control medications as ordered  - Monitor electrolytes and administer replacement therapy as ordered  Outcome: Progressing     Problem: BEHAVIOR  Goal: Pt/Family maintain appropriate behavior and adhere to behavioral management agreement, if implemented  Description: INTERVENTIONS:  - Assess the family dynamic   - Encourage verbalization of thoughts and concerns in a socially appropriate manner  - Assess patient/family's coping skills and non-compliant behavior (including use of illegal substances).  - Utilize positive, consistent limit setting strategies supporting safety of patient, staff and others  - Initiate consult with Case Management, Spiritual Care or other ancillary services as appropriate  - If a patient's/visitor's behavior jeopardizes the safety of the patient, staff, or others, refer to organization procedure.   - Notify Security of behavior or suspected illegal substances which indicate the need for search of the patient and/or belongings  - Encourage participation in the decision making process about a behavioral management agreement; implement if patient meets criteria  Outcome: Progressing     Problem: SUBSTANCE USE/ABUSE  Goal: Will have no detox symptoms and will verbalize plan for changing substance-related behavior  Description: INTERVENTIONS:  - Monitor physical status and assess for symptoms of withdrawal  - Administer medication as  ordered  - Provide emotional support with 1 on 1 interaction with staff  - Encourage recovery focused program/ addiction education  - Assess for verbalization of changing behaviors related to substance abuse  - Initiate consults and referrals as appropriate (Case Management, Spiritual Care, etc.)  Outcome: Progressing  Goal: By discharge, will develop insight into their chemical dependency and sustain motivation to continue in recovery  Description: INTERVENTIONS:  - Attends all daily group sessions and scheduled AA groups  - Actively practices coping skills through participation in the therapeutic community and adherence to program rules  - Reviews and completes assignments from individual treatment plan  - Assist patient development of understanding of their personal cycle of addiction and relapse triggers  Outcome: Progressing  Goal: By discharge, patient will have ongoing treatment plan addressing chemical dependency  Description: INTERVENTIONS:  - Assist patient with resources and/or appointments for ongoing recovery based living  Outcome: Progressing

## 2024-08-02 NOTE — PLAN OF CARE
Problem: Potential for Falls  Goal: Patient will remain free of falls  Description: INTERVENTIONS:  - Educate patient/family on patient safety including physical limitations  - Instruct patient to call for assistance with activity   - Consult OT/PT to assist with strengthening/mobility   - Keep Call bell within reach  - Keep bed low and locked with side rails adjusted as appropriate  - Keep care items and personal belongings within reach  - Initiate and maintain comfort rounds  - Make Fall Risk Sign visible to staff  - Offer Toileting every 2 Hours, in advance of need  - Initiate/Maintain bed alarm  - Obtain necessary fall risk management equipment  - Apply yellow socks and bracelet for high fall risk patients  - Consider moving patient to room near nurses station  Outcome: Progressing     Problem: PAIN - ADULT  Goal: Verbalizes/displays adequate comfort level or baseline comfort level  Description: Interventions:  - Encourage patient to monitor pain and request assistance  - Assess pain using appropriate pain scale  - Administer analgesics based on type and severity of pain and evaluate response  - Implement non-pharmacological measures as appropriate and evaluate response  - Consider cultural and social influences on pain and pain management  - Notify physician/advanced practitioner if interventions unsuccessful or patient reports new pain  Outcome: Progressing     Problem: INFECTION - ADULT  Goal: Absence or prevention of progression during hospitalization  Description: INTERVENTIONS:  - Assess and monitor for signs and symptoms of infection  - Monitor lab/diagnostic results  - Monitor all insertion sites, i.e. indwelling lines, tubes, and drains  - Monitor endotracheal if appropriate and nasal secretions for changes in amount and color  - [place] appropriate cooling/warming therapies per order  - Administer medications as ordered  - Instruct and encourage patient and family to use good hand hygiene  technique  - Identify and instruct in appropriate isolation precautions for identified infection/condition  Outcome: Progressing  Goal: Absence of fever/infection during neutropenic period  Description: INTERVENTIONS:  - Monitor WBC    Outcome: Progressing     Problem: SAFETY ADULT  Goal: Patient will remain free of falls  Description: INTERVENTIONS:  - Educate patient/family on patient safety including physical limitations  - Instruct patient to call for assistance with activity   - Consult OT/PT to assist with strengthening/mobility   - Keep Call bell within reach  - Keep bed low and locked with side rails adjusted as appropriate  - Keep care items and personal belongings within reach  - Initiate and maintain comfort rounds  - Make Fall Risk Sign visible to staff  - Offer Toileting every 2 Hours, in advance of need  - Initiate/Maintain bed alarm  - Obtain necessary fall risk management equipment  - Apply yellow socks and bracelet for high fall risk patients  - Consider moving patient to room near nurses station  Outcome: Progressing  Goal: Maintain or return to baseline ADL function  Description: INTERVENTIONS:  -  Assess patient's ability to carry out ADLs; assess patient's baseline for ADL function and identify physical deficits which impact ability to perform ADLs (bathing, care of mouth/teeth, toileting, grooming, dressing, etc.)  - Assess/evaluate cause of self-care deficits   - Assess range of motion  - Assess patient's mobility; develop plan if impaired  - Assess patient's need for assistive devices and provide as appropriate  - Encourage maximum independence but intervene and supervise when necessary  - Involve family in performance of ADLs  - Assess for home care needs following discharge   - Consider OT consult to assist with ADL evaluation and planning for discharge  - Provide patient education as appropriate  Outcome: Progressing  Goal: Maintains/Returns to pre admission functional level  Description:  INTERVENTIONS:  - Perform AM-PAC 6 Click Basic Mobility/ Daily Activity assessment daily.  - Set and communicate daily mobility goal to care team and patient/family/caregiver.   - Collaborate with rehabilitation services on mobility goals if consulted  - Perform Range of Motion 3 times a day.  - Reposition patient every 2 hours.  - Dangle patient 3 times a day  - Stand patient 3 times a day  - Ambulate patient 3 times a day  - Out of bed to chair 3 times a day   - Out of bed for meals 3 times a day  - Out of bed for toileting  - Record patient progress and toleration of activity level   Outcome: Progressing     Problem: DISCHARGE PLANNING  Goal: Discharge to home or other facility with appropriate resources  Description: INTERVENTIONS:  - Identify barriers to discharge w/patient and caregiver  - Arrange for needed discharge resources and transportation as appropriate  - Identify discharge learning needs (meds, wound care, etc.)  - Arrange for interpretive services to assist at discharge as needed  - Refer to Case Management Department for coordinating discharge planning if the patient needs post-hospital services based on physician/advanced practitioner order or complex needs related to functional status, cognitive ability, or social support system  Outcome: Progressing     Problem: Knowledge Deficit  Goal: Patient/family/caregiver demonstrates understanding of disease process, treatment plan, medications, and discharge instructions  Description: Complete learning assessment and assess knowledge base.  Interventions:  - Provide teaching at level of understanding  - Provide teaching via preferred learning methods  Outcome: Progressing     Problem: Prexisting or High Potential for Compromised Skin Integrity  Goal: Skin integrity is maintained or improved  Description: INTERVENTIONS:  - Identify patients at risk for skin breakdown  - Assess and monitor skin integrity  - Assess and monitor nutrition and hydration  status  - Monitor labs   - Assess for incontinence   - Turn and reposition patient  - Assist with mobility/ambulation  - Relieve pressure over bony prominences  - Avoid friction and shearing  - Provide appropriate hygiene as needed including keeping skin clean and dry  - Evaluate need for skin moisturizer/barrier cream  - Collaborate with interdisciplinary team   - Patient/family teaching  - Consider wound care consult   Outcome: Progressing     Problem: HEMATOLOGIC - ADULT  Goal: Maintains hematologic stability  Description: INTERVENTIONS  - Assess for signs and symptoms of bleeding or hemorrhage  - Monitor labs  - Administer supportive blood products/factors as ordered and appropriate  Outcome: Progressing     Problem: MUSCULOSKELETAL - ADULT  Goal: Maintain or return mobility to safest level of function  Description: INTERVENTIONS:  - Assess patient's ability to carry out ADLs; assess patient's baseline for ADL function and identify physical deficits which impact ability to perform ADLs (bathing, care of mouth/teeth, toileting, grooming, dressing, etc.)  - Assess/evaluate cause of self-care deficits   - Assess range of motion  - Assess patient's mobility  - Assess patient's need for assistive devices and provide as appropriate  - Encourage maximum independence but intervene and supervise when necessary  - Involve family in performance of ADLs  - Assess for home care needs following discharge   - Consider OT consult to assist with ADL evaluation and planning for discharge  - Provide patient education as appropriate  Outcome: Progressing  Goal: Maintain proper alignment of affected body part  Description: INTERVENTIONS:  - Support, maintain and protect limb and body alignment  - Provide patient/ family with appropriate education  Outcome: Progressing     Problem: CARDIOVASCULAR - ADULT  Goal: Maintains optimal cardiac output and hemodynamic stability  Description: INTERVENTIONS:  - Monitor I/O, vital signs and  rhythm  - Monitor for S/S and trends of decreased cardiac output  - Administer and titrate ordered vasoactive medications to optimize hemodynamic stability  - Assess quality of pulses, skin color and temperature  - Assess for signs of decreased coronary artery perfusion  - Instruct patient to report change in severity of symptoms  Outcome: Progressing  Goal: Absence of cardiac dysrhythmias or at baseline rhythm  Description: INTERVENTIONS:  - Continuous cardiac monitoring, vital signs, obtain 12 lead EKG if ordered  - Administer antiarrhythmic and heart rate control medications as ordered  - Monitor electrolytes and administer replacement therapy as ordered  Outcome: Progressing     Problem: BEHAVIOR  Goal: Pt/Family maintain appropriate behavior and adhere to behavioral management agreement, if implemented  Description: INTERVENTIONS:  - Assess the family dynamic   - Encourage verbalization of thoughts and concerns in a socially appropriate manner  - Assess patient/family's coping skills and non-compliant behavior (including use of illegal substances).  - Utilize positive, consistent limit setting strategies supporting safety of patient, staff and others  - Initiate consult with Case Management, Spiritual Care or other ancillary services as appropriate  - If a patient's/visitor's behavior jeopardizes the safety of the patient, staff, or others, refer to organization procedure.   - Notify Security of behavior or suspected illegal substances which indicate the need for search of the patient and/or belongings  - Encourage participation in the decision making process about a behavioral management agreement; implement if patient meets criteria  Outcome: Progressing     Problem: SUBSTANCE USE/ABUSE  Goal: Will have no detox symptoms and will verbalize plan for changing substance-related behavior  Description: INTERVENTIONS:  - Monitor physical status and assess for symptoms of withdrawal  - Administer medication as  ordered  - Provide emotional support with 1 on 1 interaction with staff  - Encourage recovery focused program/ addiction education  - Assess for verbalization of changing behaviors related to substance abuse  - Initiate consults and referrals as appropriate (Case Management, Spiritual Care, etc.)  Outcome: Progressing  Goal: By discharge, will develop insight into their chemical dependency and sustain motivation to continue in recovery  Description: INTERVENTIONS:  - Attends all daily group sessions and scheduled AA groups  - Actively practices coping skills through participation in the therapeutic community and adherence to program rules  - Reviews and completes assignments from individual treatment plan  - Assist patient development of understanding of their personal cycle of addiction and relapse triggers  Outcome: Progressing  Goal: By discharge, patient will have ongoing treatment plan addressing chemical dependency  Description: INTERVENTIONS:  - Assist patient with resources and/or appointments for ongoing recovery based living  Outcome: Progressing

## 2024-08-02 NOTE — ASSESSMENT & PLAN NOTE
*stabilizing  - continue to monitor Hgb daily, exam reassuring  - vitals q4hrs  - if becomes tachycardic, hypotensive, or change in mental status repeat CBC

## 2024-08-02 NOTE — ASSESSMENT & PLAN NOTE
190mg confirmed dosage  - will plan for 0530 timing administration of methadone per patient request  APS following, transitioning off of IV medications  - consider psych consult if ongoing irritability

## 2024-08-02 NOTE — PROGRESS NOTES
"[facility]  Progress Note  Name: David Osorio I  MRN: [id number]  Unit/Bed#: [facility] 604-01 I Date of Admission: 7/30/2024   Date of Service: 8/2/2024 I Hospital Day: 3    Assessment & Plan   Acute blood loss anemia (ABLA)  Overview  Pre-op Hgb=11.0, Post-op=9.7, 7.9, 7.8    Assessment & Plan  *stabilizing  - continue to monitor Hgb daily, exam reassuring  - vitals q4hrs  - if becomes tachycardic, hypotensive, or change in mental status repeat CBC    Methadone dependence (HCC)  Assessment & Plan  190mg confirmed dosage  - will plan for 0530 timing administration of methadone per patient request  APS following, transitioning off of IV medications  - consider psych consult if ongoing irritability    * Closed fracture of right femur, unspecified fracture morphology, sequela  Assessment & Plan  - POD2 R IM nail  - Ortho following   - DVT ppx while inpatient   - ASA 81mg BID for 28 days following operation  - pain control as ordered  - vitamin D & calcium supplementation as recommended per endo  - continue PT/OT therapies      Patient is AAOx4 and is capable of making decisions. Patient now able to have sensible conversations. Possible toxic encephalopathy now resolved.    Bowel Regimen: start  VTE Prophylaxis:Enoxaparin (Lovenox)     Disposition: pending pain control    Neel Sawyer MD  General Surgery  08/02/24  2:27 PM      Subjective   Chief Complaint: uncontrolled pain    Subjective: Patient reports that overnight he had poor quality of sleep secondary to uncontrolled pain.  He attributes that he is \"undermedicated\" and usually takes his methadone at 530.  He is compliant in taking his other multimodal therapy with ongoing consultation with acute pain service.  Denies bowel movement at this time however endorses flatus.  He also endorses belching however denies nausea/vomiting.  No abdominal pain.  Pain is significantly worse in his right leg and radiates down his " extremity.  No other concerns at this time.     Objective   Vitals:   Temp:  [96.8 °F (36 °C)-98.2 °F (36.8 °C)] 96.8 °F (36 °C)  HR:  [61-69] 68  Resp:  [16-20] 16  BP: (124-159)/(66-79) 127/79    I/O         07/31 0701  08/01 0700 08/01 0701  08/02 0700    P.O.  1258    I.V. (mL/kg) 1658.3 (20.9)     Total Intake(mL/kg) 1658.3 (20.9) 1258 (15.8)    Urine (mL/kg/hr) 1100 (0.6) 3225 (1.7)    Total Output 1100 3225    Net +558.3 -1967                   Physical Exam:   GENERAL APPEARANCE: well developed, well nourished male in NAD.  HEENT: NCAT; EOMI; normal external nose & ears; MMM  NECK: Supple, normal ROM.  CARDIOVASCULAR: Regular rate. Grossly well perfused.  LUNGS/CHEST: Symmetric chest rise/fall with respirations.  ABD: Soft; non-distended; non-tender.  EXT: Normal ROM. No observable deformities in 4/4 extremities. No edema.  NEURO: AAOx4. No focal neurologic deficits. Distally neurovascularly intact.  SKIN: Warm, dry and well perfused; no rash; no jaundice.    Invasive Devices       Peripheral Intravenous Line  Duration             Peripheral IV 07/30/24 Distal;Left;Ventral (anterior) Forearm 3 days                        Lab Results: Results: I have personally reviewed all pertinent laboratory/tests results and NO AM LABS COLLECTED TODAY  Imaging: I have personally reviewed pertinent reports.    XR hip/pelv 1 vw right if performed   Final Result      Fluoroscopy provided for procedure guidance.      Please refer to the separate procedure note for additional details.                  Workstation performed: [id number]         CT chest abdomen pelvis w contrast   Final Result         1. Comminuted right intertrochanteric fracture. No dislocation.   2. No evidence of acute chest trauma.   3. No acute intra-abdominal or pelvic trauma.   4. Constipation.               Workstation performed: [id number]         CT head wo contrast   Final Result      No intracranial hemorrhage or calvarial fracture.                   Workstation performed: [id number]         XR chest portable   Final Result      No acute cardiopulmonary disease.            Workstation performed: [id number]         XR pelvis ap only 1 or 2 vw   Final Result      Right intertrochanteric fracture with varus angulation and displaced lesser trochanteric moiety. No hip dislocation.      Remainder of the femur is intact. Pelvic ring is intact.      Left proximal femur ORIF with bridging heterotopic bone formation.         Findings concur with the preliminary report by the referring clinician already in PACS and/or our electronic record EPIC.         Computerized Assisted Algorithm (CAA) may have been used to analyze all applicable images.         Workstation performed: [id number]         XR femur 2 vw right   Final Result      Right intertrochanteric fracture with varus angulation and displaced lesser trochanteric moiety. No hip dislocation.      Remainder of the femur is intact. Pelvic ring is intact.      Left proximal femur ORIF with bridging heterotopic bone formation.         Findings concur with the preliminary report by the referring clinician already in PACS and/or our electronic record EPIC.         Computerized Assisted Algorithm (CAA) may have been used to analyze all applicable images.         Workstation performed: [id number]           Other Studies:   EKG (7/31/2024): SR

## 2024-08-02 NOTE — PLAN OF CARE
Problem: PAIN - ADULT  Goal: Verbalizes/displays adequate comfort level or baseline comfort level  Description: Interventions:  - Encourage patient to monitor pain and request assistance  - Assess pain using appropriate pain scale  - Administer analgesics based on type and severity of pain and evaluate response  - Implement non-pharmacological measures as appropriate and evaluate response  - Consider cultural and social influences on pain and pain management  - Notify physician/advanced practitioner if interventions unsuccessful or patient reports new pain  Outcome: Progressing     Problem: INFECTION - ADULT  Goal: Absence or prevention of progression during hospitalization  Description: INTERVENTIONS:  - Assess and monitor for signs and symptoms of infection  - Monitor lab/diagnostic results  - Monitor all insertion sites, i.e. indwelling lines, tubes, and drains  - Monitor endotracheal if appropriate and nasal secretions for changes in amount and color  - [place] appropriate cooling/warming therapies per order  - Administer medications as ordered  - Instruct and encourage patient and family to use good hand hygiene technique  - Identify and instruct in appropriate isolation precautions for identified infection/condition  Outcome: Progressing     Problem: SAFETY ADULT  Goal: Patient will remain free of falls  Description: INTERVENTIONS:  - Educate patient/family on patient safety including physical limitations  - Instruct patient to call for assistance with activity   - Consult OT/PT to assist with strengthening/mobility   - Keep Call bell within reach  - Keep bed low and locked with side rails adjusted as appropriate  - Keep care items and personal belongings within reach  - Initiate and maintain comfort rounds  - Make Fall Risk Sign visible to staff  - Offer Toileting every 2 Hours, in advance of need  - Initiate/Maintain bed alarm  - Obtain necessary fall risk management equipment:    - Apply yellow socks and  bracelet for high fall risk patients  - Consider moving patient to room near nurses station  Outcome: Progressing     Problem: DISCHARGE PLANNING  Goal: Discharge to home or other facility with appropriate resources  Description: INTERVENTIONS:  - Identify barriers to discharge w/patient and caregiver  - Arrange for needed discharge resources and transportation as appropriate  - Identify discharge learning needs (meds, wound care, etc.)  - Arrange for interpretive services to assist at discharge as needed  - Refer to Case Management Department for coordinating discharge planning if the patient needs post-hospital services based on physician/advanced practitioner order or complex needs related to functional status, cognitive ability, or social support system  Outcome: Progressing     Problem: Prexisting or High Potential for Compromised Skin Integrity  Goal: Skin integrity is maintained or improved  Description: INTERVENTIONS:  - Identify patients at risk for skin breakdown  - Assess and monitor skin integrity  - Assess and monitor nutrition and hydration status  - Monitor labs   - Assess for incontinence   - Turn and reposition patient  - Assist with mobility/ambulation  - Relieve pressure over bony prominences  - Avoid friction and shearing  - Provide appropriate hygiene as needed including keeping skin clean and dry  - Evaluate need for skin moisturizer/barrier cream  - Collaborate with interdisciplinary team   - Patient/family teaching  - Consider wound care consult   Outcome: Progressing     Problem: HEMATOLOGIC - ADULT  Goal: Maintains hematologic stability  Description: INTERVENTIONS  - Assess for signs and symptoms of bleeding or hemorrhage  - Monitor labs  - Administer supportive blood products/factors as ordered and appropriate  Outcome: Progressing     Problem: MUSCULOSKELETAL - ADULT  Goal: Maintain or return mobility to safest level of function  Description: INTERVENTIONS:  - Assess patient's ability to  carry out ADLs; assess patient's baseline for ADL function and identify physical deficits which impact ability to perform ADLs (bathing, care of mouth/teeth, toileting, grooming, dressing, etc.)  - Assess/evaluate cause of self-care deficits   - Assess range of motion  - Assess patient's mobility  - Assess patient's need for assistive devices and provide as appropriate  - Encourage maximum independence but intervene and supervise when necessary  - Involve family in performance of ADLs  - Assess for home care needs following discharge   - Consider OT consult to assist with ADL evaluation and planning for discharge  - Provide patient education as appropriate  Outcome: Progressing     Problem: CARDIOVASCULAR - ADULT  Goal: Absence of cardiac dysrhythmias or at baseline rhythm  Description: INTERVENTIONS:  - Continuous cardiac monitoring, vital signs, obtain 12 lead EKG if ordered  - Administer antiarrhythmic and heart rate control medications as ordered  - Monitor electrolytes and administer replacement therapy as ordered  Outcome: Progressing     Problem: SUBSTANCE USE/ABUSE  Goal: Will have no detox symptoms and will verbalize plan for changing substance-related behavior  Description: INTERVENTIONS:  - Monitor physical status and assess for symptoms of withdrawal  - Administer medication as ordered  - Provide emotional support with 1 on 1 interaction with staff  - Encourage recovery focused program/ addiction education  - Assess for verbalization of changing behaviors related to substance abuse  - Initiate consults and referrals as appropriate (Case Management, Spiritual Care, etc.)  Outcome: Progressing  Goal: By discharge, patient will have ongoing treatment plan addressing chemical dependency  Description: INTERVENTIONS:  - Assist patient with resources and/or appointments for ongoing recovery based living  Outcome: Progressing

## 2024-08-02 NOTE — CASE MANAGEMENT
Case Management Assessment & Discharge Planning Note    Patient name David Skelton Jr.  Location [facility]-01 MRN [id number]  : 1960 Date 2024       Current Admission Date: 2024  Current Admission Diagnosis:Closed fracture of right femur, unspecified fracture morphology, sequela   Patient Active Problem List    Diagnosis Date Noted Date Diagnosed    Acute blood loss anemia (ABLA) 2024     Nocturia associated with benign prostatic hyperplasia 2024     Gross hematuria 2024     Closed fracture of right femur, unspecified fracture morphology, sequela 2024     B12 deficiency 2023     Bradycardia 2023     Retinopathy due to secondary DM (HCC) 2023     Visual changes 2023     Closed fracture of multiple ribs of left side 2023     Methadone dependence (HCC) 2023     Encephalopathy 2023     Liver lesion, right lobe 2021     Lung nodule 2021     Adrenal nodule (HCC) 2021     Thoracic degenerative disc disease 2021     DDD (degenerative disc disease), lumbar 2021     Pre-operative general physical examination 2019     S/P gastric bypass 11/15/2019     Hypogonadism male 11/15/2019     Hemorrhoids, internal 2019     Rectal prolapse 2019     Drug-induced constipation 2019     Chronic anticoagulation 2019     GERD without esophagitis 2019     Iron deficiency anemia 2019     Major depression, chronic 2019     Mixed hyperlipidemia 2019     Stable proliferative diabetic retinopathy of left eye associated with type 2 diabetes mellitus (HCC) 2019     Narcotic abuse ([facility]) 2018     Ulcer of leg, chronic, left ([facility]) 2015     ADD (attention deficit disorder) without hyperactivity 2015     Bipolar disorder (HCC) 01/10/2014     Insomnia 2013     History of hepatitis C 2013     Allergic rhinitis 2013     Generalized anxiety  disorder 01/04/2013     Macular degeneration 01/04/2013     Spinal stenosis 01/04/2013     Vitamin D deficiency 01/04/2013     Benign essential hypertension 09/14/2012     Type 2 diabetes mellitus with ophthalmic complication, with long-term current use of insulin (HCC) 09/14/2012     Atrial fibrillation (HCC) 05/15/2012     Complex medical condition 05/15/2012       LOS (days): 3  Geometric Mean LOS (GMLOS) (days): 2.8  Days to GMLOS:-0.1     OBJECTIVE:    Risk of Unplanned Readmission Score: 41.97         Current admission status: Inpatient       Preferred Pharmacy:   [id number] - [place], [place] - [address]  [address]  [place] [address]  Phone: [phone] Fax: [phone]    [id number] - [place], [place] - [address]  [address]  [place] [address]  Phone: [phone] Fax: [phone]    Primary Care Provider: Casey Mckeon MD    Primary Insurance: [employer] [facility]  Secondary Insurance:     ASSESSMENT:  Active Health Care Proxies    There are no active Health Care Proxies on file.       Advance Directives  Primary Contact: Vandana Skelton (Sister) [phone]    Readmission Root Cause  30 Day Readmission: No    Patient Information  Admitted from:: Home  Mental Status: Alert  During Assessment patient was accompanied by: Not accompanied during assessment  Assessment information provided by:: Patient  Primary Caregiver: Self  Support Systems: Self, Family members  County of Residence: [place]  [facility]  Living Arrangements: Lives Alone  Is patient a ?: No    Activities of Daily Living Prior to Admission  Functional Status: Independent  Completes ADLs independently?: Yes  Ambulates independently?: Yes  Does patient use assisted devices?: No  Does patient currently own DME?: No  Does patient have a history of Outpatient Therapy (PT/OT)?: No  Does the patient have a history of  Short-Term Rehab?: No  Does patient have a history of HHC?: No  Does patient currently have HHC?: No    Patient Information Continued  Income Source: SSI/SSD  Does patient have prescription coverage?: Yes  Does patient receive dialysis treatments?: No  Does patient have a history of substance abuse?: Yes  Historical substance use preference: Heroin  History of Withdrawal Symptoms: Delirium tremors  Is patient currently in treatment for substance abuse?: Yes  Does patient have a history of Mental Health Diagnosis?: Yes (NINFA, Major Depression)  Is patient receiving treatment for mental health?: No. Treatment options were provided.    Social Determinants of Health (SDOH)      Flowsheet Row Most Recent Value   Housing Stability    In the last 12 months, was there a time when you were not able to pay the mortgage or rent on time? N   In the past 12 months, how many times have you moved where you were living? 0   At any time in the past 12 months, were you homeless or living in a shelter (including now)? N   Transportation Needs    In the past 12 months, has lack of transportation kept you from medical appointments or from getting medications? no   In the past 12 months, has lack of transportation kept you from meetings, work, or from getting things needed for daily living? No   Food Insecurity    Within the past 12 months, you worried that your food would run out before you got the money to buy more. Never true   Within the past 12 months, the food you bought just didn't last and you didn't have money to get more. Never true   Utilities    In the past 12 months has the electric, gas, oil, or water company threatened to shut off services in your home? No          DISCHARGE DETAILS:    Discharge planning discussed with:: Vandana Skelton (Sister) [phone]  Freedom of Choice: Yes     CM contacted family/caregiver?: Yes  Were Treatment Team discharge recommendations reviewed with patient/caregiver?: Yes  Did  patient/caregiver verbalize understanding of patient care needs?: N/A- going to facility  Were patient/caregiver advised of the risks associated with not following Treatment Team discharge recommendations?: Yes    Contacts  Patient Contacts: Vandana Skelton (Sister) [phone]  Relationship to Patient:: Family  Contact Method: Phone  Phone Number: [phone]  Reason/Outcome: Continuity of Care, Emergency Contact, Discharge Planning    Requested Home Health Care         Is the patient interested in HHC at discharge?: No    DME Referral Provided  Referral made for DME?: No    Other Referral/Resources/Interventions Provided:  Interventions: Short Term Rehab    Treatment Team Recommendation: Short Term Rehab  Discharge Destination Plan:: Short Term Rehab       CM met with pt to discuss the role of CM, as well as d/c planning  Pt was evaluated by OT/PT and recommended for IP rehab  Pt is interested in IP rehab and his preference is acute rehab  Pt's preference is [facility] but would also like referrals to [facility] and [facility]  CM placed and will follow up      CM reviewed d/c planning process including the following: identifying help at home, patient preference for d/c planning needs, Discharge Lounge, [facility] Meds to Bed program, availability of treatment team to discuss questions or concerns patient and/or family may have regarding understanding medications and recognizing signs and symptoms once discharged.  CM also encouraged patient to follow up with all recommended appointments after discharge. Patient advised of importance for patient and family to participate in managing patient’s medical well being.

## 2024-08-02 NOTE — ASSESSMENT & PLAN NOTE
- POD2 R IM nail  - Ortho following   - DVT ppx while inpatient   - ASA 81mg BID for 28 days following operation  - pain control as ordered  - vitamin D & calcium supplementation as recommended per endo  - continue PT/OT therapies   LM for patient to return call regarding referral to pain management

## 2024-08-03 LAB
BASOPHILS # BLD AUTO: 0.03 THOUSANDS/ÂΜL (ref 0–0.1)
BASOPHILS NFR BLD AUTO: 0 % (ref 0–1)
EOSINOPHIL # BLD AUTO: 0.77 THOUSAND/ÂΜL (ref 0–0.61)
EOSINOPHIL NFR BLD AUTO: 10 % (ref 0–6)
ERYTHROCYTE [DISTWIDTH] IN BLOOD BY AUTOMATED COUNT: 12.7 % (ref 11.6–15.1)
GLUCOSE SERPL-MCNC: 134 MG/DL (ref 65–140)
GLUCOSE SERPL-MCNC: 173 MG/DL (ref 65–140)
GLUCOSE SERPL-MCNC: 183 MG/DL (ref 65–140)
GLUCOSE SERPL-MCNC: 211 MG/DL (ref 65–140)
HCT VFR BLD AUTO: 25.5 % (ref 36.5–49.3)
HGB BLD-MCNC: 8.3 G/DL (ref 12–17)
IMM GRANULOCYTES # BLD AUTO: 0.06 THOUSAND/UL (ref 0–0.2)
IMM GRANULOCYTES NFR BLD AUTO: 1 % (ref 0–2)
LYMPHOCYTES # BLD AUTO: 1.11 THOUSANDS/ÂΜL (ref 0.6–4.47)
LYMPHOCYTES NFR BLD AUTO: 14 % (ref 14–44)
MCH RBC QN AUTO: 28.5 PG (ref 26.8–34.3)
MCHC RBC AUTO-ENTMCNC: 32.5 G/DL (ref 31.4–37.4)
MCV RBC AUTO: 88 FL (ref 82–98)
MONOCYTES # BLD AUTO: 0.63 THOUSAND/ÂΜL (ref 0.17–1.22)
MONOCYTES NFR BLD AUTO: 8 % (ref 4–12)
NEUTROPHILS # BLD AUTO: 5.37 THOUSANDS/ÂΜL (ref 1.85–7.62)
NEUTS SEG NFR BLD AUTO: 67 % (ref 43–75)
NRBC BLD AUTO-RTO: 0 /100 WBCS
PLATELET # BLD AUTO: 226 THOUSANDS/UL (ref 149–390)
PMV BLD AUTO: 11.4 FL (ref 8.9–12.7)
RBC # BLD AUTO: 2.91 MILLION/UL (ref 3.88–5.62)
WBC # BLD AUTO: 7.97 THOUSAND/UL (ref 4.31–10.16)

## 2024-08-03 PROCEDURE — NC001 PR NO CHARGE: Performed by: ORTHOPAEDIC SURGERY

## 2024-08-03 PROCEDURE — 97116 GAIT TRAINING THERAPY: CPT

## 2024-08-03 PROCEDURE — 99231 SBSQ HOSP IP/OBS SF/LOW 25: CPT | Performed by: NURSE PRACTITIONER

## 2024-08-03 PROCEDURE — 97535 SELF CARE MNGMENT TRAINING: CPT

## 2024-08-03 PROCEDURE — 99232 SBSQ HOSP IP/OBS MODERATE 35: CPT | Performed by: INTERNAL MEDICINE

## 2024-08-03 PROCEDURE — 82948 REAGENT STRIP/BLOOD GLUCOSE: CPT

## 2024-08-03 PROCEDURE — 85025 COMPLETE CBC W/AUTO DIFF WBC: CPT

## 2024-08-03 PROCEDURE — 97530 THERAPEUTIC ACTIVITIES: CPT

## 2024-08-03 RX ORDER — ONDANSETRON 4 MG/1
4 TABLET, ORALLY DISINTEGRATING ORAL EVERY 8 HOURS PRN
Status: DISCONTINUED | OUTPATIENT
Start: 2024-08-03 | End: 2024-08-05 | Stop reason: HOSPADM

## 2024-08-03 RX ORDER — INSULIN LISPRO 100 [IU]/ML
3 INJECTION, SOLUTION INTRAVENOUS; SUBCUTANEOUS
Status: DISCONTINUED | OUTPATIENT
Start: 2024-08-03 | End: 2024-08-04

## 2024-08-03 RX ORDER — ALPRAZOLAM 0.5 MG/1
1 TABLET ORAL DAILY
Status: DISCONTINUED | OUTPATIENT
Start: 2024-08-03 | End: 2024-08-05 | Stop reason: HOSPADM

## 2024-08-03 RX ORDER — ALPRAZOLAM 0.5 MG/1
1 TABLET ORAL DAILY PRN
Status: DISCONTINUED | OUTPATIENT
Start: 2024-08-03 | End: 2024-08-03

## 2024-08-03 RX ADMIN — Medication 2 TABLET: at 12:06

## 2024-08-03 RX ADMIN — METHOCARBAMOL 750 MG: 750 TABLET ORAL at 05:39

## 2024-08-03 RX ADMIN — ACETAMINOPHEN 975 MG: 325 TABLET, FILM COATED ORAL at 15:56

## 2024-08-03 RX ADMIN — DICYCLOMINE HYDROCHLORIDE 10 MG: 10 CAPSULE ORAL at 08:24

## 2024-08-03 RX ADMIN — Medication 1 TABLET: at 08:13

## 2024-08-03 RX ADMIN — OXYCODONE HYDROCHLORIDE 10 MG: 10 TABLET ORAL at 12:24

## 2024-08-03 RX ADMIN — METHOCARBAMOL 750 MG: 750 TABLET ORAL at 15:56

## 2024-08-03 RX ADMIN — GABAPENTIN 600 MG: 300 CAPSULE ORAL at 18:16

## 2024-08-03 RX ADMIN — ALPRAZOLAM 0.5 MG: 0.5 TABLET ORAL at 08:24

## 2024-08-03 RX ADMIN — Medication 2 TABLET: at 08:07

## 2024-08-03 RX ADMIN — INSULIN GLARGINE 15 UNITS: 100 INJECTION, SOLUTION SUBCUTANEOUS at 21:00

## 2024-08-03 RX ADMIN — ACETAMINOPHEN 975 MG: 325 TABLET, FILM COATED ORAL at 05:39

## 2024-08-03 RX ADMIN — DICYCLOMINE HYDROCHLORIDE 10 MG: 10 CAPSULE ORAL at 21:00

## 2024-08-03 RX ADMIN — METHADONE HYDROCHLORIDE 60 MG: 10 TABLET ORAL at 06:30

## 2024-08-03 RX ADMIN — DICYCLOMINE HYDROCHLORIDE 10 MG: 10 CAPSULE ORAL at 15:57

## 2024-08-03 RX ADMIN — FOLIC ACID 1 MG: 1 TABLET ORAL at 08:15

## 2024-08-03 RX ADMIN — ENOXAPARIN SODIUM 30 MG: 30 INJECTION SUBCUTANEOUS at 05:39

## 2024-08-03 RX ADMIN — ALPRAZOLAM 0.5 MG: 0.5 TABLET ORAL at 18:14

## 2024-08-03 RX ADMIN — METOPROLOL SUCCINATE 12.5 MG: 25 TABLET, EXTENDED RELEASE ORAL at 08:16

## 2024-08-03 RX ADMIN — INSULIN LISPRO 2 UNITS: 100 INJECTION, SOLUTION INTRAVENOUS; SUBCUTANEOUS at 21:00

## 2024-08-03 RX ADMIN — OXYCODONE HYDROCHLORIDE 15 MG: 5 TABLET ORAL at 03:12

## 2024-08-03 RX ADMIN — DICYCLOMINE HYDROCHLORIDE 10 MG: 10 CAPSULE ORAL at 12:06

## 2024-08-03 RX ADMIN — Medication 2 TABLET: at 17:38

## 2024-08-03 RX ADMIN — OXYCODONE HYDROCHLORIDE 15 MG: 5 TABLET ORAL at 20:56

## 2024-08-03 RX ADMIN — ALPRAZOLAM 1 MG: 0.5 TABLET ORAL at 23:08

## 2024-08-03 RX ADMIN — ENOXAPARIN SODIUM 30 MG: 30 INJECTION SUBCUTANEOUS at 18:16

## 2024-08-03 RX ADMIN — OXYCODONE HYDROCHLORIDE 15 MG: 5 TABLET ORAL at 08:27

## 2024-08-03 RX ADMIN — GABAPENTIN 600 MG: 300 CAPSULE ORAL at 08:15

## 2024-08-03 RX ADMIN — METHOCARBAMOL 750 MG: 750 TABLET ORAL at 21:00

## 2024-08-03 RX ADMIN — ALPRAZOLAM 0.5 MG: 0.5 TABLET ORAL at 09:04

## 2024-08-03 RX ADMIN — INSULIN LISPRO 3 UNITS: 100 INJECTION, SOLUTION INTRAVENOUS; SUBCUTANEOUS at 13:12

## 2024-08-03 RX ADMIN — INSULIN LISPRO 1 UNITS: 100 INJECTION, SOLUTION INTRAVENOUS; SUBCUTANEOUS at 18:16

## 2024-08-03 RX ADMIN — ACETAMINOPHEN 975 MG: 325 TABLET, FILM COATED ORAL at 21:00

## 2024-08-03 RX ADMIN — INSULIN LISPRO 1 UNITS: 100 INJECTION, SOLUTION INTRAVENOUS; SUBCUTANEOUS at 12:13

## 2024-08-03 RX ADMIN — INSULIN LISPRO 3 UNITS: 100 INJECTION, SOLUTION INTRAVENOUS; SUBCUTANEOUS at 18:17

## 2024-08-03 RX ADMIN — THIAMINE HCL TAB 100 MG 100 MG: 100 TAB at 08:15

## 2024-08-03 RX ADMIN — METHADONE HYDROCHLORIDE 130 MG: 10 TABLET ORAL at 05:39

## 2024-08-03 NOTE — [facility] ADMISSION
Patient's case reviewed with [facility] physician, patient is pre-approved for [facility] pending medical stability, LOF at discharge, bed availability and insurance authorization.  [facility] admissions will continue to follow patient for progression of care and discharge readiness.

## 2024-08-03 NOTE — PLAN OF CARE
Problem: OCCUPATIONAL THERAPY ADULT  Goal: Performs self-care activities at highest level of function for planned discharge setting.  See evaluation for individualized goals.  Description: Treatment Interventions: ADL retraining, Functional transfer training, Endurance training, Cognitive reorientation, Patient/family training, Equipment evaluation/education, Compensatory technique education, Activityengagement          See flowsheet documentation for full assessment, interventions and recommendations.   Outcome: Progressing  Note: Limitation: Decreased ADL status, Decreased endurance, Decreased self-care trans, Decreased high-level ADLs  Prognosis: Good  Assessment: Pt seen for OT treatment session day 1 on this date focused on ADL retraining, functional transfers and mobility, functional endurance. Pt was greeted in chair and was cooperative throughout session. Following session, pt was left in chair with chair alarm on and all needs within reach. Pt continues to be limited by functional status related to ADLs and transfers requiring MIN A for UB bathing, MAX A for LB bathing and dressing, although demonstrates improvements in seated UB dressing and grooming performing with supervision. The patient's raw score on the -PAC Daily Activity Inpatient Short Form is 16. A raw score of less than 19 suggests the patient may benefit from discharge to post-acute rehabilitation services. Please refer to the recommendation of the Occupational Therapist for safe discharge planning. Pt would benefit from continued acute OT intervention with plan to continue OT treatment sessions 2-3x per week. Recommend d/c to level I services.     Rehab Resource Intensity Level, OT: I (Maximum Resource Intensity)

## 2024-08-03 NOTE — PROGRESS NOTES
Progress Note - Orthopedics   David Skelton Jr. 64 y.o. male MRN: [id number]  Unit/Bed#: [facility] 604-01      Subjective:    64 y.o.male POD 3 R hip IM nail. No acute events, no new complaints. Denies fevers, chills, CP, SOB, N/V, numbness or tingling. Pain well controlled.    Labs:  0   Lab Value Date/Time    HCT 24.1 (L) 08/02/2024 1011    HCT 24.2 (L) 08/01/2024 0606    HCT 29.8 (L) 07/31/2024 0522    HCT 38.0 01/29/2015 1342    HCT 40.1 05/21/2014 0938    HCT 38.5 02/17/2014 0900    HGB 7.8 (L) 08/02/2024 1011    HGB 7.9 (L) 08/01/2024 0606    HGB 9.7 (L) 07/31/2024 0522    HGB 12.2 01/29/2015 1342    HGB 13.1 05/21/2014 0938    HGB 12.3 02/17/2014 0900    INR 1.22 (H) 07/30/2024 2201    INR 1.11 01/29/2015 1342    WBC 9.03 08/02/2024 1011    WBC 10.66 (H) 08/01/2024 0606    WBC 9.60 07/31/2024 0522    WBC 9.29 01/29/2015 1342    WBC 7.36 05/21/2014 0938    WBC 9.99 02/17/2014 0900    ESR 8 07/30/2024 2201       Meds:    Current Facility-Administered Medications:     acetaminophen (TYLENOL) tablet 975 mg, 975 mg, Oral, Q8H MELISA, Vazquez Estrada MD, 975 mg at 08/02/24 2115    ALPRAZolam (XANAX) tablet 0.5 mg, 0.5 mg, Oral, Daily PRN, Vazquez Estrada MD    ALPRAZolam (XANAX) tablet 1 mg, 1 mg, Oral, HS PRN, Vazquez Estrada MD, 1 mg at 08/02/24 2115    calcium carbonate (OYSTER SHELL,OSCAL) 500 mg tablet 2 tablet, 2 tablet, Oral, TID With Meals, Nila Chang DO, 2 tablet at 08/02/24 1800    calcium carbonate (TUMS) chewable tablet 1,000 mg, 1,000 mg, Oral, Daily PRN, Vazquez Estrada MD    dicyclomine (BENTYL) capsule 10 mg, 10 mg, Oral, 4x Daily (AC & HS), Reema Kennedy DO, 10 mg at 08/02/24 2116    docusate sodium (COLACE) capsule 100 mg, 100 mg, Oral, BID, Vazquez Estrada MD, 100 mg at 08/02/24 1800    DULoxetine (CYMBALTA) delayed release capsule 60 mg, 60 mg, Oral, Daily, Vazquez Estrada MD, 60 mg at 08/01/24 0813    enoxaparin (LOVENOX)  subcutaneous injection 30 mg, 30 mg, Subcutaneous, Q12H, Lauryn Ullrich, DO, 30 mg at 08/02/24 1800    ergocalciferol (VITAMIN D2) capsule 50,000 Units, 50,000 Units, Oral, Once per day on Monday Wednesday Friday, Nila Chang DO, 50,000 Units at 08/01/24 2118    folic acid (FOLVITE) tablet 1 mg, 1 mg, Oral, Daily, Vazquez Estrada MD, 1 mg at 08/02/24 0928    gabapentin (NEURONTIN) capsule 600 mg, 600 mg, Oral, BID, Vazquez Estrada MD, 600 mg at 08/02/24 1800    insulin glargine (LANTUS) subcutaneous injection 15 Units 0.15 mL, 15 Units, Subcutaneous, HS, Nila Chang DO, 15 Units at 08/02/24 2115    insulin lispro (HumALOG/ADMELOG) 100 units/mL subcutaneous injection 1-6 Units, 1-6 Units, Subcutaneous, HS, Vazquez Estrada MD, 2 Units at 08/02/24 2116    insulin lispro (HumALOG/ADMELOG) 100 units/mL subcutaneous injection 1-6 Units, 1-6 Units, Subcutaneous, TID AC, 3 Units at 08/02/24 1800 **AND** Fingerstick Glucose (POCT), , , 4x Daily AC and at bedtime, Nila Chang DO    methadone (DOLOPHINE) tablet 190 mg, 190 mg, Oral, Daily, Vazquez Estrada MD    methocarbamol (ROBAXIN) tablet 750 mg, 750 mg, Oral, Q8H MELISA, Lisset Teran MD, 750 mg at 08/02/24 2115    metoprolol succinate (TOPROL-XL) 24 hr tablet 12.5 mg, 12.5 mg, Oral, Daily, Vazquez Estrada MD, 12.5 mg at 08/02/24 0929    multivitamin-minerals (CENTRUM) tablet 1 tablet, 1 tablet, Oral, Daily, Vazquez Estrada MD, 1 tablet at 08/02/24 0928    naloxone (NARCAN) 0.04 mg/mL syringe 0.04 mg, 0.04 mg, Intravenous, Q1MIN PRN, Vazquez Estrada MD    [DISCONTINUED] oxyCODONE (ROXICODONE) split tablet 2.5 mg, 2.5 mg, Oral, Q4H PRN **OR** oxyCODONE (ROXICODONE) immediate release tablet 10 mg, 10 mg, Oral, Q4H PRN, Vazquez Estrada MD    oxyCODONE (ROXICODONE) IR tablet 15 mg, 15 mg, Oral, Q4H PRN, Vazquez Estrada MD, 15 mg at 08/03/24 0312    thiamine  "tablet 100 mg, 100 mg, Oral, Daily, Vazquez Landis Palmer Estrada MD, 100 mg at 08/02/24 0927    Blood Culture:   No results found for: \"BLOODCX\"    Wound Culture:   No results found for: \"WOUNDCULT\"    Ins and Outs:  I/O last 24 hours:  In: 1682 [P.O.:1682]  Out: 3275 [Urine:3275]          Physical:  Vitals:    08/03/24 0205   BP: 154/95   Pulse: 66   Resp: 20   Temp: 98.3 °F (36.8 °C)   SpO2: 98%       Musculoskeletal: right Lower Extremity  Dressings are clean, dry, intact without signs of strikethrough or saturation.  Mild tenderness to palpation over the surgical site.  Sensation intact, but mildly decreased in sural, saphenous, superficial peroneal, deep peroneal, tibial distributions consistent with prior history of neuropathy.  Motor intact ankle plantarflexion/dorsiflexion, EHL/FHL.  Extremities warm and well-perfused, DP pulse palpable with capillary refill less than 2 seconds..      Assessment:    64 y.o.male POD 3 R hip IM nail. Doing well overall.     Plan:  WBAT RLE  PT/OT  Pain control  DVT ppx -per primary while inpatient, ASA 81 mg twice daily until 28 days after surgery on discharge.  Will monitor for ABLA and administer IVF/prbc as indicated for Greater than 2 gram drop or Hgb < 7  Medical management per primary team  Dispo: Ortho will follow    Jorge Avila MD      Please Secure Chat the [facility] Ortho Floor Role with any Questions or Concerns.    "

## 2024-08-03 NOTE — PLAN OF CARE
Problem: Potential for Falls  Goal: Patient will remain free of falls  Description: INTERVENTIONS:  - Educate patient/family on patient safety including physical limitations  - Instruct patient to call for assistance with activity   - Consult OT/PT to assist with strengthening/mobility   - Keep Call bell within reach  - Keep bed low and locked with side rails adjusted as appropriate  - Keep care items and personal belongings within reach  - Initiate and maintain comfort rounds  - Make Fall Risk Sign visible to staff  - Offer Toileting  - Apply yellow socks and bracelet for high fall risk patients  - Consider moving patient to room near nurses station  Outcome: Progressing     Problem: PAIN - ADULT  Goal: Verbalizes/displays adequate comfort level or baseline comfort level  Description: Interventions:  - Encourage patient to monitor pain and request assistance  - Assess pain using appropriate pain scale  - Administer analgesics based on type and severity of pain and evaluate response  - Implement non-pharmacological measures as appropriate and evaluate response  - Consider cultural and social influences on pain and pain management  - Notify physician/advanced practitioner if interventions unsuccessful or patient reports new pain  Outcome: Progressing     Problem: INFECTION - ADULT  Goal: Absence or prevention of progression during hospitalization  Description: INTERVENTIONS:  - Assess and monitor for signs and symptoms of infection  - Monitor lab/diagnostic results  - Monitor all insertion sites, i.e. indwelling lines, tubes, and drains  - Monitor endotracheal if appropriate and nasal secretions for changes in amount and color  - [place] appropriate cooling/warming therapies per order  - Administer medications as ordered  - Instruct and encourage patient and family to use good hand hygiene technique  - Identify and instruct in appropriate isolation precautions for identified infection/condition  Outcome:  Progressing  Goal: Absence of fever/infection during neutropenic period  Description: INTERVENTIONS:  - Monitor WBC    Outcome: Progressing     Problem: SAFETY ADULT  Goal: Patient will remain free of falls  Description: INTERVENTIONS:  - Educate patient/family on patient safety including physical limitations  - Instruct patient to call for assistance with activity   - Consult OT/PT to assist with strengthening/mobility   - Keep Call bell within reach  - Keep bed low and locked with side rails adjusted as appropriate  - Keep care items and personal belongings within reach  - Initiate and maintain comfort rounds  - Make Fall Risk Sign visible to staff  - Offer Toileting   - Apply yellow socks and bracelet for high fall risk patients  - Consider moving patient to room near nurses station  Outcome: Progressing  Goal: Maintain or return to baseline ADL function  Description: INTERVENTIONS:  -  Assess patient's ability to carry out ADLs; assess patient's baseline for ADL function and identify physical deficits which impact ability to perform ADLs (bathing, care of mouth/teeth, toileting, grooming, dressing, etc.)  - Assess/evaluate cause of self-care deficits   - Assess range of motion  - Assess patient's mobility; develop plan if impaired  - Assess patient's need for assistive devices and provide as appropriate  - Encourage maximum independence but intervene and supervise when necessary  - Involve family in performance of ADLs  - Assess for home care needs following discharge   - Consider OT consult to assist with ADL evaluation and planning for discharge  - Provide patient education as appropriate  Outcome: Progressing  Goal: Maintains/Returns to pre admission functional level  Description: INTERVENTIONS:  - Perform AM-PAC 6 Click Basic Mobility/ Daily Activity assessment daily.  - Set and communicate daily mobility goal to care team and patient/family/caregiver.   - Collaborate with rehabilitation services on mobility  goals if consulted  - Reposition patient   - Out of bed for toileting  - Record patient progress and toleration of activity level   Outcome: Progressing     Problem: DISCHARGE PLANNING  Goal: Discharge to home or other facility with appropriate resources  Description: INTERVENTIONS:  - Identify barriers to discharge w/patient and caregiver  - Arrange for needed discharge resources and transportation as appropriate  - Identify discharge learning needs (meds, wound care, etc.)  - Arrange for interpretive services to assist at discharge as needed  - Refer to Case Management Department for coordinating discharge planning if the patient needs post-hospital services based on physician/advanced practitioner order or complex needs related to functional status, cognitive ability, or social support system  Outcome: Progressing     Problem: Knowledge Deficit  Goal: Patient/family/caregiver demonstrates understanding of disease process, treatment plan, medications, and discharge instructions  Description: Complete learning assessment and assess knowledge base.  Interventions:  - Provide teaching at level of understanding  - Provide teaching via preferred learning methods  Outcome: Progressing     Problem: Prexisting or High Potential for Compromised Skin Integrity  Goal: Skin integrity is maintained or improved  Description: INTERVENTIONS:  - Identify patients at risk for skin breakdown  - Assess and monitor skin integrity  - Assess and monitor nutrition and hydration status  - Monitor labs   - Assess for incontinence   - Turn and reposition patient  - Assist with mobility/ambulation  - Relieve pressure over bony prominences  - Avoid friction and shearing  - Provide appropriate hygiene as needed including keeping skin clean and dry  - Evaluate need for skin moisturizer/barrier cream  - Collaborate with interdisciplinary team   - Patient/family teaching  - Consider wound care consult   Outcome: Progressing     Problem:  HEMATOLOGIC - ADULT  Goal: Maintains hematologic stability  Description: INTERVENTIONS  - Assess for signs and symptoms of bleeding or hemorrhage  - Monitor labs  - Administer supportive blood products/factors as ordered and appropriate  Outcome: Progressing     Problem: MUSCULOSKELETAL - ADULT  Goal: Maintain or return mobility to safest level of function  Description: INTERVENTIONS:  - Assess patient's ability to carry out ADLs; assess patient's baseline for ADL function and identify physical deficits which impact ability to perform ADLs (bathing, care of mouth/teeth, toileting, grooming, dressing, etc.)  - Assess/evaluate cause of self-care deficits   - Assess range of motion  - Assess patient's mobility  - Assess patient's need for assistive devices and provide as appropriate  - Encourage maximum independence but intervene and supervise when necessary  - Involve family in performance of ADLs  - Assess for home care needs following discharge   - Consider OT consult to assist with ADL evaluation and planning for discharge  - Provide patient education as appropriate  Outcome: Progressing  Goal: Maintain proper alignment of affected body part  Description: INTERVENTIONS:  - Support, maintain and protect limb and body alignment  - Provide patient/ family with appropriate education  Outcome: Progressing     Problem: CARDIOVASCULAR - ADULT  Goal: Maintains optimal cardiac output and hemodynamic stability  Description: INTERVENTIONS:  - Monitor I/O, vital signs and rhythm  - Monitor for S/S and trends of decreased cardiac output  - Administer and titrate ordered vasoactive medications to optimize hemodynamic stability  - Assess quality of pulses, skin color and temperature  - Assess for signs of decreased coronary artery perfusion  - Instruct patient to report change in severity of symptoms  Outcome: Progressing  Goal: Absence of cardiac dysrhythmias or at baseline rhythm  Description: INTERVENTIONS:  - Continuous  cardiac monitoring, vital signs, obtain 12 lead EKG if ordered  - Administer antiarrhythmic and heart rate control medications as ordered  - Monitor electrolytes and administer replacement therapy as ordered  Outcome: Progressing     Problem: BEHAVIOR  Goal: Pt/Family maintain appropriate behavior and adhere to behavioral management agreement, if implemented  Description: INTERVENTIONS:  - Assess the family dynamic   - Encourage verbalization of thoughts and concerns in a socially appropriate manner  - Assess patient/family's coping skills and non-compliant behavior (including use of illegal substances).  - Utilize positive, consistent limit setting strategies supporting safety of patient, staff and others  - Initiate consult with Case Management, Spiritual Care or other ancillary services as appropriate  - If a patient's/visitor's behavior jeopardizes the safety of the patient, staff, or others, refer to organization procedure.   - Notify Security of behavior or suspected illegal substances which indicate the need for search of the patient and/or belongings  - Encourage participation in the decision making process about a behavioral management agreement; implement if patient meets criteria  Outcome: Progressing     Problem: SUBSTANCE USE/ABUSE  Goal: Will have no detox symptoms and will verbalize plan for changing substance-related behavior  Description: INTERVENTIONS:  - Monitor physical status and assess for symptoms of withdrawal  - Administer medication as ordered  - Provide emotional support with 1 on 1 interaction with staff  - Encourage recovery focused program/ addiction education  - Assess for verbalization of changing behaviors related to substance abuse  - Initiate consults and referrals as appropriate (Case Management, Spiritual Care, etc.)  Outcome: Progressing  Goal: By discharge, will develop insight into their chemical dependency and sustain motivation to continue in recovery  Description:  INTERVENTIONS:  - Attends all daily group sessions and scheduled AA groups  - Actively practices coping skills through participation in the therapeutic community and adherence to program rules  - Reviews and completes assignments from individual treatment plan  - Assist patient development of understanding of their personal cycle of addiction and relapse triggers  Outcome: Progressing  Goal: By discharge, patient will have ongoing treatment plan addressing chemical dependency  Description: INTERVENTIONS:  - Assist patient with resources and/or appointments for ongoing recovery based living  Outcome: Progressing

## 2024-08-03 NOTE — PROGRESS NOTES
"Progress Note - David Skelton Jr. 64 y.o. male MRN: [id number]    Unit/Bed#: [facility] 604-01 Encounter: [id number]      CC: diabetes f/u    Subjective:   David Skelton Jr. is a 64 y.o. year old male with type 2 diabetes melitis with proliferative retinopathy, CKD, history of gastric bypass surgery November 2011, hypogonadism, vitamin D deficiency, adrenal nodule, ambulatory dysfunction and polysubstance use on methadone who was admitted for further management of right intertrochanteric femur fracture.  Patient reports feeling well, offers no complaints at this time.  Denies symptoms of low sugars overnight.  Reports having good appetite.    Objective:     Vitals: Blood pressure 129/74, pulse 63, temperature 97.9 °F (36.6 °C), resp. rate 17, height 5' 8\" (1.727 m), weight 79.4 kg (175 lb), SpO2 94%.,Body mass index is 26.61 kg/m².      Intake/Output Summary (Last 24 hours) at 8/3/2024 1453  Last data filed at 8/3/2024 0800  Gross per 24 hour   Intake 980 ml   Output 600 ml   Net 380 ml       Physical Exam:  General Appearance: awake, appears stated age and cooperative  Head: Normocephalic, without obvious abnormality, atraumatic  Extremities: moves all extremities  Skin: Skin color and temperature normal.   Pulm: no labored breathing    Lab, Imaging and other studies: I have personally reviewed pertinent reports.      POC Glucose (mg/dl)   Date Value   08/03/2024 183 (H)   08/03/2024 134   08/02/2024 220 (H)   08/02/2024 257 (H)   08/02/2024 184 (H)   08/02/2024 172 (H)   08/01/2024 308 (H)   08/01/2024 259 (H)   08/01/2024 242 (H)   08/01/2024 273 (H)       Assessment:  David Skelton Jr. is a 64 y.o. year old male with type 2 diabetes melitis with proliferative retinopathy, CKD, history of gastric bypass surgery November 2011, hypogonadism, vitamin D deficiency, adrenal nodule, ambulatory dysfunction and polysubstance use on methadone who was admitted for further management of right intertrochanteric femur fracture.  Seen " by endocrinology for Own the bone.    Plan:  1.  Right intertrochanteric femur fracture.  - Suspicious for underlying osteomalacia given history of bariatric surgery and vitamin D deficiency.  -Continue ergocalciferol 50,000 units 3 times per week x 20 doses  - Continue PT OT while inpatient  - Patient will benefit from DEXA scan as outpatient.  -Endocrinology follow-up for further monitoring and management.    2.  Type 2 diabetes mellitus  -A1c on 7/31/24 - 10.7%  -Outpatient regimen: None for the past 6 months.  Previously had taken Trulicity 1.5 mg weekly, Synjardy 12.5-1000 mg twice daily, Actos 15 mg daily, Lantus 18 units every morning.  -Recommend continuing Lantus 15 units nightly  - Recommend starting Humalog 3 units 3 times daily before meals.  - Continue correctional scale insulin algorithm 3.  -Avoid hypoglycemia, treat per protocol.  -Discharge recommendations pending clinical course.  - Endocrinology will continue to follow.    3.  Adrenal nodule  -1 cm right adrenal nodule redemonstrated on CT abdomen pelvis on 7/30/2024 which was previously 1.4 cm on CT from 8/9/2023.  Present since 2018.  - Further workup as outpatient.    4.  Hypogonadism  - Endocrinology follow-up for reassessment of testosterone levels and further management.    5.  History of gastric bypass surgery  -Would benefit from daily calcium [id number] mg daily and ergocalciferol 50,000 units 3 times weekly x 20 doses with maintenance dose of at least 5000 to 10,000 units daily.      Portions of the record may have been created with voice recognition software.

## 2024-08-03 NOTE — PROGRESS NOTES
Found a bottle of methadone from home in patient side table when looking for his glasses. Trauma team aware. Pt reluctant to give med to RN. Eventually pt agreeable to locking medication up in med box outside of room. On admission, RN found unidentifable pills in room and were sent to pharmacy. 7/31 room search done by security. No meds found, 3 lighters present.

## 2024-08-03 NOTE — PHYSICAL THERAPY NOTE
Physical Therapy Progress Note     08/03/24 1125   PT Last Visit   PT Visit Date 08/03/24   Note Type   Note Type Treatment   Pain Assessment   Pain Assessment Tool FLACC   Pain Rating: FLACC (Rest) - Face 0   Pain Rating: FLACC (Rest) - Legs 0   Pain Rating: FLACC (Rest) - Activity 0   Pain Rating: FLACC (Rest) - Cry 0   Pain Rating: FLACC (Rest) - Consolability 0   Score: FLACC (Rest) 0   Pain Rating: FLACC (Activity) - Face 2   Pain Rating: FLACC (Activity) - Legs 1   Pain Rating: FLACC (Activity) - Activity 1   Pain Rating: FLACC (Activity) - Cry 1   Pain Rating: FLACC (Activity) - Consolability 0   Score: FLACC (Activity) 5   Restrictions/Precautions   RLE Weight Bearing Per Order WBAT   Other Precautions WBS;Pain;Fall Risk;Cognitive;Chair Alarm;Bed Alarm   Subjective   Subjective Pt encountered seated in recliner, asleep & initially difficult to rouse, but once awake, he was alert & pleasant to work with.  No new complaints given during activity & pt reports controlled pain at this point.   Transfers   Sit to Stand 4  Minimal assistance   Additional items Assist x 1;Armrests;Increased time required   Stand to Sit 4  Minimal assistance   Additional items Assist x 1;Armrests;Increased time required   Ambulation/Elevation   Gait pattern Antalgic;Step to;Short stride;Foward flexed;Inconsistent shanell;Decreased R stance;Decreased foot clearance;Improper Weight shift   Gait Assistance 4  Minimal assist   Additional items Assist x 1  (+ chair follow)   Assistive Device Rolling walker   Distance 45'   Balance   Static Sitting Fair   Static Standing Fair -   Ambulatory Poor +   Activity Tolerance   Activity Tolerance Patient tolerated treatment well;Patient limited by fatigue;Patient limited by pain   Nurse Made Aware MICHAEL Snowden   Assessment   Prognosis Good   Problem List Decreased strength;Decreased range of motion;Decreased endurance;Impaired balance;Decreased mobility;Decreased safety awareness;Pain   Assessment Pt  demonstrated considerable improvement in all aspcts of mobiilty this session, performing all transfers with decreased assist, then ambulating near household distances without LOB or excessive fatigue.  Remains well below baseline mobilty by pain & deficits as result of RLE injury, but he compensated well with use of RW during stance.  Anticipate pt will demonstrate improved functional endurance & mobiilty in upcoming sessions along with increased activity outside PT sessions as he continues to heal & progress to PLOF.  PT POC & d/c recommednations remain appropraite.   Goals   Patient Goals to get better   STG Expiration Date 08/15/24   PT Treatment Day 1   Plan   Treatment/Interventions Functional transfer training;LE strengthening/ROM;Elevations;Therapeutic exercise;Endurance training;Patient/family training;Equipment eval/education;Bed mobility;Gait training   Progress Progressing toward goals   PT Frequency 3-5x/wk   Discharge Recommendation   Rehab Resource Intensity Level, PT II (Moderate Resource Intensity)   Equipment Recommended Walker   Walker Package Recommended Wheeled walker   AM-PAC Basic Mobility Inpatient   Turning in Flat Bed Without Bedrails 2   Lying on Back to Sitting on Edge of Flat Bed Without Bedrails 2   Moving Bed to Chair 2   Standing Up From Chair Using Arms 3   Walk in Room 2   Climb 3-5 Stairs With Railing 2   Basic Mobility Inpatient Raw Score 13   Basic Mobility Standardized Score 33.99   [facility] Highest Level Of Mobility   -[facility] Goal 4: Move to chair/commode   -HLM Achieved 7: Walk 25 feet or more       Eliseo Fraire PTA    An [facility] Basic Mobility Raw Score less than 17 suggests pt would benefit from post acute rehab.  Please also refer to the recommendation of the Physical Therapist for safe discharge planning.

## 2024-08-03 NOTE — PLAN OF CARE
Problem: PHYSICAL THERAPY ADULT  Goal: Performs mobility at highest level of function for planned discharge setting.  See evaluation for individualized goals.  Description: Treatment/Interventions: Functional transfer training, LE strengthening/ROM, Therapeutic exercise, Endurance training, Elevations, Patient/family training, Equipment eval/education, Bed mobility, Gait training, Spoke to nursing, OT  Equipment Recommended: Walker       See flowsheet documentation for full assessment, interventions and recommendations.  Outcome: Progressing  Note: Prognosis: Good  Problem List: Decreased strength, Decreased range of motion, Decreased endurance, Impaired balance, Decreased mobility, Decreased safety awareness, Pain  Assessment: Pt demonstrated considerable improvement in all aspcts of mobiilty this session, performing all transfers with decreased assist, then ambulating near household distances without LOB or excessive fatigue.  Remains well below baseline mobilty by pain & deficits as result of RLE injury, but he compensated well with use of RW during stance.  Anticipate pt will demonstrate improved functional endurance & mobiilty in upcoming sessions along with increased activity outside PT sessions as he continues to heal & progress to PLOF.  PT POC & d/c recommednations remain appropraite.  Barriers to Discharge: Decreased caregiver support     Rehab Resource Intensity Level, PT: II (Moderate Resource Intensity)    See flowsheet documentation for full assessment.

## 2024-08-03 NOTE — OCCUPATIONAL THERAPY NOTE
Occupational Therapy Progress Note     Patient Name: David Skelton Jr.  Today's Date: 8/3/2024  Problem List  Principal Problem:    Closed fracture of right femur, unspecified fracture morphology, sequela  Active Problems:    Methadone dependence (HCC)    Acute blood loss anemia (ABLA)            08/03/24 0948   OT Last Visit   OT Visit Date 08/03/24   Note Type   Note Type Treatment   Pain Assessment   Pain Assessment Tool 0-10   Pain Score 9   Pain Location/Orientation Orientation: Right;Location: Leg;Location: Hip   Patient's Stated Pain Goal No pain   Hospital Pain Intervention(s) Repositioned;Ambulation/increased activity   Restrictions/Precautions   Weight Bearing Precautions Per Order Yes   RLE Weight Bearing Per Order WBAT   Other Precautions WBS;Cognitive;Chair Alarm;Fall Risk;Pain   Lifestyle   Autonomy I adls and mobilty with either SPC or RW- i iadls   Reciprocal Relationships limited support available   Service to Others not working   Intrinsic Gratification mostly sedentary   ADL   Where Assessed Chair   Grooming Assistance 5  Supervision/Setup   Grooming Deficit Setup;Supervision/safety   Grooming Comments Pt washes face after setup when seated in chair   UB Bathing Assistance 4  Minimal Assistance   UB Bathing Deficit Setup;Supervision/safety;Verbal cueing;Increased time to complete   UB Bathing Comments Pt requiring assist for proximal UE and back when seated in chair   LB Bathing Assistance 2  Maximal Assistance   LB Bathing Deficit Setup;Steadying;Verbal cueing;Increased time to complete;Buttocks;Right lower leg including foot;Left lower leg including foot   LB Bathing Comments Pt requiring assist for distal LE and feet when seated in chair, stands for buttocks and post. thighs requiring assist to steady and wash. Washes ant. upper thighs after setup when seated in chair.   UB Dressing Assistance 5  Supervision/Setup   UB Dressing Deficit Setup;Supervision/safety   UB Dressing Comments Don/doff  gown   LB Dressing Assistance 2  Maximal Assistance   LB Dressing Deficit Don/doff R sock;Don/doff L sock   Bed Mobility   Additional Comments Pt greeted OOB in chair, left in chair with alarm on and all needs within reach.   Transfers   Sit to Stand 3  Moderate assistance   Additional items Assist x 1;Increased time required;Verbal cues   Stand to Sit 3  Moderate assistance   Additional items Assist x 1;Increased time required;Verbal cues   Additional Comments with RW, vc for safety   Cognition   Overall Cognitive Status Impaired   Arousal/Participation Cooperative;Alert   Attention Attends with cues to redirect   Orientation Level Oriented X4   Memory Decreased short term memory;Decreased recall of recent events;Decreased recall of precautions   Following Commands Follows one step commands with increased time or repetition   Comments Pt cooperative to therapy although with decreased insight and safety awareness requiring frequent vc for safety throughout ADLs and transfers   Activity Tolerance   Activity Tolerance Patient limited by fatigue;Patient limited by pain   Medical Staff Made Aware RN cleared for therapy   Assessment   Assessment Pt seen for OT treatment session day 1 on this date focused on ADL retraining, functional transfers and mobility, functional endurance. Pt was greeted in chair and was cooperative throughout session. Following session, pt was left in chair with chair alarm on and all needs within reach. Pt continues to be limited by functional status related to ADLs and transfers requiring MIN A for UB bathing, MAX A for LB bathing and dressing, although demonstrates improvements in seated UB dressing and grooming performing with supervision. The patient's raw score on the AM-PAC Daily Activity Inpatient Short Form is 16. A raw score of less than 19 suggests the patient may benefit from discharge to post-acute rehabilitation services. Please refer to the recommendation of the Occupational  Therapist for safe discharge planning. Pt would benefit from continued acute OT intervention with plan to continue OT treatment sessions 2-3x per week. Recommend d/c to level I services.   Plan   Treatment Interventions ADL retraining;Functional transfer training;Endurance training;Continued evaluation   Goal Expiration Date 08/15/24   OT Treatment Day 1   OT Frequency 2-3x/wk   Discharge Recommendation   Rehab Resource Intensity Level, OT I (Maximum Resource Intensity)   AM-PAC Daily Activity Inpatient   Lower Body Dressing 2   Bathing 2   Toileting 2   Upper Body Dressing 3   Grooming 3   Eating 4   Daily Activity Raw Score 16   Daily Activity Standardized Score (Calc for Raw Score >=11) 35.96   AM-PAC Applied Cognition Inpatient   Following a Speech/Presentation 3   Understanding Ordinary Conversation 4   Taking Medications 3   Remembering Where Things Are Placed or Put Away 3   Remembering List of 4-5 Errands 2   Taking Care of Complicated Tasks 2   Applied Cognition Raw Score 17   Applied Cognition Standardized Score 36.52   Modified [place] Scale   Modified [place] Scale 4   End of Consult   Education Provided Yes   Patient Position at End of Consult Bedside chair;Bed/Chair alarm activated;All needs within reach   Nurse Communication Nurse aware of consult       BRENNA Baldwin, OTR/L

## 2024-08-03 NOTE — ASSESSMENT & PLAN NOTE
- POD2 R IM nail  - Ortho following   - DVT ppx while inpatient   - ASA 81mg BID for 28 days following operation  - pain control as ordered  - vitamin D & calcium supplementation as recommended per endo  - continue PT/OT therapies

## 2024-08-03 NOTE — PROGRESS NOTES
"[facility]  Progress Note  Name: David Osorio I  MRN: [id number]  Unit/Bed#: [facility] 604-01 I Date of Admission: 7/30/2024   Date of Service: 8/3/2024 I Hospital Day: 4    Assessment & Plan   Acute blood loss anemia (ABLA)  Assessment & Plan  *stabilizing  - continue to monitor Hgb daily, exam reassuring  - vitals q4hrs  - if becomes tachycardic, hypotensive, or change in mental status repeat CBC    Methadone dependence (HCC)  Assessment & Plan  190mg confirmed dosage  - will plan for 0530 timing administration of methadone per patient request  APS following, transitioning off of IV medications  - consider psych consult if ongoing irritability    * Closed fracture of right femur, unspecified fracture morphology, sequela  Assessment & Plan  - POD2 R IM nail  - Ortho following   - DVT ppx while inpatient   - ASA 81mg BID for 28 days following operation  - pain control as ordered  - vitamin D & calcium supplementation as recommended per endo  - continue PT/OT therapies             Bowel Regimen:   VTE Prophylaxis:     Disposition: home vs rehab    Subjective   Chief Complaint: Wants his Xanax    Subjective: \" I want 1 mg in morning and at night and should get  mg Zanax at lunch time\"     Objective   Vitals:   Temp:  [98 °F (36.7 °C)-98.3 °F (36.8 °C)] 98 °F (36.7 °C)  HR:  [66-96] 96  Resp:  [19-20] 19  BP: (134-154)/(60-95) 144/60    I/O         08/01 0701  08/02 0700 08/02 0701  08/03 0700 08/03 0701  08/04 0700    P.O. 1258 1202 180    I.V. (mL/kg)       Total Intake(mL/kg) 1258 (15.8) 1202 (15.1) 180 (2.3)    Urine (mL/kg/hr) 3225 (1.7) 1400 (0.7)     Total Output 3225 1400     Net -1967 -198 +180                    Physical Exam:   GENERAL APPEARANCE: OOB in chair  NEURO: GCS - 15  HEENT: EOm's intact  CV: RRR, no complaints of chest pain  LUNGS: CTA bilaterally  GI: tolerating a diet  : voiding  MSK: moves with therapy  SKIN: warm and dry    Invasive Devices       " Peripheral Intravenous Line  Duration             Peripheral IV 07/30/24 Distal;Left;Ventral (anterior) Forearm 3 days                          Lab Results:    Latest Reference Range & Units 08/03/24 07:29   POC Glucose 65 - 140 mg/dl 134        Latest Reference Range & Units 08/03/24 05:58   WBC 4.31 - 10.16 Thousand/uL 7.97   RBC 3.88 - 5.62 Million/uL 2.91 (L)   Hemoglobin 12.0 - 17.0 g/dL 8.3 (L)   Hematocrit 36.5 - 49.3 % 25.5 (L)   MCV 82 - 98 fL 88   MCH 26.8 - 34.3 pg 28.5   MCHC 31.4 - 37.4 g/dL 32.5   RDW 11.6 - 15.1 % 12.7   Platelet Count 149 - 390 Thousands/uL 226   MPV 8.9 - 12.7 fL 11.4   nRBC /100 WBCs 0   Segmented % 43 - 75 % 67   Lymphocytes % 14 - 44 % 14   Monocytes % 4 - 12 % 8   Eosinophils % 0 - 6 % 10 (H)   Basophils % 0 - 1 % 0   Immature Grans % 0 - 2 % 1   Absolute Immature Grans 0.00 - 0.20 Thousand/uL 0.06   Absolute Neutrophils 1.85 - 7.62 Thousands/µL 5.37   Absolute Lymphocytes 0.60 - 4.47 Thousands/µL 1.11   Absolute Monocytes 0.17 - 1.22 Thousand/µL 0.63   Absolute Eosinophils 0.00 - 0.61 Thousand/µL 0.77 (H)   (L): Data is abnormally low  (H): Data is abnormally high  Imaging: none   Other Studies: none

## 2024-08-04 LAB
GLUCOSE SERPL-MCNC: 106 MG/DL (ref 65–140)
GLUCOSE SERPL-MCNC: 124 MG/DL (ref 65–140)
GLUCOSE SERPL-MCNC: 185 MG/DL (ref 65–140)
GLUCOSE SERPL-MCNC: 227 MG/DL (ref 65–140)

## 2024-08-04 PROCEDURE — 82948 REAGENT STRIP/BLOOD GLUCOSE: CPT

## 2024-08-04 PROCEDURE — 97116 GAIT TRAINING THERAPY: CPT

## 2024-08-04 PROCEDURE — 99231 SBSQ HOSP IP/OBS SF/LOW 25: CPT | Performed by: SURGERY

## 2024-08-04 PROCEDURE — 97530 THERAPEUTIC ACTIVITIES: CPT

## 2024-08-04 PROCEDURE — NC001 PR NO CHARGE: Performed by: ORTHOPAEDIC SURGERY

## 2024-08-04 RX ORDER — INSULIN LISPRO 100 [IU]/ML
5 INJECTION, SOLUTION INTRAVENOUS; SUBCUTANEOUS
Status: DISCONTINUED | OUTPATIENT
Start: 2024-08-04 | End: 2024-08-05 | Stop reason: HOSPADM

## 2024-08-04 RX ORDER — ALPRAZOLAM 0.5 MG/1
0.5 TABLET ORAL ONCE
Status: COMPLETED | OUTPATIENT
Start: 2024-08-04 | End: 2024-08-04

## 2024-08-04 RX ADMIN — INSULIN LISPRO 2 UNITS: 100 INJECTION, SOLUTION INTRAVENOUS; SUBCUTANEOUS at 12:37

## 2024-08-04 RX ADMIN — METOPROLOL SUCCINATE 12.5 MG: 25 TABLET, EXTENDED RELEASE ORAL at 08:42

## 2024-08-04 RX ADMIN — ACETAMINOPHEN 975 MG: 325 TABLET, FILM COATED ORAL at 21:10

## 2024-08-04 RX ADMIN — DICYCLOMINE HYDROCHLORIDE 10 MG: 10 CAPSULE ORAL at 15:42

## 2024-08-04 RX ADMIN — OXYCODONE HYDROCHLORIDE 15 MG: 5 TABLET ORAL at 21:53

## 2024-08-04 RX ADMIN — METHOCARBAMOL 750 MG: 750 TABLET ORAL at 05:26

## 2024-08-04 RX ADMIN — METHOCARBAMOL 750 MG: 750 TABLET ORAL at 21:09

## 2024-08-04 RX ADMIN — THIAMINE HCL TAB 100 MG 100 MG: 100 TAB at 08:41

## 2024-08-04 RX ADMIN — OXYCODONE HYDROCHLORIDE 15 MG: 5 TABLET ORAL at 12:42

## 2024-08-04 RX ADMIN — INSULIN LISPRO 3 UNITS: 100 INJECTION, SOLUTION INTRAVENOUS; SUBCUTANEOUS at 12:39

## 2024-08-04 RX ADMIN — ENOXAPARIN SODIUM 30 MG: 30 INJECTION SUBCUTANEOUS at 17:24

## 2024-08-04 RX ADMIN — Medication 2 TABLET: at 17:24

## 2024-08-04 RX ADMIN — ENOXAPARIN SODIUM 30 MG: 30 INJECTION SUBCUTANEOUS at 05:26

## 2024-08-04 RX ADMIN — INSULIN LISPRO 3 UNITS: 100 INJECTION, SOLUTION INTRAVENOUS; SUBCUTANEOUS at 08:38

## 2024-08-04 RX ADMIN — DICYCLOMINE HYDROCHLORIDE 10 MG: 10 CAPSULE ORAL at 21:09

## 2024-08-04 RX ADMIN — Medication 2 TABLET: at 12:39

## 2024-08-04 RX ADMIN — DICYCLOMINE HYDROCHLORIDE 10 MG: 10 CAPSULE ORAL at 12:39

## 2024-08-04 RX ADMIN — METHOCARBAMOL 750 MG: 750 TABLET ORAL at 15:02

## 2024-08-04 RX ADMIN — GABAPENTIN 600 MG: 300 CAPSULE ORAL at 08:40

## 2024-08-04 RX ADMIN — INSULIN LISPRO 5 UNITS: 100 INJECTION, SOLUTION INTRAVENOUS; SUBCUTANEOUS at 17:25

## 2024-08-04 RX ADMIN — INSULIN GLARGINE 15 UNITS: 100 INJECTION, SOLUTION SUBCUTANEOUS at 21:09

## 2024-08-04 RX ADMIN — GABAPENTIN 600 MG: 300 CAPSULE ORAL at 17:24

## 2024-08-04 RX ADMIN — DOCUSATE SODIUM 100 MG: 100 CAPSULE, LIQUID FILLED ORAL at 17:24

## 2024-08-04 RX ADMIN — Medication 2 TABLET: at 08:40

## 2024-08-04 RX ADMIN — METHADONE HYDROCHLORIDE 190 MG: 10 TABLET ORAL at 04:46

## 2024-08-04 RX ADMIN — ALPRAZOLAM 1 MG: 0.5 TABLET ORAL at 08:39

## 2024-08-04 RX ADMIN — DICYCLOMINE HYDROCHLORIDE 10 MG: 10 CAPSULE ORAL at 05:27

## 2024-08-04 RX ADMIN — Medication 1 TABLET: at 08:40

## 2024-08-04 RX ADMIN — FOLIC ACID 1 MG: 1 TABLET ORAL at 08:41

## 2024-08-04 RX ADMIN — DOCUSATE SODIUM 100 MG: 100 CAPSULE, LIQUID FILLED ORAL at 08:42

## 2024-08-04 RX ADMIN — OXYCODONE HYDROCHLORIDE 15 MG: 5 TABLET ORAL at 08:40

## 2024-08-04 RX ADMIN — OXYCODONE HYDROCHLORIDE 10 MG: 10 TABLET ORAL at 17:27

## 2024-08-04 RX ADMIN — ACETAMINOPHEN 975 MG: 325 TABLET, FILM COATED ORAL at 05:26

## 2024-08-04 RX ADMIN — OXYCODONE HYDROCHLORIDE 15 MG: 5 TABLET ORAL at 03:22

## 2024-08-04 RX ADMIN — ACETAMINOPHEN 975 MG: 325 TABLET, FILM COATED ORAL at 15:02

## 2024-08-04 RX ADMIN — ALPRAZOLAM 0.5 MG: 0.5 TABLET ORAL at 15:43

## 2024-08-04 RX ADMIN — ALPRAZOLAM 1 MG: 0.5 TABLET ORAL at 22:56

## 2024-08-04 RX ADMIN — INSULIN LISPRO 1 UNITS: 100 INJECTION, SOLUTION INTRAVENOUS; SUBCUTANEOUS at 21:11

## 2024-08-04 NOTE — PLAN OF CARE
Problem: Potential for Falls  Goal: Patient will remain free of falls  Description: INTERVENTIONS:  - Educate patient/family on patient safety including physical limitations  - Instruct patient to call for assistance with activity   - Consult OT/PT to assist with strengthening/mobility   - Keep Call bell within reach  - Keep bed low and locked with side rails adjusted as appropriate  - Keep care items and personal belongings within reach  - Initiate and maintain comfort rounds  - Make Fall Risk Sign visible to staff  - Offer Toileting   - Apply yellow socks and bracelet for high fall risk patients  - Consider moving patient to room near nurses station  Outcome: Progressing     Problem: PAIN - ADULT  Goal: Verbalizes/displays adequate comfort level or baseline comfort level  Description: Interventions:  - Encourage patient to monitor pain and request assistance  - Assess pain using appropriate pain scale  - Administer analgesics based on type and severity of pain and evaluate response  - Implement non-pharmacological measures as appropriate and evaluate response  - Consider cultural and social influences on pain and pain management  - Notify physician/advanced practitioner if interventions unsuccessful or patient reports new pain  Outcome: Progressing     Problem: INFECTION - ADULT  Goal: Absence or prevention of progression during hospitalization  Description: INTERVENTIONS:  - Assess and monitor for signs and symptoms of infection  - Monitor lab/diagnostic results  - Monitor all insertion sites, i.e. indwelling lines, tubes, and drains  - Monitor endotracheal if appropriate and nasal secretions for changes in amount and color  - [place] appropriate cooling/warming therapies per order  - Administer medications as ordered  - Instruct and encourage patient and family to use good hand hygiene technique  - Identify and instruct in appropriate isolation precautions for identified infection/condition  Outcome:  Progressing  Goal: Absence of fever/infection during neutropenic period  Description: INTERVENTIONS:  - Monitor WBC    Outcome: Progressing     Problem: SAFETY ADULT  Goal: Patient will remain free of falls  Description: INTERVENTIONS:  - Educate patient/family on patient safety including physical limitations  - Instruct patient to call for assistance with activity   - Consult OT/PT to assist with strengthening/mobility   - Keep Call bell within reach  - Keep bed low and locked with side rails adjusted as appropriate  - Keep care items and personal belongings within reach  - Initiate and maintain comfort rounds  - Make Fall Risk Sign visible to staff  - Offer Toileting   - Apply yellow socks and bracelet for high fall risk patients  - Consider moving patient to room near nurses station  Outcome: Progressing  Goal: Maintain or return to baseline ADL function  Description: INTERVENTIONS:  -  Assess patient's ability to carry out ADLs; assess patient's baseline for ADL function and identify physical deficits which impact ability to perform ADLs (bathing, care of mouth/teeth, toileting, grooming, dressing, etc.)  - Assess/evaluate cause of self-care deficits   - Assess range of motion  - Assess patient's mobility; develop plan if impaired  - Assess patient's need for assistive devices and provide as appropriate  - Encourage maximum independence but intervene and supervise when necessary  - Involve family in performance of ADLs  - Assess for home care needs following discharge   - Consider OT consult to assist with ADL evaluation and planning for discharge  - Provide patient education as appropriate  Outcome: Progressing  Goal: Maintains/Returns to pre admission functional level  Description: INTERVENTIONS:  - Perform AM-PAC 6 Click Basic Mobility/ Daily Activity assessment daily.  - Set and communicate daily mobility goal to care team and patient/family/caregiver.   - Collaborate with rehabilitation services on mobility  goals if consulted  - Reposition patient   - Out of bed for toileting  - Record patient progress and toleration of activity level   Outcome: Progressing     Problem: DISCHARGE PLANNING  Goal: Discharge to home or other facility with appropriate resources  Description: INTERVENTIONS:  - Identify barriers to discharge w/patient and caregiver  - Arrange for needed discharge resources and transportation as appropriate  - Identify discharge learning needs (meds, wound care, etc.)  - Arrange for interpretive services to assist at discharge as needed  - Refer to Case Management Department for coordinating discharge planning if the patient needs post-hospital services based on physician/advanced practitioner order or complex needs related to functional status, cognitive ability, or social support system  Outcome: Progressing     Problem: Knowledge Deficit  Goal: Patient/family/caregiver demonstrates understanding of disease process, treatment plan, medications, and discharge instructions  Description: Complete learning assessment and assess knowledge base.  Interventions:  - Provide teaching at level of understanding  - Provide teaching via preferred learning methods  Outcome: Progressing     Problem: Prexisting or High Potential for Compromised Skin Integrity  Goal: Skin integrity is maintained or improved  Description: INTERVENTIONS:  - Identify patients at risk for skin breakdown  - Assess and monitor skin integrity  - Assess and monitor nutrition and hydration status  - Monitor labs   - Assess for incontinence   - Turn and reposition patient  - Assist with mobility/ambulation  - Relieve pressure over bony prominences  - Avoid friction and shearing  - Provide appropriate hygiene as needed including keeping skin clean and dry  - Evaluate need for skin moisturizer/barrier cream  - Collaborate with interdisciplinary team   - Patient/family teaching  - Consider wound care consult   Outcome: Progressing     Problem:  HEMATOLOGIC - ADULT  Goal: Maintains hematologic stability  Description: INTERVENTIONS  - Assess for signs and symptoms of bleeding or hemorrhage  - Monitor labs  - Administer supportive blood products/factors as ordered and appropriate  Outcome: Progressing     Problem: MUSCULOSKELETAL - ADULT  Goal: Maintain or return mobility to safest level of function  Description: INTERVENTIONS:  - Assess patient's ability to carry out ADLs; assess patient's baseline for ADL function and identify physical deficits which impact ability to perform ADLs (bathing, care of mouth/teeth, toileting, grooming, dressing, etc.)  - Assess/evaluate cause of self-care deficits   - Assess range of motion  - Assess patient's mobility  - Assess patient's need for assistive devices and provide as appropriate  - Encourage maximum independence but intervene and supervise when necessary  - Involve family in performance of ADLs  - Assess for home care needs following discharge   - Consider OT consult to assist with ADL evaluation and planning for discharge  - Provide patient education as appropriate  Outcome: Progressing  Goal: Maintain proper alignment of affected body part  Description: INTERVENTIONS:  - Support, maintain and protect limb and body alignment  - Provide patient/ family with appropriate education  Outcome: Progressing     Problem: CARDIOVASCULAR - ADULT  Goal: Maintains optimal cardiac output and hemodynamic stability  Description: INTERVENTIONS:  - Monitor I/O, vital signs and rhythm  - Monitor for S/S and trends of decreased cardiac output  - Administer and titrate ordered vasoactive medications to optimize hemodynamic stability  - Assess quality of pulses, skin color and temperature  - Assess for signs of decreased coronary artery perfusion  - Instruct patient to report change in severity of symptoms  Outcome: Progressing  Goal: Absence of cardiac dysrhythmias or at baseline rhythm  Description: INTERVENTIONS:  - Continuous  cardiac monitoring, vital signs, obtain 12 lead EKG if ordered  - Administer antiarrhythmic and heart rate control medications as ordered  - Monitor electrolytes and administer replacement therapy as ordered  Outcome: Progressing     Problem: BEHAVIOR  Goal: Pt/Family maintain appropriate behavior and adhere to behavioral management agreement, if implemented  Description: INTERVENTIONS:  - Assess the family dynamic   - Encourage verbalization of thoughts and concerns in a socially appropriate manner  - Assess patient/family's coping skills and non-compliant behavior (including use of illegal substances).  - Utilize positive, consistent limit setting strategies supporting safety of patient, staff and others  - Initiate consult with Case Management, Spiritual Care or other ancillary services as appropriate  - If a patient's/visitor's behavior jeopardizes the safety of the patient, staff, or others, refer to organization procedure.   - Notify Security of behavior or suspected illegal substances which indicate the need for search of the patient and/or belongings  - Encourage participation in the decision making process about a behavioral management agreement; implement if patient meets criteria  Outcome: Progressing     Problem: SUBSTANCE USE/ABUSE  Goal: Will have no detox symptoms and will verbalize plan for changing substance-related behavior  Description: INTERVENTIONS:  - Monitor physical status and assess for symptoms of withdrawal  - Administer medication as ordered  - Provide emotional support with 1 on 1 interaction with staff  - Encourage recovery focused program/ addiction education  - Assess for verbalization of changing behaviors related to substance abuse  - Initiate consults and referrals as appropriate (Case Management, Spiritual Care, etc.)  Outcome: Progressing  Goal: By discharge, will develop insight into their chemical dependency and sustain motivation to continue in recovery  Description:  INTERVENTIONS:  - Attends all daily group sessions and scheduled AA groups  - Actively practices coping skills through participation in the therapeutic community and adherence to program rules  - Reviews and completes assignments from individual treatment plan  - Assist patient development of understanding of their personal cycle of addiction and relapse triggers  Outcome: Progressing  Goal: By discharge, patient will have ongoing treatment plan addressing chemical dependency  Description: INTERVENTIONS:  - Assist patient with resources and/or appointments for ongoing recovery based living  Outcome: Progressing

## 2024-08-04 NOTE — PHYSICAL THERAPY NOTE
Physical Therapy Progress Note     08/04/24 1300   PT Last Visit   PT Visit Date 08/04/24   Note Type   Note Type Treatment   Pain Assessment   Pain Assessment Tool FLACC   Pain Rating: FLACC (Rest) - Face 1   Pain Rating: FLACC (Rest) - Legs 0   Pain Rating: FLACC (Rest) - Activity 0   Pain Rating: FLACC (Rest) - Cry 1   Pain Rating: FLACC (Rest) - Consolability 0   Score: FLACC (Rest) 2   Pain Rating: FLACC (Activity) - Face 2   Pain Rating: FLACC (Activity) - Legs 1   Pain Rating: FLACC (Activity) - Activity 1   Pain Rating: FLACC (Activity) - Cry 1   Pain Rating: FLACC (Activity) - Consolability 0   Score: FLACC (Activity) 5   Restrictions/Precautions   RLE Weight Bearing Per Order WBAT   Other Precautions WBS;Pain;Fall Risk;Cognitive;Chair Alarm   Subjective   Subjective Pt encountered seated in recliner, pleasant and agreeable to treatment.  Reports no new complaints since last session & remains motivated to particpate in PT so he can regain his indpendence & return home.   Transfers   Sit to Stand 5  Supervision   Additional items Assist x 1   Stand to Sit 5  Supervision   Additional items Assist x 1   Ambulation/Elevation   Gait pattern Short stride;Foward flexed;Inconsistent shanell;Decreased foot clearance;Narrow RAYMON;Antalgic;Improper Weight shift;Knees flexed   Gait Assistance 4  Minimal assist   Additional items Assist x 1   Assistive Device Rolling walker   Distance 110', 70'   Balance   Static Sitting Fair   Static Standing Fair -   Ambulatory Fair -   Activity Tolerance   Activity Tolerance Patient tolerated treatment well;Patient limited by pain;Patient limited by fatigue   Nurse Made Aware MICHAEL Hope   Assessment   Prognosis Good   Problem List Decreased strength;Decreased range of motion;Decreased endurance;Impaired balance;Decreased mobility;Decreased safety awareness;Pain   Assessment pt demonstrated improved functional endurance this session,, ambulating up to household distances with use of RW  & no LOB.  He still requires excessive time to perform all tasks due to pain & does so with gait deviations as noted above, but performed markedly better than last session.  Plan to continue focusing on progressing to independent mobility on levels & will likely progress to stair trial in upcoming sessions pending progress.  PT POC & d/c recommendations remain appropriate at this time.   Goals   Patient Goals to go home   STG Expiration Date 08/15/24   PT Treatment Day 2   Plan   Treatment/Interventions Functional transfer training;LE strengthening/ROM;Elevations;Therapeutic exercise;Endurance training;Patient/family training;Equipment eval/education;Gait training;Bed mobility   Progress Progressing toward goals   PT Frequency 3-5x/wk   Discharge Recommendation   Rehab Resource Intensity Level, PT II (Moderate Resource Intensity)   Equipment Recommended Walker   Walker Package Recommended Wheeled walker   AM-PAC Basic Mobility Inpatient   Turning in Flat Bed Without Bedrails 3   Lying on Back to Sitting on Edge of Flat Bed Without Bedrails 2   Moving Bed to Chair 3   Standing Up From Chair Using Arms 3   Walk in Room 3   Climb 3-5 Stairs With Railing 2   Basic Mobility Inpatient Raw Score 16   Basic Mobility Standardized Score 38.32   [facility] Highest Level Of Mobility   -HLM Goal 5: Stand one or more mins   -HLM Achieved 7: Walk 25 feet or more       Eliseo Fraire PTA    An [facility] Basic Mobility Raw Score less than 17 suggests pt would benefit from post acute rehab.  Please also refer to the recommendation of the Physical Therapist for safe discharge planning.

## 2024-08-04 NOTE — PROGRESS NOTES
"[facility]  Progress Note  Name: David Osorio I  MRN: [id number]  Unit/Bed#: [facility] 604-01 I Date of Admission: 7/30/2024   Date of Service: 8/4/2024 I Hospital Day: 5    Assessment & Plan   Acute blood loss anemia (ABLA)  Assessment & Plan  *stabilizing  - continue to monitor Hgb daily, exam reassuring  - vitals q4hrs  - if becomes tachycardic, hypotensive, or change in mental status repeat CBC    Methadone dependence (HCC)  Assessment & Plan  190mg confirmed dosage  - will plan for 0530 timing administration of methadone per patient request  APS following, transitioning off of IV medications  - consider psych consult if ongoing irritability    * Closed fracture of right femur, unspecified fracture morphology, sequela  Assessment & Plan  - POD2 R IM nail  - Ortho following   - DVT ppx while inpatient   - ASA 81mg BID for 28 days following operation  - pain control as ordered  - vitamin D & calcium supplementation as recommended per endo  - continue PT/OT therapies             Bowel Regimen: Senna  VTE Prophylaxis:Enoxaparin (Lovenox)     Disposition: rehab    Subjective   Chief Complaint: leg discomfort    Subjective: \" I don't know that I'm ready to go yet\"     Objective   Vitals:   Temp:  [97.8 °F (36.6 °C)-98.3 °F (36.8 °C)] 97.8 °F (36.6 °C)  HR:  [57-93] 81  Resp:  [16-18] 18  BP: (129-157)/(65-88) 148/83    I/O         08/02 0701  08/03 0700 08/03 0701  08/04 0700 08/04 0701  08/05 0700    P.O. 1202 420 495    Total Intake(mL/kg) 1202 (15.1) 420 (5.3) 495 (6.2)    Urine (mL/kg/hr) 1400 (0.7) 600 (0.3)     Total Output 1400 600     Net -198 -180 +495                    Physical Exam:   GENERAL APPEARANCE: sitting in bed and appears comfortable  NEURO: GCS - 15  HEENT: EOm's intact  CV: RRR  LUNGS: CTA bilaterally  GI: tolerating a diet  : voiding  MSK: working with therapy  SKIN: warm and dry    Invasive Devices       Peripheral Intravenous Line  Duration             " Peripheral IV 08/03/24 Right;Ventral (anterior) Forearm <1 day                          Lab Results:    Latest Reference Range & Units 08/04/24 07:58   POC Glucose 65 - 140 mg/dl 124     Imaging: none   Other Studies:none

## 2024-08-04 NOTE — PLAN OF CARE
Problem: PHYSICAL THERAPY ADULT  Goal: Performs mobility at highest level of function for planned discharge setting.  See evaluation for individualized goals.  Description: Treatment/Interventions: Functional transfer training, LE strengthening/ROM, Therapeutic exercise, Endurance training, Elevations, Patient/family training, Equipment eval/education, Bed mobility, Gait training, Spoke to nursing, OT  Equipment Recommended: Walker       See flowsheet documentation for full assessment, interventions and recommendations.  Outcome: Progressing  Note: Prognosis: Good  Problem List: Decreased strength, Decreased range of motion, Decreased endurance, Impaired balance, Decreased mobility, Decreased safety awareness, Pain  Assessment: pt demonstrated improved functional endurance this session,, ambulating up to household distances with use of RW & no LOB.  He still requires excessive time to perform all tasks due to pain & does so with gait deviations as noted above, but performed markedly better than last session.  Plan to continue focusing on progressing to independent mobility on levels & will likely progress to stair trial in upcoming sessions pending progress.  PT POC & d/c recommendations remain appropriate at this time.  Barriers to Discharge: Decreased caregiver support     Rehab Resource Intensity Level, PT: II (Moderate Resource Intensity)    See flowsheet documentation for full assessment.

## 2024-08-04 NOTE — QUICK NOTE
"Patient unable to be seen by the team today as he is in the OR. Formal consult to follow tomorrow.     Please text questions to the provider covering the \"BE Endocrinology Call\" role. Thank you.  "
Chart and labs reviewed.  Patient not seen in person today.  Noted to have fasting glucose of 124, however still has postprandial hyperglycemia.  Recommend continuing Lantus 50 units nightly.  Recommend increasing Humalog to 5 units 3 times daily before meals.  Continue correctional scale insulin.  Endocrinology will continue following.  
Discussion with patient.  ARMANDOonstantly asking for more pain medication and Xanax, agreed to  mg dose today but no more.  He agreed,  He walked with therapy today, states he does not wish to go to rehab.  If refuses tomorrow will be discharged home.  
altered mental status

## 2024-08-04 NOTE — PROGRESS NOTES
Progress Note - Orthopedics   David Skelton Jr. 64 y.o. male MRN: [id number]  [facility]      Subjective:    64 y.o.male POD 4 R hip IM nail. No acute events, no new complaints. Denies fevers, chills, CP, SOB, N/V, numbness or tingling. Pain well controlled.     Labs:  0   Lab Value Date/Time    HCT 25.5 (L) 08/03/2024 0558    HCT 24.1 (L) 08/02/2024 1011    HCT 24.2 (L) 08/01/2024 0606    HCT 38.0 01/29/2015 1342    HCT 40.1 05/21/2014 0938    HCT 38.5 02/17/2014 0900    HGB 8.3 (L) 08/03/2024 0558    HGB 7.8 (L) 08/02/2024 1011    HGB 7.9 (L) 08/01/2024 0606    HGB 12.2 01/29/2015 1342    HGB 13.1 05/21/2014 0938    HGB 12.3 02/17/2014 0900    INR 1.22 (H) 07/30/2024 2201    INR 1.11 01/29/2015 1342    WBC 7.97 08/03/2024 0558    WBC 9.03 08/02/2024 1011    WBC 10.66 (H) 08/01/2024 0606    WBC 9.29 01/29/2015 1342    WBC 7.36 05/21/2014 0938    WBC 9.99 02/17/2014 0900    ESR 8 07/30/2024 2201       Meds:    Current Facility-Administered Medications:     acetaminophen (TYLENOL) tablet 975 mg, 975 mg, Oral, Q8H MELISA, Vazquez Estrada MD, 975 mg at 08/04/24 0526    ALPRAZolam (XANAX) tablet 1 mg, 1 mg, Oral, HS PRN, Vazquez Estrada MD, 1 mg at 08/03/24 2308    ALPRAZolam (XANAX) tablet 1 mg, 1 mg, Oral, Daily, STEVE Dey, 0.5 mg at 08/03/24 1814    calcium carbonate (OYSTER SHELL,OSCAL) 500 mg tablet 2 tablet, 2 tablet, Oral, TID With Meals, Nila Chang DO, 2 tablet at 08/03/24 1738    calcium carbonate (TUMS) chewable tablet 1,000 mg, 1,000 mg, Oral, Daily PRN, Vazquez Estrada MD    dicyclomine (BENTYL) capsule 10 mg, 10 mg, Oral, 4x Daily (AC & HS), Reema Kennedy DO, 10 mg at 08/04/24 0527    docusate sodium (COLACE) capsule 100 mg, 100 mg, Oral, BID, Vazquez Estrada MD, 100 mg at 08/02/24 1800    DULoxetine (CYMBALTA) delayed release capsule 60 mg, 60 mg, Oral, Daily, Vazquez Estrada MD, 60 mg at 08/01/24 0813    enoxaparin  (LOVENOX) subcutaneous injection 30 mg, 30 mg, Subcutaneous, Q12H, Lauryn Ullrich, DO, 30 mg at 08/04/24 0526    ergocalciferol (VITAMIN D2) capsule 50,000 Units, 50,000 Units, Oral, Once per day on Monday Wednesday Friday, Nila Chang DO, 50,000 Units at 08/01/24 2118    folic acid (FOLVITE) tablet 1 mg, 1 mg, Oral, Daily, Vazquez Estrada MD, 1 mg at 08/03/24 0815    gabapentin (NEURONTIN) capsule 600 mg, 600 mg, Oral, BID, Vazquez Estrada MD, 600 mg at 08/03/24 1816    insulin glargine (LANTUS) subcutaneous injection 15 Units 0.15 mL, 15 Units, Subcutaneous, HS, Nila Chang DO, 15 Units at 08/03/24 2100    insulin lispro (HumALOG/ADMELOG) 100 units/mL subcutaneous injection 1-6 Units, 1-6 Units, Subcutaneous, HS, Vazquez Estrada MD, 2 Units at 08/03/24 2100    insulin lispro (HumALOG/ADMELOG) 100 units/mL subcutaneous injection 1-6 Units, 1-6 Units, Subcutaneous, TID AC, 1 Units at 08/03/24 1816 **AND** Fingerstick Glucose (POCT), , , 4x Daily AC and at bedtime, Nila Chang DO    insulin lispro (HumALOG/ADMELOG) 100 units/mL subcutaneous injection 3 Units, 3 Units, Subcutaneous, TID With Meals, Johnnie Long MD, 3 Units at 08/03/24 1817    methadone (DOLOPHINE) tablet 190 mg, 190 mg, Oral, Daily, Vazquez Estrada MD, 190 mg at 08/04/24 0446    methocarbamol (ROBAXIN) tablet 750 mg, 750 mg, Oral, Q8H MELISA, Lisset Teran MD, 750 mg at 08/04/24 0526    metoprolol succinate (TOPROL-XL) 24 hr tablet 12.5 mg, 12.5 mg, Oral, Daily, Vazquez Estrada MD, 12.5 mg at 08/03/24 0816    multivitamin-minerals (CENTRUM) tablet 1 tablet, 1 tablet, Oral, Daily, Vazquez Estrada MD, 1 tablet at 08/03/24 0813    naloxone (NARCAN) 0.04 mg/mL syringe 0.04 mg, 0.04 mg, Intravenous, Q1MIN PRN, Vazquez Estrada MD    ondansetron (ZOFRAN-ODT) dispersible tablet 4 mg, 4 mg, Oral, Q8H PRN, Rusty Arguelles DO    [DISCONTINUED] oxyCODONE  "(ROXICODONE) split tablet 2.5 mg, 2.5 mg, Oral, Q4H PRN **OR** oxyCODONE (ROXICODONE) immediate release tablet 10 mg, 10 mg, Oral, Q4H PRN, Vazquez Estrada MD, 10 mg at 08/03/24 1224    oxyCODONE (ROXICODONE) IR tablet 15 mg, 15 mg, Oral, Q4H PRN, Vazquez Estrada MD, 15 mg at 08/04/24 0322    thiamine tablet 100 mg, 100 mg, Oral, Daily, Vazquez Estrada MD, 100 mg at 08/03/24 0815    Blood Culture:   No results found for: \"BLOODCX\"    Wound Culture:   No results found for: \"WOUNDCULT\"    Ins and Outs:  I/O last 24 hours:  In: 1040 [P.O.:1040]  Out: 600 [Urine:600]          Physical:  Vitals:    08/04/24 0301   BP: 157/88   Pulse: 71   Resp:    Temp: 98.3 °F (36.8 °C)   SpO2: 98%       Musculoskeletal: right Lower Extremity  Dressings are clean, dry, intact without signs of strikethrough or saturation.  Mild tenderness to palpation over the surgical site.  Sensation intact but mildly decreased in sural, saphenous, superficial peroneal, deep peroneal, tibial distributions.  This is consistent with his prior history of neuropathy.  Motor intact ankle plantarflexion/dorsiflexion, EHL/FHL.  Extremities are warm and well-perfused.  PT pulse palpable with capillary refill less than 2 seconds.    Assessment:    64 y.o.male POD 4 R hip IM nail.  Doing well overall.     Plan:  Weightbearing as tolerated right lower extremity  PT/OT  Pain control  DVT ppx per primary while inpatient, ASA 81 mg twice per day until 28 days after surgery on discharge.  Will monitor for ABLA and administer IVF/prbc as indicated for Greater than 2 gram drop or Hgb < 7  Medical management per primary team    Jorge Avila MD      Please Secure Chat the [facility] Ortho Floor Role with any Questions or Concerns.    "

## 2024-08-04 NOTE — PLAN OF CARE
Problem: Potential for Falls  Goal: Patient will remain free of falls  Description: INTERVENTIONS:  - Educate patient/family on patient safety including physical limitations  - Instruct patient to call for assistance with activity   - Consult OT/PT to assist with strengthening/mobility   - Keep Call bell within reach  - Keep bed low and locked with side rails adjusted as appropriate  - Keep care items and personal belongings within reach  - Initiate and maintain comfort rounds  - Make Fall Risk Sign visible to staff  - Offer Toileting every 2 Hours, in advance of need  - Initiate/Maintain bed/chair alarm  - Obtain necessary fall risk management equipment:   - Apply yellow socks and bracelet for high fall risk patients  - Consider moving patient to room near nurses station  Outcome: Progressing     Problem: PAIN - ADULT  Goal: Verbalizes/displays adequate comfort level or baseline comfort level  Description: Interventions:  - Encourage patient to monitor pain and request assistance  - Assess pain using appropriate pain scale  - Administer analgesics based on type and severity of pain and evaluate response  - Implement non-pharmacological measures as appropriate and evaluate response  - Consider cultural and social influences on pain and pain management  - Notify physician/advanced practitioner if interventions unsuccessful or patient reports new pain  Outcome: Progressing     Problem: INFECTION - ADULT  Goal: Absence or prevention of progression during hospitalization  Description: INTERVENTIONS:  - Assess and monitor for signs and symptoms of infection  - Monitor lab/diagnostic results  - Monitor all insertion sites, i.e. indwelling lines, tubes, and drains  - Monitor endotracheal if appropriate and nasal secretions for changes in amount and color  - [place] appropriate cooling/warming therapies per order  - Administer medications as ordered  - Instruct and encourage patient and family to use good hand hygiene  technique  - Identify and instruct in appropriate isolation precautions for identified infection/condition  Outcome: Progressing  Goal: Absence of fever/infection during neutropenic period  Description: INTERVENTIONS:  - Monitor WBC    Outcome: Progressing     Problem: SAFETY ADULT  Goal: Patient will remain free of falls  Description: INTERVENTIONS:  - Educate patient/family on patient safety including physical limitations  - Instruct patient to call for assistance with activity   - Consult OT/PT to assist with strengthening/mobility   - Keep Call bell within reach  - Keep bed low and locked with side rails adjusted as appropriate  - Keep care items and personal belongings within reach  - Initiate and maintain comfort rounds  - Make Fall Risk Sign visible to staff  - Offer Toileting every 2 Hours, in advance of need  - Initiate/Maintain bed/chair alarm  - Obtain necessary fall risk management equipment:   - Apply yellow socks and bracelet for high fall risk patients  - Consider moving patient to room near nurses station  Outcome: Progressing  Goal: Maintain or return to baseline ADL function  Description: INTERVENTIONS:  -  Assess patient's ability to carry out ADLs; assess patient's baseline for ADL function and identify physical deficits which impact ability to perform ADLs (bathing, care of mouth/teeth, toileting, grooming, dressing, etc.)  - Assess/evaluate cause of self-care deficits   - Assess range of motion  - Assess patient's mobility; develop plan if impaired  - Assess patient's need for assistive devices and provide as appropriate  - Encourage maximum independence but intervene and supervise when necessary  - Involve family in performance of ADLs  - Assess for home care needs following discharge   - Consider OT consult to assist with ADL evaluation and planning for discharge  - Provide patient education as appropriate  Outcome: Progressing  Goal: Maintains/Returns to pre admission functional  level  Description: INTERVENTIONS:  - Perform AM-PAC 6 Click Basic Mobility/ Daily Activity assessment daily.  - Set and communicate daily mobility goal to care team and patient/family/caregiver.   - Collaborate with rehabilitation services on mobility goals if consulted  - Perform Range of Motion 3 times a day.  - Reposition patient every 2 hours.  - Dangle patient 3 times a day  - Stand patient 3 times a day  - Ambulate patient 3 times a day  - Out of bed to chair 3 times a day   - Out of bed for meals 3 times a day  - Out of bed for toileting  - Record patient progress and toleration of activity level   Outcome: Progressing     Problem: DISCHARGE PLANNING  Goal: Discharge to home or other facility with appropriate resources  Description: INTERVENTIONS:  - Identify barriers to discharge w/patient and caregiver  - Arrange for needed discharge resources and transportation as appropriate  - Identify discharge learning needs (meds, wound care, etc.)  - Arrange for interpretive services to assist at discharge as needed  - Refer to Case Management Department for coordinating discharge planning if the patient needs post-hospital services based on physician/advanced practitioner order or complex needs related to functional status, cognitive ability, or social support system  Outcome: Progressing     Problem: Knowledge Deficit  Goal: Patient/family/caregiver demonstrates understanding of disease process, treatment plan, medications, and discharge instructions  Description: Complete learning assessment and assess knowledge base.  Interventions:  - Provide teaching at level of understanding  - Provide teaching via preferred learning methods  Outcome: Progressing     Problem: Prexisting or High Potential for Compromised Skin Integrity  Goal: Skin integrity is maintained or improved  Description: INTERVENTIONS:  - Identify patients at risk for skin breakdown  - Assess and monitor skin integrity  - Assess and monitor nutrition  and hydration status  - Monitor labs   - Assess for incontinence   - Turn and reposition patient  - Assist with mobility/ambulation  - Relieve pressure over bony prominences  - Avoid friction and shearing  - Provide appropriate hygiene as needed including keeping skin clean and dry  - Evaluate need for skin moisturizer/barrier cream  - Collaborate with interdisciplinary team   - Patient/family teaching  - Consider wound care consult   Outcome: Progressing     Problem: HEMATOLOGIC - ADULT  Goal: Maintains hematologic stability  Description: INTERVENTIONS  - Assess for signs and symptoms of bleeding or hemorrhage  - Monitor labs  - Administer supportive blood products/factors as ordered and appropriate  Outcome: Progressing     Problem: MUSCULOSKELETAL - ADULT  Goal: Maintain or return mobility to safest level of function  Description: INTERVENTIONS:  - Assess patient's ability to carry out ADLs; assess patient's baseline for ADL function and identify physical deficits which impact ability to perform ADLs (bathing, care of mouth/teeth, toileting, grooming, dressing, etc.)  - Assess/evaluate cause of self-care deficits   - Assess range of motion  - Assess patient's mobility  - Assess patient's need for assistive devices and provide as appropriate  - Encourage maximum independence but intervene and supervise when necessary  - Involve family in performance of ADLs  - Assess for home care needs following discharge   - Consider OT consult to assist with ADL evaluation and planning for discharge  - Provide patient education as appropriate  Outcome: Progressing  Goal: Maintain proper alignment of affected body part  Description: INTERVENTIONS:  - Support, maintain and protect limb and body alignment  - Provide patient/ family with appropriate education  Outcome: Progressing     Problem: CARDIOVASCULAR - ADULT  Goal: Maintains optimal cardiac output and hemodynamic stability  Description: INTERVENTIONS:  - Monitor I/O, vital  signs and rhythm  - Monitor for S/S and trends of decreased cardiac output  - Administer and titrate ordered vasoactive medications to optimize hemodynamic stability  - Assess quality of pulses, skin color and temperature  - Assess for signs of decreased coronary artery perfusion  - Instruct patient to report change in severity of symptoms  Outcome: Progressing  Goal: Absence of cardiac dysrhythmias or at baseline rhythm  Description: INTERVENTIONS:  - Continuous cardiac monitoring, vital signs, obtain 12 lead EKG if ordered  - Administer antiarrhythmic and heart rate control medications as ordered  - Monitor electrolytes and administer replacement therapy as ordered  Outcome: Progressing     Problem: BEHAVIOR  Goal: Pt/Family maintain appropriate behavior and adhere to behavioral management agreement, if implemented  Description: INTERVENTIONS:  - Assess the family dynamic   - Encourage verbalization of thoughts and concerns in a socially appropriate manner  - Assess patient/family's coping skills and non-compliant behavior (including use of illegal substances).  - Utilize positive, consistent limit setting strategies supporting safety of patient, staff and others  - Initiate consult with Case Management, Spiritual Care or other ancillary services as appropriate  - If a patient's/visitor's behavior jeopardizes the safety of the patient, staff, or others, refer to organization procedure.   - Notify Security of behavior or suspected illegal substances which indicate the need for search of the patient and/or belongings  - Encourage participation in the decision making process about a behavioral management agreement; implement if patient meets criteria  Outcome: Progressing     Problem: SUBSTANCE USE/ABUSE  Goal: Will have no detox symptoms and will verbalize plan for changing substance-related behavior  Description: INTERVENTIONS:  - Monitor physical status and assess for symptoms of withdrawal  - Administer  medication as ordered  - Provide emotional support with 1 on 1 interaction with staff  - Encourage recovery focused program/ addiction education  - Assess for verbalization of changing behaviors related to substance abuse  - Initiate consults and referrals as appropriate (Case Management, Spiritual Care, etc.)  Outcome: Progressing  Goal: By discharge, will develop insight into their chemical dependency and sustain motivation to continue in recovery  Description: INTERVENTIONS:  - Attends all daily group sessions and scheduled AA groups  - Actively practices coping skills through participation in the therapeutic community and adherence to program rules  - Reviews and completes assignments from individual treatment plan  - Assist patient development of understanding of their personal cycle of addiction and relapse triggers  Outcome: Progressing  Goal: By discharge, patient will have ongoing treatment plan addressing chemical dependency  Description: INTERVENTIONS:  - Assist patient with resources and/or appointments for ongoing recovery based living  Outcome: Progressing

## 2024-08-05 VITALS
HEART RATE: 68 BPM | HEIGHT: 68 IN | WEIGHT: 175 LBS | TEMPERATURE: 98.1 F | DIASTOLIC BLOOD PRESSURE: 64 MMHG | SYSTOLIC BLOOD PRESSURE: 138 MMHG | OXYGEN SATURATION: 97 % | RESPIRATION RATE: 16 BRPM | BODY MASS INDEX: 26.52 KG/M2

## 2024-08-05 LAB
GLUCOSE SERPL-MCNC: 131 MG/DL (ref 65–140)
GLUCOSE SERPL-MCNC: 182 MG/DL (ref 65–140)

## 2024-08-05 PROCEDURE — 82948 REAGENT STRIP/BLOOD GLUCOSE: CPT

## 2024-08-05 PROCEDURE — 99232 SBSQ HOSP IP/OBS MODERATE 35: CPT | Performed by: STUDENT IN AN ORGANIZED HEALTH CARE EDUCATION/TRAINING PROGRAM

## 2024-08-05 PROCEDURE — 99238 HOSP IP/OBS DSCHRG MGMT 30/<: CPT | Performed by: NURSE PRACTITIONER

## 2024-08-05 RX ORDER — GLUCOSAMINE HCL/CHONDROITIN SU 500-400 MG
CAPSULE ORAL
Qty: 100 EACH | Refills: 0 | Status: SHIPPED | OUTPATIENT
Start: 2024-08-05

## 2024-08-05 RX ORDER — BLOOD-GLUCOSE METER
KIT MISCELLANEOUS
Qty: 1 KIT | Refills: 0 | Status: SHIPPED | OUTPATIENT
Start: 2024-08-05

## 2024-08-05 RX ORDER — OXYCODONE HYDROCHLORIDE 10 MG/1
10 TABLET ORAL EVERY 4 HOURS PRN
Status: DISCONTINUED | OUTPATIENT
Start: 2024-08-05 | End: 2024-08-05 | Stop reason: HOSPADM

## 2024-08-05 RX ORDER — PEN NEEDLE, DIABETIC 32GX 5/32"
NEEDLE, DISPOSABLE MISCELLANEOUS
Qty: 100 EACH | Refills: 0 | Status: SHIPPED | OUTPATIENT
Start: 2024-08-05

## 2024-08-05 RX ORDER — LANCETS 33 GAUGE
EACH MISCELLANEOUS
Qty: 100 EACH | Refills: 0 | Status: SHIPPED | OUTPATIENT
Start: 2024-08-05

## 2024-08-05 RX ORDER — ALPRAZOLAM 0.5 MG/1
0.5 TABLET ORAL 2 TIMES DAILY PRN
Qty: 20 TABLET | Refills: 0 | Status: SHIPPED | OUTPATIENT
Start: 2024-08-05 | End: 2024-08-18

## 2024-08-05 RX ORDER — CALCIUM CARBONATE 500(1250)
2 TABLET ORAL
Qty: 180 TABLET | Refills: 0 | Status: SHIPPED | OUTPATIENT
Start: 2024-08-05 | End: 2024-09-04

## 2024-08-05 RX ORDER — GABAPENTIN 300 MG/1
600 CAPSULE ORAL 2 TIMES DAILY
Qty: 56 CAPSULE | Refills: 0 | Status: SHIPPED | OUTPATIENT
Start: 2024-08-05 | End: 2024-08-18

## 2024-08-05 RX ORDER — OXYCODONE HYDROCHLORIDE 5 MG/1
5 TABLET ORAL EVERY 4 HOURS PRN
Status: DISCONTINUED | OUTPATIENT
Start: 2024-08-05 | End: 2024-08-05 | Stop reason: HOSPADM

## 2024-08-05 RX ORDER — BLOOD SUGAR DIAGNOSTIC
STRIP MISCELLANEOUS
Qty: 100 EACH | Refills: 0 | Status: SHIPPED | OUTPATIENT
Start: 2024-08-05

## 2024-08-05 RX ORDER — HUMAN INSULIN 100 [IU]/ML
17 INJECTION, SUSPENSION SUBCUTANEOUS
Qty: 15 ML | Refills: 0 | Status: SHIPPED | OUTPATIENT
Start: 2024-08-05 | End: 2024-08-18

## 2024-08-05 RX ORDER — METOPROLOL SUCCINATE 25 MG/1
12.5 TABLET, EXTENDED RELEASE ORAL DAILY
Qty: 15 TABLET | Refills: 0 | Status: SHIPPED | OUTPATIENT
Start: 2024-08-06 | End: 2024-09-05

## 2024-08-05 RX ORDER — ASPIRIN 81 MG/1
81 TABLET, CHEWABLE ORAL 2 TIMES DAILY
Qty: 56 TABLET | Refills: 0 | Status: SHIPPED | OUTPATIENT
Start: 2024-08-05 | End: 2024-09-02

## 2024-08-05 RX ORDER — OXYCODONE HYDROCHLORIDE 10 MG/1
TABLET ORAL
Qty: 20 TABLET | Refills: 0 | Status: SHIPPED | OUTPATIENT
Start: 2024-08-05

## 2024-08-05 RX ADMIN — Medication 1 TABLET: at 09:03

## 2024-08-05 RX ADMIN — INSULIN LISPRO 5 UNITS: 100 INJECTION, SOLUTION INTRAVENOUS; SUBCUTANEOUS at 09:07

## 2024-08-05 RX ADMIN — OXYCODONE HYDROCHLORIDE 15 MG: 5 TABLET ORAL at 02:37

## 2024-08-05 RX ADMIN — ERGOCALCIFEROL 50000 UNITS: 1.25 CAPSULE ORAL at 11:58

## 2024-08-05 RX ADMIN — ENOXAPARIN SODIUM 30 MG: 30 INJECTION SUBCUTANEOUS at 05:16

## 2024-08-05 RX ADMIN — Medication 2 TABLET: at 11:57

## 2024-08-05 RX ADMIN — Medication 2 TABLET: at 09:03

## 2024-08-05 RX ADMIN — OXYCODONE HYDROCHLORIDE 10 MG: 10 TABLET ORAL at 09:04

## 2024-08-05 RX ADMIN — ACETAMINOPHEN 975 MG: 325 TABLET, FILM COATED ORAL at 12:00

## 2024-08-05 RX ADMIN — INSULIN LISPRO 5 UNITS: 100 INJECTION, SOLUTION INTRAVENOUS; SUBCUTANEOUS at 11:59

## 2024-08-05 RX ADMIN — DOCUSATE SODIUM 100 MG: 100 CAPSULE, LIQUID FILLED ORAL at 09:05

## 2024-08-05 RX ADMIN — ALPRAZOLAM 1 MG: 0.5 TABLET ORAL at 09:02

## 2024-08-05 RX ADMIN — ACETAMINOPHEN 975 MG: 325 TABLET, FILM COATED ORAL at 05:16

## 2024-08-05 RX ADMIN — FOLIC ACID 1 MG: 1 TABLET ORAL at 09:03

## 2024-08-05 RX ADMIN — DICYCLOMINE HYDROCHLORIDE 10 MG: 10 CAPSULE ORAL at 09:10

## 2024-08-05 RX ADMIN — METHADONE HYDROCHLORIDE 190 MG: 10 TABLET ORAL at 05:14

## 2024-08-05 RX ADMIN — METHOCARBAMOL 750 MG: 750 TABLET ORAL at 05:16

## 2024-08-05 RX ADMIN — THIAMINE HCL TAB 100 MG 100 MG: 100 TAB at 09:03

## 2024-08-05 RX ADMIN — DICYCLOMINE HYDROCHLORIDE 10 MG: 10 CAPSULE ORAL at 11:56

## 2024-08-05 RX ADMIN — INSULIN LISPRO 1 UNITS: 100 INJECTION, SOLUTION INTRAVENOUS; SUBCUTANEOUS at 09:07

## 2024-08-05 RX ADMIN — GABAPENTIN 600 MG: 300 CAPSULE ORAL at 09:03

## 2024-08-05 RX ADMIN — METOPROLOL SUCCINATE 12.5 MG: 25 TABLET, EXTENDED RELEASE ORAL at 09:02

## 2024-08-05 RX ADMIN — OXYCODONE HYDROCHLORIDE 10 MG: 10 TABLET ORAL at 12:59

## 2024-08-05 NOTE — PLAN OF CARE
Problem: Potential for Falls  Goal: Patient will remain free of falls  Description: INTERVENTIONS:  - Educate patient/family on patient safety including physical limitations  - Instruct patient to call for assistance with activity   - Consult OT/PT to assist with strengthening/mobility   - Keep Call bell within reach  - Keep bed low and locked with side rails adjusted as appropriate  - Keep care items and personal belongings within reach  - Initiate and maintain comfort rounds  - Make Fall Risk Sign visible to staff  - Offer Toileting every 2 Hours, in advance of need  - Initiate/Maintain 2alarm  - Obtain necessary fall risk management equipment: 2  - Apply yellow socks and bracelet for high fall risk patients  - Consider moving patient to room near nurses station  8/5/2024 1616 by Maryellen Rodriguez RN  Outcome: Completed  8/5/2024 0744 by Maryellen Rodriguez RN  Outcome: Progressing     Problem: PAIN - ADULT  Goal: Verbalizes/displays adequate comfort level or baseline comfort level  Description: Interventions:  - Encourage patient to monitor pain and request assistance  - Assess pain using appropriate pain scale  - Administer analgesics based on type and severity of pain and evaluate response  - Implement non-pharmacological measures as appropriate and evaluate response  - Consider cultural and social influences on pain and pain management  - Notify physician/advanced practitioner if interventions unsuccessful or patient reports new pain  8/5/2024 1616 by Maryellen Rodriguez RN  Outcome: Completed  8/5/2024 0744 by Maryellen Rodriguez RN  Outcome: Progressing     Problem: INFECTION - ADULT  Goal: Absence or prevention of progression during hospitalization  Description: INTERVENTIONS:  - Assess and monitor for signs and symptoms of infection  - Monitor lab/diagnostic results  - Monitor all insertion sites, i.e. indwelling lines, tubes, and drains  - Monitor endotracheal if appropriate and nasal secretions for changes in amount  and color  - [place] appropriate cooling/warming therapies per order  - Administer medications as ordered  - Instruct and encourage patient and family to use good hand hygiene technique  - Identify and instruct in appropriate isolation precautions for identified infection/condition  8/5/2024 1616 by Maryellen Rodriguez RN  Outcome: Completed  8/5/2024 0744 by Maryellen Rodriguez RN  Outcome: Progressing  Goal: Absence of fever/infection during neutropenic period  Description: INTERVENTIONS:  - Monitor WBC    8/5/2024 1616 by Maryellen Rodriguez RN  Outcome: Completed  8/5/2024 0744 by Maryellen Rodriguez RN  Outcome: Progressing     Problem: SAFETY ADULT  Goal: Patient will remain free of falls  Description: INTERVENTIONS:  - Educate patient/family on patient safety including physical limitations  - Instruct patient to call for assistance with activity   - Consult OT/PT to assist with strengthening/mobility   - Keep Call bell within reach  - Keep bed low and locked with side rails adjusted as appropriate  - Keep care items and personal belongings within reach  - Initiate and maintain comfort rounds  - Make Fall Risk Sign visible to staff  - Offer Toileting every 2 Hours, in advance of need  - Initiate/Maintain alarm  - Obtain necessary fall risk management equipment:   - Apply yellow socks and bracelet for high fall risk patients  - Consider moving patient to room near nurses station  8/5/2024 1616 by Maryellen Rodriguez RN  Outcome: Completed  8/5/2024 0744 by Maryellen Rodriguez RN  Outcome: Progressing  Goal: Maintain or return to baseline ADL function  Description: INTERVENTIONS:  -  Assess patient's ability to carry out ADLs; assess patient's baseline for ADL function and identify physical deficits which impact ability to perform ADLs (bathing, care of mouth/teeth, toileting, grooming, dressing, etc.)  - Assess/evaluate cause of self-care deficits   - Assess range of motion  - Assess patient's mobility; develop plan if impaired  -  Assess patient's need for assistive devices and provide as appropriate  - Encourage maximum independence but intervene and supervise when necessary  - Involve family in performance of ADLs  - Assess for home care needs following discharge   - Consider OT consult to assist with ADL evaluation and planning for discharge  - Provide patient education as appropriate  8/5/2024 1616 by Maryellen Rodriguez RN  Outcome: Completed  8/5/2024 0744 by Maryellen Rodriguez RN  Outcome: Progressing  Goal: Maintains/Returns to pre admission functional level  Description: INTERVENTIONS:  - Perform AM-PAC 6 Click Basic Mobility/ Daily Activity assessment daily.  - Set and communicate daily mobility goal to care team and patient/family/caregiver.   - Collaborate with rehabilitation services on mobility goals if consulted  - Perform Range of Motion 3 times a day.  - Reposition patient every 2 hours.  - Dangle patient 3 times a day  - Stand patient 3 times a day  - Ambulate patient 3 times a day  - Out of bed to chair 3 times a day   - Out of bed for meals 3 times a day  - Out of bed for toileting  - Record patient progress and toleration of activity level   8/5/2024 1616 by Maryellen Rodriguez RN  Outcome: Completed  8/5/2024 0744 by Maryellen Rodriguez RN  Outcome: Progressing     Problem: DISCHARGE PLANNING  Goal: Discharge to home or other facility with appropriate resources  Description: INTERVENTIONS:  - Identify barriers to discharge w/patient and caregiver  - Arrange for needed discharge resources and transportation as appropriate  - Identify discharge learning needs (meds, wound care, etc.)  - Arrange for interpretive services to assist at discharge as needed  - Refer to Case Management Department for coordinating discharge planning if the patient needs post-hospital services based on physician/advanced practitioner order or complex needs related to functional status, cognitive ability, or social support system  8/5/2024 1616 by Maryellen Rodriguez  RN  Outcome: Completed  8/5/2024 0744 by Maryellen Rodriguez RN  Outcome: Progressing     Problem: Knowledge Deficit  Goal: Patient/family/caregiver demonstrates understanding of disease process, treatment plan, medications, and discharge instructions  Description: Complete learning assessment and assess knowledge base.  Interventions:  - Provide teaching at level of understanding  - Provide teaching via preferred learning methods  8/5/2024 1616 by Maryellen Rodriguez RN  Outcome: Completed  8/5/2024 0744 by Maryellen Rodriguez RN  Outcome: Progressing     Problem: Prexisting or High Potential for Compromised Skin Integrity  Goal: Skin integrity is maintained or improved  Description: INTERVENTIONS:  - Identify patients at risk for skin breakdown  - Assess and monitor skin integrity  - Assess and monitor nutrition and hydration status  - Monitor labs   - Assess for incontinence   - Turn and reposition patient  - Assist with mobility/ambulation  - Relieve pressure over bony prominences  - Avoid friction and shearing  - Provide appropriate hygiene as needed including keeping skin clean and dry  - Evaluate need for skin moisturizer/barrier cream  - Collaborate with interdisciplinary team   - Patient/family teaching  - Consider wound care consult   8/5/2024 1616 by Maryellen Rodriguez RN  Outcome: Completed  8/5/2024 0744 by Maryellen Rodriguez RN  Outcome: Progressing     Problem: HEMATOLOGIC - ADULT  Goal: Maintains hematologic stability  Description: INTERVENTIONS  - Assess for signs and symptoms of bleeding or hemorrhage  - Monitor labs  - Administer supportive blood products/factors as ordered and appropriate  8/5/2024 1616 by Maryellen Rodriguez RN  Outcome: Completed  8/5/2024 0744 by Maryellen Rodriguez RN  Outcome: Progressing     Problem: MUSCULOSKELETAL - ADULT  Goal: Maintain or return mobility to safest level of function  Description: INTERVENTIONS:  - Assess patient's ability to carry out ADLs; assess patient's baseline for ADL  function and identify physical deficits which impact ability to perform ADLs (bathing, care of mouth/teeth, toileting, grooming, dressing, etc.)  - Assess/evaluate cause of self-care deficits   - Assess range of motion  - Assess patient's mobility  - Assess patient's need for assistive devices and provide as appropriate  - Encourage maximum independence but intervene and supervise when necessary  - Involve family in performance of ADLs  - Assess for home care needs following discharge   - Consider OT consult to assist with ADL evaluation and planning for discharge  - Provide patient education as appropriate  8/5/2024 1616 by Maryellen Rodriguez RN  Outcome: Completed  8/5/2024 0744 by Maryellen Rodriguez RN  Outcome: Progressing  Goal: Maintain proper alignment of affected body part  Description: INTERVENTIONS:  - Support, maintain and protect limb and body alignment  - Provide patient/ family with appropriate education  8/5/2024 1616 by Maryellen Rodriguez RN  Outcome: Completed  8/5/2024 0744 by Maryellen Rodriguez RN  Outcome: Progressing     Problem: CARDIOVASCULAR - ADULT  Goal: Maintains optimal cardiac output and hemodynamic stability  Description: INTERVENTIONS:  - Monitor I/O, vital signs and rhythm  - Monitor for S/S and trends of decreased cardiac output  - Administer and titrate ordered vasoactive medications to optimize hemodynamic stability  - Assess quality of pulses, skin color and temperature  - Assess for signs of decreased coronary artery perfusion  - Instruct patient to report change in severity of symptoms  8/5/2024 1616 by Maryellen Rodriguez RN  Outcome: Completed  8/5/2024 0744 by Maryellen Rodriguez RN  Outcome: Progressing  Goal: Absence of cardiac dysrhythmias or at baseline rhythm  Description: INTERVENTIONS:  - Continuous cardiac monitoring, vital signs, obtain 12 lead EKG if ordered  - Administer antiarrhythmic and heart rate control medications as ordered  - Monitor electrolytes and administer replacement  therapy as ordered  8/5/2024 1616 by Maryellen Rodriguez RN  Outcome: Completed  8/5/2024 0744 by Maryellen Rodriguez RN  Outcome: Progressing     Problem: BEHAVIOR  Goal: Pt/Family maintain appropriate behavior and adhere to behavioral management agreement, if implemented  Description: INTERVENTIONS:  - Assess the family dynamic   - Encourage verbalization of thoughts and concerns in a socially appropriate manner  - Assess patient/family's coping skills and non-compliant behavior (including use of illegal substances).  - Utilize positive, consistent limit setting strategies supporting safety of patient, staff and others  - Initiate consult with Case Management, Spiritual Care or other ancillary services as appropriate  - If a patient's/visitor's behavior jeopardizes the safety of the patient, staff, or others, refer to organization procedure.   - Notify Security of behavior or suspected illegal substances which indicate the need for search of the patient and/or belongings  - Encourage participation in the decision making process about a behavioral management agreement; implement if patient meets criteria  8/5/2024 1616 by Maryellen Rodriguez RN  Outcome: Completed  8/5/2024 0744 by Maryellen Rodriguez RN  Outcome: Progressing     Problem: SUBSTANCE USE/ABUSE  Goal: Will have no detox symptoms and will verbalize plan for changing substance-related behavior  Description: INTERVENTIONS:  - Monitor physical status and assess for symptoms of withdrawal  - Administer medication as ordered  - Provide emotional support with 1 on 1 interaction with staff  - Encourage recovery focused program/ addiction education  - Assess for verbalization of changing behaviors related to substance abuse  - Initiate consults and referrals as appropriate (Case Management, Spiritual Care, etc.)  8/5/2024 1616 by Maryellen Rodriguez RN  Outcome: Completed  8/5/2024 0744 by Maryellen Rodriguez RN  Outcome: Progressing  Goal: By discharge, will develop insight into  their chemical dependency and sustain motivation to continue in recovery  Description: INTERVENTIONS:  - Attends all daily group sessions and scheduled AA groups  - Actively practices coping skills through participation in the therapeutic community and adherence to program rules  - Reviews and completes assignments from individual treatment plan  - Assist patient development of understanding of their personal cycle of addiction and relapse triggers  8/5/2024 1616 by Maryellen Rodriguez RN  Outcome: Completed  8/5/2024 0744 by Maryellen Rodriguez RN  Outcome: Progressing  Goal: By discharge, patient will have ongoing treatment plan addressing chemical dependency  Description: INTERVENTIONS:  - Assist patient with resources and/or appointments for ongoing recovery based living  8/5/2024 1616 by Maryellen Rodriguez RN  Outcome: Completed  8/5/2024 0744 by Maryellen Rodriguez RN  Outcome: Progressing

## 2024-08-05 NOTE — ASSESSMENT & PLAN NOTE
Patient was previously prescribed Xanax-PDMP checked and last prescription filled 6/20 for 30-day prescription.    Patient has follow-up scheduled on 8/16-will prescribe 10-day prescription to gap in the interim.  Psychiatry may evaluate appropriateness of continued treatment with this medication at this time.  Of note, patient was evaluated by psychiatry while inpatient and they recommended continuing his home medications.

## 2024-08-05 NOTE — ASSESSMENT & PLAN NOTE
Continue following with methadone clinic  APS consult and note appreciated  Psych consultation note appreciated.

## 2024-08-05 NOTE — ARC ADMISSION
Noted per CM that patient is refusing rehab and wishes to discharge home with [facility] services. Please notify with any changes.

## 2024-08-05 NOTE — DISCHARGE SUMMARY
[facility]  Discharge- David Skelton Jr. 1960, 64 y.o. male MRN: [id number]  [facility] Encounter: [id number]  Primary Care Provider: Casey Mckeon MD   Date and time admitted to hospital: 7/30/2024  1:54 PM    Acute blood loss anemia (ABLA)  Assessment & Plan  Resolved, stabilized at 8.3.  Hemodynamically stable.    Methadone dependence (HCC)  Assessment & Plan  Continue following with methadone clinic  APS consult and note appreciated  Psych consultation note appreciated.    Type 2 diabetes mellitus with ophthalmic complication, with long-term current use of insulin (HCC)  Assessment & Plan  Lab Results   Component Value Date    HGBA1C 10.7 (H) 07/31/2024       Recent Labs     08/04/24  1637 08/04/24  2054 08/05/24  0731 08/05/24  1130   POCGLU 106 185* 182* 131       Blood Sugar Average: Last 72 hrs:  (P) 177.6226224097672329      Patient was previously on Trulicity and metformin which he has not believed to have been compliant with  Started on insulin regimen while inpatient--managed by endocrinology  Prior to discharge endocrinology recommended patient be discharged on 17 units of 70/30 insulin twice daily and patient will follow-up with endocrinology as an outpatient.    Generalized anxiety disorder  Assessment & Plan  Patient was previously prescribed Xanax-PDMP checked and last prescription filled 6/20 for 30-day prescription.    Patient has follow-up scheduled on 8/16-will prescribe 10-day prescription to gap in the interim.  Psychiatry may evaluate appropriateness of continued treatment with this medication at this time.  Of note, patient was evaluated by psychiatry while inpatient and they recommended continuing his home medications.    Atrial fibrillation (HCC)  Assessment & Plan  Cardiology consult and note appreciated  Continue home metoprolol 12.5  Patient has been in sinus rhythm-no anticoagulation reinitiation recommended at this time.  " Follow-up with regular cardiologist as an outpatient for continued management.    * Closed fracture of right femur, unspecified fracture morphology, sequela  Assessment & Plan  Secondary to fall  XR imaging showed right femur fracture, s/p IM nail  Orthopedics consult and note appreciated  DVT prophylaxis: Lovenox will transition to aspirin 81 mg twice daily for 28 days  Multimodal pain regimen  Vitamin D and calcium supplementation per endocrinology  PT/OT-recommended rehab-patient refused-case management assisted with [facility]                Medical Problems       Resolved Problems  Date Reviewed: 8/5/2024   None         Admission Date:   Admission Orders (From admission, onward)       Ordered        07/30/24 1710  Inpatient Admission  Once                            Admitting Diagnosis: Bradycardia [R00.1]  Closed right hip fracture (HCC) [S72.001A]    HPI: \"David Skelton Jr. is a 64 y.o. male with history of iron deficiency anemia, urinary retention, T2DM, anxiety, polysubstance abuse and prior falls who presents after an unwitnessed fall. Patient reports falling in AM and . Per EMS, the patient was intermittently apneic, primarily when not stimulated and had several bradycardic events with several short runs of ventricular tachycardia. They report he did no desaturate and maintained adequate blood pressure. Patient R LE noted to be shortened and externally rotated.      On arrival, patient reports that he fell this morning and does not recall mechanism. He does recall that someone was knocking on his door this afternoon and that is how EMS was contacted.\"  - Per Dr. Arguelles's H&P on 7/30/2024    Procedures Performed:   Orders Placed This Encounter   Procedures    Critical Care     Subjective: \"I want to go home\"  Patient states that he feels better and he wants to go home today.  He notes his pain being under control with his current pain regimen.  He does request a refill on his Xanax prescription because his " primary care provider currently is refusing.  He states he has a follow-up with a new psychiatric doctor on 8/16.  Patient states he has been tolerating his diet and having regular bowel movements    Objective:  General: No acute distress  Neuro: GCS is 15.  Light touch sensation intact in all extremities.  HEENT: Normocephalic, atraumatic.  Neck supple.  Cardiac: Regular rate and rhythm.  +2 radial and dorsalis pedis pulses, bilaterally.  Lungs: Clear to auscultation, bilaterally.  Abdomen: Soft, nontender.  Pelvis: Stable  MSK: Right hip swollen on palpation.  Limited range of motion of right hip.  All other extremities display full range of motion no deformities.  Skin: Warm, dry.  Right hip surgical dressing is clean, dry, intact.    Summary of Hospital Course: This is a 64-year-old male who suffered a fall resulting in a right femur fracture.  He is admitted to the trauma service with orthopedic consultation.  He was taken to the OR and IM nail was placed.  Postoperatively patient was evaluated by PT/OT and recommended for rehab which he declined.  Case management set up home health care to see him for outpatient rehab.  He will continue on aspirin 81 mg twice daily for Lovenox for DVT prophylaxis.  He was evaluated by cardiology due to his history of paroxysmal atrial fibrillation, since he has been in sinus rhythm and patient has not been taking his anticoagulation will not resume at this time.  He has to follow-up with his cardiologist as an outpatient.  Psychiatry-no changes-patient states that he ran out of Xanax and his primary care provider will no longer provide him prescriptions, he will follow-up with his psychiatrist on 8/16-will provide 10-day prescription to gap in the interim.  No further prescriptions will be provided by the trauma team.  Patient was evaluated by endocrinology and recommendations appreciated.  Patient to follow-up with the PCP as an outpatient.    Significant Findings, Care,  Treatment and Services Provided:   XR hip/pelv 1 vw right if performed    Result Date: 7/31/2024  Impression: Fluoroscopy provided for procedure guidance. Please refer to the separate procedure note for additional details. Workstation performed: [id number]     CT chest abdomen pelvis w contrast    Result Date: 7/30/2024  Impression: 1. Comminuted right intertrochanteric fracture. No dislocation. 2. No evidence of acute chest trauma. 3. No acute intra-abdominal or pelvic trauma. 4. Constipation. Workstation performed: [id number]     XR chest portable    Result Date: 7/30/2024  Impression: No acute cardiopulmonary disease. Workstation performed: [id number]     XR femur 2 vw right    Result Date: 7/30/2024  Impression: Right intertrochanteric fracture with varus angulation and displaced lesser trochanteric moiety. No hip dislocation. Remainder of the femur is intact. Pelvic ring is intact. Left proximal femur ORIF with bridging heterotopic bone formation. Findings concur with the preliminary report by the referring clinician already in PACS and/or our electronic record EPIC. Computerized Assisted Algorithm (CAA) may have been used to analyze all applicable images. Workstation performed: [id number]     XR pelvis ap only 1 or 2 vw    Result Date: 7/30/2024  Impression: Right intertrochanteric fracture with varus angulation and displaced lesser trochanteric moiety. No hip dislocation. Remainder of the femur is intact. Pelvic ring is intact. Left proximal femur ORIF with bridging heterotopic bone formation. Findings concur with the preliminary report by the referring clinician already in PACS and/or our electronic record EPIC. Computerized Assisted Algorithm (CAA) may have been used to analyze all applicable images. Workstation performed: [id number]     CT head wo contrast    Result Date: 7/30/2024  Impression: No intracranial hemorrhage or calvarial fracture. Workstation performed: [id number]        Complications:  none    Condition at Discharge: good         Discharge instructions/Information to patient and family:   See after visit summary for information provided to patient and family.      Provisions for Follow-Up Care:  See after visit summary for information related to follow-up care and any pertinent home health orders.      PCP: Casey Mckeon MD    Disposition: See After Visit Summary for discharge disposition information.    Planned Readmission: Of note-patient is at high risk for readmission due to his previous recommendation for rehab and him adamantly refusing and requesting to go home.    Discharge Statement   I spent 26 minutes discharging the patient. This time was spent on the day of discharge. I had direct contact with the patient on the day of discharge. Additional documentation is required if more than 30 minutes were spent on discharge.     Discharge Medications:  See after visit summary for reconciled discharge medications provided to patient and family.

## 2024-08-05 NOTE — PLAN OF CARE
Problem: Potential for Falls  Goal: Patient will remain free of falls  Description: INTERVENTIONS:  - Educate patient/family on patient safety including physical limitations  - Instruct patient to call for assistance with activity   - Consult OT/PT to assist with strengthening/mobility   - Keep Call bell within reach  - Keep bed low and locked with side rails adjusted as appropriate  - Keep care items and personal belongings within reach  - Initiate and maintain comfort rounds  - Make Fall Risk Sign visible to staff  - Offer Toileting every 2 Hours, in advance of need  - Initiate/Maintain alarm  - Obtain necessary fall risk management equipment:   - Apply yellow socks and bracelet for high fall risk patients  - Consider moving patient to room near nurses station  Outcome: Progressing     Problem: PAIN - ADULT  Goal: Verbalizes/displays adequate comfort level or baseline comfort level  Description: Interventions:  - Encourage patient to monitor pain and request assistance  - Assess pain using appropriate pain scale  - Administer analgesics based on type and severity of pain and evaluate response  - Implement non-pharmacological measures as appropriate and evaluate response  - Consider cultural and social influences on pain and pain management  - Notify physician/advanced practitioner if interventions unsuccessful or patient reports new pain  Outcome: Progressing     Problem: INFECTION - ADULT  Goal: Absence or prevention of progression during hospitalization  Description: INTERVENTIONS:  - Assess and monitor for signs and symptoms of infection  - Monitor lab/diagnostic results  - Monitor all insertion sites, i.e. indwelling lines, tubes, and drains  - Monitor endotracheal if appropriate and nasal secretions for changes in amount and color  - [place] appropriate cooling/warming therapies per order  - Administer medications as ordered  - Instruct and encourage patient and family to use good hand hygiene  technique  - Identify and instruct in appropriate isolation precautions for identified infection/condition  Outcome: Progressing  Goal: Absence of fever/infection during neutropenic period  Description: INTERVENTIONS:  - Monitor WBC    Outcome: Progressing     Problem: SAFETY ADULT  Goal: Patient will remain free of falls  Description: INTERVENTIONS:  - Educate patient/family on patient safety including physical limitations  - Instruct patient to call for assistance with activity   - Consult OT/PT to assist with strengthening/mobility   - Keep Call bell within reach  - Keep bed low and locked with side rails adjusted as appropriate  - Keep care items and personal belongings within reach  - Initiate and maintain comfort rounds  - Make Fall Risk Sign visible to staff  - Offer Toileting every 2 Hours, in advance of need  - Initiate/Maintain alarm  - Obtain necessary fall risk management equipment:   - Apply yellow socks and bracelet for high fall risk patients  - Consider moving patient to room near nurses station  Outcome: Progressing  Goal: Maintain or return to baseline ADL function  Description: INTERVENTIONS:  -  Assess patient's ability to carry out ADLs; assess patient's baseline for ADL function and identify physical deficits which impact ability to perform ADLs (bathing, care of mouth/teeth, toileting, grooming, dressing, etc.)  - Assess/evaluate cause of self-care deficits   - Assess range of motion  - Assess patient's mobility; develop plan if impaired  - Assess patient's need for assistive devices and provide as appropriate  - Encourage maximum independence but intervene and supervise when necessary  - Involve family in performance of ADLs  - Assess for home care needs following discharge   - Consider OT consult to assist with ADL evaluation and planning for discharge  - Provide patient education as appropriate  Outcome: Progressing  Goal: Maintains/Returns to pre admission functional level  Description:  INTERVENTIONS:  - Perform AM-PAC 6 Click Basic Mobility/ Daily Activity assessment daily.  - Set and communicate daily mobility goal to care team and patient/family/caregiver.   - Collaborate with rehabilitation services on mobility goals if consulted  - Perform Range of Motion 3 times a day.  - Reposition patient every 2 hours.  - Dangle patient 3 times a day  - Stand patient 3 times a day  - Ambulate patient 3 times a day  - Out of bed to chair 3 times a day   - Out of bed for meals 3 times a day  - Out of bed for toileting  - Record patient progress and toleration of activity level   Outcome: Progressing     Problem: DISCHARGE PLANNING  Goal: Discharge to home or other facility with appropriate resources  Description: INTERVENTIONS:  - Identify barriers to discharge w/patient and caregiver  - Arrange for needed discharge resources and transportation as appropriate  - Identify discharge learning needs (meds, wound care, etc.)  - Arrange for interpretive services to assist at discharge as needed  - Refer to Case Management Department for coordinating discharge planning if the patient needs post-hospital services based on physician/advanced practitioner order or complex needs related to functional status, cognitive ability, or social support system  Outcome: Progressing     Problem: Knowledge Deficit  Goal: Patient/family/caregiver demonstrates understanding of disease process, treatment plan, medications, and discharge instructions  Description: Complete learning assessment and assess knowledge base.  Interventions:  - Provide teaching at level of understanding  - Provide teaching via preferred learning methods  Outcome: Progressing     Problem: Prexisting or High Potential for Compromised Skin Integrity  Goal: Skin integrity is maintained or improved  Description: INTERVENTIONS:  - Identify patients at risk for skin breakdown  - Assess and monitor skin integrity  - Assess and monitor nutrition and hydration  status  - Monitor labs   - Assess for incontinence   - Turn and reposition patient  - Assist with mobility/ambulation  - Relieve pressure over bony prominences  - Avoid friction and shearing  - Provide appropriate hygiene as needed including keeping skin clean and dry  - Evaluate need for skin moisturizer/barrier cream  - Collaborate with interdisciplinary team   - Patient/family teaching  - Consider wound care consult   Outcome: Progressing     Problem: HEMATOLOGIC - ADULT  Goal: Maintains hematologic stability  Description: INTERVENTIONS  - Assess for signs and symptoms of bleeding or hemorrhage  - Monitor labs  - Administer supportive blood products/factors as ordered and appropriate  Outcome: Progressing     Problem: MUSCULOSKELETAL - ADULT  Goal: Maintain or return mobility to safest level of function  Description: INTERVENTIONS:  - Assess patient's ability to carry out ADLs; assess patient's baseline for ADL function and identify physical deficits which impact ability to perform ADLs (bathing, care of mouth/teeth, toileting, grooming, dressing, etc.)  - Assess/evaluate cause of self-care deficits   - Assess range of motion  - Assess patient's mobility  - Assess patient's need for assistive devices and provide as appropriate  - Encourage maximum independence but intervene and supervise when necessary  - Involve family in performance of ADLs  - Assess for home care needs following discharge   - Consider OT consult to assist with ADL evaluation and planning for discharge  - Provide patient education as appropriate  Outcome: Progressing  Goal: Maintain proper alignment of affected body part  Description: INTERVENTIONS:  - Support, maintain and protect limb and body alignment  - Provide patient/ family with appropriate education  Outcome: Progressing     Problem: CARDIOVASCULAR - ADULT  Goal: Maintains optimal cardiac output and hemodynamic stability  Description: INTERVENTIONS:  - Monitor I/O, vital signs and  rhythm  - Monitor for S/S and trends of decreased cardiac output  - Administer and titrate ordered vasoactive medications to optimize hemodynamic stability  - Assess quality of pulses, skin color and temperature  - Assess for signs of decreased coronary artery perfusion  - Instruct patient to report change in severity of symptoms  Outcome: Progressing  Goal: Absence of cardiac dysrhythmias or at baseline rhythm  Description: INTERVENTIONS:  - Continuous cardiac monitoring, vital signs, obtain 12 lead EKG if ordered  - Administer antiarrhythmic and heart rate control medications as ordered  - Monitor electrolytes and administer replacement therapy as ordered  Outcome: Progressing     Problem: BEHAVIOR  Goal: Pt/Family maintain appropriate behavior and adhere to behavioral management agreement, if implemented  Description: INTERVENTIONS:  - Assess the family dynamic   - Encourage verbalization of thoughts and concerns in a socially appropriate manner  - Assess patient/family's coping skills and non-compliant behavior (including use of illegal substances).  - Utilize positive, consistent limit setting strategies supporting safety of patient, staff and others  - Initiate consult with Case Management, Spiritual Care or other ancillary services as appropriate  - If a patient's/visitor's behavior jeopardizes the safety of the patient, staff, or others, refer to organization procedure.   - Notify Security of behavior or suspected illegal substances which indicate the need for search of the patient and/or belongings  - Encourage participation in the decision making process about a behavioral management agreement; implement if patient meets criteria  Outcome: Progressing     Problem: SUBSTANCE USE/ABUSE  Goal: Will have no detox symptoms and will verbalize plan for changing substance-related behavior  Description: INTERVENTIONS:  - Monitor physical status and assess for symptoms of withdrawal  - Administer medication as  ordered  - Provide emotional support with 1 on 1 interaction with staff  - Encourage recovery focused program/ addiction education  - Assess for verbalization of changing behaviors related to substance abuse  - Initiate consults and referrals as appropriate (Case Management, Spiritual Care, etc.)  Outcome: Progressing  Goal: By discharge, will develop insight into their chemical dependency and sustain motivation to continue in recovery  Description: INTERVENTIONS:  - Attends all daily group sessions and scheduled AA groups  - Actively practices coping skills through participation in the therapeutic community and adherence to program rules  - Reviews and completes assignments from individual treatment plan  - Assist patient development of understanding of their personal cycle of addiction and relapse triggers  Outcome: Progressing  Goal: By discharge, patient will have ongoing treatment plan addressing chemical dependency  Description: INTERVENTIONS:  - Assist patient with resources and/or appointments for ongoing recovery based living  Outcome: Progressing

## 2024-08-05 NOTE — ASSESSMENT & PLAN NOTE
Lab Results   Component Value Date    HGBA1C 10.7 (H) 07/31/2024       Recent Labs     08/04/24  1637 08/04/24 2054 08/05/24  0731 08/05/24  1130   POCGLU 106 185* 182* 131       Blood Sugar Average: Last 72 hrs:  (P) [id number]      Patient was previously on Trulicity and metformin which he has not believed to have been compliant with  Started on insulin regimen while inpatient--managed by endocrinology  Prior to discharge endocrinology recommended patient be discharged on 17 units of 70/30 insulin twice daily and patient will follow-up with endocrinology as an outpatient.

## 2024-08-05 NOTE — PROGRESS NOTES
Patient:    MRN:  [id number]    Nikita Request ID:  [id number]    Level of care reserved:  Home Health Agency    Partner Reserved:  [facility], [place] [address] [phone]    Clinical needs requested:    Geography searched:  [phone]    Start of Service:    Request sent:  10:11am EDT on 8/5/2024 by Hung Becker    Partner reserved:  12:39pm EDT on 8/5/2024 by Hung Becker    Choice list shared:  10:37am EDT on 8/5/2024 by Hung Becker

## 2024-08-05 NOTE — DISCHARGE INSTR - AVS FIRST PAGE
Discharge Instructions - Orthopedics  David Skelton Jr. 64 y.o. male MRN: [id number]  Unit/Bed#: [facility] 604-01    Weight Bearing Status:                                           You may weight-bear as tolerated on the right lower extremity.    DVT prophylaxis:  Continue taking aspirin for 28 days after surgery.    Pain:  Continue analgesics as directed    Dressing Instructions:   Please keep clean, dry and intact until follow up     Appt Instructions:   If you do not have your appointment, please call the clinic at [phone]  Otherwise follow up as scheduled.    Contact the office sooner if you experience any increased numbness/tingling in the extremities.

## 2024-08-05 NOTE — ARC ADMISSION
Reviewed updates with ARC physician - patient remains acute rehab appropriate pending functional progress, insurance authorization and bed availability. Additionally, patient needs to demonstrate willingness to participate in ARC program, as it is noted patient does not want to go to rehab at this time.    CM has been updated. Will continue to follow patient's case at this time.

## 2024-08-05 NOTE — ASSESSMENT & PLAN NOTE
Cardiology consult and note appreciated  Continue home metoprolol 12.5  Patient has been in sinus rhythm-no anticoagulation reinitiation recommended at this time.  Follow-up with regular cardiologist as an outpatient for continued management.

## 2024-08-05 NOTE — PROGRESS NOTES
"Progress Note - Acute Pain Service    David Skelton Jr. 64 y.o. male MRN: [id number]  Unit/Bed#: [facility] 604-01 Encounter: [id number]      David Skelton Jr. is a 64 y.o. male with notable PMHx of LUNA s/p gastric bypass sx, urinary retention, DM2 w/ significant retinopathy, anxiety, polysubstance abuse on outpt methadone, and prior falls who originally presented to [facility] ED on 7/30 after an unwitnessed fall. EMS reported pt was bradycardic w/ NSVT when they initially tended to him.    [facility] Workup revealed a Rt intertrochanteric proximal femur fracture.  He underwent right hip IM nail surgery on 7/31 with orthopedics, and is now POD 5.     David has been on methadone for 20 years. He admitted to overuse of BZDs (Oxycodone 30mg po bid \"for 10 years\"). He declined use of mood stabilizers. He has not followed up with an outpatient psych resource x 2 months but reports having ascheduled upcoming pschiatry appointment on 8/12..     APS contacted David's methadone clinic on 8/1 ([facility], [phone]) and they confirmed he has been on 190 mg PO daily. The clinic also mentioned David has 4 bottles at home of methadone and asked if they could be updated on when he takes his last dose of methadone prior to discharge.    On APS eval this morning, David is eating and drinking without any issues; denies current or overnight nausea or vomiting. He's having Bms w/ no associated discomfort and voiding without issue. He also denies any currrent or overnight fever, chills, chest pain, or shortness of breath.    * Closed fracture of right femur, unspecified fracture morphology, sequela  Assessment & Plan  POD 5 s/p R hip IM Nail for intertrochanteric proximal femur fx with h/o polysubstance abuse.  Plan:  - continue maintenance home methadone 190 mg PO QD (Clinic confirmed the dose)  - continue tylenol 975 mg PO TID. Most recent liver panel with normal levels.   - continue gabapentin 600 mg PO BID (home med)  - completed short " day course of mikel robaxin 700 mg PO TID for muscle spasms earlier this morning  - continue with xanax to 0.5 mg PO BID PRN anxiety; monitoring for oversedation, respiratory depression. Of note, he has an upcoming outpt psych appt to discuss anxiolytic regimen.  - narcan order to combate SE of respiratory depression  -continue with bowel regimen  -IV dilaudid prn for breakthrough previously dc'd    - oxycodone to 5-10  mg PO Q 4 hrs PRN mod-severe pain was increased over the wkd to 10-15 mg PO q4h prn. Per chart review, primary service has switched back to 5-10 mg q4h prn this morning (8/5).            -On APS eval this morning, Pt reported improved pain since the start of the wkd and has been ambulating with a walker on hospital floor during PT sessions; also ambulating w/ walker to the bathroom.            -with anticipated d/c to either [facility] or home w/ outpt rehab, would recommend continuing with the prn q4h 5-10 mg Oxycodone dosing he's currently on until he's re-evaluated on outpt ortho f/u.      APS will continue to see David while he's an inpt. Please reach out via [employer] secure chat if we can be of assistance.    Pain History  Current pain location(s):  Pain Score: 9  Pain Location/Orientation: Orientation: Right, Location: Leg  Pain Scale: Pain Assessment Tool: 0-10  24 hour history:  Pain rating between 6-9/10 despite scheduled administrations of methadone, gabapentin, methocarbamol, and acetaminophen.  Has been able to work with PT to ambulate with a walker down the perez, and ambulate to bathroom for BMs. David said overall pain is improved from the start of the wkd.    Opioid requirement previous 24 hours:  190 mg po methadone MIKEL x1 dose  15 mg po oxycodone PRN x4 doses  10 mg po oxycodone PRN x2 doses    OME total last 24 hours: 120 PLUS 190 mg po methadone      Meds/Allergies       Allergies   Allergen Reactions    Aspirin Shortness Of Breath    Penicillins        Objective        Vitals:    08/04/24 1809  08/04/24 2251 08/05/24 0220 08/05/24 0733   BP: 122/60 125/80 133/57 138/74   Pulse: 82 75 65 69   Resp: 18 16  17   Temp: 98.5 °F (36.9 °C) 98.2 °F (36.8 °C) 98 °F (36.7 °C) 98.3 °F (36.8 °C)   TempSrc:    Oral   SpO2: 96% 97% 93% 96%   Weight:       Height:             Physical Exam  Constitutional:       General: He is awake.      Comments: Pleasant male seated in bedside chair. No acute distress.   HENT:      Head: Normocephalic and atraumatic.   Eyes:      General: No scleral icterus.     Conjunctiva/sclera:      Right eye: Right conjunctiva is not injected.      Left eye: Left conjunctiva is not injected.   Cardiovascular:      Rate and Rhythm: Normal rate.      Pulses:           Radial pulses are 2+ on the right side and 2+ on the left side.      Heart sounds: Heart sounds are distant.   Pulmonary:      Effort: No tachypnea, accessory muscle usage or respiratory distress.      Comments: Slightly decreased tidal volume with no significant adventitious breath sounds appreciated.  Abdominal:      General: Bowel sounds are normal.      Palpations: Abdomen is soft.      Tenderness: There is no abdominal tenderness.      Comments: No rigidity appreciated   Musculoskeletal:      Right lower leg: Edema (Rt thigh; non-pitting) present.   Skin:     Capillary Refill: Capillary refill takes 2 to 3 seconds.      Comments: Hands were cool to touch.  No significant erythema overlying right lateral thigh. Two intact adhesive bandages along right lateral thigh with no visible saturation or discharge, or surrounding erythema.  There is some moderate swelling of the right thigh compared to the left.   Neurological:      Mental Status: He is alert.      Comments: Speech is nondysarthric and intelligible, but somewhat slow and pace similar to prior exams.  Responds to all questions and commands appropriately.   Psychiatric:         Behavior: Behavior is cooperative.         Lab Results:   Estimated Creatinine Clearance: 88.2  mL/min (by C-G formula based on SCr of 0.84 mg/dL).  Lab Results   Component Value Date    WBC 7.97 08/03/2024    WBC 9.29 01/29/2015    HGB 8.3 (L) 08/03/2024    HGB 12.2 01/29/2015    HCT 25.5 (L) 08/03/2024    HCT 38.0 01/29/2015     08/03/2024     01/29/2015         Component Value Date/Time     01/29/2015 1342    K 4.4 08/02/2024 1011    K 4.6 03/01/2019 1611    CL 96 08/02/2024 1011     03/01/2019 1611    CO2 35 (H) 08/02/2024 1011    CO2 29 08/17/2022 0815    CO2 31 03/01/2019 1611    BUN 15 08/02/2024 1011    BUN 17 03/01/2019 1611    CREATININE 0.84 08/02/2024 1011    CREATININE 1.03 03/01/2019 1611         Component Value Date/Time    CALCIUM 8.0 (L) 08/02/2024 1011    CALCIUM 8.7 03/01/2019 1611    ALKPHOS 67 08/01/2024 0606    ALKPHOS 133 (H) 03/01/2019 1611    AST 17 08/01/2024 0606    AST 16 03/01/2019 1611    ALT 17 08/01/2024 0606    ALT 27 03/01/2019 1611    BILITOT 0.27 01/29/2015 1342    TP 5.5 (L) 08/01/2024 0606    TP 6.9 03/01/2019 1611    ALB 3.0 (L) 08/01/2024 0606    ALB 3.6 03/01/2019 1611         Counseling / Coordination of Care  Total floor / unit time spent today 35 minutes. Greater than 50% of total time was spent with the patient and / or family counseling and / or coordination of care.    Please note that the APS provides consultative services regarding pain management only.  With the exception of ketamine and epidural infusions and except when indicated, final decisions regarding starting or changing doses of analgesic medications are at the discretion of the consulting service.    Enrrique Andrade,    Acute Pain Service

## 2024-08-05 NOTE — ASSESSMENT & PLAN NOTE
Secondary to fall  XR imaging showed right femur fracture, s/p IM nail  Orthopedics consult and note appreciated  DVT prophylaxis: Lovenox will transition to aspirin 81 mg twice daily for 28 days  Multimodal pain regimen  Vitamin D and calcium supplementation per endocrinology  PT/OT-recommended rehab-patient refused-case management assisted with [facility]

## 2024-08-05 NOTE — CASE MANAGEMENT
Case Management Discharge Planning Note    Patient name David Skelton Jr.  Location [facility] 604/[facility] 604-01 MRN [id number]  : 1960 Date 2024       Current Admission Date: 2024  Current Admission Diagnosis:Closed fracture of right femur, unspecified fracture morphology, sequela   Patient Active Problem List    Diagnosis Date Noted Date Diagnosed    Acute blood loss anemia (ABLA) 2024     Nocturia associated with benign prostatic hyperplasia 2024     Gross hematuria 2024     Closed fracture of right femur, unspecified fracture morphology, sequela 2024     B12 deficiency 2023     Bradycardia 2023     Retinopathy due to secondary DM (HCC) 2023     Visual changes 2023     Closed fracture of multiple ribs of left side 2023     Methadone dependence (HCC) 2023     Encephalopathy 2023     Liver lesion, right lobe 2021     Lung nodule 2021     Adrenal nodule (HCC) 2021     Thoracic degenerative disc disease 2021     DDD (degenerative disc disease), lumbar 2021     Pre-operative general physical examination 2019     S/P gastric bypass 11/15/2019     Hypogonadism male 11/15/2019     Hemorrhoids, internal 2019     Rectal prolapse 2019     Drug-induced constipation 2019     Chronic anticoagulation 2019     GERD without esophagitis 2019     Iron deficiency anemia 2019     Major depression, chronic 2019     Mixed hyperlipidemia 2019     Stable proliferative diabetic retinopathy of left eye associated with type 2 diabetes mellitus (HCC) 2019     Narcotic abuse (HCC) 2018     Ulcer of leg, chronic, left (HCC) 2015     ADD (attention deficit disorder) without hyperactivity 2015     Bipolar disorder (HCC) 01/10/2014     Insomnia 2013     History of hepatitis C 2013     Allergic rhinitis 2013     Generalized anxiety disorder  01/04/2013     Macular degeneration 01/04/2013     Spinal stenosis 01/04/2013     Vitamin D deficiency 01/04/2013     Benign essential hypertension 09/14/2012     Type 2 diabetes mellitus with ophthalmic complication, with long-term current use of insulin (HCC) 09/14/2012     Atrial fibrillation (HCC) 05/15/2012     Complex medical condition 05/15/2012       LOS (days): 6  Geometric Mean LOS (GMLOS) (days): 3.9  Days to GMLOS:-1.9     OBJECTIVE:  Risk of Unplanned Readmission Score: 35.27         Current admission status: Inpatient   Preferred Pharmacy:   [id number] - [place], [place] - [address]  [address]  [place] [address]  Phone: [phone] Fax: [phone]    [id number] - [place], [place] - [address]  [address]  [place] [address]  Phone: [phone] Fax: [phone]    Primary Care Provider: Casey Mckeon MD    Primary Insurance[facility] [employer] [facility]  Secondary Insurance:     DISCHARGE DETAILS:      Requested Home Health Care         Is the patient interested in HHC at discharge?: Yes  Home Health Discipline requested:: Physical Therapy, Occupational Therapy, Nursing  Home Health Agency Name:: [facility]  Home Health Follow-Up Provider:: PCP  Home Health Services Needed:: Evaluate Functional Status and Safety, Post-Op Care and Assessment, Gait/ADL Training, Strengthening/Theraputic Exercises to Improve Function  Homebound Criteria Met:: Requires the Assistance of Another Person for Safe Ambulation or to Leave the Home, Uses an Assist Device (i.e. cane, walker, etc)  Supporting Clincal Findings:: Limited Endurance, Fatigues Easliy in Short Distances       Pt wishes to d/c home  Pt will have his medications sent to [facility] to be filled  Pt will need transportation home  Pt will d/c via [employer] @1400  Pt will be picked up at [facility] as his medications are at [facility]

## 2024-08-06 ENCOUNTER — APPOINTMENT (EMERGENCY)
Dept: RADIOLOGY | Facility: HOSPITAL | Age: 64
DRG: 351 | End: 2024-08-06
Payer: COMMERCIAL

## 2024-08-06 ENCOUNTER — TRANSITIONAL CARE MANAGEMENT (OUTPATIENT)
Dept: FAMILY MEDICINE CLINIC | Facility: CLINIC | Age: 64
End: 2024-08-06

## 2024-08-06 ENCOUNTER — HOME CARE VISIT (OUTPATIENT)
Dept: HOME HEALTH SERVICES | Facility: HOME HEALTHCARE | Age: 64
End: 2024-08-06

## 2024-08-06 ENCOUNTER — HOSPITAL ENCOUNTER (INPATIENT)
Facility: HOSPITAL | Age: 64
LOS: 12 days | Discharge: HOME HEALTH | DRG: 351 | End: 2024-08-18
Attending: EMERGENCY MEDICINE | Admitting: STUDENT IN AN ORGANIZED HEALTH CARE EDUCATION/TRAINING PROGRAM
Payer: COMMERCIAL

## 2024-08-06 ENCOUNTER — APPOINTMENT (EMERGENCY)
Dept: CT IMAGING | Facility: HOSPITAL | Age: 64
DRG: 351 | End: 2024-08-06
Payer: COMMERCIAL

## 2024-08-06 ENCOUNTER — TELEPHONE (OUTPATIENT)
Dept: OBGYN CLINIC | Facility: HOSPITAL | Age: 64
End: 2024-08-06

## 2024-08-06 DIAGNOSIS — F11.90 METHADONE USE: ICD-10-CM

## 2024-08-06 DIAGNOSIS — Z79.4 TYPE 2 DIABETES MELLITUS WITH OTHER OPHTHALMIC COMPLICATION, WITH LONG-TERM CURRENT USE OF INSULIN (HCC): ICD-10-CM

## 2024-08-06 DIAGNOSIS — S72.001A CLOSED RIGHT HIP FRACTURE (HCC): ICD-10-CM

## 2024-08-06 DIAGNOSIS — Z86.19 HISTORY OF HEPATITIS C: ICD-10-CM

## 2024-08-06 DIAGNOSIS — S72.101A: Primary | ICD-10-CM

## 2024-08-06 DIAGNOSIS — E11.39 TYPE 2 DIABETES MELLITUS WITH OTHER OPHTHALMIC COMPLICATION, WITH LONG-TERM CURRENT USE OF INSULIN (HCC): ICD-10-CM

## 2024-08-06 DIAGNOSIS — F41.1 GENERALIZED ANXIETY DISORDER: ICD-10-CM

## 2024-08-06 DIAGNOSIS — F03.90 DEMENTIA (HCC): ICD-10-CM

## 2024-08-06 DIAGNOSIS — Z86.19 HISTORY OF HEPATITIS B: ICD-10-CM

## 2024-08-06 DIAGNOSIS — F31.9 BIPOLAR AFFECTIVE DISORDER, REMISSION STATUS UNSPECIFIED (HCC): ICD-10-CM

## 2024-08-06 PROBLEM — M25.551 RIGHT HIP PAIN: Status: ACTIVE | Noted: 2024-08-06

## 2024-08-06 LAB
2HR DELTA HS TROPONIN: 0 NG/L
4HR DELTA HS TROPONIN: 1 NG/L
ABO GROUP BLD: NORMAL
ALBUMIN SERPL BCG-MCNC: 3.1 G/DL (ref 3.5–5)
ALP SERPL-CCNC: 71 U/L (ref 34–104)
ALT SERPL W P-5'-P-CCNC: 26 U/L (ref 7–52)
ANION GAP SERPL CALCULATED.3IONS-SCNC: 6 MMOL/L (ref 4–13)
APAP SERPL-MCNC: <2 UG/ML (ref 10–20)
APTT PPP: 31 SECONDS (ref 23–34)
AST SERPL W P-5'-P-CCNC: 35 U/L (ref 13–39)
ATRIAL RATE: 91 BPM
ATRIAL RATE: 98 BPM
BACTERIA UR QL AUTO: NORMAL /HPF
BASE EX.OXY STD BLDV CALC-SCNC: 87.1 % (ref 60–80)
BASE EXCESS BLDV CALC-SCNC: 7.6 MMOL/L
BASOPHILS # BLD AUTO: 0.02 THOUSANDS/ÂΜL (ref 0–0.1)
BASOPHILS NFR BLD AUTO: 0 % (ref 0–1)
BILIRUB SERPL-MCNC: 0.8 MG/DL (ref 0.2–1)
BILIRUB UR QL STRIP: NEGATIVE
BLD GP AB SCN SERPL QL: NEGATIVE
BNP SERPL-MCNC: 165 PG/ML (ref 0–100)
BUN SERPL-MCNC: 20 MG/DL (ref 5–25)
CALCIUM ALBUM COR SERPL-MCNC: 9.1 MG/DL (ref 8.3–10.1)
CALCIUM SERPL-MCNC: 8.4 MG/DL (ref 8.4–10.2)
CARDIAC TROPONIN I PNL SERPL HS: 19 NG/L
CARDIAC TROPONIN I PNL SERPL HS: 19 NG/L
CARDIAC TROPONIN I PNL SERPL HS: 20 NG/L
CHLORIDE SERPL-SCNC: 99 MMOL/L (ref 96–108)
CK SERPL-CCNC: 1161 U/L (ref 39–308)
CLARITY UR: CLEAR
CO2 SERPL-SCNC: 31 MMOL/L (ref 21–32)
COLOR UR: ABNORMAL
CREAT SERPL-MCNC: 0.76 MG/DL (ref 0.6–1.3)
DME PARACHUTE DELIVERY DATE ACTUAL: NORMAL
DME PARACHUTE DELIVERY DATE REQUESTED: NORMAL
DME PARACHUTE ITEM DESCRIPTION: NORMAL
DME PARACHUTE ORDER STATUS: NORMAL
DME PARACHUTE SUPPLIER NAME: NORMAL
DME PARACHUTE SUPPLIER PHONE: NORMAL
EOSINOPHIL # BLD AUTO: 0 THOUSAND/ÂΜL (ref 0–0.61)
EOSINOPHIL NFR BLD AUTO: 0 % (ref 0–6)
ERYTHROCYTE [DISTWIDTH] IN BLOOD BY AUTOMATED COUNT: 13.2 % (ref 11.6–15.1)
ETHANOL SERPL-MCNC: <10 MG/DL
GFR SERPL CREATININE-BSD FRML MDRD: 97 ML/MIN/1.73SQ M
GLUCOSE SERPL-MCNC: 249 MG/DL (ref 65–140)
GLUCOSE UR STRIP-MCNC: ABNORMAL MG/DL
HCO3 BLDV-SCNC: 32.1 MMOL/L (ref 24–30)
HCT VFR BLD AUTO: 21.4 % (ref 36.5–49.3)
HGB BLD-MCNC: 7 G/DL (ref 12–17)
HGB UR QL STRIP.AUTO: ABNORMAL
IMM GRANULOCYTES # BLD AUTO: 0.06 THOUSAND/UL (ref 0–0.2)
IMM GRANULOCYTES NFR BLD AUTO: 1 % (ref 0–2)
INR PPP: 1.09 (ref 0.85–1.19)
KETONES UR STRIP-MCNC: NEGATIVE MG/DL
LEUKOCYTE ESTERASE UR QL STRIP: NEGATIVE
LYMPHOCYTES # BLD AUTO: 0.48 THOUSANDS/ÂΜL (ref 0.6–4.47)
LYMPHOCYTES NFR BLD AUTO: 5 % (ref 14–44)
MAGNESIUM SERPL-MCNC: 1.6 MG/DL (ref 1.9–2.7)
MCH RBC QN AUTO: 28.7 PG (ref 26.8–34.3)
MCHC RBC AUTO-ENTMCNC: 32.7 G/DL (ref 31.4–37.4)
MCV RBC AUTO: 88 FL (ref 82–98)
MONOCYTES # BLD AUTO: 0.86 THOUSAND/ÂΜL (ref 0.17–1.22)
MONOCYTES NFR BLD AUTO: 9 % (ref 4–12)
NEUTROPHILS # BLD AUTO: 8.45 THOUSANDS/ÂΜL (ref 1.85–7.62)
NEUTS SEG NFR BLD AUTO: 85 % (ref 43–75)
NITRITE UR QL STRIP: NEGATIVE
NON-SQ EPI CELLS URNS QL MICRO: NORMAL /HPF
NRBC BLD AUTO-RTO: 0 /100 WBCS
O2 CT BLDV-SCNC: 9.6 ML/DL
P AXIS: 75 DEGREES
P AXIS: 77 DEGREES
PCO2 BLDV: 45.8 MM HG (ref 42–50)
PH BLDV: 7.46 [PH] (ref 7.3–7.4)
PH UR STRIP.AUTO: 7 [PH]
PLATELET # BLD AUTO: 298 THOUSANDS/UL (ref 149–390)
PMV BLD AUTO: 10.3 FL (ref 8.9–12.7)
PO2 BLDV: 60.5 MM HG (ref 35–45)
POTASSIUM SERPL-SCNC: 3.7 MMOL/L (ref 3.5–5.3)
PR INTERVAL: 146 MS
PR INTERVAL: 146 MS
PROT SERPL-MCNC: 5.9 G/DL (ref 6.4–8.4)
PROT UR STRIP-MCNC: ABNORMAL MG/DL
PROTHROMBIN TIME: 14.8 SECONDS (ref 12.3–15)
QRS AXIS: 79 DEGREES
QRS AXIS: 82 DEGREES
QRSD INTERVAL: 92 MS
QRSD INTERVAL: 96 MS
QT INTERVAL: 384 MS
QT INTERVAL: 408 MS
QTC INTERVAL: 490 MS
QTC INTERVAL: 501 MS
RBC # BLD AUTO: 2.44 MILLION/UL (ref 3.88–5.62)
RBC #/AREA URNS AUTO: NORMAL /HPF
RH BLD: POSITIVE
SALICYLATES SERPL-MCNC: <5 MG/DL (ref 3–20)
SODIUM SERPL-SCNC: 136 MMOL/L (ref 135–147)
SP GR UR STRIP.AUTO: 1.02 (ref 1–1.03)
SPECIMEN EXPIRATION DATE: NORMAL
T WAVE AXIS: 67 DEGREES
T WAVE AXIS: 73 DEGREES
UROBILINOGEN UR STRIP-ACNC: <2 MG/DL
VENTRICULAR RATE: 91 BPM
VENTRICULAR RATE: 98 BPM
WBC # BLD AUTO: 9.87 THOUSAND/UL (ref 4.31–10.16)
WBC #/AREA URNS AUTO: NORMAL /HPF

## 2024-08-06 PROCEDURE — 84484 ASSAY OF TROPONIN QUANT: CPT

## 2024-08-06 PROCEDURE — 99285 EMERGENCY DEPT VISIT HI MDM: CPT

## 2024-08-06 PROCEDURE — 85025 COMPLETE CBC W/AUTO DIFF WBC: CPT

## 2024-08-06 PROCEDURE — 96360 HYDRATION IV INFUSION INIT: CPT

## 2024-08-06 PROCEDURE — 82805 BLOOD GASES W/O2 SATURATION: CPT

## 2024-08-06 PROCEDURE — 93005 ELECTROCARDIOGRAM TRACING: CPT

## 2024-08-06 PROCEDURE — 86901 BLOOD TYPING SEROLOGIC RH(D): CPT

## 2024-08-06 PROCEDURE — 72125 CT NECK SPINE W/O DYE: CPT

## 2024-08-06 PROCEDURE — 86900 BLOOD TYPING SEROLOGIC ABO: CPT

## 2024-08-06 PROCEDURE — 83880 ASSAY OF NATRIURETIC PEPTIDE: CPT

## 2024-08-06 PROCEDURE — 74177 CT ABD & PELVIS W/CONTRAST: CPT

## 2024-08-06 PROCEDURE — 80143 DRUG ASSAY ACETAMINOPHEN: CPT

## 2024-08-06 PROCEDURE — 99285 EMERGENCY DEPT VISIT HI MDM: CPT | Performed by: EMERGENCY MEDICINE

## 2024-08-06 PROCEDURE — 86850 RBC ANTIBODY SCREEN: CPT

## 2024-08-06 PROCEDURE — 71260 CT THORAX DX C+: CPT

## 2024-08-06 PROCEDURE — 80053 COMPREHEN METABOLIC PANEL: CPT

## 2024-08-06 PROCEDURE — 80179 DRUG ASSAY SALICYLATE: CPT

## 2024-08-06 PROCEDURE — 73701 CT LOWER EXTREMITY W/DYE: CPT

## 2024-08-06 PROCEDURE — 81001 URINALYSIS AUTO W/SCOPE: CPT

## 2024-08-06 PROCEDURE — 83735 ASSAY OF MAGNESIUM: CPT | Performed by: EMERGENCY MEDICINE

## 2024-08-06 PROCEDURE — 93010 ELECTROCARDIOGRAM REPORT: CPT | Performed by: INTERNAL MEDICINE

## 2024-08-06 PROCEDURE — 82077 ASSAY SPEC XCP UR&BREATH IA: CPT

## 2024-08-06 PROCEDURE — 85610 PROTHROMBIN TIME: CPT

## 2024-08-06 PROCEDURE — 99223 1ST HOSP IP/OBS HIGH 75: CPT | Performed by: STUDENT IN AN ORGANIZED HEALTH CARE EDUCATION/TRAINING PROGRAM

## 2024-08-06 PROCEDURE — 71045 X-RAY EXAM CHEST 1 VIEW: CPT

## 2024-08-06 PROCEDURE — 70450 CT HEAD/BRAIN W/O DYE: CPT

## 2024-08-06 PROCEDURE — 36415 COLL VENOUS BLD VENIPUNCTURE: CPT

## 2024-08-06 PROCEDURE — 82550 ASSAY OF CK (CPK): CPT

## 2024-08-06 PROCEDURE — 85730 THROMBOPLASTIN TIME PARTIAL: CPT

## 2024-08-06 RX ORDER — NALOXONE HYDROCHLORIDE 0.4 MG/ML
0.4 INJECTION, SOLUTION INTRAMUSCULAR; INTRAVENOUS; SUBCUTANEOUS
Status: DISCONTINUED | OUTPATIENT
Start: 2024-08-06 | End: 2024-08-18 | Stop reason: HOSPADM

## 2024-08-06 RX ORDER — ENOXAPARIN SODIUM 100 MG/ML
30 INJECTION SUBCUTANEOUS EVERY 12 HOURS
Status: DISCONTINUED | OUTPATIENT
Start: 2024-08-06 | End: 2024-08-18 | Stop reason: HOSPADM

## 2024-08-06 RX ORDER — AMOXICILLIN 250 MG
1 CAPSULE ORAL
Status: DISCONTINUED | OUTPATIENT
Start: 2024-08-06 | End: 2024-08-09

## 2024-08-06 RX ORDER — INSULIN LISPRO 100 [IU]/ML
1-6 INJECTION, SOLUTION INTRAVENOUS; SUBCUTANEOUS
Status: DISCONTINUED | OUTPATIENT
Start: 2024-08-07 | End: 2024-08-07

## 2024-08-06 RX ORDER — OXYCODONE HYDROCHLORIDE 5 MG/1
5 TABLET ORAL EVERY 4 HOURS PRN
Status: DISCONTINUED | OUTPATIENT
Start: 2024-08-06 | End: 2024-08-09

## 2024-08-06 RX ORDER — POLYETHYLENE GLYCOL 3350 17 G/17G
17 POWDER, FOR SOLUTION ORAL DAILY
Status: DISCONTINUED | OUTPATIENT
Start: 2024-08-06 | End: 2024-08-18 | Stop reason: HOSPADM

## 2024-08-06 RX ORDER — LIDOCAINE 50 MG/G
1 PATCH TOPICAL DAILY
Status: DISCONTINUED | OUTPATIENT
Start: 2024-08-06 | End: 2024-08-18 | Stop reason: HOSPADM

## 2024-08-06 RX ORDER — ACETAMINOPHEN 325 MG/1
975 TABLET ORAL EVERY 8 HOURS SCHEDULED
Status: DISCONTINUED | OUTPATIENT
Start: 2024-08-06 | End: 2024-08-18 | Stop reason: HOSPADM

## 2024-08-06 RX ORDER — METHOCARBAMOL 500 MG/1
500 TABLET, FILM COATED ORAL EVERY 6 HOURS SCHEDULED
Status: DISCONTINUED | OUTPATIENT
Start: 2024-08-06 | End: 2024-08-13

## 2024-08-06 RX ORDER — HYDROMORPHONE HCL IN WATER/PF 6 MG/30 ML
0.2 PATIENT CONTROLLED ANALGESIA SYRINGE INTRAVENOUS EVERY 2 HOUR PRN
Status: DISCONTINUED | OUTPATIENT
Start: 2024-08-06 | End: 2024-08-07

## 2024-08-06 RX ORDER — GABAPENTIN 100 MG/1
100 CAPSULE ORAL 3 TIMES DAILY
Status: DISCONTINUED | OUTPATIENT
Start: 2024-08-06 | End: 2024-08-06

## 2024-08-06 RX ORDER — ONDANSETRON 4 MG/1
4 TABLET, ORALLY DISINTEGRATING ORAL EVERY 6 HOURS PRN
Status: DISCONTINUED | OUTPATIENT
Start: 2024-08-06 | End: 2024-08-18 | Stop reason: HOSPADM

## 2024-08-06 RX ORDER — METHADONE HYDROCHLORIDE 10 MG/1
190 TABLET ORAL DAILY
Status: DISCONTINUED | OUTPATIENT
Start: 2024-08-07 | End: 2024-08-07

## 2024-08-06 RX ADMIN — HYDROMORPHONE HYDROCHLORIDE 0.2 MG: 0.2 INJECTION, SOLUTION INTRAMUSCULAR; INTRAVENOUS; SUBCUTANEOUS at 21:52

## 2024-08-06 RX ADMIN — IOHEXOL 100 ML: 350 INJECTION, SOLUTION INTRAVENOUS at 12:02

## 2024-08-06 RX ADMIN — LIDOCAINE 1 PATCH: 700 PATCH TOPICAL at 15:15

## 2024-08-06 RX ADMIN — SENNOSIDES AND DOCUSATE SODIUM 1 TABLET: 8.6; 5 TABLET ORAL at 22:52

## 2024-08-06 RX ADMIN — ACETAMINOPHEN 975 MG: 325 TABLET, FILM COATED ORAL at 22:52

## 2024-08-06 RX ADMIN — OXYCODONE HYDROCHLORIDE 5 MG: 5 TABLET ORAL at 15:12

## 2024-08-06 RX ADMIN — HYDROMORPHONE HYDROCHLORIDE 0.2 MG: 0.2 INJECTION, SOLUTION INTRAMUSCULAR; INTRAVENOUS; SUBCUTANEOUS at 15:50

## 2024-08-06 RX ADMIN — ONDANSETRON 4 MG: 4 TABLET, ORALLY DISINTEGRATING ORAL at 16:52

## 2024-08-06 RX ADMIN — METHOCARBAMOL TABLETS 500 MG: 500 TABLET, COATED ORAL at 17:44

## 2024-08-06 RX ADMIN — ENOXAPARIN SODIUM 30 MG: 30 INJECTION SUBCUTANEOUS at 15:54

## 2024-08-06 RX ADMIN — SODIUM CHLORIDE 1000 ML: 0.9 INJECTION, SOLUTION INTRAVENOUS at 12:28

## 2024-08-06 RX ADMIN — ACETAMINOPHEN 975 MG: 325 TABLET, FILM COATED ORAL at 15:12

## 2024-08-06 NOTE — TELEPHONE ENCOUNTER
I spoke with the patients sister Vandana in regards to scheduling the patient for a post op appt next week. Vandana states that the patient went back to the hospital due to a fall and they will be sending him to rehab. Informed Vandana to give us a call when he is in rehab. Vandana states understanding.

## 2024-08-06 NOTE — ED NOTES
Pt states he did not take his methadone 190mg dose this morning. Requests dose now. Provider made aware at this time.     Mitul Jeffers RN  08/06/24 [id number]

## 2024-08-06 NOTE — ASSESSMENT & PLAN NOTE
Pt initially very lethargic on arrival to ED, suspect due to methadone  - Pt became more alert, but was still confused.

## 2024-08-06 NOTE — ASSESSMENT & PLAN NOTE
Complaints of pain in R hip  S/p hip surgery on 7/31  CT lower extremity showed possible myositis.

## 2024-08-06 NOTE — ED PROVIDER NOTES
Emergency Department Trauma Note  David Skelton Jr. 64 y.o. male MRN: [id number]  Unit/Bed#: W /W -01 Encounter: [id number]      Trauma Alert: Trauma Acuity: C  Model of Arrival: Mode of Arrival: ALS via    Trauma Team: Current Providers  Attending Provider: Nataliia Martinez MD  Attending Provider: Lauryn Ullrich, DO  Registered Nurse: Neena Cutler RN  ED Technician: Sara Carrizales  Resident: Partha Tristan DO  Registered Nurse: Daisy Thomas RN  Patient Care Assistant: Susan Carrasco  Registered Nurse: Mitul Jeffers RN  Consulting Physician: Acute Pain Service  Consulting Physician: Lady Oreilly MD  Registered Nurse: Nori Cameron RN  Registered Nurse: Jonna Pritchett RN  Registered Nurse: Sydnee Abbasi RN  Respiratory Therapist: RT Dutch  Consultants:     None      History of Present Illness     Chief Complaint:   Chief Complaint   Patient presents with    Fall     Fall sometime last night.       HPI:  David Skelton Jr. is a 64 y.o. male who presents with right hip pain status post reported mechanical fall at home sometime yesterday evening/early this morning.  Patient's history significant limited due to mental status.  EMS reported he took his dose of methadone this morning and patient somnolent and slightly confused on arrival.  He was able to answer questions however.  Only complaints are right hip pain at the site of his recent surgical fixation of a right intertrochanteric fracture.  Patient denied lightheadedness, dizziness, syncope.  He did say he thinks he struck his head, and chart significant for aspirin use daily.    Mechanism:Details of Incident: fall unwitnessed Injury Date: 08/06/24        HPI  Review of Systems   Unable to perform ROS: Mental status change       Historical Information     Immunizations:   Immunization History   Administered Date(s) Administered    COVID-19 J&J ([place]) vaccine 0.5 mL 03/12/2021, 11/22/2021    COVID-19 Moderna Vac  BIVALENT 12 Yr+ IM 0.5 ML 02/28/2023    COVID-19 Moderna mRNA Vaccine 12 Yr+ 50 mcg/0.5 mL (Spikevax) 11/14/2023    INFLUENZA 10/10/2011, 12/03/2012, 01/26/2017, 08/17/2020, 10/31/2023    Influenza, injectable, quadrivalent, preservative free 0.5 mL 10/16/2019, 08/17/2020    Influenza, recombinant, quadrivalent,injectable, preservative free 09/11/2019    Influenza, seasonal, injectable 11/01/2013    Pneumococcal Polysaccharide PPV23 10/07/2010, 01/26/2017, 05/05/2019    Tdap 06/06/2018    Zoster Vaccine Recombinant 03/12/2019, 10/16/2019       Past Medical History:   Diagnosis Date    Anxiety     Depression     Diabetes mellitus (HCC)     Drug use     Hemorrhoids, internal 9/23/2019    Hypertension        Family History   Problem Relation Age of Onset    Diabetes Mother     Hypertension Mother     Hypertension Father     Autoimmune disease Son      Past Surgical History:   Procedure Laterality Date    FEMUR FRACTURE SURGERY Right     GASTRIC BYPASS  11/08/2011    Managed by: Braden Luna (General Surgery)    HERNIA REPAIR  02/13/2013    Incisional hernia repair Managed by: Braden Luna (General Surgery)    VT OPTX FEM SHFT FX W/INSJ IMED IMPLT W/WO SCREW Right 7/31/2024    Procedure: INSERTION NAIL IM FEMUR ANTEGRADE (TROCHANTERIC);  Surgeon: Meng Hill MD;  Location: [facility];  Service: Orthopedics    TONSILLECTOMY  2010     Social History     Tobacco Use    Smoking status: Never    Smokeless tobacco: Never   Vaping Use    Vaping status: Never Used   Substance Use Topics    Alcohol use: Never    Drug use: Yes     Comment: Methadone clinic     E-Cigarette/Vaping    E-Cigarette Use Never User      E-Cigarette/Vaping Substances    Nicotine No     THC No     CBD No     Flavoring No     Other No     Unknown No        Family History: non-contributory    Meds/Allergies   Prior to Admission Medications   Prescriptions Last Dose Informant Patient Reported? Taking?   ALPRAZolam (XANAX) 0.5 mg tablet  2024  No Yes   Sig: Take 1 tablet (0.5 mg total) by mouth 2 (two) times a day as needed for anxiety for up to 10 days   Alcohol Swabs 70 % PADS Unknown  No No   Sig: May substitute brand based on insurance coverage. Check glucose BID.   Blood Glucose Monitoring Suppl (OneTouch Verio Reflect) w/Device KIT 2024  No Yes   Sig: May substitute brand based on insurance coverage. Check glucose BID.   Continuous Blood Gluc  (FreeStyle Grace 14 Day [place]) STEVE Not Taking Self No No   Si Device by Device route 3 (three) times a day before meals   Patient not taking: Reported on 2024   Continuous Blood Gluc  (FreeStyle Grace 2 [place]) STEVE Not Taking Self No No   Si Device by Device route 3 (three) times a day before meals   Patient not taking: Reported on 7/3/2024   DULoxetine (CYMBALTA) 60 mg delayed release capsule Not Taking Self Yes No   Sig: Take 60 mg by mouth in the morning   Patient not taking: Reported on 2024   GLOBAL EASE INJECT PEN NEEDLES 31G X 8 MM MISC Not Taking Self Yes No   Patient not taking: Reported on 7/3/2024   Incontinence Supply Disposable (INCONTINENCE BRIEF MEDIUM) MISC Not Taking Self No No   Sig: by Does not apply route as needed (fecal soiling)   Patient not taking: Reported on 7/3/2024   Incontinence Supply Disposable (RA WIPES FLUSHABLE/MOIST) MISC Unknown Self No No   Sig: by Does not apply route as needed (fecal soiling)   Insulin Pen Needle (B-D UF III MINI PEN NEEDLES) 31G X 5 MM MISC Unknown Self No No   Sig: Inject 15 Units under the skin daily at bedtime   Insulin Pen Needle (BD Pen Needle Aminta 2nd Gen) 32G X 4 MM MISC Unknown  No No   Sig: For use with insulin pen. Pharmacy may dispense brand covered by insurance.   Misc. Devices (CANE) MISC Unknown Self No No   Sig: by Does not apply route daily Dx:  Frequent falls   OneTouch Delica Lancets 33G MISC Unknown  No No   Sig: May substitute brand based on insurance coverage. Check glucose BID.    acetaminophen (TYLENOL) 325 mg tablet 8/6/2024 Self No Yes   Sig: Take 2 tablets (650 mg total) by mouth every 4 (four) hours as needed for mild pain   aluminum-magnesium hydroxide 200-200 MG/5ML suspension Not Taking Self No No   Sig: Take 15 mL by mouth every 6 (six) hours as needed for heartburn   Patient not taking: Reported on 7/3/2024   aspirin 81 mg chewable tablet Not Taking  No No   Sig: Chew 1 tablet (81 mg total) 2 (two) times a day for 28 days   Patient not taking: Reported on 8/6/2024   bacitracin-polymyxin b (POLYSPORIN) ointment Not Taking Self No No   Sig: Apply topically 2 (two) times a day   Patient not taking: Reported on 8/6/2024   calcium carbonate (OYSTER SHELL,OSCAL) 500 mg Not Taking  No No   Sig: Take 2 tablets by mouth 3 (three) times a day with meals   Patient not taking: Reported on 8/6/2024   divalproex sodium (DEPAKOTE ER) 500 mg 24 hr tablet   No No   Sig: Take 1 tablet (500 mg total) by mouth every 12 (twelve) hours   docusate sodium (COLACE) 100 mg capsule   No No   Sig: Take 1 capsule (100 mg total) by mouth 2 (two) times a day for 5 days   ergocalciferol (ERGOCALCIFEROL) 1.25 MG (38413 UT) capsule   Yes No   Sig: Take 50,000 Units by mouth   fluticasone (FLONASE) 50 mcg/act nasal spray Not Taking  No No   Sig: instill 1 spray into each nostril once daily   Patient not taking: Reported on 8/6/2024   gabapentin (NEURONTIN) 300 mg capsule 8/6/2024  No Yes   Sig: Take 2 capsules (600 mg total) by mouth 2 (two) times a day for 14 days   glucose blood (OneTouch Verio) test strip Unknown  No No   Sig: May substitute brand based on insurance coverage. Check glucose BID.   insulin isophane-insulin regular (NovoLIN 70/30 FlexPen) 100 units/mL injection pen 8/5/2024  No Yes   Sig: Inject 17 Units under the skin 2 (two) times a day before meals   methadone (DOLOPHINE) 5 mg tablet Past Week Self Yes Yes   Sig: Take 165 mg by mouth daily   metoprolol succinate (TOPROL-XL) 25 mg 24 hr tablet  Unknown  No No   Sig: Take 0.5 tablets (12.5 mg total) by mouth daily   ondansetron (ZOFRAN-ODT) 4 mg disintegrating tablet 8/6/2024 Self No Yes   Sig: Take 1 tablet (4 mg total) by mouth every 6 (six) hours as needed for vomiting   oxyCODONE (ROXICODONE) 10 MG TABS Unknown  No No   Sig: You may take 5 mg (0.5 tab) for moderate pain or 10 mg (1 tab) for severe pain, every 4 hours, as needed.   zolpidem (AMBIEN) 10 mg tablet Not Taking Self Yes No   Sig: Take 10 mg by mouth daily at bedtime   Patient not taking: Reported on 8/6/2024      Facility-Administered Medications: None       Allergies   Allergen Reactions    Aspirin Shortness Of Breath    Penicillins        PHYSICAL EXAM    PE limited by: mental status    Objective   Vitals:   First set: Temperature: 98.1 °F (36.7 °C) (08/06/24 1106)  Pulse: 95 (08/06/24 1106)  Respirations: 18 (08/06/24 1106)  Blood Pressure: (!) 180/90 (08/06/24 1106)  SpO2: 98 % (08/06/24 1106)    Primary Survey:   (A) Airway: intact  (B) Breathing: bilateral breath sounds  (C) Circulation: Pulses:   pedal  2/4 and radial  2/4  (D) Disabliity:  GCS Total:  14  (E) Expose:  Completed    Secondary Survey: (Click on Physical Exam tab above)  Physical Exam  Vitals and nursing note reviewed.   Constitutional:       General: He is in acute distress.      Appearance: He is not ill-appearing.   HENT:      Head: Normocephalic and atraumatic.      Right Ear: External ear normal.      Left Ear: External ear normal.      Nose: Nose normal.      Mouth/Throat:      Mouth: Mucous membranes are moist.   Eyes:      Pupils: Pupils are equal, round, and reactive to light.   Cardiovascular:      Rate and Rhythm: Normal rate and regular rhythm.   Pulmonary:      Effort: Pulmonary effort is normal. No respiratory distress.   Abdominal:      Palpations: Abdomen is soft.      Tenderness: There is no abdominal tenderness.   Musculoskeletal:         General: Swelling, tenderness (Right hip) and signs of injury  (Surgical scars, staples, dressings in place) present.      Cervical back: Normal range of motion.   Neurological:      Mental Status: He is disoriented.         Cervical spine cleared by clinical criteria? No (imaging required)      Invasive Devices       Peripheral Intravenous Line  Duration             Peripheral IV 08/06/24 Proximal;Right;Ventral (anterior) Forearm <1 day                    Lab Results:   Results Reviewed       Procedure Component Value Units Date/Time    HS Troponin I 4hr [[id number]]  (Normal) Collected: 08/06/24 1547    Lab Status: Final result Specimen: Blood from Arm, Right Updated: 08/06/24 1625     hs TnI 4hr 20 ng/L      Delta 4hr hsTnI 1 ng/L     HS Troponin I 2hr [[id number]]  (Normal) Collected: 08/06/24 1359    Lab Status: Final result Specimen: Blood from Arm, Right Updated: 08/06/24 1430     hs TnI 2hr 19 ng/L      Delta 2hr hsTnI 0 ng/L     Urine Microscopic [[id number]]  (Normal) Collected: 08/06/24 1306    Lab Status: Final result Specimen: Urine, Clean Catch Updated: 08/06/24 1320     RBC, UA 1-2 /hpf      WBC, UA None Seen /hpf      Epithelial Cells Occasional /hpf      Bacteria, UA Occasional /hpf     UA w Reflex to Microscopic w Reflex to Culture [[id number]]  (Abnormal) Collected: 08/06/24 1306    Lab Status: Final result Specimen: Urine, Clean Catch Updated: 08/06/24 1319     Color, UA Light Yellow     Clarity, UA Clear     Specific Gravity, UA 1.016     pH, UA 7.0     Leukocytes, UA Negative     Nitrite, UA Negative     Protein,  (2+) mg/dl      Glucose,  (1/5%) mg/dl      Ketones, UA Negative mg/dl      Urobilinogen, UA <2.0 mg/dl      Bilirubin, UA Negative     Occult Blood, UA Small    Salicylate level [[id number]]  (Normal) Collected: 08/06/24 1129    Lab Status: Final result Specimen: Blood from Arm, Right Updated: 08/06/24 1210     Salicylate Lvl <5 mg/dL     Comprehensive metabolic panel [[id number]]  (Abnormal) Collected: 08/06/24 1129    Lab Status: Final  result Specimen: Blood from Arm, Right Updated: 08/06/24 1207     Sodium 136 mmol/L      Potassium 3.7 mmol/L      Chloride 99 mmol/L      CO2 31 mmol/L      ANION GAP 6 mmol/L      BUN 20 mg/dL      Creatinine 0.76 mg/dL      Glucose 249 mg/dL      Calcium 8.4 mg/dL      Corrected Calcium 9.1 mg/dL      AST 35 U/L      ALT 26 U/L      Alkaline Phosphatase 71 U/L      Total Protein 5.9 g/dL      Albumin 3.1 g/dL      Total Bilirubin 0.80 mg/dL      eGFR 97 ml/min/1.73sq m     Narrative:      National Kidney Disease Foundation guidelines for Chronic Kidney Disease (CKD):     Stage 1 with normal or high GFR (GFR > 90 mL/min/1.73 square meters)    Stage 2 Mild CKD (GFR = 60-89 mL/min/1.73 square meters)    Stage 3A Moderate CKD (GFR = 45-59 mL/min/1.73 square meters)    Stage 3B Moderate CKD (GFR = 30-44 mL/min/1.73 square meters)    Stage 4 Severe CKD (GFR = 15-29 mL/min/1.73 square meters)    Stage 5 End Stage CKD (GFR <15 mL/min/1.73 square meters)  Note: GFR calculation is accurate only with a steady state creatinine    Ethanol [[id number]]  (Normal) Collected: 08/06/24 1129    Lab Status: Final result Specimen: Blood from Arm, Right Updated: 08/06/24 1207     Ethanol Lvl <10 mg/dL     Acetaminophen level-If concentration is detectable, please discuss with medical  on call. [[id number]]  (Abnormal) Collected: 08/06/24 1129    Lab Status: Final result Specimen: Blood from Arm, Right Updated: 08/06/24 1207     Acetaminophen Level <2 ug/mL     CK [[id number]]  (Abnormal) Collected: 08/06/24 1129    Lab Status: Final result Specimen: Blood from Arm, Right Updated: 08/06/24 1207     Total CK 1,161 U/L     HS Troponin 0hr (reflex protocol) [[id number]]  (Normal) Collected: 08/06/24 1129    Lab Status: Final result Specimen: Blood from Arm, Right Updated: 08/06/24 1203     hs TnI 0hr 19 ng/L     B-Type Natriuretic Peptide(BNP) [[id number]]  (Abnormal) Collected: 08/06/24 1129    Lab Status: Final result Specimen:  Blood from Arm, Right Updated: 08/06/24 1202      pg/mL     Blood gas, venous [[id number]]  (Abnormal) Collected: 08/06/24 1151    Lab Status: Final result Specimen: Blood from Arm, Right Updated: 08/06/24 1157     pH, Raul[place] 7.464     pCO2, [place] 45.8 mm Hg      pO2, [place] 60.5 mm Hg      HCO3, [place] 32.1 mmol/L      Base Excess, [place] 7.6 mmol/L      O2 Content, [place] 9.6 ml/dL      O2 HGB, VENOUS 87.1 %     Protime-INR [[id number]]  (Normal) Collected: 08/06/24 1129    Lab Status: Final result Specimen: Blood from Arm, Right Updated: 08/06/24 1154     Protime 14.8 seconds      INR 1.09    Narrative:      INR Therapeutic Range    Indication                                             INR Range      Atrial Fibrillation                                               2.0-3.0  Hypercoagulable State                                    2.0.2.3  Left Ventricular Asist Device                            2.0-3.0  Mechanical Heart Valve                                  -    Aortic(with afib, MI, embolism, HF, LA enlargement,    and/or coagulopathy)                                     2.0-3.0 (2.5-3.5)     Mitral                                                             2.5-3.5  Prosthetic/Bioprosthetic Heart Valve               2.0-3.0  Venous thromboembolism (VTE: VT, PE        2.0-3.0    APTT [[id number]]  (Normal) Collected: 08/06/24 1129    Lab Status: Final result Specimen: Blood from Arm, Right Updated: 08/06/24 1154     PTT 31 seconds     CBC and differential [[id number]]  (Abnormal) Collected: 08/06/24 1129    Lab Status: Final result Specimen: Blood from Arm, Right Updated: 08/06/24 1138     WBC 9.87 Thousand/uL      RBC 2.44 Million/uL      Hemoglobin 7.0 g/dL      Hematocrit 21.4 %      MCV 88 fL      MCH 28.7 pg      MCHC 32.7 g/dL      RDW 13.2 %      MPV 10.3 fL      Platelets 298 Thousands/uL      nRBC 0 /100 WBCs      Segmented % 85 %      Immature Grans % 1 %      Lymphocytes % 5 %      Monocytes % 9 %       Eosinophils Relative 0 %      Basophils Relative 0 %      Absolute Neutrophils 8.45 Thousands/µL      Absolute Immature Grans 0.06 Thousand/uL      Absolute Lymphocytes 0.48 Thousands/µL      Absolute Monocytes 0.86 Thousand/µL      Eosinophils Absolute 0.00 Thousand/µL      Basophils Absolute 0.02 Thousands/µL                    Imaging Studies:   Direct to CT: Yes  XR Trauma chest portable   Final Result by Telly Javed MD (08/06 1236)      No acute cardiopulmonary disease.            Workstation performed: [id number]         TRAUMA - CT head wo contrast   Final Result by Telly Javed MD (08/06 1253)      No acute intracranial abnormality.      The study was marked in EPIC for immediate notification.            Workstation performed: [id number]         TRAUMA - CT spine cervical wo contrast   Final Result by Telly Javed MD (08/06 1245)      No cervical spine fracture or traumatic malalignment.      The study was marked in EPIC for immediate notification.            Workstation performed: [id number]         TRAUMA - CT chest abdomen pelvis w contrast   Final Result by Telly Javed MD (08/06 1338)      1.  No acute traumatic injury to the chest, abdomen, or pelvis.      2.  Cholelithiasis with severe gallbladder distention, increased from the recent prior study though without wall thickening or pericholecystic inflammatory change. This is of uncertain etiology and clinical significance and clinical correlation for right    upper quadrant symptoms recommended.      3.  Subcentimeter groundglass density in the right upper lobe, minimally more prominent than in 2023. Based on current Fleischner Society 2017 Guidelines on incidental pulmonary nodule, follow-up CT is recommended for every 2 years until 5 years of    stability is demonstrated.      4.  Moderate to diffuse colonic fecal stasis.      5.  1.3 cm right adrenal nodule, indeterminate though likely an adenoma given stability.      The  study was marked in EPIC for immediate notification.         Workstation performed: [id number]         CT lower extremity w contrast right   Final Result by Isaac Irving MD (08/06 1258)      Status post ORIF comminuted intertrochanteric fracture right proximal femur.      Diffuse enlargement of the right thigh musculature, nonspecific but could reflect myositis. No discrete intramuscular hematoma or discrete intramuscular fluid collection.      Subcutaneous edema throughout the right thigh without a discrete subcutaneous hematoma.      Arterial calcifications.      Fecal distention of the rectum suggestive of fecal impaction.         Workstation performed: [id number]               Procedures  ECG 12 Lead Documentation Only    Date/Time: 8/6/2024 8:52 PM    Performed by: Partha Tristan DO  Authorized by: Partha Tristan DO    Indications / Diagnosis:  Trauma  Patient location:  ED  Previous ECG:     Previous ECG:  Compared to current  Interpretation:     Interpretation: abnormal    Rate:     ECG rate:  91    ECG rate assessment: normal    Rhythm:     Rhythm: sinus rhythm    Ectopy:     Ectopy: none    QRS:     QRS axis:  Normal    QRS intervals:  Normal  Conduction:     Conduction: normal    ST segments:     ST segments:  Normal  T waves:     T waves: normal    Other findings:     Other findings: prolonged qTc interval             ED Course  ED Course as of 08/06/24 2108   Tue Aug 06, 2024   1329 Hemoglobin(!): 7.0  Hemoglobin dropped over a gram since 3 days ago   1348 Discussed patient with trauma attending Dr. Shi, they will evaluate and admit to their service for further evaluation and treatment.           Medical Decision Making  Differential diagnosis includes but not limited to: Closed head injury, concussion, periprosthetic fracture    Shay came in via EMS after being found in his house.  He states he had a mechanical fall where he slipped and was able to get up.  He had a recent  right hip fracture with orthopedic fixation.  Patient states he took his methadone earlier this morning before arrival.  He was somnolent and confused on arrival.  History severely limited.  Exam significant for large bruising to the posterior right thigh.  CT head, C-spine negative for any acute traumatic injuries.  CT chest abdomen pelvis was negative for any acute traumatic injuries to the chest abdomen pelvis.  Did have incidental findings listed, see impression for details.  Due to patient recent being discharged for a right ORIF of the hip, trauma was consulted and they admitted the patient under their service for further evaluation and treatment.  Patient admitted in stable condition.    Amount and/or Complexity of Data Reviewed  Labs: ordered. Decision-making details documented in ED Course.  Radiology: ordered.    Risk  Prescription drug management.  Decision regarding hospitalization.                Disposition  [facility] Transfer: No  Final diagnoses:   Trochanteric fracture of right femur (HCC)   Methadone use     Time reflects when diagnosis was documented in both MDM as applicable and the Disposition within this note       Time User Action Codes Description Comment    8/6/2024  1:52 PM Partha Tristan Add [S72.101A] Trochanteric fracture of right femur (HCC)     8/6/2024  1:52 PM Partha Tristan Add [F11.90] Methadone use           ED Disposition       ED Disposition   Admit    Condition   Stable    Date/Time   Tue Aug 6, 2024  1:51 PM    Comment   Case was discussed with Trauma and the patient's admission status was agreed to be Admission Status: inpatient status to the service of Dr. Ullrich .               Follow-up Information    None       Current Discharge Medication List        CONTINUE these medications which have NOT CHANGED    Details   acetaminophen (TYLENOL) 325 mg tablet Take 2 tablets (650 mg total) by mouth every 4 (four) hours as needed for mild pain  Refills: 0    Associated  Diagnoses: Closed fracture of multiple ribs of left side, initial encounter      ALPRAZolam (XANAX) 0.5 mg tablet Take 1 tablet (0.5 mg total) by mouth 2 (two) times a day as needed for anxiety for up to 10 days  Qty: 20 tablet, Refills: 0    Associated Diagnoses: Anxiety      Blood Glucose Monitoring Suppl (OneTouch Verio Reflect) w/Device KIT May substitute brand based on insurance coverage. Check glucose BID.  Qty: 1 kit, Refills: 0    Associated Diagnoses: Type 2 diabetes mellitus with other ophthalmic complication, with long-term current use of insulin ([facility])      gabapentin (NEURONTIN) 300 mg capsule Take 2 capsules (600 mg total) by mouth 2 (two) times a day for 14 days  Qty: 56 capsule, Refills: 0    Associated Diagnoses: Closed right hip fracture ([facility])      insulin isophane-insulin regular (NovoLIN 70/30 FlexPen) 100 units/mL injection pen Inject 17 Units under the skin 2 (two) times a day before meals  Qty: 15 mL, Refills: 0    Associated Diagnoses: Type 2 diabetes mellitus with other ophthalmic complication, with long-term current use of insulin ([facility])      methadone (DOLOPHINE) 5 mg tablet Take 165 mg by mouth daily      ondansetron (ZOFRAN-ODT) 4 mg disintegrating tablet Take 1 tablet (4 mg total) by mouth every 6 (six) hours as needed for vomiting  Qty: 30 tablet, Refills: 0    Associated Diagnoses: Nausea & vomiting      Alcohol Swabs 70 % PADS May substitute brand based on insurance coverage. Check glucose BID.  Qty: 100 each, Refills: 0    Associated Diagnoses: Type 2 diabetes mellitus with other ophthalmic complication, with long-term current use of insulin ([facility])      aluminum-magnesium hydroxide 200-200 MG/5ML suspension Take 15 mL by mouth every 6 (six) hours as needed for heartburn  Qty: 355 mL, Refills: 0    Associated Diagnoses: Nausea & vomiting      aspirin 81 mg chewable tablet Chew 1 tablet (81 mg total) 2 (two) times a day for 28 days  Qty: 56 tablet, Refills: 0    Associated Diagnoses:  Closed right hip fracture (HCC)      bacitracin-polymyxin b (POLYSPORIN) ointment Apply topically 2 (two) times a day  Qty: 30 g, Refills: 1    Associated Diagnoses: Open wound of skin      calcium carbonate (OYSTER SHELL,OSCAL) 500 mg Take 2 tablets by mouth 3 (three) times a day with meals  Qty: 180 tablet, Refills: 0    Associated Diagnoses: Closed right hip fracture (HCC)      !! Continuous Blood Gluc  (FreeStyle Grace 14 Day [place]) STEVE 1 Device by Device route 3 (three) times a day before meals  Qty: 1 each, Refills: 0    Associated Diagnoses: Type 2 diabetes mellitus with microalbuminuria, without long-term current use of insulin ([facility]ly [facility])      !! Continuous Blood Gluc  (FreeStyle Grace 2 Campbell) STEVE 1 Device by Device route 3 (three) times a day before meals  Qty: 1 each, Refills: 0    Associated Diagnoses: Type 2 diabetes mellitus with microalbuminuria, without long-term current use of insulin ([facility])      divalproex sodium (DEPAKOTE ER) 500 mg 24 hr tablet Take 1 tablet (500 mg total) by mouth every 12 (twelve) hours  Qty: 60 tablet, Refills: 5    Associated Diagnoses: Bipolar affective disorder, remission status unspecified ([facility])      docusate sodium (COLACE) 100 mg capsule Take 1 capsule (100 mg total) by mouth 2 (two) times a day for 5 days  Qty: 10 capsule, Refills: 0    Associated Diagnoses: Closed fracture of multiple ribs of left side, initial encounter      DULoxetine (CYMBALTA) 60 mg delayed release capsule Take 60 mg by mouth in the morning      ergocalciferol (ERGOCALCIFEROL) 1.25 MG (55349 UT) capsule Take 50,000 Units by mouth      fluticasone (FLONASE) 50 mcg/act nasal spray instill 1 spray into each nostril once daily  Qty: 16 g, Refills: 5    Associated Diagnoses: Seasonal allergic rhinitis due to pollen      !! GLOBAL EASE INJECT PEN NEEDLES 31G X 8 MM MISC Refills: 5      glucose blood (OneTouch Verio) test strip May substitute brand based on insurance coverage. Check glucose  BID.  Qty: 100 each, Refills: 0    Associated Diagnoses: Type 2 diabetes mellitus with other ophthalmic complication, with long-term current use of insulin ([facility])      !! Incontinence Supply Disposable (INCONTINENCE BRIEF MEDIUM) MISC by Does not apply route as needed (fecal soiling)  Qty: 90 each, Refills: 5    Associated Diagnoses: Fecal smearing      !! Incontinence Supply Disposable (RA WIPES FLUSHABLE/MOIST) MISC by Does not apply route as needed (fecal soiling)  Qty: 100 each, Refills: 5    Associated Diagnoses: Fecal smearing      !! Insulin Pen Needle (B-D UF III MINI PEN NEEDLES) 31G X 5 MM MISC Inject 15 Units under the skin daily at bedtime  Qty: 100 each, Refills: 5    Associated Diagnoses: Uncontrolled type 2 diabetes mellitus with hyperglycemia ([facility])      !! Insulin Pen Needle (BD Pen Needle Aminta 2nd Gen) 32G X 4 MM MISC For use with insulin pen. Pharmacy may dispense brand covered by insurance.  Qty: 100 each, Refills: 0    Associated Diagnoses: Type 2 diabetes mellitus with other ophthalmic complication, with long-term current use of insulin ([facility])      metoprolol succinate (TOPROL-XL) 25 mg 24 hr tablet Take 0.5 tablets (12.5 mg total) by mouth daily  Qty: 15 tablet, Refills: 0    Associated Diagnoses: Atrial fibrillation, unspecified type ([facility])      Misc. Devices (CANE) MISC by Does not apply route daily Dx:  Frequent falls  Qty: 1 each, Refills: 0    Associated Diagnoses: Falls frequently      OneTouch Delica Lancets 33G MISC May substitute brand based on insurance coverage. Check glucose BID.  Qty: 100 each, Refills: 0    Associated Diagnoses: Type 2 diabetes mellitus with other ophthalmic complication, with long-term current use of insulin ([facility])      oxyCODONE (ROXICODONE) 10 MG TABS You may take 5 mg (0.5 tab) for moderate pain or 10 mg (1 tab) for severe pain, every 4 hours, as needed.  Qty: 20 tablet, Refills: 0    Associated Diagnoses: Closed right hip fracture ([facility])      zolpidem (AMBIEN) 10  mg tablet Take 10 mg by mouth daily at bedtime       !! - Potential duplicate medications found. Please discuss with provider.        No discharge procedures on file.    PDMP Review         Value Time User    PDMP Reviewed  Yes 8/6/2024  2:50 PM Jak Randolph PA-C            ED Provider  Electronically Signed by           Partha Tristan DO  08/06/24 8376

## 2024-08-06 NOTE — UTILIZATION REVIEW
NOTIFICATION OF ADMISSION DISCHARGE   This is a Notification of Discharge from [facility]. Please be advised that this patient has been discharge from our facility. Below you will find the admission and discharge date and time including the patient’s disposition.   UTILIZATION REVIEW CONTACT:  Teresa Dickson  Utilization   Network Utilization Review Department  Phone: [phone] x carefully listen to the prompts. All voicemails are confidential.  Email: [email or web address]     ADMISSION INFORMATION  PRESENTATION DATE: 7/30/2024  1:54 PM  OBERVATION ADMISSION DATE: N/A  INPATIENT ADMISSION DATE: 7/30/24  5:11 PM   DISCHARGE DATE: 8/5/2024  5:03 PM   DISPOSITION:Home with Home Health Care    Network Utilization Review Department  ATTENTION: Please call with any questions or concerns to [phone] and carefully listen to the prompts so that you are directed to the right person. All voicemails are confidential.   For Discharge needs, contact Care Management DC Support Team at [phone] opt. 2  Send all requests for admission clinical reviews, approved or denied determinations and any other requests to dedicated fax number below belonging to the campus where the patient is receiving treatment. List of dedicated fax numbers for the Facilities:  FACILITY NAME UR FAX NUMBER   ADMISSION DENIALS (Administrative/Medical Necessity) [phone]   DISCHARGE SUPPORT TEAM ([facility]) [phone]   PARENT CHILD HEALTH (Maternity/NICU/Pediatrics) [phone]   [facility] [phone]   [facility] [phone]   [facility] [phone]   [facility] [phone]   [facility] [phone]   [facility] [phone]   [facility] [phone]   [facility]  [phone]   [facility] [phone]   [facility] [phone]   [facility] [phone]   [facility] [phone]

## 2024-08-06 NOTE — ASSESSMENT & PLAN NOTE
Pt has a hx of opioid abuse, and has been on chronic methadone.  Current dose is 190mg daily.     Continue home dose of methadone

## 2024-08-06 NOTE — CASE COMMUNICATION
Notification of Assess not Admit    [facility] for [facility] services and has determined the patient is not eligible for service due to the following: needs beyond C    Patients sister cleaning up stool on floor on RN arrival. Sister reports patient now agrees to go to rehab after being on floor most of night. Patient struck head during fall and is on aspirin and had Lovenox in hospital. Sister reports  patient is non-coherent. Patient alert to person place time but not situation. Eyes rolling in back of head and lethargic. Patient reports taking methadone around 9 am this morning.  Pulse ox 95 HR 97. EMS and police arrived. RN provided SBAR to EMS. Patient became combative and non-EMS/police personnel were asked to leave.     Julio Velez RN

## 2024-08-06 NOTE — H&P
"[facility]  H&P  Name: David Skelton Jr. 64 y.o. male I MRN: [id number]  Unit/Bed#: W -01 I Date of Admission: 8/6/2024   Date of Service: 8/6/2024 I Hospital Day: 0      Assessment & Plan   Right hip pain  Assessment & Plan  Complaints of pain in R hip  S/p hip surgery on 7/31  CT lower extremity showed possible myositis.     Fall  Assessment & Plan  Pt is s/p fall with headstrike, unknown down time  Presented to ED as Trauma C      Encephalopathy  Assessment & Plan  Pt initially very lethargic on arrival to ED, suspect due to methadone  - Pt became more alert, but was still confused.     Methadone dependence (HCC)  Assessment & Plan  Pt has a hx of opioid abuse, and has been on chronic methadone.  Current dose is 190mg daily.     Continue home dose of methadone           History of Present Illness     Chief Complaint: \"It hurts\"  Mechanism:Fall     HPI:    David Skelton Jr. is a 64 y.o. male who presents with R hip pain after fall. He presented as a Trauma C to the ED, after an unwitnessed fall, with unknown headstrike or downtime. Per ED physician, EMS reported he took his dose of methadone this AM and was confused on arrival, but was able to answer questions. On exam, he was very confused and complaining of pain everywhere. He reported hip pain. He is s/p surgical fixture of a R femur fracture on 7/31 at [facility].     Review of Systems   Unable to perform ROS: Mental status change     12-point, complete review of systems was reviewed and negative except as stated above.     Historical Information     Past Medical History:   Diagnosis Date    Anxiety     Depression     Diabetes mellitus (HCC)     Drug use     Hemorrhoids, internal 9/23/2019    Hypertension      Past Surgical History:   Procedure Laterality Date    FEMUR FRACTURE SURGERY Right     GASTRIC BYPASS  11/08/2011    Managed by: Braden Luna (General Surgery)    HERNIA REPAIR  02/13/2013    Incisional hernia repair Managed " by: Braden Luna (General Surgery)    PA OPTX FEM SHFT FX W/INSJ IMED IMPLT W/WO SCREW Right 7/31/2024    Procedure: INSERTION NAIL IM FEMUR ANTEGRADE (TROCHANTERIC);  Surgeon: Meng Hill MD;  Location: [facility];  Service: Orthopedics    TONSILLECTOMY  2010        Social History     Tobacco Use    Smoking status: Never    Smokeless tobacco: Never   Vaping Use    Vaping status: Never Used   Substance Use Topics    Alcohol use: Never    Drug use: Yes     Comment: Methadone clinic     Immunization History   Administered Date(s) Administered    COVID-19 J&J ([employer]) vaccine 0.5 mL 03/12/2021, 11/22/2021    COVID-19 Moderna Vac BIVALENT 12 Yr+ IM 0.5 ML 02/28/2023    COVID-19 Moderna mRNA Vaccine 12 Yr+ 50 mcg/0.5 mL (Spikevax) 11/14/2023    INFLUENZA 10/10/2011, 12/03/2012, 01/26/2017, 08/17/2020, 10/31/2023    Influenza, injectable, quadrivalent, preservative free 0.5 mL 10/16/2019, 08/17/2020    Influenza, recombinant, quadrivalent,injectable, preservative free 09/11/2019    Influenza, seasonal, injectable 11/01/2013    Pneumococcal Polysaccharide PPV23 10/07/2010, 01/26/2017, 05/05/2019    Tdap 06/06/2018    Zoster Vaccine Recombinant 03/12/2019, 10/16/2019     Last Tetanus:   Family History: Non-contributory     Meds/Allergies   all current active meds have been reviewed  Allergies have not been reviewed;    Allergies   Allergen Reactions    Aspirin Shortness Of Breath    Penicillins        Objective   Initial Vitals:   Temperature: 98.1 °F (36.7 °C) (08/06/24 1106)  Pulse: 95 (08/06/24 1106)  Respirations: 18 (08/06/24 1106)  Blood Pressure: (!) 180/90 (08/06/24 1106)    Primary Survey:   Airway:        Status: patent;        Pre-hospital Interventions: none        Hospital Interventions: none  Breathing:        Pre-hospital Interventions: oxygen       Effort: normal       Right breath sounds: normal       Left breath sounds: normal  Circulation:        Rhythm: regular       Rate: regular   Right  Pulses Left Pulses    R radial: 2+    R pedal: 2+     L radial: 2+    L pedal: 2+       Disability:        GCS: Eye: 4; Verbal: 4 Motor: 6 Total: 14       Right Pupil: round;  reactive         Left Pupil:  round;  reactive      R Motor Strength L Motor Strength               Exposure:           Secondary Survey:  Physical Exam  Constitutional:       General: He is not in acute distress.     Appearance: He is not ill-appearing.   HENT:      Head: Normocephalic.      Mouth/Throat:      Mouth: Mucous membranes are moist.   Eyes:      Pupils: Pupils are equal, round, and reactive to light.   Cardiovascular:      Rate and Rhythm: Normal rate and regular rhythm.      Pulses: Normal pulses.   Pulmonary:      Effort: Pulmonary effort is normal. No respiratory distress.      Breath sounds: Normal breath sounds.   Abdominal:      General: Bowel sounds are normal.      Tenderness: There is no abdominal tenderness. There is no guarding.   Musculoskeletal:         General: Tenderness present. No swelling.      Cervical back: No rigidity or tenderness.      Comments: TTP over R hip and upper thigh. Staples from recent surgery in place, incision site clean and dry   Skin:     General: Skin is dry.      Capillary Refill: Capillary refill takes less than 2 seconds.   Neurological:      General: No focal deficit present.      Mental Status: He is alert. He is disoriented.         Invasive Devices       Peripheral Intravenous Line  Duration             Peripheral IV 08/06/24 Proximal;Right;Ventral (anterior) Forearm <1 day                  Lab Results: I have personally reviewed all pertinent laboratory/test results from 08/06/24, including the preceding 24 hours.  Recent Labs     08/06/24  1129 08/06/24  1359   WBC 9.87  --    HGB 7.0*  --    HCT 21.4*  --      --    SODIUM 136  --    K 3.7  --    CL 99  --    CO2 31  --    BUN 20  --    CREATININE 0.76  --    GLUC 249*  --    AST 35  --    ALT 26  --    ALB 3.1*  --    TBILI  0.80  --    ALKPHOS 71  --    PTT 31  --    INR 1.09  --    HSTNI0 19  --    HSTNI2  --  19   *  --        Imaging Results: I have personally reviewed pertinent images saved in PACS. CT scan findings (and other pertinent positive findings on images) were discussed with radiology. My interpretation of the images/reports are as follows:  Chest Xray(s): negative for acute findings   FAST exam(s): N/A   CT Scan(s): positive for acute findings:     Additional Xray(s): N/A     Other Studies:     Code Status: Level 1 - Full Code  Advance Directive and Living Will:      Power of :    POLST:

## 2024-08-06 NOTE — PLAN OF CARE
Problem: Potential for Falls  Goal: Patient will remain free of falls  Description: INTERVENTIONS:  - Educate patient/family on patient safety including physical limitations  - Instruct patient to call for assistance with activity   - Consult OT/PT to assist with strengthening/mobility   - Keep Call bell within reach  - Keep bed low and locked with side rails adjusted as appropriate  - Keep care items and personal belongings within reach  - Initiate and maintain comfort rounds  - Make Fall Risk Sign visible to staff  - Offer Toileting every  Hours, in advance of need  - Initiate/Maintain alarm  - Obtain necessary fall risk management equipment:   - Apply yellow socks and bracelet for high fall risk patients  - Consider moving patient to room near nurses station  8/6/2024 1752 by Jonna Pritchett RN  Outcome: Progressing  8/6/2024 1752 by Jonna Pritchett RN  Outcome: Progressing

## 2024-08-07 ENCOUNTER — APPOINTMENT (INPATIENT)
Dept: RADIOLOGY | Facility: HOSPITAL | Age: 64
DRG: 351 | End: 2024-08-07
Payer: COMMERCIAL

## 2024-08-07 LAB
ATRIAL RATE: 79 BPM
ATRIAL RATE: 82 BPM
ATRIAL RATE: 83 BPM
GLUCOSE SERPL-MCNC: 259 MG/DL (ref 65–140)
GLUCOSE SERPL-MCNC: 268 MG/DL (ref 65–140)
GLUCOSE SERPL-MCNC: 269 MG/DL (ref 65–140)
GLUCOSE SERPL-MCNC: 335 MG/DL (ref 65–140)
P AXIS: 68 DEGREES
P AXIS: 71 DEGREES
P AXIS: 73 DEGREES
PR INTERVAL: 134 MS
PR INTERVAL: 134 MS
PR INTERVAL: 136 MS
QRS AXIS: 77 DEGREES
QRS AXIS: 78 DEGREES
QRS AXIS: 78 DEGREES
QRSD INTERVAL: 102 MS
QRSD INTERVAL: 94 MS
QRSD INTERVAL: 98 MS
QT INTERVAL: 376 MS
QT INTERVAL: 436 MS
QT INTERVAL: 462 MS
QTC INTERVAL: 441 MS
QTC INTERVAL: 509 MS
QTC INTERVAL: 529 MS
T WAVE AXIS: 63 DEGREES
T WAVE AXIS: 64 DEGREES
T WAVE AXIS: 80 DEGREES
VENTRICULAR RATE: 79 BPM
VENTRICULAR RATE: 82 BPM
VENTRICULAR RATE: 83 BPM

## 2024-08-07 PROCEDURE — 87340 HEPATITIS B SURFACE AG IA: CPT | Performed by: INTERNAL MEDICINE

## 2024-08-07 PROCEDURE — 93010 ELECTROCARDIOGRAM REPORT: CPT | Performed by: INTERNAL MEDICINE

## 2024-08-07 PROCEDURE — 73560 X-RAY EXAM OF KNEE 1 OR 2: CPT

## 2024-08-07 PROCEDURE — 86780 TREPONEMA PALLIDUM: CPT | Performed by: INTERNAL MEDICINE

## 2024-08-07 PROCEDURE — 97167 OT EVAL HIGH COMPLEX 60 MIN: CPT

## 2024-08-07 PROCEDURE — 82948 REAGENT STRIP/BLOOD GLUCOSE: CPT

## 2024-08-07 PROCEDURE — 86705 HEP B CORE ANTIBODY IGM: CPT | Performed by: INTERNAL MEDICINE

## 2024-08-07 PROCEDURE — 93005 ELECTROCARDIOGRAM TRACING: CPT

## 2024-08-07 PROCEDURE — 99254 IP/OBS CNSLTJ NEW/EST MOD 60: CPT

## 2024-08-07 PROCEDURE — 86803 HEPATITIS C AB TEST: CPT | Performed by: INTERNAL MEDICINE

## 2024-08-07 PROCEDURE — 99232 SBSQ HOSP IP/OBS MODERATE 35: CPT | Performed by: STUDENT IN AN ORGANIZED HEALTH CARE EDUCATION/TRAINING PROGRAM

## 2024-08-07 PROCEDURE — 86704 HEP B CORE ANTIBODY TOTAL: CPT | Performed by: INTERNAL MEDICINE

## 2024-08-07 PROCEDURE — 97163 PT EVAL HIGH COMPLEX 45 MIN: CPT

## 2024-08-07 PROCEDURE — 99255 IP/OBS CONSLTJ NEW/EST HI 80: CPT | Performed by: INTERNAL MEDICINE

## 2024-08-07 RX ORDER — INSULIN LISPRO 100 [IU]/ML
3 INJECTION, SOLUTION INTRAVENOUS; SUBCUTANEOUS
Status: DISCONTINUED | OUTPATIENT
Start: 2024-08-08 | End: 2024-08-13

## 2024-08-07 RX ORDER — ALPRAZOLAM 0.5 MG
0.5 TABLET ORAL 2 TIMES DAILY PRN
Status: DISCONTINUED | OUTPATIENT
Start: 2024-08-07 | End: 2024-08-14

## 2024-08-07 RX ORDER — METHADONE HYDROCHLORIDE 10 MG/1
190 TABLET ORAL EVERY 24 HOURS
Status: DISCONTINUED | OUTPATIENT
Start: 2024-08-07 | End: 2024-08-07

## 2024-08-07 RX ORDER — ERGOCALCIFEROL 1.25 MG/1
50000 CAPSULE, LIQUID FILLED ORAL 3 TIMES WEEKLY
Status: DISCONTINUED | OUTPATIENT
Start: 2024-08-07 | End: 2024-08-18 | Stop reason: HOSPADM

## 2024-08-07 RX ORDER — AMLODIPINE BESYLATE 5 MG/1
5 TABLET ORAL DAILY
Status: DISCONTINUED | OUTPATIENT
Start: 2024-08-07 | End: 2024-08-18 | Stop reason: HOSPADM

## 2024-08-07 RX ORDER — METHADONE HYDROCHLORIDE 10 MG/ML
190 CONCENTRATE ORAL EVERY 24 HOURS
Status: DISCONTINUED | OUTPATIENT
Start: 2024-08-07 | End: 2024-08-18 | Stop reason: HOSPADM

## 2024-08-07 RX ORDER — INSULIN LISPRO 100 [IU]/ML
1-6 INJECTION, SOLUTION INTRAVENOUS; SUBCUTANEOUS
Status: DISCONTINUED | OUTPATIENT
Start: 2024-08-07 | End: 2024-08-14

## 2024-08-07 RX ORDER — INSULIN LISPRO 100 [IU]/ML
1-6 INJECTION, SOLUTION INTRAVENOUS; SUBCUTANEOUS
Status: DISCONTINUED | OUTPATIENT
Start: 2024-08-08 | End: 2024-08-07

## 2024-08-07 RX ORDER — OLANZAPINE 10 MG/2ML
5 INJECTION, POWDER, FOR SOLUTION INTRAMUSCULAR ONCE
Status: DISCONTINUED | OUTPATIENT
Start: 2024-08-07 | End: 2024-08-11

## 2024-08-07 RX ADMIN — ALPRAZOLAM 0.5 MG: 0.5 TABLET ORAL at 23:36

## 2024-08-07 RX ADMIN — INSULIN LISPRO 5 UNITS: 100 INJECTION, SOLUTION INTRAVENOUS; SUBCUTANEOUS at 08:38

## 2024-08-07 RX ADMIN — Medication 2.5 MG: at 03:47

## 2024-08-07 RX ADMIN — METHOCARBAMOL TABLETS 500 MG: 500 TABLET, COATED ORAL at 23:15

## 2024-08-07 RX ADMIN — LIDOCAINE 1 PATCH: 700 PATCH TOPICAL at 17:23

## 2024-08-07 RX ADMIN — AMLODIPINE BESYLATE 5 MG: 5 TABLET ORAL at 23:15

## 2024-08-07 RX ADMIN — INSULIN LISPRO 3 UNITS: 100 INJECTION, SOLUTION INTRAVENOUS; SUBCUTANEOUS at 17:24

## 2024-08-07 RX ADMIN — Medication 12.5 MG: at 04:14

## 2024-08-07 RX ADMIN — METHOCARBAMOL TABLETS 500 MG: 500 TABLET, COATED ORAL at 00:21

## 2024-08-07 RX ADMIN — INSULIN LISPRO 3 UNITS: 100 INJECTION, SOLUTION INTRAVENOUS; SUBCUTANEOUS at 23:15

## 2024-08-07 RX ADMIN — CYANOCOBALAMIN TAB 500 MCG 1000 MCG: 500 TAB at 11:36

## 2024-08-07 RX ADMIN — METHOCARBAMOL TABLETS 500 MG: 500 TABLET, COATED ORAL at 05:06

## 2024-08-07 RX ADMIN — Medication 190 MG: at 06:32

## 2024-08-07 RX ADMIN — ACETAMINOPHEN 975 MG: 325 TABLET, FILM COATED ORAL at 21:30

## 2024-08-07 RX ADMIN — ERGOCALCIFEROL 50000 UNITS: 1.25 CAPSULE, LIQUID FILLED ORAL at 11:35

## 2024-08-07 RX ADMIN — METHOCARBAMOL TABLETS 500 MG: 500 TABLET, COATED ORAL at 17:23

## 2024-08-07 RX ADMIN — POLYETHYLENE GLYCOL 3350 17 G: 17 POWDER, FOR SOLUTION ORAL at 08:03

## 2024-08-07 RX ADMIN — ACETAMINOPHEN 975 MG: 325 TABLET, FILM COATED ORAL at 05:06

## 2024-08-07 RX ADMIN — ENOXAPARIN SODIUM 30 MG: 30 INJECTION SUBCUTANEOUS at 03:26

## 2024-08-07 RX ADMIN — OXYCODONE HYDROCHLORIDE 5 MG: 5 TABLET ORAL at 21:30

## 2024-08-07 RX ADMIN — METHOCARBAMOL TABLETS 500 MG: 500 TABLET, COATED ORAL at 11:36

## 2024-08-07 RX ADMIN — SENNOSIDES AND DOCUSATE SODIUM 1 TABLET: 8.6; 5 TABLET ORAL at 21:30

## 2024-08-07 RX ADMIN — INSULIN LISPRO 3 UNITS: 100 INJECTION, SOLUTION INTRAVENOUS; SUBCUTANEOUS at 11:38

## 2024-08-07 RX ADMIN — ENOXAPARIN SODIUM 30 MG: 30 INJECTION SUBCUTANEOUS at 17:23

## 2024-08-07 NOTE — PLAN OF CARE
Problem: Prexisting or High Potential for Compromised Skin Integrity  Goal: Skin integrity is maintained or improved  Description: INTERVENTIONS:  - Identify patients at risk for skin breakdown  - Assess and monitor skin integrity  - Assess and monitor nutrition and hydration status  - Monitor labs   - Assess for incontinence   - Turn and reposition patient  - Assist with mobility/ambulation  - Relieve pressure over bony prominences  - Avoid friction and shearing  - Provide appropriate hygiene as needed including keeping skin clean and dry  - Evaluate need for skin moisturizer/barrier cream  - Collaborate with interdisciplinary team   - Patient/family teaching  - Consider wound care consult   Outcome: Progressing     Problem: PAIN - ADULT  Goal: Verbalizes/displays adequate comfort level or baseline comfort level  Description: Interventions:  - Encourage patient to monitor pain and request assistance  - Assess pain using appropriate pain scale  - Administer analgesics based on type and severity of pain and evaluate response  - Implement non-pharmacological measures as appropriate and evaluate response  - Consider cultural and social influences on pain and pain management  - Notify physician/advanced practitioner if interventions unsuccessful or patient reports new pain  Outcome: Progressing     Problem: INFECTION - ADULT  Goal: Absence or prevention of progression during hospitalization  Description: INTERVENTIONS:  - Assess and monitor for signs and symptoms of infection  - Monitor lab/diagnostic results  - Monitor all insertion sites, i.e. indwelling lines, tubes, and drains  - Monitor endotracheal if appropriate and nasal secretions for changes in amount and color  - [place] appropriate cooling/warming therapies per order  - Administer medications as ordered  - Instruct and encourage patient and family to use good hand hygiene technique  - Identify and instruct in appropriate isolation precautions for  identified infection/condition  Outcome: Progressing  Goal: Absence of fever/infection during neutropenic period  Description: INTERVENTIONS:  - Monitor WBC    Outcome: Progressing     Problem: SAFETY ADULT  Goal: Maintain or return to baseline ADL function  Description: INTERVENTIONS:  -  Assess patient's ability to carry out ADLs; assess patient's baseline for ADL function and identify physical deficits which impact ability to perform ADLs (bathing, care of mouth/teeth, toileting, grooming, dressing, etc.)  - Assess/evaluate cause of self-care deficits   - Assess range of motion  - Assess patient's mobility; develop plan if impaired  - Assess patient's need for assistive devices and provide as appropriate  - Encourage maximum independence but intervene and supervise when necessary  - Involve family in performance of ADLs  - Assess for home care needs following discharge   - Consider OT consult to assist with ADL evaluation and planning for discharge  - Provide patient education as appropriate  Outcome: Progressing  Goal: Maintains/Returns to pre admission functional level  Description: INTERVENTIONS:  - Perform AM-PAC 6 Click Basic Mobility/ Daily Activity assessment daily.  - Set and communicate daily mobility goal to care team and patient/family/caregiver.   - Collaborate with rehabilitation services on mobility goals if consulted  - Perform Range of Motion    times a day.  - Reposition patient every    hours.  - Dangle patient    times a day  - Stand patient    times a day  - Ambulate patient    times a day  - Out of bed to chair    times a day   - Out of bed for meals  times a day  - Out of bed for toileting  - Record patient progress and toleration of activity level   Outcome: Progressing     Problem: DISCHARGE PLANNING  Goal: Discharge to home or other facility with appropriate resources  Description: INTERVENTIONS:  - Identify barriers to discharge w/patient and caregiver  - Arrange for needed discharge  resources and transportation as appropriate  - Identify discharge learning needs (meds, wound care, etc.)  - Arrange for interpretive services to assist at discharge as needed  - Refer to Case Management Department for coordinating discharge planning if the patient needs post-hospital services based on physician/advanced practitioner order or complex needs related to functional status, cognitive ability, or social support system  Outcome: Progressing     Problem: Knowledge Deficit  Goal: Patient/family/caregiver demonstrates understanding of disease process, treatment plan, medications, and discharge instructions  Description: Complete learning assessment and assess knowledge base.  Interventions:  - Provide teaching at level of understanding  - Provide teaching via preferred learning methods  Outcome: Progressing     Problem: SAFETY,RESTRAINT: NV/NON-SELF DESTRUCTIVE BEHAVIOR  Goal: Remains free of harm/injury (restraint for non violent/non self-detsructive behavior)  Description: INTERVENTIONS:  - Instruct patient/family regarding restraint use   - Assess and monitor physiologic and psychological status   - Provide interventions and comfort measures to meet assessed patient needs   - Identify and implement measures to help patient regain control  - Assess readiness for release of restraint   Outcome: Progressing  Goal: Returns to optimal restraint-free functioning  Description: INTERVENTIONS:  - Assess the patient's behavior and symptoms that indicate continued need for restraint  - Identify and implement measures to help patient regain control  - Assess readiness for release of restraint   Outcome: Progressing

## 2024-08-07 NOTE — ED ATTENDING ATTESTATION
8/6/2024  INataliia MD, saw and evaluated the patient. I have discussed the patient with the resident/non-physician practitioner and agree with the resident's/non-physician practitioner's findings, Plan of Care, and MDM as documented in the resident's/non-physician practitioner's note, except where noted. All available labs and Radiology studies were reviewed.  I was present for key portions of any procedure(s) performed by the resident/non-physician practitioner and I was immediately available to provide assistance.       At this point I agree with the current assessment done in the Emergency Department.  I have conducted an independent evaluation of this patient a history and physical is as follows:    62-year-old male present to the ER with right hip pain after falling at home.  Recent hip fracture, refused to go to rehab, went home, fall at home.  Unknown downtime.  Patient altered, able to provide limited information.  Moving all extremities.  Grossly nonfocal neuroexam.    Agree with trauma scans.  Likely admission to hospital.      ED Course         Critical Care Time  Procedures

## 2024-08-07 NOTE — CONSULTS
Consultation - Acute Pain Service  David Skelton Jr. 64 y.o. male MRN: [id number]  Unit/Bed#: W -01 Encounter: [id number]               David Skelton Jr. is a 64 y.o. male PMHx of iron deficiency anemia, urinary retention, T2DM, anxiety, polysubstance abuse and prior falls who presents after an unwitnessed fall.    Right hip pain  Assessment & Plan  S/p IM Nail of right right on 7/31, refused [facility] rehab and went home and fell.   CT scan: No acute fractures, does show possible myositis    Multimodal Regimen:  Methadone 190 mg PO QD (Clinic confirmed the dose)  Tylenol 975 mg PO TID. Most recent liver panel with normal levels.   Gabapentin 600 mg PO BID (home med)  Robaxin 500 mg PO TID for muscle spasms  Lidocaine patches change every 12 hours  Add home dose of Xanax to 0.5 mg PO BID PRN anxiety  Monitor for oversedation, respiratory depression  Discontinue dilaudid 0.2 mg   Oxycodone 2.5 mg every 4 hours as needed for moderate pain  Oxycodone 5 mg every 4 hours as needed for severe pain  Narcan order to combate SE of respiratory depression     Bowel Regimen:  Senokot, Miralax       Methadone dependence (HCC)  Assessment & Plan  Currently takes 190 mg Methadone daily  Patient follows with [facility]  Phone number: [phone]  APS called and verified dosage on 8/2  Repeat EKG tomorrow  8/6   If persist increase on QTC, may decrease dose of methadone  Has been on methadone x 20 years, admits to overuse of BZDs and declines use of mood stabilizers.  He has admitted to not following up with an outpatient psych resource x 2 months.        APS will continue to follow. Please contact Acute Pain Service - via [employer] from [phone] with additional questions or concerns. See [employer] or [employer] for additional contacts and after hours information.     History of Present Illness    Admit Date:  8/6/2024  Hospital Day:  1 day  Primary Service:  Trauma  Attending Provider:  Lauryn Ullrich,    Physician Requesting Consult: Lauryn Ullrich, DO  Reason for Consult / Principal Problem: Fall with acute hip pain, chronic methadone   HPI: David Skelton Jr. is a 64 y.o. year old male who presents with fall. Patient recently on 7/31/24 had right hip IM nail. He was to go to rehab however refused and went home for one day and fell. Patient return to ER for evaluation. He was very lethargic on arrival. Patient reports that he does get confused and off balance sometimes and does live home alone. He reports that he would like to get out of his soft belt and go home. He repeats himself multiple times and does not seem to understand his own safety risks. He was able to tolerate PO intake. Passing flatus.     Current pain location(s): Pain Score: 5  Pain Location/Orientation: Orientation: Right, Location: Hip  Pain Scale: Pain Assessment Tool: 0-10      Pain History: Chronic Methadone for over 20 years. Follows with [facility]   Pain Management Physician:  None    I have reviewed the patient's controlled substance dispensing history in the Prescription Drug Monitoring Program in compliance with the [facility] regulations before prescribing any controlled substances.     Inpatient consult to Acute Pain Service  Consult performed by: STEVE Walter  Consult ordered by: Jak Randolph PA-C        Review of Systems   Constitutional:  Negative for chills and fever.   HENT:  Negative for ear pain and sore throat.    Eyes:  Negative for pain and visual disturbance.   Respiratory:  Negative for cough and shortness of breath.    Cardiovascular:  Negative for chest pain and palpitations.   Gastrointestinal:  Negative for abdominal pain and vomiting.   Genitourinary:  Negative for dysuria and hematuria.   Musculoskeletal:  Positive for arthralgias and gait problem. Negative for back pain.   Skin:  Negative for color change and rash.   Neurological:  Negative for seizures and syncope.   All other systems reviewed  and are negative.      Historical Information   Past Medical History:   Diagnosis Date    Anxiety     Depression     Diabetes mellitus (HCC)     Drug use     Hemorrhoids, internal 9/23/2019    Hypertension      Past Surgical History:   Procedure Laterality Date    FEMUR FRACTURE SURGERY Right     GASTRIC BYPASS  11/08/2011    Managed by: Braden Luna (General Surgery)    HERNIA REPAIR  02/13/2013    Incisional hernia repair Managed by: Braden Luna (General Surgery)    MA OPTX FEM SHFT FX W/INSJ IMED IMPLT W/WO SCREW Right 7/31/2024    Procedure: INSERTION NAIL IM FEMUR ANTEGRADE (TROCHANTERIC);  Surgeon: Meng Hill MD;  [facility];  Service: Orthopedics    TONSILLECTOMY  2010     Social History   Social History     Substance and Sexual Activity   Alcohol Use Never     Social History     Substance and Sexual Activity   Drug Use Yes    Comment: Methadone clinic     Social History     Tobacco Use   Smoking Status Never   Smokeless Tobacco Never       Meds/Allergies   all current active meds have been reviewed, current meds:   Current Facility-Administered Medications   Medication Dose Route Frequency    acetaminophen (TYLENOL) tablet 975 mg  975 mg Oral Q8H [facility]    enoxaparin (LOVENOX) subcutaneous injection 30 mg  30 mg Subcutaneous Q12H    insulin lispro (HumALOG/ADMELOG) 100 units/mL subcutaneous injection 1-6 Units  1-6 Units Subcutaneous TID AC    lidocaine (LIDODERM) 5 % patch 1 patch  1 patch Topical Daily    methadone (DOLOPHINE) oral concentrated solution 190 mg  190 mg Oral Q24H    methocarbamol (ROBAXIN) tablet 500 mg  500 mg Oral Q6H MELISA    metoprolol tartrate (LOPRESSOR) partial tablet 12.5 mg  12.5 mg Oral QAM    naloxone (NARCAN) injection 0.4 mg  0.4 mg Intravenous Q1MIN PRN    ondansetron (ZOFRAN-ODT) dispersible tablet 4 mg  4 mg Oral Q6H PRN    oxyCODONE (ROXICODONE) split tablet 2.5 mg  2.5 mg Oral Q4H PRN    Or    oxyCODONE (ROXICODONE) IR tablet 5 mg  5 mg Oral Q4H PRN     polyethylene glycol (MIRALAX) packet 17 g  17 g Oral Daily    senna-docusate sodium (SENOKOT S) 8.6-50 mg per tablet 1 tablet  1 tablet Oral HS   , and PTA meds:   Prior to Admission Medications   Prescriptions Last Dose Informant Patient Reported? Taking?   ALPRAZolam (XANAX) 0.5 mg tablet 2024  No Yes   Sig: Take 1 tablet (0.5 mg total) by mouth 2 (two) times a day as needed for anxiety for up to 10 days   Alcohol Swabs 70 % PADS Unknown  No No   Sig: May substitute brand based on insurance coverage. Check glucose BID.   Blood Glucose Monitoring Suppl (OneTouch Verio Reflect) w/Device KIT 2024  No Yes   Sig: May substitute brand based on insurance coverage. Check glucose BID.   Continuous Blood Gluc  (FreeStyle Grace 14 Day [place]) STEVE Not Taking Self No No   Si Device by Device route 3 (three) times a day before meals   Patient not taking: Reported on 2024   Continuous Blood Gluc  (FreeStyle Grace 2 [place]) STEVE Not Taking Self No No   Si Device by Device route 3 (three) times a day before meals   Patient not taking: Reported on 7/3/2024   DULoxetine (CYMBALTA) 60 mg delayed release capsule Not Taking Self Yes No   Sig: Take 60 mg by mouth in the morning   Patient not taking: Reported on 2024   GLOBAL EASE INJECT PEN NEEDLES 31G X 8 MM MISC Not Taking Self Yes No   Patient not taking: Reported on 7/3/2024   Incontinence Supply Disposable (INCONTINENCE BRIEF MEDIUM) MISC Not Taking Self No No   Sig: by Does not apply route as needed (fecal soiling)   Patient not taking: Reported on 7/3/2024   Incontinence Supply Disposable (RA WIPES FLUSHABLE/MOIST) MISC Unknown Self No No   Sig: by Does not apply route as needed (fecal soiling)   Insulin Pen Needle (B-D UF III MINI PEN NEEDLES) 31G X 5 MM MISC Unknown Self No No   Sig: Inject 15 Units under the skin daily at bedtime   Insulin Pen Needle (BD Pen Needle Aminta 2nd Gen) 32G X 4 MM MISC Unknown  No No   Sig: For use with  insulin pen. Pharmacy may dispense brand covered by insurance.   Misc. Devices (CANE) MISC Unknown Self No No   Sig: by Does not apply route daily Dx:  Frequent falls   OneTouch Delica Lancets 33G MISC Unknown  No No   Sig: May substitute brand based on insurance coverage. Check glucose BID.   acetaminophen (TYLENOL) 325 mg tablet 8/6/2024 Self No Yes   Sig: Take 2 tablets (650 mg total) by mouth every 4 (four) hours as needed for mild pain   aluminum-magnesium hydroxide 200-200 MG/5ML suspension Not Taking Self No No   Sig: Take 15 mL by mouth every 6 (six) hours as needed for heartburn   Patient not taking: Reported on 7/3/2024   aspirin 81 mg chewable tablet Not Taking  No No   Sig: Chew 1 tablet (81 mg total) 2 (two) times a day for 28 days   Patient not taking: Reported on 8/6/2024   bacitracin-polymyxin b (POLYSPORIN) ointment Not Taking Self No No   Sig: Apply topically 2 (two) times a day   Patient not taking: Reported on 8/6/2024   calcium carbonate (OYSTER SHELL,OSCAL) 500 mg Not Taking  No No   Sig: Take 2 tablets by mouth 3 (three) times a day with meals   Patient not taking: Reported on 8/6/2024   divalproex sodium (DEPAKOTE ER) 500 mg 24 hr tablet   No No   Sig: Take 1 tablet (500 mg total) by mouth every 12 (twelve) hours   docusate sodium (COLACE) 100 mg capsule   No No   Sig: Take 1 capsule (100 mg total) by mouth 2 (two) times a day for 5 days   ergocalciferol (ERGOCALCIFEROL) 1.25 MG (25068 UT) capsule   Yes No   Sig: Take 50,000 Units by mouth   fluticasone (FLONASE) 50 mcg/act nasal spray Not Taking  No No   Sig: instill 1 spray into each nostril once daily   Patient not taking: Reported on 8/6/2024   gabapentin (NEURONTIN) 300 mg capsule 8/6/2024  No Yes   Sig: Take 2 capsules (600 mg total) by mouth 2 (two) times a day for 14 days   glucose blood (OneTouch Verio) test strip Unknown  No No   Sig: May substitute brand based on insurance coverage. Check glucose BID.   insulin isophane-insulin  regular (NovoLIN 70/30 FlexPen) 100 units/mL injection pen 8/5/2024  No Yes   Sig: Inject 17 Units under the skin 2 (two) times a day before meals   methadone (DOLOPHINE) 5 mg tablet Past Week Self Yes Yes   Sig: Take 165 mg by mouth daily   metoprolol succinate (TOPROL-XL) 25 mg 24 hr tablet Unknown  No No   Sig: Take 0.5 tablets (12.5 mg total) by mouth daily   ondansetron (ZOFRAN-ODT) 4 mg disintegrating tablet 8/6/2024 Self No Yes   Sig: Take 1 tablet (4 mg total) by mouth every 6 (six) hours as needed for vomiting   oxyCODONE (ROXICODONE) 10 MG TABS Unknown  No No   Sig: You may take 5 mg (0.5 tab) for moderate pain or 10 mg (1 tab) for severe pain, every 4 hours, as needed.   zolpidem (AMBIEN) 10 mg tablet Not Taking Self Yes No   Sig: Take 10 mg by mouth daily at bedtime   Patient not taking: Reported on 8/6/2024      Facility-Administered Medications: None       Allergies   Allergen Reactions    Aspirin Shortness Of Breath    Penicillins        Objective   Vitals:    08/07/24 0359 08/07/24 0414 08/07/24 0515 08/07/24 0737   BP: (!) 192/98 (!) 192/98 168/84 (!) 154/105   BP Location: Right arm  Left arm    Pulse:  70  58   Resp:    16   Temp:    98.3 °F (36.8 °C)   TempSrc:       SpO2:    96%   Weight:       Height:             Intake/Output Summary (Last 24 hours) at 8/7/2024 1031  Last data filed at 8/7/2024 0401  Gross per 24 hour   Intake 1000 ml   Output 5200 ml   Net -4200 ml       Physical Exam  Vitals and nursing note reviewed.   Constitutional:       General: He is not in acute distress.     Appearance: He is well-developed.   HENT:      Head: Normocephalic and atraumatic.      Right Ear: External ear normal.      Left Ear: External ear normal.   Eyes:      Conjunctiva/sclera: Conjunctivae normal.   Cardiovascular:      Rate and Rhythm: Normal rate and regular rhythm.      Pulses: Normal pulses.   Pulmonary:      Effort: Pulmonary effort is normal. No respiratory distress.   Abdominal:       Palpations: Abdomen is soft.   Musculoskeletal:         General: Normal range of motion.      Cervical back: Neck supple.   Skin:     General: Skin is warm and dry.      Capillary Refill: Capillary refill takes less than 2 seconds.   Neurological:      Mental Status: He is alert. Mental status is at baseline.   Psychiatric:         Mood and Affect: Mood normal.         Behavior: Behavior normal.           Lab Results:  Estimated Creatinine Clearance: 97.5 mL/min (by C-G formula based on SCr of 0.76 mg/dL).  Lab Results   Component Value Date    WBC 9.87 08/06/2024    WBC 9.29 01/29/2015    HGB 7.0 (L) 08/06/2024    HGB 12.2 01/29/2015    HCT 21.4 (L) 08/06/2024    HCT 38.0 01/29/2015     08/06/2024     01/29/2015         Component Value Date/Time     01/29/2015 1342    K 3.7 08/06/2024 1129    K 4.6 03/01/2019 1611    CL 99 08/06/2024 1129     03/01/2019 1611    CO2 31 08/06/2024 1129    CO2 29 08/17/2022 0815    CO2 31 03/01/2019 1611    BUN 20 08/06/2024 1129    BUN 17 03/01/2019 1611    CREATININE 0.76 08/06/2024 1129    CREATININE 1.03 03/01/2019 1611         Component Value Date/Time    CALCIUM 8.4 08/06/2024 1129    CALCIUM 8.7 03/01/2019 1611    ALKPHOS 71 08/06/2024 1129    ALKPHOS 133 (H) 03/01/2019 1611    AST 35 08/06/2024 1129    AST 16 03/01/2019 1611    ALT 26 08/06/2024 1129    ALT 27 03/01/2019 1611    BILITOT 0.27 01/29/2015 1342    TP 5.9 (L) 08/06/2024 1129    TP 6.9 03/01/2019 1611    ALB 3.1 (L) 08/06/2024 1129    ALB 3.6 03/01/2019 1611       Imaging Studies/EKG: I have personally reviewed pertinent reports.      Counseling / Coordination of Care  Total floor / unit time spent today 45 minutes. Greater than 50% of total time was spent with the patient and / or family counseling and / or coordination of care.     Please note that the APS provides consultative services regarding pain management only.  With the exception of ketamine and epidural infusions and except when  indicated, final decisions regarding starting or changing doses of analgesic medications are at the discretion of the consulting service.  STEVE Walter  Acute Pain Service

## 2024-08-07 NOTE — CONSULTS
Consultation - Neuropsychology/Psychology Department  David Skelton Jr. 64 y.o. male MRN: [id number]  Unit/Bed#: W -01 Encounter: [id number]        Reason for Consultation:  David Skelton Jr. is a 64 y.o. year old male who was referred for a Neuropsychological Exam to assess cognitive functioning and comment on capacity to make informed medical decisions.       History of Present Illness  History of right hip fracture s/p fixation 7/31, presenting after fall  Physician Requesting Consult: Lauryn Ullrich, DO    PROBLEM LIST:  Patient Active Problem List   Diagnosis    ADD (attention deficit disorder) without hyperactivity    Allergic rhinitis    Atrial fibrillation (HCC)    Bipolar disorder (HCC)    Chronic anticoagulation    Generalized anxiety disorder    GERD without esophagitis    Benign essential hypertension    Insomnia    Iron deficiency anemia    Macular degeneration    Major depression, chronic    Mixed hyperlipidemia    Complex medical condition    Spinal stenosis    Stable proliferative diabetic retinopathy of left eye associated with type 2 diabetes mellitus (HCC)    Type 2 diabetes mellitus with ophthalmic complication, with long-term current use of insulin (HCC)    Ulcer of leg, chronic, left (HCC)    Vitamin D deficiency    History of hepatitis C    Narcotic abuse (HCC)    Rectal prolapse    Drug-induced constipation    Hemorrhoids, internal    S/P gastric bypass    Hypogonadism male    Pre-operative general physical examination    Thoracic degenerative disc disease    DDD (degenerative disc disease), lumbar    Liver lesion, right lobe    Lung nodule    Adrenal nodule (HCC)    Methadone dependence (HCC)    Encephalopathy    Fall    Closed fracture of multiple ribs of left side    Bradycardia    Retinopathy due to secondary DM (HCC)    Visual changes    B12 deficiency    Closed fracture of right femur, unspecified fracture morphology, sequela    Nocturia associated with benign prostatic hyperplasia     Gross hematuria    Acute blood loss anemia (ABLA)    Right hip pain         Historical Information   Past Medical History:   Diagnosis Date    Anxiety     Depression     Diabetes mellitus (HCC)     Drug use     Hemorrhoids, internal 9/23/2019    Hypertension      Past Surgical History:   Procedure Laterality Date    FEMUR FRACTURE SURGERY Right     GASTRIC BYPASS  11/08/2011    Managed by: Braden Luna (General Surgery)    HERNIA REPAIR  02/13/2013    Incisional hernia repair Managed by: Braden Luna (General Surgery)    ND OPTX FEM SHFT FX W/INSJ IMED IMPLT W/WO SCREW Right 7/31/2024    Procedure: INSERTION NAIL IM FEMUR ANTEGRADE (TROCHANTERIC);  Surgeon: Meng Hill MD;  Location: [facility];  Service: Orthopedics    TONSILLECTOMY  2010     Social History   Social History     Substance and Sexual Activity   Alcohol Use Never     Social History     Substance and Sexual Activity   Drug Use Yes    Comment: Methadone clinic     Social History     Tobacco Use   Smoking Status Never   Smokeless Tobacco Never     Family History:   Family History   Problem Relation Age of Onset    Diabetes Mother     Hypertension Mother     Hypertension Father     Autoimmune disease Son        Meds/Allergies   current meds:   Current Facility-Administered Medications   Medication Dose Route Frequency    acetaminophen (TYLENOL) tablet 975 mg  975 mg Oral Q8H MELISA    ALPRAZolam (XANAX) tablet 0.5 mg  0.5 mg Oral BID PRN    cyanocobalamin (VITAMIN B-12) tablet 1,000 mcg  1,000 mcg Oral Daily    enoxaparin (LOVENOX) subcutaneous injection 30 mg  30 mg Subcutaneous Q12H    ergocalciferol (VITAMIN D2) capsule 50,000 Units  50,000 Units Oral Once per day on Monday Wednesday Friday    insulin lispro (HumALOG/ADMELOG) 100 units/mL subcutaneous injection 1-6 Units  1-6 Units Subcutaneous TID AC    lidocaine (LIDODERM) 5 % patch 1 patch  1 patch Topical Daily    methadone (DOLOPHINE) oral concentrated solution 190 mg  190 mg Oral  Q24H    methocarbamol (ROBAXIN) tablet 500 mg  500 mg Oral Q6H MELISA    metoprolol tartrate (LOPRESSOR) partial tablet 12.5 mg  12.5 mg Oral QAM    naloxone (NARCAN) injection 0.4 mg  0.4 mg Intravenous Q1MIN PRN    ondansetron (ZOFRAN-ODT) dispersible tablet 4 mg  4 mg Oral Q6H PRN    oxyCODONE (ROXICODONE) split tablet 2.5 mg  2.5 mg Oral Q4H PRN    Or    oxyCODONE (ROXICODONE) IR tablet 5 mg  5 mg Oral Q4H PRN    polyethylene glycol (MIRALAX) packet 17 g  17 g Oral Daily    senna-docusate sodium (SENOKOT S) 8.6-50 mg per tablet 1 tablet  1 tablet Oral HS       Allergies   Allergen Reactions    Aspirin Shortness Of Breath    Penicillins          Family and Social Support:   Living Arrangements: Lives Alone  Support Systems: Family members  Assistance Needed: none  Type of Current Residence: Private residence  Current Home Care Services: No  Type of Current Home Care Services: Home OT; Home PT  Discharge planning discussed with:: pt  Freedom of Choice: Yes      Behavioral Observations: Patient was alert, UNABLE to accurately state the name of hospital, city and day/week; patient was aware of reason for hospitalization and unable to provide medical history; affect appeared in distress due to pain and patient admitted to depressed mood and anxiety; admitted to psychiatric history; thoughts at times appeared tangential and confused and he evidenced compromised attentional focus and vigilance    Cognitive Examination    General Cognitive Functioning MMSE = [id number]/28;     Attention/Concentration Auditory Selective Attention = Average; Auditory Vigilance = Impaired; Information Processing Speed = Within Normal Limits    Frontal Systems/Executive Functioning Mental Flexibility/Cognitive Control = Impaired; Working Memory = Impaired Abstract Reasoning = Impaired; Generative Ability = Impaired,     Language Functioning Confrontation naming = Within Normal Limits, Phonemic Fluency = Impaired; Semantic Retrieval = Impaired;  Comprehension of Complex Ideational Material = Impaired; Praxis = Within Normal Limits; Repetition = Within Normal Limits; Basic Reading = Impaired;  Following Commands = Impaired    Memory Functioning Narrative Recall - Short Delay = Impaired; Long Delay Narrative Recall = Impaired; three word recall = Impaired 0/3    Visuo-Spatial Abilities Not Assessed    Functional Knowledge  Health & Safety Knowledge = Impaired;     Summary/Impression:  Results of Neuropsychological Exam revealed diffuse cognitive dysfunction and on a measure assessing awareness of personal health status and ability to evaluate health problems, handle medical emergencies and take safety precautions, patient performed in the IMPAIRED rang eo functioning. During this encounter, patient does not appear to have capacity to make fully informed medical decisions/

## 2024-08-07 NOTE — ASSESSMENT & PLAN NOTE
Complaints of pain in R hip  S/p hip surgery on 7/31  CT lower extremity showed possible myositis.     - PT/OT eval and recommendations on disposition

## 2024-08-07 NOTE — UTILIZATION REVIEW
Initial Clinical Review    Admission: Date/Time/Statement:   Admission Orders (From admission, onward)       Ordered        08/06/24 1512  Inpatient Admission  Once                          Orders Placed This Encounter   Procedures    Inpatient Admission     Standing Status:   Standing     Number of Occurrences:   1     Order Specific Question:   Level of Care     Answer:   Med Surg [16]     Order Specific Question:   Bed Type     Answer:   Trauma [7]     Order Specific Question:   Estimated length of stay     Answer:   More than 2 Midnights     Order Specific Question:   Certification     Answer:   I certify that inpatient services are medically necessary for this patient for a duration of greater than two midnights. See H&P and MD Progress Notes for additional information about the patient's course of treatment.     ED Arrival Information       Expected   -    Arrival   8/6/2024 11:04    Acuity   Emergent              Means of arrival   Ambulance    Escorted by   [facility]    Service   Trauma    Admission type   Emergency              Arrival complaint   ems             Chief Complaint   Patient presents with    Fall     Fall sometime last night.         Initial Presentation: 64 y.o. male with hx methadone dependence, s/p R hip surgery 7/31/24 for R intertrochanteric fx  who presents to ED via EMS as trauma with R hip pain s/p unwitnessed fall, unknown headstrike or downtime  . Pt on ASA daily . On exam on arrival, patient somnolent and slightly confused on arrival. He was able to answer questions however. Pt confused, c/o pain everywhere as well as R hip. BP elevated No data recorded 14 . 2 + pedal pulses bilat . TTP over R hip and upper thigh. Staples from recent surgery in place, incision site clean and dry . Labs hgb 7.0,   .CT lower extremity shows possible myositis.No acute traumatic injuries on imaging, CT shows colonic  fecal stasis  .  Pt IVF,  IV analgesic in ED. Admitted as Inpatient  with R hip pain, s/p fall, encephalopathy. Ambulatory dysfunction . Methadone dependence. Plan- PT/OT . Lovenox BID.  Continue home dos methadone . Aggressive bowel regimen    Date: 8/7    Day 2:     Gerontology consult - Acute encephalopathy noted to have passive delirium at present. He has difficulty concentrating. Unable to name the days of the week backwards and forwards. Suspected to have baseline dementia. Needs cognitive assessment.  is AAOx3 at baseline.  Patient was AAOx3 at time of encounter.  Noted to have episodes of confusion and significantly decreased concentration. Uses walker as baseline . PT/OT . Recommend blood transfusion if Hgb <7.0   PT/OT- level II , moderate, rehab resource intensity .Per PT,  pt requires YONATHAN for bed mobility in hospital bed, YONATHAN for STS transfers with RW, is able to maintain supported standing at RW with close S, and YONATHAN for ambulation of 18ft with RW (limited due to pain and generalized fatigue) AM-PAC Basic Mobility Inpatient Short Form Raw Score is 16. A Raw score of less than or equal to 16 suggests the patient may benefit from discharge to post-acute rehabilitation services.   APS consult- recently on 7/31/24 had right hip IM nail. He was to go to rehab however refused and went home for one day and fell. reports that he would like to get out of his soft belt and go home. He repeats himself multiple times and does not seem to understand his own safety risk . Pain  R hip currently 5/10 . Plam Multimodal pain control with Methadone ( dose confirmed w/ clinic )Tylenol 975 mg PO TID. Gabapentin 600 mg PO BID (home med) . Robaxin 500 mg PO TID for muscle spasms . Lidocaine patches change every 12 hours .PRN Oxycodone .  Add home dose of Xanax to 0.5 mg PO BID PRN anxiety . Discontinue dilaudid 0.2 mg . 8/6  . Repeat ECG tomorrow.   Behavioral Health consult- alert, UNABLE to accurately state the name of hospital, city and day/week; patient was aware of reason for  hospitalization and unable to provide medical history . Thoughts at times appeared tangential and confused and he evidenced compromised attentional focus and vigilance  . General Cognitive Functioning MMSE = [id number].  Neuropsychological Exam revealed diffuse cognitive dysfunction and on a measure assessing awareness of personal health status and ability to evaluate health problems, handle medical emergencies and take safety precautions, patient performed in the IMPAIRED rang eo functioning. During this encounter, patient does not appear to have capacity to make fully informed medical decisions/           ED Triage Vitals [08/06/24 1106]   Temperature Pulse Respirations Blood Pressure SpO2 Pain Score   98.1 °F (36.7 °C) 95 18 (!) 180/90 98 % 6     Weight (last 2 days)       Date/Time Weight    08/06/24 18:22:23 79.4 (175)    08/06/24 1106 79.4 (175.05)            Vital Signs (last 3 days)       Date/Time Temp Pulse Resp BP MAP (mmHg) SpO2 O2 Device Patient Position - Orthostatic VS [place] Coma Scale Score Pain    08/07/24 1100 -- -- -- 150/66 -- -- -- Lying -- --    08/07/24 07:37:25 98.3 °F (36.8 °C) 58 16 154/105 121 96 % -- -- -- --    08/07/24 0717 -- -- -- -- -- -- None (Room air) -- 15 5    08/07/24 0515 -- -- -- 168/84 -- -- -- Lying -- --    08/07/24 0500 -- -- -- -- -- -- -- -- 15 --    08/07/24 0414 -- 70 -- 192/98 -- -- -- -- -- --    08/07/24 0359 -- -- -- 192/98 -- -- -- Lying -- --    08/07/24 03:50:19 -- 70 -- 194/105 135 98 % -- Lying -- --    08/07/24 0347 -- -- -- -- -- -- -- -- -- 5    08/07/24 02:59:08 -- 70 -- 193/90 124 97 % -- -- -- --    08/07/24 02:28:17 98.1 °F (36.7 °C) 70 19 173/88 116 100 % -- -- -- --    08/06/24 2252 -- -- -- -- -- -- -- -- -- 8 08/06/24 2152 -- -- -- -- -- -- -- -- -- 9 08/06/24 2143 -- -- -- 156/74 -- -- -- Lying -- --    08/06/24 21:42:39 -- 75 -- 179/99 126 97 % -- -- -- --    08/06/24 2040 -- -- -- -- -- -- -- -- 15 9    08/06/24 1916 -- -- -- 170/80  -- -- None (Room air) Lying -- --    08/06/24 19:05:07 98 °F (36.7 °C) 76 -- 192/93 126 99 % -- -- -- --    08/06/24 18:22:23 98.1 °F (36.7 °C) 75 16 174/88 117 96 % -- -- -- --    08/06/24 1753 -- -- -- -- -- -- -- -- 15 10 - Worst Possible Pain    08/06/24 1733 -- -- -- 170/86 -- -- None (Room air) Sitting -- --    08/06/24 17:20:59 98.2 °F (36.8 °C) 78 -- 180/91 121 98 % -- -- -- --    08/06/24 1654 -- 85 16 185/96 135 98 % None (Room air) Lying -- --    08/06/24 1630 -- 81 -- 205/98 140 96 % -- -- -- --    08/06/24 1623 -- -- -- -- -- -- -- -- -- 9    08/06/24 1550 -- -- -- -- -- -- -- -- -- 10 - Worst Possible Pain    08/06/24 1524 -- -- -- -- -- -- -- -- 15 --    08/06/24 1512 -- -- -- -- -- -- -- -- -- 9    08/06/24 1500 -- 95 17 188/80 -- 99 % -- -- 14 --    08/06/24 1430 -- 94 16 188/83 -- 99 % -- -- 14 --    08/06/24 1400 -- 94 17 178/88 125 99 % -- -- 14 --    08/06/24 1345 -- 98 17 148/74 105 99 % -- -- -- --    08/06/24 1330 -- 94 17 148/69 -- 95 % -- -- 14 --    08/06/24 1300 -- 94 17 185/88 -- 96 % -- -- 14 --    08/06/24 1245 -- 95 19 156/82 -- 96 % -- -- 14 --    08/06/24 1230 -- 91 18 168/82 -- 93 % -- -- 14 --    08/06/24 1215 -- 99 17 175/96 126 98 % -- -- 14 --    08/06/24 1200 -- 93 -- 176/87 -- 96 % -- -- 14 --    08/06/24 1145 -- 93 -- 177/89 -- 82 % -- -- 14 --    08/06/24 1130 -- 88 -- 150/70 -- 98 % -- -- 14 --    08/06/24 11:20:30 -- 95 -- 179/81 -- 97 % -- -- 14 --    08/06/24 1106 98.1 °F (36.7 °C) 95 18 180/90 -- 98 % None (Room air) -- -- 6            Date and Time R Radial Pulse L Radial Pulse R Pedal Pulse L Pedal Pulse   08/07/24 0500 +2 +2 +2 +2   08/06/24 2040 +2 +2 +2 +2   08/06/24 1753 +2 +2 +2 +2   08/06/24 1119 +2 +2 +2 +2     Date and Time R Pupil Size (mm) L Pupil Size (mm) R Pupil Reaction L Pupil Reaction   08/07/24 0717 1 1 Nonreactive Nonreactive   08/07/24 0500 2 2 Brisk Brisk   08/06/24 2040 2 2 Brisk Brisk   08/06/24 1753 2 2 Brisk Brisk   08/06/24 1524 2 2 Brisk  Brisk   08/06/24 1500 1 1 Sluggish Sluggish   08/06/24 1430 1 1 Sluggish Sluggish   08/06/24 1400 1 1 Sluggish Sluggish   08/06/24 1330 1 1 Sluggish Sluggish   08/06/24 1300 1 1 Sluggish Sluggish   08/06/24 1245 1 1 Sluggish Sluggish   08/06/24 1230 1 1 Sluggish Sluggish   08/06/24 1215 1 1 Sluggish Sluggish   08/06/24 1200 1 1 Sluggish Sluggish   08/06/24 1145 1 1 Sluggish Sluggish   08/06/24 1130 1 1 Sluggish Sluggish   08/06/24 1120 1 1 Sluggish Sluggish     Pertinent Labs/Diagnostic Test Results:   Radiology:  XR knee 1 or 2 vw right   Final Interpretation by Sahil Boyle MD (08/07 0726)      No acute osseous abnormality.                  Workstation performed: [id number]         XR Trauma chest portable   Final Interpretation by Telly Javed MD (08/06 1236)      No acute cardiopulmonary disease.            Workstation performed: [id number]         TRAUMA - CT head wo contrast   Final Interpretation by Telly Javed MD (08/06 1253)      No acute intracranial abnormality.      The study was marked in EPIC for immediate notification.            Workstation performed: [id number]         TRAUMA - CT spine cervical wo contrast   Final Interpretation by Telly Javed MD (08/06 1245)      No cervical spine fracture or traumatic malalignment.      The study was marked in EPIC for immediate notification.            Workstation performed: [id number]         TRAUMA - CT chest abdomen pelvis w contrast   Final Interpretation by Telly Javed MD (08/06 1338)      1.  No acute traumatic injury to the chest, abdomen, or pelvis.      2.  Cholelithiasis with severe gallbladder distention, increased from the recent prior study though without wall thickening or pericholecystic inflammatory change. This is of uncertain etiology and clinical significance and clinical correlation for right    upper quadrant symptoms recommended.      3.  Subcentimeter groundglass density in the right upper lobe,  minimally more prominent than in 2023. Based on current Fleischner Society 2017 Guidelines on incidental pulmonary nodule, follow-up CT is recommended for every 2 years until 5 years of    stability is demonstrated.      4.  Moderate to diffuse colonic fecal stasis.      5.  1.3 cm right adrenal nodule, indeterminate though likely an adenoma given stability.      The study was marked in EPIC for immediate notification.         Workstation performed: [id number]         CT lower extremity w contrast right   Final Interpretation by Isaac Irving MD (08/06 1258)      Status post ORIF comminuted intertrochanteric fracture right proximal femur.      Diffuse enlargement of the right thigh musculature, nonspecific but could reflect myositis. No discrete intramuscular hematoma or discrete intramuscular fluid collection.      Subcutaneous edema throughout the right thigh without a discrete subcutaneous hematoma.      Arterial calcifications.      Fecal distention of the rectum suggestive of fecal impaction.         Workstation performed: [id number]           Cardiology:  ECG 12 lead   Final Result by Lorraine Allen MD (08/07 1131)   Sinus rhythm with occasional Premature ventricular complexes and Premature    atrial complexes   Prolonged QT   Abnormal ECG   When compared with ECG of 07-AUG-2024 09:52, (unconfirmed)   Premature atrial complexes are now Present   QT has lengthened   Confirmed by Lorraine Allen ([id number]) on 8/7/2024 11:31:35 AM      ECG 12 lead   Final Result by Lorraine Allen MD (08/07 1131)   Sinus rhythm with frequent Premature ventricular complexes in a pattern of    bigeminy   Minimal voltage criteria for LVH, may be normal variant   Borderline ECG   When compared with ECG of 06-AUG-2024 21:39,   QT has shortened   Confirmed by Lorraine Allen ([id number]) on 8/7/2024 11:31:41 AM      ECG 12 lead   Final Result by Lorraine Allen MD (08/07 0927)   Sinus rhythm with occasional Premature ventricular  complexes   Minimal voltage criteria for LVH, may be normal variant   Prolonged QT   Abnormal ECG   When compared with ECG of 06-AUG-2024 13:53,   Premature ventricular complexes are now Present   Premature supraventricular complexes are no longer Present   Confirmed by Lorraine Allen ([id number]) on 8/7/2024 9:27:52 AM        GI:  No orders to display           Results from last 7 days   Lab Units 08/06/24  1129 08/03/24  0558 08/02/24  1011 08/01/24  0606   WBC Thousand/uL 9.87 7.97 9.03 10.66*   HEMOGLOBIN g/dL 7.0* 8.3* 7.8* 7.9*   HEMATOCRIT % 21.4* 25.5* 24.1* 24.2*   PLATELETS Thousands/uL 298 226 174 160   TOTAL NEUT ABS Thousands/µL 8.45* 5.37  --  9.04*         Results from last 7 days   Lab Units 08/06/24  2248 08/06/24  1129 08/02/24  1011 08/01/24  0606   SODIUM mmol/L  --  136 133* 133*   POTASSIUM mmol/L  --  3.7 4.4 4.8   CHLORIDE mmol/L  --  99 96 97   CO2 mmol/L  --  31 35* 33*   ANION GAP mmol/L  --  6 2* 3*   BUN mg/dL  --  20 15 16   CREATININE mg/dL  --  0.76 0.84 0.84   EGFR ml/min/1.73sq m  --  97 93 93   CALCIUM mg/dL  --  8.4 8.0* 7.9*   MAGNESIUM mg/dL 1.6*  --   --  1.9     Results from last 7 days   Lab Units 08/06/24  1129 08/01/24  0606   AST U/L 35 17   ALT U/L 26 17   ALK PHOS U/L 71 67   TOTAL PROTEIN g/dL 5.9* 5.5*   ALBUMIN g/dL 3.1* 3.0*   TOTAL BILIRUBIN mg/dL 0.80 0.37     Results from last 7 days   Lab Units 08/07/24  1118 08/07/24  0739 08/05/24  1130 08/05/24  0731 08/04/24 2054 08/04/24  1637 08/04/24  1143 08/04/24  0758 08/03/24 2023 08/03/24  1655 08/03/24  1212 08/03/24  0729   POC GLUCOSE mg/dl 269* 335* 131 182* 185* 106 227* 124 211* 173* 183* 134     Results from last 7 days   Lab Units 08/06/24  1129 08/02/24  1011 08/01/24  0606   GLUCOSE RANDOM mg/dL 249* 266* 282*              Results from last 7 days   Lab Units 08/06/24  1151   PH PABLO  7.464*   PCO2 PABLO mm Hg 45.8   PO2 PABLO mm Hg 60.5*   HCO3 PABLO mmol/L 32.1*   BASE EXC PABLO mmol/L 7.6   O2 CONTENT PABLO ml/dL 9.6    O2 HGB, VENOUS % 87.1*         Results from last 7 days   Lab Units 08/06/24  1129   CK TOTAL U/L 1,161*     Results from last 7 days   Lab Units 08/06/24  1547 08/06/24  1359 08/06/24  1129   HS TNI 0HR ng/L  --   --  19   HS TNI 2HR ng/L  --  19  --    HSTNI D2 ng/L  --  0  --    HS TNI 4HR ng/L 20  --   --    HSTNI D4 ng/L 1  --   --          Results from last 7 days   Lab Units 08/06/24  1129   PROTIME seconds 14.8   INR  1.09   PTT seconds 31                         Results from last 7 days   Lab Units 08/06/24  1129   BNP pg/mL 165*               Results from last 7 days   Lab Units 08/06/24  1306   CLARITY UA  Clear   COLOR UA  Light Yellow   SPEC GRAV UA  1.016   PH UA  7.0   GLUCOSE UA mg/dl 200 (1/5%)*   KETONES UA mg/dl Negative   BLOOD UA  Small*   PROTEIN UA mg/dl 100 (2+)*   NITRITE UA  Negative   BILIRUBIN UA  Negative   UROBILINOGEN UA (BE) mg/dl <2.0   LEUKOCYTES UA  Negative   WBC UA /hpf None Seen   RBC UA /hpf 1-2   BACTERIA UA /hpf Occasional   EPITHELIAL CELLS WET PREP /hpf Occasional                 Results from last 7 days   Lab Units 08/06/24  1129   ETHANOL LVL mg/dL <10   ACETAMINOPHEN LVL ug/mL <2*   SALICYLATE LVL mg/dL <5                                   ED Treatment-Medication Administration from 08/06/2024 1104 to 08/06/2024 1718         Date/Time Order Dose Route Action     08/06/2024 1202 iohexol (OMNIPAQUE) 350 MG/ML injection (MULTI-DOSE) 100 mL 100 mL Intravenous Given     08/06/2024 1228 sodium chloride 0.9 % bolus 1,000 mL 1,000 mL Intravenous New Bag     08/06/2024 1512 acetaminophen (TYLENOL) tablet 975 mg 975 mg Oral Given     08/06/2024 1512 oxyCODONE (ROXICODONE) split tablet 2.5 mg -- Oral See Alternative     08/06/2024 1512 oxyCODONE (ROXICODONE) IR tablet 5 mg 5 mg Oral Given     08/06/2024 1550 HYDROmorphone HCl (DILAUDID) injection 0.2 mg 0.2 mg Intravenous Given     08/06/2024 1515 lidocaine (LIDODERM) 5 % patch 1 patch 1 patch Topical Medication Applied      08/06/2024 1554 enoxaparin (LOVENOX) subcutaneous injection 30 mg 30 mg Subcutaneous Given     08/06/2024 1652 ondansetron (ZOFRAN-ODT) dispersible tablet 4 mg 4 mg Oral Given            Past Medical History:   Diagnosis Date    Anxiety     Depression     Diabetes mellitus (HCC)     Drug use     Hemorrhoids, internal 9/23/2019    Hypertension      Present on Admission:   Methadone dependence (HCC)   Encephalopathy   Fall      Admitting Diagnosis: Methadone use [F11.90]  Trochanteric fracture of right femur (HCC) [S72.101A]  Unspecified multiple injuries, initial encounter [T07.XXXA]  Age/Sex: 64 y.o. male  Admission Orders:  Scheduled Medications:  acetaminophen, 975 mg, Oral, Q8H MELISA  cyanocobalamin, 1,000 mcg, Oral, Daily  enoxaparin, 30 mg, Subcutaneous, Q12H  ergocalciferol, 50,000 Units, Oral, Once per day on Monday Wednesday Friday  insulin lispro, 1-6 Units, Subcutaneous, TID AC  lidocaine, 1 patch, Topical, Daily  methadone, 190 mg, Oral, Q24H  methocarbamol, 500 mg, Oral, Q6H MELISA  metoprolol tartrate, 12.5 mg, Oral, QAM  polyethylene glycol, 17 g, Oral, Daily  senna-docusate sodium, 1 tablet, Oral, HS      Continuous IV Infusions:     PRN Meds:  HYDROmorphone, 0.2 mg, Intravenous, Q2H PRN x1 8/6   End: 08/07/24 1028   naloxone, 0.4 mg, Intravenous, Q1MIN PRN  ondansetron, 4 mg, Oral, Q6H PRN  oxyCODONE, 2.5 mg, Oral, Q4H PRN x1 8/7    Or  oxyCODONE, 5 mg, Oral, Q4H PRN    Reg diet    Non violent restraints   Neuro checks      SCD   OOB to chair TID    IP CONSULT TO GERONTOLOGY  IP CONSULT TO ACUTE PAIN SERVICE  IP CONSULT TO NEUROPSYCHOLOGY    Network Utilization Review Department  ATTENTION: Please call with any questions or concerns to [phone] and carefully listen to the prompts so that you are directed to the right person. All voicemails are confidential.   For Discharge needs, contact Care Management DC Support Team at [phone] opt. 2  Send all requests for admission clinical reviews, approved  or denied determinations and any other requests to dedicated fax number below belonging to the campus where the patient is receiving treatment. List of dedicated fax numbers for the Facilities:  FACILITY NAME UR FAX NUMBER   ADMISSION DENIALS (Administrative/Medical Necessity) [phone]   DISCHARGE SUPPORT TEAM (NETWORK) [phone]   PARENT CHILD HEALTH (Maternity/NICU/Pediatrics) [phone]   [facility] [phone]   [facility] [phone]   [facility] [phone]   [facility] [phone]   [facility] [phone]   [facility] [phone]   [facility] [phone]   [facility] [phone]   [facility] [phone]   [facility] [phone]   [facility] [phone]   [facility] [phone]

## 2024-08-07 NOTE — UTILIZATION REVIEW
NOTIFICATION OF INPATIENT ADMISSION   AUTHORIZATION REQUEST   SERVICING FACILITY:   [place] [address]  Tax ID: [id number]  NPI: [id number]   ATTENDING PROVIDER:  Attending Name and NPI#: Lauryn Ullrich, Do [[id number]]  Address: [address] [place] [address]  Phone: [phone]     ADMISSION INFORMATION:  Place of Service: Inpatient Acute [facility]  Place of Service Code: 21  Inpatient Admission Date/Time: 8/6/24  3:12 PM  Discharge Date/Time: No discharge date for patient encounter.  Admitting Diagnosis Code/Description:  Methadone use [F11.90]  Trochanteric fracture of right femur (HCC) [S72.101A]  Unspecified multiple injuries, initial encounter [T07.XXXA]     UTILIZATION REVIEW CONTACT:  Vandana Schmidt Utilization   Network Utilization Review Department  Phone: [phone]  Fax: [phone]  Email: [email or web address]  Contact for approvals/pending authorizations, clinical reviews, and discharge.     PHYSICIAN ADVISORY SERVICES:  Medical Necessity Denial & [place]amv Review  Phone: [phone]  Fax: [phone]  Email: [email or web address]     DISCHARGE SUPPORT TEAM:  For Patients Discharge Needs & Updates  Phone: [phone] opt. 2 Fax: [phone]  Email: [email or web address]

## 2024-08-07 NOTE — PLAN OF CARE
Problem: PHYSICAL THERAPY ADULT  Goal: Performs mobility at highest level of function for planned discharge setting.  See evaluation for individualized goals.  Description: Treatment/Interventions: Functional transfer training, LE strengthening/ROM, Therapeutic exercise, Endurance training, Cognitive reorientation, Patient/family training, Equipment eval/education, Bed mobility, Gait training, Compensatory technique education, Spoke to nursing, Spoke to case management, Spoke to MD     See flowsheet documentation for full assessment, interventions and recommendations.  Note: Prognosis: Fair  Problem List: Decreased strength, Decreased range of motion, Decreased endurance, Impaired balance, Decreased mobility, Decreased cognition, Impaired judgement, Decreased safety awareness, Decreased skin integrity, Orthopedic restrictions, Pain  Assessment: Pt seen for PT evaluation for mobility assessment & discharge needs. Activity orders: up in chair. Pt admitted 8/6/2024 w/ AMS and somnolence s/p fall. Comorbidities affecting pt's fnxl performance include: Falls, ADD, Afib, bipolar disorder, anxiety, depression, macular degeneration, spinal stenosis, DM, gastric bypass surgery, DDD, retinopathy. During PT IE, pt requires YONATHAN for bed mobility in hospital bed, YONATHAN for STS transfers with RW, is able to maintain supported standing at RW with close S, and YONATHAN for ambulation of 18ft with RW (limited due to pain and generalized fatigue). Pt displays above outlined functional impairments & limitations, and presents below his baseline level of functional mobility. The AM-PAC & Barthel Index outcome tools were used to assist in determining pt safety w/ mobility/self care & appropriate d/c recommendations, see above for scores. Pt is at risk of falls d/t multiple comorbidities, h/o falls, impaired balance, impaired cognition, impaired insight/safety awareness, use of ambulatory aid, varying levels of pain , acuity of medical  illness, ongoing medical treatment of primary dx, abnormal lab values, polypharmacy, and WB restriction. Pt's clinical presentation is currently unstable/unpredictable as seen in pt's presentation of changing level of pain, varying levels of cognitive performance, increased fall risk, new onset of impairment of functional mobility, decreased endurance, and new onset of weakness. Pt will benefit from continued PT services in order to address impairments, decrease risk of falls, maximize independence w/ fnxl mobility, & ensure safety w/ mobility for transition to next level of care. Based on pt presentation & impairments, pt would most appropriately benefit from Level II (moderate PT intensity) resources upon d/c.    Barriers to Discharge: Decreased caregiver support     Rehab Resource Intensity Level, PT: II (Moderate Resource Intensity)    See flowsheet documentation for full assessment.

## 2024-08-07 NOTE — OCCUPATIONAL THERAPY NOTE
Occupational Therapy Evaluation     Patient Name: David Skelton Jr.  Today's Date: 8/7/2024  Problem List  Active Problems:    Methadone dependence (HCC)    Encephalopathy    Fall    Right hip pain    Past Medical History  Past Medical History:   Diagnosis Date    Anxiety     Depression     Diabetes mellitus (HCC)     Drug use     Hemorrhoids, internal 9/23/2019    Hypertension      Past Surgical History  Past Surgical History:   Procedure Laterality Date    FEMUR FRACTURE SURGERY Right     GASTRIC BYPASS  11/08/2011    Managed by: Braden Luna (General Surgery)    HERNIA REPAIR  02/13/2013    Incisional hernia repair Managed by: Braden Luna (General Surgery)    MS OPTX FEM SHFT FX W/INSJ IMED IMPLT W/WO SCREW Right 7/31/2024    Procedure: INSERTION NAIL IM FEMUR ANTEGRADE (TROCHANTERIC);  Surgeon: Meng Hill MD;  Location: [facility];  Service: Orthopedics    TONSILLECTOMY  2010 08/07/24 0843   OT Last Visit   OT Visit Date 08/07/24   Note Type   Note type Evaluation   Pain Assessment   Pain Assessment Tool FLACC   Pain Location/Orientation Orientation: Right;Location: Hip   Pain Radiating Towards R upper thigh   Pain Onset/Description Frequency: Constant/Continuous;Descriptor: Discomfort   Effect of Pain on Daily Activities limits comfort, fnxl mobility, activity tolerance and ease of ADLs   Patient's Stated Pain Goal No pain   Hospital Pain Intervention(s) Repositioned;Ambulation/increased activity;Emotional support;Elevated   Multiple Pain Sites No   Pain Rating: FLACC (Rest) - Face 1   Pain Rating: FLACC (Rest) - Legs 0   Pain Rating: FLACC (Rest) - Activity 0   Pain Rating: FLACC (Rest) - Cry 0   Pain Rating: FLACC (Rest) - Consolability 0   Score: FLACC (Rest) 1   Pain Rating: FLACC (Activity) - Face 1   Pain Rating: FLACC (Activity) - Legs 1   Pain Rating: FLACC (Activity) - Activity 1   Pain Rating: FLACC (Activity) - Cry 1   Pain Rating: FLACC (Activity) - Consolability 1    Score: FLACC (Activity) 5   Restrictions/Precautions   Weight Bearing Precautions Per Order Yes   RLE Weight Bearing Per Order WBAT  (s/p R femur IM nail 7/31/24 by Dr. Hill)   Other Precautions Cognitive;Chair Alarm;Bed Alarm;Restraints;Multiple lines;Fall Risk;Pain  (Lap posey belt and B soft wrist restraints applied upon therapist entry; cleared by RN to allow R UE wrist restriant doffed at end of session to allow pt to eat breakfast all other restriants donned at the end of the session w/ patient in recliner chair)   Home Living   Type of Home Apartment  (4th floor apartment)   Home Layout One level;Performs ADLs on one level;Able to live on main level with bedroom/bathroom;Access;Elevator   Bathroom Shower/Tub Walk-in shower   Bathroom Toilet Standard   Bathroom Equipment Other (Comment)  (none)   Bathroom Accessibility Accessible   Home Equipment Walker;Cane  (RW, rollator walker, SPC)   Prior Function   Level of [place] Independent with ADLs;Independent with functional mobility;Independent with IADLS  (baseline pt is fully independent, sister has been (A) with dressing and IADLs since recent sx 7/31/24)   Lives With (S)  Alone   Receives Help From Family  (local, supportive sister)   IADLs Family/Friend/Other provides transportation;Independent with medication management;Independent with meal prep  (taskes public transportation at baseline; since sx sister has been assist with meals, transportation and household needs)   Falls in the last 6 months 1 to 4  (x2 per patient report)   Vocational Unemployed   Comments At true baseline pt is fully independent w/ all aspects of self-care and fnxl mobility. Since recent R hip sx 7/31/2024 patients sister has been (A) with ADLs/IADLs and been ambulating with use of RW.   Lifestyle   Autonomy Pt lives ALONE in a 4th floor apartment with elevator access. Independent with ADLs/IADLs and functional mobility w/o use of AD. (+) falls and (-) driving   Reciprocal  "Relationships Supportive sister and family   Service to Others Unemployed   General   Additional Pertinent History Pt is a 64 yr old male admitted 8/6/24 from home s/p fall, c/o R hip pain, [facility]. Of note, pt with recent R femur IM nail at [facility] 7/31/24, WBAT R LE. Unwitnessed fall in ED this visit.   Family/Caregiver Present No   Subjective   Subjective \"I am not hungry\"   ADL   Eating Assistance 7  Independent   Eating Deficit Beverage management  (retrival of drink from bedside table)   Grooming Assistance 7  Independent   UB Bathing Assistance 5  Supervision/Setup   LB Bathing Assistance 4  Minimal Assistance   UB Dressing Assistance 5  Supervision/Setup   LB Dressing Assistance 4  Minimal Assistance   LB Dressing Deficit Thread RLE into pants;Thread LLE into pants;Pull up over hips;Setup;Verbal cueing;Supervision/safety;Increased time to complete;Requires assistive device for steadying  (increased assistance to thread over R LE and steady while managing over hips)   Toileting Assistance  4  Minimal Assistance   Additional Comments Unable to formally assess bathing, UB dressing, grooming or toileting at time of eval. The above levels of assistance are anticipated based on functional performance deficits with use of clinical judgement. Pt is limited by pain and cognition   Bed Mobility   Supine to Sit 4  Minimal assistance   Additional items Assist x 1;HOB elevated;Bedrails;Increased time required;Verbal cues;LE management  (trunk managment)   Sit to Supine   (NT : OOB to recliner chair)   Additional Comments Denies lighteadedness/dizziness w/ postural changes   Transfers   Sit to Stand 4  Minimal assistance   Additional items Assist x 1;Increased time required;Verbal cues   Stand to Sit 4  Minimal assistance   Additional items Assist x 1;Increased time required;Verbal cues   Stand pivot 4  Minimal assistance   Additional items Assist x 1;Increased time required;Verbal cues  (RW)   Additional Comments Instruction for " optimal hand placement and body mechanics for safe transfers, pt w/ fair application. Use of RW for support   Functional Mobility   Functional Mobility 4  Minimal assistance   Additional Comments Ax1, functional household distance in the room w/ use of RW. VC for pacing and safety   Additional items Rolling walker   Balance   Static Sitting Fair +   Dynamic Sitting Fair -   Static Standing Fair -   Dynamic Standing Poor +   Activity Tolerance   Activity Tolerance Patient limited by fatigue;Patient limited by pain;Other (Comment)  (cognition)   Medical Staff Made Aware Spoke with CM, PT, geriatrics, neuropsych   Nurse Made Aware Spoke with RN pre/post   RUE Assessment   RUE Assessment WFL   LUE Assessment   LUE Assessment WFL   Hand Function   Gross Motor Coordination Functional   Fine Motor Coordination Functional   Vision-Basic Assessment   Current Vision No visual deficits   Cognition   Overall Cognitive Status Impaired   Arousal/Participation Alert;Responsive;Cooperative   Attention Attends with cues to redirect   Orientation Level Oriented X4   Memory Decreased recall of precautions   Following Commands Follows one step commands without difficulty   Comments Pt ID via wristband, name and . Per neuropsych on 2024, pt was deemed to not have the capacity to make medical decisions. Pt is impulsive and displays limited insight into current limitiations and deficits. Patient would benefit from a formal cognitive evaluation to aide in safe discharge planning.   Assessment   Limitation Decreased ADL status;Decreased UE strength;Decreased Safe judgement during ADL;Decreased cognition;Decreased self-care trans;Decreased high-level ADLs  (cognitive evaluation)   Prognosis Fair   Assessment Patient is a 64 y.o. male seen for OT evaluation following admission on 2024  s/p a fall. Please see above for comprehensive list of comorbidities and significant PMHx impacting functional performance. Upon initial evaluation,  pt appears to be performing below baseline functional status. Pt requires supervision for UB ADLs, YONATHAN for LB ADLs, YONATHAN for bed mobility, YONATHAN for transfers and YONATHAN for functional mobility household distance with RW. The AM-PAC & Barthel Index outcome tools were used to assist in determining pt safety w/self care /mobility and appropriate d/c recommendations, see above for score. Occupational performance is affected by the following deficits: decreased balance , decreased functional reach , decreased activity tolerance , impaired judgement and problem solving , impaired safety awareness , impulsive behavior, (+) pain , and direction following. Personal/Environmental factors impacting D/C include: (+) Hx of falls , Assistance needed for ADL/IADLs, Assistance needed for ADLs and functional mobility, High fall risk , decreased insight toward deficits , decreased recall of precautions , health management, and baseline cognitive deficits. Supporting factors include: able to maintain FFSU, support system available, and attitude towards recovery . Patient would benefit from OT services within the acute care setting to maximize level of functional independence in the following areas fall prevention , self-care transfers, bed mobility , functional mobility, formal cognitive evaluation, and ADLs.  From OT standpoint, recommendation at time of D/C would be Level II (Moderate Resource Intensity) .   Goals   Patient Goals to go home   LTG Time Frame 10-14   Long Term Goal See below   Plan   Treatment Interventions ADL retraining;Functional transfer training;Endurance training;Cognitive reorientation;Patient/family training;Equipment evaluation/education;Compensatory technique education;Energy conservation;Activityengagement   Goal Expiration Date 08/17/24   OT Treatment Day 0   OT Frequency 3-5x/wk   Discharge Recommendation   Rehab Resource Intensity Level, OT II (Moderate Resource Intensity)   AM-PAC Daily Activity  Inpatient   Lower Body Dressing 2   Bathing 2   Toileting 2   Upper Body Dressing 3   Grooming 3   Eating 4   Daily Activity Raw Score 16   Daily Activity Standardized Score (Calc for Raw Score >=11) 35.96   AM-PAC Applied Cognition Inpatient   Following a Speech/Presentation 3   Understanding Ordinary Conversation 4   Taking Medications 1   Remembering Where Things Are Placed or Put Away 3   Remembering List of 4-5 Errands 2   Taking Care of Complicated Tasks 1   Applied Cognition Raw Score 14   Applied Cognition Standardized Score 32.02   Barthel Index   Feeding 10   Bathing 0   Grooming Score 0   Dressing Score 5   Bladder Score 10   Bowels Score 10   Toilet Use Score 5   Transfers (Bed/Chair) Score 10   Mobility (Level Surface) Score 0   Stairs Score 0   Barthel Index Score 50   End of Consult   Education Provided Yes   Patient Position at End of Consult Bedside chair;Bed/Chair alarm activated;All needs within reach  (restriants donned)   Nurse Communication Nurse aware of consult  (restriants donned)     GOALS:      -Patient will perform grooming tasks standing at sink with overall Mod I in order to increase overall independence     -Patient will be Mod I with UB dressing using AE and AD as needed in order to increase (I) with ADLs     -Patient will be Mod I with UB bathing using AE and AD as needed in order to increase (I) with ADLs     -Patient will be Mod I with LB dressing with use of AE and AD as needed in order to increase (I) with ADLs     -Patient will be Mod I with LB bathing with use of AE and AD as needed in order to increase (I) with ADLs     -Patient will complete toileting w/ Mod I w/ G hygiene/thoroughness in order to reduce caregiver burden     -Patient will demonstrate Mod I with bed mobility for ability to manage own comfort and initiate OOB tasks.      -Patient will perform functional transfers with Mod I to/from all surfaces using DME as needed in order to increase (I) with functional  tasks     -Patient will be Mod I with functional mobility to/from bathroom for increased independence with toileting tasks     -Patient will tolerate therapeutic activities for greater than 30 min, in order to increase tolerance for functional activities.      -Patient will engage in ongoing cognitive assessment in order to assist with safe discharge planning/recommendations.     -Patient will independently integrate one pacing strategy into morning ADL's.     -Patient will demonstrate PLB techniques during ADL tasks with min VC     -Patient will demonstrate standing for 5-6 min in order to increase active participation in functional activities    Argentina Bernardo MS OTR/L   [place] Licensure# [id number]

## 2024-08-07 NOTE — PLAN OF CARE
Problem: Potential for Falls  Goal: Patient will remain free of falls  Description: INTERVENTIONS:  - Educate patient/family on patient safety including physical limitations  - Instruct patient to call for assistance with activity   - Consult OT/PT to assist with strengthening/mobility   - Keep Call bell within reach  - Keep bed low and locked with side rails adjusted as appropriate  - Keep care items and personal belongings within reach  - Initiate and maintain comfort rounds  - Make Fall Risk Sign visible to staff  - Apply yellow socks and bracelet for high fall risk patients  - Consider moving patient to room near nurses station  Outcome: Not Met     Problem: SAFETY ADULT  Goal: Patient will remain free of falls  Description: INTERVENTIONS:  - Educate patient/family on patient safety including physical limitations  - Instruct patient to call for assistance with activity   - Consult OT/PT to assist with strengthening/mobility   - Keep Call bell within reach  - Keep bed low and locked with side rails adjusted as appropriate  - Keep care items and personal belongings within reach  - Initiate and maintain comfort rounds  - Make Fall Risk Sign visible to staff  - Offer Toileting every Hour in advance of need  - Initiate/Maintain bed alarm  - Obtain necessary fall risk management equipment: bed alarm, socks, yellow bracelet  - Apply yellow socks and bracelet for high fall risk patients  - Consider moving patient to room near nurses station  Outcome: Not Met

## 2024-08-07 NOTE — ASSESSMENT & PLAN NOTE
S/p IM Nail of right right on 7/31, refused [facility] rehab and went home and fell.   CT scan: No acute fractures, does show possible myositis    Multimodal Regimen:  Tylenol 975 mg PO every 8 hours scheduled  Gabapentin 600 mg PO BID (home med)  Estimated Creatinine Clearance: 66.8 mL/min (by C-G formula based on SCr of 1.11 mg/dL).  Robaxin 500 mg oral every 6 hours scheduled  Lidocaine patches change every 12 hours  Xanax 0.5 mg PO BID PRN anxiety; home medication  Monitor for oversedation, respiratory depression   Oxycodone 5 mg every 4 hours as needed for moderate pain  Oxycodone 7.5 mg every 4 hours as needed for severe pain  Narcan as needed for opioid reversal agent/respiratory depression     Bowel Regimen:  MiraLAX daily  Senokot-S changed to 2 tablets twice a day for constipation    At discharge, suggest the following:  Tylenol 975 mg p.o. every 8 hours as needed mild pain.  Lidocaine patch, on for 12 hours and off for 12 hours.  Robaxin 500 mg p.o. every 6 hours as needed muscle spasm x 3 days.  Oxycodone 5 mg p.o. every 4 hours as needed severe pain x 2 days.  As needed intranasal Narcan for reversal of opioid.  Resume methadone 190 mg p.o. daily at discretion of methadone clinic.  Patient will require documentation of any medications given during hospitalization and any prescriptions given at discharge to provide to the methadone clinic.

## 2024-08-07 NOTE — CASE MANAGEMENT
Case Management Assessment & Discharge Planning Note    Patient name David Skelton Jr.  Location W /W -01 MRN [id number]  : 1960 Date 2024       Current Admission Date: 2024  Current Admission Diagnosis:Methadone dependence (HCC)   Patient Active Problem List    Diagnosis Date Noted Date Diagnosed    Right hip pain 2024     Acute blood loss anemia (ABLA) 2024     Nocturia associated with benign prostatic hyperplasia 2024     Gross hematuria 2024     Closed fracture of right femur, unspecified fracture morphology, sequela 2024     B12 deficiency 2023     Bradycardia 2023     Retinopathy due to secondary DM (HCC) 2023     Visual changes 2023     Fall 2023     Closed fracture of multiple ribs of left side 2023     Methadone dependence (HCC) 2023     Encephalopathy 2023     Liver lesion, right lobe 2021     Lung nodule 2021     Adrenal nodule (HCC) 2021     Thoracic degenerative disc disease 2021     DDD (degenerative disc disease), lumbar 2021     Pre-operative general physical examination 2019     S/P gastric bypass 11/15/2019     Hypogonadism male 11/15/2019     Hemorrhoids, internal 2019     Rectal prolapse 2019     Drug-induced constipation 2019     Chronic anticoagulation 2019     GERD without esophagitis 2019     Iron deficiency anemia 2019     Major depression, chronic 2019     Mixed hyperlipidemia 2019     Stable proliferative diabetic retinopathy of left eye associated with type 2 diabetes mellitus (HCC) 2019     Narcotic abuse (HCC) 2018     Ulcer of leg, chronic, left (HCC) 2015     ADD (attention deficit disorder) without hyperactivity 2015     Bipolar disorder (HCC) 01/10/2014     Insomnia 2013     History of hepatitis C 2013     Allergic rhinitis 2013     Generalized  anxiety disorder 01/04/2013     Macular degeneration 01/04/2013     Spinal stenosis 01/04/2013     Vitamin D deficiency 01/04/2013     Benign essential hypertension 09/14/2012     Type 2 diabetes mellitus with ophthalmic complication, with long-term current use of insulin (HCC) 09/14/2012     Atrial fibrillation (HCC) 05/15/2012     Complex medical condition 05/15/2012       LOS (days): 1  Geometric Mean LOS (GMLOS) (days):   Days to GMLOS:     OBJECTIVE:  PATIENT READMITTED TO HOSPITAL  Risk of Unplanned Readmission Score: 36.46         Current admission status: Inpatient       Preferred Pharmacy:   [id number] - [place], [place] - [address]  [address]  [place] [address]  Phone: [phone] Fax: [phone]    [id number] - [place], [place] - [address]  [address]  [place] [address]  Phone: [phone] Fax: [phone]    [facility] [place] - [place] - [address]  [address]  [place] [address]  Phone: [phone] Fax: [phone]    Primary Care Provider: Casey Mckeon MD    Primary Insurance: [facility]  Secondary Insurance:     ASSESSMENT:  Active Health Care Proxies       [facility] Representative - [facility]   Primary Phone: [phone] (Mobile)  Home Phone: [phone]                           Readmission Root Cause  30 Day Readmission: Yes  Who directed you to return to the hospital?: Self  Did you understand whom to contact if you had questions or problems?: Yes  Did you get your prescriptions before you left the hospital?: Yes  Were you able to get your prescriptions filled when you left the hospital?: Yes  Did you take your medications as prescribed?: Yes  Were you able to get to your follow-up appointments?: Yes  During previous admission, was a post-acute recommendation made?: Yes  What post-acute resources were offered?:  STR  Patient was readmitted due to: fall  Action Plan: rehab    Patient Information  Admitted from:: Home  Mental Status: Alert  During Assessment patient was accompanied by: Not accompanied during assessment  Assessment information provided by:: Patient  Primary Caregiver: Self  Support Systems: Family members  County of Residence: [place]  What [facility] do you live in?: [place]  Living Arrangements: Lives Alone  Is patient a ?: No    Activities of Daily Living Prior to Admission  Functional Status: Independent  Completes ADLs independently?: Yes  Ambulates independently?: Yes  Does patient use assisted devices?: No  Does patient currently own DME?: No  Does patient have a history of Outpatient Therapy (PT/OT)?: No  Does the patient have a history of Short-Term Rehab?: No  Does patient have a history of HHC?: Yes  Does patient currently have HHC?: Yes    Current Home Health Care  Type of Current Home Care Services: Home OT, Home PT  Current Home Health Agency:: [facility]  Current Home Health Follow-Up Provider:: PCP    Patient Information Continued  Income Source: SSI/SSD  Does patient have prescription coverage?: Yes  Does patient receive dialysis treatments?: No  Does patient have a history of substance abuse?: Yes  Historical substance use preference: Heroin  History of Withdrawal Symptoms: Delirium tremors  Is patient currently in treatment for substance abuse?: Yes  Does patient have a history of Mental Health Diagnosis?: Yes  Is patient receiving treatment for mental health?: No. Patient declined treatment information.  Has patient received inpatient treatment related to mental health in the last 2 years?: No                Social Determinants of Health (SDOH)      Flowsheet Row Most Recent Value   Housing Stability    In the last 12 months, was there a time when you were not able to pay the mortgage or rent on time? N   At any time in the past 12 months, were you homeless or living in a shelter  (including now)? N   Transportation Needs    In the past 12 months, has lack of transportation kept you from medical appointments or from getting medications? no   In the past 12 months, has lack of transportation kept you from meetings, work, or from getting things needed for daily living? No   Food Insecurity    Within the past 12 months, you worried that your food would run out before you got the money to buy more. Never true   Within the past 12 months, the food you bought just didn't last and you didn't have money to get more. Never true   Utilities    In the past 12 months has the electric, gas, oil, or water company threatened to shut off services in your home? No        CM reviewed the availability of treatment team to discuss questions or concerns patient and/or family may have regarding  understanding medications and recognizing signs and symptoms at discharge.  CM also encouraged patient to follow up with all recommended appointments after discharge. CM reviewed the information that will be provided to pt/family on the discharge instructions.  Patient advised of importance for patient and family to participate in managing patient's medical well being.      DISCHARGE DETAILS:    Discharge planning discussed with:: pt  Freedom of Choice: Yes  Comments - Freedom of Choice: CM met with pt to review the recommendations of the care team for rehab at [facility].  Pt is in agreement with this and would like blanket referrals made.      Requested Home Health Care         Is the patient interested in HHC at discharge?: No    DME Referral Provided  Referral made for DME?: No    Other Referral/Resources/Interventions Provided:  Interventions: Short Term Rehab  Referral Comments: STR is being recommended for pt and he is requesting blanket referrals be made to generate a list of facilities that are able to accept him.  CM reviewed with pt the potential barrier of his prescribed methadone.  Pt voiced understanding.  Referrals  have been made and CM will continue to follow to assist with needs and planning for DC.       Treatment Team Recommendation: Short Term Rehab  Discharge Destination Plan:: Short Term Rehab

## 2024-08-07 NOTE — PROGRESS NOTES
Critical access hospital  Progress Note  Name: David Osorio I  MRN: [id number]  Unit/Bed#: W -01 I Date of Admission: 8/6/2024   Date of Service: 8/7/2024 I Hospital Day: 1    Assessment & Plan   Right hip pain  Assessment & Plan  Complaints of pain in R hip  S/p hip surgery on 7/31  CT lower extremity showed possible myositis.     - PT/OT eval and recommendations on disposition    Fall  Assessment & Plan  Pt is s/p fall with headstrike, unknown down time  Presented to ED as Trauma C      Encephalopathy  Assessment & Plan  Pt initially very lethargic on arrival to ED, suspect due to methadone  - Pt became more alert, but was still confused.     - Pt at high risk for delirium   - Geriatrics on board    Methadone dependence ([facility])  Assessment & Plan  Pt has a hx of opioid abuse, and has been on chronic methadone.  Current dose is 190mg daily. Dose was confrimed    Continue home dose of methadone  APS on board, will continue to monitor QTc, and consider decreasing dose if it lengthens    Type 2 diabetes mellitus with ophthalmic complication, with long-term current use of insulin ([facility])  Assessment & Plan  Lab Results   Component Value Date    HGBA1C 10.7 (H) 07/31/2024       Recent Labs     08/05/24  1130 08/07/24  0739 08/07/24  1118 08/07/24  1617   POCGLU 131 335* 269* 259*       Blood Sugar Average: Last 72 hrs:  (P) [id number]    Continue insulin regimen while admitted.      Bipolar disorder ([facility])  Assessment & Plan  Pt has a hx of bipolar disorder  Follows with Psych outpatient.     - Continue to monitor             Bowel Regimen: Miralax  VTE Prophylaxis:Sequential compression device (Venodyne)  and Enoxaparin (Lovenox)     Disposition: Pending dispo planning    Subjective   Chief Complaint: I feel fine    Subjective: Pt reports improved pain in his R hip, and no other complaints.        Objective   Vitals:   Temp:  [98 °F (36.7 °C)-98.3 °F (36.8 °C)] 98.2 °F (36.8 °C)  HR:   "[58-76] 60  Resp:  [12-19] 12  BP: (150-200)/() 190/80    I/O         08/05 0701  08/06 0700 08/06 0701  08/07 0700 08/07 0701  08/08 0700    IV Piggyback  1000     Total Intake(mL/kg)  1000 (12.6)     Urine (mL/kg/hr)  5200     Stool  0     Total Output  5200     Net  -4200            Unmeasured Urine Occurrence  2 x 1 x    Unmeasured Stool Occurrence  1 x              Physical Exam:   GENERAL APPEARANCE: no acute distress  NEURO: slightly confused   HEENT: moist mucous membranes   CV: regular rate and rhythm  LUNGS: lungs CTAB  GI: abdomen soft, nontender  : Pelvis stable. Voiding  MSK: No deformity  SKIN: warm, dry    Invasive Devices       Peripheral Intravenous Line  Duration             Peripheral IV 08/06/24 Proximal;Right;Ventral (anterior) Forearm 1 day                          Lab Results: Results: I have personally reviewed all pertinent laboratory/tests results, BMP/CMP: No results found for: \"SODIUM\", \"K\", \"CL\", \"CO2\", \"ANIONGAP\", \"BUN\", \"CREATININE\", \"GLUCOSE\", \"CALCIUM\", \"AST\", \"ALT\", \"ALKPHOS\", \"PROT\", \"BILITOT\", \"EGFR\", and CBC: No results found for: \"WBC\", \"HGB\", \"HCT\", \"MCV\", \"PLT\", \"ADJUSTEDWBC\", \"RBC\", \"MCH\", \"MCHC\", \"RDW\", \"MPV\", \"NRBC\"  Imaging: I have personally reviewed pertinent reports.            "

## 2024-08-07 NOTE — ASSESSMENT & PLAN NOTE
Pt initially very lethargic on arrival to ED, suspect due to methadone  - Pt became more alert, but was still confused.     - Pt at high risk for delirium   - Geriatrics on board

## 2024-08-07 NOTE — PLAN OF CARE
Problem: Potential for Falls  Goal: Patient will remain free of falls  Description: INTERVENTIONS:  - Educate patient/family on patient safety including physical limitations  - Instruct patient to call for assistance with activity   - Consult OT/PT to assist with strengthening/mobility   - Keep Call bell within reach  - Keep bed low and locked with side rails adjusted as appropriate  - Keep care items and personal belongings within reach  - Initiate and maintain comfort rounds  - Make Fall Risk Sign visible to staff  - Offer Toileting every  Hours, in advance of need  - Initiate/Maintain alarm  - Obtain necessary fall risk management equipment  - Apply yellow socks and bracelet for high fall risk patients  - Consider moving patient to room near nurses station  Outcome: Progressing     Problem: Prexisting or High Potential for Compromised Skin Integrity  Goal: Skin integrity is maintained or improved  Description: INTERVENTIONS:  - Identify patients at risk for skin breakdown  - Assess and monitor skin integrity  - Assess and monitor nutrition and hydration status  - Monitor labs   - Assess for incontinence   - Turn and reposition patient  - Assist with mobility/ambulation  - Relieve pressure over bony prominences  - Avoid friction and shearing  - Provide appropriate hygiene as needed including keeping skin clean and dry  - Evaluate need for skin moisturizer/barrier cream  - Collaborate with interdisciplinary team   - Patient/family teaching  - Consider wound care consult   Outcome: Progressing     Problem: PAIN - ADULT  Goal: Verbalizes/displays adequate comfort level or baseline comfort level  Description: Interventions:  - Encourage patient to monitor pain and request assistance  - Assess pain using appropriate pain scale  - Administer analgesics based on type and severity of pain and evaluate response  - Implement non-pharmacological measures as appropriate and evaluate response  - Consider cultural and social  influences on pain and pain management  - Notify physician/advanced practitioner if interventions unsuccessful or patient reports new pain  Outcome: Progressing     Problem: INFECTION - ADULT  Goal: Absence or prevention of progression during hospitalization  Description: INTERVENTIONS:  - Assess and monitor for signs and symptoms of infection  - Monitor lab/diagnostic results  - Monitor all insertion sites, i.e. indwelling lines, tubes, and drains  - Monitor endotracheal if appropriate and nasal secretions for changes in amount and color  - [place] appropriate cooling/warming therapies per order  - Administer medications as ordered  - Instruct and encourage patient and family to use good hand hygiene technique  - Identify and instruct in appropriate isolation precautions for identified infection/condition  Outcome: Progressing  Goal: Absence of fever/infection during neutropenic period  Description: INTERVENTIONS:  - Monitor WBC    Outcome: Progressing     Problem: SAFETY ADULT  Goal: Patient will remain free of falls  Description: INTERVENTIONS:  - Educate patient/family on patient safety including physical limitations  - Instruct patient to call for assistance with activity   - Consult OT/PT to assist with strengthening/mobility   - Keep Call bell within reach  - Keep bed low and locked with side rails adjusted as appropriate  - Keep care items and personal belongings within reach  - Initiate and maintain comfort rounds  - Make Fall Risk Sign visible to staff  - Offer Toileting every  Hours, in advance of need  - Initiate/Maintain alarm  - Obtain necessary fall risk management equipment:   - Apply yellow socks and bracelet for high fall risk patients  - Consider moving patient to room near nurses station  Outcome: Progressing  Goal: Maintain or return to baseline ADL function  Description: INTERVENTIONS:  -  Assess patient's ability to carry out ADLs; assess patient's baseline for ADL function and identify  physical deficits which impact ability to perform ADLs (bathing, care of mouth/teeth, toileting, grooming, dressing, etc.)  - Assess/evaluate cause of self-care deficits   - Assess range of motion  - Assess patient's mobility; develop plan if impaired  - Assess patient's need for assistive devices and provide as appropriate  - Encourage maximum independence but intervene and supervise when necessary  - Involve family in performance of ADLs  - Assess for home care needs following discharge   - Consider OT consult to assist with ADL evaluation and planning for discharge  - Provide patient education as appropriate  Outcome: Progressing  Goal: Maintains/Returns to pre admission functional level  Description: INTERVENTIONS:  - Perform AM-PAC 6 Click Basic Mobility/ Daily Activity assessment daily.  - Set and communicate daily mobility goal to care team and patient/family/caregiver.   - Collaborate with rehabilitation services on mobility goals if consulted  - Perform Range of Motion  times a day.  - Reposition patient every  hours.  - Dangle patient times a day  - Stand patient times a day  - Ambulate patient times a day  - Out of bed to chair times a day   - Out of bed for meals  times a day  - Out of bed for toileting  - Record patient progress and toleration of activity level   Outcome: Progressing     Problem: DISCHARGE PLANNING  Goal: Discharge to home or other facility with appropriate resources  Description: INTERVENTIONS:  - Identify barriers to discharge w/patient and caregiver  - Arrange for needed discharge resources and transportation as appropriate  - Identify discharge learning needs (meds, wound care, etc.)  - Arrange for interpretive services to assist at discharge as needed  - Refer to Case Management Department for coordinating discharge planning if the patient needs post-hospital services based on physician/advanced practitioner order or complex needs related to functional status, cognitive ability, or  social support system  Outcome: Progressing     Problem: Knowledge Deficit  Goal: Patient/family/caregiver demonstrates understanding of disease process, treatment plan, medications, and discharge instructions  Description: Complete learning assessment and assess knowledge base.  Interventions:  - Provide teaching at level of understanding  - Provide teaching via preferred learning methods  Outcome: Progressing

## 2024-08-07 NOTE — ASSESSMENT & PLAN NOTE
Lab Results   Component Value Date    HGBA1C 10.7 (H) 07/31/2024       Recent Labs     08/05/24  1130 08/07/24  0739 08/07/24  1118 08/07/24  1617   POCGLU 131 335* 269* 259*       Blood Sugar Average: Last 72 hrs:  (P) [id number]    Continue insulin regimen while admitted.

## 2024-08-07 NOTE — PHYSICAL THERAPY NOTE
PHYSICAL THERAPY EVALUATION  DATE: 08/07/24  TIME: 0859-0919    NAME:  David Skelton Jr.  AGE:   64 y.o.  Mrn:   [id number]  Length Of Stay: 1    ADMIT DX:  Methadone use [F11.90]  Trochanteric fracture of right femur (HCC) [S72.101A]  Unspecified multiple injuries, initial encounter [T07.XXXA]    Past Medical History:   Diagnosis Date    Anxiety     Depression     Diabetes mellitus (HCC)     Drug use     Hemorrhoids, internal 9/23/2019    Hypertension      Past Surgical History:   Procedure Laterality Date    FEMUR FRACTURE SURGERY Right     GASTRIC BYPASS  11/08/2011    Managed by: Braden Luna (General Surgery)    HERNIA REPAIR  02/13/2013    Incisional hernia repair Managed by: Braden Luna (General Surgery)    NM OPTX FEM SHFT FX W/INSJ IMED IMPLT W/WO SCREW Right 7/31/2024    Procedure: INSERTION NAIL IM FEMUR ANTEGRADE (TROCHANTERIC);  Surgeon: Meng Hill MD;  Location: [facility];  Service: Orthopedics    TONSILLECTOMY  2010       Performed at least 2 patient identifiers during session: Name, Birthday, ID bracelet, and Epic photo     08/07/24 0859   PT Last Visit   PT Visit Date 08/07/24   Note Type   Note type Evaluation   Pain Assessment   Pain Assessment Tool FLACC   Pain Location/Orientation Orientation: Right;Location: Hip   Pain Radiating Towards R upper thigh   Pain Onset/Description Onset: Ongoing;Descriptor: Aching;Descriptor: Sore   Effect of Pain on Daily Activities limits independence with self care tasks and mobility; limits gait mechanics and gait speed   Patient's Stated Pain Goal No pain   Hospital Pain Intervention(s) Repositioned;Ambulation/increased activity;Elevated;Emotional support   Multiple Pain Sites No   Pain Rating: FLACC (Rest) - Face 1   Pain Rating: FLACC (Rest) - Legs 0   Pain Rating: FLACC (Rest) - Activity 0   Pain Rating: FLACC (Rest) - Cry 0   Pain Rating: FLACC (Rest) - Consolability 0   Score: FLACC (Rest) 1   Pain Rating: FLACC (Activity) - Face 1    Pain Rating: FLACC (Activity) - Legs 1   Pain Rating: FLACC (Activity) - Activity 1   Pain Rating: FLACC (Activity) - Cry 1   Pain Rating: FLACC (Activity) - Consolability 1   Score: FLACC (Activity) 5   Restrictions/Precautions   Weight Bearing Precautions Per Order Yes   RLE Weight Bearing Per Order WBAT  (s/p R femur IM nail 7/31/24 by Dr. Hill)   Other Precautions Cognitive;Chair Alarm;Bed Alarm;Impulsive;Fall Risk;Pain;Restraints  (Lap posey belt and B soft wrist restraints applied upon therapist entry; cleared by RN to allow R UE wrist restriant doffed at end of session to allow pt to eat breakfast)   Home Living   Type of Home Apartment   Home Layout One level;Elevator  (4th floor apartment with elevator access)   Bathroom Shower/Tub Walk-in shower   Bathroom Toilet Standard   Bathroom Equipment Other (Comment)  (none)   Bathroom Accessibility Accessible   Home Equipment Walker;Cane  (RW, rollator walker, SPC)   Prior Function   Level of [place] Independent with ADLs;Independent with functional mobility;Independent with IADLS  (baseline independent with IADLs but since surgery 7/31/24 pt's sister has been assisting with IADLs)   Lives With Alone   Receives Help From Family   IADLs Independent with meal prep;Independent with medication management;Family/Friend/Other provides transportation  (sister has recently been assisting with meals, transportation, household needs; baseline takes public transportation)   Falls in the last 6 months 1 to 4   Vocational Unemployed   Comments At baseline pt is fully independent with all aspects of self care and functional mobility with no AD. Since R hip surgery 7/31/24, pt's sister has been assisting with IADLs, pt has been ambulating with RW. Of note, pt was recommended for level 2 rehab post op however declined.   General   Additional Pertinent History Pt is a 64 yr old male admitted 8/6/24 from home s/p fall, c/o R hip pain, AMS. Of note, pt with recent R  "femur IM nail at [facility] 7/31/24, WBAT R LE. Unwitnessed fall in ED this visit.   Family/Caregiver Present No   Cognition   Overall Cognitive Status Impaired   Arousal/Participation Cooperative   Orientation Level Oriented X4   Memory Decreased recall of recent events;Decreased short term memory;Decreased recall of precautions   Following Commands Follows one step commands without difficulty   Subjective   Subjective \"Yea we can do that.\"   RUE Assessment   RUE Assessment WFL   LUE Assessment   LUE Assessment WFL   RLE Assessment   RLE Assessment X  (grossly 2+/5 MMT throughout)   LLE Assessment   LLE Assessment X  (grossly 3+/5 MMT throughout)   Coordination   Movements are Fluid and Coordinated 1   Sensation WFL   Light Touch   RLE Light Touch Grossly intact   LLE Light Touch Grossly intact   Proprioception   RLE Proprioception Grossly intact   LLE Proprioception Grossly Intact   Bed Mobility   Supine to Sit 4  Minimal assistance   Additional items Assist x 1;HOB elevated;Bedrails;Increased time required;Verbal cues  (trunk management)   Sit to Supine   (NT as pt was left seated OOB in recliner chair at end of session; alarm, posey and L wrist restraint applied)   Transfers   Sit to Stand 4  Minimal assistance   Additional items Assist x 1;Increased time required;Verbal cues  (RW)   Stand to Sit 4  Minimal assistance   Additional items Assist x 1;Armrests;Increased time required;Verbal cues  (RW)   Stand pivot 4  Minimal assistance   Additional items Assist x 1;Increased time required;Verbal cues  (RW)   Additional Comments Cue for hand and foot placement for safe transfers and optimal mechanics. Pt with fair application and poor carry over w/o cues from therapist.   Ambulation/Elevation   Gait pattern Improper Weight shift;Antalgic;Narrow RAYMON;Decreased foot clearance;Forward Flexion;Short stride;Excessively slow;Decreased heel strike;Decreased hip extension   Gait Assistance 4  Minimal assist   Additional items " Assist x 1;Verbal cues;Tactile cues   Assistive Device Rolling walker   Distance 18ft x1  (limited due to pain, fatigue)   Stair Management Assistance Not tested   Balance   Static Sitting Fair +   Dynamic Sitting Fair -   Static Standing Fair -  (w/ RW)   Dynamic Standing Poor +  (w/ RW)   Ambulatory Poor +  (w/ RW)   Endurance Deficit   Endurance Deficit Yes   Activity Tolerance   Activity Tolerance Patient limited by fatigue;Patient limited by pain;Other (Comment)  (impaired cognition and attention)   Medical Staff Made Aware Spoke with CM, OT, RN, geriatrics, neuropsych   Assessment   Prognosis Fair   Problem List Decreased strength;Decreased range of motion;Decreased endurance;Impaired balance;Decreased mobility;Decreased cognition;Impaired judgement;Decreased safety awareness;Decreased skin integrity;Orthopedic restrictions;Pain   Assessment Pt seen for PT evaluation for mobility assessment & discharge needs. Activity orders: up in chair. Pt admitted 8/6/2024 w/ AMS and somnolence s/p fall. Comorbidities affecting pt's fnxl performance include: Falls, ADD, Afib, bipolar disorder, anxiety, depression, macular degeneration, spinal stenosis, DM, gastric bypass surgery, DDD, retinopathy. During PT IE, pt requires YONATHAN for bed mobility in hospital bed, YONATHAN for STS transfers with RW, is able to maintain supported standing at RW with close S, and YONATHAN for ambulation of 18ft with RW (limited due to pain and generalized fatigue). Pt displays above outlined functional impairments & limitations, and presents below his baseline level of functional mobility. The AM-PAC & Barthel Index outcome tools were used to assist in determining pt safety w/ mobility/self care & appropriate d/c recommendations, see above for scores. Pt is at risk of falls d/t multiple comorbidities, h/o falls, impaired balance, impaired cognition, impaired insight/safety awareness, use of ambulatory aid, varying levels of pain , acuity of medical  "illness, ongoing medical treatment of primary dx, abnormal lab values, polypharmacy, and WB restriction. Pt's clinical presentation is currently unstable/unpredictable as seen in pt's presentation of changing level of pain, varying levels of cognitive performance, increased fall risk, new onset of impairment of functional mobility, decreased endurance, and new onset of weakness. Pt will benefit from continued PT services in order to address impairments, decrease risk of falls, maximize independence w/ fnxl mobility, & ensure safety w/ mobility for transition to next level of care. Based on pt presentation & impairments, pt would most appropriately benefit from Level II (moderate PT intensity) resources upon d/c.   Barriers to Discharge Decreased caregiver support   Goals   Patient Goals \"to go home\"   STG Expiration Date 08/21/24   Short Term Goal #1 Patient PT goals established in order to address pt self reported goal of \"to go home\". Pt will: complete all bed mobility independently in flat bed in order to promote increased OOB functional mobility and simulate home environment; complete all transfers with LRAD at VEGA level in order to increase safety with functional mobility; ambulate >150ft with LRAD at VEGA level in order to increase safety with household and short community distance functional mobility; improve B LE strength to >/= 4/5 MMT t/o in order to increase safety with functional mobility and decrease risk of falls; demonstrate understanding and independence with LE strengthening HEP; improve ambulatory balance to >/= good grade with LRAD in order to promote safety and increased independence with mobility; tolerate >3hrs OOB in upright position, in order to improve muscular endurance and respiratory status; improve AM-PAC score to >/= 21/24 in order to increase independence with mobility and decrease burden of care; improve Barthel Index score to >/= 50/100 in order to increase independence and decrease " risk of falls.   PT Treatment Day 0   Plan   Treatment/Interventions Functional transfer training;LE strengthening/ROM;Therapeutic exercise;Endurance training;Cognitive reorientation;Patient/family training;Equipment eval/education;Bed mobility;Gait training;Compensatory technique education;Spoke to nursing;Spoke to case management;Spoke to MD   PT Frequency 3-5x/wk   Discharge Recommendation   Rehab Resource Intensity Level, PT II (Moderate Resource Intensity)   AM-PAC Basic Mobility Inpatient   Turning in Flat Bed Without Bedrails 3   Lying on Back to Sitting on Edge of Flat Bed Without Bedrails 2   Moving Bed to Chair 3   Standing Up From Chair Using Arms 3   Walk in Room 3   Climb 3-5 Stairs With Railing 2   Basic Mobility Inpatient Raw Score 16   Basic Mobility Standardized Score 38.32   [facility] Highest Level Of Mobility   -M Goal 5: Stand one or more mins   JH-HLM Achieved 6: Walk 10 steps or more   Modified [place] Scale   Modified [place] Scale 4   Barthel Index   Feeding 5   Bathing 0   Grooming Score 0   Dressing Score 5   Bladder Score 5   Bowels Score 10   Toilet Use Score 5   Transfers (Bed/Chair) Score 10   Mobility (Level Surface) Score 0   Stairs Score 0   Barthel Index Score 40   End of Consult   Patient Position at End of Consult Bedside chair;Bed/Chair alarm activated;All needs within reach  (geriatrics team in room; lap posey and L UE restriant applied; cleared by RN for R UE restriant removed for pt to eat breakfast)     This session, pt required and most appropriately benefited from partial or full skilled PT/OT co-eval due to cognitive-communication impairments, significant regression from baseline level of mobility, decreased activity tolerance, and unpredictable medical and/or functional status. PT and OT goals were addressed separately as seen in documentation.    Based on patient's [facility] Highest Level of Mobility scores today, patient currently has a goal of -[facility] Levels: 6:  WALK 10 STEPS OR MORE, to be completed with RN staffing each shift, in order to improve overall activity tolerance and mobility, combat hospital related deconditioning, and maximize outcomes for d/c from the acute care setting.     The patient's AM-PAC Basic Mobility Inpatient Short Form Raw Score is 16. A Raw score of less than or equal to 16 suggests the patient may benefit from discharge to post-acute rehabilitation services. Please also refer to the recommendation of the Physical Therapist for safe discharge planning.      Martha Toussaint PT, DPT   Available via [employer]TE[employer]  NPI # [id number]  [place] License - [id number]  8/7/2024

## 2024-08-07 NOTE — NURSING NOTE
Pt bed alarm going off. Upon staff arrival, pt on floor. Unwitnessed fall. Pt attempted ambulating to bathroom without staff assistance/supervision despite education on calling. Pt denies headstrike or injury. Trauma notified and at bedside.
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
## 2024-08-07 NOTE — ASSESSMENT & PLAN NOTE
Currently takes 190 mg Methadone daily  Patient follows with [facility]  Phone number: [phone]  APS called and verified dosage on 8/2  Maintenance:  8/6   8/8/2024 QTc 489  QTc improved, would recommend repeat EKG in 1 week, can be done as an outpatient.  If QTc would be persistently elevated, would recommend de-escalation and daily methadone dose which would be done by his methadone clinic.  Has been on methadone x 20 years, admits to overuse of BZDs and declines use of mood stabilizers.  He has admitted to not following up with an outpatient psych resource x 2 months.

## 2024-08-07 NOTE — ASSESSMENT & PLAN NOTE
Pt has a hx of opioid abuse, and has been on chronic methadone.  Current dose is 190mg daily. Dose was confrimed    Continue home dose of methadone  APS on board, will continue to monitor QTc, and consider decreasing dose if it lengthens

## 2024-08-07 NOTE — PLAN OF CARE
Problem: OCCUPATIONAL THERAPY ADULT  Goal: Performs self-care activities at highest level of function for planned discharge setting.  See evaluation for individualized goals.  Description: Treatment Interventions: ADL retraining, Functional transfer training, Endurance training, Cognitive reorientation, Patient/family training, Equipment evaluation/education, Compensatory technique education, Energy conservation, Activityengagement          See flowsheet documentation for full assessment, interventions and recommendations.   Note: Limitation: Decreased ADL status, Decreased UE strength, Decreased Safe judgement during ADL, Decreased cognition, Decreased self-care trans, Decreased high-level ADLs (cognitive evaluation)  Prognosis: Fair  Assessment: Patient is a 64 y.o. male seen for OT evaluation following admission on 8/6/2024  s/p a fall. Please see above for comprehensive list of comorbidities and significant PMHx impacting functional performance. Upon initial evaluation, pt appears to be performing below baseline functional status. Pt requires supervision for UB ADLs, YONATHAN for LB ADLs, YONATHAN for bed mobility, YONATHAN for transfers and YONATHAN for functional mobility household distance with RW. The AM-PAC & Barthel Index outcome tools were used to assist in determining pt safety w/self care /mobility and appropriate d/c recommendations, see above for score. Occupational performance is affected by the following deficits: decreased balance , decreased functional reach , decreased activity tolerance , impaired judgement and problem solving , impaired safety awareness , impulsive behavior, (+) pain , and direction following. Personal/Environmental factors impacting D/C include: (+) Hx of falls , Assistance needed for ADL/IADLs, Assistance needed for ADLs and functional mobility, High fall risk , decreased insight toward deficits , decreased recall of precautions , health management, and baseline cognitive deficits.  Supporting factors include: able to maintain FFSU, support system available, and attitude towards recovery . Patient would benefit from OT services within the acute care setting to maximize level of functional independence in the following areas fall prevention , self-care transfers, bed mobility , functional mobility, formal cognitive evaluation, and ADLs.  From OT standpoint, recommendation at time of D/C would be Level II (Moderate Resource Intensity) .     Rehab Resource Intensity Level, OT: II (Moderate Resource Intensity)     Argentina Bernardo MS OTR/L   [place] Licensure# [id number]

## 2024-08-07 NOTE — PLAN OF CARE
Problem: Prexisting or High Potential for Compromised Skin Integrity  Goal: Skin integrity is maintained or improved  Description: INTERVENTIONS:  - Identify patients at risk for skin breakdown  - Assess and monitor skin integrity  - Assess and monitor nutrition and hydration status  - Monitor labs   - Assess for incontinence   - Turn and reposition patient  - Assist with mobility/ambulation  - Relieve pressure over bony prominences  - Avoid friction and shearing  - Provide appropriate hygiene as needed including keeping skin clean and dry  - Evaluate need for skin moisturizer/barrier cream  - Collaborate with interdisciplinary team   - Patient/family teaching  - Consider wound care consult   Outcome: Progressing     Problem: PAIN - ADULT  Goal: Verbalizes/displays adequate comfort level or baseline comfort level  Description: Interventions:  - Encourage patient to monitor pain and request assistance  - Assess pain using appropriate pain scale  - Administer analgesics based on type and severity of pain and evaluate response  - Implement non-pharmacological measures as appropriate and evaluate response  - Consider cultural and social influences on pain and pain management  - Notify physician/advanced practitioner if interventions unsuccessful or patient reports new pain  Outcome: Progressing     Problem: INFECTION - ADULT  Goal: Absence or prevention of progression during hospitalization  Description: INTERVENTIONS:  - Assess and monitor for signs and symptoms of infection  - Monitor lab/diagnostic results  - Monitor all insertion sites, i.e. indwelling lines, tubes, and drains  - Monitor endotracheal if appropriate and nasal secretions for changes in amount and color  - [place] appropriate cooling/warming therapies per order  - Administer medications as ordered  - Instruct and encourage patient and family to use good hand hygiene technique  - Identify and instruct in appropriate isolation precautions for  identified infection/condition  Outcome: Progressing  Goal: Absence of fever/infection during neutropenic period  Description: INTERVENTIONS:  - Monitor WBC    Outcome: Progressing     Problem: SAFETY ADULT  Goal: Maintain or return to baseline ADL function  Description: INTERVENTIONS:  -  Assess patient's ability to carry out ADLs; assess patient's baseline for ADL function and identify physical deficits which impact ability to perform ADLs (bathing, care of mouth/teeth, toileting, grooming, dressing, etc.)  - Assess/evaluate cause of self-care deficits   - Assess range of motion  - Assess patient's mobility; develop plan if impaired  - Assess patient's need for assistive devices and provide as appropriate  - Encourage maximum independence but intervene and supervise when necessary  - Involve family in performance of ADLs  - Assess for home care needs following discharge   - Consider OT consult to assist with ADL evaluation and planning for discharge  - Provide patient education as appropriate  Outcome: Progressing  Goal: Maintains/Returns to pre admission functional level  Description: INTERVENTIONS:  - Perform AM-PAC 6 Click Basic Mobility/ Daily Activity assessment daily.  - Set and communicate daily mobility goal to care team and patient/family/caregiver.   - Collaborate with rehabilitation services on mobility goals if consulted  - Perform Range of Motion  times a day.  - Reposition patient every  hours.  - Dangle patient  times a day  - Stand patient  times a day  - Ambulate patient  times a day  - Out of bed to chair  times a day   - Out of bed for meals  times a day  - Out of bed for toileting  - Record patient progress and toleration of activity level   Outcome: Progressing     Problem: DISCHARGE PLANNING  Goal: Discharge to home or other facility with appropriate resources  Description: INTERVENTIONS:  - Identify barriers to discharge w/patient and caregiver  - Arrange for needed discharge resources and  transportation as appropriate  - Identify discharge learning needs (meds, wound care, etc.)  - Arrange for interpretive services to assist at discharge as needed  - Refer to Case Management Department for coordinating discharge planning if the patient needs post-hospital services based on physician/advanced practitioner order or complex needs related to functional status, cognitive ability, or social support system  Outcome: Progressing     Problem: Knowledge Deficit  Goal: Patient/family/caregiver demonstrates understanding of disease process, treatment plan, medications, and discharge instructions  Description: Complete learning assessment and assess knowledge base.  Interventions:  - Provide teaching at level of understanding  - Provide teaching via preferred learning methods  Outcome: Progressing     Problem: SAFETY,RESTRAINT: NV/NON-SELF DESTRUCTIVE BEHAVIOR  Goal: Remains free of harm/injury (restraint for non violent/non self-detsructive behavior)  Description: INTERVENTIONS:  - Instruct patient/family regarding restraint use   - Assess and monitor physiologic and psychological status   - Provide interventions and comfort measures to meet assessed patient needs   - Identify and implement measures to help patient regain control  - Assess readiness for release of restraint   Outcome: Progressing  Goal: Returns to optimal restraint-free functioning  Description: INTERVENTIONS:  - Assess the patient's behavior and symptoms that indicate continued need for restraint  - Identify and implement measures to help patient regain control  - Assess readiness for release of restraint   Outcome: Progressing     Problem: Potential for Falls  Goal: Patient will remain free of falls  Description: INTERVENTIONS:  - Educate patient/family on patient safety including physical limitations  - Instruct patient to call for assistance with activity   - Consult OT/PT to assist with strengthening/mobility   - Keep Call bell within  reach  - Keep bed low and locked with side rails adjusted as appropriate  - Keep care items and personal belongings within reach  - Initiate and maintain comfort rounds  - Make Fall Risk Sign visible to staff  - Offer Toileting every  Hours, in advance of need  - Initiate/Maintain alarm  - Obtain necessary fall risk management equipment:  - Apply yellow socks and bracelet for high fall risk patients  - Consider moving patient to room near nurses station  Outcome: Progressing

## 2024-08-08 LAB
ANION GAP SERPL CALCULATED.3IONS-SCNC: 9 MMOL/L (ref 4–13)
ATRIAL RATE: 104 BPM
ATRIAL RATE: 105 BPM
BASOPHILS # BLD AUTO: 0.03 THOUSANDS/ÂΜL (ref 0–0.1)
BASOPHILS NFR BLD AUTO: 0 % (ref 0–1)
BUN SERPL-MCNC: 23 MG/DL (ref 5–25)
CALCIUM SERPL-MCNC: 9 MG/DL (ref 8.4–10.2)
CHLORIDE SERPL-SCNC: 91 MMOL/L (ref 96–108)
CO2 SERPL-SCNC: 30 MMOL/L (ref 21–32)
CREAT SERPL-MCNC: 0.72 MG/DL (ref 0.6–1.3)
EOSINOPHIL # BLD AUTO: 0.02 THOUSAND/ÂΜL (ref 0–0.61)
EOSINOPHIL NFR BLD AUTO: 0 % (ref 0–6)
ERYTHROCYTE [DISTWIDTH] IN BLOOD BY AUTOMATED COUNT: 14.1 % (ref 11.6–15.1)
GFR SERPL CREATININE-BSD FRML MDRD: 99 ML/MIN/1.73SQ M
GLUCOSE SERPL-MCNC: 127 MG/DL (ref 65–140)
GLUCOSE SERPL-MCNC: 168 MG/DL (ref 65–140)
GLUCOSE SERPL-MCNC: 213 MG/DL (ref 65–140)
GLUCOSE SERPL-MCNC: 213 MG/DL (ref 65–140)
GLUCOSE SERPL-MCNC: 70 MG/DL (ref 65–140)
HBV CORE AB SER QL: REACTIVE
HBV CORE IGM SER QL: ABNORMAL
HBV SURFACE AG SER QL: ABNORMAL
HCT VFR BLD AUTO: 30.6 % (ref 36.5–49.3)
HCV AB SER QL: REACTIVE
HGB BLD-MCNC: 10.4 G/DL (ref 12–17)
IMM GRANULOCYTES # BLD AUTO: 0.13 THOUSAND/UL (ref 0–0.2)
IMM GRANULOCYTES NFR BLD AUTO: 1 % (ref 0–2)
LYMPHOCYTES # BLD AUTO: 0.88 THOUSANDS/ÂΜL (ref 0.6–4.47)
LYMPHOCYTES NFR BLD AUTO: 5 % (ref 14–44)
MCH RBC QN AUTO: 28.9 PG (ref 26.8–34.3)
MCHC RBC AUTO-ENTMCNC: 34 G/DL (ref 31.4–37.4)
MCV RBC AUTO: 85 FL (ref 82–98)
MONOCYTES # BLD AUTO: 1.58 THOUSAND/ÂΜL (ref 0.17–1.22)
MONOCYTES NFR BLD AUTO: 9 % (ref 4–12)
NEUTROPHILS # BLD AUTO: 15.63 THOUSANDS/ÂΜL (ref 1.85–7.62)
NEUTS SEG NFR BLD AUTO: 85 % (ref 43–75)
NRBC BLD AUTO-RTO: 0 /100 WBCS
P AXIS: 68 DEGREES
P AXIS: 70 DEGREES
PLATELET # BLD AUTO: 421 THOUSANDS/UL (ref 149–390)
PMV BLD AUTO: 10.3 FL (ref 8.9–12.7)
POTASSIUM SERPL-SCNC: 3.3 MMOL/L (ref 3.5–5.3)
PR INTERVAL: 132 MS
PR INTERVAL: 132 MS
QRS AXIS: 76 DEGREES
QRS AXIS: 76 DEGREES
QRSD INTERVAL: 96 MS
QRSD INTERVAL: 98 MS
QT INTERVAL: 372 MS
QT INTERVAL: 374 MS
QTC INTERVAL: 489 MS
QTC INTERVAL: 494 MS
RBC # BLD AUTO: 3.6 MILLION/UL (ref 3.88–5.62)
SODIUM SERPL-SCNC: 130 MMOL/L (ref 135–147)
T WAVE AXIS: 65 DEGREES
T WAVE AXIS: 67 DEGREES
TREPONEMA PALLIDUM IGG+IGM AB [PRESENCE] IN SERUM OR PLASMA BY IMMUNOASSAY: NORMAL
VENTRICULAR RATE: 104 BPM
VENTRICULAR RATE: 105 BPM
WBC # BLD AUTO: 18.27 THOUSAND/UL (ref 4.31–10.16)

## 2024-08-08 PROCEDURE — 99232 SBSQ HOSP IP/OBS MODERATE 35: CPT | Performed by: STUDENT IN AN ORGANIZED HEALTH CARE EDUCATION/TRAINING PROGRAM

## 2024-08-08 PROCEDURE — 99232 SBSQ HOSP IP/OBS MODERATE 35: CPT | Performed by: NURSE PRACTITIONER

## 2024-08-08 PROCEDURE — 82948 REAGENT STRIP/BLOOD GLUCOSE: CPT

## 2024-08-08 PROCEDURE — 87522 HEPATITIS C REVRS TRNSCRPJ: CPT | Performed by: INTERNAL MEDICINE

## 2024-08-08 PROCEDURE — 99232 SBSQ HOSP IP/OBS MODERATE 35: CPT | Performed by: INTERNAL MEDICINE

## 2024-08-08 PROCEDURE — 93010 ELECTROCARDIOGRAM REPORT: CPT | Performed by: INTERNAL MEDICINE

## 2024-08-08 PROCEDURE — 93005 ELECTROCARDIOGRAM TRACING: CPT

## 2024-08-08 PROCEDURE — 85025 COMPLETE CBC W/AUTO DIFF WBC: CPT | Performed by: INTERNAL MEDICINE

## 2024-08-08 PROCEDURE — 80048 BASIC METABOLIC PNL TOTAL CA: CPT | Performed by: INTERNAL MEDICINE

## 2024-08-08 PROCEDURE — 87521 HEPATITIS C PROBE&RVRS TRNSC: CPT | Performed by: INTERNAL MEDICINE

## 2024-08-08 RX ORDER — POTASSIUM CHLORIDE 1500 MG/1
20 TABLET, EXTENDED RELEASE ORAL DAILY
Status: DISCONTINUED | OUTPATIENT
Start: 2024-08-08 | End: 2024-08-08

## 2024-08-08 RX ORDER — POTASSIUM CHLORIDE 1500 MG/1
40 TABLET, EXTENDED RELEASE ORAL ONCE
Status: COMPLETED | OUTPATIENT
Start: 2024-08-08 | End: 2024-08-08

## 2024-08-08 RX ORDER — SODIUM CHLORIDE, SODIUM GLUCONATE, SODIUM ACETATE, POTASSIUM CHLORIDE, MAGNESIUM CHLORIDE, SODIUM PHOSPHATE, DIBASIC, AND POTASSIUM PHOSPHATE .53; .5; .37; .037; .03; .012; .00082 G/100ML; G/100ML; G/100ML; G/100ML; G/100ML; G/100ML; G/100ML
1000 INJECTION, SOLUTION INTRAVENOUS ONCE
Status: COMPLETED | OUTPATIENT
Start: 2024-08-08 | End: 2024-08-08

## 2024-08-08 RX ADMIN — SENNOSIDES AND DOCUSATE SODIUM 1 TABLET: 8.6; 5 TABLET ORAL at 21:52

## 2024-08-08 RX ADMIN — METHOCARBAMOL TABLETS 500 MG: 500 TABLET, COATED ORAL at 05:24

## 2024-08-08 RX ADMIN — OXYCODONE HYDROCHLORIDE 5 MG: 5 TABLET ORAL at 01:05

## 2024-08-08 RX ADMIN — ACETAMINOPHEN 975 MG: 325 TABLET, FILM COATED ORAL at 05:24

## 2024-08-08 RX ADMIN — METHOCARBAMOL TABLETS 500 MG: 500 TABLET, COATED ORAL at 17:29

## 2024-08-08 RX ADMIN — LIDOCAINE 1 PATCH: 700 PATCH TOPICAL at 14:30

## 2024-08-08 RX ADMIN — OXYCODONE HYDROCHLORIDE 5 MG: 5 TABLET ORAL at 21:52

## 2024-08-08 RX ADMIN — SODIUM CHLORIDE, SODIUM GLUCONATE, SODIUM ACETATE, POTASSIUM CHLORIDE, MAGNESIUM CHLORIDE, SODIUM PHOSPHATE, DIBASIC, AND POTASSIUM PHOSPHATE 1000 ML: .53; .5; .37; .037; .03; .012; .00082 INJECTION, SOLUTION INTRAVENOUS at 14:52

## 2024-08-08 RX ADMIN — ACETAMINOPHEN 975 MG: 325 TABLET, FILM COATED ORAL at 21:52

## 2024-08-08 RX ADMIN — INSULIN LISPRO 2 UNITS: 100 INJECTION, SOLUTION INTRAVENOUS; SUBCUTANEOUS at 08:57

## 2024-08-08 RX ADMIN — POLYETHYLENE GLYCOL 3350 17 G: 17 POWDER, FOR SOLUTION ORAL at 09:00

## 2024-08-08 RX ADMIN — INSULIN LISPRO 1 UNITS: 100 INJECTION, SOLUTION INTRAVENOUS; SUBCUTANEOUS at 12:04

## 2024-08-08 RX ADMIN — Medication 190 MG: at 05:37

## 2024-08-08 RX ADMIN — Medication 12.5 MG: at 05:24

## 2024-08-08 RX ADMIN — METHOCARBAMOL TABLETS 500 MG: 500 TABLET, COATED ORAL at 12:04

## 2024-08-08 RX ADMIN — ENOXAPARIN SODIUM 30 MG: 30 INJECTION SUBCUTANEOUS at 05:24

## 2024-08-08 RX ADMIN — INSULIN LISPRO 3 UNITS: 100 INJECTION, SOLUTION INTRAVENOUS; SUBCUTANEOUS at 08:56

## 2024-08-08 RX ADMIN — ENOXAPARIN SODIUM 30 MG: 30 INJECTION SUBCUTANEOUS at 17:29

## 2024-08-08 RX ADMIN — INSULIN LISPRO 3 UNITS: 100 INJECTION, SOLUTION INTRAVENOUS; SUBCUTANEOUS at 12:04

## 2024-08-08 RX ADMIN — ACETAMINOPHEN 975 MG: 325 TABLET, FILM COATED ORAL at 14:30

## 2024-08-08 RX ADMIN — POTASSIUM CHLORIDE 40 MEQ: 1500 TABLET, EXTENDED RELEASE ORAL at 14:31

## 2024-08-08 RX ADMIN — CYANOCOBALAMIN TAB 500 MCG 1000 MCG: 500 TAB at 08:56

## 2024-08-08 RX ADMIN — OXYCODONE HYDROCHLORIDE 5 MG: 5 TABLET ORAL at 12:04

## 2024-08-08 RX ADMIN — AMLODIPINE BESYLATE 5 MG: 5 TABLET ORAL at 08:56

## 2024-08-08 NOTE — PROGRESS NOTES
Progress Note - Acute Pain Service    David Skelton Jr. 64 y.o. male MRN: [id number]  Unit/Bed#: W -01 Encounter: [id number]      David Skelton Jr. is a 64 y.o. male with a history of iron deficiency anemia, urinary retention, diabetes, anxiety, polysubstance abuse on methadone maintenance who presented status post unwitnessed fall.    Right hip pain  Assessment & Plan  S/p IM Nail of right right on 7/31, refused [facility] rehab and went home and fell.   CT scan: No acute fractures, does show possible myositis    Multimodal Regimen:  Tylenol 975 mg PO every 8 hours scheduled  Gabapentin 600 mg PO BID (home med)  Estimated Creatinine Clearance: 97.5 mL/min (by C-G formula based on SCr of 0.76 mg/dL).  Robaxin 500 mg oral every 6 hours scheduled  Lidocaine patches change every 12 hours  Xanax 0.5 mg PO BID PRN anxiety; home medication  Monitor for oversedation, respiratory depression   Oxycodone 2.5 mg every 4 hours as needed for moderate pain  Oxycodone 5 mg every 4 hours as needed for severe pain  Narcan as needed for opioid reversal agent/respiratory depression     Bowel Regimen:  MiraLAX daily  Senokot-S 1 tablet at bedtime    Methadone dependence (HCC)  Assessment & Plan  Currently takes 190 mg Methadone daily  [facility]  Phone number: [phone]  [facility] called and verified dosage on 8/2  Repeat EKG tomorrow  8/6   8/8/2024 QTc 489  QTc improved, would recommend repeat EKG in 1 week, can be done as an outpatient.  If QTc would be persistently elevated, would recommend de-escalation and daily methadone dose which would be done by his methadone clinic.  Has been on methadone x 20 years, admits to overuse of BZDs and declines use of mood stabilizers.  He has admitted to not following up with an outpatient psych resource x 2 months.      Treatment plan and medications have been reviewed with patient, bedside nursing staff and primary care service.    [facility] will continue to  follow. Please contact Acute Pain Service - via We[employer] from [phone] with additional questions or concerns. See WeTag or Patricia for additional contacts and after hours information.     Pain History  Current pain location(s):  Pain Score: 10  Pain Location/Orientation: Orientation: Right, Location: Hip  Pain Scale: Pain Assessment Tool: 0-10  24 hour history: Patient resting in bed, remains in restraints.  Overall is awake alert and oriented x 3 during conversation.  He continues to report right hip pain which is overall largely unchanged in the last 24 hours.  Tolerating current analgesic regimen, no headaches, dizziness, nausea or vomiting.  Reports mild improvement in pain with oxycodone.    Opioid requirement previous 24 hours: oxycodone 10mg, methadone 190mg     Meds/Allergies   all current active meds have been reviewed and current meds:   Current Facility-Administered Medications   Medication Dose Route Frequency    acetaminophen (TYLENOL) tablet 975 mg  975 mg Oral Q8H MELISA    ALPRAZolam (XANAX) tablet 0.5 mg  0.5 mg Oral BID PRN    amLODIPine (NORVASC) tablet 5 mg  5 mg Oral Daily    cyanocobalamin (VITAMIN B-12) tablet 1,000 mcg  1,000 mcg Oral Daily    enoxaparin (LOVENOX) subcutaneous injection 30 mg  30 mg Subcutaneous Q12H    ergocalciferol (VITAMIN D2) capsule 50,000 Units  50,000 Units Oral Once per day on Monday Wednesday Friday    insulin lispro (HumALOG/ADMELOG) 100 units/mL subcutaneous injection 1-6 Units  1-6 Units Subcutaneous 4x Daily (AC & HS)    insulin lispro (HumALOG/ADMELOG) 100 units/mL subcutaneous injection 3 Units  3 Units Subcutaneous TID With Meals    lidocaine (LIDODERM) 5 % patch 1 patch  1 patch Topical Daily    methadone (DOLOPHINE) oral concentrated solution 190 mg  190 mg Oral Q24H    methocarbamol (ROBAXIN) tablet 500 mg  500 mg Oral Q6H MELISA    metoprolol tartrate (LOPRESSOR) partial tablet 12.5 mg  12.5 mg Oral QAM    naloxone (NARCAN) injection 0.4 mg  0.4 mg  Intravenous Q1MIN PRN    OLANZapine (ZyPREXA) IM injection 5 mg  5 mg Intramuscular Once    ondansetron (ZOFRAN-ODT) dispersible tablet 4 mg  4 mg Oral Q6H PRN    oxyCODONE (ROXICODONE) split tablet 2.5 mg  2.5 mg Oral Q4H PRN    Or    oxyCODONE (ROXICODONE) IR tablet 5 mg  5 mg Oral Q4H PRN    polyethylene glycol (MIRALAX) packet 17 g  17 g Oral Daily    senna-docusate sodium (SENOKOT S) 8.6-50 mg per tablet 1 tablet  1 tablet Oral HS       Allergies   Allergen Reactions    Aspirin Shortness Of Breath    Penicillins        Objective        Vitals:    08/07/24 1622 08/07/24 1638 08/07/24 2125 08/08/24 0718   BP: (!) 200/136 (!) 190/80 (!) 175/114 159/98   BP Location: Right arm Left arm     Pulse: 60  101 86   Resp: 12  21 18   Temp: 98.2 °F (36.8 °C)  98.7 °F (37.1 °C) 98.4 °F (36.9 °C)   TempSrc:       SpO2: 99%  100% 99%   Weight:       Height:             Physical Exam  Vitals reviewed.   Constitutional:       General: He is awake. He is not in acute distress.     Appearance: He is not ill-appearing, toxic-appearing or diaphoretic.   HENT:      Head: Normocephalic and atraumatic.      Nose: Nose normal. No congestion or rhinorrhea.      Mouth/Throat:      Mouth: Mucous membranes are moist.   Eyes:      Extraocular Movements: Extraocular movements intact.   Cardiovascular:      Rate and Rhythm: Normal rate.   Pulmonary:      Effort: Pulmonary effort is normal. No tachypnea, bradypnea or respiratory distress.      Breath sounds: No wheezing or rhonchi.   Musculoskeletal:         General: Tenderness (right hip/thigh) present.      Right lower leg: No edema.      Left lower leg: No edema.   Skin:     General: Skin is warm and dry.      Coloration: Skin is pale.   Neurological:      Mental Status: He is alert and oriented to person, place, and time. Mental status is at baseline.   Psychiatric:         Attention and Perception: Attention normal.         Mood and Affect: Mood normal. Mood is not anxious.          Speech: Speech normal.         Behavior: Behavior normal. Behavior is not agitated. Behavior is cooperative.         Lab Results:   Estimated Creatinine Clearance: 97.5 mL/min (by C-G formula based on SCr of 0.76 mg/dL).  Lab Results   Component Value Date    WBC 9.87 08/06/2024    WBC 9.29 01/29/2015    HGB 7.0 (L) 08/06/2024    HGB 12.2 01/29/2015    HCT 21.4 (L) 08/06/2024    HCT 38.0 01/29/2015     08/06/2024     01/29/2015         Component Value Date/Time     01/29/2015 1342    K 3.7 08/06/2024 1129    K 4.6 03/01/2019 1611    CL 99 08/06/2024 1129     03/01/2019 1611    CO2 31 08/06/2024 1129    CO2 29 08/17/2022 0815    CO2 31 03/01/2019 1611    BUN 20 08/06/2024 1129    BUN 17 03/01/2019 1611    CREATININE 0.76 08/06/2024 1129    CREATININE 1.03 03/01/2019 1611         Component Value Date/Time    CALCIUM 8.4 08/06/2024 1129    CALCIUM 8.7 03/01/2019 1611    ALKPHOS 71 08/06/2024 1129    ALKPHOS 133 (H) 03/01/2019 1611    AST 35 08/06/2024 1129    AST 16 03/01/2019 1611    ALT 26 08/06/2024 1129    ALT 27 03/01/2019 1611    BILITOT 0.27 01/29/2015 1342    TP 5.9 (L) 08/06/2024 1129    TP 6.9 03/01/2019 1611    ALB 3.1 (L) 08/06/2024 1129    ALB 3.6 03/01/2019 1611         Please note that the [facility] provides consultative services regarding pain management only.  With the exception of ketamine and epidural infusions and except when indicated, final decisions regarding starting or changing doses of analgesic medications are at the discretion of the consulting service.    STEVE Galvan   Acute Pain Service

## 2024-08-08 NOTE — ASSESSMENT & PLAN NOTE
Complaints of pain in R hip  S/p hip surgery on 7/31  CT lower extremity showed possible myositis.     - PT/OT eval and recommendations on disposition  - PT/OT recommended level II rehab, and pt is in agreement  - Pending placement
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
## 2024-08-08 NOTE — CASE MANAGEMENT
Case Management Progress Note    Patient name David Skelton Jr.  Location W /W -01 MRN [id number]  : 1960 Date 2024       LOS (days): 2  Geometric Mean LOS (GMLOS) (days):   Days to GMLOS:        OBJECTIVE:        Current admission status: Inpatient  Preferred Pharmacy:   [id number] - [place], [place] - [address]  [address]  [place] [address]  Phone: [phone] Fax: [phone]    [id number] - [place], [place] - [address]  [address]  [place] [address]  Phone: [phone] Fax: [phone]    [facility] - [place] - [address]  [address]  [place] [address]  Phone: [phone] Fax: [phone]    Primary Care Provider: Casey Mckeon MD    Primary Insurance: [facility]  Secondary Insurance:     PROGRESS NOTE:    Additional referrals have been made for rehab as many facilities are not able to accommodate methadone which pt is prescribed.  Pt is in restraints at this time and has been administered Zyprexa.  CM will continue to follow to assist with needs and planning.

## 2024-08-08 NOTE — PROGRESS NOTES
[facility]  Progress Note  Name: David Osorio I  MRN: [id number]  Unit/Bed#: W -01 I Date of Admission: 8/6/2024   Date of Service: 8/8/2024 I Hospital Day: 2    Assessment & Plan   Right hip pain  Assessment & Plan  Complaints of pain in R hip  S/p hip surgery on 7/31  CT lower extremity showed possible myositis.     - PT/OT eval and recommendations on disposition  - PT/OT recommended level II rehab, and pt is in agreement  - Pending placement    Fall  Assessment & Plan  Pt is s/p fall with headstrike, unknown down time  Presented to ED as Trauma C      Encephalopathy  Assessment & Plan  Pt initially very lethargic on arrival to ED, suspect due to methadone  - Pt became more alert, but was still confused.     - Pt at high risk for delirium   - Geriatrics on board  - Currently in wrist restraints and posey due to agitation  - Zyprexa given for agitation  - Pt seen by Neuropsych, and was deemed to have no decision making capacity. Will discuss with family or health proxy on medical decisions.     Methadone dependence (HCC)  Assessment & Plan  Pt has a hx of opioid abuse, and has been on chronic methadone.  Current dose is 190mg daily. Dose was confrimed    Continue home dose of methadone  APS on board, will continue to monitor QTc, and consider decreasing dose if it lengthens    Type 2 diabetes mellitus with ophthalmic complication, with long-term current use of insulin ([facility])  Assessment & Plan  Lab Results   Component Value Date    HGBA1C 10.7 (H) 07/31/2024       Recent Labs     08/07/24  1118 08/07/24  1617 08/07/24  2101 08/08/24  0716   POCGLU 269* 259* 268* 213*         Blood Sugar Average: Last 72 hrs:  (P) 268.8    Continue insulin regimen while admitted.      Bipolar disorder ([facility])  Assessment & Plan  Pt has a hx of bipolar disorder  Follows with Psych outpatient.     - Continue to monitor             Bowel Regimen: Miralax  VTE Prophylaxis:Sequential compression device  (Venodyne)  and Enoxaparin (Lovenox)     Disposition: Pending placement    Subjective   Chief Complaint: I don't feel good    Subjective: Pt states he is having some pain in his right hip, ad he has taken his methadone this morning. He has been tolerating his diet, and has been voiding and having BMs      Objective   Vitals:   Temp:  [98.2 °F (36.8 °C)-98.7 °F (37.1 °C)] 98.3 °F (36.8 °C)  HR:  [] 86  Resp:  [12-21] 18  BP: (148-200)/() 148/90    I/O         08/06 0701 08/07 0700 08/07 0701 08/08 0700 08/08 0701 08/09 0700    P.O.  200     IV Piggyback 1000      Total Intake(mL/kg) 1000 (12.6) 200 (2.7)     Urine (mL/kg/hr) 5200 900 (0.5)     Stool 0      Total Output 5200 900     Net -4200 -700            Unmeasured Urine Occurrence 2 x 1 x     Unmeasured Stool Occurrence 1 x               Physical Exam:   GENERAL APPEARANCE: No acute distress  NEURO: GCS 14, confused  HEENT: Moist mucous membranes  CV: RRR  LUNGS: Lungs CTAB  GI: Abdomen soft, nontender  : Voiding appropriately  MSK: Incisions on R hip clean, dry  SKIN: Warm, dry    Invasive Devices       Peripheral Intravenous Line  Duration             Peripheral IV 08/06/24 Proximal;Right;Ventral (anterior) Forearm 2 days                          Lab Results: BMP/CMP:   Lab Results   Component Value Date    SODIUM 130 (L) 08/08/2024    K 3.3 (L) 08/08/2024    CL 91 (L) 08/08/2024    CO2 30 08/08/2024    BUN 23 08/08/2024    CREATININE 0.72 08/08/2024    CALCIUM 9.0 08/08/2024    EGFR 99 08/08/2024    and CBC:   Lab Results   Component Value Date    WBC 18.27 (H) 08/08/2024    HGB 10.4 (L) 08/08/2024    HCT 30.6 (L) 08/08/2024    MCV 85 08/08/2024     (H) 08/08/2024    RBC 3.60 (L) 08/08/2024    MCH 28.9 08/08/2024    MCHC 34.0 08/08/2024    RDW 14.1 08/08/2024    MPV 10.3 08/08/2024    NRBC 0 08/08/2024

## 2024-08-08 NOTE — PLAN OF CARE
Problem: Prexisting or High Potential for Compromised Skin Integrity  Goal: Skin integrity is maintained or improved  Description: INTERVENTIONS:  - Identify patients at risk for skin breakdown  - Assess and monitor skin integrity  - Assess and monitor nutrition and hydration status  - Monitor labs   - Assess for incontinence   - Turn and reposition patient  - Assist with mobility/ambulation  - Relieve pressure over bony prominences  - Avoid friction and shearing  - Provide appropriate hygiene as needed including keeping skin clean and dry  - Evaluate need for skin moisturizer/barrier cream  - Collaborate with interdisciplinary team   - Patient/family teaching  - Consider wound care consult   Outcome: Progressing     Problem: PAIN - ADULT  Goal: Verbalizes/displays adequate comfort level or baseline comfort level  Description: Interventions:  - Encourage patient to monitor pain and request assistance  - Assess pain using appropriate pain scale  - Administer analgesics based on type and severity of pain and evaluate response  - Implement non-pharmacological measures as appropriate and evaluate response  - Consider cultural and social influences on pain and pain management  - Notify physician/advanced practitioner if interventions unsuccessful or patient reports new pain  Outcome: Progressing     Problem: INFECTION - ADULT  Goal: Absence or prevention of progression during hospitalization  Description: INTERVENTIONS:  - Assess and monitor for signs and symptoms of infection  - Monitor lab/diagnostic results  - Monitor all insertion sites, i.e. indwelling lines, tubes, and drains  - Monitor endotracheal if appropriate and nasal secretions for changes in amount and color  - [place] appropriate cooling/warming therapies per order  - Administer medications as ordered  - Instruct and encourage patient and family to use good hand hygiene technique  - Identify and instruct in appropriate isolation precautions for  identified infection/condition  Outcome: Progressing  Goal: Absence of fever/infection during neutropenic period  Description: INTERVENTIONS:  - Monitor WBC    Outcome: Progressing     Problem: SAFETY ADULT  Goal: Maintain or return to baseline ADL function  Description: INTERVENTIONS:  -  Assess patient's ability to carry out ADLs; assess patient's baseline for ADL function and identify physical deficits which impact ability to perform ADLs (bathing, care of mouth/teeth, toileting, grooming, dressing, etc.)  - Assess/evaluate cause of self-care deficits   - Assess range of motion  - Assess patient's mobility; develop plan if impaired  - Assess patient's need for assistive devices and provide as appropriate  - Encourage maximum independence but intervene and supervise when necessary  - Involve family in performance of ADLs  - Assess for home care needs following discharge   - Consider OT consult to assist with ADL evaluation and planning for discharge  - Provide patient education as appropriate  Outcome: Progressing  Goal: Maintains/Returns to pre admission functional level  Description: INTERVENTIONS:  - Perform AM-PAC 6 Click Basic Mobility/ Daily Activity assessment daily.  - Set and communicate daily mobility goal to care team and patient/family/caregiver.   - Collaborate with rehabilitation services on mobility goals if consulted  - Perform Range of Motion  times a day.  - Reposition patient every  hours.  - Dangle patient  times a day  - Stand patient  times a day  - Ambulate patient  times a day  - Out of bed to chair  times a day   - Out of bed for meals  times a day  - Out of bed for toileting  - Record patient progress and toleration of activity level   Outcome: Progressing     Problem: DISCHARGE PLANNING  Goal: Discharge to home or other facility with appropriate resources  Description: INTERVENTIONS:  - Identify barriers to discharge w/patient and caregiver  - Arrange for needed discharge resources and  transportation as appropriate  - Identify discharge learning needs (meds, wound care, etc.)  - Arrange for interpretive services to assist at discharge as needed  - Refer to Case Management Department for coordinating discharge planning if the patient needs post-hospital services based on physician/advanced practitioner order or complex needs related to functional status, cognitive ability, or social support system  Outcome: Progressing     Problem: Knowledge Deficit  Goal: Patient/family/caregiver demonstrates understanding of disease process, treatment plan, medications, and discharge instructions  Description: Complete learning assessment and assess knowledge base.  Interventions:  - Provide teaching at level of understanding  - Provide teaching via preferred learning methods  Outcome: Progressing     Problem: SAFETY,RESTRAINT: NV/NON-SELF DESTRUCTIVE BEHAVIOR  Goal: Remains free of harm/injury (restraint for non violent/non self-detsructive behavior)  Description: INTERVENTIONS:  - Instruct patient/family regarding restraint use   - Assess and monitor physiologic and psychological status   - Provide interventions and comfort measures to meet assessed patient needs   - Identify and implement measures to help patient regain control  - Assess readiness for release of restraint   Outcome: Progressing  Goal: Returns to optimal restraint-free functioning  Description: INTERVENTIONS:  - Assess the patient's behavior and symptoms that indicate continued need for restraint  - Identify and implement measures to help patient regain control  - Assess readiness for release of restraint   Outcome: Progressing     Problem: Potential for Falls  Goal: Patient will remain free of falls  Description: INTERVENTIONS:  - Educate patient/family on patient safety including physical limitations  - Instruct patient to call for assistance with activity   - Consult OT/PT to assist with strengthening/mobility   - Keep Call bell within  reach  - Keep bed low and locked with side rails adjusted as appropriate  - Keep care items and personal belongings within reach  - Initiate and maintain comfort rounds  - Make Fall Risk Sign visible to staff  - Offer Toileting every  Hours, in advance of need  - Initiate/Maintain alarm  - Obtain necessary fall risk management equipment  - Apply yellow socks and bracelet for high fall risk patients  - Consider moving patient to room near nurses station  Outcome: Progressing     Problem: Nutrition/Hydration-ADULT  Goal: Nutrient/Hydration intake appropriate for improving, restoring or maintaining nutritional needs  Description: Monitor and assess patient's nutrition/hydration status for malnutrition. Collaborate with interdisciplinary team and initiate plan and interventions as ordered.  Monitor patient's weight and dietary intake as ordered or per policy. Utilize nutrition screening tool and intervene as necessary. Determine patient's food preferences and provide high-protein, high-caloric foods as appropriate.     INTERVENTIONS:  - Monitor oral intake, urinary output, labs, and treatment plans  - Assess nutrition and hydration status and recommend course of action  - Evaluate amount of meals eaten  - Assist patient with eating if necessary   - Allow adequate time for meals  - Recommend/ encourage appropriate diets, oral nutritional supplements, and vitamin/mineral supplements  - Order, calculate, and assess calorie counts as needed  - Recommend, monitor, and adjust tube feedings and TPN/PPN based on assessed needs  - Assess need for intravenous fluids  - Provide specific nutrition/hydration education as appropriate  - Include patient/family/caregiver in decisions related to nutrition  Outcome: Progressing

## 2024-08-08 NOTE — PLAN OF CARE
Problem: Potential for Falls  Goal: Patient will remain free of falls  Description: INTERVENTIONS:  - Educate patient/family on patient safety including physical limitations  - Instruct patient to call for assistance with activity   - Consult OT/PT to assist with strengthening/mobility   - Keep Call bell within reach  - Keep bed low and locked with side rails adjusted as appropriate  - Keep care items and personal belongings within reach  - Initiate and maintain comfort rounds  - Make Fall Risk Sign visible to staff  - Offer Toileting every  Hours, in advance of need  - Initiate/Maintain alarm  - Obtain necessary fall risk management equipment:   - Apply yellow socks and bracelet for high fall risk patients  - Consider moving patient to room near nurses station  Outcome: Progressing     Problem: Prexisting or High Potential for Compromised Skin Integrity  Goal: Skin integrity is maintained or improved  Description: INTERVENTIONS:  - Identify patients at risk for skin breakdown  - Assess and monitor skin integrity  - Assess and monitor nutrition and hydration status  - Monitor labs   - Assess for incontinence   - Turn and reposition patient  - Assist with mobility/ambulation  - Relieve pressure over bony prominences  - Avoid friction and shearing  - Provide appropriate hygiene as needed including keeping skin clean and dry  - Evaluate need for skin moisturizer/barrier cream  - Collaborate with interdisciplinary team   - Patient/family teaching  - Consider wound care consult   Outcome: Progressing     Problem: PAIN - ADULT  Goal: Verbalizes/displays adequate comfort level or baseline comfort level  Description: Interventions:  - Encourage patient to monitor pain and request assistance  - Assess pain using appropriate pain scale  - Administer analgesics based on type and severity of pain and evaluate response  - Implement non-pharmacological measures as appropriate and evaluate response  - Consider cultural and  social influences on pain and pain management  - Notify physician/advanced practitioner if interventions unsuccessful or patient reports new pain  Outcome: Progressing     Problem: INFECTION - ADULT  Goal: Absence or prevention of progression during hospitalization  Description: INTERVENTIONS:  - Assess and monitor for signs and symptoms of infection  - Monitor lab/diagnostic results  - Monitor all insertion sites, i.e. indwelling lines, tubes, and drains  - Monitor endotracheal if appropriate and nasal secretions for changes in amount and color  - [place] appropriate cooling/warming therapies per order  - Administer medications as ordered  - Instruct and encourage patient and family to use good hand hygiene technique  - Identify and instruct in appropriate isolation precautions for identified infection/condition  Outcome: Progressing  Goal: Absence of fever/infection during neutropenic period  Description: INTERVENTIONS:  - Monitor WBC    Outcome: Progressing     Problem: SAFETY ADULT  Goal: Maintain or return to baseline ADL function  Description: INTERVENTIONS:  -  Assess patient's ability to carry out ADLs; assess patient's baseline for ADL function and identify physical deficits which impact ability to perform ADLs (bathing, care of mouth/teeth, toileting, grooming, dressing, etc.)  - Assess/evaluate cause of self-care deficits   - Assess range of motion  - Assess patient's mobility; develop plan if impaired  - Assess patient's need for assistive devices and provide as appropriate  - Encourage maximum independence but intervene and supervise when necessary  - Involve family in performance of ADLs  - Assess for home care needs following discharge   - Consider OT consult to assist with ADL evaluation and planning for discharge  - Provide patient education as appropriate  Outcome: Progressing  Goal: Maintains/Returns to pre admission functional level  Description: INTERVENTIONS:  - Perform AM-PAC 6 Click Basic  Mobility/ Daily Activity assessment daily.  - Set and communicate daily mobility goal to care team and patient/family/caregiver.   - Collaborate with rehabilitation services on mobility goals if consulted  - Perform Range of Motion  times a day.  - Reposition patient every  hours.  - Dangle patient  times a day  - Stand patient  times a day  - Ambulate patient  times a day  - Out of bed to chair  times a day   - Out of bed for meals  times a day  - Out of bed for toileting  - Record patient progress and toleration of activity level   Outcome: Progressing

## 2024-08-08 NOTE — ASSESSMENT & PLAN NOTE
Pt initially very lethargic on arrival to ED, suspect due to methadone  - Pt became more alert, but was still confused.     - Pt at high risk for delirium   - Geriatrics on board  - Currently in wrist restraints and posey due to agitation  - Zyprexa given for agitation  - Pt seen by Neuropsych, and was deemed to have no decision making capacity. Will discuss with family or health proxy on medical decisions.

## 2024-08-08 NOTE — ASSESSMENT & PLAN NOTE
Lab Results   Component Value Date    HGBA1C 10.7 (H) 07/31/2024       Recent Labs     08/07/24  1118 08/07/24  1617 08/07/24  2101 08/08/24  0716   POCGLU 269* 259* 268* 213*         Blood Sugar Average: Last 72 hrs:  (P) 268.8    Continue insulin regimen while admitted.

## 2024-08-09 LAB
25(OH)D3 SERPL-MCNC: 29.6 NG/ML (ref 30–100)
ANION GAP SERPL CALCULATED.3IONS-SCNC: 13 MMOL/L (ref 4–13)
BASOPHILS # BLD AUTO: 0.04 THOUSANDS/ÂΜL (ref 0–0.1)
BASOPHILS NFR BLD AUTO: 0 % (ref 0–1)
BUN SERPL-MCNC: 31 MG/DL (ref 5–25)
CALCIUM SERPL-MCNC: 8.5 MG/DL (ref 8.4–10.2)
CHLORIDE SERPL-SCNC: 92 MMOL/L (ref 96–108)
CO2 SERPL-SCNC: 25 MMOL/L (ref 21–32)
CREAT SERPL-MCNC: 0.8 MG/DL (ref 0.6–1.3)
EOSINOPHIL # BLD AUTO: 0.01 THOUSAND/ÂΜL (ref 0–0.61)
EOSINOPHIL NFR BLD AUTO: 0 % (ref 0–6)
ERYTHROCYTE [DISTWIDTH] IN BLOOD BY AUTOMATED COUNT: 14.1 % (ref 11.6–15.1)
GFR SERPL CREATININE-BSD FRML MDRD: 95 ML/MIN/1.73SQ M
GLUCOSE SERPL-MCNC: 116 MG/DL (ref 65–140)
GLUCOSE SERPL-MCNC: 123 MG/DL (ref 65–140)
GLUCOSE SERPL-MCNC: 163 MG/DL (ref 65–140)
GLUCOSE SERPL-MCNC: 177 MG/DL (ref 65–140)
GLUCOSE SERPL-MCNC: 193 MG/DL (ref 65–140)
GLUCOSE SERPL-MCNC: 64 MG/DL (ref 65–140)
HCT VFR BLD AUTO: 29.6 % (ref 36.5–49.3)
HCV RNA SERPL NAA+PROBE-ACNC: NOT DETECTED K[IU]/ML
HGB BLD-MCNC: 9.7 G/DL (ref 12–17)
IMM GRANULOCYTES # BLD AUTO: 0.14 THOUSAND/UL (ref 0–0.2)
IMM GRANULOCYTES NFR BLD AUTO: 1 % (ref 0–2)
LYMPHOCYTES # BLD AUTO: 0.82 THOUSANDS/ÂΜL (ref 0.6–4.47)
LYMPHOCYTES NFR BLD AUTO: 4 % (ref 14–44)
MCH RBC QN AUTO: 28.7 PG (ref 26.8–34.3)
MCHC RBC AUTO-ENTMCNC: 32.8 G/DL (ref 31.4–37.4)
MCV RBC AUTO: 88 FL (ref 82–98)
MONOCYTES # BLD AUTO: 1.49 THOUSAND/ÂΜL (ref 0.17–1.22)
MONOCYTES NFR BLD AUTO: 8 % (ref 4–12)
NEUTROPHILS # BLD AUTO: 15.98 THOUSANDS/ÂΜL (ref 1.85–7.62)
NEUTS SEG NFR BLD AUTO: 87 % (ref 43–75)
NRBC BLD AUTO-RTO: 0 /100 WBCS
PLATELET # BLD AUTO: 449 THOUSANDS/UL (ref 149–390)
PMV BLD AUTO: 10.2 FL (ref 8.9–12.7)
POTASSIUM SERPL-SCNC: 3.7 MMOL/L (ref 3.5–5.3)
RBC # BLD AUTO: 3.38 MILLION/UL (ref 3.88–5.62)
SODIUM SERPL-SCNC: 130 MMOL/L (ref 135–147)
VIT B12 SERPL-MCNC: 3668 PG/ML (ref 180–914)
WBC # BLD AUTO: 18.48 THOUSAND/UL (ref 4.31–10.16)

## 2024-08-09 PROCEDURE — 99232 SBSQ HOSP IP/OBS MODERATE 35: CPT | Performed by: NURSE PRACTITIONER

## 2024-08-09 PROCEDURE — 82306 VITAMIN D 25 HYDROXY: CPT | Performed by: INTERNAL MEDICINE

## 2024-08-09 PROCEDURE — 99232 SBSQ HOSP IP/OBS MODERATE 35: CPT | Performed by: STUDENT IN AN ORGANIZED HEALTH CARE EDUCATION/TRAINING PROGRAM

## 2024-08-09 PROCEDURE — 82607 VITAMIN B-12: CPT | Performed by: INTERNAL MEDICINE

## 2024-08-09 PROCEDURE — 99232 SBSQ HOSP IP/OBS MODERATE 35: CPT | Performed by: INTERNAL MEDICINE

## 2024-08-09 PROCEDURE — 82948 REAGENT STRIP/BLOOD GLUCOSE: CPT

## 2024-08-09 PROCEDURE — 97530 THERAPEUTIC ACTIVITIES: CPT

## 2024-08-09 PROCEDURE — 85025 COMPLETE CBC W/AUTO DIFF WBC: CPT

## 2024-08-09 PROCEDURE — 97116 GAIT TRAINING THERAPY: CPT

## 2024-08-09 PROCEDURE — 80048 BASIC METABOLIC PNL TOTAL CA: CPT

## 2024-08-09 RX ORDER — OXYCODONE HYDROCHLORIDE 5 MG/1
5 TABLET ORAL EVERY 4 HOURS PRN
Status: DISCONTINUED | OUTPATIENT
Start: 2024-08-09 | End: 2024-08-18 | Stop reason: HOSPADM

## 2024-08-09 RX ORDER — MAGNESIUM SULFATE HEPTAHYDRATE 40 MG/ML
2 INJECTION, SOLUTION INTRAVENOUS ONCE
Status: COMPLETED | OUTPATIENT
Start: 2024-08-09 | End: 2024-08-09

## 2024-08-09 RX ORDER — AMOXICILLIN 250 MG
2 CAPSULE ORAL 2 TIMES DAILY
Status: DISCONTINUED | OUTPATIENT
Start: 2024-08-09 | End: 2024-08-18 | Stop reason: HOSPADM

## 2024-08-09 RX ADMIN — INSULIN LISPRO 2 UNITS: 100 INJECTION, SOLUTION INTRAVENOUS; SUBCUTANEOUS at 08:19

## 2024-08-09 RX ADMIN — ACETAMINOPHEN 975 MG: 325 TABLET, FILM COATED ORAL at 05:28

## 2024-08-09 RX ADMIN — Medication 7.5 MG: at 16:12

## 2024-08-09 RX ADMIN — SENNOSIDES AND DOCUSATE SODIUM 2 TABLET: 8.6; 5 TABLET ORAL at 17:22

## 2024-08-09 RX ADMIN — AMLODIPINE BESYLATE 5 MG: 5 TABLET ORAL at 08:20

## 2024-08-09 RX ADMIN — INSULIN LISPRO 1 UNITS: 100 INJECTION, SOLUTION INTRAVENOUS; SUBCUTANEOUS at 21:20

## 2024-08-09 RX ADMIN — LIDOCAINE 1 PATCH: 700 PATCH TOPICAL at 14:04

## 2024-08-09 RX ADMIN — METHOCARBAMOL TABLETS 500 MG: 500 TABLET, COATED ORAL at 11:31

## 2024-08-09 RX ADMIN — ERGOCALCIFEROL 50000 UNITS: 1.25 CAPSULE, LIQUID FILLED ORAL at 08:21

## 2024-08-09 RX ADMIN — ALPRAZOLAM 0.5 MG: 0.5 TABLET ORAL at 00:21

## 2024-08-09 RX ADMIN — ACETAMINOPHEN 975 MG: 325 TABLET, FILM COATED ORAL at 14:04

## 2024-08-09 RX ADMIN — METHOCARBAMOL TABLETS 500 MG: 500 TABLET, COATED ORAL at 05:29

## 2024-08-09 RX ADMIN — METHOCARBAMOL TABLETS 500 MG: 500 TABLET, COATED ORAL at 00:21

## 2024-08-09 RX ADMIN — ENOXAPARIN SODIUM 30 MG: 30 INJECTION SUBCUTANEOUS at 05:29

## 2024-08-09 RX ADMIN — INSULIN LISPRO 3 UNITS: 100 INJECTION, SOLUTION INTRAVENOUS; SUBCUTANEOUS at 08:28

## 2024-08-09 RX ADMIN — CYANOCOBALAMIN TAB 500 MCG 1000 MCG: 500 TAB at 08:20

## 2024-08-09 RX ADMIN — INSULIN LISPRO 3 UNITS: 100 INJECTION, SOLUTION INTRAVENOUS; SUBCUTANEOUS at 11:32

## 2024-08-09 RX ADMIN — Medication 190 MG: at 05:31

## 2024-08-09 RX ADMIN — METHOCARBAMOL TABLETS 500 MG: 500 TABLET, COATED ORAL at 23:44

## 2024-08-09 RX ADMIN — MAGNESIUM SULFATE HEPTAHYDRATE 2 G: 40 INJECTION, SOLUTION INTRAVENOUS at 04:49

## 2024-08-09 RX ADMIN — POLYETHYLENE GLYCOL 3350 17 G: 17 POWDER, FOR SOLUTION ORAL at 08:20

## 2024-08-09 RX ADMIN — ACETAMINOPHEN 975 MG: 325 TABLET, FILM COATED ORAL at 21:20

## 2024-08-09 RX ADMIN — ENOXAPARIN SODIUM 30 MG: 30 INJECTION SUBCUTANEOUS at 17:03

## 2024-08-09 RX ADMIN — METHOCARBAMOL TABLETS 500 MG: 500 TABLET, COATED ORAL at 17:21

## 2024-08-09 RX ADMIN — Medication 12.5 MG: at 05:29

## 2024-08-09 RX ADMIN — Medication 7.5 MG: at 21:20

## 2024-08-09 RX ADMIN — OXYCODONE HYDROCHLORIDE 5 MG: 5 TABLET ORAL at 03:08

## 2024-08-09 RX ADMIN — OXYCODONE HYDROCHLORIDE 5 MG: 5 TABLET ORAL at 11:29

## 2024-08-09 NOTE — PLAN OF CARE
Problem: Potential for Falls  Goal: Patient will remain free of falls  Description: INTERVENTIONS:  - Educate patient/family on patient safety including physical limitations  - Instruct patient to call for assistance with activity   - Consult OT/PT to assist with strengthening/mobility   - Keep Call bell within reach  - Keep bed low and locked with side rails adjusted as appropriate  - Keep care items and personal belongings within reach  - Initiate and maintain comfort rounds  - Make Fall Risk Sign visible to staff  - Offer Toileting every  Hours, in advance of need  - Initiate/Maintain alarm  - Obtain necessary fall risk management equipment:  - Apply yellow socks and bracelet for high fall risk patients  - Consider moving patient to room near nurses station  Outcome: Progressing     Problem: Prexisting or High Potential for Compromised Skin Integrity  Goal: Skin integrity is maintained or improved  Description: INTERVENTIONS:  - Identify patients at risk for skin breakdown  - Assess and monitor skin integrity  - Assess and monitor nutrition and hydration status  - Monitor labs   - Assess for incontinence   - Turn and reposition patient  - Assist with mobility/ambulation  - Relieve pressure over bony prominences  - Avoid friction and shearing  - Provide appropriate hygiene as needed including keeping skin clean and dry  - Evaluate need for skin moisturizer/barrier cream  - Collaborate with interdisciplinary team   - Patient/family teaching  - Consider wound care consult   Outcome: Progressing     Problem: PAIN - ADULT  Goal: Verbalizes/displays adequate comfort level or baseline comfort level  Description: Interventions:  - Encourage patient to monitor pain and request assistance  - Assess pain using appropriate pain scale  - Administer analgesics based on type and severity of pain and evaluate response  - Implement non-pharmacological measures as appropriate and evaluate response  - Consider cultural and social  influences on pain and pain management  - Notify physician/advanced practitioner if interventions unsuccessful or patient reports new pain  Outcome: Progressing     Problem: INFECTION - ADULT  Goal: Absence or prevention of progression during hospitalization  Description: INTERVENTIONS:  - Assess and monitor for signs and symptoms of infection  - Monitor lab/diagnostic results  - Monitor all insertion sites, i.e. indwelling lines, tubes, and drains  - Monitor endotracheal if appropriate and nasal secretions for changes in amount and color  - [place] appropriate cooling/warming therapies per order  - Administer medications as ordered  - Instruct and encourage patient and family to use good hand hygiene technique  - Identify and instruct in appropriate isolation precautions for identified infection/condition  Outcome: Progressing  Goal: Absence of fever/infection during neutropenic period  Description: INTERVENTIONS:  - Monitor WBC    Outcome: Progressing     Problem: SAFETY ADULT  Goal: Maintain or return to baseline ADL function  Description: INTERVENTIONS:  -  Assess patient's ability to carry out ADLs; assess patient's baseline for ADL function and identify physical deficits which impact ability to perform ADLs (bathing, care of mouth/teeth, toileting, grooming, dressing, etc.)  - Assess/evaluate cause of self-care deficits   - Assess range of motion  - Assess patient's mobility; develop plan if impaired  - Assess patient's need for assistive devices and provide as appropriate  - Encourage maximum independence but intervene and supervise when necessary  - Involve family in performance of ADLs  - Assess for home care needs following discharge   - Consider OT consult to assist with ADL evaluation and planning for discharge  - Provide patient education as appropriate  Outcome: Progressing  Goal: Maintains/Returns to pre admission functional level  Description: INTERVENTIONS:  - Perform AM-PAC 6 Click Basic  Mobility/ Daily Activity assessment daily.  - Set and communicate daily mobility goal to care team and patient/family/caregiver.   - Collaborate with rehabilitation services on mobility goals if consulted  - Perform Range of Motion  times a day.  - Reposition patient every  hours.  - Dangle patient  times a day  - Stand patient  times a day  - Ambulate patient  times a day  - Out of bed to chair  times a day   - Out of bed for meals  times a day  - Out of bed for toileting  - Record patient progress and toleration of activity level   Outcome: Progressing

## 2024-08-09 NOTE — CASE MANAGEMENT
Case Management Discharge Planning Note    Patient name David Skelotn Jr.  Location W /W -01 MRN [id number]  : 1960 Date 2024       Current Admission Date: 2024  Current Admission Diagnosis:Methadone dependence (HCC)   Patient Active Problem List    Diagnosis Date Noted Date Diagnosed    Right hip pain 2024     Acute blood loss anemia (ABLA) 2024     Nocturia associated with benign prostatic hyperplasia 2024     Gross hematuria 2024     Closed fracture of right femur, unspecified fracture morphology, sequela 2024     B12 deficiency 2023     Bradycardia 2023     Retinopathy due to secondary DM (HCC) 2023     Visual changes 2023     Fall 2023     Closed fracture of multiple ribs of left side 2023     Methadone dependence (HCC) 2023     Encephalopathy 2023     Liver lesion, right lobe 2021     Lung nodule 2021     Adrenal nodule (HCC) 2021     Thoracic degenerative disc disease 2021     DDD (degenerative disc disease), lumbar 2021     Pre-operative general physical examination 2019     S/P gastric bypass 11/15/2019     Hypogonadism male 11/15/2019     Hemorrhoids, internal 2019     Rectal prolapse 2019     Drug-induced constipation 2019     Chronic anticoagulation 2019     GERD without esophagitis 2019     Iron deficiency anemia 2019     Major depression, chronic 2019     Mixed hyperlipidemia 2019     Stable proliferative diabetic retinopathy of left eye associated with type 2 diabetes mellitus (HCC) 2019     Narcotic abuse (HCC) 2018     Ulcer of leg, chronic, left (HCC) 2015     ADD (attention deficit disorder) without hyperactivity 2015     Bipolar disorder (HCC) 01/10/2014     Insomnia 2013     History of hepatitis C 2013     Allergic rhinitis 2013     Generalized anxiety disorder  01/04/2013     Macular degeneration 01/04/2013     Spinal stenosis 01/04/2013     Vitamin D deficiency 01/04/2013     Benign essential hypertension 09/14/2012     Type 2 diabetes mellitus with ophthalmic complication, with long-term current use of insulin (HCC) 09/14/2012     Atrial fibrillation (HCC) 05/15/2012     Complex medical condition 05/15/2012       LOS (days): 3  Geometric Mean LOS (GMLOS) (days):   Days to GMLOS:     OBJECTIVE:  Risk of Unplanned Readmission Score: 48.29         Current admission status: Inpatient   Preferred Pharmacy:   [id number] - [place], [place] - [address]  [address] [facility]  [place] [address]  Phone: [phone] Fax: [phone]    [id number] - [place], [place] - [address]  [address]  [place] [address]  Phone: [phone] Fax: [phone]    [facility] - [place] - [address]  [address]  [place] [address]  Phone: [phone] Fax: [phone]    Primary Care Provider: Casey Mckeon MD    Primary Insurance: [facility]  Secondary Insurance:     DISCHARGE DETAILS:     Additional referrals have been made for rehab as facilities are not able to manage methadone.  Pt will have to be off restraints/1:1 for 24 hours prior to DC.  CM will continue to follow to assist with needs and planning for DC.

## 2024-08-09 NOTE — PHYSICAL THERAPY NOTE
PHYSICAL THERAPY TREATMENT  DATE: 08/09/24  TIME: 9707-6918    NAME:  David Skelton Jr.  AGE:   64 y.o.  Mrn:   [id number]  Length Of Stay: 3    ADMIT DX:  Methadone use [F11.90]  Trochanteric fracture of right femur (HCC) [S72.101A]  Unspecified multiple injuries, initial encounter [T07.XXXA]    Past Medical History:   Diagnosis Date    Anxiety     Depression     Diabetes mellitus (HCC)     Drug use     Hemorrhoids, internal 9/23/2019    Hypertension      Past Surgical History:   Procedure Laterality Date    FEMUR FRACTURE SURGERY Right     GASTRIC BYPASS  11/08/2011    Managed by: Braden Luna (General Surgery)    HERNIA REPAIR  02/13/2013    Incisional hernia repair Managed by: Braden Luna (General Surgery)    KS OPTX FEM SHFT FX W/INSJ IMED IMPLT W/WO SCREW Right 7/31/2024    Procedure: INSERTION NAIL IM FEMUR ANTEGRADE (TROCHANTERIC);  Surgeon: Meng Hill MD;  Location: [facility];  Service: Orthopedics    TONSILLECTOMY  2010       Performed at least 2 patient identifiers during session: Name, ID bracelet, and Epic photo     08/09/24 1229   PT Last Visit   PT Visit Date 08/09/24   Note Type   Note Type Treatment   Pain Assessment   Pain Assessment Tool 0-10   Pain Score 10 - Worst Possible Pain   Pain Location/Orientation Orientation: Right;Location: Hip   Pain Radiating Towards R upper thigh   Pain Onset/Description Onset: Ongoing;Descriptor: Discomfort;Descriptor: Aching   Effect of Pain on Daily Activities limits tolerance of functional mobility; limits independence with self care tasks; limits gait mechanics and gait speed   Patient's Stated Pain Goal No pain   Hospital Pain Intervention(s) Repositioned;Ambulation/increased activity;Emotional support;Elevated;Medication (See MAR);Cold applied   Multiple Pain Sites No   Restrictions/Precautions   Weight Bearing Precautions Per Order Yes   RLE Weight Bearing Per Order WBAT  (s/p R femur IM nail 7/31/24 by Dr. Hill)   Other  "Precautions Impulsive;Cognitive;Chair Alarm;Bed Alarm;WBS;Fall Risk;Pain  (lap posey belt)   General   Chart Reviewed Yes   Additional Pertinent History Pt is a 64 yr old male admitted 8/6/24 from home s/p fall, c/o R hip pain, AMS. Of note, pt with recent R femur IM nail at [facility] 7/31/24, WBAT R LE. Unwitnessed fall in ED this visit. No significant change in status from previous session.   Response to Previous Treatment Patient unable to report, no changes reported from family or staff   Family/Caregiver Present No   Cognition   Overall Cognitive Status Impaired   Arousal/Participation Cooperative;Alert   Attention Attends with cues to redirect   Orientation Level Oriented to person;Oriented to place   Memory Decreased short term memory;Decreased recall of recent events;Decreased recall of precautions   Following Commands Follows one step commands with increased time or repetition   Comments Pt displays lesser degree of impulsivity on this date as compared to previous session. Pt with overall flat affect.   Subjective   Subjective \"I'm having a lot of pain.\"   Bed Mobility   Supine to Sit 3  Moderate assistance   Additional items Assist x 1;Bedrails;Increased time required;Verbal cues  (trunk management)   Sit to Supine   (NT as pt was left seated OOB in recliner chair with alarm engaged, lap posey applied, RN in room)   Additional Comments Pt reports slight lightheadedness upon initial upright sit, improves with ~3 minutes of functional sitting at EOB. Displays overall fair+ sitting balance.   Transfers   Sit to Stand 4  Minimal assistance   Additional items Assist x 1;Impulsive;Increased time required;Verbal cues  (RW; impulsive to initiate transfer, increased time to complete)   Stand to Sit 4  Minimal assistance   Additional items Assist x 1;Armrests;Increased time required;Verbal cues  (RW)   Stand pivot 4  Minimal assistance   Additional items Assist x 1;Increased time required;Verbal cues  (RW)   Additional " Comments Pt requiring cues for hand and foot placement for safe transfers and optimal mechanics. Pt impulsive to initiate transfers but needing increased cues, assistance, and time to safely complete transfers.   Ambulation/Elevation   Gait pattern Improper Weight shift;Narrow RAYMON;Forward Flexion;Decreased foot clearance;Antalgic;Short stride;Excessively slow;Knees flexed  (poor RW proximity and management specifically while turning)   Gait Assistance 4  Minimal assist   Additional items Assist x 1;Verbal cues;Tactile cues   Assistive Device Rolling walker   Distance 20ft x1 - pt declines further ambulation distance or trials at this time due to ongoing R hip pain   Stair Management Assistance Not tested   Balance   Static Sitting Fair +   Dynamic Sitting Fair   Static Standing Fair -  (w/ RW)   Dynamic Standing Poor +  (w/ RW)   Ambulatory Poor +  (w/ RW)   Endurance Deficit   Endurance Deficit Yes   Activity Tolerance   Activity Tolerance Patient limited by fatigue;Patient limited by pain   Medical Staff Made Aware Spoke with CM, OT, RN, trauma AP and physician   Assessment   Prognosis Fair   Problem List Decreased strength;Decreased range of motion;Decreased endurance;Impaired balance;Decreased mobility;Decreased cognition;Impaired judgement;Decreased safety awareness;Decreased skin integrity;Orthopedic restrictions;Pain   Assessment Pt seen for PT treatment 8/9/2024 with focus on: transfers and gait training. Pt presents supine, is agreeable to participate in session after increased motivation/encouragement. Pt participates in gait training, with intervention focusing on goal of improved gait mechanics and independence. Pt requires MODA for bed mobility in flat bed, YONATHAN for STS transfers, and YONATHAN for ambulation of 20ft with RW (declines further due to persistent R hip pain). Overall pt displays no functional improvement as compared to previous session, continues to perform below their baseline level of  "functional mobility due to pain, weakness, impaired balance, decreased safety/insight, gait deviations. Pt continues to most appropriately benefit from Level II (moderate PT intensity) resources upon d/c from the acute care setting. Continue skilled PT POC as able and appropriate in order to progress towards therapy goals and maximize independence with functional mobility. Next session, plan for intervention of increased ambulation distance training as able and appropriate.   Barriers to Discharge Decreased caregiver support   Goals   Patient Goals \"to go home\"   STG Expiration Date 08/21/24   Short Term Goal #1 Patient PT goals established in order to address pt self reported goal of \"to go home\". Pt will: complete all bed mobility independently in flat bed in order to promote increased OOB functional mobility and simulate home environment; complete all transfers with LRAD at VEGA level in order to increase safety with functional mobility; ambulate >150ft with LRAD at VEGA level in order to increase safety with household and short community distance functional mobility; improve B LE strength to >/= 4/5 MMT t/o in order to increase safety with functional mobility and decrease risk of falls; demonstrate understanding and independence with LE strengthening HEP; improve ambulatory balance to >/= good grade with LRAD in order to promote safety and increased independence with mobility; tolerate >3hrs OOB in upright position, in order to improve muscular endurance and respiratory status; improve AM-PAC score to >/= 21/24 in order to increase independence with mobility and decrease burden of care; improve Barthel Index score to >/= 50/100 in order to increase independence and decrease risk of falls.   PT Treatment Day 1   Plan   Treatment/Interventions Functional transfer training;LE strengthening/ROM;Therapeutic exercise;Endurance training;Cognitive reorientation;Patient/family training;Equipment eval/education;Bed " mobility;Gait training;Compensatory technique education;Spoke to MD;Spoke to nursing;Spoke to case management;Spoke to advanced practitioner   Progress Slow progress, decreased activity tolerance   PT Frequency 3-5x/wk   Discharge Recommendation   Rehab Resource Intensity Level, PT II (Moderate Resource Intensity)   AM-PAC Basic Mobility Inpatient   Turning in Flat Bed Without Bedrails 3   Lying on Back to Sitting on Edge of Flat Bed Without Bedrails 2   Moving Bed to Chair 3   Standing Up From Chair Using Arms 3   Walk in Room 3   Climb 3-5 Stairs With Railing 1   Basic Mobility Inpatient Raw Score 15   Basic Mobility Standardized Score 36.97   [facility] Highest Level Of Mobility   -[facility] Goal 4: Move to chair/commode   -HLM Achieved 6: Walk 10 steps or more   Education   Education Provided Mobility training;Assistive device   Patient Reinforcement needed   End of Consult   Patient Position at End of Consult Bedside chair;Bed/Chair alarm activated;All needs within reach  (lap posey applied; LPN in room)       This session, pt required and most appropriately benefited from partial or full skilled PT/OT co-treat due to cognitive-communication impairments, cognitive-behavioral deficits, decreased activity tolerance, and unpredictable medical and/or functional status. PT and OT goals were addressed separately as seen in documentation.    Based on patient's [facility] Highest Level of Mobility scores today, patient currently has a goal of -[facility] Levels: 6: WALK 10 STEPS OR MORE, to be completed with RN staffing each shift, in order to improve overall activity tolerance and mobility, combat hospital related deconditioning, and maximize outcomes for d/c from the acute care setting.     The patient's AM-PAC Basic Mobility Inpatient Short Form Raw Score is 15. A Raw score of less than or equal to 16 suggests the patient may benefit from discharge to post-acute rehabilitation services. Please also refer to the  recommendation of the Physical Therapist for safe discharge planning.        Martha Toussaint PT, DPT   Available via [employer]  NPI # [id number]  [place] License - [id number]  8/9/2024

## 2024-08-09 NOTE — PROGRESS NOTES
[facility]  Progress Note  Name: David Osorio I  MRN: [id number]  Unit/Bed#: W -01 I Date of Admission: 8/6/2024   Date of Service: 8/9/2024 I Hospital Day: 3    Assessment & Plan   Right hip pain  Assessment & Plan  Complaints of pain in R hip  S/p hip surgery on 7/31  CT lower extremity showed possible myositis.     - PT/OT eval and recommendations on disposition  - PT/OT recommended level II rehab, and pt is in agreement  - Pending placement    Fall  Assessment & Plan  Pt is s/p fall with headstrike, unknown down time  Presented to ED as Trauma C      Encephalopathy  Assessment & Plan  Pt initially very lethargic on arrival to ED, suspect due to methadone  - Pt became more alert, but was still confused.     - Pt at high risk for delirium   - Geriatrics on board  - Currently in wrist restraints and posey due to agitation  - Zyprexa given for agitation  - Pt seen by Neuropsych, and was deemed to have no decision making capacity. Will discuss with family or health proxy on medical decisions.     Methadone dependence (HCC)  Assessment & Plan  Pt has a hx of opioid abuse, and has been on chronic methadone.  Current dose is 190mg daily. Dose was confrimed    Continue home dose of methadone  APS on board, will continue to monitor QTc, and consider decreasing dose if it lengthens    Type 2 diabetes mellitus with ophthalmic complication, with long-term current use of insulin ([facility])  Assessment & Plan  Lab Results   Component Value Date    HGBA1C 10.7 (H) 07/31/2024       Recent Labs     08/08/24  0716 08/08/24  1055 08/08/24  1505 08/08/24  2205   POCGLU 213* 168* 70 127         Blood Sugar Average: Last 72 hrs:  (P) 213.625    Continue insulin regimen while admitted.      Bipolar disorder ([facility])  Assessment & Plan  Pt has a hx of bipolar disorder  Follows with Psych outpatient.     - Continue to monitor             Bowel Regimen: MiraLAX, Senokot  VTE Prophylaxis:Enoxaparin (Lovenox)      Disposition: Patient will require rehabilitation, anticipate discharge within 24 to 48 hours    Subjective   Chief Complaint: I have pain    Subjective: Patient states he has pain, asking for his methadone.  Patient points to his incision to describe the area of discomfort.  Patient denies any headache, chest pain, abdominal pain, nausea, vomiting, change in bowel habits, change urination, or any other acute complaints at this time.     Objective   Vitals:   Temp:  [97.8 °F (36.6 °C)-98.4 °F (36.9 °C)] 97.8 °F (36.6 °C)  HR:  [] 94  Resp:  [16-18] 16  BP: (138-166)/() 153/103    I/O         08/07 0701 08/08 0700 08/08 0701 08/09 0700    P.O. 200 315    I.V. (mL/kg)  0 (0)    Total Intake(mL/kg) 200 (2.7) 315 (4.2)    Urine (mL/kg/hr) 900 (0.5) 450 (0.3)    Total Output 900 450    Net -700 -135          Unmeasured Urine Occurrence 1 x 1 x             Physical Exam:   GENERAL APPEARANCE: Well-appearing, no acute distress  NEURO: GCS 15  HEENT: Normocephalic, atraumatic  CV: Regular rate and rhythm  LUNGS: Clear to auscultation bilaterally  GI: Abdomen soft, nontender palpation, no guarding, no rebound  : Voiding independently  MSK: Right hip incision with bandage without strikethrough, compartments soft, strength 5 out of 5, neurovascularly intact  SKIN: Warm, dry, intact    Invasive Devices       Peripheral Intravenous Line  Duration             Peripheral IV 08/06/24 Proximal;Right;Ventral (anterior) Forearm 2 days                          Lab Results: Results: I have personally reviewed all pertinent laboratory/tests results, BMP/CMP:   Lab Results   Component Value Date    SODIUM 130 (L) 08/09/2024    K 3.7 08/09/2024    CL 92 (L) 08/09/2024    CO2 25 08/09/2024    BUN 31 (H) 08/09/2024    CREATININE 0.80 08/09/2024    CALCIUM 8.5 08/09/2024    EGFR 95 08/09/2024   , and CBC:   Lab Results   Component Value Date    WBC 18.48 (H) 08/09/2024    HGB 9.7 (L) 08/09/2024    HCT 29.6 (L) 08/09/2024     MCV 88 08/09/2024     (H) 08/09/2024    RBC 3.38 (L) 08/09/2024    MCH 28.7 08/09/2024    MCHC 32.8 08/09/2024    RDW 14.1 08/09/2024    MPV 10.2 08/09/2024    NRBC 0 08/09/2024     Imaging: I have personally reviewed pertinent reports.     Other Studies: N/A

## 2024-08-09 NOTE — PLAN OF CARE
Problem: PHYSICAL THERAPY ADULT  Goal: Performs mobility at highest level of function for planned discharge setting.  See evaluation for individualized goals.  Description: Treatment/Interventions: Functional transfer training, LE strengthening/ROM, Therapeutic exercise, Endurance training, Cognitive reorientation, Patient/family training, Equipment eval/education, Bed mobility, Gait training, Compensatory technique education, Spoke to nursing, Spoke to case management, Spoke to MD     See flowsheet documentation for full assessment, interventions and recommendations.  Outcome: Not Progressing  Note: Prognosis: Fair  Problem List: Decreased strength, Decreased range of motion, Decreased endurance, Impaired balance, Decreased mobility, Decreased cognition, Impaired judgement, Decreased safety awareness, Decreased skin integrity, Orthopedic restrictions, Pain  Assessment: Pt seen for PT treatment 8/9/2024 with focus on: transfers and gait training. Pt presents supine, is agreeable to participate in session after increased motivation/encouragement. Pt participates in gait training, with intervention focusing on goal of improved gait mechanics and independence. Pt requires MODA for bed mobility in flat bed, YONATHAN for STS transfers, and YONATHAN for ambulation of 20ft with RW (declines further due to persistent R hip pain). Overall pt displays no functional improvement as compared to previous session, continues to perform below their baseline level of functional mobility due to pain, weakness, impaired balance, decreased safety/insight, gait deviations. Pt continues to most appropriately benefit from Level II (moderate PT intensity) resources upon d/c from the acute care setting. Continue skilled PT POC as able and appropriate in order to progress towards therapy goals and maximize independence with functional mobility. Next session, plan for intervention of increased ambulation distance training as able and  appropriate.    Barriers to Discharge: Decreased caregiver support     Rehab Resource Intensity Level, PT: II (Moderate Resource Intensity)    See flowsheet documentation for full assessment.

## 2024-08-09 NOTE — ASSESSMENT & PLAN NOTE
Lab Results   Component Value Date    HGBA1C 10.7 (H) 07/31/2024       Recent Labs     08/08/24  0716 08/08/24  1055 08/08/24  1505 08/08/24  2205   POCGLU 213* 168* 70 127         Blood Sugar Average: Last 72 hrs:  (P) 213.625    Continue insulin regimen while admitted.

## 2024-08-09 NOTE — PLAN OF CARE
Problem: Potential for Falls  Goal: Patient will remain free of falls  Description: INTERVENTIONS:  - Educate patient/family on patient safety including physical limitations  - Instruct patient to call for assistance with activity   - Consult OT/PT to assist with strengthening/mobility   - Keep Call bell within reach  - Keep bed low and locked with side rails adjusted as appropriate  - Keep care items and personal belongings within reach  - Initiate and maintain comfort rounds  - Make Fall Risk Sign visible to staff  - Offer Toileting every  Hours, in advance of need  - Initiate/Maintain alarm  - Obtain necessary fall risk management equipment:   - Apply yellow socks and bracelet for high fall risk patients  - Consider moving patient to room near nurses station  Outcome: Progressing     Problem: Prexisting or High Potential for Compromised Skin Integrity  Goal: Skin integrity is maintained or improved  Description: INTERVENTIONS:  - Identify patients at risk for skin breakdown  - Assess and monitor skin integrity  - Assess and monitor nutrition and hydration status  - Monitor labs   - Assess for incontinence   - Turn and reposition patient  - Assist with mobility/ambulation  - Relieve pressure over bony prominences  - Avoid friction and shearing  - Provide appropriate hygiene as needed including keeping skin clean and dry  - Evaluate need for skin moisturizer/barrier cream  - Collaborate with interdisciplinary team   - Patient/family teaching  - Consider wound care consult   Outcome: Progressing     Problem: PAIN - ADULT  Goal: Verbalizes/displays adequate comfort level or baseline comfort level  Description: Interventions:  - Encourage patient to monitor pain and request assistance  - Assess pain using appropriate pain scale  - Administer analgesics based on type and severity of pain and evaluate response  - Implement non-pharmacological measures as appropriate and evaluate response  - Consider cultural and  social influences on pain and pain management  - Notify physician/advanced practitioner if interventions unsuccessful or patient reports new pain  Outcome: Progressing     Problem: INFECTION - ADULT  Goal: Absence or prevention of progression during hospitalization  Description: INTERVENTIONS:  - Assess and monitor for signs and symptoms of infection  - Monitor lab/diagnostic results  - Monitor all insertion sites, i.e. indwelling lines, tubes, and drains  - Monitor endotracheal if appropriate and nasal secretions for changes in amount and color  - [place] appropriate cooling/warming therapies per order  - Administer medications as ordered  - Instruct and encourage patient and family to use good hand hygiene technique  - Identify and instruct in appropriate isolation precautions for identified infection/condition  Outcome: Progressing  Goal: Absence of fever/infection during neutropenic period  Description: INTERVENTIONS:  - Monitor WBC    Outcome: Progressing     Problem: SAFETY ADULT  Goal: Maintain or return to baseline ADL function  Description: INTERVENTIONS:  -  Assess patient's ability to carry out ADLs; assess patient's baseline for ADL function and identify physical deficits which impact ability to perform ADLs (bathing, care of mouth/teeth, toileting, grooming, dressing, etc.)  - Assess/evaluate cause of self-care deficits   - Assess range of motion  - Assess patient's mobility; develop plan if impaired  - Assess patient's need for assistive devices and provide as appropriate  - Encourage maximum independence but intervene and supervise when necessary  - Involve family in performance of ADLs  - Assess for home care needs following discharge   - Consider OT consult to assist with ADL evaluation and planning for discharge  - Provide patient education as appropriate  Outcome: Progressing  Goal: Maintains/Returns to pre admission functional level  Description: INTERVENTIONS:  - Perform AM-PAC 6 Click Basic  Mobility/ Daily Activity assessment daily.  - Set and communicate daily mobility goal to care team and patient/family/caregiver.   - Collaborate with rehabilitation services on mobility goals if consulted  - Perform Range of Motion  times a day.  - Reposition patient every  hours.  - Dangle patient  times a day  - Stand patient  times a day  - Ambulate patient  times a day  - Out of bed to chair  times a day   - Out of bed for meals  times a day  - Out of bed for toileting  - Record patient progress and toleration of activity level   Outcome: Progressing

## 2024-08-09 NOTE — PROGRESS NOTES
Progress Note - Acute Pain Service    David Skelton Jr. 64 y.o. male MRN: [id number]  Unit/Bed#: W -01 Encounter: [id number]      David Skelton Jr. is a 64 y.o. male with a history of iron deficiency anemia, urinary retention, diabetes, anxiety, polysubstance abuse on methadone maintenance who presented status post unwitnessed fall.     Right hip pain  Assessment & Plan  S/p IM Nail of right right on 7/31, refused [facility] rehab and went home and fell.   CT scan: No acute fractures, does show possible myositis    Multimodal Regimen:  Tylenol 975 mg PO every 8 hours scheduled  Gabapentin 600 mg PO BID (home med)  Estimated Creatinine Clearance: 92.6 mL/min (by C-G formula based on SCr of 0.8 mg/dL).  Robaxin 500 mg oral every 6 hours scheduled  Lidocaine patches change every 12 hours  Xanax 0.5 mg PO BID PRN anxiety; home medication  Monitor for oversedation, respiratory depression   Increase oxycodone to 5 mg every 4 hours as needed for moderate pain  Increase oxycodone to 7.5 mg every 4 hours as needed for severe pain  Narcan as needed for opioid reversal agent/respiratory depression     Bowel Regimen:  MiraLAX daily  Senokot-S changed to 2 tablets twice a day for constipation    Methadone dependence (HCC)  Assessment & Plan  Currently takes 190 mg Methadone daily  Patient follows with [facility]  Phone number: [phone]  APS called and verified dosage on 8/2  Maintenance:  8/6   8/8/2024 QTc 489  QTc improved, would recommend repeat EKG in 1 week, can be done as an outpatient.  If QTc would be persistently elevated, would recommend de-escalation and daily methadone dose which would be done by his methadone clinic.  Has been on methadone x 20 years, admits to overuse of BZDs and declines use of mood stabilizers.  He has admitted to not following up with an outpatient psych resource x 2 months.      Treatment plan and medications have been reviewed with patient, bedside nursing staff and  primary care service.  He is okay to discharge to rehab facility at any point from a pain management standpoint.    APS will continue to follow. Please contact Acute Pain Service - via [employer] from [phone] with additional questions or concerns. See Yue or Patricia for additional contacts and after hours information.     Pain History  Current pain location(s):  Pain Score: 10  Pain Location/Orientation: Location: Hip  Pain Scale: Pain Assessment Tool: 0-10  24 hour history: Patient sitting in chair, continues to report severe pain in his right hip/thigh area.  Feels mild relief in pain with oxycodone, affects last less than 1 hour.  Tolerating current analgesic regimen, no reported adverse effects.  Bowel movement was greater than 2 days ago.    Opioid requirement previous 24 hours: Oxycodone 15mg    Meds/Allergies   all current active meds have been reviewed and current meds:   Current Facility-Administered Medications   Medication Dose Route Frequency    acetaminophen (TYLENOL) tablet 975 mg  975 mg Oral Q8H MELISA    ALPRAZolam (XANAX) tablet 0.5 mg  0.5 mg Oral BID PRN    amLODIPine (NORVASC) tablet 5 mg  5 mg Oral Daily    cyanocobalamin (VITAMIN B-12) tablet 1,000 mcg  1,000 mcg Oral Daily    enoxaparin (LOVENOX) subcutaneous injection 30 mg  30 mg Subcutaneous Q12H    ergocalciferol (VITAMIN D2) capsule 50,000 Units  50,000 Units Oral Once per day on Monday Wednesday Friday    insulin lispro (HumALOG/ADMELOG) 100 units/mL subcutaneous injection 1-6 Units  1-6 Units Subcutaneous 4x Daily (AC & HS)    insulin lispro (HumALOG/ADMELOG) 100 units/mL subcutaneous injection 3 Units  3 Units Subcutaneous TID With Meals    lidocaine (LIDODERM) 5 % patch 1 patch  1 patch Topical Daily    methadone (DOLOPHINE) oral concentrated solution 190 mg  190 mg Oral Q24H    methocarbamol (ROBAXIN) tablet 500 mg  500 mg Oral Q6H MELISA    metoprolol tartrate (LOPRESSOR) partial tablet 12.5 mg  12.5 mg Oral QAM    naloxone (NARCAN)  injection 0.4 mg  0.4 mg Intravenous Q1MIN PRN    OLANZapine (ZyPREXA) IM injection 5 mg  5 mg Intramuscular Once    ondansetron (ZOFRAN-ODT) dispersible tablet 4 mg  4 mg Oral Q6H PRN    oxyCODONE (ROXICODONE) IR tablet 5 mg  5 mg Oral Q4H PRN    Or    oxyCODONE (ROXICODONE) split tablet 7.5 mg  7.5 mg Oral Q4H PRN    polyethylene glycol (MIRALAX) packet 17 g  17 g Oral Daily    senna-docusate sodium (SENOKOT S) 8.6-50 mg per tablet 2 tablet  2 tablet Oral BID       Allergies   Allergen Reactions    Aspirin Shortness Of Breath    Penicillins        Objective        Vitals:    08/09/24 0235 08/09/24 0528 08/09/24 0720 08/09/24 1126   BP: 140/80 (!) 153/103 133/77 127/77   BP Location: Left arm      Pulse: 94  81 76   Resp: 16  16 16   Temp: 97.8 °F (36.6 °C)  98.3 °F (36.8 °C)    TempSrc: Oral      SpO2: 98%  95% 98%   Weight:       Height:             Physical Exam  Vitals reviewed.   Constitutional:       General: He is awake. He is not in acute distress.     Appearance: He is not ill-appearing, toxic-appearing or diaphoretic.   HENT:      Head: Normocephalic and atraumatic.      Nose: Nose normal. No congestion or rhinorrhea.      Mouth/Throat:      Mouth: Mucous membranes are moist.   Eyes:      Extraocular Movements: Extraocular movements intact.   Cardiovascular:      Rate and Rhythm: Normal rate.   Pulmonary:      Effort: Pulmonary effort is normal. No tachypnea, bradypnea or respiratory distress.   Skin:     Coloration: Skin is not pale.   Neurological:      Mental Status: He is alert. Mental status is at baseline.   Psychiatric:         Attention and Perception: Attention normal.         Mood and Affect: Mood normal. Mood is not anxious.         Speech: Speech normal.         Behavior: Behavior normal. Behavior is cooperative.         Lab Results:   Estimated Creatinine Clearance: 92.6 mL/min (by C-G formula based on SCr of 0.8 mg/dL).  Lab Results   Component Value Date    WBC 18.48 (H) 08/09/2024    WBC  9.29 01/29/2015    HGB 9.7 (L) 08/09/2024    HGB 12.2 01/29/2015    HCT 29.6 (L) 08/09/2024    HCT 38.0 01/29/2015     (H) 08/09/2024     01/29/2015         Component Value Date/Time     01/29/2015 1342    K 3.7 08/09/2024 0553    K 4.6 03/01/2019 1611    CL 92 (L) 08/09/2024 0553     03/01/2019 1611    CO2 25 08/09/2024 0553    CO2 29 08/17/2022 0815    CO2 31 03/01/2019 1611    BUN 31 (H) 08/09/2024 0553    BUN 17 03/01/2019 1611    CREATININE 0.80 08/09/2024 0553    CREATININE 1.03 03/01/2019 1611         Component Value Date/Time    CALCIUM 8.5 08/09/2024 0553    CALCIUM 8.7 03/01/2019 1611    ALKPHOS 71 08/06/2024 1129    ALKPHOS 133 (H) 03/01/2019 1611    AST 35 08/06/2024 1129    AST 16 03/01/2019 1611    ALT 26 08/06/2024 1129    ALT 27 03/01/2019 1611    BILITOT 0.27 01/29/2015 1342    TP 5.9 (L) 08/06/2024 1129    TP 6.9 03/01/2019 1611    ALB 3.1 (L) 08/06/2024 1129    ALB 3.6 03/01/2019 1611       Please note that the APS provides consultative services regarding pain management only.  With the exception of ketamine and epidural infusions and except when indicated, final decisions regarding starting or changing doses of analgesic medications are at the discretion of the consulting service.    STEVE Galvan   Acute Pain Service

## 2024-08-09 NOTE — PLAN OF CARE
Problem: OCCUPATIONAL THERAPY ADULT  Goal: Performs self-care activities at highest level of function for planned discharge setting.  See evaluation for individualized goals.  Description: Treatment Interventions: ADL retraining, Functional transfer training, Endurance training, Cognitive reorientation, Patient/family training, Equipment evaluation/education, Compensatory technique education, Energy conservation, Activityengagement          See flowsheet documentation for full assessment, interventions and recommendations.   Outcome: Progressing  Note: Limitation: Decreased ADL status, Decreased UE strength, Decreased Safe judgement during ADL, Decreased cognition, Decreased self-care trans, Decreased high-level ADLs (cognitive evaluation)  Prognosis: Fair  Assessment: Pt was seen for skilled OT tx session on this date focusing on ADLs, activity tolerance, activity endurance, fnxl transfers and fnxl mobility. David was agreeable to work c therapy. He demonstrated improvement c impulsivity throughout session. Demonstrated STS from EOB c min A x1 and RW, benefits from verbal cues for sustained attention to task. Pt completed LB dressing w max A x1. Pt demonstrated fnxl mobility from EOB to recliner chair w min A x1 and RW, second person present for safety due to increased number of falls and unpredictability. Pt would benefit from continued skilled OT services while admitted. At the end of the session, pt was seated OOB in recliner chair c posey belt donned and all needs met, no complaints.     Rehab Resource Intensity Level, OT: II (Moderate Resource Intensity)
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
## 2024-08-09 NOTE — OCCUPATIONAL THERAPY NOTE
Occupational Therapy Progress Note     Patient Name: David Skelton Jr.  Today's Date: 8/9/2024  Problem List  Active Problems:    Bipolar disorder (HCC)    Type 2 diabetes mellitus with ophthalmic complication, with long-term current use of insulin (HCC)    Methadone dependence (HCC)    Encephalopathy    Fall    Right hip pain          08/09/24 1213   OT Last Visit   OT Visit Date 08/09/24   Note Type   Note Type Treatment   Pain Assessment   Pain Assessment Tool 0-10   Pain Score 10 - Worst Possible Pain   Pain Location/Orientation Orientation: Right;Location: Hip   Hospital Pain Intervention(s) Ambulation/increased activity;Repositioned;Emotional support   Multiple Pain Sites No   Pain Rating: FLACC (Rest) - Face 1   Pain Rating: FLACC (Rest) - Legs 0   Pain Rating: FLACC (Rest) - Activity 0   Pain Rating: FLACC (Rest) - Cry 0   Pain Rating: FLACC (Rest) - Consolability 0   Score: FLACC (Rest) 1   Pain Rating: FLACC (Activity) - Face 1   Pain Rating: FLACC (Activity) - Legs 1   Pain Rating: FLACC (Activity) - Activity 1   Pain Rating: FLACC (Activity) - Cry 1   Pain Rating: FLACC (Activity) - Consolability 1   Score: FLACC (Activity) 5   Restrictions/Precautions   Weight Bearing Precautions Per Order Yes   RLE Weight Bearing Per Order WBAT   Other Precautions Cognitive;Chair Alarm;Bed Alarm;Fall Risk;Pain;Restraints;Impulsive  (lap posey belt)   ADL   Where Assessed Edge of bed   Eating Assistance 5  Supervision/Setup   Eating Deficit Setup   LB Dressing Assistance 3  Moderate Assistance   LB Dressing Deficit Don/doff R sock;Don/doff L sock   Toileting Assistance    (Pt denied need for toileting during OT tx session)   Functional Standing Tolerance   Time ~3 minutes   Activity standing at EOB   Comments RW   Bed Mobility   Supine to Sit 3  Moderate assistance   Additional items Assist x 1;Bedrails;Increased time required;Verbal cues   Sit to Supine   (Pt OOB in recliner at end of session w/ lap posey donned)    Additional Comments Pt reports slight lightheadedness upon standing, resolved within ~3 minutes of standing   Transfers   Sit to Stand 4  Minimal assistance   Additional items Assist x 1;Increased time required;Verbal cues  (RW management)   Stand to Sit 4  Minimal assistance   Additional items Assist x 1;Increased time required;Verbal cues   Stand pivot 4  Minimal assistance   Additional items Assist x 1;Increased time required;Verbal cues   Additional Comments Pt benefits from RW management and VCs for continued engagement in activites   Functional Mobility   Functional Mobility 4  Minimal assistance   Additional Comments ax1, fnxl short distance achieved   Additional items Rolling walker   Cognition   Overall Cognitive Status Impaired   Arousal/Participation Cooperative   Attention Attends with cues to redirect   Orientation Level Oriented to person;Oriented to place   Memory Decreased recall of recent events;Decreased short term memory;Decreased recall of precautions   Following Commands Follows one step commands without difficulty   Comments Pt demonstrates improved impulsivity from previous therapy sessions   Activity Tolerance   Activity Tolerance Patient limited by fatigue;Patient limited by pain   Assessment   Assessment Pt was seen for skilled OT tx session on this date focusing on ADLs, activity tolerance, activity endurance, fnxl transfers and fnxl mobility. David was agreeable to work c therapy. He demonstrated improvement c impulsivity throughout session. Demonstrated STS from EOB c min A x1 and RW, benefits from verbal cues for sustained attention to task. Pt completed LB dressing w max A x1. Pt demonstrated fnxl mobility from EOB to recliner chair w min A x1 and RW, second person present for safety due to increased number of falls and unpredictability. Pt would benefit from continued skilled OT services while admitted. At the end of the session, pt was seated OOB in recliner chair c posey belt  donned and all needs met, no complaints.   Plan   Goal Expiration Date 08/17/24   OT Treatment Day 1   OT Frequency 3-5x/wk   Discharge Recommendation   Rehab Resource Intensity Level, OT II (Moderate Resource Intensity)   AM-PAC Daily Activity Inpatient   Lower Body Dressing 2   Bathing 2   Toileting 2   Upper Body Dressing 2   Grooming 2   Eating 3   Daily Activity Raw Score 13   Daily Activity Standardized Score (Calc for Raw Score >=11) 32.03   AM-PAC Applied Cognition Inpatient   Following a Speech/Presentation 3   Understanding Ordinary Conversation 4   Taking Medications 1   Remembering Where Things Are Placed or Put Away 3   Remembering List of 4-5 Errands 2   Taking Care of Complicated Tasks 2   Applied Cognition Raw Score 15   Applied Cognition Standardized Score 33.54   End of Consult   Education Provided Yes   Patient Position at End of Consult Bedside chair;Bed/Chair alarm activated   Nurse Communication Nurse aware of consult     The patient's raw score on the AM-PAC Daily Activity Inpatient Short Form is 13. A raw score of less than 19 suggests the patient may benefit from discharge to post-acute rehabilitation services. HOWEVER please refer to the recommendation of the Occupational Therapist for safe discharge planning.    This session, pt required and most appropriately benefited from skilled OT/PT co-treat due to significant regression from functional baseline, decreased activity tolerance, and unpredictable medical and/or functional status. OT and PT goals were addressed separately as seen in documentation.     Lakeshia Saavedra OTR/L

## 2024-08-10 LAB
ANION GAP SERPL CALCULATED.3IONS-SCNC: 8 MMOL/L (ref 4–13)
BASOPHILS # BLD AUTO: 0.02 THOUSANDS/ÂΜL (ref 0–0.1)
BASOPHILS NFR BLD AUTO: 0 % (ref 0–1)
BUN SERPL-MCNC: 42 MG/DL (ref 5–25)
CALCIUM SERPL-MCNC: 8.5 MG/DL (ref 8.4–10.2)
CHLORIDE SERPL-SCNC: 91 MMOL/L (ref 96–108)
CO2 SERPL-SCNC: 30 MMOL/L (ref 21–32)
CREAT SERPL-MCNC: 1.08 MG/DL (ref 0.6–1.3)
EOSINOPHIL # BLD AUTO: 0.11 THOUSAND/ÂΜL (ref 0–0.61)
EOSINOPHIL NFR BLD AUTO: 1 % (ref 0–6)
ERYTHROCYTE [DISTWIDTH] IN BLOOD BY AUTOMATED COUNT: 14.4 % (ref 11.6–15.1)
GFR SERPL CREATININE-BSD FRML MDRD: 73 ML/MIN/1.73SQ M
GLUCOSE SERPL-MCNC: 138 MG/DL (ref 65–140)
GLUCOSE SERPL-MCNC: 143 MG/DL (ref 65–140)
GLUCOSE SERPL-MCNC: 155 MG/DL (ref 65–140)
GLUCOSE SERPL-MCNC: 204 MG/DL (ref 65–140)
GLUCOSE SERPL-MCNC: 230 MG/DL (ref 65–140)
HCT VFR BLD AUTO: 29.6 % (ref 36.5–49.3)
HGB BLD-MCNC: 9.6 G/DL (ref 12–17)
IMM GRANULOCYTES # BLD AUTO: 0.1 THOUSAND/UL (ref 0–0.2)
IMM GRANULOCYTES NFR BLD AUTO: 1 % (ref 0–2)
LYMPHOCYTES # BLD AUTO: 1.3 THOUSANDS/ÂΜL (ref 0.6–4.47)
LYMPHOCYTES NFR BLD AUTO: 9 % (ref 14–44)
MAGNESIUM SERPL-MCNC: 2.7 MG/DL (ref 1.9–2.7)
MCH RBC QN AUTO: 28.7 PG (ref 26.8–34.3)
MCHC RBC AUTO-ENTMCNC: 32.4 G/DL (ref 31.4–37.4)
MCV RBC AUTO: 89 FL (ref 82–98)
MONOCYTES # BLD AUTO: 1.18 THOUSAND/ÂΜL (ref 0.17–1.22)
MONOCYTES NFR BLD AUTO: 8 % (ref 4–12)
NEUTROPHILS # BLD AUTO: 11.8 THOUSANDS/ÂΜL (ref 1.85–7.62)
NEUTS SEG NFR BLD AUTO: 81 % (ref 43–75)
NRBC BLD AUTO-RTO: 0 /100 WBCS
PHOSPHATE SERPL-MCNC: 4 MG/DL (ref 2.3–4.1)
PLATELET # BLD AUTO: 450 THOUSANDS/UL (ref 149–390)
PMV BLD AUTO: 10.3 FL (ref 8.9–12.7)
POTASSIUM SERPL-SCNC: 4 MMOL/L (ref 3.5–5.3)
RBC # BLD AUTO: 3.34 MILLION/UL (ref 3.88–5.62)
SODIUM SERPL-SCNC: 129 MMOL/L (ref 135–147)
WBC # BLD AUTO: 14.51 THOUSAND/UL (ref 4.31–10.16)

## 2024-08-10 PROCEDURE — 85025 COMPLETE CBC W/AUTO DIFF WBC: CPT | Performed by: STUDENT IN AN ORGANIZED HEALTH CARE EDUCATION/TRAINING PROGRAM

## 2024-08-10 PROCEDURE — 83735 ASSAY OF MAGNESIUM: CPT | Performed by: STUDENT IN AN ORGANIZED HEALTH CARE EDUCATION/TRAINING PROGRAM

## 2024-08-10 PROCEDURE — 84100 ASSAY OF PHOSPHORUS: CPT | Performed by: STUDENT IN AN ORGANIZED HEALTH CARE EDUCATION/TRAINING PROGRAM

## 2024-08-10 PROCEDURE — 80048 BASIC METABOLIC PNL TOTAL CA: CPT | Performed by: STUDENT IN AN ORGANIZED HEALTH CARE EDUCATION/TRAINING PROGRAM

## 2024-08-10 PROCEDURE — 82948 REAGENT STRIP/BLOOD GLUCOSE: CPT

## 2024-08-10 PROCEDURE — 99232 SBSQ HOSP IP/OBS MODERATE 35: CPT | Performed by: STUDENT IN AN ORGANIZED HEALTH CARE EDUCATION/TRAINING PROGRAM

## 2024-08-10 RX ADMIN — INSULIN LISPRO 3 UNITS: 100 INJECTION, SOLUTION INTRAVENOUS; SUBCUTANEOUS at 08:58

## 2024-08-10 RX ADMIN — ENOXAPARIN SODIUM 30 MG: 30 INJECTION SUBCUTANEOUS at 05:37

## 2024-08-10 RX ADMIN — METHOCARBAMOL TABLETS 500 MG: 500 TABLET, COATED ORAL at 23:58

## 2024-08-10 RX ADMIN — ACETAMINOPHEN 975 MG: 325 TABLET, FILM COATED ORAL at 21:28

## 2024-08-10 RX ADMIN — ACETAMINOPHEN 975 MG: 325 TABLET, FILM COATED ORAL at 05:37

## 2024-08-10 RX ADMIN — SENNOSIDES AND DOCUSATE SODIUM 2 TABLET: 8.6; 5 TABLET ORAL at 09:07

## 2024-08-10 RX ADMIN — METHOCARBAMOL TABLETS 500 MG: 500 TABLET, COATED ORAL at 05:37

## 2024-08-10 RX ADMIN — ENOXAPARIN SODIUM 30 MG: 30 INJECTION SUBCUTANEOUS at 17:39

## 2024-08-10 RX ADMIN — SENNOSIDES AND DOCUSATE SODIUM 2 TABLET: 8.6; 5 TABLET ORAL at 17:40

## 2024-08-10 RX ADMIN — METHOCARBAMOL TABLETS 500 MG: 500 TABLET, COATED ORAL at 17:39

## 2024-08-10 RX ADMIN — INSULIN LISPRO 2 UNITS: 100 INJECTION, SOLUTION INTRAVENOUS; SUBCUTANEOUS at 21:28

## 2024-08-10 RX ADMIN — Medication 12.5 MG: at 05:37

## 2024-08-10 RX ADMIN — Medication 7.5 MG: at 19:36

## 2024-08-10 RX ADMIN — CYANOCOBALAMIN TAB 500 MCG 1000 MCG: 500 TAB at 09:01

## 2024-08-10 RX ADMIN — Medication 190 MG: at 05:37

## 2024-08-10 RX ADMIN — Medication 7.5 MG: at 23:58

## 2024-08-10 RX ADMIN — Medication 7.5 MG: at 01:29

## 2024-08-10 RX ADMIN — POLYETHYLENE GLYCOL 3350 17 G: 17 POWDER, FOR SOLUTION ORAL at 09:01

## 2024-08-10 RX ADMIN — LIDOCAINE 1 PATCH: 700 PATCH TOPICAL at 16:02

## 2024-08-10 RX ADMIN — Medication 7.5 MG: at 11:23

## 2024-08-10 RX ADMIN — METHOCARBAMOL TABLETS 500 MG: 500 TABLET, COATED ORAL at 12:30

## 2024-08-10 NOTE — ASSESSMENT & PLAN NOTE
Lab Results   Component Value Date    HGBA1C 10.7 (H) 07/31/2024       Recent Labs     08/09/24  1633 08/09/24 2054 08/10/24  0745 08/10/24  1115   POCGLU 116 163* 230* 138         Blood Sugar Average: Last 72 hrs:  (P) 182.4    Continue insulin regimen while admitted.

## 2024-08-10 NOTE — PLAN OF CARE
Problem: Potential for Falls  Goal: Patient will remain free of falls  Description: INTERVENTIONS:  - Educate patient/family on patient safety including physical limitations  - Instruct patient to call for assistance with activity   - Consult OT/PT to assist with strengthening/mobility   - Keep Call bell within reach  - Keep bed low and locked with side rails adjusted as appropriate  - Keep care items and personal belongings within reach  - Initiate and maintain comfort rounds  - Make Fall Risk Sign visible to staff  - Apply yellow socks and bracelet for high fall risk patients  - Consider moving patient to room near nurses station  Outcome: Progressing     Problem: Prexisting or High Potential for Compromised Skin Integrity  Goal: Skin integrity is maintained or improved  Description: INTERVENTIONS:  - Identify patients at risk for skin breakdown  - Assess and monitor skin integrity  - Assess and monitor nutrition and hydration status  - Monitor labs   - Assess for incontinence   - Turn and reposition patient  - Assist with mobility/ambulation  - Relieve pressure over bony prominences  - Avoid friction and shearing  - Provide appropriate hygiene as needed including keeping skin clean and dry  - Evaluate need for skin moisturizer/barrier cream  - Collaborate with interdisciplinary team   - Patient/family teaching  - Consider wound care consult   Outcome: Progressing     Problem: PAIN - ADULT  Goal: Verbalizes/displays adequate comfort level or baseline comfort level  Description: Interventions:  - Encourage patient to monitor pain and request assistance  - Assess pain using appropriate pain scale  - Administer analgesics based on type and severity of pain and evaluate response  - Implement non-pharmacological measures as appropriate and evaluate response  - Consider cultural and social influences on pain and pain management  - Notify physician/advanced practitioner if interventions unsuccessful or patient reports  new pain  Outcome: Progressing     Problem: INFECTION - ADULT  Goal: Absence or prevention of progression during hospitalization  Description: INTERVENTIONS:  - Assess and monitor for signs and symptoms of infection  - Monitor lab/diagnostic results  - Monitor all insertion sites, i.e. indwelling lines, tubes, and drains  - Monitor endotracheal if appropriate and nasal secretions for changes in amount and color  - [place] appropriate cooling/warming therapies per order  - Administer medications as ordered  - Instruct and encourage patient and family to use good hand hygiene technique  - Identify and instruct in appropriate isolation precautions for identified infection/condition  Outcome: Progressing  Goal: Absence of fever/infection during neutropenic period  Description: INTERVENTIONS:  - Monitor WBC    Outcome: Progressing     Problem: SAFETY ADULT  Goal: Maintain or return to baseline ADL function  Description: INTERVENTIONS:  -  Assess patient's ability to carry out ADLs; assess patient's baseline for ADL function and identify physical deficits which impact ability to perform ADLs (bathing, care of mouth/teeth, toileting, grooming, dressing, etc.)  - Assess/evaluate cause of self-care deficits   - Assess range of motion  - Assess patient's mobility; develop plan if impaired  - Assess patient's need for assistive devices and provide as appropriate  - Encourage maximum independence but intervene and supervise when necessary  - Involve family in performance of ADLs  - Assess for home care needs following discharge   - Consider OT consult to assist with ADL evaluation and planning for discharge  - Provide patient education as appropriate  Outcome: Progressing  Goal: Maintains/Returns to pre admission functional level  Description: INTERVENTIONS:  - Perform AM-PAC 6 Click Basic Mobility/ Daily Activity assessment daily.  - Set and communicate daily mobility goal to care team and patient/family/caregiver.   -  Collaborate with rehabilitation services on mobility goals if consulted  - Out of bed for toileting  - Record patient progress and toleration of activity level   Outcome: Progressing     Problem: DISCHARGE PLANNING  Goal: Discharge to home or other facility with appropriate resources  Description: INTERVENTIONS:  - Identify barriers to discharge w/patient and caregiver  - Arrange for needed discharge resources and transportation as appropriate  - Identify discharge learning needs (meds, wound care, etc.)  - Arrange for interpretive services to assist at discharge as needed  - Refer to Case Management Department for coordinating discharge planning if the patient needs post-hospital services based on physician/advanced practitioner order or complex needs related to functional status, cognitive ability, or social support system  Outcome: Progressing     Problem: Knowledge Deficit  Goal: Patient/family/caregiver demonstrates understanding of disease process, treatment plan, medications, and discharge instructions  Description: Complete learning assessment and assess knowledge base.  Interventions:  - Provide teaching at level of understanding  - Provide teaching via preferred learning methods  Outcome: Progressing     Problem: SAFETY,RESTRAINT: NV/NON-SELF DESTRUCTIVE BEHAVIOR  Goal: Remains free of harm/injury (restraint for non violent/non self-detsructive behavior)  Description: INTERVENTIONS:  - Instruct patient/family regarding restraint use   - Assess and monitor physiologic and psychological status   - Provide interventions and comfort measures to meet assessed patient needs   - Identify and implement measures to help patient regain control  - Assess readiness for release of restraint   Outcome: Progressing  Goal: Returns to optimal restraint-free functioning  Description: INTERVENTIONS:  - Assess the patient's behavior and symptoms that indicate continued need for restraint  - Identify and implement measures to  help patient regain control  - Assess readiness for release of restraint   Outcome: Progressing     Problem: Nutrition/Hydration-ADULT  Goal: Nutrient/Hydration intake appropriate for improving, restoring or maintaining nutritional needs  Description: Monitor and assess patient's nutrition/hydration status for malnutrition. Collaborate with interdisciplinary team and initiate plan and interventions as ordered.  Monitor patient's weight and dietary intake as ordered or per policy. Utilize nutrition screening tool and intervene as necessary. Determine patient's food preferences and provide high-protein, high-caloric foods as appropriate.     INTERVENTIONS:  - Monitor oral intake, urinary output, labs, and treatment plans  - Assess nutrition and hydration status and recommend course of action  - Evaluate amount of meals eaten  - Assist patient with eating if necessary   - Allow adequate time for meals  - Recommend/ encourage appropriate diets, oral nutritional supplements, and vitamin/mineral supplements  - Order, calculate, and assess calorie counts as needed  - Recommend, monitor, and adjust tube feedings and TPN/PPN based on assessed needs  - Assess need for intravenous fluids  - Provide specific nutrition/hydration education as appropriate  - Include patient/family/caregiver in decisions related to nutrition  Outcome: Progressing

## 2024-08-10 NOTE — PROGRESS NOTES
"[facility]  Progress Note  Name: David Osorio I  MRN: [id number]  Unit/Bed#: W -01 I Date of Admission: 8/6/2024   Date of Service: 8/10/2024 I Hospital Day: 4    Assessment & Plan   Right hip pain  Assessment & Plan  Complaints of pain in R hip  S/p hip surgery on 7/31  CT lower extremity showed possible myositis.     - PT/OT eval and recommendations on disposition  - PT/OT recommended level II rehab, and pt is in agreement  - Pending placement    Fall  Assessment & Plan  Pt is s/p fall with headstrike, unknown down time  Presented to ED as Trauma C      Encephalopathy  Assessment & Plan  Pt initially very lethargic on arrival to ED, suspect due to methadone  - Pt became more alert, but was still confused.     - Pt at high risk for delirium   - Geriatrics on board  - Currently in wrist restraints and posey due to agitation  - Zyprexa given for agitation  - Pt seen by Neuropsych, and was deemed to have no decision making capacity. Will discuss with family or health proxy on medical decisions.     Methadone dependence (HCC)  Assessment & Plan  Pt has a hx of opioid abuse, and has been on chronic methadone.  Current dose is 190mg daily. Dose was confrimed    Continue home dose of methadone  APS on board, will continue to monitor QTc, and consider decreasing dose if it lengthens    Type 2 diabetes mellitus with ophthalmic complication, with long-term current use of insulin (HCC)  Assessment & Plan  Lab Results   Component Value Date    HGBA1C 10.7 (H) 07/31/2024       Recent Labs     08/09/24  1633 08/09/24  2054 08/10/24  0745 08/10/24  1115   POCGLU 116 163* 230* 138         Blood Sugar Average: Last 72 hrs:  (P) 182.4    Continue insulin regimen while admitted.               Bowel Regimen: miralax  VTE Prophylaxis:Enoxaparin (Lovenox)     Disposition: placement vs Rehab    Subjective   Chief Complaint: right leg pain    Subjective: \" I like it here\"   Objective   Vitals:   Temp:  " [97.7 °F (36.5 °C)-98.9 °F (37.2 °C)] 98.9 °F (37.2 °C)  HR:  [68-98] 73  Resp:  [12-17] 12  BP: (104-149)/(55-91) 128/64    I/O         08/08 0701  08/09 0700 08/09 0701  08/10 0700 08/10 0701  08/11 0700    P.O. 315 480     I.V. (mL/kg) 0 (0)      Total Intake(mL/kg) 315 (4.2) 480 (6.4)     Urine (mL/kg/hr) 1450 (0.8)      Total Output 1450      Net -1135 +480            Unmeasured Urine Occurrence 1 x               Physical Exam:   GENERAL APPEARANCE: sitting up in bed  NEURO: GVD 14-15  HEENT: EOm's intact  CV: RRR  LUNGS: CTA bilaterally  GI: tolerating a diet  : voiding  MSK: moving extremities  SKIN: warm and dry    Invasive Devices       Peripheral Intravenous Line  Duration             Peripheral IV 08/10/24 Dorsal (posterior);Right Forearm <1 day                          Lab Results:    Latest Reference Range & Units 08/10/24 03:39 08/10/24 07:45 08/10/24 11:15   POC Glucose 65 - 140 mg/dl  230 (H) 138   Sodium 135 - 147 mmol/L 129 (L)     Potassium 3.5 - 5.3 mmol/L 4.0     Chloride 96 - 108 mmol/L 91 (L)     Carbon Dioxide 21 - 32 mmol/L 30     ANION GAP 4 - 13 mmol/L 8     BUN 5 - 25 mg/dL 42 (H)     Creatinine 0.60 - 1.30 mg/dL 1.08     GLUCOSE 65 - 140 mg/dL 155 (H)     Calcium 8.4 - 10.2 mg/dL 8.5     (H): Data is abnormally high  (L): Data is abnormally low  Imaging: none   Other Studies: none

## 2024-08-11 PROBLEM — E87.1 HYPONATREMIA: Status: ACTIVE | Noted: 2024-08-11

## 2024-08-11 LAB
ANION GAP SERPL CALCULATED.3IONS-SCNC: 4 MMOL/L (ref 4–13)
BASOPHILS # BLD AUTO: 0.04 THOUSANDS/ÂΜL (ref 0–0.1)
BASOPHILS NFR BLD AUTO: 0 % (ref 0–1)
BUN SERPL-MCNC: 34 MG/DL (ref 5–25)
CALCIUM SERPL-MCNC: 8.1 MG/DL (ref 8.4–10.2)
CHLORIDE SERPL-SCNC: 94 MMOL/L (ref 96–108)
CO2 SERPL-SCNC: 32 MMOL/L (ref 21–32)
CREAT SERPL-MCNC: 1.11 MG/DL (ref 0.6–1.3)
EOSINOPHIL # BLD AUTO: 0.22 THOUSAND/ÂΜL (ref 0–0.61)
EOSINOPHIL NFR BLD AUTO: 2 % (ref 0–6)
ERYTHROCYTE [DISTWIDTH] IN BLOOD BY AUTOMATED COUNT: 14.1 % (ref 11.6–15.1)
GFR SERPL CREATININE-BSD FRML MDRD: 70 ML/MIN/1.73SQ M
GLUCOSE SERPL-MCNC: 120 MG/DL (ref 65–140)
GLUCOSE SERPL-MCNC: 165 MG/DL (ref 65–140)
GLUCOSE SERPL-MCNC: 193 MG/DL (ref 65–140)
GLUCOSE SERPL-MCNC: 266 MG/DL (ref 65–140)
GLUCOSE SERPL-MCNC: 93 MG/DL (ref 65–140)
HCT VFR BLD AUTO: 28.5 % (ref 36.5–49.3)
HGB BLD-MCNC: 9.4 G/DL (ref 12–17)
IMM GRANULOCYTES # BLD AUTO: 0.1 THOUSAND/UL (ref 0–0.2)
IMM GRANULOCYTES NFR BLD AUTO: 1 % (ref 0–2)
LYMPHOCYTES # BLD AUTO: 1.12 THOUSANDS/ÂΜL (ref 0.6–4.47)
LYMPHOCYTES NFR BLD AUTO: 10 % (ref 14–44)
MCH RBC QN AUTO: 28.5 PG (ref 26.8–34.3)
MCHC RBC AUTO-ENTMCNC: 33 G/DL (ref 31.4–37.4)
MCV RBC AUTO: 86 FL (ref 82–98)
MONOCYTES # BLD AUTO: 1.08 THOUSAND/ÂΜL (ref 0.17–1.22)
MONOCYTES NFR BLD AUTO: 9 % (ref 4–12)
NEUTROPHILS # BLD AUTO: 9.25 THOUSANDS/ÂΜL (ref 1.85–7.62)
NEUTS SEG NFR BLD AUTO: 78 % (ref 43–75)
NRBC BLD AUTO-RTO: 0 /100 WBCS
PLATELET # BLD AUTO: 539 THOUSANDS/UL (ref 149–390)
PMV BLD AUTO: 9.7 FL (ref 8.9–12.7)
POTASSIUM SERPL-SCNC: 4.1 MMOL/L (ref 3.5–5.3)
RBC # BLD AUTO: 3.3 MILLION/UL (ref 3.88–5.62)
SODIUM SERPL-SCNC: 130 MMOL/L (ref 135–147)
WBC # BLD AUTO: 11.81 THOUSAND/UL (ref 4.31–10.16)

## 2024-08-11 PROCEDURE — 80048 BASIC METABOLIC PNL TOTAL CA: CPT | Performed by: PHYSICIAN ASSISTANT

## 2024-08-11 PROCEDURE — 99232 SBSQ HOSP IP/OBS MODERATE 35: CPT | Performed by: SURGERY

## 2024-08-11 PROCEDURE — 85025 COMPLETE CBC W/AUTO DIFF WBC: CPT | Performed by: PHYSICIAN ASSISTANT

## 2024-08-11 PROCEDURE — 82948 REAGENT STRIP/BLOOD GLUCOSE: CPT

## 2024-08-11 RX ADMIN — SENNOSIDES AND DOCUSATE SODIUM 2 TABLET: 8.6; 5 TABLET ORAL at 18:14

## 2024-08-11 RX ADMIN — POLYETHYLENE GLYCOL 3350 17 G: 17 POWDER, FOR SOLUTION ORAL at 10:00

## 2024-08-11 RX ADMIN — SENNOSIDES AND DOCUSATE SODIUM 2 TABLET: 8.6; 5 TABLET ORAL at 10:01

## 2024-08-11 RX ADMIN — AMLODIPINE BESYLATE 5 MG: 5 TABLET ORAL at 10:00

## 2024-08-11 RX ADMIN — METHOCARBAMOL TABLETS 500 MG: 500 TABLET, COATED ORAL at 18:14

## 2024-08-11 RX ADMIN — INSULIN LISPRO 3 UNITS: 100 INJECTION, SOLUTION INTRAVENOUS; SUBCUTANEOUS at 08:11

## 2024-08-11 RX ADMIN — METHOCARBAMOL TABLETS 500 MG: 500 TABLET, COATED ORAL at 12:25

## 2024-08-11 RX ADMIN — Medication 7.5 MG: at 14:22

## 2024-08-11 RX ADMIN — INSULIN LISPRO 3 UNITS: 100 INJECTION, SOLUTION INTRAVENOUS; SUBCUTANEOUS at 16:55

## 2024-08-11 RX ADMIN — LIDOCAINE 1 PATCH: 700 PATCH TOPICAL at 14:22

## 2024-08-11 RX ADMIN — CYANOCOBALAMIN TAB 500 MCG 1000 MCG: 500 TAB at 10:01

## 2024-08-11 RX ADMIN — Medication 12.5 MG: at 05:46

## 2024-08-11 RX ADMIN — Medication 7.5 MG: at 23:50

## 2024-08-11 RX ADMIN — INSULIN LISPRO 2 UNITS: 100 INJECTION, SOLUTION INTRAVENOUS; SUBCUTANEOUS at 21:30

## 2024-08-11 RX ADMIN — ACETAMINOPHEN 975 MG: 325 TABLET, FILM COATED ORAL at 21:30

## 2024-08-11 RX ADMIN — METHOCARBAMOL TABLETS 500 MG: 500 TABLET, COATED ORAL at 05:47

## 2024-08-11 RX ADMIN — Medication 7.5 MG: at 19:34

## 2024-08-11 RX ADMIN — METHOCARBAMOL TABLETS 500 MG: 500 TABLET, COATED ORAL at 23:50

## 2024-08-11 RX ADMIN — ENOXAPARIN SODIUM 30 MG: 30 INJECTION SUBCUTANEOUS at 05:47

## 2024-08-11 RX ADMIN — ACETAMINOPHEN 975 MG: 325 TABLET, FILM COATED ORAL at 05:47

## 2024-08-11 RX ADMIN — INSULIN LISPRO 3 UNITS: 100 INJECTION, SOLUTION INTRAVENOUS; SUBCUTANEOUS at 12:24

## 2024-08-11 RX ADMIN — Medication 7.5 MG: at 04:19

## 2024-08-11 RX ADMIN — INSULIN LISPRO 1 UNITS: 100 INJECTION, SOLUTION INTRAVENOUS; SUBCUTANEOUS at 16:55

## 2024-08-11 RX ADMIN — ACETAMINOPHEN 975 MG: 325 TABLET, FILM COATED ORAL at 14:22

## 2024-08-11 RX ADMIN — Medication 190 MG: at 05:47

## 2024-08-11 RX ADMIN — ENOXAPARIN SODIUM 30 MG: 30 INJECTION SUBCUTANEOUS at 16:56

## 2024-08-11 NOTE — ASSESSMENT & PLAN NOTE
- Status post fall with the below noted injuries.  - Fall precautions.  - Geriatric Medicine consultation for evaluation, medication review and recommendations.  - PT and OT evaluation and treatment as indicated.  - Case Management consultation for disposition planning.

## 2024-08-11 NOTE — ASSESSMENT & PLAN NOTE
-Follow-up repeat BMP  -Will need more laboratory studies based on sodium level  -May require fluid restriction versus salt tabs

## 2024-08-11 NOTE — ASSESSMENT & PLAN NOTE
Lab Results   Component Value Date    HGBA1C 10.7 (H) 07/31/2024       Recent Labs     08/10/24  0745 08/10/24  1115 08/10/24  1605 08/10/24  2123   POCGLU 230* 138 143* 204*         Blood Sugar Average: Last 72 hrs:  (P) 150.5842366426072572    Continue insulin regimen while admitted.

## 2024-08-11 NOTE — PLAN OF CARE
Problem: Potential for Falls  Goal: Patient will remain free of falls  Description: INTERVENTIONS:  - Educate patient/family on patient safety including physical limitations  - Instruct patient to call for assistance with activity   - Consult OT/PT to assist with strengthening/mobility   - Keep Call bell within reach  - Keep bed low and locked with side rails adjusted as appropriate  - Keep care items and personal belongings within reach  - Initiate and maintain comfort rounds  - Make Fall Risk Sign visible to staff  - Apply yellow socks and bracelet for high fall risk patients  - Consider moving patient to room near nurses station  Outcome: Progressing     Problem: Prexisting or High Potential for Compromised Skin Integrity  Goal: Skin integrity is maintained or improved  Description: INTERVENTIONS:  - Identify patients at risk for skin breakdown  - Assess and monitor skin integrity  - Assess and monitor nutrition and hydration status  - Monitor labs   - Assess for incontinence   - Turn and reposition patient  - Assist with mobility/ambulation  - Relieve pressure over bony prominences  - Avoid friction and shearing  - Provide appropriate hygiene as needed including keeping skin clean and dry  - Evaluate need for skin moisturizer/barrier cream  - Collaborate with interdisciplinary team   - Patient/family teaching  - Consider wound care consult   Outcome: Progressing     Problem: PAIN - ADULT  Goal: Verbalizes/displays adequate comfort level or baseline comfort level  Description: Interventions:  - Encourage patient to monitor pain and request assistance  - Assess pain using appropriate pain scale  - Administer analgesics based on type and severity of pain and evaluate response  - Implement non-pharmacological measures as appropriate and evaluate response  - Consider cultural and social influences on pain and pain management  - Notify physician/advanced practitioner if interventions unsuccessful or patient reports  new pain  Outcome: Progressing     Problem: INFECTION - ADULT  Goal: Absence or prevention of progression during hospitalization  Description: INTERVENTIONS:  - Assess and monitor for signs and symptoms of infection  - Monitor lab/diagnostic results  - Monitor all insertion sites, i.e. indwelling lines, tubes, and drains  - Monitor endotracheal if appropriate and nasal secretions for changes in amount and color  - [place] appropriate cooling/warming therapies per order  - Administer medications as ordered  - Instruct and encourage patient and family to use good hand hygiene technique  - Identify and instruct in appropriate isolation precautions for identified infection/condition  Outcome: Progressing  Goal: Absence of fever/infection during neutropenic period  Description: INTERVENTIONS:  - Monitor WBC    Outcome: Progressing     Problem: SAFETY ADULT  Goal: Maintain or return to baseline ADL function  Description: INTERVENTIONS:  -  Assess patient's ability to carry out ADLs; assess patient's baseline for ADL function and identify physical deficits which impact ability to perform ADLs (bathing, care of mouth/teeth, toileting, grooming, dressing, etc.)  - Assess/evaluate cause of self-care deficits   - Assess range of motion  - Assess patient's mobility; develop plan if impaired  - Assess patient's need for assistive devices and provide as appropriate  - Encourage maximum independence but intervene and supervise when necessary  - Involve family in performance of ADLs  - Assess for home care needs following discharge   - Consider OT consult to assist with ADL evaluation and planning for discharge  - Provide patient education as appropriate  Outcome: Progressing  Goal: Maintains/Returns to pre admission functional level  Description: INTERVENTIONS:  - Perform AM-PAC 6 Click Basic Mobility/ Daily Activity assessment daily.  - Set and communicate daily mobility goal to care team and patient/family/caregiver.   -  Collaborate with rehabilitation services on mobility goals if consulted  - Out of bed for toileting  - Record patient progress and toleration of activity level   Outcome: Progressing     Problem: DISCHARGE PLANNING  Goal: Discharge to home or other facility with appropriate resources  Description: INTERVENTIONS:  - Identify barriers to discharge w/patient and caregiver  - Arrange for needed discharge resources and transportation as appropriate  - Identify discharge learning needs (meds, wound care, etc.)  - Arrange for interpretive services to assist at discharge as needed  - Refer to Case Management Department for coordinating discharge planning if the patient needs post-hospital services based on physician/advanced practitioner order or complex needs related to functional status, cognitive ability, or social support system  Outcome: Progressing     Problem: Knowledge Deficit  Goal: Patient/family/caregiver demonstrates understanding of disease process, treatment plan, medications, and discharge instructions  Description: Complete learning assessment and assess knowledge base.  Interventions:  - Provide teaching at level of understanding  - Provide teaching via preferred learning methods  Outcome: Progressing     Problem: SAFETY,RESTRAINT: NV/NON-SELF DESTRUCTIVE BEHAVIOR  Goal: Remains free of harm/injury (restraint for non violent/non self-detsructive behavior)  Description: INTERVENTIONS:  - Instruct patient/family regarding restraint use   - Assess and monitor physiologic and psychological status   - Provide interventions and comfort measures to meet assessed patient needs   - Identify and implement measures to help patient regain control  - Assess readiness for release of restraint   Outcome: Progressing  Goal: Returns to optimal restraint-free functioning  Description: INTERVENTIONS:  - Assess the patient's behavior and symptoms that indicate continued need for restraint  - Identify and implement measures to  help patient regain control  - Assess readiness for release of restraint   Outcome: Progressing     Problem: Nutrition/Hydration-ADULT  Goal: Nutrient/Hydration intake appropriate for improving, restoring or maintaining nutritional needs  Description: Monitor and assess patient's nutrition/hydration status for malnutrition. Collaborate with interdisciplinary team and initiate plan and interventions as ordered.  Monitor patient's weight and dietary intake as ordered or per policy. Utilize nutrition screening tool and intervene as necessary. Determine patient's food preferences and provide high-protein, high-caloric foods as appropriate.     INTERVENTIONS:  - Monitor oral intake, urinary output, labs, and treatment plans  - Assess nutrition and hydration status and recommend course of action  - Evaluate amount of meals eaten  - Assist patient with eating if necessary   - Allow adequate time for meals  - Recommend/ encourage appropriate diets, oral nutritional supplements, and vitamin/mineral supplements  - Order, calculate, and assess calorie counts as needed  - Recommend, monitor, and adjust tube feedings and TPN/PPN based on assessed needs  - Assess need for intravenous fluids  - Provide specific nutrition/hydration education as appropriate  - Include patient/family/caregiver in decisions related to nutrition  Outcome: Progressing

## 2024-08-11 NOTE — PROGRESS NOTES
[facility]  Progress Note  Name: David Osorio I  MRN: [id number]  Unit/Bed#: W -01 I Date of Admission: 8/6/2024   Date of Service: 8/11/2024 I Hospital Day: 5    Assessment & Plan   Hyponatremia  Assessment & Plan  -Follow-up repeat BMP  -Will need more laboratory studies based on sodium level  -May require fluid restriction versus salt tabs    Right hip pain  Assessment & Plan  Complaints of pain in R hip  S/p hip surgery on 7/31  CT lower extremity showed possible myositis.     - PT/OT eval and recommendations on disposition  - PT/OT recommended level II rehab, and pt is in agreement  - Pending placement    Fall  Assessment & Plan  - Status post fall with the below noted injuries.  - Fall precautions.  - Geriatric Medicine consultation for evaluation, medication review and recommendations.  - PT and OT evaluation and treatment as indicated.  - Case Management consultation for disposition planning.        Encephalopathy  Assessment & Plan  Pt initially very lethargic on arrival to ED, suspect due to methadone  - Pt became more alert, but was still confused.     - Pt at high risk for delirium   - Geriatrics on board  - Pt seen by Neuropsych, and was deemed to have no decision making capacity. Will discuss with family or health proxy on medical decisions.     Methadone dependence (HCC)  Assessment & Plan  Pt has a hx of opioid abuse, and has been on chronic methadone.  Current dose is 190mg daily. Dose was confrimed    Continue home dose of methadone  APS on board, will continue to monitor QTc, and consider decreasing dose if it lengthens    Type 2 diabetes mellitus with ophthalmic complication, with long-term current use of insulin (HCC)  Assessment & Plan  Lab Results   Component Value Date    HGBA1C 10.7 (H) 07/31/2024       Recent Labs     08/10/24  0745 08/10/24  1115 08/10/24  1605 08/10/24  2123   POCGLU 230* 138 143* 204*         Blood Sugar Average: Last 72 hrs:  (P)  "150.3365535654411247    Continue insulin regimen while admitted.      Bipolar disorder (HCC)  Assessment & Plan  Pt has a hx of bipolar disorder  Follows with Psych outpatient.     - Continue to monitor         DVT prophylaxis: SCDs and Lovenox  PT and OT: eval and treat    Disposition: DC planning.  Case management following.  No further changes at this time to exam.  Continue to trend white count.    Subjective   Chief Complaint: \" I am doing well.\"    Subjective: Patient reports that he is doing well and out of bed to chair today.  Tolerated breakfast this morning.  No other complaints at this time.     Objective   Vitals:   Temp:  [97.7 °F (36.5 °C)-98.9 °F (37.2 °C)] 98.4 °F (36.9 °C)  HR:  [68-90] 88  Resp:  [12-18] 16  BP: (104-135)/(55-79) 116/67    I/O         08/09 0701  08/10 0700 08/10 0701 08/11 0700 08/11 0701  08/12 0700    P.O. 480 960     I.V. (mL/kg)       Total Intake(mL/kg) 480 (6.4) 960 (12.8)     Urine (mL/kg/hr)  1100 (0.6)     Total Output  1100     Net +480 -140                     Physical Exam:   GENERAL APPEARANCE: NAD  NEURO: GCS 15  HEENT: Normocephalic  CV: RRR  LUNGS: CTA b/l  GI: Non-tender, non-distended  : no anderson  MSK: moving all extremities, neurovascularly intact  SKIN: warm, dry, intact    Invasive Devices       Peripheral Intravenous Line  Duration             Peripheral IV 08/10/24 Dorsal (posterior);Right Forearm 1 day                          Lab Results: Results: I have personally reviewed all pertinent laboratory/tests results, BMP/CMP:   Lab Results   Component Value Date    SODIUM 130 (L) 08/11/2024    K 4.1 08/11/2024    CL 94 (L) 08/11/2024    CO2 32 08/11/2024    BUN 34 (H) 08/11/2024    CREATININE 1.11 08/11/2024    CALCIUM 8.1 (L) 08/11/2024    EGFR 70 08/11/2024   , and CBC:   Lab Results   Component Value Date    WBC 11.81 (H) 08/11/2024    HGB 9.4 (L) 08/11/2024    HCT 28.5 (L) 08/11/2024    MCV 86 08/11/2024     (H) 08/11/2024    RBC 3.30 (L) " 08/11/2024    MCH 28.5 08/11/2024    MCHC 33.0 08/11/2024    RDW 14.1 08/11/2024    MPV 9.7 08/11/2024    NRBC 0 08/11/2024     Imaging: I have personally reviewed pertinent reports.     Other Studies: no other studies

## 2024-08-12 ENCOUNTER — APPOINTMENT (INPATIENT)
Dept: RADIOLOGY | Facility: HOSPITAL | Age: 64
DRG: 351 | End: 2024-08-12
Payer: COMMERCIAL

## 2024-08-12 PROBLEM — E29.1 HYPOGONADISM IN MALE: Status: ACTIVE | Noted: 2024-08-12

## 2024-08-12 PROBLEM — I51.7 LEFT VENTRICULAR HYPERTROPHY: Status: ACTIVE | Noted: 2024-08-12

## 2024-08-12 PROBLEM — R41.89 COGNITIVE DECLINE: Status: ACTIVE | Noted: 2024-08-12

## 2024-08-12 PROBLEM — R26.2 AMBULATORY DYSFUNCTION: Status: ACTIVE | Noted: 2024-08-12

## 2024-08-12 PROBLEM — K80.20 CHOLELITHIASIS: Status: ACTIVE | Noted: 2024-08-12

## 2024-08-12 PROBLEM — D62 ACUTE BLOOD LOSS ANEMIA: Status: ACTIVE | Noted: 2024-08-12

## 2024-08-12 PROBLEM — R94.31 PROLONGED QT INTERVAL: Status: ACTIVE | Noted: 2024-08-12

## 2024-08-12 PROBLEM — N18.2 CKD (CHRONIC KIDNEY DISEASE), STAGE II: Status: ACTIVE | Noted: 2024-08-12

## 2024-08-12 PROBLEM — N40.0 BPH (BENIGN PROSTATIC HYPERPLASIA): Status: ACTIVE | Noted: 2024-08-12

## 2024-08-12 PROBLEM — E87.8 ELECTROLYTE ABNORMALITY: Status: ACTIVE | Noted: 2024-08-12

## 2024-08-12 PROBLEM — K73.9 CHRONIC HEPATITIS (HCC): Status: ACTIVE | Noted: 2024-08-12

## 2024-08-12 LAB
BASE EXCESS BLDA CALC-SCNC: 4 MMOL/L (ref -2–3)
BASOPHILS # BLD AUTO: 0.05 THOUSANDS/ÂΜL (ref 0–0.1)
BASOPHILS NFR BLD AUTO: 0 % (ref 0–1)
CA-I BLD-SCNC: 1.18 MMOL/L (ref 1.12–1.32)
CARDIAC TROPONIN I PNL SERPL HS: 5 NG/L
EOSINOPHIL # BLD AUTO: 0.29 THOUSAND/ÂΜL (ref 0–0.61)
EOSINOPHIL NFR BLD AUTO: 2 % (ref 0–6)
ERYTHROCYTE [DISTWIDTH] IN BLOOD BY AUTOMATED COUNT: 14.4 % (ref 11.6–15.1)
GLUCOSE SERPL-MCNC: 133 MG/DL (ref 65–140)
GLUCOSE SERPL-MCNC: 186 MG/DL (ref 65–140)
GLUCOSE SERPL-MCNC: 223 MG/DL (ref 65–140)
GLUCOSE SERPL-MCNC: 272 MG/DL (ref 65–140)
GLUCOSE SERPL-MCNC: 300 MG/DL (ref 65–140)
GLUCOSE SERPL-MCNC: 317 MG/DL (ref 65–140)
HCO3 BLDA-SCNC: 28.9 MMOL/L (ref 22–28)
HCT VFR BLD AUTO: 29.7 % (ref 36.5–49.3)
HCT VFR BLD CALC: 29 % (ref 36.5–49.3)
HGB BLD-MCNC: 9.7 G/DL (ref 12–17)
HGB BLDA-MCNC: 9.9 G/DL (ref 12–17)
IMM GRANULOCYTES # BLD AUTO: 0.2 THOUSAND/UL (ref 0–0.2)
IMM GRANULOCYTES NFR BLD AUTO: 2 % (ref 0–2)
LYMPHOCYTES # BLD AUTO: 1.14 THOUSANDS/ÂΜL (ref 0.6–4.47)
LYMPHOCYTES NFR BLD AUTO: 9 % (ref 14–44)
MCH RBC QN AUTO: 28.6 PG (ref 26.8–34.3)
MCHC RBC AUTO-ENTMCNC: 32.7 G/DL (ref 31.4–37.4)
MCV RBC AUTO: 88 FL (ref 82–98)
MONOCYTES # BLD AUTO: 0.97 THOUSAND/ÂΜL (ref 0.17–1.22)
MONOCYTES NFR BLD AUTO: 7 % (ref 4–12)
NEUTROPHILS # BLD AUTO: 10.41 THOUSANDS/ÂΜL (ref 1.85–7.62)
NEUTS SEG NFR BLD AUTO: 80 % (ref 43–75)
NRBC BLD AUTO-RTO: 0 /100 WBCS
PCO2 BLD: 30 MMOL/L (ref 21–32)
PCO2 BLD: 42.7 MM HG (ref 36–44)
PH BLD: 7.44 [PH] (ref 7.35–7.45)
PLATELET # BLD AUTO: 578 THOUSANDS/UL (ref 149–390)
PMV BLD AUTO: 9.6 FL (ref 8.9–12.7)
PO2 BLD: 85 MM HG (ref 75–129)
POTASSIUM BLD-SCNC: 4.5 MMOL/L (ref 3.5–5.3)
RBC # BLD AUTO: 3.39 MILLION/UL (ref 3.88–5.62)
SAO2 % BLD FROM PO2: 97 % (ref 60–85)
SODIUM BLD-SCNC: 134 MMOL/L (ref 136–145)
SPECIMEN SOURCE: ABNORMAL
WBC # BLD AUTO: 13.06 THOUSAND/UL (ref 4.31–10.16)

## 2024-08-12 PROCEDURE — 82803 BLOOD GASES ANY COMBINATION: CPT

## 2024-08-12 PROCEDURE — 71045 X-RAY EXAM CHEST 1 VIEW: CPT

## 2024-08-12 PROCEDURE — 99232 SBSQ HOSP IP/OBS MODERATE 35: CPT | Performed by: SURGERY

## 2024-08-12 PROCEDURE — 97110 THERAPEUTIC EXERCISES: CPT

## 2024-08-12 PROCEDURE — 84132 ASSAY OF SERUM POTASSIUM: CPT

## 2024-08-12 PROCEDURE — 84295 ASSAY OF SERUM SODIUM: CPT

## 2024-08-12 PROCEDURE — 99233 SBSQ HOSP IP/OBS HIGH 50: CPT | Performed by: FAMILY MEDICINE

## 2024-08-12 PROCEDURE — 82947 ASSAY GLUCOSE BLOOD QUANT: CPT

## 2024-08-12 PROCEDURE — 82948 REAGENT STRIP/BLOOD GLUCOSE: CPT

## 2024-08-12 PROCEDURE — 85014 HEMATOCRIT: CPT

## 2024-08-12 PROCEDURE — NC001 PR NO CHARGE: Performed by: INTERNAL MEDICINE

## 2024-08-12 PROCEDURE — 85025 COMPLETE CBC W/AUTO DIFF WBC: CPT

## 2024-08-12 PROCEDURE — 82330 ASSAY OF CALCIUM: CPT

## 2024-08-12 PROCEDURE — 93005 ELECTROCARDIOGRAM TRACING: CPT

## 2024-08-12 PROCEDURE — 84484 ASSAY OF TROPONIN QUANT: CPT

## 2024-08-12 PROCEDURE — 99232 SBSQ HOSP IP/OBS MODERATE 35: CPT | Performed by: PHYSICIAN ASSISTANT

## 2024-08-12 PROCEDURE — 97116 GAIT TRAINING THERAPY: CPT

## 2024-08-12 RX ORDER — LANOLIN ALCOHOL/MO/W.PET/CERES
3 CREAM (GRAM) TOPICAL
Status: DISCONTINUED | OUTPATIENT
Start: 2024-08-12 | End: 2024-08-18 | Stop reason: HOSPADM

## 2024-08-12 RX ORDER — LORAZEPAM 2 MG/ML
2 INJECTION INTRAMUSCULAR ONCE
Status: DISCONTINUED | OUTPATIENT
Start: 2024-08-12 | End: 2024-08-13

## 2024-08-12 RX ADMIN — SENNOSIDES AND DOCUSATE SODIUM 2 TABLET: 8.6; 5 TABLET ORAL at 17:05

## 2024-08-12 RX ADMIN — ERGOCALCIFEROL 50000 UNITS: 1.25 CAPSULE, LIQUID FILLED ORAL at 08:39

## 2024-08-12 RX ADMIN — ACETAMINOPHEN 975 MG: 325 TABLET, FILM COATED ORAL at 06:14

## 2024-08-12 RX ADMIN — INSULIN LISPRO 2 UNITS: 100 INJECTION, SOLUTION INTRAVENOUS; SUBCUTANEOUS at 07:56

## 2024-08-12 RX ADMIN — Medication 7.5 MG: at 17:05

## 2024-08-12 RX ADMIN — Medication 190 MG: at 06:08

## 2024-08-12 RX ADMIN — INSULIN LISPRO 3 UNITS: 100 INJECTION, SOLUTION INTRAVENOUS; SUBCUTANEOUS at 17:07

## 2024-08-12 RX ADMIN — Medication 7.5 MG: at 11:19

## 2024-08-12 RX ADMIN — CYANOCOBALAMIN TAB 500 MCG 1000 MCG: 500 TAB at 08:37

## 2024-08-12 RX ADMIN — Medication 3 MG: at 20:50

## 2024-08-12 RX ADMIN — INSULIN LISPRO 3 UNITS: 100 INJECTION, SOLUTION INTRAVENOUS; SUBCUTANEOUS at 07:57

## 2024-08-12 RX ADMIN — ACETAMINOPHEN 975 MG: 325 TABLET, FILM COATED ORAL at 20:49

## 2024-08-12 RX ADMIN — AMLODIPINE BESYLATE 5 MG: 5 TABLET ORAL at 08:37

## 2024-08-12 RX ADMIN — METHOCARBAMOL TABLETS 500 MG: 500 TABLET, COATED ORAL at 17:06

## 2024-08-12 RX ADMIN — ENOXAPARIN SODIUM 30 MG: 30 INJECTION SUBCUTANEOUS at 06:19

## 2024-08-12 RX ADMIN — METHOCARBAMOL TABLETS 500 MG: 500 TABLET, COATED ORAL at 06:19

## 2024-08-12 RX ADMIN — INSULIN LISPRO 3 UNITS: 100 INJECTION, SOLUTION INTRAVENOUS; SUBCUTANEOUS at 12:07

## 2024-08-12 RX ADMIN — METHOCARBAMOL TABLETS 500 MG: 500 TABLET, COATED ORAL at 12:05

## 2024-08-12 RX ADMIN — ENOXAPARIN SODIUM 30 MG: 30 INJECTION SUBCUTANEOUS at 17:05

## 2024-08-12 RX ADMIN — INSULIN LISPRO 4 UNITS: 100 INJECTION, SOLUTION INTRAVENOUS; SUBCUTANEOUS at 22:05

## 2024-08-12 RX ADMIN — LIDOCAINE 1 PATCH: 700 PATCH TOPICAL at 17:07

## 2024-08-12 RX ADMIN — INSULIN LISPRO 1 UNITS: 100 INJECTION, SOLUTION INTRAVENOUS; SUBCUTANEOUS at 12:07

## 2024-08-12 RX ADMIN — Medication 12.5 MG: at 06:19

## 2024-08-12 NOTE — PLAN OF CARE
Problem: PHYSICAL THERAPY ADULT  Goal: Performs mobility at highest level of function for planned discharge setting.  See evaluation for individualized goals.  Description: Treatment/Interventions: Functional transfer training, LE strengthening/ROM, Therapeutic exercise, Endurance training, Cognitive reorientation, Patient/family training, Equipment eval/education, Bed mobility, Gait training, Compensatory technique education, Spoke to nursing, Spoke to case management, Spoke to MD     See flowsheet documentation for full assessment, interventions and recommendations.  Outcome: Progressing  Note: Prognosis: Fair  Problem List: Decreased strength, Decreased endurance, Decreased range of motion, Impaired balance, Decreased mobility, Decreased cognition, Decreased safety awareness, Impaired judgement, Orthopedic restrictions, Pain, Decreased skin integrity  Assessment: pt began tx session lying supine in the bed as pt was agreeable to partcipate in PT intervention. pt required encouragement and motivation in order to participate in PT intervention. In comparison to previous tx session pt demonstrated slight progress with bed mobility as pt required min ax1 to sit EOB and /s to return to supine position from seated EOB. pt was able to sit EOB w/o LOB while performing some TE activities and no increases in pain. pt continues to remain consistant for requiring min ax1 for all functional trqansfers to and from Rw and ambulation with RW. pt required constant Vc's for safety, balance and RW management as pt would lose focus on task at hand. pt would take his hands off RW during ambulation increasing risk for falls and injuries with OOB mobility. pt did ambulated 70'x1 RW min Ax1. Additional was not possible as pt stated he is getting to far from his room to continue. pt required between AROM/AAROM for all TE w/ RLE in todays tx session. Continue to recommend dC w/ level 2 moderate rehab resource intensity when medically  cleared. pt cotninues to be functioning below his baseline level and would benefit from continued skilled PT intervention.  Barriers to Discharge: Decreased caregiver support     Rehab Resource Intensity Level, PT: II (Moderate Resource Intensity)    See flowsheet documentation for full assessment.

## 2024-08-12 NOTE — ASSESSMENT & PLAN NOTE
"Rapid response was called to room on 8/12/2024 for seizure-like activity.   Patient was noted to have jerking movements and upper airway gurgling.   Episode lasted about 4-5 minutes.   Symptoms spontaneously resolved.   Discontinued Robaxin. Continue alprazolam 0.5 mg PRN   -Patient is AAOx3 today.   -Somnolence has significantly improved.   -Patient is noted to have  delirium, mental status fluctuates.  -Patient has been seen talking to himself and repeatedly stating \"lots of crazy people.\"  -Patient has difficulty concentrating.    -Patient is unable to name the days of the week backwards and forwards.  -Patient is high risk of delirium due to pain, change in environment, polypharmacy, methadone dependence  -Initiate delirium precautions  -maintain normal sleep/wake cycle  -minimize overnight interruptions, group overnight vitals/labs/nursing checks as possible  -dim lights, close blinds and turn off tv to minimize stimulation and encourage sleep environment in evenings  -ensure that pain is well controlled.   -monitor for fecal and urinary retention which may precipitate delirium  -encourage early mobilization and ambulation  -provide frequent reorientation and redirection  -encourage family and friends at the bedside to help help calm patient if anxious  -avoid medications which may precipitate or worsen delirium such as tramadol, benzodiazepine, anticholinergics, and benadryl  -encourage hydration and nutrition   -redirect unwanted behaviors as first line, avoid physical restraints, use chemical restraint only if all other attempts have been unsuccessful, monitor for orthostatic hypotension and QTc prolongation with repeat dosing, recommend lowest dose possible for shortest duration possible  "

## 2024-08-12 NOTE — ASSESSMENT & PLAN NOTE
-CT chest abdomen pelvis shows cholelithiasis with severe gallbladder distention, increased from the recent prior study though without wall thickening or pericholecystic inflammatory change  -Follow-up outpatient

## 2024-08-12 NOTE — ASSESSMENT & PLAN NOTE
-Patient has a history of hypertension.  -When he presented to the ED, his blood pressure was 180/90.  -During episode of seizure-like activity on 8/12, BP was 201/113  -His current blood pressure is 119/71.  -Continue to monitor vitals regularly

## 2024-08-12 NOTE — ASSESSMENT & PLAN NOTE
-Vitamin D, 25-hydroxy level was 15.9 on 7/30/2024  -Increased to 29.6 on 8/9/2024. Continue current regimen.   -should receive calcium supplementation of 1200 mg daily   -Monitor Vitamin D, 25-hydroxy level

## 2024-08-12 NOTE — ASSESSMENT & PLAN NOTE
Lab Results   Component Value Date    EGFR 93 08/14/2024    EGFR 88 08/13/2024    EGFR 70 08/11/2024    CREATININE 0.85 08/14/2024    CREATININE 0.92 08/13/2024    CREATININE 1.11 08/11/2024     -Patient has a history of CKD stage II  -GFR 93, creatinine 0.85, bun 24   -Avoid nephrotoxic medications  -Continue to monitor BMP

## 2024-08-12 NOTE — ASSESSMENT & PLAN NOTE
-8/6 Qtc 528  -8/8 Qtc 489  -8/12 Qtc 464  -If Qtc persistently elevated, recommend de-escalation in daily methadone dose which would be done by his methadone clinic   -Will need to avoid medications that worsen Qtc interval.   -Continue to monitor

## 2024-08-12 NOTE — ASSESSMENT & PLAN NOTE
-Patient is suspected to have baseline cognitive impairment  -Patient is AAOx2-3 at baseline.   Noted to have episodes of confusion and significantly decreased concentration.   -Per sister, she has noted problems with memory and episodes of confusion and agitation.  -Consulted neuropsychology, appreciate input   -Patient scored 13/28 on MMSE  -Results of Neuropsychological Exam revealed diffuse cognitive dysfunction and on a measure assessing awareness of personal health status and ability to evaluate health problems, handle medical emergencies and take safety precautions, patient performed in the IMPAIRED range of functioning. During this encounter, patient does not appear to have capacity to make fully informed medical decisions   -Reevaluation requested.

## 2024-08-12 NOTE — ASSESSMENT & PLAN NOTE
-Patient uses a walker at baseline.  -History of multiple falls.  -multifactorial including acute and chronic medical conditions  -encourage use of assistant devices as directed by PT/OT  -encourage adequate lighting and assistance when out of bed  -encourage reduction of tethers to bed/IV to reduce trip hazard, ensure call bell is within reach  -encourage appropriate body mechanics and monitor for hypotension/orthostatic hypotension  -recommend appropriate footwear at all times   -PT/OT and early frequent mobilization

## 2024-08-12 NOTE — PROGRESS NOTES
Progress Note - Acute Pain Service    David Skelton Jr. 64 y.o. male MRN: [id number]  Unit/Bed#: W -01 Encounter: [id number]      David Skelton Jr. is a 64 y.o. male with a history of iron deficiency anemia, urinary retention, diabetes, anxiety, polysubstance abuse on methadone maintenance who presented status post unwitnessed fall.     Right hip pain  Assessment & Plan  S/p IM Nail of right right on 7/31, refused [facility] rehab and went home and fell.   CT scan: No acute fractures, does show possible myositis    Multimodal Regimen:  Tylenol 975 mg PO every 8 hours scheduled  Gabapentin 600 mg PO BID (home med)  Estimated Creatinine Clearance: 66.8 mL/min (by C-G formula based on SCr of 1.11 mg/dL).  Robaxin 500 mg oral every 6 hours scheduled  Lidocaine patches change every 12 hours  Xanax 0.5 mg PO BID PRN anxiety; home medication  Monitor for oversedation, respiratory depression   Oxycodone 5 mg every 4 hours as needed for moderate pain  Oxycodone 7.5 mg every 4 hours as needed for severe pain  Narcan as needed for opioid reversal agent/respiratory depression     Bowel Regimen:  MiraLAX daily  Senokot-S changed to 2 tablets twice a day for constipation    At discharge, suggest the following:  Tylenol 975 mg p.o. every 8 hours as needed mild pain.  Lidocaine patch, on for 12 hours and off for 12 hours.  Robaxin 500 mg p.o. every 6 hours as needed muscle spasm x 3 days.  Oxycodone 5 mg p.o. every 4 hours as needed severe pain x 2 days.  As needed intranasal Narcan for reversal of opioid.  Resume methadone 190 mg p.o. daily at discretion of methadone clinic.  Patient will require documentation of any medications given during hospitalization and any prescriptions given at discharge to provide to the methadone clinic.    Methadone dependence (HCC)  Assessment & Plan  Currently takes 190 mg Methadone daily  Patient follows with [facility]  Phone number: [phone]  APS called and verified dosage on  8/2  Maintenance:  8/6   8/8/2024 QTc 489  QTc improved, would recommend repeat EKG in 1 week, can be done as an outpatient.  If QTc would be persistently elevated, would recommend de-escalation and daily methadone dose which would be done by his methadone clinic.  Has been on methadone x 20 years, admits to overuse of BZDs and declines use of mood stabilizers.  He has admitted to not following up with an outpatient psych resource x 2 months.        APS will sign off at this time. Thank you for the consult. All opioids and other analgesics to be written at discretion of primary team. Please contact Acute Pain Service - via [employer] from [phone] with additional questions or concerns. See [employer] or [facility] [employer] for additional contacts and after hours information.    Pain History  Current pain location(s):  Pain Score: 9  Pain Location/Orientation: Location: Hip  Pain Scale: Pain Assessment Tool: 0-10  24 hour history: Patient continues to complain of some right hip pain but states that the current pain regimen has been very effective.  Has required only 3 doses of as needed oxycodone in the past 24 hours.    Opioid requirement previous 24 hours: Oxycodone 7.5 mg p.o. x 3, methadone 190 mg p.o. x 1.    Meds/Allergies   all current active meds have been reviewed, current meds:   Current Facility-Administered Medications   Medication Dose Route Frequency    acetaminophen (TYLENOL) tablet 975 mg  975 mg Oral Q8H MELISA    ALPRAZolam (XANAX) tablet 0.5 mg  0.5 mg Oral BID PRN    amLODIPine (NORVASC) tablet 5 mg  5 mg Oral Daily    cyanocobalamin (VITAMIN B-12) tablet 1,000 mcg  1,000 mcg Oral Daily    enoxaparin (LOVENOX) subcutaneous injection 30 mg  30 mg Subcutaneous Q12H    ergocalciferol (VITAMIN D2) capsule 50,000 Units  50,000 Units Oral Once per day on Monday Wednesday Friday    insulin lispro (HumALOG/ADMELOG) 100 units/mL subcutaneous injection 1-6 Units  1-6 Units Subcutaneous 4x Daily (AC & HS)    insulin  lispro (HumALOG/ADMELOG) 100 units/mL subcutaneous injection 3 Units  3 Units Subcutaneous TID With Meals    lidocaine (LIDODERM) 5 % patch 1 patch  1 patch Topical Daily    methadone (DOLOPHINE) oral concentrated solution 190 mg  190 mg Oral Q24H    methocarbamol (ROBAXIN) tablet 500 mg  500 mg Oral Q6H MELISA    metoprolol tartrate (LOPRESSOR) partial tablet 12.5 mg  12.5 mg Oral QAM    naloxone (NARCAN) injection 0.4 mg  0.4 mg Intravenous Q1MIN PRN    ondansetron (ZOFRAN-ODT) dispersible tablet 4 mg  4 mg Oral Q6H PRN    oxyCODONE (ROXICODONE) IR tablet 5 mg  5 mg Oral Q4H PRN    Or    oxyCODONE (ROXICODONE) split tablet 7.5 mg  7.5 mg Oral Q4H PRN    polyethylene glycol (MIRALAX) packet 17 g  17 g Oral Daily    senna-docusate sodium (SENOKOT S) 8.6-50 mg per tablet 2 tablet  2 tablet Oral BID   , and PTA meds:   Prior to Admission Medications   Prescriptions Last Dose Informant Patient Reported? Taking?   ALPRAZolam (XANAX) 0.5 mg tablet 2024  No Yes   Sig: Take 1 tablet (0.5 mg total) by mouth 2 (two) times a day as needed for anxiety for up to 10 days   Alcohol Swabs 70 % PADS Unknown  No No   Sig: May substitute brand based on insurance coverage. Check glucose BID.   Blood Glucose Monitoring Suppl (OneTouch Verio Reflect) w/Device KIT 2024  No Yes   Sig: May substitute brand based on insurance coverage. Check glucose BID.   Continuous Blood Gluc  (FreeStyle Grace 14 Day Willis) STEVE Not Taking Self No No   Si Device by Device route 3 (three) times a day before meals   Patient not taking: Reported on 2024   Continuous Blood Gluc  (FreeStyle Grace 2 Willis) STEVE Not Taking Self No No   Si Device by Device route 3 (three) times a day before meals   Patient not taking: Reported on 7/3/2024   DULoxetine (CYMBALTA) 60 mg delayed release capsule Not Taking Self Yes No   Sig: Take 60 mg by mouth in the morning   Patient not taking: Reported on 2024   GLOBAL EASE INJECT PEN  NEEDLES 31G X 8 MM MISC Not Taking Self Yes No   Patient not taking: Reported on 7/3/2024   Incontinence Supply Disposable (INCONTINENCE BRIEF MEDIUM) MISC Not Taking Self No No   Sig: by Does not apply route as needed (fecal soiling)   Patient not taking: Reported on 7/3/2024   Incontinence Supply Disposable (RA WIPES FLUSHABLE/MOIST) MISC Unknown Self No No   Sig: by Does not apply route as needed (fecal soiling)   Insulin Pen Needle (B-D UF III MINI PEN NEEDLES) 31G X 5 MM MISC Unknown Self No No   Sig: Inject 15 Units under the skin daily at bedtime   Insulin Pen Needle (BD Pen Needle Aminta 2nd Gen) 32G X 4 MM MISC Unknown  No No   Sig: For use with insulin pen. Pharmacy may dispense brand covered by insurance.   Misc. Devices (CANE) MISC Unknown Self No No   Sig: by Does not apply route daily Dx:  Frequent falls   OneTouch Delica Lancets 33G MISC Unknown  No No   Sig: May substitute brand based on insurance coverage. Check glucose BID.   acetaminophen (TYLENOL) 325 mg tablet 8/6/2024 Self No Yes   Sig: Take 2 tablets (650 mg total) by mouth every 4 (four) hours as needed for mild pain   aluminum-magnesium hydroxide 200-200 MG/5ML suspension Not Taking Self No No   Sig: Take 15 mL by mouth every 6 (six) hours as needed for heartburn   Patient not taking: Reported on 7/3/2024   aspirin 81 mg chewable tablet Not Taking  No No   Sig: Chew 1 tablet (81 mg total) 2 (two) times a day for 28 days   Patient not taking: Reported on 8/6/2024   bacitracin-polymyxin b (POLYSPORIN) ointment Not Taking Self No No   Sig: Apply topically 2 (two) times a day   Patient not taking: Reported on 8/6/2024   calcium carbonate (OYSTER SHELL,OSCAL) 500 mg Not Taking  No No   Sig: Take 2 tablets by mouth 3 (three) times a day with meals   Patient not taking: Reported on 8/6/2024   divalproex sodium (DEPAKOTE ER) 500 mg 24 hr tablet   No No   Sig: Take 1 tablet (500 mg total) by mouth every 12 (twelve) hours   docusate sodium (COLACE) 100  mg capsule   No No   Sig: Take 1 capsule (100 mg total) by mouth 2 (two) times a day for 5 days   ergocalciferol (ERGOCALCIFEROL) 1.25 MG (86458 UT) capsule   Yes No   Sig: Take 50,000 Units by mouth   fluticasone (FLONASE) 50 mcg/act nasal spray Not Taking  No No   Sig: instill 1 spray into each nostril once daily   Patient not taking: Reported on 8/6/2024   gabapentin (NEURONTIN) 300 mg capsule 8/6/2024  No Yes   Sig: Take 2 capsules (600 mg total) by mouth 2 (two) times a day for 14 days   glucose blood (OneTouch Verio) test strip Unknown  No No   Sig: May substitute brand based on insurance coverage. Check glucose BID.   insulin isophane-insulin regular (NovoLIN 70/30 FlexPen) 100 units/mL injection pen 8/5/2024  No Yes   Sig: Inject 17 Units under the skin 2 (two) times a day before meals   methadone (DOLOPHINE) 5 mg tablet Past Week Self Yes Yes   Sig: Take 165 mg by mouth daily   metoprolol succinate (TOPROL-XL) 25 mg 24 hr tablet Unknown  No No   Sig: Take 0.5 tablets (12.5 mg total) by mouth daily   ondansetron (ZOFRAN-ODT) 4 mg disintegrating tablet 8/6/2024 Self No Yes   Sig: Take 1 tablet (4 mg total) by mouth every 6 (six) hours as needed for vomiting   oxyCODONE (ROXICODONE) 10 MG TABS Unknown  No No   Sig: You may take 5 mg (0.5 tab) for moderate pain or 10 mg (1 tab) for severe pain, every 4 hours, as needed.   zolpidem (AMBIEN) 10 mg tablet Not Taking Self Yes No   Sig: Take 10 mg by mouth daily at bedtime   Patient not taking: Reported on 8/6/2024      Facility-Administered Medications: None       Allergies   Allergen Reactions    Aspirin Shortness Of Breath    Penicillins        Objective        Vitals:    08/12/24 0244 08/12/24 0757 08/12/24 0837 08/12/24 0839   BP: 131/77 113/72 118/64 118/64   Pulse: 83 64  76   Resp: 18 16     Temp: 98.8 °F (37.1 °C) 97.9 °F (36.6 °C)     TempSrc:       SpO2: 98% 96%  94%   Weight:       Height:             Physical Exam  Vitals and nursing note reviewed.    Constitutional:       General: He is awake. He is not in acute distress.     Appearance: He is not ill-appearing, toxic-appearing or diaphoretic.      Comments: Sitting up in chair with feet on floor.  Appears comfortable.   Skin:     General: Skin is warm and dry.   Neurological:      Mental Status: He is alert and oriented to person, place, and time.      GCS: GCS eye subscore is 4. GCS verbal subscore is 5. GCS motor subscore is 6.   Psychiatric:         Attention and Perception: Attention normal.         Speech: Speech normal.         Behavior: Behavior normal. Behavior is cooperative.           Lab Results:   Estimated Creatinine Clearance: 66.8 mL/min (by C-G formula based on SCr of 1.11 mg/dL).  Lab Results   Component Value Date    WBC 11.81 (H) 08/11/2024    WBC 9.29 01/29/2015    HGB 9.4 (L) 08/11/2024    HGB 12.2 01/29/2015    HCT 28.5 (L) 08/11/2024    HCT 38.0 01/29/2015     (H) 08/11/2024     01/29/2015         Component Value Date/Time     01/29/2015 1342    K 4.1 08/11/2024 1031    K 4.6 03/01/2019 1611    CL 94 (L) 08/11/2024 1031     03/01/2019 1611    CO2 32 08/11/2024 1031    CO2 29 08/17/2022 0815    CO2 31 03/01/2019 1611    BUN 34 (H) 08/11/2024 1031    BUN 17 03/01/2019 1611    CREATININE 1.11 08/11/2024 1031    CREATININE 1.03 03/01/2019 1611         Component Value Date/Time    CALCIUM 8.1 (L) 08/11/2024 1031    CALCIUM 8.7 03/01/2019 1611    ALKPHOS 71 08/06/2024 1129    ALKPHOS 133 (H) 03/01/2019 1611    AST 35 08/06/2024 1129    AST 16 03/01/2019 1611    ALT 26 08/06/2024 1129    ALT 27 03/01/2019 1611    BILITOT 0.27 01/29/2015 1342    TP 5.9 (L) 08/06/2024 1129    TP 6.9 03/01/2019 1611    ALB 3.1 (L) 08/06/2024 1129    ALB 3.6 03/01/2019 1611       Imaging Studies/EKG: I have personally reviewed pertinent reports.       Counseling / Coordination of Care  Total floor / unit time spent today 30 minutes minutes. Greater than 50% of total time was spent with  the patient and / or family counseling and / or coordination of care. A description of the counseling / coordination of care: Patient interview, physical examination, review of medical records, review of imaging and laboratory data, development of pain management plan, discussion of pain management plan with patient and primary service.    Please note that the APS provides consultative services regarding pain management only.  With the exception of ketamine and epidural infusions and except when indicated, final decisions regarding starting or changing doses of analgesic medications are at the discretion of the consulting service.    Aroldo Sanderson PA-C   Acute Pain Service

## 2024-08-12 NOTE — ASSESSMENT & PLAN NOTE
-Patient had B12 level of 327 on 6/5/2024  -Patient had a level of 221 on 9/14/2023  -Patient had a B12 level of 3,668 on 8/9/2024.  -Discontinued B12 supplementation
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
## 2024-08-12 NOTE — ASSESSMENT & PLAN NOTE
-CT chest abdomen pelvis shows 1.3 cm right adrenal nodule, indeterminate though likely an adenoma given stability  -further work-up as outpatient

## 2024-08-12 NOTE — ASSESSMENT & PLAN NOTE
-Patient reported to the ED on 8/6/2020 for status post fall the night prior.  -He complained of right hip pain.  -He had a recent open reduction internal fixation of right hip fracture on 7/31/2024.  -Patient fell once again on 8/7/2024 after attempting to ambulate to the bathroom independently.  -Patient has a history of falls.  -Uses a walker at baseline  -XRAYs right femur reviewed by ortho. Stable post operative changes without evidence of hardware failure.   -Orthopedics signed off.  -Patient to follow up with Dr. Hill upon discharge.

## 2024-08-12 NOTE — ASSESSMENT & PLAN NOTE
Lab Results   Component Value Date    HGBA1C 10.7 (H) 07/31/2024       Recent Labs     08/11/24  1146 08/11/24  1620 08/11/24 2037 08/12/24  0709   POCGLU 93 165* 193* 223*         Blood Sugar Average: Last 72 hrs:  (P) [id number]    Continue insulin regimen while admitted.

## 2024-08-12 NOTE — ASSESSMENT & PLAN NOTE
Lab Results   Component Value Date    HGBA1C 10.7 (H) 07/31/2024       Recent Labs     08/15/24  1548 08/15/24  2058 08/16/24  0721 08/16/24  1128   POCGLU 258* 202* 82 110       Blood Sugar Average: Last 72 hrs:  (P) 200.6    -Patient has diabetes mellitus type II, uncontrolled with last hemoglobin A1c at 10.7 on 7/31/2024.    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  -Patient states that he sees a retina specialist regularly, however, his sister states that he has missed several appointments  -Patient wears glasses  -Endocrine consultation note appreciated, per their recommendation:  Add Prandin 1 mg with meals  Continue Lantus 20 units daily  Continue Algorithm 3 correction scale  Primary has discontinued mealtime 5 units of lispro, which is reasonable if patient is being cued for discharge  If patient is ultimately discharged on oral antihyperglycemic's, Lantus would need be decreased.  Renal function is appropriate for a Jardiance and/or metformin 1  Hypoglycemia protocol  Patient will require endocrine follow-up for his diabetes, adrenal nodule, hypogonadism, likely osteomalacia versus osteoporosis.  He has previously seen [facility]  , but not in the past 18 months. Encouraged patient to reestablish care.  Discharge recommendations pending clinical course

## 2024-08-12 NOTE — ASSESSMENT & PLAN NOTE
Pt initially very lethargic on arrival to ED, suspect due to methadone  - Pt became more alert, but was still confused.     - Pt at high risk for delirium   - Geriatrics on board  - Pt seen by Neuropsych, and was deemed to have no decision making capacity. Will discuss with family or health proxy on medical decisions.

## 2024-08-12 NOTE — PLAN OF CARE
Problem: Potential for Falls  Goal: Patient will remain free of falls  Description: INTERVENTIONS:  - Educate patient/family on patient safety including physical limitations  - Instruct patient to call for assistance with activity   - Consult OT/PT to assist with strengthening/mobility   - Keep Call bell within reach  - Keep bed low and locked with side rails adjusted as appropriate  - Keep care items and personal belongings within reach  - Initiate and maintain comfort rounds  - Make Fall Risk Sign visible to staff  - Initiate/Maintain bed alarm  - Apply yellow socks and bracelet for high fall risk patients  - Consider moving patient to room near nurses station  Outcome: Progressing     Problem: Prexisting or High Potential for Compromised Skin Integrity  Goal: Skin integrity is maintained or improved  Description: INTERVENTIONS:  - Identify patients at risk for skin breakdown  - Assess and monitor skin integrity  - Assess and monitor nutrition and hydration status  - Monitor labs   - Assess for incontinence   - Turn and reposition patient  - Assist with mobility/ambulation  - Relieve pressure over bony prominences  - Avoid friction and shearing  - Provide appropriate hygiene as needed including keeping skin clean and dry  - Evaluate need for skin moisturizer/barrier cream  - Collaborate with interdisciplinary team   - Patient/family teaching  - Consider wound care consult   Outcome: Progressing     Problem: PAIN - ADULT  Goal: Verbalizes/displays adequate comfort level or baseline comfort level  Description: Interventions:  - Encourage patient to monitor pain and request assistance  - Assess pain using appropriate pain scale  - Administer analgesics based on type and severity of pain and evaluate response  - Implement non-pharmacological measures as appropriate and evaluate response  - Consider cultural and social influences on pain and pain management  - Notify physician/advanced practitioner if interventions  unsuccessful or patient reports new pain  Outcome: Progressing     Problem: INFECTION - ADULT  Goal: Absence or prevention of progression during hospitalization  Description: INTERVENTIONS:  - Assess and monitor for signs and symptoms of infection  - Monitor lab/diagnostic results  - Monitor all insertion sites, i.e. indwelling lines, tubes, and drains  - Monitor endotracheal if appropriate and nasal secretions for changes in amount and color  - [place] appropriate cooling/warming therapies per order  - Administer medications as ordered  - Instruct and encourage patient and family to use good hand hygiene technique  - Identify and instruct in appropriate isolation precautions for identified infection/condition  Outcome: Progressing  Goal: Absence of fever/infection during neutropenic period  Description: INTERVENTIONS:  - Monitor WBC    Outcome: Progressing     Problem: SAFETY ADULT  Goal: Maintain or return to baseline ADL function  Description: INTERVENTIONS:  -  Assess patient's ability to carry out ADLs; assess patient's baseline for ADL function and identify physical deficits which impact ability to perform ADLs (bathing, care of mouth/teeth, toileting, grooming, dressing, etc.)  - Assess/evaluate cause of self-care deficits   - Assess range of motion  - Assess patient's mobility; develop plan if impaired  - Assess patient's need for assistive devices and provide as appropriate  - Encourage maximum independence but intervene and supervise when necessary  - Involve family in performance of ADLs  - Assess for home care needs following discharge   - Consider OT consult to assist with ADL evaluation and planning for discharge  - Provide patient education as appropriate  Outcome: Progressing  Goal: Maintains/Returns to pre admission functional level  Description: INTERVENTIONS:  - Perform AM-PAC 6 Click Basic Mobility/ Daily Activity assessment daily.  - Set and communicate daily mobility goal to care team and  patient/family/caregiver.   - Collaborate with rehabilitation services on mobility goals if consulted  - Out of bed for toileting  - Record patient progress and toleration of activity level   Outcome: Progressing     Problem: DISCHARGE PLANNING  Goal: Discharge to home or other facility with appropriate resources  Description: INTERVENTIONS:  - Identify barriers to discharge w/patient and caregiver  - Arrange for needed discharge resources and transportation as appropriate  - Identify discharge learning needs (meds, wound care, etc.)  - Arrange for interpretive services to assist at discharge as needed  - Refer to Case Management Department for coordinating discharge planning if the patient needs post-hospital services based on physician/advanced practitioner order or complex needs related to functional status, cognitive ability, or social support system  Outcome: Progressing     Problem: Knowledge Deficit  Goal: Patient/family/caregiver demonstrates understanding of disease process, treatment plan, medications, and discharge instructions  Description: Complete learning assessment and assess knowledge base.  Interventions:  - Provide teaching at level of understanding  - Provide teaching via preferred learning methods  Outcome: Progressing     Problem: SAFETY,RESTRAINT: NV/NON-SELF DESTRUCTIVE BEHAVIOR  Goal: Remains free of harm/injury (restraint for non violent/non self-detsructive behavior)  Description: INTERVENTIONS:  - Instruct patient/family regarding restraint use   - Assess and monitor physiologic and psychological status   - Provide interventions and comfort measures to meet assessed patient needs   - Identify and implement measures to help patient regain control  - Assess readiness for release of restraint   Outcome: Progressing  Goal: Returns to optimal restraint-free functioning  Description: INTERVENTIONS:  - Assess the patient's behavior and symptoms that indicate continued need for restraint  -  Identify and implement measures to help patient regain control  - Assess readiness for release of restraint   Outcome: Progressing     Problem: Nutrition/Hydration-ADULT  Goal: Nutrient/Hydration intake appropriate for improving, restoring or maintaining nutritional needs  Description: Monitor and assess patient's nutrition/hydration status for malnutrition. Collaborate with interdisciplinary team and initiate plan and interventions as ordered.  Monitor patient's weight and dietary intake as ordered or per policy. Utilize nutrition screening tool and intervene as necessary. Determine patient's food preferences and provide high-protein, high-caloric foods as appropriate.     INTERVENTIONS:  - Monitor oral intake, urinary output, labs, and treatment plans  - Assess nutrition and hydration status and recommend course of action  - Evaluate amount of meals eaten  - Assist patient with eating if necessary   - Allow adequate time for meals  - Recommend/ encourage appropriate diets, oral nutritional supplements, and vitamin/mineral supplements  - Order, calculate, and assess calorie counts as needed  - Recommend, monitor, and adjust tube feedings and TPN/PPN based on assessed needs  - Assess need for intravenous fluids  - Provide specific nutrition/hydration education as appropriate  - Include patient/family/caregiver in decisions related to nutrition  Outcome: Progressing

## 2024-08-12 NOTE — ASSESSMENT & PLAN NOTE
-noted on ECG  -Echo shows left ventricular cavity size is normal. Wall thickness is mildly increased. There is concentric remodeling. The left ventricular ejection fraction is 60%. Systolic function is normal. Wall motion is normal. Unable to assess diastolic function.

## 2024-08-12 NOTE — ASSESSMENT & PLAN NOTE
-Sodium level of 135. Improved.  -Potassium level 4.5. Improved.   -Continue to monitor BMP daily.

## 2024-08-12 NOTE — ASSESSMENT & PLAN NOTE
-Patient's Hgb low at 9.2.   -Patient hemodynamically stable and hemoglobin level has remained steady.   -Monitor for signs of orthostatic hypotension  -Monitor CBC regularly   -Recommend blood transfusion if Hgb <7.0

## 2024-08-12 NOTE — ASSESSMENT & PLAN NOTE
-Noted to have a history of chronic hepatitis B and C  -ordered chronic hepatitis panel   -Family unsure whether patient was treated. Will need to be re-assessed and treated.   -Hepatitis C antibody reactive and Hep B core total antibody positive.   -Hepatitis C RNA, quantitative PCR non-reactive. Suspect patient was treated.

## 2024-08-12 NOTE — ASSESSMENT & PLAN NOTE
-Patient states that he was diagnosed with bipolar disorder.  -Follow-up with psychiatry outpatient.  -Reports episodes of agitation as well as depression.  -Unsure of the medication that he is taking.  -Per sister, he was diagnosed over 3 years ago.  He has episodes of tani where he gets very aggressive.  -Behavioral health consulted, recommendations include:   Do not recommend initiating any mood-stabilizing agents or antidepressants at this time  Start gabapentin 100 mg 3 times daily for anxiety and neuropathic pain  Keep Xanax 0.5 mg BID PRN, however will be as needed in case of seizure rather than anxiety.  Xanax should be discontinued upon discharge.  -Psychiatry has signed off.

## 2024-08-12 NOTE — PHYSICAL THERAPY NOTE
PHYSICAL THERAPY NOTE          Patient Name: David Skelton Jr.  Today's Date: 8/12/2024 08/12/24 1442   PT Last Visit   PT Visit Date 08/12/24   Note Type   Note Type Treatment   Pain Assessment   Pain Assessment Tool 0-10   Pain Score 10 - Worst Possible Pain   Pain Location/Orientation Orientation: Right;Location: Hip   Pain Onset/Description Onset: Ongoing   Effect of Pain on Daily Activities limited activity tolerance   Patient's Stated Pain Goal No pain   Hospital Pain Intervention(s) Repositioned;Ambulation/increased activity;Rest   Multiple Pain Sites No   Pain Rating: FLACC (Rest) - Face 0   Pain Rating: FLACC (Rest) - Legs 0   Pain Rating: FLACC (Rest) - Activity 0   Pain Rating: FLACC (Rest) - Cry 0   Pain Rating: FLACC (Rest) - Consolability 0   Score: FLACC (Rest) 0   Pain Rating: FLACC (Activity) - Face 1   Pain Rating: FLACC (Activity) - Legs 0   Pain Rating: FLACC (Activity) - Activity 1   Pain Rating: FLACC (Activity) - Cry 1   Pain Rating: FLACC (Activity) - Consolability 0   Score: FLACC (Activity) 3   Restrictions/Precautions   Weight Bearing Precautions Per Order Yes   RLE Weight Bearing Per Order WBAT  (s/p R femur IM nail 7/31/2024)   Other Precautions Cognitive;Impulsive;Chair Alarm;Bed Alarm;WBS;Fall Risk;Pain   General   Chart Reviewed Yes   Response to Previous Treatment Patient unable to report, no changes reported from family or staff   Family/Caregiver Present No   Cognition   Overall Cognitive Status Impaired   Arousal/Participation Alert;Responsive;Cooperative   Attention Attends with cues to redirect   Orientation Level Oriented to person;Oriented to place;Oriented to situation;Disoriented to time   Memory Decreased short term memory;Decreased recall of recent events;Decreased recall of precautions   Following Commands Follows one step commands with increased time or repetition   Comments pt  required constant Vc's to redirect as pt would lose focus on task at hand. pt required encouragement and motivation in order to participate in PT intervention   Subjective   Subjective pt stated he has alot of pain in his R  hip   Bed Mobility   Supine to Sit 4  Minimal assistance   Additional items Assist x 1;HOB elevated;Bedrails;Increased time required;Verbal cues;LE management   Sit to Supine 5  Supervision   Additional items Assist x 1;HOB elevated;Bedrails   Additional Comments pt was able to sit EOB w/o LOB prior to initiating functional transfers with RW   Transfers   Sit to Stand 4  Minimal assistance   Additional items Assist x 1;Increased time required;Verbal cues   Stand to Sit 4  Minimal assistance   Additional items Assist x 1;Increased time required;Verbal cues   Stand pivot Unable to assess   Additional Comments pt continues to require min ax1 for all funcitonal transfers as pt continues to require Vc's for hand placement for increased safety and balance   Ambulation/Elevation   Gait pattern Improper Weight shift;Decreased foot clearance;Antalgic;Narrow RAYMON   Gait Assistance 4  Minimal assist   Additional items Assist x 1;Verbal cues   Assistive Device Rolling walker   Distance 70'x1 RW   Stair Management Assistance Not tested   Balance   Static Sitting Fair +   Dynamic Sitting Fair   Static Standing Fair -   Dynamic Standing Poor +   Ambulatory Poor +  (w/ RW)   Endurance Deficit   Endurance Deficit Yes   Endurance Deficit Description limited activity tolerance, ambulation distance   Activity Tolerance   Activity Tolerance Patient limited by fatigue;Other (Comment)  (impaired cognition)   Nurse Made Aware Spoek to RN   Exercises   Hip Flexion Supine;10 reps;PROM;Right   Hip Abduction Supine;15 reps;AAROM;Right   Hip Adduction Supine;15 reps;AAROM;Right   Knee AROM Short Arc Quad Supine;10 reps;AROM;Right   Knee AROM Long Arc Quad Sitting;10 reps;AROM;Right   Ankle Pumps Supine;20 reps;AROM;Bilateral    Balance training  pt was also able to complete pillow squeezes hip abd/addwhile seated EOB 10x with AROM and no LOB   Assessment   Prognosis Fair   Problem List Decreased strength;Decreased endurance;Decreased range of motion;Impaired balance;Decreased mobility;Decreased cognition;Decreased safety awareness;Impaired judgement;Orthopedic restrictions;Pain;Decreased skin integrity   Assessment pt began tx session lying supine in the bed as pt was agreeable to partcipate in PT intervention. pt required encouragement and motivation in order to participate in PT intervention. In comparison to previous tx session pt demonstrated slight progress with bed mobility as pt required min ax1 to sit EOB and /s to return to supine position from seated EOB. pt was able to sit EOB w/o LOB while performing some TE activities and no increases in pain. pt continues to remain consistant for requiring min ax1 for all functional trqansfers to and from Rw and ambulation with RW. pt required constant Vc's for safety, balance and RW management as pt would lose focus on task at hand. pt would take his hands off RW during ambulation increasing risk for falls and injuries with OOB mobility. pt did ambulated 70'x1 RW min Ax1. Additional was not possible as pt stated he is getting to far from his room to continue. pt required between AROM/AAROM for all TE w/ RLE in todays tx session. Continue to recommend dC w/ level 2 moderate rehab resource intensity when medically cleared. pt cotninues to be functioning below his baseline level and would benefit from continued skilled PT intervention.   Barriers to Discharge Decreased caregiver support   Goals   Patient Goals to get back to bed   STG Expiration Date 08/21/24   PT Treatment Day 2   Plan   Treatment/Interventions Functional transfer training;LE strengthening/ROM;Elevations;Therapeutic exercise;Endurance training;Cognitive reorientation;Equipment eval/education;Patient/family training;Bed  mobility;Gait training;Spoke to nursing;Compensatory technique education   Progress Slow progress, decreased activity tolerance   PT Frequency 3-5x/wk   Discharge Recommendation   Rehab Resource Intensity Level, PT II (Moderate Resource Intensity)   AM-PAC Basic Mobility Inpatient   Turning in Flat Bed Without Bedrails 3   Lying on Back to Sitting on Edge of Flat Bed Without Bedrails 3   Moving Bed to Chair 3   Standing Up From Chair Using Arms 3   Walk in Room 3   Climb 3-5 Stairs With Railing 1   Basic Mobility Inpatient Raw Score 16   Basic Mobility Standardized Score 38.32   [facility] Highest Level Of Mobility   -[facility] Goal 5: Stand one or more mins   -HL Achieved 7: Walk 25 feet or more   Education   Education Provided Mobility training;Assistive device   Patient Reinforcement needed   End of Consult   Patient Position at End of Consult Supine;Bed/Chair alarm activated;All needs within reach   The patient's AM-PAC Basic Mobility Inpatient Short Form Raw Score is 16. A Raw score of less than or equal to 16 suggests the patient may benefit from discharge to post-acute rehabilitation services. Please also refer to the recommendation of the Physical Therapist for safe discharge planning.    Berhane Whaley

## 2024-08-12 NOTE — PROGRESS NOTES
[facility]  Progress Note  Name: David Osorio I  MRN: [id number]  Unit/Bed#: W -01 I Date of Admission: 8/6/2024   Date of Service: 8/12/2024  Hospital Day: 6    Assessment & Plan   Hyponatremia  Assessment & Plan  -Repeat BMP stable  -Will need more laboratory studies based on sodium level  -May require fluid restriction versus salt tabs    Right hip pain  Assessment & Plan  Complaints of pain in R hip  S/p hip surgery on 7/31  CT lower extremity showed possible myositis.     - PT/OT eval and recommendations on disposition  - PT/OT recommended level II rehab, and pt is in agreement  - Pending placement    Fall  Assessment & Plan  - Status post fall with the below noted injuries.  - Fall precautions.  - Geriatric Medicine consultation for evaluation, medication review and recommendations.  - PT and OT evaluation and treatment as indicated.  - Case Management consultation for disposition planning.        Encephalopathy  Assessment & Plan  Pt initially very lethargic on arrival to ED, suspect due to methadone  - Pt became more alert, but was still confused.     - Pt at high risk for delirium   - Geriatrics on board  - Pt seen by Neuropsych, and was deemed to have no decision making capacity. Will discuss with family or health proxy on medical decisions.     Methadone dependence (HCC)  Assessment & Plan  Pt has a hx of opioid abuse, and has been on chronic methadone.  Current dose is 190mg daily. Dose was confrimed    Continue home dose of methadone  APS on board, will continue to monitor QTc, and consider decreasing dose if it lengthens    Type 2 diabetes mellitus with ophthalmic complication, with long-term current use of insulin (HCC)  Assessment & Plan  Lab Results   Component Value Date    HGBA1C 10.7 (H) 07/31/2024       Recent Labs     08/11/24  1146 08/11/24  1620 08/11/24  2037 08/12/24  0709   POCGLU 93 165* 193* 223*         Blood Sugar Average: Last 72 hrs:  (P)  "[id number]    Continue insulin regimen while admitted.      Bipolar disorder (HCC)  Assessment & Plan  Pt has a hx of bipolar disorder  Follows with Psych outpatient.     - Continue to monitor       DVT prophylaxis: SCDs and Lovenox  PT and OT: eval and treat    Disposition: DC planning.  Will follow-up with neuropsych for reevaluation.  Updated family at bedside.  Continue to monitor for any acute changes in examination    Subjective   Chief Complaint: No complaints    Subjective: Patient is offering no new complaints. States that he is feeling well today.      Objective   Vitals:   Temp:  [97.9 °F (36.6 °C)-99 °F (37.2 °C)] 97.9 °F (36.6 °C)  HR:  [62-83] 62  Resp:  [12-18] 18  BP: (113-139)/(64-78) 116/78    I/O         08/10 0701  08/11 0700 08/11 0701  08/12 0700 08/12 0701  08/13 0700    P.O. 960 800     Total Intake(mL/kg) 960 (12.8) 800 (10.7)     Urine (mL/kg/hr) 1100 (0.6) 1200 (0.7)     Total Output 1100 1200     Net -140 -400            Unmeasured Urine Occurrence   1 x             Physical Exam:   GENERAL APPEARANCE: NAD  NEURO: GCS 15, no focal deficits  HEENT: Normocephalic  CV: RRR  LUNGS: CTA b/l  GI: Non-tender, non-distended  : no anderson  MSK: moving all extremities  SKIN: warm, dry, intact    Invasive Devices       Peripheral Intravenous Line  Duration             Peripheral IV 08/10/24 Dorsal (posterior);Right Forearm 2 days                          Lab Results: Results: I have personally reviewed all pertinent laboratory/tests results, BMP/CMP: No results found for: \"SODIUM\", \"K\", \"CL\", \"CO2\", \"ANIONGAP\", \"BUN\", \"CREATININE\", \"GLUCOSE\", \"CALCIUM\", \"AST\", \"ALT\", \"ALKPHOS\", \"PROT\", \"BILITOT\", \"EGFR\", and CBC: No results found for: \"WBC\", \"HGB\", \"HCT\", \"MCV\", \"PLT\", \"ADJUSTEDWBC\", \"RBC\", \"MCH\", \"MCHC\", \"RDW\", \"MPV\", \"NRBC\"  Imaging: I have personally reviewed pertinent reports.     Other Studies: no other studies       "

## 2024-08-12 NOTE — ASSESSMENT & PLAN NOTE
-Repeat BMP stable  -Will need more laboratory studies based on sodium level  -May require fluid restriction versus salt tabs
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
## 2024-08-12 NOTE — ASSESSMENT & PLAN NOTE
-Previous report states enlarged prostate   -Patient reports that he gets up at least 3 times overnight to go to the bathroom

## 2024-08-12 NOTE — PLAN OF CARE
Problem: Potential for Falls  Goal: Patient will remain free of falls  Description: INTERVENTIONS:  - Educate patient/family on patient safety including physical limitations  - Instruct patient to call for assistance with activity   - Consult OT/PT to assist with strengthening/mobility   - Keep Call bell within reach  - Keep bed low and locked with side rails adjusted as appropriate  - Keep care items and personal belongings within reach  - Initiate and maintain comfort rounds  - Make Fall Risk Sign visible to staff  - Apply yellow socks and bracelet for high fall risk patients  - Consider moving patient to room near nurses station  Outcome: Progressing     Problem: Prexisting or High Potential for Compromised Skin Integrity  Goal: Skin integrity is maintained or improved  Description: INTERVENTIONS:  - Identify patients at risk for skin breakdown  - Assess and monitor skin integrity  - Assess and monitor nutrition and hydration status  - Monitor labs   - Assess for incontinence   - Turn and reposition patient  - Assist with mobility/ambulation  - Relieve pressure over bony prominences  - Avoid friction and shearing  - Provide appropriate hygiene as needed including keeping skin clean and dry  - Evaluate need for skin moisturizer/barrier cream  - Collaborate with interdisciplinary team   - Patient/family teaching  - Consider wound care consult   Outcome: Progressing     Problem: PAIN - ADULT  Goal: Verbalizes/displays adequate comfort level or baseline comfort level  Description: Interventions:  - Encourage patient to monitor pain and request assistance  - Assess pain using appropriate pain scale  - Administer analgesics based on type and severity of pain and evaluate response  - Implement non-pharmacological measures as appropriate and evaluate response  - Consider cultural and social influences on pain and pain management  - Notify physician/advanced practitioner if interventions unsuccessful or patient reports  new pain  Outcome: Progressing     Problem: INFECTION - ADULT  Goal: Absence or prevention of progression during hospitalization  Description: INTERVENTIONS:  - Assess and monitor for signs and symptoms of infection  - Monitor lab/diagnostic results  - Monitor all insertion sites, i.e. indwelling lines, tubes, and drains  - Monitor endotracheal if appropriate and nasal secretions for changes in amount and color  - [place] appropriate cooling/warming therapies per order  - Administer medications as ordered  - Instruct and encourage patient and family to use good hand hygiene technique  - Identify and instruct in appropriate isolation precautions for identified infection/condition  Outcome: Progressing  Goal: Absence of fever/infection during neutropenic period  Description: INTERVENTIONS:  - Monitor WBC    Outcome: Progressing     Problem: SAFETY ADULT  Goal: Maintain or return to baseline ADL function  Description: INTERVENTIONS:  -  Assess patient's ability to carry out ADLs; assess patient's baseline for ADL function and identify physical deficits which impact ability to perform ADLs (bathing, care of mouth/teeth, toileting, grooming, dressing, etc.)  - Assess/evaluate cause of self-care deficits   - Assess range of motion  - Assess patient's mobility; develop plan if impaired  - Assess patient's need for assistive devices and provide as appropriate  - Encourage maximum independence but intervene and supervise when necessary  - Involve family in performance of ADLs  - Assess for home care needs following discharge   - Consider OT consult to assist with ADL evaluation and planning for discharge  - Provide patient education as appropriate  Outcome: Progressing  Goal: Maintains/Returns to pre admission functional level  Description: INTERVENTIONS:  - Perform AM-PAC 6 Click Basic Mobility/ Daily Activity assessment daily.  - Set and communicate daily mobility goal to care team and patient/family/caregiver.   -  Collaborate with rehabilitation services on mobility goals if consulted  - Out of bed for toileting  - Record patient progress and toleration of activity level   Outcome: Progressing     Problem: DISCHARGE PLANNING  Goal: Discharge to home or other facility with appropriate resources  Description: INTERVENTIONS:  - Identify barriers to discharge w/patient and caregiver  - Arrange for needed discharge resources and transportation as appropriate  - Identify discharge learning needs (meds, wound care, etc.)  - Arrange for interpretive services to assist at discharge as needed  - Refer to Case Management Department for coordinating discharge planning if the patient needs post-hospital services based on physician/advanced practitioner order or complex needs related to functional status, cognitive ability, or social support system  Outcome: Progressing     Problem: Knowledge Deficit  Goal: Patient/family/caregiver demonstrates understanding of disease process, treatment plan, medications, and discharge instructions  Description: Complete learning assessment and assess knowledge base.  Interventions:  - Provide teaching at level of understanding  - Provide teaching via preferred learning methods  Outcome: Progressing     Problem: SAFETY,RESTRAINT: NV/NON-SELF DESTRUCTIVE BEHAVIOR  Goal: Remains free of harm/injury (restraint for non violent/non self-detsructive behavior)  Description: INTERVENTIONS:  - Instruct patient/family regarding restraint use   - Assess and monitor physiologic and psychological status   - Provide interventions and comfort measures to meet assessed patient needs   - Identify and implement measures to help patient regain control  - Assess readiness for release of restraint   Outcome: Progressing  Goal: Returns to optimal restraint-free functioning  Description: INTERVENTIONS:  - Assess the patient's behavior and symptoms that indicate continued need for restraint  - Identify and implement measures to  help patient regain control  - Assess readiness for release of restraint   Outcome: Progressing     Problem: Nutrition/Hydration-ADULT  Goal: Nutrient/Hydration intake appropriate for improving, restoring or maintaining nutritional needs  Description: Monitor and assess patient's nutrition/hydration status for malnutrition. Collaborate with interdisciplinary team and initiate plan and interventions as ordered.  Monitor patient's weight and dietary intake as ordered or per policy. Utilize nutrition screening tool and intervene as necessary. Determine patient's food preferences and provide high-protein, high-caloric foods as appropriate.     INTERVENTIONS:  - Monitor oral intake, urinary output, labs, and treatment plans  - Assess nutrition and hydration status and recommend course of action  - Evaluate amount of meals eaten  - Assist patient with eating if necessary   - Allow adequate time for meals  - Recommend/ encourage appropriate diets, oral nutritional supplements, and vitamin/mineral supplements  - Order, calculate, and assess calorie counts as needed  - Recommend, monitor, and adjust tube feedings and TPN/PPN based on assessed needs  - Assess need for intravenous fluids  - Provide specific nutrition/hydration education as appropriate  - Include patient/family/caregiver in decisions related to nutrition  Outcome: Progressing

## 2024-08-13 ENCOUNTER — APPOINTMENT (INPATIENT)
Dept: RADIOLOGY | Facility: HOSPITAL | Age: 64
DRG: 351 | End: 2024-08-13
Payer: COMMERCIAL

## 2024-08-13 PROBLEM — R56.9 SEIZURE-LIKE ACTIVITY (HCC): Status: RESOLVED | Noted: 2024-08-13 | Resolved: 2024-08-13

## 2024-08-13 PROBLEM — R56.9 SEIZURE-LIKE ACTIVITY (HCC): Status: ACTIVE | Noted: 2024-08-13

## 2024-08-13 LAB
2HR DELTA HS TROPONIN: 28 NG/L
4HR DELTA HS TROPONIN: 3 NG/L
ANION GAP SERPL CALCULATED.3IONS-SCNC: 6 MMOL/L (ref 4–13)
BASOPHILS # BLD AUTO: 0.05 THOUSANDS/ÂΜL (ref 0–0.1)
BASOPHILS NFR BLD AUTO: 0 % (ref 0–1)
BUN SERPL-MCNC: 24 MG/DL (ref 5–25)
CALCIUM SERPL-MCNC: 8.8 MG/DL (ref 8.4–10.2)
CARDIAC TROPONIN I PNL SERPL HS: 33 NG/L
CARDIAC TROPONIN I PNL SERPL HS: 8 NG/L
CHLORIDE SERPL-SCNC: 97 MMOL/L (ref 96–108)
CO2 SERPL-SCNC: 31 MMOL/L (ref 21–32)
CREAT SERPL-MCNC: 0.92 MG/DL (ref 0.6–1.3)
EOSINOPHIL # BLD AUTO: 0.32 THOUSAND/ÂΜL (ref 0–0.61)
EOSINOPHIL NFR BLD AUTO: 2 % (ref 0–6)
ERYTHROCYTE [DISTWIDTH] IN BLOOD BY AUTOMATED COUNT: 14.4 % (ref 11.6–15.1)
GFR SERPL CREATININE-BSD FRML MDRD: 88 ML/MIN/1.73SQ M
GLUCOSE SERPL-MCNC: 103 MG/DL (ref 65–140)
GLUCOSE SERPL-MCNC: 173 MG/DL (ref 65–140)
GLUCOSE SERPL-MCNC: 222 MG/DL (ref 65–140)
GLUCOSE SERPL-MCNC: 241 MG/DL (ref 65–140)
GLUCOSE SERPL-MCNC: 260 MG/DL (ref 65–140)
HCT VFR BLD AUTO: 30.2 % (ref 36.5–49.3)
HGB BLD-MCNC: 9.9 G/DL (ref 12–17)
IMM GRANULOCYTES # BLD AUTO: 0.19 THOUSAND/UL (ref 0–0.2)
IMM GRANULOCYTES NFR BLD AUTO: 1 % (ref 0–2)
LYMPHOCYTES # BLD AUTO: 1.53 THOUSANDS/ÂΜL (ref 0.6–4.47)
LYMPHOCYTES NFR BLD AUTO: 11 % (ref 14–44)
MCH RBC QN AUTO: 28.9 PG (ref 26.8–34.3)
MCHC RBC AUTO-ENTMCNC: 32.8 G/DL (ref 31.4–37.4)
MCV RBC AUTO: 88 FL (ref 82–98)
MONOCYTES # BLD AUTO: 1.11 THOUSAND/ÂΜL (ref 0.17–1.22)
MONOCYTES NFR BLD AUTO: 8 % (ref 4–12)
NEUTROPHILS # BLD AUTO: 10.85 THOUSANDS/ÂΜL (ref 1.85–7.62)
NEUTS SEG NFR BLD AUTO: 78 % (ref 43–75)
NRBC BLD AUTO-RTO: 0 /100 WBCS
PLATELET # BLD AUTO: 651 THOUSANDS/UL (ref 149–390)
PMV BLD AUTO: 9.6 FL (ref 8.9–12.7)
POTASSIUM SERPL-SCNC: 4.3 MMOL/L (ref 3.5–5.3)
RBC # BLD AUTO: 3.43 MILLION/UL (ref 3.88–5.62)
SODIUM SERPL-SCNC: 134 MMOL/L (ref 135–147)
WBC # BLD AUTO: 14.05 THOUSAND/UL (ref 4.31–10.16)

## 2024-08-13 PROCEDURE — 99233 SBSQ HOSP IP/OBS HIGH 50: CPT | Performed by: NURSE PRACTITIONER

## 2024-08-13 PROCEDURE — 85025 COMPLETE CBC W/AUTO DIFF WBC: CPT | Performed by: STUDENT IN AN ORGANIZED HEALTH CARE EDUCATION/TRAINING PROGRAM

## 2024-08-13 PROCEDURE — 82948 REAGENT STRIP/BLOOD GLUCOSE: CPT

## 2024-08-13 PROCEDURE — 84484 ASSAY OF TROPONIN QUANT: CPT

## 2024-08-13 PROCEDURE — 80048 BASIC METABOLIC PNL TOTAL CA: CPT | Performed by: STUDENT IN AN ORGANIZED HEALTH CARE EDUCATION/TRAINING PROGRAM

## 2024-08-13 PROCEDURE — 97116 GAIT TRAINING THERAPY: CPT

## 2024-08-13 PROCEDURE — 99254 IP/OBS CNSLTJ NEW/EST MOD 60: CPT | Performed by: INTERNAL MEDICINE

## 2024-08-13 PROCEDURE — 99232 SBSQ HOSP IP/OBS MODERATE 35: CPT | Performed by: FAMILY MEDICINE

## 2024-08-13 PROCEDURE — 97110 THERAPEUTIC EXERCISES: CPT

## 2024-08-13 PROCEDURE — 73552 X-RAY EXAM OF FEMUR 2/>: CPT

## 2024-08-13 PROCEDURE — 99024 POSTOP FOLLOW-UP VISIT: CPT | Performed by: PHYSICIAN ASSISTANT

## 2024-08-13 RX ORDER — INSULIN GLARGINE 100 [IU]/ML
10 INJECTION, SOLUTION SUBCUTANEOUS
Status: DISCONTINUED | OUTPATIENT
Start: 2024-08-13 | End: 2024-08-13

## 2024-08-13 RX ORDER — INSULIN LISPRO 100 [IU]/ML
5 INJECTION, SOLUTION INTRAVENOUS; SUBCUTANEOUS
Status: DISCONTINUED | OUTPATIENT
Start: 2024-08-13 | End: 2024-08-14

## 2024-08-13 RX ORDER — INSULIN GLARGINE 100 [IU]/ML
15 INJECTION, SOLUTION SUBCUTANEOUS
Status: DISCONTINUED | OUTPATIENT
Start: 2024-08-13 | End: 2024-08-14

## 2024-08-13 RX ADMIN — INSULIN LISPRO 1 UNITS: 100 INJECTION, SOLUTION INTRAVENOUS; SUBCUTANEOUS at 12:01

## 2024-08-13 RX ADMIN — INSULIN LISPRO 3 UNITS: 100 INJECTION, SOLUTION INTRAVENOUS; SUBCUTANEOUS at 07:59

## 2024-08-13 RX ADMIN — SENNOSIDES AND DOCUSATE SODIUM 2 TABLET: 8.6; 5 TABLET ORAL at 08:31

## 2024-08-13 RX ADMIN — ENOXAPARIN SODIUM 30 MG: 30 INJECTION SUBCUTANEOUS at 05:37

## 2024-08-13 RX ADMIN — INSULIN GLARGINE 15 UNITS: 100 INJECTION, SOLUTION SUBCUTANEOUS at 22:41

## 2024-08-13 RX ADMIN — AMLODIPINE BESYLATE 5 MG: 5 TABLET ORAL at 08:31

## 2024-08-13 RX ADMIN — LIDOCAINE 1 PATCH: 700 PATCH TOPICAL at 15:28

## 2024-08-13 RX ADMIN — INSULIN LISPRO 3 UNITS: 100 INJECTION, SOLUTION INTRAVENOUS; SUBCUTANEOUS at 22:42

## 2024-08-13 RX ADMIN — Medication 7.5 MG: at 10:02

## 2024-08-13 RX ADMIN — ACETAMINOPHEN 975 MG: 325 TABLET, FILM COATED ORAL at 22:42

## 2024-08-13 RX ADMIN — POLYETHYLENE GLYCOL 3350 17 G: 17 POWDER, FOR SOLUTION ORAL at 08:31

## 2024-08-13 RX ADMIN — INSULIN LISPRO 3 UNITS: 100 INJECTION, SOLUTION INTRAVENOUS; SUBCUTANEOUS at 12:03

## 2024-08-13 RX ADMIN — ENOXAPARIN SODIUM 30 MG: 30 INJECTION SUBCUTANEOUS at 18:03

## 2024-08-13 RX ADMIN — Medication 190 MG: at 05:32

## 2024-08-13 RX ADMIN — Medication 3 MG: at 22:42

## 2024-08-13 RX ADMIN — SENNOSIDES AND DOCUSATE SODIUM 2 TABLET: 8.6; 5 TABLET ORAL at 18:03

## 2024-08-13 RX ADMIN — Medication 7.5 MG: at 15:28

## 2024-08-13 RX ADMIN — METHOCARBAMOL TABLETS 500 MG: 500 TABLET, COATED ORAL at 05:37

## 2024-08-13 RX ADMIN — Medication 12.5 MG: at 05:37

## 2024-08-13 RX ADMIN — ALPRAZOLAM 0.5 MG: 0.5 TABLET ORAL at 03:48

## 2024-08-13 RX ADMIN — INSULIN LISPRO 5 UNITS: 100 INJECTION, SOLUTION INTRAVENOUS; SUBCUTANEOUS at 18:02

## 2024-08-13 RX ADMIN — ACETAMINOPHEN 975 MG: 325 TABLET, FILM COATED ORAL at 03:48

## 2024-08-13 RX ADMIN — CYANOCOBALAMIN TAB 500 MCG 1000 MCG: 500 TAB at 08:31

## 2024-08-13 NOTE — ASSESSMENT & PLAN NOTE
Lab Results   Component Value Date    EGFR 88 08/13/2024    EGFR 70 08/11/2024    EGFR 73 08/10/2024    CREATININE 0.92 08/13/2024    CREATININE 1.11 08/11/2024    CREATININE 1.08 08/10/2024

## 2024-08-13 NOTE — CONSULTS
Orthopedics   David Skelton Jr. 64 y.o. male MRN: [id number]  Unit/Bed#: W -01      Chief Complaint:   Post op check    HPI:  64 y.o. male community ambulator with history of anemia, afib, urinary retention, DM, anxiety, substance abuse, prior left hip fracture managed by left IMN by Dr. Solis at [place] in 2018, who presented to [facility] with unwitnessed fall and right hip pain. Initially seen by orthopedic service 7/31/2024. Taken to the OR on 7/31/2024 for intramedullary nailing of right femur fracture by Dr. Hill. Currently admitted to [facility] for recurrent fall/ambulatory dysfunction. Currently awaiting placement.   Orthopedics engaged during admission as he is about 2 weeks post operative for post operative re-evaluation.   At present, patient reports that he is having some discomfort in the right hip and leg.    He has been working with physical therapy during admission. Nursing notes that he has been ambulating with assistance in his room.   His disposition is pending placement.   He is currently being evaluated for his capacity.     Review Of Systems:   Skin: Normal  Neuro: See HPI  Musculoskeletal: See HPI  14 point review of systems negative except as stated above     Past Medical History:   Past Medical History:   Diagnosis Date    Anxiety     Depression     Diabetes mellitus (HCC)     Drug use     Hemorrhoids, internal 9/23/2019    Hypertension        Past Surgical History:   Past Surgical History:   Procedure Laterality Date    FEMUR FRACTURE SURGERY Right     GASTRIC BYPASS  11/08/2011    Managed by: Braden Luna (General Surgery)    HERNIA REPAIR  02/13/2013    Incisional hernia repair Managed by: Braden Luna (General Surgery)    MD OPTX FEM SHFT FX W/INSJ IMED IMPLT W/WO SCREW Right 7/31/2024    Procedure: INSERTION NAIL IM FEMUR ANTEGRADE (TROCHANTERIC);  Surgeon: Meng Hill MD;  Location: [facility];  Service: Orthopedics    TONSILLECTOMY  2010       Family History:  Family  history reviewed and non-contributory  Family History   Problem Relation Age of Onset    Diabetes Mother     Hypertension Mother     Hypertension Father     Autoimmune disease Son        Social History:  Social History     Socioeconomic History    Marital status: Legally      Spouse name: Not on file    Number of children: Not on file    Years of education: Not on file    Highest education level: Not on file   Occupational History    Not on file   Tobacco Use    Smoking status: Never    Smokeless tobacco: Never   Vaping Use    Vaping status: Never Used   Substance and Sexual Activity    Alcohol use: Never    Drug use: Yes     Comment: Methadone clinic    Sexual activity: Not Currently   Other Topics Concern    Not on file   Social History Narrative    Not on file     Social Determinants of Health     Financial Resource Strain: Low Risk  (3/16/2021)    Overall Financial Resource Strain (CARDIA)     Difficulty of Paying Living Expenses: Not hard at all   Food Insecurity: No Food Insecurity (8/7/2024)    Hunger Vital Sign     Worried About Running Out of Food in the Last Year: Never true     Ran Out of Food in the Last Year: Never true   Transportation Needs: No Transportation Needs (8/7/2024)    PRAPARE - Transportation     Lack of Transportation (Medical): No     Lack of Transportation (Non-Medical): No   Physical Activity: Inactive (3/16/2021)    Exercise Vital Sign     Days of Exercise per Week: 0 days     Minutes of Exercise per Session: 0 min   Stress: No Stress Concern Present (3/16/2021)    [employer] - Occupational Stress Questionnaire     Feeling of Stress : Only a little   Social Connections: Unknown (3/16/2021)    Social Connection and Isolation Panel [NHANES]     Frequency of Communication with Friends and Family: Patient declined     Frequency of Social Gatherings with Friends and Family: Patient declined     Attends Restorationist Services: Patient declined     Active  Member of Clubs or Organizations: Patient declined     Attends Club or Organization Meetings: Patient declined     Marital Status: Patient declined   Intimate Partner Violence: Unknown (3/16/2021)    Humiliation, Afraid, Rape, and Kick questionnaire     Fear of Current or Ex-Partner: Patient declined     Emotionally Abused: Patient declined     Physically Abused: Patient declined     Sexually Abused: Patient declined   Housing Stability: Low Risk  (8/7/2024)    Housing Stability Vital Sign     Unable to Pay for Housing in the Last Year: No     Number of Times Moved in the Last Year: 0     Homeless in the Last Year: No       Allergies:   Allergies   Allergen Reactions    Aspirin Shortness Of Breath    Penicillins            Labs:  0   Lab Value Date/Time    HCT 30.2 (L) 08/13/2024 0249    HCT 29.7 (L) 08/12/2024 2210    HCT 29 (L) 08/12/2024 2200    HCT 28.5 (L) 08/11/2024 1031    HCT 38.0 01/29/2015 1342    HCT 40.1 05/21/2014 0938    HCT 38.5 02/17/2014 0900    HGB 9.9 (L) 08/13/2024 0249    HGB 9.7 (L) 08/12/2024 2210    HGB 9.9 (L) 08/12/2024 2200    HGB 9.4 (L) 08/11/2024 1031    HGB 12.2 01/29/2015 1342    HGB 13.1 05/21/2014 0938    HGB 12.3 02/17/2014 0900    INR 1.09 08/06/2024 1129    INR 1.11 01/29/2015 1342    WBC 14.05 (H) 08/13/2024 0249    WBC 13.06 (H) 08/12/2024 2210    WBC 11.81 (H) 08/11/2024 1031    WBC 9.29 01/29/2015 1342    WBC 7.36 05/21/2014 0938    WBC 9.99 02/17/2014 0900    ESR 8 07/30/2024 2201       Meds:    Current Facility-Administered Medications:     acetaminophen (TYLENOL) tablet 975 mg, 975 mg, Oral, Q8H Jak VINCENT PA-C, 975 mg at 08/13/24 0348    ALPRAZolam (XANAX) tablet 0.5 mg, 0.5 mg, Oral, BID PRN, STEVE Walter, 0.5 mg at 08/13/24 0348    amLODIPine (NORVASC) tablet 5 mg, 5 mg, Oral, Daily, Anamaria Ayers PA-C, 5 mg at 08/13/24 0831    enoxaparin (LOVENOX) subcutaneous injection 30 mg, 30 mg, Subcutaneous, Q12H, Briseyda Singh MD, 30 mg at 08/13/24  "0537    ergocalciferol (VITAMIN D2) capsule 50,000 Units, 50,000 Units, Oral, Once per day on Monday Wednesday Friday, Peng Mcknight MD, 50,000 Units at 08/12/24 0839    insulin glargine (LANTUS) subcutaneous injection 15 Units 0.15 mL, 15 Units, Subcutaneous, HS, Yesica Cornejo DO    insulin lispro (HumALOG/ADMELOG) 100 units/mL subcutaneous injection 1-6 Units, 1-6 Units, Subcutaneous, 4x Daily (AC & HS), 1 Units at 08/13/24 1201 **AND** Fingerstick Glucose (POCT), , , 4x Daily AC and at bedtime, Anamaria Ayers PA-C    insulin lispro (HumALOG/ADMELOG) 100 units/mL subcutaneous injection 5 Units, 5 Units, Subcutaneous, TID With Meals, Yesica Cornejo DO    lidocaine (LIDODERM) 5 % patch 1 patch, 1 patch, Topical, Daily, Jak Randolph PA-C, 1 patch at 08/12/24 1707    melatonin tablet 3 mg, 3 mg, Oral, HS, Lady Oreilly MD, 3 mg at 08/12/24 2050    methadone (DOLOPHINE) oral concentrated solution 190 mg, 190 mg, Oral, Q24H, Enrrique Roy MD, 190 mg at 08/13/24 0532    metoprolol tartrate (LOPRESSOR) partial tablet 12.5 mg, 12.5 mg, Oral, QAM, Enrrique Roy MD, 12.5 mg at 08/13/24 0537    naloxone (NARCAN) injection 0.4 mg, 0.4 mg, Intravenous, Q1MIN PRN, Enrrique Roy MD    ondansetron (ZOFRAN-ODT) dispersible tablet 4 mg, 4 mg, Oral, Q6H PRN, Jak Randolph PA-C, 4 mg at 08/06/24 1652    oxyCODONE (ROXICODONE) IR tablet 5 mg, 5 mg, Oral, Q4H PRN **OR** oxyCODONE (ROXICODONE) split tablet 7.5 mg, 7.5 mg, Oral, Q4H PRN, STEVE Galvan, 7.5 mg at 08/13/24 1002    polyethylene glycol (MIRALAX) packet 17 g, 17 g, Oral, Daily, Jak Randolph PA-C, 17 g at 08/13/24 0831    senna-docusate sodium (SENOKOT S) 8.6-50 mg per tablet 2 tablet, 2 tablet, Oral, BID, STEVE Galvan, 2 tablet at 08/13/24 0831    Blood Culture:   No results found for: \"BLOODCX\"    Wound Culture:   No results found for: \"WOUNDCULT\"    Ins and Outs:  I/O last 24 hours:  In: 600 [P.O.:600]  Out: - " "          Physical Exam:   /65   Pulse 60   Temp 98 °F (36.7 °C)   Resp 20   Ht 5' 8\" (1.727 m)   Wt 74.9 kg (165 lb 2 oz)   SpO2 98%   BMI 25.11 kg/m²   Gen: No acute distress, resting comfortably in bed  HEENT: Eyes clear, moist mucus membranes, hearing intact  Respiratory: No audible wheezing or stridor  Cardiovascular: Well Perfused peripherally, 2+ distal pulse  Abdomen: nondistended, no peritoneal signs  Musculoskeletal: bilateral upper extremity  Skin intact.   No pain to bilateral upper extremities including clavicles, shoulders, upper arms, elbows, forearms, wrists or hands.   ROM full. Spontaneously moving upper extremities.   SILT m/r/u bilaterally.   Motor intact ain/pin/m/r/u bilaterally.   2+ radial and ulnar pulse  Musculature is soft and compressible, no pain with passive stretch    Musculoskeletal: bilateral lower extremity  Surgical incisions to the right lower extremity: staple lines intact. Wounds well approximated. Staples removed at bedside and steri strips applied without issue.  Patient tolerated well.   Mild ttp throughout the right lower extremity.   No pain over the left hip, bilateral knees, bilateral lower extremities/ankles or feet.   SILT s/s/sp/dp/t bilateral lower extremities.   +ankle dorsi/plantar flexion, EHL/FHL  2+ DP/PT pulse  Musculature is soft and compressible, no pain with passive stretch  Leg lengths equal    Tertiary: no tenderness over all other joints/long bones as except already stated.    Radiology:   I personally reviewed the films.  XRAYs right femur pending.     _*_*_*_*_*_*_*_*_*_*_*_*_*_*_*_*_*_*_*_*_*_*_*_*_*_*_*_*_*_*_*_*_*_*_*_*_*_*_*_*_*    Assessment:  64 y.o.male s/p recent right sided intramedullary nailing with Dr. Hill 7/31/2024 (POD 13).     Plan:   WBAT to the right lower extremity. .  Follow up post operative xrays, ordered.   Pain control per primary team.   Recommend continued DVT ppx: per primary while admitted. Recommend ASA 81mg " BID for 28 days post operatively upon discharge.   PT/OT.   Medical management per primary team.   Body mass index is 25.11 kg/m².   Dispo: Ortho will follow for completion of xrays.   Patient should follow up with Dr. Hill upon discharge in 1 month.     Rosalinda Emery PA-C

## 2024-08-13 NOTE — PHYSICAL THERAPY NOTE
PHYSICAL THERAPY TREATMENT  DATE: 08/13/24  TIME: 0905-0930    NAME:  David Skelton Jr.  AGE:   64 y.o.  Mrn:   [id number]  Length Of Stay: 7    ADMIT DX:  Methadone use [F11.90]  Trochanteric fracture of right femur (HCC) [S72.101A]  Unspecified multiple injuries, initial encounter [T07.XXXA]    Past Medical History:   Diagnosis Date    Anxiety     Depression     Diabetes mellitus (HCC)     Drug use     Hemorrhoids, internal 9/23/2019    Hypertension      Past Surgical History:   Procedure Laterality Date    FEMUR FRACTURE SURGERY Right     GASTRIC BYPASS  11/08/2011    Managed by: Braden Luna (General Surgery)    HERNIA REPAIR  02/13/2013    Incisional hernia repair Managed by: Braden Luna (General Surgery)    ME OPTX FEM SHFT FX W/INSJ IMED IMPLT W/WO SCREW Right 7/31/2024    Procedure: INSERTION NAIL IM FEMUR ANTEGRADE (TROCHANTERIC);  Surgeon: Meng Hill MD;  Location: [facility];  Service: Orthopedics    TONSILLECTOMY  2010       Performed at least 2 patient identifiers during session: Name, ID bracelet, and Epic photo     08/13/24 0905   PT Last Visit   PT Visit Date 08/13/24   Note Type   Note Type Treatment   Pain Assessment   Pain Assessment Tool 0-10   Pain Score 9  (9/10 at rest, increases to 10/10 with mobility)   Pain Location/Orientation Orientation: Right;Orientation: Upper;Location: Leg   Pain Radiating Towards from R hip to R knee   Pain Onset/Description Frequency: Constant/Continuous;Descriptor: Discomfort;Descriptor: Aching   Effect of Pain on Daily Activities limits gait mechanics and gait speed, limits tolerance of all functional mobility   Patient's Stated Pain Goal No pain   Hospital Pain Intervention(s) Medication (See MAR);Cold applied;Repositioned;Ambulation/increased activity;Elevated;Emotional support   Multiple Pain Sites No   Restrictions/Precautions   Weight Bearing Precautions Per Order Yes   RLE Weight Bearing Per Order WBAT  (s/p R femur IM nail 7/31/24 by  "Dr. Hill)   Other Precautions Impulsive;Cognitive;Chair Alarm;Bed Alarm;Fall Risk;Pain   General   Chart Reviewed Yes   Additional Pertinent History Pt is a 64 yr old male admitted 8/6/24 from home s/p fall, c/o R hip pain, AMS. Of note, pt with recent R femur IM nail at [facility] 7/31/24, WBAT R LE. Unwitnessed fall in ED this visit. 8/12/24 PM RR called d/t seizure like activity lasting 4-5 minutes, hypertension, O2 desat. However pt presents back to baseline at this time.   Response to Previous Treatment Patient with no complaints from previous session.   Family/Caregiver Present No   Cognition   Overall Cognitive Status Impaired   Arousal/Participation Alert;Cooperative   Attention Attends with cues to redirect   Orientation Level Oriented to person;Oriented to place;Oriented to situation;Disoriented to time   Memory Decreased short term memory;Decreased recall of recent events;Decreased recall of precautions   Following Commands Follows one step commands without difficulty   Subjective   Subjective \"I don't even remember any of that happening last night.\"   Bed Mobility   Additional Comments Bed mobility NT on this date as pt presents and was left seated OOB in recliner chair with alarm engaged.   Transfers   Sit to Stand 4  Minimal assistance   Additional items Assist x 1;Increased time required;Armrests;Verbal cues  (RW)   Stand to Sit 3  Moderate assistance   Additional items Assist x 1;Increased time required;Impulsive;Verbal cues;Armrests  (RW)   Stand pivot 3  Moderate assistance  (increased assistance needed d/t impulsivity for return to sit, decreased control and poor surface proximity resulting in unsafe/uncontrolled descent to target surface)   Additional items Assist x 1;Armrests;Impulsive;Verbal cues  (RW)   Additional Comments Pt continues to require cues for all transfers, continues to display decreased level of safety and insight.   Ambulation/Elevation   Gait pattern Antalgic;Decreased foot " clearance;Decreased R stance;Short stride;Decreased hip extension;Decreased heel strike;Decreased toe off  (R LE going into ER at hip beginning in swing phase and into stance phase)   Gait Assistance 4  Minimal assist  (primarily YONATHAN but regressing to MODA for last 3ft of ambulation due to impulsivity to sit into chair d/t pain)   Additional items Assist x 1;Verbal cues;Tactile cues   Assistive Device Rolling walker   Distance 82ft x1 with change in direction longterm  (overall ambulation distance and tolerance limited due to R hip pain)   Stair Management Assistance Not tested   Balance   Static Sitting Fair +   Dynamic Sitting Fair   Static Standing Poor +  (w/ RW)   Dynamic Standing Poor +  (w/ RW)   Ambulatory Poor +  (w/ RW)   Endurance Deficit   Endurance Deficit Yes   Activity Tolerance   Activity Tolerance Patient limited by fatigue;Patient limited by pain;Other (Comment)  (impaired cognition and safety)   Medical Staff Made Aware Spoke with CM, OT, RN, PCA   Exercises   Hip Flexion Sitting;15 reps;Bilateral  (AROM LLE, AAROM RLE)   Hip Abduction Sitting;15 reps;AROM;Bilateral   Knee AROM Long Arc Quad Sitting;15 reps;AROM;Bilateral   Ankle Pumps Sitting;15 reps;AROM;Bilateral  (heel/toe raises)   Assessment   Prognosis Good   Problem List Decreased strength;Decreased range of motion;Decreased endurance;Impaired balance;Decreased mobility;Decreased cognition;Impaired judgement;Decreased safety awareness;Decreased skin integrity;Orthopedic restrictions;Pain   Assessment Pt seen for PT treatment 8/13/2024 with focus on: gait training and LE strengthening. Pt presents seated OOB in recliner chair, is agreeable to participate in session despite persistent high levels of pain. Pt participates in therapeutic exercise and gait training, with intervention focusing on goal of increased independence and safety with functional mobility. Pt requires CGA for transfers with RW, ambulates 82ft x1 with RW and YONATHAN  "regressing to MODA. Pt impulsive at end of ambulation, in order to achieve sitting in chair, however requires increased assistance and cue for safety. While seated in recliner chair, pt completes above outlined LE exercises (AROM vs AAROM). Overall pt displays increased ambulation distance tolerance, however continues to perform below their baseline level of functional mobility due to pain, weakness, impaired gait mechanics, decreased endurance. Pt continues to most appropriately benefit from Level II (moderate PT intensity) resources upon d/c from the acute care setting. Continue skilled PT POC as able and appropriate in order to progress towards therapy goals and maximize independence with functional mobility.   Barriers to Discharge Decreased caregiver support   Goals   Patient Goals \"to have less pain\"   STG Expiration Date 08/21/24   Short Term Goal #1 Patient PT goals established in order to address pt self reported goal of \"to go home\". Pt will: complete all bed mobility independently in flat bed in order to promote increased OOB functional mobility and simulate home environment; complete all transfers with LRAD at VEGA level in order to increase safety with functional mobility; ambulate >150ft with LRAD at VEGA level in order to increase safety with household and short community distance functional mobility; improve B LE strength to >/= 4/5 MMT t/o in order to increase safety with functional mobility and decrease risk of falls; demonstrate understanding and independence with LE strengthening HEP; improve ambulatory balance to >/= good grade with LRAD in order to promote safety and increased independence with mobility; tolerate >3hrs OOB in upright position, in order to improve muscular endurance and respiratory status; improve AM-PAC score to >/= 21/24 in order to increase independence with mobility and decrease burden of care; improve Barthel Index score to >/= 50/100 in order to increase independence and " decrease risk of falls.   PT Treatment Day 3   Plan   Treatment/Interventions Functional transfer training;LE strengthening/ROM;Therapeutic exercise;Endurance training;Cognitive reorientation;Patient/family training;Equipment eval/education;Bed mobility;Gait training;Spoke to nursing;Spoke to case management   Progress Progressing toward goals   PT Frequency 3-5x/wk   Discharge Recommendation   Rehab Resource Intensity Level, PT II (Moderate Resource Intensity)   AM-PAC Basic Mobility Inpatient   Turning in Flat Bed Without Bedrails 3   Lying on Back to Sitting on Edge of Flat Bed Without Bedrails 3   Moving Bed to Chair 3   Standing Up From Chair Using Arms 3   Walk in Room 2   Climb 3-5 Stairs With Railing 1   Basic Mobility Inpatient Raw Score 15   Basic Mobility Standardized Score 36.97   [facility] Highest Level Of Mobility   -[facility] Goal 4: Move to chair/commode   -HLM Achieved 7: Walk 25 feet or more   Education   Education Provided Mobility training;Assistive device   Patient Explanation/teachback used;Reinforcement needed   End of Consult   Patient Position at End of Consult Bedside chair;Bed/Chair alarm activated;All needs within reach  (RN and PCA aware of pt status)     Based on patient's [facility] Highest Level of Mobility scores today, patient currently has a goal of -[facility] Levels: 7: WALK 25 FEET OR MORE, to be completed with RN staffing each shift, in order to improve overall activity tolerance and mobility, combat hospital related deconditioning, and maximize outcomes for d/c from the acute care setting.     The patient's AM-PAC Basic Mobility Inpatient Short Form Raw Score is 15. A Raw score of less than or equal to 16 suggests the patient may benefit from discharge to post-acute rehabilitation services. Please also refer to the recommendation of the Physical Therapist for safe discharge planning.        Martha Toussaint, PT, DPT   Available via [employer] net  NPI # [id number]  [place] License -  [id number]  8/13/2024

## 2024-08-13 NOTE — QUICK NOTE
Called patient's sister, Vandana, she does not believe patient has decision-making capacity.  She guaranteed me that he will fall again if he is discharged home.  She also feels that he is unable to manage his medications.  Thus, she is in favor of patient going to rehab.  I explained to her that case management continues to look for rehab though due to his history of methadone use we are currently having difficulty finding a rehab who will take him.  Throughout our conversation, Vandana received a full update and at the completion of our conversation she confirms having all of her questions answered and denied having any other questions.

## 2024-08-13 NOTE — PLAN OF CARE
Problem: PHYSICAL THERAPY ADULT  Goal: Performs mobility at highest level of function for planned discharge setting.  See evaluation for individualized goals.  Description: Treatment/Interventions: Functional transfer training, LE strengthening/ROM, Therapeutic exercise, Endurance training, Cognitive reorientation, Patient/family training, Equipment eval/education, Bed mobility, Gait training, Compensatory technique education, Spoke to nursing, Spoke to case management, Spoke to MD     See flowsheet documentation for full assessment, interventions and recommendations.  Outcome: Progressing  Note: Prognosis: Good  Problem List: Decreased strength, Decreased range of motion, Decreased endurance, Impaired balance, Decreased mobility, Decreased cognition, Impaired judgement, Decreased safety awareness, Decreased skin integrity, Orthopedic restrictions, Pain  Assessment: Pt seen for PT treatment 8/13/2024 with focus on: gait training and LE strengthening. Pt presents seated OOB in recliner chair, is agreeable to participate in session despite persistent high levels of pain. Pt participates in therapeutic exercise and gait training, with intervention focusing on goal of increased independence and safety with functional mobility. Pt requires CGA for transfers with RW, ambulates 82ft x1 with RW and YONATHAN regressing to MODA. Pt impulsive at end of ambulation, in order to achieve sitting in chair, however requires increased assistance and cue for safety. While seated in recliner chair, pt completes above outlined LE exercises (AROM vs AAROM). Overall pt displays increased ambulation distance tolerance, however continues to perform below their baseline level of functional mobility due to pain, weakness, impaired gait mechanics, decreased endurance. Pt continues to most appropriately benefit from Level II (moderate PT intensity) resources upon d/c from the acute care setting. Continue skilled PT POC as able and appropriate in  order to progress towards therapy goals and maximize independence with functional mobility.    Barriers to Discharge: Decreased caregiver support     Rehab Resource Intensity Level, PT: II (Moderate Resource Intensity)    See flowsheet documentation for full assessment.

## 2024-08-13 NOTE — QUICK NOTE
A rapid response was called for this pt for seizure like activity. Episode lasted about 4-5 minutes, in which pt was jerking, but was somewhat following commands. BP at that time 201/113, and O2 was 87-90s. He also had upper airway gurgling, giving concern for airway obstruction. Symptoms spontaneously resolved, and pt became alert and responsive. Was not post-ictal. EKG, I-STAT, CBC, troponin and Cxr obtained to rule out cardiac etiology. Will continue to follow labs and re-evaluate patient.

## 2024-08-13 NOTE — ASSESSMENT & PLAN NOTE
Patient has a history of bipolar disorder-no longer follows with psychiatry-having difficulty scheduling outpatient follow-up  Psych consulted while IP reinitiation of medication therapy as needed.  Xanax as needed

## 2024-08-13 NOTE — ASSESSMENT & PLAN NOTE
Complaints of pain in R hip  S/p hip surgery on 7/31  CT lower extremity showed possible myositis.   Orthopedics consulted for evaluation in 2-week follow-up  Multimodal pain regimen as needed  PT/OT-recommended level 2  DVT prophylaxis: Lovenox

## 2024-08-13 NOTE — PLAN OF CARE
Problem: Prexisting or High Potential for Compromised Skin Integrity  Goal: Skin integrity is maintained or improved  Description: INTERVENTIONS:  - Identify patients at risk for skin breakdown  - Assess and monitor skin integrity  - Assess and monitor nutrition and hydration status  - Monitor labs   - Assess for incontinence   - Turn and reposition patient  - Assist with mobility/ambulation  - Relieve pressure over bony prominences  - Avoid friction and shearing  - Provide appropriate hygiene as needed including keeping skin clean and dry  - Evaluate need for skin moisturizer/barrier cream  - Collaborate with interdisciplinary team   - Patient/family teaching  - Consider wound care consult   Outcome: Progressing     Problem: PAIN - ADULT  Goal: Verbalizes/displays adequate comfort level or baseline comfort level  Description: Interventions:  - Encourage patient to monitor pain and request assistance  - Assess pain using appropriate pain scale  - Administer analgesics based on type and severity of pain and evaluate response  - Implement non-pharmacological measures as appropriate and evaluate response  - Consider cultural and social influences on pain and pain management  - Notify physician/advanced practitioner if interventions unsuccessful or patient reports new pain  Outcome: Progressing     Problem: INFECTION - ADULT  Goal: Absence or prevention of progression during hospitalization  Description: INTERVENTIONS:  - Assess and monitor for signs and symptoms of infection  - Monitor lab/diagnostic results  - Monitor all insertion sites, i.e. indwelling lines, tubes, and drains  - Monitor endotracheal if appropriate and nasal secretions for changes in amount and color  - [place] appropriate cooling/warming therapies per order  - Administer medications as ordered  - Instruct and encourage patient and family to use good hand hygiene technique  - Identify and instruct in appropriate isolation precautions for  identified infection/condition  Outcome: Progressing  Goal: Absence of fever/infection during neutropenic period  Description: INTERVENTIONS:  - Monitor WBC    Outcome: Progressing     Problem: SAFETY ADULT  Goal: Maintain or return to baseline ADL function  Description: INTERVENTIONS:  -  Assess patient's ability to carry out ADLs; assess patient's baseline for ADL function and identify physical deficits which impact ability to perform ADLs (bathing, care of mouth/teeth, toileting, grooming, dressing, etc.)  - Assess/evaluate cause of self-care deficits   - Assess range of motion  - Assess patient's mobility; develop plan if impaired  - Assess patient's need for assistive devices and provide as appropriate  - Encourage maximum independence but intervene and supervise when necessary  - Involve family in performance of ADLs  - Assess for home care needs following discharge   - Consider OT consult to assist with ADL evaluation and planning for discharge  - Provide patient education as appropriate  Outcome: Progressing  Goal: Maintains/Returns to pre admission functional level  Description: INTERVENTIONS:  - Perform AM-PAC 6 Click Basic Mobility/ Daily Activity assessment daily.  - Set and communicate daily mobility goal to care team and patient/family/caregiver.   - Collaborate with rehabilitation services on mobility goals if consulted  - Perform Range of Motion 2 times a day.  - Reposition patient every 2 hours.  - Dangle patient 2 times a day  - Stand patient 2 times a day  - Ambulate patient 2 times a day  - Out of bed to chair 2 times a day   - Out of bed for meals 2 times a day  - Out of bed for toileting  - Record patient progress and toleration of activity level   Outcome: Progressing     Problem: DISCHARGE PLANNING  Goal: Discharge to home or other facility with appropriate resources  Description: INTERVENTIONS:  - Identify barriers to discharge w/patient and caregiver  - Arrange for needed discharge  resources and transportation as appropriate  - Identify discharge learning needs (meds, wound care, etc.)  - Arrange for interpretive services to assist at discharge as needed  - Refer to Case Management Department for coordinating discharge planning if the patient needs post-hospital services based on physician/advanced practitioner order or complex needs related to functional status, cognitive ability, or social support system  Outcome: Progressing     Problem: Knowledge Deficit  Goal: Patient/family/caregiver demonstrates understanding of disease process, treatment plan, medications, and discharge instructions  Description: Complete learning assessment and assess knowledge base.  Interventions:  - Provide teaching at level of understanding  - Provide teaching via preferred learning methods  Outcome: Progressing     Problem: SAFETY,RESTRAINT: NV/NON-SELF DESTRUCTIVE BEHAVIOR  Goal: Remains free of harm/injury (restraint for non violent/non self-detsructive behavior)  Description: INTERVENTIONS:  - Instruct patient/family regarding restraint use   - Assess and monitor physiologic and psychological status   - Provide interventions and comfort measures to meet assessed patient needs   - Identify and implement measures to help patient regain control  - Assess readiness for release of restraint   Outcome: Progressing  Goal: Returns to optimal restraint-free functioning  Description: INTERVENTIONS:  - Assess the patient's behavior and symptoms that indicate continued need for restraint  - Identify and implement measures to help patient regain control  - Assess readiness for release of restraint   Outcome: Progressing     Problem: Potential for Falls  Goal: Patient will remain free of falls  Description: INTERVENTIONS:  - Educate patient/family on patient safety including physical limitations  - Instruct patient to call for assistance with activity   - Consult OT/PT to assist with strengthening/mobility   - Keep Call  bell within reach  - Keep bed low and locked with side rails adjusted as appropriate  - Keep care items and personal belongings within reach  - Initiate and maintain comfort rounds  - Make Fall Risk Sign visible to staff  - Offer Toileting every 2 Hours, in advance of need  - Initiate/Maintain bed alarm  - Obtain necessary fall risk management equipment: yellow socks  - Apply yellow socks and bracelet for high fall risk patients  - Consider moving patient to room near nurses station  Outcome: Progressing     Problem: Nutrition/Hydration-ADULT  Goal: Nutrient/Hydration intake appropriate for improving, restoring or maintaining nutritional needs  Description: Monitor and assess patient's nutrition/hydration status for malnutrition. Collaborate with interdisciplinary team and initiate plan and interventions as ordered.  Monitor patient's weight and dietary intake as ordered or per policy. Utilize nutrition screening tool and intervene as necessary. Determine patient's food preferences and provide high-protein, high-caloric foods as appropriate.     INTERVENTIONS:  - Monitor oral intake, urinary output, labs, and treatment plans  - Assess nutrition and hydration status and recommend course of action  - Evaluate amount of meals eaten  - Assist patient with eating if necessary   - Allow adequate time for meals  - Recommend/ encourage appropriate diets, oral nutritional supplements, and vitamin/mineral supplements  - Order, calculate, and assess calorie counts as needed  - Recommend, monitor, and adjust tube feedings and TPN/PPN based on assessed needs  - Assess need for intravenous fluids  - Provide specific nutrition/hydration education as appropriate  - Include patient/family/caregiver in decisions related to nutrition  Outcome: Progressing

## 2024-08-13 NOTE — RAPID RESPONSE
Rapid Response Note  David Skelton Jr. 64 y.o. male MRN: [id number]  Unit/Bed#: W -01 Encounter: [id number]    Rapid Response Notification(s):   Level of care:  [facility]  Rapid response location:  [facility]  Primary reason for rapid response call:  Acute change in neuro status    Rapid Response Intervention(s):   Airway:  None  Breathing:  None  Circulation:  None  Fluids administered:  None  Medications administered:  None       Assessment:   Acute change in mental status with spontaneous resolution, unclear etiology. Partial aware seizure not ruled out. Polypharmacy contributing to encephalopathy   Plan:   Follow-up blood work-troponins, CBC  Continue rest of treatment per primary team [surgery]  Delirium precautions  Disposition-[facility]     Rapid Response Outcome:   Transfer:  Remain on floor  Primary service notified of transfer: Yes    Code Status: Level 1 (Full Code)      Family notified: Primary team to notify Family Member       Background/Situation:   David Skelton Jr. is a 64 y.o. male past medical history of hypertension, type 2 diabetes, methadone dependence, acute blood loss anemia, chronic hep C hep C, BPH, cholelithiasis, CKD 2, gastric bypass, multiple vitamin deficiency, adrenal nodule, right hip fracture status post fixation 7/31, ambulatory dysfunction who   Patient presented 8/6 following fall as a trauma level C.  At the ED patient was confused but following commands.  Trauma workup negative for fractures.  Patient found to have traumatic rhabdomyolysis.  Patient noted to be intermittently confused this admission requiring geriatric assessment.  Patient noted not to have capacity.  Rapid response called secondary change in mental status with seizure-like movements, gurgling sounds.  According to his nurse, last known well 20 minutes prior to rapid.   Patient was administered melatonin 3mg about 45 min prior melatonin.  Vital signs sats 89%,/113 RR  Patient was awake alert but not  following commands, blank staring.  Patient is not in respiratory distress, no increased work of breathing.  No focal deficits.  Episode  completely resolved and patient was back to himself, alert and responding to questions.  EKG reviewed sinus rhythm with no ST-T changes  I-STAT reviewed with no significant metabolic derangements. HB stable 9.9      Review of Systems    Objective:   Vitals:    08/12/24 1518 08/12/24 1907 08/12/24 2147 08/12/24 2155   BP: 138/79 135/72 (!) 201/113 147/87   Pulse: 79 67 93 94   Resp: 12 12 20    Temp: 98.3 °F (36.8 °C) 98.4 °F (36.9 °C)     TempSrc:       SpO2: (!) 89% (!) 89% 94% 99%   Weight:       Height:         Physical Exam  Vitals and nursing note reviewed.   Constitutional:       General: He is not in acute distress.     Appearance: He is well-developed.   HENT:      Head: Normocephalic and atraumatic.   Eyes:      Conjunctiva/sclera: Conjunctivae normal.   Cardiovascular:      Rate and Rhythm: Normal rate and regular rhythm.      Heart sounds: No murmur heard.  Pulmonary:      Effort: Pulmonary effort is normal. No respiratory distress.      Breath sounds: Normal breath sounds.   Abdominal:      Palpations: Abdomen is soft.      Tenderness: There is no abdominal tenderness.   Musculoskeletal:         General: No swelling.      Cervical back: Neck supple.   Skin:     General: Skin is warm and dry.      Capillary Refill: Capillary refill takes less than 2 seconds.   Neurological:      Mental Status: He is alert. He is confused.   Psychiatric:         Mood and Affect: Mood normal.
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
## 2024-08-13 NOTE — DISCHARGE INSTR - AVS FIRST PAGE
Discharge Instructions - Orthopedics  David Skelton Jr. 64 y.o. male MRN: [id number]  Unit/Bed#: W -01    Weight Bearing Status:                                           Weight bearing as tolerated to the right lower extremity.     DVT prophylaxis:  Aspirin 81mg BID for 28 days post operatively.    Pain:  Continue analgesics as directed    Dressing Instructions:   Please keep clean, dry and intact until follow up     Appt Instructions:   If you do not have your appointment, please call the clinic at [phone]  Otherwise follow up as scheduled.    Contact the office sooner if you experience any increased numbness/tingling in the extremities.

## 2024-08-13 NOTE — PROGRESS NOTES
[facility]  Progress Note  Name: David Osorio I  MRN: [id number]  Unit/Bed#: W -01 I Date of Admission: 8/6/2024   Date of Service: 8/13/2024 I Hospital Day: 7    Assessment & Plan   Fall  Assessment & Plan  - Status post fall with the below noted injuries.  - Fall precautions.  - Geriatric Medicine consultation for evaluation, medication review and recommendations.  - PT and OT evaluation and treatment as indicated.  - Case Management consultation for disposition planning.        Right hip pain  Assessment & Plan  Complaints of pain in R hip  S/p hip surgery on 7/31  CT lower extremity showed possible myositis.   Orthopedics consulted for evaluation in 2-week follow-up  Multimodal pain regimen as needed  PT/OT-recommended level 2  DVT prophylaxis: Lovenox    CKD (chronic kidney disease), stage II  Assessment & Plan  Lab Results   Component Value Date    EGFR 88 08/13/2024    EGFR 70 08/11/2024    EGFR 73 08/10/2024    CREATININE 0.92 08/13/2024    CREATININE 1.11 08/11/2024    CREATININE 1.08 08/10/2024       Prolonged QT interval  Assessment & Plan  In the setting of methadone use  Continue to monitor--repeat EKG 8/15    Hyponatremia  Assessment & Plan  Repeat BMP shows stability at 134-continue to monitor    Encephalopathy  Assessment & Plan  Pt initially very lethargic on arrival to ED, suspect due to methadone  Geriatrics consulted note appreciated  Neuropsych consulted-patient was deemed not to have decision-making capacity  Reevaluation today  Geriatrics consulted  - Pt became more alert, but was still confused.     Methadone dependence (HCC)  Assessment & Plan  Pt has a hx of opioid abuse, and has been on chronic methadone.  Current dose is 190mg daily. Dose was confrimed    Continue home dose of methadone  APS on board, will continue to monitor QTc, and consider decreasing dose if it lengthens    Type 2 diabetes mellitus with ophthalmic complication, with long-term  "current use of insulin ([facility])  Assessment & Plan  Lab Results   Component Value Date    HGBA1C 10.7 (H) 07/31/2024       Recent Labs     08/12/24  2046 08/12/24  2147 08/13/24  0745 08/13/24  1120   POCGLU 317* 272* 260* 173*         Blood Sugar Average: Last 72 hrs:  (P) 199.9735248134825721    Patient was recently admitted to hospitals and discharged on insulin  Adjusted mealtime insulin and started on Lantus.  Continue sliding scale insulin.  Endocrine consulted  Concern for compliance-patient will require insulin training if being discharged home on insulin.      Benign essential hypertension  Assessment & Plan  Continue home amlodipine    Bipolar disorder (HCC)  Assessment & Plan  Patient has a history of bipolar disorder-no longer follows with psychiatry-having difficulty scheduling outpatient follow-up  Psych consulted while IP reinitiation of medication therapy as needed.  Xanax as needed             Bowel Regimen: Senna S  VTE Prophylaxis:Sequential compression device (Venodyne)  and Enoxaparin (Lovenox)     Disposition: Pending placement-case management having difficulty finding placement due to history of methadone use.    Subjective   Chief Complaint: \"It still hurts sometimes\"    Subjective: Patient notes sometimes having pain in his right hip but noting it improved.  He denies any other complaints or concerns.  He confirms that he was able to get some sleep overnight.  He has been tolerating his diet.  He notes having a bowel movement yesterday.  No other complaints offered.     Objective   Vitals:   Temp:  [98 °F (36.7 °C)-99.2 °F (37.3 °C)] 98 °F (36.7 °C)  HR:  [] 60  Resp:  [12-20] 20  BP: (111-201)/() 111/65    I/O         08/11 0701  08/12 0700 08/12 0701  08/13 0700 08/13 0701  08/14 0700    P.O. 800 480 120    Total Intake(mL/kg) 800 (10.7) 480 (6.4) 120 (1.6)    Urine (mL/kg/hr) 1200 (0.7)      Total Output 1200      Net -400 +480 +120           Unmeasured Urine Occurrence  3 x     " Unmeasured Stool Occurrence  1 x              Physical Exam:   GENERAL APPEARANCE: No acute distress  NEURO: GCS is 15.  Light touch sensation intact throughout.  HEENT: Normocephalic, atraumatic.  Neck supple.  CV: Regular rate and rhythm.  +2 radial and dorsalis pedis pulses, bilaterally.  LUNGS: Clear to auscultation, bilaterally.  Chest wall is nontender.  GI: Abdomen is soft nontender.  : Pelvis is stable.  MSK: Right hip has some mild swelling-overall nontender.  Patient is able to move, foot, ankle distal.  All other extremities display no deformities.  SKIN: Warm, dry.    Invasive Devices       Peripheral Intravenous Line  Duration             Peripheral IV 08/10/24 Dorsal (posterior);Right Forearm 3 days                          Lab Results: Results: I have personally reviewed all pertinent laboratory/tests results, BMP/CMP:   Lab Results   Component Value Date    SODIUM 134 (L) 08/13/2024    K 4.3 08/13/2024    CL 97 08/13/2024    CO2 31 08/13/2024    CO2 30 08/12/2024    BUN 24 08/13/2024    CREATININE 0.92 08/13/2024    GLUCOSE 300 (H) 08/12/2024    CALCIUM 8.8 08/13/2024    EGFR 88 08/13/2024   , and CBC:   Lab Results   Component Value Date    WBC 14.05 (H) 08/13/2024    HGB 9.9 (L) 08/13/2024    HCT 30.2 (L) 08/13/2024    MCV 88 08/13/2024     (H) 08/13/2024    RBC 3.43 (L) 08/13/2024    MCH 28.9 08/13/2024    MCHC 32.8 08/13/2024    RDW 14.4 08/13/2024    MPV 9.6 08/13/2024    NRBC 0 08/13/2024     Imaging: I have personally reviewed pertinent reports.     Other Studies: none

## 2024-08-13 NOTE — ASSESSMENT & PLAN NOTE
Pt initially very lethargic on arrival to ED, suspect due to methadone  Geriatrics consulted note appreciated  Neuropsych consulted-patient was deemed not to have decision-making capacity  Reevaluation today  Geriatrics consulted  - Pt became more alert, but was still confused.

## 2024-08-13 NOTE — CONSULTS
Endocrinology Consultation  David Skelton Jr. 64 y.o. male MRN: [id number]  Unit/Bed#: W -01 Encounter: [id number]      Assessment & Plan     Assessment:  This is a 64 y.o.-year-old male with longstanding uncontrolled type 2 diabetes, medication noncompliance, hypogonadism, recent hip fracture concerning for osteomalacia versus osteoporosis, polysubstance abuse on chronic methadone, readmitted after fall.     Patient has been off of his previous diabetic medications for at least 6 months and has not seen his endocrinologist for 18 months.  This admission he has been managed on correctional scale only with resultant hyperglycemia.  Appetite does remain low.      Plan:  --Do recommend basal bolus management at this time  --Will start Lantus 15 units tonight, Humalog 5 units with meals and continue Algorithm 3 correction  --Patient fortunately has knowledge of how to use insulin, GLP-1's, orals but compliance and insight to remain questionable. Would not expect patient to be able to be managed on oral therapy, even if he does remain compliant  --Hypoglycemia protocol  --Will continue to follow and make adjustments as appropriate.   -- Patient will require endocrine follow-up for his diabetes, adrenal nodule, hypogonadism, likely osteomalacia versus osteoporosis.  He has previously seen [facility]  , but not in the past 18 months. Encouraged patient to reestablish care.  Discharge recommendations pending clinical course    CC: Diabetes Consult    History of Present Illness     HPI: David Skelton Jr. is a 64 y.o. year old male with type 2 diabetes for 20+ years, with complication of retinopathy, nephropathy. Recent A1c 10.7% 7/31/2024.. Also with history of adrenal nodule, gastric bypass 2011, hypogonadism, vitamin D deficiency, anxiety, bipolar, polysubstance abuse on methadone, hepatitis B and C.  Patient recently admitted 7/30 to 8/5 for right femur fracture now s/p IMN fixation 7/31.  Patient  returned 8/6 after fall/trauma.    Endocrinology is consulted for glycemic management.  Patient has been treated with correctional scale insulin only this admission.  Endocrine's last recommendations were Lantus 15 units nightly, Humalog 5 units with meals on 8/4 during previous hospitalization.    Of note, patient was seen as an [facility] consult for right intertrochanteric femur fracture during [facility] admission on 8/1/2024, concern for osteomalacia in the setting of gastric bypass, noncompliant on bariatric vitamins.  Also has been diagnosed with hypogonadism previously prescribed testosterone therapy.     Per record review, patient does not appear to been on any diabetic medications for at least 6 months, or testosterone replacement for over a year.  He did previously followed with [facility]ocr[facility]ology  , last seen in February 2023.  It appears that he previously was on Trulicity 1.5 mg weekly, Lantus 18 units every morning, Synjardy 12.5-1000 mg twice daily, vitamin D 50,000 units weekly, pioglitazone 15 mg daily, testosterone cypionate 200 mg every 2 weeks,  Kerendia 10 mg daily.  Patient compliance to the above medications historically appears poor.          Inpatient consult to Endocrinology     Date/Time  8/13/2024 2:44 PM     Performed by  Yesica Cornejo DO   Authorized by  STEVE Blum     Universal Protocol   Timeout called at: 8/13/2024 2:44 PM.             Review of Systems   Reason unable to perform ROS: Limited by mentation.   Constitutional:  Positive for appetite change (Decreased appetite recently).   Gastrointestinal:         Some stomach discomfort, patient reports improved   Musculoskeletal:  Positive for gait problem (Due to hip pain).       Historical Information   Past Medical History:   Diagnosis Date    Anxiety     Depression     Diabetes mellitus (HCC)     Drug use     Hemorrhoids, internal 9/23/2019    Hypertension      Past Surgical History:   Procedure  Laterality Date    FEMUR FRACTURE SURGERY Right     GASTRIC BYPASS  11/08/2011    Managed by: Braden Luna (General Surgery)    HERNIA REPAIR  02/13/2013    Incisional hernia repair Managed by: Braden Luna (General Surgery)    AZ OPTX FEM SHFT FX W/INSJ IMED IMPLT W/WO SCREW Right 7/31/2024    Procedure: INSERTION NAIL IM FEMUR ANTEGRADE (TROCHANTERIC);  Surgeon: Meng Hill MD;  Location: [facility];  Service: Orthopedics    TONSILLECTOMY  2010     Social History   Social History     Substance and Sexual Activity   Alcohol Use Never     Social History     Substance and Sexual Activity   Drug Use Yes    Comment: Methadone clinic     Social History     Tobacco Use   Smoking Status Never   Smokeless Tobacco Never     Family History:   Family History   Problem Relation Age of Onset    Diabetes Mother     Hypertension Mother     Hypertension Father     Autoimmune disease Son        Meds/Allergies   Current Facility-Administered Medications   Medication Dose Route Frequency Provider Last Rate Last Admin    acetaminophen (TYLENOL) tablet 975 mg  975 mg Oral Q8H [facility] Jak Randolph PA-C   975 mg at 08/13/24 0348    ALPRAZolam (XANAX) tablet 0.5 mg  0.5 mg Oral BID PRN STEVE Walter   0.5 mg at 08/13/24 0348    amLODIPine (NORVASC) tablet 5 mg  5 mg Oral Daily Anamaria Ayers PA-C   5 mg at 08/13/24 0831    enoxaparin (LOVENOX) subcutaneous injection 30 mg  30 mg Subcutaneous Q12H Briseyda Singh MD   30 mg at 08/13/24 0537    ergocalciferol (VITAMIN D2) capsule 50,000 Units  50,000 Units Oral Once per day on Monday Wednesday Friday Peng Mcknight MD   50,000 Units at 08/12/24 0839    insulin glargine (LANTUS) subcutaneous injection 10 Units 0.1 mL  10 Units Subcutaneous HS STEVE Blum        insulin lispro (HumALOG/ADMELOG) 100 units/mL subcutaneous injection 1-6 Units  1-6 Units Subcutaneous 4x Daily (AC & HS) Anamaria Ayers PA-C   1 Units at 08/13/24 1201    insulin lispro  "(HumALOG/ADMELOG) 100 units/mL subcutaneous injection 5 Units  5 Units Subcutaneous TID With Meals STEVE Blum        lidocaine (LIDODERM) 5 % patch 1 patch  1 patch Topical Daily Jak Randolph PA-C   1 patch at 08/12/24 1707    melatonin tablet 3 mg  3 mg Oral HS Lady Oreilly MD   3 mg at 08/12/24 2050    methadone (DOLOPHINE) oral concentrated solution 190 mg  190 mg Oral Q24H Enrrique Roy MD   190 mg at 08/13/24 0532    metoprolol tartrate (LOPRESSOR) partial tablet 12.5 mg  12.5 mg Oral QAM Enrrique Roy MD   12.5 mg at 08/13/24 0537    naloxone (NARCAN) injection 0.4 mg  0.4 mg Intravenous Q1MIN PRN Enrrique Roy MD        ondansetron (ZOFRAN-ODT) dispersible tablet 4 mg  4 mg Oral Q6H PRN Jak Randolph PA-C   4 mg at 08/06/24 1652    oxyCODONE (ROXICODONE) IR tablet 5 mg  5 mg Oral Q4H PRN STEVE Galvan        Or    oxyCODONE (ROXICODONE) split tablet 7.5 mg  7.5 mg Oral Q4H PRN STEVE Galvan   7.5 mg at 08/13/24 1002    polyethylene glycol (MIRALAX) packet 17 g  17 g Oral Daily Jak Randolph PA-C   17 g at 08/13/24 0831    senna-docusate sodium (SENOKOT S) 8.6-50 mg per tablet 2 tablet  2 tablet Oral BID STEVE Galvan   2 tablet at 08/13/24 0831     Allergies   Allergen Reactions    Aspirin Shortness Of Breath    Penicillins        Objective   Vitals: Blood pressure 111/65, pulse 60, temperature 98 °F (36.7 °C), resp. rate 20, height 5' 8\" (1.727 m), weight 74.9 kg (165 lb 2 oz), SpO2 98%.    Intake/Output Summary (Last 24 hours) at 8/13/2024 1424  Last data filed at 8/13/2024 0951  Gross per 24 hour   Intake 600 ml   Output --   Net 600 ml     Invasive Devices       Peripheral Intravenous Line  Duration             Peripheral IV 08/10/24 Dorsal (posterior);Right Forearm 3 days                    Physical Exam  Constitutional:       General: He is not in acute distress.     Appearance: Normal appearance. He is not ill-appearing, toxic-appearing or " "diaphoretic.      Comments: Edentulous  Appears older than stated age   HENT:      Head: Normocephalic and atraumatic.      Nose: Nose normal.   Eyes:      Extraocular Movements: Extraocular movements intact.      Conjunctiva/sclera: Conjunctivae normal.      Pupils: Pupils are equal, round, and reactive to light.   Cardiovascular:      Rate and Rhythm: Normal rate.   Pulmonary:      Effort: Pulmonary effort is normal. No respiratory distress.   Abdominal:      General: There is no distension.   Musculoskeletal:         General: No deformity.      Right lower leg: No edema.      Left lower leg: No edema.   Skin:     General: Skin is warm and dry.   Neurological:      Mental Status: He is alert. Mental status is at baseline.   Psychiatric:         Mood and Affect: Mood normal.         Behavior: Behavior normal.         Thought Content: Thought content normal.      Comments: Does repeat some phrases ?  Echolalia         The history was obtained from the review of the chart, patient.    Lab Results:       Lab Results   Component Value Date    WBC 14.05 (H) 08/13/2024    HGB 9.9 (L) 08/13/2024    HCT 30.2 (L) 08/13/2024    MCV 88 08/13/2024     (H) 08/13/2024     Lab Results   Component Value Date/Time    BUN 24 08/13/2024 02:49 AM    BUN 17 03/01/2019 04:11 PM     01/29/2015 01:42 PM    K 4.3 08/13/2024 02:49 AM    K 4.6 03/01/2019 04:11 PM    CL 97 08/13/2024 02:49 AM     03/01/2019 04:11 PM    CO2 31 08/13/2024 02:49 AM    CO2 30 08/12/2024 10:00 PM    CO2 31 03/01/2019 04:11 PM    CREATININE 0.92 08/13/2024 02:49 AM    CREATININE 1.03 03/01/2019 04:11 PM    AST 35 08/06/2024 11:29 AM    AST 16 03/01/2019 04:11 PM    ALT 26 08/06/2024 11:29 AM    ALT 27 03/01/2019 04:11 PM    TP 5.9 (L) 08/06/2024 11:29 AM    TP 6.9 03/01/2019 04:11 PM    ALB 3.1 (L) 08/06/2024 11:29 AM    ALB 3.6 03/01/2019 04:11 PM     No results for input(s): \"CHOL\", \"HDL\", \"LDL\", \"TRIG\", \"VLDL\" in the last 72 hours.  No results " "found for: \"MICROALBUR\", \"[id number]\"  POC Glucose (mg/dl)   Date Value   08/13/2024 173 (H)   08/13/2024 260 (H)   08/12/2024 272 (H)   08/12/2024 317 (H)   08/12/2024 133   08/12/2024 186 (H)   08/12/2024 223 (H)   08/11/2024 193 (H)   08/11/2024 165 (H)   08/11/2024 93       Imaging Studies: I have personally reviewed pertinent reports.      Yesica Cornejo DO  Endocrinology PGY-5  ~A morning cortisol is only interpretable if drawn at 8am.~    Please Epic[employer] questions to the physician covering the \"[facility] Endocrinology Call\" Role. Thank you.   "

## 2024-08-13 NOTE — UTILIZATION REVIEW
"Continued Stay Review    Date: 08/13                          Current Patient Class: Inpatient  Current Level of Care: MS    HPI:64 y.o. male initially admitted on 08/06     Assessment/Plan: Pt reports having pain sometimes in his R hip, improved. Tolerating diet. Cont Multimodal pain regimen.  DVT ppx. repeat EKG 8/15. Xanax as needed. PT/OT continues to recommend Moderate resource Intensity for rehab.   Per , no accepting facility d/t him being on Methadone and medically complex. Neuropsych reconsulted to determine pt's capacity. Concern remains for pt returning home alone.     Per Geriatrics, Rapid Response was called to pt's room last night for seizure like activity, he was noted to have jerking movements and upper airway gurgling. The episode lasted about five minutes and resolved spontaneously. During the episode, BP was 201/113.   Today, pt aaox3, c/o severe intermittent right leg pain. Reports did not sleep last night d/t pain. Appetite improving. He is noted to be talking to himself and repeatedly stating \"lots of crazy people.\" MS fluctuates. Cont Alprazolam. Consider neurology consult. consider psych consult.     Vital Signs (last 3 days)       Date/Time Temp Pulse Resp BP MAP (mmHg) SpO2 O2 Flow Rate (L/min) O2 Device Da Coma Scale Score Pain    08/13/24 11:21:47 98 °F (36.7 °C) 60 -- 111/65 80 98 % -- -- -- --    08/13/24 1002 -- -- -- -- -- -- -- -- -- 8    08/13/24 0905 -- -- -- -- -- -- -- -- -- 9    08/13/24 0833 -- -- -- -- -- -- -- -- 15 --    08/13/24 0700 98.6 °F (37 °C) -- -- 135/79 95 -- -- -- -- --    08/13/24 05:41:13 -- 106 -- 126/79 95 92 % -- -- -- --    08/13/24 0537 -- -- -- 126/79 -- -- -- -- -- --    08/13/24 0532 -- -- -- -- -- -- -- -- -- 10 - Worst Possible Pain    08/13/24 0326 -- 96 -- -- -- 100 % -- -- -- --    08/13/24 0325 -- 70 -- -- -- 75 % -- -- -- --    08/13/24 0324 -- 72 -- -- -- 86 % -- -- -- --    08/13/24 0310 -- 90 -- 137/76 -- 96 % 3 L/min -- -- --    " 08/13/24 0305 -- 92 -- -- -- 74 % 3 L/min -- -- --    08/13/24 0255 -- 89 -- -- -- 93 % 3 L/min -- -- --    08/13/24 0250 -- 81 -- -- -- 88 % 3 L/min -- -- --    08/13/24 0245 -- 87 -- -- -- 99 % -- -- -- --    08/13/24 0240 -- 103 -- -- -- 89 % -- -- -- --    08/13/24 0007 99.2 °F (37.3 °C) 92 -- 133/86 102 97 % -- -- -- --    08/12/24 21:57:14 -- 88 -- 147/87 -- 98 % -- -- -- --    08/12/24 21:55:49 -- 94 -- 147/87 107 99 % -- -- -- --    08/12/24 21:54:14 -- 105 -- 147/87 -- 97 % -- -- -- --    08/12/24 2152 -- -- -- -- -- -- 2 L/min -- -- --    08/12/24 21:51:14 -- 87 -- 201/113 -- 90 % -- -- -- --    08/12/24 21:47:47 -- 93 20 201/113 142 94 % -- -- -- --    08/12/24 21:45:14 -- 82 -- -- -- 84 % -- -- -- --    08/12/24 2049 -- -- -- -- -- -- -- -- -- 10 - Worst Possible Pain    08/12/24 2009 -- -- -- -- -- -- -- -- 15 9    08/12/24 19:07:25 98.4 °F (36.9 °C) 67 12 135/72 93 89 % -- -- -- --    08/12/24 1705 -- -- -- -- -- -- -- -- -- 10 - Worst Possible Pain    08/12/24 15:18:55 98.3 °F (36.8 °C) 79 12 138/79 99 89 % -- -- -- --    08/12/24 1442 -- -- -- -- -- -- -- -- -- 10 - Worst Possible Pain    08/12/24 1333 -- -- -- -- -- -- -- -- -- 10 - Worst Possible Pain    08/12/24 11:55:33 97.9 °F (36.6 °C) 62 18 116/78 91 99 % -- -- -- --    08/12/24 1119 -- -- -- -- -- -- -- -- -- 9    08/12/24 1022 -- -- -- -- -- -- -- -- 15 No Pain    08/12/24 08:39:47 -- 76 -- 118/64 82 94 % -- -- -- --    08/12/24 0837 -- -- -- 118/64 -- -- -- -- -- --    08/12/24 07:57:20 97.9 °F (36.6 °C) 64 16 113/72 86 96 % -- -- -- --    08/12/24 02:44:16 98.8 °F (37.1 °C) 83 18 131/77 95 98 % -- -- -- --    08/11/24 2350 -- -- -- -- -- -- -- -- -- 8 08/11/24 23:31:19 98.4 °F (36.9 °C) 79 18 131/70 90 96 % -- -- -- --    08/11/24 20:03:29 99 °F (37.2 °C) 76 12 139/75 96 97 % -- -- -- --    08/11/24 2000 -- -- -- -- -- -- -- -- 15 8 08/11/24 1934 -- -- -- -- -- -- -- -- -- 8 08/11/24 14:55:46 98.6 °F (37 °C) 65 16 133/73 93 97 %  -- -- -- --    08/11/24 1422 -- -- -- -- -- -- -- -- -- 8 08/11/24 11:49:17 -- 70 16 126/67 87 90 % -- -- -- --    08/11/24 1005 -- -- -- -- -- -- -- None (Room air) 15 7    08/11/24 0806 98.2 °F (36.8 °C) 63 15 128/83 98 95 % -- -- -- --    08/11/24 05:46:04 98.4 °F (36.9 °C) 88 16 116/67 83 96 % -- -- -- --    08/11/24 0546 -- 90 -- 116/67 -- -- -- -- -- 8 08/11/24 03:18:47 98.9 °F (37.2 °C) 81 18 124/79 94 96 % -- -- -- --    08/11/24 01:08:34 98.3 °F (36.8 °C) 81 18 135/71 92 97 % -- -- -- --    08/10/24 2358 -- -- -- -- -- -- -- -- -- 8    08/10/24 2000 -- -- -- -- -- -- -- -- 15 8    08/10/24 1936 -- -- -- -- -- -- -- -- -- 9    08/10/24 19:32:28 98.5 °F (36.9 °C) 72 12 129/70 90 98 % -- -- -- --    08/10/24 1700 -- -- -- -- -- -- -- -- 15 --    08/10/24 15:07:05 98.9 °F (37.2 °C) 73 12 128/64 85 96 % -- -- -- --    08/10/24 13:25:05 97.7 °F (36.5 °C) 68 -- 131/74 93 97 % -- -- -- --    08/10/24 13:22:41 -- 70 -- 131/74 93 98 % -- -- -- --    08/10/24 1123 -- -- -- -- -- -- -- -- -- 9    08/10/24 0901 -- -- -- 105/67 -- -- -- -- -- --    08/10/24 0900 -- -- -- -- -- -- -- -- 15 9    08/10/24 07:46:38 98.1 °F (36.7 °C) 70 15 104/55 71 95 % -- -- -- --    08/10/24 0537 -- -- -- -- -- -- -- -- -- 9    08/10/24 05:36:43 -- 98 -- 136/81 99 97 % -- -- -- --    08/10/24 0400 -- -- -- -- -- -- -- -- 15 --    08/10/24 03:03:01 98.9 °F (37.2 °C) 87 17 149/80 103 97 % -- -- -- --    08/10/24 0129 -- -- -- -- -- -- -- -- -- 9    08/10/24 01:21:35 98.5 °F (36.9 °C) 79 17 130/77 95 95 % -- -- -- --    08/10/24 0000 -- -- -- -- -- -- -- -- 15 --          Weight (last 2 days)       None              Pertinent Labs/Diagnostic Results:   Radiology:  XR chest portable   Final Interpretation by Sp Irving MD (08/13 0837)      No acute cardiopulmonary disease.            Workstation performed: [id number]         XR knee 1 or 2 vw right   Final Interpretation by Sahil Boyle MD (08/07 0726)      No acute  osseous abnormality.                  Workstation performed: [id number]         XR Trauma chest portable   Final Interpretation by Telly Javed MD (08/06 1236)      No acute cardiopulmonary disease.            Workstation performed: [id number]         TRAUMA - CT head wo contrast   Final Interpretation by Telly Javed MD (08/06 1253)      No acute intracranial abnormality.      The study was marked in EPIC for immediate notification.            Workstation performed: [id number]         TRAUMA - CT spine cervical wo contrast   Final Interpretation by Telly Javed MD (08/06 1245)      No cervical spine fracture or traumatic malalignment.      The study was marked in EPIC for immediate notification.            Workstation performed: [id number]         TRAUMA - CT chest abdomen pelvis w contrast   Final Interpretation by Telly Javed MD (08/06 1338)      1.  No acute traumatic injury to the chest, abdomen, or pelvis.      2.  Cholelithiasis with severe gallbladder distention, increased from the recent prior study though without wall thickening or pericholecystic inflammatory change. This is of uncertain etiology and clinical significance and clinical correlation for right    upper quadrant symptoms recommended.      3.  Subcentimeter groundglass density in the right upper lobe, minimally more prominent than in 2023. Based on current Fleischner Society 2017 Guidelines on incidental pulmonary nodule, follow-up CT is recommended for every 2 years until 5 years of    stability is demonstrated.      4.  Moderate to diffuse colonic fecal stasis.      5.  1.3 cm right adrenal nodule, indeterminate though likely an adenoma given stability.      The study was marked in EPIC for immediate notification.         Workstation performed: [id number]         CT lower extremity w contrast right   Final Interpretation by Isaac Irving MD (08/06 1258)      Status post ORIF comminuted intertrochanteric fracture  right proximal femur.      Diffuse enlargement of the right thigh musculature, nonspecific but could reflect myositis. No discrete intramuscular hematoma or discrete intramuscular fluid collection.      Subcutaneous edema throughout the right thigh without a discrete subcutaneous hematoma.      Arterial calcifications.      Fecal distention of the rectum suggestive of fecal impaction.         Workstation performed: [id number]           Cardiology:  ECG 12 lead    by Daniella Kaur Results In (08/12 2159)      ECG 12 lead   Final Result by Lorraine Allen MD (08/08 0951)   Sinus tachycardia   Minimal voltage criteria for LVH, may be normal variant   Borderline ECG   When compared with ECG of 08-AUG-2024 05:32, (unconfirmed)   No significant change was found   Confirmed by Lorraine Allen ([id number]) on 8/8/2024 9:51:57 AM      ECG 12 lead   Final Result by Lorraine Allen MD (08/08 0952)   Sinus tachycardia   Minimal voltage criteria for LVH, may be normal variant   Borderline ECG   When compared with ECG of 07-AUG-2024 09:52,   Premature ventricular complexes are no longer Present   Premature atrial complexes are no longer Present   Nonspecific T wave abnormality now evident in Inferior leads   Confirmed by Lorraine Allen ([id number]) on 8/8/2024 9:52:04 AM      ECG 12 lead   Final Result by Lorraine Allen MD (08/07 1131)   Sinus rhythm with occasional Premature ventricular complexes and Premature    atrial complexes   Prolonged QT   Abnormal ECG   When compared with ECG of 07-AUG-2024 09:52, (unconfirmed)   Premature atrial complexes are now Present   QT has lengthened   Confirmed by Lorraine Allen ([id number]) on 8/7/2024 11:31:35 AM      ECG 12 lead   Final Result by Lorraine Allen MD (08/07 1131)   Sinus rhythm with frequent Premature ventricular complexes in a pattern of    bigeminy   Minimal voltage criteria for LVH, may be normal variant   Borderline ECG   When compared with ECG of 06-AUG-2024 21:39,   QT has  shortened   Confirmed by Lorraine Allen ([id number]) on 8/7/2024 11:31:41 AM      ECG 12 lead   Final Result by Lorraine Allen MD (08/07 0927)   Sinus rhythm with occasional Premature ventricular complexes   Minimal voltage criteria for LVH, may be normal variant   Prolonged QT   Abnormal ECG   When compared with ECG of 06-AUG-2024 13:53,   Premature ventricular complexes are now Present   Premature supraventricular complexes are no longer Present   Confirmed by Lorraine Allen ([id number]) on 8/7/2024 9:27:52 AM        GI:  No orders to display           Results from last 7 days   Lab Units 08/13/24 0249 08/12/24 2210 08/12/24 2200 08/11/24  1031 08/10/24  0339   WBC Thousand/uL 14.05* 13.06*  --  11.81* 14.51*   HEMOGLOBIN g/dL 9.9* 9.7*  --  9.4* 9.6*   I STAT HEMOGLOBIN g/dl  --   --  9.9*  --   --    HEMATOCRIT % 30.2* 29.7*  --  28.5* 29.6*   HEMATOCRIT, ISTAT %  --   --  29*  --   --    PLATELETS Thousands/uL 651* 578*  --  539* 450*   TOTAL NEUT ABS Thousands/µL 10.85* 10.41*  --  9.25* 11.80*         Results from last 7 days   Lab Units 08/13/24  0249 08/12/24  2200 08/11/24  1031 08/10/24  0339 08/09/24  0553 08/08/24  0957 08/08/24  0957 08/06/24  2248   SODIUM mmol/L 134*  --  130* 129* 130*  --  130*  --    POTASSIUM mmol/L 4.3  --  4.1 4.0 3.7  --  3.3*  --    CHLORIDE mmol/L 97  --  94* 91* 92*  --  91*  --    CO2 mmol/L 31  --  32 30 25   < > 30  --    CO2, I-STAT mmol/L  --  30  --   --   --   --   --   --    ANION GAP mmol/L 6  --  4 8 13  --  9  --    BUN mg/dL 24  --  34* 42* 31*  --  23  --    CREATININE mg/dL 0.92  --  1.11 1.08 0.80  --  0.72  --    EGFR ml/min/1.73sq m 88  --  70 73 95  --  99  --    CALCIUM mg/dL 8.8  --  8.1* 8.5 8.5  --  9.0  --    CALCIUM, IONIZED, ISTAT mmol/L  --  1.18  --   --   --   --   --   --    MAGNESIUM mg/dL  --   --   --  2.7  --   --   --  1.6*   PHOSPHORUS mg/dL  --   --   --  4.0  --   --   --   --     < > = values in this interval not displayed.          Results from last 7 days   Lab Units 08/13/24  1120 08/13/24  0745 08/12/24  2147 08/12/24  2046 08/12/24  1605 08/12/24  1155 08/12/24  0709 08/11/24  2037 08/11/24  1620 08/11/24  1146 08/11/24  0803 08/10/24  2123   POC GLUCOSE mg/dl 173* 260* 272* 317* 133 186* 223* 193* 165* 93 266* 204*     Results from last 7 days   Lab Units 08/13/24  0249 08/11/24  1031 08/10/24  0339 08/09/24  0553 08/08/24  0957   GLUCOSE RANDOM mg/dL 222* 120 155* 177* 213*             BETA-HYDROXYBUTYRATE   Date Value Ref Range Status   08/17/2022 0.0 <0.6 mmol/L Final              Results from last 7 days   Lab Units 08/12/24  2200   I STAT BASE EXC mmol/L 4*   I STAT O2 SAT % 97*   ISTAT PH ART  7.439   I STAT ART PCO2 mm HG 42.7   I STAT ART PO2 mm HG 85.0   I STAT ART HCO3 mmol/L 28.9*         Results from last 7 days   Lab Units 08/13/24  0249 08/13/24  0014 08/12/24  2210 08/06/24  1547   HS TNI 0HR ng/L  --   --  5  --    HS TNI 2HR ng/L  --  33  --   --    HSTNI D2 ng/L  --  28*  --   --    HS TNI 4HR ng/L 8  --   --  20   HSTNI D4 ng/L 3  --   --  1                                                 Results from last 7 days   Lab Units 08/07/24  1049   HEP B S AG  Non-reactive   HEP C AB  Reactive*   HEP B C IGM  Non-reactive   HEP B C TOTAL AB  Reactive*                                                                       Medications:   Scheduled Medications:  acetaminophen, 975 mg, Oral, Q8H MELISA  amLODIPine, 5 mg, Oral, Daily  enoxaparin, 30 mg, Subcutaneous, Q12H  ergocalciferol, 50,000 Units, Oral, Once per day on Monday Wednesday Friday  insulin glargine, 10 Units, Subcutaneous, HS  insulin lispro, 1-6 Units, Subcutaneous, 4x Daily (AC & HS)  insulin lispro, 5 Units, Subcutaneous, TID With Meals  lidocaine, 1 patch, Topical, Daily  melatonin, 3 mg, Oral, HS  methadone, 190 mg, Oral, Q24H  metoprolol tartrate, 12.5 mg, Oral, QAM  polyethylene glycol, 17 g, Oral, Daily  senna-docusate sodium, 2 tablet, Oral,  BID      Continuous IV Infusions: none     PRN Meds:  ALPRAZolam, 0.5 mg, Oral, BID PRN 08/13 x 1  naloxone, 0.4 mg, Intravenous, Q1MIN PRN  ondansetron, 4 mg, Oral, Q6H PRN  oxyCODONE, 5 mg, Oral, Q4H PRN   Or  oxyCODONE, 7.5 mg, Oral, Q4H PRN 08/13 x 1        Discharge Plan: TBD    Network Utilization Review Department  ATTENTION: Please call with any questions or concerns to [phone] and carefully listen to the prompts so that you are directed to the right person. All voicemails are confidential.   For Discharge needs, contact Care Management DC Support Team at [phone] opt. [phone]  Send all requests for admission clinical reviews, approved or denied determinations and any other requests to dedicated fax number below belonging to the [place] where the patient is receiving treatment. List of dedicated fax numbers for the Facilities:  FACILITY NAME UR FAX NUMBER   ADMISSION DENIALS (Administrative/Medical Necessity) [phone]   DISCHARGE SUPPORT TEAM (NETWORK) [phone]   PARENT CHILD HEALTH (Maternity/NICU/Pediatrics) [phone]   [facility] [phone]   [facility] [phone]   [facility] [phone]   [facility] [phone]   [facility] [phone]   [facility] [phone]   [facility] [phone]   [facility] [phone]   [facility] [phone]   [facility] [phone]   [facility] [phone]   [facility] [phone]

## 2024-08-13 NOTE — PLAN OF CARE
Problem: Potential for Falls  Goal: Patient will remain free of falls  Description: INTERVENTIONS:  - Educate patient/family on patient safety including physical limitations  - Instruct patient to call for assistance with activity   - Consult OT/PT to assist with strengthening/mobility   - Keep Call bell within reach  - Keep bed low and locked with side rails adjusted as appropriate  - Keep care items and personal belongings within reach  - Initiate and maintain comfort rounds  - Make Fall Risk Sign visible to staff  - Initiate/Maintain bed alarm  - Apply yellow socks and bracelet for high fall risk patients  - Consider moving patient to room near nurses station  Outcome: Progressing     Problem: Prexisting or High Potential for Compromised Skin Integrity  Goal: Skin integrity is maintained or improved  Description: INTERVENTIONS:  - Identify patients at risk for skin breakdown  - Assess and monitor skin integrity  - Assess and monitor nutrition and hydration status  - Monitor labs   - Assess for incontinence   - Turn and reposition patient  - Assist with mobility/ambulation  - Relieve pressure over bony prominences  - Avoid friction and shearing  - Provide appropriate hygiene as needed including keeping skin clean and dry  - Evaluate need for skin moisturizer/barrier cream  - Collaborate with interdisciplinary team   - Patient/family teaching  - Consider wound care consult   Outcome: Progressing     Problem: PAIN - ADULT  Goal: Verbalizes/displays adequate comfort level or baseline comfort level  Description: Interventions:  - Encourage patient to monitor pain and request assistance  - Assess pain using appropriate pain scale  - Administer analgesics based on type and severity of pain and evaluate response  - Implement non-pharmacological measures as appropriate and evaluate response  - Consider cultural and social influences on pain and pain management  - Notify physician/advanced practitioner if interventions  unsuccessful or patient reports new pain  Outcome: Progressing     Problem: INFECTION - ADULT  Goal: Absence or prevention of progression during hospitalization  Description: INTERVENTIONS:  - Assess and monitor for signs and symptoms of infection  - Monitor lab/diagnostic results  - Monitor all insertion sites, i.e. indwelling lines, tubes, and drains  - Monitor endotracheal if appropriate and nasal secretions for changes in amount and color  - Randall appropriate cooling/warming therapies per order  - Administer medications as ordered  - Instruct and encourage patient and family to use good hand hygiene technique  - Identify and instruct in appropriate isolation precautions for identified infection/condition  Outcome: Progressing  Goal: Absence of fever/infection during neutropenic period  Description: INTERVENTIONS:  - Monitor WBC    Outcome: Progressing     Problem: SAFETY ADULT  Goal: Maintain or return to baseline ADL function  Description: INTERVENTIONS:  -  Assess patient's ability to carry out ADLs; assess patient's baseline for ADL function and identify physical deficits which impact ability to perform ADLs (bathing, care of mouth/teeth, toileting, grooming, dressing, etc.)  - Assess/evaluate cause of self-care deficits   - Assess range of motion  - Assess patient's mobility; develop plan if impaired  - Assess patient's need for assistive devices and provide as appropriate  - Encourage maximum independence but intervene and supervise when necessary  - Involve family in performance of ADLs  - Assess for home care needs following discharge   - Consider OT consult to assist with ADL evaluation and planning for discharge  - Provide patient education as appropriate  Outcome: Progressing  Goal: Maintains/Returns to pre admission functional level  Description: INTERVENTIONS:  - Perform AM-PAC 6 Click Basic Mobility/ Daily Activity assessment daily.  - Set and communicate daily mobility goal to care team and  patient/family/caregiver.   - Collaborate with rehabilitation services on mobility goals if consulted  - Out of bed for toileting  - Record patient progress and toleration of activity level   Outcome: Progressing     Problem: DISCHARGE PLANNING  Goal: Discharge to home or other facility with appropriate resources  Description: INTERVENTIONS:  - Identify barriers to discharge w/patient and caregiver  - Arrange for needed discharge resources and transportation as appropriate  - Identify discharge learning needs (meds, wound care, etc.)  - Arrange for interpretive services to assist at discharge as needed  - Refer to Case Management Department for coordinating discharge planning if the patient needs post-hospital services based on physician/advanced practitioner order or complex needs related to functional status, cognitive ability, or social support system  Outcome: Progressing     Problem: Knowledge Deficit  Goal: Patient/family/caregiver demonstrates understanding of disease process, treatment plan, medications, and discharge instructions  Description: Complete learning assessment and assess knowledge base.  Interventions:  - Provide teaching at level of understanding  - Provide teaching via preferred learning methods  Outcome: Progressing     Problem: SAFETY,RESTRAINT: NV/NON-SELF DESTRUCTIVE BEHAVIOR  Goal: Remains free of harm/injury (restraint for non violent/non self-detsructive behavior)  Description: INTERVENTIONS:  - Instruct patient/family regarding restraint use   - Assess and monitor physiologic and psychological status   - Provide interventions and comfort measures to meet assessed patient needs   - Identify and implement measures to help patient regain control  - Assess readiness for release of restraint   Outcome: Progressing  Goal: Returns to optimal restraint-free functioning  Description: INTERVENTIONS:  - Assess the patient's behavior and symptoms that indicate continued need for restraint  -  Identify and implement measures to help patient regain control  - Assess readiness for release of restraint   Outcome: Progressing     Problem: Nutrition/Hydration-ADULT  Goal: Nutrient/Hydration intake appropriate for improving, restoring or maintaining nutritional needs  Description: Monitor and assess patient's nutrition/hydration status for malnutrition. Collaborate with interdisciplinary team and initiate plan and interventions as ordered.  Monitor patient's weight and dietary intake as ordered or per policy. Utilize nutrition screening tool and intervene as necessary. Determine patient's food preferences and provide high-protein, high-caloric foods as appropriate.     INTERVENTIONS:  - Monitor oral intake, urinary output, labs, and treatment plans  - Assess nutrition and hydration status and recommend course of action  - Evaluate amount of meals eaten  - Assist patient with eating if necessary   - Allow adequate time for meals  - Recommend/ encourage appropriate diets, oral nutritional supplements, and vitamin/mineral supplements  - Order, calculate, and assess calorie counts as needed  - Recommend, monitor, and adjust tube feedings and TPN/PPN based on assessed needs  - Assess need for intravenous fluids  - Provide specific nutrition/hydration education as appropriate  - Include patient/family/caregiver in decisions related to nutrition  Outcome: Progressing

## 2024-08-13 NOTE — CASE MANAGEMENT
Case Management Discharge Planning Note    Patient name David Skelton Jr.  Location W /W -01 MRN [id number]  : 1960 Date 2024       Current Admission Date: 2024  Current Admission Diagnosis:Methadone dependence (HCC)   Patient Active Problem List    Diagnosis Date Noted Date Diagnosed    Cognitive decline 2024     Ambulatory dysfunction 2024     Prolonged QT interval 2024     Left ventricular hypertrophy 2024     Acute blood loss anemia 2024     Chronic hepatitis (HCC) 2024     BPH (benign prostatic hyperplasia) 2024     Cholelithiasis 2024     CKD (chronic kidney disease), stage II 2024     Hypogonadism in male 2024     Electrolyte abnormality 2024     Hyponatremia 2024     Right hip pain 2024     Acute blood loss anemia (ABLA) 2024     Nocturia associated with benign prostatic hyperplasia 2024     Gross hematuria 2024     Closed fracture of right femur, unspecified fracture morphology, sequela 2024     B12 deficiency 2023     Bradycardia 2023     Retinopathy due to secondary DM (HCC) 2023     Visual changes 2023     Fall 2023     Closed fracture of multiple ribs of left side 2023     Methadone dependence (HCC) 2023     Encephalopathy 2023     Liver lesion, right lobe 2021     Lung nodule 2021     Adrenal nodule (HCC) 2021     Thoracic degenerative disc disease 2021     DDD (degenerative disc disease), lumbar 2021     Pre-operative general physical examination 2019     S/P gastric bypass 11/15/2019     Hypogonadism male 11/15/2019     Hemorrhoids, internal 2019     Rectal prolapse 2019     Drug-induced constipation 2019     Chronic anticoagulation 2019     GERD without esophagitis 2019     Iron deficiency anemia 2019     Major depression, chronic 2019     Mixed  hyperlipidemia 03/01/2019     Stable proliferative diabetic retinopathy of left eye associated with type 2 diabetes mellitus (HCC) 03/01/2019     Narcotic abuse (HCC) 06/06/2018     Ulcer of leg, chronic, left (HCC) 02/27/2015     ADD (attention deficit disorder) without hyperactivity 01/29/2015     Bipolar disorder ([facility]) 01/10/2014     Insomnia 01/21/2013     History of hepatitis C 01/14/2013     Allergic rhinitis 01/04/2013     Generalized anxiety disorder 01/04/2013     Macular degeneration 01/04/2013     Spinal stenosis 01/04/2013     Vitamin D deficiency 01/04/2013     Benign essential hypertension 09/14/2012     Type 2 diabetes mellitus with ophthalmic complication, with long-term current use of insulin ([facility]) 09/14/2012     Atrial fibrillation ([facility]) 05/15/2012     Complex medical condition 05/15/2012       LOS (days): 7  Geometric Mean LOS (GMLOS) (days): 5  Days to GMLOS:-1.9     OBJECTIVE:  Risk of Unplanned Readmission Score: 44.16         Current admission status: Inpatient   Preferred Pharmacy:   [id number] - [place], [place] - [address]  [address]  [place] [address]  Phone: [phone] Fax: [phone]    [id number] - [place] - [address]  [address]  [place] [address]  Phone: [phone] Fax: [phone]    [facility] [place] - [place] - [address]  [address]  [place] [address]  Phone: [phone] Fax: [phone]    Primary Care Provider: Casey Mckeon MD    Primary Insurance: [facility]  Secondary Insurance:     DISCHARGE DETAILS:     CM made additional referrals as pt continues to be in need of rehab.  Pt is now new to insulin as well.  Pt's sister is not able to assist with pt once he returns home.  Facilities are not accepting pt as pt is on Methadone and they do not have physicians who are able to prescribe this.     CM followed up  with [facility] who is able to accept pt's on Methadone however they feel after review that pt is medically too complex to accept.      Neuropsych has been consulted again to determine pt's capacity as he has cleared up mentally.  Concern remains for pt returning home alone.  PT continues to work with pt to assist with improvement physically.

## 2024-08-13 NOTE — ASSESSMENT & PLAN NOTE
Lab Results   Component Value Date    HGBA1C 10.7 (H) 07/31/2024       Recent Labs     08/12/24  2046 08/12/24  2147 08/13/24  0745 08/13/24  1120   POCGLU 317* 272* 260* 173*         Blood Sugar Average: Last 72 hrs:  (P) [id number]    Patient was recently admitted to [facility] and discharged on insulin  Adjusted mealtime insulin and started on Lantus.  Continue sliding scale insulin.  Endocrine consulted  Concern for compliance-patient will require insulin training if being discharged home on insulin.

## 2024-08-14 ENCOUNTER — TELEPHONE (OUTPATIENT)
Age: 64
End: 2024-08-14

## 2024-08-14 LAB
ANION GAP SERPL CALCULATED.3IONS-SCNC: 4 MMOL/L (ref 4–13)
BASOPHILS # BLD AUTO: 0.05 THOUSANDS/ÂΜL (ref 0–0.1)
BASOPHILS NFR BLD AUTO: 0 % (ref 0–1)
BUN SERPL-MCNC: 24 MG/DL (ref 5–25)
CALCIUM SERPL-MCNC: 8.5 MG/DL (ref 8.4–10.2)
CHLORIDE SERPL-SCNC: 100 MMOL/L (ref 96–108)
CO2 SERPL-SCNC: 31 MMOL/L (ref 21–32)
CREAT SERPL-MCNC: 0.85 MG/DL (ref 0.6–1.3)
EOSINOPHIL # BLD AUTO: 0.56 THOUSAND/ÂΜL (ref 0–0.61)
EOSINOPHIL NFR BLD AUTO: 5 % (ref 0–6)
ERYTHROCYTE [DISTWIDTH] IN BLOOD BY AUTOMATED COUNT: 14.7 % (ref 11.6–15.1)
GFR SERPL CREATININE-BSD FRML MDRD: 93 ML/MIN/1.73SQ M
GLUCOSE SERPL-MCNC: 152 MG/DL (ref 65–140)
GLUCOSE SERPL-MCNC: 164 MG/DL (ref 65–140)
GLUCOSE SERPL-MCNC: 178 MG/DL (ref 65–140)
GLUCOSE SERPL-MCNC: 186 MG/DL (ref 65–140)
GLUCOSE SERPL-MCNC: 275 MG/DL (ref 65–140)
GLUCOSE SERPL-MCNC: 364 MG/DL (ref 65–140)
HCT VFR BLD AUTO: 28.7 % (ref 36.5–49.3)
HGB BLD-MCNC: 9.2 G/DL (ref 12–17)
IMM GRANULOCYTES # BLD AUTO: 0.14 THOUSAND/UL (ref 0–0.2)
IMM GRANULOCYTES NFR BLD AUTO: 1 % (ref 0–2)
LYMPHOCYTES # BLD AUTO: 1.3 THOUSANDS/ÂΜL (ref 0.6–4.47)
LYMPHOCYTES NFR BLD AUTO: 11 % (ref 14–44)
MCH RBC QN AUTO: 28.6 PG (ref 26.8–34.3)
MCHC RBC AUTO-ENTMCNC: 32.1 G/DL (ref 31.4–37.4)
MCV RBC AUTO: 89 FL (ref 82–98)
MONOCYTES # BLD AUTO: 1.11 THOUSAND/ÂΜL (ref 0.17–1.22)
MONOCYTES NFR BLD AUTO: 9 % (ref 4–12)
NEUTROPHILS # BLD AUTO: 9.19 THOUSANDS/ÂΜL (ref 1.85–7.62)
NEUTS SEG NFR BLD AUTO: 74 % (ref 43–75)
NRBC BLD AUTO-RTO: 0 /100 WBCS
PLATELET # BLD AUTO: 648 THOUSANDS/UL (ref 149–390)
PMV BLD AUTO: 9.8 FL (ref 8.9–12.7)
POTASSIUM SERPL-SCNC: 4.5 MMOL/L (ref 3.5–5.3)
RBC # BLD AUTO: 3.22 MILLION/UL (ref 3.88–5.62)
SODIUM SERPL-SCNC: 135 MMOL/L (ref 135–147)
WBC # BLD AUTO: 12.35 THOUSAND/UL (ref 4.31–10.16)

## 2024-08-14 PROCEDURE — 97116 GAIT TRAINING THERAPY: CPT

## 2024-08-14 PROCEDURE — 99232 SBSQ HOSP IP/OBS MODERATE 35: CPT | Performed by: INTERNAL MEDICINE

## 2024-08-14 PROCEDURE — 82948 REAGENT STRIP/BLOOD GLUCOSE: CPT

## 2024-08-14 PROCEDURE — 85025 COMPLETE CBC W/AUTO DIFF WBC: CPT | Performed by: NURSE PRACTITIONER

## 2024-08-14 PROCEDURE — 80048 BASIC METABOLIC PNL TOTAL CA: CPT | Performed by: NURSE PRACTITIONER

## 2024-08-14 PROCEDURE — 97110 THERAPEUTIC EXERCISES: CPT

## 2024-08-14 PROCEDURE — NC001 PR NO CHARGE: Performed by: PHYSICIAN ASSISTANT

## 2024-08-14 PROCEDURE — 99255 IP/OBS CONSLTJ NEW/EST HI 80: CPT | Performed by: PSYCHIATRY & NEUROLOGY

## 2024-08-14 PROCEDURE — 99232 SBSQ HOSP IP/OBS MODERATE 35: CPT | Performed by: NURSE PRACTITIONER

## 2024-08-14 PROCEDURE — 99232 SBSQ HOSP IP/OBS MODERATE 35: CPT | Performed by: FAMILY MEDICINE

## 2024-08-14 RX ORDER — GABAPENTIN 100 MG/1
100 CAPSULE ORAL 3 TIMES DAILY
Status: DISCONTINUED | OUTPATIENT
Start: 2024-08-14 | End: 2024-08-15

## 2024-08-14 RX ORDER — INSULIN GLARGINE 100 [IU]/ML
20 INJECTION, SOLUTION SUBCUTANEOUS
Status: DISCONTINUED | OUTPATIENT
Start: 2024-08-14 | End: 2024-08-16

## 2024-08-14 RX ORDER — ALPRAZOLAM 0.25 MG
0.25 TABLET ORAL 2 TIMES DAILY PRN
Status: DISCONTINUED | OUTPATIENT
Start: 2024-08-14 | End: 2024-08-14

## 2024-08-14 RX ORDER — ALPRAZOLAM 0.5 MG
0.5 TABLET ORAL 2 TIMES DAILY PRN
Status: DISCONTINUED | OUTPATIENT
Start: 2024-08-14 | End: 2024-08-15

## 2024-08-14 RX ORDER — INSULIN LISPRO 100 [IU]/ML
1-6 INJECTION, SOLUTION INTRAVENOUS; SUBCUTANEOUS
Status: DISCONTINUED | OUTPATIENT
Start: 2024-08-14 | End: 2024-08-18 | Stop reason: HOSPADM

## 2024-08-14 RX ORDER — INSULIN LISPRO 100 [IU]/ML
1-5 INJECTION, SOLUTION INTRAVENOUS; SUBCUTANEOUS
Status: DISCONTINUED | OUTPATIENT
Start: 2024-08-14 | End: 2024-08-14

## 2024-08-14 RX ADMIN — ENOXAPARIN SODIUM 30 MG: 30 INJECTION SUBCUTANEOUS at 17:33

## 2024-08-14 RX ADMIN — ACETAMINOPHEN 975 MG: 325 TABLET, FILM COATED ORAL at 21:29

## 2024-08-14 RX ADMIN — INSULIN LISPRO 6 UNITS: 100 INJECTION, SOLUTION INTRAVENOUS; SUBCUTANEOUS at 21:29

## 2024-08-14 RX ADMIN — Medication 190 MG: at 06:07

## 2024-08-14 RX ADMIN — INSULIN LISPRO 1 UNITS: 100 INJECTION, SOLUTION INTRAVENOUS; SUBCUTANEOUS at 12:55

## 2024-08-14 RX ADMIN — GABAPENTIN 100 MG: 100 CAPSULE ORAL at 21:29

## 2024-08-14 RX ADMIN — ACETAMINOPHEN 975 MG: 325 TABLET, FILM COATED ORAL at 06:05

## 2024-08-14 RX ADMIN — INSULIN LISPRO 4 UNITS: 100 INJECTION, SOLUTION INTRAVENOUS; SUBCUTANEOUS at 16:44

## 2024-08-14 RX ADMIN — INSULIN GLARGINE 20 UNITS: 100 INJECTION, SOLUTION SUBCUTANEOUS at 21:29

## 2024-08-14 RX ADMIN — Medication 12.5 MG: at 06:06

## 2024-08-14 RX ADMIN — SENNOSIDES AND DOCUSATE SODIUM 2 TABLET: 8.6; 5 TABLET ORAL at 17:33

## 2024-08-14 RX ADMIN — OXYCODONE HYDROCHLORIDE 5 MG: 5 TABLET ORAL at 21:29

## 2024-08-14 RX ADMIN — ERGOCALCIFEROL 50000 UNITS: 1.25 CAPSULE, LIQUID FILLED ORAL at 10:40

## 2024-08-14 RX ADMIN — ENOXAPARIN SODIUM 30 MG: 30 INJECTION SUBCUTANEOUS at 06:05

## 2024-08-14 RX ADMIN — INSULIN LISPRO 1 UNITS: 100 INJECTION, SOLUTION INTRAVENOUS; SUBCUTANEOUS at 08:36

## 2024-08-14 RX ADMIN — Medication 3 MG: at 21:29

## 2024-08-14 RX ADMIN — AMLODIPINE BESYLATE 5 MG: 5 TABLET ORAL at 08:37

## 2024-08-14 RX ADMIN — POLYETHYLENE GLYCOL 3350 17 G: 17 POWDER, FOR SOLUTION ORAL at 08:37

## 2024-08-14 RX ADMIN — GABAPENTIN 100 MG: 100 CAPSULE ORAL at 17:33

## 2024-08-14 RX ADMIN — SENNOSIDES AND DOCUSATE SODIUM 2 TABLET: 8.6; 5 TABLET ORAL at 08:37

## 2024-08-14 NOTE — PROGRESS NOTES
Brief orthopedics note    XRAYs right femur reviewed and discussed with attg. Stable post operative changes without evidence of hardware failure.     Orthopedics will sign off at this time.   Patient to follow up with Dr. Hill upon discharge.     Rosalinda Emery PA-C

## 2024-08-14 NOTE — TELEPHONE ENCOUNTER
Pt currently on med mgmt wait list. Writer was going to call and offer, but pt currently admitted. IC will await to contact at later date.

## 2024-08-14 NOTE — PLAN OF CARE
Problem: PHYSICAL THERAPY ADULT  Goal: Performs mobility at highest level of function for planned discharge setting.  See evaluation for individualized goals.  Description: Treatment/Interventions: Functional transfer training, LE strengthening/ROM, Therapeutic exercise, Endurance training, Cognitive reorientation, Patient/family training, Equipment eval/education, Bed mobility, Gait training, Compensatory technique education, Spoke to nursing, Spoke to case management, Spoke to MD     See flowsheet documentation for full assessment, interventions and recommendations.  Outcome: Progressing  Note: Prognosis: Good  Problem List: Decreased strength, Decreased range of motion, Decreased endurance, Impaired balance, Decreased mobility, Decreased cognition, Impaired judgement, Decreased safety awareness, Decreased skin integrity, Pain  Assessment: Pt seen for PT treatment 8/14/2024 with focus on: transfers and gait training. Pt presents seated OOB in recliner chair, is agreeable to participate in session, despite ongoing pain levels. Pt participates in gait training and therapeutic exercise, with intervention focusing on goal of improved independence and safety with mobility. Pt completes sit>stand transfers at VEGA level, but requires S for stand>sit. Pt ambulates 140ft with RW and primarily S (x1 episode of CGA due to impulsivity but able to regain stability and regroup self to improve safety). While seated at EOB, pt completes above outlined LE therex, with therapist cues for technique and pacing. At end of session pt was left seated OOB in recliner chair with alarm engaged and needs in reach, care returned to RN. Overall pt displays significant improvement in functional mobility as compared to previous session, however continues to perform below their baseline level of functional mobility due to pain, weakness, impaired balance, impulsivity. Pt continues to most appropriately benefit from Level III (minimal PT  intensity) resources upon d/c from the acute care setting. Continue skilled PT POC as able and appropriate in order to progress towards therapy goals and maximize independence with functional mobility.  Barriers to Discharge: Decreased caregiver support     Rehab Resource Intensity Level, PT: III (Minimum Resource Intensity)    See flowsheet documentation for full assessment.

## 2024-08-14 NOTE — CONSULTS
Psychiatry Consultation Note   David Skelton Jr. 64 y.o. male MRN: [id number]  Unit/Bed#: W -01 Encounter: [id number]      Assessment and Plan     Assessment       Assessment:    David Skelton Jr. is a 64 y.o. male, with past psychiatric history of bipolar disorder and opiate use disorder maintained on methadone as per chart history, and past medical history of CKD, hypertension, cholelithiasis, chronic hepatitis B and C, hypogonadism, right hip fracture and suspected baseline dementia who was admitted to [facility] on 8/6/2024 due to a fall.  Psychiatry was consulted to do a history of bipolar disorder.  At present, patient is calm and cooperative, and does not wish to begin any mood stabilizer or antidepressant.  Patient does display evidence of possible major neurocognitive disorder upon exam, and was confused intermittently during evaluation.  Patient does have as needed Xanax which she uses infrequently for anxiety.  Discussed case with geriatrics.  In light of possible seizure-like event, we will keep Xanax as a as needed for seizure activity.  Do not recommend using Xanax in the future due to patient's history of frequent falls, possible neurocognitive disorder, and history of delirium and it should be discontinued upon discharge.  Recommend starting gabapentin 100 mg 3 times daily for anxiety and neuropathic pain, patient reported in the past that it was useful for both.  Patient does not meet criteria for inpatient psychiatric hospitalization at this time.    Principal Psychiatric Problem:  Unspecified mood disorder (HCC)  Differential: Bipolar disorder vs major depressive disorder. Suspicion of possible major neurocognitive disorder    Active Problems:    Bipolar disorder (HCC)    Benign essential hypertension    Type 2 diabetes mellitus with ophthalmic complication, with long-term current use of insulin (HCC)    Vitamin D deficiency    Adrenal nodule (HCC)    Methadone dependence (HCC)     "Encephalopathy    Fall    B12 deficiency    Right hip pain    Hyponatremia    Cognitive decline    Ambulatory dysfunction    Prolonged QT interval    Left ventricular hypertrophy    Acute blood loss anemia    Chronic hepatitis (HCC)    BPH (benign prostatic hyperplasia)    Cholelithiasis    CKD (chronic kidney disease), stage II    Hypogonadism in male    Electrolyte abnormality      Plan     Plan:   Discussed with primary team, with the following recommendations:    Treatment Recommendations:  Patient does not meet criteria for inpatient psychiatric hospitalization.  Medical management per primary team, no new labs indicated at this time.  Delirium precautions  Pharmacological:   Do not recommend initiating any mood-stabilizing agents or antidepressants at this time  Recommend the following medication changes:   Start gabapentin 100 mg 3 times daily for anxiety and neuropathic pain  Discussed case with geriatrics, will keep Xanax 0.5 mg BID PRN, however will be as needed in case of seizure rather than anxiety.  Xanax should be discontinued upon discharge.  Observation level: Routine  Referrals: None  Psychiatry will sign off at this time. Please reach out to our service via "[employer]t for re-evaluation as needed. If contacting after hours, please call or TigerText the on-call team ([facility]: [phone]) with any questions or concerns.    Risks, benefits and possible side effects of Medications: Risks, benefits, and possible side effects of medications explained to patient and patient verbalizes understanding.      HPI   History of Present Illness   Physician Requesting Consult: Lauryn Ullrich, DO  Reason for Consult / Principal Problem: \"Bipolar Disorder\"    Chief Complaint: \"I am not sure\"    David Skelton Jr. is a 64 y.o. male, with past psychiatric history of bipolar disorder and opiate use disorder maintained on methadone as per chart history, and past medical history of CKD, hypertension, cholelithiasis, chronic " hepatitis B and C, hypogonadism, right hip fracture and suspected baseline dementia who was admitted to [facility] on 8/6/2024 due to a fall.  Patient was admitted to [facility] on 7/31 following a fall which required open reduction of a fixed right hip fracture.  Patient was later discharged on 8/05.  Upon discharge, patient had another fall with head strike and was admitted back to [facility] on 8/6/2024.  Since admission, patient is reported to have waxing and waning mentation, however there is concern for baseline major neurocognitive disorder.  Geriatric medicine was consulted, and patient scored a 13/25 on MMSE.  Neuropsychology consult earlier in admission found patient to lack capacity to make medical decisions.  On 8/12, there was a rapid response in which the patient had a 4 to 5-minute episode with myoclonic jerking, hypoxia, upper airway gurgling, with concern for possible seizure though as per chart review patient was not postictal.  EKG, I-STAT, CBC, troponin, and chest x-ray were negative for any acute cardiac etiology.  During hospital course, patient did require restraints at times, however this was when patient was more encephalopathic.  Psychiatric consultation was requested for management of history of bipolar disorder.    On initial psychiatric evaluation, David was calm, cooperative, and pleasant but he was confused at times.  He required for questions to be rephrased many times during the interview, and seemed to have poor insight into his past diagnosis of bipolar disorder.  Patient acknowledged that he was given this diagnosis in the past, but was unable to explain what the diagnosis involves, what it symptoms are, and how his previous psychotropic medication regimen affected those symptoms.  As per chart history patient has previously been on Cymbalta, Depakote, Capylata, Ambien, doxepin, and Xanax.  Patient did acknowledge that Xanax does make him feel unsteady on his feet at  "times.  Patient was circumstantial and concrete in thought process.  He was oriented to year, month, state, and current president.  Patient did acknowledge that he feels confused sometimes.  Patient states that he lives alone, but his sister helps out.  Patient states that he does not cook for himself and in that the his sister will prepare meals for him, however he was unable to say whether she was making all of his meals.  As per patient, confusion has been progressive for the past several months.     Patient reports that he is \"kind of depressed\" due to his medical issues, pain, and current hospitalization.  Patient was bright at times during interview, and appeared generally euthymic.  He endorsed occasional insomnia, anhedonia secondary to poor ambulation, and decreased energy and decreased concentration.  Patient denies any history of manic symptomatology, however upon chart review sister has reported that he does have agitated episodes.  Attempted to contact patient's sister twice via her cell phone for collateral, but she did not answer.  Patient adamantly denies any suicidal ideation, homicidal ideation, or auditory/visual hallucinations both currently and prior to hospitalization.  Patient is agreeable to starting gabapentin for anxiety and neuropathic pain, stating that he has been on the medication in the past.  Patient does not want to start any mood stabilizer or antidepressant at this time.    Psychiatric ROS and PMHx     Psychiatric Review Of Systems:  Sleep: Yes, decreased  Interest/Anhedonia: Yes  Guilt/hopeless: Denies  Low energy/anergy: Yes  Poor Concentration: Yes  Appetite changes: Denies  Weight changes: Denies  Somatic symptoms: Denies  Anxiety/panic: Yes  Marissa: denies  Self injurious behavior/risky behavior: no  Trauma: Denies   If yes: none    Suicidal ideation: no  Homicidal ideation: no  Auditory hallucinations: no  Visual hallucinations: no  Other hallucinations: Denies  Delusional " thinking: no    Eating disorder history: no  Obsessive/compulsive symptoms: no    Historical Information     Past Psychiatric History:   Past Inpatient Psychiatric Treatment:   No history of past inpatient psychiatric admissions  Past Outpatient Psychiatric Treatment:    History of outpatient treatment, but patient unable to remember who or when  Past Suicide Attempts: no  Past Violent Behavior: yes, past episodes of agitation  Past Psychiatric Medication Trials: Depakote, Cymbalta, Ambien, doxepin, Xanax, Caplyta, gabapentin    Substance Abuse History:  Social History       Tobacco History       Smoking Status  Never      Smokeless Tobacco Use  Never              Alcohol History       Alcohol Use Status  Never              Drug Use       Drug Use Status  Yes Comment  Methadone clinic              Sexual Activity       Sexually Active  Not Currently              Activities of Daily Living    Not Asked                   I have assessed this patient for substance use within the past 12 months    Alcohol use: Denies current use  Marijuana: Denies current use  Other substance use: Opiate use disorder currently in remission maintained on methadone, however patient could not elaborate  Longest clean time: Several years  History of Inpatient/Outpatient rehabilitation program: yes  Nicotine use: denies current use    Family Psychiatric History:   Psychiatric Illness:  no family history of psychiatric illness  Substance Abuse:  no family history of substance abuse  Suicide Attempts:  no family history of suicide attempts    Social History:  Education:  Some college  Learning Disabilities: none  Marital History:   Children: 1 adult daughter  Living Arrangement: lives alone in a house  Occupational History: retired  Functioning Relationships: good support system  Legal History: none   History: None  Access to Firearms: no    Traumatic History:   Abuse: none  Other Traumatic Events: none     Past Medical  "History:  History of Seizures: Possible seizure-like activity on 8/12  History of Head injury with loss of consciousness: Followed with head trauma prior to hospitalization    Past Medical History:   Diagnosis Date    Anxiety     Depression     Diabetes mellitus (HCC)     Drug use     Hemorrhoids, internal 9/23/2019    Hypertension      Past Surgical History:   Procedure Laterality Date    FEMUR FRACTURE SURGERY Right     GASTRIC BYPASS  11/08/2011    Managed by: Braden Luna (General Surgery)    HERNIA REPAIR  02/13/2013    Incisional hernia repair Managed by: Braden Luna (General Surgery)    NE OPTX FEM SHFT FX W/INSJ IMED IMPLT W/WO SCREW Right 7/31/2024    Procedure: INSERTION NAIL IM FEMUR ANTEGRADE (TROCHANTERIC);  Surgeon: eMng Hill MD;  Location: [facility];  Service: Orthopedics    TONSILLECTOMY  2010       Mental Status Evaluation and Medical ROS     Medical Review Of Systems:  Pertinent items are noted in HPI.    Meds/Allergies   All current active medications have been reviewed.  Allergies   Allergen Reactions    Aspirin Shortness Of Breath    Penicillins        Objective   Vital signs in last 24 hours:  Temp:  [98 °F (36.7 °C)-98.3 °F (36.8 °C)] 98 °F (36.7 °C)  HR:  [] 82  Resp:  [14-17] 17  BP: (120-159)/(76-90) 120/77      Intake/Output Summary (Last 24 hours) at 8/14/2024 1249  Last data filed at 8/14/2024 0901  Gross per 24 hour   Intake 480 ml   Output 1000 ml   Net -520 ml       Mental Status Evaluation:  Appearance:  age appropriate, adequate grooming, wearing hospital clothes   Behavior:  Calm, cooperative, pleasant   Speech:  normal volume, slow   Mood:  \"Kind of depressed\"   Affect:  Euthymic, bright at times   Language: naming objects and repeating phrases   Thought Process:  circumstantial, decreased rate of thoughts   Associations: concrete associations   Thought Content:  no overt delusions   Perceptual Disturbances: no auditory hallucinations, no visual " hallucinations, does not appear responding to internal stimuli   Risk Potential: Suicidal ideation - None  Homicidal ideation - None  Potential for aggression - No   Sensorium:  oriented to person, place, and time/date   Memory:  remote memory impaired, recent memory intact   Consciousness:  alert and awake   Attention/Concentration: decreased concentration and decreased attention span   Intellect: within normal limits   Fund of Knowledge: awareness of current events: yes  past history: yes  vocabulary: yes   Insight:  impaired   Judgment: impaired   Muscle Strength Muscle Tone: Not assessed  Not assessed   Gait/Station: Not assessed   Motor Activity: no abnormal movements     Laboratory Results: I have personally reviewed all pertinent laboratory/tests results        Imaging Studies: XR femur 2 vw right    Result Date: 8/13/2024  Narrative: XR FEMUR 2 VW RIGHT INDICATION: post op. COMPARISON: July 30, 2024 FINDINGS: There is interval fixation of the right hip fracture with improved alignment of the fracture fragments. Displacement of the lesser trochanter remains. No hardware fracture or hardware loosening seen. There are prominent atherosclerotic vascular calcifications. No significant degenerative changes. No lytic or blastic osseous lesion.     Impression: Postoperative findings as discussed above. No hardware complication identified. Computerized Assisted Algorithm (CAA) may have been used to analyze all applicable images. Workstation performed: [id number]     XR chest portable    Result Date: 8/13/2024  Narrative: XR CHEST PORTABLE INDICATION: eval for pneumonia. COMPARISON: 8/6/2024 FINDINGS: Clear lungs. No pneumothorax or pleural effusion. Normal cardiomediastinal silhouette. Bones are unremarkable for age. Normal upper abdomen.     Impression: No acute cardiopulmonary disease. Workstation performed: [id number]     XR knee 1 or 2 vw right    Result Date: 8/7/2024  Narrative: XR KNEE 1 OR 2 VW RIGHT  INDICATION: fall. COMPARISON: None FINDINGS: No acute fracture or dislocation. No joint effusion. Mild narrowing of the patellofemoral compartment. Patellar enthesopathy. No lytic or blastic osseous lesion. Atherosclerotic calcifications. Otherwise unremarkable soft tissues.     Impression: No acute osseous abnormality. Workstation performed: [id number]     TRAUMA - CT chest abdomen pelvis w contrast    Result Date: 8/6/2024  Narrative: CT CHEST, ABDOMEN AND PELVIS WITH IV CONTRAST INDICATION: TRAUMA. COMPARISON: 7/30/2024, 4/14/2021. TECHNIQUE: CT examination of the chest, abdomen and pelvis was performed. Multiplanar 2D reformatted images were created from the source data. This examination, like all CT scans performed in the [facility], was performed utilizing techniques to minimize radiation dose exposure, including the use of iterative reconstruction and automated exposure control. Radiation dose length product (DLP) for this visit: 1000 mGy-cm IV Contrast: 100 mL of iohexol (OMNIPAQUE) Enteric Contrast: Not administered. FINDINGS: CHEST LUNGS: There is a groundglass opacity in the right upper lobe measuring approximately 8 mm on series 302 image 53, also present in 2023 measuring approximately 6 mm. There is platelike atelectasis in the right lower lobe. No tracheal or endobronchial lesion. PLEURA: Unremarkable. HEART/GREAT VESSELS: Heart is unremarkable for patient's age. No thoracic aortic aneurysm. MEDIASTINUM AND POPEYE: Unremarkable. CHEST WALL AND LOWER NECK: Unremarkable. ABDOMEN LIVER/BILIARY TREE: Unremarkable. GALLBLADDER: There is cholelithiasis with severe gallbladder distention, increased from the prior study. No pericholecystic inflammatory change. SPLEEN: Unremarkable. PANCREAS: Unremarkable. ADRENAL GLANDS: There is a 1.3 cm right adrenal nodule, indeterminate though given stability since 2021, likely adenoma. KIDNEYS/URETERS: There is a left renal cyst. No hydronephrosis. STOMACH  AND BOWEL: There is moderate diffuse colonic fecal stasis. Small bowel is unremarkable. Status post gastric bypass. APPENDIX: No findings to suggest appendicitis. ABDOMINOPELVIC CAVITY: No ascites. No pneumoperitoneum. No lymphadenopathy. VESSELS: Unremarkable for patient's age. PELVIS REPRODUCTIVE ORGANS: Unremarkable for patient's age. URINARY BLADDER: Unremarkable. ABDOMINAL WALL/INGUINAL REGIONS: Unremarkable. BONES: No acute fracture or suspicious osseous lesion. There are old left rib fractures. Stable T12 and L1 compression deformities. See dedicated extremity CT for evaluation of the right lower extremity.     Impression: 1.  No acute traumatic injury to the chest, abdomen, or pelvis. 2.  Cholelithiasis with severe gallbladder distention, increased from the recent prior study though without wall thickening or pericholecystic inflammatory change. This is of uncertain etiology and clinical significance and clinical correlation for right  upper quadrant symptoms recommended. 3.  Subcentimeter groundglass density in the right upper lobe, minimally more prominent than in 2023. Based on current Fleischner Society 2017 Guidelines on incidental pulmonary nodule, follow-up CT is recommended for every 2 years until 5 years of stability is demonstrated. 4.  Moderate to diffuse colonic fecal stasis. 5.  1.3 cm right adrenal nodule, indeterminate though likely an adenoma given stability. The study was marked in EPIC for immediate notification. Workstation performed: [id number]     CT lower extremity w contrast right    Result Date: 8/6/2024  Narrative: CT right hip with IV contrast INDICATION: Recent hip fracture repair with fall and large hematoma on posterior thigh. COMPARISON: Correlation is made with the prior radiograph dated 7/31/2024. TECHNIQUE: CT examination of the above was performed.  This examination, like all CT scans performed [facility] the [facility], was performed utilizing techniques to  minimize radiation dose exposure, including the use of iterative reconstruction  and automated exposure control software.  Multiplanar 2D reformatted images were created from the source data. IV Contrast: 100 mL of iohexol (OMNIPAQUE) Rad dose  216 mGy-cm FINDINGS: OSSEOUS STRUCTURES: There is an intramedullary interlocking rodrigo fixation across a comminuted intertrochanteric fracture of the right proximal femur as previously seen. No evidence for hardware failure. A medially and anteriorly displaced lesser trochanteric fracture fragment again seen. No osseous erosion. There is an antegrade intramedullary interlocking rodrigo fixation of the left proximal femur also noted. VISUALIZED MUSCULATURE: There is diffuse enlargement of the right thigh musculature. This could reflect diffuse myositis. There is no intramuscular hematoma or discrete intramuscular fluid collection. SOFT TISSUES: There is circumferential subcutaneous edema throughout the right thigh. Surgical clips are seen laterally in the superior right hip. No discrete subcutaneous hematoma seen. OTHER PERTINENT FINDINGS: Arterial calcifications are noted. There is fecal distention of the rectum measuring up to 7.4 cm suggestive of fecal impaction.     Impression: Status post ORIF comminuted intertrochanteric fracture right proximal femur. Diffuse enlargement of the right thigh musculature, nonspecific but could reflect myositis. No discrete intramuscular hematoma or discrete intramuscular fluid collection. Subcutaneous edema throughout the right thigh without a discrete subcutaneous hematoma. Arterial calcifications. Fecal distention of the rectum suggestive of fecal impaction. Workstation performed: [id number]     TRAUMA - CT head wo contrast    Result Date: 8/6/2024  Narrative: CT BRAIN - WITHOUT CONTRAST INDICATION:   TRAUMA. COMPARISON: 7/30/2024 TECHNIQUE:  CT examination of the brain was performed.  Multiplanar 2D reformatted images were created from the  source data. Radiation dose length product (DLP) for this visit:  1174 mGy-cm .  This examination, like all CT scans performed in the [facility], was performed utilizing techniques to minimize radiation dose exposure, including the use of iterative reconstruction and automated exposure control. IMAGE QUALITY:  Diagnostic. FINDINGS: PARENCHYMA:  No intracranial mass, mass effect or midline shift. No CT signs of acute infarction.  No acute parenchymal hemorrhage. VENTRICLES AND EXTRA-AXIAL SPACES:  Normal for the patient's age. VISUALIZED ORBITS: Normal visualized orbits. PARANASAL SINUSES: Normal visualized paranasal sinuses. CALVARIUM AND EXTRACRANIAL SOFT TISSUES:  Normal.     Impression: No acute intracranial abnormality. The study was marked in EPIC for immediate notification. Workstation performed: [id number]     TRAUMA - CT spine cervical wo contrast    Result Date: 8/6/2024  Narrative: CT CERVICAL SPINE - WITHOUT CONTRAST INDICATION:   TRAUMA. COMPARISON: 8/9/2023. TECHNIQUE:  CT examination of the cervical spine was performed without intravenous contrast.  Contiguous axial images were obtained. Multiplanar 2D reformatted images were created from the source data. Radiation dose length product (DLP) for this visit:  395 mGy-cm .  This examination, like all CT scans performed in the [facility], was performed utilizing techniques to minimize radiation dose exposure, including the use of iterative reconstruction and automated exposure control. IMAGE QUALITY:  Diagnostic. FINDINGS: ALIGNMENT:  Normal alignment of the cervical spine. No subluxation. VERTEBRAE:  No fracture. DEGENERATIVE CHANGES:  No significant cervical degenerative changes are noted. PREVERTEBRAL AND PARASPINAL SOFT TISSUES: Unremarkable THORACIC INLET: Please refer to the concurrent chest CT report for thoracic inlet findings.     Impression: No cervical spine fracture or traumatic malalignment. The study was  marked in EPIC for immediate notification. Workstation performed: [id number]     XR Trauma chest portable    Result Date: 8/6/2024  Narrative: XR CHEST PORTABLE INDICATION: TRAUMA. COMPARISON: 7/30/2024 FINDINGS: Clear lungs. No pneumothorax or pleural effusion. Normal cardiomediastinal silhouette. Bones are unremarkable for age. Normal upper abdomen.     Impression: No acute cardiopulmonary disease. Workstation performed: [id number]     XR hip/pelv 1 vw right if performed    Result Date: 7/31/2024  Narrative: C-ARM - XR HIP/PELV 1 VW RIGHT W PELVIS IF PERFORMED INDICATION: proximal femur fracture. Procedure guidance. TECHNIQUE: Fluoroscopic guidance provided. COMPARISON: Previous day FLUOROSCOPY TIME: 38 seconds 5 FLUOROSCOPIC IMAGES FINDINGS: Fluoroscopy provided for procedure guidance. Osseous and soft tissue detail limited by technique.     Impression: Fluoroscopy provided for procedure guidance. Please refer to the separate procedure note for additional details. Workstation performed: [id number]     Echo follow up/limited w/ contrast if indicated    Result Date: 7/31/2024  Narrative:   Left Ventricle: Left ventricular cavity size is normal. Wall thickness is normal. The left ventricular ejection fraction is 60%. Systolic function is normal. Wall motion is normal. Diastolic function is normal. Very limited study to look for LV function.     Echo complete w/ contrast if indicated    Result Date: 7/31/2024  Narrative:   Left Ventricle: Left ventricular cavity size is normal. Wall thickness is mildly increased. There is concentric remodeling. The left ventricular ejection fraction is 60%. Systolic function is normal. Wall motion is normal.   IVS: There is sigmoid appearance of the septum.     CT chest abdomen pelvis w contrast    Result Date: 7/30/2024  Narrative: CT CHEST, ABDOMEN AND PELVIS WITH IV CONTRAST INDICATION: Chest and abdominal pain blunt trauma and altered mental status COMPARISON: 8/9/2023.  TECHNIQUE: CT examination of the chest, abdomen and pelvis was performed. Multiplanar 2D reformatted images were created from the source data. This examination, like all CT scans performed in the [facility], was performed utilizing techniques to minimize radiation dose exposure, including the use of iterative reconstruction and automated exposure control. Radiation dose length product (DLP) for this visit: 1249.22 mGy-cm IV Contrast: 100 mL of iohexol (OMNIPAQUE) Enteric Contrast: Not administered. FINDINGS: CHEST LUNGS: Minimal paraseptal emphysema in the right lung apex. No tracheal or endobronchial lesion. PLEURA: No hemothorax. HEART/GREAT VESSELS: Normal heart size. No thoracic aortic aneurysm or dissection. MEDIASTINUM AND POPEYE: Unremarkable. CHEST WALL AND LOWER NECK: Unremarkable. ABDOMEN LIVER/BILIARY TREE: Unremarkable. GALLBLADDER: Distended gallbladder containing radiolucent gallstones. SPLEEN: Unremarkable. PANCREAS: Unremarkable. ADRENAL GLANDS: Stable right adrenal 1 cm nodule, likely to represent an adenoma. KIDNEYS/URETERS: Symmetric nephrographic phase enhancement of the kidneys. Left renal 2.6 cm cyst. No obstructive uropathy. STOMACH AND BOWEL: Postsurgical change from gastric bypass. Large amount of stool throughout the colon. Diverticulosis without evidence of diverticulitis or colitis. APPENDIX: No findings to suggest appendicitis. ABDOMINOPELVIC CAVITY: No free peritoneal air or hemoperitoneum. VESSELS: No abdominal aortic aneurysm. PELVIS REPRODUCTIVE ORGANS: Mild prostate enlargement. URINARY BLADDER: Unremarkable. ABDOMINAL WALL/INGUINAL REGIONS: Unremarkable. BONES: Comminuted mildly displaced right intertrochanteric fracture. Femoral head remains in articulation with the acetabulum. Internal fixation of left intertrochanteric fracture and normal alignment. Chronic T12 and L1 fractures with kyphotic deformity. No acute rib or sternal fracture.     Impression: 1.  Comminuted right intertrochanteric fracture. No dislocation. 2. No evidence of acute chest trauma. 3. No acute intra-abdominal or pelvic trauma. 4. Constipation. Workstation performed: [id number]     XR chest portable    Result Date: 7/30/2024  Narrative: XR CHEST PORTABLE INDICATION: syncope. COMPARISON: 8/10/2023 FINDINGS: Clear lungs. No pneumothorax or pleural effusion. Normal cardiomediastinal silhouette. Bones are unremarkable for age. Normal upper abdomen.     Impression: No acute cardiopulmonary disease. Workstation performed: [id number]     XR femur 2 vw right    Result Date: 7/30/2024  Narrative: XR PELVIS AP ONLY 1 OR 2 VW, XR FEMUR 2 VW RIGHT INDICATION: fall, concern for fracture. COMPARISON: None FINDINGS: Right intertrochanteric fracture with varus angulation and displaced lesser trochanteric moiety. No hip dislocation. Remainder of the femur is intact. Pelvic ring is intact. Left proximal femur ORIF with bridging heterotopic bone formation. No significant degenerative changes. No lytic or blastic osseous lesion. Unremarkable soft tissues. Unremarkable visualized lumbar spine.     Impression: Right intertrochanteric fracture with varus angulation and displaced lesser trochanteric moiety. No hip dislocation. Remainder of the femur is intact. Pelvic ring is intact. Left proximal femur ORIF with bridging heterotopic bone formation. Findings concur with the preliminary report by the referring clinician already in PACS and/or our electronic record EPIC. Computerized Assisted Algorithm (CAA) may have been used to analyze all applicable images. Workstation performed: [id number]     XR pelvis ap only 1 or 2 vw    Result Date: 7/30/2024  Narrative: XR PELVIS AP ONLY 1 OR 2 VW, XR FEMUR 2 VW RIGHT INDICATION: fall, concern for fracture. COMPARISON: None FINDINGS: Right intertrochanteric fracture with varus angulation and displaced lesser trochanteric moiety. No hip dislocation. Remainder of the femur is intact.  Pelvic ring is intact. Left proximal femur ORIF with bridging heterotopic bone formation. No significant degenerative changes. No lytic or blastic osseous lesion. Unremarkable soft tissues. Unremarkable visualized lumbar spine.     Impression: Right intertrochanteric fracture with varus angulation and displaced lesser trochanteric moiety. No hip dislocation. Remainder of the femur is intact. Pelvic ring is intact. Left proximal femur ORIF with bridging heterotopic bone formation. Findings concur with the preliminary report by the referring clinician already in PACS and/or our electronic record EPIC. Computerized Assisted Algorithm (CAA) may have been used to analyze all applicable images. Workstation performed: [id number]     CT head wo contrast    Result Date: 7/30/2024  Narrative: CT BRAIN - WITHOUT CONTRAST INDICATION:   fall, unknown head strike. From group home. Pt found down c/o R leg pain. Per EMS, pt seeming lethargic and apneic when not spoken to. Pt denies taking any substance use. EMS reports Frequent PVCs and bradycardic in the 40s COMPARISON: 9/14/2023 TECHNIQUE:  CT examination of the brain was performed.  Multiplanar 2D reformatted images were created from the source data. Radiation dose length product (DLP) for this visit:  954.58 mGy-cm .  This examination, like all CT scans performed in the [facility], was performed utilizing techniques to minimize radiation dose exposure, including the use of iterative  reconstruction and automated exposure control. IMAGE QUALITY:  Diagnostic. FINDINGS: PARENCHYMA:  No intracranial mass, mass effect or midline shift. No CT signs of acute infarction.  No acute parenchymal hemorrhage. Atherosclerotic calcifications of the cavernous segment of the internal carotid artery are mild. VENTRICLES AND EXTRA-AXIAL SPACES:  Normal for the patient's age. VISUALIZED ORBITS: Bilateral lens implants noted. PARANASAL SINUSES: Normal visualized paranasal sinuses.  CALVARIUM AND EXTRACRANIAL SOFT TISSUES:  Normal.     Impression: No intracranial hemorrhage or calvarial fracture. Workstation performed: [id number]     EKG: AYa=173 on 08/12/2024    Code Status: Level 1 - Full Code  Advance Directive and Living Will:       Power of :      Suicide/Homicide Risk Assessment:  Risk of Harm to Self:  The following ratings are based on assessment at the time of the interview  Demographic risk factors include: ,  status, male, age: over 50 or older  Historical Risk Factors include: chronic psychiatric problems, history of anxiety, history of substance use  Recent Specific Risk Factors include: mental illness diagnosis, chronic pain, health problems, unemployed  Protective Factors: no current suicidal ideation, being a parent, having a desire to be alive, impulse control, resiliency, restricted access to lethal means, sobriety, supportive family  Weapons: none. The following steps have been taken to ensure weapons are properly secured: not applicable  Based on today's assessment, David presents the following risk of harm to self: minimal    Risk of Harm to Others:  The following ratings are based on assessment at the time of the interview  Demographic Risk Factors include: male.  Historical Risk Factors include: history of substance use.  Recent Specific Risk Factors include: multiple stressors, social difficulties, sees self as a victim .  Protective Factors: no current homicidal ideation, being a parent, compliant with medications, resilience, sobriety, strong relationships  Weapons: none. The following steps have been taken to ensure weapons are properly secured: not applicable  Based on today's assessment, David presents the following risk of harm to others: minimal    The following interventions are recommended: no intervention changes needed      Logan Green MD 08/14/24  Psychiatry Resident, PGY-II    This note was completed in part utilizing M-Modal  Fluency Direct Software. Grammatical, translation, syntax errors, random word insertions, spelling mistakes, and incomplete sentences may be an occasional consequence of this system secondary to software limitations with voice recognition, ambient noise, and hardware issues. If you have any questions or concerns about the content, text, or information contained within the body of this dictation, please contact the provider for clarification.

## 2024-08-14 NOTE — PROGRESS NOTES
[facility]  Progress Note  Name: David Osorio I  MRN: [id number]  Unit/Bed#: W -01 I Date of Admission: 8/6/2024   Date of Service: 8/14/2024 I Hospital Day: 8    Assessment & Plan   Fall  Assessment & Plan  - Status post fall with the below noted injuries.  - Fall precautions.  - Geriatric Medicine consultation for evaluation, medication review and recommendations.  - PT and OT evaluation and treatment as indicated.  - Case Management consultation for disposition planning.        Right hip pain  Assessment & Plan  S/p hip surgery on 7/31  CT lower extremity showed possible myositis.   Orthopedics consulted for 2-week follow-up  Continue WBAT RLE  Multimodal pain regimen as needed  PT/OT-recommended level 2  DVT prophylaxis: Lovenox.  May be discharged on aspirin 81 mg twice daily to complete 28-day DVT prophylaxis recommendation    Hypogonadism in male  Assessment & Plan  Endocrinology consulted note appreciated  Recommended outpatient follow-up with neurology    CKD (chronic kidney disease), stage II  Assessment & Plan  Lab Results   Component Value Date    EGFR 93 08/14/2024    EGFR 88 08/13/2024    EGFR 70 08/11/2024    CREATININE 0.85 08/14/2024    CREATININE 0.92 08/13/2024    CREATININE 1.11 08/11/2024       Prolonged QT interval  Assessment & Plan  In the setting of methadone use  Continue to monitor--repeat EKG 8/15    Ambulatory dysfunction  Assessment & Plan  Follow-up with PCP/endocrinology as an outpatient.    Hyponatremia  Assessment & Plan  Repeat BMP shows stability at 134-continue to monitor    Encephalopathy  Assessment & Plan  Pt initially very lethargic on arrival to ED, suspect due to methadone  Geriatrics consulted note appreciated  Neuropsych consulted-patient was deemed not to have decision-making capacity  Appears improved-reevaluation requested  Geriatrics consulted and note appreciated    Methadone dependence (HCC)  Assessment & Plan  Pt has a hx of  "opioid abuse, and has been on chronic methadone.  Current dose is 190mg daily. Dose was confrimed  Continue home dose of methadone  APS on board, will continue to monitor QTc, and consider decreasing dose if it lengthens    Type 2 diabetes mellitus with ophthalmic complication, with long-term current use of insulin (HCC)  Assessment & Plan  Lab Results   Component Value Date    HGBA1C 10.7 (H) 07/31/2024       Recent Labs     08/13/24  0745 08/13/24  1120 08/13/24  1555 08/13/24  2105   POCGLU 260* 173* 103 241*         Blood Sugar Average: Last 72 hrs:  (P) [id number]    Patient was recently admitted to [facility] and discharged on insulin  Endocrine consultation note appreciated, per their recommendation:  Started on corrective sliding scale algorithm to  Started on Lantus 15 units  Endocrine continues to follow-recommended patient may benefit from starting Synjardy 12.5/1000 mg twice a day peptide therapy on discharge   Concern for compliance-patient will require insulin training if being discharged home on insulin.      Benign essential hypertension  Assessment & Plan  Continue home amlodipine    Bipolar disorder (HCC)  Assessment & Plan  Patient has a history of bipolar disorder-no longer follows with psychiatry-having difficulty scheduling outpatient follow-up  Psych consulted while IP reinitiation of medication therapy as needed.  Xanax as needed             Bowel Regimen: Senna S, Miralax  VTE Prophylaxis:Sequential compression device (Venodyne)  and Enoxaparin (Lovenox)     Disposition: Pending placement    Subjective   Chief Complaint: \"My knee hurts\"    Subjective: Patient reports right knee pain.  He denies any worsening pain in comparison to previous.  He notes that it has hurt since the fall.  He does note having x-rays which were negative.  He is able to range at the feel some discomfort.  He denies noting any swelling.  No other complaints offered.     Objective   Vitals:   Temp:  [98 °F (36.7 " °C)-98.3 °F (36.8 °C)] 98.1 °F (36.7 °C)  HR:  [] 101  Resp:  [14-16] 14  BP: (111-159)/(65-90) 130/90    I/O         08/12 0701  08/13 0700 08/13 0701  08/14 0700 08/14 0701  08/15 0700    P.O. 480 360     Total Intake(mL/kg) 480 (6.4) 360 (4.8)     Urine (mL/kg/hr)       Total Output       Net +480 +360            Unmeasured Urine Occurrence 3 x      Unmeasured Stool Occurrence 1 x               Physical Exam:   GENERAL APPEARANCE: No acute distress.  NEURO: GCS is 15.  Light touch sensation intact.  HEENT: Normocephalic, atraumatic.  Neck supple.  CV: Regular rate and rhythm.  +2 radial and dorsalis pedis pulses, bilaterally.  LUNGS: Clear to auscultation, bilaterally.  Chest wall is nontender.  GI: Abdomen is soft nontender.  : Pelvis is stable.  MSK: Right knee is able to to fully range.  He does have some tenderness on the superior aspect of the knee/distal thigh.  No significant effusion or hematoma appreciated.  No knee laxity appreciated.  SKIN: Warm, dry.  Right hip surgical dressings clean, dry, intact.    Invasive Devices       Peripheral Intravenous Line  Duration             Peripheral IV 08/14/24 Dorsal (posterior);Right Forearm <1 day                          Lab Results: Results: I have personally reviewed all pertinent laboratory/tests results, BMP/CMP:   Lab Results   Component Value Date    SODIUM 135 08/14/2024    K 4.5 08/14/2024     08/14/2024    CO2 31 08/14/2024    BUN 24 08/14/2024    CREATININE 0.85 08/14/2024    CALCIUM 8.5 08/14/2024    EGFR 93 08/14/2024   , and CBC:   Lab Results   Component Value Date    WBC 12.35 (H) 08/14/2024    HGB 9.2 (L) 08/14/2024    HCT 28.7 (L) 08/14/2024    MCV 89 08/14/2024     (H) 08/14/2024    RBC 3.22 (L) 08/14/2024    MCH 28.6 08/14/2024    MCHC 32.1 08/14/2024    RDW 14.7 08/14/2024    MPV 9.8 08/14/2024    NRBC 0 08/14/2024     Imaging: I have personally reviewed pertinent reports.     Other Studies: none

## 2024-08-14 NOTE — ASSESSMENT & PLAN NOTE
Lab Results   Component Value Date    HGBA1C 10.7 (H) 07/31/2024       Recent Labs     08/13/24  0745 08/13/24  1120 08/13/24  1555 08/13/24  2105   POCGLU 260* 173* 103 241*         Blood Sugar Average: Last 72 hrs:  (P) [id number]    Patient was recently admitted to [facility] and discharged on insulin  Endocrine consultation note appreciated, per their recommendation:  Started on corrective sliding scale algorithm to  Started on Lantus 15 units  Endocrine continues to follow-recommended patient may benefit from starting Synjardy 12.5/1000 mg twice a day peptide therapy on discharge   Concern for compliance-patient will require insulin training if being discharged home on insulin.

## 2024-08-14 NOTE — ASSESSMENT & PLAN NOTE
S/p hip surgery on 7/31  CT lower extremity showed possible myositis.   Orthopedics consulted for 2-week follow-up  Continue WBAT RLE  Multimodal pain regimen as needed  PT/OT-recommended level 2  DVT prophylaxis: Lovenox.  May be discharged on aspirin 81 mg twice daily to complete 28-day DVT prophylaxis recommendation

## 2024-08-14 NOTE — ASSESSMENT & PLAN NOTE
Lab Results   Component Value Date    EGFR 93 08/14/2024    EGFR 88 08/13/2024    EGFR 70 08/11/2024    CREATININE 0.85 08/14/2024    CREATININE 0.92 08/13/2024    CREATININE 1.11 08/11/2024

## 2024-08-14 NOTE — PROGRESS NOTES
"Endocrinoloy Progress Note   David Skelton Jr. 64 y.o. male MRN: [id number]  Unit/Bed#: W -01 Encounter: [id number]      CC: diabetes f/u    Subjective:   David Skelton Jr. is a 64 y.o. year old male with longstanding uncontrolled type 2 diabetes, medication noncompliance, hypogonadism, recent hip fracture concerning for osteomalacia versus osteoporosis, polysubstance abuse on chronic methadone, readmitted after fall.  There are placement/competency evaluation concerns.    Feels well.  Appetite remains low.  No complaints.  No hypoglycemia.    Objective:     Vitals: Blood pressure 102/72, pulse 84, temperature 98.1 °F (36.7 °C), resp. rate 12, height 5' 8\" (1.727 m), weight 74.9 kg (165 lb 2 oz), SpO2 99%.,Body mass index is 25.11 kg/m².      Intake/Output Summary (Last 24 hours) at 8/14/2024 1627  Last data filed at 8/14/2024 0901  Gross per 24 hour   Intake 480 ml   Output 1000 ml   Net -520 ml       Physical Exam:  General Appearance: awake, appears stated age and cooperative  Head: Normocephalic, without obvious abnormality, atraumatic  Extremities: moves all extremities  Skin: Skin color and temperature normal.   Pulm: no labored breathing    Lab, Imaging and other studies: I have personally reviewed pertinent reports.      POC Glucose (mg/dl)   Date Value   08/14/2024 275 (H)   08/14/2024 178 (H)   08/14/2024 186 (H)   08/14/2024 164 (H)   08/13/2024 241 (H)   08/13/2024 103   08/13/2024 173 (H)   08/13/2024 260 (H)   08/12/2024 272 (H)   08/12/2024 317 (H)       Assessment and plan:  -Diabetes Mellitus type II uncontrolled  - A1c 10.7  -Medication noncompliance   Off all diabetic meds last 6 months-  -Recent hip fracture concern for osteoporosis versus osteomalacia  -Hypogonadism  -Adrenal nodule    --Increase Lantus to 20 units tonight  --Continue Algorithm 3 correction scale  --Primary has discontinued mealtime 5 units of lispro, which is reasonable if patient is being cued for discharge  --If patient " "is ultimately discharged on oral antihyperglycemic's, Lantus would need be decreased.  Renal function is appropriate for a Jardiance and/or metformin 1  --Hypoglycemia protocol  --Will continue to follow and make adjustments as appropriate.  Discharge recommendations pending disposition and clinical course  -- Patient will require endocrine follow-up for his diabetes, adrenal nodule, hypogonadism, likely osteomalacia versus osteoporosis.  He has previously seen [facility]  , but not in the past 18 months. Encouraged patient to reestablish care.  Discharge recommendations pending clinical course       Yesica Cornejo DO  Endocrinology PGY-5  ~A morning cortisol is only interpretable if drawn at 8am.~    Please EpicSecureChat questions to the physician covering the \"[facility] Endocrinology Call\" Role. Thank you.   "

## 2024-08-14 NOTE — ASSESSMENT & PLAN NOTE
Pt initially very lethargic on arrival to ED, suspect due to methadone  Geriatrics consulted note appreciated  Neuropsych consulted-patient was deemed not to have decision-making capacity  Appears improved-reevaluation requested  Geriatrics consulted and note appreciated
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
## 2024-08-14 NOTE — PLAN OF CARE
Problem: Potential for Falls  Goal: Patient will remain free of falls  Description: INTERVENTIONS:  - Educate patient/family on patient safety including physical limitations  - Instruct patient to call for assistance with activity   - Consult OT/PT to assist with strengthening/mobility   - Keep Call bell within reach  - Keep bed low and locked with side rails adjusted as appropriate  - Keep care items and personal belongings within reach  - Initiate and maintain comfort rounds  - Make Fall Risk Sign visible to staff  - Offer Toileting every  Hours, in advance of need  - Initiate/Maintain alarm  - Obtain necessary fall risk management equipment:   - Apply yellow socks and bracelet for high fall risk patients  - Consider moving patient to room near nurses station  Outcome: Progressing     Problem: Prexisting or High Potential for Compromised Skin Integrity  Goal: Skin integrity is maintained or improved  Description: INTERVENTIONS:  - Identify patients at risk for skin breakdown  - Assess and monitor skin integrity  - Assess and monitor nutrition and hydration status  - Monitor labs   - Assess for incontinence   - Turn and reposition patient  - Assist with mobility/ambulation  - Relieve pressure over bony prominences  - Avoid friction and shearing  - Provide appropriate hygiene as needed including keeping skin clean and dry  - Evaluate need for skin moisturizer/barrier cream  - Collaborate with interdisciplinary team   - Patient/family teaching  - Consider wound care consult   Outcome: Progressing     Problem: PAIN - ADULT  Goal: Verbalizes/displays adequate comfort level or baseline comfort level  Description: Interventions:  - Encourage patient to monitor pain and request assistance  - Assess pain using appropriate pain scale  - Administer analgesics based on type and severity of pain and evaluate response  - Implement non-pharmacological measures as appropriate and evaluate response  - Consider cultural and  social influences on pain and pain management  - Notify physician/advanced practitioner if interventions unsuccessful or patient reports new pain  Outcome: Progressing     Problem: INFECTION - ADULT  Goal: Absence or prevention of progression during hospitalization  Description: INTERVENTIONS:  - Assess and monitor for signs and symptoms of infection  - Monitor lab/diagnostic results  - Monitor all insertion sites, i.e. indwelling lines, tubes, and drains  - Monitor endotracheal if appropriate and nasal secretions for changes in amount and color  - [place] appropriate cooling/warming therapies per order  - Administer medications as ordered  - Instruct and encourage patient and family to use good hand hygiene technique  - Identify and instruct in appropriate isolation precautions for identified infection/condition  Outcome: Progressing  Goal: Absence of fever/infection during neutropenic period  Description: INTERVENTIONS:  - Monitor WBC    Outcome: Progressing     Problem: SAFETY ADULT  Goal: Maintain or return to baseline ADL function  Description: INTERVENTIONS:  -  Assess patient's ability to carry out ADLs; assess patient's baseline for ADL function and identify physical deficits which impact ability to perform ADLs (bathing, care of mouth/teeth, toileting, grooming, dressing, etc.)  - Assess/evaluate cause of self-care deficits   - Assess range of motion  - Assess patient's mobility; develop plan if impaired  - Assess patient's need for assistive devices and provide as appropriate  - Encourage maximum independence but intervene and supervise when necessary  - Involve family in performance of ADLs  - Assess for home care needs following discharge   - Consider OT consult to assist with ADL evaluation and planning for discharge  - Provide patient education as appropriate  Outcome: Progressing  Goal: Maintains/Returns to pre admission functional level  Description: INTERVENTIONS:  - Perform AM-PAC 6 Click Basic  Mobility/ Daily Activity assessment daily.  - Set and communicate daily mobility goal to care team and patient/family/caregiver.   - Collaborate with rehabilitation services on mobility goals if consulted  - Perform Range of Motion  times a day.  - Reposition patient every  hours.  - Dangle patient  times a day  - Stand patient  times a day  - Ambulate patient times a day  - Out of bed to chair  times a day   - Out of bed for meals  times a day  - Out of bed for toileting  - Record patient progress and toleration of activity level   Outcome: Progressing     Problem: DISCHARGE PLANNING  Goal: Discharge to home or other facility with appropriate resources  Description: INTERVENTIONS:  - Identify barriers to discharge w/patient and caregiver  - Arrange for needed discharge resources and transportation as appropriate  - Identify discharge learning needs (meds, wound care, etc.)  - Arrange for interpretive services to assist at discharge as needed  - Refer to Case Management Department for coordinating discharge planning if the patient needs post-hospital services based on physician/advanced practitioner order or complex needs related to functional status, cognitive ability, or social support system  Outcome: Progressing     Problem: Knowledge Deficit  Goal: Patient/family/caregiver demonstrates understanding of disease process, treatment plan, medications, and discharge instructions  Description: Complete learning assessment and assess knowledge base.  Interventions:  - Provide teaching at level of understanding  - Provide teaching via preferred learning methods  Outcome: Progressing     Problem: Nutrition/Hydration-ADULT  Goal: Nutrient/Hydration intake appropriate for improving, restoring or maintaining nutritional needs  Description: Monitor and assess patient's nutrition/hydration status for malnutrition. Collaborate with interdisciplinary team and initiate plan and interventions as ordered.  Monitor patient's  weight and dietary intake as ordered or per policy. Utilize nutrition screening tool and intervene as necessary. Determine patient's food preferences and provide high-protein, high-caloric foods as appropriate.     INTERVENTIONS:  - Monitor oral intake, urinary output, labs, and treatment plans  - Assess nutrition and hydration status and recommend course of action  - Evaluate amount of meals eaten  - Assist patient with eating if necessary   - Allow adequate time for meals  - Recommend/ encourage appropriate diets, oral nutritional supplements, and vitamin/mineral supplements  - Order, calculate, and assess calorie counts as needed  - Recommend, monitor, and adjust tube feedings and TPN/PPN based on assessed needs  - Assess need for intravenous fluids  - Provide specific nutrition/hydration education as appropriate  - Include patient/family/caregiver in decisions related to nutrition  Outcome: Progressing

## 2024-08-14 NOTE — PLAN OF CARE
Problem: Prexisting or High Potential for Compromised Skin Integrity  Goal: Skin integrity is maintained or improved  Description: INTERVENTIONS:  - Identify patients at risk for skin breakdown  - Assess and monitor skin integrity  - Assess and monitor nutrition and hydration status  - Monitor labs   - Assess for incontinence   - Turn and reposition patient  - Assist with mobility/ambulation  - Relieve pressure over bony prominences  - Avoid friction and shearing  - Provide appropriate hygiene as needed including keeping skin clean and dry  - Evaluate need for skin moisturizer/barrier cream  - Collaborate with interdisciplinary team   - Patient/family teaching  - Consider wound care consult   Outcome: Progressing     Problem: PAIN - ADULT  Goal: Verbalizes/displays adequate comfort level or baseline comfort level  Description: Interventions:  - Encourage patient to monitor pain and request assistance  - Assess pain using appropriate pain scale  - Administer analgesics based on type and severity of pain and evaluate response  - Implement non-pharmacological measures as appropriate and evaluate response  - Consider cultural and social influences on pain and pain management  - Notify physician/advanced practitioner if interventions unsuccessful or patient reports new pain  Outcome: Progressing     Problem: INFECTION - ADULT  Goal: Absence or prevention of progression during hospitalization  Description: INTERVENTIONS:  - Assess and monitor for signs and symptoms of infection  - Monitor lab/diagnostic results  - Monitor all insertion sites, i.e. indwelling lines, tubes, and drains  - Monitor endotracheal if appropriate and nasal secretions for changes in amount and color  - [place] appropriate cooling/warming therapies per order  - Administer medications as ordered  - Instruct and encourage patient and family to use good hand hygiene technique  - Identify and instruct in appropriate isolation precautions for  identified infection/condition  Outcome: Progressing  Goal: Absence of fever/infection during neutropenic period  Description: INTERVENTIONS:  - Monitor WBC    Outcome: Progressing     Problem: SAFETY ADULT  Goal: Maintain or return to baseline ADL function  Description: INTERVENTIONS:  -  Assess patient's ability to carry out ADLs; assess patient's baseline for ADL function and identify physical deficits which impact ability to perform ADLs (bathing, care of mouth/teeth, toileting, grooming, dressing, etc.)  - Assess/evaluate cause of self-care deficits   - Assess range of motion  - Assess patient's mobility; develop plan if impaired  - Assess patient's need for assistive devices and provide as appropriate  - Encourage maximum independence but intervene and supervise when necessary  - Involve family in performance of ADLs  - Assess for home care needs following discharge   - Consider OT consult to assist with ADL evaluation and planning for discharge  - Provide patient education as appropriate  Outcome: Progressing  Goal: Maintains/Returns to pre admission functional level  Description: INTERVENTIONS:  - Perform AM-PAC 6 Click Basic Mobility/ Daily Activity assessment daily.  - Set and communicate daily mobility goal to care team and patient/family/caregiver.   - Collaborate with rehabilitation services on mobility goals if consulted  - Perform Range of Motion  times a day.  - Reposition patient every  hours.  - Dangle patient  times a day  - Stand patient  times a day  - Ambulate patient  times a day  - Out of bed to chair  times a day   - Out of bed for meals  times a day  - Out of bed for toileting  - Record patient progress and toleration of activity level   Outcome: Progressing     Problem: DISCHARGE PLANNING  Goal: Discharge to home or other facility with appropriate resources  Description: INTERVENTIONS:  - Identify barriers to discharge w/patient and caregiver  - Arrange for needed discharge resources and  transportation as appropriate  - Identify discharge learning needs (meds, wound care, etc.)  - Arrange for interpretive services to assist at discharge as needed  - Refer to Case Management Department for coordinating discharge planning if the patient needs post-hospital services based on physician/advanced practitioner order or complex needs related to functional status, cognitive ability, or social support system  Outcome: Progressing     Problem: Knowledge Deficit  Goal: Patient/family/caregiver demonstrates understanding of disease process, treatment plan, medications, and discharge instructions  Description: Complete learning assessment and assess knowledge base.  Interventions:  - Provide teaching at level of understanding  - Provide teaching via preferred learning methods  Outcome: Progressing     Problem: SAFETY,RESTRAINT: NV/NON-SELF DESTRUCTIVE BEHAVIOR  Goal: Remains free of harm/injury (restraint for non violent/non self-detsructive behavior)  Description: INTERVENTIONS:  - Instruct patient/family regarding restraint use   - Assess and monitor physiologic and psychological status   - Provide interventions and comfort measures to meet assessed patient needs   - Identify and implement measures to help patient regain control  - Assess readiness for release of restraint   Outcome: Progressing  Goal: Returns to optimal restraint-free functioning  Description: INTERVENTIONS:  - Assess the patient's behavior and symptoms that indicate continued need for restraint  - Identify and implement measures to help patient regain control  - Assess readiness for release of restraint   Outcome: Progressing     Problem: Potential for Falls  Goal: Patient will remain free of falls  Description: INTERVENTIONS:  - Educate patient/family on patient safety including physical limitations  - Instruct patient to call for assistance with activity   - Consult OT/PT to assist with strengthening/mobility   - Keep Call bell within  reach  - Keep bed low and locked with side rails adjusted as appropriate  - Keep care items and personal belongings within reach  - Initiate and maintain comfort rounds  - Make Fall Risk Sign visible to staff  - Offer Toileting every  Hours, in advance of need  - Initiate/Maintain alarm  - Obtain necessary fall risk management equipment:   - Apply yellow socks and bracelet for high fall risk patients  - Consider moving patient to room near nurses station  Outcome: Progressing     Problem: Nutrition/Hydration-ADULT  Goal: Nutrient/Hydration intake appropriate for improving, restoring or maintaining nutritional needs  Description: Monitor and assess patient's nutrition/hydration status for malnutrition. Collaborate with interdisciplinary team and initiate plan and interventions as ordered.  Monitor patient's weight and dietary intake as ordered or per policy. Utilize nutrition screening tool and intervene as necessary. Determine patient's food preferences and provide high-protein, high-caloric foods as appropriate.     INTERVENTIONS:  - Monitor oral intake, urinary output, labs, and treatment plans  - Assess nutrition and hydration status and recommend course of action  - Evaluate amount of meals eaten  - Assist patient with eating if necessary   - Allow adequate time for meals  - Recommend/ encourage appropriate diets, oral nutritional supplements, and vitamin/mineral supplements  - Order, calculate, and assess calorie counts as needed  - Recommend, monitor, and adjust tube feedings and TPN/PPN based on assessed needs  - Assess need for intravenous fluids  - Provide specific nutrition/hydration education as appropriate  - Include patient/family/caregiver in decisions related to nutrition  Outcome: Progressing

## 2024-08-14 NOTE — OCCUPATIONAL THERAPY NOTE
Occupational Therapy Cancellation Note       08/14/24 1020   Note Type   Note Type Cancelled Session   Cancel Reasons Refusal  (Chart review completed. Contact made with patient. Pt refusing therapy at this time, reporting RLE pain and fatigue. Provided education re: benefits of therapy and importance of participating however pt cont to refuse. Will folllow-up as time/schedule permits.)     Nadine Ivey OTR/L   [place] License # [id number]  [place] License # [id number]

## 2024-08-14 NOTE — CASE MANAGEMENT
Case Management Progress Note    Patient name David Skelton Jr.  Location W /W -01 MRN [id number]  : 1960 Date 2024       LOS (days): 8  Geometric Mean LOS (GMLOS) (days): 5  Days to GMLOS:-2.8        OBJECTIVE:        Current admission status: Inpatient  Preferred Pharmacy:   [id number] [place] - [address]  [address]  [place] [address]  Phone: [phone] Fax: [phone]    [id number] - [place] - [address]  [address]  [place] [address]  Phone: [phone] Fax: [phone]    [facility] [place] - [address]  [address]  [place] [address]  Phone: [phone] Fax: [phone]    Primary Care Provider: Casey Mckeon MD    Primary Insurance: [facility]  Secondary Insurance:     PROGRESS NOTE:  Patient discussed at weekly LOS meeting.  Assigned CM following for discharge planning.  Barriers to rehab placement : lack of facilities able to accept with patient on methadone. Search expanded beyond [facility] to see if any facilities can accommodate this need. Home vs rehab may need to be explored.

## 2024-08-14 NOTE — PHYSICAL THERAPY NOTE
PHYSICAL THERAPY TREATMENT  DATE: 08/14/24  TIME: 9251-3224    NAME:  David Skelton Jr.  AGE:   64 y.o.  Mrn:   [id number]  Length Of Stay: 8    ADMIT DX:  Methadone use [F11.90]  Trochanteric fracture of right femur (HCC) [S72.101A]  Unspecified multiple injuries, initial encounter [T07.XXXA]    Past Medical History:   Diagnosis Date    Anxiety     Depression     Diabetes mellitus (HCC)     Drug use     Hemorrhoids, internal 9/23/2019    Hypertension      Past Surgical History:   Procedure Laterality Date    FEMUR FRACTURE SURGERY Right     GASTRIC BYPASS  11/08/2011    Managed by: Braden Luna (General Surgery)    HERNIA REPAIR  02/13/2013    Incisional hernia repair Managed by: Braden Luna (General Surgery)    MA OPTX FEM SHFT FX W/INSJ IMED IMPLT W/WO SCREW Right 7/31/2024    Procedure: INSERTION NAIL IM FEMUR ANTEGRADE (TROCHANTERIC);  Surgeon: Meng Hill MD;  [facility]: [facility];  Service: Orthopedics    TONSILLECTOMY  2010       Performed at least 2 patient identifiers during session: Name, ID bracelet, and Epic photo     08/14/24 1342   PT Last Visit   PT Visit Date 08/14/24   Note Type   Note Type Treatment   Pain Assessment   Pain Assessment Tool 0-10   Pain Score 8   Pain Location/Orientation Orientation: Right;Location: Hip   Pain Onset/Description Onset: Ongoing;Descriptor: Aching;Descriptor: Discomfort   Effect of Pain on Daily Activities limits comfort, limits gait mechanics and speed   Patient's Stated Pain Goal No pain   Hospital Pain Intervention(s) Medication (See MAR);Cold applied;Ambulation/increased activity;Emotional support   Multiple Pain Sites No   Restrictions/Precautions   Weight Bearing Precautions Per Order Yes   RLE Weight Bearing Per Order WBAT  (s/p R femur IM nail 7/31/24 by Dr. Hill)   Other Precautions Cognitive;Chair Alarm;Bed Alarm;Impulsive;Fall Risk;Pain   General   Chart Reviewed Yes   Additional Pertinent History Pt is a 64 yr old male admitted  "8/6/24 from home s/p fall, c/o R hip pain, AMS. Of note, pt with recent R femur IM nail at [facility] 7/31/24, WBAT R LE. Unwitnessed fall in ED this visit. 8/12/24 PM RR called d/t seizure like activity lasting 4-5 minutes, hypertension, O2 desat. However pt presents back to baseline at this time.   Response to Previous Treatment Patient with no complaints from previous session.   Family/Caregiver Present No   Cognition   Overall Cognitive Status Impaired   Arousal/Participation Alert;Cooperative   Attention Attends with cues to redirect   Orientation Level Oriented X4   Memory Decreased recall of precautions   Following Commands Follows one step commands without difficulty   Comments pt remains pleasant and cooperative t/o session   Subjective   Subjective \"I think I need this leg to loosen up.\"   Bed Mobility   Additional Comments Bed mobility NT on this date as pt presents and was left seated OOB in recliner chair with alarm engaged.   Transfers   Sit to Stand 6  Modified independent   Additional items Armrests  (RW)   Stand to Sit 5  Supervision   Additional items Assist x 1;Increased time required;Verbal cues;Armrests  (RW)   Stand pivot 5  Supervision   Additional items Assist x 1;Increased time required;Verbal cues  (RW - cues for surface proximity and safety)   Ambulation/Elevation   Gait pattern Antalgic;Forward Flexion;Decreased foot clearance;Decreased R stance;Short stride;Decreased heel strike  (R LE ER during swing phase)   Gait Assistance 5  Supervision  (predominantly S, x1 episode of CGA during return to room due to impulsivity, but pt able to slow and regroup self to return to S level)   Additional items Assist x 1;Verbal cues   Assistive Device Rolling walker   Distance 140ft x1 with change in direction prison   Stair Management Assistance Not tested  (pt denies having stairs at home, or at his sister's home which he is planned to dc to.)   Balance   Static Sitting Good   Dynamic Sitting Fair + "   Static Standing Fair +  (w/ RW)   Dynamic Standing Fair  (w/ RW)   Ambulatory Fair  (w/ RW)   Endurance Deficit   Endurance Deficit Yes   Activity Tolerance   Activity Tolerance Patient limited by pain   Medical Staff Made Aware Spoke with CM, RN   Exercises   Hip Flexion Sitting;20 reps;AROM;Bilateral  (x1 set)   Hip Abduction Sitting;20 reps;AROM;Bilateral  (x1 set)   Knee AROM Long Arc Quad Sitting;20 reps;AROM;Bilateral  (x2 sets)   Ankle Pumps Sitting;20 reps;AROM;Bilateral  (x2 sets)   Assessment   Prognosis Good   Problem List Decreased strength;Decreased range of motion;Decreased endurance;Impaired balance;Decreased mobility;Decreased cognition;Impaired judgement;Decreased safety awareness;Decreased skin integrity;Pain   Assessment Pt seen for PT treatment 8/14/2024 with focus on: transfers and gait training. Pt presents seated OOB in recliner chair, is agreeable to participate in session, despite ongoing pain levels. Pt participates in gait training and therapeutic exercise, with intervention focusing on goal of improved independence and safety with mobility. Pt completes sit>stand transfers at VEGA level, but requires S for stand>sit. Pt ambulates 140ft with RW and primarily S (x1 episode of CGA due to impulsivity but able to regain stability and regroup self to improve safety). While seated at EOB, pt completes above outlined LE therex, with therapist cues for technique and pacing. At end of session pt was left seated OOB in recliner chair with alarm engaged and needs in reach, care returned to RN. Overall pt displays significant improvement in functional mobility as compared to previous session, however continues to perform below their baseline level of functional mobility due to pain, weakness, impaired balance, impulsivity. Pt continues to most appropriately benefit from Level III (minimal PT intensity) resources upon d/c from the acute care setting. Continue skilled PT POC as able and appropriate in  "order to progress towards therapy goals and maximize independence with functional mobility.   Barriers to Discharge Decreased caregiver support   Goals   Patient Goals \"to have less pain\"   [facility] Expiration Date 08/21/24   Short Term Goal #1 Patient PT goals established in order to address pt self reported goal of \"to go home\". Pt will: complete all bed mobility independently in flat bed in order to promote increased OOB functional mobility and simulate home environment; complete all transfers with LRAD at VEGA level in order to increase safety with functional mobility; ambulate >150ft with LRAD at VEGA level in order to increase safety with household and short community distance functional mobility; improve B LE strength to >/= 4/5 MMT t/o in order to increase safety with functional mobility and decrease risk of falls; demonstrate understanding and independence with LE strengthening HEP; improve ambulatory balance to >/= good grade with LRAD in order to promote safety and increased independence with mobility; tolerate >3hrs OOB in upright position, in order to improve muscular endurance and respiratory status; improve AM-PAC score to >/= 21/24 in order to increase independence with mobility and decrease burden of care; improve Barthel Index score to >/= 50/100 in order to increase independence and decrease risk of falls.   PT Treatment Day 4   Plan   Treatment/Interventions Therapeutic exercise;Endurance training;Cognitive reorientation;Patient/family training;Equipment eval/education;Bed mobility;Gait training;Spoke to nursing;Spoke to case management;LE strengthening/ROM   Progress Progressing toward goals   PT Frequency 3-5x/wk   Discharge Recommendation   Rehab Resource Intensity Level, PT III (Minimum Resource Intensity)   Equipment Recommended Walker   Walker Package Recommended Wheeled walker   Change/add to Walker Package? No   AM-PAC Basic Mobility Inpatient   Turning in Flat Bed Without Bedrails 4   Lying " on Back to Sitting on Edge of Flat Bed Without Bedrails 3   Moving Bed to Chair 3   Standing Up From Chair Using Arms 3   Walk in Room 3   Climb 3-5 Stairs With Railing 2   Basic Mobility Inpatient Raw Score 18   Basic Mobility Standardized Score 41.05   [facility] Highest Level Of Mobility   -HLM Goal 6: Walk 10 steps or more   JH-HLM Achieved 7: Walk 25 feet or more   Education   Education Provided Mobility training;Home exercise program;Assistive device   Patient Explanation/teachback used;Reinforcement needed   End of Consult   Patient Position at End of Consult Bedside chair;Bed/Chair alarm activated;All needs within reach       Based on patient's [facility] Highest Level of Mobility scores today, patient currently has a goal of -HLM Levels: 7: WALK 25 FEET OR MORE, to be completed with RN staffing each shift, in order to improve overall activity tolerance and mobility, combat hospital related deconditioning, and maximize outcomes for d/c from the acute care setting.     The patient's AM-PAC Basic Mobility Inpatient Short Form Raw Score is 18. A Raw score of greater than 16 suggests the patient may benefit from discharge to home. Please also refer to the recommendation of the Physical Therapist for safe discharge planning.        Martha Toussaint PT, DPT   Available via [employer]  NPI # [id number]  [place] License - [id number]  8/14/2024

## 2024-08-14 NOTE — CASE MANAGEMENT
Case Management Progress Note    Patient name David Skelton Jr.  Location W /W -01 MRN [id number]  : 1960 Date 2024       LOS (days): 8  Geometric Mean LOS (GMLOS) (days): 5  Days to GMLOS:-2.9        OBJECTIVE:        Current admission status: Inpatient  Preferred Pharmacy:   [id number] [place], [place] - [address]  [address]  [place] [address]  Phone: [phone] Fax: [phone]    [facility][id number] - [place] - [address]  [address]  [place] [address]  Phone: [phone] Fax: [phone]    [facility] [place] - [place] - [address]  [address]  [place] [address]  Phone: [phone] Fax: [phone]    Primary Care Provider: Casey Mckeon MD    Primary Insurance: [facility]  Secondary Insurance:     PROGRESS NOTE:    CM contacted pt's sister to review plan for DC and explain there are no facilities that are able to accept pt at this time due to methadone use.  Sister continued to express she is not able to accept pt as he is at this time.  She IS willing to assist pt at her home in the event he is able to ambulate on his own.      Sister reports pt has a CW with the co and has HHA's that come in to assist him.  She will be getting in contact with this CW in order to obtain more hours in the home for him.  CM is available to assist with this as well.      CM contacted PT in order to request pt be seen each day in order for pt to improve.  Per PT, pt is doing much better however is limited by pain.  Pt does not have stairs.  Pt has elevator.  Plan iwll be for DC Friday to home.

## 2024-08-15 LAB
GLUCOSE SERPL-MCNC: 147 MG/DL (ref 65–140)
GLUCOSE SERPL-MCNC: 202 MG/DL (ref 65–140)
GLUCOSE SERPL-MCNC: 258 MG/DL (ref 65–140)
GLUCOSE SERPL-MCNC: 266 MG/DL (ref 65–140)

## 2024-08-15 PROCEDURE — 82948 REAGENT STRIP/BLOOD GLUCOSE: CPT

## 2024-08-15 PROCEDURE — 99232 SBSQ HOSP IP/OBS MODERATE 35: CPT | Performed by: PSYCHIATRY & NEUROLOGY

## 2024-08-15 PROCEDURE — 99231 SBSQ HOSP IP/OBS SF/LOW 25: CPT | Performed by: SURGERY

## 2024-08-15 PROCEDURE — 99232 SBSQ HOSP IP/OBS MODERATE 35: CPT | Performed by: FAMILY MEDICINE

## 2024-08-15 RX ORDER — ALPRAZOLAM 0.5 MG
0.5 TABLET ORAL DAILY PRN
Status: DISCONTINUED | OUTPATIENT
Start: 2024-08-15 | End: 2024-08-18 | Stop reason: HOSPADM

## 2024-08-15 RX ORDER — ALPRAZOLAM 0.5 MG
0.5 TABLET ORAL DAILY PRN
Status: DISCONTINUED | OUTPATIENT
Start: 2024-08-15 | End: 2024-08-15

## 2024-08-15 RX ORDER — GABAPENTIN 300 MG/1
300 CAPSULE ORAL 3 TIMES DAILY
Status: DISCONTINUED | OUTPATIENT
Start: 2024-08-15 | End: 2024-08-18 | Stop reason: HOSPADM

## 2024-08-15 RX ORDER — REPAGLINIDE 1 MG/1
1 TABLET ORAL
Status: DISCONTINUED | OUTPATIENT
Start: 2024-08-15 | End: 2024-08-18 | Stop reason: HOSPADM

## 2024-08-15 RX ADMIN — GABAPENTIN 300 MG: 300 CAPSULE ORAL at 21:45

## 2024-08-15 RX ADMIN — POLYETHYLENE GLYCOL 3350 17 G: 17 POWDER, FOR SOLUTION ORAL at 08:40

## 2024-08-15 RX ADMIN — Medication 3 MG: at 21:45

## 2024-08-15 RX ADMIN — Medication 190 MG: at 05:56

## 2024-08-15 RX ADMIN — ENOXAPARIN SODIUM 30 MG: 30 INJECTION SUBCUTANEOUS at 05:55

## 2024-08-15 RX ADMIN — SENNOSIDES AND DOCUSATE SODIUM 2 TABLET: 8.6; 5 TABLET ORAL at 08:40

## 2024-08-15 RX ADMIN — ACETAMINOPHEN 975 MG: 325 TABLET, FILM COATED ORAL at 21:44

## 2024-08-15 RX ADMIN — GABAPENTIN 300 MG: 300 CAPSULE ORAL at 15:41

## 2024-08-15 RX ADMIN — REPAGLINIDE 1 MG: 1 TABLET ORAL at 15:42

## 2024-08-15 RX ADMIN — SENNOSIDES AND DOCUSATE SODIUM 2 TABLET: 8.6; 5 TABLET ORAL at 17:12

## 2024-08-15 RX ADMIN — Medication 7.5 MG: at 21:45

## 2024-08-15 RX ADMIN — INSULIN LISPRO 3 UNITS: 100 INJECTION, SOLUTION INTRAVENOUS; SUBCUTANEOUS at 12:00

## 2024-08-15 RX ADMIN — INSULIN LISPRO 2 UNITS: 100 INJECTION, SOLUTION INTRAVENOUS; SUBCUTANEOUS at 21:44

## 2024-08-15 RX ADMIN — ACETAMINOPHEN 975 MG: 325 TABLET, FILM COATED ORAL at 05:56

## 2024-08-15 RX ADMIN — Medication 12.5 MG: at 05:56

## 2024-08-15 RX ADMIN — REPAGLINIDE 1 MG: 1 TABLET ORAL at 12:00

## 2024-08-15 RX ADMIN — LIDOCAINE 1 PATCH: 700 PATCH TOPICAL at 15:41

## 2024-08-15 RX ADMIN — AMLODIPINE BESYLATE 5 MG: 5 TABLET ORAL at 08:40

## 2024-08-15 RX ADMIN — GABAPENTIN 100 MG: 100 CAPSULE ORAL at 08:40

## 2024-08-15 RX ADMIN — INSULIN GLARGINE 20 UNITS: 100 INJECTION, SOLUTION SUBCUTANEOUS at 21:44

## 2024-08-15 RX ADMIN — ENOXAPARIN SODIUM 30 MG: 30 INJECTION SUBCUTANEOUS at 17:12

## 2024-08-15 RX ADMIN — Medication 7.5 MG: at 12:11

## 2024-08-15 RX ADMIN — ACETAMINOPHEN 975 MG: 325 TABLET, FILM COATED ORAL at 15:39

## 2024-08-15 RX ADMIN — INSULIN LISPRO 3 UNITS: 100 INJECTION, SOLUTION INTRAVENOUS; SUBCUTANEOUS at 15:51

## 2024-08-15 RX ADMIN — ALPRAZOLAM 0.5 MG: 0.5 TABLET ORAL at 20:10

## 2024-08-15 NOTE — PROGRESS NOTES
Patient:  RICHAR CASTELLANOS    MRN:  [id number]    Nikita Request ID:  [id number]    Level of care reserved:  Home Health Agency    Partner Reserved:  [facility], [place] [address] [phone]    Clinical needs requested:    Geography searched:  [address]    Start of Service:    Request sent:  2:43pm EDT on 8/15/2024 by Diann Lobo    Partner reserved:  3:44pm EDT on 8/15/2024 by Diann Lobo    Choice list shared:  3:44pm EDT on 8/15/2024 by Diann Lobo

## 2024-08-15 NOTE — QUICK NOTE
Glycemic trends reviewed  Progress notes from today reviewed  Plan does appear to be for discharge home with sister    Add Prandin 1 mg with meals  Continue Lantus 20 units daily, Algorithm 3  See progress note from 8/14 for discharge recommendations

## 2024-08-15 NOTE — PLAN OF CARE
Problem: Potential for Falls  Goal: Patient will remain free of falls  Description: INTERVENTIONS:  - Educate patient/family on patient safety including physical limitations  - Instruct patient to call for assistance with activity   - Consult OT/PT to assist with strengthening/mobility   - Keep Call bell within reach  - Keep bed low and locked with side rails adjusted as appropriate  - Keep care items and personal belongings within reach  - Initiate and maintain comfort rounds  - Make Fall Risk Sign visible to staff  - Offer Toileting every  Hours, in advance of need  - Initiate/Maintain alarm  - Obtain necessary fall risk management equipment:   - Apply yellow socks and bracelet for high fall risk patients  - Consider moving patient to room near nurses station  Outcome: Progressing     Problem: Prexisting or High Potential for Compromised Skin Integrity  Goal: Skin integrity is maintained or improved  Description: INTERVENTIONS:  - Identify patients at risk for skin breakdown  - Assess and monitor skin integrity  - Assess and monitor nutrition and hydration status  - Monitor labs   - Assess for incontinence   - Turn and reposition patient  - Assist with mobility/ambulation  - Relieve pressure over bony prominences  - Avoid friction and shearing  - Provide appropriate hygiene as needed including keeping skin clean and dry  - Evaluate need for skin moisturizer/barrier cream  - Collaborate with interdisciplinary team   - Patient/family teaching  - Consider wound care consult   Outcome: Progressing     Problem: PAIN - ADULT  Goal: Verbalizes/displays adequate comfort level or baseline comfort level  Description: Interventions:  - Encourage patient to monitor pain and request assistance  - Assess pain using appropriate pain scale  - Administer analgesics based on type and severity of pain and evaluate response  - Implement non-pharmacological measures as appropriate and evaluate response  - Consider cultural and  social influences on pain and pain management  - Notify physician/advanced practitioner if interventions unsuccessful or patient reports new pain  Outcome: Progressing     Problem: INFECTION - ADULT  Goal: Absence or prevention of progression during hospitalization  Description: INTERVENTIONS:  - Assess and monitor for signs and symptoms of infection  - Monitor lab/diagnostic results  - Monitor all insertion sites, i.e. indwelling lines, tubes, and drains  - Monitor endotracheal if appropriate and nasal secretions for changes in amount and color  - [place] appropriate cooling/warming therapies per order  - Administer medications as ordered  - Instruct and encourage patient and family to use good hand hygiene technique  - Identify and instruct in appropriate isolation precautions for identified infection/condition  Outcome: Progressing  Goal: Absence of fever/infection during neutropenic period  Description: INTERVENTIONS:  - Monitor WBC    Outcome: Progressing     Problem: SAFETY ADULT  Goal: Maintain or return to baseline ADL function  Description: INTERVENTIONS:  -  Assess patient's ability to carry out ADLs; assess patient's baseline for ADL function and identify physical deficits which impact ability to perform ADLs (bathing, care of mouth/teeth, toileting, grooming, dressing, etc.)  - Assess/evaluate cause of self-care deficits   - Assess range of motion  - Assess patient's mobility; develop plan if impaired  - Assess patient's need for assistive devices and provide as appropriate  - Encourage maximum independence but intervene and supervise when necessary  - Involve family in performance of ADLs  - Assess for home care needs following discharge   - Consider OT consult to assist with ADL evaluation and planning for discharge  - Provide patient education as appropriate  Outcome: Progressing  Goal: Maintains/Returns to pre admission functional level  Description: INTERVENTIONS:  - Perform AM-PAC 6 Click Basic  Mobility/ Daily Activity assessment daily.  - Set and communicate daily mobility goal to care team and patient/family/caregiver.   - Collaborate with rehabilitation services on mobility goals if consulted  - Perform Range of Motion  times a day.  - Reposition patient every  hours.  - Dangle patient  times a day  - Stand patient  times a day  - Ambulate patient  times a day  - Out of bed to chair  times a day   - Out of bed for meals  times a day  - Out of bed for toileting  - Record patient progress and toleration of activity level   Outcome: Progressing     Problem: DISCHARGE PLANNING  Goal: Discharge to home or other facility with appropriate resources  Description: INTERVENTIONS:  - Identify barriers to discharge w/patient and caregiver  - Arrange for needed discharge resources and transportation as appropriate  - Identify discharge learning needs (meds, wound care, etc.)  - Arrange for interpretive services to assist at discharge as needed  - Refer to Case Management Department for coordinating discharge planning if the patient needs post-hospital services based on physician/advanced practitioner order or complex needs related to functional status, cognitive ability, or social support system  Outcome: Progressing     Problem: Knowledge Deficit  Goal: Patient/family/caregiver demonstrates understanding of disease process, treatment plan, medications, and discharge instructions  Description: Complete learning assessment and assess knowledge base.  Interventions:  - Provide teaching at level of understanding  - Provide teaching via preferred learning methods  Outcome: Progressing     Problem: Nutrition/Hydration-ADULT  Goal: Nutrient/Hydration intake appropriate for improving, restoring or maintaining nutritional needs  Description: Monitor and assess patient's nutrition/hydration status for malnutrition. Collaborate with interdisciplinary team and initiate plan and interventions as ordered.  Monitor patient's  weight and dietary intake as ordered or per policy. Utilize nutrition screening tool and intervene as necessary. Determine patient's food preferences and provide high-protein, high-caloric foods as appropriate.     INTERVENTIONS:  - Monitor oral intake, urinary output, labs, and treatment plans  - Assess nutrition and hydration status and recommend course of action  - Evaluate amount of meals eaten  - Assist patient with eating if necessary   - Allow adequate time for meals  - Recommend/ encourage appropriate diets, oral nutritional supplements, and vitamin/mineral supplements  - Order, calculate, and assess calorie counts as needed  - Recommend, monitor, and adjust tube feedings and TPN/PPN based on assessed needs  - Assess need for intravenous fluids  - Provide specific nutrition/hydration education as appropriate  - Include patient/family/caregiver in decisions related to nutrition  Outcome: Progressing

## 2024-08-15 NOTE — PROGRESS NOTES
Progress Note - Behavioral Health   David Skelton Jr. 64 y.o. male MRN: [id number]  Unit/Bed#: W -01 Encounter: [id number]    Assessment    David Skelton Jr. is a 64 y.o. male, with past psychiatric history of bipolar disorder and opiate use disorder maintained on methadone as per chart history, and past medical history of CKD, hypertension, cholelithiasis, chronic hepatitis B and C, hypogonadism, right hip fracture and suspected baseline dementia who was admitted to [facility] on 8/6/2024 due to a fall. Psychiatry was consulted to do a history of bipolar disorder.  At present, patient is calm and cooperative, and does not wish to begin any mood stabilizer or antidepressant.  Patient does display evidence of possible major neurocognitive disorder upon exam, and was confused intermittently during evaluation, however he was found to have capacity by neuro psych. Patient does have as needed Xanax which he uses infrequently for anxiety.  Discussed case with geriatrics.  In light of possible seizure-like event, we will keep Xanax as a as needed for seizure activity.  Do not recommend using Xanax in the future due to patient's history of frequent falls, possible neurocognitive disorder, and history of delirium and it should be discontinued upon discharge. Started gabapentin 100 mg 3 times daily for anxiety and neuropathic pain, currently not experiencing any side effects. Will increase Gabapentin to 300 mg TID to better control anxiety and pain. Patient does not meet criteria for inpatient psychiatric hospitalization at this time.      Principal Psychiatric Problem:  Unspecified mood disorder  Differential: Bipolar disorder vs major depressive disorder. Suspicion of possible major neurocognitive disorder    Active Problems:    Bipolar disorder (HCC)    Benign essential hypertension    Type 2 diabetes mellitus with ophthalmic complication, with long-term current use of insulin (HCC)    Vitamin D deficiency     Adrenal nodule (HCC)    Methadone dependence (HCC)    Encephalopathy    Fall    B12 deficiency    Right hip pain    Hyponatremia    Cognitive decline    Ambulatory dysfunction    Prolonged QT interval    Left ventricular hypertrophy    Acute blood loss anemia    Chronic hepatitis (HCC)    BPH (benign prostatic hyperplasia)    Cholelithiasis    CKD (chronic kidney disease), stage II    Hypogonadism in male    Electrolyte abnormality      Plan  Discussed plan with primary team as follows:  Increase Gabapentin to 300 mg TID for anxiety and neuropathic pain  Patient does not meet criteria for inpatient psychiatric hospitalization.  Will sign off at this time, please reach out to on-call Psychiatry team via [facility] chat or if after hours/Fri/Sat/Sunday contact on-call psychiatric service via [employer] ([phone]) with any questions or concerns.    ------------------------------------------------------------    Subjective:     Patient was seen and evaluated for continuity of care. On evaluation, patient was calm and cooperative.  Patient was found to have capacity to make medical decisions by neuropsychology yesterday.  On exam, he denies any issues or adverse effects starting gabapentin yesterday.  Patient A and O x 4, but does get confused at times during evaluation.  Patient reports being in some pain currently on his left hip and leg. However, he states that he has been able to walk down the perez. States intention to go to a rehab facility on discharge.  Discussed with patient regarding discontinuation of Xanax for anxiety in the future.  Patient was in agreement, wanting to prevent any future falls.  Patient agreeable to increasing gabapentin to 300 mg 3 times daily to help neuropathic pain and anxiety.  Denies suicidal ideation, homicidal ideation, auditory hallucination or visual hallucination at this time.       Psychiatric Review of Systems:  Behavior over the last 24 hours: unchanged  Sleep:   "decreased  Appetite: adequate  Medication side effects: none verbalized  ROS: Complete review of systems is negative except as noted above.    Vital signs in last 24 hours:  Temp:  [98 °F (36.7 °C)-98.2 °F (36.8 °C)] 98.2 °F (36.8 °C)  HR:  [58-86] 58  Resp:  [12-16] 13  BP: (102-155)/(62-82) 104/62    Mental Status Exam:  Appearance:  alert, good eye contact, appears stated age, casually dressed, and appropriate grooming and hygiene   Behavior:  calm and cooperative   Motor: no abnormal movements   Speech:  Normal volume, slow   Mood:  \"alright\"   Affect:  euthymic and bright at times   Thought Process:  circumstantial, decreased rate of thoughts   Thought Content: no verbalized delusions or overt paranoia   Perceptual disturbances: no reported hallucinations and does not appear to be responding to internal stimuli at this time   Risk Potential: No active or passive suicidal or homicidal ideation was verbalized during interview   Cognition: oriented to self and situation, appears to be of average intelligence, and cognition not formally tested   Insight:  impaired   Judgment: impaired     Current Medications:  All current medications have been reviewed.  Current Facility-Administered Medications   Medication Dose Route Frequency Provider Last Rate    acetaminophen  975 mg Oral Q8H MELISA Jak Randolph PA-C      ALPRAZolam  0.5 mg Oral BID PRN Logan Osman MD      amLODIPine  5 mg Oral Daily Anamaria Ayers PA-C      enoxaparin  30 mg Subcutaneous Q12H Briseyda Singh MD      ergocalciferol  50,000 Units Oral Once per day on Monday Wednesday Friday Peng Mcknight MD      gabapentin  300 mg Oral TID Logan Osman MD      insulin glargine  20 Units Subcutaneous HS Yesica Cornejo DO      insulin lispro  1-6 Units Subcutaneous TID AC STEVE Blum      insulin lispro  1-6 Units Subcutaneous HS STEVE Blum      lidocaine  1 patch Topical Daily Jak Randolph PA-C      melatonin  3 " mg Oral HS Lady Oreilly MD      methadone  190 mg Oral Q24H Enrrique Roy MD      metoprolol tartrate  12.5 mg Oral QAM Enrrique Roy MD      naloxone  0.4 mg Intravenous Q1MIN PRN Enrrique Roy MD      ondansetron  4 mg Oral Q6H PRN Jak Randolph PA-C      oxyCODONE  5 mg Oral Q4H PRN STEVE Galvan      Or    oxyCODONE  7.5 mg Oral Q4H PRN STEVE Galvan      polyethylene glycol  17 g Oral Daily Jak Randolph PA-C      repaglinide  1 mg Oral TID AC Johnnie Long MD      senna-docusate sodium  2 tablet Oral BID STEVE Galvan         Laboratory results:  I have personally reviewed all pertinent laboratory/tests results.  Recent Results (from the past 48 hour(s))   Fingerstick Glucose (POCT)    Collection Time: 08/13/24  3:55 PM   Result Value Ref Range    POC Glucose 103 65 - 140 mg/dl   Fingerstick Glucose (POCT)    Collection Time: 08/13/24  9:05 PM   Result Value Ref Range    POC Glucose 241 (H) 65 - 140 mg/dl   CBC and differential    Collection Time: 08/14/24  6:15 AM   Result Value Ref Range    WBC 12.35 (H) 4.31 - 10.16 Thousand/uL    RBC 3.22 (L) 3.88 - 5.62 Million/uL    Hemoglobin 9.2 (L) 12.0 - 17.0 g/dL    Hematocrit 28.7 (L) 36.5 - 49.3 %    MCV 89 82 - 98 fL    MCH 28.6 26.8 - 34.3 pg    MCHC 32.1 31.4 - 37.4 g/dL    RDW 14.7 11.6 - 15.1 %    MPV 9.8 8.9 - 12.7 fL    Platelets 648 (H) 149 - 390 Thousands/uL    nRBC 0 /100 WBCs    Segmented % 74 43 - 75 %    Immature Grans % 1 0 - 2 %    Lymphocytes % 11 (L) 14 - 44 %    Monocytes % 9 4 - 12 %    Eosinophils Relative 5 0 - 6 %    Basophils Relative 0 0 - 1 %    Absolute Neutrophils 9.19 (H) 1.85 - 7.62 Thousands/µL    Absolute Immature Grans 0.14 0.00 - 0.20 Thousand/uL    Absolute Lymphocytes 1.30 0.60 - 4.47 Thousands/µL    Absolute Monocytes 1.11 0.17 - 1.22 Thousand/µL    Eosinophils Absolute 0.56 0.00 - 0.61 Thousand/µL    Basophils Absolute 0.05 0.00 - 0.10 Thousands/µL   Basic metabolic panel     Collection Time: 08/14/24  6:15 AM   Result Value Ref Range    Sodium 135 135 - 147 mmol/L    Potassium 4.5 3.5 - 5.3 mmol/L    Chloride 100 96 - 108 mmol/L    CO2 31 21 - 32 mmol/L    ANION GAP 4 4 - 13 mmol/L    BUN 24 5 - 25 mg/dL    Creatinine 0.85 0.60 - 1.30 mg/dL    Glucose 152 (H) 65 - 140 mg/dL    Calcium 8.5 8.4 - 10.2 mg/dL    eGFR 93 ml/min/1.73sq m   Fingerstick Glucose (POCT)    Collection Time: 08/14/24  7:55 AM   Result Value Ref Range    POC Glucose 164 (H) 65 - 140 mg/dl   Fingerstick Glucose (POCT)    Collection Time: 08/14/24 10:40 AM   Result Value Ref Range    POC Glucose 186 (H) 65 - 140 mg/dl   Fingerstick Glucose (POCT)    Collection Time: 08/14/24 12:41 PM   Result Value Ref Range    POC Glucose 178 (H) 65 - 140 mg/dl   Fingerstick Glucose (POCT)    Collection Time: 08/14/24  3:46 PM   Result Value Ref Range    POC Glucose 275 (H) 65 - 140 mg/dl   Fingerstick Glucose (POCT)    Collection Time: 08/14/24  8:26 PM   Result Value Ref Range    POC Glucose 364 (H) 65 - 140 mg/dl   Fingerstick Glucose (POCT)    Collection Time: 08/15/24  7:45 AM   Result Value Ref Range    POC Glucose 147 (H) 65 - 140 mg/dl   Fingerstick Glucose (POCT)    Collection Time: 08/15/24 11:06 AM   Result Value Ref Range    POC Glucose 266 (H) 65 - 140 mg/dl        [facility]  Medical Student, OMS-IV

## 2024-08-15 NOTE — CASE MANAGEMENT
Case Management Discharge Planning Note    Patient name David Skelton Jr.  Location W /W -01 MRN [id number]  : 1960 Date 8/15/2024       Current Admission Date: 2024  Current Admission Diagnosis:Methadone dependence (HCC)   Patient Active Problem List    Diagnosis Date Noted Date Diagnosed    Cognitive decline 2024     Ambulatory dysfunction 2024     Prolonged QT interval 2024     Left ventricular hypertrophy 2024     Acute blood loss anemia 2024     Chronic hepatitis (HCC) 2024     BPH (benign prostatic hyperplasia) 2024     Cholelithiasis 2024     CKD (chronic kidney disease), stage II 2024     Hypogonadism in male 2024     Electrolyte abnormality 2024     Hyponatremia 2024     Right hip pain 2024     Acute blood loss anemia (ABLA) 2024     Nocturia associated with benign prostatic hyperplasia 2024     Gross hematuria 2024     Closed fracture of right femur, unspecified fracture morphology, sequela 2024     B12 deficiency 2023     Bradycardia 2023     Retinopathy due to secondary DM (HCC) 2023     Visual changes 2023     Fall 2023     Closed fracture of multiple ribs of left side 2023     Methadone dependence (HCC) 2023     Encephalopathy 2023     Liver lesion, right lobe 2021     Lung nodule 2021     Adrenal nodule (HCC) 2021     Thoracic degenerative disc disease 2021     DDD (degenerative disc disease), lumbar 2021     Pre-operative general physical examination 2019     S/P gastric bypass 11/15/2019     Hypogonadism male 11/15/2019     Hemorrhoids, internal 2019     Rectal prolapse 2019     Drug-induced constipation 2019     Chronic anticoagulation 2019     GERD without esophagitis 2019     Iron deficiency anemia 2019     Major depression, chronic 2019     Mixed  hyperlipidemia 03/01/2019     Stable proliferative diabetic retinopathy of left eye associated with type 2 diabetes mellitus (HCC) 03/01/2019     Narcotic abuse (HCC) 06/06/2018     Ulcer of leg, chronic, left (HCC) 02/27/2015     ADD (attention deficit disorder) without hyperactivity 01/29/2015     Bipolar disorder (HCC) 01/10/2014     Insomnia 01/21/2013     History of hepatitis C 01/14/2013     Allergic rhinitis 01/04/2013     Generalized anxiety disorder 01/04/2013     Macular degeneration 01/04/2013     Spinal stenosis 01/04/2013     Vitamin D deficiency 01/04/2013     Benign essential hypertension 09/14/2012     Type 2 diabetes mellitus with ophthalmic complication, with long-term current use of insulin ([facility]) 09/14/2012     Atrial fibrillation ([facility]) 05/15/2012     Complex medical condition 05/15/2012       LOS (days): 9  Geometric Mean LOS (GMLOS) (days): 5  Days to GMLOS:-4     OBJECTIVE:  Risk of Unplanned Readmission Score: 45.95         Current admission status: Inpatient   Preferred Pharmacy:   [id number] - [place], [place] - [address]  [address]  [place] [address]  Phone: [phone] Fax: [phone]    [id number] - [place], [place] - [address]  [address]  [place] [address]  Phone: [phone] Fax: [phone]    [facility] - [place] - [address]  [address]  [place] [address]  Phone: [phone] Fax: [phone]    Primary Care Provider: Casey Mckeon MD    [facility] [employer] [facility]  Secondary Insurance:     DISCHARGE DETAILS:    Discharge planning discussed with:: pt - left sister several VM's  Freedom of Choice: Yes  Comments - Freedom of Choice: CM met with pt to review plans for DC.  At this time the care team have changed their recommendations from rehab to home with HHC as pt has progressed.  CM met with pt to review this with  him and he is in agreement with returning home however he does not feel as though he is stable for DC yet.  He reports he continues to have pain.  He would like to speak with the doctor again about this.  CM offered to contact the provider and have them come speak with him again.  Pt requested this be done.  CM attempted to contact pt's sister several times and had to leave a VM.  Request was made for return call.  CM will continue to attempt to reach her.       Requested Home Health Care         Is the patient interested in HHC at discharge?: Yes  Home Health Discipline requested:: Nursing, Occupational Therapy, Physical Therapy  Home Health Follow-Up Provider:: PCP  Home Health Services Needed:: Other (comment), Strengthening/Theraputic Exercises to Improve Function, Gait/ADL Training, Evaluate Functional Status and Safety, Diabetes Management  Homebound Criteria Met:: Uses an Assist Device (i.e. cane, walker, etc)  Supporting Clincal Findings:: Limited Endurance     Other Referral/Resources/Interventions Provided:  Interventions: HHC  Referral Comments: Pt is requesting HHC as this is being recommended.  Pt is new to insulin and is in need of PT OT.  Referrals have been made in order to obtain an agency to provide services at home.  CM will continue to follow to assist with needs and planning. At this time pt will return to his apartment upon DC.    Would you like to participate in our [facility] service program?  : No - Declined    Treatment Team Recommendation: Home with Home Health Care  Discharge Destination Plan:: Home with Home Health Care

## 2024-08-15 NOTE — PROGRESS NOTES
[facility]  Progress Note  Name: David Osorio I  MRN: [id number]  Unit/Bed#: W -01 I Date of Admission: 8/6/2024   Date of Service: 8/15/2024  Hospital Day: 9    Assessment & Plan   Hypogonadism in male  Assessment & Plan  Endocrinology consulted note appreciated  Recommended outpatient follow-up with neurology    CKD (chronic kidney disease), stage II  Assessment & Plan  Lab Results   Component Value Date    EGFR 93 08/14/2024    EGFR 88 08/13/2024    EGFR 70 08/11/2024    CREATININE 0.85 08/14/2024    CREATININE 0.92 08/13/2024    CREATININE 1.11 08/11/2024       Prolonged QT interval  Assessment & Plan  In the setting of methadone use  Continue to monitor--repeat EKG 8/15    Ambulatory dysfunction  Assessment & Plan  Follow-up with PCP/endocrinology as an outpatient.    Hyponatremia  Assessment & Plan  Repeat BMP shows stability at 134-continue to monitor    Right hip pain  Assessment & Plan  S/p hip surgery on 7/31  CT lower extremity showed possible myositis.   Orthopedics consulted for 2-week follow-up  Continue WBAT RLE  Multimodal pain regimen as needed  PT/OT-recommended level 2  DVT prophylaxis: Lovenox.  May be discharged on aspirin 81 mg twice daily to complete 28-day DVT prophylaxis recommendation    Fall  Assessment & Plan  - Status post fall with the below noted injuries.  - Fall precautions.  - Geriatric Medicine consultation for evaluation, medication review and recommendations.  - PT and OT evaluation and treatment as indicated.  - Case Management consultation for disposition planning.        Encephalopathy  Assessment & Plan  Pt initially very lethargic on arrival to ED, suspect due to methadone  Geriatrics consulted note appreciated  Neuropsych consulted-patient was deemed not to have decision-making capacity  Appears improved-reevaluation requested  Geriatrics consulted and note appreciated    Methadone dependence (HCC)  Assessment & Plan  Pt has a hx of  opioid abuse, and has been on chronic methadone.  Current dose is 190mg daily. Dose was confrimed  Continue home dose of methadone    Type 2 diabetes mellitus with ophthalmic complication, with long-term current use of insulin ([facility])  Assessment & Plan  Lab Results   Component Value Date    HGBA1C 10.7 (H) 07/31/2024       Recent Labs     08/14/24  1040 08/14/24  1241 08/14/24  1546 08/14/24 2026   POCGLU 186* 178* 275* 364*         Blood Sugar Average: Last 72 hrs:  (P) [id number]    Patient was recently admitted to [facility] and discharged on insulin  Endocrine consultation note appreciated, per their recommendation:  Started on corrective sliding scale algorithm to  Started on Lantus 15 units  Endocrine continues to follow-recommended patient may benefit from starting Synjardy 12.5/1000 mg twice a day peptide therapy on discharge   Concern for compliance-patient will require insulin training if being discharged home on insulin.      Benign essential hypertension  Assessment & Plan  Continue home amlodipine    Bipolar disorder ([facility])  Assessment & Plan  Patient has a history of bipolar disorder-no longer follows with psychiatry-having difficulty scheduling outpatient follow-up  Psych consulted while IP reinitiation of medication therapy as needed.  Xanax as needed             Bowel Regimen: Miralax  VTE Prophylaxis:Sequential compression device (Venodyne)  and Enoxaparin (Lovenox)     Disposition: Pending placement vs DC home    Subjective   Chief Complaint: I feel good    Subjective: Pt reports feeling well today, but complains of some pain. He is tolerating his diet, and has regular BMs. No complaints or concerns today        Objective   Vitals:   Temp:  [98 °F (36.7 °C)-98.1 °F (36.7 °C)] 98 °F (36.7 °C)  HR:  [74-86] 84  Resp:  [12-17] 16  BP: (102-155)/(69-82) 144/82    I/O         08/13 0701  08/14 0700 08/14 0701  08/15 0700 08/15 0701  08/16 0700    P.O. 360 3040     Total Intake(mL/kg) 360 (4.8) 3040  "(40.6)     Urine (mL/kg/hr)  1000 (0.6)     Total Output  1000     Net +360 +2040                     Physical Exam:   GENERAL APPEARANCE: NAD  NEURO: GCS 15  HEENT: Moist mucous membranes  CV: RRR  LUNGS: CTAB  GI: Abdomen soft, nontender  : Voiding appropriately   MSK: 2+ pedal and radial pulses  SKIN: warm, dry     Invasive Devices       Peripheral Intravenous Line  Duration             Peripheral IV 08/14/24 Dorsal (posterior);Right Forearm 1 day                          Lab Results: BMP/CMP: No results found for: \"SODIUM\", \"K\", \"CL\", \"CO2\", \"ANIONGAP\", \"BUN\", \"CREATININE\", \"GLUCOSE\", \"CALCIUM\", \"AST\", \"ALT\", \"ALKPHOS\", \"PROT\", \"BILITOT\", \"EGFR\" and CBC: No results found for: \"WBC\", \"HGB\", \"HCT\", \"MCV\", \"PLT\", \"ADJUSTEDWBC\", \"RBC\", \"MCH\", \"MCHC\", \"RDW\", \"MPV\", \"NRBC\"  Imaging:  n/a  Other Studies: n/a       "
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
## 2024-08-15 NOTE — CONSULTS
Consultation - Neuropsychology/Psychology Department  David Skelton Jr. 64 y.o. male MRN: [id number]  Unit/Bed#: W -01 Encounter: [id number]        Reason for Consultation:  David Skelton Jr. is a 64 y.o. year old male who was referred for a Neuropsychological Exam to assess cognitive functioning and comment on capacity to make informed medical decisions      History of Present Illness  Presented after fall; previously assessed as not having medical decision making ability    Physician Requesting Consult: Lauryn Ullrich, DO    PROBLEM LIST:  Patient Active Problem List   Diagnosis    ADD (attention deficit disorder) without hyperactivity    Allergic rhinitis    Atrial fibrillation (HCC)    Bipolar disorder (HCC)    Chronic anticoagulation    Generalized anxiety disorder    GERD without esophagitis    Benign essential hypertension    Insomnia    Iron deficiency anemia    Macular degeneration    Major depression, chronic    Mixed hyperlipidemia    Complex medical condition    Spinal stenosis    Stable proliferative diabetic retinopathy of left eye associated with type 2 diabetes mellitus (HCC)    Type 2 diabetes mellitus with ophthalmic complication, with long-term current use of insulin (HCC)    Ulcer of leg, chronic, left (HCC)    Vitamin D deficiency    History of hepatitis C    Narcotic abuse (HCC)    Rectal prolapse    Drug-induced constipation    Hemorrhoids, internal    S/P gastric bypass    Hypogonadism male    Pre-operative general physical examination    Thoracic degenerative disc disease    DDD (degenerative disc disease), lumbar    Liver lesion, right lobe    Lung nodule    Adrenal nodule (HCC)    Methadone dependence (HCC)    Encephalopathy    Fall    Closed fracture of multiple ribs of left side    Bradycardia    Retinopathy due to secondary DM (HCC)    Visual changes    B12 deficiency    Closed fracture of right femur, unspecified fracture morphology, sequela    Nocturia associated with benign  prostatic hyperplasia    Gross hematuria    Acute blood loss anemia (ABLA)    Right hip pain    Hyponatremia    Cognitive decline    Ambulatory dysfunction    Prolonged QT interval    Left ventricular hypertrophy    Acute blood loss anemia    Chronic hepatitis (HCC)    BPH (benign prostatic hyperplasia)    Cholelithiasis    CKD (chronic kidney disease), stage II    Hypogonadism in male    Electrolyte abnormality         Historical Information   Past Medical History:   Diagnosis Date    Anxiety     Depression     Diabetes mellitus (HCC)     Drug use     Hemorrhoids, internal 9/23/2019    Hypertension      Past Surgical History:   Procedure Laterality Date    FEMUR FRACTURE SURGERY Right     GASTRIC BYPASS  11/08/2011    Managed by: Braden Luna (General Surgery)    HERNIA REPAIR  02/13/2013    Incisional hernia repair Managed by: Braden Luna (General Surgery)    IA OPTX FEM SHFT FX W/INSJ IMED IMPLT W/WO SCREW Right 7/31/2024    Procedure: INSERTION NAIL IM FEMUR ANTEGRADE (TROCHANTERIC);  Surgeon: Meng Hill MD;  Location: [facility];  Service: Orthopedics    TONSILLECTOMY  2010     Social History   Social History     Substance and Sexual Activity   Alcohol Use Never     Social History     Substance and Sexual Activity   Drug Use Yes    Comment: Methadone clinic     Social History     Tobacco Use   Smoking Status Never   Smokeless Tobacco Never     Family History:   Family History   Problem Relation Age of Onset    Diabetes Mother     Hypertension Mother     Hypertension Father     Autoimmune disease Son        Meds/Allergies   current meds:   Current Facility-Administered Medications   Medication Dose Route Frequency    acetaminophen (TYLENOL) tablet 975 mg  975 mg Oral Q8H MELISA    ALPRAZolam (XANAX) tablet 0.5 mg  0.5 mg Oral BID PRN    amLODIPine (NORVASC) tablet 5 mg  5 mg Oral Daily    enoxaparin (LOVENOX) subcutaneous injection 30 mg  30 mg Subcutaneous Q12H    ergocalciferol (VITAMIN D2)  capsule 50,000 Units  50,000 Units Oral Once per day on Monday Wednesday Friday    gabapentin (NEURONTIN) capsule 100 mg  100 mg Oral TID    insulin glargine (LANTUS) subcutaneous injection 20 Units 0.2 mL  20 Units Subcutaneous HS    insulin lispro (HumALOG/ADMELOG) 100 units/mL subcutaneous injection 1-6 Units  1-6 Units Subcutaneous TID AC    insulin lispro (HumALOG/ADMELOG) 100 units/mL subcutaneous injection 1-6 Units  1-6 Units Subcutaneous HS    lidocaine (LIDODERM) 5 % patch 1 patch  1 patch Topical Daily    melatonin tablet 3 mg  3 mg Oral HS    methadone (DOLOPHINE) oral concentrated solution 190 mg  190 mg Oral Q24H    metoprolol tartrate (LOPRESSOR) partial tablet 12.5 mg  12.5 mg Oral QAM    naloxone (NARCAN) injection 0.4 mg  0.4 mg Intravenous Q1MIN PRN    ondansetron (ZOFRAN-ODT) dispersible tablet 4 mg  4 mg Oral Q6H PRN    oxyCODONE (ROXICODONE) IR tablet 5 mg  5 mg Oral Q4H PRN    Or    oxyCODONE (ROXICODONE) split tablet 7.5 mg  7.5 mg Oral Q4H PRN    polyethylene glycol (MIRALAX) packet 17 g  17 g Oral Daily    repaglinide (PRANDIN) tablet 1 mg  1 mg Oral TID AC    senna-docusate sodium (SENOKOT S) 8.6-50 mg per tablet 2 tablet  2 tablet Oral BID       Allergies   Allergen Reactions    Aspirin Shortness Of Breath    Penicillins          Family and Social Support:   No data recorded    Behavioral Observations: Patient was alert, oriented x 3 and cooperative; affect appeared pleasant and patient admitted to episodes of anxiety and sadness; no overt evidence of psychotic process/confusion    Cognitive Examination    General Cognitive Functioning MMSE = Average 28/28;     Attention/Concentration Auditory Selective Attention = Average;  Auditory Vigilance = Average; Information Processing Speed = Average    Frontal Systems/Executive Functioning Mental Flexibility/Cognitive Control = Average; Working Memory = Average Abstract Reasoning = Average; Generative Ability = Average,     Language Functioning  Confrontation naming = Within Normal Limits, Phonemic Fluency = Impaired; Semantic Retrieval = Impaired; Comprehension of Complex Ideational Material = Within Normal Limits; Praxis = Within Normal Limits; Repetition = Within Normal Limits; Basic Reading = Within Normal Limits; Following Commands = Within Normal Limits    Memory Functioning Narrative Recall - Short Delay = Within Normal Limits; Long Delay Narrative Recall = Within Normal Limits; three word recall = Average    Visuo-Spatial Abilities Not Assessed    Functional Knowledge  Health & Safety Knowledge = Within Normal Limits;     Summary/Impression:  Results of Neuropsychological Exam revealed cognitive deficits in generative ability and semantic retrieval with all other tested areas within normal limits. During this encounter, patient appears to have capacity to make informed medical decisions.

## 2024-08-15 NOTE — ASSESSMENT & PLAN NOTE
Pt has a hx of opioid abuse, and has been on chronic methadone.  Current dose is 190mg daily. Dose was confrimed  Continue home dose of methadone

## 2024-08-15 NOTE — PLAN OF CARE
Problem: Potential for Falls  Goal: Patient will remain free of falls  Description: INTERVENTIONS:  - Educate patient/family on patient safety including physical limitations  - Instruct patient to call for assistance with activity   - Consult OT/PT to assist with strengthening/mobility   - Keep Call bell within reach  - Keep bed low and locked with side rails adjusted as appropriate  - Keep care items and personal belongings within reach  - Initiate and maintain comfort rounds  - Make Fall Risk Sign visible to staff  - Offer Toileting every  Hours, in advance of need  - Initiate/Maintain alarm  - Obtain necessary fall risk management equipment:  - Apply yellow socks and bracelet for high fall risk patients  - Consider moving patient to room near nurses station  Outcome: Progressing     Problem: Prexisting or High Potential for Compromised Skin Integrity  Goal: Skin integrity is maintained or improved  Description: INTERVENTIONS:  - Identify patients at risk for skin breakdown  - Assess and monitor skin integrity  - Assess and monitor nutrition and hydration status  - Monitor labs   - Assess for incontinence   - Turn and reposition patient  - Assist with mobility/ambulation  - Relieve pressure over bony prominences  - Avoid friction and shearing  - Provide appropriate hygiene as needed including keeping skin clean and dry  - Evaluate need for skin moisturizer/barrier cream  - Collaborate with interdisciplinary team   - Patient/family teaching  - Consider wound care consult   Outcome: Progressing     Problem: PAIN - ADULT  Goal: Verbalizes/displays adequate comfort level or baseline comfort level  Description: Interventions:  - Encourage patient to monitor pain and request assistance  - Assess pain using appropriate pain scale  - Administer analgesics based on type and severity of pain and evaluate response  - Implement non-pharmacological measures as appropriate and evaluate response  - Consider cultural and social  influences on pain and pain management  - Notify physician/advanced practitioner if interventions unsuccessful or patient reports new pain  Outcome: Progressing     Problem: INFECTION - ADULT  Goal: Absence or prevention of progression during hospitalization  Description: INTERVENTIONS:  - Assess and monitor for signs and symptoms of infection  - Monitor lab/diagnostic results  - Monitor all insertion sites, i.e. indwelling lines, tubes, and drains  - Monitor endotracheal if appropriate and nasal secretions for changes in amount and color  - Roby appropriate cooling/warming therapies per order  - Administer medications as ordered  - Instruct and encourage patient and family to use good hand hygiene technique  - Identify and instruct in appropriate isolation precautions for identified infection/condition  Outcome: Progressing  Goal: Absence of fever/infection during neutropenic period  Description: INTERVENTIONS:  - Monitor WBC    Outcome: Progressing

## 2024-08-15 NOTE — CASE MANAGEMENT
Case Management Progress Note    Patient name David Skelton Jr.  Location W /W -01 MRN [id number]  : 1960 Date 8/15/2024       LOS (days): 9  Geometric Mean LOS (GMLOS) (days): 5  Days to GMLOS:-3.8        OBJECTIVE:        Current admission status: Inpatient  Preferred Pharmacy:   [id number] - [place], [place] - [address]  [address]  [place] [address]  Phone: [phone] Fax: [phone]    [id number] - [place] - [address]  [address]  [place] [address]  Phone: [phone] Fax: [phone]    [facility] [place] [facility] [place] - [address]  [address]  [place] [address]  Phone: [phone] Fax: [phone]    Primary Care Provider: Casey Mckeon MD    Primary Insurance: [facility]  Secondary Insurance:     PROGRESS NOTE:    CM attempted to contact pt's sister in order to begin arranging HHC for pt to DC home tomorrow.  CM left  requesting a return call.

## 2024-08-15 NOTE — ASSESSMENT & PLAN NOTE
Lab Results   Component Value Date    HGBA1C 10.7 (H) 07/31/2024       Recent Labs     08/14/24  1040 08/14/24  1241 08/14/24  1546 08/14/24 2026   POCGLU 186* 178* 275* 364*         Blood Sugar Average: Last 72 hrs:  (P) 219.1183966834186803    Patient was recently admitted to [facility] and discharged on insulin  Endocrine consultation note appreciated, per their recommendation:  Started on corrective sliding scale algorithm to  Started on Lantus 15 units  Endocrine continues to follow-recommended patient may benefit from starting Synjardy 12.5/1000 mg twice a day peptide therapy on discharge   Concern for compliance-patient will require insulin training if being discharged home on insulin.

## 2024-08-16 LAB
ATRIAL RATE: 91 BPM
GLUCOSE SERPL-MCNC: 110 MG/DL (ref 65–140)
GLUCOSE SERPL-MCNC: 182 MG/DL (ref 65–140)
GLUCOSE SERPL-MCNC: 232 MG/DL (ref 65–140)
GLUCOSE SERPL-MCNC: 82 MG/DL (ref 65–140)
P AXIS: 86 DEGREES
PR INTERVAL: 138 MS
QRS AXIS: 98 DEGREES
QRSD INTERVAL: 94 MS
QT INTERVAL: 378 MS
QTC INTERVAL: 464 MS
T WAVE AXIS: 80 DEGREES
VENTRICULAR RATE: 91 BPM

## 2024-08-16 PROCEDURE — 99232 SBSQ HOSP IP/OBS MODERATE 35: CPT | Performed by: SURGERY

## 2024-08-16 PROCEDURE — 93010 ELECTROCARDIOGRAM REPORT: CPT | Performed by: INTERNAL MEDICINE

## 2024-08-16 PROCEDURE — 99233 SBSQ HOSP IP/OBS HIGH 50: CPT | Performed by: FAMILY MEDICINE

## 2024-08-16 PROCEDURE — 82948 REAGENT STRIP/BLOOD GLUCOSE: CPT

## 2024-08-16 RX ORDER — INSULIN GLARGINE 100 [IU]/ML
10 INJECTION, SOLUTION SUBCUTANEOUS
Qty: 10 ML | Refills: 0 | Status: CANCELLED | OUTPATIENT
Start: 2024-08-16

## 2024-08-16 RX ORDER — INSULIN GLARGINE 100 [IU]/ML
15 INJECTION, SOLUTION SUBCUTANEOUS
Status: DISCONTINUED | OUTPATIENT
Start: 2024-08-16 | End: 2024-08-18 | Stop reason: HOSPADM

## 2024-08-16 RX ADMIN — INSULIN LISPRO 1 UNITS: 100 INJECTION, SOLUTION INTRAVENOUS; SUBCUTANEOUS at 17:00

## 2024-08-16 RX ADMIN — Medication 190 MG: at 06:38

## 2024-08-16 RX ADMIN — GABAPENTIN 300 MG: 300 CAPSULE ORAL at 15:18

## 2024-08-16 RX ADMIN — Medication 12.5 MG: at 06:37

## 2024-08-16 RX ADMIN — ENOXAPARIN SODIUM 30 MG: 30 INJECTION SUBCUTANEOUS at 17:07

## 2024-08-16 RX ADMIN — ACETAMINOPHEN 975 MG: 325 TABLET, FILM COATED ORAL at 06:38

## 2024-08-16 RX ADMIN — REPAGLINIDE 1 MG: 1 TABLET ORAL at 11:30

## 2024-08-16 RX ADMIN — GABAPENTIN 300 MG: 300 CAPSULE ORAL at 20:24

## 2024-08-16 RX ADMIN — ACETAMINOPHEN 975 MG: 325 TABLET, FILM COATED ORAL at 14:59

## 2024-08-16 RX ADMIN — REPAGLINIDE 1 MG: 1 TABLET ORAL at 06:38

## 2024-08-16 RX ADMIN — AMLODIPINE BESYLATE 5 MG: 5 TABLET ORAL at 08:42

## 2024-08-16 RX ADMIN — Medication 7.5 MG: at 20:24

## 2024-08-16 RX ADMIN — SENNOSIDES AND DOCUSATE SODIUM 2 TABLET: 8.6; 5 TABLET ORAL at 08:40

## 2024-08-16 RX ADMIN — Medication 3 MG: at 21:36

## 2024-08-16 RX ADMIN — GABAPENTIN 300 MG: 300 CAPSULE ORAL at 08:40

## 2024-08-16 RX ADMIN — ACETAMINOPHEN 975 MG: 325 TABLET, FILM COATED ORAL at 21:36

## 2024-08-16 RX ADMIN — INSULIN GLARGINE 15 UNITS: 100 INJECTION, SOLUTION SUBCUTANEOUS at 21:36

## 2024-08-16 RX ADMIN — INSULIN LISPRO 3 UNITS: 100 INJECTION, SOLUTION INTRAVENOUS; SUBCUTANEOUS at 21:36

## 2024-08-16 RX ADMIN — POLYETHYLENE GLYCOL 3350 17 G: 17 POWDER, FOR SOLUTION ORAL at 08:40

## 2024-08-16 RX ADMIN — REPAGLINIDE 1 MG: 1 TABLET ORAL at 17:00

## 2024-08-16 RX ADMIN — Medication 7.5 MG: at 08:40

## 2024-08-16 RX ADMIN — ERGOCALCIFEROL 50000 UNITS: 1.25 CAPSULE, LIQUID FILLED ORAL at 08:43

## 2024-08-16 RX ADMIN — LIDOCAINE 1 PATCH: 700 PATCH TOPICAL at 15:18

## 2024-08-16 RX ADMIN — ENOXAPARIN SODIUM 30 MG: 30 INJECTION SUBCUTANEOUS at 06:37

## 2024-08-16 RX ADMIN — SENNOSIDES AND DOCUSATE SODIUM 2 TABLET: 8.6; 5 TABLET ORAL at 17:07

## 2024-08-16 RX ADMIN — ALPRAZOLAM 0.5 MG: 0.5 TABLET ORAL at 20:24

## 2024-08-16 RX ADMIN — Medication 7.5 MG: at 15:00

## 2024-08-16 NOTE — ASSESSMENT & PLAN NOTE
Lab Results   Component Value Date    HGBA1C 10.7 (H) 07/31/2024       Recent Labs     08/15/24  1106 08/15/24  1548 08/15/24  2058 08/16/24  0721   POCGLU 266* 258* 202* 82         Blood Sugar Average: Last 72 hrs:  (P) 207.7061602178887717    Patient was recently admitted to [facility] and discharged on insulin  Endocrine consultation note appreciated, per their recommendation:  Started on corrective sliding scale algorithm to  Started on Lantus 15 units--advance to 20 units  Endocrine consulted and note appreciated-discharged on basal insulin plus Janumet plus Prandin or glipizide, to improve compliance to medical management   Concern for compliance-patient will require insulin training if being discharged home on insulin.

## 2024-08-16 NOTE — PROGRESS NOTES
[facility]  Progress Note  Name: David Osorio I  MRN: [id number]  Unit/Bed#: W -01 I Date of Admission: 8/6/2024   Date of Service: 8/16/2024 I Hospital Day: 10    Assessment & Plan   Fall  Assessment & Plan  - Status post fall with the below noted injuries.  - Fall precautions.  - Geriatric Medicine consultation for evaluation, medication review and recommendations.  - PT and OT evaluation and treatment as indicated.  - Case Management consultation for disposition planning.        Right hip pain  Assessment & Plan  S/p hip surgery on 7/31  CT lower extremity showed possible myositis.   Orthopedics consulted for 2-week follow-up  Continue WBAT RLE  Multimodal pain regimen as needed  PT/OT-recommended level 2  DVT prophylaxis: Lovenox.  May be discharged on aspirin 81 mg twice daily to complete 28-day DVT prophylaxis recommendation    Hypogonadism in male  Assessment & Plan  Endocrinology consulted note appreciated  Recommended outpatient follow-up with neurology    CKD (chronic kidney disease), stage II  Assessment & Plan  Lab Results   Component Value Date    EGFR 93 08/14/2024    EGFR 88 08/13/2024    EGFR 70 08/11/2024    CREATININE 0.85 08/14/2024    CREATININE 0.92 08/13/2024    CREATININE 1.11 08/11/2024       Prolonged QT interval  Assessment & Plan  In the setting of methadone use  Continue to monitor    Ambulatory dysfunction  Assessment & Plan  Follow-up with PCP/endocrinology as an outpatient.    Hyponatremia  Assessment & Plan  Repeat BMP shows stability at 134-continue to monitor    Encephalopathy  Assessment & Plan  Pt initially very lethargic on arrival to ED, suspect due to methadone  Geriatrics consulted note appreciated  Neuropsych reevaluated patient and cleared him for decision-making capabilities.  Geriatrics consulted and note appreciated    Methadone dependence (HCC)  Assessment & Plan  Pt has a hx of opioid abuse, and has been on chronic  "methadone.  Current dose is 190mg daily. Dose was confrimed  Continue home dose of methadone    Type 2 diabetes mellitus with ophthalmic complication, with long-term current use of insulin ([facility])  Assessment & Plan  Lab Results   Component Value Date    HGBA1C 10.7 (H) 07/31/2024       Recent Labs     08/15/24  1106 08/15/24  1548 08/15/24  2058 08/16/24  0721   POCGLU 266* 258* 202* 82         Blood Sugar Average: Last 72 hrs:  (P) [id number]    Patient was recently admitted to [facility] and discharged on insulin  Endocrine consultation note appreciated, per their recommendation:  Started on corrective sliding scale algorithm to  Started on Lantus 15 units--advance to 20 units  Endocrine consulted and note appreciated-discharged on basal insulin plus Janumet plus Prandin or glipizide, to improve compliance to medical management   Concern for compliance-patient will require insulin training if being discharged home on insulin.      Benign essential hypertension  Assessment & Plan  Continue home amlodipine    Bipolar disorder ([facility])  Assessment & Plan  Patient has a history of bipolar disorder-no longer follows with psychiatry-having difficulty scheduling outpatient follow-up  Psych consulted note appreciated-increase gabapentin to 300 3 times daily.  Xanax as needed.  Patient confirms he has follow-up scheduled with psych             Bowel Regimen: Senna S  VTE Prophylaxis:Sequential compression device (Venodyne)  and Enoxaparin (Lovenox)     Disposition: Anticipate discharge home today with sister    Subjective   Chief Complaint: \"I am all right\"    Subjective: Patient denies any new complaints this morning.  He he continues to have some right hip and knee pain.  Despite the pain he has been getting up to the chair with every meal.  He confirms he is been tolerating his diet and having normal bowel movements/voiding.  No other complaints offered.     Objective   Vitals:   Temp:  [98.2 °F (36.8 °C)-98.9 °F (37.2 " "°C)] 98.3 °F (36.8 °C)  HR:  [76-92] 77  Resp:  [12-18] 18  BP: (102-134)/(64-76) 120/76    I/O         08/14 0701  08/15 0700 08/15 0701  08/16 0700 08/16 0701  08/17 0700    P.O. 3040 2600     Total Intake(mL/kg) 3040 (40.6) 2600 (34.7)     Urine (mL/kg/hr) 1000 (0.6)      Total Output 1000      Net +2040 +2600                     Physical Exam:   GENERAL APPEARANCE: No acute distress  NEURO: GCS is 15.  Light touch sensation intact  HEENT: Normocephalic, atraumatic.  Neck supple.  CV: Regular rate and rhythm.  +2 radial dorsalis pedis pulses, bilaterally.  LUNGS: Clear to auscultation, bilaterally.  Chest wall is nontender.  GI: Abdomen is soft nontender.  : Pelvis is stable.  MSK: Minimal swelling on right hip--tender on palpation.  Patient also has tenderness on inferior thigh-no swelling or effusion.  All other extremities display no deformities.  SKIN: Warm, dry.  Right hip surgical dressing is clean, dry, intact-Steri-Strips in place.    Invasive Devices       Peripheral Intravenous Line  Duration             Peripheral IV 08/14/24 Dorsal (posterior);Right Forearm 2 days                          Lab Results: Results: I have personally reviewed all pertinent laboratory/tests results, BMP/CMP: No results found for: \"SODIUM\", \"K\", \"CL\", \"CO2\", \"ANIONGAP\", \"BUN\", \"CREATININE\", \"GLUCOSE\", \"CALCIUM\", \"AST\", \"ALT\", \"ALKPHOS\", \"PROT\", \"BILITOT\", \"EGFR\", and CBC: No results found for: \"WBC\", \"HGB\", \"HCT\", \"MCV\", \"PLT\", \"ADJUSTEDWBC\", \"RBC\", \"MCH\", \"MCHC\", \"RDW\", \"MPV\", \"NRBC\"  Imaging: I have personally reviewed pertinent reports.     Other Studies: none       "

## 2024-08-16 NOTE — ASSESSMENT & PLAN NOTE
Patient has a history of bipolar disorder-no longer follows with psychiatry-having difficulty scheduling outpatient follow-up  Psych consulted note appreciated-increase gabapentin to 300 3 times daily.  Xanax as needed.  Patient confirms he has follow-up scheduled with psych

## 2024-08-16 NOTE — PLAN OF CARE
Problem: Potential for Falls  Goal: Patient will remain free of falls  Description: INTERVENTIONS:  - Educate patient/family on patient safety including physical limitations  - Instruct patient to call for assistance with activity   - Consult OT/PT to assist with strengthening/mobility   - Keep Call bell within reach  - Keep bed low and locked with side rails adjusted as appropriate  - Keep care items and personal belongings within reach  - Initiate and maintain comfort rounds  - Make Fall Risk Sign visible to staff  - Offer Toileting every  Hours, in advance of need  - Initiate/Maintain alarm  - Obtain necessary fall risk management equipment:   - Apply yellow socks and bracelet for high fall risk patients  - Consider moving patient to room near nurses station  Outcome: Progressing     Problem: Prexisting or High Potential for Compromised Skin Integrity  Goal: Skin integrity is maintained or improved  Description: INTERVENTIONS:  - Identify patients at risk for skin breakdown  - Assess and monitor skin integrity  - Assess and monitor nutrition and hydration status  - Monitor labs   - Assess for incontinence   - Turn and reposition patient  - Assist with mobility/ambulation  - Relieve pressure over bony prominences  - Avoid friction and shearing  - Provide appropriate hygiene as needed including keeping skin clean and dry  - Evaluate need for skin moisturizer/barrier cream  - Collaborate with interdisciplinary team   - Patient/family teaching  - Consider wound care consult   Outcome: Progressing     Problem: PAIN - ADULT  Goal: Verbalizes/displays adequate comfort level or baseline comfort level  Description: Interventions:  - Encourage patient to monitor pain and request assistance  - Assess pain using appropriate pain scale  - Administer analgesics based on type and severity of pain and evaluate response  - Implement non-pharmacological measures as appropriate and evaluate response  - Consider cultural and  social influences on pain and pain management  - Notify physician/advanced practitioner if interventions unsuccessful or patient reports new pain  Outcome: Progressing     Problem: INFECTION - ADULT  Goal: Absence or prevention of progression during hospitalization  Description: INTERVENTIONS:  - Assess and monitor for signs and symptoms of infection  - Monitor lab/diagnostic results  - Monitor all insertion sites, i.e. indwelling lines, tubes, and drains  - Monitor endotracheal if appropriate and nasal secretions for changes in amount and color  - Hermon appropriate cooling/warming therapies per order  - Administer medications as ordered  - Instruct and encourage patient and family to use good hand hygiene technique  - Identify and instruct in appropriate isolation precautions for identified infection/condition  Outcome: Progressing  Goal: Absence of fever/infection during neutropenic period  Description: INTERVENTIONS:  - Monitor WBC    Outcome: Progressing     Problem: SAFETY ADULT  Goal: Maintain or return to baseline ADL function  Description: INTERVENTIONS:  -  Assess patient's ability to carry out ADLs; assess patient's baseline for ADL function and identify physical deficits which impact ability to perform ADLs (bathing, care of mouth/teeth, toileting, grooming, dressing, etc.)  - Assess/evaluate cause of self-care deficits   - Assess range of motion  - Assess patient's mobility; develop plan if impaired  - Assess patient's need for assistive devices and provide as appropriate  - Encourage maximum independence but intervene and supervise when necessary  - Involve family in performance of ADLs  - Assess for home care needs following discharge   - Consider OT consult to assist with ADL evaluation and planning for discharge  - Provide patient education as appropriate  Outcome: Progressing  Goal: Maintains/Returns to pre admission functional level  Description: INTERVENTIONS:  - Perform AM-PAC 6 Click Basic  Mobility/ Daily Activity assessment daily.  - Set and communicate daily mobility goal to care team and patient/family/caregiver.   - Collaborate with rehabilitation services on mobility goals if consulted  - Perform Range of Motion  times a day.  - Reposition patient every  hours.  - Dangle patient  times a day  - Stand patient  times a day  - Ambulate patient  times a day  - Out of bed to chair  times a day   - Out of bed for meals times a day  - Out of bed for toileting  - Record patient progress and toleration of activity level   Outcome: Progressing     Problem: DISCHARGE PLANNING  Goal: Discharge to home or other facility with appropriate resources  Description: INTERVENTIONS:  - Identify barriers to discharge w/patient and caregiver  - Arrange for needed discharge resources and transportation as appropriate  - Identify discharge learning needs (meds, wound care, etc.)  - Arrange for interpretive services to assist at discharge as needed  - Refer to Case Management Department for coordinating discharge planning if the patient needs post-hospital services based on physician/advanced practitioner order or complex needs related to functional status, cognitive ability, or social support system  Outcome: Progressing     Problem: Knowledge Deficit  Goal: Patient/family/caregiver demonstrates understanding of disease process, treatment plan, medications, and discharge instructions  Description: Complete learning assessment and assess knowledge base.  Interventions:  - Provide teaching at level of understanding  - Provide teaching via preferred learning methods  Outcome: Progressing     Problem: Nutrition/Hydration-ADULT  Goal: Nutrient/Hydration intake appropriate for improving, restoring or maintaining nutritional needs  Description: Monitor and assess patient's nutrition/hydration status for malnutrition. Collaborate with interdisciplinary team and initiate plan and interventions as ordered.  Monitor patient's  weight and dietary intake as ordered or per policy. Utilize nutrition screening tool and intervene as necessary. Determine patient's food preferences and provide high-protein, high-caloric foods as appropriate.     INTERVENTIONS:  - Monitor oral intake, urinary output, labs, and treatment plans  - Assess nutrition and hydration status and recommend course of action  - Evaluate amount of meals eaten  - Assist patient with eating if necessary   - Allow adequate time for meals  - Recommend/ encourage appropriate diets, oral nutritional supplements, and vitamin/mineral supplements  - Order, calculate, and assess calorie counts as needed  - Recommend, monitor, and adjust tube feedings and TPN/PPN based on assessed needs  - Assess need for intravenous fluids  - Provide specific nutrition/hydration education as appropriate  - Include patient/family/caregiver in decisions related to nutrition  Outcome: Progressing
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
## 2024-08-16 NOTE — INCIDENTAL FINDINGS
The following findings require follow up:  Radiographic finding   Finding:    Cholelithiasis with severe gallbladder distention, increased from the recent prior study though without wall thickening or pericholecystic inflammatory change. This is of uncertain etiology and clinical significance and clinical correlation for right    upper quadrant symptoms recommended.     Subcentimeter groundglass density in the right upper lobe, minimally more prominent than in 2023. Based on current Fleischner Society 2017 Guidelines on incidental pulmonary nodule, follow-up CT is recommended for every 2 years until 5 years of   stability is demonstrated.    Follow up required: Follow up with PCP   Follow up should be done within 1/2 week(s)    Please notify the following clinician to assist with the follow up:   Dr. Mckeon    Incidental finding results were discussed with the Patient by STEVE Blum on 08/16/24.   They expressed understanding and all questions answered.

## 2024-08-16 NOTE — CASE MANAGEMENT
Case Management Progress Note    Patient name David Skelton Jr.  Location W /W -01 MRN [id number]  : 1960 Date 2024       LOS (days): 10  Geometric Mean LOS (GMLOS) (days): 5  Days to GMLOS:-4.9        OBJECTIVE:        Current admission status: Inpatient  Preferred Pharmacy:   [id number] - [place], [place] - [address]  [address]  [place] [address]  Phone: [phone] Fax: [phone]    [id number] - [place] - [address]  [address]  [place] [address]  Phone: [phone] Fax: [phone]    [facility] - [place] - [address]  [address]  [place] [address]  Phone: [phone] Fax: [phone]    Primary Care Provider: Casey Mckeon MD    Primary Insurance: [facility]  Secondary Insurance:     PROGRESS NOTE:    Cm advised patient home aids set up. Provider requesting CM confirm they can start upon discharge. CM contacted patients daughter who reports patient's home aids do not start until Monday. CM advised provider.

## 2024-08-16 NOTE — ASSESSMENT & PLAN NOTE
Pt initially very lethargic on arrival to ED, suspect due to methadone  Geriatrics consulted note appreciated  Neuropsych reevaluated patient and cleared him for decision-making capabilities.  Geriatrics consulted and note appreciated

## 2024-08-16 NOTE — QUICK NOTE
Glycemic trends reviewed  Progress notes reviewed, patient has been found with capacity  Plan was ready for discharge today but appears discharge is being moved to possibly Sunday 8/18    Today, decrease Lantus to 15 units nightly and continue Prandin 1 mg with meals as well as Algorithm 3/3    Discharge recommendations: Lantus 10 units nightly, Prandin 1 mg with meals, metformin  mg twice daily    Patient needs to make follow-up with his [facility] endocrinologist  for 1.  Uncontrolled diabetes 2.  Likely osteoporosis versus osteomalacia as evidenced by recent hip fracture 3.  Hypogonadism, likely due to long-term opiate use 4.  Adrenal nodule

## 2024-08-16 NOTE — CASE MANAGEMENT
Case Management Progress Note    Patient name David Skelton Jr.  Location W /W -01 MRN [id number]  : 1960 Date 2024       LOS (days): 10  Geometric Mean LOS (GMLOS) (days): 5  Days to GMLOS:-4.8        OBJECTIVE:        Current admission status: Inpatient  Preferred Pharmacy:   [id number] - [place], [place] - [address]  [address] [place]  [place] [address]  Phone: [phone] Fax: [phone]    [id number] - [place] - [address]  [address]  [place] [address]  Phone: [phone] Fax: [phone]    [facility] - [place] - [address]  [address]  [place] [address]  Phone: [phone] Fax: [phone]    Primary Care Provider: Casey Mckeon MD    Primary Insurance: [facility]  Secondary Insurance:     PROGRESS NOTE:    Trevor received call from patients sister Vandana. Vandana reports she got a call from provider stating patient would be discharged today. Vandana is working and will not be able to come get patient and requesting Cm set up transport. Vandana requesting CM assist with patient calling her.     Cm met with patient in room and dialed Vandana's number for him. Patient was speaking to Vandana when CM left the room. Patient reports he is in pain, Cm advised RN in epic chat.

## 2024-08-16 NOTE — QUICK NOTE
"Patient status update:  Patient was scheduled for discharge today, after extensive conversation with patient he does not feel comfortable returning home today. He states that he is not feeling as if he is ready, and does not have the support at home without HH aid for the first several days. He believes he will be ready by Sunday or Monday.  Verified with sister that he does have home health aide, case management confirmed that they will not be able to see him until Monday 8/19--CM to set up.      Plan:    Patient will be discharged on Sunday 8/18.  He states that he has a friend that he \"knows will pick him up\".  He wants to be discharged Sunday, 8/18, to ensure that he can get a ride to the methadone clinic on Monday and be home for his home health aide to help assist him. In the meantime, patient will be trained on administering insulin and be encouraged to ambulate/perform ADLs under nursing supervision.  "

## 2024-08-16 NOTE — PLAN OF CARE
Problem: Potential for Falls  Goal: Patient will remain free of falls  Description: INTERVENTIONS:  - Educate patient/family on patient safety including physical limitations  - Instruct patient to call for assistance with activity   - Consult OT/PT to assist with strengthening/mobility   - Keep Call bell within reach  - Keep bed low and locked with side rails adjusted as appropriate  - Keep care items and personal belongings within reach  - Initiate and maintain comfort rounds  - Make Fall Risk Sign visible to staff  - Offer Toileting every  Hours, in advance of need  - Initiate/Maintain alarm  - Obtain necessary fall risk management equipment:   - Apply yellow socks and bracelet for high fall risk patients  - Consider moving patient to room near nurses station  Outcome: Progressing     Problem: Prexisting or High Potential for Compromised Skin Integrity  Goal: Skin integrity is maintained or improved  Description: INTERVENTIONS:  - Identify patients at risk for skin breakdown  - Assess and monitor skin integrity  - Assess and monitor nutrition and hydration status  - Monitor labs   - Assess for incontinence   - Turn and reposition patient  - Assist with mobility/ambulation  - Relieve pressure over bony prominences  - Avoid friction and shearing  - Provide appropriate hygiene as needed including keeping skin clean and dry  - Evaluate need for skin moisturizer/barrier cream  - Collaborate with interdisciplinary team   - Patient/family teaching  - Consider wound care consult   Outcome: Progressing     Problem: PAIN - ADULT  Goal: Verbalizes/displays adequate comfort level or baseline comfort level  Description: Interventions:  - Encourage patient to monitor pain and request assistance  - Assess pain using appropriate pain scale  - Administer analgesics based on type and severity of pain and evaluate response  - Implement non-pharmacological measures as appropriate and evaluate response  - Consider cultural and  social influences on pain and pain management  - Notify physician/advanced practitioner if interventions unsuccessful or patient reports new pain  Outcome: Progressing     Problem: INFECTION - ADULT  Goal: Absence or prevention of progression during hospitalization  Description: INTERVENTIONS:  - Assess and monitor for signs and symptoms of infection  - Monitor lab/diagnostic results  - Monitor all insertion sites, i.e. indwelling lines, tubes, and drains  - Monitor endotracheal if appropriate and nasal secretions for changes in amount and color  - [place] appropriate cooling/warming therapies per order  - Administer medications as ordered  - Instruct and encourage patient and family to use good hand hygiene technique  - Identify and instruct in appropriate isolation precautions for identified infection/condition  Outcome: Progressing  Goal: Absence of fever/infection during neutropenic period  Description: INTERVENTIONS:  - Monitor WBC    Outcome: Progressing     Problem: SAFETY ADULT  Goal: Maintain or return to baseline ADL function  Description: INTERVENTIONS:  -  Assess patient's ability to carry out ADLs; assess patient's baseline for ADL function and identify physical deficits which impact ability to perform ADLs (bathing, care of mouth/teeth, toileting, grooming, dressing, etc.)  - Assess/evaluate cause of self-care deficits   - Assess range of motion  - Assess patient's mobility; develop plan if impaired  - Assess patient's need for assistive devices and provide as appropriate  - Encourage maximum independence but intervene and supervise when necessary  - Involve family in performance of ADLs  - Assess for home care needs following discharge   - Consider OT consult to assist with ADL evaluation and planning for discharge  - Provide patient education as appropriate  Outcome: Progressing  Goal: Maintains/Returns to pre admission functional level  Description: INTERVENTIONS:  - Perform AM-PAC 6 Click Basic  Mobility/ Daily Activity assessment daily.  - Set and communicate daily mobility goal to care team and patient/family/caregiver.   - Collaborate with rehabilitation services on mobility goals if consulted  - Perform Range of Motion  times a day.  - Reposition patient every  hours.  - Dangle patient  times a day  - Stand patient  times a day  - Ambulate patient times a day  - Out of bed to chair  times a day   - Out of bed for meals times a day  - Out of bed for toileting  - Record patient progress and toleration of activity level   Outcome: Progressing     Problem: DISCHARGE PLANNING  Goal: Discharge to home or other facility with appropriate resources  Description: INTERVENTIONS:  - Identify barriers to discharge w/patient and caregiver  - Arrange for needed discharge resources and transportation as appropriate  - Identify discharge learning needs (meds, wound care, etc.)  - Arrange for interpretive services to assist at discharge as needed  - Refer to Case Management Department for coordinating discharge planning if the patient needs post-hospital services based on physician/advanced practitioner order or complex needs related to functional status, cognitive ability, or social support system  Outcome: Progressing     Problem: Knowledge Deficit  Goal: Patient/family/caregiver demonstrates understanding of disease process, treatment plan, medications, and discharge instructions  Description: Complete learning assessment and assess knowledge base.  Interventions:  - Provide teaching at level of understanding  - Provide teaching via preferred learning methods  Outcome: Progressing     Problem: Nutrition/Hydration-ADULT  Goal: Nutrient/Hydration intake appropriate for improving, restoring or maintaining nutritional needs  Description: Monitor and assess patient's nutrition/hydration status for malnutrition. Collaborate with interdisciplinary team and initiate plan and interventions as ordered.  Monitor patient's weight  and dietary intake as ordered or per policy. Utilize nutrition screening tool and intervene as necessary. Determine patient's food preferences and provide high-protein, high-caloric foods as appropriate.     INTERVENTIONS:  - Monitor oral intake, urinary output, labs, and treatment plans  - Assess nutrition and hydration status and recommend course of action  - Evaluate amount of meals eaten  - Assist patient with eating if necessary   - Allow adequate time for meals  - Recommend/ encourage appropriate diets, oral nutritional supplements, and vitamin/mineral supplements  - Order, calculate, and assess calorie counts as needed  - Recommend, monitor, and adjust tube feedings and TPN/PPN based on assessed needs  - Assess need for intravenous fluids  - Provide specific nutrition/hydration education as appropriate  - Include patient/family/caregiver in decisions related to nutrition  Outcome: Progressing

## 2024-08-17 PROBLEM — Z79.4 TYPE 2 DIABETES MELLITUS WITH OTHER OPHTHALMIC COMPLICATION, WITH LONG-TERM CURRENT USE OF INSULIN (HCC): Status: ACTIVE | Noted: 2024-08-17

## 2024-08-17 PROBLEM — E11.39 TYPE 2 DIABETES MELLITUS WITH OTHER OPHTHALMIC COMPLICATION, WITH LONG-TERM CURRENT USE OF INSULIN (HCC): Status: ACTIVE | Noted: 2024-08-17

## 2024-08-17 LAB
GLUCOSE SERPL-MCNC: 119 MG/DL (ref 65–140)
GLUCOSE SERPL-MCNC: 201 MG/DL (ref 65–140)
GLUCOSE SERPL-MCNC: 316 MG/DL (ref 65–140)
GLUCOSE SERPL-MCNC: 67 MG/DL (ref 65–140)
GLUCOSE SERPL-MCNC: 76 MG/DL (ref 65–140)
GLUCOSE SERPL-MCNC: 97 MG/DL (ref 65–140)

## 2024-08-17 PROCEDURE — 99231 SBSQ HOSP IP/OBS SF/LOW 25: CPT | Performed by: SURGERY

## 2024-08-17 PROCEDURE — 82948 REAGENT STRIP/BLOOD GLUCOSE: CPT

## 2024-08-17 RX ORDER — METFORMIN HCL 500 MG
500 TABLET, EXTENDED RELEASE 24 HR ORAL 2 TIMES DAILY WITH MEALS
Qty: 60 TABLET | Refills: 0 | Status: SHIPPED | OUTPATIENT
Start: 2024-08-17 | End: 2024-09-16

## 2024-08-17 RX ORDER — GLUCOSAMINE HCL/CHONDROITIN SU 500-400 MG
CAPSULE ORAL 2 TIMES DAILY
Qty: 100 EACH | Refills: 0 | Status: SHIPPED | OUTPATIENT
Start: 2024-08-17

## 2024-08-17 RX ORDER — INSULIN GLARGINE 100 [IU]/ML
10 INJECTION, SOLUTION SUBCUTANEOUS
Qty: 10 ML | Refills: 0 | Status: SHIPPED | OUTPATIENT
Start: 2024-08-17

## 2024-08-17 RX ORDER — ALPRAZOLAM 0.5 MG
0.5 TABLET ORAL DAILY PRN
Start: 2024-08-17 | End: 2024-08-27

## 2024-08-17 RX ORDER — GABAPENTIN 300 MG/1
300 CAPSULE ORAL 3 TIMES DAILY
Qty: 90 CAPSULE | Refills: 0 | Status: SHIPPED | OUTPATIENT
Start: 2024-08-17 | End: 2024-09-16

## 2024-08-17 RX ORDER — REPAGLINIDE 1 MG/1
1 TABLET ORAL
Qty: 90 TABLET | Refills: 0 | Status: SHIPPED | OUTPATIENT
Start: 2024-08-17 | End: 2024-09-16

## 2024-08-17 RX ADMIN — ACETAMINOPHEN 975 MG: 325 TABLET, FILM COATED ORAL at 22:01

## 2024-08-17 RX ADMIN — GABAPENTIN 300 MG: 300 CAPSULE ORAL at 22:01

## 2024-08-17 RX ADMIN — ACETAMINOPHEN 975 MG: 325 TABLET, FILM COATED ORAL at 15:30

## 2024-08-17 RX ADMIN — REPAGLINIDE 1 MG: 1 TABLET ORAL at 17:35

## 2024-08-17 RX ADMIN — Medication 190 MG: at 05:42

## 2024-08-17 RX ADMIN — INSULIN LISPRO 2 UNITS: 100 INJECTION, SOLUTION INTRAVENOUS; SUBCUTANEOUS at 17:33

## 2024-08-17 RX ADMIN — POLYETHYLENE GLYCOL 3350 17 G: 17 POWDER, FOR SOLUTION ORAL at 08:03

## 2024-08-17 RX ADMIN — SENNOSIDES AND DOCUSATE SODIUM 2 TABLET: 8.6; 5 TABLET ORAL at 17:37

## 2024-08-17 RX ADMIN — INSULIN LISPRO 5 UNITS: 100 INJECTION, SOLUTION INTRAVENOUS; SUBCUTANEOUS at 22:04

## 2024-08-17 RX ADMIN — ENOXAPARIN SODIUM 30 MG: 30 INJECTION SUBCUTANEOUS at 04:30

## 2024-08-17 RX ADMIN — SENNOSIDES AND DOCUSATE SODIUM 2 TABLET: 8.6; 5 TABLET ORAL at 08:03

## 2024-08-17 RX ADMIN — ENOXAPARIN SODIUM 30 MG: 30 INJECTION SUBCUTANEOUS at 17:35

## 2024-08-17 RX ADMIN — REPAGLINIDE 1 MG: 1 TABLET ORAL at 08:05

## 2024-08-17 RX ADMIN — Medication 3 MG: at 22:01

## 2024-08-17 RX ADMIN — GABAPENTIN 300 MG: 300 CAPSULE ORAL at 15:30

## 2024-08-17 RX ADMIN — GABAPENTIN 300 MG: 300 CAPSULE ORAL at 08:03

## 2024-08-17 RX ADMIN — ACETAMINOPHEN 975 MG: 325 TABLET, FILM COATED ORAL at 05:37

## 2024-08-17 RX ADMIN — INSULIN GLARGINE 15 UNITS: 100 INJECTION, SOLUTION SUBCUTANEOUS at 22:02

## 2024-08-17 RX ADMIN — Medication 7.5 MG: at 00:26

## 2024-08-17 RX ADMIN — LIDOCAINE 1 PATCH: 700 PATCH TOPICAL at 17:32

## 2024-08-17 RX ADMIN — REPAGLINIDE 1 MG: 1 TABLET ORAL at 12:04

## 2024-08-17 RX ADMIN — Medication 7.5 MG: at 04:29

## 2024-08-17 RX ADMIN — Medication 7.5 MG: at 19:58

## 2024-08-17 RX ADMIN — Medication 12.5 MG: at 05:37

## 2024-08-17 RX ADMIN — ALPRAZOLAM 0.5 MG: 0.5 TABLET ORAL at 22:10

## 2024-08-17 RX ADMIN — AMLODIPINE BESYLATE 5 MG: 5 TABLET ORAL at 08:03

## 2024-08-17 NOTE — PLAN OF CARE
Problem: Prexisting or High Potential for Compromised Skin Integrity  Goal: Skin integrity is maintained or improved  Description: INTERVENTIONS:  - Identify patients at risk for skin breakdown  - Assess and monitor skin integrity  - Assess and monitor nutrition and hydration status  - Monitor labs   - Assess for incontinence   - Turn and reposition patient  - Assist with mobility/ambulation  - Relieve pressure over bony prominences  - Avoid friction and shearing  - Provide appropriate hygiene as needed including keeping skin clean and dry  - Evaluate need for skin moisturizer/barrier cream  - Collaborate with interdisciplinary team   - Patient/family teaching  - Consider wound care consult   8/17/2024 1609 by Alexandrea Patterson RN  Outcome: Progressing  8/17/2024 1608 by Alexandrea Patterson RN  Outcome: Progressing     Problem: PAIN - ADULT  Goal: Verbalizes/displays adequate comfort level or baseline comfort level  Description: Interventions:  - Encourage patient to monitor pain and request assistance  - Assess pain using appropriate pain scale  - Administer analgesics based on type and severity of pain and evaluate response  - Implement non-pharmacological measures as appropriate and evaluate response  - Consider cultural and social influences on pain and pain management  - Notify physician/advanced practitioner if interventions unsuccessful or patient reports new pain  8/17/2024 1609 by Alexandrea Patterson RN  Outcome: Progressing  8/17/2024 1608 by Alexandrea Patterson RN  Outcome: Progressing     Problem: INFECTION - ADULT  Goal: Absence or prevention of progression during hospitalization  Description: INTERVENTIONS:  - Assess and monitor for signs and symptoms of infection  - Monitor lab/diagnostic results  - Monitor all insertion sites, i.e. indwelling lines, tubes, and drains  - Monitor endotracheal if appropriate and nasal secretions for changes in amount and color  - [place] appropriate cooling/warming therapies  per order  - Administer medications as ordered  - Instruct and encourage patient and family to use good hand hygiene technique  - Identify and instruct in appropriate isolation precautions for identified infection/condition  8/17/2024 1609 by Alexandrea Patterson RN  Outcome: Progressing  8/17/2024 1608 by Alexandrea Patterson RN  Outcome: Progressing  Goal: Absence of fever/infection during neutropenic period  Description: INTERVENTIONS:  - Monitor WBC    8/17/2024 1609 by Alexandrea Patterson RN  Outcome: Progressing  8/17/2024 1608 by Alexandrea Patterson RN  Outcome: Progressing     Problem: SAFETY ADULT  Goal: Maintain or return to baseline ADL function  Description: INTERVENTIONS:  -  Assess patient's ability to carry out ADLs; assess patient's baseline for ADL function and identify physical deficits which impact ability to perform ADLs (bathing, care of mouth/teeth, toileting, grooming, dressing, etc.)  - Assess/evaluate cause of self-care deficits   - Assess range of motion  - Assess patient's mobility; develop plan if impaired  - Assess patient's need for assistive devices and provide as appropriate  - Encourage maximum independence but intervene and supervise when necessary  - Involve family in performance of ADLs  - Assess for home care needs following discharge   - Consider OT consult to assist with ADL evaluation and planning for discharge  - Provide patient education as appropriate  8/17/2024 1609 by Alexandrea Patterson RN  Outcome: Progressing  8/17/2024 1608 by Alexandrea Patterson RN  Outcome: Progressing  Goal: Maintains/Returns to pre admission functional level  Description: INTERVENTIONS:  - Perform AM-PAC 6 Click Basic Mobility/ Daily Activity assessment daily.  - Set and communicate daily mobility goal to care team and patient/family/caregiver.   - Collaborate with rehabilitation services on mobility goals if consulted  - Perform Range of Motion *** times a day.  - Reposition patient every *** hours.  - Dangle  patient *** times a day  - Stand patient *** times a day  - Ambulate patient *** times a day  - Out of bed to chair *** times a day   - Out of bed for meals *** times a day  - Out of bed for toileting  - Record patient progress and toleration of activity level   8/17/2024 1609 by Alexandrea Patterson RN  Outcome: Progressing  8/17/2024 1608 by Alexandrea Patterson RN  Outcome: Progressing     Problem: DISCHARGE PLANNING  Goal: Discharge to home or other facility with appropriate resources  Description: INTERVENTIONS:  - Identify barriers to discharge w/patient and caregiver  - Arrange for needed discharge resources and transportation as appropriate  - Identify discharge learning needs (meds, wound care, etc.)  - Arrange for interpretive services to assist at discharge as needed  - Refer to Case Management Department for coordinating discharge planning if the patient needs post-hospital services based on physician/advanced practitioner order or complex needs related to functional status, cognitive ability, or social support system  8/17/2024 1609 by Alexandrea Patterson RN  Outcome: Progressing  8/17/2024 1608 by Alexandrea Patterson RN  Outcome: Progressing     Problem: Knowledge Deficit  Goal: Patient/family/caregiver demonstrates understanding of disease process, treatment plan, medications, and discharge instructions  Description: Complete learning assessment and assess knowledge base.  Interventions:  - Provide teaching at level of understanding  - Provide teaching via preferred learning methods  8/17/2024 1609 by Alexandrea Patterson RN  Outcome: Progressing  8/17/2024 1608 by Alexandrea Patterson RN  Outcome: Progressing     Problem: SAFETY,RESTRAINT: NV/NON-SELF DESTRUCTIVE BEHAVIOR  Goal: Remains free of harm/injury (restraint for non violent/non self-detsructive behavior)  Description: INTERVENTIONS:  - Instruct patient/family regarding restraint use   - Assess and monitor physiologic and psychological status   - Provide  interventions and comfort measures to meet assessed patient needs   - Identify and implement measures to help patient regain control  - Assess readiness for release of restraint   8/17/2024 1609 by Alexandrea Patterson RN  Outcome: Progressing  8/17/2024 1608 by Alexandrea Patterson RN  Outcome: Progressing  Goal: Returns to optimal restraint-free functioning  Description: INTERVENTIONS:  - Assess the patient's behavior and symptoms that indicate continued need for restraint  - Identify and implement measures to help patient regain control  - Assess readiness for release of restraint   8/17/2024 1609 by Alexandrea Patterson RN  Outcome: Progressing  8/17/2024 1608 by Alexandrea Patterson RN  Outcome: Progressing     Problem: Potential for Falls  Goal: Patient will remain free of falls  Description: INTERVENTIONS:  - Educate patient/family on patient safety including physical limitations  - Instruct patient to call for assistance with activity   - Consult OT/PT to assist with strengthening/mobility   - Keep Call bell within reach  - Keep bed low and locked with side rails adjusted as appropriate  - Keep care items and personal belongings within reach  - Initiate and maintain comfort rounds  - Make Fall Risk Sign visible to staff  - Offer Toileting every *** Hours, in advance of need  - Initiate/Maintain ***alarm  - Obtain necessary fall risk management equipment: ***  - Apply yellow socks and bracelet for high fall risk patients  - Consider moving patient to room near nurses station  8/17/2024 1609 by Alexandrea Patterson RN  Outcome: Progressing  8/17/2024 1608 by Alexandrea Patterson RN  Outcome: Progressing     Problem: Nutrition/Hydration-ADULT  Goal: Nutrient/Hydration intake appropriate for improving, restoring or maintaining nutritional needs  Description: Monitor and assess patient's nutrition/hydration status for malnutrition. Collaborate with interdisciplinary team and initiate plan and interventions as ordered.  Monitor patient's  weight and dietary intake as ordered or per policy. Utilize nutrition screening tool and intervene as necessary. Determine patient's food preferences and provide high-protein, high-caloric foods as appropriate.     INTERVENTIONS:  - Monitor oral intake, urinary output, labs, and treatment plans  - Assess nutrition and hydration status and recommend course of action  - Evaluate amount of meals eaten  - Assist patient with eating if necessary   - Allow adequate time for meals  - Recommend/ encourage appropriate diets, oral nutritional supplements, and vitamin/mineral supplements  - Order, calculate, and assess calorie counts as needed  - Recommend, monitor, and adjust tube feedings and TPN/PPN based on assessed needs  - Assess need for intravenous fluids  - Provide specific nutrition/hydration education as appropriate  - Include patient/family/caregiver in decisions related to nutrition  8/17/2024 1609 by Alexandrea Patterson RN  Outcome: Progressing  8/17/2024 1608 by Alexandrea Patterson, RN  Outcome: Progressing

## 2024-08-17 NOTE — ASSESSMENT & PLAN NOTE
- Status post fall with the below noted injuries.  - Fall precautions.  - Geriatric Medicine consultation for evaluation, medication review and recommendations.  - PT and OT evaluation and treatment as indicated.  - Case Management consultation for disposition planning.    - Plan for dispo in AM

## 2024-08-17 NOTE — PROGRESS NOTES
[facility]  Progress Note  Name: David Osorio I  MRN: [id number]  Unit/Bed#: W -01 I Date of Admission: 8/6/2024   Date of Service: 8/17/2024 I Hospital Day: 11    Assessment & Plan   Prolonged QT interval  Assessment & Plan  In the setting of methadone use  Continue to monitor    Ambulatory dysfunction  Assessment & Plan  Follow-up with PCP/endocrinology as an outpatient.    Hyponatremia  Assessment & Plan  Repeat BMP shows stability at 134-continue to monitor    Right hip pain  Assessment & Plan  S/p hip surgery on 7/31  CT lower extremity showed possible myositis.   Orthopedics consulted for 2-week follow-up  Continue WBAT RLE  Multimodal pain regimen as needed  PT/OT-recommended level 2  DVT prophylaxis: Lovenox.  May be discharged on aspirin 81 mg twice daily to complete 28-day DVT prophylaxis recommendation    Fall  Assessment & Plan  - Status post fall with the below noted injuries.  - Fall precautions.  - Geriatric Medicine consultation for evaluation, medication review and recommendations.  - PT and OT evaluation and treatment as indicated.  - Case Management consultation for disposition planning.    - Plan for dispo in AM         Encephalopathy  Assessment & Plan  Pt initially very lethargic on arrival to ED, suspect due to methadone  Neuropsych reevaluated patient and cleared him for decision-making capabilities.  Geriatrics consulted and note appreciated    Methadone dependence (HCC)  Assessment & Plan  Pt has a hx of opioid abuse, and has been on chronic methadone.  Current dose is 190mg daily. Dose was confrimed  Continue home dose of methadone    Type 2 diabetes mellitus with ophthalmic complication, with long-term current use of insulin (HCC)  Assessment & Plan  Lab Results   Component Value Date    HGBA1C 10.7 (H) 07/31/2024       Recent Labs     08/16/24  0721 08/16/24  1128 08/16/24  1600 08/16/24 2055   POCGLU 82 110 182* 232*         Blood Sugar Average:  "Last 72 hrs:  (P) [id number]    Patient was recently admitted to [facility] and discharged on insulin  Endocrine consultation note appreciated, per their recommendation:  Started on corrective sliding scale algorithm to  Started on Lantus 15 units--advance to 20 units  Endocrine consulted and note appreciated-discharged on basal insulin plus Janumet plus Prandin or glipizide, to improve compliance to medical management   Concern for compliance-patient will require insulin training if being discharged home on insulin.      Benign essential hypertension  Assessment & Plan  Continue home amlodipine    Bipolar disorder (HCC)  Assessment & Plan  Patient has a history of bipolar disorder-no longer follows with psychiatry-having difficulty scheduling outpatient follow-up  Psych consulted note appreciated-increase gabapentin to 300 3 times daily.  Xanax as needed.  Patient confirms he has follow-up scheduled with psych             Bowel Regimen: Miralax  VTE Prophylaxis:Sequential compression device (Venodyne)  and Enoxaparin (Lovenox)     Disposition: Pending DC in AM    Subjective   Chief Complaint: \"I'm not feeling good\"    Subjective: Pt resting in bed comfortably. He reports not feeling good due to pain in his R leg. He describes the pain as shooting pain to his toes. He is also concerned that he might be withdrawing from xanax, as his dose has been decreased since he was admitted. He also reports some constipation for the past day. He was having regular BM prior to this. He does report decreased po intake. He denies nausea, vomiting.        Objective   Vitals:   Temp:  [98 °F (36.7 °C)-98.6 °F (37 °C)] 98.1 °F (36.7 °C)  HR:  [56-87] 56  Resp:  [16-20] 16  BP: (119-148)/(55-87) 132/55    I/O         08/15 0701  08/16 0700 08/16 0701  08/17 0700 08/17 0701  08/18 0700    P.O. 2600 836     Total Intake(mL/kg) 2600 (34.7) 836 (11.2)     Urine (mL/kg/hr)  1900 (1.1)     Total Output  1900     Net +2600 -1064         " "            Physical Exam:   GENERAL APPEARANCE: NAD  NEURO: GCS 15   HEENT: Moist mucous membranes  CV: RRR  LUNGS: CTAB  GI: abdomen soft, nontender  : voiding  MSK: 2+ pedal and radial pulses.   SKIN: warm, dry     Invasive Devices       Peripheral Intravenous Line  Duration             Peripheral IV 08/14/24 Dorsal (posterior);Right Forearm 3 days                          Lab Results: BMP/CMP: No results found for: \"SODIUM\", \"K\", \"CL\", \"CO2\", \"ANIONGAP\", \"BUN\", \"CREATININE\", \"GLUCOSE\", \"CALCIUM\", \"AST\", \"ALT\", \"ALKPHOS\", \"PROT\", \"BILITOT\", \"EGFR\" and CBC: No results found for: \"WBC\", \"HGB\", \"HCT\", \"MCV\", \"PLT\", \"ADJUSTEDWBC\", \"RBC\", \"MCH\", \"MCHC\", \"RDW\", \"MPV\", \"NRBC\"  Imaging: n/a   Other Studies: n/a       "

## 2024-08-17 NOTE — PLAN OF CARE
Problem: Prexisting or High Potential for Compromised Skin Integrity  Goal: Skin integrity is maintained or improved  Description: INTERVENTIONS:  - Identify patients at risk for skin breakdown  - Assess and monitor skin integrity  - Assess and monitor nutrition and hydration status  - Monitor labs   - Assess for incontinence   - Turn and reposition patient  - Assist with mobility/ambulation  - Relieve pressure over bony prominences  - Avoid friction and shearing  - Provide appropriate hygiene as needed including keeping skin clean and dry  - Evaluate need for skin moisturizer/barrier cream  - Collaborate with interdisciplinary team   - Patient/family teaching  - Consider wound care consult   Outcome: Progressing     Problem: PAIN - ADULT  Goal: Verbalizes/displays adequate comfort level or baseline comfort level  Description: Interventions:  - Encourage patient to monitor pain and request assistance  - Assess pain using appropriate pain scale  - Administer analgesics based on type and severity of pain and evaluate response  - Implement non-pharmacological measures as appropriate and evaluate response  - Consider cultural and social influences on pain and pain management  - Notify physician/advanced practitioner if interventions unsuccessful or patient reports new pain  Outcome: Progressing     Problem: INFECTION - ADULT  Goal: Absence or prevention of progression during hospitalization  Description: INTERVENTIONS:  - Assess and monitor for signs and symptoms of infection  - Monitor lab/diagnostic results  - Monitor all insertion sites, i.e. indwelling lines, tubes, and drains  - Monitor endotracheal if appropriate and nasal secretions for changes in amount and color  - [place] appropriate cooling/warming therapies per order  - Administer medications as ordered  - Instruct and encourage patient and family to use good hand hygiene technique  - Identify and instruct in appropriate isolation precautions for  identified infection/condition  Outcome: Progressing  Goal: Absence of fever/infection during neutropenic period  Description: INTERVENTIONS:  - Monitor WBC    Outcome: Progressing     Problem: SAFETY ADULT  Goal: Maintain or return to baseline ADL function  Description: INTERVENTIONS:  -  Assess patient's ability to carry out ADLs; assess patient's baseline for ADL function and identify physical deficits which impact ability to perform ADLs (bathing, care of mouth/teeth, toileting, grooming, dressing, etc.)  - Assess/evaluate cause of self-care deficits   - Assess range of motion  - Assess patient's mobility; develop plan if impaired  - Assess patient's need for assistive devices and provide as appropriate  - Encourage maximum independence but intervene and supervise when necessary  - Involve family in performance of ADLs  - Assess for home care needs following discharge   - Consider OT consult to assist with ADL evaluation and planning for discharge  - Provide patient education as appropriate  Outcome: Progressing  Goal: Maintains/Returns to pre admission functional level  Description: INTERVENTIONS:  - Perform AM-PAC 6 Click Basic Mobility/ Daily Activity assessment daily.  - Set and communicate daily mobility goal to care team and patient/family/caregiver.   - Collaborate with rehabilitation services on mobility goals if consulted  - Perform Range of Motion  times a day.  - Reposition patient every  hours.  - Dangle patient  times a day  - Stand patient  times a day  - Ambulate patient  times a day  - Out of bed to chair  times a day   - Out of bed for meals  times a day  - Out of bed for toileting  - Record patient progress and toleration of activity level   Outcome: Progressing     Problem: DISCHARGE PLANNING  Goal: Discharge to home or other facility with appropriate resources  Description: INTERVENTIONS:  - Identify barriers to discharge w/patient and caregiver  - Arrange for needed discharge resources and  transportation as appropriate  - Identify discharge learning needs (meds, wound care, etc.)  - Arrange for interpretive services to assist at discharge as needed  - Refer to Case Management Department for coordinating discharge planning if the patient needs post-hospital services based on physician/advanced practitioner order or complex needs related to functional status, cognitive ability, or social support system  Outcome: Progressing     Problem: Knowledge Deficit  Goal: Patient/family/caregiver demonstrates understanding of disease process, treatment plan, medications, and discharge instructions  Description: Complete learning assessment and assess knowledge base.  Interventions:  - Provide teaching at level of understanding  - Provide teaching via preferred learning methods  Outcome: Progressing     Problem: SAFETY,RESTRAINT: NV/NON-SELF DESTRUCTIVE BEHAVIOR  Goal: Remains free of harm/injury (restraint for non violent/non self-detsructive behavior)  Description: INTERVENTIONS:  - Instruct patient/family regarding restraint use   - Assess and monitor physiologic and psychological status   - Provide interventions and comfort measures to meet assessed patient needs   - Identify and implement measures to help patient regain control  - Assess readiness for release of restraint   Outcome: Progressing  Goal: Returns to optimal restraint-free functioning  Description: INTERVENTIONS:  - Assess the patient's behavior and symptoms that indicate continued need for restraint  - Identify and implement measures to help patient regain control  - Assess readiness for release of restraint   Outcome: Progressing     Problem: Potential for Falls  Goal: Patient will remain free of falls  Description: INTERVENTIONS:  - Educate patient/family on patient safety including physical limitations  - Instruct patient to call for assistance with activity   - Consult OT/PT to assist with strengthening/mobility   - Keep Call bell within  reach  - Keep bed low and locked with side rails adjusted as appropriate  - Keep care items and personal belongings within reach  - Initiate and maintain comfort rounds  - Make Fall Risk Sign visible to staff  - Offer Toileting every  Hours, in advance of need  - Initiate/Maintain alarm  - Obtain necessary fall risk management equipment:   - Apply yellow socks and bracelet for high fall risk patients  - Consider moving patient to room near nurses station  Outcome: Progressing     Problem: Nutrition/Hydration-ADULT  Goal: Nutrient/Hydration intake appropriate for improving, restoring or maintaining nutritional needs  Description: Monitor and assess patient's nutrition/hydration status for malnutrition. Collaborate with interdisciplinary team and initiate plan and interventions as ordered.  Monitor patient's weight and dietary intake as ordered or per policy. Utilize nutrition screening tool and intervene as necessary. Determine patient's food preferences and provide high-protein, high-caloric foods as appropriate.     INTERVENTIONS:  - Monitor oral intake, urinary output, labs, and treatment plans  - Assess nutrition and hydration status and recommend course of action  - Evaluate amount of meals eaten  - Assist patient with eating if necessary   - Allow adequate time for meals  - Recommend/ encourage appropriate diets, oral nutritional supplements, and vitamin/mineral supplements  - Order, calculate, and assess calorie counts as needed  - Recommend, monitor, and adjust tube feedings and TPN/PPN based on assessed needs  - Assess need for intravenous fluids  - Provide specific nutrition/hydration education as appropriate  - Include patient/family/caregiver in decisions related to nutrition  Outcome: Progressing

## 2024-08-17 NOTE — PLAN OF CARE
Problem: Potential for Falls  Goal: Patient will remain free of falls  Description: INTERVENTIONS:  - Educate patient/family on patient safety including physical limitations  - Instruct patient to call for assistance with activity   - Consult OT/PT to assist with strengthening/mobility   - Keep Call bell within reach  - Keep bed low and locked with side rails adjusted as appropriate  - Keep care items and personal belongings within reach  - Initiate and maintain comfort rounds  - Make Fall Risk Sign visible to staff  - Apply yellow socks and bracelet for high fall risk patients  - Consider moving patient to room near nurses station  Outcome: Progressing     Problem: Prexisting or High Potential for Compromised Skin Integrity  Goal: Skin integrity is maintained or improved  Description: INTERVENTIONS:  - Identify patients at risk for skin breakdown  - Assess and monitor skin integrity  - Assess and monitor nutrition and hydration status  - Monitor labs   - Assess for incontinence   - Turn and reposition patient  - Assist with mobility/ambulation  - Relieve pressure over bony prominences  - Avoid friction and shearing  - Provide appropriate hygiene as needed including keeping skin clean and dry  - Evaluate need for skin moisturizer/barrier cream  - Collaborate with interdisciplinary team   - Patient/family teaching  - Consider wound care consult   Outcome: Progressing     Problem: PAIN - ADULT  Goal: Verbalizes/displays adequate comfort level or baseline comfort level  Description: Interventions:  - Encourage patient to monitor pain and request assistance  - Assess pain using appropriate pain scale  - Administer analgesics based on type and severity of pain and evaluate response  - Implement non-pharmacological measures as appropriate and evaluate response  - Consider cultural and social influences on pain and pain management  - Notify physician/advanced practitioner if interventions unsuccessful or patient reports  new pain  Outcome: Progressing     Problem: INFECTION - ADULT  Goal: Absence or prevention of progression during hospitalization  Description: INTERVENTIONS:  - Assess and monitor for signs and symptoms of infection  - Monitor lab/diagnostic results  - Monitor all insertion sites, i.e. indwelling lines, tubes, and drains  - Monitor endotracheal if appropriate and nasal secretions for changes in amount and color  - [place] appropriate cooling/warming therapies per order  - Administer medications as ordered  - Instruct and encourage patient and family to use good hand hygiene technique  - Identify and instruct in appropriate isolation precautions for identified infection/condition  Outcome: Progressing  Goal: Absence of fever/infection during neutropenic period  Description: INTERVENTIONS:  - Monitor WBC    Outcome: Progressing     Problem: SAFETY ADULT  Goal: Maintain or return to baseline ADL function  Description: INTERVENTIONS:  -  Assess patient's ability to carry out ADLs; assess patient's baseline for ADL function and identify physical deficits which impact ability to perform ADLs (bathing, care of mouth/teeth, toileting, grooming, dressing, etc.)  - Assess/evaluate cause of self-care deficits   - Assess range of motion  - Assess patient's mobility; develop plan if impaired  - Assess patient's need for assistive devices and provide as appropriate  - Encourage maximum independence but intervene and supervise when necessary  - Involve family in performance of ADLs  - Assess for home care needs following discharge   - Consider OT consult to assist with ADL evaluation and planning for discharge  - Provide patient education as appropriate  Outcome: Progressing  Goal: Maintains/Returns to pre admission functional level  Description: INTERVENTIONS:  - Perform AM-PAC 6 Click Basic Mobility/ Daily Activity assessment daily.  - Set and communicate daily mobility goal to care team and patient/family/caregiver.   -  Collaborate with rehabilitation services on mobility goals if consulted  - Out of bed for toileting  - Record patient progress and toleration of activity level   Outcome: Progressing     Problem: DISCHARGE PLANNING  Goal: Discharge to home or other facility with appropriate resources  Description: INTERVENTIONS:  - Identify barriers to discharge w/patient and caregiver  - Arrange for needed discharge resources and transportation as appropriate  - Identify discharge learning needs (meds, wound care, etc.)  - Arrange for interpretive services to assist at discharge as needed  - Refer to Case Management Department for coordinating discharge planning if the patient needs post-hospital services based on physician/advanced practitioner order or complex needs related to functional status, cognitive ability, or social support system  Outcome: Progressing     Problem: Knowledge Deficit  Goal: Patient/family/caregiver demonstrates understanding of disease process, treatment plan, medications, and discharge instructions  Description: Complete learning assessment and assess knowledge base.  Interventions:  - Provide teaching at level of understanding  - Provide teaching via preferred learning methods  Outcome: Progressing     Problem: SAFETY,RESTRAINT: NV/NON-SELF DESTRUCTIVE BEHAVIOR  Goal: Remains free of harm/injury (restraint for non violent/non self-detsructive behavior)  Description: INTERVENTIONS:  - Instruct patient/family regarding restraint use   - Assess and monitor physiologic and psychological status   - Provide interventions and comfort measures to meet assessed patient needs   - Identify and implement measures to help patient regain control  - Assess readiness for release of restraint   Outcome: Progressing  Goal: Returns to optimal restraint-free functioning  Description: INTERVENTIONS:  - Assess the patient's behavior and symptoms that indicate continued need for restraint  - Identify and implement measures to  help patient regain control  - Assess readiness for release of restraint   Outcome: Progressing     Problem: Nutrition/Hydration-ADULT  Goal: Nutrient/Hydration intake appropriate for improving, restoring or maintaining nutritional needs  Description: Monitor and assess patient's nutrition/hydration status for malnutrition. Collaborate with interdisciplinary team and initiate plan and interventions as ordered.  Monitor patient's weight and dietary intake as ordered or per policy. Utilize nutrition screening tool and intervene as necessary. Determine patient's food preferences and provide high-protein, high-caloric foods as appropriate.     INTERVENTIONS:  - Monitor oral intake, urinary output, labs, and treatment plans  - Assess nutrition and hydration status and recommend course of action  - Evaluate amount of meals eaten  - Assist patient with eating if necessary   - Allow adequate time for meals  - Recommend/ encourage appropriate diets, oral nutritional supplements, and vitamin/mineral supplements  - Order, calculate, and assess calorie counts as needed  - Recommend, monitor, and adjust tube feedings and TPN/PPN based on assessed needs  - Assess need for intravenous fluids  - Provide specific nutrition/hydration education as appropriate  - Include patient/family/caregiver in decisions related to nutrition  Outcome: Progressing

## 2024-08-17 NOTE — ASSESSMENT & PLAN NOTE
Pt initially very lethargic on arrival to ED, suspect due to methadone  Neuropsych reevaluated patient and cleared him for decision-making capabilities.  Geriatrics consulted and note appreciated

## 2024-08-17 NOTE — ASSESSMENT & PLAN NOTE
Lab Results   Component Value Date    HGBA1C 10.7 (H) 07/31/2024       Recent Labs     08/16/24  0721 08/16/24  1128 08/16/24  1600 08/16/24 2055   POCGLU 82 110 182* 232*         Blood Sugar Average: Last 72 hrs:  (P) [id number]    Patient was recently admitted to [facility] and discharged on insulin  Endocrine consultation note appreciated, per their recommendation:  Started on corrective sliding scale algorithm to  Started on Lantus 15 units--advance to 20 units  Endocrine consulted and note appreciated-discharged on basal insulin plus Janumet plus Prandin or glipizide, to improve compliance to medical management   Concern for compliance-patient will require insulin training if being discharged home on insulin.

## 2024-08-18 VITALS
WEIGHT: 165.12 LBS | OXYGEN SATURATION: 94 % | DIASTOLIC BLOOD PRESSURE: 79 MMHG | RESPIRATION RATE: 19 BRPM | HEIGHT: 68 IN | BODY MASS INDEX: 25.03 KG/M2 | HEART RATE: 87 BPM | SYSTOLIC BLOOD PRESSURE: 129 MMHG | TEMPERATURE: 98.2 F

## 2024-08-18 LAB
GLUCOSE SERPL-MCNC: 106 MG/DL (ref 65–140)
GLUCOSE SERPL-MCNC: 124 MG/DL (ref 65–140)

## 2024-08-18 PROCEDURE — NC001 PR NO CHARGE: Performed by: SURGERY

## 2024-08-18 PROCEDURE — 82948 REAGENT STRIP/BLOOD GLUCOSE: CPT

## 2024-08-18 PROCEDURE — 99238 HOSP IP/OBS DSCHRG MGMT 30/<: CPT | Performed by: NURSE PRACTITIONER

## 2024-08-18 RX ADMIN — REPAGLINIDE 1 MG: 1 TABLET ORAL at 06:15

## 2024-08-18 RX ADMIN — Medication 7.5 MG: at 12:15

## 2024-08-18 RX ADMIN — AMLODIPINE BESYLATE 5 MG: 5 TABLET ORAL at 09:10

## 2024-08-18 RX ADMIN — ALPRAZOLAM 0.5 MG: 0.5 TABLET ORAL at 13:44

## 2024-08-18 RX ADMIN — SENNOSIDES AND DOCUSATE SODIUM 2 TABLET: 8.6; 5 TABLET ORAL at 09:10

## 2024-08-18 RX ADMIN — REPAGLINIDE 1 MG: 1 TABLET ORAL at 12:15

## 2024-08-18 RX ADMIN — Medication 12.5 MG: at 06:13

## 2024-08-18 RX ADMIN — ACETAMINOPHEN 975 MG: 325 TABLET, FILM COATED ORAL at 06:09

## 2024-08-18 RX ADMIN — GABAPENTIN 300 MG: 300 CAPSULE ORAL at 09:10

## 2024-08-18 RX ADMIN — Medication 190 MG: at 06:10

## 2024-08-18 RX ADMIN — Medication 7.5 MG: at 05:06

## 2024-08-18 RX ADMIN — POLYETHYLENE GLYCOL 3350 17 G: 17 POWDER, FOR SOLUTION ORAL at 09:10

## 2024-08-18 RX ADMIN — Medication 7.5 MG: at 00:53

## 2024-08-18 RX ADMIN — ENOXAPARIN SODIUM 30 MG: 30 INJECTION SUBCUTANEOUS at 06:11

## 2024-08-18 NOTE — ASSESSMENT & PLAN NOTE
Lab Results   Component Value Date    EGFR 93 08/14/2024    EGFR 88 08/13/2024    EGFR 70 08/11/2024    CREATININE 0.85 08/14/2024    CREATININE 0.92 08/13/2024    CREATININE 1.11 08/11/2024     - Baseline Cr 0.8-1.0 per chart check  - Avoid nephrotoxins and hypotension  - Monitor renal indices  - Outpatient follow up with PCP

## 2024-08-18 NOTE — CASE MANAGEMENT
Case Management Discharge Planning Note    Patient name David Skelton Jr.  Location W /W -01 MRN [id number]  : 1960 Date 2024       Current Admission Date: 2024  Current Admission Diagnosis:Methadone dependence (HCC)   Patient Active Problem List    Diagnosis Date Noted Date Diagnosed    Type 2 diabetes mellitus with other ophthalmic complication, with long-term current use of insulin (HCC) 2024     Cognitive decline 2024     Ambulatory dysfunction 2024     Prolonged QT interval 2024     Left ventricular hypertrophy 2024     Acute blood loss anemia 2024     Chronic hepatitis (HCC) 2024     BPH (benign prostatic hyperplasia) 2024     Cholelithiasis 2024     CKD (chronic kidney disease), stage II 2024     Hypogonadism in male 2024     Electrolyte abnormality 2024     Hyponatremia 2024     Right hip pain 2024     Acute blood loss anemia (ABLA) 2024     Nocturia associated with benign prostatic hyperplasia 2024     Gross hematuria 2024     Closed fracture of right femur, unspecified fracture morphology, sequela 2024     B12 deficiency 2023     Bradycardia 2023     Retinopathy due to secondary DM (HCC) 2023     Visual changes 2023     Fall 2023     Closed fracture of multiple ribs of left side 2023     Methadone dependence (HCC) 2023     Encephalopathy 2023     Liver lesion, right lobe 2021     Lung nodule 2021     Adrenal nodule (HCC) 2021     Thoracic degenerative disc disease 2021     DDD (degenerative disc disease), lumbar 2021     Pre-operative general physical examination 2019     S/P gastric bypass 11/15/2019     Hypogonadism male 11/15/2019     Hemorrhoids, internal 2019     Rectal prolapse 2019     Drug-induced constipation 2019     Chronic anticoagulation 2019     GERD  without esophagitis 03/01/2019     Iron deficiency anemia 03/01/2019     Major depression, chronic 03/01/2019     Mixed hyperlipidemia 03/01/2019     Stable proliferative diabetic retinopathy of left eye associated with type 2 diabetes mellitus (HCC) 03/01/2019     Narcotic abuse ([facility]) 06/06/2018     Ulcer of leg, chronic, left ([facility]) 02/27/2015     ADD (attention deficit disorder) without hyperactivity 01/29/2015     Bipolar disorder ([facility]) 01/10/2014     Insomnia 01/21/2013     History of hepatitis C 01/14/2013     Allergic rhinitis 01/04/2013     Generalized anxiety disorder 01/04/2013     Macular degeneration 01/04/2013     Spinal stenosis 01/04/2013     Vitamin D deficiency 01/04/2013     Benign essential hypertension 09/14/2012     Type 2 diabetes mellitus with ophthalmic complication, with long-term current use of insulin ([facility]) 09/14/2012     Atrial fibrillation ([facility]) 05/15/2012     Complex medical condition 05/15/2012       LOS (days): 12  Geometric Mean LOS (GMLOS) (days): 5  Days to GMLOS:-6.8     OBJECTIVE:  Risk of Unplanned Readmission Score: 46.35         Current admission status: Inpatient   Preferred Pharmacy:   [id number] - [place], [place] - [address]  [address]  [place] [address]  Phone: [phone] Fax: [phone]    [id number] - [place] - [address]  [address]  [place] [address]  Phone: [phone] Fax: [phone]    [facility] - [place] - [address]  [address]  [place] [address]  Phone: [phone] Fax: [phone]    [facility]  [place] - [address]  [address]  [place] [address]  Phone: [phone] Fax: [phone]    Primary Care Provider: Casey Mckeon MD    Primary Insurance: [employer]  Secondary Insurance:     DISCHARGE DETAILS:    Discharge planning discussed  with:: Patient and sister  Freedom of Choice: Yes  Comments - Freedom of Choice: Cm met with patient and spoke to sister via phone regarding dc of patient. both are in agreement with discharge plan. Patients sister setting up home aids to come to the home starting monday for 36 hours a week. Cm reserved [facility] with [facility] to assist patient with SN, PT, and OT./  CM contacted family/caregiver?: Yes  Were Treatment Team discharge recommendations reviewed with patient/caregiver?: Yes  Did patient/caregiver verbalize understanding of patient care needs?: Yes  Were patient/caregiver advised of the risks associated with not following Treatment Team discharge recommendations?: Yes    Contacts  Patient Contacts: Vandana Skelton (Sister) [phone]  Relationship to Patient:: Family  Contact Method: Phone  Reason/Outcome: Discharge Planning    Requested Home Health Care         Home Health Agency Name:: [facility]  Home Health Follow-Up Provider:: PCP  Home Health Services Needed:: Strengthening/Theraputic Exercises to Improve Function, Evaluate Functional Status and Safety, Gait/ADL Training, Diabetes Management  Homebound Criteria Met:: Uses an Assist Device (i.e. cane, walker, etc)  Supporting Clincal Findings:: Fatigues Easliy in Short Distances, Limited Endurance    DME Referral Provided  Referral made for DME?: No    Other Referral/Resources/Interventions Provided:  Referral Comments: [facility]    Would you like to participate in our [facility] service program?  : No - Declined    Treatment Team Recommendation: Home with Home Health Care  Discharge Destination Plan:: Home with Home Health Care

## 2024-08-18 NOTE — ASSESSMENT & PLAN NOTE
Pt initially very lethargic on arrival to ED, suspect due to methadone  Neuropsych reevaluated patient and cleared him for decision-making capabilities.  Geriatrics consulted and note appreciated   Agree with excellent note above.     CTLI, bilateral nonhealing wounds. s/p LSFA stent and DCBA today, with minimal contrast (13ccs) via the RCFA. Continue aggressive medical management. No further revascularization planned prior to the OR with podiatry. IVF per renal today post-intervention. DAPT.

## 2024-08-18 NOTE — PLAN OF CARE
Problem: PAIN - ADULT  Goal: Verbalizes/displays adequate comfort level or baseline comfort level  Description: Interventions:  - Encourage patient to monitor pain and request assistance  - Assess pain using appropriate pain scale  - Administer analgesics based on type and severity of pain and evaluate response  - Implement non-pharmacological measures as appropriate and evaluate response  - Consider cultural and social influences on pain and pain management  - Notify physician/advanced practitioner if interventions unsuccessful or patient reports new pain  Outcome: Progressing     Problem: INFECTION - ADULT  Goal: Absence or prevention of progression during hospitalization  Description: INTERVENTIONS:  - Assess and monitor for signs and symptoms of infection  - Monitor lab/diagnostic results  - Monitor all insertion sites, i.e. indwelling lines, tubes, and drains  - Monitor endotracheal if appropriate and nasal secretions for changes in amount and color  - [place] appropriate cooling/warming therapies per order  - Administer medications as ordered  - Instruct and encourage patient and family to use good hand hygiene technique  - Identify and instruct in appropriate isolation precautions for identified infection/condition  Outcome: Progressing  Goal: Absence of fever/infection during neutropenic period  Description: INTERVENTIONS:  - Monitor WBC    Outcome: Progressing     Problem: SAFETY ADULT  Goal: Maintain or return to baseline ADL function  Description: INTERVENTIONS:  -  Assess patient's ability to carry out ADLs; assess patient's baseline for ADL function and identify physical deficits which impact ability to perform ADLs (bathing, care of mouth/teeth, toileting, grooming, dressing, etc.)  - Assess/evaluate cause of self-care deficits   - Assess range of motion  - Assess patient's mobility; develop plan if impaired  - Assess patient's need for assistive devices and provide as appropriate  - Encourage  maximum independence but intervene and supervise when necessary  - Involve family in performance of ADLs  - Assess for home care needs following discharge   - Consider OT consult to assist with ADL evaluation and planning for discharge  - Provide patient education as appropriate  Outcome: Progressing  Goal: Maintains/Returns to pre admission functional level  Description: INTERVENTIONS:  - Perform AM-PAC 6 Click Basic Mobility/ Daily Activity assessment daily.  - Set and communicate daily mobility goal to care team and patient/family/caregiver.   - Collaborate with rehabilitation services on mobility goals if consulted  - Perform Range of Motion 3 times a day.  - Reposition patient every 3 hours.  - Dangle patient 3 times a day  - Stand patient 3 times a day  - Ambulate patient 3 times a day  - Out of bed to chair 3 times a day   - Out of bed for meals 3 times a day  - Out of bed for toileting  - Record patient progress and toleration of activity level   Outcome: Progressing

## 2024-08-18 NOTE — ASSESSMENT & PLAN NOTE
Lab Results   Component Value Date    HGBA1C 10.7 (H) 07/31/2024       Recent Labs     08/17/24  1117 08/17/24  1159 08/17/24  1525 08/17/24  2203   POCGLU 76 119 201* 316*         Blood Sugar Average: Last 72 hrs:  (P) 168.4220651753660832    Patient was recently admitted to [facility] and discharged on insulin  Endocrine consultation note appreciated, per their recommendation:  Started on corrective sliding scale algorithm to  Started on Lantus 15 units--advance to 20 units  Endocrine consulted and note appreciated-discharged on basal insulin plus Janumet plus Prandin or glipizide, to improve compliance to medical management   Concern for compliance-patient will require insulin training if being discharged home on insulin.

## 2024-08-18 NOTE — ASSESSMENT & PLAN NOTE
S/p hip surgery on 7/31  CT lower extremity showed possible myositis.   Orthopedics consulted for 2-week follow-up  Continue WBAT RLE  Multimodal pain regimen as needed  PT/OT-recommended level 2  DVT prophylaxis: Lovenox.  May be discharged on aspirin 81 mg twice daily to complete 28-day DVT prophylaxis recommendation  Outpatient follow-up with orthopedic surgery for reevaluation

## 2024-08-18 NOTE — PLAN OF CARE
Problem: Potential for Falls  Goal: Patient will remain free of falls  Description: INTERVENTIONS:  - Educate patient/family on patient safety including physical limitations  - Instruct patient to call for assistance with activity   - Consult OT/PT to assist with strengthening/mobility   - Keep Call bell within reach  - Keep bed low and locked with side rails adjusted as appropriate  - Keep care items and personal belongings within reach  - Initiate and maintain comfort rounds  - Make Fall Risk Sign visible to staff  - Offer Toileting every  Hours, in advance of need  - Initiate/Maintain alarm  - Obtain necessary fall risk management equipment:   - Apply yellow socks and bracelet for high fall risk patients  - Consider moving patient to room near nurses station  Outcome: Progressing     Problem: Prexisting or High Potential for Compromised Skin Integrity  Goal: Skin integrity is maintained or improved  Description: INTERVENTIONS:  - Identify patients at risk for skin breakdown  - Assess and monitor skin integrity  - Assess and monitor nutrition and hydration status  - Monitor labs   - Assess for incontinence   - Turn and reposition patient  - Assist with mobility/ambulation  - Relieve pressure over bony prominences  - Avoid friction and shearing  - Provide appropriate hygiene as needed including keeping skin clean and dry  - Evaluate need for skin moisturizer/barrier cream  - Collaborate with interdisciplinary team   - Patient/family teaching  - Consider wound care consult   Outcome: Progressing     Problem: PAIN - ADULT  Goal: Verbalizes/displays adequate comfort level or baseline comfort level  Description: Interventions:  - Encourage patient to monitor pain and request assistance  - Assess pain using appropriate pain scale  - Administer analgesics based on type and severity of pain and evaluate response  - Implement non-pharmacological measures as appropriate and evaluate response  - Consider cultural and  social influences on pain and pain management  - Notify physician/advanced practitioner if interventions unsuccessful or patient reports new pain  Outcome: Progressing     Problem: INFECTION - ADULT  Goal: Absence or prevention of progression during hospitalization  Description: INTERVENTIONS:  - Assess and monitor for signs and symptoms of infection  - Monitor lab/diagnostic results  - Monitor all insertion sites, i.e. indwelling lines, tubes, and drains  - Monitor endotracheal if appropriate and nasal secretions for changes in amount and color  - [place] appropriate cooling/warming therapies per order  - Administer medications as ordered  - Instruct and encourage patient and family to use good hand hygiene technique  - Identify and instruct in appropriate isolation precautions for identified infection/condition  Outcome: Progressing  Goal: Absence of fever/infection during neutropenic period  Description: INTERVENTIONS:  - Monitor WBC    Outcome: Progressing     Problem: SAFETY ADULT  Goal: Maintain or return to baseline ADL function  Description: INTERVENTIONS:  -  Assess patient's ability to carry out ADLs; assess patient's baseline for ADL function and identify physical deficits which impact ability to perform ADLs (bathing, care of mouth/teeth, toileting, grooming, dressing, etc.)  - Assess/evaluate cause of self-care deficits   - Assess range of motion  - Assess patient's mobility; develop plan if impaired  - Assess patient's need for assistive devices and provide as appropriate  - Encourage maximum independence but intervene and supervise when necessary  - Involve family in performance of ADLs  - Assess for home care needs following discharge   - Consider OT consult to assist with ADL evaluation and planning for discharge  - Provide patient education as appropriate  Outcome: Progressing  Goal: Maintains/Returns to pre admission functional level  Description: INTERVENTIONS:  - Perform AM-PAC 6 Click Basic  Mobility/ Daily Activity assessment daily.  - Set and communicate daily mobility goal to care team and patient/family/caregiver.   - Collaborate with rehabilitation services on mobility goals if consulted  - Perform Range of Motion  times a day.  - Reposition patient every  hours.  - Dangle patient  times a day  - Stand patient  times a day  - Ambulate patient  times a day  - Out of bed to chair  times a day   - Out of bed for meals  times a day  - Out of bed for toileting  - Record patient progress and toleration of activity level   Outcome: Progressing     Problem: DISCHARGE PLANNING  Goal: Discharge to home or other facility with appropriate resources  Description: INTERVENTIONS:  - Identify barriers to discharge w/patient and caregiver  - Arrange for needed discharge resources and transportation as appropriate  - Identify discharge learning needs (meds, wound care, etc.)  - Arrange for interpretive services to assist at discharge as needed  - Refer to Case Management Department for coordinating discharge planning if the patient needs post-hospital services based on physician/advanced practitioner order or complex needs related to functional status, cognitive ability, or social support system  Outcome: Progressing     Problem: Knowledge Deficit  Goal: Patient/family/caregiver demonstrates understanding of disease process, treatment plan, medications, and discharge instructions  Description: Complete learning assessment and assess knowledge base.  Interventions:  - Provide teaching at level of understanding  - Provide teaching via preferred learning methods  Outcome: Progressing     Problem: SAFETY,RESTRAINT: NV/NON-SELF DESTRUCTIVE BEHAVIOR  Goal: Remains free of harm/injury (restraint for non violent/non self-detsructive behavior)  Description: INTERVENTIONS:  - Instruct patient/family regarding restraint use   - Assess and monitor physiologic and psychological status   - Provide interventions and comfort  measures to meet assessed patient needs   - Identify and implement measures to help patient regain control  - Assess readiness for release of restraint   Outcome: Progressing  Goal: Returns to optimal restraint-free functioning  Description: INTERVENTIONS:  - Assess the patient's behavior and symptoms that indicate continued need for restraint  - Identify and implement measures to help patient regain control  - Assess readiness for release of restraint   Outcome: Progressing

## 2024-08-18 NOTE — PROGRESS NOTES
[facility]  Progress Note  Name: David Osorio I  MRN: [id number]  Unit/Bed#: W -01 I Date of Admission: 8/6/2024   Date of Service: 8/18/2024 I Hospital Day: 12    Assessment & Plan   Hypogonadism in male  Assessment & Plan  Endocrinology consulted note appreciated  Recommended outpatient follow-up with neurology    CKD (chronic kidney disease), stage II  Assessment & Plan  Lab Results   Component Value Date    EGFR 93 08/14/2024    EGFR 88 08/13/2024    EGFR 70 08/11/2024    CREATININE 0.85 08/14/2024    CREATININE 0.92 08/13/2024    CREATININE 1.11 08/11/2024     - Baseline Cr 0.8-1.0 per chart check  - Avoid nephrotoxins and hypotension  - Monitor renal indices  - Outpatient follow up with PCP    Prolonged QT interval  Assessment & Plan  In the setting of methadone use  Continue to monitor    Ambulatory dysfunction  Assessment & Plan  Fall precautions  Follow-up with PCP/endocrinology as an outpatient.    Hyponatremia  Assessment & Plan  Repeat BMP shows stability at 134-continue to monitor    Right hip pain  Assessment & Plan  S/p hip surgery on 7/31  CT lower extremity showed possible myositis.   Orthopedics consulted for 2-week follow-up  Continue WBAT RLE  Multimodal pain regimen as needed  PT/OT-recommended level 2  DVT prophylaxis: Lovenox.  May be discharged on aspirin 81 mg twice daily to complete 28-day DVT prophylaxis recommendation  Outpatient follow-up with orthopedic surgery for reevaluation    Fall  Assessment & Plan  - Status post fall with the below noted injuries.  - Fall precautions.  - Geriatric Medicine consultation for evaluation, medication review and recommendations.  - PT and OT evaluation and treatment as indicated.  - Case Management consultation for disposition planning.    - Plan for dispo in AM         Encephalopathy  Assessment & Plan  Pt initially very lethargic on arrival to ED, suspect due to methadone  Neuropsych reevaluated patient and  cleared him for decision-making capabilities.  Geriatrics consulted and note appreciated    Methadone dependence ([facility])  Assessment & Plan  Pt has a hx of opioid abuse, and has been on chronic methadone.  Current dose is 190mg daily. Dose was confrimed  Continue home dose of methadone    Type 2 diabetes mellitus with ophthalmic complication, with long-term current use of insulin ([facility])  Assessment & Plan  Lab Results   Component Value Date    HGBA1C 10.7 (H) 07/31/2024       Recent Labs     08/17/24  1117 08/17/24  1159 08/17/24  1525 08/17/24  2203   POCGLU 76 119 201* 316*         Blood Sugar Average: Last 72 hrs:  (P) 168.9665138342665566    Patient was recently admitted to [facility] and discharged on insulin  Endocrine consultation note appreciated, per their recommendation:  Started on corrective sliding scale algorithm to  Started on Lantus 15 units--advance to 20 units  Endocrine consulted and note appreciated-discharged on basal insulin plus Janumet plus Prandin or glipizide, to improve compliance to medical management   Concern for compliance-patient will require insulin training if being discharged home on insulin.      Benign essential hypertension  Assessment & Plan  Continue home amlodipine  Outpatient follow up with PCP    Bipolar disorder ([facility])  Assessment & Plan  Patient has a history of bipolar disorder-no longer follows with psychiatry-having difficulty scheduling outpatient follow-up  Psych consulted note appreciated-increase gabapentin to 300 3 times daily.  Xanax as needed.  Patient confirms he has follow-up scheduled with psych             Bowel Regimen: MiraLAX, Senokot-S  VTE Prophylaxis:Enoxaparin (Lovenox)     Disposition: Continue current level of care.  Anticipate discharge home today.  CM following for disposition planning.    Subjective   Chief Complaint: No complaints    Subjective: Patient reports he is feeling well this morning and offers no new complaints.  He reports his pain is  well-controlled at this time.  He is tolerating a diet and denies abdominal pain, nausea, vomiting.  He is motivated for discharge home.     Objective   Vitals:   Temp:  [97.9 °F (36.6 °C)-98.3 °F (36.8 °C)] 98.3 °F (36.8 °C)  HR:  [56-87] 85  Resp:  [16-20] 20  BP: (125-148)/(55-87) 125/65    I/O         08/16 0701  08/17 0700 08/17 0701  08/18 0700    P.O. 836 360    Total Intake(mL/kg) 836 (11.2) 360 (4.8)    Urine (mL/kg/hr) 1900 (1.1) 900 (0.5)    Total Output 1900 900    Net -1064 -540                   Physical Exam:   GENERAL APPEARANCE: Patient in no acute distress.  HEENT: NCAT; PERRL, EOMs intact; Mucous membranes moist  NECK / BACK: ROM normal  CV: Regular rate and rhythm; no murmur/gallops/rubs appreciated.  CHEST / LUNGS: Clear to auscultation; no wheezes/rales/rhonci.  ABD: NABS; soft; non-distended; non-tender.  : voiding  EXT: RLE NVI; +2 pulses bilaterally upper & lower extremities; no edema.  NEURO: GCS 15; no focal neurologic deficits; neurovascularly intact.  SKIN: Warm, dry and well perfused; no rash; no jaundice.      Invasive Devices       Peripheral Intravenous Line  Duration             Peripheral IV 08/14/24 Dorsal (posterior);Right Forearm 3 days                          Lab Results: Results: I have personally reviewed all pertinent laboratory/tests results  Imaging: I have personally reviewed pertinent reports.     Other Studies: None

## 2024-08-18 NOTE — INCIDENTAL FINDINGS
"The following findings require follow up:  Radiographic finding   Finding: \" Subcentimeter groundglass density in the right upper lobe, minimally more prominent than in 2023. Based on current Fleischner Society 2017 Guidelines on incidental pulmonary nodule, follow-up CT is recommended for every 2 years until 5 years of   stability is demonstrated.\"   Follow up required: See above, follow up with PCP for imaging as indicated   Follow up should be done within 1-2 week(s)    Please notify the following clinician to assist with the follow up:   Dr. Mckeon    Incidental finding results were discussed with the Patient by STEVE Blum on 08/18/24.   They expressed understanding and all questions answered.  "

## 2024-08-19 ENCOUNTER — TRANSITIONAL CARE MANAGEMENT (OUTPATIENT)
Dept: FAMILY MEDICINE CLINIC | Facility: CLINIC | Age: 64
End: 2024-08-19

## 2024-08-19 ENCOUNTER — HOME CARE VISIT (OUTPATIENT)
Dept: HOME HEALTH SERVICES | Facility: HOME HEALTHCARE | Age: 64
End: 2024-08-19

## 2024-08-19 ENCOUNTER — APPOINTMENT (OUTPATIENT)
Dept: RADIOLOGY | Facility: HOSPITAL | Age: 64
DRG: 052 | End: 2024-08-19
Payer: COMMERCIAL

## 2024-08-19 ENCOUNTER — APPOINTMENT (EMERGENCY)
Dept: RADIOLOGY | Facility: HOSPITAL | Age: 64
DRG: 052 | End: 2024-08-19
Payer: COMMERCIAL

## 2024-08-19 ENCOUNTER — HOSPITAL ENCOUNTER (INPATIENT)
Facility: HOSPITAL | Age: 64
LOS: 4 days | Discharge: HOME HEALTH | DRG: 052 | End: 2024-08-23
Attending: EMERGENCY MEDICINE | Admitting: FAMILY MEDICINE
Payer: COMMERCIAL

## 2024-08-19 DIAGNOSIS — G93.40 ENCEPHALOPATHY ACUTE: ICD-10-CM

## 2024-08-19 DIAGNOSIS — R26.2 AMBULATORY DYSFUNCTION: ICD-10-CM

## 2024-08-19 DIAGNOSIS — R09.81 NASAL CONGESTION: ICD-10-CM

## 2024-08-19 DIAGNOSIS — T50.904A OVERDOSE OF UNDETERMINED INTENT, INITIAL ENCOUNTER: ICD-10-CM

## 2024-08-19 DIAGNOSIS — F41.9 ANXIETY DISORDER: ICD-10-CM

## 2024-08-19 DIAGNOSIS — S72.90XA FEMUR FRACTURE (HCC): ICD-10-CM
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
## 2024-08-19 PROBLEM — I10 BENIGN ESSENTIAL HYPERTENSION: Status: ACTIVE | Noted: 2024-08-19

## 2024-08-19 PROBLEM — F11.20 METHADONE DEPENDENCE (HCC): Status: ACTIVE | Noted: 2024-08-19

## 2024-08-19 PROBLEM — S72.91XS CLOSED FRACTURE OF RIGHT FEMUR, UNSPECIFIED FRACTURE MORPHOLOGY, SEQUELA: Status: ACTIVE | Noted: 2024-08-19

## 2024-08-19 PROBLEM — I48.0 PAROXYSMAL ATRIAL FIBRILLATION (HCC): Status: ACTIVE | Noted: 2024-08-19

## 2024-08-19 PROBLEM — D50.9 IDA (IRON DEFICIENCY ANEMIA): Status: ACTIVE | Noted: 2024-08-19

## 2024-08-19 PROBLEM — Z79.4 TYPE 2 DIABETES MELLITUS, WITH LONG-TERM CURRENT USE OF INSULIN (HCC): Status: ACTIVE | Noted: 2024-08-19

## 2024-08-19 PROBLEM — I48.91 ATRIAL FIBRILLATION (HCC): Status: ACTIVE | Noted: 2024-08-19

## 2024-08-19 PROBLEM — R29.6 RECURRENT FALLS: Status: ACTIVE | Noted: 2024-08-19

## 2024-08-19 PROBLEM — E11.9 TYPE 2 DIABETES MELLITUS, WITH LONG-TERM CURRENT USE OF INSULIN (HCC): Status: ACTIVE | Noted: 2024-08-19

## 2024-08-19 PROBLEM — G92.9 TOXIC ENCEPHALOPATHY: Status: ACTIVE | Noted: 2024-08-19

## 2024-08-19 PROBLEM — F41.1 GAD (GENERALIZED ANXIETY DISORDER): Status: ACTIVE | Noted: 2024-08-19

## 2024-08-19 LAB
ALBUMIN SERPL BCG-MCNC: 3.2 G/DL (ref 3.5–5)
ALBUMIN SERPL BCG-MCNC: 3.2 G/DL (ref 3.5–5)
ALP SERPL-CCNC: 172 U/L (ref 34–104)
ALP SERPL-CCNC: 172 U/L (ref 34–104)
ALT SERPL W P-5'-P-CCNC: 24 U/L (ref 7–52)
ALT SERPL W P-5'-P-CCNC: 24 U/L (ref 7–52)
ANION GAP SERPL CALCULATED.3IONS-SCNC: 4 MMOL/L (ref 4–13)
ANION GAP SERPL CALCULATED.3IONS-SCNC: 4 MMOL/L (ref 4–13)
APAP SERPL-MCNC: <2 UG/ML (ref 10–20)
APAP SERPL-MCNC: <2 UG/ML (ref 10–20)
APTT PPP: 26 SECONDS (ref 23–34)
APTT PPP: 26 SECONDS (ref 23–34)
AST SERPL W P-5'-P-CCNC: 20 U/L (ref 13–39)
AST SERPL W P-5'-P-CCNC: 20 U/L (ref 13–39)
ATRIAL RATE: 78 BPM
ATRIAL RATE: 78 BPM
BASE EX.OXY STD BLDV CALC-SCNC: 29.1 % (ref 60–80)
BASE EX.OXY STD BLDV CALC-SCNC: 29.1 % (ref 60–80)
BASE EXCESS BLDA CALC-SCNC: 3 MMOL/L (ref -2–3)
BASE EXCESS BLDA CALC-SCNC: 3 MMOL/L (ref -2–3)
BASE EXCESS BLDV CALC-SCNC: 3.5 MMOL/L
BASE EXCESS BLDV CALC-SCNC: 3.5 MMOL/L
BASOPHILS # BLD AUTO: 0.04 THOUSANDS/ÂΜL (ref 0–0.1)
BASOPHILS # BLD AUTO: 0.04 THOUSANDS/ÂΜL (ref 0–0.1)
BASOPHILS NFR BLD AUTO: 0 % (ref 0–1)
BASOPHILS NFR BLD AUTO: 0 % (ref 0–1)
BILIRUB SERPL-MCNC: 0.42 MG/DL (ref 0.2–1)
BILIRUB SERPL-MCNC: 0.42 MG/DL (ref 0.2–1)
BUN SERPL-MCNC: 22 MG/DL (ref 5–25)
BUN SERPL-MCNC: 22 MG/DL (ref 5–25)
CA-I BLD-SCNC: 1.14 MMOL/L (ref 1.12–1.32)
CA-I BLD-SCNC: 1.14 MMOL/L (ref 1.12–1.32)
CALCIUM ALBUM COR SERPL-MCNC: 8.8 MG/DL (ref 8.3–10.1)
CALCIUM ALBUM COR SERPL-MCNC: 8.8 MG/DL (ref 8.3–10.1)
CALCIUM SERPL-MCNC: 8.2 MG/DL (ref 8.4–10.2)
CALCIUM SERPL-MCNC: 8.2 MG/DL (ref 8.4–10.2)
CHLORIDE SERPL-SCNC: 102 MMOL/L (ref 96–108)
CHLORIDE SERPL-SCNC: 102 MMOL/L (ref 96–108)
CK SERPL-CCNC: 32 U/L (ref 39–308)
CK SERPL-CCNC: 32 U/L (ref 39–308)
CO2 SERPL-SCNC: 28 MMOL/L (ref 21–32)
CO2 SERPL-SCNC: 28 MMOL/L (ref 21–32)
CREAT SERPL-MCNC: 0.91 MG/DL (ref 0.6–1.3)
CREAT SERPL-MCNC: 0.91 MG/DL (ref 0.6–1.3)
EOSINOPHIL # BLD AUTO: 0.43 THOUSAND/ÂΜL (ref 0–0.61)
EOSINOPHIL # BLD AUTO: 0.43 THOUSAND/ÂΜL (ref 0–0.61)
EOSINOPHIL NFR BLD AUTO: 4 % (ref 0–6)
EOSINOPHIL NFR BLD AUTO: 4 % (ref 0–6)
ERYTHROCYTE [DISTWIDTH] IN BLOOD BY AUTOMATED COUNT: 14.7 % (ref 11.6–15.1)
ERYTHROCYTE [DISTWIDTH] IN BLOOD BY AUTOMATED COUNT: 14.7 % (ref 11.6–15.1)
ETHANOL SERPL-MCNC: <10 MG/DL
ETHANOL SERPL-MCNC: <10 MG/DL
GFR SERPL CREATININE-BSD FRML MDRD: 90 ML/MIN/1.73SQ M
GFR SERPL CREATININE-BSD FRML MDRD: 90 ML/MIN/1.73SQ M
GLUCOSE SERPL-MCNC: 108 MG/DL (ref 65–140)
GLUCOSE SERPL-MCNC: 108 MG/DL (ref 65–140)
GLUCOSE SERPL-MCNC: 155 MG/DL (ref 65–140)
GLUCOSE SERPL-MCNC: 155 MG/DL (ref 65–140)
GLUCOSE SERPL-MCNC: 231 MG/DL (ref 65–140)
GLUCOSE SERPL-MCNC: 231 MG/DL (ref 65–140)
GLUCOSE SERPL-MCNC: 236 MG/DL (ref 65–140)
GLUCOSE SERPL-MCNC: 236 MG/DL (ref 65–140)
GLUCOSE SERPL-MCNC: 289 MG/DL (ref 65–140)
GLUCOSE SERPL-MCNC: 289 MG/DL (ref 65–140)
HCO3 BLDA-SCNC: 28.6 MMOL/L (ref 24–30)
HCO3 BLDA-SCNC: 28.6 MMOL/L (ref 24–30)
HCO3 BLDV-SCNC: 30.9 MMOL/L (ref 24–30)
HCO3 BLDV-SCNC: 30.9 MMOL/L (ref 24–30)
HCT VFR BLD AUTO: 27 % (ref 36.5–49.3)
HCT VFR BLD AUTO: 27 % (ref 36.5–49.3)
HCT VFR BLD CALC: 25 % (ref 36.5–49.3)
HCT VFR BLD CALC: 25 % (ref 36.5–49.3)
HGB BLD-MCNC: 8.4 G/DL (ref 12–17)
HGB BLD-MCNC: 8.4 G/DL (ref 12–17)
HGB BLDA-MCNC: 8.5 G/DL (ref 12–17)
HGB BLDA-MCNC: 8.5 G/DL (ref 12–17)
IMM GRANULOCYTES # BLD AUTO: 0.07 THOUSAND/UL (ref 0–0.2)
IMM GRANULOCYTES # BLD AUTO: 0.07 THOUSAND/UL (ref 0–0.2)
IMM GRANULOCYTES NFR BLD AUTO: 1 % (ref 0–2)
IMM GRANULOCYTES NFR BLD AUTO: 1 % (ref 0–2)
INR PPP: 1.1 (ref 0.85–1.19)
INR PPP: 1.1 (ref 0.85–1.19)
LYMPHOCYTES # BLD AUTO: 1.21 THOUSANDS/ÂΜL (ref 0.6–4.47)
LYMPHOCYTES # BLD AUTO: 1.21 THOUSANDS/ÂΜL (ref 0.6–4.47)
LYMPHOCYTES NFR BLD AUTO: 11 % (ref 14–44)
LYMPHOCYTES NFR BLD AUTO: 11 % (ref 14–44)
MCH RBC QN AUTO: 29.2 PG (ref 26.8–34.3)
MCH RBC QN AUTO: 29.2 PG (ref 26.8–34.3)
MCHC RBC AUTO-ENTMCNC: 31.1 G/DL (ref 31.4–37.4)
MCHC RBC AUTO-ENTMCNC: 31.1 G/DL (ref 31.4–37.4)
MCV RBC AUTO: 94 FL (ref 82–98)
MCV RBC AUTO: 94 FL (ref 82–98)
MONOCYTES # BLD AUTO: 0.9 THOUSAND/ÂΜL (ref 0.17–1.22)
MONOCYTES # BLD AUTO: 0.9 THOUSAND/ÂΜL (ref 0.17–1.22)
MONOCYTES NFR BLD AUTO: 8 % (ref 4–12)
MONOCYTES NFR BLD AUTO: 8 % (ref 4–12)
NEUTROPHILS # BLD AUTO: 8.32 THOUSANDS/ÂΜL (ref 1.85–7.62)
NEUTROPHILS # BLD AUTO: 8.32 THOUSANDS/ÂΜL (ref 1.85–7.62)
NEUTS SEG NFR BLD AUTO: 76 % (ref 43–75)
NEUTS SEG NFR BLD AUTO: 76 % (ref 43–75)
NRBC BLD AUTO-RTO: 0 /100 WBCS
NRBC BLD AUTO-RTO: 0 /100 WBCS
O2 CT BLDV-SCNC: 3.5 ML/DL
O2 CT BLDV-SCNC: 3.5 ML/DL
P AXIS: 67 DEGREES
P AXIS: 67 DEGREES
PCO2 BLD: 30 MMOL/L (ref 21–32)
PCO2 BLD: 30 MMOL/L (ref 21–32)
PCO2 BLD: 49.4 MM HG (ref 42–50)
PCO2 BLD: 49.4 MM HG (ref 42–50)
PCO2 BLDV: 65.1 MM HG (ref 42–50)
PCO2 BLDV: 65.1 MM HG (ref 42–50)
PH BLD: 7.37 [PH] (ref 7.3–7.4)
PH BLD: 7.37 [PH] (ref 7.3–7.4)
PH BLDV: 7.29 [PH] (ref 7.3–7.4)
PH BLDV: 7.29 [PH] (ref 7.3–7.4)
PLATELET # BLD AUTO: 498 THOUSANDS/UL (ref 149–390)
PLATELET # BLD AUTO: 498 THOUSANDS/UL (ref 149–390)
PMV BLD AUTO: 10 FL (ref 8.9–12.7)
PMV BLD AUTO: 10 FL (ref 8.9–12.7)
PO2 BLD: 27 MM HG (ref 35–45)
PO2 BLD: 27 MM HG (ref 35–45)
PO2 BLDV: 20.8 MM HG (ref 35–45)
PO2 BLDV: 20.8 MM HG (ref 35–45)
POTASSIUM BLD-SCNC: 4.9 MMOL/L (ref 3.5–5.3)
POTASSIUM BLD-SCNC: 4.9 MMOL/L (ref 3.5–5.3)
POTASSIUM SERPL-SCNC: 4.9 MMOL/L (ref 3.5–5.3)
POTASSIUM SERPL-SCNC: 4.9 MMOL/L (ref 3.5–5.3)
PR INTERVAL: 144 MS
PR INTERVAL: 144 MS
PROT SERPL-MCNC: 6.1 G/DL (ref 6.4–8.4)
PROT SERPL-MCNC: 6.1 G/DL (ref 6.4–8.4)
PROTHROMBIN TIME: 14.5 SECONDS (ref 12.3–15)
PROTHROMBIN TIME: 14.5 SECONDS (ref 12.3–15)
QRS AXIS: 69 DEGREES
QRS AXIS: 69 DEGREES
QRSD INTERVAL: 94 MS
QRSD INTERVAL: 94 MS
QT INTERVAL: 370 MS
QT INTERVAL: 370 MS
QTC INTERVAL: 421 MS
QTC INTERVAL: 421 MS
RBC # BLD AUTO: 2.88 MILLION/UL (ref 3.88–5.62)
RBC # BLD AUTO: 2.88 MILLION/UL (ref 3.88–5.62)
SALICYLATES SERPL-MCNC: <5 MG/DL (ref 3–20)
SALICYLATES SERPL-MCNC: <5 MG/DL (ref 3–20)
SAO2 % BLD FROM PO2: 47 % (ref 60–85)
SAO2 % BLD FROM PO2: 47 % (ref 60–85)
SODIUM BLD-SCNC: 135 MMOL/L (ref 136–145)
SODIUM BLD-SCNC: 135 MMOL/L (ref 136–145)
SODIUM SERPL-SCNC: 134 MMOL/L (ref 135–147)
SODIUM SERPL-SCNC: 134 MMOL/L (ref 135–147)
SPECIMEN SOURCE: ABNORMAL
SPECIMEN SOURCE: ABNORMAL
T WAVE AXIS: 67 DEGREES
T WAVE AXIS: 67 DEGREES
VENTRICULAR RATE: 78 BPM
VENTRICULAR RATE: 78 BPM
WBC # BLD AUTO: 10.97 THOUSAND/UL (ref 4.31–10.16)
WBC # BLD AUTO: 10.97 THOUSAND/UL (ref 4.31–10.16)

## 2024-08-19 PROCEDURE — 72125 CT NECK SPINE W/O DYE: CPT

## 2024-08-19 PROCEDURE — 82947 ASSAY GLUCOSE BLOOD QUANT: CPT

## 2024-08-19 PROCEDURE — 82805 BLOOD GASES W/O2 SATURATION: CPT | Performed by: FAMILY MEDICINE

## 2024-08-19 PROCEDURE — 85014 HEMATOCRIT: CPT

## 2024-08-19 PROCEDURE — 93005 ELECTROCARDIOGRAM TRACING: CPT

## 2024-08-19 PROCEDURE — 84295 ASSAY OF SERUM SODIUM: CPT

## 2024-08-19 PROCEDURE — 85730 THROMBOPLASTIN TIME PARTIAL: CPT

## 2024-08-19 PROCEDURE — 82948 REAGENT STRIP/BLOOD GLUCOSE: CPT

## 2024-08-19 PROCEDURE — 82550 ASSAY OF CK (CPK): CPT

## 2024-08-19 PROCEDURE — 85610 PROTHROMBIN TIME: CPT

## 2024-08-19 PROCEDURE — 80143 DRUG ASSAY ACETAMINOPHEN: CPT

## 2024-08-19 PROCEDURE — 85025 COMPLETE CBC W/AUTO DIFF WBC: CPT

## 2024-08-19 PROCEDURE — 82077 ASSAY SPEC XCP UR&BREATH IA: CPT

## 2024-08-19 PROCEDURE — 82803 BLOOD GASES ANY COMBINATION: CPT

## 2024-08-19 PROCEDURE — 71045 X-RAY EXAM CHEST 1 VIEW: CPT

## 2024-08-19 PROCEDURE — 70450 CT HEAD/BRAIN W/O DYE: CPT

## 2024-08-19 PROCEDURE — 84132 ASSAY OF SERUM POTASSIUM: CPT

## 2024-08-19 PROCEDURE — 99285 EMERGENCY DEPT VISIT HI MDM: CPT

## 2024-08-19 PROCEDURE — EDAIR PR ED AIR: Performed by: EMERGENCY MEDICINE

## 2024-08-19 PROCEDURE — 99223 1ST HOSP IP/OBS HIGH 75: CPT | Performed by: FAMILY MEDICINE

## 2024-08-19 PROCEDURE — 99215 OFFICE O/P EST HI 40 MIN: CPT | Performed by: EMERGENCY MEDICINE

## 2024-08-19 PROCEDURE — 93010 ELECTROCARDIOGRAM REPORT: CPT | Performed by: INTERNAL MEDICINE

## 2024-08-19 PROCEDURE — 82330 ASSAY OF CALCIUM: CPT

## 2024-08-19 PROCEDURE — 36415 COLL VENOUS BLD VENIPUNCTURE: CPT

## 2024-08-19 PROCEDURE — 80179 DRUG ASSAY SALICYLATE: CPT

## 2024-08-19 PROCEDURE — 80053 COMPREHEN METABOLIC PANEL: CPT

## 2024-08-19 RX ORDER — METOPROLOL SUCCINATE 25 MG/1
12.5 TABLET, EXTENDED RELEASE ORAL DAILY
COMMUNITY

## 2024-08-19 RX ORDER — INSULIN GLARGINE 100 [IU]/ML
10 INJECTION, SOLUTION SUBCUTANEOUS
Status: DISCONTINUED | OUTPATIENT
Start: 2024-08-19 | End: 2024-08-21

## 2024-08-19 RX ORDER — ALPRAZOLAM 0.5 MG
0.5 TABLET ORAL
Status: DISCONTINUED | OUTPATIENT
Start: 2024-08-19 | End: 2024-08-21

## 2024-08-19 RX ORDER — ASPIRIN 81 MG/1
81 TABLET, CHEWABLE ORAL 2 TIMES DAILY
Status: DISCONTINUED | OUTPATIENT
Start: 2024-08-19 | End: 2024-08-23 | Stop reason: HOSPADM

## 2024-08-19 RX ORDER — ALPRAZOLAM 0.5 MG
TABLET ORAL
Status: ON HOLD | COMMUNITY
End: 2024-08-23

## 2024-08-19 RX ORDER — GABAPENTIN 300 MG/1
300 CAPSULE ORAL 3 TIMES DAILY
Status: DISCONTINUED | OUTPATIENT
Start: 2024-08-19 | End: 2024-08-23 | Stop reason: HOSPADM

## 2024-08-19 RX ORDER — INSULIN LISPRO 100 [IU]/ML
1-6 INJECTION, SOLUTION INTRAVENOUS; SUBCUTANEOUS EVERY 6 HOURS SCHEDULED
Status: DISCONTINUED | OUTPATIENT
Start: 2024-08-19 | End: 2024-08-20

## 2024-08-19 RX ORDER — METOPROLOL TARTRATE 1 MG/ML
2.5 INJECTION, SOLUTION INTRAVENOUS EVERY 6 HOURS PRN
Status: DISCONTINUED | OUTPATIENT
Start: 2024-08-19 | End: 2024-08-23 | Stop reason: HOSPADM

## 2024-08-19 RX ORDER — METHADONE HYDROCHLORIDE 10 MG/ML
190 CONCENTRATE ORAL DAILY
Status: DISCONTINUED | OUTPATIENT
Start: 2024-08-20 | End: 2024-08-20

## 2024-08-19 RX ORDER — SODIUM CHLORIDE, SODIUM GLUCONATE, SODIUM ACETATE, POTASSIUM CHLORIDE, MAGNESIUM CHLORIDE, SODIUM PHOSPHATE, DIBASIC, AND POTASSIUM PHOSPHATE .53; .5; .37; .037; .03; .012; .00082 G/100ML; G/100ML; G/100ML; G/100ML; G/100ML; G/100ML; G/100ML
75 INJECTION, SOLUTION INTRAVENOUS CONTINUOUS
Status: DISCONTINUED | OUTPATIENT
Start: 2024-08-19 | End: 2024-08-21

## 2024-08-19 RX ORDER — GABAPENTIN 300 MG/1
300 CAPSULE ORAL 3 TIMES DAILY
COMMUNITY

## 2024-08-19 RX ORDER — INSULIN GLARGINE 100 [IU]/ML
10 INJECTION, SOLUTION SUBCUTANEOUS
COMMUNITY

## 2024-08-19 RX ORDER — METFORMIN HCL 500 MG
TABLET, EXTENDED RELEASE 24 HR ORAL
COMMUNITY

## 2024-08-19 RX ORDER — METOPROLOL SUCCINATE 25 MG/1
12.5 TABLET, EXTENDED RELEASE ORAL DAILY
Status: DISCONTINUED | OUTPATIENT
Start: 2024-08-20 | End: 2024-08-23 | Stop reason: HOSPADM

## 2024-08-19 RX ORDER — METHADONE HYDROCHLORIDE 10 MG/1
190 TABLET ORAL DAILY
Status: DISCONTINUED | OUTPATIENT
Start: 2024-08-20 | End: 2024-08-19

## 2024-08-19 RX ORDER — ASPIRIN 81 MG/1
81 TABLET, CHEWABLE ORAL 2 TIMES DAILY
COMMUNITY
End: 2024-09-02

## 2024-08-19 RX ORDER — HEPARIN SODIUM 5000 [USP'U]/ML
5000 INJECTION, SOLUTION INTRAVENOUS; SUBCUTANEOUS EVERY 8 HOURS SCHEDULED
Status: DISCONTINUED | OUTPATIENT
Start: 2024-08-19 | End: 2024-08-23 | Stop reason: HOSPADM

## 2024-08-19 RX ORDER — REPAGLINIDE 1 MG/1
1 TABLET ORAL
COMMUNITY

## 2024-08-19 RX ADMIN — GABAPENTIN 300 MG: 300 CAPSULE ORAL at 22:12

## 2024-08-19 RX ADMIN — HEPARIN SODIUM 5000 UNITS: 5000 INJECTION INTRAVENOUS; SUBCUTANEOUS at 21:00

## 2024-08-19 RX ADMIN — ALPRAZOLAM 0.5 MG: 0.5 TABLET ORAL at 20:57

## 2024-08-19 RX ADMIN — INSULIN GLARGINE 10 UNITS: 100 INJECTION, SOLUTION SUBCUTANEOUS at 21:00

## 2024-08-19 RX ADMIN — SODIUM CHLORIDE, SODIUM GLUCONATE, SODIUM ACETATE, POTASSIUM CHLORIDE, MAGNESIUM CHLORIDE, SODIUM PHOSPHATE, DIBASIC, AND POTASSIUM PHOSPHATE 75 ML/HR: .53; .5; .37; .037; .03; .012; .00082 INJECTION, SOLUTION INTRAVENOUS at 13:07

## 2024-08-19 RX ADMIN — ASPIRIN 81 MG CHEWABLE TABLET 81 MG: 81 TABLET CHEWABLE at 20:56

## 2024-08-19 RX ADMIN — METOROPROLOL TARTRATE 2.5 MG: 5 INJECTION, SOLUTION INTRAVENOUS at 21:57

## 2024-08-19 NOTE — ASSESSMENT & PLAN NOTE
"No results found for: \"HGBA1C\"    No results for input(s): \"POCGLU\" in the last 72 hours.    Blood Sugar Average: Last 72 hrs:    Home medication is metformin, repaglinide, Lantus 10 units at bedtime.  Currently n.p.o. status, sliding scale every 6 hours  Hypoglycemia protocol  "

## 2024-08-19 NOTE — QUICK NOTE
Patient is awake, alert, oriented x 4.  He reports he did not take any extra dose of methadone this morning.  However he was awake all night last night to take the bus at 4 AM to methadone clinic, which is why he was exhausted.  He reports that is the primary reason why he was found down.    Resume home meds, resume diet.  Will resume methadone 190 mg as confirmed by clinic and monitor next 2 days for mental status change.

## 2024-08-19 NOTE — HOME HEALTH
PRIMARY FOCUS OF HOME HEALTH_   HBN-N  SN PT  8.18 NEW TO INSULIN FALL HIP FX ORIF ON 7.31  ------------------------------  HB-N  8.5 SN PT OT - R Femur fx s/p IM nail- WBAT  ------------------------------  COMORBIDITIES__  Methadone dependence/polysubstance abuse and prior falls who presents after an unwitnessed fall    s/p surgical fixture of a R femur fracture on 7/31 at [facility]- He would now like to pursue rehab.  Case management was consulted-ultimately rehab placement was not able to be found due to patient's methadone use.   During his hospitalization he was evaluated by neuropsych who ultimately deemed him competent.  He was also evaluated by psychiatry for his bipolar disorder-they adjusted his medications and discontinued his home Cymbalta, Xanax, and Depakote (which he was largely noncompliant with), they did increase his gabapentin to 300 mg 3 times daily.  He was also evaluated by endocrinology who adjusted his home regimen to include nighttime Lantus (patient was educated on insulin administration and confirmed that he felt comfortable administering it to himself), Prandin, metformin.     Cognitive decline/Encephalopathy    IDDM2 HA1C 10.7 7/31/24-     Chronic hepatitis    CKD2/BPH     Bipolar disorder/Generalized anxiety disorder  Patient was previously prescribed Xanax-PDMP checked and last prescription filled 6/20 for 30-day prescription.    Patient has follow-up scheduled on 8/16-will prescribe 10-day prescription to gap in the interim.  Psychiatry may evaluate appropriateness of continued treatment with this medication at this time.  Of note, patient was evaluated by psychiatry while inpatient and they recommended continuing his home medications.    HTN/Afib- Patient has been in sinus rhythm-no anticoagulation reinitiation recommended at this time.  Follow-up with regular cardiologist as an outpatient for continued management  ------------------------------  PHYSICIAN NOTIFICATION_  Notification of Assess not Admit    [facility] has assessed your patient for Home Health services and has determined the patient is not eligible for service due to the following: needs beyond [facility]  TC to schedule visit no answer. On arrival no answer at door. TC to sister and sister came to home to let nurse in home. Found patient on floor tangled in walker, immediately called 911. Patient nonresponsive to sternal rub. Breathing with pulse , Pulse ox 92-94. Sister reports patient will never be safe at home. Area on aging should be notified of not safe at home if again released from the hospital. EMS reports patient is frequently found on floor and is getting too much methadone.     Julio Velez RN  ----------------------------  Notification of Assess not Admit      [facility] has assessed your patient for Home Health services and has determined the patient is not eligible for service due to the following: needs beyond [facility]      Patients sister cleaning up stool on floor on RN arrival. Sister reports patient now agrees to go to rehab after being on floor most of night. Patient struck head during fall and is on aspirin and had Lovenox in hospital. Sister reports patient is non-coherent. Patient alert to person place time but not situation. Eyes rolling in back of head and lethargic. Patient reports taking methadone around 9 am this morning.  Pulse ox 95 HR 97. EMS and police arrived. RN provided SBAR to EMS. Patient became combative and non-EMS/police personnel were asked to leave.      Julio Velez RN

## 2024-08-19 NOTE — ASSESSMENT & PLAN NOTE
Multiple recent admissions from encephalopathy, fall, likely from same etiology.    Patient discharged only yesterday from trauma service  Consult PT/OT and case management for safe discharge planning

## 2024-08-19 NOTE — ASSESSMENT & PLAN NOTE
I called [facility], Phone number: [phone], and confirmed the dose of Methadone   He has been on chronic methadone for many years

## 2024-08-19 NOTE — ED PROVIDER NOTES
Emergency Department Airway Evaluation and Management Form    History  Obtained from: EMS  Patient has no allergy information on record.  Chief Complaint   Patient presents with    Trauma     Fall on ASA, change in GCS       HPI  Patient on aspirin, arriving from home for evaluation of altered mental status.  Patient was found by his nurses aide on the ground entangled with his walker.  Patient is unable to provide history secondary to altered mental status.  Per EMS patient has a history of polypharmacy and was found with an empty bottle of methadone.  No past medical history on file.  No past surgical history on file.  No family history on file.     I have reviewed and agree with the history as documented.    Review of Systems    Physical Exam  /71   Pulse 80   Temp 97.6 °F (36.4 °C) (Tympanic)   Resp 12   Wt 79.3 kg (174 lb 13.2 oz)   SpO2 100%     Physical Exam  Airway intact.  Trachea midline.  Breath sounds bilaterally.  Chest nontender.  Pulses intact.  Vitals reviewed.  GCS 15.  Moves all 4 extremities.      ED Medications  Medications - No data to display    Intubation  Procedures    Notes  Patient intermittently with GCS 14, no current airway issues.    Final Diagnosis  Final diagnoses:   Fall, initial encounter   Encephalopathy acute   Overdose of undetermined intent, initial encounter   Altered mental status.  Closed head injury.    ED Provider  Electronically Signed by     Palmer Archer MD  08/19/24 2148

## 2024-08-19 NOTE — ASSESSMENT & PLAN NOTE
History of recent right femur fracture s/p ORIF on 7/31/2024 secondary to fall.  Was on aspirin 81 mg twice daily for 28 days postop, will hold for encephalopathy

## 2024-08-19 NOTE — ASSESSMENT & PLAN NOTE
At home patient is on metoprolol 12.5 mg daily.  Currently encephalopathy, hold all oral medication.  Will order as needed IV Lopressor 2.5 mg.  Previously patient is not on anticoagulation due to recurrent fall

## 2024-08-19 NOTE — H&P
H&P - Trauma   [place] Foxtrot Trapezoid 89 y.o. male MRN: [id number]  Unit/Bed#:  Encounter: [id number]    Trauma Alert: Level A   Model of Arrival: Ambulance    Trauma Team: Attending Zuhair, Residents Blane, and Fellow Palladino  Consultants:     None     Assessment & Plan   Active Problems / Assessment:   Found down unresponsive - GCS 12 in field  Presumed methadone/polypharm OD     Plan:   CT head/c-spine negative  FAST exam negative  EKG normal qtc 421  Labs unremarkable    No traumatic injuries, to be admitted to medicine for presumed methadone/polypharm OD    History of Present Illness     Chief Complaint: n/a  Mechanism:Fall     HPI:    [place] Foxtrot Trapezoid is a 89 y.o. male who presents after an unwitnessed fall. Patient presents from home and was found unresponsive by EMS at the scene, however GCS was 12. Per EMS, patient is arousable and best GCS noted is 14. Patient takes ASA twice daily, no other AC/AP. Of note, patient was recently discharged from [facility] yesterday, 8/18 to home with [facility]. He has a recent history of a right femur fracture, status post surgical fixation on 7/31.     Review of Systems   Unable to perform ROS: Mental status change     12-point, complete review of systems was reviewed and negative except as stated above.     Historical Information     No past medical history on file.  No past surgical history on file.   Unable to obtain history due to Acute encephalopathy and Acute intoxication         There is no immunization history on file for this patient.  Last Tetanus: n/a  Family History: Non-contributory     Meds/Allergies   all current active meds have been reviewed  Allergies have not been reviewed;  Not on File    Objective   Initial Vitals:        Primary Survey:   Airway:        Status: patent;        Pre-hospital Interventions: none        Hospital Interventions: none  Breathing:        Pre-hospital Interventions: oxygen       Effort: normal       Right breath sounds:  normal       Left breath sounds: normal  Circulation:        Rhythm: regular       Rate: regular   Right Pulses Left Pulses    R radial: 2+  R femoral: 2+  R pedal: 2+     L radial: 2+  L femoral: 2+  L pedal: 2+       Disability:        GCS: Eye: 3; Verbal: 4 Motor: 5 Total: 12       Right Pupil: 2 mm;  round;  reactive         Left Pupil:  2 mm;  round;  reactive      R Motor Strength L Motor Strength    R : 5/5  R dorsiflex: 5/5  R plantarflex: 5/5 L : 5/5  L dorsiflex: 5/5  L plantarflex: 5/5        Sensory:  No sensory deficit  Exposure:       Completed: Yes      Secondary Survey:  Physical Exam  Constitutional:       Appearance: He is ill-appearing.      Interventions: Cervical collar in place.   HENT:      Head: Normocephalic.      Right Ear: External ear normal.      Left Ear: External ear normal.      Nose: Nose normal.      Mouth/Throat:      Mouth: Mucous membranes are moist.   Eyes:      Extraocular Movements: Extraocular movements intact.      Pupils: Pupils are equal, round, and reactive to light.   Cardiovascular:      Rate and Rhythm: Normal rate and regular rhythm.      Pulses: Normal pulses.      Heart sounds: Normal heart sounds.   Pulmonary:      Effort: Pulmonary effort is normal. No respiratory distress.      Breath sounds: Normal breath sounds.   Chest:      Chest wall: No tenderness.   Abdominal:      General: Abdomen is flat. There is no distension.      Palpations: Abdomen is soft.      Tenderness: There is no abdominal tenderness. There is no guarding.   Musculoskeletal:         General: No tenderness or signs of injury. Normal range of motion.      Cervical back: No deformity or tenderness.      Thoracic back: No deformity or tenderness.      Lumbar back: No deformity or tenderness.   Skin:     General: Skin is warm and dry.      Capillary Refill: Capillary refill takes less than 2 seconds.      Findings: Bruising present.      Comments: Ecchymosis of right groin and thigh  "  Neurological:      General: No focal deficit present.      Mental Status: He is alert and oriented to person, place, and time.      Motor: No weakness.         Invasive Devices       None                 Lab Results: I have personally reviewed all pertinent laboratory/test results 08/19/24 and in the preceding 24 hours.  No results for input(s): \"WBC\", \"HGB\", \"HCT\", \"PLT\", \"BANDSPCT\", \"SODIUM\", \"K\", \"CL\", \"CO2\", \"BUN\", \"CREATININE\", \"GLUC\", \"CAIONIZED\", \"MG\", \"PHOS\", \"AST\", \"ALT\", \"ALB\", \"TBILI\", \"DBILI\", \"ALKPHOS\", \"PTT\", \"INR\", \"HSTNI0\", \"HSTNI2\", \"BNP\", \"LACTICACID\" in the last 72 hours.    Imaging Results: I have personally reviewed pertinent images saved in PACS. CT scan findings (and other pertinent positive findings on images) were discussed with radiology. My interpretation of the images/reports are as follows:  Chest Xray(s): negative for acute findings   FAST exam(s): negative for acute findings   CT Scan(s): negative for acute findings   Additional Xray(s): N/A     Other Studies: n/a    Code Status: No Order  Advance Directive and Living Will:      Power of :    POLST:           "

## 2024-08-19 NOTE — CASE MANAGEMENT
Case Management Discharge Planning Note    Patient name David Skelton  [facility] MRN [id number]  : 1960 Date 2024       Current Admission Date: 2024  Current Admission Diagnosis:Other injury of unspecified body region, initial encounter   There are no problems to display for this patient.     LOS (days): 0  Geometric Mean LOS (GMLOS) (days):   Days to GMLOS:     OBJECTIVE:            Current admission status: Emergency   Preferred Pharmacy:   UNKNOWN - FOLLOW UP PRIOR TO DISCHARGE TO E-PRESCRIBE  No address on file      Primary Care Provider: Casey Mckeon MD    Primary Insurance: [facility]  Secondary Insurance:     DISCHARGE DETAILS:    CM responded to trauma alert.   Pt brought to the ED via [facility] s/p being found down outside his home.  Pt is GCS 12.  Pt has recent admissions to [facility] and [facility] and in both instances were cleared for a home d/c  Pt was scheduled to have his first VNA session today.   Pt does live alone

## 2024-08-19 NOTE — UTILIZATION REVIEW
NOTIFICATION OF ADMISSION DISCHARGE   This is a Notification of Discharge from [facility]. Please be advised that this patient has been discharge from our facility. Below you will find the admission and discharge date and time including the patient’s disposition.   UTILIZATION REVIEW CONTACT:  Vandana Schmidt  Utilization   Network Utilization Review Department  Phone: [phone] x carefully listen to the prompts. All voicemails are confidential.  Email: [email or web address]     ADMISSION INFORMATION  PRESENTATION DATE: 8/6/2024 11:04 AM  OBERVATION ADMISSION DATE: N/A  INPATIENT ADMISSION DATE: 8/6/24  3:12 PM   DISCHARGE DATE: 8/18/2024  2:56 PM   DISPOSITION:Home with Home Health Care    Network Utilization Review Department  ATTENTION: Please call with any questions or concerns to [phone] and carefully listen to the prompts so that you are directed to the right person. All voicemails are confidential.   For Discharge needs, contact Care Management DC Support Team at [phone] opt. 2  Send all requests for admission clinical reviews, approved or denied determinations and any other requests to dedicated fax number below belonging to the campus where the patient is receiving treatment. List of dedicated fax numbers for the Facilities:  FACILITY NAME UR FAX NUMBER   ADMISSION DENIALS (Administrative/Medical Necessity) [phone]   DISCHARGE SUPPORT TEAM ([facility]) [phone]   PARENT CHILD HEALTH (Maternity/NICU/Pediatrics) [phone]   [facility] [phone]   [facility] [phone]   [facility] [phone]   [facility] [phone]   [facility] [phone]   [facility] [phone]   [facility] [phone]   [facility]  [phone]   [facility] [phone]   [facility] [phone]   [facility] [phone]   [facility] [phone]

## 2024-08-19 NOTE — CASE COMMUNICATION
Notification of Assess not Admit    [facility] and has determined the patient is not eligible for service due to the following: needs beyond [facility]  TC to schedule visit no answer. On arrival no answer at door. TC to sister and sister came to home to let nurse in home. Found patient on floor tangled in walker, immediately called 911. Patient nonresponsive to sternal rub. Breathing with  pulse , Pulse ox 92-94. Sister reports patient will never be safe at home. Area on aging should be notified of not safe at home if again released from the hospital. EMS reports patient is frequently found on floor and is getting too much methadone.     Julio Velez RN

## 2024-08-19 NOTE — ASSESSMENT & PLAN NOTE
64-year-old male with IDDM 2, recurrent fall, methadone dependence, presented to ED via EMS after he was found down by home visiting nurses.  Patient was not hypoxic at the scene.  Noted patient has multiple recent hospitalization secondary to encephalopathy presumably from methadone, and multiple fall, resulting in a right femur fracture s/p ORIF on 7/31/2024.  He was discharged home only yesterday from trauma service, as per discharge summary, unable to place in rehab due to methadone use.  And subsequently patient wanted to go home with home VNA.    Spoke to patient's methadone clinic [facility], Phone number: [phone], and confirmed the dose of Methadone 190 mg daily.  Nursing staff verified patient was at the clinic this morning at 6:32 AM and received his regular dose of methadone 190 mg, and he also received extra doses to take home with.  As per nursing staff, patient was at usual health as of this morning.    In ED, patient is not hypoxic, hemodynamically stable.   Labs unremarkable, hgb baseline,   Reviewed VBG, no CO2 retention, pH 7.371  Trauma imaging including CT head and CT C-spine negative for acute injury or fractures.  On my exam patient is still encephalopathic, wakes up with painful stimuli only, however quickly falls asleep.    Toxic encephalopathy most likely secondary to methadone overdose, unable to verify whether he also took Xanax.  Intent unknown  In case of any respiratory depression, will administer naloxone.  For now continue continuous pulse ox, IV fluid, n.p.o. status

## 2024-08-19 NOTE — TELEPHONE ENCOUNTER
Received duplicate referral; Closing referral. Pt on med [facility] wait list.

## 2024-08-19 NOTE — H&P
[facility]  H&P  Name: David Skelton 64 y.o. male I MRN: [id number]  Unit/Bed#: QCF I Date of Admission: 8/19/2024   Date of Service: 8/19/2024 I Hospital Day: 0      Assessment & Plan   * Toxic encephalopathy  Assessment & Plan  64-year-old male with IDDM 2, recurrent fall, methadone dependence, presented to ED via EMS after he was found down by home visiting nurses.  Patient was not hypoxic at the scene.  Noted patient has multiple recent hospitalization secondary to encephalopathy presumably from methadone, and multiple fall, resulting in a right femur fracture s/p ORIF on 7/31/2024.  He was discharged home only yesterday from trauma service, as per discharge summary, unable to place in rehab due to methadone use.  And subsequently patient wanted to go home with home VNA.    Spoke to patient's methadone clinic [facility], Phone number: [phone], and confirmed the dose of Methadone 190 mg daily.  Nursing staff verified patient was at the clinic this morning at 6:32 AM and received his regular dose of methadone 190 mg, and he also received extra doses to take home with.  As per nursing staff, patient was at usual health as of this morning.    In ED, patient is not hypoxic, hemodynamically stable.   Labs unremarkable, hgb baseline,   Reviewed VBG, no CO2 retention, pH 7.371  Trauma imaging including CT head and CT C-spine negative for acute injury or fractures.  On my exam patient is still encephalopathic, wakes up with painful stimuli only, however quickly falls asleep.    Toxic encephalopathy most likely secondary to methadone overdose, unable to verify whether he also took Xanax.  Intent unknown  In case of any respiratory depression, will administer naloxone.  For now continue continuous pulse ox, IV fluid, n.p.o. status        Closed fracture of right femur, unspecified fracture morphology, sequela  Assessment & Plan  History of recent right femur  "fracture s/p ORIF on 7/31/2024 secondary to fall.  Was on aspirin 81 mg twice daily for 28 days postop, will hold for encephalopathy    Recurrent falls  Assessment & Plan  Multiple recent admissions from encephalopathy, fall, likely from same etiology.    Patient discharged only yesterday from trauma service  Consult PT/OT and case management for safe discharge planning    Paroxysmal atrial fibrillation (HCC)  Assessment & Plan  At home patient is on metoprolol 12.5 mg daily.  Currently encephalopathy, hold all oral medication.  Will order as needed IV Lopressor 2.5 mg.  Previously patient is not on anticoagulation due to recurrent fall    Methadone dependence (HCC)  Assessment & Plan  I called [facility], Phone number: [phone], and confirmed the dose of Methadone   He has been on chronic methadone for many years    NINFA (generalized anxiety disorder)  Assessment & Plan  At home patient is only on as needed Xanax, and gabapentin.  Hold    LUNA (iron deficiency anemia)  Assessment & Plan  History of iron deficiency anemia, currently hemoglobin is at baseline 8.4    Type 2 diabetes mellitus, with long-term current use of insulin ([facility])  Assessment & Plan  No results found for: \"HGBA1C\"    No results for input(s): \"POCGLU\" in the last 72 hours.    Blood Sugar Average: Last 72 hrs:    Home medication is metformin, repaglinide, Lantus 10 units at bedtime.  Currently n.p.o. status, sliding scale every 6 hours  Hypoglycemia protocol           VTE Prophylaxis: Heparin  / sequential compression device   Code Status: Full code     Anticipated Length of Stay:  Patient will be admitted on an Inpatient basis with an anticipated length of stay of  > 2 midnights.   Justification for Hospital Stay: Toxic encephalopathy    Total Time for Visit, including Counseling / Coordination of Care: 60 minutes.  Greater than 50% of this total time spent on direct patient counseling and coordination of care.    Chief " Complaint:   Found down    History of Present Illness:    David Skelton is a 64-year-old male with IDDM 2, recurrent fall, methadone dependence, presented to ED via EMS after he was found down by home visiting nurses.  Patient was not hypoxic at the scene.  Noted patient has multiple recent hospitalization secondary to encephalopathy presumably from methadone, and multiple fall, resulting in a right femur fracture s/p ORIF on 7/31/2024.  He was discharged home only yesterday from trauma service, as per discharge summary, unable to place in rehab due to methadone use.  And subsequently patient wanted to go home with home VNA.    Spoke to patient's methadone clinic [facility], Phone number: [phone], and confirmed the dose of Methadone 190 mg daily.  Nursing staff verified patient was at the clinic this morning at 6:32 AM and received his regular dose of methadone 190 mg, and he also received extra doses to take home with.  As per nursing staff, patient was at usual health as of this morning.    In ED, patient is not hypoxic, hemodynamically stable.   Labs unremarkable, hgb baseline,   Reviewed VBG, no CO2 retention, pH 7.371  Trauma imaging including CT head and CT C-spine negative for acute injury or fractures.  On my exam patient is still encephalopathic, wakes up with painful stimuli only, however quickly falls asleep.    Review of Systems:    Review of Systems   Unable to perform ROS: Mental status change       Past Medical and Surgical History:     No past medical history on file.    No past surgical history on file.    Meds/Allergies:    Prior to Admission medications    Medication Sig Start Date End Date Taking? Authorizing Provider   ALPRAZolam (XANAX) 0.5 mg tablet Take by mouth daily at bedtime as needed for anxiety   Yes Historical Provider, MD   aspirin 81 mg chewable tablet Chew 81 mg 2 (two) times a day  9/2/24 Yes Historical Provider, MD   gabapentin (NEURONTIN) 300 mg capsule Take  300 mg by mouth 3 (three) times a day   Yes Historical Provider, MD   insulin glargine (LANTUS) 100 units/mL subcutaneous injection Inject 10 Units under the skin daily at bedtime   Yes Historical Provider, MD   metFORMIN (GLUCOPHAGE-XR) 500 mg 24 hr tablet Take by mouth daily with dinner   Yes Historical Provider, MD   metoprolol succinate (TOPROL-XL) 25 mg 24 hr tablet Take 12.5 mg by mouth daily   Yes Historical Provider, MD   repaglinide (PRANDIN) 1 mg tablet Take 1 mg by mouth 3 (three) times a day before meals   Yes Historical Provider, MD     I have reviewed home medications using allscripts.    Allergies: Not on File    Social History:     Marital Status: Unknown    Substance Use History:   Social History     Substance and Sexual Activity   Alcohol Use Not on file     Social History     Tobacco Use   Smoking Status Not on file   Smokeless Tobacco Not on file     Social History     Substance and Sexual Activity   Drug Use Not on file       Family History:    No family history on file.    Physical Exam:     Vitals:   Blood Pressure: 117/71 (08/19/24 1200)  Pulse: 80 (08/19/24 1200)  Temperature: 97.6 °F (36.4 °C) (08/19/24 1006)  Temp Source: Tympanic (08/19/24 1006)  Respirations: 12 (08/19/24 1200)  Weight - Scale: 79.3 kg (174 lb 13.2 oz) (08/19/24 1006)  SpO2: 100 % (08/19/24 1200)    Physical Exam  Constitutional:       General: He is sleeping.      Appearance: Normal appearance.   HENT:      Head: Normocephalic and atraumatic.   Cardiovascular:      Rate and Rhythm: Normal rate and regular rhythm.   Pulmonary:      Effort: Pulmonary effort is normal.      Breath sounds: No stridor. No rhonchi.   Abdominal:      General: There is no distension.      Palpations: Abdomen is soft.   Skin:     General: Skin is warm and dry.   Neurological:      Mental Status: He is lethargic.      Comments: Wakes up only with painful stimuli          Additional Data:     Lab Results: I have personally reviewed pertinent  reports.      Results from last 7 days   Lab Units 08/19/24  1013   WBC Thousand/uL 10.97*   HEMOGLOBIN g/dL 8.4*   HEMATOCRIT % 27.0*   PLATELETS Thousands/uL 498*   SEGS PCT % 76*   LYMPHO PCT % 11*   MONO PCT % 8   EOS PCT % 4     Results from last 7 days   Lab Units 08/19/24  1013   SODIUM mmol/L 134*   POTASSIUM mmol/L 4.9   CHLORIDE mmol/L 102   CO2 mmol/L 28   BUN mg/dL 22   CREATININE mg/dL 0.91   ANION GAP mmol/L 4   CALCIUM mg/dL 8.2*   ALBUMIN g/dL 3.2*   TOTAL BILIRUBIN mg/dL 0.42   ALK PHOS U/L 172*   ALT U/L 24   AST U/L 20   GLUCOSE RANDOM mg/dL 231*     Results from last 7 days   Lab Units 08/19/24  1013   INR  1.10                   Imaging: I have personally reviewed pertinent reports.      TRAUMA - CT head wo contrast   Final Result by Isaac Irving MD (08/19 1037)      No acute intracranial abnormality.      I personally discussed this study with DR. WEBB on 8/19/2024 10:36 AM.               Workstation performed: [id number]         TRAUMA - CT spine cervical wo contrast   Final Result by Isaac Irving MD (08/19 1036)      No acute fracture.         I personally discussed this study with DR. WEBB on 8/19/2024 10:36 AM.                  Workstation performed: [id number]         XR Trauma multiple (B/RA trauma bay ONLY)   Final Result by Isaac Irving MD (08/19 1039)      No acute cardiopulmonary disease within limitations of supine imaging.               Computerized Assisted Algorithm (CAA) may have been used to analyze all applicable images.            Workstation performed: [id number]         XR chest 1 view    (Results Pending)       EKG, Pathology, and Other Studies Reviewed on Admission:   EKG: Sinus rhythm    Allscripts / Epic Records Reviewed: Yes     ** Please Note: This note has been constructed using a voice recognition system. **

## 2024-08-20 ENCOUNTER — TELEPHONE (OUTPATIENT)
Age: 64
End: 2024-08-20

## 2024-08-20 ENCOUNTER — APPOINTMENT (INPATIENT)
Dept: RADIOLOGY | Facility: HOSPITAL | Age: 64
DRG: 052 | End: 2024-08-20
Payer: COMMERCIAL

## 2024-08-20 LAB
ANION GAP SERPL CALCULATED.3IONS-SCNC: 4 MMOL/L (ref 4–13)
ANION GAP SERPL CALCULATED.3IONS-SCNC: 4 MMOL/L (ref 4–13)
BUN SERPL-MCNC: 18 MG/DL (ref 5–25)
BUN SERPL-MCNC: 18 MG/DL (ref 5–25)
CALCIUM SERPL-MCNC: 8.3 MG/DL (ref 8.4–10.2)
CALCIUM SERPL-MCNC: 8.3 MG/DL (ref 8.4–10.2)
CHLORIDE SERPL-SCNC: 103 MMOL/L (ref 96–108)
CHLORIDE SERPL-SCNC: 103 MMOL/L (ref 96–108)
CO2 SERPL-SCNC: 31 MMOL/L (ref 21–32)
CO2 SERPL-SCNC: 31 MMOL/L (ref 21–32)
CREAT SERPL-MCNC: 0.71 MG/DL (ref 0.6–1.3)
CREAT SERPL-MCNC: 0.71 MG/DL (ref 0.6–1.3)
ERYTHROCYTE [DISTWIDTH] IN BLOOD BY AUTOMATED COUNT: 14.6 % (ref 11.6–15.1)
ERYTHROCYTE [DISTWIDTH] IN BLOOD BY AUTOMATED COUNT: 14.6 % (ref 11.6–15.1)
GFR SERPL CREATININE-BSD FRML MDRD: 100 ML/MIN/1.73SQ M
GFR SERPL CREATININE-BSD FRML MDRD: 100 ML/MIN/1.73SQ M
GLUCOSE SERPL-MCNC: 106 MG/DL (ref 65–140)
GLUCOSE SERPL-MCNC: 106 MG/DL (ref 65–140)
GLUCOSE SERPL-MCNC: 141 MG/DL (ref 65–140)
GLUCOSE SERPL-MCNC: 141 MG/DL (ref 65–140)
GLUCOSE SERPL-MCNC: 250 MG/DL (ref 65–140)
GLUCOSE SERPL-MCNC: 250 MG/DL (ref 65–140)
GLUCOSE SERPL-MCNC: 257 MG/DL (ref 65–140)
GLUCOSE SERPL-MCNC: 257 MG/DL (ref 65–140)
GLUCOSE SERPL-MCNC: 287 MG/DL (ref 65–140)
GLUCOSE SERPL-MCNC: 287 MG/DL (ref 65–140)
GLUCOSE SERPL-MCNC: 97 MG/DL (ref 65–140)
GLUCOSE SERPL-MCNC: 97 MG/DL (ref 65–140)
HCT VFR BLD AUTO: 25.2 % (ref 36.5–49.3)
HCT VFR BLD AUTO: 25.2 % (ref 36.5–49.3)
HGB BLD-MCNC: 7.9 G/DL (ref 12–17)
HGB BLD-MCNC: 7.9 G/DL (ref 12–17)
MCH RBC QN AUTO: 29 PG (ref 26.8–34.3)
MCH RBC QN AUTO: 29 PG (ref 26.8–34.3)
MCHC RBC AUTO-ENTMCNC: 31.3 G/DL (ref 31.4–37.4)
MCHC RBC AUTO-ENTMCNC: 31.3 G/DL (ref 31.4–37.4)
MCV RBC AUTO: 93 FL (ref 82–98)
MCV RBC AUTO: 93 FL (ref 82–98)
PLATELET # BLD AUTO: 487 THOUSANDS/UL (ref 149–390)
PLATELET # BLD AUTO: 487 THOUSANDS/UL (ref 149–390)
PMV BLD AUTO: 10 FL (ref 8.9–12.7)
PMV BLD AUTO: 10 FL (ref 8.9–12.7)
POTASSIUM SERPL-SCNC: 4.2 MMOL/L (ref 3.5–5.3)
POTASSIUM SERPL-SCNC: 4.2 MMOL/L (ref 3.5–5.3)
RBC # BLD AUTO: 2.72 MILLION/UL (ref 3.88–5.62)
RBC # BLD AUTO: 2.72 MILLION/UL (ref 3.88–5.62)
SODIUM SERPL-SCNC: 138 MMOL/L (ref 135–147)
SODIUM SERPL-SCNC: 138 MMOL/L (ref 135–147)
WBC # BLD AUTO: 8.65 THOUSAND/UL (ref 4.31–10.16)
WBC # BLD AUTO: 8.65 THOUSAND/UL (ref 4.31–10.16)

## 2024-08-20 PROCEDURE — 99232 SBSQ HOSP IP/OBS MODERATE 35: CPT | Performed by: STUDENT IN AN ORGANIZED HEALTH CARE EDUCATION/TRAINING PROGRAM

## 2024-08-20 PROCEDURE — 82948 REAGENT STRIP/BLOOD GLUCOSE: CPT

## 2024-08-20 PROCEDURE — 85027 COMPLETE CBC AUTOMATED: CPT | Performed by: FAMILY MEDICINE

## 2024-08-20 PROCEDURE — 73552 X-RAY EXAM OF FEMUR 2/>: CPT

## 2024-08-20 PROCEDURE — 80048 BASIC METABOLIC PNL TOTAL CA: CPT | Performed by: FAMILY MEDICINE

## 2024-08-20 PROCEDURE — 99232 SBSQ HOSP IP/OBS MODERATE 35: CPT | Performed by: EMERGENCY MEDICINE

## 2024-08-20 PROCEDURE — 97167 OT EVAL HIGH COMPLEX 60 MIN: CPT

## 2024-08-20 PROCEDURE — 97163 PT EVAL HIGH COMPLEX 45 MIN: CPT

## 2024-08-20 PROCEDURE — 72170 X-RAY EXAM OF PELVIS: CPT

## 2024-08-20 RX ORDER — INSULIN LISPRO 100 [IU]/ML
1-6 INJECTION, SOLUTION INTRAVENOUS; SUBCUTANEOUS
Status: DISCONTINUED | OUTPATIENT
Start: 2024-08-20 | End: 2024-08-23 | Stop reason: HOSPADM

## 2024-08-20 RX ORDER — METHADONE HYDROCHLORIDE 10 MG/ML
190 CONCENTRATE ORAL DAILY
Status: DISCONTINUED | OUTPATIENT
Start: 2024-08-20 | End: 2024-08-21

## 2024-08-20 RX ORDER — OXYCODONE HYDROCHLORIDE 5 MG/1
5 TABLET ORAL EVERY 4 HOURS PRN
Status: DISCONTINUED | OUTPATIENT
Start: 2024-08-20 | End: 2024-08-21

## 2024-08-20 RX ORDER — ACETAMINOPHEN 325 MG/1
975 TABLET ORAL EVERY 8 HOURS SCHEDULED
Status: DISCONTINUED | OUTPATIENT
Start: 2024-08-20 | End: 2024-08-23 | Stop reason: HOSPADM

## 2024-08-20 RX ADMIN — Medication 7.5 MG: at 07:47

## 2024-08-20 RX ADMIN — HEPARIN SODIUM 5000 UNITS: 5000 INJECTION INTRAVENOUS; SUBCUTANEOUS at 22:20

## 2024-08-20 RX ADMIN — ASPIRIN 81 MG CHEWABLE TABLET 81 MG: 81 TABLET CHEWABLE at 08:08

## 2024-08-20 RX ADMIN — INSULIN LISPRO 4 UNITS: 100 INJECTION, SOLUTION INTRAVENOUS; SUBCUTANEOUS at 22:21

## 2024-08-20 RX ADMIN — INSULIN LISPRO 4 UNITS: 100 INJECTION, SOLUTION INTRAVENOUS; SUBCUTANEOUS at 18:00

## 2024-08-20 RX ADMIN — HEPARIN SODIUM 5000 UNITS: 5000 INJECTION INTRAVENOUS; SUBCUTANEOUS at 13:03

## 2024-08-20 RX ADMIN — OXYCODONE HYDROCHLORIDE 5 MG: 5 TABLET ORAL at 17:43

## 2024-08-20 RX ADMIN — INSULIN LISPRO 4 UNITS: 100 INJECTION, SOLUTION INTRAVENOUS; SUBCUTANEOUS at 00:03

## 2024-08-20 RX ADMIN — INSULIN GLARGINE 10 UNITS: 100 INJECTION, SOLUTION SUBCUTANEOUS at 22:21

## 2024-08-20 RX ADMIN — ACETAMINOPHEN 975 MG: 325 TABLET ORAL at 13:03

## 2024-08-20 RX ADMIN — OXYCODONE HYDROCHLORIDE 5 MG: 5 TABLET ORAL at 22:19

## 2024-08-20 RX ADMIN — METHADONE HYDROCHLORIDE 190 MG: 10 CONCENTRATE ORAL at 05:55

## 2024-08-20 RX ADMIN — ALPRAZOLAM 0.5 MG: 0.5 TABLET ORAL at 13:03

## 2024-08-20 RX ADMIN — GABAPENTIN 300 MG: 300 CAPSULE ORAL at 08:08

## 2024-08-20 RX ADMIN — GABAPENTIN 300 MG: 300 CAPSULE ORAL at 22:19

## 2024-08-20 RX ADMIN — OXYCODONE HYDROCHLORIDE 5 MG: 5 TABLET ORAL at 12:21

## 2024-08-20 RX ADMIN — ACETAMINOPHEN 975 MG: 325 TABLET ORAL at 22:19

## 2024-08-20 RX ADMIN — ALPRAZOLAM 0.5 MG: 0.5 TABLET ORAL at 22:19

## 2024-08-20 RX ADMIN — ASPIRIN 81 MG CHEWABLE TABLET 81 MG: 81 TABLET CHEWABLE at 22:19

## 2024-08-20 RX ADMIN — SODIUM CHLORIDE, SODIUM GLUCONATE, SODIUM ACETATE, POTASSIUM CHLORIDE, MAGNESIUM CHLORIDE, SODIUM PHOSPHATE, DIBASIC, AND POTASSIUM PHOSPHATE 75 ML/HR: .53; .5; .37; .037; .03; .012; .00082 INJECTION, SOLUTION INTRAVENOUS at 19:47

## 2024-08-20 RX ADMIN — GABAPENTIN 300 MG: 300 CAPSULE ORAL at 17:43

## 2024-08-20 RX ADMIN — METOPROLOL SUCCINATE 12.5 MG: 25 TABLET, EXTENDED RELEASE ORAL at 08:08

## 2024-08-20 RX ADMIN — HEPARIN SODIUM 5000 UNITS: 5000 INJECTION INTRAVENOUS; SUBCUTANEOUS at 04:58

## 2024-08-20 NOTE — CASE MANAGEMENT
Case Management Assessment & Discharge Planning Note    Patient name David Skelton  Location [facility] 724/[facility] 724-01 MRN [id number]  : 1960 Date 2024       Current Admission Date: 2024  Current Admission Diagnosis:Toxic encephalopathy   Patient Active Problem List    Diagnosis Date Noted Date Diagnosed    Type 2 diabetes mellitus, with long-term current use of insulin (HCC) 2024     LUNA (iron deficiency anemia) 2024     NINFA (generalized anxiety disorder) 2024     Benign essential hypertension 2024     Methadone dependence (HCC) 2024     Paroxysmal atrial fibrillation (HCC) 2024     Toxic encephalopathy 2024     Recurrent falls 2024     Closed fracture of right femur, unspecified fracture morphology, sequela 2024       LOS (days): 1  Geometric Mean LOS (GMLOS) (days):   Days to GMLOS:     OBJECTIVE:    Risk of Unplanned Readmission Score: 16.51         Current admission status: Inpatient       Preferred Pharmacy:   UNKNOWN - FOLLOW UP PRIOR TO DISCHARGE TO E-PRESCRIBE  No address on file      [id number] - [place], [place] - [address]  [facility] [address]  Phone: [phone] Fax: [phone]    Primary Care Provider: Casey Mckeon MD    Primary Insurance: [facility]  Secondary Insurance:     ASSESSMENT:  Active Health Care Proxies    There are no active Health Care Proxies on file.                      Patient Information  Admitted from:: Home  Mental Status: Alert  During Assessment patient was accompanied by: Not accompanied during assessment  Assessment information provided by:: Patient  Primary Caregiver: Self  Support Systems: Self, /, Family members  County of Residence: [place]  What [facility] do you live in?: [place]  Home entry access options. Select all that apply.: Elevator  Type of Current Residence: Apartment  Floor Level: 4  Upon  entering residence, is there a bedroom on the main floor (no further steps)?: Yes  Upon entering residence, is there a bathroom on the main floor (no further steps)?: Yes  Living Arrangements: Lives Alone  Is patient a ?: No    Activities of Daily Living Prior to Admission  Functional Status: Independent  Completes ADLs independently?: Yes  Ambulates independently?: No  Level of ambulatory dependence: Assistance  Does patient use assisted devices?: Yes  Assisted Devices (DME) used: Walker  Does patient currently own DME?: Yes  What DME does the patient currently own?: Walker  Does patient have a history of Outpatient Therapy (PT/OT)?: No  Does the patient have a history of Short-Term Rehab?: No  Does patient have a history of HHC?: Yes  Does patient currently have HHC?: Yes    Current Home Health Care  Type of Current Home Care Services: Home health aide, Nurse visit, Meals on Wheels,   Current Home Health Agency:: [facility]  Current Home Health Follow-Up Provider:: PCP    Patient Information Continued  Income Source: Pension/group home  Does patient have prescription coverage?: Yes  Does patient receive dialysis treatments?: No  Does patient have a history of substance abuse?: Yes  Historical substance use preference:  (Pt on methadone maintance 190mg/daily)         Means of Transportation  [facility]      Social Determinants of Health (SDOH)      Flowsheet Row Most Recent Value   Housing Stability    In the last 12 months, was there a time when you were not able to pay the mortgage or rent on time? N   In the past 12 months, how many times have you moved where you were living? 0   At any time in the past 12 months, were you homeless or living in a shelter (including now)? N   Transportation Needs    In the past 12 months, has lack of transportation kept you from medical appointments or from getting medications? no   In the past 12 months, has lack of  transportation kept you from meetings, work, or from getting things needed for daily living? No   Food Insecurity    Within the past 12 months, you worried that your food would run out before you got the money to buy more. Never true   Within the past 12 months, the food you bought just didn't last and you didn't have money to get more. Never true   Utilities    In the past 12 months has the electric, gas, oil, or water company threatened to shut off services in your home? No            DISCHARGE DETAILS:    Discharge planning discussed with:: bedside with pt  Freedom of Choice: Yes  Comments - Freedom of Choice: Discussed FOC pt was active with [facility]  CM contacted family/caregiver?: No- see comments (pt is A&O)             Contacts  Patient Contacts: Vandana Matos  Relationship to Patient:: Family (sister)  Contact Method: Phone  Phone Number: [phone]  Reason/Outcome: Continuity of Care, Emergency Contact    Requested Home Health Care         Is the patient interested in HHC at discharge?: Yes  Home Health Discipline requested:: Physical Therapy, Occupational Therapy, Home Health Aide  Home Health Agency Name:: [facility]  Home Health Follow-Up Provider:: PCP  Home Health Services Needed:: Evaluate Functional Status and Safety, Gait/ADL Training, Strengthening/Theraputic Exercises to Improve Function  Homebound Criteria Met:: Uses an Assist Device (i.e. cane, walker, etc)  Supporting Clincal Findings:: Limited Endurance    DME Referral Provided  Referral made for DME?: No    Other Referral/Resources/Interventions Provided:  Interventions: HHC, Transportation  Referral Comments: re-referred to [facility]    Would you like to participate in our [facility] service program?  : No - Declined    Treatment Team Recommendation: Home with Home Health Care  Discharge Destination Plan:: Home with Home Health Care  Transport at Discharge : Free Local Transportation      Additional Comments: Discussed DCP with pt  bedside. Pt sister Vandana ccomes to home daily and assits pt. Pt is take home methadone, goes to office on Monday and has 6 day takehome. PT will get rides to [facility] or will use [facility].

## 2024-08-20 NOTE — ASSESSMENT & PLAN NOTE
History of recent right femur fracture s/p ORIF on 7/31/2024 secondary to fall.  Continue aspirin 81 mg twice daily for 28 days postop  Follow-up hip and femur x-rays given complaints of pain

## 2024-08-20 NOTE — ASSESSMENT & PLAN NOTE
"No results found for: \"HGBA1C\"    Recent Labs     08/19/24  2348 08/20/24  0753 08/20/24  1059 08/20/24  1621   POCGLU 289* 106 141* 287*       Blood Sugar Average: Last 72 hrs:  (P) [id number]    Home medication is metformin, repaglinide, Lantus 10 units at bedtime.  Continue Lantus 10 units at bedtime with correctional scale  Hypoglycemia protocol  "

## 2024-08-20 NOTE — ASSESSMENT & PLAN NOTE
Multiple recent admissions from encephalopathy, fall, likely from same etiology.    Patient discharged only 1 day prior to admission from trauma service  Consult PT/OT and case management for safe discharge planning

## 2024-08-20 NOTE — PROGRESS NOTES
Progress Note - Trauma Tertiary [facility]   David Skelton 64 y.o. male [id number]   Unit/Bed#: [facility] 724-01 Encounter: [id number]     Assessment & Plan   Summary of Diagnosed Injuries: no injuries, ordered right hip and femur xray    PLAN:  Medical management for likely polypharm     VTE Prophylaxis:Heparin     Disposition: Per primary team    Code status:  Level 1 - Full Code    Consultants: IP CONSULT TO CASE MANAGEMENT     Subjective   Transfer from: Found down     Mechanism of Injury:Fall     Chief Complaint: none     HPI/Last 24 hour events: none      Objective   Vitals:   Temp:  [97.5 °F (36.4 °C)-99.2 °F (37.3 °C)] 99.1 °F (37.3 °C)  HR:  [] 92  Resp:  [12-22] 16  BP: (102-149)/(59-85) 125/75    I/O         08/18 0701  08/19 0700 08/19 0701  08/20 0700 08/20 0701  08/21 0700    Urine (mL/kg/hr)  900     Total Output  900     Net  -900                     Physical Exam:   General: VS reviewed  Appears in NAD  awake, alert.   Head: Normocephalic, atraumatic  Eyes: EOM-I. No diplopia.   No hyphema.   No subconjunctival hemorrhages.  Symmetrical lids.   ENT: Atraumatic external nose and ears.    MMM  No malocclusion. No stridor. Normal phonation. No drooling. Normal swallowing.   Neck: No JVD.  CV: No pallor noted  Lungs:   No tachypnea  No respiratory distress  MSK:   FROM spontaneously, mild tenderness in the right hip, recent right hip surgery 2 weeks ago, well healing incision   Skin: Dry, intact.   Neuro: Awake, alert, GCS15, CN II-XII grossly intact.   Motor grossly intact.  Psychiatric/Behavioral: Appropriate mood and affect   Exam: deferred    Invasive Devices       Peripheral Intravenous Line  Duration             Peripheral IV 08/19/24 Distal;Left;Upper;Ventral (anterior) Arm <1 day    Peripheral IV 08/20/24 Right;Ventral (anterior) Forearm <1 day                            Lab Results: Results: I have personally reviewed all pertinent laboratory/tests results    Imaging Results: I have personally  reviewed pertinent reports.    Chest Xray(s): negative for acute findings   FAST exam(s): negative for acute findings   CT Scan(s): negative for acute findings   Additional Xray(s): N/A     Other Studies:

## 2024-08-20 NOTE — PLAN OF CARE
Problem: Prexisting or High Potential for Compromised Skin Integrity  Goal: Skin integrity is maintained or improved  Description: INTERVENTIONS:  - Identify patients at risk for skin breakdown  - Assess and monitor skin integrity  - Assess and monitor nutrition and hydration status  - Monitor labs   - Assess for incontinence   - Turn and reposition patient  - Assist with mobility/ambulation  - Relieve pressure over bony prominences  - Avoid friction and shearing  - Provide appropriate hygiene as needed including keeping skin clean and dry  - Evaluate need for skin moisturizer/barrier cream  - Collaborate with interdisciplinary team   - Patient/family teaching  - Consider wound care consult   Outcome: Progressing     Problem: NEUROSENSORY - ADULT  Goal: Achieves stable or improved neurological status  Description: INTERVENTIONS  - Monitor and report changes in neurological status  - Monitor vital signs such as temperature, blood pressure, glucose, and any other labs ordered   - Initiate measures to prevent increased intracranial pressure  - Monitor for seizure activity and implement precautions if appropriate      Outcome: Progressing  Goal: Remains free of injury related to seizures activity  Description: INTERVENTIONS  - Maintain airway, patient safety  and administer oxygen as ordered  - Monitor patient for seizure activity, document and report duration and description of seizure to physician/advanced practitioner  - If seizure occurs,  ensure patient safety during seizure  - Reorient patient post seizure  - Seizure pads on all 4 side rails  - Instruct patient/family to notify RN of any seizure activity including if an aura is experienced  - Instruct patient/family to call for assistance with activity based on nursing assessment  - Administer anti-seizure medications if ordered    Outcome: Progressing     Problem: MUSCULOSKELETAL - ADULT  Goal: Maintain or return mobility to safest level of  function  Description: INTERVENTIONS:  - Assess patient's ability to carry out ADLs; assess patient's baseline for ADL function and identify physical deficits which impact ability to perform ADLs (bathing, care of mouth/teeth, toileting, grooming, dressing, etc.)  - Assess/evaluate cause of self-care deficits   - Assess range of motion  - Assess patient's mobility  - Assess patient's need for assistive devices and provide as appropriate  - Encourage maximum independence but intervene and supervise when necessary  - Involve family in performance of ADLs  - Assess for home care needs following discharge   - Consider OT consult to assist with ADL evaluation and planning for discharge  - Provide patient education as appropriate  Outcome: Progressing     Problem: PAIN - ADULT  Goal: Verbalizes/displays adequate comfort level or baseline comfort level  Description: Interventions:  - Encourage patient to monitor pain and request assistance  - Assess pain using appropriate pain scale  - Administer analgesics based on type and severity of pain and evaluate response  - Implement non-pharmacological measures as appropriate and evaluate response  - Consider cultural and social influences on pain and pain management  - Notify physician/advanced practitioner if interventions unsuccessful or patient reports new pain  Outcome: Progressing

## 2024-08-20 NOTE — UTILIZATION REVIEW
Initial Clinical Review    Admission: Date/Time/Statement:   Admission Orders (From admission, onward)       Ordered        24 1136  INPATIENT ADMISSION  Once                          Orders Placed This Encounter   Procedures    INPATIENT ADMISSION     Standing Status:   Standing     Number of Occurrences:   1     Order Specific Question:   Level of Care     Answer:   Med Surg [16]     Order Specific Question:   Estimated length of stay     Answer:   More than 2 Midnights     Order Specific Question:   Certification     Answer:   I certify that inpatient services are medically necessary for this patient for a duration of greater than two midnights. See H&P and MD Progress Notes for additional information about the patient's course of treatment.     ED Arrival Information       Expected   -    Arrival   2024 10:11    Acuity   Emergent              Means of arrival   Ambulance    Escorted by   [facility]    Service   Hospitalist    Admission type   Emergency              Arrival complaint   -             Chief Complaint   Patient presents with    Trauma     Fall on ASA, change in GCS         Initial Presentation: 64 y.o. male presents to ed from home on  via ems for evaluation and treatment of altered mental status and potential injury from a fall. Found by nurses aide on the floor entangled with his walker and with an empty bottle of methadone near by.  Pt has a history of poly pharmacy.  Discharged to home 1 day ago with [facility].  Unable to place in rehab due to methadone use.  PMHX: IDDM, recurrent falls, methadone dependence, ORIF R femur 24.   on aspirin.  Clinical assessment significant for encephalopathy , GCS 12.  VB.29/ 65 / 20.8 / 30.9.   WBC 10.97, HB 8.4, .  Trauma imaging without acute finding. Initially treated with iv multi electrolyte 75/hr. 4L O2nc.   Admit to inpatient med surg  for toxic encephalopathy likely related to methadone overdose and /or xanax.   Intent unknown.  Plan includes: npo, neuro checks, resume methadone, iv lopressor prn for HTN, PT/ OT evaluations.      Anticipated Length of Stay/Certification Statement: Patient will be admitted on an Inpatient basis with an anticipated length of stay of  > 2 midnights.   Justification for Hospital Stay: Toxic encephalopathy     Date: 8-20-24    Day 2: inpatient med surg   Certification Statement: The patient will continue to require additional inpatient hospital stay due to PT/OT, encephalopathy, pain control  Discharge Plan: Anticipate discharge tomorrow to discharge location to be determined pending rehab evaluations.  GCS 15. Monitor mentation closely. Concern for medication related encephalopathy in setting of poor sleep.  Currently oriented to self, time, place, partially to situation.  Advance diet. Pain 10/10 right hip.  X ray pelvis and right femur :  possible acute fracture of the R proximal femur below the level of the greater trochanter.   Plan to start scheduled po tylenol with methadone 190 mg daily and prn oxycodone. Continue iv multi electrolyte 75/hr.  Start low dose oxycodone for pain mgmt.  Obtain PT/OT evaluations.     Date: 8-21-24   Orthopedics consulted. X ray reviewed for possible R proximal femur fracture. Stable since surgery.  Patient reports leg pain.    Patient is able to bear when ambulating with physical therapy.  WBAT RLE.  Continue PT.  Continue lovenox, aspirin.      ED Triage Vitals   Temperature Pulse Respirations Blood Pressure SpO2 Pain Score   08/19/24 1006 08/19/24 1006 08/19/24 1006 08/19/24 1006 08/19/24 1006 08/19/24 1646   97.6 °F (36.4 °C) 94 18 127/63 94 % 7     Weight (last 2 days)       Date/Time Weight    08/19/24 10:06:19 79.3 (174.83)            Vital Signs (last 3 days)       Date/Time Temp Pulse Resp BP MAP (mmHg) SpO2 Nasal Cannula O2 Flow Rate (L/min) [place] Coma Scale Score Pain    08/20/24 0747 -- -- -- -- -- -- -- -- 10 - Worst Possible Pain    08/20/24  07:14:57 99.1 °F (37.3 °C) 92 -- 125/75 92 96 % -- -- --    08/20/24 00:16:10 -- 96 -- 125/71 89 97 % -- -- --    08/19/24 2326 -- -- -- -- -- -- -- 15 --    08/19/24 21:55:34 -- 104 -- 148/85 106 98 % -- -- 7    08/19/24 20:36:47 99.2 °F (37.3 °C) 115 16 149/85 106 98 % -- -- --    08/19/24 1646 -- -- -- -- -- -- -- 15 7    08/19/24 14:40:23 97.5 °F (36.4 °C) -- 16 102/77 85 -- -- -- --    08/19/24 1310 -- 100 22 144/67 -- 98 % -- 15 --    08/19/24 1200 -- 80 12 117/71 -- 100 % -- 12 --    08/19/24 1130 -- 72 12 115/72 -- 100 % -- 12 --    08/19/24 1100 -- 76 12 113/66 -- 100 % -- 12 --    08/19/24 1045 -- 80 12 107/62 79 100 % 4 L/min 12 --    08/19/24 1030 -- 88 18 103/59 -- 100 % -- 12 --    08/19/24 10:11:18 -- 90 18 119/68 -- 100 % 4 L/min 12 --    08/19/24 10:06:19 97.6 °F (36.4 °C) 94 18 127/63 -- 94 % -- 12 --              Pertinent Labs/Diagnostic Test Results:     Radiology:  TRAUMA - CT head wo contrast   Final (08/19 1037)      No acute intracranial abnormality.         TRAUMA - CT spine cervical wo contrast   Final  (08/19 1036)      No acute fracture.         XR Trauma multiple (SLB/SLRA trauma bay ONLY)   Final (08/19 1039)      No acute cardiopulmonary disease within limitations of supine imaging.         XR chest 1 view   Final (08/19 2007)      No acute cardiopulmonary disease within limitations of supine imaging.      XR femur 2 vw right    (Results Pending)   XR pelvis ap only 1 or 2 vw    (Results Pending)     Cardiology:  ECG 12 lead   Final (08/19 1350)      Sinus rhythm with sinus arrhythmia   Normal ECG   No previous ECGs available        GI:  No orders to display           Results from last 7 days   Lab Units 08/20/24  0744 08/19/24  1013 08/19/24  1012   WBC Thousand/uL 8.65 10.97*  --    HEMOGLOBIN g/dL 7.9* 8.4*  --    I STAT HEMOGLOBIN g/dl  --   --  8.5*   HEMATOCRIT % 25.2* 27.0*  --    HEMATOCRIT, ISTAT %  --   --  25*   PLATELETS Thousands/uL 487* 498*  --    TOTAL NEUT ABS  Thousands/µL  --  8.32*  --          Results from last 7 days   Lab Units 08/20/24  0744 08/19/24  1013 08/19/24  1012   SODIUM mmol/L 138 134*  --    POTASSIUM mmol/L 4.2 4.9  --    CHLORIDE mmol/L 103 102  --    CO2 mmol/L 31 28  --    CO2, I-STAT mmol/L  --   --  30   ANION GAP mmol/L 4 4  --    BUN mg/dL 18 22  --    CREATININE mg/dL 0.71 0.91  --    EGFR ml/min/1.73sq m 100 90  --    CALCIUM mg/dL 8.3* 8.2*  --    CALCIUM, IONIZED, ISTAT mmol/L  --   --  1.14     Results from last 7 days   Lab Units 08/19/24  1013   AST U/L 20   ALT U/L 24   ALK PHOS U/L 172*   TOTAL PROTEIN g/dL 6.1*   ALBUMIN g/dL 3.2*   TOTAL BILIRUBIN mg/dL 0.42     Results from last 7 days   Lab Units 08/20/24  1059 08/20/24  0753 08/19/24  2348 08/19/24  1801 08/19/24  1305 08/19/24  1006   POC GLUCOSE mg/dl 141* 106 289* 108 155* 250*     Results from last 7 days   Lab Units 08/20/24  0744 08/19/24  1013   GLUCOSE RANDOM mg/dL 97 231*       Results from last 7 days   Lab Units 08/19/24  1306   PH PABLO  7.294*   PCO2 PABLO mm Hg 65.1*   PO2 PABLO mm Hg 20.8*   HCO3 PABLO mmol/L 30.9*   BASE EXC PABLO mmol/L 3.5   O2 CONTENT PABLO ml/dL 3.5   O2 HGB, VENOUS % 29.1*     Results from last 7 days   Lab Units 08/19/24  1012   PH, PABLO I-STAT  7.371   PCO2, PABLO ISTAT mm HG 49.4   PO2, PABLO ISTAT mm HG 27.0*   HCO3, PABLO ISTAT mmol/L 28.6   I STAT BASE EXC mmol/L 3   I STAT O2 SAT % 47*     Results from last 7 days   Lab Units 08/19/24  1013   CK TOTAL U/L 32*     Results from last 7 days   Lab Units 08/19/24  1013   PROTIME seconds 14.5   INR  1.10   PTT seconds 26     Results from last 7 days   Lab Units 08/19/24  1037 08/19/24  1013   ETHANOL LVL mg/dL  --  <10   ACETAMINOPHEN LVL ug/mL <2*  --    SALICYLATE LVL mg/dL <5  --        ED Treatment-Medication Administration from 08/19/2024 0959 to 08/19/2024 1427         Date/Time Order Dose Route Action     08/19/2024 1307 multi-electrolyte (PLASMALYTE-A/ISOLYTE-S PH 7.4) IV solution 75 mL/hr Intravenous New  Bag         No past medical history on file.      Present on Admission:  None       Admitting Diagnosis:     Encephalopathy acute [G93.40]  Other injury of unspecified body region, initial encounter [T14.8XXA]  Overdose of undetermined intent, initial encounter [T50.904A]      Age/Sex: 64 y.o. male    Scheduled Medications:      acetaminophen, 975 mg, Oral, Q8H MELISA  aspirin, 81 mg, Oral, BID  gabapentin, 300 mg, Oral, TID  heparin (porcine), 5,000 Units, Subcutaneous, Q8H MELISA  insulin glargine, 10 Units, Subcutaneous, HS  insulin lispro, 1-6 Units, Subcutaneous, 4x Daily (AC & HS)  methadone, 190 mg, Oral, Daily  metoprolol succinate, 12.5 mg, Oral, Daily      Continuous IV Infusions:  multi-electrolyte, 75 mL/hr, Intravenous, Continuous      PRN Meds:  ALPRAZolam, 0.5 mg, Oral, HS PRN  metoprolol, 2.5 mg, Intravenous, Q6H PRN  oxyCODONE, 5 mg, Oral, Q4H PRN  oxyCODONE, 2.5 mg, Oral, Q4H PRN        IP CONSULT TO CASE MANAGEMENT    Network Utilization Review Department  ATTENTION: Please call with any questions or concerns to [phone] and carefully listen to the prompts so that you are directed to the right person. All voicemails are confidential.   For Discharge needs, contact Care Management DC Support Team at [phone] opt. [phone]  Send all requests for admission clinical reviews, approved or denied determinations and any other requests to dedicated fax number below belonging to the [facility] where the patient is receiving treatment. List of dedicated fax numbers for the Facilities:  FACILITY NAME UR FAX NUMBER   ADMISSION DENIALS (Administrative/Medical Necessity) [phone]   DISCHARGE SUPPORT TEAM (NETWORK) [phone]   PARENT CHILD HEALTH (Maternity/NICU/Pediatrics) [phone]   [facility] [phone]   [facility] [phone]   [facility] [phone]   [facility] [phone]   [facility]  [facility] [phone]   [facility] [phone]   [facility] [phone]   [facility] [phone]   [facility] [phone]   [facility] [phone]   [facility] [phone]   [facility] [phone]

## 2024-08-20 NOTE — ASSESSMENT & PLAN NOTE
Admitting physician called [facility], Phone number: [phone], and confirmed the dose of Methadone   He has been on chronic methadone for many years  Continue methadone 190 mg daily

## 2024-08-20 NOTE — TELEPHONE ENCOUNTER
Patient has referral to be seen. He is a hfu and wants to go to the [facility] office. The soonest I could schedule is November, do you have any sooner openings? Please follow up with patient. Thank you

## 2024-08-20 NOTE — PLAN OF CARE
Problem: OCCUPATIONAL THERAPY ADULT  Goal: Performs self-care activities at highest level of function for planned discharge setting.  See evaluation for individualized goals.  Description: Treatment Interventions: ADL retraining, Functional transfer training, UE strengthening/ROM, Endurance training, Cognitive reorientation, Patient/family training, Energy conservation, Activityengagement, Continued evaluation  Equipment Recommended: Bedside commode, Shower/Tub chair with back ($)       See flowsheet documentation for full assessment, interventions and recommendations.   Note: Limitation: Decreased ADL status, Decreased UE strength, Decreased Safe judgement during ADL, Decreased cognition, Decreased endurance, Decreased self-care trans, Decreased high-level ADLs  Prognosis: Fair  Assessment: Pt is a 64 y.o. male who was admitted to [facility] on 8/19/2024 with fall , found down unresponsive and now here with Toxic encephalopathy R femur fracture s/p ORIF 7/31/24 +WBAT, a-fib, methadone dependence, NINFA, LUNA, diabetes. Patient   has no past medical history on file.  At baseline pt was completing I with ADL's, assistance from sister with IADL's/including driving. Pt lives alone in a 4th floor apartment with elevator and [address]. Currently pt requires min a  for overall ADLS and min a  for functional mobility/transfers. Pt currently presents with impairments in the following categories -limited home support, difficulty performing ADLS, difficulty performing IADLS , limited insight into deficits, compliance, flat affect, and decreased initiation and engagement  activity tolerance, endurance, standing balance/tolerance, and sitting balance/tolerance. These impairments, as well as pt's fatigue, impulsivity, decreased caregiver support, and risk for falls  limit pt's ability to safely engage in all baseline areas of occupation, includinggrooming, bathing, dressing, toileting, functional mobility/transfers, and community mobility  From OT standpoint, recommend mod level II upon D/C. The patient's raw score on the AM-PAC Daily Activity Inpatient Short Form is 16. A raw score of less than 19 suggests the patient may benefit from discharge to post-acute rehabilitation services. Please refer to the recommendation of the Occupational Therapist for safe discharge planning. OT will continue to follow to address the below stated goals.     Rehab Resource Intensity Level, OT: II (Moderate Resource Intensity)

## 2024-08-20 NOTE — ASSESSMENT & PLAN NOTE
64-year-old male with IDDM 2, recurrent fall, methadone dependence, presented to ED via EMS after he was found down by home visiting nurses.  Patient was not hypoxic at the scene.  Noted patient has multiple recent hospitalization secondary to encephalopathy presumably from methadone, and multiple fall, resulting in a right femur fracture s/p ORIF on 7/31/2024.  He was discharged home only on 8/18 from trauma service, as per discharge summary, unable to place in rehab due to methadone use.  And subsequently patient wanted to go home with home VNA.  Admitting physician spoke to patient's methadone clinic [facility], Phone number: [phone], and confirmed the dose of Methadone 190 mg daily.  Nursing staff verified patient was at the clinic this morning at 6:32 AM and received his regular dose of methadone 190 mg, and he also received extra doses to take home with.  As per nursing staff, patient was at usual health as of this morning.  Admission labs, imaging unremarkable  Initial concern for medication related encephalopathy in the setting of poor sleep given significant improvement in mentation today, oriented to self, time, place, partially to situation  Continue IVF  Monitor mentation closely, if patient remains oriented, tentative plan for discharge tomorrow.  Initiated low-dose oxycodone for pain control, will monitor mentation on this regimen.

## 2024-08-20 NOTE — RESTORATIVE TECHNICIAN NOTE
Restorative Technician Note      Patient Name: David Skelton     Note Type: Mobility  Patient Position Upon Consult: Bedside chair  Activity Performed: Ambulated; Dangled; Stood  Assistive Device: Roller walker  Education Provided: Yes  Patient Position at End of Consult: Bedside chair; All needs within reach; Bed/Chair alarm activated    Adenike YANES, Restorative Technician,

## 2024-08-20 NOTE — PHYSICAL THERAPY NOTE
Physical Therapy Evaluation    Patient Name: David Skelton    Today's Date: 8/20/2024     Problem List  Principal Problem:    Toxic encephalopathy  Active Problems:    Type 2 diabetes mellitus, with long-term current use of insulin (HCC)    LUNA (iron deficiency anemia)    NINFA (generalized anxiety disorder)    Benign essential hypertension    Methadone dependence (HCC)    Paroxysmal atrial fibrillation (HCC)    Recurrent falls    Closed fracture of right femur, unspecified fracture morphology, sequela       Past Medical History  No past medical history on file.     Past Surgical History  No past surgical history on file.      08/20/24 0849   PT Last Visit   PT Visit Date 08/20/24   Note Type   Note type Evaluation   Pain Assessment   Pain Assessment Tool 0-10   Pain Score 7   Pain Location/Orientation Orientation: Right;Location: Hip;Location: Leg   Patient's Stated Pain Goal No pain   Hospital Pain Intervention(s) Repositioned;Ambulation/increased activity;Emotional support   Restrictions/Precautions   Weight Bearing Precautions Per Order Yes   RLE Weight Bearing Per Order (S)  WBAT   Other Precautions Cognitive;Chair Alarm;Bed Alarm;Multiple lines;Telemetry;Fall Risk;Pain  (dysarthric, s/p ORIF 7/31/24)   Home Living   Type of Home Apartment  (4th floor)   Home Layout Multi-level;Laundry in basement;Performs ADLs on one level;Able to live on main level with bedroom/bathroom;Stairs to enter with rails;Elevator  ([address] apt building, elevator to 4th floor)   Bathroom Shower/Tub Tub/shower unit   Bathroom Toilet Raised   Bathroom Equipment Other (Comment)  (denies)   Bathroom Accessibility Accessible   Home Equipment Walker;Cane   Additional Comments pt reports living alone in 4th floor apartment with [address] the building, then elevator access to 4th floor. laundry in basement, pt uses cart and elevator to acces laundry room.   Prior Function   Level of [place]  Independent with ADLs;Independent with functional mobility;Needs assistance with IADLS   Lives With Alone   Receives Help From Family;Friend(s)  (sister lives 1/2 block away)   IADLs Family/Friend/Other provides transportation;Family/Friend/Other provides medication management;Family/Friend/Other provides meals   Falls in the last 6 months 5 to 10   Comments pt reports sister assists with grocery shopping, meals and driving. pt uses cane vs RW PTA depending on how he feels   General   Family/Caregiver Present No   Cognition   Overall Cognitive Status Impaired   Arousal/Participation Cooperative   Orientation Level Oriented X4   Memory Decreased recall of recent events;Decreased recall of precautions   Following Commands Follows one step commands with increased time or repetition   Comments pt cooperative throughout session and making jokes   Subjective   Subjective pt reports he like sports   RLE Assessment   RLE Assessment X   Strength RLE   R Knee Extension 3/5  (at least 3/5, deferred further testing due to pain)   R Ankle Dorsiflexion 5/5   R Ankle Plantar Flexion 5/5   LLE Assessment   LLE Assessment WFL   Vision-Basic Assessment   Patient Visual Report Other (Comment)  (pt reports he has difficulty with his vision, and that it is blurry however he does not know where his glasses are and cannot find them at home)   Light Touch   RLE Light Touch Impaired   RLE Light Touch Comments pt reports neuropathy at baseline   LLE Light Touch Impaired   LLE Light Touch Comments pt reports neuropathy at baseline   Bed Mobility   Supine to Sit 4  Minimal assistance   Additional items Assist x 1;HOB elevated;Increased time required;Verbal cues;LE management   Sit to Supine Unable to assess   Additional Comments pt OOB in chair at end of session   Transfers   Sit to Stand 4  Minimal assistance   Additional items Assist x 1;Increased time required;Verbal cues   Stand to Sit 4  Minimal assistance   Additional items Assist x  1;Armrests;Increased time required;Verbal cues   Additional Comments c RW   Ambulation/Elevation   Gait pattern Improper Weight shift;Antalgic;Forward Flexion;Decreased foot clearance;Short stride;Excessively slow;Step to   Gait Assistance 4  Minimal assist   Additional items Assist x 1;Verbal cues   Assistive Device Rolling walker   Distance 30'   Stair Management Assistance Not tested   Ambulation/Elevation Additional Comments after ambulation pt sitting in recliner and suddenly vommited, pt reports that he just ate breakfast and that was the first time he was up and walking, RN aware.   Balance   Static Sitting Fair   Dynamic Sitting Fair -   Static Standing Poor +   Dynamic Standing Poor   Ambulatory Poor -   Endurance Deficit   Endurance Deficit Yes   Endurance Deficit Description pt limited by pain and deficits in strength and activity tolerance   Activity Tolerance   Activity Tolerance Patient limited by fatigue;Patient limited by pain   Medical Staff Made Aware PT YESSI Caldwell Cherelle   Nurse Made Aware yes-cleared   Assessment   Prognosis Good   Problem List Decreased strength;Decreased endurance;Impaired balance;Decreased mobility;Decreased coordination;Decreased cognition;Impaired judgement;Decreased safety awareness;Impaired vision;Impaired sensation;Pain   Assessment Pt is an 64 y.o. male presenting to [facility] on 8/19/24 s/p unwitnessed fall at home. Pt was found down by her nurses on the ground with AMS. Pt received initial diagnosis of toxic encephalopathy. Pt recently had a R femur fracture and is s/p ORIF on 7/31/24.  has no past medical history on file. Active problems listed above. Pt presents as a high complexity evaluation due to Ongoing medical management for primary dx, Increased reliance on more restrictive AD compared to baseline, Decreased activity tolerance compared to baseline, Fall risk, Increased assistance needed from caregiver at current time, Ongoing telemetry monitoring, Cog status, Current  WBS, Continuous pulse oximetry monitoring , s/p surgical intervention. Pt currently requires min A for bed mobility, min A for transfers with RW and min A for ambulating 30' with RW. Pt presents below his functional baseline and will benefit from continued skilled acute PT services to address deficits. Pt is limited by pain and deficits in strength, balance, mobility, activity tolerance and endurance limiting his ability to safely return home and actively participate in his community. The patient's AM-PAC Basic Mobility Inpatient Short Form Raw Score is 18. A Raw score of greater than or equal to 16 suggests the patient may benefit from discharge to home, however pt remains limited by pain, mobility deficits, strength deficits and is home alone which is a safety concern for his currently level of function. Pt would benefit from STR to promote increased mobility and PLOF before returning home. Pt left in chair with chair alarm donned, call bell and personal items within reach and all needs met.   Barriers to Discharge Inaccessible home environment;Decreased caregiver support   Goals   Patient Goals to go home   STG Expiration Date 09/03/24   Short Term Goal #1 In 14 days pt will complete bed mobility at mod I to increase independence. Pt will complete transfers at mod I to increase independence and decrease caregiver burden. Pt will ambulate 300' with LRD at mod I to promote safe ambulation at home and within the community. Pt will negotiate 3 steps at mod I to promote safe access to home.   PT Treatment Day 0   Plan   Treatment/Interventions Functional transfer training;LE strengthening/ROM;Elevations;Therapeutic exercise;Endurance training;Cognitive reorientation;Patient/family training;Equipment eval/education;Bed mobility;Gait training;Spoke to nursing;OT   PT Frequency 2-3x/wk   Discharge Recommendation   Rehab Resource Intensity Level, PT II (Moderate Resource Intensity)   Equipment Recommended Walker   Walker  Package Recommended Wheeled walker   AM-PAC Basic Mobility Inpatient   Turning in Flat Bed Without Bedrails 4   Lying on Back to Sitting on Edge of Flat Bed Without Bedrails 3   Moving Bed to Chair 3   Standing Up From Chair Using Arms 3   Walk in Room 3   Climb 3-5 Stairs With Railing 2   Basic Mobility Inpatient Raw Score 18   Basic Mobility Standardized Score 41.05   [facility] Level Of Mobility   -[facility] Goal 6: Walk 10 steps or more   -HL Achieved 7: Walk 25 feet or more   Modified Blanchard Scale   Modified Blanchard Scale 4   Barthel Index   Feeding 10   Bathing 0   Grooming Score 0   Dressing Score 5   Bladder Score 10   Bowels Score 10   Toilet Use Score 5   Transfers (Bed/Chair) Score 5   Mobility (Level Surface) Score 0   Stairs Score 0   Barthel Index Score 45   Jazlyn Willams, SPT

## 2024-08-20 NOTE — PROGRESS NOTES
Pt focused on finding belongings(phone and wallet) throughout the evening. Writer called security to inquire about belongings. Security informed writer that belongings were not collected. Shortly after, pt informed writer that his sister was in possession of wallet and phone. At approx 0030 sister arrived with belongings. Content of wallet have not been reviewed at this time.

## 2024-08-20 NOTE — PLAN OF CARE
Problem: PHYSICAL THERAPY ADULT  Goal: Performs mobility at highest level of function for planned discharge setting.  See evaluation for individualized goals.  Description: Treatment/Interventions: Functional transfer training, LE strengthening/ROM, Elevations, Therapeutic exercise, Endurance training, Cognitive reorientation, Patient/family training, Equipment eval/education, Bed mobility, Gait training, Spoke to nursing, OT  Equipment Recommended: Walker       See flowsheet documentation for full assessment, interventions and recommendations.  Note: Prognosis: Good  Problem List: Decreased strength, Decreased endurance, Impaired balance, Decreased mobility, Decreased coordination, Decreased cognition, Impaired judgement, Decreased safety awareness, Impaired vision, Impaired sensation, Pain  Assessment: Pt is an 64 y.o. male presenting to [facility] on 8/19/24 s/p unwitnessed fall at home. Pt was found down by her nurses on the ground with AMS. Pt received initial diagnosis of toxic encephalopathy. Pt recently had a R femur fracture and is s/p ORIF on 7/31/24.  has no past medical history on file. Active problems listed above. Pt presents as a high complexity evaluation due to Ongoing medical management for primary dx, Increased reliance on more restrictive AD compared to baseline, Decreased activity tolerance compared to baseline, Fall risk, Increased assistance needed from caregiver at current time, Ongoing telemetry monitoring, Cog status, Current WBS, Continuous pulse oximetry monitoring , s/p surgical intervention. Pt currently requires min A for bed mobility, min A for transfers with RW and min A for ambulating 30' with RW. Pt presents below his functional baseline and will benefit from continued skilled acute PT services to address deficits. Pt is limited by pain and deficits in strength, balance, mobility, activity tolerance and endurance limiting his ability to safely return home and actively participate in his  community. The patient's AM-PAC Basic Mobility Inpatient Short Form Raw Score is 18. A Raw score of greater than or equal to 16 suggests the patient may benefit from discharge to home, however pt remains limited by pain, mobility deficits, strength deficits and is home alone which is a safety concern for his currently level of function. Pt would benefit from STR to promote increased mobility and PLOF before returning home. Pt left in chair with chair alarm donned, call bell and personal items within reach and all needs met.  Barriers to Discharge: Inaccessible home environment, Decreased caregiver support     Rehab Resource Intensity Level, PT: II (Moderate Resource Intensity)    See flowsheet documentation for full assessment.

## 2024-08-20 NOTE — PROGRESS NOTES
[facility]  Progress Note  Name: David Skelton I  MRN: [id number]  Unit/Bed#: [facility]PHP 724-01 I Date of Admission: 8/19/2024   Date of Service: 8/20/2024 I Hospital Day: 1    Assessment & Plan   * Toxic encephalopathy  Assessment & Plan  64-year-old male with IDDM 2, recurrent fall, methadone dependence, presented to ED via EMS after he was found down by home visiting nurses.  Patient was not hypoxic at the scene.  Noted patient has multiple recent hospitalization secondary to encephalopathy presumably from methadone, and multiple fall, resulting in a right femur fracture s/p ORIF on 7/31/2024.  He was discharged home only on 8/18 from trauma service, as per discharge summary, unable to place in rehab due to methadone use.  And subsequently patient wanted to go home with home VNA.  Admitting physician spoke to patient's methadone clinic [facility], Phone number: [phone], and confirmed the dose of Methadone 190 mg daily.  Nursing staff verified patient was at the clinic this morning at 6:32 AM and received his regular dose of methadone 190 mg, and he also received extra doses to take home with.  As per nursing staff, patient was at usual health as of this morning.  Admission labs, imaging unremarkable  Initial concern for medication related encephalopathy in the setting of poor sleep given significant improvement in mentation today, oriented to self, time, place, partially to situation  Continue IVF  Monitor mentation closely, if patient remains oriented, tentative plan for discharge tomorrow.  Initiated low-dose oxycodone for pain control, will monitor mentation on this regimen.    Closed fracture of right femur, unspecified fracture morphology, sequela  Assessment & Plan  History of recent right femur fracture s/p ORIF on 7/31/2024 secondary to fall.  Continue aspirin 81 mg twice daily for 28 days postop  Follow-up hip and femur x-rays given complaints of  "pain    Recurrent falls  Assessment & Plan  Multiple recent admissions from encephalopathy, fall, likely from same etiology.    Patient discharged only 1 day prior to admission from trauma service  Consult PT/OT and case management for safe discharge planning    Paroxysmal atrial fibrillation (HCC)  Assessment & Plan  Rate controlled  Continue home Toprol-XL  Not on AC due to prior history of multiple falls    Methadone dependence (HCC)  Assessment & Plan  Admitting physician called [facility], Phone number: [phone], and confirmed the dose of Methadone   He has been on chronic methadone for many years  Continue methadone 190 mg daily    Benign essential hypertension  Assessment & Plan  BP stable  Continue Toprol-XL    NINFA (generalized anxiety disorder)  Assessment & Plan  Continue home gabapentin  Continue Xanax as needed at bedtime    LUNA (iron deficiency anemia)  Assessment & Plan  History of iron deficiency anemia, currently hemoglobin is at baseline  Continue to monitor    Type 2 diabetes mellitus, with long-term current use of insulin (HCC)  Assessment & Plan  No results found for: \"HGBA1C\"    Recent Labs     08/19/24  2348 08/20/24  0753 08/20/24  1059 08/20/24  1621   POCGLU 289* 106 141* 287*       Blood Sugar Average: Last 72 hrs:  (P) [id number]    Home medication is metformin, repaglinide, Lantus 10 units at bedtime.  Continue Lantus 10 units at bedtime with correctional scale  Hypoglycemia protocol             VTE Pharmacologic Prophylaxis:   Moderate Risk (Score 3-4) - Pharmacological DVT Prophylaxis Ordered: heparin.    Mobility:   Basic Mobility Inpatient Raw Score: 18  JH-HLM Goal: 6: Walk 10 steps or more  JH-HLM Achieved: 7: Walk 25 feet or more  JH-HLM Goal achieved. Continue to encourage appropriate mobility.    Patient Centered Rounds: I performed bedside rounds with nursing staff today.   Discussions with Specialists or Other Care Team Provider: CM    Education " and Discussions with Family / Patient: Patient declined call to .     Total Time Spent on Date of Encounter in care of patient: 25 mins. This time was spent on one or more of the following: performing physical exam; counseling and coordination of care; obtaining or reviewing history; documenting in the medical record; reviewing/ordering tests, medications or procedures; communicating with other healthcare professionals and discussing with patient's family/caregivers.    Current Length of Stay: 1 day(s)  Current Patient Status: Inpatient   Certification Statement: The patient will continue to require additional inpatient hospital stay due to PT/OT, encephalopathy, pain control  Discharge Plan: Anticipate discharge tomorrow to discharge location to be determined pending rehab evaluations.    Code Status: Level 1 - Full Code    Subjective:   Patient assessed at bedside.  Reports feeling better today.  Oriented to person, place, time, partially to situation.  Denies overdosing on methadone.  Reports taking methadone, oxycodone the morning of presentation.  He also reports poor sleep the day before.  Complains of throbbing pain in the leg from his recent fracture.    Objective:     Vitals:   Temp (24hrs), Av.8 °F (37.1 °C), Min:98 °F (36.7 °C), Max:99.2 °F (37.3 °C)    Temp:  [98 °F (36.7 °C)-99.2 °F (37.3 °C)] 98 °F (36.7 °C)  HR:  [] 71  Resp:  [16] 16  BP: (121-149)/() 121/101  SpO2:  [96 %-99 %] 99 %  There is no height or weight on file to calculate BMI.     Input and Output Summary (last 24 hours):     Intake/Output Summary (Last 24 hours) at 2024 1638  Last data filed at 2024 0900  Gross per 24 hour   Intake 240 ml   Output 1100 ml   Net -860 ml       Physical Exam:   Physical Exam  Vitals reviewed.   Constitutional:       General: He is not in acute distress.     Appearance: Normal appearance. He is not ill-appearing.   HENT:      Head: Normocephalic and atraumatic.    Cardiovascular:      Rate and Rhythm: Normal rate and regular rhythm.      Heart sounds: Normal heart sounds.   Pulmonary:      Effort: Pulmonary effort is normal. No respiratory distress.   Abdominal:      General: Bowel sounds are normal.      Tenderness: There is no abdominal tenderness.   Musculoskeletal:      Right lower leg: No edema.      Left lower leg: No edema.   Neurological:      Mental Status: He is alert and oriented to person, place, and time. Mental status is at baseline.          Additional Data:     Labs:  Results from last 7 days   Lab Units 08/20/24  0744 08/19/24  1013   WBC Thousand/uL 8.65 10.97*   HEMOGLOBIN g/dL 7.9* 8.4*   HEMATOCRIT % 25.2* 27.0*   PLATELETS Thousands/uL 487* 498*   SEGS PCT %  --  76*   LYMPHO PCT %  --  11*   MONO PCT %  --  8   EOS PCT %  --  4     Results from last 7 days   Lab Units 08/20/24  0744 08/19/24  1013   SODIUM mmol/L 138 134*   POTASSIUM mmol/L 4.2 4.9   CHLORIDE mmol/L 103 102   CO2 mmol/L 31 28   BUN mg/dL 18 22   CREATININE mg/dL 0.71 0.91   ANION GAP mmol/L 4 4   CALCIUM mg/dL 8.3* 8.2*   ALBUMIN g/dL  --  3.2*   TOTAL BILIRUBIN mg/dL  --  0.42   ALK PHOS U/L  --  172*   ALT U/L  --  24   AST U/L  --  20   GLUCOSE RANDOM mg/dL 97 231*     Results from last 7 days   Lab Units 08/19/24  1013   INR  1.10     Results from last 7 days   Lab Units 08/20/24  1621 08/20/24  1059 08/20/24  0753 08/19/24  2348 08/19/24  1801 08/19/24  1305 08/19/24  1006   POC GLUCOSE mg/dl 287* 141* 106 289* 108 155* 250*               Lines/Drains:  Invasive Devices       Peripheral Intravenous Line  Duration             Peripheral IV 08/19/24 Distal;Left;Upper;Ventral (anterior) Arm 1 day    Peripheral IV 08/20/24 Right;Ventral (anterior) Forearm <1 day                          Imaging: Reviewed radiology reports from this admission including: chest xray and CT head    Recent Cultures (last 7 days):         Last 24 Hours Medication List:   Current Facility-Administered  Medications   Medication Dose Route Frequency Provider Last Rate    acetaminophen  975 mg Oral Q8H MELIAS Babin DO      ALPRAZolam  0.5 mg Oral HS PRN Pau Eckert MD      aspirin  81 mg Oral BID Pau Eckert MD      gabapentin  300 mg Oral TID Annika Guillaume PA-C      heparin (porcine)  5,000 Units Subcutaneous Q8H MELISA Eckert MD      insulin glargine  10 Units Subcutaneous HS Pau Eckert MD      insulin lispro  1-6 Units Subcutaneous 4x Daily (AC & HS) Annika Guillaume PA-C      methadone  190 mg Oral Daily Annika Guillaume PA-C      metoprolol  2.5 mg Intravenous Q6H PRN Pau Eckert MD      metoprolol succinate  12.5 mg Oral Daily Pau Eckert MD      multi-electrolyte  75 mL/hr Intravenous Continuous Pau Eckert MD 75 mL/hr (08/19/24 1307)    oxyCODONE  5 mg Oral Q4H PRN Pedro Babin DO      oxyCODONE  2.5 mg Oral Q4H PRN Pedro Babin DO          Today, Patient Was Seen By: Pedro Babin DO    **Please Note: This note may have been constructed using a voice recognition system.**

## 2024-08-20 NOTE — OCCUPATIONAL THERAPY NOTE
Occupational Therapy Evaluation     Patient Name: David Skelton  Today's Date: 8/20/2024  Problem List  Principal Problem:    Toxic encephalopathy  Active Problems:    Type 2 diabetes mellitus, with long-term current use of insulin (HCC)    LUNA (iron deficiency anemia)    NINFA (generalized anxiety disorder)    Benign essential hypertension    Methadone dependence (HCC)    Paroxysmal atrial fibrillation (HCC)    Recurrent falls    Closed fracture of right femur, unspecified fracture morphology, sequela    Past Medical History  No past medical history on file.  Past Surgical History  No past surgical history on file.        08/20/24 0830   OT Last Visit   OT Visit Date 08/20/24   Note Type   Note type Evaluation   Pain Assessment   Pain Assessment Tool 0-10   Pain Score 7   Pain Location/Orientation Orientation: Right;Location: Leg   Hospital Pain Intervention(s) Ambulation/increased activity;Repositioned;Emotional support;Rest   Restrictions/Precautions   Weight Bearing Precautions Per Order Yes   RLE Weight Bearing Per Order WBAT   Other Precautions Cognitive;Chair Alarm;Bed Alarm;Fall Risk;Telemetry;Pain;Visual impairment  (Cognitive deficits, blurry vision)   Home Living   Type of Home Apartment  (4th floor apartment with elevator access and [address])   Home Layout Laundry in basement;One level;Able to live on main level with bedroom/bathroom;Stairs to enter with rails;Elevator   Bathroom Shower/Tub Tub/shower unit   Bathroom Toilet Raised (slightly per pt report)   Bathroom Equipment (No DME at baseline)   Bathroom Accessibility Accessible   Home Equipment Walker;Cane   Prior Function   Level of [place] Independent with ADLs/assistance IADL's   Lives With Alone   Receives Help From Family  (sister)   IADLs Family/Friend/Other provides medication management;Family/Friend/Other provides meals;Family/Friend/Other provides transportation   Falls in the last 6 months 5 to 10   Lifestyle   Autonomy Sister assists  with ADL's (LB since Right LE ORIF) assistance with IADL's, +SPC occassionally (-) drives/sister assists   Reciprocal Relationships sister, family   Service to Others unemployed   Intrinsic Gratification socialize   General   Family/Caregiver Present No   ADL   Eating Assistance 5  Supervision/Setup   Grooming Assistance 5  Supervision/Setup   UB Bathing Assistance 4  Minimal Assistance   LB Bathing Assistance 3  Moderate Assistance   UB Dressing Assistance 4  Minimal Assistance   LB Dressing Assistance 3  Moderate Assistance   Toileting Assistance  4  Minimal Assistance   Bed Mobility   Supine to Sit 4  Minimal assistance   Additional items Assist x 1;HOB elevated   Sit to Supine Unable to assess   Transfers   Sit to Stand 4  Minimal assistance   Additional items Assist x 1   Stand to Sit 4  Minimal assistance   Additional items Assist x 1   Additional Comments +RW   Functional Mobility   Functional Mobility 4  Minimal assistance   Additional Comments min a x 1 with RW, short distance functional mobilty with chair follow for activity tolerance/endurance   Balance   Static Sitting Fair   Dynamic Sitting Fair -   Static Standing Poor +   Dynamic Standing Poor   Ambulatory Poor   Activity Tolerance   Activity Tolerance Patient limited by fatigue   Medical Staff Made Aware PT Javi, GENA vann due to the patient's co-morbidities, clinically unstable presentation, and present impairments which are a regression from the patient's baseline.    Nurse Made Aware RN cleared pt for therapy   RUE Assessment   RUE Assessment WFL   LUE Assessment   LUE Assessment WFL   Hand Function   Gross Motor Coordination Impaired  (motor delay)   Fine Motor Coordination Impaired  (motor delay)   Vision-Basic Assessment   Patient Visual Report (Blurriness with visual acuity-continue to assess)   Cognition   Overall Cognitive Status (S)  Impaired   Arousal/Participation Alert;Responsive;Cooperative   Attention Attends with cues to redirect    Orientation Level Oriented X4   Memory Decreased recall of precautions;Decreased recall of recent events;Decreased short term memory   Following Commands Follows one step commands with increased time or repetition   Comments pt motivated for therapy, impaired attention, memory (vague details of situtation) suspect attempting hide cogntive deficits with humor. pt would benefit from a formal cogntivie evaluation 2* lives alone.   Assessment   Limitation Decreased ADL status;Decreased UE strength;Decreased Safe judgement during ADL;Decreased cognition;Decreased endurance;Decreased self-care trans;Decreased high-level ADLs   Prognosis Fair   Assessment Pt is a 64 y.o. male who was admitted to [facility] on 8/19/[facility] with fall , found down unresponsive and now here with Toxic encephalopathy R femur fracture s/p ORIF 7/31/24 +WBAT, a-fib, methadone dependence, NINFA, LUNA, diabetes. Patient   has no past medical history on file.  At baseline pt was completing I with ADL's, assistance from sister with IADL's/including driving. Pt lives alone in a 4th floor apartment with elevator and [address]. Currently pt requires min a  for overall ADLS and min a  for functional mobility/transfers. Pt currently presents with impairments in the following categories -limited home support, difficulty performing ADLS, difficulty performing IADLS , limited insight into deficits, compliance, flat affect, and decreased initiation and engagement  activity tolerance, endurance, standing balance/tolerance, and sitting balance/tolerance. These impairments, as well as pt's fatigue, impulsivity, decreased caregiver support, and risk for falls  limit pt's ability to safely engage in all baseline areas of occupation, includinggrooming, bathing, dressing, toileting, functional mobility/transfers, and community mobility From OT standpoint, recommend mod level II upon D/C. The patient's raw score on the AM-PAC Daily Activity Inpatient Short Form is 16. A raw score  of less than 19 suggests the patient may benefit from discharge to post-acute rehabilitation services. Please refer to the recommendation of the Occupational Therapist for safe discharge planning. OT will continue to follow to address the below stated goals.   Goals   Patient Goals go home   LTG Time Frame 10-14   Long Term Goal #1 see goals below   Plan   Treatment Interventions ADL retraining;Functional transfer training;UE strengthening/ROM;Endurance training;Cognitive reorientation;Patient/family training;Energy conservation;Activityengagement;Continued evaluation   Goal Expiration Date 09/03/24   OT Frequency 2-3x/wk   Discharge Recommendation   Rehab Resource Intensity Level, OT II (Moderate Resource Intensity)   Equipment Recommended Bedside commode;Shower/Tub chair with back ($)   AM-PAC Daily Activity Inpatient   Lower Body Dressing 2   Bathing 2   Toileting 3   Upper Body Dressing 3   Grooming 3   Eating 3   Daily Activity Raw Score 16   Daily Activity Standardized Score (Calc for Raw Score >=11) 35.96   AM-PAC Applied Cognition Inpatient   Following a Speech/Presentation 1   Understanding Ordinary Conversation 3   Taking Medications 1   Remembering Where Things Are Placed or Put Away 3   Remembering List of 4-5 Errands 2   Taking Care of Complicated Tasks 1   Applied Cognition Raw Score 11   Applied Cognition Standardized Score 27.03   End of Consult   Patient Position at End of Consult Bedside chair;Bed/Chair alarm activated;All needs within reach   Nurse Communication Nurse aware of consult      Occupational Therapy Goals:    *Mod I with bed mobility to engage in functional tasks.  *Mod I Adl's after setup with use of AE PRN  *Mod I toileting and clothing management   *Mod I functional mobility and transfers to/from all surfaces with Fair + dynamic balance and safety for participation in dynamic adls and iadl tasks   *Demonstrate good carryover with safe use of RW during functional tasks   *Assess DME  needs   *Increase activity tolerance to 25-30 minutes for participation in adls and enjoyable activities  *Pt to participate in further cognitive testing with good attention and participation to assist with safe d/c recommendations  *Demonstrate good carryover of pt/family education and training with good tolerance for increased safety and independence with ADL's/ADl's.  *Pt will improve standing balance to 4-5 minutes with functional tasks to increase I with toileting/transfers.  *Patient will demonstrate 100% carryover of energy conservation techniques t/o functional I/ADL/leisure tasks w/o cues s/p skilled education to increase endurance during functional tasks  *Pt will increase attention to follow 100% simple 2-3 step verbal commands and be A/Ox4 consistently t/o use of external environmental cues w/ mod I  Jaclyn Loya OTR/L

## 2024-08-21 ENCOUNTER — HOME CARE VISIT (OUTPATIENT)
Dept: HOME HEALTH SERVICES | Facility: HOME HEALTHCARE | Age: 64
End: 2024-08-21

## 2024-08-21 LAB
ANION GAP SERPL CALCULATED.3IONS-SCNC: 5 MMOL/L (ref 4–13)
ANION GAP SERPL CALCULATED.3IONS-SCNC: 5 MMOL/L (ref 4–13)
BASOPHILS # BLD AUTO: 0.04 THOUSANDS/ÂΜL (ref 0–0.1)
BASOPHILS # BLD AUTO: 0.04 THOUSANDS/ÂΜL (ref 0–0.1)
BASOPHILS NFR BLD AUTO: 1 % (ref 0–1)
BASOPHILS NFR BLD AUTO: 1 % (ref 0–1)
BUN SERPL-MCNC: 19 MG/DL (ref 5–25)
BUN SERPL-MCNC: 19 MG/DL (ref 5–25)
CALCIUM SERPL-MCNC: 8 MG/DL (ref 8.4–10.2)
CALCIUM SERPL-MCNC: 8 MG/DL (ref 8.4–10.2)
CHLORIDE SERPL-SCNC: 103 MMOL/L (ref 96–108)
CHLORIDE SERPL-SCNC: 103 MMOL/L (ref 96–108)
CO2 SERPL-SCNC: 30 MMOL/L (ref 21–32)
CO2 SERPL-SCNC: 30 MMOL/L (ref 21–32)
CREAT SERPL-MCNC: 0.74 MG/DL (ref 0.6–1.3)
CREAT SERPL-MCNC: 0.74 MG/DL (ref 0.6–1.3)
EOSINOPHIL # BLD AUTO: 0.6 THOUSAND/ÂΜL (ref 0–0.61)
EOSINOPHIL # BLD AUTO: 0.6 THOUSAND/ÂΜL (ref 0–0.61)
EOSINOPHIL NFR BLD AUTO: 9 % (ref 0–6)
EOSINOPHIL NFR BLD AUTO: 9 % (ref 0–6)
ERYTHROCYTE [DISTWIDTH] IN BLOOD BY AUTOMATED COUNT: 14.4 % (ref 11.6–15.1)
ERYTHROCYTE [DISTWIDTH] IN BLOOD BY AUTOMATED COUNT: 14.4 % (ref 11.6–15.1)
GFR SERPL CREATININE-BSD FRML MDRD: 98 ML/MIN/1.73SQ M
GFR SERPL CREATININE-BSD FRML MDRD: 98 ML/MIN/1.73SQ M
GLUCOSE SERPL-MCNC: 201 MG/DL (ref 65–140)
GLUCOSE SERPL-MCNC: 201 MG/DL (ref 65–140)
GLUCOSE SERPL-MCNC: 244 MG/DL (ref 65–140)
GLUCOSE SERPL-MCNC: 244 MG/DL (ref 65–140)
GLUCOSE SERPL-MCNC: 270 MG/DL (ref 65–140)
GLUCOSE SERPL-MCNC: 270 MG/DL (ref 65–140)
GLUCOSE SERPL-MCNC: 300 MG/DL (ref 65–140)
GLUCOSE SERPL-MCNC: 300 MG/DL (ref 65–140)
GLUCOSE SERPL-MCNC: 321 MG/DL (ref 65–140)
GLUCOSE SERPL-MCNC: 321 MG/DL (ref 65–140)
GLUCOSE SERPL-MCNC: 55 MG/DL (ref 65–140)
GLUCOSE SERPL-MCNC: 55 MG/DL (ref 65–140)
HCT VFR BLD AUTO: 23.9 % (ref 36.5–49.3)
HCT VFR BLD AUTO: 23.9 % (ref 36.5–49.3)
HGB BLD-MCNC: 7.5 G/DL (ref 12–17)
HGB BLD-MCNC: 7.5 G/DL (ref 12–17)
IMM GRANULOCYTES # BLD AUTO: 0.04 THOUSAND/UL (ref 0–0.2)
IMM GRANULOCYTES # BLD AUTO: 0.04 THOUSAND/UL (ref 0–0.2)
IMM GRANULOCYTES NFR BLD AUTO: 1 % (ref 0–2)
IMM GRANULOCYTES NFR BLD AUTO: 1 % (ref 0–2)
LYMPHOCYTES # BLD AUTO: 1.52 THOUSANDS/ÂΜL (ref 0.6–4.47)
LYMPHOCYTES # BLD AUTO: 1.52 THOUSANDS/ÂΜL (ref 0.6–4.47)
LYMPHOCYTES NFR BLD AUTO: 23 % (ref 14–44)
LYMPHOCYTES NFR BLD AUTO: 23 % (ref 14–44)
MCH RBC QN AUTO: 29.1 PG (ref 26.8–34.3)
MCH RBC QN AUTO: 29.1 PG (ref 26.8–34.3)
MCHC RBC AUTO-ENTMCNC: 31.4 G/DL (ref 31.4–37.4)
MCHC RBC AUTO-ENTMCNC: 31.4 G/DL (ref 31.4–37.4)
MCV RBC AUTO: 93 FL (ref 82–98)
MCV RBC AUTO: 93 FL (ref 82–98)
MONOCYTES # BLD AUTO: 0.6 THOUSAND/ÂΜL (ref 0.17–1.22)
MONOCYTES # BLD AUTO: 0.6 THOUSAND/ÂΜL (ref 0.17–1.22)
MONOCYTES NFR BLD AUTO: 9 % (ref 4–12)
MONOCYTES NFR BLD AUTO: 9 % (ref 4–12)
NEUTROPHILS # BLD AUTO: 3.87 THOUSANDS/ÂΜL (ref 1.85–7.62)
NEUTROPHILS # BLD AUTO: 3.87 THOUSANDS/ÂΜL (ref 1.85–7.62)
NEUTS SEG NFR BLD AUTO: 57 % (ref 43–75)
NEUTS SEG NFR BLD AUTO: 57 % (ref 43–75)
NRBC BLD AUTO-RTO: 0 /100 WBCS
NRBC BLD AUTO-RTO: 0 /100 WBCS
PLATELET # BLD AUTO: 446 THOUSANDS/UL (ref 149–390)
PLATELET # BLD AUTO: 446 THOUSANDS/UL (ref 149–390)
PMV BLD AUTO: 10.2 FL (ref 8.9–12.7)
PMV BLD AUTO: 10.2 FL (ref 8.9–12.7)
POTASSIUM SERPL-SCNC: 4.2 MMOL/L (ref 3.5–5.3)
POTASSIUM SERPL-SCNC: 4.2 MMOL/L (ref 3.5–5.3)
RBC # BLD AUTO: 2.58 MILLION/UL (ref 3.88–5.62)
RBC # BLD AUTO: 2.58 MILLION/UL (ref 3.88–5.62)
SODIUM SERPL-SCNC: 138 MMOL/L (ref 135–147)
SODIUM SERPL-SCNC: 138 MMOL/L (ref 135–147)
WBC # BLD AUTO: 6.67 THOUSAND/UL (ref 4.31–10.16)
WBC # BLD AUTO: 6.67 THOUSAND/UL (ref 4.31–10.16)

## 2024-08-21 PROCEDURE — 85025 COMPLETE CBC W/AUTO DIFF WBC: CPT | Performed by: STUDENT IN AN ORGANIZED HEALTH CARE EDUCATION/TRAINING PROGRAM

## 2024-08-21 PROCEDURE — 80048 BASIC METABOLIC PNL TOTAL CA: CPT | Performed by: STUDENT IN AN ORGANIZED HEALTH CARE EDUCATION/TRAINING PROGRAM

## 2024-08-21 PROCEDURE — 82948 REAGENT STRIP/BLOOD GLUCOSE: CPT

## 2024-08-21 PROCEDURE — 99232 SBSQ HOSP IP/OBS MODERATE 35: CPT | Performed by: STUDENT IN AN ORGANIZED HEALTH CARE EDUCATION/TRAINING PROGRAM

## 2024-08-21 RX ORDER — METHADONE HYDROCHLORIDE 10 MG/ML
190 CONCENTRATE ORAL DAILY
Status: DISCONTINUED | OUTPATIENT
Start: 2024-08-22 | End: 2024-08-23 | Stop reason: HOSPADM

## 2024-08-21 RX ORDER — ALPRAZOLAM 0.5 MG
0.5 TABLET ORAL DAILY PRN
Status: DISCONTINUED | OUTPATIENT
Start: 2024-08-21 | End: 2024-08-21

## 2024-08-21 RX ORDER — ALPRAZOLAM 0.5 MG
0.5 TABLET ORAL DAILY PRN
Status: DISCONTINUED | OUTPATIENT
Start: 2024-08-21 | End: 2024-08-23 | Stop reason: HOSPADM

## 2024-08-21 RX ORDER — ALPRAZOLAM 0.5 MG
1 TABLET ORAL
Status: DISCONTINUED | OUTPATIENT
Start: 2024-08-21 | End: 2024-08-23 | Stop reason: HOSPADM

## 2024-08-21 RX ORDER — INSULIN GLARGINE 100 [IU]/ML
15 INJECTION, SOLUTION SUBCUTANEOUS
Status: DISCONTINUED | OUTPATIENT
Start: 2024-08-21 | End: 2024-08-22

## 2024-08-21 RX ORDER — OXYCODONE HYDROCHLORIDE 5 MG/1
5 TABLET ORAL EVERY 6 HOURS PRN
Status: DISCONTINUED | OUTPATIENT
Start: 2024-08-21 | End: 2024-08-23 | Stop reason: HOSPADM

## 2024-08-21 RX ADMIN — INSULIN LISPRO 4 UNITS: 100 INJECTION, SOLUTION INTRAVENOUS; SUBCUTANEOUS at 22:01

## 2024-08-21 RX ADMIN — GABAPENTIN 300 MG: 300 CAPSULE ORAL at 15:59

## 2024-08-21 RX ADMIN — OXYCODONE HYDROCHLORIDE 5 MG: 5 TABLET ORAL at 01:16

## 2024-08-21 RX ADMIN — HEPARIN SODIUM 5000 UNITS: 5000 INJECTION INTRAVENOUS; SUBCUTANEOUS at 21:58

## 2024-08-21 RX ADMIN — HEPARIN SODIUM 5000 UNITS: 5000 INJECTION INTRAVENOUS; SUBCUTANEOUS at 13:24

## 2024-08-21 RX ADMIN — HEPARIN SODIUM 5000 UNITS: 5000 INJECTION INTRAVENOUS; SUBCUTANEOUS at 05:27

## 2024-08-21 RX ADMIN — OXYCODONE HYDROCHLORIDE 5 MG: 5 TABLET ORAL at 05:27

## 2024-08-21 RX ADMIN — ACETAMINOPHEN 975 MG: 325 TABLET ORAL at 21:58

## 2024-08-21 RX ADMIN — METOPROLOL SUCCINATE 12.5 MG: 25 TABLET, EXTENDED RELEASE ORAL at 07:31

## 2024-08-21 RX ADMIN — INSULIN LISPRO 3 UNITS: 100 INJECTION, SOLUTION INTRAVENOUS; SUBCUTANEOUS at 07:32

## 2024-08-21 RX ADMIN — ACETAMINOPHEN 975 MG: 325 TABLET ORAL at 05:27

## 2024-08-21 RX ADMIN — INSULIN GLARGINE 15 UNITS: 100 INJECTION, SOLUTION SUBCUTANEOUS at 21:58

## 2024-08-21 RX ADMIN — GABAPENTIN 300 MG: 300 CAPSULE ORAL at 07:31

## 2024-08-21 RX ADMIN — INSULIN LISPRO 5 UNITS: 100 INJECTION, SOLUTION INTRAVENOUS; SUBCUTANEOUS at 16:03

## 2024-08-21 RX ADMIN — ASPIRIN 81 MG CHEWABLE TABLET 81 MG: 81 TABLET CHEWABLE at 21:58

## 2024-08-21 RX ADMIN — ACETAMINOPHEN 975 MG: 325 TABLET ORAL at 13:24

## 2024-08-21 RX ADMIN — ALPRAZOLAM 0.5 MG: 0.5 TABLET ORAL at 16:39

## 2024-08-21 RX ADMIN — METHADONE HYDROCHLORIDE 190 MG: 10 CONCENTRATE ORAL at 07:30

## 2024-08-21 RX ADMIN — ASPIRIN 81 MG CHEWABLE TABLET 81 MG: 81 TABLET CHEWABLE at 07:31

## 2024-08-21 RX ADMIN — OXYCODONE HYDROCHLORIDE 5 MG: 5 TABLET ORAL at 14:48

## 2024-08-21 RX ADMIN — ALPRAZOLAM 1 MG: 0.5 TABLET ORAL at 21:58

## 2024-08-21 RX ADMIN — GABAPENTIN 300 MG: 300 CAPSULE ORAL at 21:58

## 2024-08-21 RX ADMIN — SODIUM CHLORIDE, SODIUM GLUCONATE, SODIUM ACETATE, POTASSIUM CHLORIDE, MAGNESIUM CHLORIDE, SODIUM PHOSPHATE, DIBASIC, AND POTASSIUM PHOSPHATE 75 ML/HR: .53; .5; .37; .037; .03; .012; .00082 INJECTION, SOLUTION INTRAVENOUS at 07:43

## 2024-08-21 NOTE — CASE COMMUNICATION
TC to the Adult protective services [facility] Hotline [phone] 8/21/24 at 1518 provided protective report to Nancy Miller for the [facility].

## 2024-08-21 NOTE — ASSESSMENT & PLAN NOTE
Admitting physician called [facility], Phone number: [phone], and confirmed the dose of Methadone   He has been on chronic methadone for many years  Continue methadone 190 mg daily

## 2024-08-21 NOTE — PLAN OF CARE
Problem: Prexisting or High Potential for Compromised Skin Integrity  Goal: Skin integrity is maintained or improved  Description: INTERVENTIONS:  - Identify patients at risk for skin breakdown  - Assess and monitor skin integrity  - Assess and monitor nutrition and hydration status  - Monitor labs   - Assess for incontinence   - Turn and reposition patient  - Assist with mobility/ambulation  - Relieve pressure over bony prominences  - Avoid friction and shearing  - Provide appropriate hygiene as needed including keeping skin clean and dry  - Evaluate need for skin moisturizer/barrier cream  - Collaborate with interdisciplinary team   - Patient/family teaching  - Consider wound care consult   Outcome: Progressing     Problem: MUSCULOSKELETAL - ADULT  Goal: Maintain or return mobility to safest level of function  Description: INTERVENTIONS:  - Assess patient's ability to carry out ADLs; assess patient's baseline for ADL function and identify physical deficits which impact ability to perform ADLs (bathing, care of mouth/teeth, toileting, grooming, dressing, etc.)  - Assess/evaluate cause of self-care deficits   - Assess range of motion  - Assess patient's mobility  - Assess patient's need for assistive devices and provide as appropriate  - Encourage maximum independence but intervene and supervise when necessary  - Involve family in performance of ADLs  - Assess for home care needs following discharge   - Consider OT consult to assist with ADL evaluation and planning for discharge  - Provide patient education as appropriate  Outcome: Progressing

## 2024-08-21 NOTE — PLAN OF CARE
Problem: Prexisting or High Potential for Compromised Skin Integrity  Goal: Skin integrity is maintained or improved  Description: INTERVENTIONS:  - Identify patients at risk for skin breakdown  - Assess and monitor skin integrity  - Assess and monitor nutrition and hydration status  - Monitor labs   - Assess for incontinence   - Turn and reposition patient  - Assist with mobility/ambulation  - Relieve pressure over bony prominences  - Avoid friction and shearing  - Provide appropriate hygiene as needed including keeping skin clean and dry  - Evaluate need for skin moisturizer/barrier cream  - Collaborate with interdisciplinary team   - Patient/family teaching  - Consider wound care consult   Outcome: Progressing     Problem: NEUROSENSORY - ADULT  Goal: Achieves stable or improved neurological status  Description: INTERVENTIONS  - Monitor and report changes in neurological status  - Monitor vital signs such as temperature, blood pressure, glucose, and any other labs ordered   - Initiate measures to prevent increased intracranial pressure  - Monitor for seizure activity and implement precautions if appropriate      Outcome: Progressing  Goal: Remains free of injury related to seizures activity  Description: INTERVENTIONS  - Maintain airway, patient safety  and administer oxygen as ordered  - Monitor patient for seizure activity, document and report duration and description of seizure to physician/advanced practitioner  - If seizure occurs,  ensure patient safety during seizure  - Reorient patient post seizure  - Seizure pads on all 4 side rails  - Instruct patient/family to notify RN of any seizure activity including if an aura is experienced  - Instruct patient/family to call for assistance with activity based on nursing assessment  - Administer anti-seizure medications if ordered    Outcome: Progressing     Problem: MUSCULOSKELETAL - ADULT  Goal: Maintain or return mobility to safest level of  function  Description: INTERVENTIONS:  - Assess patient's ability to carry out ADLs; assess patient's baseline for ADL function and identify physical deficits which impact ability to perform ADLs (bathing, care of mouth/teeth, toileting, grooming, dressing, etc.)  - Assess/evaluate cause of self-care deficits   - Assess range of motion  - Assess patient's mobility  - Assess patient's need for assistive devices and provide as appropriate  - Encourage maximum independence but intervene and supervise when necessary  - Involve family in performance of ADLs  - Assess for home care needs following discharge   - Consider OT consult to assist with ADL evaluation and planning for discharge  - Provide patient education as appropriate  Outcome: Progressing     Problem: PAIN - ADULT  Goal: Verbalizes/displays adequate comfort level or baseline comfort level  Description: Interventions:  - Encourage patient to monitor pain and request assistance  - Assess pain using appropriate pain scale  - Administer analgesics based on type and severity of pain and evaluate response  - Implement non-pharmacological measures as appropriate and evaluate response  - Notify physician/advanced practitioner if interventions unsuccessful or patient reports new pain  Outcome: Progressing     Problem: INFECTION - ADULT  Goal: Absence or prevention of progression during hospitalization  Description: INTERVENTIONS:  - Assess and monitor for signs and symptoms of infection  - Monitor lab/diagnostic results  - Monitor all insertion sites, i.e. indwelling lines, tubes, and drains  - [place] appropriate cooling/warming therapies per order  - Administer medications as ordered  - Instruct and encourage patient and family to use good hand hygiene technique  - Identify and instruct in appropriate isolation precautions for identified infection/condition  Outcome: Progressing     Problem: SAFETY ADULT  Goal: Patient will remain free of falls  Description:  INTERVENTIONS:  - Educate patient/family on patient safety including physical limitations  - Instruct patient to call for assistance with activity   - Consult OT/PT to assist with strengthening/mobility   - Keep Call bell within reach  - Keep bed low and locked with side rails adjusted as appropriate  - Keep care items and personal belongings within reach  - Initiate and maintain comfort rounds  - Make Fall Risk Sign visible to staff  - Offer Toileting every 2 Hours, in advance of need  - Initiate/Maintain bed/chair alarm  - Obtain necessary fall risk management equipment.  - Apply yellow socks and bracelet for high fall risk patients  - Consider moving patient to room near nurses station  Outcome: Progressing  Goal: Maintain or return to baseline ADL function  Description: INTERVENTIONS:  -  Assess patient's ability to carry out ADLs; assess patient's baseline for ADL function and identify physical deficits which impact ability to perform ADLs (bathing, care of mouth/teeth, toileting, grooming, dressing, etc.)  - Assess/evaluate cause of self-care deficits   - Assess range of motion  - Assess patient's mobility; develop plan if impaired  - Assess patient's need for assistive devices and provide as appropriate  - Encourage maximum independence but intervene and supervise when necessary  - Involve family in performance of ADLs  - Assess for home care needs following discharge   - Consider OT consult to assist with ADL evaluation and planning for discharge  - Provide patient education as appropriate  Outcome: Progressing  Goal: Maintains/Returns to pre admission functional level  Description: INTERVENTIONS:  - Perform AM-PAC 6 Click Basic Mobility/ Daily Activity assessment daily.  - Set and communicate daily mobility goal to care team and patient/family/caregiver.   - Collaborate with rehabilitation services on mobility goals if consulted  - Reposition patient every 2 hours.  - Dangle patient 3 times a day  - Stand  patient 3 times a day  - Ambulate patient 3 times a day  - Out of bed to chair 3 times a day   - Out of bed for meals 3 times a day  - Out of bed for toileting  - Record patient progress and toleration of activity level   Outcome: Progressing     Problem: DISCHARGE PLANNING  Goal: Discharge to home or other facility with appropriate resources  Description: INTERVENTIONS:  - Identify barriers to discharge w/patient and caregiver  - Arrange for needed discharge resources and transportation as appropriate  - Identify discharge learning needs (meds, wound care, etc.)  - Refer to Case Management Department for coordinating discharge planning if the patient needs post-hospital services based on physician/advanced practitioner order or complex needs related to functional status, cognitive ability, or social support system  Outcome: Progressing     Problem: Knowledge Deficit  Goal: Patient/family/caregiver demonstrates understanding of disease process, treatment plan, medications, and discharge instructions  Description: Complete learning assessment and assess knowledge base.  Interventions:  - Provide teaching at level of understanding  - Provide teaching via preferred learning methods  Outcome: Progressing     Problem: COPING  Goal: Pt/Family able to verbalize concerns and demonstrate effective coping strategies  Description: INTERVENTIONS:  - Assist patient/family to identify coping skills, available support systems and cultural and spiritual values  - Provide emotional support, including active listening and acknowledgement of concerns of patient and caregivers  - Reduce environmental stimuli, as able  - Provide patient education  - Assess for spiritual pain/suffering and initiate spiritual care, including notification of Pastoral Care or kya based community as needed  - Assess effectiveness of coping strategies  Outcome: Progressing  Goal: Will report anxiety at manageable levels  Description: INTERVENTIONS:  -  Administer medication as ordered  - Teach and encourage coping skills  - Provide emotional support  - Assess patient/family for anxiety and ability to cope  Outcome: Progressing

## 2024-08-21 NOTE — CONSULTS
Orthopedics   David Skelton 64 y.o. male MRN: [id number]  Unit/Bed#: [facility] 724-01      Chief Complaint:   right hip pain    HPI:   64 y.o.male walker ambulator who initially sustained a right hip fracture now 3 weeks status post IM nail right femur with Dr. Hill.  Patient initially went home from rehabilitation but then was found down at his home with questionable fall, patient states he did have a fall.  Patient admitted to medicine for toxic encephalopathy workup, orthopedics is consulted for questionable yoan-implant fracture.  Unknown Headstrike.  Unknown LOC.  On DVT prophylaxis ASA 81 mg twice daily but no other blood Thinners. Pain is remittent and dull in character, Located right hip, unclear timing in onset, remittent in duration, mild in intensity.  Patient unable to supply additional history secondary to mental status but does state that he had a fall and his right hip occasionally hurts him.  He has been ambulating with physical therapy and is able to bear weight. No other complaints at this time. PMH significant for type 2 diabetes mellitus, iron deficiency anemia, hypertension and methadone dependence, A-fib.    Review Of Systems:   Skin: Normal  Neuro: See HPI  Musculoskeletal: See HPI  14 point review of systems negative except as stated above     Past Medical History:   No past medical history on file.    Past Surgical History:   No past surgical history on file.    Family History:  Family history reviewed and non-contributory  No family history on file.    Social History:  Social History     Socioeconomic History    Marital status: Unknown     Spouse name: Not on file    Number of children: Not on file    Years of education: Not on file    Highest education level: Not on file   Occupational History    Not on file   Tobacco Use    Smoking status: Not on file    Smokeless tobacco: Not on file   Substance and Sexual Activity    Alcohol use: Not on file    Drug use: Not on file    Sexual activity:  Not on file   Other Topics Concern    Not on file   Social History Narrative    Not on file     Social Determinants of Health     Financial Resource Strain: Not on file   Food Insecurity: No Food Insecurity (8/20/2024)    Hunger Vital Sign     Worried About Running Out of Food in the Last Year: Never true     Ran Out of Food in the Last Year: Never true   Transportation Needs: No Transportation Needs (8/20/2024)    PRAPARE - Transportation     Lack of Transportation (Medical): No     Lack of Transportation (Non-Medical): No   Physical Activity: Not on file   Stress: Not on file   Social Connections: Not on file   Intimate Partner Violence: Not on file   Housing Stability: Low Risk  (8/20/2024)    Housing Stability Vital Sign     Unable to Pay for Housing in the Last Year: No     Number of Times Moved in the Last Year: 0     Homeless in the Last Year: No       Allergies:   Allergies   Allergen Reactions    Penicillins Shortness Of Breath    Aspirin Anxiety           Labs:  0   Lab Value Date/Time    HCT 23.9 (L) 08/21/2024 0539    HCT 25.2 (L) 08/20/2024 0744    HCT 27.0 (L) 08/19/2024 1013    HCT 25 (L) 08/19/2024 1012    HGB 7.5 (L) 08/21/2024 0539    HGB 7.9 (L) 08/20/2024 0744    HGB 8.4 (L) 08/19/2024 1013    HGB 8.5 (L) 08/19/2024 1012    INR 1.10 08/19/2024 1013    WBC 6.67 08/21/2024 0539    WBC 8.65 08/20/2024 0744    WBC 10.97 (H) 08/19/2024 1013       Meds:    Current Facility-Administered Medications:     acetaminophen (TYLENOL) tablet 975 mg, 975 mg, Oral, Q8H [facility], Pedro Babin DO, 975 mg at 08/21/24 0527    ALPRAZolam (XANAX) tablet 0.5 mg, 0.5 mg, Oral, HS PRN, Pau Eckert MD, 0.5 mg at 08/20/24 2219    aspirin chewable tablet 81 mg, 81 mg, Oral, BID, Pau Eckert MD, 81 mg at 08/21/24 0731    gabapentin (NEURONTIN) capsule 300 mg, 300 mg, Oral, TID, Annika Guillaume PA-C, 300 mg at 08/21/24 0731    heparin (porcine) subcutaneous injection 5,000 Units, 5,000 Units, Subcutaneous, Q8H MELISA,  "5,000 Units at 08/21/24 0527 **AND** [CANCELED] Platelet count, , , Once, Pau Eckert MD    insulin glargine (LANTUS) subcutaneous injection 10 Units 0.1 mL, 10 Units, Subcutaneous, HS, Pau Eckert MD, 10 Units at 08/20/24 2221    insulin lispro (HumALOG/ADMELOG) 100 units/mL subcutaneous injection 1-6 Units, 1-6 Units, Subcutaneous, 4x Daily (AC & HS), 3 Units at 08/21/24 0732 **AND** Fingerstick Glucose (POCT), , , 4x Daily AC and at bedtime, Annika Guillaume PA-C    methadone (DOLOPHINE) oral concentrated solution 190 mg, 190 mg, Oral, Daily, Annika Guillaume PA-C, 190 mg at 08/21/24 0730    metoprolol (LOPRESSOR) injection 2.5 mg, 2.5 mg, Intravenous, Q6H PRN, Pau Eckert MD, 2.5 mg at 08/19/24 2157    metoprolol succinate (TOPROL-XL) 24 hr tablet 12.5 mg, 12.5 mg, Oral, Daily, Pau Eckert MD, 12.5 mg at 08/21/24 0731    multi-electrolyte (PLASMALYTE-A/ISOLYTE-S PH 7.4) IV solution, 75 mL/hr, Intravenous, Continuous, Pau Eckert MD, Last Rate: 75 mL/hr at 08/21/24 0743, 75 mL/hr at 08/21/24 0743    oxyCODONE (ROXICODONE) IR tablet 5 mg, 5 mg, Oral, Q4H PRN, Pedro Montielel, DO, 5 mg at 08/21/24 0527    oxyCODONE (ROXICODONE) split tablet 2.5 mg, 2.5 mg, Oral, Q4H PRN, Harnishkgm Babin, DO    Blood Culture:   No results found for: \"BLOODCX\"    Wound Culture:   No results found for: \"WOUNDCULT\"    Ins and Outs:  I/O last 24 hours:  In: 240 [P.O.:240]  Out: 1800 [Urine:1800]          Physical Exam:   /74 (BP Location: Left arm)   Pulse 73   Temp 98.3 °F (36.8 °C) (Oral)   Resp 12   Wt 79.3 kg (174 lb 13.2 oz)   SpO2 97%   Gen: No acute distress, resting comfortably in bed  HEENT: Eyes clear, moist mucus membranes, hearing intact  Respiratory: No audible wheezing or stridor  Cardiovascular: Well Perfused peripherally, 2+ distal pulse  Abdomen: nondistended, no peritoneal signs  Musculoskeletal: right lower extremity  Skin dry, and intact, incisions are well-healed without yoan-incisional " erythema or tenderness to palpation  No pain with gentle logroll of right hip  Patient unable to comply with detailed neuro or motor exam secondary to mental status   2+ DP/PT pulse  Tissues are soft and depressible    Tertiary exam was negative for additional palpable stepoffs or additional bony tenderness to palpation    Radiology:   I personally reviewed the films.  X-rays AP/Lateral and full-length femur films of right femur demonstrates stable cephalomedullary nail in place for intertrochanteric right hip fracture.  When compared to previous films in his merged chart from 8/13 there appears to be pieces of comminution around the nail that are stable and unchanged in fracture pattern or arrangement/displacement.    _*_*_*_*_*_*_*_*_*_*_*_*_*_*_*_*_*_*_*_*_*_*_*_*_*_*_*_*_*_*_*_*_*_*_*_*_*_*_*_*_*    Assessment:  64 y.o.male with right hip intertrochanteric fracture status post IM nail 3 weeks ago with recent fall.  There appears to be no acute change when compared to the films and his merged chart.  He may follow-up as scheduled on outpatient basis, no acute intervention from orthopedic standpoint is indicated.    Plan:   WBAT right lower extremity  PT  Diet for diet per primary team  DVT ppx continue per primary team, recommend completing 28 total days postoperatively of ASA 81 mg twice daily or Lovenox/SQH while in house  Pain control  There is no height or weight on file to calculate BMI.   Dispo: Ortho will follow      Washington Matthew MD    This consult was completed with the senior resident and attending on call.

## 2024-08-21 NOTE — ASSESSMENT & PLAN NOTE
History of recent right femur fracture s/p ORIF on 7/31/2024 secondary to fall.  Continue aspirin 81 mg twice daily for 28 days postop  Hip x-ray shows possible new fracture.  Discussed with orthopedic surgery who states send no acute change when compared to prior films.  No acute surgical intervention recommended.  PT/OT

## 2024-08-21 NOTE — ASSESSMENT & PLAN NOTE
64-year-old male with IDDM 2, recurrent fall, methadone dependence, presented to ED via EMS after he was found down by home visiting nurses.  Patient was not hypoxic at the scene.  Noted patient has multiple recent hospitalization secondary to encephalopathy presumably from methadone, and multiple fall, resulting in a right femur fracture s/p ORIF on 7/31/2024.  He was discharged home only on 8/18 from trauma service, as per discharge summary, unable to place in rehab due to methadone use.  And subsequently patient wanted to go home with home VNA.  Admitting physician spoke to patient's methadone clinic [facility], Phone number: [phone], and confirmed the dose of Methadone 190 mg daily.  Nursing staff verified patient was at the clinic this morning at 6:32 AM and received his regular dose of methadone 190 mg, and he also received extra doses to take home with.  As per nursing staff, patient was at usual health as of this morning.  Admission labs, imaging unremarkable  Suspect TME in the setting of poor sleep along with use of oxycodone and methadone in combination.  Patient was recently initiated on oxycodone for pain control after recent hospitalization  This morning, patient was quite lethargic and had received oxycodone and methadone this morning.  Will plan to avoid oxycodone between hours of 6 and 12 in the morning to avoid combining with methadone.  Continue to monitor mentation

## 2024-08-21 NOTE — DISCHARGE INSTR - AVS FIRST PAGE
Discharge Instructions - Orthopedics  David Skelton 64 y.o. male MRN: [id number]  Unit/Bed#: [facility] 724-01    Weight Bearing Status:                                           WBAT RLE    DVT prophylaxis:  Complete the remainder of your aspirin 81mg for a total of 28 days after surgery. You have one week remaining.    Pain:  Continue analgesics as directed    Appt Instructions:   If you do not have your appointment, please call the clinic at [phone].  Otherwise follow up as scheduled with Dr. Hill for your 6-week postop appointment.     Contact the office sooner if you experience any increased numbness/tingling in the extremities.    Do not take oxycodone between midnight and afternoon given you will be taking methadone in the morning.  Okay to take oxycodone every 6 hours as needed during other hours.

## 2024-08-21 NOTE — ASSESSMENT & PLAN NOTE
"No results found for: \"HGBA1C\"    Recent Labs     08/20/24  2108 08/21/24  0621 08/21/24  1114 08/21/24  1339   POCGLU 257* 244* 55* 201*         Blood Sugar Average: Last 72 hrs:  (P) [id number]    Home medication is metformin, repaglinide, Lantus 10 units at bedtime.  Continue Lantus 10 units at bedtime with correctional scale  Hypoglycemia protocol  Hypoglycemic this afternoon due to poor oral intake this morning, monitor  "

## 2024-08-21 NOTE — CASE MANAGEMENT
Case Management Discharge Planning Note    Patient name David Skelton  Location [facility] 724/[facility] 724-01 MRN [id number]  : 1960 Date 2024       Current Admission Date: 2024  Current Admission Diagnosis:Toxic encephalopathy   Patient Active Problem List    Diagnosis Date Noted Date Diagnosed    Type 2 diabetes mellitus, with long-term current use of insulin (HCC) 2024     LUNA (iron deficiency anemia) 2024     NINFA (generalized anxiety disorder) 2024     Benign essential hypertension 2024     Methadone dependence (HCC) 2024     Paroxysmal atrial fibrillation (HCC) 2024     Toxic encephalopathy 2024     Recurrent falls 2024     Closed fracture of right femur, unspecified fracture morphology, sequela 2024       LOS (days): 2  Geometric Mean LOS (GMLOS) (days):   Days to GMLOS:     OBJECTIVE:  Risk of Unplanned Readmission Score: 16.57         Current admission status: Inpatient   Preferred Pharmacy:   UNKNOWN - FOLLOW UP PRIOR TO DISCHARGE TO E-PRESCRIBE  No address on file      [id number] [place] [address] [address]  Phone: [phone] Fax: [phone]    Primary Care Provider: Casey Mckeon MD    Primary Insurance: [facility]  Secondary Insurance:     DISCHARGE DETAILS:         Additional Comments: CM provided update to pt re:VNA. CM will refer to another agency. Pt sister Vandana is coming in today and CM was provided permission to speak with her. Pt adamently denies od on methadone, he stated he had just come back from the clinic. Pt continues to report he should have 6 bottles in his fridge. CM then spoke with pt sister Vandana Skelton [phone]. Vandana goes to the home daily, pt has waiver and also has nursing from [facility] SW is either Vandana or Ilsa [phone]. Vandana believes that pt may be able to mobilize however she cannot stay  with him. CM will reach out to [facility] and see if pt is able to be evaluated for more hours. CM asked Vandana if the next time she goes to the home if she could count the bottles in his fridge, Vandana will leave a message with CM. [facility] to [facility].    ADDENDUM: CM spoke with [facility]NA. Pt was not teachable, not safe for pt to be alone. Pt had previously been very lethargic. CM will submit referrals for STR. CM will f/u with [facility] if med regime is altered and pt improves

## 2024-08-21 NOTE — TELEPHONE ENCOUNTER
That should be Ok, as we dont have earlier appt , please keep him on wait list/cancellation list     Johnnie Long

## 2024-08-21 NOTE — PROGRESS NOTES
[facility]  Progress Note  Name: David Skelton I  MRN: [id number]  Unit/Bed#: [facility] 724-01 I Date of Admission: 8/19/2024   Date of Service: 8/21/2024 I Hospital Day: 2    Assessment & Plan   * Toxic encephalopathy  Assessment & Plan  64-year-old male with IDDM 2, recurrent fall, methadone dependence, presented to ED via EMS after he was found down by home visiting nurses.  Patient was not hypoxic at the scene.  Noted patient has multiple recent hospitalization secondary to encephalopathy presumably from methadone, and multiple fall, resulting in a right femur fracture s/p ORIF on 7/31/2024.  He was discharged home only on 8/18 from trauma service, as per discharge summary, unable to place in rehab due to methadone use.  And subsequently patient wanted to go home with home VNA.  Admitting physician spoke to patient's methadone clinic [facility], Phone number: [phone], and confirmed the dose of Methadone 190 mg daily.  Nursing staff verified patient was at the clinic this morning at 6:32 AM and received his regular dose of methadone 190 mg, and he also received extra doses to take home with.  As per nursing staff, patient was at usual health as of this morning.  Admission labs, imaging unremarkable  Suspect TME in the setting of poor sleep along with use of oxycodone and methadone in combination.  Patient was recently initiated on oxycodone for pain control after recent hospitalization  This morning, patient was quite lethargic and had received oxycodone and methadone this morning.  Will plan to avoid oxycodone between hours of 6 and 12 in the morning to avoid combining with methadone.  Continue to monitor mentation    Closed fracture of right femur, unspecified fracture morphology, sequela  Assessment & Plan  History of recent right femur fracture s/p ORIF on 7/31/2024 secondary to fall.  Continue aspirin 81 mg twice daily for 28 days postop  Hip x-ray  "shows possible new fracture.  Discussed with orthopedic surgery who states send no acute change when compared to prior films.  No acute surgical intervention recommended.  PT/OT    Recurrent falls  Assessment & Plan  Multiple recent admissions from encephalopathy, fall, likely from same etiology.    Patient discharged only 1 day prior to admission from trauma service  Consult PT/OT and case management for safe discharge planning    Paroxysmal atrial fibrillation (HCC)  Assessment & Plan  Rate controlled  Continue home Toprol-XL  Not on AC due to prior history of multiple falls    Methadone dependence (HCC)  Assessment & Plan  Admitting physician called [facility], Phone number: [phone], and confirmed the dose of Methadone   He has been on chronic methadone for many years  Continue methadone 190 mg daily    Benign essential hypertension  Assessment & Plan  BP stable  Continue Toprol-XL    NINFA (generalized anxiety disorder)  Assessment & Plan  Continue home gabapentin  Continue Xanax as needed at bedtime    LUNA (iron deficiency anemia)  Assessment & Plan  History of iron deficiency anemia, currently hemoglobin is at baseline  Continue to monitor    Type 2 diabetes mellitus, with long-term current use of insulin ([facility])  Assessment & Plan  No results found for: \"HGBA1C\"    Recent Labs     08/20/24  2108 08/21/24  0621 08/21/24  1114 08/21/24  1339   POCGLU 257* 244* 55* 201*         Blood Sugar Average: Last 72 hrs:  (P) [id number]    Home medication is metformin, repaglinide, Lantus 10 units at bedtime.  Continue Lantus 10 units at bedtime with correctional scale  Hypoglycemia protocol  Hypoglycemic this afternoon due to poor oral intake this morning, monitor             VTE Pharmacologic Prophylaxis:   Moderate Risk (Score 3-4) - Pharmacological DVT Prophylaxis Ordered: heparin.    Mobility:   Basic Mobility Inpatient Raw Score: 17  JH-HLM Goal: 5: Stand one or more mins  JH-HLM " Achieved: 5: Stand (1 or more minutes)  -HLM Goal achieved. Continue to encourage appropriate mobility.    Patient Centered Rounds: I performed bedside rounds with nursing staff today.   Discussions with Specialists or Other Care Team Provider: primary RN    Education and Discussions with Family / Patient: Updated  (sister) at bedside.    Total Time Spent on Date of Encounter in care of patient: 25 mins. This time was spent on one or more of the following: performing physical exam; counseling and coordination of care; obtaining or reviewing history; documenting in the medical record; reviewing/ordering tests, medications or procedures; communicating with other healthcare professionals and discussing with patient's family/caregivers.    Current Length of Stay: 2 day(s)  Current Patient Status: Inpatient   Certification Statement: The patient will continue to require additional inpatient hospital stay due to encephalopathy, pain management , pending placement to rehab  Discharge Plan: Anticipate discharge in 24-48 hrs to rehab facility.    Code Status: Level 1 - Full Code    Subjective:   Patient assessed at bedside.  Patient seen in the morning and was quite lethargic.  Did not eat breakfast.  Arousable but quickly fell asleep.  Reports poor sleep overnight.  Patient received oxycodone and methadone.    Patient reevaluated in the afternoon and was mentating closer to baseline.    Objective:     Vitals:   Temp (24hrs), Av.2 °F (36.8 °C), Min:98 °F (36.7 °C), Max:98.3 °F (36.8 °C)    Temp:  [98 °F (36.7 °C)-98.3 °F (36.8 °C)] 98.3 °F (36.8 °C)  HR:  [71-75] 73  Resp:  [12-16] 12  BP: (121-137)/() 137/74  SpO2:  [97 %-99 %] 97 %  There is no height or weight on file to calculate BMI.     Input and Output Summary (last 24 hours):     Intake/Output Summary (Last 24 hours) at 2024 5374  Last data filed at 2024 1845  Gross per 24 hour   Intake --   Output 700 ml   Net -700 ml        Physical Exam:   Physical Exam  Vitals reviewed.   Constitutional:       General: He is not in acute distress.     Appearance: Normal appearance. He is not ill-appearing.   HENT:      Head: Normocephalic and atraumatic.   Cardiovascular:      Rate and Rhythm: Normal rate and regular rhythm.      Heart sounds: Normal heart sounds.   Pulmonary:      Effort: Pulmonary effort is normal. No respiratory distress.      Breath sounds: No wheezing or rales.   Abdominal:      General: Bowel sounds are normal.      Tenderness: There is no abdominal tenderness.   Musculoskeletal:      Right lower leg: No edema.      Left lower leg: No edema.   Neurological:      Mental Status: He is alert and oriented to person, place, and time.      Cranial Nerves: No cranial nerve deficit.          Additional Data:     Labs:  Results from last 7 days   Lab Units 08/21/24  0539   WBC Thousand/uL 6.67   HEMOGLOBIN g/dL 7.5*   HEMATOCRIT % 23.9*   PLATELETS Thousands/uL 446*   SEGS PCT % 57   LYMPHO PCT % 23   MONO PCT % 9   EOS PCT % 9*     Results from last 7 days   Lab Units 08/21/24  0539 08/20/24  0744 08/19/24  1013   SODIUM mmol/L 138   < > 134*   POTASSIUM mmol/L 4.2   < > 4.9   CHLORIDE mmol/L 103   < > 102   CO2 mmol/L 30   < > 28   BUN mg/dL 19   < > 22   CREATININE mg/dL 0.74   < > 0.91   ANION GAP mmol/L 5   < > 4   CALCIUM mg/dL 8.0*   < > 8.2*   ALBUMIN g/dL  --   --  3.2*   TOTAL BILIRUBIN mg/dL  --   --  0.42   ALK PHOS U/L  --   --  172*   ALT U/L  --   --  24   AST U/L  --   --  20   GLUCOSE RANDOM mg/dL 270*   < > 231*    < > = values in this interval not displayed.     Results from last 7 days   Lab Units 08/19/24  1013   INR  1.10     Results from last 7 days   Lab Units 08/21/24  1339 08/21/24  1114 08/21/24  0621 08/20/24  2108 08/20/24  1621 08/20/24  1059 08/20/24  0753 08/19/24  2348 08/19/24  1801 08/19/24  1305 08/19/24  1006   POC GLUCOSE mg/dl 201* 55* 244* 257* 287* 141* 106 289* 108 155* 250*                Lines/Drains:  Invasive Devices       Peripheral Intravenous Line  Duration             Peripheral IV 08/19/24 Distal;Left;Upper;Ventral (anterior) Arm 2 days    Peripheral IV 08/20/24 Right;Ventral (anterior) Forearm 1 day                          Imaging: Reviewed radiology reports from this admission including: xray(s)    Recent Cultures (last 7 days):         Last 24 Hours Medication List:   Current Facility-Administered Medications   Medication Dose Route Frequency Provider Last Rate    acetaminophen  975 mg Oral Q8H MELISA Pedro Babin DO      ALPRAZolam  0.5 mg Oral HS PRN Pau Eckert MD      aspirin  81 mg Oral BID Pau Eckert MD      gabapentin  300 mg Oral TID Annika Guillaume PA-C      heparin (porcine)  5,000 Units Subcutaneous Q8H MELISA Eckert MD      insulin glargine  10 Units Subcutaneous HS Pau Eckert MD      insulin lispro  1-6 Units Subcutaneous 4x Daily (AC & HS) Annika Guillaume PA-C      [START ON 8/22/2024] methadone  190 mg Oral Daily Pedro Babin DO      metoprolol  2.5 mg Intravenous Q6H PRN Pau Eckert MD      metoprolol succinate  12.5 mg Oral Daily Pau Eckert MD      oxyCODONE  5 mg Oral Q6H PRN Pedro Babin DO      oxyCODONE  2.5 mg Oral Q6H PRN Pedro Babin DO          Today, Patient Was Seen By: Pedro Babin DO    **Please Note: This note may have been constructed using a voice recognition system.**

## 2024-08-21 NOTE — UTILIZATION REVIEW
NOTIFICATION OF INPATIENT ADMISSION      AUTHORIZATION REQUEST   SERVICING FACILITY:   [facility]  Address: [address] [place] [address]  Tax ID: [id number]  NPI: [id number] ATTENDING PROVIDER:  Attending Name and NPI#: Pedro Babin Do [[id number]]  Address: [address] [place] [address]  Phone: [phone]   ADMISSION INFORMATION:  Place of Service: Inpatient [facility]  Place of Service Code: 21  Inpatient Admission Date/Time: 8/19/24 11:36 AM  Discharge Date/Time: No discharge date for patient encounter.  Admitting Diagnosis Code/Description:  Encephalopathy acute [G93.40]  Other injury of unspecified body region, initial encounter [T14.8XXA]  Overdose of undetermined intent, initial encounter [T50.904A]     UTILIZATION REVIEW CONTACT:  Teresa Dickson Utilization   Network Utilization Review Department  Phone: [phone]  Fax: [phone]  Email: [email or web address]  Contact for approvals/pending authorizations, clinical reviews, and discharge.     PHYSICIAN ADVISORY SERVICES:  Medical Necessity Denial & [id number]-[id number] Review  Phone: [phone]  Fax: [phone]  Email: [email or web address]     DISCHARGE SUPPORT TEAM:  For Patients Discharge Needs & Updates  Phone: [phone] opt. 2 Fax: [phone]  Email: [email or web address]

## 2024-08-22 LAB
ANION GAP SERPL CALCULATED.3IONS-SCNC: 6 MMOL/L (ref 4–13)
ANION GAP SERPL CALCULATED.3IONS-SCNC: 6 MMOL/L (ref 4–13)
BASOPHILS # BLD AUTO: 0.04 THOUSANDS/ÂΜL (ref 0–0.1)
BASOPHILS # BLD AUTO: 0.04 THOUSANDS/ÂΜL (ref 0–0.1)
BASOPHILS NFR BLD AUTO: 1 % (ref 0–1)
BASOPHILS NFR BLD AUTO: 1 % (ref 0–1)
BUN SERPL-MCNC: 14 MG/DL (ref 5–25)
BUN SERPL-MCNC: 14 MG/DL (ref 5–25)
CALCIUM SERPL-MCNC: 8 MG/DL (ref 8.4–10.2)
CALCIUM SERPL-MCNC: 8 MG/DL (ref 8.4–10.2)
CHLORIDE SERPL-SCNC: 103 MMOL/L (ref 96–108)
CHLORIDE SERPL-SCNC: 103 MMOL/L (ref 96–108)
CO2 SERPL-SCNC: 30 MMOL/L (ref 21–32)
CO2 SERPL-SCNC: 30 MMOL/L (ref 21–32)
CREAT SERPL-MCNC: 0.77 MG/DL (ref 0.6–1.3)
CREAT SERPL-MCNC: 0.77 MG/DL (ref 0.6–1.3)
EOSINOPHIL # BLD AUTO: 0.52 THOUSAND/ÂΜL (ref 0–0.61)
EOSINOPHIL # BLD AUTO: 0.52 THOUSAND/ÂΜL (ref 0–0.61)
EOSINOPHIL NFR BLD AUTO: 7 % (ref 0–6)
EOSINOPHIL NFR BLD AUTO: 7 % (ref 0–6)
ERYTHROCYTE [DISTWIDTH] IN BLOOD BY AUTOMATED COUNT: 14.3 % (ref 11.6–15.1)
ERYTHROCYTE [DISTWIDTH] IN BLOOD BY AUTOMATED COUNT: 14.3 % (ref 11.6–15.1)
FERRITIN SERPL-MCNC: 186 NG/ML (ref 24–336)
FERRITIN SERPL-MCNC: 186 NG/ML (ref 24–336)
FOLATE SERPL-MCNC: 8.9 NG/ML
FOLATE SERPL-MCNC: 8.9 NG/ML
GFR SERPL CREATININE-BSD FRML MDRD: 97 ML/MIN/1.73SQ M
GFR SERPL CREATININE-BSD FRML MDRD: 97 ML/MIN/1.73SQ M
GLUCOSE SERPL-MCNC: 106 MG/DL (ref 65–140)
GLUCOSE SERPL-MCNC: 106 MG/DL (ref 65–140)
GLUCOSE SERPL-MCNC: 183 MG/DL (ref 65–140)
GLUCOSE SERPL-MCNC: 183 MG/DL (ref 65–140)
GLUCOSE SERPL-MCNC: 216 MG/DL (ref 65–140)
GLUCOSE SERPL-MCNC: 216 MG/DL (ref 65–140)
GLUCOSE SERPL-MCNC: 255 MG/DL (ref 65–140)
GLUCOSE SERPL-MCNC: 255 MG/DL (ref 65–140)
GLUCOSE SERPL-MCNC: 299 MG/DL (ref 65–140)
GLUCOSE SERPL-MCNC: 299 MG/DL (ref 65–140)
HCT VFR BLD AUTO: 24.1 % (ref 36.5–49.3)
HCT VFR BLD AUTO: 24.1 % (ref 36.5–49.3)
HGB BLD-MCNC: 7.5 G/DL (ref 12–17)
HGB BLD-MCNC: 7.5 G/DL (ref 12–17)
IMM GRANULOCYTES # BLD AUTO: 0.03 THOUSAND/UL (ref 0–0.2)
IMM GRANULOCYTES # BLD AUTO: 0.03 THOUSAND/UL (ref 0–0.2)
IMM GRANULOCYTES NFR BLD AUTO: 0 % (ref 0–2)
IMM GRANULOCYTES NFR BLD AUTO: 0 % (ref 0–2)
IRON SATN MFR SERPL: 25 % (ref 15–50)
IRON SATN MFR SERPL: 25 % (ref 15–50)
IRON SERPL-MCNC: 59 UG/DL (ref 50–212)
IRON SERPL-MCNC: 59 UG/DL (ref 50–212)
LYMPHOCYTES # BLD AUTO: 1.18 THOUSANDS/ÂΜL (ref 0.6–4.47)
LYMPHOCYTES # BLD AUTO: 1.18 THOUSANDS/ÂΜL (ref 0.6–4.47)
LYMPHOCYTES NFR BLD AUTO: 17 % (ref 14–44)
LYMPHOCYTES NFR BLD AUTO: 17 % (ref 14–44)
MCH RBC QN AUTO: 29.1 PG (ref 26.8–34.3)
MCH RBC QN AUTO: 29.1 PG (ref 26.8–34.3)
MCHC RBC AUTO-ENTMCNC: 31.1 G/DL (ref 31.4–37.4)
MCHC RBC AUTO-ENTMCNC: 31.1 G/DL (ref 31.4–37.4)
MCV RBC AUTO: 93 FL (ref 82–98)
MCV RBC AUTO: 93 FL (ref 82–98)
MONOCYTES # BLD AUTO: 0.61 THOUSAND/ÂΜL (ref 0.17–1.22)
MONOCYTES # BLD AUTO: 0.61 THOUSAND/ÂΜL (ref 0.17–1.22)
MONOCYTES NFR BLD AUTO: 9 % (ref 4–12)
MONOCYTES NFR BLD AUTO: 9 % (ref 4–12)
NEUTROPHILS # BLD AUTO: 4.61 THOUSANDS/ÂΜL (ref 1.85–7.62)
NEUTROPHILS # BLD AUTO: 4.61 THOUSANDS/ÂΜL (ref 1.85–7.62)
NEUTS SEG NFR BLD AUTO: 66 % (ref 43–75)
NEUTS SEG NFR BLD AUTO: 66 % (ref 43–75)
NRBC BLD AUTO-RTO: 0 /100 WBCS
NRBC BLD AUTO-RTO: 0 /100 WBCS
PLATELET # BLD AUTO: 413 THOUSANDS/UL (ref 149–390)
PLATELET # BLD AUTO: 413 THOUSANDS/UL (ref 149–390)
PMV BLD AUTO: 10.2 FL (ref 8.9–12.7)
PMV BLD AUTO: 10.2 FL (ref 8.9–12.7)
POTASSIUM SERPL-SCNC: 4.3 MMOL/L (ref 3.5–5.3)
POTASSIUM SERPL-SCNC: 4.3 MMOL/L (ref 3.5–5.3)
RBC # BLD AUTO: 2.58 MILLION/UL (ref 3.88–5.62)
RBC # BLD AUTO: 2.58 MILLION/UL (ref 3.88–5.62)
RETICS # AUTO: ABNORMAL 10*3/UL (ref 14356–105094)
RETICS # AUTO: ABNORMAL 10*3/UL (ref 14356–105094)
RETICS # CALC: 2.62 % (ref 0.37–1.87)
RETICS # CALC: 2.62 % (ref 0.37–1.87)
SODIUM SERPL-SCNC: 139 MMOL/L (ref 135–147)
SODIUM SERPL-SCNC: 139 MMOL/L (ref 135–147)
TIBC SERPL-MCNC: 233 UG/DL (ref 250–450)
TIBC SERPL-MCNC: 233 UG/DL (ref 250–450)
UIBC SERPL-MCNC: 174 UG/DL (ref 155–355)
UIBC SERPL-MCNC: 174 UG/DL (ref 155–355)
VIT B12 SERPL-MCNC: 336 PG/ML (ref 180–914)
VIT B12 SERPL-MCNC: 336 PG/ML (ref 180–914)
WBC # BLD AUTO: 6.99 THOUSAND/UL (ref 4.31–10.16)
WBC # BLD AUTO: 6.99 THOUSAND/UL (ref 4.31–10.16)

## 2024-08-22 PROCEDURE — 85045 AUTOMATED RETICULOCYTE COUNT: CPT | Performed by: STUDENT IN AN ORGANIZED HEALTH CARE EDUCATION/TRAINING PROGRAM

## 2024-08-22 PROCEDURE — 82607 VITAMIN B-12: CPT | Performed by: STUDENT IN AN ORGANIZED HEALTH CARE EDUCATION/TRAINING PROGRAM

## 2024-08-22 PROCEDURE — 80048 BASIC METABOLIC PNL TOTAL CA: CPT | Performed by: STUDENT IN AN ORGANIZED HEALTH CARE EDUCATION/TRAINING PROGRAM

## 2024-08-22 PROCEDURE — 83540 ASSAY OF IRON: CPT | Performed by: STUDENT IN AN ORGANIZED HEALTH CARE EDUCATION/TRAINING PROGRAM

## 2024-08-22 PROCEDURE — 82728 ASSAY OF FERRITIN: CPT | Performed by: STUDENT IN AN ORGANIZED HEALTH CARE EDUCATION/TRAINING PROGRAM

## 2024-08-22 PROCEDURE — 97535 SELF CARE MNGMENT TRAINING: CPT

## 2024-08-22 PROCEDURE — 82948 REAGENT STRIP/BLOOD GLUCOSE: CPT

## 2024-08-22 PROCEDURE — 83550 IRON BINDING TEST: CPT | Performed by: STUDENT IN AN ORGANIZED HEALTH CARE EDUCATION/TRAINING PROGRAM

## 2024-08-22 PROCEDURE — 82746 ASSAY OF FOLIC ACID SERUM: CPT | Performed by: STUDENT IN AN ORGANIZED HEALTH CARE EDUCATION/TRAINING PROGRAM

## 2024-08-22 PROCEDURE — 97129 THER IVNTJ 1ST 15 MIN: CPT

## 2024-08-22 PROCEDURE — 99232 SBSQ HOSP IP/OBS MODERATE 35: CPT | Performed by: STUDENT IN AN ORGANIZED HEALTH CARE EDUCATION/TRAINING PROGRAM

## 2024-08-22 PROCEDURE — 85025 COMPLETE CBC W/AUTO DIFF WBC: CPT | Performed by: STUDENT IN AN ORGANIZED HEALTH CARE EDUCATION/TRAINING PROGRAM

## 2024-08-22 PROCEDURE — 99024 POSTOP FOLLOW-UP VISIT: CPT | Performed by: ORTHOPAEDIC SURGERY

## 2024-08-22 RX ORDER — INSULIN GLARGINE 100 [IU]/ML
20 INJECTION, SOLUTION SUBCUTANEOUS
Status: DISCONTINUED | OUTPATIENT
Start: 2024-08-22 | End: 2024-08-23 | Stop reason: HOSPADM

## 2024-08-22 RX ADMIN — ASPIRIN 81 MG CHEWABLE TABLET 81 MG: 81 TABLET CHEWABLE at 09:23

## 2024-08-22 RX ADMIN — INSULIN GLARGINE 20 UNITS: 100 INJECTION, SOLUTION SUBCUTANEOUS at 21:23

## 2024-08-22 RX ADMIN — METHADONE HYDROCHLORIDE 190 MG: 10 CONCENTRATE ORAL at 06:18

## 2024-08-22 RX ADMIN — INSULIN LISPRO 1 UNITS: 100 INJECTION, SOLUTION INTRAVENOUS; SUBCUTANEOUS at 16:27

## 2024-08-22 RX ADMIN — OXYCODONE HYDROCHLORIDE 5 MG: 5 TABLET ORAL at 01:00

## 2024-08-22 RX ADMIN — GABAPENTIN 300 MG: 300 CAPSULE ORAL at 16:27

## 2024-08-22 RX ADMIN — ACETAMINOPHEN 975 MG: 325 TABLET ORAL at 05:29

## 2024-08-22 RX ADMIN — HEPARIN SODIUM 5000 UNITS: 5000 INJECTION INTRAVENOUS; SUBCUTANEOUS at 21:20

## 2024-08-22 RX ADMIN — Medication 2.5 MG: at 05:29

## 2024-08-22 RX ADMIN — ALPRAZOLAM 1 MG: 0.5 TABLET ORAL at 23:38

## 2024-08-22 RX ADMIN — GABAPENTIN 300 MG: 300 CAPSULE ORAL at 09:23

## 2024-08-22 RX ADMIN — ASPIRIN 81 MG CHEWABLE TABLET 81 MG: 81 TABLET CHEWABLE at 21:19

## 2024-08-22 RX ADMIN — INSULIN LISPRO 4 UNITS: 100 INJECTION, SOLUTION INTRAVENOUS; SUBCUTANEOUS at 21:23

## 2024-08-22 RX ADMIN — HEPARIN SODIUM 5000 UNITS: 5000 INJECTION INTRAVENOUS; SUBCUTANEOUS at 13:45

## 2024-08-22 RX ADMIN — OXYCODONE HYDROCHLORIDE 5 MG: 5 TABLET ORAL at 21:43

## 2024-08-22 RX ADMIN — GABAPENTIN 300 MG: 300 CAPSULE ORAL at 21:19

## 2024-08-22 RX ADMIN — INSULIN LISPRO 2 UNITS: 100 INJECTION, SOLUTION INTRAVENOUS; SUBCUTANEOUS at 07:30

## 2024-08-22 RX ADMIN — ALPRAZOLAM 0.5 MG: 0.5 TABLET ORAL at 16:30

## 2024-08-22 RX ADMIN — OXYCODONE HYDROCHLORIDE 5 MG: 5 TABLET ORAL at 13:45

## 2024-08-22 RX ADMIN — HEPARIN SODIUM 5000 UNITS: 5000 INJECTION INTRAVENOUS; SUBCUTANEOUS at 05:31

## 2024-08-22 RX ADMIN — ACETAMINOPHEN 975 MG: 325 TABLET ORAL at 13:45

## 2024-08-22 RX ADMIN — ACETAMINOPHEN 975 MG: 325 TABLET ORAL at 21:18

## 2024-08-22 NOTE — ASSESSMENT & PLAN NOTE
Admitting physician called [facility], Phone number: [phone], and confirmed the dose of Methadone   He has been on chronic methadone for many years  Continue methadone 190 mg daily

## 2024-08-22 NOTE — OCCUPATIONAL THERAPY NOTE
Occupational Therapy Progress Note     Patient Name: David Skelton  Today's Date: 8/22/2024  Problem List  Principal Problem:    Toxic encephalopathy  Active Problems:    Type 2 diabetes mellitus, with long-term current use of insulin (HCC)    LUNA (iron deficiency anemia)    NINFA (generalized anxiety disorder)    Benign essential hypertension    Methadone dependence (HCC)    Paroxysmal atrial fibrillation (HCC)    Recurrent falls    Closed fracture of right femur, unspecified fracture morphology, sequela     08/22/24 1101   OT Last Visit   OT Visit Date 08/22/24   Note Type   Note Type Treatment   Pain Assessment   Pain Assessment Tool 0-10   Pain Score 8   Pain Location/Orientation Orientation: Right;Location: Neck   Hospital Pain Intervention(s) Repositioned;Ambulation/increased activity;Emotional support;Rest   Restrictions/Precautions   Weight Bearing Precautions Per Order Yes   RLE Weight Bearing Per Order WBAT   Other Precautions Cognitive;Chair Alarm;Bed Alarm;Telemetry;Fall Risk;Pain   Lifestyle   Autonomy Sister assists with ADL's (LB since Right LE ORIF) assistance with IADL's, +SPC occassionally (-) drives/sister assists   Reciprocal Relationships sister, family   Service to Others unemployed   Intrinsic Gratification socialize   ADL   Where Assessed Chair   Transfers   Sit to Stand 4  Minimal assistance   Additional items Assist x 1   Stand to Sit 4  Minimal assistance   Additional items Assist x 1   Additional Comments +RW min a for CG/CS   Functional Mobility   Functional Mobility 4  Minimal assistance   Additional Comments min a for CG/CS with RW household distance functional mobiltiy into hallway to access, retreive and transport lt item (towel) verbalc cues for RW safety, -pt educated on walker bag/baskets   Additional items Rolling walker   Cognition   Overall Cognitive Status (S)  Impaired   Arousal/Participation Alert;Responsive;Cooperative   Attention Attends with cues to redirect   Orientation  Level Oriented X4   Memory Decreased recall of precautions;Decreased recall of recent events;Decreased short term memory   Following Commands Follows one step commands with increased time or repetition   Comments pt pleasant and cooperative, improving attention, impaired STM with multi-step direction following, impaired problem solving with ACLS assessment.   Cognition Assessment Tools ACLS   Score 4.6 -live alone with daily assistance (sister provides at baseline)   Activity Tolerance   Activity Tolerance Patient tolerated treatment well   Medical Staff Made Aware RN cleared pt for therapy   Assessment   Assessment Patient participated in Skilled OT session this date with interventions consisting of self care tasks, standardized evidence based cogntive assessment ACLS, functional transfers/mobility, home safety, fall risk prevention education . Patient agreeable to OT treatment session, upon arrival patient was found seated OOB to Chair.  In comparison to previous session, patient with improvements in functional transfers . Patient requiring verbal cues for safety and verbal cues for correct technique. Patient continues to be functioning below baseline level, occupational performance remains limited secondary to factors listed above and increased risk for falls and injury.   From OT standpoint, recommendation at time of d/c would be min level III with increased social support, per ACLs score 4.6 lives alone with daily assistance/pt should not drive/baseline does not operate a motorized vehicle.   Patient to benefit from continued Occupational Therapy treatment while in the hospital to address deficits as defined above and maximize level of functional independence with ADLs and functional mobility.   Plan   Treatment Interventions ADL retraining;Functional transfer training;UE strengthening/ROM;Endurance training;Cognitive reorientation;Equipment evaluation/education;Patient/family training;Compensatory technique  education;Energy conservation;Activityengagement;Continued evaluation   Goal Expiration Date 09/03/24   OT Treatment Day 1   OT Frequency 2-3x/wk   Discharge Recommendation   Rehab Resource Intensity Level, OT III (Minimum Resource Intensity)   Equipment Recommended Bedside commode   AM-PAC Daily Activity Inpatient   Lower Body Dressing 3   Bathing 3   Toileting 3   Upper Body Dressing 3   Grooming 4   Eating 4   Daily Activity Raw Score 20   Daily Activity Standardized Score (Calc for Raw Score >=11) 42.03   AM-PAC Applied Cognition Inpatient   Following a Speech/Presentation 1   Understanding Ordinary Conversation 4   Taking Medications 3   Remembering Where Things Are Placed or Put Away 3   Remembering List of 4-5 Errands 2   Taking Care of Complicated Tasks 2   Applied Cognition Raw Score 15   Applied Cognition Standardized Score 33.54   Waqar Cognitive Level   Waqar Cognitive Level Score 4.6   Waqar Cognitive Score Recommendation (S)  Live alone with daily assistance   Waqar Cognitive Level Comments pt with increased attention this session, continues with multi-step direction following and problem solving tasks accurately.   End of Consult   Education Provided Yes  (Home safety (use of cell phone at all times, life alert use-pt has at baseline including kitchen safety with RW, fall risk preventation strategies, use of walker bag/basekt [facility] visual images and location to purchase (amazon), energy conservation techiques.)   Patient Position at End of Consult Bed/Chair alarm activated;All needs within reach;Bedside chair   Nurse Communication Nurse aware of consult      4.6    Administered Waqar Cognitive Level Screen (ACLS).  The patient scored 4.6/6.0 indicating that they may live alone with daily assistance to monitor personal safety and provide a daily allowance.  Bill and money management assistance required.    Behavior:  Scans environment to retrieve supplies.  Notices others.  Comments on others  nonconformity.  Initiates change in routine.  Learns by demonstration, not by reading.  Can avoid obvious hazards.  Often presents as impulsive.  Insight into disability is fair/poor.  Processing speed is delayed.  Do not expect to be aware of the needs of others.  May need reminders to keep appointments. May make alex statements without concern for embarrassing others.   Grooming:  Varies typical procedure on his own.  May be willing to try new products or tools.  May be impulsive.  Dressing:  Searches for desired clothing items in drawers or closets.  Changes 1 item in an outfit to create a new “outfit”.  May coordinate shoes with outfit.  Monitor appropriateness of outfit.  Bathing:  May alter bathing routine to account for exercise, hot weather or special event.  Washes small hidden spaces.  May try new products.  May follow suggestions for hanging up towels.  May be unable to coordinate bath schedule with others.  Remove unseen hazards.  Walking/exercising:   Varies routes taken in familiar neighborhood.  Scan visible environment for landmarks.  Follows verbal explanation for hazards.  May attempt to alter amount, duration and speed of exercise.  May get lost especially in unfamiliar environments.  Eating:  May alter rate of eating on request but does not sustain.  May be willing to alter food selections or accept diet without resistance.  Looks across the table to converse with others.  Watch handling of hot foods/liquids and heavy objects.  Monitor compliance with dietary restrictions.  Toileting:  Scans visible environment for needed supplies like toilet paper but may not find unless it is at eye level.  May take much longer than average to complete toileting.  Medications:   May attempt to open new containers but may not be successful.  May stop medications because they feel “well”.  May alter amounts of pills without realizing the hazard.  May refuse to take medications based on erroneous beliefs about  effects.  Use of adaptive equipment:  May be able to make spontaneous adjustments in positions or strength for better effect.  Scans environment for equipment.  May understand verbal explanation of safety hazard.  Housekeeping:  Scans environment for needed supplies, dirt and clutter when cleaning.  May organize closets, cupboards or put away objects. May require reminders to do household chores.   Food preparation:  May follow a fixed diet and go hungry if usual food items are not available.  Does not check inventory and may run out of essentials frequently.  Does not consider nutritional needs.  Scans counters, shelves and grocery stores for needed items.  Check stove after use.  Spending money:  May be willing to vary usual routine to manage money.  May attempt his or her usual routine without anticipating problems.  May overspend if given access to bank or credit cards.  Shopping:  May vary usual shopping routine by going to a new store or buying different brands.  Searches shelves in stores, moves items to look underneath or behind.  May overspend.  Laundry:  May scan immediate environment for needed supplies.  May choose to vary from usual routine.  Check iron after use.  Check for overloading of washing machine.  Check sorting criteria.  Traveling:  May vary familiar routes without awareness of consequences.  May impulsively take a trip to a new location and get lost.  Does not anticipate problems or travel needs.   Telephone:  May be able to alter rate of speech for a short phone call.  May find a number in an address or phone book.  May not be able to locate items in yellow pages.  Driving:  Should NOT operate a motor vehicle.  Jaclyn Loya OTR/L

## 2024-08-22 NOTE — PHYSICAL THERAPY NOTE
PHYSICAL THERAPY NOTE          Patient Name: David Skelton  Today's Date: 8/22/2024 08/22/24 1354   PT Last Visit   PT Visit Date 08/22/24   Note Type   Note Type Cancelled Session   Cancel Reasons Refusal       PT attempt to see patient on 3 separate occasions. Patient declining PT services on each attempt for a variety of reasons. Patient educated on importance of mobility, but patient continues to decline. PT will continue to follow as able.    Marianna Fatima, PT, DPT

## 2024-08-22 NOTE — PLAN OF CARE
Problem: Prexisting or High Potential for Compromised Skin Integrity  Goal: Skin integrity is maintained or improved  Description: INTERVENTIONS:  - Identify patients at risk for skin breakdown  - Assess and monitor skin integrity  - Assess and monitor nutrition and hydration status  - Monitor labs   - Assess for incontinence   - Turn and reposition patient  - Assist with mobility/ambulation  - Relieve pressure over bony prominences  - Avoid friction and shearing  - Provide appropriate hygiene as needed including keeping skin clean and dry  - Evaluate need for skin moisturizer/barrier cream  - Collaborate with interdisciplinary team   - Patient/family teaching  - Consider wound care consult   Outcome: Progressing     Problem: NEUROSENSORY - ADULT  Goal: Achieves stable or improved neurological status  Description: INTERVENTIONS  - Monitor and report changes in neurological status  - Monitor vital signs such as temperature, blood pressure, glucose, and any other labs ordered   - Initiate measures to prevent increased intracranial pressure  - Monitor for seizure activity and implement precautions if appropriate      Outcome: Progressing  Goal: Remains free of injury related to seizures activity  Description: INTERVENTIONS  - Maintain airway, patient safety  and administer oxygen as ordered  - Monitor patient for seizure activity, document and report duration and description of seizure to physician/advanced practitioner  - If seizure occurs,  ensure patient safety during seizure  - Reorient patient post seizure  - Seizure pads on all 4 side rails  - Instruct patient/family to notify RN of any seizure activity including if an aura is experienced  - Instruct patient/family to call for assistance with activity based on nursing assessment  - Administer anti-seizure medications if ordered    Outcome: Progressing     Problem: MUSCULOSKELETAL - ADULT  Goal: Maintain or return mobility to safest level of  function  Description: INTERVENTIONS:  - Assess patient's ability to carry out ADLs; assess patient's baseline for ADL function and identify physical deficits which impact ability to perform ADLs (bathing, care of mouth/teeth, toileting, grooming, dressing, etc.)  - Assess/evaluate cause of self-care deficits   - Assess range of motion  - Assess patient's mobility  - Assess patient's need for assistive devices and provide as appropriate  - Encourage maximum independence but intervene and supervise when necessary  - Involve family in performance of ADLs  - Assess for home care needs following discharge   - Consider OT consult to assist with ADL evaluation and planning for discharge  - Provide patient education as appropriate  Outcome: Progressing     Problem: PAIN - ADULT  Goal: Verbalizes/displays adequate comfort level or baseline comfort level  Description: Interventions:  - Encourage patient to monitor pain and request assistance  - Assess pain using appropriate pain scale  - Administer analgesics based on type and severity of pain and evaluate response  - Implement non-pharmacological measures as appropriate and evaluate response  - Notify physician/advanced practitioner if interventions unsuccessful or patient reports new pain  Outcome: Progressing     Problem: INFECTION - ADULT  Goal: Absence or prevention of progression during hospitalization  Description: INTERVENTIONS:  - Assess and monitor for signs and symptoms of infection  - Monitor lab/diagnostic results  - Monitor all insertion sites, i.e. indwelling lines, tubes, and drains  - Henderson appropriate cooling/warming therapies per order  - Administer medications as ordered  - Instruct and encourage patient and family to use good hand hygiene technique  - Identify and instruct in appropriate isolation precautions for identified infection/condition  Outcome: Progressing     Problem: SAFETY ADULT  Goal: Patient will remain free of falls  Description:  INTERVENTIONS:  - Educate patient/family on patient safety including physical limitations  - Instruct patient to call for assistance with activity   - Consult OT/PT to assist with strengthening/mobility   - Keep Call bell within reach  - Keep bed low and locked with side rails adjusted as appropriate  - Keep care items and personal belongings within reach  - Initiate and maintain comfort rounds  - Make Fall Risk Sign visible to staff  - Offer Toileting every 2 Hours, in advance of need  - Initiate/Maintain bed/chair alarm  - Obtain necessary fall risk management equipment.  - Apply yellow socks and bracelet for high fall risk patients  - Consider moving patient to room near nurses station  Outcome: Progressing  Goal: Maintain or return to baseline ADL function  Description: INTERVENTIONS:  -  Assess patient's ability to carry out ADLs; assess patient's baseline for ADL function and identify physical deficits which impact ability to perform ADLs (bathing, care of mouth/teeth, toileting, grooming, dressing, etc.)  - Assess/evaluate cause of self-care deficits   - Assess range of motion  - Assess patient's mobility; develop plan if impaired  - Assess patient's need for assistive devices and provide as appropriate  - Encourage maximum independence but intervene and supervise when necessary  - Involve family in performance of ADLs  - Assess for home care needs following discharge   - Consider OT consult to assist with ADL evaluation and planning for discharge  - Provide patient education as appropriate  Outcome: Progressing  Goal: Maintains/Returns to pre admission functional level  Description: INTERVENTIONS:  - Perform AM-PAC 6 Click Basic Mobility/ Daily Activity assessment daily.  - Set and communicate daily mobility goal to care team and patient/family/caregiver.   - Collaborate with rehabilitation services on mobility goals if consulted  - Reposition patient every 2 hours.  - Dangle patient 3 times a day  - Stand  patient 3 times a day  - Ambulate patient 3 times a day  - Out of bed to chair 3 times a day   - Out of bed for meals 3 times a day  - Out of bed for toileting  - Record patient progress and toleration of activity level   Outcome: Progressing     Problem: DISCHARGE PLANNING  Goal: Discharge to home or other facility with appropriate resources  Description: INTERVENTIONS:  - Identify barriers to discharge w/patient and caregiver  - Arrange for needed discharge resources and transportation as appropriate  - Identify discharge learning needs (meds, wound care, etc.)  - Refer to Case Management Department for coordinating discharge planning if the patient needs post-hospital services based on physician/advanced practitioner order or complex needs related to functional status, cognitive ability, or social support system  Outcome: Progressing     Problem: Knowledge Deficit  Goal: Patient/family/caregiver demonstrates understanding of disease process, treatment plan, medications, and discharge instructions  Description: Complete learning assessment and assess knowledge base.  Interventions:  - Provide teaching at level of understanding  - Provide teaching via preferred learning methods  Outcome: Progressing     Problem: COPING  Goal: Pt/Family able to verbalize concerns and demonstrate effective coping strategies  Description: INTERVENTIONS:  - Assist patient/family to identify coping skills, available support systems and cultural and spiritual values  - Provide emotional support, including active listening and acknowledgement of concerns of patient and caregivers  - Reduce environmental stimuli, as able  - Provide patient education  - Assess for spiritual pain/suffering and initiate spiritual care, including notification of Pastoral Care or kya based community as needed  - Assess effectiveness of coping strategies  Outcome: Progressing  Goal: Will report anxiety at manageable levels  Description: INTERVENTIONS:  -  Administer medication as ordered  - Teach and encourage coping skills  - Provide emotional support  - Assess patient/family for anxiety and ability to cope  Outcome: Progressing     Problem: METABOLIC, FLUID AND ElECTROLYTES - ADULT  Goal: Glucose maintained within target range  Description: INTERVENTIONS:  - Monitor Blood Glucose as ordered  - Assess for signs and symptoms of hyperglycemia and hypoglycemia  - Administer ordered medications to maintain glucose within target range  - Assess nutritional intake and initiate nutrition service referral as needed  Outcome: Progressing

## 2024-08-22 NOTE — ASSESSMENT & PLAN NOTE
64-year-old male with IDDM 2, recurrent fall, methadone dependence, presented to ED via EMS after he was found down by home visiting nurses.  Patient was not hypoxic at the scene.  Noted patient has multiple recent hospitalization secondary to encephalopathy presumably from methadone, and multiple fall, resulting in a right femur fracture s/p ORIF on 7/31/2024.  He was discharged home only on 8/18 from trauma service, as per discharge summary, unable to place in rehab due to methadone use.  And subsequently patient wanted to go home with home VNA.  Admitting physician spoke to patient's methadone clinic [facility], Phone number: [phone], and confirmed the dose of Methadone 190 mg daily.  Nursing staff verified patient was at the clinic this morning at 6:32 AM and received his regular dose of methadone 190 mg, and he also received extra doses to take home with.  As per nursing staff, patient was at usual health as of this morning.  Admission labs, imaging unremarkable  Suspect TME in the setting of poor sleep along with use of oxycodone and methadone in combination.  Patient was recently initiated on oxycodone for pain control after recent hospitalization  Less lethargic this morning,  Appears to be at baseline.  Continue to monitor mentation

## 2024-08-22 NOTE — PLAN OF CARE
Problem: OCCUPATIONAL THERAPY ADULT  Goal: Performs self-care activities at highest level of function for planned discharge setting.  See evaluation for individualized goals.  Description: Treatment Interventions: ADL retraining, Functional transfer training, UE strengthening/ROM, Endurance training, Cognitive reorientation, Patient/family training, Energy conservation, Activityengagement, Continued evaluation  Equipment Recommended: Bedside commode, Shower/Tub chair with back ($)       See flowsheet documentation for full assessment, interventions and recommendations.   Note: Limitation: Decreased ADL status, Decreased UE strength, Decreased Safe judgement during ADL, Decreased cognition, Decreased endurance, Decreased self-care trans, Decreased high-level ADLs  Prognosis: Fair  Assessment: Patient participated in Skilled OT session this date with interventions consisting of self care tasks, standardized evidence based cogntive assessment ACLS, functional transfers/mobility, home safety, fall risk prevention education . Patient agreeable to OT treatment session, upon arrival patient was found seated OOB to Chair.  In comparison to previous session, patient with improvements in functional transfers . Patient requiring verbal cues for safety and verbal cues for correct technique. Patient continues to be functioning below baseline level, occupational performance remains limited secondary to factors listed above and increased risk for falls and injury.   From OT standpoint, recommendation at time of d/c would be min level III with increased social support, per ACLs score 4.6 lives alone with daily assistance.   Patient to benefit from continued Occupational Therapy treatment while in the hospital to address deficits as defined above and maximize level of functional independence with ADLs and functional mobility.     Rehab Resource Intensity Level, OT: III (Minimum Resource Intensity)

## 2024-08-22 NOTE — Clinical Note
Patient participated in Skilled OT session this date with interventions consisting of self care tasks, standardized evidence based cogntive assessment ACLS, functional transfers/mobility, home safety, fall risk prevention education . Patient agreeable to OT treatment session, upon arrival patient was found seated OOB to Chair.  In comparison to previous session, patient with improvements in functional transfers . Patient requiring verbal cues for safety and verbal cues for correct technique. Patient continues to be functioning below baseline level, occupational performance remains limited secondary to factors listed above and increased risk for falls and injury.   From OT standpoint, recommendation at time of d/c would be min level III with increased social support, per ACLs score 4.6 lives alone with daily assistance.   Patient to benefit from continued Occupational Therapy treatment while in the hospital to address deficits as defined above and maximize level of functional independence with ADLs and functional mobility.       4.6    Administered Waqar Cognitive Level Screen (ACLS).  The patient scored 4.6/6.0 indicating that they may live alone with daily assistance to monitor personal safety and provide a daily allowance.  Bill and money management assistance required.    Behavior:  Scans environment to retrieve supplies.  Notices others.  Comments on others nonconformity.  Initiates change in routine.  Learns by demonstration, not by reading.  Can avoid obvious hazards.  Often presents as impulsive.  Insight into disability is fair/poor.  Processing speed is delayed.  Do not expect to be aware of the needs of others.  May need reminders to keep appointments. May make alex statements without concern for embarrassing others.   Grooming:  Varies typical procedure on his own.  May be willing to try new products or tools.  May be impulsive.  Dressing:  Searches for desired clothing items in drawers or closets.   Changes 1 item in an outfit to create a new “outfit”.  May coordinate shoes with outfit.  Monitor appropriateness of outfit.  Bathing:  May alter bathing routine to account for exercise, hot weather or special event.  Washes small hidden spaces.  May try new products.  May follow suggestions for hanging up towels.  May be unable to coordinate bath schedule with others.  Remove unseen hazards.  Walking/exercising:   Varies routes taken in familiar neighborhood.  Scan visible environment for landmarks.  Follows verbal explanation for hazards.  May attempt to alter amount, duration and speed of exercise.  May get lost especially in unfamiliar environments.  Eating:  May alter rate of eating on request but does not sustain.  May be willing to alter food selections or accept diet without resistance.  Looks across the table to converse with others.  Watch handling of hot foods/liquids and heavy objects.  Monitor compliance with dietary restrictions.  Toileting:  Scans visible environment for needed supplies like toilet paper but may not find unless it is at eye level.  May take much longer than average to complete toileting.  Medications:   May attempt to open new containers but may not be successful.  May stop medications because they feel “well”.  May alter amounts of pills without realizing the hazard.  May refuse to take medications based on erroneous beliefs about effects.  Use of adaptive equipment:  May be able to make spontaneous adjustments in positions or strength for better effect.  Scans environment for equipment.  May understand verbal explanation of safety hazard.  Housekeeping:  Scans environment for needed supplies, dirt and clutter when cleaning.  May organize closets, cupboards or put away objects. May require reminders to do household chores.   Food preparation:  May follow a fixed diet and go hungry if usual food items are not available.  Does not check inventory and may run out of essentials  frequently.  Does not consider nutritional needs.  Scans counters, shelves and grocery stores for needed items.  Check stove after use.  Spending money:  May be willing to vary usual routine to manage money.  May attempt his or her usual routine without anticipating problems.  May overspend if given access to bank or credit cards.  Shopping:  May vary usual shopping routine by going to a new store or buying different brands.  Searches shelves in stores, moves items to look underneath or behind.  May overspend.  Laundry:  May scan immediate environment for needed supplies.  May choose to vary from usual routine.  Check iron after use.  Check for overloading of washing machine.  Check sorting criteria.  Traveling:  May vary familiar routes without awareness of consequences.  May impulsively take a trip to a new location and get lost.  Does not anticipate problems or travel needs.   Telephone:  May be able to alter rate of speech for a short phone call.  May find a number in an address or phone book.  May not be able to locate items in yellow pages.  Driving:  Should NOT operate a motor vehicle.

## 2024-08-22 NOTE — ASSESSMENT & PLAN NOTE
"No results found for: \"HGBA1C\"    Recent Labs     08/21/24  1602 08/21/24  2109 08/22/24  0617 08/22/24  1054   POCGLU 321* 300* 216* 106         Blood Sugar Average: Last 72 hrs:  (P) 202.4    Home medication is metformin, repaglinide, Lantus 10 units at bedtime.  Increase Lantus 20 units at bedtime with correctional scale  Hypoglycemia protocol  "

## 2024-08-22 NOTE — ASSESSMENT & PLAN NOTE
History of recent right femur fracture s/p ORIF on 7/31/2024 secondary to fall.  Continue aspirin 81 mg twice daily for 28 days postop  Hip x-ray shows possible new fracture.  Discussed with orthopedic surgery who reports no acute change when compared to prior films.  No acute surgical intervention recommended.  PT/OT

## 2024-08-22 NOTE — PROGRESS NOTES
[facility]  Progress Note  Name: David Skelton I  MRN: [id number]  Unit/Bed#: [facility] 724-01 I Date of Admission: 8/19/2024   Date of Service: 8/22/2024 I Hospital Day: 3    Assessment & Plan   * Toxic encephalopathy  Assessment & Plan  64-year-old male with IDDM 2, recurrent fall, methadone dependence, presented to ED via EMS after he was found down by home visiting nurses.  Patient was not hypoxic at the scene.  Noted patient has multiple recent hospitalization secondary to encephalopathy presumably from methadone, and multiple fall, resulting in a right femur fracture s/p ORIF on 7/31/2024.  He was discharged home only on 8/18 from trauma service, as per discharge summary, unable to place in rehab due to methadone use.  And subsequently patient wanted to go home with home VNA.  Admitting physician spoke to patient's methadone clinic [facility], Phone number: [phone], and confirmed the dose of Methadone 190 mg daily.  Nursing staff verified patient was at the clinic this morning at 6:32 AM and received his regular dose of methadone 190 mg, and he also received extra doses to take home with.  As per nursing staff, patient was at usual health as of this morning.  Admission labs, imaging unremarkable  Suspect TME in the setting of poor sleep along with use of oxycodone and methadone in combination.  Patient was recently initiated on oxycodone for pain control after recent hospitalization  Less lethargic this morning,  Appears to be at baseline.  Continue to monitor mentation    Closed fracture of right femur, unspecified fracture morphology, sequela  Assessment & Plan  History of recent right femur fracture s/p ORIF on 7/31/2024 secondary to fall.  Continue aspirin 81 mg twice daily for 28 days postop  Hip x-ray shows possible new fracture.  Discussed with orthopedic surgery who reports no acute change when compared to prior films.  No acute surgical  "intervention recommended.  PT/OT    Recurrent falls  Assessment & Plan  Multiple recent admissions from encephalopathy, fall, likely from same etiology.    Patient discharged only 1 day prior to admission from trauma service  Consult PT/OT and case management for safe discharge planning    Paroxysmal atrial fibrillation (HCC)  Assessment & Plan  Rate controlled  Continue home Toprol-XL  Not on AC due to prior history of multiple falls    Methadone dependence (HCC)  Assessment & Plan  Admitting physician called [facility], Phone number: [phone], and confirmed the dose of Methadone   He has been on chronic methadone for many years  Continue methadone 190 mg daily    Benign essential hypertension  Assessment & Plan  BP stable  Continue Toprol-XL    NINFA (generalized anxiety disorder)  Assessment & Plan  Continue home gabapentin  Continue Xanax as needed at bedtime    LUNA (iron deficiency anemia)  Assessment & Plan  History of iron deficiency anemia, currently hemoglobin is at baseline  Continue to monitor    Type 2 diabetes mellitus, with long-term current use of insulin (HCC)  Assessment & Plan  No results found for: \"HGBA1C\"    Recent Labs     08/21/24  1602 08/21/24  2109 08/22/24  0617 08/22/24  1054   POCGLU 321* 300* 216* 106         Blood Sugar Average: Last 72 hrs:  (P) 202.4    Home medication is metformin, repaglinide, Lantus 10 units at bedtime.  Increase Lantus 20 units at bedtime with correctional scale  Hypoglycemia protocol           VTE Pharmacologic Prophylaxis:   Moderate Risk (Score 3-4) - Pharmacological DVT Prophylaxis Ordered: heparin.    Mobility:   Basic Mobility Inpatient Raw Score: 17  JH-HLM Goal: 5: Stand one or more mins  JH-HLM Achieved: 6: Walk 10 steps or more  JH-HLM Goal achieved. Continue to encourage appropriate mobility.    Patient Centered Rounds: I performed bedside rounds with nursing staff today.   Discussions with Specialists or Other Care Team Provider: " primary RN, JACOB    Education and Discussions with Family / Patient: Patient declined call to .     Total Time Spent on Date of Encounter in care of patient: 25 mins. This time was spent on one or more of the following: performing physical exam; counseling and coordination of care; obtaining or reviewing history; documenting in the medical record; reviewing/ordering tests, medications or procedures; communicating with other healthcare professionals and discussing with patient's family/caregivers.    Current Length of Stay: 3 day(s)  Current Patient Status: Inpatient   Certification Statement: The patient will continue to require additional inpatient hospital stay due to pending placement to rehab for safe discharge  Discharge Plan: Anticipate discharge tomorrow to home versus rehab    Code Status: Level 1 - Full Code    Subjective:   Patient assessed at bedside.  No acute complaints this morning.  Ongoing pain in the right leg.    Objective:     Vitals:   Temp (24hrs), Av.2 °F (36.8 °C), Min:97.9 °F (36.6 °C), Max:98.6 °F (37 °C)    Temp:  [97.9 °F (36.6 °C)-98.6 °F (37 °C)] 98.6 °F (37 °C)  HR:  [76-98] 76  Resp:  [16-18] 18  BP: ()/(63-79) 106/65  SpO2:  [97 %-98 %] 98 %  There is no height or weight on file to calculate BMI.     Input and Output Summary (last 24 hours):     Intake/Output Summary (Last 24 hours) at 2024 1340  Last data filed at 2024 0800  Gross per 24 hour   Intake 240 ml   Output --   Net 240 ml       Physical Exam:   Physical Exam  Vitals reviewed.   Constitutional:       General: He is not in acute distress.     Appearance: Normal appearance. He is not ill-appearing.   HENT:      Head: Normocephalic and atraumatic.   Cardiovascular:      Rate and Rhythm: Normal rate and regular rhythm.      Heart sounds: Normal heart sounds.   Pulmonary:      Effort: Pulmonary effort is normal. No respiratory distress.   Abdominal:      General: Bowel sounds are normal.       Tenderness: There is no abdominal tenderness.   Musculoskeletal:      Right lower leg: No edema.      Left lower leg: No edema.   Neurological:      Mental Status: He is alert and oriented to person, place, and time.          Additional Data:     Labs:  Results from last 7 days   Lab Units 08/22/24  0529   WBC Thousand/uL 6.99   HEMOGLOBIN g/dL 7.5*   HEMATOCRIT % 24.1*   PLATELETS Thousands/uL 413*   SEGS PCT % 66   LYMPHO PCT % 17   MONO PCT % 9   EOS PCT % 7*     Results from last 7 days   Lab Units 08/22/24  0529 08/20/24  0744 08/19/24  1013   SODIUM mmol/L 139   < > 134*   POTASSIUM mmol/L 4.3   < > 4.9   CHLORIDE mmol/L 103   < > 102   CO2 mmol/L 30   < > 28   BUN mg/dL 14   < > 22   CREATININE mg/dL 0.77   < > 0.91   ANION GAP mmol/L 6   < > 4   CALCIUM mg/dL 8.0*   < > 8.2*   ALBUMIN g/dL  --   --  3.2*   TOTAL BILIRUBIN mg/dL  --   --  0.42   ALK PHOS U/L  --   --  172*   ALT U/L  --   --  24   AST U/L  --   --  20   GLUCOSE RANDOM mg/dL 255*   < > 231*    < > = values in this interval not displayed.     Results from last 7 days   Lab Units 08/19/24  1013   INR  1.10     Results from last 7 days   Lab Units 08/22/24  1054 08/22/24  0617 08/21/24  2109 08/21/24  1602 08/21/24  1339 08/21/24  1114 08/21/24  0621 08/20/24  2108 08/20/24  1621 08/20/24  1059 08/20/24  0753 08/19/24  2348   POC GLUCOSE mg/dl 106 216* 300* 321* 201* 55* 244* 257* 287* 141* 106 289*               Lines/Drains:  Invasive Devices       Peripheral Intravenous Line  Duration             Peripheral IV 08/19/24 Distal;Left;Upper;Ventral (anterior) Arm 3 days    Peripheral IV 08/20/24 Right;Ventral (anterior) Forearm 2 days                          Imaging: No pertinent imaging reviewed.    Recent Cultures (last 7 days):         Last 24 Hours Medication List:   Current Facility-Administered Medications   Medication Dose Route Frequency Provider Last Rate    acetaminophen  975 mg Oral Q8H MELISA Babin DO      ALPRAZolam  0.5  mg Oral Daily PRN Pedro Babin DO      ALPRAZolam  1 mg Oral HS PRN Pedro Babin DO      aspirin  81 mg Oral BID Pau Eckert MD      gabapentin  300 mg Oral TID Annika Guillaume PA-C      heparin (porcine)  5,000 Units Subcutaneous Q8H [facility] Pau Eckert MD      insulin glargine  20 Units Subcutaneous HS Pedro Babin DO      insulin lispro  1-6 Units Subcutaneous 4x Daily (AC & HS) Annika Guillaume PA-C      methadone  190 mg Oral Daily Pedro Babin DO      metoprolol  2.5 mg Intravenous Q6H PRN Pau Eckert MD      metoprolol succinate  12.5 mg Oral Daily Pau Eckert MD      oxyCODONE  5 mg Oral Q6H PRN Pedro Babin DO      oxyCODONE  2.5 mg Oral Q6H PRN Pedro Babin DO          Today, Patient Was Seen By: Pedro Babin DO    **Please Note: This note may have been constructed using a voice recognition system.**

## 2024-08-23 VITALS
TEMPERATURE: 97.9 F | RESPIRATION RATE: 18 BRPM | DIASTOLIC BLOOD PRESSURE: 65 MMHG | HEART RATE: 84 BPM | WEIGHT: 174.82 LBS | SYSTOLIC BLOOD PRESSURE: 145 MMHG | OXYGEN SATURATION: 99 %

## 2024-08-23 LAB
ANION GAP SERPL CALCULATED.3IONS-SCNC: 3 MMOL/L (ref 4–13)
ANION GAP SERPL CALCULATED.3IONS-SCNC: 3 MMOL/L (ref 4–13)
BASOPHILS # BLD AUTO: 0.03 THOUSANDS/ÂΜL (ref 0–0.1)
BASOPHILS # BLD AUTO: 0.03 THOUSANDS/ÂΜL (ref 0–0.1)
BASOPHILS NFR BLD AUTO: 1 % (ref 0–1)
BASOPHILS NFR BLD AUTO: 1 % (ref 0–1)
BUN SERPL-MCNC: 13 MG/DL (ref 5–25)
BUN SERPL-MCNC: 13 MG/DL (ref 5–25)
CALCIUM SERPL-MCNC: 8.7 MG/DL (ref 8.4–10.2)
CALCIUM SERPL-MCNC: 8.7 MG/DL (ref 8.4–10.2)
CHLORIDE SERPL-SCNC: 104 MMOL/L (ref 96–108)
CHLORIDE SERPL-SCNC: 104 MMOL/L (ref 96–108)
CO2 SERPL-SCNC: 33 MMOL/L (ref 21–32)
CO2 SERPL-SCNC: 33 MMOL/L (ref 21–32)
CREAT SERPL-MCNC: 0.67 MG/DL (ref 0.6–1.3)
CREAT SERPL-MCNC: 0.67 MG/DL (ref 0.6–1.3)
EOSINOPHIL # BLD AUTO: 0.48 THOUSAND/ÂΜL (ref 0–0.61)
EOSINOPHIL # BLD AUTO: 0.48 THOUSAND/ÂΜL (ref 0–0.61)
EOSINOPHIL NFR BLD AUTO: 8 % (ref 0–6)
EOSINOPHIL NFR BLD AUTO: 8 % (ref 0–6)
ERYTHROCYTE [DISTWIDTH] IN BLOOD BY AUTOMATED COUNT: 14.3 % (ref 11.6–15.1)
ERYTHROCYTE [DISTWIDTH] IN BLOOD BY AUTOMATED COUNT: 14.3 % (ref 11.6–15.1)
GFR SERPL CREATININE-BSD FRML MDRD: 102 ML/MIN/1.73SQ M
GFR SERPL CREATININE-BSD FRML MDRD: 102 ML/MIN/1.73SQ M
GLUCOSE SERPL-MCNC: 175 MG/DL (ref 65–140)
GLUCOSE SERPL-MCNC: 175 MG/DL (ref 65–140)
GLUCOSE SERPL-MCNC: 207 MG/DL (ref 65–140)
GLUCOSE SERPL-MCNC: 207 MG/DL (ref 65–140)
GLUCOSE SERPL-MCNC: 93 MG/DL (ref 65–140)
GLUCOSE SERPL-MCNC: 93 MG/DL (ref 65–140)
GLUCOSE SERPL-MCNC: 94 MG/DL (ref 65–140)
GLUCOSE SERPL-MCNC: 94 MG/DL (ref 65–140)
HCT VFR BLD AUTO: 26.1 % (ref 36.5–49.3)
HCT VFR BLD AUTO: 26.1 % (ref 36.5–49.3)
HGB BLD-MCNC: 8 G/DL (ref 12–17)
HGB BLD-MCNC: 8 G/DL (ref 12–17)
IMM GRANULOCYTES # BLD AUTO: 0.01 THOUSAND/UL (ref 0–0.2)
IMM GRANULOCYTES # BLD AUTO: 0.01 THOUSAND/UL (ref 0–0.2)
IMM GRANULOCYTES NFR BLD AUTO: 0 % (ref 0–2)
IMM GRANULOCYTES NFR BLD AUTO: 0 % (ref 0–2)
LYMPHOCYTES # BLD AUTO: 1.36 THOUSANDS/ÂΜL (ref 0.6–4.47)
LYMPHOCYTES # BLD AUTO: 1.36 THOUSANDS/ÂΜL (ref 0.6–4.47)
LYMPHOCYTES NFR BLD AUTO: 22 % (ref 14–44)
LYMPHOCYTES NFR BLD AUTO: 22 % (ref 14–44)
MCH RBC QN AUTO: 29 PG (ref 26.8–34.3)
MCH RBC QN AUTO: 29 PG (ref 26.8–34.3)
MCHC RBC AUTO-ENTMCNC: 30.7 G/DL (ref 31.4–37.4)
MCHC RBC AUTO-ENTMCNC: 30.7 G/DL (ref 31.4–37.4)
MCV RBC AUTO: 95 FL (ref 82–98)
MCV RBC AUTO: 95 FL (ref 82–98)
MONOCYTES # BLD AUTO: 0.48 THOUSAND/ÂΜL (ref 0.17–1.22)
MONOCYTES # BLD AUTO: 0.48 THOUSAND/ÂΜL (ref 0.17–1.22)
MONOCYTES NFR BLD AUTO: 8 % (ref 4–12)
MONOCYTES NFR BLD AUTO: 8 % (ref 4–12)
NEUTROPHILS # BLD AUTO: 3.72 THOUSANDS/ÂΜL (ref 1.85–7.62)
NEUTROPHILS # BLD AUTO: 3.72 THOUSANDS/ÂΜL (ref 1.85–7.62)
NEUTS SEG NFR BLD AUTO: 61 % (ref 43–75)
NEUTS SEG NFR BLD AUTO: 61 % (ref 43–75)
NRBC BLD AUTO-RTO: 0 /100 WBCS
NRBC BLD AUTO-RTO: 0 /100 WBCS
PLATELET # BLD AUTO: 420 THOUSANDS/UL (ref 149–390)
PLATELET # BLD AUTO: 420 THOUSANDS/UL (ref 149–390)
PMV BLD AUTO: 10.4 FL (ref 8.9–12.7)
PMV BLD AUTO: 10.4 FL (ref 8.9–12.7)
POTASSIUM SERPL-SCNC: 4.6 MMOL/L (ref 3.5–5.3)
POTASSIUM SERPL-SCNC: 4.6 MMOL/L (ref 3.5–5.3)
RBC # BLD AUTO: 2.76 MILLION/UL (ref 3.88–5.62)
RBC # BLD AUTO: 2.76 MILLION/UL (ref 3.88–5.62)
SODIUM SERPL-SCNC: 140 MMOL/L (ref 135–147)
SODIUM SERPL-SCNC: 140 MMOL/L (ref 135–147)
WBC # BLD AUTO: 6.08 THOUSAND/UL (ref 4.31–10.16)
WBC # BLD AUTO: 6.08 THOUSAND/UL (ref 4.31–10.16)

## 2024-08-23 PROCEDURE — 99239 HOSP IP/OBS DSCHRG MGMT >30: CPT | Performed by: STUDENT IN AN ORGANIZED HEALTH CARE EDUCATION/TRAINING PROGRAM

## 2024-08-23 PROCEDURE — 85025 COMPLETE CBC W/AUTO DIFF WBC: CPT | Performed by: STUDENT IN AN ORGANIZED HEALTH CARE EDUCATION/TRAINING PROGRAM

## 2024-08-23 PROCEDURE — 97116 GAIT TRAINING THERAPY: CPT

## 2024-08-23 PROCEDURE — 80048 BASIC METABOLIC PNL TOTAL CA: CPT | Performed by: STUDENT IN AN ORGANIZED HEALTH CARE EDUCATION/TRAINING PROGRAM

## 2024-08-23 PROCEDURE — 82948 REAGENT STRIP/BLOOD GLUCOSE: CPT

## 2024-08-23 RX ORDER — ALPRAZOLAM 0.5 MG
TABLET ORAL
Qty: 21 TABLET | Refills: 0 | Status: SHIPPED | OUTPATIENT
Start: 2024-08-23 | End: 2024-08-30

## 2024-08-23 RX ORDER — FLUTICASONE PROPIONATE 50 MCG
1 SPRAY, SUSPENSION (ML) NASAL DAILY
Qty: 11.1 ML | Refills: 0 | Status: SHIPPED | OUTPATIENT
Start: 2024-08-24

## 2024-08-23 RX ORDER — FLUTICASONE PROPIONATE 50 MCG
1 SPRAY, SUSPENSION (ML) NASAL DAILY
Status: DISCONTINUED | OUTPATIENT
Start: 2024-08-23 | End: 2024-08-23 | Stop reason: HOSPADM

## 2024-08-23 RX ORDER — METHADONE HYDROCHLORIDE 10 MG/ML
190 CONCENTRATE ORAL DAILY
COMMUNITY

## 2024-08-23 RX ORDER — ACETAMINOPHEN 325 MG/1
650 TABLET ORAL EVERY 6 HOURS PRN
Qty: 30 TABLET | Refills: 0 | Status: SHIPPED | OUTPATIENT
Start: 2024-08-23

## 2024-08-23 RX ORDER — OXYCODONE HYDROCHLORIDE 5 MG/1
5 TABLET ORAL EVERY 6 HOURS PRN
Qty: 14 TABLET | Refills: 0 | Status: SHIPPED | OUTPATIENT
Start: 2024-08-23 | End: 2024-08-30 | Stop reason: SDUPTHER

## 2024-08-23 RX ADMIN — HEPARIN SODIUM 5000 UNITS: 5000 INJECTION INTRAVENOUS; SUBCUTANEOUS at 13:27

## 2024-08-23 RX ADMIN — OXYCODONE HYDROCHLORIDE 5 MG: 5 TABLET ORAL at 19:44

## 2024-08-23 RX ADMIN — HEPARIN SODIUM 5000 UNITS: 5000 INJECTION INTRAVENOUS; SUBCUTANEOUS at 05:10

## 2024-08-23 RX ADMIN — ACETAMINOPHEN 975 MG: 325 TABLET ORAL at 05:10

## 2024-08-23 RX ADMIN — INSULIN LISPRO 2 UNITS: 100 INJECTION, SOLUTION INTRAVENOUS; SUBCUTANEOUS at 16:59

## 2024-08-23 RX ADMIN — INSULIN LISPRO 1 UNITS: 100 INJECTION, SOLUTION INTRAVENOUS; SUBCUTANEOUS at 11:24

## 2024-08-23 RX ADMIN — FLUTICASONE PROPIONATE 1 SPRAY: 50 SPRAY, METERED NASAL at 11:23

## 2024-08-23 RX ADMIN — ASPIRIN 81 MG CHEWABLE TABLET 81 MG: 81 TABLET CHEWABLE at 09:44

## 2024-08-23 RX ADMIN — METHADONE HYDROCHLORIDE 190 MG: 10 CONCENTRATE ORAL at 07:30

## 2024-08-23 RX ADMIN — ALPRAZOLAM 0.5 MG: 0.5 TABLET ORAL at 16:05

## 2024-08-23 RX ADMIN — ACETAMINOPHEN 975 MG: 325 TABLET ORAL at 13:28

## 2024-08-23 RX ADMIN — GABAPENTIN 300 MG: 300 CAPSULE ORAL at 09:44

## 2024-08-23 RX ADMIN — METOPROLOL SUCCINATE 12.5 MG: 25 TABLET, EXTENDED RELEASE ORAL at 09:44

## 2024-08-23 RX ADMIN — GABAPENTIN 300 MG: 300 CAPSULE ORAL at 16:05

## 2024-08-23 RX ADMIN — OXYCODONE HYDROCHLORIDE 5 MG: 5 TABLET ORAL at 13:28

## 2024-08-23 NOTE — PLAN OF CARE
Problem: PHYSICAL THERAPY ADULT  Goal: Performs mobility at highest level of function for planned discharge setting.  See evaluation for individualized goals.  Description: Treatment/Interventions: Functional transfer training, LE strengthening/ROM, Elevations, Therapeutic exercise, Endurance training, Cognitive reorientation, Patient/family training, Equipment eval/education, Bed mobility, Gait training, Spoke to nursing, OT  Equipment Recommended: Walker       See flowsheet documentation for full assessment, interventions and recommendations.  Outcome: Progressing  Note: Prognosis: Good  Problem List: Decreased strength, Decreased endurance, Impaired balance, Decreased mobility, Decreased coordination, Decreased cognition, Impaired judgement, Decreased safety awareness, Impaired vision, Impaired sensation, Pain  Assessment: Patient was received seated in bedside recliner . Patient was agreeable to therapy services today. PT session focused on gait today in order to improve functional mobility and independence. Functional mobility was performed as described above.  Pt was eager to participate in therapy services today. Pt displays markedly improved functional mobility this date compared to previous therapy sessions. Pt did not require any hands on assist throughout therapy session, but display antalgic and excessively slow gait. Pt deferred seated rest and was able to complete gait training without breaks. Upon return to room, pt was made comfortable in bedside recliner and left with all needs within reach.  Patient will benefit from continued PT services while in hospital in order to address remaining limitations. The patients AM-PAC Basic Mobility Inpatient Short From Raw Score is 23 .  Based on AM-PAC scoring and patient presentation, PT currently recommending Level III (Minimum Resource Intensity). Please also refer to the recommendation of the Physical Therapist for safe discharge planning.  Barriers to  Discharge: Decreased caregiver support     Rehab Resource Intensity Level, PT: III (Minimum Resource Intensity)    See flowsheet documentation for full assessment.

## 2024-08-23 NOTE — ASSESSMENT & PLAN NOTE
"No results found for: \"HGBA1C\"    Recent Labs     08/22/24  2047 08/23/24  0614 08/23/24  1108 08/23/24  1627   POCGLU 299* 94 175* 207*         Blood Sugar Average: Last 72 hrs:  (P) 199.5    Resume home metformin, Prandin and Lantus 10 units at bedtime at discharge   "

## 2024-08-23 NOTE — CASE MANAGEMENT
Case Management Discharge Planning Note    Patient name David Skelton  Location [facility] 724/[facility] 724-01 MRN [id number]  : 1960 Date 2024       Current Admission Date: 2024  Current Admission Diagnosis:Toxic encephalopathy   Patient Active Problem List    Diagnosis Date Noted Date Diagnosed    Type 2 diabetes mellitus, with long-term current use of insulin (HCC) 2024     LUNA (iron deficiency anemia) 2024     NINFA (generalized anxiety disorder) 2024     Benign essential hypertension 2024     Methadone dependence (HCC) 2024     Paroxysmal atrial fibrillation (HCC) 2024     Toxic encephalopathy 2024     Recurrent falls 2024     Closed fracture of right femur, unspecified fracture morphology, sequela 2024       LOS (days): 4  Geometric Mean LOS (GMLOS) (days):   Days to GMLOS:     OBJECTIVE:  Risk of Unplanned Readmission Score: 15.04         Current admission status: Inpatient   Preferred Pharmacy:   UNKNOWN - FOLLOW UP PRIOR TO DISCHARGE TO E-PRESCRIBE  No address on file      [id number] - [place], [place] - [address] [address]  Phone: [phone] Fax: [phone]    Primary Care Provider: Casey Mckeon MD    Primary Insurance: [facility]  Secondary Insurance:     DISCHARGE DETAILS:                                Requested Home Health Care         Home Health Agency Name:: [facility]  Home Health Follow-Up Provider:: PCP  Home Health Services Needed:: Evaluate Functional Status and Safety, Gait/ADL Training, Strengthening/Theraputic Exercises to Improve Function  Homebound Criteria Met:: Uses an Assist Device (i.e. cane, walker, etc)  Supporting Clincal Findings:: Limited Endurance    DME Referral Provided  Referral made for DME?: No    Other Referral/Resources/Interventions Provided:  Interventions: HHC, Transportation    Would you like to participate in our  [facility] service program?  : No - Declined    Treatment Team Recommendation: Home with Home Health Care  Discharge Destination Plan:: Home with Home Health Care  Transport at Discharge : Free Local Transportation           ETA of Transport (Date): 08/23/24  ETA of Transport (Time): 2000     Transfer Mode: Other (Comment)  Accompanied by: Alone              Additional Comments: Phone call to Renetta [phone] pt neighbor. Pt provided her information. CM asked Renetta if she is able to check in on pt over the weekend. Renetta stated she has her grandchildren this weekend and won't be around to help. Renetta stated that she checks in on him and is more than willing to help him walk up and down the halls however he needs to be able to use the walker. Renetta stated that he has difficulty in mkaing it move forward, he needs tennis balls or skis on the back legs. CM left another message for pt sister. [facility] JACOB Anderson will be out to assess pt on Tuesday. Aging [facility] Cha and Chantelle asking if pt can remain until Tuesday. Pt has refused to remain and wants to return home. Pt requires supervision due to falls. Phone clau to Vandana discussed DCP and that someone needs to stay with pt. Vandana is available to stay with patient at his home. CM will notify Vandana Lockhart will reach out to [facility] and advise them as well. JACOB spoke with pt and he is in agreement CM will set up a Lyft for 8pm when Vandana will be at the house. [facility] has been notified about discharge and AVS will be faxed over. [facility] requesting H&P and AVS to be faxed over.

## 2024-08-23 NOTE — ASSESSMENT & PLAN NOTE
History of recent right femur fracture s/p ORIF on 7/31/2024 secondary to fall.  Continue aspirin 81 mg twice daily for 28 days postop  Hip x-ray shows possible new fracture.  Discussed with orthopedic surgery who reports no acute change when compared to prior films.  No acute surgical intervention recommended.  Home PT/OT

## 2024-08-23 NOTE — CASE MANAGEMENT
Case Management Discharge Planning Note    Patient name David Skelton  Location [facility] 724/[facility] 724-01 MRN [id number]  : 1960 Date 2024       Current Admission Date: 2024  Current Admission Diagnosis:Toxic encephalopathy   Patient Active Problem List    Diagnosis Date Noted Date Diagnosed    Type 2 diabetes mellitus, with long-term current use of insulin (HCC) 2024     LUNA (iron deficiency anemia) 2024     NINFA (generalized anxiety disorder) 2024     Benign essential hypertension 2024     Methadone dependence (HCC) 2024     Paroxysmal atrial fibrillation (HCC) 2024     Toxic encephalopathy 2024     Recurrent falls 2024     Closed fracture of right femur, unspecified fracture morphology, sequela 2024       LOS (days): 4  Geometric Mean LOS (GMLOS) (days):   Days to GMLOS:     OBJECTIVE:  Risk of Unplanned Readmission Score: 14.8         Current admission status: Inpatient   Preferred Pharmacy:   UNKNOWN - FOLLOW UP PRIOR TO DISCHARGE TO E-PRESCRIBE  No address on file      [id number] [place], [place] - [address] [address]  Phone: [phone] Fax: [phone]    Primary Care Provider: Casey Mckeon MD    Primary Insurance: [facility]  Secondary Insurance:     DISCHARGE DETAILS:      Other Referral/Resources/Interventions Provided:  Referral Comments: Pt has been denied at SNF due to methadone maintance additional referrals sent to [facility]     Additional Comments: Phone message to Jack Staples [phone] and [phone] left messages for return call on both  numbers

## 2024-08-23 NOTE — PLAN OF CARE
Problem: Prexisting or High Potential for Compromised Skin Integrity  Goal: Skin integrity is maintained or improved  Description: INTERVENTIONS:  - Identify patients at risk for skin breakdown  - Assess and monitor skin integrity  - Assess and monitor nutrition and hydration status  - Monitor labs   - Assess for incontinence   - Turn and reposition patient  - Assist with mobility/ambulation  - Relieve pressure over bony prominences  - Avoid friction and shearing  - Provide appropriate hygiene as needed including keeping skin clean and dry  - Evaluate need for skin moisturizer/barrier cream  - Collaborate with interdisciplinary team   - Patient/family teaching  - Consider wound care consult   Outcome: Progressing     Problem: PAIN - ADULT  Goal: Verbalizes/displays adequate comfort level or baseline comfort level  Description: Interventions:  - Encourage patient to monitor pain and request assistance  - Assess pain using appropriate pain scale  - Administer analgesics based on type and severity of pain and evaluate response  - Implement non-pharmacological measures as appropriate and evaluate response  - Notify physician/advanced practitioner if interventions unsuccessful or patient reports new pain  Outcome: Progressing

## 2024-08-23 NOTE — ASSESSMENT & PLAN NOTE
64-year-old male with IDDM 2, recurrent fall, methadone dependence, presented to ED via EMS after he was found down by home visiting nurses.  Patient was not hypoxic at the scene.  Noted patient has multiple recent hospitalization secondary to encephalopathy presumably from methadone, and multiple fall, resulting in a right femur fracture s/p ORIF on 7/31/2024.  He was discharged home only on 8/18 from trauma service, as per discharge summary, unable to place in rehab due to methadone use.  And subsequently patient wanted to go home with home VNA.  Admitting physician spoke to patient's methadone clinic [facility], Phone number: [phone], and confirmed the dose of Methadone 190 mg daily.  Nursing staff verified patient was at the clinic in the morning at 6:32 AM and received his regular dose of methadone 190 mg, and he also received extra doses to take home with.  As per nursing staff at the clinic, patient was at usual health prior to admission.  Admission labs, imaging unremarkable  Suspect TME in the setting of poor sleep along with use of oxycodone (7.5 mg) and methadone in combination.  Patient was recently initiated on oxycodone for pain control after recent hospitalization.  I do not believe that patient overdosed by taking any additional methadone.   Mentation at baseline.  Encourage patient to avoid using oxycodone between the hours of midnight and afternoon given he will be taking methadone early in the morning.  Reduced oxycodone prescription sent to pharmacy 5 mg every 6 hours as needed (14 tablets).

## 2024-08-23 NOTE — PHYSICAL THERAPY NOTE
PHYSICAL THERAPY NOTE          Patient Name: David Skelton  Today's Date: 8/23/2024 08/23/24 1324   PT Last Visit   PT Visit Date 08/23/24   Note Type   Note Type Treatment   Pain Assessment   Pain Assessment Tool 0-10   Pain Score 7   Pain Location/Orientation Orientation: Right;Location: Leg   Patient's Stated Pain Goal No pain   Hospital Pain Intervention(s) Repositioned;Ambulation/increased activity   Restrictions/Precautions   Weight Bearing Precautions Per Order Yes   RLE Weight Bearing Per Order WBAT   Other Precautions Cognitive;Fall Risk;Pain   General   Chart Reviewed Yes   Response to Previous Treatment Patient with no complaints from previous session.   Family/Caregiver Present No   Cognition   Overall Cognitive Status Impaired   Arousal/Participation Alert;Responsive;Cooperative   Attention Attends with cues to redirect   Orientation Level Oriented X4   Memory Decreased recall of precautions;Decreased recall of recent events;Decreased short term memory   Following Commands Follows one step commands with increased time or repetition   Subjective   Subjective pt pleasant and cooperative throughout therapy session. pt received seated in bedside recliner   Bed Mobility   Supine to Sit Unable to assess   Sit to Supine Unable to assess   Transfers   Sit to Stand 5  Supervision   Additional items Increased time required;Verbal cues   Stand to Sit 5  Supervision   Additional items Increased time required;Verbal cues   Additional Comments transfers w RW   Ambulation/Elevation   Gait pattern Improper Weight shift;Antalgic;Forward Flexion;Decreased foot clearance;Decreased R stance;Shuffling;Short stride;Excessively slow   Gait Assistance 5  Supervision   Additional items Verbal cues   Assistive Device Rolling walker   Distance 200'   Stair Management Assistance Not tested   Balance   Static Sitting Fair +   Dynamic Sitting Fair  +   Static Standing Fair   Dynamic Standing Fair   Ambulatory Fair -   Endurance Deficit   Endurance Deficit Yes   Endurance Deficit Description generalized weakness, decreased exercise tolerance   Activity Tolerance   Activity Tolerance Patient limited by fatigue;Patient limited by pain   Nurse Made Aware RN cleared and updated   Assessment   Prognosis Good   Problem List Decreased strength;Decreased endurance;Impaired balance;Decreased mobility;Decreased coordination;Decreased cognition;Impaired judgement;Decreased safety awareness;Impaired vision;Impaired sensation;Pain   Assessment Patient was received seated in bedside recliner . Patient was agreeable to therapy services today. PT session focused on gait today in order to improve functional mobility and independence. Functional mobility was performed as described above.  Pt was eager to participate in therapy services today. Pt displays markedly improved functional mobility this date compared to previous therapy sessions. Pt did not require any hands on assist throughout therapy session, but display antalgic and excessively slow gait. Pt deferred seated rest and was able to complete gait training without breaks. Upon return to room, pt was made comfortable in bedside recliner and left with all needs within reach.  Patient will benefit from continued PT services while in hospital in order to address remaining limitations. The patients AM-PAC Basic Mobility Inpatient Short From Raw Score is 23 .  Based on AM-PAC scoring and patient presentation, PT currently recommending Level III (Minimum Resource Intensity). Please also refer to the recommendation of the Physical Therapist for safe discharge planning.   Barriers to Discharge Decreased caregiver support   Goals   Patient Goals to go home   STG Expiration Date 09/03/24   PT Treatment Day 1   Plan   Treatment/Interventions ADL retraining;Functional transfer training;LE strengthening/ROM;Elevations;Therapeutic  exercise;Endurance training;Bed mobility;Gait training;Spoke to nursing;OT   Progress Progressing toward goals   PT Frequency 2-3x/wk   Discharge Recommendation   Rehab Resource Intensity Level, PT III (Minimum Resource Intensity)   AM-PAC Basic Mobility Inpatient   Turning in Flat Bed Without Bedrails 4   Lying on Back to Sitting on Edge of Flat Bed Without Bedrails 4   Moving Bed to Chair 4   Standing Up From Chair Using Arms 4   Walk in Room 4   Climb 3-5 Stairs With Railing 3   Basic Mobility Inpatient Raw Score 23   Basic Mobility Standardized Score 50.88   [facility] Highest Level Of Mobility   -HLM Goal 7: Walk 25 feet or more   -HLM Achieved 7: Walk 25 feet or more   Education   Education Provided Mobility training   Patient Demonstrates acceptance/verbal understanding   End of Consult   Patient Position at End of Consult Bedside chair;All needs within reach  (RN aware no chair alarm, no chair alarm upon arrival)       Partha Fatima PT, DPT

## 2024-08-23 NOTE — DISCHARGE SUMMARY
[facility]  Discharge- David Skelton 1960, 64 y.o. male MRN: [id number]  Unit/Bed#: [facility]PHP 724-01 Encounter: [id number]  Primary Care Provider: Casey Mckeon MD   Date and time admitted to hospital: 8/19/2024 10:11 AM    * Toxic encephalopathy  Assessment & Plan  64-year-old male with IDDM 2, recurrent fall, methadone dependence, presented to ED via EMS after he was found down by home visiting nurses.  Patient was not hypoxic at the scene.  Noted patient has multiple recent hospitalization secondary to encephalopathy presumably from methadone, and multiple fall, resulting in a right femur fracture s/p ORIF on 7/31/2024.  He was discharged home only on 8/18 from trauma service, as per discharge summary, unable to place in rehab due to methadone use.  And subsequently patient wanted to go home with home VNA.  Admitting physician spoke to patient's methadone clinic [facility], Phone number: [phone], and confirmed the dose of Methadone 190 mg daily.  Nursing staff verified patient was at the clinic in the morning at 6:32 AM and received his regular dose of methadone 190 mg, and he also received extra doses to take home with.  As per nursing staff at the clinic, patient was at usual health prior to admission.  Admission labs, imaging unremarkable  Suspect TME in the setting of poor sleep along with use of oxycodone (7.5 mg) and methadone in combination.  Patient was recently initiated on oxycodone for pain control after recent hospitalization.  I do not believe that patient overdosed by taking any additional methadone.   Mentation at baseline.  Encourage patient to avoid using oxycodone between the hours of midnight and afternoon given he will be taking methadone early in the morning.  Reduced oxycodone prescription sent to pharmacy 5 mg every 6 hours as needed (14 tablets).    Closed fracture of right femur, unspecified fracture morphology,  "sequela  Assessment & Plan  History of recent right femur fracture s/p ORIF on 7/31/2024 secondary to fall.  Continue aspirin 81 mg twice daily for 28 days postop  Hip x-ray shows possible new fracture.  Discussed with orthopedic surgery who reports no acute change when compared to prior films.  No acute surgical intervention recommended.  Home PT/OT    Recurrent falls  Assessment & Plan  Multiple recent admissions from encephalopathy, fall, likely from same etiology.    Patient discharged only 1 day prior to admission from trauma service  Home PT and OT arranged  Outpatient follow-up with     Paroxysmal atrial fibrillation (HCC)  Assessment & Plan  Rate controlled  Continue home Toprol-XL  Not on AC due to prior history of multiple falls    Methadone dependence (HCC)  Assessment & Plan  Admitting physician called [facility], Phone number: [phone], and confirmed the dose of Methadone   He has been on chronic methadone for many years  Continue methadone 190 mg daily    Benign essential hypertension  Assessment & Plan  BP stable  Continue Toprol-XL    NINFA (generalized anxiety disorder)  Assessment & Plan  Continue home gabapentin  Continue Xanax 0.5 mg in noon and 1 mg at bedtime (7 days rx sent)     LUNA (iron deficiency anemia)  Assessment & Plan  History of iron deficiency anemia, currently hemoglobin is at baseline  Continue to monitor    Type 2 diabetes mellitus, with long-term current use of insulin ([facility])  Assessment & Plan  No results found for: \"HGBA1C\"    Recent Labs     08/22/24  2047 08/23/24  0614 08/23/24  1108 08/23/24  1627   POCGLU 299* 94 175* 207*         Blood Sugar Average: Last 72 hrs:  (P) 199.5    Resume home metformin, Prandin and Lantus 10 units at bedtime at discharge       Medical Problems       Resolved Problems  Date Reviewed: 8/23/2024   None       Discharging Physician / Practitioner: Pedro Babin DO  PCP: Casey Mckeon MD  Admission Date: "   Admission Orders (From admission, onward)       Ordered        08/19/24 1136  INPATIENT ADMISSION  Once                          Discharge Date: 08/23/24    Consultations During Hospital Stay:  Orthopedic surgery    Procedures Performed:   None    Significant Findings / Test Results:   XR femur 2 vw right    Result Date: 8/21/2024  Narrative: XR FEMUR 2 VW RIGHT INDICATION: fall. COMPARISON: None FINDINGS: Intramedullary fixation hardware of the proximal portion of the right femur is noted. There seems to be a fracture fragment projecting lateral to the femoral shaft just below the greater trochanter region. This may be an acute fracture. There is some lucency in the bone nearby which probably is the donor site. Degenerative arthritis of the hip. No lytic or blastic osseous lesion. Unremarkable soft tissues.     Impression: Possibly acute fracture in the infratrochanteric region of the right femur. Orthopedic fixation of the femoral neck is noted. No priors for comparison to assess for any change. Consider follow-up CT for more thorough evaluation. The study was marked in EPIC for immediate notification. Computerized Assisted Algorithm (CAA) may have been used to analyze all applicable images. Workstation performed: [id number]     XR pelvis ap only 1 or 2 vw    Result Date: 8/21/2024  Narrative: XR PELVIS AP ONLY 1 OR 2 VW INDICATION: fall. COMPARISON: None FINDINGS: No acute fracture or dislocation seen on the left. Bilateral intramedullary fixation of proximal femora noted. Hardware appears satisfactory. There is fracture fragment noted along the lateral margin of the right femur just below the greater trochanter. This might be acute. Mild arthritic changes of the hips. No lytic or blastic osseous lesion. Atherosclerotic calcifications. Otherwise unremarkable soft tissues. Degenerative changes in the visualized lower lumbar spine.     Impression: Chronic postop findings and arthritic changes of the left hip as  mentioned. Possible acute fracture of the right proximal femur below the level of the greater trochanter. Computerized Assisted Algorithm (CAA) may have been used to analyze all applicable images. Workstation performed: [id number]     XR chest 1 view    Result Date: 8/19/2024  Narrative: XR TRAUMA MULTIPLE INDICATION: TRAUMA. COMPARISON: 8/12/2024. FINDINGS: CHEST: Supine frontal view of the chest is obtained. Clear lungs. No evidence of a pneumothorax or pleural effusion. Upright images are more sensitive to detect pneumothoraces if relevant. Normal cardiomediastinal silhouette accounting for technique and patient positioning. The patient is rotated towards the right. No displaced fracture. Overlying EKG wire leads.     Impression: No acute cardiopulmonary disease within limitations of supine imaging. Computerized Assisted Algorithm (CAA) may have been used to analyze all applicable images. Workstation performed: [id number]     XR Trauma multiple (\[facility]\""/[facility] trauma bay ONLY)    Result Date: 8/19/2024  Narrative: XR TRAUMA MULTIPLE INDICATION: TRAUMA. COMPARISON: 8/12/2024. FINDINGS: CHEST: Supine frontal view of the chest is obtained. Clear lungs. No evidence of a pneumothorax or pleural effusion. Upright images are more sensitive to detect pneumothoraces if relevant. Normal cardiomediastinal silhouette accounting for technique and patient positioning. The patient is rotated towards the right. No displaced fracture. Overlying EKG wire leads.     Impression: No acute cardiopulmonary disease within limitations of supine imaging. Computerized Assisted Algorithm (CAA) may have been used to analyze all applicable images. Workstation performed: [id number]     TRAUMA - CT head wo contrast    Result Date: 8/19/2024  Narrative: CT BRAIN - WITHOUT CONTRAST INDICATION:   TRAUMA. COMPARISON: 8/6/2024. TECHNIQUE:  CT examination of the brain was performed.  Multiplanar 2D reformatted images were created from the source data.  Radiation dose length product (DLP) for this visit:  1074.62 mGy-cm .  This examination, like all CT scans performed in the [facility], was performed utilizing techniques to minimize radiation dose exposure, including the use of iterative reconstruction and automated exposure control. IMAGE QUALITY:  Diagnostic. FINDINGS: PARENCHYMA:  No intracranial mass, mass effect or midline shift. No CT signs of acute infarction.  No acute parenchymal hemorrhage. Mild chronic microvascular ischemic changes. VENTRICLES AND EXTRA-AXIAL SPACES:  Normal for the patient's age. VISUALIZED ORBITS: Normal visualized orbits. PARANASAL SINUSES: Normal visualized paranasal sinuses. CALVARIUM AND EXTRACRANIAL SOFT TISSUES:  Normal.     Impression: No acute intracranial abnormality. I personally discussed this study with DR. WEBB on 8/19/2024 10:36 AM. Workstation performed: [id number]     TRAUMA - CT spine cervical wo contrast    Result Date: 8/19/2024  Narrative: CT CERVICAL SPINE - WITHOUT CONTRAST INDICATION:   TRAUMA. COMPARISON: 8/6/2024. TECHNIQUE:  CT examination of the cervical spine was performed without intravenous contrast.  Contiguous axial images were obtained. Multiplanar 2D reformatted images were created from the source data. Radiation dose length product (DLP) for this visit:  394.13 mGy-cm .  This examination, like all CT scans performed in the [facility], was performed utilizing techniques to minimize radiation dose exposure, including the use of iterative  reconstruction and automated exposure control. IMAGE QUALITY:  Diagnostic. FINDINGS: ALIGNMENT:  Normal alignment of the cervical spine. No subluxation. VERTEBRAE: Stable mild anterior compression deformity of C6 and stable mild compression fracture deformity of the superior endplate of C7. No acute fracture. DEGENERATIVE CHANGES: Mild multilevel cervical degenerative changes are noted without critical central canal stenosis.  PREVERTEBRAL AND PARASPINAL SOFT TISSUES: No prevertebral soft tissue edema. Unremarkable THORACIC INLET:  Normal.     Impression: No acute fracture. I personally discussed this study with DR. WEBB on 8/19/2024 10:36 AM. Workstation performed: [id number]      Incidental Findings:   None    Test Results Pending at Discharge (will require follow up):   None     Outpatient Tests Requested:  None     Complications:  none     Reason for Admission: Suspected fall, encephalopathy    Hospital Course:   David Skelton is a 64 y.o. male patient who originally presented to the hospital on 8/19/2024 due to encephalopathy with presumed fall.  Patient has history of recent hospitalization secondary to right femur fracture s/p ORIF on 7/31/2024 with recurrent hospitalizations secondary to encephalopathy presumably secondary to polypharmacy.  Patient was discharged from the hospital on 8/18 as he was unable to be placed in rehab due to use of methadone.  Patient was subsequently discharged home with VNA only to return on 8/19 after being found down by visiting home nurse.  CT head and cervical spine unremarkable.  Infectious workup unremarkable.  Suspect TME in the setting of poor sleep, use of methadone and oxycodone in combination contributing to encephalopathy.  Do not suspect intentional polypharmacy however patient does report that he took oxycodone and methadone earlier that morning before he was found down.  Mentation improved with avoiding oxycodone in the morning and therefore we will recommend avoiding use of oxycodone between midnight and noon as patient takes methadone early in the morning around 6:00.  X-ray of the hip due to complaint of pain showed possible new fracture however evaluated by orthopedic surgery and reported no acute changes compared to prior films.  Patient was recommended continuing aspirin twice daily for DVT prophylaxis and outpatient follow-up with orthopedic surgery.  CM following we discussed  with aging and outpatient CM will continue to monitor patient after discharge.  Patient will be discharged home with care from his Sister Vandana who will be available and stay with patient at his home until seen by aging next week.  Patient is medically stable for discharge home with family support.  VNA arranged at time of discharge.    Please see above list of diagnoses and related plan for additional information.     Condition at Discharge: stable    Discharge Day Visit / Exam:   Subjective: No acute complaints.  Mentation well.  At baseline.   Vitals: Blood Pressure: 145/65 (08/23/24 1503)  Pulse: 84 (08/23/24 1503)  Temperature: 97.9 °F (36.6 °C) (08/23/24 1503)  Temp Source: Oral (08/21/24 2108)  Respirations: 18 (08/23/24 0810)  Weight - Scale: 79.3 kg (174 lb 13.2 oz) (08/19/24 1006)  SpO2: 99 % (08/23/24 1503)  Exam:   Physical Exam  Vitals reviewed.   Constitutional:       General: He is not in acute distress.     Appearance: Normal appearance. He is not ill-appearing.   HENT:      Head: Normocephalic and atraumatic.   Cardiovascular:      Rate and Rhythm: Normal rate and regular rhythm.      Heart sounds: Normal heart sounds.   Pulmonary:      Effort: Pulmonary effort is normal. No respiratory distress.   Abdominal:      General: Bowel sounds are normal.      Tenderness: There is no abdominal tenderness.   Musculoskeletal:      Right lower leg: No edema.      Left lower leg: No edema.   Neurological:      Mental Status: He is alert and oriented to person, place, and time. Mental status is at baseline.          Discussion with Family: Patient declined call to .     Discharge instructions/Information to patient and family:   See after visit summary for information provided to patient and family.      Provisions for Follow-Up Care:  See after visit summary for information related to follow-up care and any pertinent home health orders.      Mobility at time of Discharge:   Basic Mobility  Inpatient Raw Score: 23  JH-HLM Goal: 7: Walk 25 feet or more  JH-HLM Achieved: 7: Walk 25 feet or more  HLM Goal achieved. Continue to encourage appropriate mobility.     Disposition:   Home with VNA Services (Reminder: Complete face to face encounter)    Planned Readmission: none     Discharge Statement:  I spent 35 minutes discharging the patient. This time was spent on the day of discharge. I had direct contact with the patient on the day of discharge. Greater than 50% of the total time was spent examining patient, answering all patient questions, arranging and discussing plan of care with patient as well as directly providing post-discharge instructions.  Additional time then spent on discharge activities.    Discharge Medications:  See after visit summary for reconciled discharge medications provided to patient and/or family.      **Please Note: This note may have been constructed using a voice recognition system**

## 2024-08-23 NOTE — Clinical Note
Patient was received seated in bedside recliner . Patient was agreeable to therapy services today. PT session focused on gait today in order to improve functional mobility and independence. Functional mobility was performed as described above.  Pt was eager to participate in therapy services today. Pt displays markedly improved functional mobility this date compared to previous therapy sessions. Pt did not require any hands on assist throughout therapy session, but display antalgic and excessively slow gait. Pt deferred seated rest and was able to complete gait training without breaks. Upon return to room, pt was made comfortable in bedside recliner and left with all needs within reach.  Patient will benefit from continued PT services while in hospital in order to address remaining limitations. The patients AM-PAC Basic Mobility Inpatient Short From Raw Score is 23 .  Based on AM-PAC scoring and patient presentation, PT currently recommending Level III (Minimum Resource Intensity). Please also refer to the recommendation of the Physical Therapist for safe discharge planning.

## 2024-08-23 NOTE — PLAN OF CARE
Problem: NEUROSENSORY - ADULT  Goal: Achieves stable or improved neurological status  Description: INTERVENTIONS  - Monitor and report changes in neurological status  - Monitor vital signs such as temperature, blood pressure, glucose, and any other labs ordered   - Initiate measures to prevent increased intracranial pressure  - Monitor for seizure activity and implement precautions if appropriate      Outcome: Progressing  Goal: Remains free of injury related to seizures activity  Description: INTERVENTIONS  - Maintain airway, patient safety  and administer oxygen as ordered  - Monitor patient for seizure activity, document and report duration and description of seizure to physician/advanced practitioner  - If seizure occurs,  ensure patient safety during seizure  - Reorient patient post seizure  - Seizure pads on all 4 side rails  - Instruct patient/family to notify RN of any seizure activity including if an aura is experienced  - Instruct patient/family to call for assistance with activity based on nursing assessment  - Administer anti-seizure medications if ordered    Outcome: Progressing     Problem: METABOLIC, FLUID AND ELECTROLYTES - ADULT  Goal: Glucose maintained within target range  Description: INTERVENTIONS:  - Monitor Blood Glucose as ordered  - Assess for signs and symptoms of hyperglycemia and hypoglycemia  - Administer ordered medications to maintain glucose within target range  - Assess nutritional intake and initiate nutrition service referral as needed  Outcome: Progressing

## 2024-08-23 NOTE — ASSESSMENT & PLAN NOTE
Admitting physician called [facility], Phone number: [phone], and confirmed the dose of Methadone   He has been on chronic methadone for many years  Continue methadone 190 mg daily

## 2024-08-23 NOTE — PLAN OF CARE
Problem: Prexisting or High Potential for Compromised Skin Integrity  Goal: Skin integrity is maintained or improved  Description: INTERVENTIONS:  - Identify patients at risk for skin breakdown  - Assess and monitor skin integrity  - Assess and monitor nutrition and hydration status  - Monitor labs   - Assess for incontinence   - Turn and reposition patient  - Assist with mobility/ambulation  - Relieve pressure over bony prominences  - Avoid friction and shearing  - Provide appropriate hygiene as needed including keeping skin clean and dry  - Evaluate need for skin moisturizer/barrier cream  - Collaborate with interdisciplinary team   - Patient/family teaching  - Consider wound care consult   Outcome: Adequate for Discharge     Problem: NEUROSENSORY - ADULT  Goal: Achieves stable or improved neurological status  Description: INTERVENTIONS  - Monitor and report changes in neurological status  - Monitor vital signs such as temperature, blood pressure, glucose, and any other labs ordered   - Initiate measures to prevent increased intracranial pressure  - Monitor for seizure activity and implement precautions if appropriate      Outcome: Adequate for Discharge  Goal: Remains free of injury related to seizures activity  Description: INTERVENTIONS  - Maintain airway, patient safety  and administer oxygen as ordered  - Monitor patient for seizure activity, document and report duration and description of seizure to physician/advanced practitioner  - If seizure occurs,  ensure patient safety during seizure  - Reorient patient post seizure  - Seizure pads on all 4 side rails  - Instruct patient/family to notify RN of any seizure activity including if an aura is experienced  - Instruct patient/family to call for assistance with activity based on nursing assessment  - Administer anti-seizure medications if ordered    Outcome: Adequate for Discharge     Problem: MUSCULOSKELETAL - ADULT  Goal: Maintain or return mobility to  safest level of function  Description: INTERVENTIONS:  - Assess patient's ability to carry out ADLs; assess patient's baseline for ADL function and identify physical deficits which impact ability to perform ADLs (bathing, care of mouth/teeth, toileting, grooming, dressing, etc.)  - Assess/evaluate cause of self-care deficits   - Assess range of motion  - Assess patient's mobility  - Assess patient's need for assistive devices and provide as appropriate  - Encourage maximum independence but intervene and supervise when necessary  - Involve family in performance of ADLs  - Assess for home care needs following discharge   - Consider OT consult to assist with ADL evaluation and planning for discharge  - Provide patient education as appropriate  Outcome: Adequate for Discharge     Problem: METABOLIC, FLUID AND ELECTROLYTES - ADULT  Goal: Glucose maintained within target range  Description: INTERVENTIONS:  - Monitor Blood Glucose as ordered  - Assess for signs and symptoms of hyperglycemia and hypoglycemia  - Administer ordered medications to maintain glucose within target range  - Assess nutritional intake and initiate nutrition service referral as needed  Outcome: Adequate for Discharge     Problem: PAIN - ADULT  Goal: Verbalizes/displays adequate comfort level or baseline comfort level  Description: Interventions:  - Encourage patient to monitor pain and request assistance  - Assess pain using appropriate pain scale  - Administer analgesics based on type and severity of pain and evaluate response  - Implement non-pharmacological measures as appropriate and evaluate response  - Notify physician/advanced practitioner if interventions unsuccessful or patient reports new pain  Outcome: Adequate for Discharge     Problem: INFECTION - ADULT  Goal: Absence or prevention of progression during hospitalization  Description: INTERVENTIONS:  - Assess and monitor for signs and symptoms of infection  - Monitor lab/diagnostic  results  - Monitor all insertion sites, i.e. indwelling lines, tubes, and drains  - Hull appropriate cooling/warming therapies per order  - Administer medications as ordered  - Instruct and encourage patient and family to use good hand hygiene technique  - Identify and instruct in appropriate isolation precautions for identified infection/condition  Outcome: Adequate for Discharge     Problem: SAFETY ADULT  Goal: Patient will remain free of falls  Description: INTERVENTIONS:  - Educate patient/family on patient safety including physical limitations  - Instruct patient to call for assistance with activity   - Consult OT/PT to assist with strengthening/mobility   - Keep Call bell within reach  - Keep bed low and locked with side rails adjusted as appropriate  - Keep care items and personal belongings within reach  - Initiate and maintain comfort rounds  - Make Fall Risk Sign visible to staff  - Offer Toileting every 2 Hours, in advance of need  - Initiate/Maintain bed/chair alarm  - Obtain necessary fall risk management equipment.  - Apply yellow socks and bracelet for high fall risk patients  - Consider moving patient to room near nurses station  Outcome: Adequate for Discharge  Goal: Maintain or return to baseline ADL function  Description: INTERVENTIONS:  -  Assess patient's ability to carry out ADLs; assess patient's baseline for ADL function and identify physical deficits which impact ability to perform ADLs (bathing, care of mouth/teeth, toileting, grooming, dressing, etc.)  - Assess/evaluate cause of self-care deficits   - Assess range of motion  - Assess patient's mobility; develop plan if impaired  - Assess patient's need for assistive devices and provide as appropriate  - Encourage maximum independence but intervene and supervise when necessary  - Involve family in performance of ADLs  - Assess for home care needs following discharge   - Consider OT consult to assist with ADL evaluation and planning for  discharge  - Provide patient education as appropriate  Outcome: Adequate for Discharge  Goal: Maintains/Returns to pre admission functional level  Description: INTERVENTIONS:  - Perform AM-PAC 6 Click Basic Mobility/ Daily Activity assessment daily.  - Set and communicate daily mobility goal to care team and patient/family/caregiver.   - Collaborate with rehabilitation services on mobility goals if consulted  - Reposition patient every 2 hours.  - Dangle patient 3 times a day  - Stand patient 3 times a day  - Ambulate patient 3 times a day  - Out of bed to chair 3 times a day   - Out of bed for meals 3 times a day  - Out of bed for toileting  - Record patient progress and toleration of activity level   Outcome: Adequate for Discharge     Problem: DISCHARGE PLANNING  Goal: Discharge to home or other facility with appropriate resources  Description: INTERVENTIONS:  - Identify barriers to discharge w/patient and caregiver  - Arrange for needed discharge resources and transportation as appropriate  - Identify discharge learning needs (meds, wound care, etc.)  - Refer to Case Management Department for coordinating discharge planning if the patient needs post-hospital services based on physician/advanced practitioner order or complex needs related to functional status, cognitive ability, or social support system  Outcome: Adequate for Discharge     Problem: Knowledge Deficit  Goal: Patient/family/caregiver demonstrates understanding of disease process, treatment plan, medications, and discharge instructions  Description: Complete learning assessment and assess knowledge base.  Interventions:  - Provide teaching at level of understanding  - Provide teaching via preferred learning methods  Outcome: Adequate for Discharge     Problem: COPING  Goal: Pt/Family able to verbalize concerns and demonstrate effective coping strategies  Description: INTERVENTIONS:  - Assist patient/family to identify coping skills, available support  systems and cultural and spiritual values  - Provide emotional support, including active listening and acknowledgement of concerns of patient and caregivers  - Reduce environmental stimuli, as able  - Provide patient education  - Assess for spiritual pain/suffering and initiate spiritual care, including notification of Pastoral Care or kya based community as needed  - Assess effectiveness of coping strategies  Outcome: Adequate for Discharge  Goal: Will report anxiety at manageable levels  Description: INTERVENTIONS:  - Administer medication as ordered  - Teach and encourage coping skills  - Provide emotional support  - Assess patient/family for anxiety and ability to cope  Outcome: Adequate for Discharge

## 2024-08-23 NOTE — ASSESSMENT & PLAN NOTE
Multiple recent admissions from encephalopathy, fall, likely from same etiology.    Patient discharged only 1 day prior to admission from trauma service  Home PT and OT arranged  Outpatient follow-up with [facility]

## 2024-08-26 ENCOUNTER — TELEPHONE (OUTPATIENT)
Age: 64
End: 2024-08-26

## 2024-08-26 ENCOUNTER — NURSE TRIAGE (OUTPATIENT)
Age: 64
End: 2024-08-26

## 2024-08-26 ENCOUNTER — TRANSITIONAL CARE MANAGEMENT (OUTPATIENT)
Dept: FAMILY MEDICINE CLINIC | Facility: CLINIC | Age: 64
End: 2024-08-26

## 2024-08-26 ENCOUNTER — PATIENT OUTREACH (OUTPATIENT)
Dept: CASE MANAGEMENT | Facility: OTHER | Age: 64
End: 2024-08-26

## 2024-08-26 DIAGNOSIS — S72.90XA FEMUR FRACTURE (HCC): ICD-10-CM

## 2024-08-26 DIAGNOSIS — E11.39 TYPE 2 DIABETES MELLITUS WITH OTHER OPHTHALMIC COMPLICATION, WITH LONG-TERM CURRENT USE OF INSULIN (HCC): ICD-10-CM

## 2024-08-26 DIAGNOSIS — Z79.4 TYPE 2 DIABETES MELLITUS WITH OTHER OPHTHALMIC COMPLICATION, WITH LONG-TERM CURRENT USE OF INSULIN (HCC): ICD-10-CM

## 2024-08-26 DIAGNOSIS — F41.9 ANXIETY DISORDER: ICD-10-CM

## 2024-08-26 DIAGNOSIS — Z71.89 COORDINATION OF COMPLEX CARE: Primary | ICD-10-CM

## 2024-08-26 NOTE — TELEPHONE ENCOUNTER
Patient called the RX Refill Line. Message is being forwarded to the office.     Test Strips not on medication list - Last filled at hospital    Patient is out of test strips    Patient is requesting test strips for refill, last filled at the hospital. One Touch Verio Test Strips test BID to be filled at [address] on file.      Patient also requested other medications that were sent tot he pod for review. He also complaint of his legs being swollen and in a lot of pain, sent to Call Hub in a separate encounter.     Please contact patient at

## 2024-08-26 NOTE — UTILIZATION REVIEW
NOTIFICATION OF ADMISSION DISCHARGE   This is a Notification of Discharge from [facility]. Please be advised that this patient has been discharge from our facility. Below you will find the admission and discharge date and time including the patient’s disposition.   UTILIZATION REVIEW CONTACT:  Teresa Dickson  Utilization   Network Utilization Review Department  Phone: [phone] x carefully listen to the prompts. All voicemails are confidential.  Email: [email or web address]     ADMISSION INFORMATION  PRESENTATION DATE: 8/19/2024 10:11 AM  OBERVATION ADMISSION DATE: N/A  INPATIENT ADMISSION DATE: 8/19/24 11:36 AM   DISCHARGE DATE: 8/23/2024  8:30 PM   DISPOSITION:Home with Home Health Care    Network Utilization Review Department  ATTENTION: Please call with any questions or concerns to [phone] and carefully listen to the prompts so that you are directed to the right person. All voicemails are confidential.   For Discharge needs, contact Care Management DC Support Team at [phone] opt. 2  Send all requests for admission clinical reviews, approved or denied determinations and any other requests to dedicated fax number below belonging to the campus where the patient is receiving treatment. List of dedicated fax numbers for the Facilities:  FACILITY NAME UR FAX NUMBER   ADMISSION DENIALS (Administrative/Medical Necessity) [phone]   DISCHARGE SUPPORT TEAM ([facility]) [phone]   PARENT CHILD HEALTH (Maternity/NICU/Pediatrics) [phone]   [facility] [phone]   [facility] [phone]   [facility] [phone]   [facility] [phone]   [facility] [phone]   [facility] [phone]   [facility] [phone]   [facility]  [phone]   [facility] [phone]   [facility] [phone]   [facility] [phone]   [facility] [phone]

## 2024-08-26 NOTE — TELEPHONE ENCOUNTER
Patient called and is requesting a call back from Marta. I asked if there is anything I can assist with and he said no he just needs to talk to her.    Please call him back at your earliest convenience.

## 2024-08-26 NOTE — TELEPHONE ENCOUNTER
Regarding: Swollen legs x 1M and is very painful  ----- Message from Leana GALLEGO sent at 8/26/2024  1:40 PM EDT -----  Patient called rx refill line for medication refill and stated his legs are swollen and have been swollen for over a month and he need his medications that he is in a lot of pain, to please please fill my medication. Patient says over an over that he is in pain and need his medications: oxycodone 5 mg, xanax and his test strips and lancets to test his sugar. Medications sent to pod in a separate encounter.

## 2024-08-26 NOTE — TELEPHONE ENCOUNTER
Reason for call: Out of medication. Last filled at the hospital.  Says he needs this ASAP. Patient also requested test strips sent to office in a separate encounter. Patient also stated he is in a lot of pian and his legs have been swollen for over a month and he is in pain ( call hub encounter created and sent to clinical pod)  [x] Refill   [] Prior Auth  [] Other:     Office:   [x] PCP/Provider - Dr Mckeon   [] Specialty/Provider -     Medication    Does the patient have enough for 3 days?   [] Yes   [x] No - Send as HP to POD

## 2024-08-26 NOTE — PROGRESS NOTES
In basket message received with a patient referred from the HRR report. Chart reviewed.  Patient was admitted to [facility] 8/19-8/23 with toxic encephalopathy,recent right femur fracture from a fall 7/31, Afib, methadone dependence, hypertension, anxiety, iron deficiency anemia and type 2 diabetes.He discharged home with SHARI.  I called and there was no answer or voicemail.

## 2024-08-27 ENCOUNTER — TELEPHONE (OUTPATIENT)
Dept: OBGYN CLINIC | Facility: HOSPITAL | Age: 64
End: 2024-08-27

## 2024-08-27 ENCOUNTER — PATIENT OUTREACH (OUTPATIENT)
Dept: CASE MANAGEMENT | Facility: OTHER | Age: 64
End: 2024-08-27

## 2024-08-27 ENCOUNTER — TELEPHONE (OUTPATIENT)
Age: 64
End: 2024-08-27

## 2024-08-27 NOTE — TELEPHONE ENCOUNTER
Please be advised that upon home visit today Monica observed the patient to seem extremely under the influence. Patient denies over taking his prescribed Methadone, however multiple outside parties that work with the patient have the same concerns. While with the patient, she observed him walking with his eyes closed, very slowed speech, off balance, appeared incoherent. He will not answer the phone nor the door during house calls if he is in this state. He is non-complaint an Extreme fall risk. Had had multiple falls, not using walker. Reported he had requested a wheel chair from pcp.   Please be advised R=routing to office clinical for documentation and follow up

## 2024-08-27 NOTE — PROGRESS NOTES
I called Cha Staples a  at [facility] and left a message on her voicemail with my contact information requesting a return call.

## 2024-08-27 NOTE — PROGRESS NOTES
I called [facility], they spoke with David and will start care tomorrow.

## 2024-08-27 NOTE — TELEPHONE ENCOUNTER
LVM to the patients sister Vandana in regards to scheduling patient for follow up with Dr. Hill around 9/16/24. Provided office call back at [phone].

## 2024-08-27 NOTE — PROGRESS NOTES
"Second outreach attempt. I spoke with patient's sister Vandana. She reports David is disoriented. When I asked her why she stated \"it's the Methadone\". She put David on the phone and he has slurred speech and was not able to answer any questions.  Vandana got back on the phone at my request and stated she was leaving him alone. She stated she has to go to work.  He has aides assigned to assist him but none are there today.  I will place a call to [facility].  "

## 2024-08-28 DIAGNOSIS — Z79.4 TYPE 2 DIABETES MELLITUS WITH OTHER OPHTHALMIC COMPLICATION, WITH LONG-TERM CURRENT USE OF INSULIN (HCC): ICD-10-CM

## 2024-08-28 DIAGNOSIS — F41.1 GENERALIZED ANXIETY DISORDER: ICD-10-CM

## 2024-08-28 DIAGNOSIS — E11.39 TYPE 2 DIABETES MELLITUS WITH OTHER OPHTHALMIC COMPLICATION, WITH LONG-TERM CURRENT USE OF INSULIN (HCC): ICD-10-CM

## 2024-08-28 DIAGNOSIS — F41.9 ANXIETY DISORDER, UNSPECIFIED TYPE: Primary | ICD-10-CM

## 2024-08-28 RX ORDER — LANCETS 33 GAUGE
EACH MISCELLANEOUS
Qty: 100 EACH | Refills: 3 | Status: SHIPPED | OUTPATIENT
Start: 2024-08-28

## 2024-08-28 RX ORDER — OXYCODONE HYDROCHLORIDE 5 MG/1
5 TABLET ORAL EVERY 6 HOURS PRN
Qty: 14 TABLET | Refills: 0 | OUTPATIENT
Start: 2024-08-28 | End: 2024-09-04

## 2024-08-28 RX ORDER — LANCETS 33 GAUGE
EACH MISCELLANEOUS
Qty: 180 EACH | Refills: 1 | OUTPATIENT
Start: 2024-08-28

## 2024-08-28 RX ORDER — ALPRAZOLAM 0.5 MG
TABLET ORAL
Qty: 21 TABLET | Refills: 0 | OUTPATIENT
Start: 2024-08-28 | End: 2024-09-02

## 2024-08-29 ENCOUNTER — TELEPHONE (OUTPATIENT)
Dept: OBGYN CLINIC | Facility: CLINIC | Age: 64
End: 2024-08-29

## 2024-08-29 LAB
DME PARACHUTE DELIVERY DATE ACTUAL: NORMAL
DME PARACHUTE DELIVERY DATE REQUESTED: NORMAL
DME PARACHUTE ITEM DESCRIPTION: NORMAL
DME PARACHUTE ORDER STATUS: NORMAL
DME PARACHUTE SUPPLIER NAME: NORMAL
DME PARACHUTE SUPPLIER PHONE: NORMAL

## 2024-08-29 NOTE — TELEPHONE ENCOUNTER
I spoke with the patients sister in regards to scheduling 6 week follow up for the patient. Vandana states she will schedule tentatively and will give us a call back if needed. Vandana accepted appt on 9/16/24 at 3:15 pm.     
negative

## 2024-08-30 ENCOUNTER — TELEPHONE (OUTPATIENT)
Dept: FAMILY MEDICINE CLINIC | Facility: CLINIC | Age: 64
End: 2024-08-30

## 2024-08-30 DIAGNOSIS — S72.90XA FEMUR FRACTURE (HCC): ICD-10-CM

## 2024-08-30 RX ORDER — OXYCODONE HYDROCHLORIDE 5 MG/1
5 TABLET ORAL EVERY 6 HOURS PRN
Qty: 21 TABLET | Refills: 0 | Status: SHIPPED | OUTPATIENT
Start: 2024-08-30 | End: 2024-09-06

## 2024-08-30 NOTE — TELEPHONE ENCOUNTER
Patient is requesting a refill for oxycodone he received in the hospital after his surgery. He stated he is in a lot of pain and he is very upset. Please call patient and let him know if this can be refilled or if there is something else you could do for him.  [phone]    Reason for call:   [x] Refill   [] Prior Auth  [] Other:     Office:   [] PCP/Provider -   [x] Specialty/Provider - Ortho        Does the patient have enough for 3 days?   [] Yes   [x] No - Send as HP to POD

## 2024-08-30 NOTE — TELEPHONE ENCOUNTER
Pt called to speak with office. Pt stated he is in a lot of pain and needs pain meds refilled. Pt was requesting to speak with Marta. Pt was warm transferred to [facility] for direction.

## 2024-08-30 NOTE — TELEPHONE ENCOUNTER
Patient called asking for refills of oxycodone 5mg, he states he is out of pain meds, he broke his hip and leg  Patient was recently released from hospital and scheduled to see PCP 09/04.  He was told to go to ER or call orthopedic surgeon if the pain is unbearable.

## 2024-08-30 NOTE — TELEPHONE ENCOUNTER
Patient is requesting a refill on this medication    Medication: oxycodone 5 mg  PDMP   08/23/2024 08/23/2024 oxyCODONE HCL (Tablet) 14.0 7 5 MG 15.0 NARCISA HANNON

## 2024-08-31 ENCOUNTER — NURSE TRIAGE (OUTPATIENT)
Dept: OTHER | Facility: OTHER | Age: 64
End: 2024-08-31

## 2024-08-31 NOTE — TELEPHONE ENCOUNTER
"Regarding: Broken hip/fractured leg pain  ----- Message from Migdalia MARLOW sent at 8/31/2024  5:56 AM EDT -----  Pt stated, \"I broke my hip and fractured my leg, was in the hospital and was prescribed oxycodone but only 8 pills. What else can I do to alleviate my pain?\"    "

## 2024-08-31 NOTE — TELEPHONE ENCOUNTER
"Reason for Disposition  • Can't stand (bear weight) or walk    Answer Assessment - Initial Assessment Questions  1. LOCATION and RADIATION: \"Where is the pain located?\"       R hip and R leg - fractured leg and hip on 8/19, has been hurting since. No worse today.    2. QUALITY: \"What does the pain feel like?\"  (e.g., sharp, dull, aching, burning)      Pain in kneecap down to ankle and radiates back up    3. SEVERITY: \"How bad is the pain?\" \"What does it keep you from doing?\"   (Scale 1-10; or mild, moderate, severe)    -  MILD (1-3): doesn't interfere with normal activities     -  MODERATE (4-7): interferes with normal activities (e.g., work or school) or awakens from sleep, limping     -  SEVERE (8-10): excruciating pain, unable to do any normal activities, unable to walk      10/10    4. ONSET: \"When did the pain start?\" \"Does it come and go, or is it there all the time?\"          6. CAUSE: \"What do you think is causing the hip pain?\"       Has a fracture - was given 8 5mg oxy tablets but is out      8. OTHER SYMPTOMS: \"Do you have any other symptoms?\" (e.g., back pain, pain shooting down leg,  fever, rash)    Unable to ambulate d/t pain  Neg fever  Leg swelling worse than it was      Patient declines 911  States he will call a family member to take him back to the hospital for re eval    Protocols used: Hip Pain-ADULT-AH    "

## 2024-09-03 DIAGNOSIS — S72.90XA FEMUR FRACTURE (HCC): ICD-10-CM

## 2024-09-03 DIAGNOSIS — S72.001A CLOSED FRACTURE OF RIGHT HIP, INITIAL ENCOUNTER (HCC): Primary | ICD-10-CM

## 2024-09-03 RX ORDER — OXYCODONE HYDROCHLORIDE 5 MG/1
5 TABLET ORAL EVERY 6 HOURS PRN
Qty: 21 TABLET | Refills: 0 | Status: SHIPPED | OUTPATIENT
Start: 2024-09-03 | End: 2024-09-10

## 2024-09-03 RX ORDER — OXYCODONE HYDROCHLORIDE 5 MG/1
5 TABLET ORAL EVERY 6 HOURS PRN
Qty: 14 TABLET | Refills: 0 | OUTPATIENT
Start: 2024-09-03 | End: 2024-09-10

## 2024-09-04 ENCOUNTER — TELEPHONE (OUTPATIENT)
Age: 64
End: 2024-09-04

## 2024-09-04 ENCOUNTER — OFFICE VISIT (OUTPATIENT)
Dept: FAMILY MEDICINE CLINIC | Facility: CLINIC | Age: 64
End: 2024-09-04
Payer: COMMERCIAL

## 2024-09-04 VITALS
TEMPERATURE: 98.2 F | WEIGHT: 173.2 LBS | OXYGEN SATURATION: 100 % | HEART RATE: 71 BPM | RESPIRATION RATE: 18 BRPM | HEIGHT: 68 IN | DIASTOLIC BLOOD PRESSURE: 71 MMHG | BODY MASS INDEX: 26.25 KG/M2 | SYSTOLIC BLOOD PRESSURE: 151 MMHG

## 2024-09-04 DIAGNOSIS — S72.91XS CLOSED FRACTURE OF RIGHT FEMUR, UNSPECIFIED FRACTURE MORPHOLOGY, SEQUELA: ICD-10-CM

## 2024-09-04 DIAGNOSIS — I10 BENIGN ESSENTIAL HYPERTENSION: ICD-10-CM

## 2024-09-04 DIAGNOSIS — F41.1 GENERALIZED ANXIETY DISORDER: ICD-10-CM

## 2024-09-04 DIAGNOSIS — E78.2 MIXED HYPERLIPIDEMIA: ICD-10-CM

## 2024-09-04 DIAGNOSIS — E13.319 RETINOPATHY DUE TO SECONDARY DM (HCC): ICD-10-CM

## 2024-09-04 DIAGNOSIS — M79.89 RIGHT LEG SWELLING: ICD-10-CM

## 2024-09-04 DIAGNOSIS — E11.39 TYPE 2 DIABETES MELLITUS WITH OTHER OPHTHALMIC COMPLICATION, WITH LONG-TERM CURRENT USE OF INSULIN (HCC): ICD-10-CM

## 2024-09-04 DIAGNOSIS — Z79.4 TYPE 2 DIABETES MELLITUS WITH OTHER OPHTHALMIC COMPLICATION, WITH LONG-TERM CURRENT USE OF INSULIN (HCC): ICD-10-CM

## 2024-09-04 DIAGNOSIS — Z12.11 SCREEN FOR COLON CANCER: ICD-10-CM

## 2024-09-04 DIAGNOSIS — G92.8 TOXIC METABOLIC ENCEPHALOPATHY: Primary | ICD-10-CM

## 2024-09-04 DIAGNOSIS — Z23 NEED FOR VACCINATION: ICD-10-CM

## 2024-09-04 PROCEDURE — 96372 THER/PROPH/DIAG INJ SC/IM: CPT | Performed by: FAMILY MEDICINE

## 2024-09-04 PROCEDURE — 90673 RIV3 VACCINE NO PRESERV IM: CPT | Performed by: FAMILY MEDICINE

## 2024-09-04 PROCEDURE — 99495 TRANSJ CARE MGMT MOD F2F 14D: CPT | Performed by: FAMILY MEDICINE

## 2024-09-04 RX ORDER — METFORMIN HCL 500 MG
500 TABLET, EXTENDED RELEASE 24 HR ORAL 2 TIMES DAILY WITH MEALS
Qty: 60 TABLET | Refills: 5 | Status: SHIPPED | OUTPATIENT
Start: 2024-09-04 | End: 2024-10-04

## 2024-09-04 NOTE — TELEPHONE ENCOUNTER
Patient's physical therapy called to report that he fell and broke his femur. She was requesting for a script for a shower transfer bench for him since he has had this injury. Can this script be faxed over to [facility] please   thank you

## 2024-09-04 NOTE — PROGRESS NOTES
Ambulatory Visit  Name: David Skelton Jr.      : 1960      MRN: [id number]  Encounter Provider: Casey Mckeon MD  Encounter Date: 2024   Encounter department: [facility]    Assessment & Plan   1. Toxic metabolic encephalopathy  2. Closed fracture of right femur, unspecified fracture morphology, sequela  -      VAS VENOUS DUPLEX - LOWER LIMB BILATERAL; Future; Expected date: 2024  3. Type 2 diabetes mellitus with other ophthalmic complication, with long-term current use of insulin (HCC)  -     Ambulatory referral to Diabetic Education - use to refer for diabetes group classes, individual diabetes education, medical nutrition therapy, device training; Future; Expected date: 2024  -     Hemoglobin A1C; Future; Expected date: 2024  -     Comprehensive metabolic panel; Future; Expected date: 2024  -     Albumin / creatinine urine ratio; Future; Expected date: 2024  -     TSH, 3rd generation with Free T4 reflex; Future; Expected date: 2024  -     Ambulatory referral to Ophthalmology; Future; Expected date: 2024  -     Ambulatory referral to Endocrinology; Future; Expected date: 2024  -     metFORMIN (GLUCOPHAGE-XR) 500 mg 24 hr tablet; Take 1 tablet (500 mg total) by mouth 2 (two) times a day with meals  4. Retinopathy due to secondary DM (HCC)  -     Ambulatory referral to Ophthalmology; Future; Expected date: 2024  5. Benign essential hypertension  -     Comprehensive metabolic panel; Future; Expected date: 2024  -     CBC and differential; Future; Expected date: 2024  6. Mixed hyperlipidemia  -     Comprehensive metabolic panel; Future; Expected date: 2024  -     Lipid Panel with Direct LDL reflex; Future; Expected date: 2024  7. Generalized anxiety disorder  -     TSH, 3rd generation with Free T4 reflex; Future; Expected date: 2024  -     Drug Toxicology Monitoring 8 Screen, Urine;  Future  8. Right leg swelling  -      VAS VENOUS DUPLEX - LOWER LIMB BILATERAL; Future; Expected date: 09/04/2024  9. Screen for colon cancer  -     Ambulatory Referral to Gastroenterology; Future  10. Need for vaccination  -     RSV Pre-Fusion F A&B Vac Rcmb (Abrysvo) SOLR vaccine; Inject 0.5 mL into a muscle once for 1 dose  -     influenza vaccine, recombinant, PF, 0.5 mL IM (Flublok)      Regarding his recent hospitalization for toxic encephalopathy as per the note, it has resolved.  Patient advised appropriately.  Patient to limit his oxycodone as per the attending recommendation.  At the hospital.  Patient advised to get just enough and appropriate pain medication from his orthopedic because he asserts that he has been secondary to his surgery.  And seeing them for his pain medication and or pain management.  Patient will get the above blood work and a urine test including a urine toxicology screen.  Regarding his right femur fracture, discussed with patient.  Patient advised to continue with his therapy.  And follow-up with his orthopedic as scheduled.  Regarding his diabetes and diabetic retinopathy history, discussed with patient.  Patient education.  Patient advised to have less starches sweets and fats.  Will check his labs as above.  Patient is sent to the ophthalmologist as well as his endocrinologist.  And patient is also referred to diabetic education especially that he asserts that he does not know what to and also what not to eat as per patient.  Will continue to monitor.  Regarding his hypertension his systolic blood pressure is slightly elevated secondary to him getting anxious.  Patient denies any acute cardiovascular neurosymptoms from it.  Patient advised to have a low-salt diet.  Hydrate well.  Continue his current therapy.  I will continue to monitor.  Also check his labs.  Regarding his hyperlipidemia, discussed with patient.  Patient to have less fats starches and sweets.  Will check his  labs.  Continue his current therapy.  Regarding his anxiety, discussed with patient also.  Patient advised to follow-up with his psych.  Denies SI HI.  And meds also per psych.  Regarding his right leg swelling, discussed with patient.  Patient denies any acute cardiovascular pulmonary or neuro symptoms from it.  Patient has had surgery relatively recently on that leg.  Patient is sent for a bilateral lower extremity venous Doppler to rule out DVT also.  Patient advised to follow-up with the surgeon and his therapy.  Patient is sent to GI for his colonoscopy.  Patient is given the flu vaccine with us today.  And patient is sent to his pharmacy for his RSV vaccine.  RTO 1 month and do the blood work and urine before.             History of Present Illness     64-year-old male here for follow-up hospitalization visit.  Patient was most recently admitted from August 19 until August 23 for toxic encephalopathy.  Patient also had recent fracture of his right femur for which he was operated on 31 July.  Patient denies tobacco alcohol or drugs.  Patient does get therapy.  Patient is requesting a refill on his metformin for his diabetes.  Patient also asserts that he needs help with what to eat and what not to eat regarding his diabetes.  Patient has seen his endocrinologist in the past and would like to see them again.  Patient is due to see his ophthalmologist secondary to his diabetic retinopathy history.  Patient is not due for a colonoscopy.  Patient is amenable to getting the flu vaccine with us today.  Patient also due for the RSV vaccine.  No history of an adverse reaction to vaccines in the past.        Review of Systems   Constitutional:  Negative for chills, diaphoresis, fever and unexpected weight change.   HENT:  Negative for congestion and sore throat.    Eyes:  Negative for visual disturbance.   Respiratory:  Negative for cough and shortness of breath.    Cardiovascular:  Negative for chest pain and  "palpitations.   Gastrointestinal:  Negative for abdominal pain and blood in stool.   Genitourinary:  Negative for dysuria and hematuria.   Musculoskeletal:  Positive for arthralgias.   Skin:  Negative for rash.   Neurological:  Negative for dizziness and headaches.   Psychiatric/Behavioral:  Negative for self-injury and suicidal ideas. The patient is nervous/anxious.        Objective     /71 (BP Location: Left arm, Patient Position: Sitting, Cuff Size: Large)   Pulse 71   Temp 98.2 °F (36.8 °C) (Tympanic)   Resp 18   Ht 5' 8\" (1.727 m)   Wt 78.6 kg (173 lb 3.2 oz)   SpO2 100%   BMI 26.33 kg/m²     Physical Exam  Vitals reviewed.   Constitutional:       General: He is not in acute distress.     Appearance: Normal appearance. He is not ill-appearing.   HENT:      Head: Normocephalic and atraumatic.      Mouth/Throat:      Mouth: Mucous membranes are moist.      Pharynx: Oropharynx is clear.   Eyes:      Extraocular Movements: Extraocular movements intact.      Conjunctiva/sclera: Conjunctivae normal.   Neck:      Vascular: No carotid bruit.   Cardiovascular:      Rate and Rhythm: Normal rate and regular rhythm.   Pulmonary:      Effort: Pulmonary effort is normal.      Breath sounds: Normal breath sounds.   Musculoskeletal:         General: No tenderness.      Right lower leg: Edema present.      Left lower leg: No edema.      Comments: No calf tenderness bilateral.  Right lower leg slightly swollen especially secondary to his surgery.  Patient was also using a wheelchair to get around.   Lymphadenopathy:      Cervical: No cervical adenopathy.   Skin:     General: Skin is warm.   Neurological:      General: No focal deficit present.      Mental Status: He is alert and oriented to person, place, and time.   Psychiatric:         Mood and Affect: Mood normal.         Behavior: Behavior normal.         Thought Content: Thought content normal.       Administrative Statements           "

## 2024-09-05 ENCOUNTER — TELEPHONE (OUTPATIENT)
Age: 64
End: 2024-09-05

## 2024-09-05 NOTE — TELEPHONE ENCOUNTER
Pt called to speak to Tess in regards to a power chair. He mentioned the company that was to be used has changed. Please call pt back at     [phone]

## 2024-09-05 NOTE — TELEPHONE ENCOUNTER
Contacted pt off [facility] Wait List, not able to LVM to contact [phone] option 3, for scheduling, the VM says something in Turkish then hangs up.

## 2024-09-09 ENCOUNTER — PATIENT OUTREACH (OUTPATIENT)
Dept: CASE MANAGEMENT | Facility: OTHER | Age: 64
End: 2024-09-09

## 2024-09-09 DIAGNOSIS — F41.1 GENERALIZED ANXIETY DISORDER: ICD-10-CM

## 2024-09-09 DIAGNOSIS — S72.90XA FEMUR FRACTURE (HCC): ICD-10-CM

## 2024-09-09 NOTE — PROGRESS NOTES
"I spoke with David who reports he is \"making progress\". He has [facility].He uses his walker to ambulate.  Patient reports he is \"out of Roxicodone\".I advised him to call his orthopedic surgeon.   He stated he injects his insulin which the nurse draws up for him. His blood sugar was 89 today.   We reviewed his upcoming appointment with his orthopedic surgeon on 9/16. He reports his sister Vandana made him a calendar of his appointments and she will take him.   He did consent to a call after his orthopedic appointment.  "

## 2024-09-09 NOTE — TELEPHONE ENCOUNTER
Medication:  PDMP  08/23/2024 08/23/2024 ALPRAZolam (Tablet) 21.0 7 0.5 MG NA NARCISA HANNON [facility] Medicaid 0 / 0 [place]   1 [id number] 08/05/2024 08/05/2024 oxyCODONE HCL (Tablet) 20.0 7 10 MG 42.86 MANUEL [facility] Commercial Insurance 0 / 0 [place]   1 [id number] 08/05/2024 08/05/2024 ALPRAZolam (Tablet) 20.0 10 0.5 MG NA MANUEL [facility] Commercial Insurance 0 / 0 [place]   1 [id number] 06/20/2024 05/24/2024 ALPRAZolam (Tablet) 30.0 30 1 MG NA ARINA BLAKE [facility]  Active agreement on file -

## 2024-09-10 RX ORDER — ALPRAZOLAM 0.5 MG
0.5 TABLET ORAL DAILY PRN
Qty: 30 TABLET | Refills: 0 | OUTPATIENT
Start: 2024-09-10 | End: 2024-10-10

## 2024-09-10 NOTE — TELEPHONE ENCOUNTER
VM left informing David that to get his refill through the provider prescribing medication as he sent the request to outpatient psychiatric services. Left a call back number if he has any questions. Please refuse as he is not an established patient.

## 2024-09-11 NOTE — TELEPHONE ENCOUNTER
"Received transfer call from pt and relay LATASHA Summers's msg to pt. Pt had nailing of R upper leg femur (on 7/31/24).  Last oxycodone Sig: Take 1 tablet (5 mg total) by mouth every 6 (six) hours as needed for severe pain for up to 7 days Do not take between the hours of midnight and noon Max Daily Amount: 20 mg prescribed 9/3/24.  At first, pt stated he did not receive it. Then he said was it 20 tablets?  I informed him it was 21 tablets.  He then said it was all gone now and he is still in pain at his incision.  Informed pt that he should be taking OTC pain meds at this time in his recovery after surgery and refills will not be given.  He states that he would not be calling if he was not still in pain. States he just got out of the hospital recently. Pt states that if she will not give him more pain medication then he will just go back to the hospital. Said, \"Thank you\" and ended call.   "

## 2024-09-12 RX ORDER — OXYCODONE HYDROCHLORIDE 5 MG/1
5 TABLET ORAL EVERY 6 HOURS PRN
Qty: 21 TABLET | Refills: 0 | OUTPATIENT
Start: 2024-09-12 | End: 2024-09-19

## 2024-09-12 NOTE — TELEPHONE ENCOUNTER
Called and spoke w/pt and relayed LATASHA Summers's msg to pt.  He asked if she had sent a refill of his pain meds. Once again, informed pt that a refill of pain med was not sent and pain meds would be discussed at ov on 9/16/24 at arrival 3PM appt 3:15 at [facility] office [address] [facility]. Pt should take ibuprofen and tylenol as per LATASHA Summers's msg.  Pt states, Ok. No further questions/concerns at this time.

## 2024-09-12 NOTE — TELEPHONE ENCOUNTER
Refill sent in on 9/3... has follow up with us on 9/16 where we can discuss pain medications. He should continue taking ibuprofen and tylenol.

## 2024-09-13 NOTE — ASSESSMENT & PLAN NOTE
Pt initially very lethargic on arrival to ED, suspect due to methadone  - Pt became more alert, but was still confused.     - Pt at high risk for delirium   - Geriatrics on board  - Pt seen by Neuropsych, and was deemed to have no decision making capacity. Will discuss with family or health proxy on medical decisions.    13-Sep-2024 03:36

## 2024-09-14 ENCOUNTER — TELEPHONE (OUTPATIENT)
Dept: OTHER | Facility: OTHER | Age: 64
End: 2024-09-14

## 2024-09-14 NOTE — TELEPHONE ENCOUNTER
Pt reached the after hours service, he is asking for a letter to give the methadone clinic stating he is unable to get there 3 x per week due to his condition and the distance. Could you please give pt a call, he is asking for the letter for Tuesday when he has a meeting with them.

## 2024-09-16 ENCOUNTER — HOSPITAL ENCOUNTER (INPATIENT)
Facility: HOSPITAL | Age: 64
LOS: 4 days | Discharge: HOME | DRG: 812 | End: 2024-09-20
Attending: EMERGENCY MEDICINE | Admitting: FAMILY MEDICINE
Payer: COMMERCIAL

## 2024-09-16 ENCOUNTER — APPOINTMENT (EMERGENCY)
Dept: RADIOLOGY | Facility: HOSPITAL | Age: 64
DRG: 812 | End: 2024-09-16
Payer: COMMERCIAL

## 2024-09-16 ENCOUNTER — PATIENT OUTREACH (OUTPATIENT)
Dept: CASE MANAGEMENT | Facility: OTHER | Age: 64
End: 2024-09-16

## 2024-09-16 DIAGNOSIS — R41.82 ALTERED MENTAL STATUS: Primary | ICD-10-CM

## 2024-09-16 DIAGNOSIS — F11.20 METHADONE DEPENDENCE (HCC): ICD-10-CM

## 2024-09-16 PROBLEM — J96.00 ACUTE RESPIRATORY FAILURE (HCC): Status: ACTIVE | Noted: 2024-09-16

## 2024-09-16 PROBLEM — E78.2 MIXED HYPERLIPIDEMIA: Status: RESOLVED | Noted: 2019-03-01 | Resolved: 2024-09-16

## 2024-09-16 LAB
ALBUMIN SERPL BCG-MCNC: 3.4 G/DL (ref 3.5–5)
ALP SERPL-CCNC: 194 U/L (ref 34–104)
ALT SERPL W P-5'-P-CCNC: 31 U/L (ref 7–52)
ANION GAP SERPL CALCULATED.3IONS-SCNC: 2 MMOL/L (ref 4–13)
ANISOCYTOSIS BLD QL SMEAR: PRESENT
APAP SERPL-MCNC: <2 UG/ML (ref 10–20)
AST SERPL W P-5'-P-CCNC: 19 U/L (ref 13–39)
ATRIAL RATE: 95 BPM
BASOPHILS # BLD AUTO: 0.03 THOUSANDS/ΜL (ref 0–0.1)
BASOPHILS # BLD MANUAL: 0.1 THOUSAND/UL (ref 0–0.1)
BASOPHILS NFR BLD AUTO: 0 % (ref 0–1)
BASOPHILS NFR MAR MANUAL: 1 % (ref 0–1)
BILIRUB SERPL-MCNC: 0.29 MG/DL (ref 0.2–1)
BUN SERPL-MCNC: 25 MG/DL (ref 5–25)
CALCIUM ALBUM COR SERPL-MCNC: 8.9 MG/DL (ref 8.3–10.1)
CALCIUM SERPL-MCNC: 8.4 MG/DL (ref 8.4–10.2)
CHLORIDE SERPL-SCNC: 106 MMOL/L (ref 96–108)
CO2 SERPL-SCNC: 32 MMOL/L (ref 21–32)
CREAT SERPL-MCNC: 0.98 MG/DL (ref 0.6–1.3)
EOSINOPHIL # BLD AUTO: 0.68 THOUSAND/ΜL (ref 0–0.61)
EOSINOPHIL # BLD MANUAL: 0 THOUSAND/UL (ref 0–0.4)
EOSINOPHIL NFR BLD AUTO: 6 % (ref 0–6)
EOSINOPHIL NFR BLD MANUAL: 0 % (ref 0–6)
ERYTHROCYTE [DISTWIDTH] IN BLOOD BY AUTOMATED COUNT: 13.2 % (ref 11.6–15.1)
ERYTHROCYTE [DISTWIDTH] IN BLOOD BY AUTOMATED COUNT: 13.3 % (ref 11.6–15.1)
ETHANOL SERPL-MCNC: <10 MG/DL
FLUAV RNA RESP QL NAA+PROBE: NEGATIVE
FLUBV RNA RESP QL NAA+PROBE: NEGATIVE
GFR SERPL CREATININE-BSD FRML MDRD: 82 ML/MIN/1.73SQ M
GLUCOSE SERPL-MCNC: 135 MG/DL (ref 65–140)
GLUCOSE SERPL-MCNC: 136 MG/DL (ref 65–140)
GLUCOSE SERPL-MCNC: 144 MG/DL (ref 65–140)
GLUCOSE SERPL-MCNC: 153 MG/DL (ref 65–140)
HCT VFR BLD AUTO: 28.9 % (ref 36.5–49.3)
HCT VFR BLD AUTO: 30 % (ref 36.5–49.3)
HGB BLD-MCNC: 8.9 G/DL (ref 12–17)
HGB BLD-MCNC: 9.1 G/DL (ref 12–17)
IMM GRANULOCYTES # BLD AUTO: 0.07 THOUSAND/UL (ref 0–0.2)
IMM GRANULOCYTES NFR BLD AUTO: 1 % (ref 0–2)
LACTATE SERPL-SCNC: 4.4 MMOL/L (ref 0.5–2)
LYMPHOCYTES # BLD AUTO: 0.21 THOUSAND/UL (ref 0.6–4.47)
LYMPHOCYTES # BLD AUTO: 0.92 THOUSANDS/ΜL (ref 0.6–4.47)
LYMPHOCYTES # BLD AUTO: 2 % (ref 14–44)
LYMPHOCYTES NFR BLD AUTO: 8 % (ref 14–44)
MACROCYTES BLD QL AUTO: PRESENT
MCH RBC QN AUTO: 28.1 PG (ref 26.8–34.3)
MCH RBC QN AUTO: 28.4 PG (ref 26.8–34.3)
MCHC RBC AUTO-ENTMCNC: 30.3 G/DL (ref 31.4–37.4)
MCHC RBC AUTO-ENTMCNC: 30.8 G/DL (ref 31.4–37.4)
MCV RBC AUTO: 91 FL (ref 82–98)
MCV RBC AUTO: 94 FL (ref 82–98)
METAMYELOCYTE ABSOLUTE CT: 0.1 THOUSAND/UL (ref 0–0.1)
METAMYELOCYTES NFR BLD MANUAL: 1 % (ref 0–1)
MONOCYTES # BLD AUTO: 0.21 THOUSAND/UL (ref 0–1.22)
MONOCYTES # BLD AUTO: 0.66 THOUSAND/ΜL (ref 0.17–1.22)
MONOCYTES NFR BLD AUTO: 6 % (ref 4–12)
MONOCYTES NFR BLD: 2 % (ref 4–12)
MYELOCYTE ABSOLUTE CT: 0.1 THOUSAND/UL (ref 0–0.1)
MYELOCYTES NFR BLD MANUAL: 1 % (ref 0–1)
NEUTROPHILS # BLD AUTO: 8.72 THOUSANDS/ΜL (ref 1.85–7.62)
NEUTROPHILS # BLD MANUAL: 9.55 THOUSAND/UL (ref 1.85–7.62)
NEUTS BAND NFR BLD MANUAL: 15 % (ref 0–8)
NEUTS SEG NFR BLD AUTO: 78 % (ref 43–75)
NEUTS SEG NFR BLD AUTO: 79 % (ref 43–75)
NRBC BLD AUTO-RTO: 0 /100 WBCS
P AXIS: 47 DEGREES
PLATELET # BLD AUTO: 306 THOUSANDS/UL (ref 149–390)
PLATELET # BLD AUTO: 331 THOUSANDS/UL (ref 149–390)
PLATELET # BLD AUTO: 334 THOUSANDS/UL (ref 149–390)
PLATELET BLD QL SMEAR: ADEQUATE
PMV BLD AUTO: 10.9 FL (ref 8.9–12.7)
PMV BLD AUTO: 10.9 FL (ref 8.9–12.7)
PMV BLD AUTO: 11.1 FL (ref 8.9–12.7)
POTASSIUM SERPL-SCNC: 4.6 MMOL/L (ref 3.5–5.3)
PR INTERVAL: 140 MS
PROCALCITONIN SERPL-MCNC: 2.62 NG/ML
PROT SERPL-MCNC: 5.7 G/DL (ref 6.4–8.4)
QRS AXIS: 67 DEGREES
QRSD INTERVAL: 92 MS
QT INTERVAL: 378 MS
QTC INTERVAL: 475 MS
RBC # BLD AUTO: 3.17 MILLION/UL (ref 3.88–5.62)
RBC # BLD AUTO: 3.2 MILLION/UL (ref 3.88–5.62)
RBC MORPH BLD: PRESENT
RSV RNA RESP QL NAA+PROBE: NEGATIVE
SALICYLATES SERPL-MCNC: <5 MG/DL (ref 3–20)
SARS-COV-2 RNA RESP QL NAA+PROBE: NEGATIVE
SODIUM SERPL-SCNC: 140 MMOL/L (ref 135–147)
T WAVE AXIS: 73 DEGREES
TSH SERPL DL<=0.05 MIU/L-ACNC: 0.78 UIU/ML (ref 0.45–4.5)
VENTRICULAR RATE: 95 BPM
WBC # BLD AUTO: 10.27 THOUSAND/UL (ref 4.31–10.16)
WBC # BLD AUTO: 11.08 THOUSAND/UL (ref 4.31–10.16)

## 2024-09-16 PROCEDURE — 94002 VENT MGMT INPAT INIT DAY: CPT

## 2024-09-16 PROCEDURE — 99223 1ST HOSP IP/OBS HIGH 75: CPT | Performed by: FAMILY MEDICINE

## 2024-09-16 PROCEDURE — 36415 COLL VENOUS BLD VENIPUNCTURE: CPT

## 2024-09-16 PROCEDURE — 85027 COMPLETE CBC AUTOMATED: CPT | Performed by: PHYSICIAN ASSISTANT

## 2024-09-16 PROCEDURE — 85007 BL SMEAR W/DIFF WBC COUNT: CPT | Performed by: PHYSICIAN ASSISTANT

## 2024-09-16 PROCEDURE — 70450 CT HEAD/BRAIN W/O DYE: CPT

## 2024-09-16 PROCEDURE — 99285 EMERGENCY DEPT VISIT HI MDM: CPT | Performed by: EMERGENCY MEDICINE

## 2024-09-16 PROCEDURE — 85049 AUTOMATED PLATELET COUNT: CPT | Performed by: FAMILY MEDICINE

## 2024-09-16 PROCEDURE — 99285 EMERGENCY DEPT VISIT HI MDM: CPT

## 2024-09-16 PROCEDURE — 93005 ELECTROCARDIOGRAM TRACING: CPT

## 2024-09-16 PROCEDURE — 84145 PROCALCITONIN (PCT): CPT | Performed by: PHYSICIAN ASSISTANT

## 2024-09-16 PROCEDURE — 80179 DRUG ASSAY SALICYLATE: CPT

## 2024-09-16 PROCEDURE — 85025 COMPLETE CBC W/AUTO DIFF WBC: CPT

## 2024-09-16 PROCEDURE — 72125 CT NECK SPINE W/O DYE: CPT

## 2024-09-16 PROCEDURE — 82077 ASSAY SPEC XCP UR&BREATH IA: CPT

## 2024-09-16 PROCEDURE — 94760 N-INVAS EAR/PLS OXIMETRY 1: CPT

## 2024-09-16 PROCEDURE — 93010 ELECTROCARDIOGRAM REPORT: CPT | Performed by: INTERNAL MEDICINE

## 2024-09-16 PROCEDURE — 0241U HB NFCT DS VIR RESP RNA 4 TRGT: CPT | Performed by: PHYSICIAN ASSISTANT

## 2024-09-16 PROCEDURE — 80143 DRUG ASSAY ACETAMINOPHEN: CPT

## 2024-09-16 PROCEDURE — 83605 ASSAY OF LACTIC ACID: CPT | Performed by: PHYSICIAN ASSISTANT

## 2024-09-16 PROCEDURE — 82948 REAGENT STRIP/BLOOD GLUCOSE: CPT

## 2024-09-16 PROCEDURE — 80053 COMPREHEN METABOLIC PANEL: CPT

## 2024-09-16 PROCEDURE — 71045 X-RAY EXAM CHEST 1 VIEW: CPT

## 2024-09-16 PROCEDURE — 87040 BLOOD CULTURE FOR BACTERIA: CPT | Performed by: PHYSICIAN ASSISTANT

## 2024-09-16 PROCEDURE — 84443 ASSAY THYROID STIM HORMONE: CPT

## 2024-09-16 RX ORDER — LABETALOL HYDROCHLORIDE 5 MG/ML
10 INJECTION, SOLUTION INTRAVENOUS EVERY 6 HOURS PRN
Status: DISCONTINUED | OUTPATIENT
Start: 2024-09-16 | End: 2024-09-16

## 2024-09-16 RX ORDER — LABETALOL HYDROCHLORIDE 5 MG/ML
10 INJECTION, SOLUTION INTRAVENOUS EVERY 4 HOURS PRN
Status: DISCONTINUED | OUTPATIENT
Start: 2024-09-16 | End: 2024-09-20 | Stop reason: HOSPADM

## 2024-09-16 RX ORDER — ACETAMINOPHEN 325 MG/1
650 TABLET ORAL EVERY 6 HOURS PRN
Status: DISCONTINUED | OUTPATIENT
Start: 2024-09-16 | End: 2024-09-16

## 2024-09-16 RX ORDER — SODIUM CHLORIDE, SODIUM GLUCONATE, SODIUM ACETATE, POTASSIUM CHLORIDE, MAGNESIUM CHLORIDE, SODIUM PHOSPHATE, DIBASIC, AND POTASSIUM PHOSPHATE .53; .5; .37; .037; .03; .012; .00082 G/100ML; G/100ML; G/100ML; G/100ML; G/100ML; G/100ML; G/100ML
1358 INJECTION, SOLUTION INTRAVENOUS ONCE
Status: COMPLETED | OUTPATIENT
Start: 2024-09-16 | End: 2024-09-17

## 2024-09-16 RX ORDER — ACETAMINOPHEN 10 MG/ML
1000 INJECTION, SOLUTION INTRAVENOUS ONCE
Status: COMPLETED | OUTPATIENT
Start: 2024-09-16 | End: 2024-09-16

## 2024-09-16 RX ORDER — INSULIN LISPRO 100 [IU]/ML
1-5 INJECTION, SOLUTION INTRAVENOUS; SUBCUTANEOUS
Status: DISCONTINUED | OUTPATIENT
Start: 2024-09-16 | End: 2024-09-20 | Stop reason: HOSPADM

## 2024-09-16 RX ORDER — SODIUM CHLORIDE, SODIUM GLUCONATE, SODIUM ACETATE, POTASSIUM CHLORIDE, MAGNESIUM CHLORIDE, SODIUM PHOSPHATE, DIBASIC, AND POTASSIUM PHOSPHATE .53; .5; .37; .037; .03; .012; .00082 G/100ML; G/100ML; G/100ML; G/100ML; G/100ML; G/100ML; G/100ML
1000 INJECTION, SOLUTION INTRAVENOUS ONCE
Status: COMPLETED | OUTPATIENT
Start: 2024-09-16 | End: 2024-09-16

## 2024-09-16 RX ORDER — ACETAMINOPHEN 650 MG/1
650 SUPPOSITORY RECTAL EVERY 4 HOURS PRN
Status: DISCONTINUED | OUTPATIENT
Start: 2024-09-17 | End: 2024-09-17

## 2024-09-16 RX ORDER — HEPARIN SODIUM 5000 [USP'U]/ML
5000 INJECTION, SOLUTION INTRAVENOUS; SUBCUTANEOUS EVERY 8 HOURS SCHEDULED
Status: DISCONTINUED | OUTPATIENT
Start: 2024-09-16 | End: 2024-09-20 | Stop reason: HOSPADM

## 2024-09-16 RX ADMIN — LABETALOL HYDROCHLORIDE 10 MG: 5 INJECTION, SOLUTION INTRAVENOUS at 20:53

## 2024-09-16 RX ADMIN — SODIUM CHLORIDE, SODIUM GLUCONATE, SODIUM ACETATE, POTASSIUM CHLORIDE, MAGNESIUM CHLORIDE, SODIUM PHOSPHATE, DIBASIC, AND POTASSIUM PHOSPHATE 1358 ML: .53; .5; .37; .037; .03; .012; .00082 INJECTION, SOLUTION INTRAVENOUS at 23:20

## 2024-09-16 RX ADMIN — LABETALOL HYDROCHLORIDE 10 MG: 5 INJECTION, SOLUTION INTRAVENOUS at 12:18

## 2024-09-16 RX ADMIN — ACETAMINOPHEN 1000 MG: 10 INJECTION INTRAVENOUS at 20:54

## 2024-09-16 RX ADMIN — HEPARIN SODIUM 5000 UNITS: 5000 INJECTION INTRAVENOUS; SUBCUTANEOUS at 21:05

## 2024-09-16 RX ADMIN — HEPARIN SODIUM 5000 UNITS: 5000 INJECTION INTRAVENOUS; SUBCUTANEOUS at 13:53

## 2024-09-16 RX ADMIN — LABETALOL HYDROCHLORIDE 10 MG: 5 INJECTION, SOLUTION INTRAVENOUS at 16:22

## 2024-09-16 RX ADMIN — SODIUM CHLORIDE, SODIUM GLUCONATE, SODIUM ACETATE, POTASSIUM CHLORIDE, MAGNESIUM CHLORIDE, SODIUM PHOSPHATE, DIBASIC, AND POTASSIUM PHOSPHATE 1000 ML: .53; .5; .37; .037; .03; .012; .00082 INJECTION, SOLUTION INTRAVENOUS at 21:18

## 2024-09-16 NOTE — ASSESSMENT & PLAN NOTE
Continue metoprolol 12.5 mg daily  Blood pressure not at goal we will place patient on labetalol as needed

## 2024-09-16 NOTE — ASSESSMENT & PLAN NOTE
Lab Results   Component Value Date    HGBA1C 10.7 (H) 07/31/2024       Recent Labs     09/16/24  1155   POCGLU 135       Blood Sugar Average: Last 72 hrs:  (P) 135  Poorly controlled evidence within A1c of 10.7  Continue Lantus 10 units at night hold metformin hold Prandin  Start patient on insulin sliding scale

## 2024-09-16 NOTE — ED ATTENDING ATTESTATION
9/16/2024  I, Palmer Archer MD, saw and evaluated the patient. I have discussed the patient with the resident/non-physician practitioner and agree with the resident's/non-physician practitioner's findings, Plan of Care, and MDM as documented in the resident's/non-physician practitioner's note, except where noted. All available labs and Radiology studies were reviewed.  I was present for key portions of any procedure(s) performed by the resident/non-physician practitioner and I was immediately available to provide assistance.       At this point I agree with the current assessment done in the Emergency Department.  I have conducted an independent evaluation of this patient a history and physical is as follows:    ED Course     64-year-old male, history of polypharmacy, presenting from his residence after being found by his neighbors slumped in the hallway.  Patient is unable to provide any history.    On examination the patient is somnolent but arousable to physical stimuli.  Eyelids are closed.  Pupils are 1 mm bilaterally.  Patient moans to physical stimuli.  Lungs are clear bilaterally.  Heart is regular rate and rhythm with no murmurs rubs or gallops.  Abdomen is nondistended, soft and nontender.  Extremities unremarkable.  Patient withdraws from pain.    MEDICAL DECISION MAKING    Number and Complexity of Problems  Differential diagnosis: Altered mental status secondary to polypharmacy versus intracranial catastrophe versus metabolic process.    Medical Decision Making Data  External documents reviewed: Reviewed last office visit from 9/4/2024.  My EKG interpretation: Normal sinus rhythm at 95 bpm.  No acute ST or T wave changes  My CT interpretation: No intracranial bleed.  No fracture.  My X-ray interpretation: No acute cardiopulmonary disease.    XR chest 1 view portable   ED Interpretation   No acute cardiopulmonary disease.      CT head without contrast   Final Result      No acute intracranial  abnormality.  Chronic microangiopathic changes.                  Workstation performed: [id number]         CT spine cervical without contrast   Final Result      No cervical spine fracture or traumatic malalignment.                  Workstation performed: [id number]             Labs Reviewed   CBC AND DIFFERENTIAL - Abnormal       Result Value Ref Range Status    WBC 11.08 (*) 4.31 - 10.16 Thousand/uL Final    RBC 3.17 (*) 3.88 - 5.62 Million/uL Final    Hemoglobin 8.9 (*) 12.0 - 17.0 g/dL Final    Hematocrit 28.9 (*) 36.5 - 49.3 % Final    MCV 91  82 - 98 fL Final    MCH 28.1  26.8 - 34.3 pg Final    MCHC 30.8 (*) 31.4 - 37.4 g/dL Final    RDW 13.3  11.6 - 15.1 % Final    MPV 10.9  8.9 - 12.7 fL Final    Platelets 331  149 - 390 Thousands/uL Final    nRBC 0  /100 WBCs Final    Segmented % 79 (*) 43 - 75 % Final    Immature Grans % 1  0 - 2 % Final    Lymphocytes % 8 (*) 14 - 44 % Final    Monocytes % 6  4 - 12 % Final    Eosinophils Relative 6  0 - 6 % Final    Basophils Relative 0  0 - 1 % Final    Absolute Neutrophils 8.72 (*) 1.85 - 7.62 Thousands/µL Final    Absolute Immature Grans 0.07  0.00 - 0.20 Thousand/uL Final    Absolute Lymphocytes 0.92  0.60 - 4.47 Thousands/µL Final    Absolute Monocytes 0.66  0.17 - 1.22 Thousand/µL Final    Eosinophils Absolute 0.68 (*) 0.00 - 0.61 Thousand/µL Final    Basophils Absolute 0.03  0.00 - 0.10 Thousands/µL Final   COMPREHENSIVE METABOLIC PANEL - Abnormal    Sodium 140  135 - 147 mmol/L Final    Potassium 4.6  3.5 - 5.3 mmol/L Final    Chloride 106  96 - 108 mmol/L Final    CO2 32  21 - 32 mmol/L Final    ANION GAP 2 (*) 4 - 13 mmol/L Final    BUN 25  5 - 25 mg/dL Final    Creatinine 0.98  0.60 - 1.30 mg/dL Final    Comment: Standardized to IDMS reference method    Glucose 153 (*) 65 - 140 mg/dL Final    Comment: If the patient is fasting, the ADA then defines impaired fasting glucose as > 100 mg/dL and diabetes as > or equal to 123 mg/dL.    Calcium 8.4  8.4 - 10.2  mg/dL Final    Corrected Calcium 8.9  8.3 - 10.1 mg/dL Final    AST 19  13 - 39 U/L Final    ALT 31  7 - 52 U/L Final    Comment: Specimen collection should occur prior to Sulfasalazine administration due to the potential for falsely depressed results.     Alkaline Phosphatase 194 (*) 34 - 104 U/L Final    Total Protein 5.7 (*) 6.4 - 8.4 g/dL Final    Albumin 3.4 (*) 3.5 - 5.0 g/dL Final    Total Bilirubin 0.29  0.20 - 1.00 mg/dL Final    Comment: Use of this assay is not recommended for patients undergoing treatment with eltrombopag due to the potential for falsely elevated results.  N-acetyl-p-benzoquinone imine (metabolite of Acetaminophen) will generate erroneously low results in samples for patients that have taken an overdose of Acetaminophen.    eGFR 82  ml/min/1.73sq m Final    Narrative:     National Kidney Disease Foundation guidelines for Chronic Kidney Disease (CKD):     Stage 1 with normal or high GFR (GFR > 90 mL/min/1.73 square meters)    Stage 2 Mild CKD (GFR = 60-89 mL/min/1.73 square meters)    Stage 3A Moderate CKD (GFR = 45-59 mL/min/1.73 square meters)    Stage 3B Moderate CKD (GFR = 30-44 mL/min/1.73 square meters)    Stage 4 Severe CKD (GFR = 15-29 mL/min/1.73 square meters)    Stage 5 End Stage CKD (GFR <15 mL/min/1.73 square meters)  Note: GFR calculation is accurate only with a steady state creatinine   ACETAMINOPHEN LEVEL - Abnormal    Acetaminophen Level <2 (*) 10 - 20 ug/mL Final   TSH, 3RD GENERATION - Normal    TSH 3RD GENERATON 0.777  0.450 - 4.500 uIU/mL Final    Comment: Adult TSH (3rd generation) reference range follows the recommended guidelines of the American Thyroid Association, January, 2020.   MEDICAL ALCOHOL - Normal    Ethanol Lvl <10  <10 mg/dL Final   SALICYLATE LEVEL - Normal    Salicylate Lvl <5  3 - 20 mg/dL Final   PLATELET COUNT - Normal    Platelets 334  149 - 390 Thousands/uL Final    MPV 10.9  8.9 - 12.7 fL Final   POCT GLUCOSE - Normal    POC Glucose 135  65 -  140 mg/dl Final   COMA PANEL    Narrative:     The following orders were created for panel order Coma Panel.  Procedure                               Abnormality         Status                     ---------                               -----------         ------                     Ethanol[[id number]]                      Normal              Final result               Salicylate level[[id number]]             Normal              Final result               Acetaminophen level-If c...[[id number]]  Abnormal            Final result                 Please view results for these tests on the individual orders.       Labs reviewed by me are significant for:  Hemoglobin 8.9.    Discussed case with: Medicine.  Considered admission for: Altered mental status.  Toxic encephalopathy.    Treatment and Disposition  ED course: Patient remained hemodynamically stable in the emergency department.  Patient had nasal trumpet placed in the emergency department to assist with oxygenation and managing his airway given his degree of somnolence.  Patient continued to be monitored because of periodic mild hypoxia during his emergency department stay.  Patient will be admitted for somnolence.  Shared decision making: Patient unable to participate in medical decision making's currently.  Code status: Full code.              Critical Care Time  Procedures

## 2024-09-16 NOTE — H&P
H&P - Hospitalist   Name: David Skelton Jr. 64 y.o. male I MRN: [id number]  Unit/Bed#: ED 01 I Date of Admission: 9/16/2024   Date of Service: 9/16/2024 I Hospital Day: 0     Assessment & Plan  Toxic encephalopathy  Patient has multiple recent hospitalization secondary to toxic metabolic encephalopathy most recent episode was last month on August 19  Patient goes to a methadone clinic called [facility] phone number [phone].   175mg new daily last dose 9/16 @ 5:34 am  Continuous pulse ox  Administer naloxone if needed  Holding all medications  N.p.o.  Atrial fibrillation (HCC)  Continue metoprolol 12.5 mg daily  Benign essential hypertension  Continue metoprolol 12.5 mg daily  Blood pressure not at goal we will place patient on labetalol as needed  Type 2 diabetes mellitus, with long-term current use of insulin (HCC)  Lab Results   Component Value Date    HGBA1C 10.7 (H) 07/31/2024       Recent Labs     09/16/24  1155   POCGLU 135       Blood Sugar Average: Last 72 hrs:  (P) 135  Poorly controlled evidence within A1c of 10.7  Continue Lantus 10 units at night hold metformin hold Prandin  Start patient on insulin sliding scale  Recurrent falls  Fall precaution  PT OT evaluation  CT head and CT cervical spine unremarkable for acute pathology  Acute respiratory failure (HCC)  2/2 TME  Will start patient on Bipap as he is able to follow directions and does wake up to voice.     VTE Pharmacologic Prophylaxis:   Moderate Risk (Score 3-4) - Pharmacological DVT Prophylaxis Ordered: heparin.  Code Status: Level 1 - Full Code Full code  Discussion with family: Patient declined call to .     Anticipated Length of Stay: Patient will be admitted on an inpatient basis with an anticipated length of stay of greater than 2 midnights secondary to toxic metabolic encephalopathy.    History of Present Illness   Chief Complaint: Toxic metabolic encephalopathy    David Skelton Jr. is a 64 y.o. male with a  PMH of opioid abuse, methadone use who presents with metabolic encephalopathy.  Patient has multiple admissions for metabolic encephalopathy secondary to overdose of methadone.  Patient was found to have another patient's bottle of methadone and his sock upon arrival to the emergency department.  Patient is sleeping throughout the interview.  He is arousable to sternal rub as well as verbal commands and is able to follow simple commands such as cough and squeeze.  Patient not a good historian.    Review of Systems   Unable to perform ROS: Mental status change       I have reviewed the patient's PMH, PSH, Social History, Family History, Meds, and Allergies    Current Facility-Administered Medications:     heparin (porcine) subcutaneous injection 5,000 Units, Q8H MELISA **AND** [COMPLETED] Platelet count, Once    insulin lispro (HumALOG/ADMELOG) 100 units/mL subcutaneous injection 1-5 Units, TID AC **AND** Fingerstick Glucose (POCT), TID AC    insulin lispro (HumALOG/ADMELOG) 100 units/mL subcutaneous injection 1-5 Units, HS    labetalol (NORMODYNE) injection 10 mg, Q6H PRN    naloxone (NARCAN) 0.04 mg/mL syringe 0.04 mg, Q1MIN PRN  Prior to Admission Medications   Prescriptions Last Dose Informant Patient Reported? Taking?   ALPRAZolam (XANAX) 0.5 mg tablet   No No   Sig: Take 1 tablet (0.5 mg total) by mouth daily as needed for anxiety for up to 10 days   ALPRAZolam (XANAX) 0.5 mg tablet   No No   Sig: Take 1 tablet (0.5 mg) in the afternoon and 2 tablets (1 mg) at bedtime.   Alcohol Swabs 70 % PADS   No No   Sig: May substitute brand based on insurance coverage. Check glucose BID.   Blood Glucose Monitoring Suppl (OneTouch Verio Reflect) w/Device KIT   No No   Sig: May substitute brand based on insurance coverage. Check glucose BID.   Continuous Blood Gluc  (Diagnoplexyle Grace 14 Day [place]) STEVE  Self No No   Si Device by Device route 3 (three) times a day before meals   Patient not taking: Reported on  2024   Continuous Blood Gluc  (FreeStyle Grace 2 [place]) STEVE  Self No No   Si Device by Device route 3 (three) times a day before meals   Patient not taking: Reported on 7/3/2024   GLOBAL EASE INJECT PEN NEEDLES 31G X 8 MM MISC  Self Yes No   Patient not taking: Reported on 7/3/2024   Incontinence Supply Disposable (INCONTINENCE BRIEF MEDIUM) MISC  Self No No   Sig: by Does not apply route as needed (fecal soiling)   Patient not taking: Reported on 7/3/2024   Incontinence Supply Disposable (RA WIPES FLUSHABLE/MOIST) MISC  Self No No   Sig: by Does not apply route as needed (fecal soiling)   Patient not taking: Reported on 2024   Insulin Pen Needle (B-D UF III MINI PEN NEEDLES) 31G X 5 MM MISC  Self No No   Sig: Inject 15 Units under the skin daily at bedtime   Patient not taking: Reported on 2024   Insulin Pen Needle (BD Pen Needle Aminta 2nd Gen) 32G X 4 MM MISC   No No   Sig: For use with insulin pen. Pharmacy may dispense brand covered by insurance.   Patient not taking: Reported on 2024   Misc. Devices (CANE) MISC  Self No No   Sig: by Does not apply route daily Dx:  Frequent falls   OneTouch Delica Lancets 33G MISC   No No   Sig: May substitute brand based on insurance coverage. Check glucose BID.   Patient not taking: Reported on 2024   acetaminophen (TYLENOL) 325 mg tablet  Self No No   Sig: Take 2 tablets (650 mg total) by mouth every 4 (four) hours as needed for mild pain   aspirin 81 mg chewable tablet   No No   Sig: Chew 1 tablet (81 mg total) 2 (two) times a day for 28 days   aspirin 81 mg chewable tablet   Yes No   Sig: Chew 81 mg 2 (two) times a day   calcium carbonate (OYSTER SHELL,OSCAL) 500 mg   No No   Sig: Take 2 tablets by mouth 3 (three) times a day with meals   Patient not taking: Reported on 2024   docusate sodium (COLACE) 100 mg capsule   No No   Sig: Take 1 capsule (100 mg total) by mouth 2 (two) times a day for 5 days   ergocalciferol (ERGOCALCIFEROL)  1.25 MG (13930 UT) capsule   Yes No   Sig: Take 50,000 Units by mouth   fluticasone (FLONASE) 50 mcg/act nasal spray   No No   Sig: instill 1 spray into each nostril once daily   Patient not taking: Reported on 2024   fluticasone (FLONASE) 50 mcg/act nasal spray   No No   Si spray into each nostril daily   gabapentin (NEURONTIN) 300 mg capsule   No No   Sig: Take 1 capsule (300 mg total) by mouth 3 (three) times a day   glucose blood (OneTouch Verio) test strip   No No   Sig: May substitute brand based on insurance coverage. Check glucose BID.   insulin glargine (LANTUS) 100 units/mL subcutaneous injection   No No   Sig: Inject 10 Units under the skin daily at bedtime   Patient not taking: Reported on 2024   insulin glargine (LANTUS) 100 units/mL subcutaneous injection   Yes No   Sig: Inject 10 Units under the skin daily at bedtime   Patient not taking: Reported on 2024   metFORMIN (GLUCOPHAGE-XR) 500 mg 24 hr tablet   No No   Sig: Take 1 tablet (500 mg total) by mouth 2 (two) times a day with meals   methadone (DOLOPHINE) 10 mg/mL oral concentrated solution   Yes No   Sig: Take 190 mg by mouth in the morning   methadone (DOLOPHINE) 5 mg tablet  Self Yes No   Sig: Take 165 mg by mouth daily   metoprolol succinate (TOPROL-XL) 25 mg 24 hr tablet   No No   Sig: Take 0.5 tablets (12.5 mg total) by mouth daily   Patient not taking: Reported on 2024   naloxone (NARCAN) 4 mg/0.1 mL nasal spray   No No   Sig: Administer 1 spray into a nostril. If no response after 2-3 minutes, give another dose in the other nostril using a new spray.   ondansetron (ZOFRAN-ODT) 4 mg disintegrating tablet  Self No No   Sig: Take 1 tablet (4 mg total) by mouth every 6 (six) hours as needed for vomiting   Patient not taking: Reported on 2024   oxyCODONE (ROXICODONE) 10 MG TABS   No No   Sig: You may take 5 mg (0.5 tab) for moderate pain or 10 mg (1 tab) for severe pain, every 4 hours, as needed.   Patient not taking:  Reported on 9/4/2024   repaglinide (PRANDIN) 1 mg tablet   No No   Sig: Take 1 tablet (1 mg total) by mouth 3 (three) times a day before meals   Patient not taking: Reported on 9/4/2024   repaglinide (PRANDIN) 1 mg tablet   Yes No   Sig: Take 1 mg by mouth 3 (three) times a day before meals   Patient not taking: Reported on 9/4/2024      Facility-Administered Medications: None     Social History:  Marital Status: Legally    Occupation: N/A  Patient Pre-hospital Living Situation: Skilled Nursing Facility:    Patient Pre-hospital Level of Mobility: walks  Patient Pre-hospital Diet Restrictions: None    Objective     Vitals:   Blood Pressure: 142/68 (09/16/24 1330)  Pulse: (!) 106 (09/16/24 1345)  Temperature: 98.5 °F (36.9 °C) (09/16/24 0917)  Temp Source: Rectal (09/16/24 0917)  Respirations: 16 (09/16/24 1345)  SpO2: 95 % (09/16/24 1345)    Physical Exam  Constitutional:       General: He is not in acute distress.     Appearance: He is well-developed.   HENT:      Head: Normocephalic and atraumatic.   Eyes:      General:         Right eye: No discharge.         Left eye: No discharge.      Extraocular Movements: EOM normal.      Conjunctiva/sclera: Conjunctivae normal.      Pupils: Pupils are equal, round, and reactive to light.   Cardiovascular:      Rate and Rhythm: Normal rate and regular rhythm.      Heart sounds: Normal heart sounds. No murmur heard.     No friction rub. No gallop.   Pulmonary:      Effort: Pulmonary effort is normal.      Breath sounds: Normal breath sounds.   Chest:      Chest wall: No tenderness.   Abdominal:      General: Bowel sounds are normal.      Palpations: Abdomen is soft.      Tenderness: There is no abdominal tenderness.   Musculoskeletal:         General: No tenderness, deformity or edema. Normal range of motion.      Cervical back: Normal range of motion and neck supple.   Skin:     General: Skin is warm and dry.      Findings: No erythema or rash.   Neurological:       Mental Status: He is alert. He is disoriented.   Psychiatric:         Mood and Affect: Mood and affect normal.         Behavior: Behavior normal.         Lines/Drains:  Lines/Drains/Airways       Active Status       None                        Additional Data:   Lab Results: I have reviewed the following results: CBC/BMP:   .     09/16/24  0906 09/16/24  1221   WBC 11.08*  --    HGB 8.9*  --    HCT 28.9*  --     334   SODIUM 140  --    K 4.6  --      --    CO2 32  --    BUN 25  --    CREATININE 0.98  --    GLUC 153*  --     , TSH:   Results from last 7 days   Lab Units 09/16/24  0906   TSH 3RD GENERATON uIU/mL 0.777     Results from last 7 days   Lab Units 09/16/24  1221 09/16/24  0906   WBC Thousand/uL  --  11.08*   HEMOGLOBIN g/dL  --  8.9*   HEMATOCRIT %  --  28.9*   PLATELETS Thousands/uL 334 331   SEGS PCT %  --  79*   LYMPHO PCT %  --  8*   MONO PCT %  --  6   EOS PCT %  --  6     Results from last 7 days   Lab Units 09/16/24  0906   SODIUM mmol/L 140   POTASSIUM mmol/L 4.6   CHLORIDE mmol/L 106   CO2 mmol/L 32   BUN mg/dL 25   CREATININE mg/dL 0.98   ANION GAP mmol/L 2*   CALCIUM mg/dL 8.4   ALBUMIN g/dL 3.4*   TOTAL BILIRUBIN mg/dL 0.29   ALK PHOS U/L 194*   ALT U/L 31   AST U/L 19   GLUCOSE RANDOM mg/dL 153*         Results from last 7 days   Lab Units 09/16/24  1155   POC GLUCOSE mg/dl 135     Lab Results   Component Value Date    HGBA1C 10.7 (H) 07/31/2024    HGBA1C 8.8 (H) 07/25/2023    HGBA1C 9.4 (A) 01/23/2023           Imaging Review: Reviewed radiology reports from this admission including: CT head and CT C-spine.  Other Studies: EKG was reviewed.     Administrative Statements   I have spent a total time of 55 minutes in caring for this patient on the day of the visit/encounter including Instructions for management, Reviewing / ordering tests, medicine, procedures  , Obtaining or reviewing history  , and Communicating with other healthcare professionals .    ** Please Note: This note has  been constructed using a voice recognition system. **

## 2024-09-16 NOTE — ASSESSMENT & PLAN NOTE
Patient has multiple recent hospitalization secondary to toxic metabolic encephalopathy most recent episode was last month on August 19  Patient goes to a methadone clinic called [facility] phone number [phone].   175mg new daily last dose 9/16 @ 5:34 am  Continuous pulse ox  Administer naloxone if needed  Holding all medications  N.p.o.

## 2024-09-16 NOTE — PROGRESS NOTES
ADT alert received. Patient was admitted to [facility] with altered mental status. I will follow up at discharge.

## 2024-09-16 NOTE — ASSESSMENT & PLAN NOTE
2/2 TME  Will start patient on Bipap as he is able to follow directions and does wake up to voice.

## 2024-09-16 NOTE — PLAN OF CARE
Problem: Potential for Falls  Goal: Patient will remain free of falls  Description: INTERVENTIONS:  - Educate patient/family on patient safety including physical limitations  - Instruct patient to call for assistance with activity   - Consult OT/PT to assist with strengthening/mobility   - Keep Call bell within reach  - Keep bed low and locked with side rails adjusted as appropriate  - Keep care items and personal belongings within reach  - Initiate and maintain comfort rounds  - Make Fall Risk Sign visible to staff  - Offer Toileting every 2 Hours, in advance of need  - Initiate/Maintain bed alarm  - Obtain necessary fall risk management equipment:   - Apply yellow socks and bracelet for high fall risk patients  - Consider moving patient to room near nurses station  Outcome: Progressing     Problem: Prexisting or High Potential for Compromised Skin Integrity  Goal: Skin integrity is maintained or improved  Description: INTERVENTIONS:  - Identify patients at risk for skin breakdown  - Assess and monitor skin integrity  - Assess and monitor nutrition and hydration status  - Monitor labs   - Assess for incontinence   - Turn and reposition patient  - Assist with mobility/ambulation  - Relieve pressure over bony prominences  - Avoid friction and shearing  - Provide appropriate hygiene as needed including keeping skin clean and dry  - Evaluate need for skin moisturizer/barrier cream  - Collaborate with interdisciplinary team   - Patient/family teaching  - Consider wound care consult   Outcome: Progressing     Problem: PAIN - ADULT  Goal: Verbalizes/displays adequate comfort level or baseline comfort level  Description: Interventions:  - Encourage patient to monitor pain and request assistance  - Assess pain using appropriate pain scale  - Administer analgesics based on type and severity of pain and evaluate response  - Implement non-pharmacological measures as appropriate and evaluate response  - Consider cultural and  social influences on pain and pain management  - Notify physician/advanced practitioner if interventions unsuccessful or patient reports new pain  Outcome: Progressing     Problem: INFECTION - ADULT  Goal: Absence or prevention of progression during hospitalization  Description: INTERVENTIONS:  - Assess and monitor for signs and symptoms of infection  - Monitor lab/diagnostic results  - Monitor all insertion sites, i.e. indwelling lines, tubes, and drains  - Monitor endotracheal if appropriate and nasal secretions for changes in amount and color  - [place] appropriate cooling/warming therapies per order  - Administer medications as ordered  - Instruct and encourage patient and family to use good hand hygiene technique  - Identify and instruct in appropriate isolation precautions for identified infection/condition  Outcome: Progressing  Goal: Absence of fever/infection during neutropenic period  Description: INTERVENTIONS:  - Monitor WBC    Outcome: Progressing     Problem: SAFETY ADULT  Goal: Patient will remain free of falls  Description: INTERVENTIONS:  - Educate patient/family on patient safety including physical limitations  - Instruct patient to call for assistance with activity   - Consult OT/PT to assist with strengthening/mobility   - Keep Call bell within reach  - Keep bed low and locked with side rails adjusted as appropriate  - Keep care items and personal belongings within reach  - Initiate and maintain comfort rounds  - Make Fall Risk Sign visible to staff  - Offer Toileting every 2 Hours, in advance of need  - Initiate/Maintain bed alarm  - Obtain necessary fall risk management equipment:   - Apply yellow socks and bracelet for high fall risk patients  - Consider moving patient to room near nurses station  Outcome: Progressing  Goal: Maintain or return to baseline ADL function  Description: INTERVENTIONS:  -  Assess patient's ability to carry out ADLs; assess patient's baseline for ADL function and  identify physical deficits which impact ability to perform ADLs (bathing, care of mouth/teeth, toileting, grooming, dressing, etc.)  - Assess/evaluate cause of self-care deficits   - Assess range of motion  - Assess patient's mobility; develop plan if impaired  - Assess patient's need for assistive devices and provide as appropriate  - Encourage maximum independence but intervene and supervise when necessary  - Involve family in performance of ADLs  - Assess for home care needs following discharge   - Consider OT consult to assist with ADL evaluation and planning for discharge  - Provide patient education as appropriate  Outcome: Progressing  Goal: Maintains/Returns to pre admission functional level  Description: INTERVENTIONS:  - Perform AM-PAC 6 Click Basic Mobility/ Daily Activity assessment daily.  - Set and communicate daily mobility goal to care team and patient/family/caregiver.   - Collaborate with rehabilitation services on mobility goals if consulted  - Perform Range of Motion 3 times a day.  - Reposition patient every 2 hours.  - Dangle patient 3 times a day  - Stand patient 3 times a day  - Ambulate patient 3 times a day  - Out of bed to chair 3 times a day   - Out of bed for meals 3 times a day  - Out of bed for toileting  - Record patient progress and toleration of activity level   Outcome: Progressing     Problem: DISCHARGE PLANNING  Goal: Discharge to home or other facility with appropriate resources  Description: INTERVENTIONS:  - Identify barriers to discharge w/patient and caregiver  - Arrange for needed discharge resources and transportation as appropriate  - Identify discharge learning needs (meds, wound care, etc.)  - Arrange for interpretive services to assist at discharge as needed  - Refer to Case Management Department for coordinating discharge planning if the patient needs post-hospital services based on physician/advanced practitioner order or complex needs related to functional status,  cognitive ability, or social support system  Outcome: Progressing     Problem: Knowledge Deficit  Goal: Patient/family/caregiver demonstrates understanding of disease process, treatment plan, medications, and discharge instructions  Description: Complete learning assessment and assess knowledge base.  Interventions:  - Provide teaching at level of understanding  - Provide teaching via preferred learning methods  Outcome: Progressing

## 2024-09-16 NOTE — ED NOTES
[place] [employer] took the Methadone that was on patient on arrival.     Roseanne Tavares RN  09/16/24 1017

## 2024-09-17 PROBLEM — A41.9 SEPSIS (HCC): Status: ACTIVE | Noted: 2024-09-17

## 2024-09-17 LAB
ALBUMIN SERPL BCG-MCNC: 3.2 G/DL (ref 3.5–5)
ALP SERPL-CCNC: 172 U/L (ref 34–104)
ALT SERPL W P-5'-P-CCNC: 24 U/L (ref 7–52)
ANION GAP SERPL CALCULATED.3IONS-SCNC: 9 MMOL/L (ref 4–13)
AST SERPL W P-5'-P-CCNC: 18 U/L (ref 13–39)
BILIRUB SERPL-MCNC: 0.39 MG/DL (ref 0.2–1)
BUN SERPL-MCNC: 21 MG/DL (ref 5–25)
CALCIUM ALBUM COR SERPL-MCNC: 8.9 MG/DL (ref 8.3–10.1)
CALCIUM SERPL-MCNC: 8.3 MG/DL (ref 8.4–10.2)
CHLORIDE SERPL-SCNC: 101 MMOL/L (ref 96–108)
CO2 SERPL-SCNC: 29 MMOL/L (ref 21–32)
CREAT SERPL-MCNC: 0.77 MG/DL (ref 0.6–1.3)
ERYTHROCYTE [DISTWIDTH] IN BLOOD BY AUTOMATED COUNT: 13.3 % (ref 11.6–15.1)
GFR SERPL CREATININE-BSD FRML MDRD: 97 ML/MIN/1.73SQ M
GLUCOSE SERPL-MCNC: 104 MG/DL (ref 65–140)
GLUCOSE SERPL-MCNC: 172 MG/DL (ref 65–140)
GLUCOSE SERPL-MCNC: 174 MG/DL (ref 65–140)
GLUCOSE SERPL-MCNC: 187 MG/DL (ref 65–140)
GLUCOSE SERPL-MCNC: 190 MG/DL (ref 65–140)
GLUCOSE SERPL-MCNC: 229 MG/DL (ref 65–140)
HCT VFR BLD AUTO: 30.7 % (ref 36.5–49.3)
HGB BLD-MCNC: 9.3 G/DL (ref 12–17)
LACTATE SERPL-SCNC: 3.8 MMOL/L (ref 0.5–2)
LACTATE SERPL-SCNC: 4.3 MMOL/L (ref 0.5–2)
LACTATE SERPL-SCNC: 4.4 MMOL/L (ref 0.5–2)
MAGNESIUM SERPL-MCNC: 1.6 MG/DL (ref 1.9–2.7)
MCH RBC QN AUTO: 28.1 PG (ref 26.8–34.3)
MCHC RBC AUTO-ENTMCNC: 30.3 G/DL (ref 31.4–37.4)
MCV RBC AUTO: 93 FL (ref 82–98)
PLATELET # BLD AUTO: 287 THOUSANDS/UL (ref 149–390)
PMV BLD AUTO: 10.4 FL (ref 8.9–12.7)
POTASSIUM SERPL-SCNC: 4.5 MMOL/L (ref 3.5–5.3)
PROT SERPL-MCNC: 6.1 G/DL (ref 6.4–8.4)
RBC # BLD AUTO: 3.31 MILLION/UL (ref 3.88–5.62)
SODIUM SERPL-SCNC: 139 MMOL/L (ref 135–147)
WBC # BLD AUTO: 10.47 THOUSAND/UL (ref 4.31–10.16)

## 2024-09-17 PROCEDURE — 97163 PT EVAL HIGH COMPLEX 45 MIN: CPT

## 2024-09-17 PROCEDURE — 99232 SBSQ HOSP IP/OBS MODERATE 35: CPT | Performed by: FAMILY MEDICINE

## 2024-09-17 PROCEDURE — 82948 REAGENT STRIP/BLOOD GLUCOSE: CPT

## 2024-09-17 PROCEDURE — 83735 ASSAY OF MAGNESIUM: CPT | Performed by: FAMILY MEDICINE

## 2024-09-17 PROCEDURE — 83605 ASSAY OF LACTIC ACID: CPT | Performed by: PHYSICIAN ASSISTANT

## 2024-09-17 PROCEDURE — 80053 COMPREHEN METABOLIC PANEL: CPT | Performed by: FAMILY MEDICINE

## 2024-09-17 PROCEDURE — 97167 OT EVAL HIGH COMPLEX 60 MIN: CPT

## 2024-09-17 PROCEDURE — 94760 N-INVAS EAR/PLS OXIMETRY 1: CPT

## 2024-09-17 PROCEDURE — 85027 COMPLETE CBC AUTOMATED: CPT | Performed by: FAMILY MEDICINE

## 2024-09-17 RX ORDER — ASPIRIN 81 MG/1
81 TABLET, CHEWABLE ORAL 2 TIMES DAILY
Status: DISCONTINUED | OUTPATIENT
Start: 2024-09-17 | End: 2024-09-20 | Stop reason: HOSPADM

## 2024-09-17 RX ORDER — METHADONE HYDROCHLORIDE 10 MG/ML
175 CONCENTRATE ORAL DAILY
Status: DISCONTINUED | OUTPATIENT
Start: 2024-09-17 | End: 2024-09-18

## 2024-09-17 RX ORDER — INSULIN GLARGINE 100 [IU]/ML
10 INJECTION, SOLUTION SUBCUTANEOUS
Status: DISCONTINUED | OUTPATIENT
Start: 2024-09-17 | End: 2024-09-20 | Stop reason: HOSPADM

## 2024-09-17 RX ORDER — SODIUM CHLORIDE, SODIUM GLUCONATE, SODIUM ACETATE, POTASSIUM CHLORIDE, MAGNESIUM CHLORIDE, SODIUM PHOSPHATE, DIBASIC, AND POTASSIUM PHOSPHATE .53; .5; .37; .037; .03; .012; .00082 G/100ML; G/100ML; G/100ML; G/100ML; G/100ML; G/100ML; G/100ML
500 INJECTION, SOLUTION INTRAVENOUS ONCE
Status: COMPLETED | OUTPATIENT
Start: 2024-09-17 | End: 2024-09-17

## 2024-09-17 RX ORDER — SODIUM CHLORIDE 9 MG/ML
100 INJECTION, SOLUTION INTRAVENOUS CONTINUOUS
Status: DISCONTINUED | OUTPATIENT
Start: 2024-09-17 | End: 2024-09-17

## 2024-09-17 RX ORDER — GABAPENTIN 300 MG/1
300 CAPSULE ORAL 3 TIMES DAILY
Status: DISCONTINUED | OUTPATIENT
Start: 2024-09-17 | End: 2024-09-20 | Stop reason: HOSPADM

## 2024-09-17 RX ORDER — ACETAMINOPHEN 325 MG/1
650 TABLET ORAL EVERY 6 HOURS PRN
Status: DISCONTINUED | OUTPATIENT
Start: 2024-09-17 | End: 2024-09-20 | Stop reason: HOSPADM

## 2024-09-17 RX ORDER — METOPROLOL SUCCINATE 25 MG/1
12.5 TABLET, EXTENDED RELEASE ORAL DAILY
Status: DISCONTINUED | OUTPATIENT
Start: 2024-09-17 | End: 2024-09-20 | Stop reason: HOSPADM

## 2024-09-17 RX ADMIN — INSULIN LISPRO 1 UNITS: 100 INJECTION, SOLUTION INTRAVENOUS; SUBCUTANEOUS at 21:23

## 2024-09-17 RX ADMIN — SODIUM CHLORIDE, SODIUM GLUCONATE, SODIUM ACETATE, POTASSIUM CHLORIDE, MAGNESIUM CHLORIDE, SODIUM PHOSPHATE, DIBASIC, AND POTASSIUM PHOSPHATE 500 ML: .53; .5; .37; .037; .03; .012; .00082 INJECTION, SOLUTION INTRAVENOUS at 08:18

## 2024-09-17 RX ADMIN — HEPARIN SODIUM 5000 UNITS: 5000 INJECTION INTRAVENOUS; SUBCUTANEOUS at 13:43

## 2024-09-17 RX ADMIN — INSULIN GLARGINE 10 UNITS: 100 INJECTION, SOLUTION SUBCUTANEOUS at 21:22

## 2024-09-17 RX ADMIN — CEFTRIAXONE SODIUM 1000 MG: 10 INJECTION, POWDER, FOR SOLUTION INTRAVENOUS at 23:06

## 2024-09-17 RX ADMIN — SODIUM CHLORIDE 75 ML/HR: 0.9 INJECTION, SOLUTION INTRAVENOUS at 01:55

## 2024-09-17 RX ADMIN — METOPROLOL SUCCINATE 12.5 MG: 25 TABLET, FILM COATED, EXTENDED RELEASE ORAL at 11:09

## 2024-09-17 RX ADMIN — INSULIN LISPRO 2 UNITS: 100 INJECTION, SOLUTION INTRAVENOUS; SUBCUTANEOUS at 11:13

## 2024-09-17 RX ADMIN — CEFTRIAXONE SODIUM 1000 MG: 10 INJECTION, POWDER, FOR SOLUTION INTRAVENOUS at 00:40

## 2024-09-17 RX ADMIN — GABAPENTIN 300 MG: 300 CAPSULE ORAL at 21:22

## 2024-09-17 RX ADMIN — GABAPENTIN 300 MG: 300 CAPSULE ORAL at 11:10

## 2024-09-17 RX ADMIN — HEPARIN SODIUM 5000 UNITS: 5000 INJECTION INTRAVENOUS; SUBCUTANEOUS at 21:22

## 2024-09-17 RX ADMIN — METHADONE HYDROCHLORIDE 175 MG: 10 CONCENTRATE ORAL at 11:10

## 2024-09-17 RX ADMIN — INSULIN LISPRO 1 UNITS: 100 INJECTION, SOLUTION INTRAVENOUS; SUBCUTANEOUS at 08:18

## 2024-09-17 RX ADMIN — GABAPENTIN 300 MG: 300 CAPSULE ORAL at 16:52

## 2024-09-17 NOTE — PLAN OF CARE
Problem: PHYSICAL THERAPY ADULT  Goal: Performs mobility at highest level of function for planned discharge setting.  See evaluation for individualized goals.  Description: Treatment/Interventions: ADL retraining, Functional transfer training, LE strengthening/ROM, Elevations, Therapeutic exercise, Endurance training, Cognitive reorientation, Patient/family training, Equipment eval/education, Bed mobility, Gait training, Compensatory technique education, Spoke to case management, Spoke to nursing, OT  Equipment Recommended:  (has RW and SPC for dc)       See flowsheet documentation for full assessment, interventions and recommendations.  Note: Prognosis: Fair  Problem List: Decreased strength, Decreased endurance, Impaired balance, Decreased mobility, Impaired judgement, Decreased safety awareness, Pain  Assessment: Pt is 64 y.o. male seen for PT evaluation s/p admit to [facility] on 9/16/2024  w/ Toxic encephalopathy.   Of note pt w/ multiple admissions for same w/  most recent episode last month on August 19.   Pt  has a past medical history of Anxiety, Depression, Diabetes mellitus (HCC), Drug use, Hemorrhoids, internal (9/23/2019), and Hypertension.   At baseline, pt reports living alone in an apt complex w/ elevator access; pt is indep w/ mobility using RW vs SPC and ADLs/ has home aides/ nursing assist  assist 9-4 weekdays and sister stays on weekends to assist. Pt reports 0 drive; EMR states pt w/ 5-10 falls in last 6 months.  Currently,  pt  is requiring S for bed skills; min/ CGA for functional transfers and CGA/ S for ambulation w/ RW 60+40' w/ cues for pacing and safety/ attention to task.   Pt presents functioning below baseline and currently w/ overall mobility deficits 2* to: limited flexibility;  generalized weakness/ deconditioning; decreased endurance; decreased activity tolerance; impaired balance; gait deviations; decreased safety awareness limited insight; SOB/MACHUCA; fatigue; impaired  safety and judgement; limited insight into current deficits; bed/ chair alarms; multiple lines;   Pt currently at risk for falls.  (Please find additional objective findings from PT assessment regarding body systems outlined above.) Pt will continue to benefit from skilled PT interventions to address stated impairments; to maximize functional potential; for ongoing pt/ family training; and DME needs.  PT is recommending PT Resource Intensity Level  3 + inc family/ caregiver support on d/c.      PT will follow for STG as outlined on eval.     In 14 days pt will:  Complete bed mobility skills with MI to facilitate safe return to previous living environment and decrease burden on caregivers.  Perform all functional transfers with MI  to facilitate safe return to previous living environment.    Ambulate  with least restrictive AD MI > 350'' without LOB and stable vitals for safe ambulation in home/ community environment.   Improve balance by 1 grade in order to decrease fall risk.    Improve LE strength grades by 1/2 to increase independence w/ all functional mobility, transfers and gait.  Actively participate w/ PT for ongoing pt and family education; DME needs and D/C planning to promote highest level of function in least restrictive environment.  Barriers to Discharge: Decreased caregiver support  Barriers to Discharge Comments: alone at times; hx of polypharmacy + multiple admissions  Rehab Resource Intensity Level, PT: III (Minimum Resource Intensity)    See flowsheet documentation for full assessment.

## 2024-09-17 NOTE — ASSESSMENT & PLAN NOTE
Patient has multiple recent hospitalization secondary to toxic metabolic encephalopathy most recent episode was last month on August 19  Patient goes to a methadone clinic called [facility] phone number [phone].   175mg new daily last dose 9/16 @ 5:34 am  Encephalopathy currently resolved, back to baseline, ambulatory  Resume home medication  Patient reports along with methadone, he also took oxycodone.  Will hold off and discontinue oxycodone on discharge

## 2024-09-17 NOTE — PLAN OF CARE
Problem: Potential for Falls  Goal: Patient will remain free of falls  Description: INTERVENTIONS:  - Educate patient/family on patient safety including physical limitations  - Instruct patient to call for assistance with activity   - Consult OT/PT to assist with strengthening/mobility   - Keep Call bell within reach  - Keep bed low and locked with side rails adjusted as appropriate  - Keep care items and personal belongings within reach  - Initiate and maintain comfort rounds  - Make Fall Risk Sign visible to staff  - Apply yellow socks and bracelet for high fall risk patients  - Consider moving patient to room near nurses station  Outcome: Progressing     Problem: Prexisting or High Potential for Compromised Skin Integrity  Goal: Skin integrity is maintained or improved  Description: INTERVENTIONS:  - Identify patients at risk for skin breakdown  - Assess and monitor skin integrity  - Assess and monitor nutrition and hydration status  - Monitor labs   - Assess for incontinence   - Turn and reposition patient  - Assist with mobility/ambulation  - Relieve pressure over bony prominences  - Avoid friction and shearing  - Provide appropriate hygiene as needed including keeping skin clean and dry  - Evaluate need for skin moisturizer/barrier cream  - Collaborate with interdisciplinary team   - Patient/family teaching  - Consider wound care consult   Outcome: Progressing     Problem: PAIN - ADULT  Goal: Verbalizes/displays adequate comfort level or baseline comfort level  Description: Interventions:  - Encourage patient to monitor pain and request assistance  - Assess pain using appropriate pain scale  - Administer analgesics based on type and severity of pain and evaluate response  - Implement non-pharmacological measures as appropriate and evaluate response  - Consider cultural and social influences on pain and pain management  - Notify physician/advanced practitioner if interventions unsuccessful or patient reports  new pain  Outcome: Progressing     Problem: INFECTION - ADULT  Goal: Absence or prevention of progression during hospitalization  Description: INTERVENTIONS:  - Assess and monitor for signs and symptoms of infection  - Monitor lab/diagnostic results  - Monitor all insertion sites, i.e. indwelling lines, tubes, and drains  - Monitor endotracheal if appropriate and nasal secretions for changes in amount and color  - [place] appropriate cooling/warming therapies per order  - Administer medications as ordered  - Instruct and encourage patient and family to use good hand hygiene technique  - Identify and instruct in appropriate isolation precautions for identified infection/condition  Outcome: Progressing  Goal: Absence of fever/infection during neutropenic period  Description: INTERVENTIONS:  - Monitor WBC    Outcome: Progressing     Problem: SAFETY ADULT  Goal: Patient will remain free of falls  Description: INTERVENTIONS:  - Educate patient/family on patient safety including physical limitations  - Instruct patient to call for assistance with activity   - Consult OT/PT to assist with strengthening/mobility   - Keep Call bell within reach  - Keep bed low and locked with side rails adjusted as appropriate  - Keep care items and personal belongings within reach  - Initiate and maintain comfort rounds  - Make Fall Risk Sign visible to staff  - Apply yellow socks and bracelet for high fall risk patients  - Consider moving patient to room near nurses station  Outcome: Progressing  Goal: Maintain or return to baseline ADL function  Description: INTERVENTIONS:  -  Assess patient's ability to carry out ADLs; assess patient's baseline for ADL function and identify physical deficits which impact ability to perform ADLs (bathing, care of mouth/teeth, toileting, grooming, dressing, etc.)  - Assess/evaluate cause of self-care deficits   - Assess range of motion  - Assess patient's mobility; develop plan if impaired  - Assess  patient's need for assistive devices and provide as appropriate  - Encourage maximum independence but intervene and supervise when necessary  - Involve family in performance of ADLs  - Assess for home care needs following discharge   - Consider OT consult to assist with ADL evaluation and planning for discharge  - Provide patient education as appropriate  Outcome: Progressing  Goal: Maintains/Returns to pre admission functional level  Description: INTERVENTIONS:  - Perform AM-PAC 6 Click Basic Mobility/ Daily Activity assessment daily.  - Set and communicate daily mobility goal to care team and patient/family/caregiver.   - Collaborate with rehabilitation services on mobility goals if consulted  - Out of bed for toileting  - Record patient progress and toleration of activity level   Outcome: Progressing     Problem: DISCHARGE PLANNING  Goal: Discharge to home or other facility with appropriate resources  Description: INTERVENTIONS:  - Identify barriers to discharge w/patient and caregiver  - Arrange for needed discharge resources and transportation as appropriate  - Identify discharge learning needs (meds, wound care, etc.)  - Arrange for interpretive services to assist at discharge as needed  - Refer to Case Management Department for coordinating discharge planning if the patient needs post-hospital services based on physician/advanced practitioner order or complex needs related to functional status, cognitive ability, or social support system  Outcome: Progressing     Problem: Knowledge Deficit  Goal: Patient/family/caregiver demonstrates understanding of disease process, treatment plan, medications, and discharge instructions  Description: Complete learning assessment and assess knowledge base.  Interventions:  - Provide teaching at level of understanding  - Provide teaching via preferred learning methods  Outcome: Progressing

## 2024-09-17 NOTE — PROGRESS NOTES
Nursing dysphagia screen completed. Pt passed screening without evidence of difficulty swallowing. Dr Eckert made aware. Diet progressed.

## 2024-09-17 NOTE — DISCHARGE INSTR - OTHER ORDERS
Marcela Amezcua  Certified     [facility] [place] [facility]  [phone]    [facility]  [address]  [address]  [place] [address]  [phone]    [facility]  [address] [place] [id number] [phone]

## 2024-09-17 NOTE — ASSESSMENT & PLAN NOTE
Sepsis, developed shortly after admission 2/2 aspiration PNA a/e/b fever, Tachycardia, lactic acidosis requiring CXR, IVF bolus, IV Ceftriaxone and cultures.  Findings: T 101-102, HR , LA 4.4, Procal 2.62, Bands 15%  CXR: New bibasilar airspace disease is suspicious for the presence of aspiration or infection as clinically questioned.

## 2024-09-17 NOTE — UTILIZATION REVIEW
Initial Clinical Review    Admission: Date/Time/Statement:   Admission Orders (From admission, onward)       Ordered        09/16/24 1139  INPATIENT ADMISSION  Once                          Orders Placed This Encounter   Procedures    INPATIENT ADMISSION     Standing Status:   Standing     Number of Occurrences:   1     Order Specific Question:   Level of Care     Answer:   Med Surg [16]     Order Specific Question:   Estimated length of stay     Answer:   More than 2 Midnights     Order Specific Question:   Certification     Answer:   I certify that inpatient services are medically necessary for this patient for a duration of greater than two midnights. See H&P and MD Progress Notes for additional information about the patient's course of treatment.     ED Arrival Information       Expected   -    Arrival   9/16/2024 08:40    Acuity   Emergent              Means of arrival   Ambulance    Escorted by   [facility]    Service   Hospitalist    Admission type   Emergency              Arrival complaint   -             Chief Complaint   Patient presents with    Altered Mental Status     Pt found in apartment, EMS called. Pt has a history of drug use.        Initial Presentation: 64 y.o. male with Pmhx of opioid abuse, methadone use, HTN, A-fib, T2DM to ED by ems Admitted Inpatient with metabolic encephalopathy 2/2 overdose of methadone. Pt was found to have another pt's bottle of methadone in his sock upon arrival to ED. In ED pt disoriented, arousable to sternal rub as well as verbal commands and is able to follow simple commands such as cough and squeeze. WBC 11.08, Hgb 8.9, Hct 28.9, anion gap 2, albumin 3.4, alk phos 1954, glucose 153. Placed on 2L nc in ED.  Admitted Inpatient to [facility] Unit with Toxic Encephalopathy -- telemetry. Cont Pox. Nalaxone prn. Holding all po meds, npo for now. Labetalol IV prn for BP control. Continue lantus, accu-checks w/ ssi. Bipap prn.    Anticipated Length of Stay/Certification  Statement: Patient will be admitted on an inpatient basis with an anticipated length of stay of greater than 2 midnights secondary to toxic metabolic encephalopathy.     Date: 9/17   Day 2: Overnight pt met sepsis criteria with fever, tachycardia, elevated lactic acid and bandemia. Received IVF bolus and started on IV Rocephin. Chest x-ray showed concern for aspiration vs infection. Pt had required O2 15L midflow NC max yesterday. Currently weaned down to 4L NC. AA&O on exam today. Continue po meds, monitor temp, CBC. PT/OT evals with no IP rehab recs. Continue supportive care.      ED Triage Vitals   Temperature Pulse Respirations Blood Pressure SpO2 Pain Score   09/16/24 0917 09/16/24 0843 09/16/24 0843 09/16/24 0843 09/16/24 0843 09/16/24 1721   98.5 °F (36.9 °C) 94 16 133/70 93 % No Pain     Weight (last 2 days)       None            Vital Signs (last 3 days)       Date/Time Temp Pulse Resp BP MAP (mmHg) SpO2 Calculated FIO2 (%) - Nasal Cannula O2 Flow Rate (L/min) Nasal Cannula O2 Flow Rate (L/min) O2 Device O2 Interface Device Patient Position - Orthostatic VS [place] Coma Scale Score Pain    09/17/24 11:11:47 -- 97 -- 141/71 94 98 % -- -- -- -- -- -- -- --    09/17/24 1109 -- 97 -- 141/71 -- -- -- -- -- -- -- -- -- No Pain    09/17/24 0804 -- -- -- -- -- -- 36 -- 4 L/min Nasal cannula -- -- -- --    09/17/24 0730 -- -- -- -- -- -- -- 6 L/min -- Mid flow nasal cannula -- -- 13 8    09/17/24 07:08:55 100.2 °F (37.9 °C) 109 16 158/83 108 90 % -- -- -- -- -- -- -- --    09/17/24 0322 -- -- -- -- -- -- 44 -- 6 L/min Mid flow nasal cannula -- -- 13 No Pain    09/17/24 02:41:58 100.2 °F (37.9 °C) 107 20 157/83 108 93 % -- -- -- -- -- -- -- --    09/17/24 02:41:33 100.2 °F (37.9 °C) 102 20 157/83 108 82 % -- -- -- -- -- -- -- --    09/17/24 0145 -- -- -- -- -- -- 44 -- 6 L/min Mid flow nasal cannula -- -- -- --    09/16/24 23:04:43 99 °F (37.2 °C) 99 18 115/69 84 93 % -- -- -- -- -- -- -- --    09/16/24 22:19:42 99.3  °F (37.4 °C) 104 -- 106/53 71 92 % 40 -- 5 L/min Mid flow nasal cannula -- -- -- --    09/16/24 2030 -- -- -- -- -- -- 40 -- 5 L/min Mid flow nasal cannula -- -- 12 No Pain    09/16/24 20:14:56 101.3 °F (38.5 °C) 117 18 169/101 124 98 % -- -- -- -- -- -- -- --    09/16/24 19:20:22 102 °F (38.9 °C) 112 -- -- -- 99 % -- -- -- -- -- -- -- --    09/16/24 1858 -- -- -- -- -- -- 60 -- 10 L/min Mid flow nasal cannula -- -- -- --    09/16/24 17:38:09 101 °F (38.3 °C) 109 19 147/90 109 99 % -- -- -- -- -- -- -- --    09/16/24 1721 -- -- -- -- -- 96 % 80 -- 15 L/min Mid flow nasal cannula -- -- -- No Pain    09/16/24 1630 -- 108 16 136/64 92 96 % -- -- -- -- -- -- -- --    09/16/24 1600 -- 118 17 165/83 117 98 % -- -- -- BiPAP -- -- -- --    09/16/24 1500 -- 114 18 184/86 124 98 % -- -- -- BiPAP -- -- -- --    09/16/24 1430 -- 111 18 179/84 121 97 % -- -- -- BiPAP -- -- -- --    09/16/24 1401 -- -- -- -- -- -- -- -- -- -- Face mask -- -- --    09/16/24 1400 -- 105 21 171/80 114 96 % -- -- -- BiPAP -- -- -- --    09/16/24 1345 -- 106 16 -- -- 95 % -- -- -- BiPAP -- -- -- --    09/16/24 1330 -- 98 17 142/68 98 91 % -- 6 L/min -- Simple mask -- -- -- --    09/16/24 1230 -- 93 17 142/68 98 92 % -- -- -- -- -- -- -- --    09/16/24 1215 -- 104 19 167/83 118 91 % -- -- -- -- -- -- -- --    09/16/24 1200 -- 106 18 166/85 116 93 % -- -- -- -- -- -- -- --    09/16/24 1130 -- 105 17 179/83 116 92 % -- -- -- -- -- -- -- --    09/16/24 1030 -- 94 18 150/70 101 95 % -- 4 L/min -- Simple mask -- Lying -- --    09/16/24 1000 -- 88 14 124/64 88 91 % -- 4 L/min -- Simple mask -- -- -- --    09/16/24 0930 -- 90 13 119/60 83 90 % -- 4 L/min -- Nasal cannula -- -- -- --    09/16/24 0917 98.5 °F (36.9 °C) -- -- -- -- -- -- -- -- -- -- -- -- --    09/16/24 0900 -- 99 14 153/64 95 93 % 36 -- 4 L/min Nasal cannula -- -- -- --    09/16/24 0847 -- -- -- -- -- -- -- -- -- Nasal cannula -- -- -- --    09/16/24 0846 -- -- -- -- -- 96 % 28 -- 2 L/min Nasal  cannula -- -- -- --    09/16/24 0845 -- -- -- -- -- -- -- -- -- -- -- -- 14 --    09/16/24 0844 -- -- -- -- -- -- -- -- -- -- -- -- 14 --    09/16/24 0843 -- 94 16 133/70 97 93 % -- -- -- None (Room air) -- -- -- --            Pertinent Labs/Diagnostic Test Results:   Radiology:  XR chest 1 view portable   ED Interpretation by Palmer Archer MD (09/16 1231)   No acute cardiopulmonary disease.      Final Interpretation by Alberto Walker MD (09/16 1639)      New bibasilar airspace disease is suspicious for the presence of aspiration or infection as clinically questioned. No pneumothorax.         CT head without contrast   Final Interpretation by Logan Davis MD (09/16 0940)      No acute intracranial abnormality.  Chronic microangiopathic changes.         CT spine cervical without contrast   Final Interpretation by Logan Davis MD (09/16 0952)      No cervical spine fracture or traumatic malalignment.        Results from last 7 days   Lab Units 09/16/24  2108   SARS-COV-2  Negative     Results from last 7 days   Lab Units 09/17/24  0436 09/16/24  2155 09/16/24  1221 09/16/24  0906   WBC Thousand/uL 10.47* 10.27*  --  11.08*   HEMOGLOBIN g/dL 9.3* 9.1*  --  8.9*   HEMATOCRIT % 30.7* 30.0*  --  28.9*   PLATELETS Thousands/uL 287 306 334 331   TOTAL NEUT ABS Thousands/µL  --   --   --  8.72*   BANDS PCT %  --  15*  --   --      Results from last 7 days   Lab Units 09/17/24  0436 09/16/24  0906   SODIUM mmol/L 139 140   POTASSIUM mmol/L 4.5 4.6   CHLORIDE mmol/L 101 106   CO2 mmol/L 29 32   ANION GAP mmol/L 9 2*   BUN mg/dL 21 25   CREATININE mg/dL 0.77 0.98   EGFR ml/min/1.73sq m 97 82   CALCIUM mg/dL 8.3* 8.4   MAGNESIUM mg/dL 1.6*  --      Results from last 7 days   Lab Units 09/17/24  0436 09/16/24  0906   AST U/L 18 19   ALT U/L 24 31   ALK PHOS U/L 172* 194*   TOTAL PROTEIN g/dL 6.1* 5.7*   ALBUMIN g/dL 3.2* 3.4*   TOTAL BILIRUBIN mg/dL 0.39 0.29     Results from last 7 days   Lab Units 09/17/24  1113  09/17/24  0707 09/16/24  2055 09/16/24  1621 09/16/24  1155   POC GLUCOSE mg/dl 229* 174* 144* 136 135     Results from last 7 days   Lab Units 09/17/24  0436 09/16/24  0906   GLUCOSE RANDOM mg/dL 172* 153*     Results from last 7 days   Lab Units 09/16/24  0906   TSH 3RD GENERATON uIU/mL 0.777     Results from last 7 days   Lab Units 09/16/24  2155   PROCALCITONIN ng/ml 2.62*     Results from last 7 days   Lab Units 09/17/24  1210 09/17/24  0436 09/17/24  0110 09/16/24  2155   LACTIC ACID mmol/L 3.8* 4.4* 4.3* 4.4*     Results from last 7 days   Lab Units 09/16/24  2108   INFLUENZA A PCR  Negative   INFLUENZA B PCR  Negative   RSV PCR  Negative     Results from last 7 days   Lab Units 09/16/24  0906   ETHANOL LVL mg/dL <10   ACETAMINOPHEN LVL ug/mL <2*   SALICYLATE LVL mg/dL <5     Results from last 7 days   Lab Units 09/16/24  2152   BLOOD CULTURE  Received in Microbiology Lab. Culture in Progress.  Received in Microbiology Lab. Culture in Progress.         ED Treatment-Medication Administration from 09/16/2024 0839 to 09/16/2024 1712         Date/Time Order Dose Route Action     09/16/2024 1218 labetalol (NORMODYNE) injection 10 mg 10 mg Intravenous Given     09/16/2024 1353 heparin (porcine) subcutaneous injection 5,000 Units 5,000 Units Subcutaneous Given     09/16/2024 1622 labetalol (NORMODYNE) injection 10 mg 10 mg Intravenous Given         Past Medical History:   Diagnosis Date    Anxiety     Depression     Diabetes mellitus (HCC)     Drug use     Hemorrhoids, internal 9/23/2019    Hypertension      Present on Admission:   Toxic encephalopathy   Atrial fibrillation (HCC)   Benign essential hypertension   (Resolved) Mixed hyperlipidemia      Admitting Diagnosis: Altered mental status [R41.82]  AMS (altered mental status) [R41.82]  Age/Sex: 64 y.o. male  Admission Orders:  Scheduled Medications:  aspirin, 81 mg, Oral, BID  cefTRIAXone, 1,000 mg, Intravenous, Q24H  gabapentin, 300 mg, Oral, TID  heparin  (porcine), 5,000 Units, Subcutaneous, Q8H MELISA  insulin glargine, 10 Units, Subcutaneous, HS  insulin lispro, 1-5 Units, Subcutaneous, TID AC  insulin lispro, 1-5 Units, Subcutaneous, HS  methadone, 175 mg, Oral, Daily  metoprolol succinate, 12.5 mg, Oral, Daily    PRN Meds:  acetaminophen, 650 mg, Oral, Q6H PRN  labetalol, 10 mg, Intravenous, Q4H PRN 9/16 x3  naloxone, 0.04 mg, Intravenous, Q1MIN PRN        CONSULT TO CERTIFIED     Network Utilization Review Department  ATTENTION: Please call with any questions or concerns to [phone] and carefully listen to the prompts so that you are directed to the right person. All voicemails are confidential.   For Discharge needs, contact Care Management DC Support Team at [phone] opt. [phone]  Send all requests for admission clinical reviews, approved or denied determinations and any other requests to dedicated fax number below belonging to the [place] where the patient is receiving treatment. List of dedicated fax numbers for the Facilities:  FACILITY NAME UR FAX NUMBER   ADMISSION DENIALS (Administrative/Medical Necessity) [phone]   DISCHARGE SUPPORT TEAM (NETWORK) [phone]   PARENT CHILD HEALTH (Maternity/NICU/Pediatrics) [phone]   [facility] [phone]   [facility] [phone]   Critical access hospital [phone]   [facility] [phone]   Critical access hospital [phone]   [facility] [phone]   [facility] [phone]   [facility] [phone]   [facility] [phone]   [facility] [phone]   [facility] [phone]   [facility] [place] [phone]

## 2024-09-17 NOTE — PLAN OF CARE
Problem: OCCUPATIONAL THERAPY ADULT  Goal: Performs self-care activities at highest level of function for planned discharge setting.  See evaluation for individualized goals.  Description: Treatment Interventions: ADL retraining, Functional transfer training, UE strengthening/ROM, Endurance training, Cognitive reorientation, Patient/family training, Equipment evaluation/education, Fine motor coordination activities, Compensatory technique education, Continued evaluation, Energy conservation, Activityengagement          See flowsheet documentation for full assessment, interventions and recommendations.   Note: Limitation: Decreased ADL status, Decreased cognition, Decreased Safe judgement during ADL, Decreased endurance, Decreased self-care trans, Decreased high-level ADLs  Prognosis: Fair  Assessment: 64 year old pt seen today for an OT evaluation following admission to [facility] 2/2 toxic encephalopathy with present symptoms significant for pain, fatigue, weakness, decreased ADL status, decreased functional mobility. Pt  has a past medical history of Anxiety, Depression, Diabetes mellitus (HCC), Drug use, Hemorrhoids, internal, and Hypertension. Pt reported living alone in a [address] [place] [address] with elevator to enter. Pt reports being IND for ADLs, assist IADLs. Pt has HHA M-F from [facility], sister assists on weekends. Pt (-). Pt pleasant and cooperative t/o session. Pt completed functional bed mobility with SUP. Min A for functional STS txfs and functional mobility with RW. Pt was min A for UB ADLs and Min A for LB ADLs. The patient's raw score on the -PAC Daily Activity Inpatient Short Form is 17. A raw score of less than 19 suggests the patient may benefit from discharge to post-acute rehabilitation services. Please refer to the recommendation of the Occupational Therapist for safe discharge planning. Pt is functioning below baseline level of function and will continue to benefit from skilled  acute OT to promote increased independence and return to PLOF. At this time, current OT recommendation is Level 3 resources - pending increased social support and progress. Will continue to follow and assess.     Rehab Resource Intensity Level, OT: III (Minimum Resource Intensity) (with increased social support)

## 2024-09-17 NOTE — PROGRESS NOTES
Progress Note - Hospitalist   Name: David Skelton Jr. 64 y.o. male I MRN: [id number]  Unit/Bed#: [facility] 834-01 I Date of Admission: 9/16/2024   Date of Service: 9/17/2024 I Hospital Day: 1    Assessment & Plan  Toxic encephalopathy  Patient has multiple recent hospitalization secondary to toxic metabolic encephalopathy most recent episode was last month on August 19  Patient goes to a methadone clinic called [facility] phone number [phone].   175mg new daily last dose 9/16 @ 5:34 am  Encephalopathy currently resolved, back to baseline, ambulatory  Resume home medication  Patient reports along with methadone, he also took oxycodone.  Will hold off and discontinue oxycodone on discharge  Atrial fibrillation (HCC)  Continue metoprolol 12.5 mg daily  Benign essential hypertension  Continue metoprolol 12.5 mg daily  Blood pressure not at goal we will place patient on labetalol as needed  Type 2 diabetes mellitus, with long-term current use of insulin (HCC)  Lab Results   Component Value Date    HGBA1C 10.7 (H) 07/31/2024       Recent Labs     09/16/24  1621 09/16/24  2055 09/17/24  0707 09/17/24  1114   POCGLU 136 144* 174* 229*       Blood Sugar Average: Last 72 hrs:  (P) 163.6  Poorly controlled evidence within A1c of 10.7  Continue Lantus 10 units at night hold metformin hold Prandin  Start patient on insulin sliding scale  Recurrent falls  Fall precaution  PT OT evaluation  CT head and CT cervical spine unremarkable for acute pathology  Acute respiratory failure (HCC)  2/2 TME, aspiration pneumonia I  Currently weaned down to 4 L oxygen via nasal cannula  Sepsis ([facility])  Sepsis, developed shortly after admission 2/2 aspiration PNA a/e/b fever, Tachycardia, lactic acidosis requiring CXR, IVF bolus, IV Ceftriaxone and cultures.  Findings: T 101-102, HR , LA 4.4, Procal 2.62, Bands 15%  CXR: New bibasilar airspace disease is suspicious for the presence of aspiration or infection as clinically  questioned.    VTE Pharmacologic Prophylaxis:   Moderate Risk (Score 3-4) - Pharmacological DVT Prophylaxis Ordered: heparin.    Mobility:   Basic Mobility Inpatient Raw Score: 19  JH-HLM Goal: 6: Walk 10 steps or more  JH-HLM Achieved: 7: Walk 25 feet or more  JH-HLM Goal achieved. Continue to encourage appropriate mobility.    Patient Centered Rounds: I performed bedside rounds with nursing staff today.        Current Length of Stay: 1 day(s)  Current Patient Status: Inpatient   Certification Statement: The patient will continue to require additional inpatient hospital stay due to pending improvement in sepsis  Discharge Plan: Anticipate discharge in 48-72 hrs to discharge location to be determined pending rehab evaluations.    Code Status: Level 1 - Full Code    Subjective     This morning patient is back to baseline, alert, oriented x 4.  Ambulating with the nurse without any difficulty with a walker.  Currently down to two 4 L oxygen via nasal cannula.    Overnight patient met sepsis criteria with fever, tachycardia, elevated lactic acid and bandemia.  Received IVF bolus and started on IV Rocephin.  Chest x-ray showed concern for aspiration versus infection.    Objective     Vitals:   Temp (24hrs), Av.4 °F (38 °C), Min:99 °F (37.2 °C), Max:102 °F (38.9 °C)    Temp:  [99 °F (37.2 °C)-102 °F (38.9 °C)] 100.2 °F (37.9 °C)  HR:  [] 97  Resp:  [16-20] 16  BP: (106-169)/() 141/71  SpO2:  [82 %-99 %] 98 %  There is no height or weight on file to calculate BMI.     Input and Output Summary (last 24 hours):     Intake/Output Summary (Last 24 hours) at 2024 1515  Last data filed at 2024 0823  Gross per 24 hour   Intake 485 ml   Output --   Net 485 ml       Physical Exam  Constitutional:       Appearance: He is well-developed.   HENT:      Head: Normocephalic and atraumatic.      Right Ear: External ear normal.      Left Ear: External ear normal.      Nose: Nose normal.   Eyes:       Conjunctiva/sclera: Conjunctivae normal.   Cardiovascular:      Rate and Rhythm: Normal rate and regular rhythm.      Heart sounds: Normal heart sounds.   Pulmonary:      Effort: Pulmonary effort is normal.      Breath sounds: Normal breath sounds.   Abdominal:      General: Bowel sounds are normal.      Palpations: Abdomen is soft.   Genitourinary:     Comments: deferred  Musculoskeletal:      Cervical back: Normal range of motion and neck supple.   Skin:     General: Skin is warm and dry.   Neurological:      Mental Status: He is alert.      Comments: Cranial nerve 2 -12 are normal.          Lines/Drains:  Lines/Drains/Airways       Active Status       None                            Lab Results: I have reviewed the following results: none   Results from last 7 days   Lab Units 09/17/24  0436 09/16/24  2155 09/16/24  1221 09/16/24  0906   WBC Thousand/uL 10.47* 10.27*  --  11.08*   HEMOGLOBIN g/dL 9.3* 9.1*  --  8.9*   HEMATOCRIT % 30.7* 30.0*  --  28.9*   PLATELETS Thousands/uL 287 306   < > 331   BANDS PCT %  --  15*  --   --    SEGS PCT %  --   --   --  79*   LYMPHO PCT %  --  2*  --  8*   MONO PCT %  --  2*  --  6   EOS PCT %  --  0  --  6    < > = values in this interval not displayed.     Results from last 7 days   Lab Units 09/17/24  0436   SODIUM mmol/L 139   POTASSIUM mmol/L 4.5   CHLORIDE mmol/L 101   CO2 mmol/L 29   BUN mg/dL 21   CREATININE mg/dL 0.77   ANION GAP mmol/L 9   CALCIUM mg/dL 8.3*   ALBUMIN g/dL 3.2*   TOTAL BILIRUBIN mg/dL 0.39   ALK PHOS U/L 172*   ALT U/L 24   AST U/L 18   GLUCOSE RANDOM mg/dL 172*         Results from last 7 days   Lab Units 09/17/24  1114 09/17/24  0707 09/16/24  2055 09/16/24  1621 09/16/24  1155   POC GLUCOSE mg/dl 229* 174* 144* 136 135         Results from last 7 days   Lab Units 09/17/24  1210 09/17/24  0436 09/17/24  0110 09/16/24  2155   LACTIC ACID mmol/L 3.8* 4.4* 4.3* 4.4*   PROCALCITONIN ng/ml  --   --   --  2.62*       Recent Cultures (last 7 days):    Results from last 7 days   Lab Units 09/16/24  2152   BLOOD CULTURE  Received in Microbiology Lab. Culture in Progress.  Received in Microbiology Lab. Culture in Progress.          Last 24 Hours Medication List:     Current Facility-Administered Medications:     acetaminophen (TYLENOL) tablet 650 mg, Q6H PRN    aspirin chewable tablet 81 mg, BID    cefTRIAXone (ROCEPHIN) 1,000 mg in dextrose 5 % 50 mL IVPB, Q24H, Last Rate: 1,000 mg (09/17/24 0040)    gabapentin (NEURONTIN) capsule 300 mg, TID    heparin (porcine) subcutaneous injection 5,000 Units, Q8H MELISA **AND** [COMPLETED] Platelet count, Once    insulin glargine (LANTUS) subcutaneous injection 10 Units 0.1 mL, HS    insulin lispro (HumALOG/ADMELOG) 100 units/mL subcutaneous injection 1-5 Units, TID AC **AND** Fingerstick Glucose (POCT), TID AC    insulin lispro (HumALOG/ADMELOG) 100 units/mL subcutaneous injection 1-5 Units, HS    labetalol (NORMODYNE) injection 10 mg, Q4H PRN    methadone (DOLOPHINE) oral concentrated solution 175 mg, Daily    metoprolol succinate (TOPROL-XL) 24 hr tablet 12.5 mg, Daily    naloxone (NARCAN) 0.04 mg/mL syringe 0.04 mg, Q1MIN PRN    Administrative Statements   Today, Patient Was Seen By: Pau Eckert MD       **Please Note: This note may have been constructed using a voice recognition system.**

## 2024-09-17 NOTE — OCCUPATIONAL THERAPY NOTE
Occupational Therapy Evaluation     Patient Name: David Skelton Jr.  Today's Date: 9/17/2024  Problem List  Principal Problem:    Toxic encephalopathy  Active Problems:    Atrial fibrillation (HCC)    Benign essential hypertension    Type 2 diabetes mellitus, with long-term current use of insulin (HCC)    Recurrent falls    Acute respiratory failure (HCC)    Sepsis (HCC)    Past Medical History  Past Medical History:   Diagnosis Date    Anxiety     Depression     Diabetes mellitus (HCC)     Drug use     Hemorrhoids, internal 9/23/2019    Hypertension      Past Surgical History  Past Surgical History:   Procedure Laterality Date    FEMUR FRACTURE SURGERY Right     GASTRIC BYPASS  11/08/2011    Managed by: Braden Luna (General Surgery)    HERNIA REPAIR  02/13/2013    Incisional hernia repair Managed by: Braden Luna (General Surgery)    SD OPTX FEM SHFT FX W/INSJ IMED IMPLT W/WO SCREW Right 7/31/2024    Procedure: INSERTION NAIL IM FEMUR ANTEGRADE (TROCHANTERIC);  Surgeon: Meng Hill MD;  Location: [facility];  Service: Orthopedics    TONSILLECTOMY  2010 09/17/24 0959   OT Last Visit   OT Visit Date 09/17/24   Note Type   Note type Evaluation   Pain Assessment   Pain Assessment Tool 0-10   Pain Score No Pain   Restrictions/Precautions   Weight Bearing Precautions Per Order No   Other Precautions Cognitive;Impulsive;Chair Alarm;Bed Alarm;Multiple lines;Fall Risk;Pain   Home Living   Type of Home Apartment   Home Layout One level;Performs ADLs on one level;Able to live on main level with bedroom/bathroom;Elevator   Bathroom Shower/Tub Tub/shower unit   Bathroom Toilet Raised   Home Equipment Cane  (SPC used occasionally)   Additional Comments 4th floor Apt, elevator, Alone. rollator used  occasionally   Prior Function   Level of Commerce Independent with ADLs;Independent with functional mobility;Needs assistance with IADLS   Lives With Alone   Receives Help From Personal care  "attendant   IADLs Family/Friend/Other provides transportation;Family/Friend/Other provides meals;Family/Friend/Other provides medication management  (MITCHEL 9-4 M-F, sister assists on weekends)   Falls in the last 6 months (S)  5 to 10   Vocational Unemployed   Comments Pt reports having MITCHEL's Aravind-4 M-F, sister stops in on weekends   Lifestyle   Autonomy IND PTA with ADLs. Assist IADLs. (-). Rollator used PRN.   Reciprocal Relationships Lives alone, MITCHEL M-F, sister weekends   Service to Others Unemployed   Intrinsic Gratification None stated, will continue to assess   General   Additional Pertinent History Pt with multiple admissions 2/2 toxic encephalopathy. Limited safety awareness. Pt with previous ACLS completed 8/22/24 with a score of 4.6 indicating may live alone with daily assistance. ?need for increased social support 2/2 multiple overdoses.   Family/Caregiver Present No   Subjective   Subjective \"You're here for my hip?\"   ADL   Where Assessed Edge of bed   Eating Assistance 5  Supervision/Setup   Grooming Assistance 5  Supervision/Setup   UB Bathing Assistance 4  Minimal Assistance   LB Bathing Assistance 4  Minimal Assistance   UB Dressing Assistance 4  Minimal Assistance   LB Dressing Assistance 4  Minimal Assistance   Toileting Assistance  4  Minimal Assistance   Functional Assistance 4  Minimal Assistance   Bed Mobility   Supine to Sit 5  Supervision   Additional items Increased time required;Verbal cues;Bedrails;HOB elevated   Sit to Supine Unable to assess   Additional Comments Pt supine in bed upon arrival. OOB in recliner post session, all needs met, call bell within reach. +chair alarm on   Transfers   Sit to Stand 4  Minimal assistance   Additional items Assist x 1;Increased time required;Verbal cues   Stand to Sit 4  Minimal assistance   Additional items Assist x 1;Increased time required;Verbal cues   Additional Comments RW in stance   Functional Mobility   Functional Mobility 4  Minimal " assistance   Additional Comments household distances, RW   Additional items Rolling walker   Balance   Static Sitting Fair   Dynamic Sitting Fair -   Static Standing Poor +   Dynamic Standing Poor   Ambulatory Poor   Activity Tolerance   Activity Tolerance Patient limited by fatigue   Medical Staff Made Aware PT benedicto Diggs 2/2 increased medical complexity and comorbidities   Nurse Made Aware RN cleared for therapy   RUE Assessment   RUE Assessment WFL   LUE Assessment   LUE Assessment WFL   Hand Function   Gross Motor Coordination Functional   Fine Motor Coordination Functional   Cognition   Overall Cognitive Status Impaired   Arousal/Participation Alert;Cooperative   Attention Attends with cues to redirect   Orientation Level Oriented to person;Oriented to place;Oriented to time;Disoriented to situation  (knows month/year, not date)   Memory Decreased recall of precautions;Decreased recall of recent events;Decreased short term memory   Following Commands Follows one step commands with increased time or repetition   Comments Pt cooperative. Limited insight and safety awareness. Pt with multiple recent admissions for similar toxic encephalopathy related to methadone. CM aware. Pt participated in ACLS cognitive assessment on previous admission with a score of 4.6 on 8/22/24. Indicates ability to live alone with daily assistance. HHA 9-4 M-F and sister assists on weekends   Assessment   Limitation Decreased ADL status;Decreased cognition;Decreased Safe judgement during ADL;Decreased endurance;Decreased self-care trans;Decreased high-level ADLs   Prognosis Fair   Assessment 64 year old pt seen today for an OT evaluation following admission to [facility] 2/2 toxic encephalopathy with present symptoms significant for pain, fatigue, weakness, decreased ADL status, decreased functional mobility. Pt  has a past medical history of Anxiety, Depression, Diabetes mellitus (HCC), Drug use, Hemorrhoids,  internal, and Hypertension. Pt reported living alone in a 4th fl apt with elevator to enter. Pt reports being IND for ADLs, assist IADLs. Pt has HHA M-F from 9-4, sister assists on weekends. Pt (-). Pt pleasant and cooperative t/o session. Pt completed functional bed mobility with SUP. Min A for functional STS txfs and functional mobility with RW. Pt was min A for UB ADLs and Min A for LB ADLs. The patient's raw score on the AM-PAC Daily Activity Inpatient Short Form is 17. A raw score of less than 19 suggests the patient may benefit from discharge to post-acute rehabilitation services. Please refer to the recommendation of the Occupational Therapist for safe discharge planning. Pt is functioning below baseline level of function and will continue to benefit from skilled acute OT to promote increased independence and return to PLOF. At this time, current OT recommendation is Level 3 resources - pending increased social support and progress. Will continue to follow and assess.   Goals   Patient Goals to go home   LTG Time Frame 10-14   Long Term Goal #1 See below for goals   Plan   Treatment Interventions ADL retraining;Functional transfer training;UE strengthening/ROM;Endurance training;Cognitive reorientation;Patient/family training;Equipment evaluation/education;Fine motor coordination activities;Compensatory technique education;Continued evaluation;Energy conservation;Activityengagement   Goal Expiration Date 10/01/24   OT Frequency 2-3x/wk   Discharge Recommendation   Rehab Resource Intensity Level, OT III (Minimum Resource Intensity)  (with increased social support)   AM-PAC Daily Activity Inpatient   Lower Body Dressing 2   Bathing 2   Toileting 2   Upper Body Dressing 3   Grooming 4   Eating 4   Daily Activity Raw Score 17   Daily Activity Standardized Score (Calc for Raw Score >=11) 37.26   AM-PAC Applied Cognition Inpatient   Following a Speech/Presentation 2   Understanding Ordinary Conversation 3    Taking Medications 2   Remembering Where Things Are Placed or Put Away 2   Remembering List of 4-5 Errands 2   Taking Care of Complicated Tasks 2   Applied Cognition Raw Score 13   Applied Cognition Standardized Score 30.46   End of Consult   Education Provided Yes   Patient Position at End of Consult Bedside chair;All needs within reach;Bed/Chair alarm activated   Nurse Communication Nurse aware of consult         Occupational Therapy Goals    Pt will be Mod I with bed mobility with good sitting balance/tolerance to engage in self care tasks within this plan of care.      Pt will be Mod I for UB dressing and bathing with use of assistive devices PRN within this plan of care.     Pt will be Mod I for LB dressing and bathing with use of assistive devices PRN within this plan of care.     Pt will demonstrate standing tolerance of 2-3 minutes increase independence for toileting ADLs/tasks with use of assistive devices PRN within this plan of care.     Pt will completed functional transfers with mod I, with use of appropriate assistive devices within this plan of care.     Pt will demonstrate good carryover of safety awareness with use of appropriate assistive devices during functional tasks within this plan of care.     Pt will demonstrated increased activity tolerance to 30 mins for participation in ADLs and functional tasks within this plan of care.     Pt will complete further functional cognitive assessment with good attention/participation to assist with safe d/c planning recommendations.        Rakesh Brown, MS, OTR/L     M[facility] CERTIFIED RATER ID [id number]

## 2024-09-17 NOTE — ED PROVIDER NOTES
1. Altered mental status      ED Disposition       ED Disposition   Admit    Condition   Stable    Date/Time   Mon Sep 16, 2024 11:39 AM    Comment   Case was discussed with ELVIS and the patient's admission status was agreed to be Admission Status: inpatient status to the service of Dr. Coats .               Assessment & Plan       Medical Decision Making  David Skelton Jr. is a 64 y.o. who presents for evaluation after fall. Somnolent per bystanders to EMS. AMS in ED    Vital signs are stable, afebrile  PE: no visible signs of trauma, GCS 14    Ddx: concern for intracranial abnormality,     Plan: CT head and c-spine showing no acute abnormalities   Lab work as below  Nasal trumpet inserted, NC O2 applied, up to 4 L  Persistent AMS in the ED.     Disposition: Discussed with [facility]. Admit inpatient.          Amount and/or Complexity of Data Reviewed  Labs: ordered.  Radiology: ordered and independent interpretation performed.    Risk  Decision regarding hospitalization.                       Medications   insulin lispro (HumALOG/ADMELOG) 100 units/mL subcutaneous injection 1-5 Units ( Subcutaneous Not Given 9/16/24 1624)   insulin lispro (HumALOG/ADMELOG) 100 units/mL subcutaneous injection 1-5 Units (has no administration in time range)   naloxone (NARCAN) 0.04 mg/mL syringe 0.04 mg (has no administration in time range)   heparin (porcine) subcutaneous injection 5,000 Units (5,000 Units Subcutaneous Given 9/16/24 1353)   labetalol (NORMODYNE) injection 10 mg (10 mg Intravenous Given 9/16/24 1622)       History of Present Illness       Patient is a 63 yo male with pertinent PMH of narcotic abuse, taking methadone, T2DM, frequent falls, and HTN who presents for evaluation via EMS after being found in hallway of his apartment building unresponsive by another resident. Upon EMS arrival, patient protecting airway but somnolent. Placed on 4L NC en route to the hospital due to intermittent desaturation to 89% on RA.  Otherwise, HD stable. Upon arrival, patient unable to provide further history due to mental status. No family members or other individuals present at bedside to provide further history.             Review of Systems   All other systems reviewed and are negative.          Objective     ED Triage Vitals   Temperature Pulse Blood Pressure Respirations SpO2 Patient Position - Orthostatic VS   09/16/24 0917 09/16/24 0843 09/16/24 0843 09/16/24 0843 09/16/24 0843 09/16/24 1030   98.5 °F (36.9 °C) 94 133/70 16 93 % Lying      Temp Source Heart Rate Source BP Location FiO2 (%) Pain Score    09/16/24 0917 09/16/24 1030 09/16/24 1030 -- 09/16/24 1721    Rectal Monitor Left arm  No Pain        Physical Exam  Constitutional:       Appearance: He is ill-appearing. He is not toxic-appearing or diaphoretic.   HENT:      Head: Normocephalic and atraumatic.      Mouth/Throat:      Mouth: Mucous membranes are moist.   Eyes:      General: No scleral icterus.     Pupils: Pupils are equal.      Comments: 2, equal, round, reactive      Cardiovascular:      Rate and Rhythm: Regular rhythm. Tachycardia present.      Heart sounds: Normal heart sounds.   Pulmonary:      Breath sounds: Normal breath sounds.      Comments: Intermittent snoring while asleep with brief apneic periods     Abdominal:      General: There is no distension.      Palpations: Abdomen is soft.   Musculoskeletal:         General: No swelling.   Skin:     General: Skin is warm and dry.      Comments: Multiple scars on abdomen, concerning for prior injection sites.    Neurological:      GCS: GCS eye subscore is 4. GCS verbal subscore is 4. GCS motor subscore is 6.      Comments: Somnolent but arousable to tactile stimuli and occasional verbal stimuli             Labs Reviewed   CBC AND DIFFERENTIAL - Abnormal       Result Value    WBC 11.08 (*)     RBC 3.17 (*)     Hemoglobin 8.9 (*)     Hematocrit 28.9 (*)     MCV 91      MCH 28.1      MCHC 30.8 (*)     RDW 13.3      MPV  10.9      Platelets 331      nRBC 0      Segmented % 79 (*)     Immature Grans % 1      Lymphocytes % 8 (*)     Monocytes % 6      Eosinophils Relative 6      Basophils Relative 0      Absolute Neutrophils 8.72 (*)     Absolute Immature Grans 0.07      Absolute Lymphocytes 0.92      Absolute Monocytes 0.66      Eosinophils Absolute 0.68 (*)     Basophils Absolute 0.03     COMPREHENSIVE METABOLIC PANEL - Abnormal    Sodium 140      Potassium 4.6      Chloride 106      CO2 32      ANION GAP 2 (*)     BUN 25      Creatinine 0.98      Glucose 153 (*)     Calcium 8.4      Corrected Calcium 8.9      AST 19      ALT 31      Alkaline Phosphatase 194 (*)     Total Protein 5.7 (*)     Albumin 3.4 (*)     Total Bilirubin 0.29      eGFR 82      Narrative:     National Kidney Disease Foundation guidelines for Chronic Kidney Disease (CKD):     Stage 1 with normal or high GFR (GFR > 90 mL/min/1.73 square meters)    Stage 2 Mild CKD (GFR = 60-89 mL/min/1.73 square meters)    Stage 3A Moderate CKD (GFR = 45-59 mL/min/1.73 square meters)    Stage 3B Moderate CKD (GFR = 30-44 mL/min/1.73 square meters)    Stage 4 Severe CKD (GFR = 15-29 mL/min/1.73 square meters)    Stage 5 End Stage CKD (GFR <15 mL/min/1.73 square meters)  Note: GFR calculation is accurate only with a steady state creatinine   ACETAMINOPHEN LEVEL - Abnormal    Acetaminophen Level <2 (*)    LACTIC ACID, PLASMA (W/REFLEX IF RESULT > 2.0) - Abnormal    LACTIC ACID 4.4 (*)     Narrative:     Result may be elevated if tourniquet was used during collection.   PROCALCITONIN TEST - Abnormal    Procalcitonin 2.62 (*)    CBC AND DIFFERENTIAL - Abnormal    WBC 10.27 (*)     RBC 3.20 (*)     Hemoglobin 9.1 (*)     Hematocrit 30.0 (*)     MCV 94      MCH 28.4      MCHC 30.3 (*)     RDW 13.2      MPV 11.1      Platelets 306      Narrative:     This is an appended report.  These results have been appended to a previously verified report.   LACTIC ACID 2 HOUR - Abnormal    LACTIC  ACID 4.3 (*)     Narrative:     Result may be elevated if tourniquet was used during collection.   COMPREHENSIVE METABOLIC PANEL - Abnormal    Sodium 139      Potassium 4.5      Chloride 101      CO2 29      ANION GAP 9      BUN 21      Creatinine 0.77      Glucose 172 (*)     Calcium 8.3 (*)     Corrected Calcium 8.9      AST 18      ALT 24      Alkaline Phosphatase 172 (*)     Total Protein 6.1 (*)     Albumin 3.2 (*)     Total Bilirubin 0.39      eGFR 97      Narrative:     National Kidney Disease Foundation guidelines for Chronic Kidney Disease (CKD):     Stage 1 with normal or high GFR (GFR > 90 mL/min/1.73 square meters)    Stage 2 Mild CKD (GFR = 60-89 mL/min/1.73 square meters)    Stage 3A Moderate CKD (GFR = 45-59 mL/min/1.73 square meters)    Stage 3B Moderate CKD (GFR = 30-44 mL/min/1.73 square meters)    Stage 4 Severe CKD (GFR = 15-29 mL/min/1.73 square meters)    Stage 5 End Stage CKD (GFR <15 mL/min/1.73 square meters)  Note: GFR calculation is accurate only with a steady state creatinine   MAGNESIUM - Abnormal    Magnesium 1.6 (*)    CBC AND DIFFERENTIAL - Abnormal    WBC 10.47 (*)     RBC 3.31 (*)     Hemoglobin 9.3 (*)     Hematocrit 30.7 (*)     MCV 93      MCH 28.1      MCHC 30.3 (*)     RDW 13.3      MPV 10.4      Platelets 287      Narrative:     See Manual Diff Done Within 3 Days  This is an appended report.  These results have been appended to a previously verified report.   LACTIC ACID, PLASMA (W/REFLEX IF RESULT > 2.0) - Abnormal    LACTIC ACID 4.4 (*)     Narrative:     Result may be elevated if tourniquet was used during collection.   POCT GLUCOSE - Abnormal    POC Glucose 144 (*)    POCT GLUCOSE - Abnormal    POC Glucose 174 (*)    MANUAL DIFFERENTIAL(PHLEBS DO NOT ORDER) - Abnormal    Segmented % 78 (*)     Bands % 15 (*)     Lymphocytes % 2 (*)     Monocytes % 2 (*)     Eosinophils % 0      Basophils % 1      Metamyelocytes % 1      Myelocytes % 1      Absolute Neutrophils 9.55 (*)      Absolute Lymphocytes 0.21 (*)     Absolute Monocytes 0.21      Absolute Eosinophils 0.00      Absolute Basophils 0.10      Absolute Metamyelocytes 0.10      Absolute Myelocytes 0.10      Total Counted        RBC Morphology Present      Platelet Estimate Adequate      Anisocytosis Present      Macrocytes Present     COVID19, INFLUENZA A/B, RSV PCR, [facility] - Normal    SARS-CoV-2 Negative      INFLUENZA A PCR Negative      INFLUENZA B PCR Negative      RSV PCR Negative      Narrative:     This test has been performed using the CoV-2/Flu/RSV plus assay on the [employer] platform. This test has been validated by the  and verified by the performing laboratory.     This test is designed to amplify and detect the following: nucleocapsid (N), envelope (E), and RNA-dependent RNA polymerase (RdRP) genes of the SARS-CoV-2 genome; matrix (M), basic polymerase (PB2), and acidic protein (PA) segments of the influenza A genome; matrix (M) and non-structural protein (NS) segments of the influenza B genome, and the nucleocapsid genes of RSV A and RSV B.     Positive results are indicative of the presence of Flu A, Flu B, RSV, and/or SARS-CoV-2 RNA. Positive results for SARS-CoV-2 or suspected novel influenza should be reported to state, local, or federal health departments according to local reporting requirements.      All results should be assessed in conjunction with clinical presentation and other laboratory markers for clinical management.     FOR PEDIATRIC PATIENTS - copy/paste COVID Guidelines URL to browser: [email or web address]      TSH, 3RD GENERATION - Normal    TSH 3RD GENERATON 0.777     MEDICAL ALCOHOL - Normal    Ethanol Lvl <10     SALICYLATE LEVEL - Normal    Salicylate Lvl <5     PLATELET COUNT - Normal    Platelets 334      MPV 10.9     POCT GLUCOSE - Normal    POC Glucose 135     POCT GLUCOSE - Normal    POC Glucose 136     BLOOD CULTURE     Blood Culture Received in Microbiology Lab. Culture in Progress.     BLOOD CULTURE    Blood Culture Received in Microbiology Lab. Culture in Progress.     RBC MORPHOLOGY REFLEX TEST   LACTIC ACID 2 HOUR   COMA PANEL    Narrative:     The following orders were created for panel order Coma Panel.  Procedure                               Abnormality         Status                     ---------                               -----------         ------                     Ethanol[[id number]]                      Normal              Final result               Salicylate level[[id number]]             Normal              Final result               Acetaminophen level-If c...[[id number]]  Abnormal            Final result                 Please view results for these tests on the individual orders.     XR chest 1 view portable   ED Interpretation by Palmer Archer MD (09/16 1231)   No acute cardiopulmonary disease.      Final Interpretation by Alberto Walker MD (09/16 2698)      New bibasilar airspace disease is suspicious for the presence of aspiration or infection as clinically questioned. No pneumothorax.            Workstation performed: [id number]         CT head without contrast   Final Interpretation by Logan Davis MD (09/16 1414)      No acute intracranial abnormality.  Chronic microangiopathic changes.                  Workstation performed: [id number]         CT spine cervical without contrast   Final Interpretation by oLgan Davis MD (09/16 5489)      No cervical spine fracture or traumatic malalignment.                  Workstation performed: [id number]             ECG 12 Lead Documentation Only    Date/Time: 9/17/2024 8:00 AM    Performed by: Megha Bernal MD  Authorized by: Megha Bernal MD    Indications / Diagnosis:  AMS  ECG reviewed by me, the ED Provider: yes    Patient location:  ED  Previous ECG:     Previous ECG:  Compared to current    Similarity:  No change  Interpretation:      Interpretation: normal    Rate:     ECG rate:  95    ECG rate assessment: normal    Rhythm:     Rhythm: sinus rhythm    Ectopy:     Ectopy: none    QRS:     QRS axis:  Normal    QRS intervals:  Normal  Conduction:     Conduction: normal    ST segments:     ST segments:  Normal  T waves:     T waves: normal             Megha Bernal MD  09/17/24 0809

## 2024-09-17 NOTE — SEPSIS NOTE
"  Sepsis Note   David Skelton Jr. 64 y.o. male MRN: [id number]  Unit/Bed#: [facility] 834-01 Encounter: [id number]       Initial Sepsis Screening       Row Name 09/16/24 2352 09/16/24 6215             Is the patient's history suggestive of a new or worsening infection? Yes (Proceed)  -AF No  -AF       Suspected source of infection pneumonia  -AF --       Indicate SIRS criteria Hyperthemia > 38.3C (100.9F) OR Hypothermia <36C (96.8F);Tachycardia > 90 bpm  -AF --       Are two or more of the above signs & symptoms of infection both present and new to the patient? Yes (Proceed)  -AF --       Assess for evidence of organ dysfunction: Are any of the below criteria present within 6 hours of suspected infection and SIRS criteria that are NOT considered to be chronic conditions? Lactate >/equal 4.0  -AF --       Date of presentation of septic shock -- --       Time of presentation of septic shock -- --       Fluid Resuscitation: 30 ml/kg IV fluid bolus will be given based on actual body weight  -AF --       Is the patient is persistently hypotensive in the hour after fluid bolus administration? If yes, patient meets criteria for vasopressor use. NO  -AF --       Sepsis Note: Click \"NEXT\" below (NOT \"close\") to generate sepsis note based on above information. YES (proceed by clicking \"NEXT\")  -AF --                 User Key  (r) = Recorded By, (t) = Taken By, (c) = Cosigned By      Initials Name Provider Type    AF Annika Guillaume PA-C Physician Assistant                  Evaluated for fever of 101, tachycardia. Appears patient had an aspiration event earlier today (and likely prior to admission given suspicion for polypharmacy or misuse of methadone) and was placed on bipap then HFNC. Currently nontoxic/well-appearing, wakes easily to voice although goes back to sleep promptly. Denies any complaints.     Possible aspiration pneumonitis however given elevated procal, lactate, and CXR findings cannot rule out developing " aspiration pneumonia so will start abx. Has remained normotensive, given 1L prior to lactic resulting at 4, will give additional 1.3L to complete 30 cc/kg bolus. I was able to wean him from 10L to 6L without a drop in his O2 sats and he is breathing comfortably.       There is no height or weight on file to calculate BMI.  Wt Readings from Last 1 Encounters:   09/04/24 78.6 kg (173 lb 3.2 oz)        Ideal body weight: 68.4 kg (150 lb 12.7 oz)  Adjusted ideal body weight: 72.5 kg (159 lb 12.1 oz)

## 2024-09-17 NOTE — CERTIFIED RECOVERY SPECIALIST
Certified  Note    Patient name: David Skelton Jr.  Location: [facility] 834/[facility] 834-01  [place]: [facility]  Attending:  Pau Eckert MD MRN [id number]  : 1960  Age: 64 y.o.    Sex: male Date 2024         Substance Use History:     Social History     Substance and Sexual Activity   Alcohol Use Never        Social History     Substance and Sexual Activity   Drug Use Yes    Comment: Methadone clinic     CRS attempted to meet with patient and offer support. Patient attempted to talk but quickly fell asleep and CRS unable to engage. CRS spoke with CM and discussed patients lack of ability to engage. CM and CRS will continue to follow up with patient.     CRS provided business card and resources.               Omar Amezcua

## 2024-09-17 NOTE — PHYSICAL THERAPY NOTE
PHYSICAL THERAPY EVALUATION  NAME:  David Skelton Jr.  DATE: 09/17/24    AGE:   64 y.o.  Mrn:   [id number]  ADMIT DX:  Altered mental status [R41.82]  AMS (altered mental status) [R41.82]    Past Medical History:   Diagnosis Date    Anxiety     Depression     Diabetes mellitus (HCC)     Drug use     Hemorrhoids, internal 9/23/2019    Hypertension      Past Surgical History:   Procedure Laterality Date    FEMUR FRACTURE SURGERY Right     GASTRIC BYPASS  11/08/2011    Managed by: Braden Luna (General Surgery)    HERNIA REPAIR  02/13/2013    Incisional hernia repair Managed by: Braden Luna (General Surgery)    OH OPTX FEM SHFT FX W/INSJ IMED IMPLT W/WO SCREW Right 7/31/2024    Procedure: INSERTION NAIL IM FEMUR ANTEGRADE (TROCHANTERIC);  Surgeon: Meng Hill MD;  [facility]: [facility];  Service: Orthopedics    TONSILLECTOMY  2010       Length Of Stay: 1  PHYSICAL THERAPY EVALUATION :          09/17/24 0958   PT Last Visit   PT Visit Date 09/17/24   Note Type   Note type Evaluation   Pain Assessment   Pain Assessment Tool 0-10   Pain Score No Pain   Home Living   Type of Home Apartment   Home Layout Elevator   Home Equipment [place];Cane   Additional Comments At baseline, pt reports living alone in an apt complex w/ elevator access; pt is indep w/ mobility using RW vs SPC and ADLs/ has home aides/ nursing assist  assist 9-4 weekdays and sister stays on weekends to assist. Pt reports 0 drive; EMR states pt w/ 5-10 falls in last 6 months.   Prior Function   Level of [place] Independent with ADLs;Independent with functional mobility;Needs assistance with IADLS   Lives With Alone   Receives Help From Personal care attendant   IADLs Family/Friend/Other provides transportation;Independent with meal prep;Independent with medication management  (noted limited safety- multiple admissions w/ metabolic encephalopathy/ hx of polypharmacy ? safety)   Falls in the last 6 months 5 to 10   Vocational Unemployed  "  General   Family/Caregiver Present No   Cognition   Arousal/Participation Alert   Orientation Level Oriented to person;Oriented to place;Oriented to time;Disoriented to situation  (knows month/ year- not date.)   Memory Decreased recall of precautions;Decreased recall of recent events   Following Commands Follows one step commands with increased time or repetition   Comments limited safety awareness; needs cues for safety/ attention to task. limited insight   Subjective   Subjective \"Im here because of my hip\"   RLE Assessment   RLE Assessment X  (gross weakness- at least 3+/5)   LLE Assessment   LLE Assessment X  (gross weakness- at least 3+/5)   Coordination   Movements are Fluid and Coordinated 1   Light Touch   RLE Light Touch Grossly intact   LLE Light Touch Grossly intact   Sharp/Dull   RLE Sharp/Dull Grossly intact   LLE Sharp/Dull Grossly intact   Proprioception   RLE Proprioception Grossly intact   LLE Proprioception Grossly Intact   Bed Mobility   Supine to Sit 5  Supervision   Additional items HOB elevated;Increased time required;Verbal cues;LE management   Additional Comments sits EOB w/ S 0 dizziness   Transfers   Sit to Stand 4  Minimal assistance   Additional items Increased time required;Verbal cues   Stand to Sit 4  Minimal assistance   Additional items Increased time required;Verbal cues   Additional Comments w/ RW use   Ambulation/Elevation   Gait pattern Improper Weight shift;Narrow RAYMON;Decreased foot clearance;Shuffling   Gait Assistance 4  Minimal assist   Additional items Assist x 1;Verbal cues;Tactile cues   Assistive Device Rolling walker   Distance 60+40' w/ cues for pacing and safety/ attention to task.   Stair Management Assistance Not tested   Balance   Static Sitting Fair   Dynamic Sitting Fair -   Static Standing Poor +   Dynamic Standing Poor   Ambulatory Poor  (rw)   Endurance Deficit   Endurance Deficit Yes   Activity Tolerance   Activity Tolerance Patient limited by " fatigue;Patient tolerated treatment well   Medical Staff Made Aware OT and RN Pt was seen in conjunction w/ OT 2* unstable presentation; medical complexity; new precautions/ limitations + limited activity tolerance and regression from baseline functional mobility.   Assessment   Prognosis Fair   Problem List Decreased strength;Decreased endurance;Impaired balance;Decreased mobility;Impaired judgement;Decreased safety awareness;Pain   Assessment Pt is 64 y.o. male seen for PT evaluation s/p admit to [facility] on 9/16/2024  w/ Toxic encephalopathy.   Of note pt w/ multiple admissions for same w/  most recent episode last month on August 19, 2024.       Pt  has a past medical history of Anxiety, Depression, Diabetes mellitus (HCC), Drug use, Hemorrhoids, internal (9/23/2019), and Hypertension.   At baseline, pt reports living alone in an apt complex w/ elevator access; pt is indep w/ mobility using RW vs SPC and ADLs/ has home aides/ nursing assist  assist 9-4 weekdays and sister stays on weekends to assist. Pt reports 0 drive; EMR states pt w/ 5-10 falls in last 6 months.      Currently,  pt  is requiring S for bed skills; min/ CGA for functional transfers and CGA/ S for ambulation w/ RW 60+40' w/ cues for pacing and safety/ attention to task.   Pt presents functioning below baseline and currently w/ overall mobility deficits 2* to: limited flexibility;  generalized weakness/ deconditioning; decreased endurance; decreased activity tolerance; impaired balance; gait deviations; decreased safety awareness limited insight; SOB/MACHUCA; fatigue; impaired safety and judgement; limited insight into current deficits; bed/ chair alarms; multiple lines;   Pt currently at risk for falls.  (Please find additional objective findings from PT assessment regarding body systems outlined above.) Pt will continue to benefit from skilled PT interventions to address stated impairments; to maximize functional potential; for ongoing  pt/ family training; and DME needs.      PT is recommending PT Resource Intensity Level  3 + inc family/ caregiver support on d/c.      PT will follow for STG as outlined on eval.     In 14 days pt will:  Complete bed mobility skills with MI to facilitate safe return to previous living environment and decrease burden on caregivers.  Perform all functional transfers with MI  to facilitate safe return to previous living environment.    Ambulate  with least restrictive AD MI > 350'' without LOB and stable vitals for safe ambulation in home/ community environment.   Improve balance by 1 grade in order to decrease fall risk.    Improve LE strength grades by 1/2 to increase independence w/ all functional mobility, transfers and gait.  Actively participate w/ PT for ongoing pt and family education; DME needs and D/C planning to promote highest level of function in least restrictive environment.   Barriers to Discharge Decreased caregiver support   Barriers to Discharge Comments alone at times; hx of polypharmacy + multiple admissions   Goals   Patient Goals go home   STG Expiration Date 10/01/24   Short Term Goal #1 In 14 days pt will:  Complete bed mobility skills with MI to facilitate safe return to previous living environment and decrease burden on caregivers.  Perform all functional transfers with MI  to facilitate safe return to previous living environment.    Ambulate  with least restrictive AD MI > 350'' without LOB and stable vitals for safe ambulation in home/ community environment.   Improve balance by 1 grade in order to decrease fall risk.    Improve LE strength grades by 1/2 to increase independence w/ all functional mobility, transfers and gait.  Actively participate w/ PT for ongoing pt and family education; DME needs and D/C planning to promote highest level of function in least restrictive environment.   PT Treatment Day 0   Plan   Treatment/Interventions ADL retraining;Functional transfer training;LE  strengthening/ROM;Elevations;Therapeutic exercise;Endurance training;Cognitive reorientation;Patient/family training;Equipment eval/education;Bed mobility;Gait training;Compensatory technique education;Spoke to case management;Spoke to nursing;OT   PT Frequency 2-3x/wk   Discharge Recommendation   Rehab Resource Intensity Level, PT III (Minimum Resource Intensity)   Equipment Recommended   (has RW and SPC for dc)   AM-PAC Basic Mobility Inpatient   Turning in Flat Bed Without Bedrails 4   Lying on Back to Sitting on Edge of Flat Bed Without Bedrails 3   Moving Bed to Chair 3   Standing Up From Chair Using Arms 3   Walk in Room 3   Climb 3-5 Stairs With Railing 3   Basic Mobility Inpatient Raw Score 19   Basic Mobility Standardized Score 42.48   [facility] Highest Level Of Mobility   -HLM Goal 6: Walk 10 steps or more   -HLM Achieved 7: Walk 25 feet or more   End of Consult   Patient Position at End of Consult Bedside chair;Bed/Chair alarm activated;All needs within reach     The patient's AM-PAC Basic Mobility Inpatient Short Form Raw Score is 19. A Raw score of greater than 16 suggests the patient may benefit from discharge to home. Please also refer to the recommendation of the Physical Therapist for safe discharge planning.

## 2024-09-17 NOTE — PLAN OF CARE
Problem: Potential for Falls  Goal: Patient will remain free of falls  Description: INTERVENTIONS:  - Educate patient/family on patient safety including physical limitations  - Instruct patient to call for assistance with activity   - Consult OT/PT to assist with strengthening/mobility   - Keep Call bell within reach  - Keep bed low and locked with side rails adjusted as appropriate  - Keep care items and personal belongings within reach  - Initiate and maintain comfort rounds  - Make Fall Risk Sign visible to staff  Problem: PAIN - ADULT  Goal: Verbalizes/displays adequate comfort level or baseline comfort level  Description: Interventions:  - Encourage patient to monitor pain and request assistance  - Assess pain using appropriate pain scale  - Administer analgesics based on type and severity of pain and evaluate response  - Implement non-pharmacological measures as appropriate and evaluate response  - Consider cultural and social influences on pain and pain management  - Notify physician/advanced practitioner if interventions unsuccessful or patient reports new pain  Outcome: Progressing     Problem: DISCHARGE PLANNING  Goal: Discharge to home or other facility with appropriate resources  Description: INTERVENTIONS:  - Identify barriers to discharge w/patient and caregiver  - Arrange for needed discharge resources and transportation as appropriate  - Identify discharge learning needs (meds, wound care, etc.)  - Arrange for interpretive services to assist at discharge as needed  - Refer to Case Management Department for coordinating discharge planning if the patient needs post-hospital services based on physician/advanced practitioner order or complex needs related to functional status, cognitive ability, or social support system  Outcome: Progressing     - Apply yellow socks and bracelet for high fall risk patients  - Consider moving patient to room near nurses station  Outcome: Progressing

## 2024-09-17 NOTE — ASSESSMENT & PLAN NOTE
Lab Results   Component Value Date    HGBA1C 10.7 (H) 07/31/2024       Recent Labs     09/16/24  1621 09/16/24  2055 09/17/24  0707 09/17/24  1114   POCGLU 136 144* 174* 229*       Blood Sugar Average: Last 72 hrs:  (P) 163.6  Poorly controlled evidence within A1c of 10.7  Continue Lantus 10 units at night hold metformin hold Prandin  Start patient on insulin sliding scale

## 2024-09-17 NOTE — CASE MANAGEMENT
Case Management Assessment & Discharge Planning Note    Patient name David Skelton Jr.  Location [facility] 834/[facility] 834-01 MRN [id number]  : 1960 Date 2024       Current Admission Date: 2024  Current Admission Diagnosis:Toxic encephalopathy   Patient Active Problem List    Diagnosis Date Noted Date Diagnosed    Acute respiratory failure (HCC) 2024     Type 2 diabetes mellitus, with long-term current use of insulin (HCC) 2024     LUNA (iron deficiency anemia) 2024     NINFA (generalized anxiety disorder) 2024     Methadone dependence (HCC) 2024     Paroxysmal atrial fibrillation (HCC) 2024     Toxic encephalopathy 2024     Recurrent falls 2024     Closed fracture of right femur, unspecified fracture morphology, sequela 2024     Type 2 diabetes mellitus with other ophthalmic complication, with long-term current use of insulin (HCC) 2024     Cognitive decline 2024     Ambulatory dysfunction 2024     Prolonged QT interval 2024     Left ventricular hypertrophy 2024     Acute blood loss anemia 2024     Chronic hepatitis (HCC) 2024     BPH (benign prostatic hyperplasia) 2024     Cholelithiasis 2024     CKD (chronic kidney disease), stage II 2024     Hypogonadism in male 2024     Electrolyte abnormality 2024     Hyponatremia 2024     Right hip pain 2024     Acute blood loss anemia (ABLA) 2024     Nocturia associated with benign prostatic hyperplasia 2024     Gross hematuria 2024     Closed fracture of right femur, unspecified fracture morphology, sequela 2024     B12 deficiency 2023     Bradycardia 2023     Retinopathy due to secondary DM (HCC) 2023     Visual changes 2023     Fall 2023     Closed fracture of multiple ribs of left side 2023     Methadone dependence (HCC) 2023     Encephalopathy 2023      Liver lesion, right lobe 06/08/2021     Lung nodule 06/08/2021     Adrenal nodule (HCC) 06/08/2021     Thoracic degenerative disc disease 04/12/2021     DDD (degenerative disc disease), lumbar 04/12/2021     Pre-operative general physical examination 12/31/2019     S/P gastric bypass 11/15/2019     Hypogonadism male 11/15/2019     Hemorrhoids, internal 09/23/2019     Rectal prolapse 09/11/2019     Drug-induced constipation 09/11/2019     Chronic anticoagulation 03/01/2019     GERD without esophagitis 03/01/2019     Iron deficiency anemia 03/01/2019     Major depression, chronic 03/01/2019     Stable proliferative diabetic retinopathy of left eye associated with type 2 diabetes mellitus (HCC) 03/01/2019     Narcotic abuse (HCC) 06/06/2018     Ulcer of leg, chronic, left (HCC) 02/27/2015     ADD (attention deficit disorder) without hyperactivity 01/29/2015     Bipolar disorder ([facility]) 01/10/2014     Insomnia 01/21/2013     History of hepatitis C 01/14/2013     Allergic rhinitis 01/04/2013     Generalized anxiety disorder 01/04/2013     Macular degeneration 01/04/2013     Spinal stenosis 01/04/2013     Vitamin D deficiency 01/04/2013     Benign essential hypertension 09/14/2012     Type 2 diabetes mellitus with ophthalmic complication, with long-term current use of insulin ([facility]) 09/14/2012     Atrial fibrillation ([facility]) 05/15/2012     Complex medical condition 05/15/2012       LOS (days): 1  Geometric Mean LOS (GMLOS) (days): 3.6  Days to GMLOS:2.5     OBJECTIVE:  PATIENT READMITTED TO HOSPITAL  Risk of Unplanned Readmission Score: 55.37         Current admission status: Inpatient       Preferred Pharmacy:   [id number] - [place] - [address]  [address]  [place] [address]  Phone: [phone] Fax: [phone]    [id number] - [place] - [address]  [address]  [place] [address]  Phone: [phone] Fax: [phone]    [facility]  [place] [facility] [place] - [address]  [address]  [place] [address]  Phone: [phone] Fax: [phone]    [facility] [place] - [address]  [address]  [place] [address]  Phone: [phone] Fax: [phone]    UNKNOWN - FOLLOW UP PRIOR TO DISCHARGE TO E-PRESCRIBE  No address on file      Primary Care Provider: Casey Mckeon MD    [facility] Insurance[facility]  Secondary Insurance:     ASSESSMENT:  Active Health Care Proxies       [facility] Representative - Sister   Primary Phone: [phone] (Mobile)  Home Phone: [phone]                           Readmission Root Cause  30 Day Readmission: Yes  Who directed you to return to the hospital?: Other (comment)  Did you understand whom to contact if you had questions or problems?: Yes  Did you get your prescriptions before you left the hospital?: Yes  Were you able to get your prescriptions filled when you left the hospital?: Yes  Did you take your medications as prescribed?: Yes  Were you able to get to your follow-up appointments?: Yes  During previous admission, was a post-acute recommendation made?: Yes  What post-acute resources were offered?: [facility]  Patient was readmitted due to: AMS- found down  Action Plan: admit    Patient Information  Admitted from:: Home  Mental Status: Confused                               Social Determinants of Health (SDOH)      Flowsheet Row Most Recent Value   Housing Stability    In the last 12 months, was there a time when you were not able to pay the mortgage or rent on time? N   In the past 12 months, how many times have you moved where you were living? 0   At any time in the past 12 months, were you homeless or living in a shelter (including now)? N   Transportation Needs    In the past 12 months, has lack of transportation kept you from medical appointments or from getting medications?  "no   In the past 12 months, has lack of transportation kept you from meetings, work, or from getting things needed for daily living? No   Food Insecurity    Within the past 12 months, you worried that your food would run out before you got the money to buy more. Never true   Within the past 12 months, the food you bought just didn't last and you didn't have money to get more. Never true   Utilities    In the past 12 months has the electric, gas, oil, or water company threatened to shut off services in your home? No            DISCHARGE DETAILS:                    Pt 30 day readmit, d/cd 8/23. Pt active with [facility].   Completed chart review, noted numerous admissions d/t possible overdose of meth, patient also taking oxycodone. Office of Aging is involved- worker is Cha Staples ( [phone]). CM left voicemail requesting return call    Per chart review, pt resides alone in 4th floor apartment ( elevator access). Has waiver services through [employer] ( [phone]) M-F,  sister- Vandana visits on weekends.  through [employer] is Monica   Uses rolling walker for ambulation   Hx of NINFA, Major Depression, hx of IV heroin use- currently receives \" take home\" Methadone through [facility] in [place]. Uses [facility] to transport to [facility].   CM to follow for dc planning       UPDATE 3:40PM:  call placed to Vandana anaya. Voicemail left requesting return call at earliest convenience.       Placed additional call to Cha Staples at [facility] ( [phone]). Provided details related to admission.   Reports she received case on 8/21, confirms pt is active with Waiver Services and  ( Chantelle Menjivar , email: [email or web address])   Per report, during most recent admission, previous CM contacted methadone clinic and were informed pt was taking prescribed amount and had no concerns.   Cha states at this time, " "[facility] reassessed for more hours at home. Hours increased from 32hrs - 48hrs week .   Sister checks on pt \" daily\", and completes all errands for patient. Reports at this time, supervision and support appeared adequate as of 9/10 and on 9/12,Cha given directive to close out case.     Cha requests e-mail thread between this CM, her and  to discuss plan moving forward.        [email or web address]       "

## 2024-09-18 LAB
ANION GAP SERPL CALCULATED.3IONS-SCNC: 4 MMOL/L (ref 4–13)
BUN SERPL-MCNC: 20 MG/DL (ref 5–25)
CALCIUM SERPL-MCNC: 8.2 MG/DL (ref 8.4–10.2)
CHLORIDE SERPL-SCNC: 98 MMOL/L (ref 96–108)
CO2 SERPL-SCNC: 32 MMOL/L (ref 21–32)
CREAT SERPL-MCNC: 0.78 MG/DL (ref 0.6–1.3)
ERYTHROCYTE [DISTWIDTH] IN BLOOD BY AUTOMATED COUNT: 13.2 % (ref 11.6–15.1)
GFR SERPL CREATININE-BSD FRML MDRD: 96 ML/MIN/1.73SQ M
GLUCOSE SERPL-MCNC: 109 MG/DL (ref 65–140)
GLUCOSE SERPL-MCNC: 117 MG/DL (ref 65–140)
GLUCOSE SERPL-MCNC: 148 MG/DL (ref 65–140)
GLUCOSE SERPL-MCNC: 152 MG/DL (ref 65–140)
GLUCOSE SERPL-MCNC: 189 MG/DL (ref 65–140)
HCT VFR BLD AUTO: 26.6 % (ref 36.5–49.3)
HGB BLD-MCNC: 8.2 G/DL (ref 12–17)
MCH RBC QN AUTO: 28 PG (ref 26.8–34.3)
MCHC RBC AUTO-ENTMCNC: 30.8 G/DL (ref 31.4–37.4)
MCV RBC AUTO: 91 FL (ref 82–98)
PLATELET # BLD AUTO: 257 THOUSANDS/UL (ref 149–390)
PMV BLD AUTO: 11.1 FL (ref 8.9–12.7)
POTASSIUM SERPL-SCNC: 4.4 MMOL/L (ref 3.5–5.3)
RBC # BLD AUTO: 2.93 MILLION/UL (ref 3.88–5.62)
SODIUM SERPL-SCNC: 134 MMOL/L (ref 135–147)
WBC # BLD AUTO: 11.44 THOUSAND/UL (ref 4.31–10.16)

## 2024-09-18 PROCEDURE — 85027 COMPLETE CBC AUTOMATED: CPT | Performed by: FAMILY MEDICINE

## 2024-09-18 PROCEDURE — 97116 GAIT TRAINING THERAPY: CPT

## 2024-09-18 PROCEDURE — 97530 THERAPEUTIC ACTIVITIES: CPT

## 2024-09-18 PROCEDURE — 99232 SBSQ HOSP IP/OBS MODERATE 35: CPT | Performed by: FAMILY MEDICINE

## 2024-09-18 PROCEDURE — 80048 BASIC METABOLIC PNL TOTAL CA: CPT | Performed by: FAMILY MEDICINE

## 2024-09-18 PROCEDURE — 82948 REAGENT STRIP/BLOOD GLUCOSE: CPT

## 2024-09-18 RX ORDER — FLUTICASONE PROPIONATE 50 MCG
1 SPRAY, SUSPENSION (ML) NASAL DAILY
Status: DISCONTINUED | OUTPATIENT
Start: 2024-09-18 | End: 2024-09-20 | Stop reason: HOSPADM

## 2024-09-18 RX ORDER — METHADONE HYDROCHLORIDE 10 MG/ML
150 CONCENTRATE ORAL DAILY
Status: DISCONTINUED | OUTPATIENT
Start: 2024-09-19 | End: 2024-09-19

## 2024-09-18 RX ORDER — FLUTICASONE PROPIONATE 50 MCG
1 SPRAY, SUSPENSION (ML) NASAL DAILY
Status: DISCONTINUED | OUTPATIENT
Start: 2024-09-19 | End: 2024-09-18

## 2024-09-18 RX ADMIN — INSULIN LISPRO 1 UNITS: 100 INJECTION, SOLUTION INTRAVENOUS; SUBCUTANEOUS at 16:29

## 2024-09-18 RX ADMIN — CEFTRIAXONE SODIUM 1000 MG: 10 INJECTION, POWDER, FOR SOLUTION INTRAVENOUS at 22:35

## 2024-09-18 RX ADMIN — HEPARIN SODIUM 5000 UNITS: 5000 INJECTION INTRAVENOUS; SUBCUTANEOUS at 20:37

## 2024-09-18 RX ADMIN — GABAPENTIN 300 MG: 300 CAPSULE ORAL at 08:20

## 2024-09-18 RX ADMIN — HEPARIN SODIUM 5000 UNITS: 5000 INJECTION INTRAVENOUS; SUBCUTANEOUS at 05:05

## 2024-09-18 RX ADMIN — FLUTICASONE PROPIONATE 1 SPRAY: 50 SPRAY, METERED NASAL at 17:50

## 2024-09-18 RX ADMIN — METOPROLOL SUCCINATE 12.5 MG: 25 TABLET, FILM COATED, EXTENDED RELEASE ORAL at 08:20

## 2024-09-18 RX ADMIN — INSULIN LISPRO 1 UNITS: 100 INJECTION, SOLUTION INTRAVENOUS; SUBCUTANEOUS at 22:34

## 2024-09-18 RX ADMIN — GABAPENTIN 300 MG: 300 CAPSULE ORAL at 20:37

## 2024-09-18 RX ADMIN — GABAPENTIN 300 MG: 300 CAPSULE ORAL at 16:29

## 2024-09-18 RX ADMIN — INSULIN GLARGINE 10 UNITS: 100 INJECTION, SOLUTION SUBCUTANEOUS at 20:37

## 2024-09-18 RX ADMIN — METHADONE HYDROCHLORIDE 175 MG: 10 CONCENTRATE ORAL at 08:20

## 2024-09-18 NOTE — ASSESSMENT & PLAN NOTE
Lab Results   Component Value Date    HGBA1C 10.7 (H) 07/31/2024       Recent Labs     09/17/24 2120 09/17/24 2135 09/18/24  0802 09/18/24  1100   POCGLU 190* 187* 148* 109       Blood Sugar Average: Last 72 hrs:  (P) 155.6  Poorly controlled evidence within A1c of 10.7  Continue Lantus 10 units at night hold metformin hold Prandin  Start patient on insulin sliding scale

## 2024-09-18 NOTE — PHYSICAL THERAPY NOTE
Physical Therapy Progress Note     09/18/24 1510   PT Last Visit   PT Visit Date 09/18/24   Note Type   Note Type Treatment   Pain Assessment   Pain Assessment Tool 0-10   Pain Score 4   Pain Location/Orientation Orientation: Right;Location: Hip   Hospital Pain Intervention(s) Repositioned;Ambulation/increased activity   Restrictions/Precautions   Other Precautions Cognitive;Chair Alarm;Bed Alarm;Impulsive;Fall Risk;Pain  (Alarm active post session.)   Subjective   Subjective The patient was amenable to go for a walk. He is tangential at times and covers many random topics.   Transfers   Sit to Stand 4  Minimal assistance   Additional items Assist x 1;Verbal cues   Stand to Sit 4  Minimal assistance   Additional items Assist x 1;Verbal cues   Ambulation/Elevation   Gait pattern Excessively slow;Short stride;Inconsistent shanell;Shuffling;Decreased foot clearance   Gait Assistance 4  Minimal assist   Additional items Assist x 1;Verbal cues   Assistive Device Rolling walker   Distance 70 feet, 50 feet x 2, 40 feet, 20 feet, 10 feet x 2.   Balance   Static Sitting Fair +   Dynamic Sitting Fair   Static Standing Fair -   Dynamic Standing Poor +   Ambulatory Poor +   Activity Tolerance   Activity Tolerance Patient tolerated treatment well;Patient limited by fatigue   Assessment   Prognosis Fair   Problem List Decreased strength;Decreased endurance;Impaired balance;Decreased mobility;Impaired judgement;Decreased safety awareness;Pain   Assessment The patient requires frequent redirection to task as well as stimulation at first. He was able to ambulate household distances several times with standing rests. He needs instructions frequently as well due to his distractability at times. He continues to exhibit impulsivity and decreased safety awareness throughout the session. He was in the chair post session with the alarm active and all needs within reach.   Barriers to Discharge Inaccessible home environment;Decreased  caregiver support   Goals   Patient Goals To have no pain.   STG Expiration Date 10/01/24   PT Treatment Day 1   Plan   Treatment/Interventions Functional transfer training;LE strengthening/ROM;Therapeutic exercise;Endurance training;Cognitive reorientation;Patient/family training;Bed mobility;Gait training   Progress Progressing toward goals   PT Frequency 2-3x/wk   Discharge Recommendation   Rehab Resource Intensity Level, PT III (Minimum Resource Intensity)   AM-PAC Basic Mobility Inpatient   Turning in Flat Bed Without Bedrails 4   Lying on Back to Sitting on Edge of Flat Bed Without Bedrails 3   Moving Bed to Chair 3   Standing Up From Chair Using Arms 3   Walk in Room 3   Climb 3-5 Stairs With Railing 3   Basic Mobility Inpatient Raw Score 19   Basic Mobility Standardized Score 42.48   [facility] Highest Level Of Mobility   -HLM Goal 6: Walk 10 steps or more   -HLM Achieved 8: Walk 250 feet ot more         An AM-PAC Basic Mobility raw score less than 16 suggests the patient may benefit from discharge to post-acute rehab services.    Rusty Esqueda, PTA

## 2024-09-18 NOTE — PROGRESS NOTES
Progress Note - Hospitalist   Name: David Skelton Jr. 64 y.o. male I MRN: [id number]  Unit/Bed#: [facility] 834-01 I Date of Admission: 9/16/2024   Date of Service: 9/18/2024 I Hospital Day: 2    Assessment & Plan  Toxic encephalopathy  Patient has multiple recent hospitalization secondary to toxic metabolic encephalopathy most recent episode was last month on August 19  Patient goes to a methadone clinic called [facility] phone number [phone].   175mg new daily last dose 9/16 @ 5:34 am  Encephalopathy currently resolved, back to baseline, ambulatory  Resume home medication  Patient reports along with methadone, he also took oxycodone.  Will hold off and discontinue oxycodone on discharge  With the lower dose of methadone of 175 mg, patient was still drowsy this morning. CM contacted the clinic to discuss further reducing the dose.   For hospital stay, will reduce to 150 mg and monitor for symptoms.   Consulted certified .   Atrial fibrillation (HCC)  Continue metoprolol 12.5 mg daily  Benign essential hypertension  Continue metoprolol 12.5 mg daily  Blood pressure not at goal we will place patient on labetalol as needed  Type 2 diabetes mellitus, with long-term current use of insulin (HCC)  Lab Results   Component Value Date    HGBA1C 10.7 (H) 07/31/2024       Recent Labs     09/17/24  2120 09/17/24  2135 09/18/24  0802 09/18/24  1100   POCGLU 190* 187* 148* 109       Blood Sugar Average: Last 72 hrs:  (P) 155.6  Poorly controlled evidence within A1c of 10.7  Continue Lantus 10 units at night hold metformin hold Prandin  Start patient on insulin sliding scale  Recurrent falls  Fall precaution  PT OT evaluation  CT head and CT cervical spine unremarkable for acute pathology  Acute respiratory failure (HCC)  2/2 TME, aspiration pneumonia I  Room air  Sepsis (HCC)  Sepsis, developed shortly after admission 2/2 aspiration PNA a/e/b fever, Tachycardia, lactic acidosis requiring CXR, IVF  bolus, IV Ceftriaxone and cultures.  Findings: T 101-102, HR , LA 4.4, Procal 2.62, Bands 15%  CXR: New bibasilar airspace disease is suspicious for the presence of aspiration or infection as clinically questioned.  Continue IV Ceftriaxone    VTE Pharmacologic Prophylaxis: VTE Score: 7 High Risk (Score >/= 5) - Pharmacological DVT Prophylaxis Ordered: heparin. Sequential Compression Devices Ordered.    Mobility:   Basic Mobility Inpatient Raw Score: 19  JH-HLM Goal: 6: Walk 10 steps or more  JH-HLM Achieved: 4: Move to chair/commode  JH-HLM Goal achieved. Continue to encourage appropriate mobility.    Patient Centered Rounds: I performed bedside rounds with nursing staff today.        Current Length of Stay: 2 day(s)  Current Patient Status: Inpatient   Certification Statement: The patient will continue to require additional inpatient hospital stay due to pending   Discharge Plan: Anticipate discharge in 24-48 hrs to rehab facility.    Code Status: Level 1 - Full Code    Subjective     This morning, patient was briefly drowsy after administration of methadone 175 mg.  On my exam, patient is sleeping, however wakes up quickly with name calling.  Afebrile.  Will reduce methadone dose further.    Objective     Vitals:   Temp (24hrs), Av.2 °F (37.3 °C), Min:98.5 °F (36.9 °C), Max:99.9 °F (37.7 °C)    Temp:  [98.5 °F (36.9 °C)-99.9 °F (37.7 °C)] 98.5 °F (36.9 °C)  HR:  [] 81  Resp:  [16-18] 16  BP: (124-141)/(75-80) 136/75  SpO2:  [89 %-96 %] 93 %  There is no height or weight on file to calculate BMI.     Input and Output Summary (last 24 hours):     Intake/Output Summary (Last 24 hours) at 2024 1527  Last data filed at 2024 0900  Gross per 24 hour   Intake 420 ml   Output 1600 ml   Net -1180 ml       Physical Exam  Constitutional:       Appearance: He is well-developed.   HENT:      Head: Normocephalic and atraumatic.      Right Ear: External ear normal.      Left Ear: External ear normal.       Nose: Nose normal.   Eyes:      Conjunctiva/sclera: Conjunctivae normal.   Cardiovascular:      Rate and Rhythm: Normal rate and regular rhythm.   Pulmonary:      Effort: Pulmonary effort is normal.      Breath sounds: Normal breath sounds.   Abdominal:      General: Bowel sounds are normal.   Genitourinary:     Comments: deferred  Skin:     General: Skin is warm and dry.   Neurological:      Mental Status: He is alert.      Comments: Cranial nerve 2 -12 are normal.          Lines/Drains:  Lines/Drains/Airways       Active Status       None                          Results from last 7 days   Lab Units 09/18/24  0504 09/17/24  0436 09/16/24  2155 09/16/24  1221 09/16/24  0906   WBC Thousand/uL 11.44*   < > 10.27*  --  11.08*   HEMOGLOBIN g/dL 8.2*   < > 9.1*  --  8.9*   HEMATOCRIT % 26.6*   < > 30.0*  --  28.9*   PLATELETS Thousands/uL 257   < > 306   < > 331   BANDS PCT %  --   --  15*  --   --    SEGS PCT %  --   --   --   --  79*   LYMPHO PCT %  --   --  2*  --  8*   MONO PCT %  --   --  2*  --  6   EOS PCT %  --   --  0  --  6    < > = values in this interval not displayed.     Results from last 7 days   Lab Units 09/18/24  0348 09/17/24  0436   SODIUM mmol/L 134* 139   POTASSIUM mmol/L 4.4 4.5   CHLORIDE mmol/L 98 101   CO2 mmol/L 32 29   BUN mg/dL 20 21   CREATININE mg/dL 0.78 0.77   ANION GAP mmol/L 4 9   CALCIUM mg/dL 8.2* 8.3*   ALBUMIN g/dL  --  3.2*   TOTAL BILIRUBIN mg/dL  --  0.39   ALK PHOS U/L  --  172*   ALT U/L  --  24   AST U/L  --  18   GLUCOSE RANDOM mg/dL 117 172*         Results from last 7 days   Lab Units 09/18/24  1100 09/18/24  0802 09/17/24  2135 09/17/24  2120 09/17/24  1621 09/17/24  1114 09/17/24  0707 09/16/24  2055 09/16/24  1621 09/16/24  1155   POC GLUCOSE mg/dl 109 148* 187* 190* 104 229* 174* 144* 136 135         Results from last 7 days   Lab Units 09/17/24  1210 09/17/24  0436 09/17/24  0110 09/16/24  2155   LACTIC ACID mmol/L 3.8* 4.4* 4.3* 4.4*   PROCALCITONIN ng/ml  --   --    --  2.62*       Recent Cultures (last 7 days):   Results from last 7 days   Lab Units 09/16/24 2152   BLOOD CULTURE  No Growth at 24 hrs.  No Growth at 24 hrs.          Last 24 Hours Medication List:     Current Facility-Administered Medications:     acetaminophen (TYLENOL) tablet 650 mg, Q6H PRN    aspirin chewable tablet 81 mg, BID    cefTRIAXone (ROCEPHIN) 1,000 mg in dextrose 5 % 50 mL IVPB, Q24H, Last Rate: 1,000 mg (09/17/24 2306)    gabapentin (NEURONTIN) capsule 300 mg, TID    heparin (porcine) subcutaneous injection 5,000 Units, Q8H MELISA **AND** [COMPLETED] Platelet count, Once    insulin glargine (LANTUS) subcutaneous injection 10 Units 0.1 mL, HS    insulin lispro (HumALOG/ADMELOG) 100 units/mL subcutaneous injection 1-5 Units, TID AC **AND** Fingerstick Glucose (POCT), TID AC    insulin lispro (HumALOG/ADMELOG) 100 units/mL subcutaneous injection 1-5 Units, HS    labetalol (NORMODYNE) injection 10 mg, Q4H PRN    [START ON 9/19/2024] methadone (DOLOPHINE) oral concentrated solution 150 mg, Daily    metoprolol succinate (TOPROL-XL) 24 hr tablet 12.5 mg, Daily    naloxone (NARCAN) 0.04 mg/mL syringe 0.04 mg, Q1MIN PRN    Administrative Statements   Today, Patient Was Seen By: Pau Eckert MD       **Please Note: This note may have been constructed using a voice recognition system.**

## 2024-09-18 NOTE — CERTIFIED RECOVERY SPECIALIST
Certified  Note    Patient name: David Skelton Jr.  Location: [facility] 834/[facility] 834-01  [place]: [facility]  Attending:  Pau Eckert MD MRN [id number]  : 1960  Age: 64 y.o.    Sex: male Date 2024         Substance Use History:     Social History     Substance and Sexual Activity   Alcohol Use Never        Social History     Substance and Sexual Activity   Drug Use Yes    Comment: Methadone clinic     Time spent 15 minutes  CRS and CM attempted to have conversation with patient. Patient had methadone dose about an hour ago and is slurring and tries to talk. But is unable to communicate and carry on conversation. CRS and CM are unsuccessful in getting new information.     CRS will continue to follow and be available.              Omar Amezcua

## 2024-09-18 NOTE — PLAN OF CARE
Problem: Potential for Falls  Goal: Patient will remain free of falls  Description: INTERVENTIONS:  - Educate patient/family on patient safety including physical limitations  - Instruct patient to call for assistance with activity   - Consult OT/PT to assist with strengthening/mobility   - Keep Call bell within reach  - Keep bed low and locked with side rails adjusted as appropriate  - Keep care items and personal belongings within reach  - Initiate and maintain comfort rounds  - Make Fall Risk Sign visible to staff  - Apply yellow socks and bracelet for high fall risk patients  - Consider moving patient to room near nurses station  Outcome: Progressing     Problem: Prexisting or High Potential for Compromised Skin Integrity  Goal: Skin integrity is maintained or improved  Description: INTERVENTIONS:  - Identify patients at risk for skin breakdown  - Assess and monitor skin integrity  - Assess and monitor nutrition and hydration status  - Monitor labs   - Assess for incontinence   - Turn and reposition patient  - Assist with mobility/ambulation  - Relieve pressure over bony prominences  - Avoid friction and shearing  - Provide appropriate hygiene as needed including keeping skin clean and dry  - Evaluate need for skin moisturizer/barrier cream  - Collaborate with interdisciplinary team   - Patient/family teaching  - Consider wound care consult   Outcome: Progressing     Problem: PAIN - ADULT  Goal: Verbalizes/displays adequate comfort level or baseline comfort level  Description: Interventions:  - Encourage patient to monitor pain and request assistance  - Assess pain using appropriate pain scale  - Administer analgesics based on type and severity of pain and evaluate response  - Implement non-pharmacological measures as appropriate and evaluate response  - Consider cultural and social influences on pain and pain management  - Notify physician/advanced practitioner if interventions unsuccessful or patient reports  new pain  Outcome: Progressing     Problem: INFECTION - ADULT  Goal: Absence or prevention of progression during hospitalization  Description: INTERVENTIONS:  - Assess and monitor for signs and symptoms of infection  - Monitor lab/diagnostic results  - Monitor all insertion sites, i.e. indwelling lines, tubes, and drains  - Monitor endotracheal if appropriate and nasal secretions for changes in amount and color  - [place] appropriate cooling/warming therapies per order  - Administer medications as ordered  - Instruct and encourage patient and family to use good hand hygiene technique  - Identify and instruct in appropriate isolation precautions for identified infection/condition  Outcome: Progressing  Goal: Absence of fever/infection during neutropenic period  Description: INTERVENTIONS:  - Monitor WBC    Outcome: Progressing     Problem: SAFETY ADULT  Goal: Patient will remain free of falls  Description: INTERVENTIONS:  - Educate patient/family on patient safety including physical limitations  - Instruct patient to call for assistance with activity   - Consult OT/PT to assist with strengthening/mobility   - Keep Call bell within reach  - Keep bed low and locked with side rails adjusted as appropriate  - Keep care items and personal belongings within reach  - Initiate and maintain comfort rounds  - Make Fall Risk Sign visible to staff  - Apply yellow socks and bracelet for high fall risk patients  - Consider moving patient to room near nurses station  Outcome: Progressing  Goal: Maintain or return to baseline ADL function  Description: INTERVENTIONS:  -  Assess patient's ability to carry out ADLs; assess patient's baseline for ADL function and identify physical deficits which impact ability to perform ADLs (bathing, care of mouth/teeth, toileting, grooming, dressing, etc.)  - Assess/evaluate cause of self-care deficits   - Assess range of motion  - Assess patient's mobility; develop plan if impaired  - Assess  patient's need for assistive devices and provide as appropriate  - Encourage maximum independence but intervene and supervise when necessary  - Involve family in performance of ADLs  - Assess for home care needs following discharge   - Consider OT consult to assist with ADL evaluation and planning for discharge  - Provide patient education as appropriate  Outcome: Progressing  Goal: Maintains/Returns to pre admission functional level  Description: INTERVENTIONS:  - Perform AM-PAC 6 Click Basic Mobility/ Daily Activity assessment daily.  - Set and communicate daily mobility goal to care team and patient/family/caregiver.   - Collaborate with rehabilitation services on mobility goals if consulted  - Out of bed for toileting  - Record patient progress and toleration of activity level   Outcome: Progressing     Problem: DISCHARGE PLANNING  Goal: Discharge to home or other facility with appropriate resources  Description: INTERVENTIONS:  - Identify barriers to discharge w/patient and caregiver  - Arrange for needed discharge resources and transportation as appropriate  - Identify discharge learning needs (meds, wound care, etc.)  - Arrange for interpretive services to assist at discharge as needed  - Refer to Case Management Department for coordinating discharge planning if the patient needs post-hospital services based on physician/advanced practitioner order or complex needs related to functional status, cognitive ability, or social support system  Outcome: Progressing     Problem: Knowledge Deficit  Goal: Patient/family/caregiver demonstrates understanding of disease process, treatment plan, medications, and discharge instructions  Description: Complete learning assessment and assess knowledge base.  Interventions:  - Provide teaching at level of understanding  - Provide teaching via preferred learning methods  Outcome: Progressing

## 2024-09-18 NOTE — PLAN OF CARE
Problem: PHYSICAL THERAPY ADULT  Goal: Performs mobility at highest level of function for planned discharge setting.  See evaluation for individualized goals.  Description: Treatment/Interventions: ADL retraining, Functional transfer training, LE strengthening/ROM, Elevations, Therapeutic exercise, Endurance training, Cognitive reorientation, Patient/family training, Equipment eval/education, Bed mobility, Gait training, Compensatory technique education, Spoke to case management, Spoke to nursing, OT  Equipment Recommended:  (has RW and SPC for dc)       See flowsheet documentation for full assessment, interventions and recommendations.  Outcome: Progressing  Note: Prognosis: Fair  Problem List: Decreased strength, Decreased endurance, Impaired balance, Decreased mobility, Impaired judgement, Decreased safety awareness, Pain  Assessment: The patient requires frequent redirection to task as well as stimulation at first. He was able to ambulate household distances several times with standing rests. He needs instructions frequently as well due to his distractability at times. He continues to exhibit impulsivity and decreased safety awareness throughout the session. He was in the chair post session with the alarm active and all needs within reach.  Barriers to Discharge: Inaccessible home environment, Decreased caregiver support  Barriers to Discharge Comments: alone at times; hx of polypharmacy + multiple admissions  Rehab Resource Intensity Level, PT: III (Minimum Resource Intensity)    See flowsheet documentation for full assessment.

## 2024-09-18 NOTE — PLAN OF CARE
Problem: Potential for Falls  Goal: Patient will remain free of falls  Description: INTERVENTIONS:  - Educate patient/family on patient safety including physical limitations  - Instruct patient to call for assistance with activity   - Consult OT/PT to assist with strengthening/mobility   - Keep Call bell within reach  - Keep bed low and locked with side rails adjusted as appropriate  - Keep care items and personal belongings within reach  - Initiate and maintain comfort rounds  - Make Fall Risk Sign visible to staff  Problem: DISCHARGE PLANNING  Goal: Discharge to home or other facility with appropriate resources  Description: INTERVENTIONS:  - Identify barriers to discharge w/patient and caregiver  - Arrange for needed discharge resources and transportation as appropriate  - Identify discharge learning needs (meds, wound care, etc.)  - Arrange for interpretive services to assist at discharge as needed  - Refer to Case Management Department for coordinating discharge planning if the patient needs post-hospital services based on physician/advanced practitioner order or complex needs related to functional status, cognitive ability, or social support system  Outcome: Progressing     - Apply yellow socks and bracelet for high fall risk patients  - Consider moving patient to room near nurses station  Outcome: Progressing

## 2024-09-18 NOTE — RESTORATIVE TECHNICIAN NOTE
Restorative Technician Note      Patient Name: David Skelton JrMarychuy     Restorative Tech Visit Date: 09/18/24  Note Type: Mobility (Pt stated he wanted to ambulate however when following up pt declined participation 2/2 interesting TV show. Encouraged pt but pt said he will ask again when ready. RN aware)  Patient Position Upon Consult: Bedside chair  Education Provided: Yes  Patient Position at End of Consult: Bedside chair; All needs within reach; Bed/Chair alarm activated    Bobbi Sher  DPT, Restorative Technician             Detail Level: Detailed Quality 110: Preventive Care And Screening: Influenza Immunization: Influenza Immunization Administered during Influenza season Quality 111:Pneumonia Vaccination Status For Older Adults: Pneumococcal vaccine administered on or after patient’s 60th birthday and before the end of the measurement period Quality 265: Biopsy Follow-Up: Biopsy results reviewed, communicated, tracked, and documented Quality 226: Preventive Care And Screening: Tobacco Use: Screening And Cessation Intervention: Tobacco Screening not Performed for Medical Reasons Quality 130: Documentation Of Current Medications In The Medical Record: Current Medications Documented

## 2024-09-18 NOTE — ASSESSMENT & PLAN NOTE
Sepsis, developed shortly after admission 2/2 aspiration PNA a/e/b fever, Tachycardia, lactic acidosis requiring CXR, IVF bolus, IV Ceftriaxone and cultures.  Findings: T 101-102, HR , LA 4.4, Procal 2.62, Bands 15%  CXR: New bibasilar airspace disease is suspicious for the presence of aspiration or infection as clinically questioned.  Continue IV Ceftriaxone

## 2024-09-18 NOTE — ASSESSMENT & PLAN NOTE
Patient has multiple recent hospitalization secondary to toxic metabolic encephalopathy most recent episode was last month on August 19  Patient goes to a methadone clinic called [facility] phone number [phone].   175mg new daily last dose 9/16 @ 5:34 am  Encephalopathy currently resolved, back to baseline, ambulatory  Resume home medication  Patient reports along with methadone, he also took oxycodone.  Will hold off and discontinue oxycodone on discharge  With the lower dose of methadone of 175 mg, patient was still drowsy this morning. CM contacted the clinic to discuss further reducing the dose.   For hospital stay, will reduce to 150 mg and monitor for symptoms.   Consulted certified .

## 2024-09-18 NOTE — CASE MANAGEMENT
Case Management Discharge Planning Note    Patient name David Skelton Jr.  Location [facility] 834/[facility] 834-01 MRN [id number]  : 1960 Date 2024       Current Admission Date: 2024  Current Admission Diagnosis:Toxic encephalopathy   Patient Active Problem List    Diagnosis Date Noted Date Diagnosed    Sepsis (HCC) 2024     Acute respiratory failure (HCC) 2024     Type 2 diabetes mellitus, with long-term current use of insulin (HCC) 2024     LUNA (iron deficiency anemia) 2024     NINAF (generalized anxiety disorder) 2024     Methadone dependence (HCC) 2024     Paroxysmal atrial fibrillation (HCC) 2024     Toxic encephalopathy 2024     Recurrent falls 2024     Closed fracture of right femur, unspecified fracture morphology, sequela 2024     Type 2 diabetes mellitus with other ophthalmic complication, with long-term current use of insulin (HCC) 2024     Cognitive decline 2024     Ambulatory dysfunction 2024     Prolonged QT interval 2024     Left ventricular hypertrophy 2024     Acute blood loss anemia 2024     Chronic hepatitis (HCC) 2024     BPH (benign prostatic hyperplasia) 2024     Cholelithiasis 2024     CKD (chronic kidney disease), stage II 2024     Hypogonadism in male 2024     Electrolyte abnormality 2024     Hyponatremia 2024     Right hip pain 2024     Acute blood loss anemia (ABLA) 2024     Nocturia associated with benign prostatic hyperplasia 2024     Gross hematuria 2024     Closed fracture of right femur, unspecified fracture morphology, sequela 2024     B12 deficiency 2023     Bradycardia 2023     Retinopathy due to secondary DM (HCC) 2023     Visual changes 2023     Fall 2023     Closed fracture of multiple ribs of left side 2023     Methadone dependence (HCC) 2023     Encephalopathy  07/25/2023     Liver lesion, right lobe 06/08/2021     Lung nodule 06/08/2021     Adrenal nodule (HCC) 06/08/2021     Thoracic degenerative disc disease 04/12/2021     DDD (degenerative disc disease), lumbar 04/12/2021     Pre-operative general physical examination 12/31/2019     S/P gastric bypass 11/15/2019     Hypogonadism male 11/15/2019     Hemorrhoids, internal 09/23/2019     Rectal prolapse 09/11/2019     Drug-induced constipation 09/11/2019     Chronic anticoagulation 03/01/2019     GERD without esophagitis 03/01/2019     Iron deficiency anemia 03/01/2019     Major depression, chronic 03/01/2019     Stable proliferative diabetic retinopathy of left eye associated with type 2 diabetes mellitus (HCC) 03/01/2019     Narcotic abuse (HCC) 06/06/2018     Ulcer of leg, chronic, left (HCC) 02/27/2015     ADD (attention deficit disorder) without hyperactivity 01/29/2015     Bipolar disorder ([facility]) 01/10/2014     Insomnia 01/21/2013     History of hepatitis C 01/14/2013     Allergic rhinitis 01/04/2013     Generalized anxiety disorder 01/04/2013     Macular degeneration 01/04/2013     Spinal stenosis 01/04/2013     Vitamin D deficiency 01/04/2013     Benign essential hypertension 09/14/2012     Type 2 diabetes mellitus with ophthalmic complication, with long-term current use of insulin ([facility]) 09/14/2012     Atrial fibrillation ([facility]) 05/15/2012     Complex medical condition 05/15/2012       LOS (days): 2  Geometric Mean LOS (GMLOS) (days): 5.1  Days to GMLOS:3.2     OBJECTIVE:  Risk of Unplanned Readmission Score: 55.11         Current admission status: Inpatient   Preferred Pharmacy:   [id number] - [place], [place] - [address]  [address]  [place] [address]  Phone: [phone] Fax: [phone]    [id number] - [place] - [address]  [address]  [place] [address]  Phone: [phone] Fax: [phone]    [facility] - [place],  "[place] - [address]  [address]  [place] [address]  Phone: [phone] Fax: [phone]    [facility] ([place] - [place] - [address]  [address]  [place] [address]  Phone: [phone] Fax: [phone]    UNKNOWN - FOLLOW UP PRIOR TO DISCHARGE TO E-PRESCRIBE  No address on file      Primary Care Provider: Casey Mckeon MD    Primary Insurance: [employer] [facility]  Secondary Insurance:     DISCHARGE DETAILS:        Spoke to nursing @ pts methadone clinic- [facility] ( [phone]). Report pt doses 3x/week in clinic ( Monday, Tuesdays and Fridays) He gets take home bottles for Wednesday/ Thursdays. Clinic reports d/t past concerns for overdose, misuse, they lowered his dose from 190mg to 175mg.    Clinic reports they are going to discuss with provider, may need to change patient to \" daily dosing\"     CRS and CM met with patient at bedside. Pt holding phone to his ear, siminbling, states he is talking to his sister, but when provided CM the phone no one was on the other line. Pt unable to engage in much of conversation, appears altered, slurred speech. Able to state that he takes methadone, and occasionally oxy. Reports this is all prescribed to him. CRS did briefly discuss treatment options with him, at this time, pt states he is not interested in treatment.     Aging CM and Insurance CM both updated and aware.   "

## 2024-09-18 NOTE — CERTIFIED RECOVERY SPECIALIST
Certified  Note    Patient name: David Skelton Jr.  Location: [facility] 834/[facility] 834-01  [place]: [facility]  Attending:  Pau Eckert MD MRN [id number]  : 1960  Age: 64 y.o.    Sex: male Date 2024         Substance Use History:     Social History     Substance and Sexual Activity   Alcohol Use Never        Social History     Substance and Sexual Activity   Drug Use Yes    Comment: Methadone clinic     CRS attempted to engage, patient sleeping.     CRS is available through this admission.               '          Marcela Amezcua

## 2024-09-19 PROBLEM — J96.00 ACUTE RESPIRATORY FAILURE (HCC): Status: RESOLVED | Noted: 2024-09-16 | Resolved: 2024-09-19

## 2024-09-19 PROBLEM — J69.0 ASPIRATION PNEUMONIA (HCC): Status: ACTIVE | Noted: 2024-09-19

## 2024-09-19 LAB
GLUCOSE SERPL-MCNC: 103 MG/DL (ref 65–140)
GLUCOSE SERPL-MCNC: 122 MG/DL (ref 65–140)
GLUCOSE SERPL-MCNC: 165 MG/DL (ref 65–140)
GLUCOSE SERPL-MCNC: 221 MG/DL (ref 65–140)

## 2024-09-19 PROCEDURE — 97535 SELF CARE MNGMENT TRAINING: CPT

## 2024-09-19 PROCEDURE — 97112 NEUROMUSCULAR REEDUCATION: CPT

## 2024-09-19 PROCEDURE — 97530 THERAPEUTIC ACTIVITIES: CPT

## 2024-09-19 PROCEDURE — 97116 GAIT TRAINING THERAPY: CPT

## 2024-09-19 PROCEDURE — 99233 SBSQ HOSP IP/OBS HIGH 50: CPT | Performed by: FAMILY MEDICINE

## 2024-09-19 PROCEDURE — 82948 REAGENT STRIP/BLOOD GLUCOSE: CPT

## 2024-09-19 RX ORDER — METHADONE HYDROCHLORIDE 10 MG/ML
175 CONCENTRATE ORAL
Status: DISCONTINUED | OUTPATIENT
Start: 2024-09-20 | End: 2024-09-20 | Stop reason: HOSPADM

## 2024-09-19 RX ORDER — METHADONE HYDROCHLORIDE 10 MG/ML
25 CONCENTRATE ORAL ONCE
Status: COMPLETED | OUTPATIENT
Start: 2024-09-19 | End: 2024-09-19

## 2024-09-19 RX ADMIN — HEPARIN SODIUM 5000 UNITS: 5000 INJECTION INTRAVENOUS; SUBCUTANEOUS at 05:21

## 2024-09-19 RX ADMIN — FLUTICASONE PROPIONATE 1 SPRAY: 50 SPRAY, METERED NASAL at 08:08

## 2024-09-19 RX ADMIN — GABAPENTIN 300 MG: 300 CAPSULE ORAL at 17:21

## 2024-09-19 RX ADMIN — GABAPENTIN 300 MG: 300 CAPSULE ORAL at 08:07

## 2024-09-19 RX ADMIN — METOPROLOL SUCCINATE 12.5 MG: 25 TABLET, FILM COATED, EXTENDED RELEASE ORAL at 08:06

## 2024-09-19 RX ADMIN — HEPARIN SODIUM 5000 UNITS: 5000 INJECTION INTRAVENOUS; SUBCUTANEOUS at 22:07

## 2024-09-19 RX ADMIN — HEPARIN SODIUM 5000 UNITS: 5000 INJECTION INTRAVENOUS; SUBCUTANEOUS at 13:35

## 2024-09-19 RX ADMIN — METHADONE HYDROCHLORIDE 25 MG: 10 CONCENTRATE ORAL at 11:02

## 2024-09-19 RX ADMIN — METHADONE HYDROCHLORIDE 150 MG: 10 CONCENTRATE ORAL at 08:06

## 2024-09-19 RX ADMIN — INSULIN LISPRO 1 UNITS: 100 INJECTION, SOLUTION INTRAVENOUS; SUBCUTANEOUS at 12:11

## 2024-09-19 RX ADMIN — INSULIN GLARGINE 10 UNITS: 100 INJECTION, SOLUTION SUBCUTANEOUS at 22:07

## 2024-09-19 RX ADMIN — INSULIN LISPRO 2 UNITS: 100 INJECTION, SOLUTION INTRAVENOUS; SUBCUTANEOUS at 22:08

## 2024-09-19 RX ADMIN — GABAPENTIN 300 MG: 300 CAPSULE ORAL at 22:05

## 2024-09-19 NOTE — PROGRESS NOTES
Progress Note - Hospitalist   Name: David Skelton Jr. 64 y.o. male I MRN: [id number]  Unit/Bed#: [facility] 834-01 I Date of Admission: 9/16/2024   Date of Service: 9/19/2024 I Hospital Day: 3    Assessment & Plan  Toxic encephalopathy  Patient has multiple recent hospitalization secondary to toxic metabolic encephalopathy most recent episode was last month on August 19  Patient goes to a methadone clinic called [facility] phone number [phone].   175mg new daily last dose 9/16 @ 5:34 am  Encephalopathy currently resolved, back to baseline, ambulatory  Resume home medication  Patient reports along with methadone, he also took oxycodone.  Will hold off and discontinue oxycodone on discharge  With the lower dose of methadone of 175 mg, patient was still drowsy this morning. CM contacted the clinic to discuss further reducing the dose.   Today reduced the methadone dose to 150 mg.  However patient continued to complain of withdrawal symptoms including nausea.  This afternoon administered additional methadone 25 mg.  Will continue to watch today.  If increased drowsiness is noted, will go back down to 150 mg  Consulted certified .   Currently waiting for a safe discharge plan involving multidisciplinary team of CRS, CM, methadone clinic  Atrial fibrillation (HCC)  Continue metoprolol 12.5 mg daily.  Currently not on any anticoagulation due to recurrent fall  Benign essential hypertension  Continue metoprolol 12.5 mg daily  As needed labetalol  Type 2 diabetes mellitus, with long-term current use of insulin (HCC)  Lab Results   Component Value Date    HGBA1C 10.7 (H) 07/31/2024       Recent Labs     09/18/24  1624 09/18/24  2048 09/19/24  0724 09/19/24  1117   POCGLU 152* 189* 122 165*       Blood Sugar Average: Last 72 hrs:  (P) 156  Poorly controlled evidence within A1c of 10.7  Continue Lantus 10 units at night hold metformin hold Prandin  Start patient on insulin sliding  scale  Recurrent falls  Fall precaution  PT OT evaluation  CT head and CT cervical spine unremarkable for acute pathology  Acute respiratory failure (HCC) (Resolved: 9/19/2024)  2/2 TME, aspiration pneumonia I  Room air  Sepsis (HCC)  Sepsis, developed shortly after admission 2/2 aspiration PNA a/e/b fever, Tachycardia, lactic acidosis requiring CXR, IVF bolus, IV Ceftriaxone and cultures.  Findings: T 101-102, HR , LA 4.4, Procal 2.62, Bands 15%  CXR: New bibasilar airspace disease is suspicious for the presence of aspiration or infection as clinically questioned.  Continue IV Ceftriaxone  Aspiration pneumonia (HCC)  Shortly after admission, patient met sepsis criteria and had hypoxia, chest x-ray concerning for aspiration and possible infection.  Continue IV Rocephin    VTE Pharmacologic Prophylaxis: VTE Score: 7 High Risk (Score >/= 5) - Pharmacological DVT Prophylaxis Ordered: heparin. Sequential Compression Devices Ordered.    Mobility:   Basic Mobility Inpatient Raw Score: 19  JH-HLM Goal: 6: Walk 10 steps or more  JH-HLM Achieved: 8: Walk 250 feet ot more  JH-HLM Goal achieved. Continue to encourage appropriate mobility.    Patient Centered Rounds: I performed bedside rounds with nursing staff today.       Education and Discussions with Family / Patient: Patient declined call to .     Current Length of Stay: 3 day(s)  Current Patient Status: Inpatient   Certification Statement: The patient will continue to require additional inpatient hospital stay due to pending safe discharge plan  Discharge Plan: Anticipate discharge in 24-48 hrs to discharge location to be determined pending rehab evaluations.    Code Status: Level 1 - Full Code    Subjective     Patient examined at bedside this morning.  The patient was administered reduced dose of methadone 150 mg this morning.  However patient has been complaining of increased nausea, and some withdrawal symptoms with the reduced dose.  Noted  low-grade temp of 100.1 last night, this morning temp of 98.4.  Pulse 73, currently room air.  With me patient is speaking in full sentences, alert and oriented x 4.    Objective     Vitals:   Temp (24hrs), Av.2 °F (37.3 °C), Min:98.4 °F (36.9 °C), Max:100.1 °F (37.8 °C)    Temp:  [98.4 °F (36.9 °C)-100.1 °F (37.8 °C)] 98.4 °F (36.9 °C)  HR:  [73-92] 73  Resp:  [16-20] 16  BP: (134-152)/(73-88) 150/88  SpO2:  [96 %-98 %] 96 %  There is no height or weight on file to calculate BMI.     Input and Output Summary (last 24 hours):     Intake/Output Summary (Last 24 hours) at 2024 1232  Last data filed at 2024 0900  Gross per 24 hour   Intake 180 ml   Output 3601 ml   Net -3421 ml       Physical Exam  Constitutional:       Appearance: He is well-developed.   HENT:      Head: Normocephalic and atraumatic.      Right Ear: External ear normal.      Left Ear: External ear normal.      Nose: Nose normal.   Eyes:      Conjunctiva/sclera: Conjunctivae normal.   Cardiovascular:      Rate and Rhythm: Normal rate.      Heart sounds: Normal heart sounds.   Pulmonary:      Effort: Pulmonary effort is normal.      Breath sounds: Normal breath sounds.   Abdominal:      General: Bowel sounds are normal.      Palpations: Abdomen is soft.   Genitourinary:     Comments: deferred  Musculoskeletal:      Cervical back: Normal range of motion and neck supple.   Skin:     General: Skin is warm and dry.   Neurological:      Mental Status: He is alert.      Comments: Cranial nerve 2 -12 are normal.           Lines/Drains:  Lines/Drains/Airways       Active Status       None                            Lab Results: I have reviewed the following results: none   Results from last 7 days   Lab Units 24  0504 24  0436 24  2155 24  1221 24  0906   WBC Thousand/uL 11.44*   < > 10.27*  --  11.08*   HEMOGLOBIN g/dL 8.2*   < > 9.1*  --  8.9*   HEMATOCRIT % 26.6*   < > 30.0*  --  28.9*   PLATELETS Thousands/uL 257    < > 306   < > 331   BANDS PCT %  --   --  15*  --   --    SEGS PCT %  --   --   --   --  79*   LYMPHO PCT %  --   --  2*  --  8*   MONO PCT %  --   --  2*  --  6   EOS PCT %  --   --  0  --  6    < > = values in this interval not displayed.     Results from last 7 days   Lab Units 09/18/24  0348 09/17/24  0436   SODIUM mmol/L 134* 139   POTASSIUM mmol/L 4.4 4.5   CHLORIDE mmol/L 98 101   CO2 mmol/L 32 29   BUN mg/dL 20 21   CREATININE mg/dL 0.78 0.77   ANION GAP mmol/L 4 9   CALCIUM mg/dL 8.2* 8.3*   ALBUMIN g/dL  --  3.2*   TOTAL BILIRUBIN mg/dL  --  0.39   ALK PHOS U/L  --  172*   ALT U/L  --  24   AST U/L  --  18   GLUCOSE RANDOM mg/dL 117 172*         Results from last 7 days   Lab Units 09/19/24  1117 09/19/24  0724 09/18/24  2048 09/18/24  1624 09/18/24  1100 09/18/24  0802 09/17/24  2135 09/17/24  2120 09/17/24  1621 09/17/24  1114 09/17/24  0707 09/16/24  2055   POC GLUCOSE mg/dl 165* 122 189* 152* 109 148* 187* 190* 104 229* 174* 144*         Results from last 7 days   Lab Units 09/17/24  1210 09/17/24  0436 09/17/24  0110 09/16/24  2155   LACTIC ACID mmol/L 3.8* 4.4* 4.3* 4.4*   PROCALCITONIN ng/ml  --   --   --  2.62*       Recent Cultures (last 7 days):   Results from last 7 days   Lab Units 09/16/24  2152   BLOOD CULTURE  No Growth at 48 hrs.  No Growth at 48 hrs.        Last 24 Hours Medication List:     Current Facility-Administered Medications:     acetaminophen (TYLENOL) tablet 650 mg, Q6H PRN    aspirin chewable tablet 81 mg, BID    cefTRIAXone (ROCEPHIN) 1,000 mg in dextrose 5 % 50 mL IVPB, Q24H, Last Rate: 1,000 mg (09/18/24 8409)    fluticasone (FLONASE) 50 mcg/act nasal spray 1 spray, Daily    gabapentin (NEURONTIN) capsule 300 mg, TID    heparin (porcine) subcutaneous injection 5,000 Units, Q8H MELISA **AND** [COMPLETED] Platelet count, Once    insulin glargine (LANTUS) subcutaneous injection 10 Units 0.1 mL, HS    insulin lispro (HumALOG/ADMELOG) 100 units/mL subcutaneous injection 1-5 Units,  TID AC **AND** Fingerstick Glucose (POCT), TID AC    insulin lispro (HumALOG/ADMELOG) 100 units/mL subcutaneous injection 1-5 Units, HS    labetalol (NORMODYNE) injection 10 mg, Q4H PRN    methadone (DOLOPHINE) oral concentrated solution 150 mg, Daily    metoprolol succinate (TOPROL-XL) 24 hr tablet 12.5 mg, Daily    Administrative Statements   Today, Patient Was Seen By: Pau Eckert MD     **Please Note: This note may have been constructed using a voice recognition system.**

## 2024-09-19 NOTE — OCCUPATIONAL THERAPY NOTE
"  Occupational Therapy Progress Note     Patient Name: David Skelton Jr.  Today's Date: 9/19/2024  Problem List  Principal Problem:    Toxic encephalopathy  Active Problems:    Atrial fibrillation (HCC)    Benign essential hypertension    Type 2 diabetes mellitus, with long-term current use of insulin (HCC)    Recurrent falls    Sepsis (HCC)    Aspiration pneumonia (HCC)              09/19/24 1454   OT Last Visit   OT Visit Date 09/19/24   Note Type   Note Type Treatment   Pain Assessment   Pain Assessment Tool 0-10   Pain Score No Pain   Restrictions/Precautions   Weight Bearing Precautions Per Order No   Other Precautions Cognitive;Bed Alarm;Chair Alarm;Multiple lines;Fall Risk;O2;Pain  (2L O2)   Lifestyle   Autonomy IND PTA with ADLs. Assist IADLs. (-). Rollator used PRN.   Reciprocal Relationships Lives alone, HHA M-F, sister weekends   Service to Others Unemployed   Intrinsic Gratification None stated, will continue to assess   ADL   Where Assessed Chair   Grooming Assistance 5  Supervision/Setup   Grooming Deficit Wash/dry hands;Wash/dry face   LB Dressing Assistance 4  Minimal Assistance   LB Dressing Deficit Don/doff R sock;Don/doff L sock   LB Dressing Comments Min A to don/doff R sock 2/2 hip pain. SUP to don/doff L sock.   Transfers   Sit to Stand   (CGA)   Additional items Assist x 1;Increased time required;Verbal cues   Stand to Sit   (CGA)   Additional items Assist x 1;Increased time required;Verbal cues   Additional Comments RW in stance   Functional Mobility   Functional Mobility   (CGA)   Additional Comments household distances, RW   Additional items Rolling walker   Subjective   Subjective \"I used to\"   Cognition   Overall Cognitive Status Impaired   Arousal/Participation Alert;Cooperative   Attention Attends with cues to redirect   Orientation Level Oriented X4   Memory Decreased recall of precautions;Decreased recall of recent events   Following Commands Follows one step commands with " increased time or repetition   Comments Pt pleasant and cooperative, motivated to participate. Pt with increased orientation and alertness on this date. VCs for safety/attention to task. Pt with increased recall of memory on this date. Anticipate pt able to live alone with daily assist as per previous ACLS.   Activity Tolerance   Activity Tolerance Patient limited by fatigue;Patient limited by pain   Medical Staff Made Aware RN cleared for therapy   Assessment   Assessment Patient participated in Skilled OT session this date with interventions consisting of ADL re training with the use of correct body mechnaics, Energy Conservation techniques, safety awareness and fall prevention techniques,  therapeutic activities to: increase activity tolerance, and increase OOB/ sitting tolerance . Patient agreeable to OT treatment session, upon arrival patient was found seated OOB to Chair, alert, responsive , and in no apparent distress.  In comparison to previous session, patient with improvements in ADL completion, functional mobility. Patient requiring verbal cues for safety, one step directives, frequent rest periods, and ocassional safety reminders. Patient continues to be functioning below baseline level, occupational performance remains limited secondary to factors listed above and increased risk for falls and injury. The patient's raw score on the -PAC Daily Activity Inpatient Short Form is 19. A raw score of greater than or equal to 19 suggests the patient may benefit from discharge to home. Please refer to the recommendation of the Occupational Therapist for safe discharge planning. From OT standpoint, recommendation at time of d/c would be Level 3 resources - home with appropriate level of support and assist. Pt recently scored a 4.6 on the ACLS indicating ability to live alone with daily assistance. Patient to benefit from continued Occupational Therapy treatment while in the hospital to address deficits as  defined above and maximize level of functional independence with ADLs and functional mobility.   Plan   Treatment Interventions ADL retraining;Functional transfer training;Endurance training;Cognitive reorientation;Patient/family training;Compensatory technique education;Continued evaluation;Energy conservation;Activityengagement   Goal Expiration Date 10/01/24   OT Treatment Day 1   OT Frequency 2-3x/wk   Discharge Recommendation   Rehab Resource Intensity Level, OT III (Minimum Resource Intensity)  (return with appropriate level of assist/SUP)   AM-PAC Daily Activity Inpatient   Lower Body Dressing 2   Bathing 3   Toileting 3   Upper Body Dressing 3   Grooming 4   Eating 4   Daily Activity Raw Score 19   Daily Activity Standardized Score (Calc for Raw Score >=11) 40.22   AM-PAC Applied Cognition Inpatient   Following a Speech/Presentation 3   Understanding Ordinary Conversation 4   Taking Medications 2   Remembering Where Things Are Placed or Put Away 3   Remembering List of 4-5 Errands 3   Taking Care of Complicated Tasks 3   Applied Cognition Raw Score 18   Applied Cognition Standardized Score 38.07   End of Consult   Education Provided Yes   Patient Position at End of Consult Bedside chair;Bed/Chair alarm activated;All needs within reach   Nurse Communication Nurse aware of consult         Rakesh Brown MS, OTR/L     [facility] CERTIFIED RATER ID [id number]

## 2024-09-19 NOTE — PLAN OF CARE
Problem: OCCUPATIONAL THERAPY ADULT  Goal: Performs self-care activities at highest level of function for planned discharge setting.  See evaluation for individualized goals.  Description: Treatment Interventions: ADL retraining, Functional transfer training, UE strengthening/ROM, Endurance training, Cognitive reorientation, Patient/family training, Equipment evaluation/education, Fine motor coordination activities, Compensatory technique education, Continued evaluation, Energy conservation, Activityengagement          See flowsheet documentation for full assessment, interventions and recommendations.   Note: Limitation: Decreased ADL status, Decreased cognition, Decreased Safe judgement during ADL, Decreased endurance, Decreased self-care trans, Decreased high-level ADLs  Prognosis: Fair  Assessment: Patient participated in Skilled OT session this date with interventions consisting of ADL re training with the use of correct body mechnaics, Energy Conservation techniques, safety awareness and fall prevention techniques,  therapeutic activities to: increase activity tolerance, and increase OOB/ sitting tolerance . Patient agreeable to OT treatment session, upon arrival patient was found seated OOB to Chair, alert, responsive , and in no apparent distress.  In comparison to previous session, patient with improvements in ADL completion, functional mobility. Patient requiring verbal cues for safety, one step directives, frequent rest periods, and ocassional safety reminders. Patient continues to be functioning below baseline level, occupational performance remains limited secondary to factors listed above and increased risk for falls and injury. The patient's raw score on the -PAC Daily Activity Inpatient Short Form is 19. A raw score of greater than or equal to 19 suggests the patient may benefit from discharge to home. Please refer to the recommendation of the Occupational Therapist for safe discharge planning.  From OT standpoint, recommendation at time of d/c would be Level 3 resources - home with appropriate level of support and assist. Pt recently scored a 4.6 on the ACLS indicating ability to live alone with daily assistance. Patient to benefit from continued Occupational Therapy treatment while in the hospital to address deficits as defined above and maximize level of functional independence with ADLs and functional mobility.     Rehab Resource Intensity Level, OT: III (Minimum Resource Intensity) (return with appropriate level of assist/SUP)

## 2024-09-19 NOTE — RESTORATIVE TECHNICIAN NOTE
Restorative Technician Note      Patient Name: David Skelton      Restorative Tech Visit Date: 09/19/24  Note Type: Mobility  Patient Position Upon Consult: Bedside chair  Activity Performed: Ambulated  Assistive Device: Standard walker; Other (Comment) (2L O2; SpO2 90-91% during ambulation)  Education Provided: Yes  Patient Position at End of Consult: Bedside chair; All needs within reach; Bed/Chair alarm activated    Bobbi GOSST, Restorative Technician

## 2024-09-19 NOTE — ASSESSMENT & PLAN NOTE
Patient has multiple recent hospitalization secondary to toxic metabolic encephalopathy most recent episode was last month on August 19  Patient goes to a methadone clinic called [facility] phone number [phone].   175mg new daily last dose 9/16 @ 5:34 am  Encephalopathy currently resolved, back to baseline, ambulatory  Resume home medication  Patient reports along with methadone, he also took oxycodone.  Will hold off and discontinue oxycodone on discharge  With the lower dose of methadone of 175 mg, patient was still drowsy this morning. CM contacted the clinic to discuss further reducing the dose.   Today reduced the methadone dose to 150 mg.  However patient continued to complain of withdrawal symptoms including nausea.  This afternoon administered additional methadone 25 mg.  Will continue to watch today.  If increased drowsiness is noted, will go back down to 150 mg  Consulted certified .   Currently waiting for a safe discharge plan involving multidisciplinary team of CRS, CM, methadone clinic

## 2024-09-19 NOTE — PHYSICAL THERAPY NOTE
Physical Therapy Progress Note     09/19/24 1600   PT Last Visit   PT Visit Date 09/19/24   Note Type   Note Type Treatment   Pain Assessment   Pain Assessment Tool 0-10   Pain Score No Pain   Restrictions/Precautions   Other Precautions Cognitive;Chair Alarm;Bed Alarm;Fall Risk;O2   Subjective   Subjective The patient reports feeling good today, and he is amenable to work with therapy.   Transfers   Sit to Stand 5  Supervision   Additional items Verbal cues;Increased time required   Stand to Sit 5  Supervision   Additional items Verbal cues   Ambulation/Elevation   Gait pattern Excessively slow;Short stride;Inconsistent shanell;Decreased foot clearance;Forward Flexion   Gait Assistance 4  Minimal assist   Additional items Assist x 1;Verbal cues   Assistive Device Rolling walker   Distance 60 feet, 50 feet x 2, 40 feet, 20 feet.   Balance   Static Sitting Fair +   Dynamic Sitting Fair   Static Standing Fair -   Dynamic Standing Poor +   Ambulatory Poor +   Activity Tolerance   Activity Tolerance Patient tolerated treatment well;Patient limited by fatigue   Exercises   Hip Flexion Sitting;10 reps;Bilateral;AROM  (x2 sets.)   Knee AROM Long Arc Quad Sitting;10 reps;Bilateral;AROM  (x2 sets.)   Ankle Pumps Sitting;10 reps;Bilateral;AROM  (x2 sets.)   Assessment   Prognosis Good   Problem List Decreased strength;Decreased endurance;Impaired balance;Decreased mobility;Impaired judgement;Decreased safety awareness;Pain   Assessment The patient is demonstrating improved instruction following as well as improved attention to task. He is ambulating household distances with increased time. The patient was able to stand for several minutes while looking out the window as well. He will benefit from continued therapies in order to maximize his functional mobility and independence.   Goals   Patient Goals To get better.   STG Expiration Date 10/01/24   PT Treatment Day 2   Plan   Treatment/Interventions Functional transfer  training;LE strengthening/ROM;Endurance training;Therapeutic exercise;Patient/family training;Bed mobility;Gait training   Progress Progressing toward goals   PT Frequency 2-3x/wk   Discharge Recommendation   Rehab Resource Intensity Level, PT III (Minimum Resource Intensity)   AM-PAC Basic Mobility Inpatient   Turning in Flat Bed Without Bedrails 4   Lying on Back to Sitting on Edge of Flat Bed Without Bedrails 3   Moving Bed to Chair 3   Standing Up From Chair Using Arms 3   Walk in Room 3   Climb 3-5 Stairs With Railing 3   Basic Mobility Inpatient Raw Score 19   Basic Mobility Standardized Score 42.48   [facility] Highest Level Of Mobility   -HLM Goal 6: Walk 10 steps or more   -HLM Achieved 7: Walk 25 feet or more         An AM-PAC Basic Mobility raw score less than 16 suggests the patient may benefit from discharge to post-acute rehab services.    Rusty Esqueda, PTA

## 2024-09-19 NOTE — PLAN OF CARE
Problem: PHYSICAL THERAPY ADULT  Goal: Performs mobility at highest level of function for planned discharge setting.  See evaluation for individualized goals.  Description: Treatment/Interventions: ADL retraining, Functional transfer training, LE strengthening/ROM, Elevations, Therapeutic exercise, Endurance training, Cognitive reorientation, Patient/family training, Equipment eval/education, Bed mobility, Gait training, Compensatory technique education, Spoke to case management, Spoke to nursing, OT  Equipment Recommended:  (has RW and SPC for dc)       See flowsheet documentation for full assessment, interventions and recommendations.  Outcome: Progressing  Note: Prognosis: Good  Problem List: Decreased strength, Decreased endurance, Impaired balance, Decreased mobility, Impaired judgement, Decreased safety awareness, Pain  Assessment: The patient is demonstrating improved instruction following as well as improved attention to task. He is ambulating household distances with increased time. The patient was able to stand for several minutes while looking out the window as well. He will benefit from continued therapies in order to maximize his functional mobility and independence.  Barriers to Discharge: Inaccessible home environment, Decreased caregiver support  Barriers to Discharge Comments: alone at times; hx of polypharmacy + multiple admissions  Rehab Resource Intensity Level, PT: III (Minimum Resource Intensity)    See flowsheet documentation for full assessment.

## 2024-09-19 NOTE — ASSESSMENT & PLAN NOTE
Shortly after admission, patient met sepsis criteria and had hypoxia, chest x-ray concerning for aspiration and possible infection.  Continue IV Rocephin

## 2024-09-19 NOTE — ASSESSMENT & PLAN NOTE
Lab Results   Component Value Date    HGBA1C 10.7 (H) 07/31/2024       Recent Labs     09/18/24  1624 09/18/24 2048 09/19/24  0724 09/19/24  1117   POCGLU 152* 189* 122 165*       Blood Sugar Average: Last 72 hrs:  (P) 156  Poorly controlled evidence within A1c of 10.7  Continue Lantus 10 units at night hold metformin hold Prandin  Start patient on insulin sliding scale

## 2024-09-19 NOTE — PLAN OF CARE
Problem: Potential for Falls  Goal: Patient will remain free of falls  Description: INTERVENTIONS:  - Educate patient/family on patient safety including physical limitations  - Instruct patient to call for assistance with activity   - Consult OT/PT to assist with strengthening/mobility   - Keep Call bell within reach  - Keep bed low and locked with side rails adjusted as appropriate  - Keep care items and personal belongings within reach  - Initiate and maintain comfort rounds  - Make Fall Risk Sign visible to staff  - Apply yellow socks and bracelet for high fall risk patients  - Consider moving patient to room near nurses station  Outcome: Progressing     Problem: Prexisting or High Potential for Compromised Skin Integrity  Goal: Skin integrity is maintained or improved  Description: INTERVENTIONS:  - Identify patients at risk for skin breakdown  - Assess and monitor skin integrity  - Assess and monitor nutrition and hydration status  - Monitor labs   - Assess for incontinence   - Turn and reposition patient  - Assist with mobility/ambulation  - Relieve pressure over bony prominences  - Avoid friction and shearing  - Provide appropriate hygiene as needed including keeping skin clean and dry  - Evaluate need for skin moisturizer/barrier cream  - Collaborate with interdisciplinary team   - Patient/family teaching  - Consider wound care consult   Outcome: Progressing     Problem: PAIN - ADULT  Goal: Verbalizes/displays adequate comfort level or baseline comfort level  Description: Interventions:  - Encourage patient to monitor pain and request assistance  - Assess pain using appropriate pain scale  - Administer analgesics based on type and severity of pain and evaluate response  - Implement non-pharmacological measures as appropriate and evaluate response  - Consider cultural and social influences on pain and pain management  - Notify physician/advanced practitioner if interventions unsuccessful or patient reports  new pain  Outcome: Progressing     Problem: INFECTION - ADULT  Goal: Absence or prevention of progression during hospitalization  Description: INTERVENTIONS:  - Assess and monitor for signs and symptoms of infection  - Monitor lab/diagnostic results  - Monitor all insertion sites, i.e. indwelling lines, tubes, and drains  - Monitor endotracheal if appropriate and nasal secretions for changes in amount and color  - [place] appropriate cooling/warming therapies per order  - Administer medications as ordered  - Instruct and encourage patient and family to use good hand hygiene technique  - Identify and instruct in appropriate isolation precautions for identified infection/condition  Outcome: Progressing  Goal: Absence of fever/infection during neutropenic period  Description: INTERVENTIONS:  - Monitor WBC    Outcome: Progressing     Problem: SAFETY ADULT  Goal: Patient will remain free of falls  Description: INTERVENTIONS:  - Educate patient/family on patient safety including physical limitations  - Instruct patient to call for assistance with activity   - Consult OT/PT to assist with strengthening/mobility   - Keep Call bell within reach  - Keep bed low and locked with side rails adjusted as appropriate  - Keep care items and personal belongings within reach  - Initiate and maintain comfort rounds  - Make Fall Risk Sign visible to staff  - Apply yellow socks and bracelet for high fall risk patients  - Consider moving patient to room near nurses station  Outcome: Progressing  Goal: Maintain or return to baseline ADL function  Description: INTERVENTIONS:  -  Assess patient's ability to carry out ADLs; assess patient's baseline for ADL function and identify physical deficits which impact ability to perform ADLs (bathing, care of mouth/teeth, toileting, grooming, dressing, etc.)  - Assess/evaluate cause of self-care deficits   - Assess range of motion  - Assess patient's mobility; develop plan if impaired  - Assess  patient's need for assistive devices and provide as appropriate  - Encourage maximum independence but intervene and supervise when necessary  - Involve family in performance of ADLs  - Assess for home care needs following discharge   - Consider OT consult to assist with ADL evaluation and planning for discharge  - Provide patient education as appropriate  Outcome: Progressing  Goal: Maintains/Returns to pre admission functional level  Description: INTERVENTIONS:  - Perform AM-PAC 6 Click Basic Mobility/ Daily Activity assessment daily.  - Set and communicate daily mobility goal to care team and patient/family/caregiver.   - Collaborate with rehabilitation services on mobility goals if consulted  - Out of bed for toileting  - Record patient progress and toleration of activity level   Outcome: Progressing     Problem: DISCHARGE PLANNING  Goal: Discharge to home or other facility with appropriate resources  Description: INTERVENTIONS:  - Identify barriers to discharge w/patient and caregiver  - Arrange for needed discharge resources and transportation as appropriate  - Identify discharge learning needs (meds, wound care, etc.)  - Arrange for interpretive services to assist at discharge as needed  - Refer to Case Management Department for coordinating discharge planning if the patient needs post-hospital services based on physician/advanced practitioner order or complex needs related to functional status, cognitive ability, or social support system  Outcome: Progressing     Problem: Knowledge Deficit  Goal: Patient/family/caregiver demonstrates understanding of disease process, treatment plan, medications, and discharge instructions  Description: Complete learning assessment and assess knowledge base.  Interventions:  - Provide teaching at level of understanding  - Provide teaching via preferred learning methods  Outcome: Progressing

## 2024-09-20 ENCOUNTER — PATIENT OUTREACH (OUTPATIENT)
Dept: CASE MANAGEMENT | Facility: OTHER | Age: 64
End: 2024-09-20

## 2024-09-20 VITALS
WEIGHT: 173.28 LBS | DIASTOLIC BLOOD PRESSURE: 58 MMHG | HEART RATE: 75 BPM | TEMPERATURE: 99.2 F | SYSTOLIC BLOOD PRESSURE: 128 MMHG | RESPIRATION RATE: 16 BRPM | BODY MASS INDEX: 26.26 KG/M2 | OXYGEN SATURATION: 94 % | HEIGHT: 68 IN

## 2024-09-20 PROBLEM — J69.0 ASPIRATION PNEUMONIA (HCC): Status: RESOLVED | Noted: 2024-09-19 | Resolved: 2024-09-20

## 2024-09-20 PROBLEM — A41.9 SEPSIS (HCC): Status: RESOLVED | Noted: 2024-09-17 | Resolved: 2024-09-20

## 2024-09-20 LAB
ANION GAP SERPL CALCULATED.3IONS-SCNC: 4 MMOL/L (ref 4–13)
BUN SERPL-MCNC: 11 MG/DL (ref 5–25)
CALCIUM SERPL-MCNC: 8.4 MG/DL (ref 8.4–10.2)
CHLORIDE SERPL-SCNC: 97 MMOL/L (ref 96–108)
CO2 SERPL-SCNC: 35 MMOL/L (ref 21–32)
CREAT SERPL-MCNC: 0.59 MG/DL (ref 0.6–1.3)
ERYTHROCYTE [DISTWIDTH] IN BLOOD BY AUTOMATED COUNT: 12.9 % (ref 11.6–15.1)
GFR SERPL CREATININE-BSD FRML MDRD: 108 ML/MIN/1.73SQ M
GLUCOSE SERPL-MCNC: 182 MG/DL (ref 65–140)
GLUCOSE SERPL-MCNC: 228 MG/DL (ref 65–140)
GLUCOSE SERPL-MCNC: 60 MG/DL (ref 65–140)
HCT VFR BLD AUTO: 25.9 % (ref 36.5–49.3)
HGB BLD-MCNC: 8.1 G/DL (ref 12–17)
MCH RBC QN AUTO: 27.5 PG (ref 26.8–34.3)
MCHC RBC AUTO-ENTMCNC: 31.3 G/DL (ref 31.4–37.4)
MCV RBC AUTO: 88 FL (ref 82–98)
PLATELET # BLD AUTO: 263 THOUSANDS/UL (ref 149–390)
PMV BLD AUTO: 10.5 FL (ref 8.9–12.7)
POTASSIUM SERPL-SCNC: 3.9 MMOL/L (ref 3.5–5.3)
RBC # BLD AUTO: 2.95 MILLION/UL (ref 3.88–5.62)
SODIUM SERPL-SCNC: 136 MMOL/L (ref 135–147)
WBC # BLD AUTO: 9.31 THOUSAND/UL (ref 4.31–10.16)

## 2024-09-20 PROCEDURE — 82948 REAGENT STRIP/BLOOD GLUCOSE: CPT

## 2024-09-20 PROCEDURE — 85027 COMPLETE CBC AUTOMATED: CPT | Performed by: FAMILY MEDICINE

## 2024-09-20 PROCEDURE — 99239 HOSP IP/OBS DSCHRG MGMT >30: CPT | Performed by: FAMILY MEDICINE

## 2024-09-20 PROCEDURE — 80048 BASIC METABOLIC PNL TOTAL CA: CPT | Performed by: FAMILY MEDICINE

## 2024-09-20 RX ORDER — METHADONE HYDROCHLORIDE 10 MG/ML
175 CONCENTRATE ORAL DAILY
Start: 2024-09-20 | End: 2024-09-30

## 2024-09-20 RX ADMIN — CEFTRIAXONE SODIUM 1000 MG: 10 INJECTION, POWDER, FOR SOLUTION INTRAVENOUS at 00:05

## 2024-09-20 RX ADMIN — GABAPENTIN 300 MG: 300 CAPSULE ORAL at 08:01

## 2024-09-20 RX ADMIN — FLUTICASONE PROPIONATE 1 SPRAY: 50 SPRAY, METERED NASAL at 08:01

## 2024-09-20 RX ADMIN — METOPROLOL SUCCINATE 12.5 MG: 25 TABLET, FILM COATED, EXTENDED RELEASE ORAL at 08:01

## 2024-09-20 RX ADMIN — INSULIN LISPRO 1 UNITS: 100 INJECTION, SOLUTION INTRAVENOUS; SUBCUTANEOUS at 07:53

## 2024-09-20 RX ADMIN — ASPIRIN 81 MG CHEWABLE TABLET 81 MG: 81 TABLET CHEWABLE at 08:01

## 2024-09-20 RX ADMIN — METHADONE HYDROCHLORIDE 175 MG: 10 CONCENTRATE ORAL at 05:29

## 2024-09-20 RX ADMIN — HEPARIN SODIUM 5000 UNITS: 5000 INJECTION INTRAVENOUS; SUBCUTANEOUS at 05:21

## 2024-09-20 RX ADMIN — INSULIN LISPRO 2 UNITS: 100 INJECTION, SOLUTION INTRAVENOUS; SUBCUTANEOUS at 11:30

## 2024-09-20 NOTE — PLAN OF CARE
Problem: Potential for Falls  Goal: Patient will remain free of falls  Description: INTERVENTIONS:  - Educate patient/family on patient safety including physical limitations  - Instruct patient to call for assistance with activity   - Consult OT/PT to assist with strengthening/mobility   - Keep Call bell within reach  - Keep bed low and locked with side rails adjusted as appropriate  - Keep care items and personal belongings within reach  - Initiate and maintain comfort rounds  - Make Fall Risk Sign visible to staff  - Offer Toileting every 2 Hours, in advance of need  - Initiate/Maintain bed/chair alarm  - Obtain necessary fall risk management equipment: assistive devices  - Apply yellow socks and bracelet for high fall risk patients  - Consider moving patient to room near nurses station  Outcome: Progressing     Problem: PAIN - ADULT  Goal: Verbalizes/displays adequate comfort level or baseline comfort level  Description: Interventions:  - Encourage patient to monitor pain and request assistance  - Assess pain using appropriate pain scale  - Administer analgesics based on type and severity of pain and evaluate response  - Implement non-pharmacological measures as appropriate and evaluate response  - Consider cultural and social influences on pain and pain management  - Notify physician/advanced practitioner if interventions unsuccessful or patient reports new pain  Outcome: Progressing     Problem: Prexisting or High Potential for Compromised Skin Integrity  Goal: Skin integrity is maintained or improved  Description: INTERVENTIONS:  - Identify patients at risk for skin breakdown  - Assess and monitor skin integrity  - Assess and monitor nutrition and hydration status  - Monitor labs   - Assess for incontinence   - Turn and reposition patient  - Assist with mobility/ambulation  - Relieve pressure over bony prominences  - Avoid friction and shearing  - Provide appropriate hygiene as needed including keeping skin  clean and dry  - Evaluate need for skin moisturizer/barrier cream  - Collaborate with interdisciplinary team   - Patient/family teaching  - Consider wound care consult   Outcome: Progressing     Problem: INFECTION - ADULT  Goal: Absence or prevention of progression during hospitalization  Description: INTERVENTIONS:  - Assess and monitor for signs and symptoms of infection  - Monitor lab/diagnostic results  - Monitor all insertion sites, i.e. indwelling lines, tubes, and drains  - Monitor endotracheal if appropriate and nasal secretions for changes in amount and color  - [place] appropriate cooling/warming therapies per order  - Administer medications as ordered  - Instruct and encourage patient and family to use good hand hygiene technique  - Identify and instruct in appropriate isolation precautions for identified infection/condition  Outcome: Progressing     Problem: Knowledge Deficit  Goal: Patient/family/caregiver demonstrates understanding of disease process, treatment plan, medications, and discharge instructions  Description: Complete learning assessment and assess knowledge base.  Interventions:  - Provide teaching at level of understanding  - Provide teaching via preferred learning methods  Outcome: Progressing     Problem: DISCHARGE PLANNING  Goal: Discharge to home or other facility with appropriate resources  Description: INTERVENTIONS:  - Identify barriers to discharge w/patient and caregiver  - Arrange for needed discharge resources and transportation as appropriate  - Identify discharge learning needs (meds, wound care, etc.)  - Arrange for interpretive services to assist at discharge as needed  - Refer to Case Management Department for coordinating discharge planning if the patient needs post-hospital services based on physician/advanced practitioner order or complex needs related to functional status, cognitive ability, or social support system  Outcome: Progressing

## 2024-09-20 NOTE — ASSESSMENT & PLAN NOTE
"Patient has multiple recent hospitalization secondary to toxic metabolic encephalopathy most recent episode was last month on August 19  Patient goes to a methadone clinic called [facility] phone number [phone].   175mg new daily last dose 9/16 @ 5:34 am  Encephalopathy currently resolved, back to baseline, ambulatory  Resume home medication  Patient reports along with methadone, he also took oxycodone.  Will hold off and discontinue oxycodone on discharge  administered additional methadone 25 mg.  Will continue to watch today.  If increased drowsiness is noted, will go back down to 150 mg  Consulted certified .   Currently waiting for a safe discharge plan involving multidisciplinary team of CRS, CM, methadone clinic  Patient has been monitored with administration of methadone 175 mg daily during this hospitalization, noted to tolerate very well without significant increased drowsiness.  Discussed with methadone clinic regarding necessity for daily dosing instead of 3 times a week dosing to avoid ED visit and readmission  Again strongly discussed with patient not to use any additional narcotics, risk involving encephalopathy, intracranial bleed, fall, multiple fractures, even death.  He verbalizes understanding.  Currently ambulating around the hallway without any difficulty.  Requesting to be discharged home  Toxic metabolic encephalopathy due to methadone/oxycodone \"due to or associated with each other\"  "

## 2024-09-20 NOTE — ASSESSMENT & PLAN NOTE
Lab Results   Component Value Date    HGBA1C 10.7 (H) 07/31/2024       Recent Labs     09/19/24  1616 09/19/24  2107 09/20/24  0747 09/20/24  1129   POCGLU 103 221* 182* 228*       Blood Sugar Average: Last 72 hrs:  (P) [id number]  Poorly controlled evidence within A1c of 10.7  Continue Lantus 10 units at night hold metformin hold Prandin  Start patient on insulin sliding scale

## 2024-09-20 NOTE — DISCHARGE SUMMARY
"Discharge Summary - Hospitalist   Name: David Skelton Jr. 64 y.o. male I MRN: [id number]  Unit/Bed#: [facility] 834-01 I Date of Admission: 9/16/2024   Date of Service: 9/20/2024 I Hospital Day: 4     Assessment & Plan  Toxic encephalopathy  Patient has multiple recent hospitalization secondary to toxic metabolic encephalopathy most recent episode was last month on August 19  Patient goes to a methadone clinic called [facility] phone number [phone].   175mg new daily last dose 9/16 @ 5:34 am  Encephalopathy currently resolved, back to baseline, ambulatory  Resume home medication  Patient reports along with methadone, he also took oxycodone.  Will hold off and discontinue oxycodone on discharge  administered additional methadone 25 mg.  Will continue to watch today.  If increased drowsiness is noted, will go back down to 150 mg  Consulted certified .   Currently waiting for a safe discharge plan involving multidisciplinary team of CRS, CM, methadone clinic  Patient has been monitored with administration of methadone 175 mg daily during this hospitalization, noted to tolerate very well without significant increased drowsiness.  Discussed with methadone clinic regarding necessity for daily dosing instead of 3 times a week dosing to avoid ED visit and readmission  Again strongly discussed with patient not to use any additional narcotics, risk involving encephalopathy, intracranial bleed, fall, multiple fractures, even death.  He verbalizes understanding.  Currently ambulating around the hallway without any difficulty.  Requesting to be discharged home  Toxic metabolic encephalopathy due to methadone/oxycodone \"due to or associated with each other\"  Atrial fibrillation (HCC)  Continue metoprolol 12.5 mg daily.  Currently not on any anticoagulation due to recurrent fall  Benign essential hypertension  Continue metoprolol 12.5 mg daily  As needed labetalol  Type 2 diabetes mellitus, with " long-term current use of insulin (HCC)  Lab Results   Component Value Date    HGBA1C 10.7 (H) 07/31/2024       Recent Labs     09/19/24  1616 09/19/24  2107 09/20/24  0747 09/20/24  1129   POCGLU 103 221* 182* 228*       Blood Sugar Average: Last 72 hrs:  (P) [id number]  Poorly controlled evidence within A1c of 10.7  Continue Lantus 10 units at night hold metformin hold Prandin  Start patient on insulin sliding scale  Recurrent falls  Fall precaution  PT OT evaluation  CT head and CT cervical spine unremarkable for acute pathology  Sepsis (HCC) (Resolved: 9/20/2024)  Sepsis, developed shortly after admission 2/2 aspiration PNA a/e/b fever, Tachycardia, lactic acidosis requiring CXR, IVF bolus, IV Ceftriaxone and cultures.  Findings: T 101-102, HR , LA 4.4, Procal 2.62, Bands 15%  CXR: New bibasilar airspace disease is suspicious for the presence of aspiration or infection as clinically questioned.     Aspiration pneumonia (HCC) (Resolved: 9/20/2024)   shortly after admission, patient met sepsis criteria and had hypoxia, chest x-ray concerning for aspiration and possible infection.  Received treatment with IV Rocephin    Discharging Physician / Practitioner: Pau Eckert MD  PCP: Casey Mckeon MD  Admission Date:   Admission Orders (From admission, onward)       Ordered        09/16/24 1139  INPATIENT ADMISSION  Once                          Discharge Date: 09/20/24    Medical Problems       Resolved Problems  Date Reviewed: 8/23/2024            Resolved    Mixed hyperlipidemia 9/16/2024     Resolved by  Logan Coats MD    Acute respiratory failure (HCC) 9/19/2024     Resolved by  Pau Eckert MD    Sepsis (HCC) 9/20/2024     Resolved by  Pau Eckert MD    Aspiration pneumonia (HCC) 9/20/2024     Resolved by  Pau Eckert MD          Consultations During Hospital Stay:  Certified        Reason for Admission: encephalopathy    Hospital Course:     David Skelton Jr. is a  64 y.o. male patient who originally presented to the hospital on 9/16/2024 with PMH of opioid abuse, currently maintained on methadone, multiple recent ED visit and hospitalization due to toxic encephalopathy, recently reduced methadone dose, return to ED after he was found unresponsive in the hallway of his apartment by bystander.  Patient was hypoxic on arrival, chest x-ray was concerning for aspiration versus pneumonia.  Patient started on IV Rocephin.  Patient returned to baseline mental status shortly after admission.  Multiple conversation held with patient's methadone clinic who confirmed patient dose was reduced to 175 mg daily, and currently he is visiting the clinic 3 times a week to receive his medication.  Certified  was consulted during hospitalization, as well as area of aging.  We recommended to methadone clinic for daily dosing to possibly avoid further readmission.  During hospitalization patient was administered methadone 175 mg which did not result in increased drowsiness.  Currently patient is ambulating independently around the hallway, and requesting to be discharged.  Discussed in detail with patient about not using any additional oxycodone or other opioids to avoid further fall, risk of fractures, intracranial bleed, and even death.  Patient verbalizes understanding.    Please see above list of diagnoses and related plan for additional information.     Condition at Discharge: good     Discharge Day Visit / Exam:     Subjective:      On my exam, patient is sitting up on bedside sofa, watching up.  He reports he has been ambulating around the hallway.  He did receive his methadone 175 mg this morning, however currently does not show any drowsiness or increased sleepiness.  Requesting to go home as he has a puppy at home.    Vitals: Blood Pressure: 128/58 (09/20/24 0738)  Pulse: 75 (09/20/24 0738)  Temperature: 99.2 °F (37.3 °C) (09/20/24 0738)  Temp Source: Oral (09/16/24  "9353)  Respirations: 16 (09/20/24 0738)  Height: 5' 8\" (172.7 cm) (09/19/24 1600)  Weight - Scale: 78.6 kg (173 lb 4.5 oz) (09/19/24 1600)  SpO2: 94 % (09/20/24 0756)  Exam:   Physical Exam  Vitals and nursing note reviewed.   Constitutional:       General: He is not in acute distress.     Appearance: Normal appearance.   HENT:      Head: Normocephalic and atraumatic.      Right Ear: External ear normal.      Left Ear: External ear normal.      Nose: Nose normal.   Eyes:      Extraocular Movements: Extraocular movements intact.   Pulmonary:      Effort: Pulmonary effort is normal.   Musculoskeletal:      Cervical back: Normal range of motion.   Neurological:      Mental Status: He is alert. Mental status is at baseline.   Psychiatric:         Mood and Affect: Mood normal.            Discharge instructions/Information to patient and family:   See after visit summary for information provided to patient and family.      Provisions for Follow-Up Care:  See after visit summary for information related to follow-up care and any pertinent home health orders.      Disposition:     Home       Planned Readmission: no     Discharge Statement:  I spent 45 minutes discharging the patient. This time was spent on the day of discharge. I had direct contact with the patient on the day of discharge. Greater than 50% of the total time was spent examining patient, answering all patient questions, arranging and discussing plan of care with patient as well as directly providing post-discharge instructions.  Additional time then spent on discharge activities.    Discharge Medications:  See after visit summary for reconciled discharge medications provided to patient and family.      ** Please Note: This note has been constructed using a voice recognition system **  "

## 2024-09-20 NOTE — ASSESSMENT & PLAN NOTE
shortly after admission, patient met sepsis criteria and had hypoxia, chest x-ray concerning for aspiration and possible infection.  Received treatment with IV Rocephin

## 2024-09-20 NOTE — PROGRESS NOTES
ADT alert received. Patient was discharged from [facility] where he was hospitalized 9/16-9/20 with toxic encephalopathy, acute respiratory failure, aspiration pneumonia and sepsis. He has a history of Afib, uncontrolled type 2 diabetes and hypertension.   Patient receives Methadone from [facility] and the discharging physician asked that the patient be put on daily dosing to avoid readmissions.  Patients  with [facility] is aware of the discharge,  I will follow up on next business day.

## 2024-09-22 LAB
BACTERIA BLD CULT: NORMAL
BACTERIA BLD CULT: NORMAL

## 2024-09-23 ENCOUNTER — TRANSITIONAL CARE MANAGEMENT (OUTPATIENT)
Dept: INTERNAL MEDICINE CLINIC | Facility: CLINIC | Age: 64
End: 2024-09-23

## 2024-09-23 ENCOUNTER — PATIENT OUTREACH (OUTPATIENT)
Dept: CASE MANAGEMENT | Facility: OTHER | Age: 64
End: 2024-09-23

## 2024-09-23 NOTE — UTILIZATION REVIEW
NOTIFICATION OF ADMISSION DISCHARGE   This is a Notification of Discharge from [facility]. Please be advised that this patient has been discharge from our facility. Below you will find the admission and discharge date and time including the patient’s disposition.   UTILIZATION REVIEW CONTACT:  Allie Gregorio  Utilization   Network Utilization Review Department  Phone: [phone] x carefully listen to the prompts. All voicemails are confidential.  Email: [email or web address]     ADMISSION INFORMATION  PRESENTATION DATE: 9/16/2024  8:40 AM  OBERVATION ADMISSION DATE: N/A  INPATIENT ADMISSION DATE: 9/16/24 11:40 AM   DISCHARGE DATE: 9/20/2024  1:00 PM   DISPOSITION:Home/Self Care    Network Utilization Review Department  ATTENTION: Please call with any questions or concerns to [phone] and carefully listen to the prompts so that you are directed to the right person. All voicemails are confidential.   For Discharge needs, contact Care Management DC Support Team at [phone] opt. 2  Send all requests for admission clinical reviews, approved or denied determinations and any other requests to dedicated fax number below belonging to the campus where the patient is receiving treatment. List of dedicated fax numbers for the Facilities:  FACILITY NAME UR FAX NUMBER   ADMISSION DENIALS (Administrative/Medical Necessity) [phone]   DISCHARGE SUPPORT TEAM ([facility]) [phone]   PARENT CHILD HEALTH (Maternity/NICU/Pediatrics) [phone]   [facility] [phone]   [facility] [phone]   Replaced by [facility] [phone]   [facility] [phone]   Psychiatric hospital [phone]   [facility] [phone]   [facility] [phone]   [facility] [phone]    [facility] [phone]   [facility] [phone]   [facility] [phone]   [facility] [phone]

## 2024-09-23 NOTE — PROGRESS NOTES
IB message received from Radha Leonardo RN CM that pt was discharged 9/20 from [facility] and due for follow up outreach.   Chart review completed.     Call placed to David and introduced self as covering RN CM and reason for call is follow up. David states that this is not a good time to speak, and that he is leaving to go to clinic. Offered follow up call a different day this week. He is agreeable to tomorrow. I informed him Radha Leonardo RN CM would continue to follow up and perform outreach.     Note routed to Radha.

## 2024-09-24 ENCOUNTER — PATIENT OUTREACH (OUTPATIENT)
Dept: CASE MANAGEMENT | Facility: OTHER | Age: 64
End: 2024-09-24

## 2024-09-24 NOTE — PROGRESS NOTES
I spoke with David who reports he feels much better. He has his medications and reports he is taking them as directed. When I asked how he administers his insulin he reports his aide draws it up for him and he gives it to himself.He reports this morning his glucose was 118.  He denies cough, fever or chills.  He has an appointment with his PCP tomorrow.  He was discharged on Methadone 175 mg daily.  Multiple notes while David was an an inpatient attribute his Methadone use in addition to pain medication are causing readmissions for encephalopathy. Admitted 7/30, 8/6,8/19,and 9/16. Office of aging is involved.  Will follow up next week.

## 2024-09-25 ENCOUNTER — OFFICE VISIT (OUTPATIENT)
Dept: FAMILY MEDICINE CLINIC | Facility: CLINIC | Age: 64
End: 2024-09-25
Payer: COMMERCIAL

## 2024-09-25 VITALS
HEART RATE: 82 BPM | DIASTOLIC BLOOD PRESSURE: 62 MMHG | RESPIRATION RATE: 16 BRPM | WEIGHT: 182 LBS | TEMPERATURE: 98.1 F | BODY MASS INDEX: 27.58 KG/M2 | SYSTOLIC BLOOD PRESSURE: 125 MMHG | OXYGEN SATURATION: 100 % | HEIGHT: 68 IN

## 2024-09-25 DIAGNOSIS — G92.9 TOXIC ENCEPHALOPATHY: Primary | ICD-10-CM

## 2024-09-25 DIAGNOSIS — I10 BENIGN ESSENTIAL HYPERTENSION: ICD-10-CM

## 2024-09-25 DIAGNOSIS — F32.9 MAJOR DEPRESSION, CHRONIC: ICD-10-CM

## 2024-09-25 DIAGNOSIS — F11.20 METHADONE DEPENDENCE (HCC): ICD-10-CM

## 2024-09-25 DIAGNOSIS — F41.1 GENERALIZED ANXIETY DISORDER: ICD-10-CM

## 2024-09-25 DIAGNOSIS — Z02.83 ENCOUNTER FOR DRUG SCREENING: ICD-10-CM

## 2024-09-25 DIAGNOSIS — M51.369 DDD (DEGENERATIVE DISC DISEASE), LUMBAR: ICD-10-CM

## 2024-09-25 DIAGNOSIS — M51.36 DDD (DEGENERATIVE DISC DISEASE), LUMBAR: ICD-10-CM

## 2024-09-25 PROCEDURE — 99496 TRANSJ CARE MGMT HIGH F2F 7D: CPT | Performed by: FAMILY MEDICINE

## 2024-09-25 RX ORDER — GABAPENTIN 300 MG/1
300 CAPSULE ORAL 3 TIMES DAILY
Qty: 90 CAPSULE | Refills: 1 | Status: SHIPPED | OUTPATIENT
Start: 2024-09-25 | End: 2024-11-24

## 2024-09-25 NOTE — ASSESSMENT & PLAN NOTE
Blood pressure stable today.  Patient advised to continue current therapy.  Low-salt diet.  Hydrate well.

## 2024-09-25 NOTE — ASSESSMENT & PLAN NOTE
Discussed with patient his recent hospitalization for it.  His hospital record and the RN that follow-up send afterwards who also called him yesterday those notes were also discussed with patient.  Patient to follow-up with his methadone clinic as scheduled.  Patient advised not to take medication that will compromise his staying alert conscious and etc.  Hydrate well.  Eat 3 healthy meals on time.

## 2024-09-25 NOTE — ASSESSMENT & PLAN NOTE
Discussed with patient.  Patient advised not to take the 600 mg tablet of gabapentin secondary to risk of him being drowsy and sleepy especially that he has gotten hospitalized for more than once including his most recent hospitalization.  Patient refilled on his capsule 3 times a day.  Patient also advised that he may want to take it twice a day to decrease the chance of him being drowsy etc.  Patient also advised to hydrate well.  And also advised to eat 3 healthy meals on time.  Patient to follow-up with his pain management doctor and orthopedic for his chronic pain.  Orders:    gabapentin (NEURONTIN) 300 mg capsule; Take 1 capsule (300 mg total) by mouth 3 (three) times a day

## 2024-09-25 NOTE — PROGRESS NOTES
Transition of Care Visit  Name: David Skelton Jr.      : 1960      MRN: [id number]  Encounter Provider: Casey Mckeon MD  Encounter Date: 2024   Encounter department: [facility]    Assessment & Plan  Toxic encephalopathy  Discussed with patient his recent hospitalization for it.  His hospital record and the RN that follow-up send afterwards who also called him yesterday those notes were also discussed with patient.  Patient to follow-up with his methadone clinic as scheduled.  Patient advised not to take medication that will compromise his staying alert conscious and etc.  Hydrate well.  Eat 3 healthy meals on time.       Methadone dependence (HCC)  Discussed with patient.  See above.  Follow-up with the clinic.       Major depression, chronic  Discussed with patient.  Briefly counseled.  Psych appointment pending on  as per patient.  Denies SI HI.         Generalized anxiety disorder  Discussed with patient.  Patient briefly counseled.  Patient will see his psych on  as per his reporting today.       Benign essential hypertension  Blood pressure stable today.  Patient advised to continue current therapy.  Low-salt diet.  Hydrate well.       DDD (degenerative disc disease), lumbar  Discussed with patient.  Patient advised not to take the 600 mg tablet of gabapentin secondary to risk of him being drowsy and sleepy especially that he has gotten hospitalized for more than once including his most recent hospitalization.  Patient refilled on his capsule 3 times a day.  Patient also advised that he may want to take it twice a day to decrease the chance of him being drowsy etc.  Patient also advised to hydrate well.  And also advised to eat 3 healthy meals on time.  Patient to follow-up with his pain management doctor and orthopedic for his chronic pain.  Orders:    gabapentin (NEURONTIN) 300 mg capsule; Take 1 capsule (300 mg total) by mouth 3 (three) times a  day         RTO for his neck scheduled visit with me which is in October and he already knows as he told me himself just now that he will do the blood work and urine a week before.          History of Present Illness     Transitional Care Management Review:   David Skelton Jr. is a 64 y.o. male here for TCM follow up.     During the TCM phone call patient stated:  TCM Call       Date and time call was made  9/23/2024 11:14 AM    Hospital care reviewed  Records reviewed    Patient was hospitialized at  [facility]    Date of Admission  09/16/24    Date of discharge  09/20/24    Diagnosis  Toxic encephalopathy    Disposition  Home    Current Symptoms  --  Bilateral leg pain          TCM Call       Post hospital issues  None    Scheduled for follow up?  No    Did you obtain your prescribed medications  No    Why were you unable to obtain your medications  No new medications prescribed    Do you need help managing your prescriptions or medications  Yes    Is transportation to your appointment needed  No    I have advised the patient to call PCP with any new or worsening symptoms  Teresa Chew MA    Living Arrangements  Alone    Support System  Family    Do you have social support  Yes, as much as I need    Are you recieving any outpatient services  Yes    Are you recieving home care services  Yes    Types of home care services  Nurse visit    Are you using any community resources  No    Current waiver services  No    Have you fallen in the last 12 months  Yes    How many times  6    Interperter language line needed  No    Counseling  Patient    Counseling topics  Activities of daily living          64-year-old male here for follow-up on his recent hospitalization for toxic encephalopathy from September 16 till the 20th of this year.  Patient hospital record was reviewed with patient today as well.  Patient has been following up with the methadone clinic and is on 175 mg.  Patient says he missed an  "appointment with his psychiatrist and but that he has another 1 on 4 October.  Patient is requesting a refill on his gabapentin.  He is requesting a 600 mg tablet when he is only on 300-minute milligram capsule 1 tablet 3 times a day.  Patient denies tobacco alcohol or drugs.  And patient is amenable to giving us a urine sample today for the urine toxicology.  Of note he was also found to have aspiration pneumonia and was treated with IV antibiotics.  Today patient denies any chest pain palpitation shortness of breath cough fever chills.      Review of Systems   Constitutional:  Positive for fatigue. Negative for chills and fever.   HENT:  Negative for congestion and sore throat.    Eyes:  Negative for visual disturbance.   Respiratory:  Negative for cough and shortness of breath.    Cardiovascular:  Negative for chest pain and palpitations.   Gastrointestinal:  Negative for abdominal pain.   Genitourinary:  Negative for dysuria.   Musculoskeletal:  Positive for arthralgias and back pain.   Neurological:  Negative for dizziness and headaches.   Psychiatric/Behavioral:  Negative for dysphoric mood, self-injury and suicidal ideas. The patient is not nervous/anxious.      Objective     /62 (BP Location: Left arm, Patient Position: Sitting, Cuff Size: Large)   Pulse 82   Temp 98.1 °F (36.7 °C) (Temporal)   Resp 16   Ht 5' 8\" (1.727 m)   Wt 82.6 kg (182 lb)   SpO2 100%   BMI 27.67 kg/m²     Physical Exam  Vitals reviewed.   Constitutional:       General: He is not in acute distress.     Appearance: Normal appearance. He is not ill-appearing.   HENT:      Head: Normocephalic and atraumatic.      Mouth/Throat:      Mouth: Mucous membranes are moist.   Neck:      Vascular: No carotid bruit.   Cardiovascular:      Rate and Rhythm: Normal rate and regular rhythm.   Pulmonary:      Effort: Pulmonary effort is normal.      Breath sounds: Normal breath sounds.   Neurological:      General: No focal deficit present. "      Mental Status: He is alert and oriented to person, place, and time.   Psychiatric:         Mood and Affect: Mood normal.         Behavior: Behavior normal.         Thought Content: Thought content normal.      Comments: His reason for hospitalization including his history of anxiety depression and bipolar discussed with patient.  Patient counseled again.  Patient will be seeing his psychiatrist on 4 October as per patient.  Patient denies SI HI.  Patient is also alert oriented x 3.  And patient walked over to the bathroom with his cane that he usually uses to give us a urine for his urine toxicology without any hesitance.       Medications have been reviewed by provider in current encounter    Administrative Statements

## 2024-09-25 NOTE — ASSESSMENT & PLAN NOTE
Discussed with patient.  Briefly counseled.  Psych appointment pending on 4 October as per patient.  Denies SI HI.

## 2024-09-25 NOTE — ASSESSMENT & PLAN NOTE
Discussed with patient.  Patient briefly counseled.  Patient will see his psych on 4 October as per his reporting today.

## 2024-09-26 ENCOUNTER — TELEPHONE (OUTPATIENT)
Age: 64
End: 2024-09-26

## 2024-09-26 NOTE — TELEPHONE ENCOUNTER
I did not mention giving him a water pill. I did however, asked him to cut down on his salt intake. ty

## 2024-09-26 NOTE — TELEPHONE ENCOUNTER
Pt's aid called regarding medication; verbal consent given.    She stated that Dr. Mckeon mentioned giving pt  an rx for a water pill but there was nothing at the pharmacy and pt's legs are swollen.    Please contact Irene to advise.

## 2024-09-27 DIAGNOSIS — R60.9 EDEMA, UNSPECIFIED TYPE: Primary | ICD-10-CM

## 2024-09-27 LAB
4OH-XYLAZINE UR QL CFM: NEGATIVE NG/ML
6MAM UR QL CFM: NEGATIVE NG/ML
7AMINOCLONAZEPAM UR QL CFM: ABNORMAL NG/ML
A-OH ALPRAZ UR QL CFM: ABNORMAL NG/ML
ACCEPTABLE CREAT UR QL: NORMAL MG/DL
ACCEPTIBLE SP GR UR QL: NORMAL
AMPHET UR QL CFM: NEGATIVE NG/ML
BUPRENORPHINE UR QL CFM: NEGATIVE NG/ML
BUTALBITAL UR QL CFM: NEGATIVE NG/ML
BZE UR QL CFM: NEGATIVE NG/ML
CODEINE UR QL CFM: NEGATIVE NG/ML
EDDP UR QL CFM: NORMAL NG/ML
ETHYL GLUCURONIDE UR QL CFM: NEGATIVE NG/ML
ETHYL SULFATE UR QL SCN: NEGATIVE NG/ML
EUTYLONE UR QL: NEGATIVE NG/ML
FENTANYL UR QL CFM: NEGATIVE NG/ML
GLIADIN IGG SER IA-ACNC: NEGATIVE NG/ML
HYDROCODONE UR QL CFM: NEGATIVE NG/ML
HYDROMORPHONE UR QL CFM: NEGATIVE NG/ML
LORAZEPAM UR QL CFM: NEGATIVE NG/ML
ME-PHENIDATE UR QL CFM: NEGATIVE NG/ML
MEPERIDINE UR QL CFM: NEGATIVE NG/ML
METHADONE UR QL CFM: NORMAL NG/ML
METHAMPHET UR QL CFM: NEGATIVE NG/ML
MORPHINE UR QL CFM: NEGATIVE NG/ML
NALTREXONE UR QL CFM: NEGATIVE NG/ML
NITRITE UR QL: NORMAL UG/ML
NORBUPRENORPHINE UR QL CFM: NEGATIVE NG/ML
NORDIAZEPAM UR QL CFM: ABNORMAL NG/ML
NORFENTANYL UR QL CFM: NEGATIVE NG/ML
NORHYDROCODONE UR QL CFM: NEGATIVE NG/ML
NORMEPERIDINE UR QL CFM: NEGATIVE NG/ML
NOROXYCODONE UR QL CFM: NEGATIVE NG/ML
OXAZEPAM UR QL CFM: ABNORMAL NG/ML
OXYCODONE UR QL CFM: NEGATIVE NG/ML
OXYMORPHONE UR QL CFM: NEGATIVE NG/ML
PARA-FLUOROFENTANYL QUANTIFICATION: NORMAL NG/ML
PHENOBARB UR QL CFM: NEGATIVE NG/ML
RESULT ALL_PRESCRIBED MEDS AND SPECIAL INSTRUCTIONS: NORMAL
SECOBARBITAL UR QL CFM: NEGATIVE NG/ML
SL AMB 5F-ADB-M7 METABOLITE QUANTIFICATION: NEGATIVE NG/ML
SL AMB 7-OH-MITRAGYNINE (KRATOM ALKALOID) QUANTIFICATION: NEGATIVE NG/ML
SL AMB AB-FUBINACA-M3 METABOLITE QUANTIFICATION: NEGATIVE NG/ML
SL AMB ACETYL FENTANYL QUANTIFICATION: NORMAL NG/ML
SL AMB ACETYL NORFENTANYL QUANTIFICATION: NORMAL NG/ML
SL AMB ACRYL FENTANYL QUANTIFICATION: NORMAL NG/ML
SL AMB CARFENTANIL QUANTIFICATION: NORMAL NG/ML
SL AMB CTHC (MARIJUANA METABOLITE) QUANTIFICATION: ABNORMAL NG/ML
SL AMB DEXTRORPHAN (DEXTROMETHORPHAN METABOLITE) QUANT: NEGATIVE NG/ML
SL AMB GABAPENTIN QUANTIFICATION: NORMAL NG/ML
SL AMB JWH018 METABOLITE QUANTIFICATION: NEGATIVE NG/ML
SL AMB JWH073 METABOLITE QUANTIFICATION: NEGATIVE NG/ML
SL AMB MDMB-FUBINACA-M1 METABOLITE QUANTIFICATION: NEGATIVE NG/ML
SL AMB METHYLONE QUANTIFICATION: NEGATIVE NG/ML
SL AMB N-DESMETHYL-TRAMADOL QUANTIFICATION: NEGATIVE NG/ML
SL AMB PHENTERMINE QUANTIFICATION: NEGATIVE NG/ML
SL AMB PREGABALIN QUANTIFICATION: NEGATIVE NG/ML
SL AMB RCS4 METABOLITE QUANTIFICATION: NEGATIVE NG/ML
SL AMB RITALINIC ACID QUANTIFICATION: NEGATIVE NG/ML
SMOOTH MUSCLE AB TITR SER IF: NEGATIVE NG/ML
SPECIMEN DRAWN SERPL: NEGATIVE NG/ML
SPECIMEN PH ACCEPTABLE UR: NORMAL
TAPENTADOL UR QL CFM: NEGATIVE NG/ML
TEMAZEPAM UR QL CFM: ABNORMAL NG/ML
TRAMADOL UR QL CFM: NEGATIVE NG/ML
URATE/CREAT 24H UR: NEGATIVE NG/ML
XYLAZINE UR QL CFM: NEGATIVE NG/ML

## 2024-09-27 RX ORDER — FUROSEMIDE 20 MG/1
20 TABLET ORAL DAILY PRN
Qty: 10 TABLET | Refills: 0 | Status: SHIPPED | OUTPATIENT
Start: 2024-09-27

## 2024-09-27 NOTE — TELEPHONE ENCOUNTER
Patient called back, asking if he can get a prescription for a water pill. He can not walk , legs are swollen and can not put shoes on.

## 2024-10-02 ENCOUNTER — PATIENT OUTREACH (OUTPATIENT)
Dept: CASE MANAGEMENT | Facility: OTHER | Age: 64
End: 2024-10-02

## 2024-10-02 NOTE — PROGRESS NOTES
I called David and his voicemail picked up which was in Bel[place]ian. Message left on voicemail with my contact information.    Of note patient was seen by his PCP on 9/25 and was counseled on his medications. He tested positive for Alprazolam, clonazepam, diazepam and THC.

## 2024-10-07 ENCOUNTER — HOSPITAL ENCOUNTER (OUTPATIENT)
Dept: RADIOLOGY | Facility: HOSPITAL | Age: 64
Discharge: HOME | End: 2024-10-07
Attending: ORTHOPAEDIC SURGERY
Payer: COMMERCIAL

## 2024-10-07 ENCOUNTER — OFFICE VISIT (OUTPATIENT)
Dept: OBGYN CLINIC | Facility: HOSPITAL | Age: 64
End: 2024-10-07

## 2024-10-07 ENCOUNTER — TELEPHONE (OUTPATIENT)
Age: 64
End: 2024-10-07

## 2024-10-07 VITALS — OXYGEN SATURATION: 98 % | HEART RATE: 73 BPM

## 2024-10-07 DIAGNOSIS — S72.001A CLOSED FRACTURE OF RIGHT HIP, INITIAL ENCOUNTER (HCC): ICD-10-CM

## 2024-10-07 DIAGNOSIS — S72.001D CLOSED FRACTURE OF RIGHT HIP WITH ROUTINE HEALING, SUBSEQUENT ENCOUNTER: Primary | ICD-10-CM

## 2024-10-07 PROCEDURE — 73502 X-RAY EXAM HIP UNI 2-3 VIEWS: CPT

## 2024-10-07 PROCEDURE — 99024 POSTOP FOLLOW-UP VISIT: CPT | Performed by: ORTHOPAEDIC SURGERY

## 2024-10-07 RX ORDER — CLONAZEPAM 1 MG/1
1 TABLET ORAL 2 TIMES DAILY
COMMUNITY
Start: 2024-10-04

## 2024-10-07 RX ORDER — ARIPIPRAZOLE 2 MG/1
2 TABLET ORAL DAILY
COMMUNITY
Start: 2024-10-04

## 2024-10-07 NOTE — PROGRESS NOTES
SUBJECTIVE  Patient is a 64-year-old male who is 10 weeks status post right insertional nail IM femur, DOS 7/31/2024.  Patient states she does have occasional pain within his right hip. he states that he admit his nurse who is with him today states that he has been taking Tylenol and gabapentin for pain control prescribed at his PCP.  Patient has weaned off oxycodone.     Except as noted above:  no further complaints  no red flags    OBJECTIVE/EXAM  no signs of infection  No skin issues - healing well  ROM pain with hip ROM         XRs:  any newly obtained images reviewed and discussed with patient/family: Status post insertional nail IM femur, adequate alignment of implants.    Plan:  Follow up in 3 months  Next visit obtain following XRs: Right hip/pelvis XR  Additional instructions / restrictions:     X-rays were obtained and reviewed with patient at today's visit demonstrating adequate alignment with advancement  I discussed with patient given that he still has little pain within his hip his fracture could still be in the healing phase.  Patient was advised to utilize walker to help with ambulatory assistance.  Patient may continue activity as tolerated and let pain be a guide for activity tolerance.  Patient will follow-up in 2-3 months for repeat right hip/pelvis XR.      All patient/family questions were addressed.     Scribe Attestation      I,:  Pankaj Carr am acting as a scribe while in the presence of the attending physician.:       I,:  Meng Hill MD personally performed the services described in this documentation    as scribed in my presence.:

## 2024-10-07 NOTE — TELEPHONE ENCOUNTER
Patient called the RX Refill Line. Message is being forwarded to the office.     Patient is requesting a prescription for Gabapentin 600 mg tablets 3 times a day. Patient states he cannot take the capsules.     Please contact patient at  [phone]

## 2024-10-07 NOTE — TELEPHONE ENCOUNTER
Patient called the RX Refill Line. Message is being forwarded to the office.     Patient is requesting a prescription for Gabapentin 600 mg tablets. He states he cannot take capsules because they make him have a bowel movement in his pants in the middle of the night and he cannot keep affording to buy Depends.     Please contact patient at  [phone]

## 2024-10-09 ENCOUNTER — PATIENT OUTREACH (OUTPATIENT)
Dept: CASE MANAGEMENT | Facility: OTHER | Age: 64
End: 2024-10-09

## 2024-10-09 NOTE — TELEPHONE ENCOUNTER
Plz let the pt know that I didn't prescribe him the higher dose 2nd to his somnolence from it. And I told him that in person, more than once. ty

## 2024-10-09 NOTE — PROGRESS NOTES
"I spoke with David who reports he is still having right hip pain.  He was seen yesterday bt his orthopedic surgeon who advised him to continue taking Gabapentin and tylenol for the pain.  Patient called his PCP office and requested Gabapentin be increased to 600 mg three times daily.He is waiting to hear from the office.  He also reports the physician who prescribes methadone has decreased it to 70 mg which David reports \"is not enough\". I explained that he was admitted with altered mental status and a fall in September 2024. At that tie he also was taking oxycodone which was discontinued at discharge.  I explained to David I cannot prescribe medications and he will have to speak to his PCP.  He thanked me for calling but declined further outreach  "

## 2024-10-10 ENCOUNTER — TELEPHONE (OUTPATIENT)
Dept: FAMILY MEDICINE CLINIC | Facility: CLINIC | Age: 64
End: 2024-10-10

## 2024-10-10 NOTE — TELEPHONE ENCOUNTER
Patient called for refills of gabapentin tablets, too soon to fill, will notify provider of next refill with change from capsules to tablets, per patient he gets stomach aches causing diarrhea when he takes the capsules.

## 2024-10-11 DIAGNOSIS — R09.81 NASAL CONGESTION: ICD-10-CM

## 2024-10-11 NOTE — TELEPHONE ENCOUNTER
Pt called and stated he still would like the gabapentin tablets as soon as possible, he keeps having accidents while he sleeps and does not have enough money to keep buying clothes after they are damaged. I advised him of Marta's previous message about it being too soon to fill and asked if he was putting anything on top of his sheets for the accidents and pt said he was. Pt also mentioned the phone # [phone] that Marta gave for the power wheelchair just keeps sending him to [employer] and nobody calls him back. I let him know Marta will contact him back Monday morning when she is in office again. Please advise.

## 2024-10-13 ENCOUNTER — TELEPHONE (OUTPATIENT)
Dept: OTHER | Facility: OTHER | Age: 64
End: 2024-10-13

## 2024-10-13 ENCOUNTER — NURSE TRIAGE (OUTPATIENT)
Dept: OTHER | Facility: OTHER | Age: 64
End: 2024-10-13

## 2024-10-13 NOTE — TELEPHONE ENCOUNTER
"Answer Assessment - Initial Assessment Questions  1. NAME of MEDICINE: \"What medicine(s) are you calling about?\"      Gabapentin- 300mg capsule TID         2. QUESTION: \"What is your question?\" (e.g., double dose of medicine, side effect)      Requesting to have medication changed to the tablets- was previously taking Gabapentin 600mg twice a day     3. PRESCRIBER: \"Who prescribed the medicine?\" Reason: if prescribed by specialist, call should be referred to that group.      PCP    4. SYMPTOMS: \"Do you have any symptoms?\" If Yes, ask: \"What symptoms are you having?\"  \"How bad are the symptoms (e.g., mild, moderate, severe)      Upset stomach    Protocols used: Medication Question Call-Adult-    "

## 2024-10-13 NOTE — TELEPHONE ENCOUNTER
On call provider contacted, stated he has spoken to the patient about this in the office and does not recommend the patient be on the 600mg tablets as it is a higher dose. Stated the patient should make an appointment with the office to discuss the medication in person. Patient and patient's care giver made aware. Patient stated he did  not want to his PCP to be contacted but wanted his orthopedic surgeon to contacted. Stated his PCP does not understand the amount of pain he is in. Stated his right hip is a 7-8/10 in pain. Patient requested the on call surgeon be notified at this time to discuss if changes can be made to Gabapentin prescription. On call provider with orthopedics contacted and made aware of the patient's request as well as the patient's PCP's recommendation. Orthopedic on call provider stated, the patient needs to call back during office hours to discuss his concerns with Dr. Hill if he would like. Patient made aware and verbalized understanding.  Reason for Disposition  • [1] Caller has URGENT medicine question about med that PCP or specialist prescribed AND [2] triager unable to answer question    Protocols used: Medication Question Call-Adult-

## 2024-10-13 NOTE — TELEPHONE ENCOUNTER
Pt's sister called, stated her brother has an appt this week and she would like to share concern with the doctor. She state's pt is acting very funny, he is forgetting a lot, and the family would like him checked for dementia. Offered to connect her to a nurse but she declined.

## 2024-10-14 RX ORDER — FLUTICASONE PROPIONATE 50 MCG
1 SPRAY, SUSPENSION (ML) NASAL DAILY
Qty: 16 G | Refills: 1 | Status: SHIPPED | OUTPATIENT
Start: 2024-10-14

## 2024-10-14 NOTE — TELEPHONE ENCOUNTER
I have never prescribed him Gabapentin and told him last visit I don't prescribe him this medication. He can see pain management or his PCP as this seems to be an ongoing issue pre-dating surgery. His surgery was nearly 3 months ago and his last X-ray demonstrated adequate healing thus reported pain is inconsistent with the hip fracture.

## 2024-10-15 ENCOUNTER — TELEPHONE (OUTPATIENT)
Age: 64
End: 2024-10-15

## 2024-10-15 NOTE — TELEPHONE ENCOUNTER
Patient calling in to speak to Marta concerning powered wheelchair. Spoke to her she will give patient a call back . Explained to patient , is not happy with how ling the process is taking to get the wheelchair.

## 2024-10-15 NOTE — TELEPHONE ENCOUNTER
Contacted Pt . in regards to Med Mgmt Wait List, LVM for pt. to contact [phone], option 3 in regards to scheduling.

## 2024-10-16 ENCOUNTER — APPOINTMENT (OUTPATIENT)
Dept: LAB | Facility: CLINIC | Age: 64
End: 2024-10-16
Payer: COMMERCIAL

## 2024-10-16 DIAGNOSIS — Z00.00 MEDICARE ANNUAL WELLNESS VISIT, SUBSEQUENT: ICD-10-CM

## 2024-10-16 DIAGNOSIS — F41.1 GENERALIZED ANXIETY DISORDER: ICD-10-CM

## 2024-10-16 DIAGNOSIS — E11.39 TYPE 2 DIABETES MELLITUS WITH OTHER OPHTHALMIC COMPLICATION, WITH LONG-TERM CURRENT USE OF INSULIN (HCC): ICD-10-CM

## 2024-10-16 DIAGNOSIS — Z79.4 TYPE 2 DIABETES MELLITUS WITH OTHER OPHTHALMIC COMPLICATION, WITH LONG-TERM CURRENT USE OF INSULIN (HCC): ICD-10-CM

## 2024-10-16 DIAGNOSIS — Z79.4 TYPE 2 DIABETES MELLITUS WITH BOTH EYES AFFECTED BY RETINOPATHY AND MACULAR EDEMA, WITH LONG-TERM CURRENT USE OF INSULIN, UNSPECIFIED RETINOPATHY SEVERITY (HCC): ICD-10-CM

## 2024-10-16 DIAGNOSIS — R30.0 DYSURIA: ICD-10-CM

## 2024-10-16 DIAGNOSIS — I10 BENIGN ESSENTIAL HYPERTENSION: ICD-10-CM

## 2024-10-16 DIAGNOSIS — R35.1 NOCTURIA: ICD-10-CM

## 2024-10-16 DIAGNOSIS — E11.311 TYPE 2 DIABETES MELLITUS WITH BOTH EYES AFFECTED BY RETINOPATHY AND MACULAR EDEMA, WITH LONG-TERM CURRENT USE OF INSULIN, UNSPECIFIED RETINOPATHY SEVERITY (HCC): ICD-10-CM

## 2024-10-16 DIAGNOSIS — E55.9 VITAMIN D DEFICIENCY: ICD-10-CM

## 2024-10-16 DIAGNOSIS — E78.2 MIXED HYPERLIPIDEMIA: ICD-10-CM

## 2024-10-16 LAB
25(OH)D3 SERPL-MCNC: 27 NG/ML (ref 30–100)
ALBUMIN SERPL BCG-MCNC: 3.4 G/DL (ref 3.5–5)
ALP SERPL-CCNC: 181 U/L (ref 34–104)
ALT SERPL W P-5'-P-CCNC: 27 U/L (ref 7–52)
ANION GAP SERPL CALCULATED.3IONS-SCNC: 6 MMOL/L (ref 4–13)
AST SERPL W P-5'-P-CCNC: 23 U/L (ref 13–39)
BACTERIA UR QL AUTO: ABNORMAL /HPF
BASOPHILS # BLD AUTO: 0.04 THOUSANDS/ΜL (ref 0–0.1)
BASOPHILS NFR BLD AUTO: 1 % (ref 0–1)
BILIRUB SERPL-MCNC: 0.28 MG/DL (ref 0.2–1)
BILIRUB UR QL STRIP: NEGATIVE
BUN SERPL-MCNC: 14 MG/DL (ref 5–25)
CALCIUM ALBUM COR SERPL-MCNC: 9.1 MG/DL (ref 8.3–10.1)
CALCIUM SERPL-MCNC: 8.6 MG/DL (ref 8.4–10.2)
CHLORIDE SERPL-SCNC: 100 MMOL/L (ref 96–108)
CHOLEST SERPL-MCNC: 95 MG/DL
CLARITY UR: CLEAR
CO2 SERPL-SCNC: 32 MMOL/L (ref 21–32)
COLOR UR: ABNORMAL
CREAT SERPL-MCNC: 0.84 MG/DL (ref 0.6–1.3)
CREAT UR-MCNC: 95.9 MG/DL
EOSINOPHIL # BLD AUTO: 0.79 THOUSAND/ΜL (ref 0–0.61)
EOSINOPHIL NFR BLD AUTO: 10 % (ref 0–6)
ERYTHROCYTE [DISTWIDTH] IN BLOOD BY AUTOMATED COUNT: 13.7 % (ref 11.6–15.1)
EST. AVERAGE GLUCOSE BLD GHB EST-MCNC: 177 MG/DL
GFR SERPL CREATININE-BSD FRML MDRD: 93 ML/MIN/1.73SQ M
GLUCOSE P FAST SERPL-MCNC: 183 MG/DL (ref 65–99)
GLUCOSE UR STRIP-MCNC: ABNORMAL MG/DL
HBA1C MFR BLD: 7.8 %
HCT VFR BLD AUTO: 30.6 % (ref 36.5–49.3)
HDLC SERPL-MCNC: 55 MG/DL
HGB BLD-MCNC: 9.4 G/DL (ref 12–17)
HGB UR QL STRIP.AUTO: NEGATIVE
HYALINE CASTS #/AREA URNS LPF: ABNORMAL /LPF
IMM GRANULOCYTES # BLD AUTO: 0.02 THOUSAND/UL (ref 0–0.2)
IMM GRANULOCYTES NFR BLD AUTO: 0 % (ref 0–2)
KETONES UR STRIP-MCNC: NEGATIVE MG/DL
LDLC SERPL CALC-MCNC: 33 MG/DL (ref 0–100)
LEUKOCYTE ESTERASE UR QL STRIP: ABNORMAL
LYMPHOCYTES # BLD AUTO: 1.49 THOUSANDS/ΜL (ref 0.6–4.47)
LYMPHOCYTES NFR BLD AUTO: 19 % (ref 14–44)
MCH RBC QN AUTO: 26.9 PG (ref 26.8–34.3)
MCHC RBC AUTO-ENTMCNC: 30.7 G/DL (ref 31.4–37.4)
MCV RBC AUTO: 87 FL (ref 82–98)
MICROALBUMIN UR-MCNC: 712.5 MG/L
MICROALBUMIN/CREAT 24H UR: 743 MG/G CREATININE (ref 0–30)
MONOCYTES # BLD AUTO: 0.62 THOUSAND/ΜL (ref 0.17–1.22)
MONOCYTES NFR BLD AUTO: 8 % (ref 4–12)
NEUTROPHILS # BLD AUTO: 5.08 THOUSANDS/ΜL (ref 1.85–7.62)
NEUTS SEG NFR BLD AUTO: 62 % (ref 43–75)
NITRITE UR QL STRIP: NEGATIVE
NON-SQ EPI CELLS URNS QL MICRO: ABNORMAL /HPF
NRBC BLD AUTO-RTO: 0 /100 WBCS
PH UR STRIP.AUTO: 6 [PH]
PLATELET # BLD AUTO: 277 THOUSANDS/UL (ref 149–390)
PMV BLD AUTO: 10.9 FL (ref 8.9–12.7)
POTASSIUM SERPL-SCNC: 4.8 MMOL/L (ref 3.5–5.3)
PROT SERPL-MCNC: 6.8 G/DL (ref 6.4–8.4)
PROT UR STRIP-MCNC: ABNORMAL MG/DL
PSA FREE MFR SERPL: 33.21 %
PSA FREE SERPL-MCNC: 0.35 NG/ML
PSA SERPL-MCNC: 1.05 NG/ML (ref 0–4)
RBC # BLD AUTO: 3.5 MILLION/UL (ref 3.88–5.62)
RBC #/AREA URNS AUTO: ABNORMAL /HPF
SODIUM SERPL-SCNC: 138 MMOL/L (ref 135–147)
SP GR UR STRIP.AUTO: 1.01 (ref 1–1.03)
TRIGL SERPL-MCNC: 36 MG/DL
TSH SERPL DL<=0.05 MIU/L-ACNC: 0.83 UIU/ML (ref 0.45–4.5)
UROBILINOGEN UR STRIP-ACNC: <2 MG/DL
WBC # BLD AUTO: 8.04 THOUSAND/UL (ref 4.31–10.16)
WBC #/AREA URNS AUTO: ABNORMAL /HPF

## 2024-10-16 PROCEDURE — 80061 LIPID PANEL: CPT

## 2024-10-16 PROCEDURE — 83036 HEMOGLOBIN GLYCOSYLATED A1C: CPT

## 2024-10-16 PROCEDURE — 81001 URINALYSIS AUTO W/SCOPE: CPT

## 2024-10-16 PROCEDURE — 82570 ASSAY OF URINE CREATININE: CPT

## 2024-10-16 PROCEDURE — 80366 DRUG SCREENING PREGABALIN: CPT

## 2024-10-16 PROCEDURE — 80053 COMPREHEN METABOLIC PANEL: CPT

## 2024-10-16 PROCEDURE — 80358 DRUG SCREENING METHADONE: CPT

## 2024-10-16 PROCEDURE — 80346 BENZODIAZEPINES1-12: CPT

## 2024-10-16 PROCEDURE — 36415 COLL VENOUS BLD VENIPUNCTURE: CPT

## 2024-10-16 PROCEDURE — 84153 ASSAY OF PSA TOTAL: CPT

## 2024-10-16 PROCEDURE — 84443 ASSAY THYROID STIM HORMONE: CPT

## 2024-10-16 PROCEDURE — 82306 VITAMIN D 25 HYDROXY: CPT

## 2024-10-16 PROCEDURE — 84154 ASSAY OF PSA FREE: CPT

## 2024-10-16 PROCEDURE — 87591 N.GONORRHOEAE DNA AMP PROB: CPT

## 2024-10-16 PROCEDURE — 80355 GABAPENTIN NON-BLOOD: CPT

## 2024-10-16 PROCEDURE — 85025 COMPLETE CBC W/AUTO DIFF WBC: CPT

## 2024-10-16 PROCEDURE — 80349 CANNABINOIDS NATURAL: CPT

## 2024-10-16 PROCEDURE — 87491 CHLMYD TRACH DNA AMP PROBE: CPT

## 2024-10-16 PROCEDURE — 80307 DRUG TEST PRSMV CHEM ANLYZR: CPT

## 2024-10-16 PROCEDURE — 82043 UR ALBUMIN QUANTITATIVE: CPT

## 2024-10-17 LAB
C TRACH DNA SPEC QL NAA+PROBE: NEGATIVE
N GONORRHOEA DNA SPEC QL NAA+PROBE: NEGATIVE

## 2024-10-18 ENCOUNTER — TELEPHONE (OUTPATIENT)
Age: 64
End: 2024-10-18

## 2024-10-21 ENCOUNTER — OFFICE VISIT (OUTPATIENT)
Dept: FAMILY MEDICINE CLINIC | Facility: CLINIC | Age: 64
End: 2024-10-21
Payer: COMMERCIAL

## 2024-10-21 VITALS
WEIGHT: 172.2 LBS | HEART RATE: 59 BPM | BODY MASS INDEX: 26.1 KG/M2 | SYSTOLIC BLOOD PRESSURE: 139 MMHG | OXYGEN SATURATION: 98 % | TEMPERATURE: 97.5 F | RESPIRATION RATE: 16 BRPM | DIASTOLIC BLOOD PRESSURE: 63 MMHG | HEIGHT: 68 IN

## 2024-10-21 DIAGNOSIS — Z79.4 TYPE 2 DIABETES MELLITUS WITHOUT COMPLICATION, WITH LONG-TERM CURRENT USE OF INSULIN (HCC): Primary | ICD-10-CM

## 2024-10-21 DIAGNOSIS — I10 BENIGN ESSENTIAL HYPERTENSION: ICD-10-CM

## 2024-10-21 DIAGNOSIS — S72.91XS CLOSED FRACTURE OF RIGHT FEMUR, UNSPECIFIED FRACTURE MORPHOLOGY, SEQUELA: ICD-10-CM

## 2024-10-21 DIAGNOSIS — Z23 NEED FOR COVID-19 VACCINE: ICD-10-CM

## 2024-10-21 DIAGNOSIS — R41.3 POOR MEMORY: ICD-10-CM

## 2024-10-21 DIAGNOSIS — E55.9 VITAMIN D DEFICIENCY: ICD-10-CM

## 2024-10-21 DIAGNOSIS — R74.8 ELEVATED ALKALINE PHOSPHATASE LEVEL: ICD-10-CM

## 2024-10-21 DIAGNOSIS — F41.1 GENERALIZED ANXIETY DISORDER: ICD-10-CM

## 2024-10-21 DIAGNOSIS — Z12.11 COLON CANCER SCREENING: ICD-10-CM

## 2024-10-21 DIAGNOSIS — D50.8 OTHER IRON DEFICIENCY ANEMIA: ICD-10-CM

## 2024-10-21 DIAGNOSIS — E11.9 TYPE 2 DIABETES MELLITUS WITHOUT COMPLICATION, WITH LONG-TERM CURRENT USE OF INSULIN (HCC): Primary | ICD-10-CM

## 2024-10-21 PROCEDURE — 90480 ADMN SARSCOV2 VAC 1/ONLY CMP: CPT | Performed by: FAMILY MEDICINE

## 2024-10-21 PROCEDURE — 99214 OFFICE O/P EST MOD 30 MIN: CPT | Performed by: FAMILY MEDICINE

## 2024-10-21 PROCEDURE — 91320 SARSCV2 VAC 30MCG TRS-SUC IM: CPT | Performed by: FAMILY MEDICINE

## 2024-10-21 RX ORDER — GABAPENTIN 600 MG/1
300 TABLET ORAL 2 TIMES DAILY
Qty: 30 TABLET | Refills: 2 | Status: SHIPPED | OUTPATIENT
Start: 2024-10-21

## 2024-10-21 NOTE — PROGRESS NOTES
Ambulatory Visit  Name: David Skelton Jr.      : 1960      MRN: [id number]  Encounter Provider: Casey Mckeon MD  Encounter Date: 10/21/2024   Encounter department: [facility]    Assessment & Plan  Type 2 diabetes mellitus without complication, with long-term current use of insulin ([facility])    Lab Results   Component Value Date    HGBA1C 7.8 (H) 10/16/2024   Regarding his diabetes his glucose was 183 and his A1c went down to 7.8 from 10.7.  Patient advised to continue cutting down on his starches sweets and fats.  Exercise as tolerated.  Continue current therapy.  And he will be seeing his endocrinologist soon for follow-up.  Recheck labs in 3 months also.         Closed fracture of right femur, unspecified fracture morphology, sequela  Discussed with patient.  Regarding his chronic pain that he needs the gabapentin for, discussed with patient.  Patient advised to take half of the 600 mg tablet twice a day.  Side effect including decreased memory were also discussed with patient.  And patient is referred to neurology for the that as well.  Patient also advised to follow-up with his orthopedic for his chronic pain.  Orders:    gabapentin (Neurontin) 600 MG tablet; Take 0.5 tablets (300 mg total) by mouth 2 (two) times a day    Benign essential hypertension  Blood pressure stable.  Patient advised to cut down on the salt.  Hydrate well.  Continue current therapy.  Follow-up on labs also.       Elevated alkaline phosphatase level  Discussed with patient.  Patient with no symptoms.  Not new.  Patient sent for the alkaline phosphatase isoenzymes.  Can follow-up appropriately.  Orders:    Alkaline phosphatase, isoenzymes; Future    Other iron deficiency anemia  Discussed with patient.  H&H relatively stable.  Will check his iron studies.  And restart iron as necessary.  Patient to follow-up with GI as well.  Orders:    Iron Panel (Includes Ferritin, Iron Sat%, Iron, and TIBC);  Future    Ambulatory Referral to Gastroenterology; Future    Vitamin D deficiency  Discussed with patient.  Patient advised to take vitamin D3 2000 units daily.  And prescription was sent.  Recheck with his next labs also.  Orders:    Cholecalciferol (VITAMIN D3) 1,000 units tablet; Take 2 tablets (2,000 Units total) by mouth daily    Poor memory  Discussed with patient.  Also discussed with his nurse.  Side effects of gabapentin also discussed with patient.  Patient sent to neurology for an evaluation.  Orders:    Ambulatory Referral to Neurology; Future    Generalized anxiety disorder  Discussed with patient.  Patient briefly counseled.  Patient denies SI HI.  See my HPI for more details.  And patient advised to follow-up with his psychiatrist and therapist.       Colon cancer screening  Discussed with patient.  Patient sent to GI for colonoscopy.  Orders:    Ambulatory Referral to Gastroenterology; Future    Need for COVID-19 vaccine  Patient received the COVID-19 vaccine with us today.  Orders:    COVID-19 Pfizer mRNA vaccine 12 yr and older (Comirnaty pre-filled syringe)           RTO 1 month and do the blood work before.        History of Present Illness     64-year-old male here accompanied by his nurse for an evaluation of his diabetes, hyperlipidemia and pain.  Patient did blood work and urine recently.  Patient is also been asking for the gabapentin 600 mg tablet instead of 300 mg capsule because he says that gives him upset stomach.  Of note patient sister called and is concerned about his memory as well.  In the past I have declined to increase his gabapentin secondary to the side effect that he reported.  Close patient gets that from his orthopedic for his right hip pain.  Patient denies tobacco.  Patient already received the flu vaccine.  Patient is amenable to getting the COVID-19 updated vaccine with us today.  No history of an adverse reaction to vaccines in the past.  Patient does see psych who  "handles and treats his anxiety.  Patient was positive for 3 different benzodiazepine and his last urine toxicology screen.  Patient now asserts that he is only taking Klonopin after that.  Patient denies SI HI.          Review of Systems   Constitutional:  Negative for chills, fatigue, fever and unexpected weight change.   HENT:  Negative for congestion and sore throat.    Eyes:  Negative for visual disturbance.   Respiratory:  Negative for cough and shortness of breath.    Cardiovascular:  Negative for chest pain and palpitations.   Gastrointestinal:  Negative for abdominal pain.   Genitourinary:  Negative for dysuria and hematuria.   Musculoskeletal:  Positive for arthralgias.   Neurological:  Negative for dizziness and headaches.   Psychiatric/Behavioral:  Negative for dysphoric mood, self-injury and suicidal ideas. The patient is nervous/anxious.            Objective     /63 (BP Location: Left arm, Patient Position: Sitting, Cuff Size: Large)   Pulse 59   Temp 97.5 °F (36.4 °C) (Temporal)   Resp 16   Ht 5' 8\" (1.727 m)   Wt 78.1 kg (172 lb 3.2 oz)   SpO2 98%   BMI 26.18 kg/m²     Physical Exam  Vitals reviewed.   Constitutional:       General: He is not in acute distress.     Appearance: Normal appearance. He is not ill-appearing.   HENT:      Head: Normocephalic and atraumatic.      Mouth/Throat:      Mouth: Mucous membranes are moist.   Neck:      Vascular: No carotid bruit.   Cardiovascular:      Rate and Rhythm: Normal rate and regular rhythm.   Pulmonary:      Effort: Pulmonary effort is normal.      Breath sounds: Normal breath sounds.   Musculoskeletal:      Right lower leg: No edema.      Left lower leg: No edema.      Comments: No calf tenderness bilateral.   Lymphadenopathy:      Cervical: No cervical adenopathy.   Neurological:      General: No focal deficit present.      Mental Status: He is alert and oriented to person, place, and time.   Psychiatric:         Mood and Affect: Mood " normal.         Behavior: Behavior normal.         Thought Content: Thought content normal.

## 2024-10-21 NOTE — ASSESSMENT & PLAN NOTE
Blood pressure stable.  Patient advised to cut down on the salt.  Hydrate well.  Continue current therapy.  Follow-up on labs also.

## 2024-10-21 NOTE — ASSESSMENT & PLAN NOTE
Discussed with patient.  H&H relatively stable.  Will check his iron studies.  And restart iron as necessary.  Patient to follow-up with GI as well.  Orders:    Iron Panel (Includes Ferritin, Iron Sat%, Iron, and TIBC); Future    Ambulatory Referral to Gastroenterology; Future

## 2024-10-21 NOTE — ASSESSMENT & PLAN NOTE
Discussed with patient.  Patient advised to take vitamin D3 2000 units daily.  And prescription was sent.  Recheck with his next labs also.  Orders:    Cholecalciferol (VITAMIN D3) 1,000 units tablet; Take 2 tablets (2,000 Units total) by mouth daily

## 2024-10-21 NOTE — ASSESSMENT & PLAN NOTE
Discussed with patient.  Regarding his chronic pain that he needs the gabapentin for, discussed with patient.  Patient advised to take half of the 600 mg tablet twice a day.  Side effect including decreased memory were also discussed with patient.  And patient is referred to neurology for the that as well.  Patient also advised to follow-up with his orthopedic for his chronic pain.  Orders:    gabapentin (Neurontin) 600 MG tablet; Take 0.5 tablets (300 mg total) by mouth 2 (two) times a day

## 2024-10-21 NOTE — ASSESSMENT & PLAN NOTE
Discussed with patient.  Patient briefly counseled.  Patient denies SI HI.  See my HPI for more details.  And patient advised to follow-up with his psychiatrist and therapist.

## 2024-10-21 NOTE — ASSESSMENT & PLAN NOTE
Lab Results   Component Value Date    HGBA1C 7.8 (H) 10/16/2024   Regarding his diabetes his glucose was 183 and his A1c went down to 7.8 from 10.7.  Patient advised to continue cutting down on his starches sweets and fats.  Exercise as tolerated.  Continue current therapy.  And he will be seeing his endocrinologist soon for follow-up.  Recheck labs in 3 months also.

## 2024-10-22 LAB
1OH-MIDAZOLAM UR CFM-MCNC: NOT DETECTED NG/MG CREAT
6MAM UR QL SCN: NEGATIVE NG/ML
7AMINOCLONAZEPAM UR CFM-MCNC: 126 NG/MG CREAT
A-OH ALPRAZ UR QL CFM: 1505 NG/MG CREAT
ACCEPTABLE CREAT UR QL: 87 MG/DL
ALPRAZ/CREAT UR CFM: 336 NG/MG CREAT
AMPHET UR QL SCN: NEGATIVE NG/ML
ANTICONVULSANTS: NORMAL
ATRIAL RATE: 78 BPM
BARBITURATES UR QL SCN: NEGATIVE NG/ML
BENZODIAZ UR QL SCN: NORMAL NG/ML
BENZODIAZEPINES: ABNORMAL
BUPRENORPHINE UR QL CFM: NEGATIVE NG/ML
CANNABINOIDS SERPLBLD QL SCN: ABNORMAL
CANNABINOIDS UR QL SCN: NORMAL NG/ML
CANNABINOIDS/CREAT UR: 23 NG/MG CREAT
CARISOPRODOL UR QL: NEGATIVE NG/ML
CLONAZEPAM/CREAT UR CFM: NOT DETECTED NG/MG CREAT
COCAINE+BZE UR QL SCN: NEGATIVE NG/ML
DIAZEPAM/CREAT UR: NOT DETECTED NG/MG CREAT
EDDP UR QL CFM: ABNORMAL NG/MG CREAT
ETHYL GLUCURONIDE UR QL SCN: NEGATIVE NG/ML
FENTANYL UR QL SCN: NEGATIVE NG/ML
FLUNITRAZEPAM UR-MCNC: NOT DETECTED NG/MG CREAT
GABAPENTIN SERPLBLD QL SCN: NORMAL UG/ML
GABAPENTIN UR QL CFM: PRESENT
LORAZEPAM UR CFM-MCNC: NOT DETECTED NG/MG CREAT
METHADONE UR QL CFM: ABNORMAL NG/MG CREAT
METHADONE UR QL SCN: NORMAL NG/ML
METHADONE: ABNORMAL
MIDAZOLAM/CREAT UR CFM: NOT DETECTED NG/MG CREAT
NITRITE UR QL STRIP: NEGATIVE UG/ML
NORDIAZEPAM UR CFM-MCNC: NOT DETECTED NG/MG CREAT
NORFLUNITRAZEPAM UR-MCNC: NOT DETECTED NG/MG CREAT
OH-ETHYLFLURAZ UR CFM-MCNC: NOT DETECTED NG/MG CREAT
OH-TRIAZOLAM UR CFM-MCNC: NOT DETECTED NG/MG CREAT
OPIATES UR QL SCN: NEGATIVE NG/ML
OXAZEPAM CTO UR CFM-MCNC: NOT DETECTED NG/MG CREAT
OXYCODONE+OXYMORPHONE UR QL SCN: NEGATIVE NG/ML
P AXIS: 67 DEGREES
PCP UR QL SCN: NEGATIVE NG/ML
PR INTERVAL: 144 MS
PREGABALIN UR QL CFM: NOT DETECTED
PROPOXYPH UR QL SCN: NEGATIVE NG/ML
QRS AXIS: 69 DEGREES
QRSD INTERVAL: 94 MS
QT INTERVAL: 370 MS
QTC INTERVAL: 421 MS
SPECIMEN PH ACCEPTABLE UR: 5.7 (ref 4.5–8.9)
T WAVE AXIS: 67 DEGREES
TAPENTADOL UR QL SCN: NEGATIVE NG/ML
TEMAZEPAM UR CFM-MCNC: NOT DETECTED NG/MG CREAT
TRAMADOL UR QL SCN: NEGATIVE NG/ML
VENTRICULAR RATE: 78 BPM

## 2024-10-31 ENCOUNTER — TELEPHONE (OUTPATIENT)
Age: 64
End: 2024-10-31

## 2024-10-31 NOTE — TELEPHONE ENCOUNTER
Monica from [facility]  called to report patient had 2 falls in the past 2 weeks that the aid reported.  She said the patient fell at home,  patient did not have any injuries.      Thank you

## 2024-10-31 NOTE — TELEPHONE ENCOUNTER
Patient's aid Irene called with the patient next to her. Pt was able to confirm HIPAA identifiers and gave me verbal permission to talk to Irene.    Irene called to reschedule the patient's no showed appt of 10/30. Pt is rescheduled to 12/3 at 10 AM with LUIS Small in [facility]. Patient prefers AM appointments.     The address was provided to the caller as well as directions.    No further action needed at this time.

## 2024-11-05 NOTE — TELEPHONE ENCOUNTER
Patient called and stated he made a mistake and gave you the name of the wrong pain med.     he meant to say  Gabapentin 800 mg tablets 3 times daily  Send to local pharmacy No

## 2024-11-06 ENCOUNTER — HOSPITAL ENCOUNTER (OUTPATIENT)
Dept: NON INVASIVE DIAGNOSTICS | Facility: HOSPITAL | Age: 64
Discharge: HOME | End: 2024-11-06
Attending: FAMILY MEDICINE
Payer: COMMERCIAL

## 2024-11-06 DIAGNOSIS — S72.91XS CLOSED FRACTURE OF RIGHT FEMUR, UNSPECIFIED FRACTURE MORPHOLOGY, SEQUELA: ICD-10-CM

## 2024-11-06 DIAGNOSIS — M79.89 RIGHT LEG SWELLING: ICD-10-CM

## 2024-11-06 PROCEDURE — 93970 EXTREMITY STUDY: CPT | Performed by: SURGERY

## 2024-11-06 PROCEDURE — 93970 EXTREMITY STUDY: CPT

## 2024-12-03 ENCOUNTER — CONSULT (OUTPATIENT)
Dept: UROLOGY | Facility: AMBULATORY SURGERY CENTER | Age: 64
End: 2024-12-03
Payer: COMMERCIAL

## 2024-12-03 VITALS
SYSTOLIC BLOOD PRESSURE: 140 MMHG | WEIGHT: 172 LBS | HEART RATE: 77 BPM | OXYGEN SATURATION: 96 % | DIASTOLIC BLOOD PRESSURE: 82 MMHG | HEIGHT: 68 IN | BODY MASS INDEX: 26.07 KG/M2

## 2024-12-03 DIAGNOSIS — R39.14 BENIGN PROSTATIC HYPERPLASIA WITH INCOMPLETE BLADDER EMPTYING: Primary | ICD-10-CM

## 2024-12-03 DIAGNOSIS — N40.1 BENIGN PROSTATIC HYPERPLASIA WITH INCOMPLETE BLADDER EMPTYING: Primary | ICD-10-CM

## 2024-12-03 DIAGNOSIS — R31.21 ASYMPTOMATIC MICROSCOPIC HEMATURIA: ICD-10-CM

## 2024-12-03 LAB
POST-VOID RESIDUAL VOLUME, ML POC: 154 ML
SL AMB  POCT GLUCOSE, UA: 2000
SL AMB LEUKOCYTE ESTERASE,UA: NORMAL
SL AMB POCT BILIRUBIN,UA: NORMAL
SL AMB POCT BLOOD,UA: NORMAL
SL AMB POCT CLARITY,UA: CLEAR
SL AMB POCT COLOR,UA: YELLOW
SL AMB POCT KETONES,UA: NORMAL
SL AMB POCT NITRITE,UA: NORMAL
SL AMB POCT PH,UA: 5
SL AMB POCT SPECIFIC GRAVITY,UA: 1.01
SL AMB POCT URINE PROTEIN: NORMAL
SL AMB POCT UROBILINOGEN: 0.2

## 2024-12-03 PROCEDURE — 81002 URINALYSIS NONAUTO W/O SCOPE: CPT

## 2024-12-03 PROCEDURE — 99204 OFFICE O/P NEW MOD 45 MIN: CPT

## 2024-12-03 PROCEDURE — 88112 CYTOPATH CELL ENHANCE TECH: CPT | Performed by: PATHOLOGY

## 2024-12-03 PROCEDURE — 51798 US URINE CAPACITY MEASURE: CPT

## 2024-12-03 PROCEDURE — 87086 URINE CULTURE/COLONY COUNT: CPT

## 2024-12-03 RX ORDER — TAMSULOSIN HYDROCHLORIDE 0.4 MG/1
0.4 CAPSULE ORAL
Qty: 30 CAPSULE | Refills: 4 | Status: SHIPPED | OUTPATIENT
Start: 2024-12-03

## 2024-12-03 NOTE — ASSESSMENT & PLAN NOTE
Patient does report a weakened urinary stream and incomplete bladder emptying  PVR in office today 154 mL  Initiate Flomax 0.4 mg, side effect profile discussed  Follow-up in 6 to 8 weeks with AUA and PVR    Orders:    Ambulatory Referral to Urology    POCT urine dip    POCT Measure PVR    tamsulosin (FLOMAX) 0.4 mg; Take 1 capsule (0.4 mg total) by mouth daily with dinner

## 2024-12-03 NOTE — PROGRESS NOTES
Name: David Skelton Jr.      : 1960      MRN: [id number]  Encounter Provider: STEVE Johnston  Encounter Date: 12/3/2024   Encounter department: [facility] [place]  :  Assessment & Plan  Asymptomatic microscopic hematuria  Multiple microscopic urinalysis on file from 2024 with 2-4 RBCs  Patient also reports episodes of gross hematuria over the course of the last year  UA in office today positive for leukocytes, blood, and glucose, will send for urine culture and cytology  CT renal protocol ordered, will plan to review at next follow-up appointment  Patient will proceed with cystoscopy for further evaluation of the bladder to rule out any underlying malignancy/tumors and complete the hematuria workup  All questions and concerns answered at today's office visit    Orders:    Ambulatory Referral to Urology    POCT urine dip    POCT Measure PVR    CT renal protocol; Future    Benign prostatic hyperplasia with incomplete bladder emptying  Patient does report a weakened urinary stream and incomplete bladder emptying  PVR in office today 154 mL  Initiate Flomax 0.4 mg, side effect profile discussed  Follow-up in 6 to 8 weeks with AUA and PVR    Orders:    Ambulatory Referral to Urology    POCT urine dip    POCT Measure PVR    tamsulosin (FLOMAX) 0.4 mg; Take 1 capsule (0.4 mg total) by mouth daily with dinner        History of Present Illness   David Skelton Jr. is a 64 y.o. male who presents today to the office as a new patient for further evaluation of microscopic hematuria and bothersome lower urinary tract symptoms.    Patient states that he has noticed episodes of gross hematuria over the course of the last year.  He is unsure when the last time he noticed this.  He does report an extensive smoking history.  He states that he has been smoking for approximately 20 years.    He also reports lower urinary tract symptoms.  States that he feels as though it takes him a a while to  completely empty out his bladder.  He also reports a weakened urinary stream.  He denies any dysuria, suprapubic or flank pain.  He denies ever being on medications in the past for an enlarged prostate.  He denies any nocturia.    He is here today in the his caregiver.  She does report that he takes methadone daily.  The patient does appear to be tired today in the office.  The caregiver also states that the patient was recently on Lasix for lower extremity swelling.  She believes that his lower extremity swelling is starting to return and is planning to call his PCP regarding this.     Review of Systems   Constitutional:  Negative for chills and fever.   Respiratory: Negative.  Negative for cough and shortness of breath.    Cardiovascular: Negative.  Negative for chest pain.   Gastrointestinal: Negative.  Negative for abdominal distention, abdominal pain, nausea and vomiting.   Genitourinary:  Positive for difficulty urinating. Negative for decreased urine volume, dysuria, flank pain, frequency, hematuria, penile discharge, penile pain, penile swelling, scrotal swelling, testicular pain and urgency.   Skin: Negative.  Negative for rash.   Neurological: Negative.    Hematological:  Negative for adenopathy. Does not bruise/bleed easily.     Past Medical History   Past Medical History:   Diagnosis Date    Anxiety     Depression     Diabetes mellitus (HCC)     Drug use     Hemorrhoids, internal 9/23/2019    Hypertension      Past Surgical History:   Procedure Laterality Date    FEMUR FRACTURE SURGERY Right     GASTRIC BYPASS  11/08/2011    Managed by: Braden Luna (General Surgery)    HERNIA REPAIR  02/13/2013    Incisional hernia repair Managed by: Braden Luna (General Surgery)    TX OPTX FEM SHFT FX W/INSJ IMED IMPLT W/WO SCREW Right 7/31/2024    Procedure: INSERTION NAIL IM FEMUR ANTEGRADE (TROCHANTERIC);  Surgeon: Meng Hill MD;  Location: [facility];  Service: Orthopedics    TONSILLECTOMY  2010      Family History   Problem Relation Age of Onset    Diabetes Mother     Hypertension Mother     Hypertension Father     Autoimmune disease Son       reports that he has never smoked. He has never used smokeless tobacco. He reports current drug use. He reports that he does not drink alcohol.  Current Outpatient Medications on File Prior to Visit   Medication Sig Dispense Refill    acetaminophen (TYLENOL) 325 mg tablet Take 2 tablets (650 mg total) by mouth every 4 (four) hours as needed for mild pain (Patient not taking: Reported on 10/21/2024)  0    Alcohol Swabs 70 % PADS May substitute brand based on insurance coverage. Check glucose BID. 100 each 0    ALPRAZolam (XANAX) 0.5 mg tablet Take 1 tablet (0.5 mg total) by mouth daily as needed for anxiety for up to 10 days      ALPRAZolam (XANAX) 0.5 mg tablet Take 1 tablet (0.5 mg) in the afternoon and 2 tablets (1 mg) at bedtime. 21 tablet 0    ARIPiprazole (ABILIFY) 2 mg tablet Take 2 mg by mouth daily (Patient not taking: Reported on 10/21/2024)      aspirin 81 mg chewable tablet Chew 1 tablet (81 mg total) 2 (two) times a day for 28 days 56 tablet 0    Blood Glucose Monitoring Suppl (OneTouch Verio Reflect) w/Device KIT May substitute brand based on insurance coverage. Check glucose BID. 1 kit 0    Cholecalciferol (VITAMIN D3) 1,000 units tablet Take 2 tablets (2,000 Units total) by mouth daily 180 tablet 3    clonazePAM (KlonoPIN) 1 mg tablet Take 1 mg by mouth 2 (two) times a day      docusate sodium (COLACE) 100 mg capsule Take 1 capsule (100 mg total) by mouth 2 (two) times a day for 5 days 10 capsule 0    ergocalciferol (ERGOCALCIFEROL) 1.25 MG (26797 UT) capsule Take 50,000 Units by mouth      fluticasone (FLONASE) 50 mcg/act nasal spray instill 1 spray into each nostril once daily 16 g 1    furosemide (LASIX) 20 mg tablet Take 1 tablet (20 mg total) by mouth daily as needed (leg swelling) 10 tablet 0    gabapentin (Neurontin) 600 MG tablet Take 0.5 tablets  (300 mg total) by mouth 2 (two) times a day 30 tablet 2    glucose blood (OneTouch Verio) test strip May substitute brand based on insurance coverage. Check glucose BID. 100 each 0    insulin glargine (LANTUS) 100 units/mL subcutaneous injection Inject 10 Units under the skin daily at bedtime 10 mL 0    metFORMIN (GLUCOPHAGE-XR) 500 mg 24 hr tablet Take 1 tablet (500 mg total) by mouth 2 (two) times a day with meals 60 tablet 5    metoprolol succinate (TOPROL-XL) 25 mg 24 hr tablet Take 0.5 tablets (12.5 mg total) by mouth daily (Patient not taking: Reported on 9/4/2024) 15 tablet 0    Misc. Devices (CANE) MISC by Does not apply route daily Dx:  Frequent falls 1 each 0    naloxone (NARCAN) 4 mg/0.1 mL nasal spray Administer 1 spray into a nostril. If no response after 2-3 minutes, give another dose in the other nostril using a new spray. (Patient not taking: Reported on 9/25/2024) 1 each 1    repaglinide (PRANDIN) 1 mg tablet Take 1 tablet (1 mg total) by mouth 3 (three) times a day before meals (Patient not taking: Reported on 9/4/2024) 90 tablet 0    [DISCONTINUED] bacitracin-neomycin-polymyxin b-hydrocortisone 1 % ointment Apply topically 2 (two) times a day 15 g 0     No current facility-administered medications on file prior to visit.     Allergies   Allergen Reactions    Aspirin Shortness Of Breath    Penicillins Shortness Of Breath    Penicillins     Aspirin Anxiety           Objective   There were no vitals taken for this visit.    Physical Exam  Vitals reviewed.   Constitutional:       Appearance: Normal appearance.   HENT:      Head: Normocephalic and atraumatic.   Eyes:      Pupils: Pupils are equal, round, and reactive to light.   Cardiovascular:      Rate and Rhythm: Normal rate.   Pulmonary:      Effort: Pulmonary effort is normal.   Musculoskeletal:      Cervical back: Normal range of motion.   Skin:     General: Skin is warm and dry.   Neurological:      General: No focal deficit present.      Mental  Status: He is alert and oriented to person, place, and time.   Psychiatric:         Mood and Affect: Mood normal.         Behavior: Behavior normal.         Thought Content: Thought content normal.         Judgment: Judgment normal.        Results  Lab Results   Component Value Date    PSA 1.048 10/16/2024    PSA 1.04 03/05/2024    PSA 1.24 06/29/2023     Lab Results   Component Value Date    GLUCOSE 236 (H) 08/19/2024    CALCIUM 8.6 10/16/2024     01/29/2015    K 4.8 10/16/2024    CO2 32 10/16/2024     10/16/2024    BUN 14 10/16/2024    CREATININE 0.84 10/16/2024     Lab Results   Component Value Date    WBC 8.04 10/16/2024    HGB 9.4 (L) 10/16/2024    HCT 30.6 (L) 10/16/2024    MCV 87 10/16/2024     10/16/2024       Office Urine Dip  No results found for this or any previous visit (from the past hour).]

## 2024-12-04 LAB — BACTERIA UR CULT: NORMAL

## 2024-12-05 ENCOUNTER — RESULTS FOLLOW-UP (OUTPATIENT)
Dept: UROLOGY | Facility: AMBULATORY SURGERY CENTER | Age: 64
End: 2024-12-05

## 2024-12-05 PROCEDURE — 88112 CYTOPATH CELL ENHANCE TECH: CPT | Performed by: PATHOLOGY

## 2024-12-06 NOTE — TELEPHONE ENCOUNTER
I was able to call and speak with patient about Marina WILSON last note. Patient understood everything and will still follow up with us in the future for a cystoscopy.        ----- Message from STEVE Johnston sent at 12/5/2024  1:03 PM EST -----  Urine cytology negative for any high-grade urothelial/bladder cancer.  Plan to follow-up in the future with a cystoscopy to complete the hematuria workup.

## 2024-12-10 ENCOUNTER — TELEPHONE (OUTPATIENT)
Dept: FAMILY MEDICINE CLINIC | Facility: CLINIC | Age: 64
End: 2024-12-10

## 2024-12-11 NOTE — TELEPHONE ENCOUNTER
Called and scheduled appointment for 12/13/24  
Patient called the RX Refill Line. Message is being forwarded to the office.     Nurse Aid for Patient calling with patient present to request a water pill, she states his legs are filling with fluid.     [facility], [address]    Please contact patient at [phone]    
Pt needs an appt. Less salt, etc also. ty  
No

## 2024-12-20 ENCOUNTER — TELEPHONE (OUTPATIENT)
Age: 64
End: 2024-12-20

## 2024-12-20 NOTE — TELEPHONE ENCOUNTER
Patient called asking for the address of our [place] office. Gave patient location. Patient was unable to write down the address stating he was legally blind. Offered to mail patient our address for his  and patient accepted.    Appointment with Corine 1-3-25.      Annabel Clerical, please mail patient the address for our [place] office for his .  Thank you!!!!!

## 2024-12-23 NOTE — TELEPHONE ENCOUNTER
Appointment reminder with [place] office address placed in mail today to patient address on file.

## 2024-12-24 ENCOUNTER — TELEPHONE (OUTPATIENT)
Dept: NEUROLOGY | Facility: CLINIC | Age: 64
End: 2024-12-24

## 2024-12-24 DIAGNOSIS — F41.9 ANXIETY DISORDER: ICD-10-CM

## 2024-12-24 NOTE — TELEPHONE ENCOUNTER
Patient called refill line to request Xanex, I confirmed that the message had been relayed to the office by my colleague a few moments ago and would be addressed when the office reopens after the holiday.

## 2024-12-24 NOTE — TELEPHONE ENCOUNTER
Patient called the RX Refill Line. Message is being forwarded to the office.     Patient called requesting a refill for Xanax 1 mg. Informed him that the 1 mg was not in his medication profile. Patient insisted it is 1 mg. Pharmacy informed him they will not refill his medication without the doctors approval. Please contact pharmacy at [phone]    Please contact patient at [phone] with any questions

## 2024-12-24 NOTE — TELEPHONE ENCOUNTER
Spoke to pt to confirm his appt w/ Corine Epstein in [facility] on 1/3/25 11:30. Advised pt to arrive 15

## 2024-12-26 RX ORDER — ALPRAZOLAM 0.5 MG
TABLET ORAL
Qty: 21 TABLET | Refills: 0 | OUTPATIENT
Start: 2024-12-26 | End: 2025-01-02

## 2025-02-09 ENCOUNTER — NURSE TRIAGE (OUTPATIENT)
Dept: OTHER | Facility: OTHER | Age: 65
End: 2025-02-09

## 2025-02-09 NOTE — TELEPHONE ENCOUNTER
"Reason for Disposition   Urinating more frequently than usual (i.e., frequency) OR new-onset of the feeling of an urgent need to urinate (i.e., urgency)    Answer Assessment - Initial Assessment Questions  1. SYMPTOM: \"What's the main symptom you're concerned about?\" (e.g., frequency, incontinence)        Urinary urgency     2. ONSET: \"When did the  symptoms  start?\"            3. PAIN: \"Is there any pain?\" If Yes, ask: \"How bad is it?\" (Scale: 1-10; mild, moderate, severe)        No    4. CAUSE: \"What do you think is causing the symptoms?\"        Unsure    5. OTHER SYMPTOMS: \"Do you have any other symptoms?\" (e.g., blood in urine, fever, flank pain, pain with urination)  Denies    Protocols used: Urinary Symptoms-Adult-AH    Patient reports he is possibly withdrawing from benzos, but only states stuttering and denies other symptoms at this time . Per chart review, it appears Xanax has been  since  and he has been requesting refills.   Also states he is having urinary urgency that started yesterday.  Reports he needs clearance from his pcp and needs medication to help urinary urgency. Appt scheduled for  at 10am.  Pt was advised to call back with any worsening symptoms or report to ED.     Multiple attempts made to call patient back to reassess.      "

## 2025-02-10 ENCOUNTER — OFFICE VISIT (OUTPATIENT)
Dept: FAMILY MEDICINE CLINIC | Facility: CLINIC | Age: 65
End: 2025-02-10
Payer: COMMERCIAL

## 2025-02-10 VITALS
HEART RATE: 89 BPM | BODY MASS INDEX: 26.07 KG/M2 | DIASTOLIC BLOOD PRESSURE: 86 MMHG | SYSTOLIC BLOOD PRESSURE: 158 MMHG | OXYGEN SATURATION: 96 % | HEIGHT: 68 IN | WEIGHT: 172 LBS | TEMPERATURE: 98.9 F

## 2025-02-10 DIAGNOSIS — R39.15 URINARY URGENCY: ICD-10-CM

## 2025-02-10 DIAGNOSIS — N30.01 ACUTE CYSTITIS WITH HEMATURIA: Primary | ICD-10-CM

## 2025-02-10 LAB
BACTERIA UR QL AUTO: ABNORMAL /HPF
BILIRUB UR QL STRIP: NEGATIVE
CLARITY UR: CLEAR
COLOR UR: ABNORMAL
GLUCOSE UR STRIP-MCNC: ABNORMAL MG/DL
HGB UR QL STRIP.AUTO: ABNORMAL
KETONES UR STRIP-MCNC: NEGATIVE MG/DL
LEUKOCYTE ESTERASE UR QL STRIP: ABNORMAL
NITRITE UR QL STRIP: NEGATIVE
NON-SQ EPI CELLS URNS QL MICRO: ABNORMAL /HPF
PH UR STRIP.AUTO: 6.5 [PH]
PROT UR STRIP-MCNC: ABNORMAL MG/DL
RBC #/AREA URNS AUTO: ABNORMAL /HPF
SL AMB  POCT GLUCOSE, UA: 250
SL AMB LEUKOCYTE ESTERASE,UA: 500
SL AMB POCT BILIRUBIN,UA: NEGATIVE
SL AMB POCT BLOOD,UA: 10
SL AMB POCT CLARITY,UA: ABNORMAL
SL AMB POCT COLOR,UA: YELLOW
SL AMB POCT KETONES,UA: NEGATIVE
SL AMB POCT NITRITE,UA: NEGATIVE
SL AMB POCT PH,UA: 6
SL AMB POCT SPECIFIC GRAVITY,UA: 1.02
SL AMB POCT URINE PROTEIN: 100
SL AMB POCT UROBILINOGEN: 0.2
SP GR UR STRIP.AUTO: 1.01 (ref 1–1.03)
UROBILINOGEN UR STRIP-ACNC: <2 MG/DL
WBC #/AREA URNS AUTO: ABNORMAL /HPF

## 2025-02-10 PROCEDURE — 99213 OFFICE O/P EST LOW 20 MIN: CPT | Performed by: FAMILY MEDICINE

## 2025-02-10 PROCEDURE — 81002 URINALYSIS NONAUTO W/O SCOPE: CPT | Performed by: FAMILY MEDICINE

## 2025-02-10 PROCEDURE — 87086 URINE CULTURE/COLONY COUNT: CPT | Performed by: FAMILY MEDICINE

## 2025-02-10 PROCEDURE — 81001 URINALYSIS AUTO W/SCOPE: CPT | Performed by: FAMILY MEDICINE

## 2025-02-10 RX ORDER — DOXYCYCLINE HYCLATE 100 MG
100 TABLET ORAL 2 TIMES DAILY
Qty: 14 TABLET | Refills: 0 | Status: SHIPPED | OUTPATIENT
Start: 2025-02-10 | End: 2025-02-17

## 2025-02-10 NOTE — PROGRESS NOTES
Name: David Skelton Jr.      : 1960      MRN: [id number]  Encounter Provider: Casey Mckeon MD  Encounter Date: 2/10/2025   Encounter department: [facility]  :  Assessment & Plan  Acute cystitis with hematuria  His urine dip is positive for 3+ leukocytes, 2+ proteins, plus or minus blood and 1+ glucose.  Discussed with patient and his home health aide.  His urine will be sent to the lab for formal urinalysis, culture and sensitivity.  And he will be empirically treated with doxycycline below.  Advised patient to take antibiotic with food.  Also take a probiotic.  Hydrate well.  He is also advised if his symptoms worsen change and or he does not feel comfortable with the symptoms then patient is to go to the emergency room.  And he is reminded to call his urologist so they can reschedule him for his routine follow-up.  Orders:  •  doxycycline hyclate (VIBRA-TABS) 100 mg tablet; Take 1 tablet (100 mg total) by mouth 2 (two) times a day for 7 days    Urinary urgency  Discussed with patient.  Please see above.  Orders:  •  UA w Reflex to Microscopic w Reflex to Culture; Future  •  POCT urine dip        RTO for his next scheduled visit.       History of Present Illness   64-year-old male accompanied by his home health aide for dysuria for about a week.  Patient denies any nausea vomiting, abdominal pain, fever or chills or hematuria.  Patient was supposed to see his urologist for his routine visit, but they canceled his appointment.  Patient does have anxiety and is anxious hence why his systolic blood pressure is elevated elevated.  The patient denies any acute cardiovascular neurosymptoms from it.  Patient also sees psych for his psych diagnoses including anxiety.  Patient denies SI HI.      Review of Systems   Constitutional:  Negative for chills, diaphoresis and fever.   Respiratory:  Negative for cough and shortness of breath.    Cardiovascular:  Negative for chest pain and  "palpitations.   Gastrointestinal:  Negative for abdominal pain, nausea and vomiting.   Genitourinary:  Positive for frequency. Negative for hematuria.   Psychiatric/Behavioral:  Negative for self-injury and suicidal ideas. The patient is nervous/anxious.        Objective   /86 (BP Location: Right arm, Patient Position: Sitting, Cuff Size: Standard)   Pulse 89   Temp 98.9 °F (37.2 °C) (Tympanic)   Ht 5' 8\" (1.727 m)   Wt 78 kg (172 lb)   SpO2 96%   BMI 26.15 kg/m²      Physical Exam  Vitals reviewed.   Constitutional:       General: He is not in acute distress.     Appearance: Normal appearance. He is not ill-appearing.   HENT:      Mouth/Throat:      Mouth: Mucous membranes are moist.   Cardiovascular:      Rate and Rhythm: Normal rate and regular rhythm.   Pulmonary:      Effort: Pulmonary effort is normal.      Breath sounds: Normal breath sounds.   Abdominal:      General: Bowel sounds are normal.      Tenderness: There is no abdominal tenderness. There is no right CVA tenderness or left CVA tenderness.   Musculoskeletal:      Right lower leg: No edema.      Left lower leg: No edema.   Neurological:      General: No focal deficit present.      Mental Status: He is alert and oriented to person, place, and time.   Psychiatric:         Mood and Affect: Mood normal.         Behavior: Behavior normal.         Thought Content: Thought content normal.         "

## 2025-02-11 ENCOUNTER — TELEPHONE (OUTPATIENT)
Age: 65
End: 2025-02-11

## 2025-02-11 ENCOUNTER — RESULTS FOLLOW-UP (OUTPATIENT)
Dept: FAMILY MEDICINE CLINIC | Facility: CLINIC | Age: 65
End: 2025-02-11

## 2025-02-11 DIAGNOSIS — R60.9 EDEMA, UNSPECIFIED TYPE: Primary | ICD-10-CM

## 2025-02-11 RX ORDER — HYDROCHLOROTHIAZIDE 12.5 MG/1
12.5 TABLET ORAL DAILY PRN
Qty: 30 TABLET | Refills: 0 | Status: SHIPPED | OUTPATIENT
Start: 2025-02-11 | End: 2025-08-10

## 2025-02-11 NOTE — RESULT ENCOUNTER NOTE
Just FYI, patient already treated empirically for this abnormal urine with an antibiotic yesterday when he was seen. ty

## 2025-02-11 NOTE — TELEPHONE ENCOUNTER
Please note that I did send him a water pill that he can take daily as needed only.  To hydrate well.  And also to cut down on the salt/condiments Septra in his diet.  And to elevate his legs while he is sitting.  Thank you

## 2025-02-11 NOTE — TELEPHONE ENCOUNTER
Pt's legs are swollen.  Pt was in the office yesterday and forgot to show Dr Mckeon his legs.     Pt's Aid asked if Dr Mckeon can send a water pill to: [facility]/pharmacy #[id number] - [place], [place] - [address]     Please advise back to Pt with updates.

## 2025-02-12 ENCOUNTER — OFFICE VISIT (OUTPATIENT)
Dept: FAMILY MEDICINE CLINIC | Facility: CLINIC | Age: 65
End: 2025-02-12
Payer: COMMERCIAL

## 2025-02-12 ENCOUNTER — TELEPHONE (OUTPATIENT)
Dept: FAMILY MEDICINE CLINIC | Facility: CLINIC | Age: 65
End: 2025-02-12

## 2025-02-12 VITALS
OXYGEN SATURATION: 98 % | DIASTOLIC BLOOD PRESSURE: 62 MMHG | TEMPERATURE: 98 F | WEIGHT: 177 LBS | HEART RATE: 58 BPM | SYSTOLIC BLOOD PRESSURE: 126 MMHG | BODY MASS INDEX: 26.91 KG/M2

## 2025-02-12 DIAGNOSIS — Z79.4 TYPE 2 DIABETES MELLITUS WITH OTHER SPECIFIED COMPLICATION, WITH LONG-TERM CURRENT USE OF INSULIN (HCC): ICD-10-CM

## 2025-02-12 DIAGNOSIS — I87.2 VENOUS STASIS ULCER OF LEFT CALF LIMITED TO BREAKDOWN OF SKIN WITHOUT VARICOSE VEINS (HCC): ICD-10-CM

## 2025-02-12 DIAGNOSIS — L28.0 LICHENIFICATION: ICD-10-CM

## 2025-02-12 DIAGNOSIS — L81.9 DISCOLORATION OF SKIN OF TOE: Primary | ICD-10-CM

## 2025-02-12 DIAGNOSIS — E11.69 TYPE 2 DIABETES MELLITUS WITH OTHER SPECIFIED COMPLICATION, WITH LONG-TERM CURRENT USE OF INSULIN (HCC): ICD-10-CM

## 2025-02-12 DIAGNOSIS — L84 CORN OF TOE: ICD-10-CM

## 2025-02-12 DIAGNOSIS — L97.221 VENOUS STASIS ULCER OF LEFT CALF LIMITED TO BREAKDOWN OF SKIN WITHOUT VARICOSE VEINS (HCC): ICD-10-CM

## 2025-02-12 LAB — BACTERIA UR CULT: NORMAL

## 2025-02-12 PROCEDURE — 99214 OFFICE O/P EST MOD 30 MIN: CPT | Performed by: FAMILY MEDICINE

## 2025-02-12 RX ORDER — TRIAMCINOLONE ACETONIDE 1 MG/G
CREAM TOPICAL 2 TIMES DAILY
Qty: 80 G | Refills: 1 | Status: SHIPPED | OUTPATIENT
Start: 2025-02-12

## 2025-02-12 RX ORDER — GEL DRESSING
1 GEL (GRAM) TOPICAL EVERY OTHER DAY
Qty: 85 G | Refills: 0 | Status: SHIPPED | OUTPATIENT
Start: 2025-02-12

## 2025-02-12 NOTE — ASSESSMENT & PLAN NOTE
Lab Results   Component Value Date    HGBA1C 7.8 (H) 10/16/2024     Orders:  •  Ambulatory Referral to Podiatry; Future

## 2025-02-12 NOTE — PROGRESS NOTES
Name: David Skelton Jr.      : 1960      MRN: [id number]  Encounter Provider: Gatito Rios DO  Encounter Date: 2025   Encounter department: [facility]    Assessment & Plan  Discoloration of skin of toe  -Patient was noting recent onset of discoloration of the fourth left toe  - Patient with chronic ongoing numbness/decree sensation predominant the left side of the foot physical examination  - Notable developing callus on the lateral side of the fifth toe detailed below    Orders:  •  VAS ARTERIAL DUPLEX-LOWER LIMB UNILATERAL; Future  •  Ambulatory Referral to Podiatry; Future    Corn of toe  -Patient noting onset of developing callus on the lateral side of the left fourth toe    Plan  - Toe was wrapped with gauze and dressing  - Patient advised buy corn bandages to be applied over developing calluses to offload pressure  - Follow-up podiatry  Orders:  •  Ambulatory Referral to Podiatry; Future    Type 2 diabetes mellitus with other specified complication, with long-term current use of insulin (HCC)    Lab Results   Component Value Date    HGBA1C 7.8 (H) 10/16/2024     Orders:  •  Ambulatory Referral to Podiatry; Future    Venous stasis ulcer of left calf limited to breakdown of skin without varicose veins (HCC)  -Patient with notable developing venous stasis ulcer over the lateral side left calf  - Currently dry red/beefy color and appearance no noticeable exudate surrounding erythema or other concerning signs for infection  - Currently measuring approximately 2.5 cm    Plan  - Patient advised to apply Dermagran hydrogel wound gel once daily every other day and cover wound; continue treatment until healing  Orders:  •  Wound Dressings (Dermagran Hydrogel Wound) GEL; Apply 1 Application topically every other day To applied every other day to small red developing ulcer on the left lateral shin area    Lichenification  -Patient noting ongoing severely dry skin on the  posterior aspect of his right thigh  - Physical examination notable for severely dry lichenified skin along the entire posterior aspect of the right thigh    Orders:  •  triamcinolone (KENALOG) 0.1 % cream; Apply topically 2 (two) times a day Applied to right posterior thigh           History of Present Illness   64-year-old male past medical history type 2 diabetes mellitus presents with wife with concerns of a developing ulcer of the left fifth toe as well as discoloration developing of the left fourth toe as well as developing ulcer along the lateral left side of patient's left calf wound patient also complaining of ongoing severely dry skin on the posterior aspect of his right thigh.      Review of Systems   Constitutional:  Negative for chills and fever.   Skin:  Positive for color change, rash and wound.       Objective   /62 (BP Location: Left arm, Patient Position: Sitting, Cuff Size: Standard)   Pulse 58   Temp 98 °F (36.7 °C)   Wt 80.3 kg (177 lb)   SpO2 98%   BMI 26.91 kg/m²      Physical Exam  Constitutional:       General: He is not in acute distress.     Appearance: Normal appearance. He is not ill-appearing.   HENT:      Head: Normocephalic and atraumatic.      Right Ear: External ear normal.      Left Ear: External ear normal.      Nose: Nose normal.   Cardiovascular:      Rate and Rhythm: Normal rate and regular rhythm.      Pulses: Pulses are weak.           Dorsalis pedis pulses are 1+ on the right side and 0 on the left side.        Posterior tibial pulses are 0 on the right side and 0 on the left side.   Pulmonary:      Effort: Pulmonary effort is normal.      Breath sounds: Normal breath sounds.   Musculoskeletal:      Right lower leg: Edema present.      Left lower leg: Edema (2+) present.   Feet:      Right foot:      Skin integrity: Dry skin present.      Left foot:      Skin integrity: Callus (5th toe, lateral side; corn) and dry skin present.   Neurological:      Mental Status:  He is alert.         Patient's shoes and socks removed.    Right Foot/Ankle   Right Foot Inspection  Skin Exam: dry skin.     Toe Exam: ROM and strength within normal limits.     Sensory   Vibration: diminished  Proprioception: intact  Monofilament testing: intact    Vascular  Capillary refills: < 3 seconds  The right DP pulse is 1+. The right PT pulse is 0.     Right Toe  - Comprehensive Exam  Ecchymosis: none  Arch: normal  Hammertoes: absent  Claw Toes: absent  Swelling: none   Tenderness: none       Left Foot/Ankle  Left Foot Inspection  Skin Exam: dry skin, abnormal color (bluish hue, 4th toe) and callus (5th toe, lateral side; corn).     Toe Exam: ROM and strength within normal limits.     Sensory   Vibration: diminished  Proprioception: diminished  Monofilament testing: diminished    Vascular  Capillary refills: elevated  The left DP pulse is 0. The left PT pulse is 0.     Left Toe  - Comprehensive Exam  Ecchymosis: toe 4  Arch: normal  Hammertoes: absent  Claw toes: absent  Swelling: none   Tenderness: none       Assign Risk Category  No deformity present  No loss of protective sensation  Weak pulses  Risk: 1

## 2025-02-12 NOTE — PATIENT INSTRUCTIONS
Patient Education     Corns and calluses   The Basics   Written by the doctors and editors at [facility]   What are corns and calluses? -- Corns and calluses are areas of thick, hard skin, usually on the hands or feet. They can form when something rubs or presses on the skin over time.  Corns usually affect the bottoms of the feet and sides of the toes (picture 1). A corn looks like a small bump, and has a hard center surrounded by an area of irritated skin. Corns are often painful.  Calluses often form on the hands, fingers, feet, or toes (picture 2). They look like thick, rough, sometimes bumpy skin. Calluses usually do not hurt.  What causes corns and calluses? -- Corns and calluses can result from:   Wearing shoes that are too tight or too loose   Wearing shoes without socks   Walking around barefoot   Using tools (like a hammer or rake) or sports equipment (like a tennis racket) that can rub against the skin  If you have other problems with your feet, you might be more likely to get corns or calluses. For example, if you have a bunion (a bony bump at the base of your big toe), your shoes can rub against it. This can cause a corn or callus.  Is there a test for corns and calluses? -- No. But your doctor or nurse can tell if you have a corn or callus by looking at your skin. If they think that your corns or calluses are caused by problems with the bones in your feet, you might need an X-ray.  Is there anything I can do on my own to get rid of corns and calluses? -- Yes. To help corns and calluses heal, and prevent new ones, you can:   Wear shoes and socks that fit properly. Tight shoes or shoes with high heels can cause corns and calluses.   Avoid going barefoot, or wearing shoes without socks.   Use special pads inside your shoes to prevent rubbing.  Should I see a doctor or nurse? -- Maybe. If you think that you have a corn or a callus and it is causing pain, see a doctor or nurse. They can check to see if you  "have a corn, callus, or something else (like a wart), and whether it needs treatment.  How are corns and calluses treated? -- If you have a corn or callus that causes pain or won't heal on its own, your doctor can treat it. Treatment usually involves removing the top layers of skin on the corn or callus, and then applying a patch with medicine in it. The patch softens the skin, so that more can be removed. After wearing the patch for 2 to 3 days, the doctor can trim the skin again and replace the patch. This process can be repeated until the corn or callus is gone.  If you would like to treat yourself, you can buy the patches with medicine in them without a prescription (sample brand names: Curad Mediplast, Dr. Birmingham's Callus Removers). Then, your doctor can teach you how and when to change the patch yourself. But do not use this treatment if you have diabetes or other conditions that can affect the nerves in the feet.  If your corns or calluses are severe or keep coming back, your doctor might refer you to a foot doctor, called a \"podiatrist.\" A podiatrist can fit you with a special shoe insert (called an \"orthotic\") to help protect and cushion your feet.  Can corns and calluses be prevented? -- Sometimes. You are less likely to get corns or calluses if you avoid shoes that squeeze or rub against your feet or toes. You can also wear gloves to protect your hands when using tools that might rub on your skin (such as while gardening).  All topics are updated as new evidence becomes available and our peer review process is complete.  This topic retrieved from [employer] on: Feb 26, 2024.  Topic 73300 Version 6.0  Release: 32.2.4 - C32.56  © 2024 UpToDate, Inc. and/or its affiliates. All rights reserved.  picture 1: Corns     Corns can appear on the feet when something repeatedly rubs or presses on the skin, such as shoes that don't fit well.  (A) A corn on the outside of the 5th (pinky) toe.  (B) A corn on the bottom of " the foot.  Graphic 52687 Version 6.0  picture 2: Calluses on the hands     This picture shows calluses on a person's hands. These form when something repeatedly rubs or presses on the skin. Over time, using gardening tools or sports equipment can cause calluses to appear on the hands.  Graphic 34894 Version 2.0  Consumer Information Use and Disclaimer   Disclaimer: This generalized information is a limited summary of diagnosis, treatment, and/or medication information. It is not meant to be comprehensive and should be used as a tool to help the user understand and/or assess potential diagnostic and treatment options. It does NOT include all information about conditions, treatments, medications, side effects, or risks that may apply to a specific patient. It is not intended to be medical advice or a substitute for the medical advice, diagnosis, or treatment of a health care provider based on the health care provider's examination and assessment of a patient's specific and unique circumstances. Patients must speak with a health care provider for complete information about their health, medical questions, and treatment options, including any risks or benefits regarding use of medications. This information does not endorse any treatments or medications as safe, effective, or approved for treating a specific patient. UpToDate, Inc. and its affiliates disclaim any warranty or liability relating to this information or the use thereof.The use of this information is governed by the Terms of Use, available at https://www.[employer]Bridgeuwer.com/en/know/clinical-effectiveness-terms. 2024© UpToDate, Inc. and its affiliates and/or licensors. All rights reserved.  Copyright   © 2024 UpToDate, Inc. and/or its affiliates. All rights reserved.

## 2025-03-04 ENCOUNTER — HOSPITAL ENCOUNTER (OUTPATIENT)
Dept: NON INVASIVE DIAGNOSTICS | Facility: CLINIC | Age: 65
Discharge: HOME | End: 2025-03-04
Payer: COMMERCIAL

## 2025-03-04 DIAGNOSIS — L81.9 DISCOLORATION OF SKIN OF TOE: ICD-10-CM

## 2025-03-04 PROCEDURE — 93926 LOWER EXTREMITY STUDY: CPT

## 2025-03-04 PROCEDURE — 93926 LOWER EXTREMITY STUDY: CPT | Performed by: SURGERY

## 2025-03-04 PROCEDURE — 93922 UPR/L XTREMITY ART 2 LEVELS: CPT | Performed by: SURGERY

## 2025-03-07 ENCOUNTER — TELEPHONE (OUTPATIENT)
Age: 65
End: 2025-03-07

## 2025-03-07 NOTE — TELEPHONE ENCOUNTER
Caregiver was calling to schedule an appt for the patient. She was not aware of the cysto appt on Tuesday 3/11 at 9:30 am.    She wrote that down and said she will work on getting the patient there.     She would also like to added for the doctor to review with the patient that he has had decreased urine output. Even for as much as he has been drinking, very little comes back out. Also the patient has been straining to start a flow. Like he really has to push to start.     Can the provider please be advised for the appt so he has all the information needed for the appt?

## 2025-03-11 ENCOUNTER — APPOINTMENT (OUTPATIENT)
Dept: NEUROLOGY | Facility: HOSPITAL | Age: 65
End: 2025-03-11
Payer: COMMERCIAL

## 2025-03-11 ENCOUNTER — APPOINTMENT (EMERGENCY)
Dept: CT IMAGING | Facility: HOSPITAL | Age: 65
End: 2025-03-11
Payer: COMMERCIAL

## 2025-03-11 ENCOUNTER — HOSPITAL ENCOUNTER (OUTPATIENT)
Facility: HOSPITAL | Age: 65
Setting detail: OBSERVATION
Discharge: HOME | End: 2025-03-13
Attending: EMERGENCY MEDICINE | Admitting: INTERNAL MEDICINE
Payer: COMMERCIAL

## 2025-03-11 ENCOUNTER — APPOINTMENT (OUTPATIENT)
Dept: ULTRASOUND IMAGING | Facility: HOSPITAL | Age: 65
End: 2025-03-11
Payer: COMMERCIAL

## 2025-03-11 ENCOUNTER — APPOINTMENT (EMERGENCY)
Dept: RADIOLOGY | Facility: HOSPITAL | Age: 65
End: 2025-03-11
Payer: COMMERCIAL

## 2025-03-11 DIAGNOSIS — R33.9 URINARY RETENTION: ICD-10-CM

## 2025-03-11 DIAGNOSIS — F10.10 ALCOHOL ABUSE: ICD-10-CM

## 2025-03-11 DIAGNOSIS — Z86.73 HISTORY OF STROKE: ICD-10-CM

## 2025-03-11 DIAGNOSIS — R26.2 AMBULATORY DYSFUNCTION: ICD-10-CM

## 2025-03-11 DIAGNOSIS — R47.81 SLURRED SPEECH: Primary | ICD-10-CM

## 2025-03-11 DIAGNOSIS — F11.10 OPIOID ABUSE (HCC): ICD-10-CM

## 2025-03-11 DIAGNOSIS — R60.9 EDEMA, UNSPECIFIED TYPE: ICD-10-CM

## 2025-03-11 DIAGNOSIS — R33.9 INABILITY TO URINATE: ICD-10-CM

## 2025-03-11 PROBLEM — I63.9 FOCAL INFARCTION OF BRAIN (HCC): Status: ACTIVE | Noted: 2025-03-11

## 2025-03-11 PROBLEM — D64.9 ANEMIA: Status: ACTIVE | Noted: 2025-03-11

## 2025-03-11 LAB
25(OH)D3 SERPL-MCNC: 18.2 NG/ML (ref 30–100)
2HR DELTA HS TROPONIN: 0 NG/L
ALBUMIN SERPL BCG-MCNC: 3.6 G/DL (ref 3.5–5)
ALP SERPL-CCNC: 128 U/L (ref 34–104)
ALT SERPL W P-5'-P-CCNC: 60 U/L (ref 7–52)
AMMONIA PLAS-SCNC: 21 UMOL/L (ref 18–72)
AMPHETAMINES SERPL QL SCN: NEGATIVE
ANION GAP SERPL CALCULATED.3IONS-SCNC: 5 MMOL/L (ref 4–13)
APAP SERPL-MCNC: <2 UG/ML (ref 10–20)
APTT PPP: 30 SECONDS (ref 23–34)
AST SERPL W P-5'-P-CCNC: 27 U/L (ref 13–39)
ATRIAL RATE: 93 BPM
BACTERIA UR QL AUTO: ABNORMAL /HPF
BARBITURATES UR QL: NEGATIVE
BASOPHILS # BLD AUTO: 0.04 THOUSANDS/ÂΜL (ref 0–0.1)
BASOPHILS NFR BLD AUTO: 1 % (ref 0–1)
BENZODIAZ UR QL: NEGATIVE
BILIRUB SERPL-MCNC: 0.37 MG/DL (ref 0.2–1)
BILIRUB UR QL STRIP: NEGATIVE
BUN SERPL-MCNC: 16 MG/DL (ref 5–25)
CALCIUM SERPL-MCNC: 8.9 MG/DL (ref 8.4–10.2)
CARDIAC TROPONIN I PNL SERPL HS: 3 NG/L (ref ?–50)
CARDIAC TROPONIN I PNL SERPL HS: 3 NG/L (ref ?–50)
CHLORIDE SERPL-SCNC: 105 MMOL/L (ref 96–108)
CHOLEST SERPL-MCNC: 95 MG/DL (ref ?–200)
CK SERPL-CCNC: 120 U/L (ref 39–308)
CLARITY UR: CLEAR
CO2 SERPL-SCNC: 26 MMOL/L (ref 21–32)
COCAINE UR QL: NEGATIVE
COLOR UR: ABNORMAL
CREAT SERPL-MCNC: 0.99 MG/DL (ref 0.6–1.3)
EOSINOPHIL # BLD AUTO: 0.69 THOUSAND/ÂΜL (ref 0–0.61)
EOSINOPHIL NFR BLD AUTO: 9 % (ref 0–6)
ERYTHROCYTE [DISTWIDTH] IN BLOOD BY AUTOMATED COUNT: 13.7 % (ref 11.6–15.1)
ETHANOL SERPL-MCNC: <10 MG/DL
FENTANYL UR QL SCN: NEGATIVE
FERRITIN SERPL-MCNC: 77 NG/ML (ref 24–336)
FOLATE SERPL-MCNC: 20.3 NG/ML
GFR SERPL CREATININE-BSD FRML MDRD: 81 ML/MIN/1.73SQ M
GLUCOSE SERPL-MCNC: 254 MG/DL (ref 65–140)
GLUCOSE SERPL-MCNC: 262 MG/DL (ref 65–140)
GLUCOSE UR STRIP-MCNC: ABNORMAL MG/DL
HCT VFR BLD AUTO: 33.2 % (ref 36.5–49.3)
HDLC SERPL-MCNC: 53 MG/DL
HGB BLD-MCNC: 10.6 G/DL (ref 12–17)
HGB UR QL STRIP.AUTO: NEGATIVE
HYDROCODONE UR QL SCN: NEGATIVE
IMM GRANULOCYTES # BLD AUTO: 0.02 THOUSAND/UL (ref 0–0.2)
IMM GRANULOCYTES NFR BLD AUTO: 0 % (ref 0–2)
INR PPP: 1.1 (ref 0.85–1.19)
IRON SATN MFR SERPL: 10 % (ref 15–50)
IRON SERPL-MCNC: 32 UG/DL (ref 50–212)
KETONES UR STRIP-MCNC: NEGATIVE MG/DL
LACTATE SERPL-SCNC: 1 MMOL/L (ref 0.5–2)
LDLC SERPL CALC-MCNC: 34 MG/DL (ref 0–100)
LEUKOCYTE ESTERASE UR QL STRIP: NEGATIVE
LYMPHOCYTES # BLD AUTO: 1.4 THOUSANDS/ÂΜL (ref 0.6–4.47)
LYMPHOCYTES NFR BLD AUTO: 18 % (ref 14–44)
MAGNESIUM SERPL-MCNC: 1.7 MG/DL (ref 1.9–2.7)
MCH RBC QN AUTO: 27.7 PG (ref 26.8–34.3)
MCHC RBC AUTO-ENTMCNC: 31.9 G/DL (ref 31.4–37.4)
MCV RBC AUTO: 87 FL (ref 82–98)
METHADONE UR QL: POSITIVE
MONOCYTES # BLD AUTO: 0.53 THOUSAND/ÂΜL (ref 0.17–1.22)
MONOCYTES NFR BLD AUTO: 7 % (ref 4–12)
NEUTROPHILS # BLD AUTO: 4.94 THOUSANDS/ÂΜL (ref 1.85–7.62)
NEUTS SEG NFR BLD AUTO: 65 % (ref 43–75)
NITRITE UR QL STRIP: NEGATIVE
NON-SQ EPI CELLS URNS QL MICRO: ABNORMAL /HPF
NONHDLC SERPL-MCNC: 42 MG/DL
NRBC BLD AUTO-RTO: 0 /100 WBCS
OPIATES UR QL SCN: NEGATIVE
OXYCODONE+OXYMORPHONE UR QL SCN: NEGATIVE
P AXIS: 35 DEGREES
PCP UR QL: NEGATIVE
PH UR STRIP.AUTO: 5.5 [PH]
PLATELET # BLD AUTO: 214 THOUSANDS/UL (ref 149–390)
PMV BLD AUTO: 11.3 FL (ref 8.9–12.7)
POTASSIUM SERPL-SCNC: 4.1 MMOL/L (ref 3.5–5.3)
PR INTERVAL: 110 MS
PROLACTIN SERPL-MCNC: 7.28 NG/ML (ref 2.64–13.13)
PROT SERPL-MCNC: 6.4 G/DL (ref 6.4–8.4)
PROT UR STRIP-MCNC: ABNORMAL MG/DL
PROTHROMBIN TIME: 15 SECONDS (ref 12.3–15)
QRS AXIS: 76 DEGREES
QRSD INTERVAL: 92 MS
QT INTERVAL: 390 MS
QTC INTERVAL: 484 MS
RBC # BLD AUTO: 3.82 MILLION/UL (ref 3.88–5.62)
RBC #/AREA URNS AUTO: ABNORMAL /HPF
SALICYLATES SERPL-MCNC: <5 MG/DL (ref 3–20)
SODIUM SERPL-SCNC: 136 MMOL/L (ref 135–147)
SP GR UR STRIP.AUTO: 1.03 (ref 1–1.03)
T WAVE AXIS: 63 DEGREES
THC UR QL: NEGATIVE
TIBC SERPL-MCNC: 305.2 UG/DL (ref 250–450)
TRANSFERRIN SERPL-MCNC: 218 MG/DL (ref 203–362)
TRIGL SERPL-MCNC: 39 MG/DL (ref ?–150)
TSH SERPL DL<=0.05 MIU/L-ACNC: 1.13 UIU/ML (ref 0.45–4.5)
UIBC SERPL-MCNC: 273 UG/DL (ref 155–355)
UROBILINOGEN UR STRIP-ACNC: <2 MG/DL
VENTRICULAR RATE: 93 BPM
VIT B12 SERPL-MCNC: 348 PG/ML (ref 180–914)
WBC # BLD AUTO: 7.62 THOUSAND/UL (ref 4.31–10.16)
WBC #/AREA URNS AUTO: ABNORMAL /HPF

## 2025-03-11 PROCEDURE — 80307 DRUG TEST PRSMV CHEM ANLYZR: CPT | Performed by: EMERGENCY MEDICINE

## 2025-03-11 PROCEDURE — 82746 ASSAY OF FOLIC ACID SERUM: CPT

## 2025-03-11 PROCEDURE — 85730 THROMBOPLASTIN TIME PARTIAL: CPT

## 2025-03-11 PROCEDURE — 99285 EMERGENCY DEPT VISIT HI MDM: CPT

## 2025-03-11 PROCEDURE — 84484 ASSAY OF TROPONIN QUANT: CPT

## 2025-03-11 PROCEDURE — 82140 ASSAY OF AMMONIA: CPT

## 2025-03-11 PROCEDURE — 83550 IRON BINDING TEST: CPT

## 2025-03-11 PROCEDURE — 83605 ASSAY OF LACTIC ACID: CPT

## 2025-03-11 PROCEDURE — 82728 ASSAY OF FERRITIN: CPT

## 2025-03-11 PROCEDURE — 80179 DRUG ASSAY SALICYLATE: CPT

## 2025-03-11 PROCEDURE — 82306 VITAMIN D 25 HYDROXY: CPT

## 2025-03-11 PROCEDURE — 93005 ELECTROCARDIOGRAM TRACING: CPT

## 2025-03-11 PROCEDURE — 76775 US EXAM ABDO BACK WALL LIM: CPT

## 2025-03-11 PROCEDURE — 96365 THER/PROPH/DIAG IV INF INIT: CPT

## 2025-03-11 PROCEDURE — 84425 ASSAY OF VITAMIN B-1: CPT

## 2025-03-11 PROCEDURE — 80061 LIPID PANEL: CPT

## 2025-03-11 PROCEDURE — 92610 EVALUATE SWALLOWING FUNCTION: CPT

## 2025-03-11 PROCEDURE — 84146 ASSAY OF PROLACTIN: CPT

## 2025-03-11 PROCEDURE — 85025 COMPLETE CBC W/AUTO DIFF WBC: CPT

## 2025-03-11 PROCEDURE — 99223 1ST HOSP IP/OBS HIGH 75: CPT | Performed by: INTERNAL MEDICINE

## 2025-03-11 PROCEDURE — 95816 EEG AWAKE AND DROWSY: CPT

## 2025-03-11 PROCEDURE — 83540 ASSAY OF IRON: CPT

## 2025-03-11 PROCEDURE — 81001 URINALYSIS AUTO W/SCOPE: CPT | Performed by: EMERGENCY MEDICINE

## 2025-03-11 PROCEDURE — 82077 ASSAY SPEC XCP UR&BREATH IA: CPT

## 2025-03-11 PROCEDURE — 83735 ASSAY OF MAGNESIUM: CPT

## 2025-03-11 PROCEDURE — 99285 EMERGENCY DEPT VISIT HI MDM: CPT | Performed by: EMERGENCY MEDICINE

## 2025-03-11 PROCEDURE — 36415 COLL VENOUS BLD VENIPUNCTURE: CPT

## 2025-03-11 PROCEDURE — 80143 DRUG ASSAY ACETAMINOPHEN: CPT

## 2025-03-11 PROCEDURE — 70496 CT ANGIOGRAPHY HEAD: CPT

## 2025-03-11 PROCEDURE — 82607 VITAMIN B-12: CPT

## 2025-03-11 PROCEDURE — 82550 ASSAY OF CK (CPK): CPT

## 2025-03-11 PROCEDURE — 99245 OFF/OP CONSLTJ NEW/EST HI 55: CPT | Performed by: PSYCHIATRY & NEUROLOGY

## 2025-03-11 PROCEDURE — 82948 REAGENT STRIP/BLOOD GLUCOSE: CPT

## 2025-03-11 PROCEDURE — 84443 ASSAY THYROID STIM HORMONE: CPT

## 2025-03-11 PROCEDURE — 85610 PROTHROMBIN TIME: CPT

## 2025-03-11 PROCEDURE — 71045 X-RAY EXAM CHEST 1 VIEW: CPT

## 2025-03-11 PROCEDURE — 87086 URINE CULTURE/COLONY COUNT: CPT | Performed by: EMERGENCY MEDICINE

## 2025-03-11 PROCEDURE — 70498 CT ANGIOGRAPHY NECK: CPT

## 2025-03-11 PROCEDURE — 80053 COMPREHEN METABOLIC PANEL: CPT

## 2025-03-11 PROCEDURE — 93010 ELECTROCARDIOGRAM REPORT: CPT | Performed by: INTERNAL MEDICINE

## 2025-03-11 PROCEDURE — 96375 TX/PRO/DX INJ NEW DRUG ADDON: CPT

## 2025-03-11 RX ORDER — METHADONE HYDROCHLORIDE 10 MG/1
140 TABLET ORAL DAILY
Status: DISCONTINUED | OUTPATIENT
Start: 2025-03-11 | End: 2025-03-11

## 2025-03-11 RX ORDER — FOLIC ACID 1 MG/1
1 TABLET ORAL DAILY
Status: DISCONTINUED | OUTPATIENT
Start: 2025-03-11 | End: 2025-03-13 | Stop reason: HOSPADM

## 2025-03-11 RX ORDER — CLONAZEPAM 1 MG/1
1 TABLET ORAL 2 TIMES DAILY
Status: DISCONTINUED | OUTPATIENT
Start: 2025-03-11 | End: 2025-03-13 | Stop reason: HOSPADM

## 2025-03-11 RX ORDER — LANOLIN ALCOHOL/MO/W.PET/CERES
100 CREAM (GRAM) TOPICAL DAILY
Status: DISCONTINUED | OUTPATIENT
Start: 2025-03-11 | End: 2025-03-11

## 2025-03-11 RX ORDER — TAMSULOSIN HYDROCHLORIDE 0.4 MG/1
0.4 CAPSULE ORAL
Status: DISCONTINUED | OUTPATIENT
Start: 2025-03-11 | End: 2025-03-12

## 2025-03-11 RX ORDER — CLOPIDOGREL BISULFATE 75 MG/1
75 TABLET ORAL DAILY
Status: DISCONTINUED | OUTPATIENT
Start: 2025-03-11 | End: 2025-03-13 | Stop reason: HOSPADM

## 2025-03-11 RX ORDER — LABETALOL HYDROCHLORIDE 5 MG/ML
10 INJECTION, SOLUTION INTRAVENOUS EVERY 4 HOURS PRN
Status: DISCONTINUED | OUTPATIENT
Start: 2025-03-11 | End: 2025-03-13 | Stop reason: HOSPADM

## 2025-03-11 RX ORDER — CLONAZEPAM 0.5 MG/1
1 TABLET ORAL ONCE
Status: COMPLETED | OUTPATIENT
Start: 2025-03-11 | End: 2025-03-11

## 2025-03-11 RX ORDER — TAMSULOSIN HYDROCHLORIDE 0.4 MG/1
0.4 CAPSULE ORAL
Status: DISCONTINUED | OUTPATIENT
Start: 2025-03-11 | End: 2025-03-11

## 2025-03-11 RX ORDER — HYDROCHLOROTHIAZIDE 12.5 MG/1
TABLET ORAL
Qty: 30 TABLET | Refills: 0 | Status: SHIPPED | OUTPATIENT
Start: 2025-03-11

## 2025-03-11 RX ORDER — CLONIDINE HYDROCHLORIDE 0.1 MG/1
0.1 TABLET ORAL
Status: DISCONTINUED | OUTPATIENT
Start: 2025-03-11 | End: 2025-03-13 | Stop reason: HOSPADM

## 2025-03-11 RX ORDER — ENOXAPARIN SODIUM 100 MG/ML
40 INJECTION SUBCUTANEOUS DAILY
Status: DISCONTINUED | OUTPATIENT
Start: 2025-03-11 | End: 2025-03-13 | Stop reason: HOSPADM

## 2025-03-11 RX ORDER — MAGNESIUM SULFATE HEPTAHYDRATE 40 MG/ML
2 INJECTION, SOLUTION INTRAVENOUS ONCE
Status: COMPLETED | OUTPATIENT
Start: 2025-03-11 | End: 2025-03-11

## 2025-03-11 RX ORDER — ATORVASTATIN CALCIUM 40 MG/1
40 TABLET, FILM COATED ORAL
Status: DISCONTINUED | OUTPATIENT
Start: 2025-03-11 | End: 2025-03-13 | Stop reason: HOSPADM

## 2025-03-11 RX ORDER — METHADONE HYDROCHLORIDE 10 MG/ML
140 CONCENTRATE ORAL DAILY
Refills: 0 | Status: DISCONTINUED | OUTPATIENT
Start: 2025-03-11 | End: 2025-03-13

## 2025-03-11 RX ORDER — INSULIN GLARGINE 100 [IU]/ML
10 INJECTION, SOLUTION SUBCUTANEOUS
Status: DISCONTINUED | OUTPATIENT
Start: 2025-03-11 | End: 2025-03-13 | Stop reason: HOSPADM

## 2025-03-11 RX ORDER — LANOLIN ALCOHOL/MO/W.PET/CERES
100 CREAM (GRAM) TOPICAL DAILY
Status: DISCONTINUED | OUTPATIENT
Start: 2025-03-11 | End: 2025-03-13 | Stop reason: HOSPADM

## 2025-03-11 RX ORDER — ACETAMINOPHEN 325 MG/1
650 TABLET ORAL EVERY 6 HOURS PRN
Status: DISCONTINUED | OUTPATIENT
Start: 2025-03-11 | End: 2025-03-13 | Stop reason: HOSPADM

## 2025-03-11 RX ORDER — MAGNESIUM SULFATE HEPTAHYDRATE 40 MG/ML
2 INJECTION, SOLUTION INTRAVENOUS ONCE
Status: DISCONTINUED | OUTPATIENT
Start: 2025-03-11 | End: 2025-03-11

## 2025-03-11 RX ADMIN — FOLIC ACID 1 MG: 1 TABLET ORAL at 08:54

## 2025-03-11 RX ADMIN — TAMSULOSIN HYDROCHLORIDE 0.4 MG: 0.4 CAPSULE ORAL at 18:11

## 2025-03-11 RX ADMIN — METHADONE HYDROCHLORIDE 140 MG: 10 CONCENTRATE ORAL at 08:49

## 2025-03-11 RX ADMIN — IOHEXOL 85 ML: 350 INJECTION, SOLUTION INTRAVENOUS at 00:54

## 2025-03-11 RX ADMIN — CLOPIDOGREL BISULFATE 75 MG: 75 TABLET ORAL at 14:15

## 2025-03-11 RX ADMIN — Medication 2000 UNITS: at 08:48

## 2025-03-11 RX ADMIN — THIAMINE HYDROCHLORIDE 100 MG: 100 INJECTION, SOLUTION INTRAMUSCULAR; INTRAVENOUS at 02:40

## 2025-03-11 RX ADMIN — LABETALOL HYDROCHLORIDE 10 MG: 5 INJECTION, SOLUTION INTRAVENOUS at 14:15

## 2025-03-11 RX ADMIN — CLONAZEPAM 1 MG: 0.5 TABLET ORAL at 02:54

## 2025-03-11 RX ADMIN — INSULIN GLARGINE 10 UNITS: 100 INJECTION, SOLUTION SUBCUTANEOUS at 22:24

## 2025-03-11 RX ADMIN — MULTIPLE VITAMINS W/ MINERALS TAB 1 TABLET: TAB ORAL at 08:48

## 2025-03-11 RX ADMIN — ACETAMINOPHEN 650 MG: 325 TABLET, FILM COATED ORAL at 20:18

## 2025-03-11 RX ADMIN — ATORVASTATIN CALCIUM 40 MG: 40 TABLET, FILM COATED ORAL at 18:11

## 2025-03-11 RX ADMIN — FOLIC ACID 1 MG: 5 INJECTION, SOLUTION INTRAMUSCULAR; INTRAVENOUS; SUBCUTANEOUS at 02:15

## 2025-03-11 RX ADMIN — MAGNESIUM SULFATE HEPTAHYDRATE 2 G: 40 INJECTION, SOLUTION INTRAVENOUS at 05:21

## 2025-03-11 RX ADMIN — CLONIDINE HYDROCHLORIDE 0.1 MG: 0.1 TABLET ORAL at 22:24

## 2025-03-11 RX ADMIN — ENOXAPARIN SODIUM 40 MG: 40 INJECTION SUBCUTANEOUS at 08:48

## 2025-03-11 RX ADMIN — Medication 100 MG: at 08:48

## 2025-03-11 NOTE — ASSESSMENT & PLAN NOTE
Patient is supposed to be on Adderall 30 mg twice a day  We will hold this medication for now as the patient is drowsy and encephalopathic

## 2025-03-11 NOTE — ED PROVIDER NOTES
Time reflects when diagnosis was documented in both MDM as applicable and the Disposition within this note       Time User Action Codes Description Comment    3/11/2025 12:41 AM Cinda Rudolph Add [R47.81] Slurred speech     3/11/2025  2:38 AM Bebeto Shore Add [R26.2] Ambulatory dysfunction     3/11/2025  2:38 AM Bebeto Shore Add [R33.9] Inability to urinate     3/11/2025  3:45 AM KarenpaZen zuniga Add [R33.9] Urinary retention           ED Disposition       ED Disposition   Admit    Condition   Stable    Date/Time   Tue Mar 11, 2025  2:52 AM    Comment   Case was discussed with ELVIS  and the patient's admission status was agreed to be Admission Status: observation status to the service of Dr. Spence .               Assessment & Plan       Medical Decision Making  64-year-old male presenting to the ED with altered mental status.    DDx including but not limited to: metabolic abnormality, intracranial etiology, cardiac etiology, hypercarbia, hypoxia, substance abuse, infectious etiology including UTI, thyroid disease, hyperammonemia, delirium, dementia stroke, overmedication.      Stroke alert called and patient sent directly CT.  Lab work ordered.  See ED course for progression and interpretation of results.    Following workup and discussion with Dr. De Paz from neurology was decided patient would be admitted for altered mental status/encephalopathy, urinary retention.    Patient admitted to [facility].    Amount and/or Complexity of Data Reviewed  Labs: ordered. Decision-making details documented in ED Course.  Radiology: ordered and independent interpretation performed.    Risk  Prescription drug management.  Decision regarding hospitalization.        ED Course as of 03/11/25 0555   Tue Mar 11, 2025   0049 POC Glucose(!): 254   0055 Call to sister attempted, no answer   0100 CBC and differential(!)  Mildly decreased hemoglobin but otherwise reassuring and unremarkable CBC.   0124 IMPRESSION:      No hemodynamically significant stenosis, dissection or occlusion of the carotid or vertebral arteries or major vessels of the [place].              Findings were directly discussed with Logan De Paz at 1:19 AM.     Workstation performed: [id number]     0155 IMPRESSION:     Chronic lacunar infarcts in the right basal ganglia, new since the prior exam. No evidence of acute vascular territorial infarction, intracranial hemorrhage or mass.     Findings were directly discussed with Logan De Paz at approximately 1:19 AM.     Workstation performed: [id number]     0155 LACTIC ACID: 1.0  Reassuring   0155 Coma Panel(!)  Reassuring   0155 TSH 3RD GENERATON: 1.131  Reassuring   0155 Total CK: 120  Reassuring   0155 Comprehensive metabolic panel(!)  Mildly elevated ALT, electrolytes within normal limits, kidney function appears normal.       Medications   folic acid (FOLVITE) tablet 1 mg (has no administration in time range)   multivitamin-minerals (CENTRUM) tablet 1 tablet (has no administration in time range)   atorvastatin (LIPITOR) tablet 40 mg (has no administration in time range)   thiamine tablet 100 mg (has no administration in time range)   enoxaparin (LOVENOX) subcutaneous injection 40 mg (has no administration in time range)   Cholecalciferol (VITAMIN D3) tablet 2,000 Units (has no administration in time range)   clonazePAM (KlonoPIN) tablet 1 mg ( Oral Held Dose 3/14/25 1800)   cloNIDine (CATAPRES) tablet 0.1 mg (0 mg Oral Hold 3/11/25 0406)   insulin glargine (LANTUS) subcutaneous injection 10 Units 0.1 mL (has no administration in time range)   tamsulosin (FLOMAX) capsule 0.4 mg (0 mg Oral Hold 3/11/25 0406)   magnesium sulfate 2 g/50 mL IVPB (premix) 2 g (2 g Intravenous New Bag 3/11/25 0521)   iohexol (OMNIPAQUE) 350 MG/ML injection (MULTI-DOSE) 85 mL (85 mL Intravenous Given 3/11/25 0054)   thiamine (VITAMIN B1) 100 mg in sodium chloride 0.9 % 50 mL IVPB (0 mg Intravenous Stopped 3/11/25 0310)    folic acid 1 mg in sodium chloride 0.9 % 50 mL IVPB (0 mg Intravenous Stopped 3/11/25 0239)   clonazePAM (KlonoPIN) tablet 1 mg (1 mg Oral Given 3/11/25 1114)       ED Risk Strat Scores         Stroke Assessment       Row Name 03/11/25 0048             NIH Stroke Scale    Interval Baseline      Level of Consciousness (1a.) 0      LOC Questions (1b.) 0      LOC Commands (1c.) 0      Best Gaze (2.) 0      Visual (3.) 0      Facial Palsy (4.) 0      Motor Arm, Left (5a.) 0      Motor Arm, Right (5b.) 0      Motor Leg, Left (6a.) 0      Motor Leg, Right (6b.) 0      Limb Ataxia (7.) 0      Sensory (8.) 0      Best Language (9.) 0      Dysarthria (10.) 2      Extinction and Inattention (11.) (Formerly Neglect) 0      Total 2                           CIWA-Ar Score       Row Name 03/11/25 0445 03/11/25 0300          CIWA-Ar    Blood Pressure 162/81 --     Pulse 86 --     Nausea and Vomiting 0 0     Tactile Disturbances 0 0     Tremor 0 0     Auditory Disturbances 0 0     Paroxysmal Sweats 0 0     Visual Disturbances 0 0     Anxiety 2 2     Headache, Fullness in Head 0 0     Agitation 1 1     Orientation and Clouding of Sensorium 0 0     CIWA-Ar Total 3 3                             SBIRT 20yo+      Flowsheet Row Most Recent Value   Initial Alcohol Screen: US AUDIT-C     1. How often do you have a drink containing alcohol? 0 Filed at: 03/11/2025 0305   2. How many drinks containing alcohol do you have on a typical day you are drinking?  0 Filed at: 03/11/2025 0305   3a. Male UNDER 65: How often do you have five or more drinks on one occasion? 0 Filed at: 03/11/2025 0305   3b. FEMALE Any Age, or MALE 65+: How often do you have 4 or more drinks on one occassion? 0 Filed at: 03/11/2025 0305   Audit-C Score 0 Filed at: 03/11/2025 0305   BEATRIZ: How many times in the past year have you...    Used an illegal drug or used a prescription medication for non-medical reasons? Never  [Current [facility]one [facility]] Filed at: 03/11/2025  "0305                            History of Present Illness       Chief Complaint   Patient presents with    CVA/TIA-like Symptoms     Slurred speech, weakness       Past Medical History:   Diagnosis Date    Anxiety     Depression     Diabetes mellitus (HCC)     Drug use     Hemorrhoids, internal 9/23/2019    Hypertension       Past Surgical History:   Procedure Laterality Date    FEMUR FRACTURE SURGERY Right     GASTRIC BYPASS  11/08/2011    Managed by: Braden Luna (General Surgery)    HERNIA REPAIR  02/13/2013    Incisional hernia repair Managed by: Braden Luna (General Surgery)    SC OPTX FEM SHFT FX W/INSJ IMED IMPLT W/WO SCREW Right 7/31/2024    Procedure: INSERTION NAIL IM FEMUR ANTEGRADE (TROCHANTERIC);  Surgeon: Meng Hill MD;  Location: [facility];  Service: Orthopedics    TONSILLECTOMY  2010      Family History   Problem Relation Age of Onset    Diabetes Mother     Hypertension Mother     Hypertension Father     Autoimmune disease Son       Social History     Tobacco Use    Smoking status: Never    Smokeless tobacco: Never   Vaping Use    Vaping status: Never Used   Substance Use Topics    Alcohol use: Never    Drug use: Yes     Comment: Methadone clinic      E-Cigarette/Vaping    E-Cigarette Use Never User       E-Cigarette/Vaping Substances    Nicotine No     THC No     CBD No     Flavoring No     Other No     Unknown No       I have reviewed and agree with the history as documented.     64-year-old male presenting to the ED for complaint of \"not feeling himself\".  Patient was found in the waiting room on a wheelchair with slurred speech and inability to move by himself.  According to triage, they did note the patient's sister wheeled him into the waiting room but then leave him and immediately leave.  Patient brought back in stating that he is having weakness but unable to give any other history.    I contacted the patient's sister and was unable to get in touch with her for the first " time but on second phone call was able to.  Patient and sister states that she does not speak with the patient normally and that she has not relationship with him but that today he called her asking to be brought to the hospital.  Patient does have a substance abuse history and does take methadone.  Patient also has a psych history and is being seen by psychiatry and neurology for possible dementia.  Patient is also under house arrest and is supposed to be followed up with urinalysis for drug screen however patient's been having more difficulty urinating recently and is supposed to see urology for possible catheter placement.        Review of Systems   Unable to perform ROS: Mental status change           Objective       ED Triage Vitals   Temperature Pulse Blood Pressure Respirations SpO2 Patient Position - Orthostatic VS   03/11/25 0114 03/11/25 0042 03/11/25 0042 03/11/25 0042 03/11/25 0046 03/11/25 0330   97.7 °F (36.5 °C) 81 136/85 20 97 % Lying      Temp Source Heart Rate Source BP Location FiO2 (%) Pain Score    03/11/25 0114 03/11/25 0042 03/11/25 0042 -- 03/11/25 0300    Oral Monitor Right arm  2      Vitals      Date and Time Temp Pulse SpO2 Resp BP Pain Score FACES Pain Rating User   03/11/25 0515 -- 90 98 % -- 152/81 -- --    03/11/25 0500 -- 86 98 % -- 149/82 -- --    03/11/25 0445 -- 86 99 % -- 162/81 -- --    03/11/25 0330 -- 85 98 % 18 151/85 -- --    03/11/25 0300 -- 88 99 % -- 172/92 2 --    03/11/25 0245 -- 89 98 % 18 161/95 -- -- AMAURY   03/11/25 0215 -- 88 100 % 18 161/95 -- -- AMAURY   03/11/25 0200 -- 82 98 % -- 141/88 -- -- SERA   03/11/25 0145 -- 81 97 % 16 142/80 -- -- AMAURY   03/11/25 0145 -- 79 98 % -- 137/87 -- -- SERA   03/11/25 0130 -- 79 99 % 18 142/80 -- -- AMAURY   03/11/25 0115 -- 80 99 % 16 143/77 -- -- AMAURY   03/11/25 0114 97.7 °F (36.5 °C) -- -- -- -- -- -- AMAURY   03/11/25 0109 -- 82 96 % 16 170/86 -- -- AMAURY   03/11/25 0046 -- -- 97 % -- -- -- -- JUVENCIO   03/11/25 0042 -- 81 -- 20 136/85 -- --  KB            Physical Exam  Constitutional:       General: He is in acute distress.   HENT:      Head: Normocephalic and atraumatic.      Right Ear: External ear normal.      Left Ear: External ear normal.      Nose: Nose normal.      Mouth/Throat:      Mouth: Mucous membranes are moist.   Eyes:      Comments: 2 mm, nonreactive.   Cardiovascular:      Rate and Rhythm: Normal rate and regular rhythm.      Pulses: Normal pulses.      Heart sounds: Normal heart sounds.   Pulmonary:      Effort: Pulmonary effort is normal.      Breath sounds: Normal breath sounds.   Abdominal:      General: Bowel sounds are normal.      Palpations: Abdomen is soft.   Musculoskeletal:         General: Normal range of motion.      Cervical back: Normal range of motion.   Skin:     General: Skin is warm.      Capillary Refill: Capillary refill takes less than 2 seconds.   Neurological:      Mental Status: He is disoriented.      GCS: GCS eye subscore is 4. GCS verbal subscore is 4. GCS motor subscore is 6.      Cranial Nerves: Cranial nerve deficit (Slurred speech) present.      Sensory: Sensation is intact.      Motor: Weakness (Generalized weakness) present.      Coordination: Coordination is intact.      Gait: Gait abnormal.   Psychiatric:         Mood and Affect: Mood normal.         Behavior: Behavior normal.         Results Reviewed       Procedure Component Value Units Date/Time    Vitamin B12 [[id number]]  (Normal) Collected: 03/11/25 0048    Lab Status: Final result Specimen: Blood from Arm, Left Updated: 03/11/25 0512     Vitamin B-12 348 pg/mL     Vitamin D 25 hydroxy [[id number]]  (Abnormal) Collected: 03/11/25 0048    Lab Status: Final result Specimen: Blood from Arm, Left Updated: 03/11/25 0512     Vit D, 25-Hydroxy 18.2 ng/mL     Folate [[id number]]  (Normal) Collected: 03/11/25 0048    Lab Status: Final result Specimen: Blood from Arm, Left Updated: 03/11/25 0512     Folate 20.3 ng/mL     TIBC Panel (incl. Iron, TIBC, % Iron  Saturation) [[id number]]  (Abnormal) Collected: 03/11/25 0048    Lab Status: Final result Specimen: Blood from Arm, Left Updated: 03/11/25 0512     Iron Saturation 10 %      TIBC 305.2 ug/dL      Iron 32 ug/dL      Transferrin 218 mg/dL      UIBC 273 ug/dL     Ferritin [[id number]]  (Normal) Collected: 03/11/25 0048    Lab Status: Final result Specimen: Blood from Arm, Left Updated: 03/11/25 0512     Ferritin 77 ng/mL     Lipid panel [[id number]] Collected: 03/11/25 0047    Lab Status: Final result Specimen: Blood from Arm, Left Updated: 03/11/25 0325     Cholesterol 95 mg/dL      Triglycerides 39 mg/dL      HDL, Direct 53 mg/dL      LDL Calculated 34 mg/dL      Non-HDL-Chol (CHOL-HDL) 42 mg/dl     HS Troponin I 2hr [[id number]]  (Normal) Collected: 03/11/25 0234    Lab Status: Final result Specimen: Blood from Arm, Left Updated: 03/11/25 0317     hs TnI 2hr 3 ng/L      Delta 2hr hsTnI 0 ng/L     Magnesium [[id number]]  (Abnormal) Collected: 03/11/25 0103    Lab Status: Final result Specimen: Blood from Arm, Left Updated: 03/11/25 0315     Magnesium 1.7 mg/dL     Salicylate level [[id number]]  (Normal) Collected: 03/11/25 0103    Lab Status: Final result Specimen: Blood from Arm, Left Updated: 03/11/25 0147     Salicylate Lvl <5 mg/dL     TSH, 3rd generation with Free T4 reflex [[id number]]  (Normal) Collected: 03/11/25 0048    Lab Status: Final result Specimen: Blood from Arm, Left Updated: 03/11/25 0137     TSH 3RD GENERATON 1.131 uIU/mL     Lactic acid, plasma (w/reflex if result > 2.0) [[id number]]  (Normal) Collected: 03/11/25 0103    Lab Status: Final result Specimen: Blood from Arm, Left Updated: 03/11/25 0136     LACTIC ACID 1.0 mmol/L     Narrative:      Result may be elevated if tourniquet was used during collection.    Acetaminophen level-If concentration is detectable, please discuss with medical  on call. [[id number]]  (Abnormal) Collected: 03/11/25 0103    Lab Status: Final result Specimen: Blood  from Arm, Left Updated: 03/11/25 0136     Acetaminophen Level <2 ug/mL     HS Troponin 0hr (reflex protocol) [[id number]]  (Normal) Collected: 03/11/25 0047    Lab Status: Final result Specimen: Blood from Arm, Left Updated: 03/11/25 0123     hs TnI 0hr 3 ng/L     Comprehensive metabolic panel [[id number]]  (Abnormal) Collected: 03/11/25 0048    Lab Status: Final result Specimen: Blood from Arm, Left Updated: 03/11/25 0122     Sodium 136 mmol/L      Potassium 4.1 mmol/L      Chloride 105 mmol/L      CO2 26 mmol/L      ANION GAP 5 mmol/L      BUN 16 mg/dL      Creatinine 0.99 mg/dL      Glucose 262 mg/dL      Calcium 8.9 mg/dL      AST 27 U/L      ALT 60 U/L      Alkaline Phosphatase 128 U/L      Total Protein 6.4 g/dL      Albumin 3.6 g/dL      Total Bilirubin 0.37 mg/dL      eGFR 81 ml/min/1.73sq m     Narrative:      National Kid[employer] Disease Foundation guidelines for Chronic Kidney Disease (CKD):     Stage 1 with normal or high GFR (GFR > 90 mL/min/1.73 square meters)    Stage 2 Mild CKD (GFR = 60-89 mL/min/1.73 square meters)    Stage 3A Moderate CKD (GFR = 45-59 mL/min/1.73 square meters)    Stage 3B Moderate CKD (GFR = 30-44 mL/min/1.73 square meters)    Stage 4 Severe CKD (GFR = 15-29 mL/min/1.73 square meters)    Stage 5 End Stage CKD (GFR <15 mL/min/1.73 square meters)  Note: GFR calculation is accurate only with a steady state creatinine    CK [[id number]]  (Normal) Collected: 03/11/25 0048    Lab Status: Final result Specimen: Blood from Arm, Left Updated: 03/11/25 0122     Total  U/L     Ethanol [[id number]]  (Normal) Collected: 03/11/25 0048    Lab Status: Final result Specimen: Blood from Arm, Left Updated: 03/11/25 0122     Ethanol Lvl <10 mg/dL     Ammonia [[id number]]  (Normal) Collected: 03/11/25 0048    Lab Status: Final result Specimen: Blood from Arm, Left Updated: 03/11/25 0118     Ammonia 21 umol/L     Protime-INR [[id number]]  (Normal) Collected: 03/11/25 0047    Lab Status: Final result  Specimen: Blood from Arm, Left Updated: 03/11/25 0113     Protime 15.0 seconds      INR 1.10    Narrative:      INR Therapeutic Range    Indication                                             INR Range      Atrial Fibrillation                                               2.0-3.0  Hypercoagulable State                                    2.0.2.3  Left Ventricular Asist Device                            2.0-3.0  Mechanical Heart Valve                                  -    Aortic(with afib, MI, embolism, HF, LA enlargement,    and/or coagulopathy)                                     2.0-3.0 (2.5-3.5)     Mitral                                                             2.5-3.5  Prosthetic/Bioprosthetic Heart Valve               2.0-3.0  Venous thromboembolism (VTE: VT, PE        2.0-3.0    APTT [[id number]]  (Normal) Collected: 03/11/25 0047    Lab Status: Final result Specimen: Blood from Arm, Left Updated: 03/11/25 0113     PTT 30 seconds     CBC and differential [[id number]]  (Abnormal) Collected: 03/11/25 0047    Lab Status: Final result Specimen: Blood from Arm, Left Updated: 03/11/25 0101     WBC 7.62 Thousand/uL      RBC 3.82 Million/uL      Hemoglobin 10.6 g/dL      Hematocrit 33.2 %      MCV 87 fL      MCH 27.7 pg      MCHC 31.9 g/dL      RDW 13.7 %      MPV 11.3 fL      Platelets 214 Thousands/uL      nRBC 0 /100 WBCs      Segmented % 65 %      Immature Grans % 0 %      Lymphocytes % 18 %      Monocytes % 7 %      Eosinophils Relative 9 %      Basophils Relative 1 %      Absolute Neutrophils 4.94 Thousands/µL      Absolute Immature Grans 0.02 Thousand/uL      Absolute Lymphocytes 1.40 Thousands/µL      Absolute Monocytes 0.53 Thousand/µL      Eosinophils Absolute 0.69 Thousand/µL      Basophils Absolute 0.04 Thousands/µL     Rapid drug screen, urine [[id number]]     Lab Status: No result Specimen: Urine     UA w Reflex to Microscopic w Reflex to Culture [[id number]]     Lab Status: No result Specimen: Urine,  Clean Catch     Fingerstick Glucose (POCT) [[id number]]  (Abnormal) Collected: 03/11/25 0044    Lab Status: Final result Specimen: Blood Updated: 03/11/25 0045     POC Glucose 254 mg/dl             XR chest 1 view portable   ED Interpretation by Bebeto Onofre MD (03/11 0124)   No acute cardiopulmonary disease      CT stroke alert brain   Final Interpretation by Williams Sands MD (03/11 0127)      Chronic lacunar infarcts in the right basal ganglia, new since the prior exam. No evidence of acute vascular territorial infarction, intracranial hemorrhage or mass.      Findings were directly discussed with Logan De Paz at approximately 1:19 AM.      Workstation performed: [id number]         CTA stroke alert (head/neck)   Final Interpretation by Williams Sands MD (03/11 0122)      No hemodynamically significant stenosis, dissection or occlusion of the carotid or vertebral arteries or major vessels of the [place].               Findings were directly discussed with Logan De Paz at 1:19 AM.      Workstation performed: [id number]          kidney and bladder    (Results Pending)       ECG 12 Lead Documentation Only    Date/Time: 3/11/2025 12:44 AM    Performed by: Bebeto Onofre MD  Authorized by: Bebeto Onofre MD    Indications / Diagnosis:  Stroke alert  ECG reviewed by me, the ED Provider: yes    Patient location:  ED  Previous ECG:     Previous ECG:  Compared to current    Similarity:  Changes noted  Interpretation:     Interpretation: abnormal    Rate:     ECG rate:  93    ECG rate assessment: normal    Rhythm:     Rhythm: sinus rhythm    Ectopy:     Ectopy: PVCs    QRS:     QRS axis:  Normal    QRS intervals:  Normal  Conduction:     Conduction: normal    ST segments:     ST segments:  Normal  T waves:     T waves: normal        ED Medication and Procedure Management   Prior to Admission Medications   Prescriptions Last Dose Informant Patient Reported? Taking?   ALPRAZolam (XANAX)  0.5 mg tablet   No No   Sig: Take 1 tablet (0.5 mg total) by mouth daily as needed for anxiety for up to 10 days   ALPRAZolam (XANAX) 0.5 mg tablet   No No   Sig: Take 1 tablet (0.5 mg) in the afternoon and 2 tablets (1 mg) at bedtime.   ARIPiprazole (ABILIFY) 2 mg tablet  Self Yes No   Sig: Take 2 mg by mouth daily   Patient not taking: Reported on 3/7/2025   Alcohol Swabs 70 % PADS  Self No No   Sig: May substitute brand based on insurance coverage. Check glucose BID.   Blood Glucose Monitoring Suppl (OneTouch Verio Reflect) w/Device KIT  Self No No   Sig: May substitute brand based on insurance coverage. Check glucose BID.   Cholecalciferol (VITAMIN D3) 1,000 units tablet  Self No No   Sig: Take 2 tablets (2,000 Units total) by mouth daily   Dulaglutide (TRULICITY SC)   Yes No   Sig: Inject under the skin   Empagliflozin-metFORMIN HCl (SYNJARDY PO)   Yes No   Sig: Take by mouth   Misc. Devices (CANE) MISC  Self No No   Sig: by Does not apply route daily Dx:  Frequent falls   Trazodone HCl POWD   Yes No   Sig: Take by mouth   Wound Dressings (Dermagran Hydrogel Wound) GEL   No No   Sig: Apply 1 Application topically every other day To applied every other day to small red developing ulcer on the left lateral shin area   acetaminophen (TYLENOL) 325 mg tablet  Self No No   Sig: Take 2 tablets (650 mg total) by mouth every 4 (four) hours as needed for mild pain   Patient not taking: Reported on 3/7/2025   amphetamine-dextroamphetamine (ADDERALL XR) 30 MG 24 hr capsule   Yes No   Sig: TAKE 1 CAPSULE BY MOUTH TWICE A DAY (8AM AND 1PM)   aspirin 81 mg chewable tablet   No No   Sig: Chew 1 tablet (81 mg total) 2 (two) times a day for 28 days   cloNIDine (CATAPRES) 0.1 mg tablet   Yes No   Sig: Take 0.1 mg by mouth daily at bedtime   clonazePAM (KlonoPIN) 1 mg tablet  Self Yes No   Sig: Take 1 mg by mouth 2 (two) times a day   docusate sodium (COLACE) 100 mg capsule   No No   Sig: Take 1 capsule (100 mg total) by mouth 2  (two) times a day for 5 days   ergocalciferol (ERGOCALCIFEROL) 1.25 MG (45207 UT) capsule   Yes No   Sig: Take 50,000 Units by mouth   fluticasone (FLONASE) 50 mcg/act nasal spray  Self No No   Sig: instill 1 spray into each nostril once daily   furosemide (LASIX) 20 mg tablet  Self No No   Sig: Take 1 tablet (20 mg total) by mouth daily as needed (leg swelling)   gabapentin (Neurontin) 600 MG tablet  Self No No   Sig: Take 0.5 tablets (300 mg total) by mouth 2 (two) times a day   glucose blood (OneTouch Verio) test strip  Self No No   Sig: May substitute brand based on insurance coverage. Check glucose BID.   hydroCHLOROthiazide 12.5 mg tablet   No No   Sig: Take 1 tablet (12.5 mg total) by mouth daily as needed (Edema)   insulin glargine (LANTUS) 100 units/mL subcutaneous injection  Self No No   Sig: Inject 10 Units under the skin daily at bedtime   metFORMIN (GLUCOPHAGE-XR) 500 mg 24 hr tablet  Self No No   Sig: Take 1 tablet (500 mg total) by mouth 2 (two) times a day with meals   metoprolol succinate (TOPROL-XL) 25 mg 24 hr tablet   No No   Sig: Take 0.5 tablets (12.5 mg total) by mouth daily   Patient not taking: Reported on 3/7/2025   naloxone (NARCAN) 4 mg/0.1 mL nasal spray  Self No No   Sig: Administer 1 spray into a nostril. If no response after 2-3 minutes, give another dose in the other nostril using a new spray.   Patient not taking: Reported on 3/7/2025   repaglinide (PRANDIN) 1 mg tablet   No No   Sig: Take 1 tablet (1 mg total) by mouth 3 (three) times a day before meals   Patient not taking: Reported on 3/7/2025   tamsulosin (FLOMAX) 0.4 mg   No No   Sig: Take 1 capsule (0.4 mg total) by mouth daily with dinner   triamcinolone (KENALOG) 0.1 % cream   No No   Sig: Apply topically 2 (two) times a day Applied to right posterior thigh      Facility-Administered Medications: None     Patient's Medications   Discharge Prescriptions    No medications on file     No discharge procedures on file.  ED SEPSIS  DOCUMENTATION   Time reflects when diagnosis was documented in both MDM as applicable and the Disposition within this note       Time User Action Codes Description Comment    3/11/2025 12:41 AM Cinda Rudolph Add [R47.81] Slurred speech     3/11/2025  2:38 AM Bebeto Onofre Add [R26.2] Ambulatory dysfunction     3/11/2025  2:38 AM Bebeto Onofre Add [R33.9] Inability to urinate     3/11/2025  3:45 AM Zen Barfield Add [R33.9] Urinary retention                  Bebeto Onofre MD  03/11/25 0548

## 2025-03-11 NOTE — ASSESSMENT & PLAN NOTE
History of opioid abuse in the past  Per chart review patient was on methadone.  But no current prescription Per PDMP review.  Last prescription  in 2024.  Patient is not a reliable historian, as he is encephalopathic and confused.  He told me that his last use of methadone was 2 days back.
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
## 2025-03-11 NOTE — CASE MANAGEMENT
Case Management Progress Note    Patient name David Skelton Jr.  Location ED-28/ED-28 MRN [id number]  : 1960 Date 3/11/2025       LOS (days): 0  Geometric Mean LOS (GMLOS) (days):   Days to GMLOS:        OBJECTIVE:        Current admission status: Observation  Preferred Pharmacy:   [id number] - [place], [place] - [address]  [address]  [place] [address]  Phone: [phone] Fax: [phone]    [id number] - [place] - [address]  [address]  [place] [address]  Phone: [phone] Fax: [phone]    [facility] [place]  [place] - [address]  [address]  [place] [address]  Phone: [phone] Fax: [phone]    [facility] [place] ([place])  [place] - [address]  [address]  [place] [address]  Phone: [phone] Fax: [phone]    UNKNOWN - FOLLOW UP PRIOR TO DISCHARGE TO E-PRESCRIBE  No address on file      [id number] - [place], [place] - [address]  [address]  [place] [address]  Phone: [phone] Fax: [phone]    Primary Care Provider: Casey Mckeon MD    Primary Insurance: [facility]  Secondary Insurance:     PROGRESS NOTE:  CM attempted to contact the patient's sister Vandana Skelton at [phone] to complete CM open assessment and dcp but there was no answer. CM left VM with direct line for call back.    OMI attempted to meet with patient at bedside. Per OMI, patient unable to speak with her at the time therefore OMI will follow up with patient later. CM dept will continue to follow and assist with disposition as needed
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
## 2025-03-11 NOTE — ASSESSMENT & PLAN NOTE
"64 y.o.  male with diabetes, HTN, NINFA, bipolar disorder, who presented to [facility] on 3/11/25 with \"not feeling himself.\"      A stroke alert was initiated by the ED due to dysarthria. BP on arrival 136/85. LKW unknown per ED. NIHSS 2 (dysarthria) per ED. CTH wo contrast without acute intracranial abnormality. CTA H/N wwo contrast without IR target. Pt not a thrombolytic candidate due to unclear LKW. Per on-call neurology, if pt's speech back to baseline, which was reported by ED, and does not have focal/lateralizing symptoms, no recommendation to admit on stroke pathway. On-Call neurologist recommended checking UDS +/- alcohol level, metalbolic workup per primary team, TSH, vitamin B12, and thiamine.     Workup  - CTH wo contrast 3/11/25:  \"Chronic lacunar infarcts in the right basal ganglia, new since the prior exam. No evidence of acute vascular territorial infarction, intracranial hemorrhage or mass.\"  - CTA H/N wwo contrast 3/11/25:  \"No hemodynamically significant stenosis, dissection or occlusion of the carotid or vertebral arteries or major vessels of the [place].\"  - Labs  - vitamin B1 pending  - UDS + methadone, otherwise negative   - lactic acid 1   - Vitamin B12 348  - vitamin D 18.2  - ammonia 21  - TSH 1.131  - salicylate <5, acetaminophen <2, ethanol <10   - Lipid panel: total cholesterol 95, LDL 34  - UA with negative nitrites, negative leukocytes     Plan  - Recommend MRI brain wwo contrast   - Recommend routine EEG  - Pt unable to take ASA, adverse reaction of SOB/anxiety/wheezing in the past. Start Plavix 75 mg daily   - continue atorvastatin 40 mg daily   - recommend vitamin B12 supplementation, defer to primary team   - goal normotension   - Medical management and correction of metabolic and infectious disturbances per primary team   - Avoid CNS altering medications if possible  - Continue supportive care   - Continue to monitor neurologic status. Notify neurology with any changes in exam. "
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
## 2025-03-11 NOTE — ASSESSMENT & PLAN NOTE
Patient reports poor stream of urine  History of BPH on Flomax  Is being evaluated by urology outpatient for microscopic hematuria  Was supposed to get CT renal study done but has not done yet urology was planning to do cystoscopy procedure today 3/11/2025  Urinated around 1 Litres since admission  Plan    Follow-up UA  Postvoid residual and bedside bladder ultrasound   Urinary retention protocol  Continue Flomax 0.4 mg-questionable if the patient has been compliant with the Flomax  Consider urology consult if worsening urinary retention

## 2025-03-11 NOTE — ASSESSMENT & PLAN NOTE
History of bipolar disorder  Per chart review patient is on Abilify 2 mg.  However patient reports that he has not been taking it, patient is poor historian and encephalopathic

## 2025-03-11 NOTE — QUICK NOTE
David Skelton Jr.          Assessment/Plan   Assessment: David is a 64 year old male presenting with dysarthria    TNK Decision: Patient not a candidate. Unclear time of onset outside appropriate time window.    Plan:-Agree with admission although at this point if his speech is baseline and he does not have focal/lateralizing signs would not admit on stroke pathway  -Consider checking UDS +/- alcohol level  -Would check for infection and basic metabolic evaluation  -Recommend checking TSH, vitamin B12, thiamine  -May consider EEG/MRI depending on results of the above    History of Present Illness     Reason for Consult / Principal Problem: Stroke Alert  Hx and PE limited by: none  Patient last known well: Unknown  Stroke alert called: 0043 03/11/25  Neurology time of arrival: 0043 03/11/25    HPI: David Skelton Jr. is a 64 y.o. male who presents as a stroke alert.  He has a history of some mental health challenges and substance abuse challenges.  Earlier this evening he was taken to the emergency room by family.  In the emergency room he was noted to have significant dysarthria and a stroke alert was activated.  Subsequently family was able to be contacted.  They report that he has a long history of mental health challenges and they are not really in touch with him.  They confirm that his current speech is actually his standard baseline.  Reportedly he called and asked to be taken to the hospital earlier this evening but they are unclear as to any significant change in condition.    I personally reviewed the noncontrast head CT which reveals no evidence of intercerebral hemorrhage or large territorial infarction.  I also reviewed the CT angiogram which reveals no evidence of large vessel occlusion or significant flow limiting stenosis.    He is scheduled to see the neurology group in May and family reports that this may be for a dementia  evaluation    Past Medical History:   Diagnosis Date    Anxiety     Depression     Diabetes mellitus (HCC)     Drug use     Hemorrhoids, internal 9/23/2019    Hypertension        Social History     Socioeconomic History    Marital status: Legally      Spouse name: Not on file    Number of children: Not on file    Years of education: Not on file    Highest education level: Not on file   Occupational History    Not on file   Tobacco Use    Smoking status: Never    Smokeless tobacco: Never   Vaping Use    Vaping status: Never Used   Substance and Sexual Activity    Alcohol use: Never    Drug use: Yes     Comment: Methadone clinic    Sexual activity: Not Currently   Other Topics Concern    Not on file   Social History Narrative    Not on file     Social Drivers of Health     Financial Resource Strain: Low Risk  (3/16/2021)    Overall Financial Resource Strain (CARDIA)     Difficulty of Paying Living Expenses: Not hard at all   Food Insecurity: No Food Insecurity (9/17/2024)    Nursing - Inadequate Food Risk Classification     Worried About Running Out of Food in the Last Year: Never true     Ran Out of Food in the Last Year: Never true     Ran Out of Food in the Last Year: Not on file   Transportation Needs: No Transportation Needs (9/17/2024)    PRAPARE - Transportation     Lack of Transportation (Medical): No     Lack of Transportation (Non-Medical): No   Physical Activity: Inactive (3/16/2021)    Exercise Vital Sign     Days of Exercise per Week: 0 days     Minutes of Exercise per Session: 0 min   Stress: No Stress Concern Present (3/16/2021)    [employer] [place] [employer] - Occupational Stress Questionnaire     Feeling of Stress : Only a little   Social Connections: Unknown (3/16/2021)    Social Connection and Isolation Panel [NHANES]     Frequency of Communication with Friends and Family: Patient declined     Frequency of Social Gatherings with Friends and Family: Patient declined     Attends  Nondenominational Services: Patient declined     Active Member of Clubs or Organizations: Patient declined     Attends Club or Organization Meetings: Patient declined     Marital Status: Patient declined   Intimate Partner Violence: Unknown (3/16/2021)    Humiliation, Afraid, Rape, and Kick questionnaire     Fear of Current or Ex-Partner: Patient declined     Emotionally Abused: Patient declined     Physically Abused: Patient declined     Sexually Abused: Patient declined   Housing Stability: Low Risk  (9/17/2024)    Housing Stability Vital Sign     Unable to Pay for Housing in the Last Year: No     Number of Times Moved in the Last Year: 0     Homeless in the Last Year: No       Meds/Allergies   PTA meds:   Prior to Admission Medications   Prescriptions Last Dose Informant Patient Reported? Taking?   ALPRAZolam (XANAX) 0.5 mg tablet   No No   Sig: Take 1 tablet (0.5 mg total) by mouth daily as needed for anxiety for up to 10 days   ALPRAZolam (XANAX) 0.5 mg tablet   No No   Sig: Take 1 tablet (0.5 mg) in the afternoon and 2 tablets (1 mg) at bedtime.   ARIPiprazole (ABILIFY) 2 mg tablet  Self Yes No   Sig: Take 2 mg by mouth daily   Patient not taking: Reported on 3/7/2025   Alcohol Swabs 70 % PADS  Self No No   Sig: May substitute brand based on insurance coverage. Check glucose BID.   Blood Glucose Monitoring Suppl (OneTouch Verio Reflect) w/Device KIT  Self No No   Sig: May substitute brand based on insurance coverage. Check glucose BID.   Cholecalciferol (VITAMIN D3) 1,000 units tablet  Self No No   Sig: Take 2 tablets (2,000 Units total) by mouth daily   Dulaglutide (TRULICITY SC)   Yes No   Sig: Inject under the skin   Empagliflozin-metFORMIN HCl (SYNJARDY PO)   Yes No   Sig: Take by mouth   Misc. Devices (CANE) MISC  Self No No   Sig: by Does not apply route daily Dx:  Frequent falls   Trazodone HCl POWD   Yes No   Sig: Take by mouth   Wound Dressings (Dermagran Hydrogel Wound) GEL   No No   Sig: Apply 1 Application  topically every other day To applied every other day to small red developing ulcer on the left lateral shin area   acetaminophen (TYLENOL) 325 mg tablet  Self No No   Sig: Take 2 tablets (650 mg total) by mouth every 4 (four) hours as needed for mild pain   Patient not taking: Reported on 3/7/2025   amphetamine-dextroamphetamine (ADDERALL XR) 30 MG 24 hr capsule   Yes No   Sig: TAKE 1 CAPSULE BY MOUTH TWICE A DAY (8AM AND 1PM)   aspirin 81 mg chewable tablet   No No   Sig: Chew 1 tablet (81 mg total) 2 (two) times a day for 28 days   cloNIDine (CATAPRES) 0.1 mg tablet   Yes No   Sig: Take 0.1 mg by mouth daily at bedtime   clonazePAM (KlonoPIN) 1 mg tablet  Self Yes No   Sig: Take 1 mg by mouth 2 (two) times a day   docusate sodium (COLACE) 100 mg capsule   No No   Sig: Take 1 capsule (100 mg total) by mouth 2 (two) times a day for 5 days   ergocalciferol (ERGOCALCIFEROL) 1.25 MG (01444 UT) capsule   Yes No   Sig: Take 50,000 Units by mouth   fluticasone (FLONASE) 50 mcg/act nasal spray  Self No No   Sig: instill 1 spray into each nostril once daily   furosemide (LASIX) 20 mg tablet  Self No No   Sig: Take 1 tablet (20 mg total) by mouth daily as needed (leg swelling)   gabapentin (Neurontin) 600 MG tablet  Self No No   Sig: Take 0.5 tablets (300 mg total) by mouth 2 (two) times a day   glucose blood (OneTouch Verio) test strip  Self No No   Sig: May substitute brand based on insurance coverage. Check glucose BID.   hydroCHLOROthiazide 12.5 mg tablet   No No   Sig: Take 1 tablet (12.5 mg total) by mouth daily as needed (Edema)   insulin glargine (LANTUS) 100 units/mL subcutaneous injection  Self No No   Sig: Inject 10 Units under the skin daily at bedtime   metFORMIN (GLUCOPHAGE-XR) 500 mg 24 hr tablet  Self No No   Sig: Take 1 tablet (500 mg total) by mouth 2 (two) times a day with meals   metoprolol succinate (TOPROL-XL) 25 mg 24 hr tablet   No No   Sig: Take 0.5 tablets (12.5 mg total) by mouth daily   Patient  not taking: Reported on 3/7/2025   naloxone (NARCAN) 4 mg/0.1 mL nasal spray  Self No No   Sig: Administer 1 spray into a nostril. If no response after 2-3 minutes, give another dose in the other nostril using a new spray.   Patient not taking: Reported on 3/7/2025   repaglinide (PRANDIN) 1 mg tablet   No No   Sig: Take 1 tablet (1 mg total) by mouth 3 (three) times a day before meals   Patient not taking: Reported on 3/7/2025   tamsulosin (FLOMAX) 0.4 mg   No No   Sig: Take 1 capsule (0.4 mg total) by mouth daily with dinner   triamcinolone (KENALOG) 0.1 % cream   No No   Sig: Apply topically 2 (two) times a day Applied to right posterior thigh      Facility-Administered Medications: None         Patient Vitals for the past 24 hrs:   BP Temp Temp src Pulse Resp SpO2 Weight   03/11/25 0130 142/80 -- -- 79 18 99 % --   03/11/25 0115 143/77 -- -- 80 16 99 % --   03/11/25 0114 -- 97.7 °F (36.5 °C) Oral -- -- -- --   03/11/25 0109 170/86 -- -- 82 16 96 % --   03/11/25 0046 -- -- -- -- -- 97 % 83.1 kg (183 lb 3.2 oz)   03/11/25 0042 136/85 -- -- 81 20 -- --

## 2025-03-11 NOTE — TELEPHONE ENCOUNTER
Caregiver called stating patient is in the hospital and was not able to come to appointment for cysto today.  Appointment was cancelled.
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
## 2025-03-11 NOTE — SPEECH THERAPY NOTE
"Speech-Language Pathology Bedside Swallow Evaluation        Patient Name: David Skelton Jr.    Today's Date: 3/11/2025     Problem List  Principal Problem:    Encephalopathy  Active Problems:    ADD (attention deficit disorder) without hyperactivity    Bipolar disorder (HCC)    Generalized anxiety disorder    Benign essential hypertension    Opioid abuse (HCC)    Anemia    Urinary retention    chronic infarction of basal ganglia         Summary    Pt presents with Mild-moderate oropharyngeal dysphagia characterized by prolonged mastication w/ edentulous state, increased pharyngeal mucous/wet cough noted, and occas coughing w/ thin liquids.      Recommendations:   Diet: soft/level 3 diet and nectar thick liquids   Meds: whole with liquid and whole with puree   Frequent Oral care: 2x/day  Aspiration precautions  Other Recommendations/ considerations: will cont to follow up for ongoing assessment and aspiration risk .       Current Medical Status  Pt is a 64 y.o. male who presented to [facility]  with stroke like symptoms. pt presents with worsening dysarthria.  Stroke alert was activated. Per her sister, patient is under house arrest, called her saying \" I do not feel good, take me to the ER''.  Sister is unsure why the patient wanted to come to the ER.  Patient was drowsy and encephalopathic.  Patient sister reports that they are not really in touch with him.  His current speech is is actually his is baseline and the family does not think there is worsening.  Per the sister patient has been having increased confusion and difficulty concentrating over the past few months     Past medical history:   Please see H&P for details    Special Studies:  CXR: 3/11/25 No acute cardiopulmonary disease.   CT-head: 3/11/25 Chronic lacunar infarcts in the right basal ganglia, new since the prior exam. No evidence of acute vascular territorial infarction, intracranial hemorrhage or mass.     Social/Education/Vocational " Hx:  Pt lives  at home     Swallow Information   Prior speech/swallowing tx: none   Current Risks for Dysphagia & Aspiration: CVA and AMS  Current Symptoms/Concerns:  AMS  Current Diet: soft/level 3 diet and thin liquids   Baseline Diet: regular diet and thin liquids  Takes pills-  whole w/ water     Baseline Assessment   Behavior/Cognition: alert  Speech/Language Status: able to participate in basic conversation, able to follow commands, and mumbling, dysarthria  Patient Positioning: upright in bed     Swallow Mechanism Exam   Facial: symmetrical  Labial: WFL  Lingual: WFL  Velum: unable to visualize  Mandible: adequate ROM  Dentition: edentulous  Vocal quality:clear/adequate   Volitional Cough: strong/productive   Respiratory: RA    Consistencies Assessed and Performance   Consistencies Administered: thin liquids, nectar thick, puree, and soft solids (seen at breakfast w/ muffin, scrambled eggs, chopped sausage, NTL and thin liquids by cup and straw)     Oral Stage: pt w/ adequate bolus retrieval from utensil, cup and straw. Matsication was prolonged but appeared effective. ? Reduced oral control w/ liquids. Delayed transfer noted.     Pharyngeal Stage: delayed swallow initiation suspected. Pt w/ wet voice at times, ? Pharyngeal mucous. Intermittent cough w/ thin liquids by cup and straw. No overt s/s aspiration.       Esophageal Concerns: none reported      Results Reviewed with: patient, RN, and MD   Dysphagia Goals: pt will tolerate dysphagia 3 diet  with nectar thick liquids without s/s of aspiration x2-4 sessions.         Monica Yoon MA [facility]  Speech Pathologist  Available via [facility] [facility]

## 2025-03-11 NOTE — PLAN OF CARE
Problem: Potential for Falls  Goal: Patient will remain free of falls  Description: INTERVENTIONS:  - Educate patient/family on patient safety including physical limitations  - Instruct patient to call for assistance with activity   - Consult OT/PT to assist with strengthening/mobility   - Keep Call bell within reach  - Keep bed low and locked with side rails adjusted as appropriate  - Keep care items and personal belongings within reach  - Initiate and maintain comfort rounds  - Make Fall Risk Sign visible to staff  - Offer Toileting every 2 Hours, in advance of need  - Initiate/Maintain bed alarm  - Obtain necessary fall risk management equipment: alarms  - Apply yellow socks and bracelet for high fall risk patients  - Consider moving patient to room near nurses station  Outcome: Progressing     Problem: Prexisting or High Potential for Compromised Skin Integrity  Goal: Skin integrity is maintained or improved  Description: INTERVENTIONS:  - Identify patients at risk for skin breakdown  - Assess and monitor skin integrity  - Assess and monitor nutrition and hydration status  - Monitor labs   - Assess for incontinence   - Turn and reposition patient  - Assist with mobility/ambulation  - Relieve pressure over bony prominences  - Avoid friction and shearing  - Provide appropriate hygiene as needed including keeping skin clean and dry  - Evaluate need for skin moisturizer/barrier cream  - Collaborate with interdisciplinary team   - Patient/family teaching  - Consider wound care consult   Outcome: Progressing     Problem: Neurological Deficit  Goal: Neurological status is stable or improving  Description: Interventions:  - Monitor and assess patient's level of consciousness, motor function, sensory function, and level of assistance needed for ADLs.   - Monitor and report changes from baseline. Collaborate with interdisciplinary team to initiate plan and implement interventions as ordered.   - Provide and maintain a  safe environment.  - Consider seizure precautions.  - Consider fall precautions.  - Consider aspiration precautions.  - Consider bleeding precautions.  Outcome: Progressing     Problem: Activity Intolerance/Impaired Mobility  Goal: Mobility/activity is maintained at optimum level for patient  Description: Interventions:  - Assess and monitor patient  barriers to mobility and need for assistive/adaptive devices.  - Assess patient's emotional response to limitations.  - Collaborate with interdisciplinary team and initiate plans and interventions as ordered.  - Encourage independent activity per ability.  - Maintain proper body alignment.  - Perform active/passive rom as tolerated/ordered.  - Plan activities to conserve energy.  - Turn patient as appropriate  Outcome: Progressing     Problem: Communication Impairment  Goal: Ability to express needs and understand communication  Description: Assess patient's communication skills and ability to understand information.  Patient will demonstrate use of effective communication techniques, alternative methods of communication and understanding even if not able to speak.     - Encourage communication and provide alternate methods of communication as needed.  - Collaborate with case management/ for discharge needs.  - Include patient/family/caregiver in decisions related to communication.  Outcome: Progressing     Problem: Potential for Aspiration  Goal: Non-ventilated patient's risk of aspiration is minimized  Description: Assess and monitor vital signs, respiratory status, and labs (WBC).  Monitor for signs of aspiration (tachypnea, cough, rales, wheezing, cyanosis, fever).    - Assess and monitor patient's ability to swallow.  - Place patient up in chair to eat if possible.  - HOB up at 90 degrees to eat if unable to get patient up into chair.  - Supervise patient during oral intake.   - Instruct patient/ family to take small bites.  - Instruct patient/  family to take small single sips when taking liquids.  - Follow patient-specific strategies generated by speech pathologist.  Outcome: Progressing     Problem: Nutrition  Goal: Nutrition/Hydration status is improving  Description: Monitor and assess patient's nutrition/hydration status for malnutrition (ex- brittle hair, bruises, dry skin, pale skin and conjunctiva, muscle wasting, smooth red tongue, and disorientation). Collaborate with interdisciplinary team and initiate plan and interventions as ordered.  Monitor patient's weight and dietary intake as ordered or per policy. Utilize nutrition screening tool and intervene per policy. Determine patient's food preferences and provide high-protein, high-caloric foods as appropriate.     - Assist patient with eating.  - Allow adequate time for meals.  - Encourage patient to take dietary supplement as ordered.  - Collaborate with clinical nutritionist.  - Include patient/family/caregiver in decisions related to nutrition.  Outcome: Progressing     Problem: PAIN - ADULT  Goal: Verbalizes/displays adequate comfort level or baseline comfort level  Description: Interventions:  - Encourage patient to monitor pain and request assistance  - Assess pain using appropriate pain scale  - Administer analgesics based on type and severity of pain and evaluate response  - Implement non-pharmacological measures as appropriate and evaluate response  - Consider cultural and social influences on pain and pain management  - Notify physician/advanced practitioner if interventions unsuccessful or patient reports new pain  Outcome: Progressing     Problem: INFECTION - ADULT  Goal: Absence or prevention of progression during hospitalization  Description: INTERVENTIONS:  - Assess and monitor for signs and symptoms of infection  - Monitor lab/diagnostic results  - Monitor all insertion sites, i.e. indwelling lines, tubes, and drains  - Monitor endotracheal if appropriate and nasal secretions for  changes in amount and color  - [place] appropriate cooling/warming therapies per order  - Administer medications as ordered  - Instruct and encourage patient and family to use good hand hygiene technique  - Identify and instruct in appropriate isolation precautions for identified infection/condition  Outcome: Progressing     Problem: DISCHARGE PLANNING  Goal: Discharge to home or other facility with appropriate resources  Description: INTERVENTIONS:  - Identify barriers to discharge w/patient and caregiver  - Arrange for needed discharge resources and transportation as appropriate  - Identify discharge learning needs (meds, wound care, etc.)  - Arrange for interpretive services to assist at discharge as needed  - Refer to Case Management Department for coordinating discharge planning if the patient needs post-hospital services based on physician/advanced practitioner order or complex needs related to functional status, cognitive ability, or social support system  Outcome: Progressing     Problem: Knowledge Deficit  Goal: Patient/family/caregiver demonstrates understanding of disease process, treatment plan, medications, and discharge instructions  Description: Complete learning assessment and assess knowledge base.  Interventions:  - Provide teaching at level of understanding  - Provide teaching via preferred learning methods  Outcome: Progressing

## 2025-03-11 NOTE — QUICK NOTE
Drug screen is positive for methadone.  Methadone clinic called [facility] phone number [phone]  Patient is on 140 mg daily of methadone  Will order this for now but may have to come down on the dosage as the patient is presenting with encephalopathy

## 2025-03-11 NOTE — ED NOTES
Spoke with Officer Anabelle at this time. Provided him with an update on pts disposition. Pt and family made aware of conversation. Officer requesting to be contacted prior to pt discharged. Number is [phone].     Gavin Rees RN  03/11/25 1530

## 2025-03-11 NOTE — ASSESSMENT & PLAN NOTE
History of hypertension  Blood pressure on the higher side  Supposed to be on clonidine 0.1 mg, Toprol-XL 12.5 mg daily.  Patient does not take Toprol-XL 12.5 mg.  Also on Lasix 20 mg daily and hydrochlorothiazide 12.5 mg daily as needed for leg swelling  Plan  Will continue clonidine 0.1 mg.  Patient is encephalopathic and drowsy, but will reorder the clonidine as holding clonidine may cause rebound hypertension.  If blood pressure is not controlled, may add Toprol-XL 12.5 mg

## 2025-03-11 NOTE — CASE MANAGEMENT
Case Management Progress Note    Patient name David Skelton Jr.  Location ED-28/ED-28 MRN [id number]  : 1960 Date 3/11/2025       LOS (days): 0  Geometric Mean LOS (GMLOS) (days):   Days to GMLOS:        OBJECTIVE:        Current admission status: Observation  Preferred Pharmacy:   [id number] - [place], [place] - [address]  [address]  [place] [address]  Phone: [phone] Fax: [phone]    [id number] - [place] - [address]  [address]  [place] [address]  Phone: [phone] Fax: [phone]    [facility] - [place] - [address]  [address]  [place] [address]  Phone: [phone] Fax: [phone]    [facility] ([place]  [place] - [address]  [address]  [place] [address]  Phone: [phone] Fax: [phone]    UNKNOWN - FOLLOW UP PRIOR TO DISCHARGE TO E-PRESCRIBE  No address on file      [id number] - [place], [place] - [address]  [address]  [place] [address]  Phone: [phone] Fax: [phone]    Primary Care Provider: Casey Mckeon MD    Primary Insurance: [facility]  Secondary Insurance:     PROGRESS NOTE:    CM attempted to complete open assessment with pt, pt noted to be sleeping. CM placed call to pt sister Vandana, no answer. VM left requesting return call to discuss dcp. CM will continue to outreach to complete open assessment.

## 2025-03-11 NOTE — ASSESSMENT & PLAN NOTE
CAT scan of the brain showed-Chronic lacunar infarcts in the right basal ganglia, new since the prior exam.   Plan  Since patient has had old infarcts , consider starting antiplatelet.  Patient has history of allergy to aspirin.  Consider starting Plavix daily  Lipitor 40 mg  Follow-up lipid panel
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
## 2025-03-12 ENCOUNTER — APPOINTMENT (OUTPATIENT)
Dept: MRI IMAGING | Facility: HOSPITAL | Age: 65
End: 2025-03-12
Payer: COMMERCIAL

## 2025-03-12 PROBLEM — Z86.73 HISTORY OF STROKE: Status: ACTIVE | Noted: 2025-03-11

## 2025-03-12 LAB
ANION GAP SERPL CALCULATED.3IONS-SCNC: 4 MMOL/L (ref 4–13)
BACTERIA UR CULT: NORMAL
BASOPHILS # BLD AUTO: 0.02 THOUSANDS/ÂΜL (ref 0–0.1)
BASOPHILS NFR BLD AUTO: 0 % (ref 0–1)
BUN SERPL-MCNC: 14 MG/DL (ref 5–25)
CALCIUM SERPL-MCNC: 8.6 MG/DL (ref 8.4–10.2)
CHLORIDE SERPL-SCNC: 103 MMOL/L (ref 96–108)
CO2 SERPL-SCNC: 30 MMOL/L (ref 21–32)
CREAT SERPL-MCNC: 0.83 MG/DL (ref 0.6–1.3)
EOSINOPHIL # BLD AUTO: 0.39 THOUSAND/ÂΜL (ref 0–0.61)
EOSINOPHIL NFR BLD AUTO: 5 % (ref 0–6)
ERYTHROCYTE [DISTWIDTH] IN BLOOD BY AUTOMATED COUNT: 13.4 % (ref 11.6–15.1)
GFR SERPL CREATININE-BSD FRML MDRD: 94 ML/MIN/1.73SQ M
GLUCOSE P FAST SERPL-MCNC: 130 MG/DL (ref 65–99)
GLUCOSE SERPL-MCNC: 130 MG/DL (ref 65–140)
GLUCOSE SERPL-MCNC: 258 MG/DL (ref 65–140)
HCT VFR BLD AUTO: 29.6 % (ref 36.5–49.3)
HGB BLD-MCNC: 9.8 G/DL (ref 12–17)
IMM GRANULOCYTES # BLD AUTO: 0.02 THOUSAND/UL (ref 0–0.2)
IMM GRANULOCYTES NFR BLD AUTO: 0 % (ref 0–2)
LYMPHOCYTES # BLD AUTO: 1.65 THOUSANDS/ÂΜL (ref 0.6–4.47)
LYMPHOCYTES NFR BLD AUTO: 21 % (ref 14–44)
MAGNESIUM SERPL-MCNC: 1.8 MG/DL (ref 1.9–2.7)
MCH RBC QN AUTO: 28.2 PG (ref 26.8–34.3)
MCHC RBC AUTO-ENTMCNC: 33.1 G/DL (ref 31.4–37.4)
MCV RBC AUTO: 85 FL (ref 82–98)
MONOCYTES # BLD AUTO: 0.55 THOUSAND/ÂΜL (ref 0.17–1.22)
MONOCYTES NFR BLD AUTO: 7 % (ref 4–12)
NEUTROPHILS # BLD AUTO: 5.43 THOUSANDS/ÂΜL (ref 1.85–7.62)
NEUTS SEG NFR BLD AUTO: 67 % (ref 43–75)
NRBC BLD AUTO-RTO: 0 /100 WBCS
PLATELET # BLD AUTO: 208 THOUSANDS/UL (ref 149–390)
PMV BLD AUTO: 11.6 FL (ref 8.9–12.7)
POTASSIUM SERPL-SCNC: 3.9 MMOL/L (ref 3.5–5.3)
PROCALCITONIN SERPL-MCNC: 0.44 NG/ML
RBC # BLD AUTO: 3.47 MILLION/UL (ref 3.88–5.62)
SODIUM SERPL-SCNC: 137 MMOL/L (ref 135–147)
WBC # BLD AUTO: 8.06 THOUSAND/UL (ref 4.31–10.16)

## 2025-03-12 PROCEDURE — 95819 EEG AWAKE AND ASLEEP: CPT | Performed by: PSYCHIATRY & NEUROLOGY

## 2025-03-12 PROCEDURE — 83735 ASSAY OF MAGNESIUM: CPT | Performed by: INTERNAL MEDICINE

## 2025-03-12 PROCEDURE — 82948 REAGENT STRIP/BLOOD GLUCOSE: CPT

## 2025-03-12 PROCEDURE — 80048 BASIC METABOLIC PNL TOTAL CA: CPT | Performed by: INTERNAL MEDICINE

## 2025-03-12 PROCEDURE — 70553 MRI BRAIN STEM W/O & W/DYE: CPT

## 2025-03-12 PROCEDURE — A9585 GADOBUTROL INJECTION: HCPCS | Performed by: INTERNAL MEDICINE

## 2025-03-12 PROCEDURE — 99232 SBSQ HOSP IP/OBS MODERATE 35: CPT | Performed by: INTERNAL MEDICINE

## 2025-03-12 PROCEDURE — 84145 PROCALCITONIN (PCT): CPT | Performed by: INTERNAL MEDICINE

## 2025-03-12 PROCEDURE — 99222 1ST HOSP IP/OBS MODERATE 55: CPT | Performed by: UROLOGY

## 2025-03-12 PROCEDURE — 85025 COMPLETE CBC W/AUTO DIFF WBC: CPT | Performed by: INTERNAL MEDICINE

## 2025-03-12 RX ORDER — HYDROCHLOROTHIAZIDE 12.5 MG/1
12.5 TABLET ORAL DAILY PRN
Status: DISCONTINUED | OUTPATIENT
Start: 2025-03-12 | End: 2025-03-13 | Stop reason: HOSPADM

## 2025-03-12 RX ORDER — LORAZEPAM 1 MG/1
2 TABLET ORAL ONCE
Status: DISCONTINUED | OUTPATIENT
Start: 2025-03-12 | End: 2025-03-12

## 2025-03-12 RX ORDER — MAGNESIUM SULFATE HEPTAHYDRATE 40 MG/ML
2 INJECTION, SOLUTION INTRAVENOUS ONCE
Status: COMPLETED | OUTPATIENT
Start: 2025-03-12 | End: 2025-03-12

## 2025-03-12 RX ORDER — CLONAZEPAM 0.5 MG/1
0.5 TABLET ORAL ONCE
Status: COMPLETED | OUTPATIENT
Start: 2025-03-12 | End: 2025-03-12

## 2025-03-12 RX ORDER — GADOBUTROL 604.72 MG/ML
8 INJECTION INTRAVENOUS
Status: COMPLETED | OUTPATIENT
Start: 2025-03-12 | End: 2025-03-12

## 2025-03-12 RX ORDER — FINASTERIDE 5 MG/1
5 TABLET, FILM COATED ORAL DAILY
Status: DISCONTINUED | OUTPATIENT
Start: 2025-03-12 | End: 2025-03-13 | Stop reason: HOSPADM

## 2025-03-12 RX ORDER — TAMSULOSIN HYDROCHLORIDE 0.4 MG/1
0.8 CAPSULE ORAL
Status: DISCONTINUED | OUTPATIENT
Start: 2025-03-12 | End: 2025-03-13 | Stop reason: HOSPADM

## 2025-03-12 RX ORDER — FUROSEMIDE 20 MG/1
20 TABLET ORAL DAILY
Status: DISCONTINUED | OUTPATIENT
Start: 2025-03-13 | End: 2025-03-13 | Stop reason: HOSPADM

## 2025-03-12 RX ADMIN — INSULIN GLARGINE 10 UNITS: 100 INJECTION, SOLUTION SUBCUTANEOUS at 21:34

## 2025-03-12 RX ADMIN — MAGNESIUM SULFATE HEPTAHYDRATE 2 G: 40 INJECTION, SOLUTION INTRAVENOUS at 10:14

## 2025-03-12 RX ADMIN — ATORVASTATIN CALCIUM 40 MG: 40 TABLET, FILM COATED ORAL at 16:10

## 2025-03-12 RX ADMIN — METHADONE HYDROCHLORIDE 140 MG: 10 CONCENTRATE ORAL at 06:14

## 2025-03-12 RX ADMIN — TAMSULOSIN HYDROCHLORIDE 0.8 MG: 0.4 CAPSULE ORAL at 16:11

## 2025-03-12 RX ADMIN — CLOPIDOGREL BISULFATE 75 MG: 75 TABLET ORAL at 09:53

## 2025-03-12 RX ADMIN — GADOBUTROL 8 ML: 604.72 INJECTION INTRAVENOUS at 20:45

## 2025-03-12 RX ADMIN — FINASTERIDE 5 MG: 5 TABLET, FILM COATED ORAL at 14:17

## 2025-03-12 RX ADMIN — CLONAZEPAM 0.5 MG: 0.5 TABLET ORAL at 01:34

## 2025-03-12 RX ADMIN — CLONIDINE HYDROCHLORIDE 0.1 MG: 0.1 TABLET ORAL at 21:26

## 2025-03-12 RX ADMIN — Medication 100 MG: at 09:53

## 2025-03-12 RX ADMIN — Medication 2000 UNITS: at 09:52

## 2025-03-12 RX ADMIN — MULTIPLE VITAMINS W/ MINERALS TAB 1 TABLET: TAB ORAL at 09:53

## 2025-03-12 RX ADMIN — ENOXAPARIN SODIUM 40 MG: 40 INJECTION SUBCUTANEOUS at 09:53

## 2025-03-12 RX ADMIN — CLONAZEPAM 0.5 MG: 0.5 TABLET ORAL at 02:46

## 2025-03-12 RX ADMIN — FOLIC ACID 1 MG: 1 TABLET ORAL at 09:53

## 2025-03-12 RX ADMIN — Medication 3 MG: at 00:39

## 2025-03-12 NOTE — ASSESSMENT & PLAN NOTE
Continue Flomax  Appreciate urology input -> known to their practice for prior episodes of microscopic materia

## 2025-03-12 NOTE — ASSESSMENT & PLAN NOTE
History of opioid abuse in the past  Per chart review patient was on Methadone.  But no current prescription Per PDMP review.  Last prescription  in 2024.  As stated above, methadone clinic was contacted by admitting provider with current dosing (140 mg daily) confirmed.

## 2025-03-12 NOTE — PLAN OF CARE
Problem: Potential for Falls  Goal: Patient will remain free of falls  Description: INTERVENTIONS:  - Educate patient/family on patient safety including physical limitations  - Instruct patient to call for assistance with activity   - Consult OT/PT to assist with strengthening/mobility   - Keep Call bell within reach  - Keep bed low and locked with side rails adjusted as appropriate  - Keep care items and personal belongings within reach  - Initiate and maintain comfort rounds  - Make Fall Risk Sign visible to staff  - Offer Toileting every 2 Hours, in advance of need  - Initiate/Maintain bed alarm  - Obtain necessary fall risk management equipment: alarms  - Apply yellow socks and bracelet for high fall risk patients  - Consider moving patient to room near nurses station  3/12/2025 1131 by Renaldo Martinez RN  Outcome: Progressing  3/12/2025 1130 by Renaldo Martinez RN  Outcome: Progressing     Problem: Prexisting or High Potential for Compromised Skin Integrity  Goal: Skin integrity is maintained or improved  Description: INTERVENTIONS:  - Identify patients at risk for skin breakdown  - Assess and monitor skin integrity  - Assess and monitor nutrition and hydration status  - Monitor labs   - Assess for incontinence   - Turn and reposition patient  - Assist with mobility/ambulation  - Relieve pressure over bony prominences  - Avoid friction and shearing  - Provide appropriate hygiene as needed including keeping skin clean and dry  - Evaluate need for skin moisturizer/barrier cream  - Collaborate with interdisciplinary team   - Patient/family teaching  - Consider wound care consult   3/12/2025 1131 by Renaldo Martinez RN  Outcome: Progressing  3/12/2025 1130 by Renaldo Martinez RN  Outcome: Progressing     Problem: Neurological Deficit  Goal: Neurological status is stable or improving  Description: Interventions:  - Monitor and assess patient's level of consciousness, motor function, sensory function, and level of assistance  needed for ADLs.   - Monitor and report changes from baseline. Collaborate with interdisciplinary team to initiate plan and implement interventions as ordered.   - Provide and maintain a safe environment.  - Consider seizure precautions.  - Consider fall precautions.  - Consider aspiration precautions.  - Consider bleeding precautions.  3/12/2025 1131 by Renaldo Martinez RN  Outcome: Progressing  3/12/2025 1130 by Renaldo Martinez RN  Outcome: Progressing     Problem: Activity Intolerance/Impaired Mobility  Goal: Mobility/activity is maintained at optimum level for patient  Description: Interventions:  - Assess and monitor patient  barriers to mobility and need for assistive/adaptive devices.  - Assess patient's emotional response to limitations.  - Collaborate with interdisciplinary team and initiate plans and interventions as ordered.  - Encourage independent activity per ability.  - Maintain proper body alignment.  - Perform active/passive rom as tolerated/ordered.  - Plan activities to conserve energy.  - Turn patient as appropriate  3/12/2025 1131 by Renaldo Martinez RN  Outcome: Progressing  3/12/2025 1130 by Renaldo Martinez RN  Outcome: Progressing     Problem: Communication Impairment  Goal: Ability to express needs and understand communication  Description: Assess patient's communication skills and ability to understand information.  Patient will demonstrate use of effective communication techniques, alternative methods of communication and understanding even if not able to speak.     - Encourage communication and provide alternate methods of communication as needed.  - Collaborate with case management/ for discharge needs.  - Include patient/family/caregiver in decisions related to communication.  3/12/2025 1131 by Renaldo Martinez RN  Outcome: Progressing  3/12/2025 1130 by Renaldo Martinez RN  Outcome: Progressing     Problem: Potential for Aspiration  Goal: Non-ventilated patient's risk of aspiration is  minimized  Description: Assess and monitor vital signs, respiratory status, and labs (WBC).  Monitor for signs of aspiration (tachypnea, cough, rales, wheezing, cyanosis, fever).    - Assess and monitor patient's ability to swallow.  - Place patient up in chair to eat if possible.  - HOB up at 90 degrees to eat if unable to get patient up into chair.  - Supervise patient during oral intake.   - Instruct patient/ family to take small bites.  - Instruct patient/ family to take small single sips when taking liquids.  - Follow patient-specific strategies generated by speech pathologist.  3/12/2025 1131 by Renaldo Martinez RN  Outcome: Progressing  3/12/2025 1130 by Renaldo Martinez RN  Outcome: Progressing     Problem: Nutrition  Goal: Nutrition/Hydration status is improving  Description: Monitor and assess patient's nutrition/hydration status for malnutrition (ex- brittle hair, bruises, dry skin, pale skin and conjunctiva, muscle wasting, smooth red tongue, and disorientation). Collaborate with interdisciplinary team and initiate plan and interventions as ordered.  Monitor patient's weight and dietary intake as ordered or per policy. Utilize nutrition screening tool and intervene per policy. Determine patient's food preferences and provide high-protein, high-caloric foods as appropriate.     - Assist patient with eating.  - Allow adequate time for meals.  - Encourage patient to take dietary supplement as ordered.  - Collaborate with clinical nutritionist.  - Include patient/family/caregiver in decisions related to nutrition.  3/12/2025 1131 by Renaldo Martinez RN  Outcome: Progressing  3/12/2025 1130 by Renaldo Martinez RN  Outcome: Progressing     Problem: PAIN - ADULT  Goal: Verbalizes/displays adequate comfort level or baseline comfort level  Description: Interventions:  - Encourage patient to monitor pain and request assistance  - Assess pain using appropriate pain scale  - Administer analgesics based on type and severity of  pain and evaluate response  - Implement non-pharmacological measures as appropriate and evaluate response  - Consider cultural and social influences on pain and pain management  - Notify physician/advanced practitioner if interventions unsuccessful or patient reports new pain  3/12/2025 1131 by Renaldo Martinez RN  Outcome: Progressing  3/12/2025 1130 by Renaldo Martinez RN  Outcome: Progressing     Problem: INFECTION - ADULT  Goal: Absence or prevention of progression during hospitalization  Description: INTERVENTIONS:  - Assess and monitor for signs and symptoms of infection  - Monitor lab/diagnostic results  - Monitor all insertion sites, i.e. indwelling lines, tubes, and drains  - Monitor endotracheal if appropriate and nasal secretions for changes in amount and color  - [place] appropriate cooling/warming therapies per order  - Administer medications as ordered  - Instruct and encourage patient and family to use good hand hygiene technique  - Identify and instruct in appropriate isolation precautions for identified infection/condition  3/12/2025 1131 by Renaldo Martinez RN  Outcome: Progressing  3/12/2025 1130 by Renaldo Martinez RN  Outcome: Progressing     Problem: DISCHARGE PLANNING  Goal: Discharge to home or other facility with appropriate resources  Description: INTERVENTIONS:  - Identify barriers to discharge w/patient and caregiver  - Arrange for needed discharge resources and transportation as appropriate  - Identify discharge learning needs (meds, wound care, etc.)  - Arrange for interpretive services to assist at discharge as needed  - Refer to Case Management Department for coordinating discharge planning if the patient needs post-hospital services based on physician/advanced practitioner order or complex needs related to functional status, cognitive ability, or social support system  3/12/2025 1131 by Renaldo Martinez RN  Outcome: Progressing  3/12/2025 1130 by Renaldo Martinez RN  Outcome: Progressing      Problem: Knowledge Deficit  Goal: Patient/family/caregiver demonstrates understanding of disease process, treatment plan, medications, and discharge instructions  Description: Complete learning assessment and assess knowledge base.  Interventions:  - Provide teaching at level of understanding  - Provide teaching via preferred learning methods  3/12/2025 1131 by Renaldo Martinez RN  Outcome: Progressing  3/12/2025 1130 by Renaldo Martinez RN  Outcome: Progressing

## 2025-03-12 NOTE — UTILIZATION REVIEW
"Initial Clinical Review    Admission: Date/Time/Statement:   Admission Orders (From admission, onward)       Ordered        03/11/25 0252  Place in Observation  Once                          Orders Placed This Encounter   Procedures    Place in Observation     Standing Status:   Standing     Number of Occurrences:   1     Level of Care:   Med Surg [16]     ED Arrival Information       Expected   -    Arrival   3/11/2025 00:28    Acuity   Emergent              Means of arrival   Walk-In    Escorted by   Self    Service   Hospitalist    Admission type   Emergency              Arrival complaint   Seizure-Like Activity / Slurred Speech             Chief Complaint   Patient presents with    CVA/TIA-like Symptoms     Slurred speech, weakness       Initial Presentation: 64 y.o. male  with a PMH of anxiety, asymptomatic hematuria, ADHD, bipolar disorder , depression, diabetes mellitus, hypertension, substance use, BPH with urinary retention who presents with worsening dysarthria.  Stroke alert was activated.  Per his sister, patient is under house arrest, called her saying \" I do not feel good, take me to the ER''. Sister is unsure why the patient wanted to come to the ER.  Patient was drowsy and encephalopathic. Patient sister reports that they are not really in touch with him. His current speech is is actually his is baseline and the family does not think there is worsening. Per the sister patient has been having increased confusion and difficulty concentrating over the past few months. CAT scan of the head was negative for any acute stroke, showed chronic lacunar infarct.  CTA head and neck no evidence of large vessel occlusion or significant flow-limiting stenosis. Neurology team did not think that this was a stroke and was not placed on a stroke pathway. Patient also complained of difficulty urinating with no suprapubic pain or other symptoms. Plan: Observation for encephalopathy, opioid abuse, HTN, anemia, ADD, " urinary retention, Bipolar disorder, anxiety, chronic infarction of basal ganglia: Patient not a good candidate for MRI as patient has bracelet on the ankle because of house arrest. Will have to get that off for MRI, folate, Vit B12 and Vit D levels, hold gabapentin, trazodone, clonazepam, Adderall, Neurology consult, add folate and thiamine, seizure precautions, dysphagia dental soft diet -due to no teeth, continue home methadone, continue clonidine and Flomax, Lipitor    Neurology consult: It was difficult to clarify with the patient slurred speech was chronic or not. Low suspicion for patient suffer from acute stroke given nonfocal exam other than slurred speech. Will still obtain MRI brain without to rule out. Recommend to obtain routine EEG. However low suspicion for patient has ongoing seizure at this moment.     3/12:    Urology consult: continue Flomax and finasteride. Has been seen in the office previously for microscopic hematuria. Still needs to get outpatient CT renal protocol done and still needs cystoscopy. He will follow-up in the office for this.     Internal medicine:  Awaiting MRI of brain.  EEG negative for acute epileptiform activity but did reveal moderate diffuse cerebral dysfunction. Neurology following.  Continue to limit/avoid sedating medications as possible, although mentation improved today.  Residual slurred speech has persisted.     ED Treatment-Medication Administration from 03/11/2025 0028 to 03/11/2025 1546         Date/Time Order Dose Route Action     03/11/2025 0054 iohexol (OMNIPAQUE) 350 MG/ML injection (MULTI-DOSE) 85 mL 85 mL Intravenous Given     03/11/2025 0240 thiamine (VITAMIN B1) 100 mg in sodium chloride 0.9 % 50 mL IVPB 100 mg Intravenous New Bag     03/11/2025 0215 folic acid 1 mg in sodium chloride 0.9 % 50 mL IVPB 1 mg Intravenous New Bag     03/11/2025 0254 clonazePAM (KlonoPIN) tablet 1 mg 1 mg Oral Given     03/11/2025 0854 folic acid (FOLVITE) tablet 1 mg 1 mg  Oral Given     03/11/2025 0848 multivitamin-minerals (CENTRUM) tablet 1 tablet 1 tablet Oral Given     03/11/2025 0848 thiamine tablet 100 mg 100 mg Oral Given     03/11/2025 0848 enoxaparin (LOVENOX) subcutaneous injection 40 mg 40 mg Subcutaneous Given     03/11/2025 0848 Cholecalciferol (VITAMIN D3) tablet 2,000 Units 2,000 Units Oral Given     03/11/2025 0521 magnesium sulfate 2 g/50 mL IVPB (premix) 2 g 2 g Intravenous New Bag     03/11/2025 0849 methadone (DOLOPHINE) oral concentrated solution 140 mg 140 mg Oral Given     03/11/2025 1415 labetalol (NORMODYNE) injection 10 mg 10 mg Intravenous Given     03/11/2025 1415 clopidogrel (PLAVIX) tablet 75 mg 75 mg Oral Given            Scheduled Medications:  atorvastatin, 40 mg, Oral, Daily With Dinner  Cholecalciferol, 2,000 Units, Oral, Daily  [Held by provider] clonazePAM, 1 mg, Oral, BID  cloNIDine, 0.1 mg, Oral, HS  clopidogrel, 75 mg, Oral, Daily  enoxaparin, 40 mg, Subcutaneous, Daily  folic acid, 1 mg, Oral, Daily  insulin glargine, 10 Units, Subcutaneous, HS  methadone, 140 mg, Oral, Daily  multivitamin-minerals, 1 tablet, Oral, Daily  tamsulosin, 0.4 mg, Oral, Daily With Dinner  thiamine, 100 mg, Oral, Daily      Continuous IV Infusions:     PRN Meds:  acetaminophen, 650 mg, Oral, Q6H PRN  labetalol, 10 mg, Intravenous, Q4H PRN      ED Triage Vitals   Temperature Pulse Respirations Blood Pressure SpO2 Pain Score   03/11/25 0114 03/11/25 0042 03/11/25 0042 03/11/25 0042 03/11/25 0046 03/11/25 0300   97.7 °F (36.5 °C) 81 20 136/85 97 % 2     Weight (last 2 days)       Date/Time Weight    03/11/25 0046 83.1 (183.2)            Vital Signs (last 3 days)       Date/Time Temp Pulse Resp BP MAP (mmHg) SpO2 O2 Device Patient Position - Orthostatic VS Da Coma Scale Score CIWA-Ar Total Pain    03/12/25 08:04:59 97.3 °F (36.3 °C) -- -- 105/68 80 -- -- -- -- -- --    03/12/25 0613 -- -- -- -- -- -- -- -- -- -- Med Not Given for Pain - for MAR use only    03/11/25  2345 -- -- -- -- -- -- -- -- 15 -- No Pain    03/11/25 23:05:31 -- -- 16 141/71 94 -- -- -- -- -- --    03/11/25 2300 -- -- -- -- -- -- -- -- -- 8 --    03/11/25 22:25:54 99.2 °F (37.3 °C) 97 -- 130/72 91 93 % -- -- -- -- --    03/11/25 2224 -- -- -- 130/72 -- -- -- -- -- -- --    03/11/25 2100 -- -- -- -- -- -- -- -- -- -- No Pain    03/11/25 2018 -- -- -- -- -- -- -- -- -- -- Med Not Given for Pain - for MAR use only    03/11/25 19:25:08 101 °F (38.3 °C) 99 -- 130/69 89 95 % -- -- -- -- --    03/11/25 1900 -- -- -- -- -- -- -- -- -- 0 --    03/11/25 16:25:50 101.5 °F (38.6 °C) 85 -- 183/96 125 94 % -- -- -- -- --    03/11/25 1545 98.7 °F (37.1 °C) 86 22 176/115 135 96 % None (Room air) Lying 14 -- 9    03/11/25 1500 -- 73 -- 177/86 123 96 % None (Room air) Sitting -- 2 --    03/11/25 1300 -- 82 -- 150/85 110 96 % None (Room air) Lying -- -- --    03/11/25 1100 -- 85 -- 152/69 99 93 % None (Room air) Lying 15 0 --    03/11/25 1000 -- 91 16 163/78 112 95 % None (Room air) Lying -- -- --    03/11/25 0800 -- 73 -- 164/83 117 98 % None (Room air) Lying -- -- --    03/11/25 0600 -- -- -- -- -- -- -- -- 15 -- --    03/11/25 0545 -- -- -- -- -- -- -- -- 15 -- --    03/11/25 0530 -- -- -- -- -- -- -- -- 15 -- --    03/11/25 0515 -- 90 -- 152/81 109 98 % -- -- 15 -- --    03/11/25 0500 -- 86 -- 149/82 103 98 % -- -- 15 -- --    03/11/25 0445 -- 86 -- 162/81 111 99 % -- -- 15 3 --    03/11/25 0430 -- -- -- -- -- -- -- -- 15 -- --    03/11/25 0415 -- -- -- -- -- -- -- -- 15 -- --    03/11/25 0400 -- -- -- -- -- -- -- -- 15 -- --    03/11/25 0345 -- -- -- -- -- -- -- -- 15 -- --    03/11/25 0330 -- 85 18 151/85 112 98 % None (Room air) Lying 15 -- --    03/11/25 0300 -- 88 -- 172/92 124 99 % -- -- 15 3 2    03/11/25 0245 -- 89 18 161/95 -- 98 % None (Room air) -- 15 -- --    03/11/25 0215 -- 88 18 161/95 122 100 % -- -- 15 -- --    03/11/25 0200 -- 82 -- 141/88 108 98 % -- -- -- -- --    03/11/25 01:45:11 -- 81 16 142/80 -- 97  % None (Room air) -- 15 -- --    03/11/25 0145 -- 79 -- 137/87 105 98 % -- -- -- -- --    03/11/25 0130 -- 79 18 142/80 106 99 % None (Room air) -- 15 -- --    03/11/25 01:15:53 -- 80 16 143/77 -- 99 % None (Room air) -- 15 -- --    03/11/25 0114 97.7 °F (36.5 °C) -- -- -- -- -- -- -- -- -- --    03/11/25 01:09:02 -- 82 16 170/86 -- 96 % None (Room air) -- 15 -- --    03/11/25 0046 -- -- -- -- -- 97 % None (Room air) -- -- -- --    03/11/25 0042 -- 81 20 136/85 105 -- -- -- -- -- --           CIWA-Ar Score       Row Name 03/11/25 2300 03/11/25 1900 03/11/25 1500       CIWA-Ar    Nausea and Vomiting 0 0 0    Tactile Disturbances 0 0 0    Tremor 0 0 1    Auditory Disturbances 0 0 0    Paroxysmal Sweats 0 0 0    Visual Disturbances 0 0 0    Anxiety 4 0 0    Headache, Fullness in Head 0 0 0    Agitation 4 0 1    Orientation and Clouding of Sensorium 0 0 0    CIWA-Ar Total 8 0 2      Row Name 03/11/25 1100 03/11/25 0445 03/11/25 0300       CIWA-Ar    BP -- 162/81 --    Pulse -- 86 --    Nausea and Vomiting 0 0 0    Tactile Disturbances 0 0 0    Tremor 0 0 0    Auditory Disturbances 0 0 0    Paroxysmal Sweats 0 0 0    Visual Disturbances 0 0 0    Anxiety 0 2 2    Headache, Fullness in Head 0 0 0    Agitation 0 1 1    Orientation and Clouding of Sensorium 0 0 0    CIWA-Ar Total 0 3 3                    Pertinent Labs/Diagnostic Test Results:   Radiology:  US kidney and bladder   Final Interpretation by Scottie Almazan MD (03/11 2247)      No right-sided hydronephrosis or shadowing renal calculus.      Significantly limited examination of the left kidney. No hydronephrosis seen on the single image provided.         Resident: EVIE Irving I, the attending radiologist, have reviewed the images and agree with the final report above.      Workstation performed: [id number]         XR chest 1 view portable   ED Interpretation by Bebeto Onofre MD (03/11 0124)   No acute cardiopulmonary disease      Final Interpretation  by Marino Espinoza MD (03/11 1055)      No acute cardiopulmonary disease.            Workstation performed: [id number]         CT stroke alert brain   Final Interpretation by Williams Sands MD (03/11 0127)      Chronic lacunar infarcts in the right basal ganglia, new since the prior exam. No evidence of acute vascular territorial infarction, intracranial hemorrhage or mass.      Findings were directly discussed with Logan De Paz at approximately 1:19 AM.      Workstation performed: [id number]         CTA stroke alert (head/neck)   Final Interpretation by Williams Sadns MD (03/11 0122)      No hemodynamically significant stenosis, dissection or occlusion of the carotid or vertebral arteries or major vessels of the [place].               Findings were directly discussed with Logan De Paz at 1:19 AM.      Workstation performed: [id number]         MRI inpatient order    (Results Pending)     Cardiology:  ECG 12 lead   Final Result by Babatunde Bhatti MD (03/11 1548)   Sinus rhythm with short AZ with occasional Premature ventricular complexes   Prolonged QT   Abnormal ECG   When compared with ECG of 16-Sep-2024 08:49,   Premature ventricular complexes are now Present   AZ interval has decreased   Confirmed by Babatunde Bhatti ([id number]) on 3/11/2025 3:48:50 PM        GI:  No orders to display           Results from last 7 days   Lab Units 03/12/25  0433 03/11/25  0047   WBC Thousand/uL 8.06 7.62   HEMOGLOBIN g/dL 9.8* 10.6*   HEMATOCRIT % 29.6* 33.2*   PLATELETS Thousands/uL 208 214   TOTAL NEUT ABS Thousands/µL 5.43 4.94         Results from last 7 days   Lab Units 03/12/25  0433 03/11/25  0103 03/11/25  0048   SODIUM mmol/L 137  --  136   POTASSIUM mmol/L 3.9  --  4.1   CHLORIDE mmol/L 103  --  105   CO2 mmol/L 30  --  26   ANION GAP mmol/L 4  --  5   BUN mg/dL 14  --  16   CREATININE mg/dL 0.83  --  0.99   EGFR ml/min/1.73sq m 94  --  81   CALCIUM mg/dL 8.6  --  8.9   MAGNESIUM mg/dL 1.8* 1.7*  --       Results from last 7 days   Lab Units 03/11/25  0048   AST U/L 27   ALT U/L 60*   ALK PHOS U/L 128*   TOTAL PROTEIN g/dL 6.4   ALBUMIN g/dL 3.6   TOTAL BILIRUBIN mg/dL 0.37   AMMONIA umol/L 21     Results from last 7 days   Lab Units 03/11/25  0044   POC GLUCOSE mg/dl 254*     Results from last 7 days   Lab Units 03/12/25  0433 03/11/25  0048   GLUCOSE RANDOM mg/dL 130 262*             BETA-HYDROXYBUTYRATE   Date Value Ref Range Status   08/17/2022 0.0 <0.6 mmol/L Final                  Results from last 7 days   Lab Units 03/11/25  0048   CK TOTAL U/L 120     Results from last 7 days   Lab Units 03/11/25  0234 03/11/25  0047   HS TNI 0HR ng/L  --  3   HS TNI 2HR ng/L 3  --    HSTNI D2 ng/L 0  --          Results from last 7 days   Lab Units 03/11/25  0047   PROTIME seconds 15.0   INR  1.10   PTT seconds 30     Results from last 7 days   Lab Units 03/11/25  0048   TSH 3RD GENERATON uIU/mL 1.131         Results from last 7 days   Lab Units 03/11/25  0103   LACTIC ACID mmol/L 1.0     Results from last 7 days   Lab Units 03/11/25  0234   PROLACTIN ng/mL 7.28             Results from last 7 days   Lab Units 03/11/25  0048   FERRITIN ng/mL 77   IRON SATURATION % 10*   IRON ug/dL 32*   TIBC ug/dL 305.2     Results from last 7 days   Lab Units 03/11/25  0048   TRANSFERRIN mg/dL 218           Results from last 7 days   Lab Units 03/11/25  0626   CLARITY UA  Clear   COLOR UA  Light Yellow   SPEC GRAV UA  1.026   PH UA  5.5   GLUCOSE UA mg/dl 1000 (1%)*   KETONES UA mg/dl Negative   BLOOD UA  Negative   PROTEIN UA mg/dl 30 (1+)*   NITRITE UA  Negative   BILIRUBIN UA  Negative   UROBILINOGEN UA (BE) mg/dl <2.0   LEUKOCYTES UA  Negative   WBC UA /hpf 4-10*   RBC UA /hpf None Seen   BACTERIA UA /hpf None Seen   EPITHELIAL CELLS WET PREP /hpf None Seen             Results from last 7 days   Lab Units 03/11/25  0626   AMPH/METH  Negative   BARBITURATE UR  Negative   BENZODIAZEPINE UR  Negative   COCAINE UR  Negative    METHADONE URINE  Positive*   OPIATE UR  Negative   PCP UR  Negative   THC UR  Negative     Results from last 7 days   Lab Units 03/11/25  0103 03/11/25  0048   ETHANOL LVL mg/dL  --  <10   ACETAMINOPHEN LVL ug/mL <2*  --    SALICYLATE LVL mg/dL <5  --          Past Medical History:   Diagnosis Date    Anxiety     Depression     Diabetes mellitus (HCC)     Drug use     Hemorrhoids, internal 9/23/2019    Hypertension      Present on Admission:   Benign essential hypertension   Encephalopathy   Opioid abuse (HCC)   ADD (attention deficit disorder) without hyperactivity   Bipolar disorder (HCC)   Generalized anxiety disorder      Admitting Diagnosis: Urinary retention [R33.9]  Slurred speech [R47.81]  Inability to urinate [R33.9]  Stroke-like symptom [R29.90]  Ambulatory dysfunction [R26.2]  Age/Sex: 64 y.o. male    Network Utilization Review Department  ATTENTION: Please call with any questions or concerns to [phone] and carefully listen to the prompts so that you are directed to the right person. All voicemails are confidential.   For Discharge needs, contact Care Management DC Support Team at [phone] opt. 2  Send all requests for admission clinical reviews, approved or denied determinations and any other requests to dedicated fax number below belonging to the campus where the patient is receiving treatment. List of dedicated fax numbers for the Facilities:  FACILITY NAME UR FAX NUMBER   ADMISSION DENIALS (Administrative/Medical Necessity) [phone]   DISCHARGE SUPPORT TEAM (NETWORK) [phone]   PARENT CHILD HEALTH (Maternity/NICU/Pediatrics) [phone]   [facility] [phone]   [facility] [phone]   [facility] [phone]   [facility] [phone]   [facility] [phone]   [facility] [phone]   [facility]  [phone]   [facility] [phone]   [facility] [phone]   [facility] [phone]   [facility] [phone]   [facility] [phone]
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
## 2025-03-12 NOTE — PLAN OF CARE
Problem: Potential for Falls  Goal: Patient will remain free of falls  Description: INTERVENTIONS:  - Educate patient/family on patient safety including physical limitations  - Instruct patient to call for assistance with activity   - Consult OT/PT to assist with strengthening/mobility   - Keep Call bell within reach  - Keep bed low and locked with side rails adjusted as appropriate  - Keep care items and personal belongings within reach  - Initiate and maintain comfort rounds  - Make Fall Risk Sign visible to staff  - Offer Toileting every 2 Hours, in advance of need  - Initiate/Maintain bed alarm  - Obtain necessary fall risk management equipment: alarms  - Apply yellow socks and bracelet for high fall risk patients  - Consider moving patient to room near nurses station  Outcome: Progressing     Problem: Prexisting or High Potential for Compromised Skin Integrity  Goal: Skin integrity is maintained or improved  Description: INTERVENTIONS:  - Identify patients at risk for skin breakdown  - Assess and monitor skin integrity  - Assess and monitor nutrition and hydration status  - Monitor labs   - Assess for incontinence   - Turn and reposition patient  - Assist with mobility/ambulation  - Relieve pressure over bony prominences  - Avoid friction and shearing  - Provide appropriate hygiene as needed including keeping skin clean and dry  - Evaluate need for skin moisturizer/barrier cream  - Collaborate with interdisciplinary team   - Patient/family teaching  - Consider wound care consult   Outcome: Progressing     Problem: Neurological Deficit  Goal: Neurological status is stable or improving  Description: Interventions:  - Monitor and assess patient's level of consciousness, motor function, sensory function, and level of assistance needed for ADLs.   - Monitor and report changes from baseline. Collaborate with interdisciplinary team to initiate plan and implement interventions as ordered.   - Provide and maintain a  safe environment.  - Consider seizure precautions.  - Consider fall precautions.  - Consider aspiration precautions.  - Consider bleeding precautions.  Outcome: Progressing     Problem: Activity Intolerance/Impaired Mobility  Goal: Mobility/activity is maintained at optimum level for patient  Description: Interventions:  - Assess and monitor patient  barriers to mobility and need for assistive/adaptive devices.  - Assess patient's emotional response to limitations.  - Collaborate with interdisciplinary team and initiate plans and interventions as ordered.  - Encourage independent activity per ability.  - Maintain proper body alignment.  - Perform active/passive rom as tolerated/ordered.  - Plan activities to conserve energy.  - Turn patient as appropriate  Outcome: Progressing     Problem: Communication Impairment  Goal: Ability to express needs and understand communication  Description: Assess patient's communication skills and ability to understand information.  Patient will demonstrate use of effective communication techniques, alternative methods of communication and understanding even if not able to speak.     - Encourage communication and provide alternate methods of communication as needed.  - Collaborate with case management/ for discharge needs.  - Include patient/family/caregiver in decisions related to communication.  Outcome: Progressing     Problem: Potential for Aspiration  Goal: Non-ventilated patient's risk of aspiration is minimized  Description: Assess and monitor vital signs, respiratory status, and labs (WBC).  Monitor for signs of aspiration (tachypnea, cough, rales, wheezing, cyanosis, fever).    - Assess and monitor patient's ability to swallow.  - Place patient up in chair to eat if possible.  - HOB up at 90 degrees to eat if unable to get patient up into chair.  - Supervise patient during oral intake.   - Instruct patient/ family to take small bites.  - Instruct patient/  family to take small single sips when taking liquids.  - Follow patient-specific strategies generated by speech pathologist.  Outcome: Progressing     Problem: Nutrition  Goal: Nutrition/Hydration status is improving  Description: Monitor and assess patient's nutrition/hydration status for malnutrition (ex- brittle hair, bruises, dry skin, pale skin and conjunctiva, muscle wasting, smooth red tongue, and disorientation). Collaborate with interdisciplinary team and initiate plan and interventions as ordered.  Monitor patient's weight and dietary intake as ordered or per policy. Utilize nutrition screening tool and intervene per policy. Determine patient's food preferences and provide high-protein, high-caloric foods as appropriate.     - Assist patient with eating.  - Allow adequate time for meals.  - Encourage patient to take dietary supplement as ordered.  - Collaborate with clinical nutritionist.  - Include patient/family/caregiver in decisions related to nutrition.  Outcome: Progressing     Problem: PAIN - ADULT  Goal: Verbalizes/displays adequate comfort level or baseline comfort level  Description: Interventions:  - Encourage patient to monitor pain and request assistance  - Assess pain using appropriate pain scale  - Administer analgesics based on type and severity of pain and evaluate response  - Implement non-pharmacological measures as appropriate and evaluate response  - Consider cultural and social influences on pain and pain management  - Notify physician/advanced practitioner if interventions unsuccessful or patient reports new pain  Outcome: Progressing     Problem: INFECTION - ADULT  Goal: Absence or prevention of progression during hospitalization  Description: INTERVENTIONS:  - Assess and monitor for signs and symptoms of infection  - Monitor lab/diagnostic results  - Monitor all insertion sites, i.e. indwelling lines, tubes, and drains  - Monitor endotracheal if appropriate and nasal secretions for  changes in amount and color  - Rosalia appropriate cooling/warming therapies per order  - Administer medications as ordered  - Instruct and encourage patient and family to use good hand hygiene technique  - Identify and instruct in appropriate isolation precautions for identified infection/condition  Outcome: Progressing     Problem: DISCHARGE PLANNING  Goal: Discharge to home or other facility with appropriate resources  Description: INTERVENTIONS:  - Identify barriers to discharge w/patient and caregiver  - Arrange for needed discharge resources and transportation as appropriate  - Identify discharge learning needs (meds, wound care, etc.)  - Arrange for interpretive services to assist at discharge as needed  - Refer to Case Management Department for coordinating discharge planning if the patient needs post-hospital services based on physician/advanced practitioner order or complex needs related to functional status, cognitive ability, or social support system  Outcome: Progressing     Problem: Knowledge Deficit  Goal: Patient/family/caregiver demonstrates understanding of disease process, treatment plan, medications, and discharge instructions  Description: Complete learning assessment and assess knowledge base.  Interventions:  - Provide teaching at level of understanding  - Provide teaching via preferred learning methods  Outcome: Progressing
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
## 2025-03-12 NOTE — ASSESSMENT & PLAN NOTE
Reports difficulty initiating stream worsening over the past few months  Known to our practice history of microscopic hematuria, CT renal protocol ordered outpatient, urine cytology and office cystoscopy was scheduled for yesterday, canceled due to hospitalization  Had not been taking tamsulosin outpatient  PVR overnight was 0, PVR today 402  Ultrasound has no hydronephrosis, bilateral jets detected  Tamsulosin 0.8 mg  Proscar, enlarged prostate on previous CT AP 2024  Patient mainly worried about providing urine sample for drug testing outpatient. Discussed hydration beforehand  Patient emptying bladder in good amounts 1200 ml. No abdominal pain. He would not want catheter/straight cathing on discharge.  UA this admission negative for infection, no micro heme.   Outpatient CT renal protocol, cysto for micro hematuria workup.   Urology will sign off but remain available for any further inpatient needs. Please feel free to contact the provider currently covering the Urology [facility] role for this campus with questions or concerns.

## 2025-03-12 NOTE — CONSULTS
Consultation - Urology   Name: David Skelton Jr. 64 y.o. male I MRN: [id number]  Unit/Bed#: S [facility]-01 I Date of Admission: 3/11/2025   Date of Service: 3/12/2025 I Hospital Day: 0   Inpatient consult to Urology  Consult performed by: Margarita Pardo PA-C  Consult ordered by: Montse Spence MD        Physician Requesting Evaluation: Montse Spence MD   Reason for Evaluation / Principal Problem: Urinary retention, hesitancy    Assessment & Plan  Urinary retention  Reports difficulty initiating stream worsening over the past few months  Known to our practice history of microscopic hematuria, CT renal protocol ordered outpatient, urine cytology and office cystoscopy was scheduled for yesterday, canceled due to hospitalization  Had not been taking tamsulosin outpatient  PVR overnight was 0, PVR today 402  Ultrasound has no hydronephrosis, bilateral jets detected  Tamsulosin 0.8 mg  Proscar, enlarged prostate on previous CT AP 2024  Patient mainly worried about providing urine sample for drug testing outpatient. Discussed hydration beforehand  Patient emptying bladder in good amounts 1200 ml. No abdominal pain. He would not want catheter/straight cathing on discharge.  UA this admission negative for infection, no micro heme.   Outpatient CT renal protocol, cysto for micro hematuria workup.   Urology will sign off but remain available for any further inpatient needs. Please feel free to contact the provider currently covering the Urology [facility] role for this campus with questions or concerns.     Encephalopathy  CTA, CT head no significant findings  MRI  Neurology following  ADD (attention deficit disorder) without hyperactivity    Bipolar disorder (HCC)    Opioid abuse (HCC)    Generalized anxiety disorder    Benign essential hypertension    chronic infarction of basal ganglia    Slurred speech          Subjective:   HPI: Johan is a 64-year-old male past med history anxiety, microscopic hematuria, ADHD, bipolar  disorder, depression, DM, HTN, substance use, BPH with urinary retention who presented to the ED with worsening dysarthria.  Stroke alert was activated.  Per chart review sister reported increased confusion and difficulty concentrating over the past few months.  He underwent a CAT scan of the head negative for any acute stroke chronic lacunar infarct.  CTA head and neck no evidence of large vascular occlusion or significant flow-limiting stenosis.  Neurology was consulted.    Patient is known to our practice history of microscopic hematuria, ordered CT renal protocol, urine cytology and cystoscopy.  He was scheduled yesterday for an office cystoscopy, canceled due to hospitalization.  Patient reports decreased urine output over the past month, straining to start a flow.  PVR last night was 0.  On my evaluation patient had just urinated 1200, .  He had an ultrasound which showed no hydronephrosis on the right side, bilateral ureteral jets.  He did not tolerate exam and declined to left kidney ultrasound.  His main complaint is when he needs to go to get his urine testing he has a difficulty initiating stream. We discussed hydrating beforehand.  He was ordered Flomax on this admission and outpatient.  Patient reports he had not been taking outpatient.  CT scan from 8/6/2024 did show enlarged prostate. Urology was consulted due to urinary retention.     Review of Systems   Constitutional:  Negative for chills and fever.   Respiratory:  Negative for cough and shortness of breath.    Cardiovascular:  Negative for chest pain and palpitations.   Gastrointestinal:  Negative for abdominal pain and vomiting.   Genitourinary:  Positive for difficulty urinating. Negative for hematuria.   Musculoskeletal:  Negative for arthralgias and back pain.   Skin:  Negative for color change and rash.   All other systems reviewed and are negative.      Objective:    Vitals: Blood pressure 130/78, pulse 97, temperature (!) 97.3 °F  "(36.3 °C), resp. rate 16, height 5' 8\" (1.727 m), weight 83.1 kg (183 lb 3.2 oz), SpO2 93%.,Body mass index is 27.86 kg/m².    Physical Exam  Constitutional:       General: He is not in acute distress.     Appearance: Normal appearance. He is normal weight. He is not ill-appearing or toxic-appearing.   HENT:      Head: Normocephalic and atraumatic.      Right Ear: External ear normal.      Left Ear: External ear normal.      Nose: Nose normal.      Mouth/Throat:      Mouth: Mucous membranes are moist.   Eyes:      General: No scleral icterus.     Conjunctiva/sclera: Conjunctivae normal.   Cardiovascular:      Rate and Rhythm: Normal rate.      Pulses: Normal pulses.   Pulmonary:      Effort: Pulmonary effort is normal.   Abdominal:      General: There is no distension.      Tenderness: There is no abdominal tenderness. There is no guarding.   Neurological:      General: No focal deficit present.      Mental Status: He is alert.           Labs:  Recent Labs     03/11/25  0047 03/12/25  0433   WBC 7.62 8.06       Recent Labs     03/11/25  0047 03/12/25  0433   HGB 10.6* 9.8*     Recent Labs     03/11/25  0047 03/12/25  0433   HCT 33.2* 29.6*     Recent Labs     03/11/25  0048 03/12/25  0433   CREATININE 0.99 0.83         History:    Past Medical History:   Diagnosis Date    Anxiety     Depression     Diabetes mellitus (HCC)     Drug use     Hemorrhoids, internal 9/23/2019    Hypertension      Social History     Socioeconomic History    Marital status: Legally      Spouse name: None    Number of children: None    Years of education: None    Highest education level: None   Occupational History    None   Tobacco Use    Smoking status: Never    Smokeless tobacco: Never   Vaping Use    Vaping status: Never Used   Substance and Sexual Activity    Alcohol use: Never    Drug use: Yes     Comment: Methadone clinic    Sexual activity: Not Currently   Other Topics Concern    None   Social History Narrative    None "     Social Drivers of Health     Financial Resource Strain: Low Risk  (3/16/2021)    Overall Financial Resource Strain (CARDIA)     Difficulty of Paying Living Expenses: Not hard at all   Food Insecurity: No Food Insecurity (3/11/2025)    Nursing - Inadequate Food Risk Classification     Worried About Running Out of Food in the Last Year: Never true     Ran Out of Food in the Last Year: Never true     Ran Out of Food in the Last Year: Never true   Transportation Needs: No Transportation Needs (3/11/2025)    Nursing - Transportation Risk Classification     Lack of Transportation: Not on file     Lack of Transportation: No   Physical Activity: Inactive (3/16/2021)    Exercise Vital Sign     Days of Exercise per Week: 0 days     Minutes of Exercise per Session: 0 min   Stress: No Stress Concern Present (3/16/2021)    [employer] [place] [employer] - Occupational Stress Questionnaire     Feeling of Stress : Only a little   Social Connections: Unknown (3/16/2021)    Social Connection and Isolation Panel [NHANES]     Frequency of Communication with Friends and Family: Patient declined     Frequency of Social Gatherings with Friends and Family: Patient declined     Attends Synagogue Services: Patient declined     Active Member of Clubs or Organizations: Patient declined     Attends Club or Organization Meetings: Patient declined     Marital Status: Patient declined   Intimate Partner Violence: Unknown (3/11/2025)    Nursing IPS     Feels Physically and Emotionally Safe: Not on file     Physically Hurt by Someone: Not on file     Humiliated or Emotionally Abused by Someone: Not on file     Physically Hurt by Someone: No     Hurt or Threatened by Someone: No   Housing Stability: Unknown (3/11/2025)    Nursing: Inadequate Housing Risk Classification     Has Housing: Not on file     Worried About Losing Housing: Not on file     Unable to Get Utilities: Not on file     Unable to Pay for Housing in the Last Year: No      Has Housin     Past Surgical History:   Procedure Laterality Date    FEMUR FRACTURE SURGERY Right     GASTRIC BYPASS  2011    Managed by: Braden Luna (General Surgery)    HERNIA REPAIR  2013    Incisional hernia repair Managed by: Braden Luna (General Surgery)    NC OPTX FEM SHFT FX W/INSJ IMED IMPLT W/WO SCREW Right 2024    Procedure: INSERTION NAIL IM FEMUR ANTEGRADE (TROCHANTERIC);  Surgeon: Meng Hill MD;  Location: [facility];  Service: Orthopedics    TONSILLECTOMY  2010     Family History   Problem Relation Age of Onset    Diabetes Mother     Hypertension Mother     Hypertension Father     Autoimmune disease Son        Margarita RAMESH Pardo  Date: 3/12/2025 Time: 1:58 PM

## 2025-03-12 NOTE — ASSESSMENT & PLAN NOTE
CT imaging with evidence of chronic lacunar infarcts in the right basal ganglia, new since the prior exam.   Continue statin  In light of presenting encephalopathy with worsened slurred speech, await MRI brain  Neurology following

## 2025-03-13 ENCOUNTER — TRANSITIONAL CARE MANAGEMENT (OUTPATIENT)
Dept: FAMILY MEDICINE CLINIC | Facility: CLINIC | Age: 65
End: 2025-03-13

## 2025-03-13 VITALS
WEIGHT: 183.2 LBS | SYSTOLIC BLOOD PRESSURE: 127 MMHG | TEMPERATURE: 98.2 F | HEIGHT: 68 IN | DIASTOLIC BLOOD PRESSURE: 71 MMHG | OXYGEN SATURATION: 96 % | RESPIRATION RATE: 16 BRPM | HEART RATE: 80 BPM | BODY MASS INDEX: 27.77 KG/M2

## 2025-03-13 PROBLEM — G92.8 TOXIC METABOLIC ENCEPHALOPATHY: Status: ACTIVE | Noted: 2023-07-25

## 2025-03-13 PROBLEM — E83.42 HYPOMAGNESEMIA: Status: ACTIVE | Noted: 2025-03-13

## 2025-03-13 LAB
ANION GAP SERPL CALCULATED.3IONS-SCNC: 3 MMOL/L (ref 4–13)
BASOPHILS # BLD AUTO: 0.01 THOUSANDS/ÂΜL (ref 0–0.1)
BASOPHILS NFR BLD AUTO: 0 % (ref 0–1)
BUN SERPL-MCNC: 11 MG/DL (ref 5–25)
CALCIUM SERPL-MCNC: 8.7 MG/DL (ref 8.4–10.2)
CHLORIDE SERPL-SCNC: 103 MMOL/L (ref 96–108)
CO2 SERPL-SCNC: 32 MMOL/L (ref 21–32)
CREAT SERPL-MCNC: 0.69 MG/DL (ref 0.6–1.3)
EOSINOPHIL # BLD AUTO: 0.66 THOUSAND/ÂΜL (ref 0–0.61)
EOSINOPHIL NFR BLD AUTO: 12 % (ref 0–6)
ERYTHROCYTE [DISTWIDTH] IN BLOOD BY AUTOMATED COUNT: 13.3 % (ref 11.6–15.1)
GFR SERPL CREATININE-BSD FRML MDRD: 101 ML/MIN/1.73SQ M
GLUCOSE SERPL-MCNC: 113 MG/DL (ref 65–140)
HCT VFR BLD AUTO: 30.4 % (ref 36.5–49.3)
HGB BLD-MCNC: 9.8 G/DL (ref 12–17)
IMM GRANULOCYTES # BLD AUTO: 0.01 THOUSAND/UL (ref 0–0.2)
IMM GRANULOCYTES NFR BLD AUTO: 0 % (ref 0–2)
LYMPHOCYTES # BLD AUTO: 0.92 THOUSANDS/ÂΜL (ref 0.6–4.47)
LYMPHOCYTES NFR BLD AUTO: 16 % (ref 14–44)
MAGNESIUM SERPL-MCNC: 1.7 MG/DL (ref 1.9–2.7)
MCH RBC QN AUTO: 27.4 PG (ref 26.8–34.3)
MCHC RBC AUTO-ENTMCNC: 32.2 G/DL (ref 31.4–37.4)
MCV RBC AUTO: 85 FL (ref 82–98)
MONOCYTES # BLD AUTO: 0.53 THOUSAND/ÂΜL (ref 0.17–1.22)
MONOCYTES NFR BLD AUTO: 9 % (ref 4–12)
NEUTROPHILS # BLD AUTO: 3.51 THOUSANDS/ÂΜL (ref 1.85–7.62)
NEUTS SEG NFR BLD AUTO: 63 % (ref 43–75)
NRBC BLD AUTO-RTO: 0 /100 WBCS
PLATELET # BLD AUTO: 198 THOUSANDS/UL (ref 149–390)
PMV BLD AUTO: 11.3 FL (ref 8.9–12.7)
POTASSIUM SERPL-SCNC: 4 MMOL/L (ref 3.5–5.3)
RBC # BLD AUTO: 3.58 MILLION/UL (ref 3.88–5.62)
SODIUM SERPL-SCNC: 138 MMOL/L (ref 135–147)
WBC # BLD AUTO: 5.64 THOUSAND/UL (ref 4.31–10.16)

## 2025-03-13 PROCEDURE — 85025 COMPLETE CBC W/AUTO DIFF WBC: CPT | Performed by: INTERNAL MEDICINE

## 2025-03-13 PROCEDURE — 97165 OT EVAL LOW COMPLEX 30 MIN: CPT

## 2025-03-13 PROCEDURE — 92526 ORAL FUNCTION THERAPY: CPT

## 2025-03-13 PROCEDURE — 83735 ASSAY OF MAGNESIUM: CPT | Performed by: INTERNAL MEDICINE

## 2025-03-13 PROCEDURE — 99214 OFFICE O/P EST MOD 30 MIN: CPT | Performed by: PSYCHIATRY & NEUROLOGY

## 2025-03-13 PROCEDURE — 97163 PT EVAL HIGH COMPLEX 45 MIN: CPT

## 2025-03-13 PROCEDURE — 99239 HOSP IP/OBS DSCHRG MGMT >30: CPT | Performed by: INTERNAL MEDICINE

## 2025-03-13 PROCEDURE — 80048 BASIC METABOLIC PNL TOTAL CA: CPT | Performed by: INTERNAL MEDICINE

## 2025-03-13 RX ORDER — METHADONE HYDROCHLORIDE 10 MG/ML
140 CONCENTRATE ORAL DAILY
Start: 2025-03-14 | End: 2025-03-24

## 2025-03-13 RX ORDER — METHADONE HYDROCHLORIDE 10 MG/ML
140 CONCENTRATE ORAL DAILY
Refills: 0 | Status: DISCONTINUED | OUTPATIENT
Start: 2025-03-13 | End: 2025-03-13

## 2025-03-13 RX ORDER — MAGNESIUM SULFATE HEPTAHYDRATE 40 MG/ML
4 INJECTION, SOLUTION INTRAVENOUS ONCE
Status: COMPLETED | OUTPATIENT
Start: 2025-03-13 | End: 2025-03-13

## 2025-03-13 RX ORDER — METHADONE HYDROCHLORIDE 10 MG/ML
140 CONCENTRATE ORAL DAILY
Refills: 0 | Status: DISCONTINUED | OUTPATIENT
Start: 2025-03-14 | End: 2025-03-13 | Stop reason: HOSPADM

## 2025-03-13 RX ORDER — MULTIVITAMIN
1 CAPSULE ORAL DAILY
Qty: 30 CAPSULE | Refills: 0 | Status: SHIPPED | OUTPATIENT
Start: 2025-03-13

## 2025-03-13 RX ORDER — LANOLIN ALCOHOL/MO/W.PET/CERES
100 CREAM (GRAM) TOPICAL DAILY
Qty: 30 TABLET | Refills: 0 | Status: SHIPPED | OUTPATIENT
Start: 2025-03-14

## 2025-03-13 RX ORDER — ATORVASTATIN CALCIUM 40 MG/1
40 TABLET, FILM COATED ORAL
Qty: 30 TABLET | Refills: 0 | Status: SHIPPED | OUTPATIENT
Start: 2025-03-13

## 2025-03-13 RX ORDER — CLOPIDOGREL BISULFATE 75 MG/1
75 TABLET ORAL DAILY
Qty: 30 TABLET | Refills: 0 | Status: SHIPPED | OUTPATIENT
Start: 2025-03-14

## 2025-03-13 RX ORDER — FINASTERIDE 5 MG/1
5 TABLET, FILM COATED ORAL DAILY
Qty: 30 TABLET | Refills: 0 | Status: SHIPPED | OUTPATIENT
Start: 2025-03-14

## 2025-03-13 RX ORDER — FOLIC ACID 1 MG/1
1 TABLET ORAL DAILY
Qty: 30 TABLET | Refills: 0 | Status: SHIPPED | OUTPATIENT
Start: 2025-03-14

## 2025-03-13 RX ADMIN — MAGNESIUM SULFATE 4 G: 4 INJECTION INTRAVENOUS at 10:05

## 2025-03-13 RX ADMIN — MULTIPLE VITAMINS W/ MINERALS TAB 1 TABLET: TAB ORAL at 10:05

## 2025-03-13 RX ADMIN — FINASTERIDE 5 MG: 5 TABLET, FILM COATED ORAL at 10:05

## 2025-03-13 RX ADMIN — Medication 100 MG: at 10:05

## 2025-03-13 RX ADMIN — CLOPIDOGREL BISULFATE 75 MG: 75 TABLET ORAL at 10:05

## 2025-03-13 RX ADMIN — ENOXAPARIN SODIUM 40 MG: 40 INJECTION SUBCUTANEOUS at 10:05

## 2025-03-13 RX ADMIN — FUROSEMIDE 20 MG: 20 TABLET ORAL at 10:05

## 2025-03-13 RX ADMIN — Medication 2000 UNITS: at 10:04

## 2025-03-13 RX ADMIN — METHADONE HYDROCHLORIDE 140 MG: 10 CONCENTRATE ORAL at 06:48

## 2025-03-13 RX ADMIN — FOLIC ACID 1 MG: 1 TABLET ORAL at 10:05

## 2025-03-13 NOTE — CASE MANAGEMENT
Case Management Assessment & Discharge Planning Note    Patient name David Skelton Jr.  Location S /S -01 MRN [id number]  : 1960 Date 3/13/2025       Current Admission Date: 3/11/2025  Current Admission Diagnosis:Encephalopathy   Patient Active Problem List    Diagnosis Date Noted Date Diagnosed    Chronic anemia 2025     Urinary retention 2025     History of stroke 2025     Slurred speech 2025     Type 2 diabetes mellitus, with long-term current use of insulin (HCC) 2024     LUNA (iron deficiency anemia) 2024     NINFA (generalized anxiety disorder) 2024     Methadone dependence (HCC) 2024     Paroxysmal atrial fibrillation (HCC) 2024     Toxic encephalopathy 2024     Recurrent falls 2024     Closed fracture of right femur, unspecified fracture morphology, sequela 2024     Type 2 diabetes mellitus with other ophthalmic complication, with long-term current use of insulin (HCC) 2024     Cognitive decline 2024     Ambulatory dysfunction 2024     Prolonged QT interval 2024     Left ventricular hypertrophy 2024     Acute blood loss anemia 2024     Chronic hepatitis (HCC) 2024     BPH (benign prostatic hyperplasia) 2024     Cholelithiasis 2024     CKD (chronic kidney disease), stage II 2024     Hypogonadism in male 2024     Electrolyte abnormality 2024     Hyponatremia 2024     Right hip pain 2024     Acute blood loss anemia (ABLA) 2024     Nocturia associated with benign prostatic hyperplasia 2024     Gross hematuria 2024     Closed fracture of right femur, unspecified fracture morphology, sequela 2024     B12 deficiency 2023     Bradycardia 2023     Retinopathy due to secondary DM (HCC) 2023     Visual changes 2023     Fall 2023     Closed fracture of multiple ribs of left side 2023      Methadone dependence (HCC) 07/25/2023     Encephalopathy 07/25/2023     Liver lesion, right lobe 06/08/2021     Lung nodule 06/08/2021     Adrenal nodule (HCC) 06/08/2021     Thoracic degenerative disc disease 04/12/2021     DDD (degenerative disc disease), lumbar 04/12/2021     Pre-operative general physical examination 12/31/2019     S/P gastric bypass 11/15/2019     Hypogonadism male 11/15/2019     Hemorrhoids, internal 09/23/2019     Rectal prolapse 09/11/2019     Drug-induced constipation 09/11/2019     Chronic anticoagulation 03/01/2019     GERD without esophagitis 03/01/2019     Iron deficiency anemia 03/01/2019     Major depression, chronic 03/01/2019     Stable proliferative diabetic retinopathy of left eye associated with type 2 diabetes mellitus (HCC) 03/01/2019     Opioid abuse ([facility]) 06/06/2018     Ulcer of leg, chronic, left (HCC) 02/27/2015     ADD (attention deficit disorder) without hyperactivity 01/29/2015     Bipolar disorder ([facility]) 01/10/2014     Insomnia 01/21/2013     History of hepatitis C 01/14/2013     Allergic rhinitis 01/04/2013     Generalized anxiety disorder 01/04/2013     Macular degeneration 01/04/2013     Spinal stenosis 01/04/2013     Vitamin D deficiency 01/04/2013     Benign essential hypertension 09/14/2012     Type 2 diabetes mellitus with ophthalmic complication, with long-term current use of insulin ([facility]) 09/14/2012     Atrial fibrillation ([facility]) 05/15/2012     Complex medical condition 05/15/2012       LOS (days): 0  Geometric Mean LOS (GMLOS) (days):   Days to GMLOS:     OBJECTIVE:              Current admission status: Observation       Preferred Pharmacy:   [id number] - [place] - [address]  [place] [address]  Phone: [phone] Fax: [phone]    [id number] - [place] - [address]  [address]  [place] [address]  Phone: [phone] Fax: [phone]    [facility]  [place] [facility] [place] - [address]  [address]  [place] [address]  Phone: [phone] Fax: [phone]    [facility] [place] - [address]  [address]  [place] [address]  Phone: [phone] Fax: [phone]    UNKNOWN - FOLLOW UP PRIOR TO DISCHARGE TO E-PRESCRIBE  No address on file      [id number] - [place], [place] - [address]  [address]  [place] [address]  Phone: [phone] Fax: [phone]    Primary Care Provider: Casey Mckeon MD    Primary Insurance: [facility]  Secondary Insurance:     ASSESSMENT:  Active Health Care Proxies       [facility] - Sister   Primary Phone: [phone] (Mobile)  Home Phone: [phone]                           Readmission Root Cause  30 Day Readmission: No    Patient Information  Admitted from:: Home  Mental Status: Other (Comment) (Cm spoke with Pts sister)  During Assessment patient was accompanied by: Sister  Assessment information provided by:: Sister  Primary Caregiver: Self  Support Systems: Self, Family members, Home care staff (Home care staff through waiver)  County of Residence: [place]  What city do you live in?: [place]  Home entry access options. Select all that apply.: No steps to enter home  Type of Current Residence: Apartment  Floor Level: 4  Upon entering residence, is there a bedroom on the main floor (no further steps)?: No  A bedroom is located on the following floor levels of residence (select all that apply)::  (4th Floor Apt.)  Upon entering residence, is there a bathroom on the main floor (no further steps)?: Yes  Living Arrangements: Lives Alone  Is patient a ?: No    Activities of Daily Living Prior to Admission  Functional Status: Assistance  Completes ADLs independently?: Yes  Ambulates independently?: Yes  Does patient use assisted devices?:  Yes  Assisted Devices (DME) used: Walker  Does patient currently own DME?: Yes  What DME does the patient currently own?: Walker  Does patient have a history of Outpatient Therapy (PT/OT)?: No  Does the patient have a history of Short-Term Rehab?: No  Does patient have a history of HHC?: Yes  Does patient currently have HHC?: No         Patient Information Continued  Income Source: SSI/SSD  Does patient have prescription coverage?: Yes  Can the patient afford their medications and any related supplies (such as glucometers or test strips)?: Yes  Does patient receive dialysis treatments?: No  Does patient have a history of substance abuse?: Yes  Historical substance use preference: Heroin  Is patient currently in treatment for substance abuse?: Yes (Methadone clinic in [place])  Does patient have a history of Mental Health Diagnosis?: Yes (Anxiety and depression)  Is patient receiving treatment for mental health?: Yes  Has patient received inpatient treatment related to mental health in the last 2 years?: No         Means of Transportation  Means of Transport to Appts:: Public Transportation - Lyft      DISCHARGE DETAILS:    Discharge planning discussed with:: Patient's sister via phone  Freedom of Choice: Yes     CM contacted family/caregiver?: Yes  Were Treatment Team discharge recommendations reviewed with patient/caregiver?: Yes          Contacts  Patient Contacts: Vandana Matos  Relationship to Patient:: Family  Contact Method: Phone  Phone Number: [phone]  Reason/Outcome: Discharge Planning, Continuity of Care    CM spoke with Pts sister regarding the Pts discharge plan.Pt resides alone in a 4th floor apt with elevator access. Pt uses a RW as needed.  Pt receives in-home services through the waiver program M-F (8am-2pm). Pt receives Lyft/Uber rides through Medical Assistance to get to his methadone clinic appointments. Pt has had HHC in the past.     Pt will need Lyft transportation home.

## 2025-03-13 NOTE — QUICK NOTE
Patient evaluated bedside.  Patient wanted methadone to be timed at 530 since that is how he takes it at this clinic [facility] in [place] (Dr Dean).  Patient provided me the information of his methadone clinic and #[phone]. change the timing of today's dose to be given now.  Will need to reach out to the provider and confirm timing of dose.      Addendum:  I called the clinic to verify.  Patient is able to come from 5:30 AM to 11 AM for dosing and patient does come to the clinic around 6:30 AM.  Nurse also inquired why patient was admitted to the hospital and let her know that he was encephalopathic.  Currently alert and oriented x 3.  We suspect currently that he might have had polypharmacy.  Nursing was concerned whether he overdosed since he has been found with extra methadone bottle on him.    Changing the methadone 140 mg daily to be timed at 5.30 am.

## 2025-03-13 NOTE — ASSESSMENT & PLAN NOTE
"David Skelton Jr. is a 64 y.o. male with reported history of paroxysmal A-fib not on AC, DM type II, HTN, NINFA, bipolar disorder, ADD, chronic anemia, history of substance abuse who presented to [facility] on 3/11/25 with \"not feeling himself.\"     Stroke alert was initiated by the ED due to dysarthria. BP on arrival 136/85. LKW unknown per ED. NIHSS 2 (dysarthria) per ED. CTH wo contrast without acute intracranial abnormality. CTA H/N wwo contrast without IR target. Pt not a thrombolytic candidate due to unclear LKW.    Per on-call neurology, if patient speech back to baseline, which was reported by ED, and does not have focal/lateralizing symptoms, no recommendation to admit on stroke pathway. On-Call neurologist recommended checking UDS +/- alcohol level, metalbolic workup per primary team, TSH, vitamin B12, and thiamine.     Workup:  - CTH wo contrast 3/11/25:  \"Chronic lacunar infarcts in the right basal ganglia, new since the prior exam. No evidence of acute vascular territorial infarction, intracranial hemorrhage or mass.\"  - CTA H/N wwo contrast 3/11/25:  \"No hemodynamically significant stenosis, dissection or occlusion of the carotid or vertebral arteries or major vessels of the [place].\"  - Labs on presentation:  - Hyperglycemic, 254  - Hypomagnesemia, 1.7  - UDS + methadone, otherwise negative   - Vitamin B12 on lower end of normal, 348  - Vitamin D low, 18.2  - Lipid panel: Total cholesterol 95, triglycerides 39, HDL 53, LDL 34  - UA with negative nitrites, negative leukocytes  - Urine culture: <10,000 CFU/mL mixed contaminants x 2  - Labs WNL: Troponin, coma panel, total CK, TSH, ammonia, folate, lactic acid, prolactin  -MRI brain w wo:  \"No acute intracranial pathology. Mild chronic microangiopathy and chronic lacunar infarcts\"  - Routine EEG:  \"This Routine EEG recorded during predominant sleep and brief wakefulness is abnormal. Background activities are too slow suggesting moderate diffuse cerebral " "dysfunction of nonspecific etiology.\"    MRI brain unremarkable for acute pathology and routine EEG unremarkable for electrographic seizures or epileptiform discharges. Suspect toxic metabolic encephalopathy, likely related to polypharmacy as patient is on multiple medications which may cause encephalopathy, sedation, and slurred speech including trazodone, gabapentin, clonazepam, clonidine, methadone.    Plan:  - Pending: Vitamin B1  - Pt unable to take ASA, adverse reaction of SOB/anxiety/wheezing in the past.   - Given evidence of chronic strokes on MRI brain, reasonable to continue Plavix 75 mg daily from a neurology standpoint  - Currently on atorvastatin 40 mg daily, will defer continuation to primary team; given evidence of chronic strokes, okay to continue from a neurology standpoint  - Recommend vitamin B12 supplementation, defer to primary team   - Goal normotension, euglycemia, normothermia  - Medical management and correction of metabolic and infectious disturbances per primary team   - Avoid CNS altering medications if possible  - Currently on CIWA protocol  - Continue supportive care   - Continue to monitor neurologic status. Notify neurology with any changes in exam.   "

## 2025-03-13 NOTE — DISCHARGE SUMMARY
Discharge Summary - Hospitalist   Name: David Skelton Jr. 64 y.o. male I MRN: [id number]  Unit/Bed#: [facility] I Date of Admission: 3/11/2025   Date of Service: 3/13/2025 I Hospital Day: 0     Assessment & Plan  Toxic metabolic encephalopathy  3/11 - Presented drowsy, encephalopathy, slurred speech-initially a stroke alert  At one point patient reported that he had seizure at home.  He has a history of seizure-like activity in the past.  Lactic acid within normal limits he also told someone stole his clonazepam from him.  CAT scan of the head was negative for any acute stroke, showed chronic lacunar infarct.  CTA head and neck no evidence of large vessel occlusion or significant flow-limiting stenosis.  Neurology team did not think that this was a stroke and was not placed on a stroke pathway.  Per family patient's speech is currently at baseline.  Increased confusion and difficulty concentrating over the past few months per family  Physical examination-slurred speech, no teeth(slurred speech is consistent with the dental status).  Bilateral upper and lower extremity strength within normal limits.  Left lower extremity motion limited by pain in the hip.  Did not get much history as the patient was drowsy and yelling.  TSH, blood alcohol, within normal limits  Patient is on trazodone, gabapentin, clonazepam 1 mg twice daily, clonidine, Aderall  Was telling me that his clonazepam was stolen by someone and also told that he he takes methadone.  However review of PDMP did not show methadone prescription. Methadone clinic called [facility] phone number [phone].  Patient is on 140 mg daily of methadone.  Will order this for now but may have to come down on the dosage as the patient is presenting with encephalopath  Differential diagnosis-polypharmacy causing drowsiness and encephalopathy versus dementia with progression versus Wernicke's encephalopathy(alcohol level negative and patient reports he  does not drink alcohol) versus seizures versus less likely stroke  Unsure if this is an episode of withdrawal seizure from withdrawal of Klonopin as the patient told me that he had a seizure at home and has not been taking Klonopin for the past 3 to 4 days as someone stole the Klonopin(patient is not a reliable historian and is encephalopathic and drowsy)-received 1 dose of Klonopin 1 mg in the ED. however further doses have been held as the patient is encephalopathic and drowsy.  Patient not a good candidate for MRI as patient has bracelet on the ankle because of house arrest. Will have to get that off for MRI  Per neurology-does not require stroke pathway  Follow-up UA  Vitamin B12  Folate  Vitamin D levels  Hold gabapentin, trazodone, clonazepam, Adderall as the patient is very drowsy  That is high chance that the patient may withdraw from clonazepam but will hold for now as the patient is very drowsy, may restart at a lower dose  Neurology consulted   Supplement folate and thiamine  Unsure if patient has  been using alcohol in the past and is withdrawing from that-will place him on a CIWA protocol  Consider psych consult for medication management  Seizure precautions  Consider EEG for evaluation of seizure   Will place patient on dysphagia dental soft diet as he has no teeth and presented with slurred speech but passed bedside dysphagia screening.  Speech and swallow evaluation    3/12 - Awaiting MRI of brain.  EEG negative for acute epileptiform activity but did reveal moderate diffuse cerebral dysfunction. Neurology following.  Continue to limit/avoid sedating medications as possible, although mentation improved today.  Residual slurred speech has persisted.     3/13 - Plan for discharge home today with home health services.  MRI brain without evidence of acute intracranial etiology but did reveal chronic microangiopathic changes and prior lacunar infarcts.  Mentation back to baseline with mild/residual  chronic slurred speech.  Resume home Clonazepam regimen and continue maintenance Methadone course.  Opioid abuse (HCC)  History of opioid abuse in the past  Per chart review patient was on Methadone.  But no current prescription Per PDMP review.  Last prescription  in 2024.  As stated above, methadone clinic was contacted by admitting provider with current dosing (140 mg daily) confirmed.  Benign essential hypertension  Continue Catapres - additional PRN IV Labetalol on board for BP spikes  Previously on Toprol-XL, which patient currently denies taking  Also on PRN HCTZ daily for leg edema  Chronic anemia  Hemoglobin stable  ADD (attention deficit disorder) without hyperactivity  Adderall initially held due to presenting encephalopathy -> resumed on discharge  Urinary retention  Continue Flomax  Appreciate urology input -> known to their practice for prior episodes of microscopic materia  Bipolar disorder (HCC)  Patient no longer on Abilify  Generalized anxiety disorder  Klonopin resumed  History of stroke  CT imaging with evidence of chronic lacunar infarcts in the right basal ganglia -confirmed on MRI of brain  Continue statin - Plavix initiated by neurology due to AC intolerability  Hypomagnesemia  Repleted magnesium deficiency  Serum potassium normal           Discharging Physician / Practitioner: Montse Spence MD  PCP: Casey Mckeon MD    Admission Date:   Admission Orders (From admission, onward)       Ordered        25 0252  Place in Observation  Once                          Discharge Date: 25      Reason for Admission:     Slurred speech and sluggishness      Discharge Diagnoses:     Principal Problem:    Toxic metabolic encephalopathy    Active Problems:    ADD (attention deficit disorder) without hyperactivity    Bipolar disorder (HCC)    Generalized anxiety disorder    Benign essential hypertension    Opioid abuse (HCC)    Chronic anemia    Urinary retention    History  "of stroke    Hypomagnesemia      Consultations During Hospital Stay:   Neurology  Urology      Condition at Discharge: fair       Discharge Day Visit / Exam:     Vitals: Blood Pressure: 127/71 (03/13/25 1108)  Pulse: 80 (03/13/25 1108)  Temperature: 98.2 °F (36.8 °C) (03/13/25 1108)  Temp Source: Oral (03/13/25 0755)  Respirations: 16 (03/12/25 4775)  Height: 5' 8\" (172.7 cm) (03/11/25 0046)  Weight - Scale: 83.1 kg (183 lb 3.2 oz) (03/11/25 0046)  SpO2: 96 % (03/13/25 1108)      Physical exam - I had a face-to-face encounter with the patient on day of discharge.      Discussion with Patient and/or Family:  The patient has been advised to return to the ER immediately if any symptoms recur or worsen.       Discharge Instructions/Information to Patient and/or Family:   See after visit summary for information provided to patient and/or family.        Provisions for Follow-up Care:   See after visit summary for information related to follow-up care and any pertinent home health orders.        Disposition:   Home with home health services      Discharge Medications:   See after visit summary for reconciled discharge medications provided to patient and/or family.        Discharge Statement:   I spent 38 minutes discharging the patient. This time was spent on the day of discharge. I had direct contact with the patient on the day of discharge. Greater than 50% of the total time was spent examining patient, answering all patient questions, arranging and discussing plan of care with patient as well as directly providing post-discharge instructions.  Additional time then spent on discharge activities.           YUNIOR ELMORE MD    Hospitalist - [facility]        ** Please Note:  Documentation is constructed using a voice recognition dictation system.  An occasional wrong word/phrase or “sound-a-like” substitution may have been picked up by dictation device due to the inherent limitations of voice recognition " software.  Read the chart carefully and recognize, using reasonable context, where substitutions may have occurred.**

## 2025-03-13 NOTE — PHYSICAL THERAPY NOTE
PHYSICAL THERAPY EVALUATION NOTE          Patient Name: David Skelton Jr.  Today's Date: 3/13/2025          AGE:   64 y.o.  Mrn:   [id number]  ADMIT DX:  Urinary retention [R33.9]  Slurred speech [R47.81]  Inability to urinate [R33.9]  Stroke-like symptom [R29.90]  Ambulatory dysfunction [R26.2]    Past Medical History:  Past Medical History:   Diagnosis Date    Anxiety     Depression     Diabetes mellitus (HCC)     Drug use     Hemorrhoids, internal 2019    Hypertension        Past Surgical History:  Past Surgical History:   Procedure Laterality Date    FEMUR FRACTURE SURGERY Right     GASTRIC BYPASS  2011    Managed by: Braden Luna (General Surgery)    HERNIA REPAIR  2013    Incisional hernia repair Managed by: Braden Luna (General Surgery)    CO OPTX FEM SHFT FX W/INSJ IMED IMPLT W/WO SCREW Right 2024    Procedure: INSERTION NAIL IM FEMUR ANTEGRADE (TROCHANTERIC);  Surgeon: Meng Hill MD;  Location:  MAIN OR;  Service: Orthopedics    TONSILLECTOMY       Length Of Stay: 0        PHYSICAL THERAPY EVALUATION:    Patient's identity confirmed via 2 patient identifiers (full name and ) at start of session       25 1137   PT Last Visit   PT Visit Date 25   Note Type   Note type Evaluation   Pain Assessment   Pain Assessment Tool 0-10   Pain Score No Pain   Restrictions/Precautions   Weight Bearing Precautions Per Order No   Other Precautions Cognitive;Chair Alarm;Bed Alarm;Fall Risk   Home Living   Type of Home Apartment   Home Layout One level;Performs ADLs on one level;Able to live on main level with bedroom/bathroom;Elevator  (0 MARIA VICTORIA)   Bathroom Shower/Tub Tub/shower unit   Bathroom Toilet Raised   Bathroom Equipment Tub transfer bench   Home Equipment Walker;Cane  (RW)   Prior Function   Level of [place] Independent with functional mobility;Needs assistance with ADLs;Needs assistance with  "IADLS  (assist prn w/ socks and shower set-up)   Lives With Alone   Receives Help From Family;Home health  (aide Sun-Fri 09:00-03:30 per pt, reports he will have night aide assist upon DC and his sister assists on Saturday)   IADLs Family/Friend/Other provides transportation;Family/Friend/Other provides meals;Family/Friend/Other provides medication management   Falls in the last 6 months 1 to 4   Comments At baseline pt amb mod I w/ SPC vs RW   General   Additional Pertinent History Pt admitted due to CVA/TIA-like symptoms, stroke alert in ED due to dysarthria. Brain MRI: No acute intracranial abnormality. Mild chronic microangiopathy and chronic lacunar infarcts   Family/Caregiver Present No   Cognition   Overall Cognitive Status Impaired   Arousal/Participation Cooperative   Attention Attends with cues to redirect   Orientation Level Oriented X4   Memory Decreased short term memory;Decreased recall of recent events;Decreased recall of precautions   Following Commands Follows one step commands with increased time or repetition   Comments Pt ID via name and ; pt agreeable to PT eval and mobility, pleasant and cooperative, baseline slurred speech her chair review   Subjective   Subjective \"do you give me my walking papers?\"   RLE Assessment   RLE Assessment WFL  (grossly assessed w/ functional mobility, at least 3+/5)   LLE Assessment   LLE Assessment WFL  (grossly assessed w/ functional mobility, at least 3+/5)   Bed Mobility   Additional Comments pt OOB in chair upon arrival to room, returned to chair at end of session   Transfers   Sit to Stand 5  Supervision   Additional items Assist x 1;Armrests;Increased time required   Stand to Sit 5  Supervision   Additional items Assist x 1;Armrests;Increased time required   Ambulation/Elevation   Gait pattern Improper Weight shift;Decreased foot clearance;Decreased hip extension;Decreased heel strike;Knees flexed   Gait Assistance 5  Supervision   Additional items Assist " x 1;Verbal cues   Assistive Device Rolling walker   Distance 220'   Balance   Static Sitting Good   Dynamic Sitting Fair +   Static Standing Fair  (w/ RW)   Ambulatory Fair  (w/ RW)   Activity Tolerance   Activity Tolerance Patient tolerated treatment well   Medical Staff Made Aware JACOB Jackman   Nurse Made Aware MICHAEL Macario   Assessment   Prognosis Good   Problem List Impaired balance;Decreased mobility;Decreased cognition;Impaired judgement;Decreased safety awareness   Assessment David Skelton Jr. is a 64 y.o. Male who presents to [facility] on 3/11/25 due to CVA/TIA-like symptoms and diagnosis of encephalopathy. Orders for PT eval and treat received. Comorbidities affecting pt's functional mobility at time of evaluation include: ADD, NINFA, h/o opioid abuse, h/o CVA, spinal stenosis, DM. Personal factors affecting DC include: ambulating w/ assistive device, decreased cognition, and positive fall history. At baseline, pt mobilizes mod I w/ SPC vs RW, and w/ 1-4 fall(s) in the previous 6 months. Upon evaluation, pt presents w/ the following deficits: impaired balance, impaired cognition, decreased safety awareness, and gait deviations. Pt currently requires  supervision for transfers, supervision w/ RW for ambulation. Pt's clinical presentation is unstable/unpredictable due to abnormal lab values, need for input for mobility technique, need for input for task focus, recent h/o falls, ongoing medical management. From a PT/mobility standpoint given the above findings, DC recommendation is level: III (Minimum Rehab Resource Intensity). During current admission, pt will benefit from continued skilled inpatient PT in the acute care setting in order to address the above deficits and to maximize function and mobility prior to DC from acute care.   Goals   Patient Goals to go home today   STG Expiration Date 03/23/25   Short Term Goal #1 Pt will: perform bed mobility w/ mod I to decrease pt's burden of care and increase pt's  independence w/ repositioning in bed; perform transfers w/ mod I to promote OOB mobility; ambulate 250' w/ LRAD and mod I to increase pt's ambulatory endurance/tolerance; increase all balance ratings by at least 1 grade to decrease pt's risk of falls   PT Treatment Day 0   Plan   Treatment/Interventions Functional transfer training;LE strengthening/ROM;Therapeutic exercise;Endurance training;Cognitive reorientation;Patient/family training;Equipment eval/education;Bed mobility;Gait training;Compensatory technique education   PT Frequency 1-2x/wk   Discharge Recommendation   Rehab Resource Intensity Level, PT III (Minimum Resource Intensity)   Equipment Recommended Walker   Walker Package Recommended Wheeled walker   Change/add to Walker Package? No   AM-PAC Basic Mobility Inpatient   Turning in Flat Bed Without Bedrails 4   Lying on Back to Sitting on Edge of Flat Bed Without Bedrails 3   Moving Bed to Chair 3   Standing Up From Chair Using Arms 3   Walk in Room 3   Climb 3-5 Stairs With Railing 3   Basic Mobility Inpatient Raw Score 19   Basic Mobility Standardized Score 42.48   [facility] Level Of Mobility   -HL Goal 6: Walk 10 steps or more   -HLM Achieved 7: Walk 25 feet or more   End of Consult   Patient Position at End of Consult Bedside chair;Bed/Chair alarm activated;All needs within reach       The patient's AM-PAC Basic Mobility Inpatient Short Form Raw Score is 19. A Raw score of greater than 16 suggests the patient may benefit from discharge to home. Please also refer to the recommendation of the Physical Therapist for safe discharge planning.    Pt will benefit from skilled inpatient PT during this admission in order to facilitate progress towards goals and to maximize functional independence prior to DC      DC rec: level III (Minimum Rehab Resource Intensity)        Geno Knapp, PT, DPT  03/13/25

## 2025-03-13 NOTE — ASSESSMENT & PLAN NOTE
3/11 - Presented drowsy, encephalopathy, slurred speech-initially a stroke alert  At one point patient reported that he had seizure at home.  He has a history of seizure-like activity in the past.  Lactic acid within normal limits he also told someone stole his clonazepam from him.  CAT scan of the head was negative for any acute stroke, showed chronic lacunar infarct.  CTA head and neck no evidence of large vessel occlusion or significant flow-limiting stenosis.  Neurology team did not think that this was a stroke and was not placed on a stroke pathway.  Per family patient's speech is currently at baseline.  Increased confusion and difficulty concentrating over the past few months per family  Physical examination-slurred speech, no teeth(slurred speech is consistent with the dental status).  Bilateral upper and lower extremity strength within normal limits.  Left lower extremity motion limited by pain in the hip.  Did not get much history as the patient was drowsy and yelling.  TSH, blood alcohol, within normal limits  Patient is on trazodone, gabapentin, clonazepam 1 mg twice daily, clonidine, Aderall  Was telling me that his clonazepam was stolen by someone and also told that he he takes methadone.  However review of PDMP did not show methadone prescription. Methadone clinic called [facility] phone number [phone].  Patient is on 140 mg daily of methadone.  Will order this for now but may have to come down on the dosage as the patient is presenting with encephalopath  Differential diagnosis-polypharmacy causing drowsiness and encephalopathy versus dementia with progression versus Wernicke's encephalopathy(alcohol level negative and patient reports he does not drink alcohol) versus seizures versus less likely stroke  Unsure if this is an episode of withdrawal seizure from withdrawal of Klonopin as the patient told me that he had a seizure at home and has not been taking Klonopin for the past 3  to 4 days as someone stole the Klonopin(patient is not a reliable historian and is encephalopathic and drowsy)-received 1 dose of Klonopin 1 mg in the ED. however further doses have been held as the patient is encephalopathic and drowsy.  Patient not a good candidate for MRI as patient has bracelet on the ankle because of house arrest. Will have to get that off for MRI  Per neurology-does not require stroke pathway  Follow-up UA  Vitamin B12  Folate  Vitamin D levels  Hold gabapentin, trazodone, clonazepam, Adderall as the patient is very drowsy  That is high chance that the patient may withdraw from clonazepam but will hold for now as the patient is very drowsy, may restart at a lower dose  Neurology consulted   Supplement folate and thiamine  Unsure if patient has  been using alcohol in the past and is withdrawing from that-will place him on a CIWA protocol  Consider psych consult for medication management  Seizure precautions  Consider EEG for evaluation of seizure   Will place patient on dysphagia dental soft diet as he has no teeth and presented with slurred speech but passed bedside dysphagia screening.  Speech and swallow evaluation    3/12 - Awaiting MRI of brain.  EEG negative for acute epileptiform activity but did reveal moderate diffuse cerebral dysfunction. Neurology following.  Continue to limit/avoid sedating medications as possible, although mentation improved today.  Residual slurred speech has persisted.     3/13 - Plan for discharge home today with home health services.  MRI brain without evidence of acute intracranial etiology but did reveal chronic microangiopathic changes and prior lacunar infarcts.  Mentation back to baseline with mild/residual chronic slurred speech.  Resume home Clonazepam regimen and continue maintenance Methadone course.

## 2025-03-13 NOTE — CASE MANAGEMENT
Case Management Discharge Planning Note    Patient name David Skelton Jr.  Location S /S -01 MRN [id number]  : 1960 Date 3/13/2025       Current Admission Date: 3/11/2025  Current Admission Diagnosis:Encephalopathy   Patient Active Problem List    Diagnosis Date Noted Date Diagnosed    Chronic anemia 2025     Urinary retention 2025     History of stroke 2025     Slurred speech 2025     Type 2 diabetes mellitus, with long-term current use of insulin (HCC) 2024     LUNA (iron deficiency anemia) 2024     NINFA (generalized anxiety disorder) 2024     Methadone dependence (HCC) 2024     Paroxysmal atrial fibrillation (HCC) 2024     Toxic encephalopathy 2024     Recurrent falls 2024     Closed fracture of right femur, unspecified fracture morphology, sequela 2024     Type 2 diabetes mellitus with other ophthalmic complication, with long-term current use of insulin (HCC) 2024     Cognitive decline 2024     Ambulatory dysfunction 2024     Prolonged QT interval 2024     Left ventricular hypertrophy 2024     Acute blood loss anemia 2024     Chronic hepatitis (HCC) 2024     BPH (benign prostatic hyperplasia) 2024     Cholelithiasis 2024     CKD (chronic kidney disease), stage II 2024     Hypogonadism in male 2024     Electrolyte abnormality 2024     Hyponatremia 2024     Right hip pain 2024     Acute blood loss anemia (ABLA) 2024     Nocturia associated with benign prostatic hyperplasia 2024     Gross hematuria 2024     Closed fracture of right femur, unspecified fracture morphology, sequela 2024     B12 deficiency 2023     Bradycardia 2023     Retinopathy due to secondary DM (HCC) 2023     Visual changes 2023     Fall 2023     Closed fracture of multiple ribs of left side 2023     Methadone  dependence ([facility]) 07/25/2023     Encephalopathy 07/25/2023     Liver lesion, right lobe 06/08/2021     Lung nodule 06/08/2021     Adrenal nodule (HCC) 06/08/2021     Thoracic degenerative disc disease 04/12/2021     DDD (degenerative disc disease), lumbar 04/12/2021     Pre-operative general physical examination 12/31/2019     S/P gastric bypass 11/15/2019     Hypogonadism male 11/15/2019     Hemorrhoids, internal 09/23/2019     Rectal prolapse 09/11/2019     Drug-induced constipation 09/11/2019     Chronic anticoagulation 03/01/2019     GERD without esophagitis 03/01/2019     Iron deficiency anemia 03/01/2019     Major depression, chronic 03/01/2019     Stable proliferative diabetic retinopathy of left eye associated with type 2 diabetes mellitus ([facility]) 03/01/2019     Opioid abuse ([facility]) 06/06/2018     Ulcer of leg, chronic, left ([facility]) 02/27/2015     ADD (attention deficit disorder) without hyperactivity 01/29/2015     Bipolar disorder ([facility]) 01/10/2014     Insomnia 01/21/2013     History of hepatitis C 01/14/2013     Allergic rhinitis 01/04/2013     Generalized anxiety disorder 01/04/2013     Macular degeneration 01/04/2013     Spinal stenosis 01/04/2013     Vitamin D deficiency 01/04/2013     Benign essential hypertension 09/14/2012     Type 2 diabetes mellitus with ophthalmic complication, with long-term current use of insulin ([facility]) 09/14/2012     Atrial fibrillation ([facility]) 05/15/2012     Complex medical condition 05/15/2012       LOS (days): 0  Geometric Mean LOS (GMLOS) (days):   Days to GMLOS:     OBJECTIVE:            Current admission status: Observation   Preferred Pharmacy:   [id number] - [place], [place] - [address]  [place] [address]  Phone: [phone] Fax: [phone]    [id number] - [place] - [address]  [address]  [place] [address]  Phone: [phone] Fax: [phone]    [facility] - [place] - [address]  [address]  [place] [address]  Phone: [phone] Fax: [phone]    [facility] [place] - [address]  [address]  [place] [address]  Phone: [phone] Fax: [phone]    UNKNOWN - FOLLOW UP PRIOR TO DISCHARGE TO E-PRESCRIBE  No address on file      [id number] - [place], [place] - [address]  [address]  [place] [address]  Phone: [phone] Fax: [phone]    Primary Care Provider: Casey Mckeon MD    [facility]  Secondary Insurance:     DISCHARGE DETAILS:      Requested Home Health Care         Is the patient interested in HHC at discharge?: Yes  Home Health Discipline requested:: Occupational Therapy, Physical Therapy  Home Health Agency Name:: [facility]  A External Referral Reason (only applicable if external HHA name selected): Scheduling access issues  Home Health Services Needed:: Strengthening/Theraputic Exercises to Improve Function, Gait/ADL Training, Evaluate Functional Status and Safety  Homebound Criteria Met:: Uses an Assist Device (i.e. cane, walker, etc)  Supporting Clincal Findings:: Limited Endurance         Other Referral/Resources/Interventions Provided:  Referral Comments: [facility] reserved in [place].

## 2025-03-13 NOTE — ASSESSMENT & PLAN NOTE
CT imaging with evidence of chronic lacunar infarcts in the right basal ganglia -confirmed on MRI of brain  Continue statin - Plavix initiated by neurology due to AC intolerability

## 2025-03-13 NOTE — PLAN OF CARE
Problem: Potential for Falls  Goal: Patient will remain free of falls  Description: INTERVENTIONS:  - Educate patient/family on patient safety including physical limitations  - Instruct patient to call for assistance with activity   - Consult OT/PT to assist with strengthening/mobility   - Keep Call bell within reach  - Keep bed low and locked with side rails adjusted as appropriate  - Keep care items and personal belongings within reach  - Initiate and maintain comfort rounds  - Make Fall Risk Sign visible to staff  - Offer Toileting every 2 Hours, in advance of need  - Initiate/Maintain bed alarm  - Obtain necessary fall risk management equipment: alarms  - Apply yellow socks and bracelet for high fall risk patients  - Consider moving patient to room near nurses station  Outcome: Progressing     Problem: Prexisting or High Potential for Compromised Skin Integrity  Goal: Skin integrity is maintained or improved  Description: INTERVENTIONS:  - Identify patients at risk for skin breakdown  - Assess and monitor skin integrity  - Assess and monitor nutrition and hydration status  - Monitor labs   - Assess for incontinence   - Turn and reposition patient  - Assist with mobility/ambulation  - Relieve pressure over bony prominences  - Avoid friction and shearing  - Provide appropriate hygiene as needed including keeping skin clean and dry  - Evaluate need for skin moisturizer/barrier cream  - Collaborate with interdisciplinary team   - Patient/family teaching  - Consider wound care consult   Outcome: Progressing     Problem: Neurological Deficit  Goal: Neurological status is stable or improving  Description: Interventions:  - Monitor and assess patient's level of consciousness, motor function, sensory function, and level of assistance needed for ADLs.   - Monitor and report changes from baseline. Collaborate with interdisciplinary team to initiate plan and implement interventions as ordered.   - Provide and maintain a  safe environment.  - Consider seizure precautions.  - Consider fall precautions.  - Consider aspiration precautions.  - Consider bleeding precautions.  Outcome: Progressing     Problem: Activity Intolerance/Impaired Mobility  Goal: Mobility/activity is maintained at optimum level for patient  Description: Interventions:  - Assess and monitor patient  barriers to mobility and need for assistive/adaptive devices.  - Assess patient's emotional response to limitations.  - Collaborate with interdisciplinary team and initiate plans and interventions as ordered.  - Encourage independent activity per ability.  - Maintain proper body alignment.  - Perform active/passive rom as tolerated/ordered.  - Plan activities to conserve energy.  - Turn patient as appropriate  Outcome: Progressing     Problem: Communication Impairment  Goal: Ability to express needs and understand communication  Description: Assess patient's communication skills and ability to understand information.  Patient will demonstrate use of effective communication techniques, alternative methods of communication and understanding even if not able to speak.     - Encourage communication and provide alternate methods of communication as needed.  - Collaborate with case management/ for discharge needs.  - Include patient/family/caregiver in decisions related to communication.  Outcome: Progressing     Problem: Potential for Aspiration  Goal: Non-ventilated patient's risk of aspiration is minimized  Description: Assess and monitor vital signs, respiratory status, and labs (WBC).  Monitor for signs of aspiration (tachypnea, cough, rales, wheezing, cyanosis, fever).    - Assess and monitor patient's ability to swallow.  - Place patient up in chair to eat if possible.  - HOB up at 90 degrees to eat if unable to get patient up into chair.  - Supervise patient during oral intake.   - Instruct patient/ family to take small bites.  - Instruct patient/  family to take small single sips when taking liquids.  - Follow patient-specific strategies generated by speech pathologist.  Outcome: Progressing     Problem: Nutrition  Goal: Nutrition/Hydration status is improving  Description: Monitor and assess patient's nutrition/hydration status for malnutrition (ex- brittle hair, bruises, dry skin, pale skin and conjunctiva, muscle wasting, smooth red tongue, and disorientation). Collaborate with interdisciplinary team and initiate plan and interventions as ordered.  Monitor patient's weight and dietary intake as ordered or per policy. Utilize nutrition screening tool and intervene per policy. Determine patient's food preferences and provide high-protein, high-caloric foods as appropriate.     - Assist patient with eating.  - Allow adequate time for meals.  - Encourage patient to take dietary supplement as ordered.  - Collaborate with clinical nutritionist.  - Include patient/family/caregiver in decisions related to nutrition.  Outcome: Progressing     Problem: PAIN - ADULT  Goal: Verbalizes/displays adequate comfort level or baseline comfort level  Description: Interventions:  - Encourage patient to monitor pain and request assistance  - Assess pain using appropriate pain scale  - Administer analgesics based on type and severity of pain and evaluate response  - Implement non-pharmacological measures as appropriate and evaluate response  - Consider cultural and social influences on pain and pain management  - Notify physician/advanced practitioner if interventions unsuccessful or patient reports new pain  Outcome: Progressing     Problem: INFECTION - ADULT  Goal: Absence or prevention of progression during hospitalization  Description: INTERVENTIONS:  - Assess and monitor for signs and symptoms of infection  - Monitor lab/diagnostic results  - Monitor all insertion sites, i.e. indwelling lines, tubes, and drains  - Monitor endotracheal if appropriate and nasal secretions for  changes in amount and color  - [place] appropriate cooling/warming therapies per order  - Administer medications as ordered  - Instruct and encourage patient and family to use good hand hygiene technique  - Identify and instruct in appropriate isolation precautions for identified infection/condition  Outcome: Progressing     Problem: DISCHARGE PLANNING  Goal: Discharge to home or other facility with appropriate resources  Description: INTERVENTIONS:  - Identify barriers to discharge w/patient and caregiver  - Arrange for needed discharge resources and transportation as appropriate  - Identify discharge learning needs (meds, wound care, etc.)  - Arrange for interpretive services to assist at discharge as needed  - Refer to Case Management Department for coordinating discharge planning if the patient needs post-hospital services based on physician/advanced practitioner order or complex needs related to functional status, cognitive ability, or social support system  Outcome: Progressing     Problem: Knowledge Deficit  Goal: Patient/family/caregiver demonstrates understanding of disease process, treatment plan, medications, and discharge instructions  Description: Complete learning assessment and assess knowledge base.  Interventions:  - Provide teaching at level of understanding  - Provide teaching via preferred learning methods  Outcome: Progressing

## 2025-03-13 NOTE — ASSESSMENT & PLAN NOTE
Continue Catapres - additional PRN IV Labetalol on board for BP spikes  Previously on Toprol-XL, which patient currently denies taking  Also on PRN HCTZ daily for leg edema

## 2025-03-13 NOTE — OCCUPATIONAL THERAPY NOTE
Occupational Therapy Evaluation     Patient Name: David Skelton Jr.  Today's Date: 3/13/2025  Problem List  Principal Problem:    Encephalopathy  Active Problems:    ADD (attention deficit disorder) without hyperactivity    Bipolar disorder (HCC)    Generalized anxiety disorder    Benign essential hypertension    Opioid abuse (HCC)    Chronic anemia    Urinary retention    History of stroke    Slurred speech    Past Medical History  Past Medical History:   Diagnosis Date    Anxiety     Depression     Diabetes mellitus (HCC)     Drug use     Hemorrhoids, internal 9/23/2019    Hypertension      Past Surgical History  Past Surgical History:   Procedure Laterality Date    FEMUR FRACTURE SURGERY Right     GASTRIC BYPASS  11/08/2011    Managed by: Braden Luna (General Surgery)    HERNIA REPAIR  02/13/2013    Incisional hernia repair Managed by: Braden Luna (General Surgery)    CA OPTX FEM SHFT FX W/INSJ IMED IMPLT W/WO SCREW Right 7/31/2024    Procedure: INSERTION NAIL IM FEMUR ANTEGRADE (TROCHANTERIC);  Surgeon: Meng Hill MD;  [facility]: [facility];  Service: Orthopedics    TONSILLECTOMY  2010 03/13/25 1124   OT Last Visit   OT Visit Date 03/13/25   Note Type   Note type Evaluation   Pain Assessment   Pain Assessment Tool 0-10   Pain Score No Pain   Restrictions/Precautions   Weight Bearing Precautions Per Order No   Other Precautions Cognitive;Chair Alarm;Bed Alarm;Fall Risk  (ankle monitor for house arrest)   Home Living   Type of Home Apartment   Home Layout One level;Elevator   Bathroom Shower/Tub Tub/shower unit   Bathroom Toilet Raised   Bathroom Equipment Tub transfer bench   Home Equipment Walker;Cane  (rollator)   Additional Comments use of RW vs SPC at baseline   Prior Function   Level of [place] Independent with ADLs  ((A) PRN with socks, set up for showering)   Lives With Alone   Receives Help From Home health  (has A 9-3:30 Sun-Fri- also reports that night shift aide  "will be starting when he gets home. Sister assists on Saturday)   IADLs Family/Friend/Other provides transportation;Family/Friend/Other provides meals;Family/Friend/Other provides medication management  (aide completes)   Falls in the last 6 months 1 to 4   Vocational On disability   Lifestyle   Autonomy PTA pt living alone in apartment, pt (I) with ADLs and (A) with IADLs, (+)falls, (-)drives, use of RW at baseline   Reciprocal Relationships supportive sister local  + HHAs   Service to Others on disability   Intrinsic Gratification watching tv   General   Additional Pertinent History Admit due to slurred speech, admitted on stroke pathway. CT head (-) for acute changes - showing chronic lacunar infarct. PMH: opioid abuse anxiety, asymptomatic hematuria, ADHD, bipolar disorder , depression, diabetes mellitus, hypertension, substance use, BPH   Family/Caregiver Present No   Subjective   Subjective \"You know I sometimes trip up over my feet - you think I might need some physical therapy\"   ADL   Eating Assistance 7  Independent   Grooming Assistance 7  Independent   UB Bathing Assistance 5  Supervision/Setup   LB Bathing Assistance 5  Supervision/Setup   UB Dressing Assistance 5  Supervision/Setup   LB Dressing Assistance 5  Supervision/Setup   LB Dressing Deficit Thread RLE into pants;Thread LLE into pants;Pull up over hips  (increased time to complete, putting BLE into same side of pants multiple times)   Toileting Assistance  5  Supervision/Setup   Bed Mobility   Additional Comments OOB and in chair at start and end of session   Transfers   Sit to Stand 5  Supervision   Additional items Increased time required;Verbal cues   Stand to Sit 5  Supervision   Additional items Increased time required;Verbal cues   Additional Comments use of RW   Functional Mobility   Functional Mobility 5  Supervision   Additional Comments functional household distance   Additional items Rolling walker   Balance   Static Sitting Good "   Dynamic Sitting Good   Static Standing Fair   Dynamic Standing Fair -   Ambulatory Fair -   Activity Tolerance   Activity Tolerance Patient tolerated treatment well   Medical Staff Made Aware Spoke to MICHAEL Macario, PT Geno CHAPPELL Assessment   RUE Assessment WFL   LUE Assessment   LUE Assessment WFL   Hand Function   Gross Motor Coordination Functional   Fine Motor Coordination Functional   Cognition   Overall Cognitive Status Impaired   Arousal/Participation Alert;Cooperative   Attention Attends with cues to redirect   Orientation Level Oriented X4  (with increased time)   Memory Decreased short term memory;Decreased recall of recent events;Decreased recall of precautions   Following Commands Follows one step commands with increased time or repetition   Comments pleasant and cooperative, slurred speech is pt's baseline. Limited insight into deficits. Hx of ACLS in May 2024 with score of 4.6 - indicating pt may live alone with daily assistance   Assessment   Limitation Decreased ADL status;Decreased Safe judgement during ADL;Decreased cognition;Decreased endurance;Decreased self-care trans;Decreased high-level ADLs  (impaired balance, fxnl mobility, act brittney, standing brittney, attention to task, direction following, safety awareness, insight, pacing, problem solving, learning new tasks)   Prognosis Good   Assessment Pt is a 64 y.o. male seen for OT evaluation s/p admission to [facility] on 3/11/2025 due to slurred speech. Diagnosed with Encephalopathy. Personal and env factors supporting pt at time of IE include (I) PLOF, supportive sister + HHA, accessible home environment, and FFSU. Personal and env factors inhibiting engagement in occupations include limited social support and difficulty completing IADLs. Performance deficits that affect the pt’s occupational performance can be seen above. Despite pt's current functional limitations and medical complications pt is functioning near his baseline. No further acute OT needs  identified at this time. Recommend continued active ADL participation and mobilization with hospital staff while in the hospital to increase pt’s endurance and strength upon D/C. From OT standpoint, recommend D/C to home with family support when medically cleared. D/C pt from OT caseload at this time.   Goals   Patient Goals to go home today   Plan   OT Frequency Eval only   Discharge Recommendation   Rehab Resource Intensity Level, OT No post-acute rehabilitation needs  (no OT needs)   AM-PAC Daily Activity Inpatient   Lower Body Dressing 3   Bathing 3   Toileting 3   Upper Body Dressing 3   Grooming 4   Eating 4   Daily Activity Raw Score 20   Daily Activity Standardized Score (Calc for Raw Score >=11) 42.03   AM-PAC Applied Cognition Inpatient   Following a Speech/Presentation 3   Understanding Ordinary Conversation 4   Taking Medications 3   Remembering Where Things Are Placed or Put Away 4   Remembering List of 4-5 Errands 3   Taking Care of Complicated Tasks 3   Applied Cognition Raw Score 20   Applied Cognition Standardized Score 41.76   End of Consult   Patient Position at End of Consult Bedside chair;Bed/Chair alarm activated;All needs within reach        Pt with no further acute OT needs, will d/c from OT services at this time. Please re-consult if functional status declines.       The patient's raw score on the AM-PAC Daily Activity Inpatient Short Form is 20. A raw score of greater than or equal to 19 suggests the patient may benefit from discharge to home. HOWEVER please refer to the recommendation of the Occupational Therapist for safe discharge planning.      Divya Marin MS, OTR/L

## 2025-03-13 NOTE — PLAN OF CARE
Problem: Potential for Falls  Goal: Patient will remain free of falls  Description: INTERVENTIONS:  - Educate patient/family on patient safety including physical limitations  - Instruct patient to call for assistance with activity   - Consult OT/PT to assist with strengthening/mobility   - Keep Call bell within reach  - Keep bed low and locked with side rails adjusted as appropriate  - Keep care items and personal belongings within reach  - Initiate and maintain comfort rounds  - Make Fall Risk Sign visible to staff  - Offer Toileting every 2 Hours, in advance of need  - Initiate/Maintain bed alarm  - Obtain necessary fall risk management equipment: alarms  - Apply yellow socks and bracelet for high fall risk patients  - Consider moving patient to room near nurses station  Outcome: Progressing     Problem: Prexisting or High Potential for Compromised Skin Integrity  Goal: Skin integrity is maintained or improved  Description: INTERVENTIONS:  - Identify patients at risk for skin breakdown  - Assess and monitor skin integrity  - Assess and monitor nutrition and hydration status  - Monitor labs   - Assess for incontinence   - Turn and reposition patient  - Assist with mobility/ambulation  - Relieve pressure over bony prominences  - Avoid friction and shearing  - Provide appropriate hygiene as needed including keeping skin clean and dry  - Evaluate need for skin moisturizer/barrier cream  - Collaborate with interdisciplinary team   - Patient/family teaching  - Consider wound care consult   Outcome: Progressing     Problem: Neurological Deficit  Goal: Neurological status is stable or improving  Description: Interventions:  - Monitor and assess patient's level of consciousness, motor function, sensory function, and level of assistance needed for ADLs.   - Monitor and report changes from baseline. Collaborate with interdisciplinary team to initiate plan and implement interventions as ordered.   - Provide and maintain a  safe environment.  - Consider seizure precautions.  - Consider fall precautions.  - Consider aspiration precautions.  - Consider bleeding precautions.  Outcome: Progressing     Problem: Activity Intolerance/Impaired Mobility  Goal: Mobility/activity is maintained at optimum level for patient  Description: Interventions:  - Assess and monitor patient  barriers to mobility and need for assistive/adaptive devices.  - Assess patient's emotional response to limitations.  - Collaborate with interdisciplinary team and initiate plans and interventions as ordered.  - Encourage independent activity per ability.  - Maintain proper body alignment.  - Perform active/passive rom as tolerated/ordered.  - Plan activities to conserve energy.  - Turn patient as appropriate  Outcome: Progressing     Problem: Communication Impairment  Goal: Ability to express needs and understand communication  Description: Assess patient's communication skills and ability to understand information.  Patient will demonstrate use of effective communication techniques, alternative methods of communication and understanding even if not able to speak.     - Encourage communication and provide alternate methods of communication as needed.  - Collaborate with case management/ for discharge needs.  - Include patient/family/caregiver in decisions related to communication.  Outcome: Progressing     Problem: Potential for Aspiration  Goal: Non-ventilated patient's risk of aspiration is minimized  Description: Assess and monitor vital signs, respiratory status, and labs (WBC).  Monitor for signs of aspiration (tachypnea, cough, rales, wheezing, cyanosis, fever).    - Assess and monitor patient's ability to swallow.  - Place patient up in chair to eat if possible.  - HOB up at 90 degrees to eat if unable to get patient up into chair.  - Supervise patient during oral intake.   - Instruct patient/ family to take small bites.  - Instruct patient/  family to take small single sips when taking liquids.  - Follow patient-specific strategies generated by speech pathologist.  Outcome: Progressing     Problem: Nutrition  Goal: Nutrition/Hydration status is improving  Description: Monitor and assess patient's nutrition/hydration status for malnutrition (ex- brittle hair, bruises, dry skin, pale skin and conjunctiva, muscle wasting, smooth red tongue, and disorientation). Collaborate with interdisciplinary team and initiate plan and interventions as ordered.  Monitor patient's weight and dietary intake as ordered or per policy. Utilize nutrition screening tool and intervene per policy. Determine patient's food preferences and provide high-protein, high-caloric foods as appropriate.     - Assist patient with eating.  - Allow adequate time for meals.  - Encourage patient to take dietary supplement as ordered.  - Collaborate with clinical nutritionist.  - Include patient/family/caregiver in decisions related to nutrition.  Outcome: Progressing     Problem: PAIN - ADULT  Goal: Verbalizes/displays adequate comfort level or baseline comfort level  Description: Interventions:  - Encourage patient to monitor pain and request assistance  - Assess pain using appropriate pain scale  - Administer analgesics based on type and severity of pain and evaluate response  - Implement non-pharmacological measures as appropriate and evaluate response  - Consider cultural and social influences on pain and pain management  - Notify physician/advanced practitioner if interventions unsuccessful or patient reports new pain  Outcome: Progressing     Problem: INFECTION - ADULT  Goal: Absence or prevention of progression during hospitalization  Description: INTERVENTIONS:  - Assess and monitor for signs and symptoms of infection  - Monitor lab/diagnostic results  - Monitor all insertion sites, i.e. indwelling lines, tubes, and drains  - Monitor endotracheal if appropriate and nasal secretions for  changes in amount and color  - [place] appropriate cooling/warming therapies per order  - Administer medications as ordered  - Instruct and encourage patient and family to use good hand hygiene technique  - Identify and instruct in appropriate isolation precautions for identified infection/condition  Outcome: Progressing     Problem: DISCHARGE PLANNING  Goal: Discharge to home or other facility with appropriate resources  Description: INTERVENTIONS:  - Identify barriers to discharge w/patient and caregiver  - Arrange for needed discharge resources and transportation as appropriate  - Identify discharge learning needs (meds, wound care, etc.)  - Arrange for interpretive services to assist at discharge as needed  - Refer to Case Management Department for coordinating discharge planning if the patient needs post-hospital services based on physician/advanced practitioner order or complex needs related to functional status, cognitive ability, or social support system  Outcome: Progressing     Problem: Knowledge Deficit  Goal: Patient/family/caregiver demonstrates understanding of disease process, treatment plan, medications, and discharge instructions  Description: Complete learning assessment and assess knowledge base.  Interventions:  - Provide teaching at level of understanding  - Provide teaching via preferred learning methods  Outcome: Progressing

## 2025-03-13 NOTE — SPEECH THERAPY NOTE
Speech Language/Pathology    Speech/Language Pathology Progress Note    Patient Name: David Skelton Jr.  Today's Date: 3/13/2025         Subjective:  Pt seen for dysphagia tx follow up. Pt more awake and alert. Does not like the NTL, but would like to stay on dysphagia 3 diet.     Objective:  Pt trialed w/ ice water by cup and straw, also pc of toast- pt took large bites and appeared w/ effective mastication/manipulation. Good oral control w/ liquids. Swallows appeared timely with no overt s/s aspiration. No overt s/s aspiration noted. Pt also took multiple pills at one w/ sips of water w/o difficulty.    Assessment:  Pt w/ improved THOMAS, decreased risk for aspiration.    Plan/Recommendations:  Rec advance to thin liquids, cont dysphagia 3  Meds as tolerated  Aspiration precautions        Monica Yoon MA CCC-SLP  Speech Pathologist  Available via [employer]

## 2025-03-13 NOTE — PLAN OF CARE
Problem: PHYSICAL THERAPY ADULT  Goal: Performs mobility at highest level of function for planned discharge setting.  See evaluation for individualized goals.  Description: Treatment/Interventions: Functional transfer training, LE strengthening/ROM, Therapeutic exercise, Endurance training, Cognitive reorientation, Patient/family training, Equipment eval/education, Bed mobility, Gait training, Compensatory technique education  Equipment Recommended: Walker       See flowsheet documentation for full assessment, interventions and recommendations.  3/13/2025 1158 by Geno Knapp PT  Note: Prognosis: Good  Problem List: Impaired balance, Decreased mobility, Decreased cognition, Impaired judgement, Decreased safety awareness  Assessment: David Skelton Jr. is a 64 y.o. Male who presents to [facility] on 3/11/25 due to CVA/TIA-like symptoms and diagnosis of encephalopathy. Orders for PT eval and treat received. Comorbidities affecting pt's functional mobility at time of evaluation include: ADD, NINFA, h/o opioid abuse, h/o CVA, spinal stenosis, DM. Personal factors affecting DC include: ambulating w/ assistive device, decreased cognition, and positive fall history. At baseline, pt mobilizes mod I w/ SPC vs RW, and w/ 1-4 fall(s) in the previous 6 months. Upon evaluation, pt presents w/ the following deficits: impaired balance, impaired cognition, decreased safety awareness, and gait deviations. Pt currently requires  supervision for transfers, supervision w/ RW for ambulation. Pt's clinical presentation is unstable/unpredictable due to abnormal lab values, need for input for mobility technique, need for input for task focus, recent h/o falls, ongoing medical management. From a PT/mobility standpoint given the above findings, DC recommendation is level: III (Minimum Rehab Resource Intensity). During current admission, pt will benefit from continued skilled inpatient PT in the acute care setting in order to address the above  deficits and to maximize function and mobility prior to DC from acute care.        Rehab Resource Intensity Level, PT: III (Minimum Resource Intensity)    See flowsheet documentation for full assessment.

## 2025-03-13 NOTE — PROGRESS NOTES
"Progress Note - Neurology   Name: David Skelton Jr. 64 y.o. male I MRN: [id number]  Unit/Bed#: S -01 I Date of Admission: 3/11/2025   Date of Service: 3/13/2025 I Hospital Day: 0     Assessment & Plan  Encephalopathy  David Skelton Jr. is a 64 y.o. male with reported history of paroxysmal A-fib not on AC, DM type II, HTN, NINFA, bipolar disorder, ADD, chronic anemia, history of substance abuse who presented to [facility] on 3/11/25 with \"not feeling himself.\"     Stroke alert was initiated by the ED due to dysarthria. BP on arrival 136/85. LKW unknown per ED. NIHSS 2 (dysarthria) per ED. CTH wo contrast without acute intracranial abnormality. CTA H/N wwo contrast without IR target. Pt not a thrombolytic candidate due to unclear LKW.    Per on-call neurology, if patient speech back to baseline, which was reported by ED, and does not have focal/lateralizing symptoms, no recommendation to admit on stroke pathway. On-Call neurologist recommended checking UDS +/- alcohol level, metalbolic workup per primary team, TSH, vitamin B12, and thiamine.     Workup:  - CTH wo contrast 3/11/25:  \"Chronic lacunar infarcts in the right basal ganglia, new since the prior exam. No evidence of acute vascular territorial infarction, intracranial hemorrhage or mass.\"  - CTA H/N wwo contrast 3/11/25:  \"No hemodynamically significant stenosis, dissection or occlusion of the carotid or vertebral arteries or major vessels of the [place].\"  - Labs on presentation:  - Hyperglycemic, 254  - Hypomagnesemia, 1.7  - UDS + methadone, otherwise negative   - Vitamin B12 on lower end of normal, 348  - Vitamin D low, 18.2  - Lipid panel: Total cholesterol 95, triglycerides 39, HDL 53, LDL 34  - UA with negative nitrites, negative leukocytes  - Urine culture: <10,000 CFU/mL mixed contaminants x 2  - Labs WNL: Troponin, coma panel, total CK, TSH, ammonia, folate, lactic acid, prolactin  -MRI brain w wo:  \"No acute intracranial pathology. Mild chronic " "microangiopathy and chronic lacunar infarcts\"  - Routine EEG:  \"This Routine EEG recorded during predominant sleep and brief wakefulness is abnormal. Background activities are too slow suggesting moderate diffuse cerebral dysfunction of nonspecific etiology.\"    MRI brain unremarkable for acute pathology and routine EEG unremarkable for electrographic seizures or epileptiform discharges. Suspect toxic metabolic encephalopathy, likely related to polypharmacy as patient is on multiple medications which may cause encephalopathy, sedation, and slurred speech including trazodone, gabapentin, clonazepam, clonidine, methadone.    Plan:  - Pending: Vitamin B1  - Pt unable to take ASA, adverse reaction of SOB/anxiety/wheezing in the past.   - Given evidence of chronic strokes on MRI brain, reasonable to continue Plavix 75 mg daily from a neurology standpoint  - Currently on atorvastatin 40 mg daily, will defer continuation to primary team; given evidence of chronic strokes, okay to continue from a neurology standpoint  - Recommend vitamin B12 supplementation, defer to primary team   - Goal normotension, euglycemia, normothermia  - Medical management and correction of metabolic and infectious disturbances per primary team   - Avoid CNS altering medications if possible  - Currently on CIWA protocol  - Continue supportive care   - Continue to monitor neurologic status. Notify neurology with any changes in exam.   Slurred speech  - See workup and plan noted above  - Suspect patient may have chronic dysarthria  Benign essential hypertension  - Goal normotension   - Management per primary team   Bipolar disorder (HCC)  - Management per primary team   ADD (attention deficit disorder) without hyperactivity  - Management per primary team   Generalized anxiety disorder  - Management per primary team  Opioid abuse (HCC)  - Per chart review, pt on methadone as an outpatient  - Management per primary team     David Skelton Jr. will not " need outpatient follow up with Neurology. He will not require outpatient neurological testing.    Subjective   Today patient reports that his speech is at baseline.  He states that his speech is slurred from a prior stroke, however reports that it feels worse than baseline.  When asked to clarify how long his speech has been worse, he reports it has been ongoing for the past 5 years.  Patient also reports chronic right upper leg/hip pain from a prior surgery.  Denies chest pain, shortness of breath, abdominal pain, nausea, vomiting, headache, double vision, blurry vision, loss of vision, weakness, numbness/tingling.    Review of Systems  12 point ROS performed, as stated above, all others negative.    Objective :  Temp:  [97.3 °F (36.3 °C)-98 °F (36.7 °C)] 98 °F (36.7 °C)  HR:  [71-73] 71  BP: (105-136)/(68-82) 132/72  Resp:  [16-18] 16  SpO2:  [97 %-99 %] 97 %  O2 Device: None (Room air)    Physical Exam  Vitals and nursing note reviewed.   Constitutional:       General: He is not in acute distress.     Appearance: Normal appearance. He is not ill-appearing, toxic-appearing or diaphoretic.   HENT:      Head: Normocephalic and atraumatic.   Eyes:      General: No scleral icterus.        Right eye: No discharge.         Left eye: No discharge.      Extraocular Movements: Extraocular movements intact.      Conjunctiva/sclera: Conjunctivae normal.   Musculoskeletal:         General: Normal range of motion.   Skin:     General: Skin is warm and dry.      Coloration: Skin is not jaundiced or pale.   Neurological:      Mental Status: He is alert.   Psychiatric:         Mood and Affect: Mood normal.         Behavior: Behavior normal.         Thought Content: Thought content normal.         Judgment: Judgment normal.      Comments: Became tearful on exam when talking about his slurred speech and about the passing of his wife 4 years ago  Hyperfixated on receiving his clonazepam       Neurological Exam  Mental  Status  Alert.  Patient is alert, lying in bed  Oriented to person, place, month, and year   Able to name the president  Able to name all objects provided   Follows central and appendicular commands   Answers all questions appropriately   Moderate dysarthria noted  No evidence of aphasia.    Cranial Nerves  CN III, IV, VI: Extraocular movements intact bilaterally.  Primary gaze midline, conjugate gaze noted   No gaze preference or forced gaze deviation   No visual field deficits noted   EOMs intacts, no evidence of nystagmus   Left lower lip weakness when talking; small appears symmetric  Hearing intact   Tongue midline, no deviation or lacerations;  appears erythematous.    Motor  Normal muscle bulk throughout. Normal muscle tone.  Bilateral upper and lower extremity strength testing 5/5 throughout except as noted:  Right hip flexion strength 5/5 with giveaway, limited to chronic pain.    Sensory  Light touch is normal in upper and lower extremities.     Reflexes  No involuntary movements or rhythmic seizure-like activity noted throughout exam.    Coordination    No ataxia or dysmetria in bilateral upper extremity finger-nose testing.      Lab Results: I have personally reviewed pertinent reports.  Recent Results (from the past 24 hours)   Fingerstick Glucose (POCT)    Collection Time: 03/12/25  9:32 PM   Result Value Ref Range    POC Glucose 258 (H) 65 - 140 mg/dl   Basic metabolic panel    Collection Time: 03/13/25  5:33 AM   Result Value Ref Range    Sodium 138 135 - 147 mmol/L    Potassium 4.0 3.5 - 5.3 mmol/L    Chloride 103 96 - 108 mmol/L    CO2 32 21 - 32 mmol/L    ANION GAP 3 (L) 4 - 13 mmol/L    BUN 11 5 - 25 mg/dL    Creatinine 0.69 0.60 - 1.30 mg/dL    Glucose 113 65 - 140 mg/dL    Calcium 8.7 8.4 - 10.2 mg/dL    eGFR 101 ml/min/1.73sq m   CBC and differential    Collection Time: 03/13/25  5:33 AM   Result Value Ref Range    WBC 5.64 4.31 - 10.16 Thousand/uL    RBC 3.58 (L) 3.88 - 5.62 Million/uL     Hemoglobin 9.8 (L) 12.0 - 17.0 g/dL    Hematocrit 30.4 (L) 36.5 - 49.3 %    MCV 85 82 - 98 fL    MCH 27.4 26.8 - 34.3 pg    MCHC 32.2 31.4 - 37.4 g/dL    RDW 13.3 11.6 - 15.1 %    MPV 11.3 8.9 - 12.7 fL    Platelets 198 149 - 390 Thousands/uL    nRBC 0 /100 WBCs    Segmented % 63 43 - 75 %    Immature Grans % 0 0 - 2 %    Lymphocytes % 16 14 - 44 %    Monocytes % 9 4 - 12 %    Eosinophils Relative 12 (H) 0 - 6 %    Basophils Relative 0 0 - 1 %    Absolute Neutrophils 3.51 1.85 - 7.62 Thousands/µL    Absolute Immature Grans 0.01 0.00 - 0.20 Thousand/uL    Absolute Lymphocytes 0.92 0.60 - 4.47 Thousands/µL    Absolute Monocytes 0.53 0.17 - 1.22 Thousand/µL    Eosinophils Absolute 0.66 (H) 0.00 - 0.61 Thousand/µL    Basophils Absolute 0.01 0.00 - 0.10 Thousands/µL   Magnesium    Collection Time: 03/13/25  5:33 AM   Result Value Ref Range    Magnesium 1.7 (L) 1.9 - 2.7 mg/dL     Imaging Studies: I have personally reviewed pertinent reports and I have personally reviewed pertinent films in PACS.    EKG, Pathology, and Other Studies: I have personally reviewed pertinent reports.    VTE Pharmacologic Prophylaxis: Enoxaparin (Lovenox)    Dictation voice to text software has been used in the creation of this document.  Please consider this in light of any contextual or grammatical errors.

## 2025-03-14 LAB — VIT B1 BLD-SCNC: 178.4 NMOL/L (ref 66.5–200)

## 2025-03-19 ENCOUNTER — TELEPHONE (OUTPATIENT)
Age: 65
End: 2025-03-19

## 2025-03-19 ENCOUNTER — OFFICE VISIT (OUTPATIENT)
Dept: FAMILY MEDICINE CLINIC | Facility: CLINIC | Age: 65
End: 2025-03-19
Payer: COMMERCIAL

## 2025-03-19 VITALS
DIASTOLIC BLOOD PRESSURE: 70 MMHG | BODY MASS INDEX: 26.91 KG/M2 | OXYGEN SATURATION: 98 % | WEIGHT: 177 LBS | HEART RATE: 82 BPM | TEMPERATURE: 98 F | SYSTOLIC BLOOD PRESSURE: 127 MMHG

## 2025-03-19 DIAGNOSIS — R47.81 SLURRED SPEECH: ICD-10-CM

## 2025-03-19 DIAGNOSIS — R09.81 NASAL CONGESTION: ICD-10-CM

## 2025-03-19 DIAGNOSIS — R33.9 URINARY RETENTION: ICD-10-CM

## 2025-03-19 DIAGNOSIS — G92.8 TOXIC METABOLIC ENCEPHALOPATHY: ICD-10-CM

## 2025-03-19 DIAGNOSIS — E11.69 TYPE 2 DIABETES MELLITUS WITH OTHER SPECIFIED COMPLICATION, WITH LONG-TERM CURRENT USE OF INSULIN (HCC): ICD-10-CM

## 2025-03-19 DIAGNOSIS — Z76.89 ENCOUNTER FOR SUPPORT AND COORDINATION OF TRANSITION OF CARE: Primary | ICD-10-CM

## 2025-03-19 DIAGNOSIS — Z79.4 TYPE 2 DIABETES MELLITUS WITH OTHER SPECIFIED COMPLICATION, WITH LONG-TERM CURRENT USE OF INSULIN (HCC): ICD-10-CM

## 2025-03-19 PROCEDURE — 99495 TRANSJ CARE MGMT MOD F2F 14D: CPT

## 2025-03-19 RX ORDER — INSULIN GLARGINE 100 [IU]/ML
10 INJECTION, SOLUTION SUBCUTANEOUS
Qty: 3 ML | Refills: 2 | Status: SHIPPED | OUTPATIENT
Start: 2025-03-19

## 2025-03-19 RX ORDER — PEN NEEDLE, DIABETIC 32GX 5/32"
NEEDLE, DISPOSABLE MISCELLANEOUS
Qty: 100 EACH | Refills: 1 | Status: SHIPPED | OUTPATIENT
Start: 2025-03-19

## 2025-03-19 RX ORDER — FLUTICASONE PROPIONATE 50 MCG
1 SPRAY, SUSPENSION (ML) NASAL DAILY
Qty: 9.9 ML | Refills: 1 | Status: SHIPPED | OUTPATIENT
Start: 2025-03-19

## 2025-03-19 RX ORDER — METFORMIN HYDROCHLORIDE 500 MG/1
500 TABLET, EXTENDED RELEASE ORAL
COMMUNITY

## 2025-03-19 NOTE — ASSESSMENT & PLAN NOTE
Presented to ER on 3/11 drowsy, encephalopathy, slurred speech-initially a stroke alert was called. Pt has hx of seizure like activity reports episode at home. CAT scan of the head was negative for any acute stroke, showed chronic lacunar infarct. CTA head and neck no evidence of large vessel occlusion or significant flow-limiting stenosis. Neurology team did not think that this was a stroke and was not placed on a stroke pathway. Suspected polypharmacy as cause of symptoms. History of stroke  CT imaging with evidence of chronic lacunar infarcts in the right basal ganglia -confirmed on MRI of brain  Continue statin - Plavix initiated by neurology due to AC intolerability. Continue atorvastatin 40 mg and Plavix 75 mg qd. Hospital discharge follow-up with neurology 5/19/25.

## 2025-03-19 NOTE — TELEPHONE ENCOUNTER
Please forward Jocelyn the note from his recent hospital visit.  Patient also had a TCM with provider Kya and you could forward her note to Jocelyn as well.  And let me know if she still has further questions.  Thank you

## 2025-03-19 NOTE — ASSESSMENT & PLAN NOTE
Pt and caregiver reports pt is taking metformin  mg qd, Lanus 10 units at bedtime. Pt does not check blood sugars. Counseled on adherence to  diabetic diet and will refer to endocrinology.  Lab Results   Component Value Date    HGBA1C 7.8 (H) 10/16/2024     Orders:  •  Insulin Pen Needle (BD Pen Needle Aminta U/F) 32G X 4 MM MISC; Use daily as directed with insulin pen  •  Ambulatory Referral to Endocrinology; Future  •  Insulin Glargine Solostar (Lantus SoloStar) 100 UNIT/ML SOPN; Inject 0.1 mL (10 Units total) under the skin daily at bedtime

## 2025-03-19 NOTE — TELEPHONE ENCOUNTER
Jocelyn a  with [facility] called wanting to clarify the reasoning for David recent hospital visit, stated that she contacted him and told her that he went because he had a stroke, fell, also had a seizure, also stated he didn't know why he was in the hospital. Jocelyn was looking for further clarification on this as she believes he is almost in a state of change in mental status.    David was requesting further assistance which she is fine with providing, however would like further clarification on the issue.    Please advise out to Jocelyn to further discuss [phone]

## 2025-03-19 NOTE — PROGRESS NOTES
Transition of Care Visit  Name: David Skelton Jr.      : 1960      MRN: [id number]  Encounter Provider: STEVE Cross  Encounter Date: 3/19/2025   Encounter department: [facility]    Assessment & Plan  Encounter for support and coordination of transition of care  S/p hospitalization 3/11-3/13/25 secondary to increased confusion, drowsiness. Work-up was negative for acute infarction, seizure or alcoholism. Pt d/c home with neurology follow-up and Speech therapy referral.       Slurred speech  Secondary to suspected dysarthria and hx of CVA, pt has speech therapy referral       Toxic metabolic encephalopathy  Presented to ER on 3/11 drowsy, encephalopathy, slurred speech-initially a stroke alert was called. Pt has hx of seizure like activity reports episode at home. CAT scan of the head was negative for any acute stroke, showed chronic lacunar infarct. CTA head and neck no evidence of large vessel occlusion or significant flow-limiting stenosis. Neurology team did not think that this was a stroke and was not placed on a stroke pathway. Suspected polypharmacy as cause of symptoms. History of stroke  CT imaging with evidence of chronic lacunar infarcts in the right basal ganglia -confirmed on MRI of brain  Continue statin - Plavix initiated by neurology due to AC intolerability. Continue atorvastatin 40 mg and Plavix 75 mg qd. Hospital discharge follow-up with neurology 25.       Urinary retention  Pt reports continued urinary retention enlarged prostate on previous CT AP , follows with urology outpatient. Ultrasound has no hydronephrosis, bilateral jets detected. Outpatient CT renal protocol, cysto for micro hematuria workup. Continue Finasteride 5 mg qd and Flomax 0.4 mg qd. Pt encouraged to schedule f/u appt with urology and complete testing.       Type 2 diabetes mellitus with other specified complication, with long-term current use of insulin (HCC)  Pt and  caregiver reports pt is taking metformin  mg qd, Lanus 10 units at bedtime. Pt does not check blood sugars. Counseled on adherence to  diabetic diet and will refer to endocrinology.  Lab Results   Component Value Date    HGBA1C 7.8 (H) 10/16/2024     Orders:  •  Insulin Pen Needle (BD Pen Needle Aminta U/F) 32G X 4 MM MISC; Use daily as directed with insulin pen  •  Ambulatory Referral to Endocrinology; Future  •  Insulin Glargine Solostar (Lantus SoloStar) 100 UNIT/ML SOPN; Inject 0.1 mL (10 Units total) under the skin daily at bedtime    Nasal congestion    Orders:  •  fluticasone (FLONASE) 50 mcg/act nasal spray; 1 spray into each nostril daily         History of Present Illness     Transitional Care Management Review:   David Skelton Jr. is a 64 y.o. male here for TCM follow up.     During the TCM phone call patient stated:  TCM Call (since 3/5/2025)     Date and time call was made  3/13/2025  4:18 PM    Hospital care reviewed  Records reviewed    Patient was hospitialized at  [facility]    Date of Admission  03/11/25    Date of discharge  03/13/25    Diagnosis  Toxic metabolic encephalopathy      TCM Call (since 3/5/2025)     Scheduled for follow up?  Yes    I have advised the patient to call PCP with any new or worsening symptoms  Landy Chew MA        TCM follow-up - Pt and caregiver Irene Matos present.  Pt presented to ER on 3/11 drowsy, encephalopathy, slurred speech-initially a stroke alert was called. Pt has hx of seizure like activity reports episode at home. CAT scan of the head was negative for any acute stroke he takes methadone 140 mg daily goes to Methadone clinic called [facility] phone number [phone]. Patient kept reiterated that methadone dose needs to  be increased believes he is having withdrawal symptoms. Pt with drowsiness in office, slurred speech which caregiver states is baseline.   Reviewed medications pt is taking only metformin  mg and  lantus 10 units at bedtime for diabetes, was referred to endocrinology last year however did not follow-up. Caregiver requesting new referral as pt does not check  BS and interested in CGM.     Pt also mentioned inability to  tolerate Adderall XR 30 mg capsules believes they are causing drowsiness, would prefer tablets.Educated on potential side effects of the medication and dysphagia. Pt follows with psychiatry and will discuss with provider.            Review of Systems   Constitutional:  Negative for activity change, appetite change, chills, diaphoresis, fatigue, fever and unexpected weight change.   HENT:  Negative for congestion, dental problem, drooling, ear discharge, ear pain, facial swelling, hearing loss, mouth sores, nosebleeds, postnasal drip, rhinorrhea, sinus pressure, sinus pain, sneezing, sore throat, tinnitus, trouble swallowing and voice change.    Eyes:  Negative for photophobia, pain, discharge, redness, itching and visual disturbance.   Respiratory:  Negative for apnea, cough, choking, chest tightness, shortness of breath, wheezing and stridor.    Cardiovascular:  Negative for chest pain, palpitations and leg swelling.   Gastrointestinal:  Negative for abdominal distention, abdominal pain, anal bleeding, blood in stool, constipation, diarrhea, nausea, rectal pain and vomiting.   Endocrine: Negative for cold intolerance, heat intolerance, polydipsia, polyphagia and polyuria.   Genitourinary:  Positive for decreased urine volume and difficulty urinating. Negative for dysuria, enuresis, flank pain, frequency, genital sores, hematuria, penile discharge, penile pain, penile swelling, scrotal swelling, testicular pain and urgency.   Musculoskeletal:  Positive for gait problem. Negative for arthralgias, back pain, joint swelling, myalgias, neck pain and neck stiffness.   Skin:  Negative for color change, rash and wound.   Allergic/Immunologic: Negative for environmental allergies, food allergies and  immunocompromised state.   Neurological:  Positive for speech difficulty. Negative for dizziness, tremors, seizures, syncope, facial asymmetry, weakness, light-headedness, numbness and headaches.   Hematological:  Negative for adenopathy. Does not bruise/bleed easily.   Psychiatric/Behavioral:  Positive for confusion, decreased concentration and sleep disturbance. Negative for agitation, behavioral problems, dysphoric mood, hallucinations, self-injury and suicidal ideas. The patient is not nervous/anxious and is not hyperactive.    All other systems reviewed and are negative.    Objective   /70 (BP Location: Left arm, Patient Position: Sitting, Cuff Size: Standard)   Pulse 82   Temp 98 °F (36.7 °C)   Wt 80.3 kg (177 lb)   SpO2 98%   BMI 26.91 kg/m²     Physical Exam  Vitals and nursing note reviewed.   Constitutional:       General: He is sleeping. He is not in acute distress.     Appearance: Normal appearance. He is not ill-appearing, toxic-appearing or diaphoretic.   HENT:      Head: Normocephalic and atraumatic.      Right Ear: Tympanic membrane, ear canal and external ear normal. There is no impacted cerumen.      Left Ear: Tympanic membrane, ear canal and external ear normal. There is no impacted cerumen.      Nose: Nose normal. No congestion or rhinorrhea.      Mouth/Throat:      Mouth: Mucous membranes are moist.      Pharynx: No oropharyngeal exudate or posterior oropharyngeal erythema.   Eyes:      General: No scleral icterus.        Right eye: No discharge.         Left eye: No discharge.      Extraocular Movements: Extraocular movements intact.      Conjunctiva/sclera: Conjunctivae normal.      Pupils: Pupils are equal, round, and reactive to light.   Neck:      Vascular: No carotid bruit.   Cardiovascular:      Rate and Rhythm: Normal rate and regular rhythm.      Pulses: Normal pulses.      Heart sounds: Normal heart sounds. No murmur heard.  Pulmonary:      Effort: Pulmonary effort is  normal. No respiratory distress.      Breath sounds: Normal breath sounds. No stridor. No wheezing, rhonchi or rales.   Chest:      Chest wall: No tenderness.   Abdominal:      General: Bowel sounds are normal. There is no distension.      Palpations: Abdomen is soft. There is no mass.      Tenderness: There is no abdominal tenderness. There is no right CVA tenderness, left CVA tenderness, guarding or rebound.      Hernia: No hernia is present.   Musculoskeletal:         General: No swelling, tenderness, deformity or signs of injury. Normal range of motion.      Cervical back: Normal range of motion and neck supple. No rigidity or tenderness.      Right lower leg: No edema.      Left lower leg: No edema.   Lymphadenopathy:      Cervical: No cervical adenopathy.   Skin:     General: Skin is warm.      Capillary Refill: Capillary refill takes less than 2 seconds.      Coloration: Skin is not jaundiced.      Findings: No bruising, erythema, lesion or rash.   Neurological:      General: No focal deficit present.      Mental Status: He is oriented to person, place, and time and easily aroused.      Cranial Nerves: No cranial nerve deficit.      Sensory: No sensory deficit.      Motor: No weakness.      Coordination: Coordination normal.      Gait: Gait abnormal.      Deep Tendon Reflexes: Reflexes normal.   Psychiatric:         Attention and Perception: Attention and perception normal. He is attentive. He does not perceive auditory or visual hallucinations.         Mood and Affect: Mood is anxious.         Speech: Speech is slurred.         Behavior: Behavior is agitated. Behavior is not slowed, aggressive, withdrawn, hyperactive or combative. Behavior is cooperative.         Thought Content: Thought content normal. Thought content is not paranoid or delusional. Thought content does not include homicidal or suicidal ideation. Thought content does not include homicidal or suicidal plan.         Judgment: Judgment normal.  Judgment is not impulsive.       Medications have been reviewed by provider in current encounter

## 2025-03-20 ENCOUNTER — TELEPHONE (OUTPATIENT)
Age: 65
End: 2025-03-20

## 2025-03-20 NOTE — TELEPHONE ENCOUNTER
PA for Insulin Glargine Solostar (Lantus SoloStar) 100 UNIT/ML SOPN SUBMITTED to [employer]    via    []CMM-KEY:   [x]Surescripts-Case ID #: [id number]   []Availity-Auth ID #  []Faxed to plan  []Other website   []Phone call Case ID #    [x]PA sent as URGENT    All office notes, labs and other pertaining documents and studies sent. Clinical questions answered. Awaiting determination from insurance company.     Turnaround time for your insurance to make a decision on your Prior Authorization can take 7-21 business days.

## 2025-03-21 NOTE — TELEPHONE ENCOUNTER
PA for Insulin Glargine Solostar (Lantus SoloStar) 100 UNIT/ML SOPN NOT REQUIRED     Reason (screenshot if applicable)            Patient advised by      - Pt aware and med was picked up on 3/20    [] MyChart Message  [] Phone call   []LMOM  []L/M to call office as no active Communication consent on file  []Unable to leave detailed message as VM not approved on Communication consent       Pharmacy advised by    []Fax  [x]Phone call

## 2025-03-31 ENCOUNTER — TELEPHONE (OUTPATIENT)
Dept: ENDOCRINOLOGY | Facility: CLINIC | Age: 65
End: 2025-03-31

## 2025-03-31 NOTE — TELEPHONE ENCOUNTER
LM with patient informing him that the HFU that was scheduled was not a valid apt slot with juan antonio on 4/2/25. Patient needs 1 hr for HFU

## 2025-04-01 NOTE — TELEPHONE ENCOUNTER
This one Dr. Long did see with the fellow in the hospital. She has a previous note to try to bring patient in for sooner visit.

## 2025-04-07 ENCOUNTER — OFFICE VISIT (OUTPATIENT)
Dept: ENDOCRINOLOGY | Facility: CLINIC | Age: 65
End: 2025-04-07
Payer: COMMERCIAL

## 2025-04-07 VITALS
WEIGHT: 170 LBS | DIASTOLIC BLOOD PRESSURE: 78 MMHG | BODY MASS INDEX: 25.76 KG/M2 | OXYGEN SATURATION: 98 % | HEIGHT: 68 IN | HEART RATE: 64 BPM | SYSTOLIC BLOOD PRESSURE: 128 MMHG

## 2025-04-07 DIAGNOSIS — Z98.84 S/P GASTRIC BYPASS: ICD-10-CM

## 2025-04-07 DIAGNOSIS — E13.319 RETINOPATHY DUE TO SECONDARY DM (HCC): ICD-10-CM

## 2025-04-07 DIAGNOSIS — E78.2 MIXED HYPERLIPIDEMIA: ICD-10-CM

## 2025-04-07 DIAGNOSIS — I10 PRIMARY HYPERTENSION: ICD-10-CM

## 2025-04-07 DIAGNOSIS — R26.2 AMBULATORY DYSFUNCTION: ICD-10-CM

## 2025-04-07 DIAGNOSIS — Z79.4 TYPE 2 DIABETES MELLITUS WITH OTHER SPECIFIED COMPLICATION, WITH LONG-TERM CURRENT USE OF INSULIN (HCC): Primary | ICD-10-CM

## 2025-04-07 DIAGNOSIS — E11.69 TYPE 2 DIABETES MELLITUS WITH OTHER SPECIFIED COMPLICATION, WITH LONG-TERM CURRENT USE OF INSULIN (HCC): Primary | ICD-10-CM

## 2025-04-07 DIAGNOSIS — L97.929 CHRONIC ULCER OF LOWER EXTREMITY, LEFT, WITH UNSPECIFIED SEVERITY (HCC): ICD-10-CM

## 2025-04-07 LAB — SL AMB POCT HEMOGLOBIN AIC: 10.3 (ref ?–6.5)

## 2025-04-07 PROCEDURE — 83036 HEMOGLOBIN GLYCOSYLATED A1C: CPT | Performed by: INTERNAL MEDICINE

## 2025-04-07 PROCEDURE — 99214 OFFICE O/P EST MOD 30 MIN: CPT | Performed by: INTERNAL MEDICINE

## 2025-04-07 RX ORDER — HYDROCHLOROTHIAZIDE 12.5 MG/1
1 CAPSULE ORAL
Qty: 2 EACH | Refills: 5 | Status: SHIPPED | OUTPATIENT
Start: 2025-04-07

## 2025-04-07 RX ORDER — INSULIN GLARGINE 100 [IU]/ML
INJECTION, SOLUTION SUBCUTANEOUS
Start: 2025-04-07

## 2025-04-07 RX ORDER — GABAPENTIN 800 MG/1
TABLET ORAL
COMMUNITY
Start: 2025-03-16

## 2025-04-07 RX ORDER — KETOROLAC TROMETHAMINE 30 MG/ML
INJECTION, SOLUTION INTRAMUSCULAR; INTRAVENOUS
Qty: 1 EACH | Refills: 0 | Status: SHIPPED | OUTPATIENT
Start: 2025-04-07

## 2025-04-07 RX ORDER — METFORMIN HYDROCHLORIDE 500 MG/1
1000 TABLET, EXTENDED RELEASE ORAL 2 TIMES DAILY WITH MEALS
Qty: 360 TABLET | Refills: 1 | Status: SHIPPED | OUTPATIENT
Start: 2025-04-07

## 2025-04-07 RX ORDER — MULTIVITAMIN WITH FOLIC ACID 400 MCG
1 TABLET ORAL DAILY
COMMUNITY
Start: 2025-03-13

## 2025-04-07 NOTE — ASSESSMENT & PLAN NOTE
Lab Results   Component Value Date    HGBA1C 10.3 (A) 04/07/2025   Follow-up with ophthalmology

## 2025-04-07 NOTE — ASSESSMENT & PLAN NOTE
Lab Results   Component Value Date    HGBA1C 10.3 (A) 04/07/2025     A1c is 10.3%, uncontrolled  Increase Lantus to 16 units at bedtime  Increase metformin to 1000 mg twice a day with meals  Educated to check blood sugars 2-3 times daily and send log in 2 weeks.  Prescription for continuous glucose monitor sensor and  was sent to the pharmacy  Educated about keeping carbohydrate consistent with meals  Discussed the importance of follow-up with ophthalmology and podiatry  Educated about long-term complications of uncontrolled diabetes including neuropathy, retinopathy, CKD, risk of heart attack and stroke  Orders:    Ambulatory Referral to Ophthalmology    Ambulatory Referral to Endocrinology    POCT hemoglobin A1c    Continuous Glucose Sensor (FreeStyle Grace 3 Plus Sensor) MISC; Use 1 each every 15 (fifteen) days    Continuous Glucose  (FreeStyle Grace 3 [place]) STEVE; Please dispense 1 reader    Ambulatory referral to Diabetic Education; Future    Insulin Glargine Solostar (Lantus SoloStar) 100 UNIT/ML SOPN; Inject 16 units at bedtime    metFORMIN (GLUCOPHAGE-XR) 500 mg 24 hr tablet; Take 2 tablets (1,000 mg total) by mouth 2 (two) times a day with meals    Basic metabolic panel; Future    Hemoglobin A1C; Future    Ambulatory referral to Podiatry; Future

## 2025-04-07 NOTE — PROGRESS NOTES
Name: David Skelton Jr.      : 1960      MRN: [id number]  Encounter Provider: Johnnie Long MD  Encounter Date: 2025   Encounter department: [facility] [place]    No chief complaint on file.  :  Assessment & Plan  Type 2 diabetes mellitus with other specified complication, with long-term current use of insulin (HCC)    Lab Results   Component Value Date    HGBA1C 10.3 (A) 2025     A1c is 10.3%, uncontrolled  Increase Lantus to 16 units at bedtime  Increase metformin to 1000 mg twice a day with meals  Educated to check blood sugars 2-3 times daily and send log in 2 weeks.  Prescription for continuous glucose monitor sensor and  was sent to the pharmacy  Educated about keeping carbohydrate consistent with meals  Discussed the importance of follow-up with ophthalmology and podiatry  Educated about long-term complications of uncontrolled diabetes including neuropathy, retinopathy, CKD, risk of heart attack and stroke  Orders:    Ambulatory Referral to Ophthalmology    Ambulatory Referral to Endocrinology    POCT hemoglobin A1c    Continuous Glucose Sensor (FreeStyle Grace 3 Plus Sensor) MISC; Use 1 each every 15 (fifteen) days    Continuous Glucose  (FreeStyle Grace 3 [place]) STEVE; Please dispense 1 reader    Ambulatory referral to Diabetic Education; Future    Insulin Glargine Solostar (Lantus SoloStar) 100 UNIT/ML SOPN; Inject 16 units at bedtime    metFORMIN (GLUCOPHAGE-XR) 500 mg 24 hr tablet; Take 2 tablets (1,000 mg total) by mouth 2 (two) times a day with meals    Basic metabolic panel; Future    Hemoglobin A1C; Future    Ambulatory referral to Podiatry; Future    Mixed hyperlipidemia  Continue statin, continue lifestyle modifications       Primary hypertension  Blood pressure at goal, continue current management as per PCP       Ambulatory dysfunction  Discussed the importance of fall precautions       S/P gastric bypass  BMI 25.   Continue lifestyle modifications       Retinopathy due to secondary DM (HCC)    Lab Results   Component Value Date    HGBA1C 10.3 (A) 04/07/2025   Follow-up with ophthalmology         Chronic ulcer of lower extremity, left, with unspecified severity (HCC)  Discussed to follow-up with podiatry           History of Present Illness   History of Present Illness    David Skelton Jr. is a 64 y.o. male with type 2 diabetes seen in follow up. Reports complications of diabetes . Denies recent severe hypoglycemic or severe hyperglycemic episodes. Denies any issues with his current regimen. Last A1C was  Denies recent illness, hospitalization or steroid use.   He does not check blood sugars regularly  Lab Results   Component Value Date    HGBA1C 10.3 (A) 04/07/2025     Current regimen:     Lantus 10 units at bedtime   Metformin  mg daily with dinner   Vitamin D 3, 2000 IU daily     Last Eye Exam: 09/18/2023  Last Foot Exam: 02/12/2025  Health Maintenance   Topic Date Due    Diabetic Eye Exam  09/18/2024    Diabetic Foot Exam  02/12/2026           Review of Systems   Constitutional:  Positive for activity change and fatigue. Negative for diaphoresis, fever and unexpected weight change.   HENT: Negative.     Eyes:  Negative for visual disturbance.   Respiratory:  Negative for cough, chest tightness and shortness of breath.    Cardiovascular:  Negative for chest pain, palpitations and leg swelling.   Gastrointestinal:  Negative for abdominal pain, blood in stool, constipation, diarrhea, nausea and vomiting.   Endocrine: Negative for cold intolerance, heat intolerance, polydipsia, polyphagia and polyuria.   Genitourinary:  Negative for dysuria, enuresis, frequency and urgency.   Musculoskeletal:  Negative for arthralgias and myalgias.   Skin:  Negative for pallor, rash and wound.   Allergic/Immunologic: Negative.    Neurological:  Negative for dizziness, tremors, weakness and numbness.   Hematological: Negative.     Psychiatric/Behavioral: Negative.      as per HPI    Current Outpatient Medications on File Prior to Visit   Medication Sig Dispense Refill    Alcohol Swabs 70 % PADS May substitute brand based on insurance coverage. Check glucose BID. 100 each 0    amphetamine-dextroamphetamine (ADDERALL XR) 30 MG 24 hr capsule TAKE 1 CAPSULE BY MOUTH TWICE A DAY (8AM AND 1PM)      atorvastatin (LIPITOR) 40 mg tablet Take 1 tablet (40 mg total) by mouth daily with dinner 30 tablet 0    Blood Glucose Monitoring Suppl (OneTouch Verio Reflect) w/Device KIT May substitute brand based on insurance coverage. Check glucose BID. 1 kit 0    Cholecalciferol (VITAMIN D3) 1,000 units tablet Take 2 tablets (2,000 Units total) by mouth daily 180 tablet 3    clonazePAM (KlonoPIN) 1 mg tablet Take 1 mg by mouth 2 (two) times a day      cloNIDine (CATAPRES) 0.1 mg tablet Take 0.1 mg by mouth daily at bedtime      clopidogrel (PLAVIX) 75 mg tablet Take 1 tablet (75 mg total) by mouth daily 30 tablet 0    finasteride (PROSCAR) 5 mg tablet Take 1 tablet (5 mg total) by mouth daily 30 tablet 0    fluticasone (FLONASE) 50 mcg/act nasal spray 1 spray into each nostril daily 9.9 mL 1    folic acid (FOLVITE) 1 mg tablet Take 1 tablet (1 mg total) by mouth daily 30 tablet 0    furosemide (LASIX) 20 mg tablet Take 1 tablet (20 mg total) by mouth daily as needed (leg swelling) 10 tablet 0    gabapentin (Neurontin) 600 MG tablet Take 0.5 tablets (300 mg total) by mouth 2 (two) times a day 30 tablet 2    gabapentin (NEURONTIN) 800 mg tablet TAKE 1 TABLET BY MOUTH 3 TIMES A DAY AND TAKE 1 TABLET BY MOUTH AT BEDTIME      glucose blood (OneTouch Verio) test strip May substitute brand based on insurance coverage. Check glucose BID. 100 each 0    hydroCHLOROthiazide 12.5 mg tablet TAKE 1 TABLET (12.5 MG TOTAL) BY MOUTH DAILY AS NEEDED EDEMA 30 tablet 0    Insulin Pen Needle (BD Pen Needle Aminta U/F) 32G X 4 MM MISC Use daily as directed with insulin pen 100 each 1  "   Misc. Devices (CANE) MISC by Does not apply route daily Dx:  Frequent falls 1 each 0    Multiple Vitamin (Daily-Laura Multivitamin) TABS Take 1 tablet by mouth in the morning      Multiple Vitamin (multivitamin) capsule Take 1 capsule by mouth daily 30 capsule 0    tamsulosin (FLOMAX) 0.4 mg Take 1 capsule (0.4 mg total) by mouth daily with dinner 30 capsule 4    thiamine 100 MG tablet Take 1 tablet (100 mg total) by mouth daily 30 tablet 0    Trazodone HCl POWD Take by mouth      triamcinolone (KENALOG) 0.1 % cream Apply topically 2 (two) times a day Applied to right posterior thigh 80 g 1    Wound Dressings (Dermagran Hydrogel Wound) GEL Apply 1 Application topically every other day To applied every other day to small red developing ulcer on the left lateral shin area 85 g 0    [DISCONTINUED] Insulin Glargine Solostar (Lantus SoloStar) 100 UNIT/ML SOPN Inject 0.1 mL (10 Units total) under the skin daily at bedtime 3 mL 2    [DISCONTINUED] metFORMIN (GLUCOPHAGE-XR) 500 mg 24 hr tablet Take 500 mg by mouth daily with dinner      [DISCONTINUED] bacitracin-neomycin-polymyxin b-hydrocortisone 1 % ointment Apply topically 2 (two) times a day 15 g 0     No current facility-administered medications on file prior to visit.         Medical History Reviewed by provider this encounter:  Tobacco  Allergies  Meds  Problems  Med Hx  Surg Hx  Fam Hx     .    Objective   /78   Pulse 64   Ht 5' 8\" (1.727 m)   Wt 77.1 kg (170 lb)   SpO2 98%   BMI 25.85 kg/m²      Body mass index is 25.85 kg/m².  Wt Readings from Last 3 Encounters:   04/07/25 77.1 kg (170 lb)   03/19/25 80.3 kg (177 lb)   03/11/25 83.1 kg (183 lb 3.2 oz)   Physical Exam    Physical Exam  Vitals reviewed.   Constitutional:       Appearance: Normal appearance.   Cardiovascular:      Rate and Rhythm: Normal rate and regular rhythm.   Pulmonary:      Effort: Pulmonary effort is normal.      Breath sounds: Normal breath sounds.   Musculoskeletal:       "   General: Normal range of motion.      Cervical back: Normal range of motion and neck supple.      Right lower leg: No edema.      Left lower leg: No edema.   Skin:     General: Skin is warm and dry.   Neurological:      General: No focal deficit present.      Mental Status: He is alert and oriented to person, place, and time.   Psychiatric:         Mood and Affect: Mood normal.         Behavior: Behavior normal.     Results    Labs:   Lab Results   Component Value Date    HGBA1C 10.3 (A) 04/07/2025    HGBA1C 7.8 (H) 10/16/2024    HGBA1C 10.7 (H) 07/31/2024     Lab Results   Component Value Date    CREATININE 0.69 03/13/2025    CREATININE 0.83 03/12/2025    CREATININE 0.99 03/11/2025    BUN 11 03/13/2025     01/29/2015    K 4.0 03/13/2025     03/13/2025    CO2 32 03/13/2025     GFR, Calculated   Date Value Ref Range Status   03/01/2019 81 >60 mL/min/1.73m2 Final     Comment:     mL/min per 1.73 square meters                                            Normal Function or Mild Renal    Disease (if clinically at risk):  >or=60  Moderately Decreased:                30-59  Severely Decreased:                  15-29  Renal Failure:                         <15                                            -American GFR: multiply reported GFR by 1.16    Please note that the eGFR is based on the CKD-EPI calculation, and is not intended to be used for drug dosing.                                            Note: Calculated GFR may not be an accurate indicator of renal function if the patient's renal function is not in a steady state.     eGFR   Date Value Ref Range Status   03/13/2025 101 ml/min/1.73sq m Final     Lab Results   Component Value Date    CHOL 109 05/21/2014    HDL 53 03/11/2025    TRIG 39 03/11/2025     Lab Results   Component Value Date    ALT 60 (H) 03/11/2025    AST 27 03/11/2025    ALKPHOS 128 (H) 03/11/2025    BILITOT 0.27 01/29/2015     Lab Results   Component Value Date    RAT7ZLSMVYVG  1.131 03/11/2025    [id number] 0.831 10/16/2024    GQA8SJWHTRZF 0.777 09/16/2024       There are no Patient Instructions on file for this visit.    Discussed with the patient and all questioned fully answered. He will call me if any problems arise.

## 2025-04-08 ENCOUNTER — TELEPHONE (OUTPATIENT)
Age: 65
End: 2025-04-08

## 2025-04-08 NOTE — TELEPHONE ENCOUNTER
Received a call from Irene, patient's , to schedule patient's Grace training. Tried to contact team member but was unsuccessful. Please contact Irene at [phone]. Thanks!

## 2025-04-08 NOTE — TELEPHONE ENCOUNTER
S/W case work Irene who advised they do not have the Grace yet and are still waiting on approval. I advised her to call back when he has it to be schedule. When patient calls back, please schedule...    CGM Training- The following information should be in Note section    What type of CGM ? (Grace or Dexcom) Grace    What version? ( Grace 2(Plus),Grace 3(Plus), Dexcom G6, Dexcom G7) confirm    Will the patient be using the [place] or the Application on their phone? confirm    If using a phone, be sure they know which apps need to be downloaded prior to their appointment. ( Grace 2, Grace 3, Dexcom G6, Dexcom G7, Dexcom Clarity)   Please confirm patient's email  New patient  75 Minutes    Send to Education Clerical Pool/Shaye to send paperwork

## 2025-04-10 DIAGNOSIS — R60.9 EDEMA, UNSPECIFIED TYPE: ICD-10-CM

## 2025-04-10 RX ORDER — HYDROCHLOROTHIAZIDE 12.5 MG/1
TABLET ORAL
Qty: 30 TABLET | Refills: 1 | Status: SHIPPED | OUTPATIENT
Start: 2025-04-10

## 2025-04-13 ENCOUNTER — APPOINTMENT (EMERGENCY)
Dept: RADIOLOGY | Facility: HOSPITAL | Age: 65
End: 2025-04-13
Payer: COMMERCIAL

## 2025-04-13 ENCOUNTER — HOSPITAL ENCOUNTER (INPATIENT)
Facility: HOSPITAL | Age: 65
LOS: 2 days | Discharge: HOME HEALTH | End: 2025-04-15
Attending: EMERGENCY MEDICINE | Admitting: INTERNAL MEDICINE
Payer: COMMERCIAL

## 2025-04-13 ENCOUNTER — APPOINTMENT (EMERGENCY)
Dept: CT IMAGING | Facility: HOSPITAL | Age: 65
End: 2025-04-13
Payer: COMMERCIAL

## 2025-04-13 DIAGNOSIS — Z13.9 ENCOUNTER FOR SCREENING INVOLVING SOCIAL DETERMINANTS OF HEALTH (SDOH): ICD-10-CM

## 2025-04-13 DIAGNOSIS — R91.1 PULMONARY NODULE: ICD-10-CM

## 2025-04-13 DIAGNOSIS — R26.2 AMBULATORY DYSFUNCTION: ICD-10-CM

## 2025-04-13 DIAGNOSIS — R41.82 ALTERED MENTAL STATUS: Primary | ICD-10-CM

## 2025-04-13 DIAGNOSIS — F98.8 ADD (ATTENTION DEFICIT DISORDER) WITHOUT HYPERACTIVITY: ICD-10-CM

## 2025-04-13 PROBLEM — R40.0 DROWSINESS: Status: ACTIVE | Noted: 2025-04-13

## 2025-04-13 PROBLEM — K59.00 CONSTIPATION: Status: ACTIVE | Noted: 2019-09-11

## 2025-04-13 LAB
ALBUMIN SERPL BCG-MCNC: 3.5 G/DL (ref 3.5–5)
ALP SERPL-CCNC: 119 U/L (ref 34–104)
ALT SERPL W P-5'-P-CCNC: 36 U/L (ref 7–52)
ANION GAP SERPL CALCULATED.3IONS-SCNC: 4 MMOL/L (ref 4–13)
APAP SERPL-MCNC: <2 UG/ML (ref 10–20)
APTT PPP: 27 SECONDS (ref 23–34)
AST SERPL W P-5'-P-CCNC: 22 U/L (ref 13–39)
ATRIAL RATE: 74 BPM
BASOPHILS # BLD AUTO: 0.05 THOUSANDS/ÂΜL (ref 0–0.1)
BASOPHILS NFR BLD AUTO: 1 % (ref 0–1)
BILIRUB SERPL-MCNC: 0.39 MG/DL (ref 0.2–1)
BUN SERPL-MCNC: 18 MG/DL (ref 5–25)
CALCIUM SERPL-MCNC: 8.3 MG/DL (ref 8.4–10.2)
CARDIAC TROPONIN I PNL SERPL HS: 3 NG/L (ref ?–50)
CHLORIDE SERPL-SCNC: 102 MMOL/L (ref 96–108)
CO2 SERPL-SCNC: 30 MMOL/L (ref 21–32)
CREAT SERPL-MCNC: 1.2 MG/DL (ref 0.6–1.3)
EOSINOPHIL # BLD AUTO: 0.52 THOUSAND/ÂΜL (ref 0–0.61)
EOSINOPHIL NFR BLD AUTO: 9 % (ref 0–6)
ERYTHROCYTE [DISTWIDTH] IN BLOOD BY AUTOMATED COUNT: 13.2 % (ref 11.6–15.1)
ETHANOL SERPL-MCNC: <10 MG/DL
GFR SERPL CREATININE-BSD FRML MDRD: 64 ML/MIN/1.73SQ M
GLUCOSE SERPL-MCNC: 186 MG/DL (ref 65–140)
GLUCOSE SERPL-MCNC: 201 MG/DL (ref 65–140)
GLUCOSE SERPL-MCNC: 206 MG/DL (ref 65–140)
GLUCOSE SERPL-MCNC: 239 MG/DL (ref 65–140)
GLUCOSE SERPL-MCNC: 62 MG/DL (ref 65–140)
HCT VFR BLD AUTO: 31.7 % (ref 36.5–49.3)
HGB BLD-MCNC: 10.3 G/DL (ref 12–17)
IMM GRANULOCYTES # BLD AUTO: 0.02 THOUSAND/UL (ref 0–0.2)
IMM GRANULOCYTES NFR BLD AUTO: 0 % (ref 0–2)
INR PPP: 1.07 (ref 0.85–1.19)
LYMPHOCYTES # BLD AUTO: 1.37 THOUSANDS/ÂΜL (ref 0.6–4.47)
LYMPHOCYTES NFR BLD AUTO: 23 % (ref 14–44)
MCH RBC QN AUTO: 28 PG (ref 26.8–34.3)
MCHC RBC AUTO-ENTMCNC: 32.5 G/DL (ref 31.4–37.4)
MCV RBC AUTO: 86 FL (ref 82–98)
MONOCYTES # BLD AUTO: 0.48 THOUSAND/ÂΜL (ref 0.17–1.22)
MONOCYTES NFR BLD AUTO: 8 % (ref 4–12)
NEUTROPHILS # BLD AUTO: 3.51 THOUSANDS/ÂΜL (ref 1.85–7.62)
NEUTS SEG NFR BLD AUTO: 59 % (ref 43–75)
NRBC BLD AUTO-RTO: 0 /100 WBCS
P AXIS: 70 DEGREES
PLATELET # BLD AUTO: 259 THOUSANDS/UL (ref 149–390)
PMV BLD AUTO: 11.4 FL (ref 8.9–12.7)
POTASSIUM SERPL-SCNC: 3.9 MMOL/L (ref 3.5–5.3)
PR INTERVAL: 146 MS
PROT SERPL-MCNC: 6.2 G/DL (ref 6.4–8.4)
PROTHROMBIN TIME: 14.7 SECONDS (ref 12.3–15)
QRS AXIS: 78 DEGREES
QRSD INTERVAL: 100 MS
QT INTERVAL: 484 MS
QTC INTERVAL: 537 MS
RBC # BLD AUTO: 3.68 MILLION/UL (ref 3.88–5.62)
SALICYLATES SERPL-MCNC: <5 MG/DL (ref 3–20)
SODIUM SERPL-SCNC: 136 MMOL/L (ref 135–147)
T WAVE AXIS: 80 DEGREES
VENTRICULAR RATE: 74 BPM
WBC # BLD AUTO: 5.95 THOUSAND/UL (ref 4.31–10.16)

## 2025-04-13 PROCEDURE — 82077 ASSAY SPEC XCP UR&BREATH IA: CPT | Performed by: EMERGENCY MEDICINE

## 2025-04-13 PROCEDURE — 85025 COMPLETE CBC W/AUTO DIFF WBC: CPT | Performed by: EMERGENCY MEDICINE

## 2025-04-13 PROCEDURE — 99285 EMERGENCY DEPT VISIT HI MDM: CPT | Performed by: EMERGENCY MEDICINE

## 2025-04-13 PROCEDURE — 82948 REAGENT STRIP/BLOOD GLUCOSE: CPT

## 2025-04-13 PROCEDURE — 85610 PROTHROMBIN TIME: CPT | Performed by: EMERGENCY MEDICINE

## 2025-04-13 PROCEDURE — 99223 1ST HOSP IP/OBS HIGH 75: CPT | Performed by: INTERNAL MEDICINE

## 2025-04-13 PROCEDURE — 72125 CT NECK SPINE W/O DYE: CPT

## 2025-04-13 PROCEDURE — 70450 CT HEAD/BRAIN W/O DYE: CPT

## 2025-04-13 PROCEDURE — 93005 ELECTROCARDIOGRAM TRACING: CPT

## 2025-04-13 PROCEDURE — 84484 ASSAY OF TROPONIN QUANT: CPT | Performed by: EMERGENCY MEDICINE

## 2025-04-13 PROCEDURE — 80143 DRUG ASSAY ACETAMINOPHEN: CPT | Performed by: EMERGENCY MEDICINE

## 2025-04-13 PROCEDURE — 80179 DRUG ASSAY SALICYLATE: CPT | Performed by: EMERGENCY MEDICINE

## 2025-04-13 PROCEDURE — 99285 EMERGENCY DEPT VISIT HI MDM: CPT

## 2025-04-13 PROCEDURE — 85730 THROMBOPLASTIN TIME PARTIAL: CPT | Performed by: EMERGENCY MEDICINE

## 2025-04-13 PROCEDURE — 93010 ELECTROCARDIOGRAM REPORT: CPT | Performed by: INTERNAL MEDICINE

## 2025-04-13 PROCEDURE — 71260 CT THORAX DX C+: CPT

## 2025-04-13 PROCEDURE — 74177 CT ABD & PELVIS W/CONTRAST: CPT

## 2025-04-13 PROCEDURE — 80053 COMPREHEN METABOLIC PANEL: CPT | Performed by: EMERGENCY MEDICINE

## 2025-04-13 PROCEDURE — 36415 COLL VENOUS BLD VENIPUNCTURE: CPT | Performed by: EMERGENCY MEDICINE

## 2025-04-13 PROCEDURE — 73564 X-RAY EXAM KNEE 4 OR MORE: CPT

## 2025-04-13 RX ORDER — ACETAMINOPHEN 325 MG/1
650 TABLET ORAL EVERY 6 HOURS PRN
Status: DISCONTINUED | OUTPATIENT
Start: 2025-04-13 | End: 2025-04-15 | Stop reason: HOSPADM

## 2025-04-13 RX ORDER — LANOLIN ALCOHOL/MO/W.PET/CERES
100 CREAM (GRAM) TOPICAL DAILY
Status: DISCONTINUED | OUTPATIENT
Start: 2025-04-13 | End: 2025-04-15 | Stop reason: HOSPADM

## 2025-04-13 RX ORDER — ONDANSETRON 2 MG/ML
4 INJECTION INTRAMUSCULAR; INTRAVENOUS EVERY 6 HOURS PRN
Status: DISCONTINUED | OUTPATIENT
Start: 2025-04-13 | End: 2025-04-15 | Stop reason: HOSPADM

## 2025-04-13 RX ORDER — INSULIN LISPRO 100 [IU]/ML
1-5 INJECTION, SOLUTION INTRAVENOUS; SUBCUTANEOUS EVERY 6 HOURS SCHEDULED
Status: DISCONTINUED | OUTPATIENT
Start: 2025-04-14 | End: 2025-04-15

## 2025-04-13 RX ORDER — INSULIN LISPRO 100 [IU]/ML
1-5 INJECTION, SOLUTION INTRAVENOUS; SUBCUTANEOUS EVERY 6 HOURS SCHEDULED
Status: DISCONTINUED | OUTPATIENT
Start: 2025-04-13 | End: 2025-04-13

## 2025-04-13 RX ORDER — HYDROXYZINE HYDROCHLORIDE 25 MG/1
25 TABLET, FILM COATED ORAL EVERY 6 HOURS PRN
Status: DISCONTINUED | OUTPATIENT
Start: 2025-04-13 | End: 2025-04-15 | Stop reason: HOSPADM

## 2025-04-13 RX ORDER — CLOPIDOGREL BISULFATE 75 MG/1
75 TABLET ORAL DAILY
Status: DISCONTINUED | OUTPATIENT
Start: 2025-04-13 | End: 2025-04-15 | Stop reason: HOSPADM

## 2025-04-13 RX ORDER — DEXTROAMPHETAMINE SACCHARATE, AMPHETAMINE ASPARTATE, DEXTROAMPHETAMINE SULFATE AND AMPHETAMINE SULFATE 2.5; 2.5; 2.5; 2.5 MG/1; MG/1; MG/1; MG/1
30 TABLET ORAL
Status: DISCONTINUED | OUTPATIENT
Start: 2025-04-14 | End: 2025-04-14

## 2025-04-13 RX ORDER — CLONAZEPAM 0.5 MG/1
0.5 TABLET ORAL ONCE
Status: COMPLETED | OUTPATIENT
Start: 2025-04-13 | End: 2025-04-13

## 2025-04-13 RX ORDER — CLONIDINE HYDROCHLORIDE 0.1 MG/1
0.1 TABLET ORAL
Status: DISCONTINUED | OUTPATIENT
Start: 2025-04-13 | End: 2025-04-15 | Stop reason: HOSPADM

## 2025-04-13 RX ORDER — DOCUSATE SODIUM 100 MG/1
100 CAPSULE, LIQUID FILLED ORAL 2 TIMES DAILY
Status: DISCONTINUED | OUTPATIENT
Start: 2025-04-13 | End: 2025-04-15 | Stop reason: HOSPADM

## 2025-04-13 RX ORDER — INSULIN GLARGINE 100 [IU]/ML
16 INJECTION, SOLUTION SUBCUTANEOUS
Status: DISCONTINUED | OUTPATIENT
Start: 2025-04-13 | End: 2025-04-15 | Stop reason: HOSPADM

## 2025-04-13 RX ORDER — FOLIC ACID 1 MG/1
1 TABLET ORAL DAILY
Status: DISCONTINUED | OUTPATIENT
Start: 2025-04-13 | End: 2025-04-15 | Stop reason: HOSPADM

## 2025-04-13 RX ORDER — FLUTICASONE PROPIONATE 50 MCG
1 SPRAY, SUSPENSION (ML) NASAL DAILY
Status: DISCONTINUED | OUTPATIENT
Start: 2025-04-13 | End: 2025-04-15 | Stop reason: HOSPADM

## 2025-04-13 RX ORDER — SODIUM CHLORIDE 9 MG/ML
75 INJECTION, SOLUTION INTRAVENOUS CONTINUOUS
Status: DISCONTINUED | OUTPATIENT
Start: 2025-04-13 | End: 2025-04-14

## 2025-04-13 RX ORDER — ATORVASTATIN CALCIUM 40 MG/1
40 TABLET, FILM COATED ORAL
Status: DISCONTINUED | OUTPATIENT
Start: 2025-04-13 | End: 2025-04-15 | Stop reason: HOSPADM

## 2025-04-13 RX ORDER — FINASTERIDE 5 MG/1
5 TABLET, FILM COATED ORAL DAILY
Status: DISCONTINUED | OUTPATIENT
Start: 2025-04-13 | End: 2025-04-15 | Stop reason: HOSPADM

## 2025-04-13 RX ORDER — POLYETHYLENE GLYCOL 3350 17 G/17G
17 POWDER, FOR SOLUTION ORAL DAILY
Status: DISCONTINUED | OUTPATIENT
Start: 2025-04-13 | End: 2025-04-15 | Stop reason: HOSPADM

## 2025-04-13 RX ORDER — LIDOCAINE 50 MG/G
1 PATCH TOPICAL DAILY
Status: DISCONTINUED | OUTPATIENT
Start: 2025-04-14 | End: 2025-04-15 | Stop reason: HOSPADM

## 2025-04-13 RX ORDER — HEPARIN SODIUM 5000 [USP'U]/ML
5000 INJECTION, SOLUTION INTRAVENOUS; SUBCUTANEOUS EVERY 8 HOURS SCHEDULED
Status: DISCONTINUED | OUTPATIENT
Start: 2025-04-13 | End: 2025-04-15 | Stop reason: HOSPADM

## 2025-04-13 RX ADMIN — CLOPIDOGREL 75 MG: 75 TABLET ORAL at 16:28

## 2025-04-13 RX ADMIN — DOCUSATE SODIUM 100 MG: 100 CAPSULE, LIQUID FILLED ORAL at 17:44

## 2025-04-13 RX ADMIN — HEPARIN SODIUM 5000 UNITS: 5000 INJECTION INTRAVENOUS; SUBCUTANEOUS at 21:35

## 2025-04-13 RX ADMIN — ATORVASTATIN CALCIUM 40 MG: 40 TABLET, FILM COATED ORAL at 16:27

## 2025-04-13 RX ADMIN — SODIUM CHLORIDE 75 ML/HR: 0.9 INJECTION, SOLUTION INTRAVENOUS at 16:20

## 2025-04-13 RX ADMIN — FOLIC ACID 1 MG: 1 TABLET ORAL at 16:28

## 2025-04-13 RX ADMIN — FINASTERIDE 5 MG: 5 TABLET, FILM COATED ORAL at 16:27

## 2025-04-13 RX ADMIN — CLONAZEPAM 0.5 MG: 0.5 TABLET ORAL at 23:13

## 2025-04-13 RX ADMIN — ACETAMINOPHEN 650 MG: 325 TABLET, FILM COATED ORAL at 16:33

## 2025-04-13 RX ADMIN — HYDROXYZINE HYDROCHLORIDE 25 MG: 25 TABLET, FILM COATED ORAL at 16:43

## 2025-04-13 RX ADMIN — CLONIDINE HYDROCHLORIDE 0.1 MG: 0.1 TABLET ORAL at 21:35

## 2025-04-13 RX ADMIN — IOHEXOL 85 ML: 350 INJECTION, SOLUTION INTRAVENOUS at 11:27

## 2025-04-13 RX ADMIN — Medication 100 MG: at 16:28

## 2025-04-13 RX ADMIN — INSULIN GLARGINE 16 UNITS: 100 INJECTION, SOLUTION SUBCUTANEOUS at 21:35

## 2025-04-13 RX ADMIN — CLONAZEPAM 0.5 MG: 0.5 TABLET ORAL at 20:44

## 2025-04-13 RX ADMIN — HEPARIN SODIUM 5000 UNITS: 5000 INJECTION INTRAVENOUS; SUBCUTANEOUS at 16:28

## 2025-04-13 NOTE — ED NOTES
Patient transported to CT- monitored, provider and RN at bedside       Kait Nazario RN  04/13/25 [id number]

## 2025-04-13 NOTE — ASSESSMENT & PLAN NOTE
Continue clonidine 0.1 mg at bedtime  On Lasix 20 mg and HCTZ 12.5 mg as needed for lower extremity edema--monitor volume status, currently on hold given elevated SrCr

## 2025-04-13 NOTE — INCIDENTAL FINDINGS
The following findings require follow up:  Radiographic finding   Finding: Stable pulmonary nodules including an 8 mm right upper lobe groundglass nodule.      Follow up required: based on current Fleischner Society 2017 Guidelines on incidental pulmonary nodule, follow-up CT is recommended for every 2 years until 5 years of stability is demonstrated.    Follow up should be done within 2 years until 5 years of stability    Please notify the following clinician to assist with the follow up:   Dr. Mckeon    Incidental finding results were discussed with the Patient by Gardenia Frausto MD on 04/13/25.   They expressed understanding and all questions answered.

## 2025-04-13 NOTE — ASSESSMENT & PLAN NOTE
Secondary to head strike s/p fall  Had a previous admission on 3/11 for drowsiness/encephalopathy.  Suspected polypharmacy use, stroke was ruled out  GCS 14 on admission  CT head without intracranial normalities  CT C-spine without traumatic injury  Follow-up UDS  Coma Panel unremarkable   Stat head CT for change in mental status  If respiratory status changes/respiratory depression please get stat VBG/ABG and contact respiratory therapy for capnography  Frequent neurochecks  Hold sedating medications  Consider neurology re-samira/MRI if mental status is not improved   Nursing dysphagia assessment patient initiating diet

## 2025-04-13 NOTE — ASSESSMENT & PLAN NOTE
Lab Results   Component Value Date    HGBA1C 10.3 (A) 04/07/2025     Recent Labs     04/13/25  1104   POCGLU 206*   Blood Sugar Average: Last 72 hrs:  (P) 206  Last seen by endocrinology on 4/7/2025  Home regimen: Lantus 16 units at bedtime, metformin 1000 mg twice a day with meals  Inpatient regimen: Lantus 16 units at bedtime, SSI, carb controlled diet  Hypoglycemia protocol

## 2025-04-13 NOTE — ASSESSMENT & PLAN NOTE
H/O opioid abuse  Methadone clinic [facility] (phone number [phone]). Patient is on 140 mg daily of methadone.   Took his methadone today  Attempted to reach methadone clinic, left voicemail will reach out again tomorrow  Continue methadone while inpatient once verified dose

## 2025-04-13 NOTE — PLAN OF CARE
Problem: Nutrition/Hydration-ADULT  Goal: Nutrient/Hydration intake appropriate for improving, restoring or maintaining nutritional needs  Description: Monitor and assess patient's nutrition/hydration status for malnutrition. Collaborate with interdisciplinary team and initiate plan and interventions as ordered.  Monitor patient's weight and dietary intake as ordered or per policy. Utilize nutrition screening tool and intervene as necessary. Determine patient's food preferences and provide high-protein, high-caloric foods as appropriate. INTERVENTIONS:- Monitor oral intake, urinary output, labs, and treatment plans- Assess nutrition and hydration status and recommend course of action- Evaluate amount of meals eaten- Assist patient with eating if necessary - Allow adequate time for meals- Recommend/ encourage appropriate diets, oral nutritional supplements, and vitamin/mineral supplements- Order, calculate, and assess calorie counts as needed- Recommend, monitor, and adjust tube feedings and TPN/PPN based on assessed needs- Assess need for intravenous fluids- Provide specific nutrition/hydration education as appropriate- Include patient/family/caregiver in decisions related to nutrition  Outcome: Progressing     Problem: PAIN - ADULT  Goal: Verbalizes/displays adequate comfort level or baseline comfort level  Description: Interventions:- Encourage patient to monitor pain and request assistance- Assess pain using appropriate pain scale- Administer analgesics based on type and severity of pain and evaluate response- Implement non-pharmacological measures as appropriate and evaluate response- Consider cultural and social influences on pain and pain management- Notify physician/advanced practitioner if interventions unsuccessful or patient reports new pain  Outcome: Progressing     Problem: SAFETY ADULT  Goal: Patient will remain free of falls  Description: INTERVENTIONS:- Educate patient/family on patient safety  including physical limitations- Instruct patient to call for assistance with activity - Consult OT/PT to assist with strengthening/mobility - Keep Call bell within reach- Keep bed low and locked with side rails adjusted as appropriate- Keep care items and personal belongings within reach- Initiate and maintain comfort rounds- Make Fall Risk Sign visible to staff- Offer Toileting every 2 Hours, in advance of need- Initiate/Maintain bed chair alarm- Obtain necessary fall risk management equipment: walker - Apply yellow socks and bracelet for high fall risk patients- Consider moving patient to room near nurses station  Outcome: Progressing     Problem: DISCHARGE PLANNING  Goal: Discharge to home or other facility with appropriate resources  Description: INTERVENTIONS:- Identify barriers to discharge w/patient and caregiver- Arrange for needed discharge resources and transportation as appropriate- Identify discharge learning needs (meds, wound care, etc.)- Arrange for interpretive services to assist at discharge as needed- Refer to Case Management Department for coordinating discharge planning if the patient needs post-hospital services based on physician/advanced practitioner order or complex needs related to functional status, cognitive ability, or social support system  Outcome: Progressing     Problem: Knowledge Deficit  Goal: Patient/family/caregiver demonstrates understanding of disease process, treatment plan, medications, and discharge instructions  Description: Complete learning assessment and assess knowledge base.Interventions:- Provide teaching at level of understanding- Provide teaching via preferred learning methods  Outcome: Progressing     Problem: METABOLIC, FLUID AND ELECTROLYTES - ADULT  Goal: Glucose maintained within target range  Description: INTERVENTIONS:- Monitor Blood Glucose as ordered- Assess for signs and symptoms of hyperglycemia and hypoglycemia- Administer ordered medications to maintain  glucose within target range- Assess nutritional intake and initiate nutrition service referral as needed  Outcome: Progressing

## 2025-04-13 NOTE — LETTER
To whom it may concern:    David Skelton is under my professional care. He was seen in the hospital from 4/13 to 4/15 and required Lorazepam during that time.    Please don't hesitate to reach out if there are questions.    Partha Friedman MD

## 2025-04-13 NOTE — ASSESSMENT & PLAN NOTE
"Patient states he was very constipated about 2 days ago, took a \"bunch of stuff\" to help facilitate a BM.  Yesterday he had a very massive BM and since has developed abdominal pain/cramping and nausea.  CT A/P without acute findings, wall thickening in the rectosigmoid  Start inpatient bowel regimen  Monitor BMs  "

## 2025-04-13 NOTE — ASSESSMENT & PLAN NOTE
Has chronic dysarthria at baseline secondary to CVA history as seen on MRI  Continue to follow-up outpatient  Started on Plavix 75 mg daily by neurology last admission given chronic stroke seen on MRI  CT without evidence of CVA, if patient's mental status does not improve consider MRI

## 2025-04-13 NOTE — ED ATTENDING ATTESTATION
"4/13/2025  I, Gardenia Frausto MD, saw and evaluated the patient. I have discussed the patient with the resident/non-physician practitioner and agree with the resident's/non-physician practitioner's findings, Plan of Care, and MDM as documented in the resident's/non-physician practitioner's note, except where noted. All available labs and Radiology studies were reviewed.  I was present for key portions of any procedure(s) performed by the resident/non-physician practitioner and I was immediately available to provide assistance.       At this point I agree with the current assessment done in the Emergency Department.  I have conducted an independent evaluation of this patient a history and physical is as follows:    63 yo male with h/o CVA, anxiety, depression, DM, h/o substance use disorder, HTN presents from home with AMS.  Per patient was in normal state of health when he passed out while attempting to make a pizza.  States he fell forward onto knees and hit head.  Unknown if prolonged LOC.  No h/o incontinence or tongue biting.  Denies street drugs or ETOH.  Pt does not know which medications he takes, states \"I take 5-6 different pills\".  Per chart review pt on Plavix which was not apparent at time of triage.  On my initial evaluation, pt awake/alert, slurred speech (which is baseline per chart), no new CN deficits, no obvious head trauma, at neuro baseline, without nystagmus.  Pt somnolent intermittently but arousable to voice, protecting airway.  Pupils small/reactive, pt does report taking methadone denies adamantly other opiates or illicit substances.  Pt c/o right knee pain but no obvious bruising/swelling or effusion.  Pt with some TTP to back without ecchymosis, deformity, or step off.  Repeatedly asking for food. Given h/o fall, change in mental status and possible plavix scans expedited.  Discussed with trauma in real time who ok with that plan given lack of objective signs of trauma and initial " placement in room.  Imaging negative for acute traumatic process.  Stable lung nodule noted.  This was relayed to patient and documented in chart.  Labs reassuring.  No obvious ischemic or infection.  Observed at length without significant improvement in mental status.  However, pt remained arousable to voice, End tidal CO2 appropriate.  No hypoxia.  Pt protecting airway throughout.  Pt requires admission for monitoring.  HD stable.       ED Course  ED Course as of 04/13/25 1548   Sun Apr 13, 2025   1120 Pt evaluated, no obvious head trauma, neuro intact, he is unsure what meds he takes at home, initially denied blood thinners to nursing.  However, upon chart review pt has been prescribed Plavix for CVA in past.  Pt not sure what he is taking.  Pt reports right knee pain.  And some TTP on back without ecchymosis, deformity, or step off.  Pt intermittently somnolent easily arousable, small pupils, admits to methadone use denies other opiates or street drugs/ETOH.  Will get patient to scanner quickly and involve trauma if necessary.     1130 CT head without obvious bleeding on my review.  Pt remains awake/alert/and answering questions   1226 CT chest abdomen pelvis w contrast  mpression:  1.  No findings of acute traumatic injury in the chest, abdomen or pelvis. 2.  Stable pulmonary nodules including an 8 mm right upper lobe groundglass nodule. Based on current Fleischner Society 2017 Guidelines on incidental pulmonary nodule, follow-up CT is recommended for every 2 years until 5 years of stability is  demonstrated. 3.  Cholelithiasis.  The study was marked in EPIC for immediate notification.  ...   1226 CT head without contrast  IMPRESSION:     No acute intracranial abnormality.     The study was marked in EPIC for immediate notification.           Workstation performed: [id number]         Exam Ended: 04/13/25 11:24       1227 CT cervical spine without contrast  IMPRESSION:     No cervical spine fracture or  traumatic malalignment.           The study was marked in EPIC for immediate notification.     Workstation performed: [id number]         Exam Ended: 04/13/25 11:24       1237 Comprehensive metabolic panel(!)  Reassuring, no end organ damage, no AG, normal bicarb.       1238 Coma panel(!)  wnl   1240 Pt still somnolent but easily arousible to voice.  End tidal CO2 appropriate.  No hypoxia.  Pt protecting airway.           Critical Care Time  Procedures

## 2025-04-13 NOTE — ED PROVIDER NOTES
Emergency Department Trauma Note  Daivd Skelton Jr. 64 y.o. male MRN: [id number]  Unit/Bed#: S /S -01 Encounter: [id number]      Trauma Alert:  C  Model of Arrival:  ambulance    Trauma Team: Current Providers  Attending Provider: Gardenia Frausto MD  Attending Provider: Timoteo Porter MD  Attending Provider: Montse Spence MD  Registered Nurse: Kait Nazario RN  ED Physician: Conrad Cevallos MD  Registered Nurse: Zach Randolph RN  Registered Nurse: Guanakito Velazquez RN  Registered Nurse: Prachi Mane RN  Patient Care Assistant: Macrina Jonas  Registered Nurse: Logan Temple RN  Consultants:     None      History of Present Illness     Chief Complaint:   Chief Complaint   Patient presents with    Fall     Pt states he was on his way to make a frozen pizza when he tripped and fell onto BL knees and landed on R side of body. +bl knee pain +r sided cp +HS -Thinners +loc Pt drowsy in triage.      HPI:  David Skelton Jr. is a 64 y.o. male who presents with bilateral knee pain, and drowsiness post fall from standing with head strike. PMH of a-fib, bipolar disorder, type 2 DM, opioid abuse, prior stroke with lasting slurred speech, seizure disorder, CKD.   Mechanism:  trip and fall from standing                    Review of Systems    Historical Information     Immunizations:   Immunization History   Administered Date(s) Administered    COVID-19 J&J (Yohana) vaccine 0.5 mL 03/12/2021, 11/22/2021    COVID-19 Moderna Vac BIVALENT 12 Yr+ IM 0.5 ML 02/28/2023    COVID-19 Moderna mRNA Vaccine 12 Yr+ 50 mcg/0.5 mL (Spikevax) 11/14/2023    COVID-19 Pfizer mRNA vacc PF angie-sucrose 12 yr and older (Comirnaty) 10/21/2024    INFLUENZA 10/10/2011, 12/03/2012, 01/26/2017, 08/17/2020, 10/31/2023    Influenza Recombinant Preservative Free Im 09/04/2024    Influenza, injectable, quadrivalent, preservative free 0.5 mL 10/16/2019, 08/17/2020    Influenza, recombinant, quadrivalent,injectable, preservative  free 09/11/2019    Influenza, seasonal, injectable 11/01/2013    Pneumococcal Polysaccharide PPV23 10/07/2010, 01/26/2017, 05/05/2019    Tdap 06/06/2018    Zoster Vaccine Recombinant 03/12/2019, 10/16/2019       Past Medical History:   Diagnosis Date    Anxiety     Depression     Diabetes mellitus (HCC)     Drug use     Hemorrhoids, internal 9/23/2019    Hypertension        Family History   Problem Relation Age of Onset    Diabetes Mother     Hypertension Mother     Hypertension Father     Autoimmune disease Son      Past Surgical History:   Procedure Laterality Date    FEMUR FRACTURE SURGERY Right     GASTRIC BYPASS  11/08/2011    Managed by: Braden Luna (General Surgery)    HERNIA REPAIR  02/13/2013    Incisional hernia repair Managed by: Braden Luna (General Surgery)    MD OPTX FEM SHFT FX W/INSJ IMED IMPLT W/WO SCREW Right 7/31/2024    Procedure: INSERTION NAIL IM FEMUR ANTEGRADE (TROCHANTERIC);  Surgeon: Meng Hill MD;  Location: [facility];  Service: Orthopedics    TONSILLECTOMY  2010     Social History     Tobacco Use    Smoking status: Never    Smokeless tobacco: Never   Vaping Use    Vaping status: Never Used   Substance Use Topics    Alcohol use: Never    Drug use: Yes     Comment: Methadone clinic     E-Cigarette/Vaping    E-Cigarette Use Never User      E-Cigarette/Vaping Substances    Nicotine No     THC No     CBD No     Flavoring No     Other No     Unknown No        Family History: non-contributory    Meds/Allergies   Prior to Admission Medications   Prescriptions Last Dose Informant Patient Reported? Taking?   Alcohol Swabs 70 % PADS  Self No No   Sig: May substitute brand based on insurance coverage. Check glucose BID.   Blood Glucose Monitoring Suppl (OneTouch Verio Reflect) w/Device KIT  Self No No   Sig: May substitute brand based on insurance coverage. Check glucose BID.   Cholecalciferol (VITAMIN D3) 1,000 units tablet  Self No No   Sig: Take 2 tablets (2,000 Units total)  by mouth daily   Continuous Glucose  (FreeStyle Grace 3 [place]) STEVE   No No   Sig: Please dispense 1 reader   Continuous Glucose Sensor (FreeStyle Grace 3 Plus Sensor) MISC   No No   Sig: Use 1 each every 15 (fifteen) days   Insulin Glargine Solostar (Lantus SoloStar) 100 UNIT/ML SOPN   No No   Sig: Inject 16 units at bedtime   Insulin Pen Needle (BD Pen Needle Aminta U/F) 32G X 4 MM MISC   No No   Sig: Use daily as directed with insulin pen   Misc. Devices (CANE) MISC  Self No No   Sig: by Does not apply route daily Dx:  Frequent falls   Multiple Vitamin (Daily-Laura Multivitamin) TABS   Yes No   Sig: Take 1 tablet by mouth in the morning   Multiple Vitamin (multivitamin) capsule   No No   Sig: Take 1 capsule by mouth daily   Trazodone HCl POWD   Yes No   Sig: Take by mouth   Wound Dressings (Dermagran Hydrogel Wound) GEL   No No   Sig: Apply 1 Application topically every other day To applied every other day to small red developing ulcer on the left lateral shin area   amphetamine-dextroamphetamine (ADDERALL XR) 30 MG 24 hr capsule   Yes No   Sig: TAKE 1 CAPSULE BY MOUTH TWICE A DAY (8AM AND 1PM)   atorvastatin (LIPITOR) 40 mg tablet   No No   Sig: Take 1 tablet (40 mg total) by mouth daily with dinner   cloNIDine (CATAPRES) 0.1 mg tablet   Yes No   Sig: Take 0.1 mg by mouth daily at bedtime   clonazePAM (KlonoPIN) 1 mg tablet  Self Yes No   Sig: Take 1 mg by mouth 2 (two) times a day   clopidogrel (PLAVIX) 75 mg tablet   No No   Sig: Take 1 tablet (75 mg total) by mouth daily   finasteride (PROSCAR) 5 mg tablet   No No   Sig: Take 1 tablet (5 mg total) by mouth daily   fluticasone (FLONASE) 50 mcg/act nasal spray   No No   Si spray into each nostril daily   folic acid (FOLVITE) 1 mg tablet   No No   Sig: Take 1 tablet (1 mg total) by mouth daily   furosemide (LASIX) 20 mg tablet  Self No No   Sig: Take 1 tablet (20 mg total) by mouth daily as needed (leg swelling)   gabapentin (NEURONTIN) 800 mg tablet    Yes No   Sig: TAKE 1 TABLET BY MOUTH 3 TIMES A DAY AND TAKE 1 TABLET BY MOUTH AT BEDTIME   gabapentin (Neurontin) 600 MG tablet  Self No No   Sig: Take 0.5 tablets (300 mg total) by mouth 2 (two) times a day   glucose blood (OneTouch Verio) test strip  Self No No   Sig: May substitute brand based on insurance coverage. Check glucose BID.   hydroCHLOROthiazide 12.5 mg tablet   No No   Sig: TAKE 1 TABLET (12.5 MG TOTAL) BY MOUTH DAILY AS NEEDED EDEMA   metFORMIN (GLUCOPHAGE-XR) 500 mg 24 hr tablet   No No   Sig: Take 2 tablets (1,000 mg total) by mouth 2 (two) times a day with meals   tamsulosin (FLOMAX) 0.4 mg   No No   Sig: Take 1 capsule (0.4 mg total) by mouth daily with dinner   thiamine 100 MG tablet   No No   Sig: Take 1 tablet (100 mg total) by mouth daily   triamcinolone (KENALOG) 0.1 % cream   No No   Sig: Apply topically 2 (two) times a day Applied to right posterior thigh      Facility-Administered Medications: None       Allergies   Allergen Reactions    Aspirin Shortness Of Breath    Penicillins Shortness Of Breath    Penicillins     Aspirin Anxiety and Wheezing       PHYSICAL EXAM    PE limited by: nothing    Objective   Vitals:   First set: Temperature: 98.1 °F (36.7 °C) (04/13/25 1205)  Pulse: 77 (04/13/25 1043)  Respirations: 18 (04/13/25 1043)  Blood Pressure: 123/68 (04/13/25 1043)  SpO2: 96 % (04/13/25 1043)    Primary Survey:   (A) Airway: intact  (B) Breathing: bilateral vesicular breath sounds present  (C) Circulation: Pulses:   normal  (D) Disabliity:  GCS Total:  14  (E) Expose:  Completed    Secondary Survey: (Click on Physical Exam tab above)  Physical Exam  Vitals and nursing note reviewed. Exam conducted with a chaperone present.   Constitutional:       General: He is not in acute distress.  HENT:      Head: Normocephalic and atraumatic.      Right Ear: Tympanic membrane and ear canal normal.      Left Ear: Tympanic membrane and ear canal normal.      Nose: Nose normal. No congestion or  rhinorrhea.      Mouth/Throat:      Mouth: Mucous membranes are dry.   Eyes:      General:         Right eye: No discharge.         Left eye: No discharge.      Conjunctiva/sclera: Conjunctivae normal.      Comments: 2 mm bilaterally both non-reactive to light.   Cardiovascular:      Rate and Rhythm: Normal rate and regular rhythm.      Pulses: Normal pulses.      Heart sounds: Normal heart sounds. No murmur heard.     No friction rub. No gallop.   Pulmonary:      Effort: Pulmonary effort is normal. No respiratory distress.      Breath sounds: Normal breath sounds. No stridor. No wheezing, rhonchi or rales.   Abdominal:      General: There is no distension.      Palpations: Abdomen is soft. There is no shifting dullness, fluid wave, hepatomegaly, splenomegaly or mass.      Tenderness: There is abdominal tenderness in the right upper quadrant and right lower quadrant. There is guarding (mild).      Hernia: No hernia is present.   Musculoskeletal:      Right shoulder: Normal. No tenderness or bony tenderness.      Left shoulder: Normal. No tenderness or bony tenderness.      Right upper arm: No tenderness or bony tenderness.      Left upper arm: No tenderness or bony tenderness.      Right forearm: No tenderness or bony tenderness.      Left forearm: No tenderness or bony tenderness.      Right wrist: No tenderness or bony tenderness. Normal range of motion.      Left wrist: No tenderness or bony tenderness. Normal range of motion.      Right hand: No tenderness or bony tenderness. Normal range of motion.      Left hand: No tenderness or bony tenderness. Normal range of motion.      Cervical back: Tenderness and bony tenderness present.      Thoracic back: No tenderness or bony tenderness. Normal range of motion.      Lumbar back: Tenderness and bony tenderness present. Normal range of motion.      Right hip: Tenderness present. No bony tenderness.      Left hip: No tenderness or bony tenderness.      Right upper leg:  No tenderness or bony tenderness.      Left upper leg: No tenderness or bony tenderness.      Right knee: Bony tenderness present. Tenderness present. No LCL laxity, MCL laxity, ACL laxity or PCL laxity. Normal pulse.      Left knee: Bony tenderness present. Tenderness present. No LCL laxity, MCL laxity, ACL laxity or PCL laxity.Normal pulse.      Right lower leg: No tenderness or bony tenderness.      Left lower leg: No tenderness or bony tenderness.      Right ankle: No tenderness.      Left ankle: No tenderness.      Right foot: No tenderness or bony tenderness.      Left foot: No tenderness or bony tenderness.   Skin:     General: Skin is warm.      Capillary Refill: Capillary refill takes less than 2 seconds.   Neurological:      General: No focal deficit present.      Mental Status: He is alert and oriented to person, place, and time. Mental status is at baseline.         Cervical spine cleared by clinical criteria? No (imaging required)      Invasive Devices       Peripheral Intravenous Line  Duration             Peripheral IV 04/13/25 Right Antecubital <1 day                    Lab Results:   Results Reviewed       Procedure Component Value Units Date/Time    Comprehensive metabolic panel [[id number]]  (Abnormal) Collected: 04/13/25 1120    Lab Status: Final result Specimen: Blood from Arm, Right Updated: 04/13/25 1211     Sodium 136 mmol/L      Potassium 3.9 mmol/L      Chloride 102 mmol/L      CO2 30 mmol/L      ANION GAP 4 mmol/L      BUN 18 mg/dL      Creatinine 1.20 mg/dL      Glucose 186 mg/dL      Calcium 8.3 mg/dL      AST 22 U/L      ALT 36 U/L      Alkaline Phosphatase 119 U/L      Total Protein 6.2 g/dL      Albumin 3.5 g/dL      Total Bilirubin 0.39 mg/dL      eGFR 64 ml/min/1.73sq m     Narrative:      National Kidney Disease Foundation guidelines for Chronic Kidney Disease (CKD):     Stage 1 with normal or high GFR (GFR > 90 mL/min/1.73 square meters)    Stage 2 Mild CKD (GFR = 60-89 mL/min/1.73  square meters)    Stage 3A Moderate CKD (GFR = 45-59 mL/min/1.73 square meters)    Stage 3B Moderate CKD (GFR = 30-44 mL/min/1.73 square meters)    Stage 4 Severe CKD (GFR = 15-29 mL/min/1.73 square meters)    Stage 5 End Stage CKD (GFR <15 mL/min/1.73 square meters)  Note: GFR calculation is accurate only with a steady state creatinine    Salicylate level [[id number]]  (Normal) Collected: 04/13/25 1120    Lab Status: Final result Specimen: Blood from Arm, Right Updated: 04/13/25 1211     Salicylate Lvl <5 mg/dL     Acetaminophen level-If concentration is detectable, please discuss with medical  on call. [[id number]]  (Abnormal) Collected: 04/13/25 1120    Lab Status: Final result Specimen: Blood from Arm, Right Updated: 04/13/25 1211     Acetaminophen Level <2 ug/mL     HS Troponin 0hr (reflex protocol) [[id number]]  (Normal) Collected: 04/13/25 1120    Lab Status: Final result Specimen: Blood from Arm, Right Updated: 04/13/25 1148     hs TnI 0hr 3 ng/L     Protime-INR [[id number]]  (Normal) Collected: 04/13/25 1120    Lab Status: Final result Specimen: Blood from Arm, Right Updated: 04/13/25 1147     Protime 14.7 seconds      INR 1.07    Narrative:      INR Therapeutic Range    Indication                                             INR Range      Atrial Fibrillation                                               2.0-3.0  Hypercoagulable State                                    2.0.2.3  Left Ventricular Asist Device                            2.0-3.0  Mechanical Heart Valve                                  -    Aortic(with afib, MI, embolism, HF, LA enlargement,    and/or coagulopathy)                                     2.0-3.0 (2.5-3.5)     Mitral                                                             2.5-3.5  Prosthetic/Bioprosthetic Heart Valve               2.0-3.0  Venous thromboembolism (VTE: VT, PE        2.0-3.0    APTT [[id number]]  (Normal) Collected: 04/13/25 1120    Lab Status: Final result  Specimen: Blood from Arm, Right Updated: 04/13/25 1147     PTT 27 seconds     Ethanol [[id number]]  (Normal) Collected: 04/13/25 1120    Lab Status: Final result Specimen: Blood from Arm, Right Updated: 04/13/25 1143     Ethanol Lvl <10 mg/dL     CBC and differential [[id number]]  (Abnormal) Collected: 04/13/25 1120    Lab Status: Final result Specimen: Blood from Arm, Right Updated: 04/13/25 1127     WBC 5.95 Thousand/uL      RBC 3.68 Million/uL      Hemoglobin 10.3 g/dL      Hematocrit 31.7 %      MCV 86 fL      MCH 28.0 pg      MCHC 32.5 g/dL      RDW 13.2 %      MPV 11.4 fL      Platelets 259 Thousands/uL      nRBC 0 /100 WBCs      Segmented % 59 %      Immature Grans % 0 %      Lymphocytes % 23 %      Monocytes % 8 %      Eosinophils Relative 9 %      Basophils Relative 1 %      Absolute Neutrophils 3.51 Thousands/µL      Absolute Immature Grans 0.02 Thousand/uL      Absolute Lymphocytes 1.37 Thousands/µL      Absolute Monocytes 0.48 Thousand/µL      Eosinophils Absolute 0.52 Thousand/µL      Basophils Absolute 0.05 Thousands/µL     Fingerstick Glucose (POCT) [[id number]]  (Abnormal) Collected: 04/13/25 1104    Lab Status: Final result Specimen: Blood Updated: 04/13/25 1110     POC Glucose 206 mg/dl     Rapid drug screen, urine [[id number]]     Lab Status: No result Specimen: Urine     UA w Reflex to Microscopic w Reflex to Culture [[id number]]     Lab Status: No result Specimen: Urine                    Imaging Studies:   Direct to CT: Yes  XR knee 4+ views left injury   ED Interpretation by Gardenia Frausto MD (04/13 1310)   NAD      XR knee 4+ views Right injury   ED Interpretation by Gardenia Frausto MD (04/13 1310)   NAD      CT head without contrast   Final Result by Edith Gonzalez MD (04/13 1219)      No acute intracranial abnormality.      The study was marked in EPIC for immediate notification.            Workstation performed: [id number]         CT cervical spine without contrast   Final Result by  Edith Gonzalez MD (04/13 1228)      No cervical spine fracture or traumatic malalignment.            The study was marked in EPIC for immediate notification.      Workstation performed: [id number]         CT chest abdomen pelvis w contrast   Final Result by Edith Gonzalez MD (04/13 1214)      1.  No findings of acute traumatic injury in the chest, abdomen or pelvis.   2.  Stable pulmonary nodules including an 8 mm right upper lobe groundglass nodule. Based on current Fleischner Society 2017 Guidelines on incidental pulmonary nodule, follow-up CT is recommended for every 2 years until 5 years of stability is    demonstrated.   3.  Cholelithiasis.      The study was marked in EPIC for immediate notification.         Workstation performed: [id number]               Procedures  ECG 12 Lead Documentation Only    Date/Time: 4/13/2025 12:00 PM    Performed by: Conrad Cevallos MD  Authorized by: Conrad Cevallos MD    Indications / Diagnosis:  LOC with head strike, now AMS  ECG reviewed by me, the ED Provider: yes    Patient location:  ED  Previous ECG:     Previous ECG:  Compared to current    Comparison ECG info:  11-March-2025 00:44:16    Similarity:  Changes noted (elongation of QT)    Comparison to cardiac monitor: No    Interpretation:     Interpretation: abnormal    Rate:     ECG rate:  74    ECG rate assessment: normal    Rhythm:     Rhythm: sinus rhythm    Ectopy:     Ectopy: none    QRS:     QRS axis:  Normal    QRS intervals:  Normal  Conduction:     Conduction: normal    ST segments:     ST segments:  Normal  T waves:     T waves: normal    Other findings:     Other findings: prolonged qTc interval             ED Course           Medical Decision Making  Patient presents with:  Fall: Pt states he was on his way to make a frozen pizza when he tripped and fell onto BL knees and landed on R side of body. +bl knee pain , +HS ,+loc, Pt  currently drowsy. On chart review patient possibly taking Plavix, patient  denies use.      Patient seen and examined noted to have GCS of 13, periodic drowsiness that is worsening over time.      Differential diagnosis includes but is not limited to intracranial hemorrhage, cardiac dysrhythmia, alcohol intoxication, hypoglycemia, ACS, fracture.      Patient's labs notable for: POC glucose of 206, otherwise unremarkable labs, Imaging revealed: stable pulmonary nodules in Right upper lobe groundglass nodule. No acute cervical spine fracture or traumatic malalignment. No acute intracranial abnormalities. Bilateral knee Xray: no acute fractures or osseous abnormalities present. and EKG: interpreted by myself as above.     Patient lives at home by himself, unable to ambulate and has worsening drowsiness.    Discussed patient's case with Dr. Porter regarding admission who accepted the patient for further evaluation and management.    Patient was rx'd as below       Amount and/or Complexity of Data Reviewed  Labs: ordered.  Radiology: ordered and independent interpretation performed.    Risk  Prescription drug management.  Decision regarding hospitalization.                Disposition  [facility] Transfer: No  Final diagnoses:   Altered mental status   Pulmonary nodule     Time reflects when diagnosis was documented in both MDM as applicable and the Disposition within this note       Time User Action Codes Description Comment    4/13/2025  1:09 PM MulHayden spencery Add [R41.82] Altered mental status     4/13/2025  2:59 PM MulHayden spencery Add [R91.1] Pulmonary nodule     4/13/2025  4:45 PM Logan Temple Add [Z13.9] Encounter for screening involving social determinants of health (SDoH)     4/13/2025  4:46 PM Logan Temple Modify [Z13.9] Encounter for screening involving social determinants of health (SDoH)     4/13/2025  4:46 PM Logan Temple Add [R26.2] Ambulatory dysfunction           ED Disposition       ED Disposition   Admit    Condition   Stable    Date/Time   Sun Apr 13, 2025  1:10 PM     Comment   Case was discussed with Dr. Porter and the patient's admission status was agreed to be Admission Status: inpatient status to the service of Dr. Porter .               Follow-up Information    None       Current Discharge Medication List        CONTINUE these medications which have NOT CHANGED    Details   Alcohol Swabs 70 % PADS May substitute brand based on insurance coverage. Check glucose BID.  Qty: 100 each, Refills: 0    Associated Diagnoses: Type 2 diabetes mellitus with other ophthalmic complication, with long-term current use of insulin ([facility])      amphetamine-dextroamphetamine (ADDERALL XR) 30 MG 24 hr capsule TAKE 1 CAPSULE BY MOUTH TWICE A DAY (8AM AND 1PM)      atorvastatin (LIPITOR) 40 mg tablet Take 1 tablet (40 mg total) by mouth daily with dinner  Qty: 30 tablet, Refills: 0    Associated Diagnoses: History of stroke      Blood Glucose Monitoring Suppl (OneTouch Verio Reflect) w/Device KIT May substitute brand based on insurance coverage. Check glucose BID.  Qty: 1 kit, Refills: 0    Associated Diagnoses: Type 2 diabetes mellitus with other ophthalmic complication, with long-term current use of insulin ([facility])      Cholecalciferol (VITAMIN D3) 1,000 units tablet Take 2 tablets (2,000 Units total) by mouth daily  Qty: 180 tablet, Refills: 3    Associated Diagnoses: Vitamin D deficiency      clonazePAM (KlonoPIN) 1 mg tablet Take 1 mg by mouth 2 (two) times a day      cloNIDine (CATAPRES) 0.1 mg tablet Take 0.1 mg by mouth daily at bedtime      clopidogrel (PLAVIX) 75 mg tablet Take 1 tablet (75 mg total) by mouth daily  Qty: 30 tablet, Refills: 0    Associated Diagnoses: History of stroke      Continuous Glucose  (FreeStyle Grace 3 [place]) STEVE Please dispense 1 reader  Qty: 1 each, Refills: 0    Associated Diagnoses: Type 2 diabetes mellitus with other specified complication, with long-term current use of insulin ([facility])      Continuous Glucose Sensor (FreeStyle Grace 3 Plus Sensor) MISC  Use 1 each every 15 (fifteen) days  Qty: 2 each, Refills: 5    Associated Diagnoses: Type 2 diabetes mellitus with other specified complication, with long-term current use of insulin ([facility])      finasteride (PROSCAR) 5 mg tablet Take 1 tablet (5 mg total) by mouth daily  Qty: 30 tablet, Refills: 0    Associated Diagnoses: Urinary retention      fluticasone (FLONASE) 50 mcg/act nasal spray 1 spray into each nostril daily  Qty: 9.9 mL, Refills: 1    Associated Diagnoses: Nasal congestion      folic acid (FOLVITE) 1 mg tablet Take 1 tablet (1 mg total) by mouth daily  Qty: 30 tablet, Refills: 0    Associated Diagnoses: Alcohol abuse      furosemide (LASIX) 20 mg tablet Take 1 tablet (20 mg total) by mouth daily as needed (leg swelling)  Qty: 10 tablet, Refills: 0    Associated Diagnoses: Edema, unspecified type      !! gabapentin (Neurontin) 600 MG tablet Take 0.5 tablets (300 mg total) by mouth 2 (two) times a day  Qty: 30 tablet, Refills: 2    Associated Diagnoses: Closed fracture of right femur, unspecified fracture morphology, sequela      !! gabapentin (NEURONTIN) 800 mg tablet TAKE 1 TABLET BY MOUTH 3 TIMES A DAY AND TAKE 1 TABLET BY MOUTH AT BEDTIME      glucose blood (OneTouch Verio) test strip May substitute brand based on insurance coverage. Check glucose BID.  Qty: 100 each, Refills: 0    Associated Diagnoses: Type 2 diabetes mellitus with other ophthalmic complication, with long-term current use of insulin ([facility])      hydroCHLOROthiazide 12.5 mg tablet TAKE 1 TABLET (12.5 MG TOTAL) BY MOUTH DAILY AS NEEDED EDEMA  Qty: 30 tablet, Refills: 1    Associated Diagnoses: Edema, unspecified type      Insulin Glargine Solostar (Lantus SoloStar) 100 UNIT/ML SOPN Inject 16 units at bedtime    Associated Diagnoses: Type 2 diabetes mellitus with other specified complication, with long-term current use of insulin ([facility])      Insulin Pen Needle (BD Pen Needle Aminta U/F) 32G X 4 MM MISC Use daily as directed with insulin  pen  Qty: 100 each, Refills: 1    Comments: May substitute with any brand insulin pen needle  Associated Diagnoses: Type 2 diabetes mellitus with other specified complication, with long-term current use of insulin ([facility])      metFORMIN (GLUCOPHAGE-XR) 500 mg 24 hr tablet Take 2 tablets (1,000 mg total) by mouth 2 (two) times a day with meals  Qty: 360 tablet, Refills: 1    Associated Diagnoses: Type 2 diabetes mellitus with other specified complication, with long-term current use of insulin (Formerly [facility])      Misc. Devices (CANE) MISC by Does not apply route daily Dx:  Frequent falls  Qty: 1 each, Refills: 0    Associated Diagnoses: Falls frequently      Multiple Vitamin (Daily-Laura Multivitamin) TABS Take 1 tablet by mouth in the morning      Multiple Vitamin (multivitamin) capsule Take 1 capsule by mouth daily  Qty: 30 capsule, Refills: 0    Associated Diagnoses: Alcohol abuse      tamsulosin (FLOMAX) 0.4 mg Take 1 capsule (0.4 mg total) by mouth daily with dinner  Qty: 30 capsule, Refills: 4    Associated Diagnoses: Benign prostatic hyperplasia with incomplete bladder emptying      thiamine 100 MG tablet Take 1 tablet (100 mg total) by mouth daily  Qty: 30 tablet, Refills: 0    Associated Diagnoses: Alcohol abuse      Trazodone HCl POWD Take by mouth      triamcinolone (KENALOG) 0.1 % cream Apply topically 2 (two) times a day Applied to right posterior thigh  Qty: 80 g, Refills: 1    Associated Diagnoses: Lichenification      Wound Dressings (Dermagran Hydrogel Wound) GEL Apply 1 Application topically every other day To applied every other day to small red developing ulcer on the left lateral shin area  Qty: 85 g, Refills: 0    Associated Diagnoses: Venous stasis ulcer of left calf limited to breakdown of skin without varicose veins (Formerly [facility])       !! - Potential duplicate medications found. Please discuss with provider.        No discharge procedures on file.    PDMP Review         Value Time User    PDMP Reviewed  Yes  3/11/2025 12:42 AM Guanaco Johnson MD            ED Provider  Electronically Signed by           Conrad Cevallos MD  04/13/25 [id number]

## 2025-04-13 NOTE — ASSESSMENT & PLAN NOTE
Consider etiology from hypoglycemia as patient has poor compliance with glucose monitoring and was started on a new regimen, orthostatic hypotension from dehydration given extensive BM from constipation, polypharmacy use, ambulatory dysfunction  Monitor sugars  Follow-up orthostatics  PT/OT eval and treat  Fall precautions

## 2025-04-13 NOTE — H&P
"H&P - Hospitalist   Name: David Skelton Jr. 64 y.o. male I MRN: [id number]  Unit/Bed#: ED-43 I Date of Admission: 4/13/2025   Date of Service: 4/13/2025 I Hospital Day: 0     Assessment & Plan  Drowsiness  Secondary to head strike s/p fall  Had a previous admission on 3/11 for drowsiness/encephalopathy.  Suspected polypharmacy use, stroke was ruled out  GCS 14 on admission  CT head without intracranial normalities  CT C-spine without traumatic injury  Follow-up UDS  Coma Panel unremarkable   Stat head CT for change in mental status  If respiratory status changes/respiratory depression please get stat VBG/ABG and contact respiratory therapy for capnography  Frequent neurochecks  Hold sedating medications  Consider neurology re-samira/MRI if mental status is not improved   Nursing dysphagia assessment patient initiating diet  Fall  Consider etiology from hypoglycemia as patient has poor compliance with glucose monitoring and was started on a new regimen, orthostatic hypotension from dehydration given extensive BM from constipation, polypharmacy use, ambulatory dysfunction  Monitor sugars  Follow-up orthostatics  PT/OT eval and treat  Fall precautions  Constipation  Patient states he was very constipated about 2 days ago, took a \"bunch of stuff\" to help facilitate a BM.  Yesterday he had a very massive BM and since has developed abdominal pain/cramping and nausea.  CT A/P without acute findings, wall thickening in the rectosigmoid  Start inpatient bowel regimen  Monitor BMs  Benign essential hypertension  Continue clonidine 0.1 mg at bedtime  On Lasix 20 mg and HCTZ 12.5 mg as needed for lower extremity edema--monitor volume status, currently on hold given elevated SrCr  Type 2 diabetes mellitus with ophthalmic complication, with long-term current use of insulin ([facility])  Lab Results   Component Value Date    HGBA1C 10.3 (A) 04/07/2025     Recent Labs     04/13/25  1104   POCGLU 206*   Blood Sugar Average: Last 72 hrs:  (P) " 206  Last seen by endocrinology on 4/7/2025  Home regimen: Lantus 16 units at bedtime, metformin 1000 mg twice a day with meals  Inpatient regimen: Lantus 16 units at bedtime, SSI, carb controlled diet  Hypoglycemia protocol  Methadone dependence (HCC)  H/O opioid abuse  Methadone clinic [facility] (phone number [phone]). Patient is on 140 mg daily of methadone.   Took his methadone today  Attempted to reach methadone clinic, left voicemail will reach out again tomorrow  Continue methadone while inpatient once verified dose  CKD (chronic kidney disease), stage II  Lab Results   Component Value Date    EGFR 64 04/13/2025    EGFR 101 03/13/2025    EGFR 94 03/12/2025    CREATININE 1.20 04/13/2025    CREATININE 0.69 03/13/2025    CREATININE 0.83 03/12/2025   Creatinine baseline of 0.7-0.9, elevated at time of admission to 1.20  Possibly related to poor oral intake versus obstructive nephropathy from severe constipation  Avoid nephrotoxins  Follow-up PVR/bladder scan  Started on gentle IV fluids  Trend BMP  History of stroke  Has chronic dysarthria at baseline secondary to CVA history as seen on MRI  Continue to follow-up outpatient  Started on Plavix 75 mg daily by neurology last admission given chronic stroke seen on MRI  CT without evidence of CVA, if patient's mental status does not improve consider MRI      VTE Pharmacologic Prophylaxis: VTE Score: 3 Moderate Risk (Score 3-4) - Pharmacological DVT Prophylaxis Ordered: heparin.  Code Status: Prior   Discussion with family: Attempted to update  (sister) via phone. Left voicemail.     Anticipated Length of Stay: Patient will be admitted on an inpatient basis with an anticipated length of stay of greater than 2 midnights secondary to fall, drowsiness.    History of Present Illness   Chief Complaint: Fall    David Skelton Jr. is a 64 y.o. male with a PMH of hypertension, type 2 diabetes, opioid abuse on methadone, CKD stage II, history  "of stroke, history of A-fib who presents with a fall at home.  He states he was trying to put pizza on the oven when he suddenly felt his left leg either tripped or become weak, he is not sure, and fell onto the right side of his face and ribs.  He denies any prodromal chest pain, lightheadedness, dizziness, weakness or numbness.  Likely mechanical fall.  He then pressed his life alert bracelet to be evaluated in the ED.  He notes that about 2 days ago he had severe constipation and took \"a bunch of stuff\" to facilitate a bowel movement.  He finally had a bowel movement yesterday but subsequently developed abdominal cramping, nausea and vomiting.  Denies any hematemesis, hematochezia, fever, chills, dysuria, flank pain.  He states he has been eating and drinking appropriately despite his constipation.  Denies taking any medications for the abdominal pain.  At present he complains of bilateral blurry vision, right sided facial pain, generalized abdominal pain.  The only medications he took today is his methadone.    Review of Systems   Constitutional:  Negative for activity change, appetite change, chills, fatigue and fever.   Eyes:  Positive for visual disturbance. Negative for photophobia and pain.   Respiratory:  Negative for cough, chest tightness and shortness of breath.    Gastrointestinal:  Positive for abdominal pain, constipation and nausea. Negative for abdominal distention and blood in stool.   Genitourinary:  Negative for difficulty urinating, dysuria, flank pain, frequency and hematuria.   Musculoskeletal:  Negative for back pain and gait problem.   Neurological:  Negative for dizziness, tremors, syncope, facial asymmetry, speech difficulty, weakness, light-headedness, numbness and headaches.   Psychiatric/Behavioral:  Negative for confusion.        Historical Information   Past Medical History:   Diagnosis Date    Anxiety     Depression     Diabetes mellitus (HCC)     Drug use     Hemorrhoids, internal " 9/23/2019    Hypertension      Past Surgical History:   Procedure Laterality Date    FEMUR FRACTURE SURGERY Right     GASTRIC BYPASS  11/08/2011    Managed by: Braden Luna (General Surgery)    HERNIA REPAIR  02/13/2013    Incisional hernia repair Managed by: Braden Luna (General Surgery)    CO OPTX FEM SHFT FX W/INSJ IMED IMPLT W/WO SCREW Right 7/31/2024    Procedure: INSERTION NAIL IM FEMUR ANTEGRADE (TROCHANTERIC);  Surgeon: Meng Hill MD;  Location: [facility];  Service: Orthopedics    TONSILLECTOMY  2010     Social History     Tobacco Use    Smoking status: Never    Smokeless tobacco: Never   Vaping Use    Vaping status: Never Used   Substance and Sexual Activity    Alcohol use: Never    Drug use: Yes     Comment: Methadone clinic    Sexual activity: Not Currently     E-Cigarette/Vaping    E-Cigarette Use Never User      E-Cigarette/Vaping Substances    Nicotine No     THC No     CBD No     Flavoring No     Other No     Unknown No        Social History:  Marital Status: Legally    Occupation: disabled  Patient Pre-hospital Living Situation: Home, Alone  Patient Pre-hospital Level of Mobility: walks  Patient Pre-hospital Diet Restrictions: None    Meds/Allergies   I have reviewed home medications using recent Epic encounter.  Prior to Admission medications    Medication Sig Start Date End Date Taking? Authorizing Provider   Alcohol Swabs 70 % PADS May substitute brand based on insurance coverage. Check glucose BID. 8/5/24   STEVE Blum   amphetamine-dextroamphetamine (ADDERALL XR) 30 MG 24 hr capsule TAKE 1 CAPSULE BY MOUTH TWICE A DAY (8AM AND 1PM) 2/7/25   Historical Provider, MD   atorvastatin (LIPITOR) 40 mg tablet Take 1 tablet (40 mg total) by mouth daily with dinner 3/13/25   Montse Spence MD   Blood Glucose Monitoring Suppl (OneTouch Verio Reflect) w/Device KIT May substitute brand based on insurance coverage. Check glucose BID. 8/5/24   STEVE Blum    Cholecalciferol (VITAMIN D3) 1,000 units tablet Take 2 tablets (2,000 Units total) by mouth daily 10/21/24   Casey Mckeon MD   clonazePAM (KlonoPIN) 1 mg tablet Take 1 mg by mouth 2 (two) times a day 10/4/24   Historical Provider, MD   cloNIDine (CATAPRES) 0.1 mg tablet Take 0.1 mg by mouth daily at bedtime 2/10/25   Historical Provider, MD   clopidogrel (PLAVIX) 75 mg tablet Take 1 tablet (75 mg total) by mouth daily 3/14/25   Montse Spence MD   Continuous Glucose  (FreeStyle [place] 3 [place]) STEVE Please dispense 1 reader 4/7/25   Johnnie Logn MD   Continuous Glucose Sensor (FreeStyle Grace 3 Plus Sensor) MISC Use 1 each every 15 (fifteen) days 4/7/25   Johnnie Long MD   finasteride (PROSCAR) 5 mg tablet Take 1 tablet (5 mg total) by mouth daily 3/14/25   Montse Spence MD   fluticasone (FLONASE) 50 mcg/act nasal spray 1 spray into each nostril daily 3/19/25   STEVE Cross   folic acid (FOLVITE) 1 mg tablet Take 1 tablet (1 mg total) by mouth daily 3/14/25   Montse Spence MD   furosemide (LASIX) 20 mg tablet Take 1 tablet (20 mg total) by mouth daily as needed (leg swelling) 9/27/24   Gatito Rios DO   gabapentin (Neurontin) 600 MG tablet Take 0.5 tablets (300 mg total) by mouth 2 (two) times a day 10/21/24   Casey Mckeon MD   gabapentin (NEURONTIN) 800 mg tablet TAKE 1 TABLET BY MOUTH 3 TIMES A DAY AND TAKE 1 TABLET BY MOUTH AT BEDTIME 3/16/25   Historical Provider, MD   glucose blood (OneTouch Verio) test strip May substitute brand based on insurance coverage. Check glucose BID. 8/5/24   STEVE Blum   hydroCHLOROthiazide 12.5 mg tablet TAKE 1 TABLET (12.5 MG TOTAL) BY MOUTH DAILY AS NEEDED EDEMA 4/10/25   Casey Mckeon MD   Insulin Glargine Solostar (Lantus SoloStar) 100 UNIT/ML SOPN Inject 16 units at bedtime 4/7/25   Johnnie Long MD   Insulin Pen Needle (BD Pen Needle Aminta U/F) 32G X 4 MM MISC Use daily as  directed with insulin pen 3/19/25   STEVE Cross   metFORMIN (GLUCOPHAGE-XR) 500 mg 24 hr tablet Take 2 tablets (1,000 mg total) by mouth 2 (two) times a day with meals 4/7/25   Johnnie Long MD   Misc. Devices (CANE) MISC by Does not apply route daily Dx:  Frequent falls 7/29/19   Lucinda Shearer MD   Multiple Vitamin (Daily-Laura Multivitamin) TABS Take 1 tablet by mouth in the morning 3/13/25   Historical Provider, MD   Multiple Vitamin (multivitamin) capsule Take 1 capsule by mouth daily 3/13/25   Montse Spence MD   tamsulosin (FLOMAX) 0.4 mg Take 1 capsule (0.4 mg total) by mouth daily with dinner 12/3/24   STEVE Johnston   thiamine 100 MG tablet Take 1 tablet (100 mg total) by mouth daily 3/14/25   Montse Spence MD   Trazodone HCl POWD Take by mouth    Historical Provider, MD   triamcinolone (KENALOG) 0.1 % cream Apply topically 2 (two) times a day Applied to right posterior thigh 2/12/25   Gatito Rios,    Wound Dressings (Dermagran Hydrogel Wound) GEL Apply 1 Application topically every other day To applied every other day to small red developing ulcer on the left lateral shin area 2/12/25   Gatito Rios, DO   bacitracin-neomycin-polymyxin b-hydrocortisone 1 % ointment Apply topically 2 (two) times a day 5/24/23 6/25/23  STEVE Banda     Allergies   Allergen Reactions    Aspirin Shortness Of Breath    Penicillins Shortness Of Breath    Penicillins     Aspirin Anxiety and Wheezing       Objective :  Temp:  [98.1 °F (36.7 °C)] 98.1 °F (36.7 °C)  HR:  [59-77] 60  BP: (115-154)/(67-78) 143/77  Resp:  [18] 18  SpO2:  [96 %-99 %] 98 %  O2 Device: None (Room air)    Physical Exam  Vitals reviewed.   Constitutional:       General: He is not in acute distress.     Appearance: He is not ill-appearing.   HENT:      Head: Normocephalic and atraumatic.      Mouth/Throat:      Mouth: Mucous membranes are dry.   Eyes:      General: No visual field  deficit.     Extraocular Movements: Extraocular movements intact.      Comments: Bilateral pupils pinpoint, equal in size   Cardiovascular:      Rate and Rhythm: Normal rate and regular rhythm.      Pulses: Normal pulses.      Heart sounds: Normal heart sounds.   Pulmonary:      Effort: Pulmonary effort is normal.      Breath sounds: Normal breath sounds. No wheezing, rhonchi or rales.   Abdominal:      General: Bowel sounds are normal. There is no distension.      Palpations: Abdomen is soft.      Tenderness: There is abdominal tenderness. There is no guarding or rebound.   Musculoskeletal:      Right lower leg: No edema.      Left lower leg: No edema.   Skin:     General: Skin is warm and dry.   Neurological:      General: No focal deficit present.      Mental Status: He is oriented to person, place, and time. He is lethargic.      GCS: GCS eye subscore is 3. GCS verbal subscore is 5. GCS motor subscore is 6.      Cranial Nerves: No facial asymmetry.      Sensory: Sensation is intact.      Motor: Motor function is intact.      Coordination: Coordination is intact. Finger-Nose-Finger Test and Heel to Shin Test normal.   Psychiatric:         Attention and Perception: Attention normal.         Speech: Speech is slurred.         Behavior: Behavior is cooperative.          Lines/Drains:            Lab Results: I have reviewed the following results:  Results from last 7 days   Lab Units 04/13/25  1120   WBC Thousand/uL 5.95   HEMOGLOBIN g/dL 10.3*   HEMATOCRIT % 31.7*   PLATELETS Thousands/uL 259   SEGS PCT % 59   LYMPHO PCT % 23   MONO PCT % 8   EOS PCT % 9*     Results from last 7 days   Lab Units 04/13/25  1120   SODIUM mmol/L 136   POTASSIUM mmol/L 3.9   CHLORIDE mmol/L 102   CO2 mmol/L 30   BUN mg/dL 18   CREATININE mg/dL 1.20   ANION GAP mmol/L 4   CALCIUM mg/dL 8.3*   ALBUMIN g/dL 3.5   TOTAL BILIRUBIN mg/dL 0.39   ALK PHOS U/L 119*   ALT U/L 36   AST U/L 22   GLUCOSE RANDOM mg/dL 186*     Results from last 7  days   Lab Units 04/13/25  1120   INR  1.07     Results from last 7 days   Lab Units 04/13/25  1104   POC GLUCOSE mg/dl 206*     Lab Results   Component Value Date    HGBA1C 10.3 (A) 04/07/2025    HGBA1C 7.8 (H) 10/16/2024    HGBA1C 10.7 (H) 07/31/2024                 Administrative Statements       ** Please Note: This note has been constructed using a voice recognition system. **

## 2025-04-13 NOTE — ASSESSMENT & PLAN NOTE
Lab Results   Component Value Date    EGFR 64 04/13/2025    EGFR 101 03/13/2025    EGFR 94 03/12/2025    CREATININE 1.20 04/13/2025    CREATININE 0.69 03/13/2025    CREATININE 0.83 03/12/2025   Creatinine baseline of 0.7-0.9, elevated at time of admission to 1.20  Possibly related to poor oral intake versus obstructive nephropathy from severe constipation  Avoid nephrotoxins  Follow-up PVR/bladder scan  Started on gentle IV fluids  Trend BMP

## 2025-04-14 LAB
ALBUMIN SERPL BCG-MCNC: 3 G/DL (ref 3.5–5)
ALP SERPL-CCNC: 102 U/L (ref 34–104)
ALT SERPL W P-5'-P-CCNC: 32 U/L (ref 7–52)
AMPHETAMINES SERPL QL SCN: NEGATIVE
ANION GAP SERPL CALCULATED.3IONS-SCNC: 4 MMOL/L (ref 4–13)
AST SERPL W P-5'-P-CCNC: 30 U/L (ref 13–39)
BACTERIA UR QL AUTO: ABNORMAL /HPF
BARBITURATES UR QL: NEGATIVE
BENZODIAZ UR QL: POSITIVE
BILIRUB SERPL-MCNC: 0.36 MG/DL (ref 0.2–1)
BILIRUB UR QL STRIP: NEGATIVE
BUN SERPL-MCNC: 12 MG/DL (ref 5–25)
CALCIUM ALBUM COR SERPL-MCNC: 8.9 MG/DL (ref 8.3–10.1)
CALCIUM SERPL-MCNC: 8.1 MG/DL (ref 8.4–10.2)
CHLORIDE SERPL-SCNC: 105 MMOL/L (ref 96–108)
CLARITY UR: CLEAR
CO2 SERPL-SCNC: 26 MMOL/L (ref 21–32)
COCAINE UR QL: NEGATIVE
COLOR UR: ABNORMAL
CREAT SERPL-MCNC: 0.97 MG/DL (ref 0.6–1.3)
ERYTHROCYTE [DISTWIDTH] IN BLOOD BY AUTOMATED COUNT: 13.2 % (ref 11.6–15.1)
FENTANYL UR QL SCN: NEGATIVE
GFR SERPL CREATININE-BSD FRML MDRD: 83 ML/MIN/1.73SQ M
GLUCOSE SERPL-MCNC: 110 MG/DL (ref 65–140)
GLUCOSE SERPL-MCNC: 138 MG/DL (ref 65–140)
GLUCOSE SERPL-MCNC: 138 MG/DL (ref 65–140)
GLUCOSE SERPL-MCNC: 139 MG/DL (ref 65–140)
GLUCOSE SERPL-MCNC: 200 MG/DL (ref 65–140)
GLUCOSE SERPL-MCNC: 254 MG/DL (ref 65–140)
GLUCOSE SERPL-MCNC: 373 MG/DL (ref 65–140)
GLUCOSE UR STRIP-MCNC: ABNORMAL MG/DL
HCT VFR BLD AUTO: 32.3 % (ref 36.5–49.3)
HGB BLD-MCNC: 10.7 G/DL (ref 12–17)
HGB UR QL STRIP.AUTO: NEGATIVE
HYDROCODONE UR QL SCN: NEGATIVE
KETONES UR STRIP-MCNC: NEGATIVE MG/DL
LEUKOCYTE ESTERASE UR QL STRIP: NEGATIVE
MCH RBC QN AUTO: 28.4 PG (ref 26.8–34.3)
MCHC RBC AUTO-ENTMCNC: 33.1 G/DL (ref 31.4–37.4)
MCV RBC AUTO: 86 FL (ref 82–98)
METHADONE UR QL: POSITIVE
NITRITE UR QL STRIP: NEGATIVE
NON-SQ EPI CELLS URNS QL MICRO: ABNORMAL /HPF
OPIATES UR QL SCN: NEGATIVE
OXYCODONE+OXYMORPHONE UR QL SCN: NEGATIVE
PCP UR QL: NEGATIVE
PH UR STRIP.AUTO: 5.5 [PH]
PLATELET # BLD AUTO: 257 THOUSANDS/UL (ref 149–390)
PMV BLD AUTO: 11 FL (ref 8.9–12.7)
POTASSIUM SERPL-SCNC: 4.6 MMOL/L (ref 3.5–5.3)
PROT SERPL-MCNC: 5.8 G/DL (ref 6.4–8.4)
PROT UR STRIP-MCNC: ABNORMAL MG/DL
RBC # BLD AUTO: 3.77 MILLION/UL (ref 3.88–5.62)
RBC #/AREA URNS AUTO: ABNORMAL /HPF
SODIUM SERPL-SCNC: 135 MMOL/L (ref 135–147)
SP GR UR STRIP.AUTO: 1.02 (ref 1–1.03)
THC UR QL: NEGATIVE
UROBILINOGEN UR STRIP-ACNC: <2 MG/DL
WBC # BLD AUTO: 6.49 THOUSAND/UL (ref 4.31–10.16)
WBC #/AREA URNS AUTO: ABNORMAL /HPF

## 2025-04-14 PROCEDURE — 82948 REAGENT STRIP/BLOOD GLUCOSE: CPT

## 2025-04-14 PROCEDURE — 81001 URINALYSIS AUTO W/SCOPE: CPT | Performed by: INTERNAL MEDICINE

## 2025-04-14 PROCEDURE — 97167 OT EVAL HIGH COMPLEX 60 MIN: CPT

## 2025-04-14 PROCEDURE — 93005 ELECTROCARDIOGRAM TRACING: CPT

## 2025-04-14 PROCEDURE — 97163 PT EVAL HIGH COMPLEX 45 MIN: CPT

## 2025-04-14 PROCEDURE — 80053 COMPREHEN METABOLIC PANEL: CPT

## 2025-04-14 PROCEDURE — 80307 DRUG TEST PRSMV CHEM ANLYZR: CPT

## 2025-04-14 PROCEDURE — 99232 SBSQ HOSP IP/OBS MODERATE 35: CPT | Performed by: INTERNAL MEDICINE

## 2025-04-14 PROCEDURE — 85027 COMPLETE CBC AUTOMATED: CPT

## 2025-04-14 RX ORDER — METHADONE HYDROCHLORIDE 10 MG/ML
150 CONCENTRATE ORAL DAILY
Refills: 0 | Status: DISCONTINUED | OUTPATIENT
Start: 2025-04-14 | End: 2025-04-15 | Stop reason: HOSPADM

## 2025-04-14 RX ORDER — GABAPENTIN 400 MG/1
400 CAPSULE ORAL ONCE
Status: COMPLETED | OUTPATIENT
Start: 2025-04-14 | End: 2025-04-14

## 2025-04-14 RX ORDER — CLONAZEPAM 1 MG/1
1 TABLET ORAL 2 TIMES DAILY
Status: DISCONTINUED | OUTPATIENT
Start: 2025-04-14 | End: 2025-04-15 | Stop reason: HOSPADM

## 2025-04-14 RX ORDER — DEXTROAMPHETAMINE SACCHARATE, AMPHETAMINE ASPARTATE, DEXTROAMPHETAMINE SULFATE AND AMPHETAMINE SULFATE 2.5; 2.5; 2.5; 2.5 MG/1; MG/1; MG/1; MG/1
30 TABLET ORAL DAILY
Refills: 0 | Status: DISCONTINUED | OUTPATIENT
Start: 2025-04-14 | End: 2025-04-15 | Stop reason: HOSPADM

## 2025-04-14 RX ADMIN — CLONAZEPAM 1 MG: 1 TABLET ORAL at 21:47

## 2025-04-14 RX ADMIN — FOLIC ACID 1 MG: 1 TABLET ORAL at 09:19

## 2025-04-14 RX ADMIN — LIDOCAINE 1 PATCH: 700 PATCH TOPICAL at 09:20

## 2025-04-14 RX ADMIN — CLONAZEPAM 1 MG: 1 TABLET ORAL at 10:04

## 2025-04-14 RX ADMIN — GABAPENTIN 400 MG: 400 CAPSULE ORAL at 13:07

## 2025-04-14 RX ADMIN — Medication 100 MG: at 09:19

## 2025-04-14 RX ADMIN — CLOPIDOGREL 75 MG: 75 TABLET ORAL at 09:20

## 2025-04-14 RX ADMIN — FINASTERIDE 5 MG: 5 TABLET, FILM COATED ORAL at 09:19

## 2025-04-14 RX ADMIN — INSULIN LISPRO 4 UNITS: 100 INJECTION, SOLUTION INTRAVENOUS; SUBCUTANEOUS at 23:13

## 2025-04-14 RX ADMIN — HEPARIN SODIUM 5000 UNITS: 5000 INJECTION INTRAVENOUS; SUBCUTANEOUS at 21:46

## 2025-04-14 RX ADMIN — INSULIN GLARGINE 16 UNITS: 100 INJECTION, SOLUTION SUBCUTANEOUS at 21:47

## 2025-04-14 RX ADMIN — METHADONE HYDROCHLORIDE 150 MG: 10 CONCENTRATE ORAL at 06:49

## 2025-04-14 RX ADMIN — HEPARIN SODIUM 5000 UNITS: 5000 INJECTION INTRAVENOUS; SUBCUTANEOUS at 13:13

## 2025-04-14 RX ADMIN — HEPARIN SODIUM 5000 UNITS: 5000 INJECTION INTRAVENOUS; SUBCUTANEOUS at 06:49

## 2025-04-14 RX ADMIN — ATORVASTATIN CALCIUM 40 MG: 40 TABLET, FILM COATED ORAL at 16:11

## 2025-04-14 RX ADMIN — SODIUM CHLORIDE 75 ML/HR: 0.9 INJECTION, SOLUTION INTRAVENOUS at 06:54

## 2025-04-14 RX ADMIN — INSULIN LISPRO 1 UNITS: 100 INJECTION, SOLUTION INTRAVENOUS; SUBCUTANEOUS at 11:10

## 2025-04-14 NOTE — NURSING NOTE
This RN was administering patient's medications when patient received call from [facility] officer (Officer Anabelle). Officer Anabelle requested updates about patient in regard to anticipated plan and discharge. Officer Anabelle provided his contact information to this RN. Officer Anabelle shared that patient's ankle monitor battery needs to be charged and that he will reach out to patient's sister to see if the  can be brought in to the hospital and if not that he will bring in a  himself.     Patient gave permission that his RN can provide Officeshanell Ahn with updates.

## 2025-04-14 NOTE — PLAN OF CARE
Problem: OCCUPATIONAL THERAPY ADULT  Goal: Performs self-care activities at highest level of function for planned discharge setting.  See evaluation for individualized goals.  Description: Treatment Interventions: ADL retraining, Functional transfer training, Endurance training, UE strengthening/ROM, Equipment evaluation/education, Patient/family training, Compensatory technique education, Continued evaluation, Energy conservation, Activityengagement          See flowsheet documentation for full assessment, interventions and recommendations.   Note: Limitation: Decreased ADL status, Decreased endurance, Decreased self-care trans, Decreased high-level ADLs (impaired pain, activity tolerance, standing tolerance, balance, insight into deficits, forward functional reach)     Assessment: Pt is a 63yo male admitted to [facility] on 4/13/25 following a fall from home. Diagnosed w/ drowsiness. Significant PMH impacting his occupational performance includes anxiety/depression, DM, drug use hx, HTN, R femur fracture s/p IM nail (07/2024), gastric bypass (2011), Bipolar disorder, hx stroke, chronic anemia. Pt reports living in an apt and use of cane for functional mobility. Pt reports home health aide 6 days a week and I w/ ADLs. Upon eval, pt alert and generally oriented. Able to follow directions and communicate wants / needs. Pt required mod I grooming seated, mod I UBD for + time, min A LBD, S bed mobility, min A sit <> stand and min A using RW for functional mobility.  Pt is completing ADLs below baseline level of I and would benefit from OT in acute care to max I w/ ADLs, achieve highest level of function. Recommend level III rehab resource intensity when medically stable for discharge from acute care.     Rehab Resource Intensity Level, OT: III (Minimum Resource Intensity)

## 2025-04-14 NOTE — MALNUTRITION/BMI
This medical record reflects one or more clinical indicators suggestive of malnutrition.    Malnutrition Findings:   Adult Malnutrition type: Chronic illness  Adult Degree of Malnutrition: Other severe protein calorie malnutrition  Malnutrition Characteristics: Weight loss, Fat loss                360 Statement: related to inadequate energy/protein intake as evidenced by 7.1% (13#) weight loss noted in 1 month and severe subcutanous fat loss (orbitals).Treated with diet and ONS.      See Nutrition note dated 4/14/2025 for additional details.  Completed nutrition assessment is viewable in the nutrition documentation.

## 2025-04-14 NOTE — UTILIZATION REVIEW
NOTIFICATION OF INPATIENT ADMISSION   AUTHORIZATION REQUEST   SERVICING FACILITY:   [place] [address]  Tax ID: [id number]  NPI: [id number]   ATTENDING PROVIDER:  Attending Name and NPI#: Meenu Gould Md [[id number]]  Address: [address] [place] [address]  Phone: [phone]     ADMISSION INFORMATION:  Place of Service: Inpatient [facility]  Place of Service Code: 21  Inpatient Admission Date/Time: 4/13/25  1:10 PM  Discharge Date/Time: No discharge date for patient encounter.  Admitting Diagnosis Code/Description:  Altered mental status [R41.82]  Pulmonary nodule [R91.1]  Unspecified multiple injuries, initial encounter [T07.XXXA]     UTILIZATION REVIEW CONTACT:  Vandana Schmidt Utilization   Network Utilization Review Department  Phone: [phone]  Fax: [phone]  Email: [email or web address]  Contact for approvals/pending authorizations, clinical reviews, and discharge.     PHYSICIAN ADVISORY SERVICES:  Medical Necessity Denial & Fstw-uj-Whsf Review  Phone: [phone]  Fax: [phone]  Email: [email or web address]     DISCHARGE SUPPORT TEAM:  For Patients Discharge Needs & Updates  Phone: [phone] opt. 2 Fax: [phone]  Email: [email or web address]

## 2025-04-14 NOTE — ASSESSMENT & PLAN NOTE
Lab Results   Component Value Date    HGBA1C 10.3 (A) 04/07/2025     Recent Labs     04/13/25  2045 04/14/25  0017 04/14/25  0654 04/14/25  1015   POCGLU 201* 138 139 200*   Blood Sugar Average: Last 72 hrs:  (P) [id number]  Last seen by endocrinology on 4/7/2025  Home regimen: Lantus 16 units at bedtime, metformin 1000 mg twice a day with meals  Inpatient regimen: Lantus 16 units at bedtime, SSI, carb controlled diet  Hypoglycemia protocol   General

## 2025-04-14 NOTE — PLAN OF CARE
Problem: Nutrition/Hydration-ADULT  Goal: Nutrient/Hydration intake appropriate for improving, restoring or maintaining nutritional needs  Description: Monitor and assess patient's nutrition/hydration status for malnutrition. Collaborate with interdisciplinary team and initiate plan and interventions as ordered.  Monitor patient's weight and dietary intake as ordered or per policy. Utilize nutrition screening tool and intervene as necessary. Determine patient's food preferences and provide high-protein, high-caloric foods as appropriate. INTERVENTIONS:- Monitor oral intake, urinary output, labs, and treatment plans- Assess nutrition and hydration status and recommend course of action- Evaluate amount of meals eaten- Assist patient with eating if necessary - Allow adequate time for meals- Recommend/ encourage appropriate diets, oral nutritional supplements, and vitamin/mineral supplements- Order, calculate, and assess calorie counts as needed- Recommend, monitor, and adjust tube feedings and TPN/PPN based on assessed needs- Assess need for intravenous fluids- Provide specific nutrition/hydration education as appropriate- Include patient/family/caregiver in decisions related to nutrition  Outcome: Progressing     Problem: PAIN - ADULT  Goal: Verbalizes/displays adequate comfort level or baseline comfort level  Description: Interventions:- Encourage patient to monitor pain and request assistance- Assess pain using appropriate pain scale- Administer analgesics based on type and severity of pain and evaluate response- Implement non-pharmacological measures as appropriate and evaluate response- Consider cultural and social influences on pain and pain management- Notify physician/advanced practitioner if interventions unsuccessful or patient reports new pain  Outcome: Progressing     Problem: SAFETY ADULT  Goal: Patient will remain free of falls  Description: INTERVENTIONS:- Educate patient/family on patient safety  including physical limitations- Instruct patient to call for assistance with activity - Consult OT/PT to assist with strengthening/mobility - Keep Call bell within reach- Keep bed low and locked with side rails adjusted as appropriate- Keep care items and personal belongings within reach- Initiate and maintain comfort rounds- Make Fall Risk Sign visible to staff- Offer Toileting every 2 Hours, in advance of need- Initiate/Maintain bed/chair alarm- Obtain necessary fall risk management equipment: yellow bracelet/socks- Apply yellow socks and bracelet for high fall risk patients- Consider moving patient to room near nurses station  Outcome: Progressing     Problem: DISCHARGE PLANNING  Goal: Discharge to home or other facility with appropriate resources  Description: INTERVENTIONS:- Identify barriers to discharge w/patient and caregiver- Arrange for needed discharge resources and transportation as appropriate- Identify discharge learning needs (meds, wound care, etc.)- Arrange for interpretive services to assist at discharge as needed- Refer to Case Management Department for coordinating discharge planning if the patient needs post-hospital services based on physician/advanced practitioner order or complex needs related to functional status, cognitive ability, or social support system  Outcome: Progressing     Problem: Knowledge Deficit  Goal: Patient/family/caregiver demonstrates understanding of disease process, treatment plan, medications, and discharge instructions  Description: Complete learning assessment and assess knowledge base.Interventions:- Provide teaching at level of understanding- Provide teaching via preferred learning methods  Outcome: Progressing     Problem: METABOLIC, FLUID AND ELECTROLYTES - ADULT  Goal: Glucose maintained within target range  Description: INTERVENTIONS:- Monitor Blood Glucose as ordered- Assess for signs and symptoms of hyperglycemia and hypoglycemia- Administer ordered  medications to maintain glucose within target range- Assess nutritional intake and initiate nutrition service referral as needed  Outcome: Progressing     Problem: Prexisting or High Potential for Compromised Skin Integrity  Goal: Skin integrity is maintained or improved  Description: INTERVENTIONS:- Identify patients at risk for skin breakdown- Assess and monitor skin integrity- Assess and monitor nutrition and hydration status- Monitor labs - Assess for incontinence - Turn and reposition patient- Assist with mobility/ambulation- Relieve pressure over bony prominences- Avoid friction and shearing- Provide appropriate hygiene as needed including keeping skin clean and dry- Evaluate need for skin moisturizer/barrier cream- Collaborate with interdisciplinary team - Patient/family teaching- Consider wound care consult   Outcome: Progressing

## 2025-04-14 NOTE — PHYSICAL THERAPY NOTE
PT EVALUATION    NAME:  David Skelton Jr.  AGE:   64 y.o.  Mrn:   [id number]  Length Of Stay: 1    ADMIT DX:  Altered mental status [R41.82]  Pulmonary nodule [R91.1]  Unspecified multiple injuries, initial encounter [T07.XXXA]    Past Medical History:   Diagnosis Date    Anxiety     Depression     Diabetes mellitus (HCC)     Drug use     Hemorrhoids, internal 9/23/2019    Hypertension      Past Surgical History:   Procedure Laterality Date    FEMUR FRACTURE SURGERY Right     GASTRIC BYPASS  11/08/2011    Managed by: Braden Luna (General Surgery)    HERNIA REPAIR  02/13/2013    Incisional hernia repair Managed by: Braden Luna (General Surgery)    WA OPTX FEM SHFT FX W/INSJ IMED IMPLT W/WO SCREW Right 7/31/2024    Procedure: INSERTION NAIL IM FEMUR ANTEGRADE (TROCHANTERIC);  Surgeon: Meng Hill MD;  Location: [facility];  Service: Orthopedics    TONSILLECTOMY  2010 04/14/25 1022   PT Last Visit   PT Visit Date 04/14/25   Note Type   Note type Evaluation   Pain Assessment   Pain Assessment Tool 0-10   Pain Score 7   Pain Location/Orientation Orientation: Bilateral;Orientation: Lower;Location: Back   Pain Onset/Description Onset: Ongoing   Effect of Pain on Daily Activities Limits comfort and activity tolerance   Patient's Stated Pain Goal No pain   Hospital Pain Intervention(s) Repositioned;Ambulation/increased activity;Emotional support;Rest   Restrictions/Precautions   Other Precautions Fall Risk;Bed Alarm;Chair Alarm;Multiple lines  (IV; Adalid on room air)   Home Living   Type of Home Apartment   Home Layout One level;Able to live on main level with bedroom/bathroom;Performs ADLs on one level;Elevator  (0 steps to enter)   Bathroom Shower/Tub Tub/shower unit   Bathroom Toilet Raised   Bathroom Equipment Tub transfer bench;Grab bars in shower   Bathroom Accessibility Accessible   Home Equipment Cane  (RW x 2)   Additional Comments Patient is on house arrest; has ankle bracelet   Prior  Function   Level of [place] Needs assistance with ADLs;Independent with functional mobility;Needs assistance with IADLS   Lives With (S)  Alone   Receives Help From Home health  (Sunday to Friday 9-3:30; assist with cooking, cleaning, grocery shopping and laundry;prn assist with ADLs/dressing)   IADLs Family/Friend/Other provides meals;Family/Friend/Other provides medication management;Family/Friend/Other provides transportation   Falls in the last 6 months 1 to 4  (1, reason for admission)   Comments Patient normally ambulates with a cane at all times prior to admission   General   Additional Pertinent History Patient is admitted secondary to a fall   Family/Caregiver Present No   Cognition   Overall Cognitive Status Impaired   Arousal/Participation Cooperative   Orientation Level Oriented X4   Memory Decreased recall of precautions;Decreased recall of recent events;Decreased short term memory   Following Commands Follows one step commands without difficulty   Comments At least 2 patient identifiers including name and date of birth   Subjective   Subjective Patient reluctantly agreeable to PT   RLE Assessment   RLE Assessment WFL  (Grossly 3 to 3+/5)   LLE Assessment   LLE Assessment WFL  (Grossly 3-3+/5)   Vision-Basic Assessment   Current Vision Wears glasses only for reading   Light Touch   RLE Light Touch Grossly intact   LLE Light Touch Grossly intact   Bed Mobility   Supine to Sit 5  Supervision   Additional items Assist x 1;Verbal cues;Increased time required;HOB elevated;Bedrails   Transfers   Sit to Stand 4  Minimal assistance   Additional items Assist x 1;Verbal cues;Increased time required   Stand to Sit 4  Minimal assistance   Additional items Assist x 1;Verbal cues;Increased time required   Ambulation/Elevation   Gait pattern Forward Flexion;Decreased foot clearance;Short stride;Step through pattern  (Patient tends to hold RW to far ahead; needs cues for roller walker placement, control and  direction)   Gait Assistance 4  Minimal assist   Additional items Assist x 1;Verbal cues;Tactile cues   Assistive Device Rolling walker   Distance 20 feet in patient's room with change in direction; patient declined further ambulation secondary to pain and fatigue   Balance   Static Sitting Fair +   Static Standing Fair -  (With roller walker)   Ambulatory Poor +  (With roller walker)   Endurance Deficit   Endurance Deficit Yes   Endurance Deficit Description Limited activity, standing and gait endurance   Activity Tolerance   Activity Tolerance Patient limited by fatigue;Patient limited by pain   Medical Staff Made Aware CM;OT   Nurse Made Aware RN   Assessment   Problem List Decreased strength;Decreased endurance;Impaired balance;Decreased mobility;Decreased coordination;Decreased safety awareness;Pain   Assessment Patient seen for Physical Therapy evaluation. Patient admitted with Drowsiness.  Comorbidities affecting patient's physical performance include: DM 2, HTN, methadone dependence, ADD, bipolar, spinal stenosis, macular degeneration, DDD, left rib fractures, bradycardia cardia, gross hematuria, BPH, chronic hepatitis, generalized anxiety disorder, toxic encephalopathy, slurred speech, urinary retention, CVA.  Personal factors affecting patient at time of initial evaluation include: lives in one story house, ambulating with assistive device, inability to navigate community distances, inability to navigate level surfaces without external assistance, inability to perform dynamic tasks in community, decreased cognition, limited home support, positive fall history, anxiety, and depression. Prior to admission, patient was independent with functional mobility with cane, requiring assist for ADLS, requiring assist for IADLS, living alone in one story home with no steps to enter, and ambulating household distance.  Please find objective findings from Physical Therapy assessment regarding body systems outlined above  with impairments and limitations including weakness, impaired balance, decreased endurance, impaired coordination, gait deviations, pain, decreased activity tolerance, decreased functional mobility tolerance, decreased safety awareness, impaired judgement, fall risk, SOB upon exertion, and decreased cognition.  The Barthel Index was used as a functional outcome tool presenting with a score of Barthel Index Score: 40 today indicating marked limitations of functional mobility and ADLS.  Patient's clinical presentation is currently unstable/unpredictable as seen in patient's presentation of vital sign response, changing level of pain, varying levels of cognitive performance, increased fall risk, new onset of impairment of functional mobility, decreased endurance, and new onset of weakness. Pt would benefit from continued Physical Therapy treatment to address deficits as defined above and maximize level of functional mobility. As demonstrated by objective findings, the assigned level of complexity for this evaluation is high.    The patient's -[facility] Basic Mobility Inpatient Short Form Raw Score is 17. A Raw score of greater than 16 suggests the patient may benefit from discharge to home. Please also refer to the recommendation of the Physical Therapist for safe discharge planning.   Barriers to Discharge Decreased caregiver support   Barriers to Discharge Comments Patient lives alone; history of falls; has a home health aide Sunday to Friday 9-3:30   Goals   Patient Goals Less pain   STG Expiration Date 04/24/25   Short Term Goal #1 Patient will: Increase bilateral LE strength 1/2 grade to facilitate independent mobility, Perform all bed mobility tasks independently to improve pt's independence w/ repositioning for decrease risk of skin breakdown, Perform all transfers independently consistently from various height surfaces in order to improve I w/ engagement w/ real-world environments/situations, Ambulate at least 250  ft. with single point cane independently w/o LOB to facilitate return and engagement w/ previous living environment, Increase ambulatory and static and dynamic standing balance 1 grade to decrease risk for falls, Tolerate at least 30 consecutive minutes of activity to demonstrate improved activity tolerance and endurance, and Tolerate 3 hr OOB to faciliate upright tolerance   Plan   Treatment/Interventions ADL retraining;Functional transfer training;LE strengthening/ROM;Therapeutic exercise;Endurance training;Patient/family training;Equipment eval/education;Bed mobility;Gait training;Compensatory technique education;Spoke to nursing;Spoke to case management;OT   PT Frequency 3-5x/wk   Discharge Recommendation   Rehab Resource Intensity Level, PT III (Minimum Resource Intensity)   AM-PAC Basic Mobility Inpatient   Turning in Flat Bed Without Bedrails 3   Lying on Back to Sitting on Edge of Flat Bed Without Bedrails 3   Moving Bed to Chair 3   Standing Up From Chair Using Arms 3   Walk in Room 3   Climb 3-5 Stairs With Railing 2   Basic Mobility Inpatient Raw Score 17   Basic Mobility Standardized Score 39.67   [facility] Highest Level Of Mobility   -HL Goal 5: Stand one or more mins   -HL Achieved 6: Walk 10 steps or more   Barthel Index   Feeding 10   Bathing 0   Grooming Score 0   Dressing Score 5   Bladder Score 5   Bowels Score 5   Toilet Use Score 5   Transfers (Bed/Chair) Score 10   Mobility (Level Surface) Score 0   Stairs Score 0   Barthel Index Score 40   End of Consult   Patient Position at End of Consult Bed/Chair alarm activated;Bedside chair;All needs within reach   Licensure   [place] License Number  Jocelyn L Kathy, PT   Portions of the documentation may have been created using voice recognition software. Occasional wrong word or sound alike substitutions may have occurred due to the inherent limitation of the voice recognition software. Read the chart carefully and recognize, using context,  where substitutions have occurred.

## 2025-04-14 NOTE — PLAN OF CARE
Problem: PHYSICAL THERAPY ADULT  Goal: Performs mobility at highest level of function for planned discharge setting.  See evaluation for individualized goals.  Description: Treatment/Interventions: ADL retraining, Functional transfer training, LE strengthening/ROM, Therapeutic exercise, Endurance training, Patient/family training, Equipment eval/education, Bed mobility, Gait training, Compensatory technique education, Spoke to nursing, Spoke to case management, OT          See flowsheet documentation for full assessment, interventions and recommendations.  Note:    Problem List: Decreased strength, Decreased endurance, Impaired balance, Decreased mobility, Decreased coordination, Decreased safety awareness, Pain  Assessment: Patient seen for Physical Therapy evaluation. Patient admitted with Drowsiness.  Comorbidities affecting patient's physical performance include: DM 2, HTN, methadone dependence, ADD, bipolar, spinal stenosis, macular degeneration, DDD, left rib fractures, bradycardia cardia, gross hematuria, BPH, chronic hepatitis, generalized anxiety disorder, toxic encephalopathy, slurred speech, urinary retention, CVA.  Personal factors affecting patient at time of initial evaluation include: lives in one story house, ambulating with assistive device, inability to navigate community distances, inability to navigate level surfaces without external assistance, inability to perform dynamic tasks in community, decreased cognition, limited home support, positive fall history, anxiety, and depression. Prior to admission, patient was independent with functional mobility with cane, requiring assist for ADLS, requiring assist for IADLS, living alone in one story home with no steps to enter, and ambulating household distance.  Please find objective findings from Physical Therapy assessment regarding body systems outlined above with impairments and limitations including weakness, impaired balance, decreased endurance,  impaired coordination, gait deviations, pain, decreased activity tolerance, decreased functional mobility tolerance, decreased safety awareness, impaired judgement, fall risk, SOB upon exertion, and decreased cognition.  The Barthel Index was used as a functional outcome tool presenting with a score of Barthel Index Score: 40 today indicating marked limitations of functional mobility and ADLS.  Patient's clinical presentation is currently unstable/unpredictable as seen in patient's presentation of vital sign response, changing level of pain, varying levels of cognitive performance, increased fall risk, new onset of impairment of functional mobility, decreased endurance, and new onset of weakness. Pt would benefit from continued Physical Therapy treatment to address deficits as defined above and maximize level of functional mobility. As demonstrated by objective findings, the assigned level of complexity for this evaluation is high.The patient's AM-PAC Basic Mobility Inpatient Short Form Raw Score is 17. A Raw score of greater than 16 suggests the patient may benefit from discharge to home. Please also refer to the recommendation of the Physical Therapist for safe discharge planning.  Barriers to Discharge: Decreased caregiver support  Barriers to Discharge Comments: Patient lives alone; history of falls; has a home health aide Sunday to Friday 9-3:30  Rehab Resource Intensity Level, PT: III (Minimum Resource Intensity)    See flowsheet documentation for full assessment.

## 2025-04-14 NOTE — ASSESSMENT & PLAN NOTE
H/O opioid abuse  Methadone clinic [facility] (phone number [phone]). Patient is on 140 mg daily of methadone.   Took his methadone today  Attempted to reach methadone clinic, left voicemail will reach out again tomorrow  Continue methadone while inpatient once verified dose

## 2025-04-14 NOTE — PROGRESS NOTES
"Progress Note - Hospitalist   Name: David Skelton Jr. 64 y.o. male I MRN: [id number]  Unit/Bed#: S -01 I Date of Admission: 4/13/2025   Date of Service: 4/14/2025 I Hospital Day: 1    Assessment & Plan  Constipation  Patient states he was very constipated about 2 days ago, took a \"bunch of stuff\" to help facilitate a BM.  Yesterday he had a very massive BM and since has developed abdominal pain/cramping and nausea.  CT A/P without acute findings, wall thickening in the rectosigmoid  Start inpatient bowel regimen  Monitor Bms  Patient had large bowel movement today with some remittance of abdominal pain    Plan:  Monitor for recurrence of abdominal pain in next 24 hours  Drowsiness  Secondary to head strike s/p fall  Had a previous admission on 3/11 for drowsiness/encephalopathy.  Suspected polypharmacy use, stroke was ruled out  GCS 14 on admission  CT head without intracranial normalities  CT C-spine without traumatic injury  Coma Panel unremarkable   Patient developed acute onset of drowsiness in setting of pills identified as Xanax and hydroxyzine on his person  Telemetry renewed    Plan:  Continue to monitor  Avoid sedating agents  Fall  Consider etiology from hypoglycemia as patient has poor compliance with glucose monitoring and was started on a new regimen, orthostatic hypotension from dehydration given extensive BM from constipation, polypharmacy use, ambulatory dysfunction  Monitor sugars  Follow-up orthostatics  PT/OT eval and treat  Fall precautions  Benign essential hypertension  Continue clonidine 0.1 mg at bedtime  On Lasix 20 mg and HCTZ 12.5 mg as needed for lower extremity edema--monitor volume status, currently on hold given elevated SrCr  Type 2 diabetes mellitus with ophthalmic complication, with long-term current use of insulin ([facility])  Lab Results   Component Value Date    HGBA1C 10.3 (A) 04/07/2025     Recent Labs     04/13/25  2045 04/14/25  0017 04/14/25  0654 04/14/25  1015   POCGLU 201* " 138 139 200*   Blood Sugar Average: Last 72 hrs:  (P) [id number]  Last seen by endocrinology on 4/7/2025  Home regimen: Lantus 16 units at bedtime, metformin 1000 mg twice a day with meals  Inpatient regimen: Lantus 16 units at bedtime, SSI, carb controlled diet  Hypoglycemia protocol  Methadone dependence (HCC)  H/O opioid abuse  Methadone clinic [facility] (phone number [phone]). Patient is on 140 mg daily of methadone.   Took his methadone today  Attempted to reach methadone clinic, left voicemail will reach out again tomorrow  Continue methadone while inpatient once verified dose  CKD (chronic kidney disease), stage II  Lab Results   Component Value Date    EGFR 83 04/14/2025    EGFR 64 04/13/2025    EGFR 101 03/13/2025    CREATININE 0.97 04/14/2025    CREATININE 1.20 04/13/2025    CREATININE 0.69 03/13/2025   Creatinine baseline of 0.7-0.9, elevated at time of admission to 1.20  Possibly related to poor oral intake versus obstructive nephropathy from severe constipation  Avoid nephrotoxins  Follow-up PVR/bladder scan  Started on gentle IV fluids  Trend BMP  History of stroke  Has chronic dysarthria at baseline secondary to CVA history as seen on MRI  Continue to follow-up outpatient  Started on Plavix 75 mg daily by neurology last admission given chronic stroke seen on MRI  CT without evidence of CVA, if patient's mental status does not improve consider MRI    VTE Pharmacologic Prophylaxis: VTE Score: 3 Moderate Risk (Score 3-4) - Pharmacological DVT Prophylaxis Ordered: heparin.    Mobility:   Basic Mobility Inpatient Raw Score: 17  JH-HLM Goal: 5: Stand one or more mins  JH-HLM Achieved: 6: Walk 10 steps or more  JH-HLM Goal achieved. Continue to encourage appropriate mobility.    Patient Centered Rounds: I performed bedside rounds with nursing staff today.   Discussions with Specialists or Other Care Team Provider: treatment team    Education and Discussions with Family /  Patient: Patient declined call to .     Current Length of Stay: 1 day(s)  Current Patient Status: Inpatient   Certification Statement: The patient will continue to require additional inpatient hospital stay due to abdominal pain  Discharge Plan: Anticipate discharge tomorrow to home.    Code Status: Level 1 - Full Code    Subjective   This morning the patient was experiencing abdominal pain and stated he needed to have a bowel movement urgently. He proceeded to have a large volume bowel movement with some resolution of pain. He was not drowsy on examination in the morning, however on re-examination later in the day he developed drowsiness. A PCA found two pills of xanx and 11 pills of hydroxyzine on his person coinciding with his change in mental status. He remains AxOx4.    Objective :  Temp:  [97.5 °F (36.4 °C)-97.8 °F (36.6 °C)] 97.8 °F (36.6 °C)  HR:  [60-72] 65  BP: (124-143)/(77-85) 131/77  Resp:  [18] 18  SpO2:  [97 %-99 %] 99 %  O2 Device: None (Room air)    There is no height or weight on file to calculate BMI.     Input and Output Summary (last 24 hours):     Intake/Output Summary (Last 24 hours) at 4/14/2025 1513  Last data filed at 4/14/2025 1100  Gross per 24 hour   Intake 240 ml   Output 3150 ml   Net -2910 ml       Physical Exam  Constitutional:       General: He is not in acute distress.     Appearance: Normal appearance.   HENT:      Mouth/Throat:      Mouth: Mucous membranes are moist.   Eyes:      Extraocular Movements: Extraocular movements intact.      Conjunctiva/sclera: Conjunctivae normal.   Cardiovascular:      Rate and Rhythm: Normal rate and regular rhythm.      Pulses: Normal pulses.      Heart sounds: Normal heart sounds. No murmur heard.  Pulmonary:      Effort: Pulmonary effort is normal. No respiratory distress.      Breath sounds: Normal breath sounds.   Abdominal:      General: Abdomen is flat. Bowel sounds are normal. There is no distension.      Palpations: Abdomen  is soft.      Tenderness: There is abdominal tenderness.   Musculoskeletal:      Right lower leg: No edema.      Left lower leg: No edema.   Neurological:      General: No focal deficit present.      Mental Status: He is oriented to person, place, and time.   Psychiatric:         Mood and Affect: Mood normal.         Behavior: Behavior normal.           Lines/Drains:        Telemetry:  Telemetry Orders (From admission, onward)               24 Hour Telemetry Monitoring  Continuous x 24 Hours (Telem)        Expiring   Question:  Reason for 24 Hour Telemetry  Answer:  Syncope suspected to be cardiac in origin                     Telemetry Reviewed: Normal Sinus Rhythm  Indication for Continued Telemetry Use: suspected illicit medication consumption               Lab Results: I have reviewed the following results:   Results from last 7 days   Lab Units 04/14/25  0804 04/13/25  1120   WBC Thousand/uL 6.49 5.95   HEMOGLOBIN g/dL 10.7* 10.3*   HEMATOCRIT % 32.3* 31.7*   PLATELETS Thousands/uL 257 259   SEGS PCT %  --  59   LYMPHO PCT %  --  23   MONO PCT %  --  8   EOS PCT %  --  9*     Results from last 7 days   Lab Units 04/14/25  0625   SODIUM mmol/L 135   POTASSIUM mmol/L 4.6   CHLORIDE mmol/L 105   CO2 mmol/L 26   BUN mg/dL 12   CREATININE mg/dL 0.97   ANION GAP mmol/L 4   CALCIUM mg/dL 8.1*   ALBUMIN g/dL 3.0*   TOTAL BILIRUBIN mg/dL 0.36   ALK PHOS U/L 102   ALT U/L 32   AST U/L 30   GLUCOSE RANDOM mg/dL 138     Results from last 7 days   Lab Units 04/13/25  1120   INR  1.07     Results from last 7 days   Lab Units 04/14/25  1015 04/14/25  0654 04/14/25  0017 04/13/25  2045 04/13/25  1747 04/13/25  1607 04/13/25  1104   POC GLUCOSE mg/dl 200* 139 138 201* 239* 62* 206*               Recent Cultures (last 7 days):         Imaging Results Review: No pertinent imaging studies reviewed.  Other Study Results Review: No additional pertinent studies reviewed.    Last 24 Hours Medication List:     Current  Facility-Administered Medications:     acetaminophen (TYLENOL) tablet 650 mg, Q6H PRN    amphetamine-dextroamphetamine (ADDERALL) tablet 30 mg, Daily    atorvastatin (LIPITOR) tablet 40 mg, Daily With Dinner    clonazePAM (KlonoPIN) tablet 1 mg, BID    cloNIDine (CATAPRES) tablet 0.1 mg, HS    clopidogrel (PLAVIX) tablet 75 mg, Daily    docusate sodium (COLACE) capsule 100 mg, BID    finasteride (PROSCAR) tablet 5 mg, Daily    fluticasone (FLONASE) 50 mcg/act nasal spray 1 spray, Daily    folic acid (FOLVITE) tablet 1 mg, Daily    heparin (porcine) subcutaneous injection 5,000 Units, Q8H MELISA **AND** Platelet count, Once    hydrOXYzine HCL (ATARAX) tablet 25 mg, Q6H PRN    insulin glargine (LANTUS) subcutaneous injection 16 Units 0.16 mL, HS    insulin lispro (HumALOG/ADMELOG) 100 units/mL subcutaneous injection 1-5 Units, Q6H MELISA **AND** Fingerstick Glucose (POCT), 4x Daily AC and at bedtime    lidocaine (LIDODERM) 5 % patch 1 patch, Daily    methadone (DOLOPHINE) oral concentrated solution 150 mg, Daily    ondansetron (ZOFRAN) injection 4 mg, Q6H PRN    polyethylene glycol (MIRALAX) packet 17 g, Daily    sodium chloride 0.9 % infusion, Continuous, Last Rate: 75 mL/hr (04/14/25 0654)    thiamine tablet 100 mg, Daily    Administrative Statements   Today, Patient Was Seen By: Partha Friedman MD  I have spent a total time of 25 minutes in caring for this patient on the day of the visit/encounter including Impressions, Counseling / Coordination of care, Documenting in the medical record, Reviewing/placing orders in the medical record (including tests, medications, and/or procedures), Obtaining or reviewing history  , and Communicating with other healthcare professionals .    **Please Note: This note may have been constructed using a voice recognition system.**

## 2025-04-14 NOTE — UTILIZATION REVIEW
"Initial Clinical Review    Admission: Date/Time/Statement:   Admission Orders (From admission, onward)       Ordered        04/13/25 1310  INPATIENT ADMISSION  Once                          Orders Placed This Encounter   Procedures    INPATIENT ADMISSION     Standing Status:   Standing     Number of Occurrences:   1     Level of Care:   Med Surg [16]     Estimated length of stay:   More than 2 Midnights     Certification:   I certify that inpatient services are medically necessary for this patient for a duration of greater than two midnights. See H&P and MD Progress Notes for additional information about the patient's course of treatment.     ED Arrival Information       Expected   -    Arrival   4/13/2025 10:36    Acuity   Emergent              Means of arrival   Ambulance    Escorted by   [facility]    Service   Hospitalist    Admission type   Emergency              Arrival complaint   [facility] - Fall             Chief Complaint   Patient presents with    Fall     Pt states he was on his way to make a frozen pizza when he tripped and fell onto BL knees and landed on R side of body. +bl knee pain +r sided cp +HS -Thinners +loc Pt drowsy in triage.        Initial Presentation: 64 y.o. male presents to ED via  EMS from home after a fall.  He states he was trying to put pizza on the oven when he suddenly felt his left leg either tripped or become weak, he is not sure, and fell onto the right side of his face and ribs.   Denies  any prodromal chest pain,  lightheadedness, dizziness, weakness or numbness.  Likely  was mechanical fall.   Pressed life alert button.  Had severe constipation 2 days  prior to this and  states he  \" took a  bunch of stuff\" to  facilitate  BM.  Did move bowels the day prior to admission, then developed abdominal cramping, nausea and vomiting.  Complains of blurred vision, right sided facial pain and general abdominal pain in ED.  PMH  is  HTN, DM2, opioid abuse on methadone, CKD, " stroke and Afib.   Has chronic dysarthria at baseline D/T  prior CVA.   Previously admitted   on  3/11 for drowsiness/encephalopathy, suspected polypharmacy, stroke  r/o.    GCS  14.  CT head and  c/spine unremarkable.  Admit  Ip with  Drowsiness,  Fall, Constipation  and plan is  bowel regimen, monitor labs, PT/OT/fall precautions,  frequent neurochecks, orthostatic  vital signs, hold sedating meds  and hold  lasix  and  HCTZ  given  elevated creatinine.        Anticipated Length of Stay/Certification Statement: Patient will be admitted on an inpatient basis with an anticipated length of stay of greater than 2 midnights secondary to fall, drowsiness.       Date:   4/14   Day 2:   Continue  PT/OT/fall precautions.  Remains on  tele.  Large  BM this am with some resolution of pain.  Developed drowsiness  this pm,  not  drowsy earlier in the day.  2  pills, xanax , found by  PCA  and 11  pills  of hydroxtzine  on patient.  Soincides with  mental status change.  Remains  alert and oriented  X 4.    ED Treatment-Medication Administration from 04/13/2025 1036 to 04/13/2025 1553         Date/Time Order Dose Route Action     04/13/2025 1127 iohexol (OMNIPAQUE) 350 MG/ML injection (MULTI-DOSE) 85 mL 85 mL Intravenous Given            Scheduled Medications:  amphetamine-dextroamphetamine, 30 mg, Oral, Daily  atorvastatin, 40 mg, Oral, Daily With Dinner  clonazePAM, 1 mg, Oral, BID  cloNIDine, 0.1 mg, Oral, HS  clopidogrel, 75 mg, Oral, Daily  docusate sodium, 100 mg, Oral, BID  finasteride, 5 mg, Oral, Daily  fluticasone, 1 spray, Nasal, Daily  folic acid, 1 mg, Oral, Daily  heparin (porcine), 5,000 Units, Subcutaneous, Q8H MELISA  insulin glargine, 16 Units, Subcutaneous, HS  insulin lispro, 1-5 Units, Subcutaneous, Q6H MELISA  lidocaine, 1 patch, Topical, Daily  methadone, 150 mg, Oral, Daily  polyethylene glycol, 17 g, Oral, Daily  thiamine, 100 mg, Oral, Daily      Continuous IV Infusions:  sodium chloride, 75 mL/hr,  Intravenous, Continuous      PRN Meds:  acetaminophen, 650 mg, Oral, Q6H PRN  hydrOXYzine HCL, 25 mg, Oral, Q6H PRN  ondansetron, 4 mg, Intravenous, Q6H PRN      ED Triage Vitals   Temperature Pulse Respirations Blood Pressure SpO2 Pain Score   04/13/25 1205 04/13/25 1043 04/13/25 1043 04/13/25 1043 04/13/25 1043 04/13/25 1043   98.1 °F (36.7 °C) 77 18 123/68 96 % 8           Vital Signs (last 3 days)       Date/Time Temp Pulse Resp BP MAP (mmHg) SpO2 O2 Device Patient Position - Orthostatic VS Da Coma Scale Score Pain    04/14/25 06:56:01 97.8 °F (36.6 °C) 65 18 131/77 95 99 % None (Room air) Lying -- --    04/14/25 0649 -- -- -- -- -- -- -- -- -- Med Not Given for Pain - for MAR use only    04/14/25 0300 -- -- -- -- -- -- -- -- 15 No Pain    04/13/25 22:58:30 97.5 °F (36.4 °C) 69 18 124/85 98 98 % -- -- -- --    04/13/25 20:34:56 97.7 °F (36.5 °C) 72 -- 138/82 101 97 % -- -- -- --    04/13/25 1633 -- -- -- -- -- -- None (Room air) -- 15 7    04/13/25 1616 -- -- -- -- -- -- -- -- -- 8    04/13/25 15:59:47 97.5 °F (36.4 °C) 65 18 130/82 98 99 % -- -- -- --    04/13/25 1530 -- 60 -- 143/77 105 98 % -- -- -- --    04/13/25 1500 -- -- -- -- -- -- -- -- 15 --    04/13/25 1431 -- -- -- -- -- -- None (Room air) -- -- --    04/13/25 1430 -- 59 18 154/78 107 99 % None (Room air) Lying -- --    04/13/25 1400 -- 59 18 146/74 101 99 % None (Room air) Lying 15 --    04/13/25 1300 -- -- -- -- -- -- -- -- 15 --    04/13/25 1230 -- -- -- -- -- -- -- -- 15 --    04/13/25 1215 -- 67 18 115/68 87 99 % None (Room air) Lying -- --    04/13/25 1205 98.1 °F (36.7 °C) -- -- -- -- -- -- -- -- --    04/13/25 1200 -- 77 18 117/67 86 97 % None (Room air) Lying 15 --    04/13/25 1145 -- -- 18 -- -- -- None (Room air) Lying -- --    04/13/25 1130 -- -- 18 -- -- 97 % None (Room air) Lying 15 --    04/13/25 1115 -- -- -- -- -- -- -- -- 15 --    04/13/25 1100 -- -- -- -- -- -- -- -- 15 -- 04/13/25 1045 -- -- 18 -- -- -- None (Room air)  Lying 15 --    04/13/25 1043 -- 77 18 123/68 -- 96 % None (Room air) Sitting -- 8              Pertinent Labs/Diagnostic Test Results:   Radiology:  XR knee 4+ views left injury   ED Interpretation by Gardenia Frausto MD (04/13 1310)   NAD      Final Interpretation by Cayden Dawkins MD (04/14 0746)      No acute osseous abnormality.      Degenerative changes as described.         Computerized Assisted Algorithm (CAA) may have been used to analyze all applicable images.         Workstation performed: [id number]         XR knee 4+ views Right injury   ED Interpretation by Gardenia Frausto MD (04/13 1310)   NAD      Final Interpretation by Cayden Dawkins MD (04/14 0746)      No acute osseous abnormality.         Computerized Assisted Algorithm (CAA) may have been used to analyze all applicable images.         Workstation performed: [id number]         CT head without contrast   Final Interpretation by Edith Gonzalez MD (04/13 1219)      No acute intracranial abnormality.      The study was marked in EPIC for immediate notification.            Workstation performed: [id number]         CT cervical spine without contrast   Final Interpretation by Edith Gonzalez MD (04/13 1220)      No cervical spine fracture or traumatic malalignment.            The study was marked in EPIC for immediate notification.      Workstation performed: [id number]         CT chest abdomen pelvis w contrast   Final Interpretation by Edith Gonzalez MD (04/13 1219)      1.  No findings of acute traumatic injury in the chest, abdomen or pelvis.   2.  Stable pulmonary nodules including an 8 mm right upper lobe groundglass nodule. Based on current Fleischner Society 2017 Guidelines on incidental pulmonary nodule, follow-up CT is recommended for every 2 years until 5 years of stability is    demonstrated.   3.  Cholelithiasis.      The study was marked in EPIC for immediate notification.         Workstation performed: [id number]            Cardiology:  ECG 12 lead   Final Result by Babatunde Bhatti MD (04/13 0817)   Normal sinus rhythm   Nonspecific T wave abnormality   Abnormal ECG   When compared with ECG of 11-Mar-2025 00:44,   Premature ventricular complexes are no longer Present   MO interval has increased   Nonspecific T wave abnormality now evident in Anterolateral leads   QT has lengthened   Confirmed by Babatunde Bhatti ([id number]) on 4/13/2025 4:37:01 PM              Results from last 7 days   Lab Units 04/14/25  0804 04/13/25  1120   WBC Thousand/uL 6.49 5.95   HEMOGLOBIN g/dL 10.7* 10.3*   HEMATOCRIT % 32.3* 31.7*   PLATELETS Thousands/uL 257 259   TOTAL NEUT ABS Thousands/µL  --  3.51         Results from last 7 days   Lab Units 04/14/25  0625 04/13/25  1120   SODIUM mmol/L 135 136   POTASSIUM mmol/L 4.6 3.9   CHLORIDE mmol/L 105 102   CO2 mmol/L 26 30   ANION GAP mmol/L 4 4   BUN mg/dL 12 18   CREATININE mg/dL 0.97 1.20   EGFR ml/min/1.73sq m 83 64   CALCIUM mg/dL 8.1* 8.3*     Results from last 7 days   Lab Units 04/14/25  0625 04/13/25  1120   AST U/L 30 22   ALT U/L 32 36   ALK PHOS U/L 102 119*   TOTAL PROTEIN g/dL 5.8* 6.2*   ALBUMIN g/dL 3.0* 3.5   TOTAL BILIRUBIN mg/dL 0.36 0.39     Results from last 7 days   Lab Units 04/14/25  1015 04/14/25  0654 04/14/25  0017 04/13/25  2045 04/13/25  1747 04/13/25  1607 04/13/25  1104   POC GLUCOSE mg/dl 200* 139 138 201* 239* 62* 206*     Results from last 7 days   Lab Units 04/14/25  0625 04/13/25  1120   GLUCOSE RANDOM mg/dL 138 186*         Results from last 7 days   Lab Units 04/07/25  1312   HEMOGLOBIN A1C  10.3*           Results from last 7 days   Lab Units 04/13/25  1120   HS TNI 0HR ng/L 3         Results from last 7 days   Lab Units 04/13/25  1120   PROTIME seconds 14.7   INR  1.07   PTT seconds 27                       Results from last 7 days   Lab Units 04/14/25  0413   CLARITY UA  Clear   COLOR UA  Light Yellow   SPEC GRAV UA  1.018   PH UA  5.5   GLUCOSE UA mg/dl 300 (3/10%)*   KETONES  UA mg/dl Negative   BLOOD UA  Negative   PROTEIN UA mg/dl 30 (1+)*   NITRITE UA  Negative   BILIRUBIN UA  Negative   UROBILINOGEN UA (BE) mg/dl <2.0   LEUKOCYTES UA  Negative   WBC UA /hpf 2-4*   RBC UA /hpf 1-2   BACTERIA UA /hpf None Seen   EPITHELIAL CELLS WET PREP /hpf None Seen             Results from last 7 days   Lab Units 04/14/25  0347   AMPH/METH  Negative   BARBITURATE UR  Negative   BENZODIAZEPINE UR  Positive*   COCAINE UR  Negative   METHADONE URINE  Positive*   OPIATE UR  Negative   PCP UR  Negative   THC UR  Negative     Results from last 7 days   Lab Units 04/13/25  1120   ETHANOL LVL mg/dL <10   ACETAMINOPHEN LVL ug/mL <2*   SALICYLATE LVL mg/dL <5                   Present on Admission:   Benign essential hypertension   CKD (chronic kidney disease), stage II   Fall   Methadone dependence (HCC)   Constipation      Admitting Diagnosis: Altered mental status [R41.82]  Pulmonary nodule [R91.1]  Unspecified multiple injuries, initial encounter [T07.XXXA]  Age/Sex: 64 y.o. male    Network Utilization Review Department  ATTENTION: Please call with any questions or concerns to [phone] and carefully listen to the prompts so that you are directed to the right person. All voicemails are confidential.   For Discharge needs, contact Care Management DC Support Team at [phone] opt. [phone]  Send all requests for admission clinical reviews, approved or denied determinations and any other requests to dedicated fax number below belonging to the campus where the patient is receiving treatment. List of dedicated fax numbers for the Facilities:  FACILITY NAME UR FAX NUMBER   ADMISSION DENIALS (Administrative/Medical Necessity) [phone]   DISCHARGE SUPPORT TEAM (NETWORK) [phone]   PARENT CHILD HEALTH (Maternity/NICU/Pediatrics) [phone]   [facility] [phone]   [facility] [phone]   [facility] [phone]   [facility]  [facility] [phone]   [facility] [phone]   [facility] [phone]   [facility] [phone]   [facility] [phone]   [facility] [phone]   [facility] [phone]   [facility] [phone]   [facility] [phone]

## 2025-04-14 NOTE — ASSESSMENT & PLAN NOTE
"Patient states he was very constipated about 2 days ago, took a \"bunch of stuff\" to help facilitate a BM.  Yesterday he had a very massive BM and since has developed abdominal pain/cramping and nausea.  CT A/P without acute findings, wall thickening in the rectosigmoid  Start inpatient bowel regimen  Monitor Bms  Patient had large bowel movement today with some remittance of abdominal pain    Plan:  Monitor for recurrence of abdominal pain in next 24 hours  "

## 2025-04-14 NOTE — ASSESSMENT & PLAN NOTE
Lab Results   Component Value Date    EGFR 83 04/14/2025    EGFR 64 04/13/2025    EGFR 101 03/13/2025    CREATININE 0.97 04/14/2025    CREATININE 1.20 04/13/2025    CREATININE 0.69 03/13/2025   Creatinine baseline of 0.7-0.9, elevated at time of admission to 1.20  Possibly related to poor oral intake versus obstructive nephropathy from severe constipation  Avoid nephrotoxins  Follow-up PVR/bladder scan  Started on gentle IV fluids  Trend BMP

## 2025-04-14 NOTE — ASSESSMENT & PLAN NOTE
Secondary to head strike s/p fall  Had a previous admission on 3/11 for drowsiness/encephalopathy.  Suspected polypharmacy use, stroke was ruled out  GCS 14 on admission  CT head without intracranial normalities  CT C-spine without traumatic injury  Coma Panel unremarkable   Patient developed acute onset of drowsiness in setting of pills identified as Xanax and hydroxyzine on his person  Telemetry renewed    Plan:  Continue to monitor  Avoid sedating agents   nurses notes/vital signs

## 2025-04-14 NOTE — NURSING NOTE
Telemetry monitor at this time is reading HR of 45. Highest HR has gone up to is within the low 50s. SLIM Resident made aware (Partha Friedman MD). Patient still remains lethargic at this time. EKG ordered by provider and obtained. Will continue to monitor.

## 2025-04-14 NOTE — NURSING NOTE
This RN informed by PCA that when disconnecting telemetry monitor that two small blue pills were found in a small plastic bag (Code: 031- Xanax 1 MG). RN notified provider and immediately called security to bedside in which a room check was performed. During room check, 11 white small pills were found wrapped in aluminum foil (Code: H500- Hydroxyzine 10 MG). Per protocol, all medications were confiscated and destroyed. RN notified [facility] Resident (Partha Friedman MD) about medications that were found and asked that provider comes to bedside to evaluate patient as patient appears more lethargic and slow to movements. Will continue to monitor.

## 2025-04-14 NOTE — OCCUPATIONAL THERAPY NOTE
Occupational Therapy Evaluation     Patient Name: David Skelton Jr.  Today's Date: 4/14/2025  Problem List  Principal Problem:    Drowsiness  Active Problems:    Benign essential hypertension    Type 2 diabetes mellitus with ophthalmic complication, with long-term current use of insulin (HCC)    Constipation    Methadone dependence (HCC)    Fall    CKD (chronic kidney disease), stage II    History of stroke    Past Medical History  Past Medical History:   Diagnosis Date    Anxiety     Depression     Diabetes mellitus (HCC)     Drug use     Hemorrhoids, internal 9/23/2019    Hypertension      Past Surgical History  Past Surgical History:   Procedure Laterality Date    FEMUR FRACTURE SURGERY Right     GASTRIC BYPASS  11/08/2011    Managed by: Braden Luna (General Surgery)    HERNIA REPAIR  02/13/2013    Incisional hernia repair Managed by: Braden Luna (General Surgery)    CT OPTX FEM SHFT FX W/INSJ IMED IMPLT W/WO SCREW Right 7/31/2024    Procedure: INSERTION NAIL IM FEMUR ANTEGRADE (TROCHANTERIC);  Surgeon: Meng Hill MD;  [facility]: [facility];  Service: Orthopedics    TONSILLECTOMY  2010 04/14/25 1023   OT Last Visit   OT Visit Date 04/14/25  (Monday)   Note Type   Note type Evaluation  (Eval [id number])   Additional Comments Identified pt by full name and birthdate   Pain Assessment   Pain Assessment Tool 0-10   Pain Score 7   Pain Location/Orientation Location: Back;Location: Neck   Effect of Pain on Daily Activities limits pace, activity tolerance and comfort   Patient's Stated Pain Goal No pain   Hospital Pain Intervention(s) Repositioned;Ambulation/increased activity;Emotional support  (RN aware)   Multiple Pain Sites Yes   Pain 2   Pain Location/Orientation 2 Orientation: Bilateral;Location: Knee  (L>R)   Restrictions/Precautions   Weight Bearing Precautions Per Order No   Other Precautions Chair Alarm;Bed Alarm;Multiple lines;Fall Risk;Pain  (Adalid, room air)   Home Living  "  Type of Home Apartment   Home Layout One level;Performs ADLs on one level;Able to live on main level with bedroom/bathroom   Bathroom Shower/Tub Tub/shower unit   Bathroom Toilet Raised   Bathroom Equipment Tub transfer bench;Grab bars in shower   Bathroom Accessibility Accessible   Home Equipment Walker;Cane;Grab bars   Additional Comments Pt reports living in apt and is on house arrest w/ ankle bracelet   Prior Function   Level of [place] Independent with IADLS   Lives With Alone   Receives Help From Family;Home health  (home healh aide Sunday thru Friday 9-3:30 assist w/ IADLS and as needed w/ dressing)   IADLs Family/Friend/Other provides transportation;Family/Friend/Other provides meals;Family/Friend/Other provides medication management   Falls in the last 6 months 1 to 4   Vocational On disability   Comments Pt reports I w/ ADLs at baseline using cane. Needs assistance w/ IADLs   Lifestyle   Autonomy Pt reports I w/ ADLs using cane   Reciprocal Relationships Pt reports living alone. Supportive home health aide, family.   Intrinsic Gratification Pt reports he does not have much choice due to ankle bracelet; watches tv, plays games   General   Additional Pertinent History Pt admitted to [facility] on 4/13/25. Diagnosed w/ drowsiness   Family/Caregiver Present No   Additional General Comments Personal and environmental factors supporting performance includes access to AD/ DME, first floor environment; barriers include pain, fall history, difficulty completing IADLs   Subjective   Subjective \"I want to talk to the doctor. I need something for pain\"   ADL   Where Assessed Edge of bed  (vs OOB in chair)   Eating Assistance 7  Independent   Eating Deficit Setup   Grooming Assistance 6  Modified Independent   Grooming Deficit Setup;Increased time to complete  (seated after set- up)   UB Bathing Assistance Unable to assess   LB Bathing Assistance Unable to assess   UB Dressing Assistance 6  Modified independent   UB " Dressing Deficit Setup;Increased time to complete   LB Dressing Assistance 4  Minimal Assistance   LB Dressing Deficit Setup;Thread RLE into pants;Thread LLE into pants;Increased time to complete;Supervision/safety   Toileting Assistance  Unable to assess  (denied need to void)   Bed Mobility   Supine to Sit 5  Supervision   Additional items Assist x 1;Increased time required  (to pt's R)   Sit to Supine Unable to assess   Additional Comments Pt seated OOB In chair post eval w/ needs met, call bell in reach and chair alarm activated   Transfers   Sit to Stand 4  Minimal assistance   Additional items Assist x 1;Increased time required;Verbal cues   Stand to Sit 4  Minimal assistance   Additional items Assist x 1;Increased time required;Verbal cues   Functional Mobility   Functional Mobility 4  Minimal assistance   Additional Comments w/ in room   Additional items Rolling walker   Balance   Static Sitting Fair +   Dynamic Sitting Fair -   Static Standing Fair -   Ambulatory Poor +   Activity Tolerance   Activity Tolerance Patient limited by fatigue;Patient limited by pain   Medical Staff Made Aware care coordination w/ PT, Tory due to regression from baseline and decreased activity tolerance.   Nurse Made Aware Spokew/ Kelly RODRIGUEZ   RUSURJIT Assessment   RUE Assessment   (observed during ADLs)   RUE Strength   RUE Overall Strength Within Functional Limits - able to perform ADL tasks with strength   LUE Assessment   LUE Assessment   (observed during ADLs)   LUE Strength   LUE Overall Strength Within Functional Limits - able to perform ADL tasks with strength   Hand Function   Gross Motor Coordination Functional   Fine Motor Coordination Functional   Cognition   Overall Cognitive Status Impaired  (questionable insight into deficits, higher level/ executive function)   Arousal/Participation Alert;Cooperative   Attention Attends with cues to redirect   Orientation Level Oriented X4   Memory Decreased recall of recent  events;Decreased recall of precautions   Following Commands Follows one step commands with increased time or repetition   Comments Alert, generally oriented and able to follow directions, communicate wants / needs. Irritable   Assessment   Limitation Decreased ADL status;Decreased endurance;Decreased self-care trans;Decreased high-level ADLs  (impaired pain, activity tolerance, standing tolerance, balance, insight into deficits, forward functional reach)   Assessment Pt is a 65yo male admitted to [facility] on 4/13/25 following a fall from home. Diagnosed w/ drowsiness. Significant PMH impacting his occupational performance includes anxiety/depression, DM, drug use hx, HTN, R femur fracture s/p IM nail (07/2024), gastric bypass (2011), Bipolar disorder, hx stroke, chronic anemia. Pt reports living in an apt and use of cane for functional mobility. Pt reports home health aide 6 days a week and I w/ ADLs. Upon eval, pt alert and generally oriented. Able to follow directions and communicate wants / needs. Pt required mod I grooming seated, mod I UBD for + time, min A LBD, S bed mobility, min A sit <> stand and min A using RW for functional mobility.  Pt is completing ADLs below baseline level of I and would benefit from OT in acute care to max I w/ ADLs, achieve highest level of function. Recommend level III rehab resource intensity when medically stable for discharge from acute care.   Goals   Patient Goals Pt stated that he wants to have decreased pain   LTG Time Frame 7-10   Long Term Goal see below   Plan   Treatment Interventions ADL retraining;Functional transfer training;Endurance training;UE strengthening/ROM;Equipment evaluation/education;Patient/family training;Compensatory technique education;Continued evaluation;Energy conservation;Activityengagement   Goal Expiration Date 04/21/25   OT Treatment Day 0  (Monday 4/14/25)   OT Frequency 3-5x/wk   Discharge Recommendation   Rehab Resource Intensity Level, OT III  (Minimum Resource Intensity)   AM-PAC Daily Activity Inpatient   Lower Body Dressing 3   Bathing 3   Toileting 3   Upper Body Dressing 4   Grooming 4   Eating 4   Daily Activity Raw Score 21   Daily Activity Standardized Score (Calc for Raw Score >=11) 44.27   AM-PAC Applied Cognition Inpatient   Following a Speech/Presentation 3   Understanding Ordinary Conversation 4   Taking Medications 3   Remembering Where Things Are Placed or Put Away 4   Remembering List of 4-5 Errands 3   Taking Care of Complicated Tasks 3   Applied Cognition Raw Score 20   Applied Cognition Standardized Score 41.76   Barthel Index   Feeding 10   Bathing 0   Grooming Score 5   Dressing Score 5   Bladder Score 5   Bowels Score 5   Toilet Use Score 5   Transfers (Bed/Chair) Score 10   Mobility (Level Surface) Score 0   Stairs Score 0   Barthel Index Score 45   End of Consult   Education Provided Yes   Patient Position at End of Consult Bedside chair;Bed/Chair alarm activated;All needs within reach   Nurse Communication Nurse aware of consult   Licensure   [place] License Number  Charisse Graciaelena, OTR/L          The patient's raw score on the AM-PAC Daily Activity Inpatient Short Form is 21. A raw score of greater than or equal to 19 suggests the patient may benefit from discharge to home. Please refer to the recommendation of the Occupational Therapist for safe discharge planning.    Pt goals to be met by 4/21/25 to max I w/ ADLs and improve engagement to return home to OF includes:    -Pt will complete bed mobility supine <> sit w/ mod I in preparation for ADLs     -Pt will complete functional transfers to bed, chair, and toilet using LRAD, DME as needed    -Pt will complete LBD w/ mod I for + time using LHAE as needed    -Pt will consistently engage in functional mobility using LRAD household distances w/ mod I    -Pt will complete UBD independently    -Pt will demonstrate improved AMPAC score to at least 24 to max I w/ ADLs, assist in DC  Planning.    -Pt will demonstrate improved functional standing tolerance for at least 15 minutes using LRAD as needed w/ at least fair balance while engaged in grooming w/ mod I in stance to max I w/ IADLs, functional mobility    -Pt will demonstrate improved activity and sitting tolerance OOB in chair for all meals.       Charisse Gibbons, OTR/L  [id number]  [id number]

## 2025-04-14 NOTE — NURSING NOTE
"Patient demanding certain medications such as metformin and klonopin. RN educated that sometimes when patients come to the hospital that certain medications are temporarily on hold such as metformin and using insulin instead. Patient frustrated that \"no doctor has come to see me yet.\" RN reiterated that providers are still performing morning rounds and will be up to see him this AM. This RN reached out to [facility] Senior Resident (Mayuri Lebron MD) with this information.   "

## 2025-04-14 NOTE — CASE MANAGEMENT
Case Management Assessment & Discharge Planning Note    Patient name David Skelton Jr.  Location S /S -01 MRN [id number]  : 1960 Date 2025       Current Admission Date: 2025  Current Admission Diagnosis:Drowsiness   Patient Active Problem List    Diagnosis Date Noted Date Diagnosed    Drowsiness 2025     Hypomagnesemia 2025     Chronic anemia 2025     Urinary retention 2025     History of stroke 2025     Slurred speech 2025     Type 2 diabetes mellitus, with long-term current use of insulin (HCC) 2024     LUNA (iron deficiency anemia) 2024     NINFA (generalized anxiety disorder) 2024     Methadone dependence (HCC) 2024     Paroxysmal atrial fibrillation (HCC) 2024     Toxic encephalopathy 2024     Recurrent falls 2024     Closed fracture of right femur, unspecified fracture morphology, sequela 2024     Type 2 diabetes mellitus with other ophthalmic complication, with long-term current use of insulin (HCC) 2024     Cognitive decline 2024     Ambulatory dysfunction 2024     Prolonged QT interval 2024     Left ventricular hypertrophy 2024     Acute blood loss anemia 2024     Chronic hepatitis (HCC) 2024     BPH (benign prostatic hyperplasia) 2024     Cholelithiasis 2024     CKD (chronic kidney disease), stage II 2024     Hypogonadism in male 2024     Electrolyte abnormality 2024     Hyponatremia 2024     Right hip pain 2024     Acute blood loss anemia (ABLA) 2024     Nocturia associated with benign prostatic hyperplasia 2024     Gross hematuria 2024     Closed fracture of right femur, unspecified fracture morphology, sequela 2024     B12 deficiency 2023     Bradycardia 2023     Retinopathy due to secondary DM (HCC) 2023     Visual changes 2023     Fall 2023     Closed  fracture of multiple ribs of left side 08/09/2023     Methadone dependence (HCC) 07/25/2023     Toxic metabolic encephalopathy 07/25/2023     Liver lesion, right lobe 06/08/2021     Lung nodule 06/08/2021     Adrenal nodule (HCC) 06/08/2021     Thoracic degenerative disc disease 04/12/2021     DDD (degenerative disc disease), lumbar 04/12/2021     Pre-operative general physical examination 12/31/2019     S/P gastric bypass 11/15/2019     Hypogonadism male 11/15/2019     Hemorrhoids, internal 09/23/2019     Rectal prolapse 09/11/2019     Constipation 09/11/2019     Chronic anticoagulation 03/01/2019     GERD without esophagitis 03/01/2019     Iron deficiency anemia 03/01/2019     Major depression, chronic 03/01/2019     Stable proliferative diabetic retinopathy of left eye associated with type 2 diabetes mellitus (HCC) 03/01/2019     Opioid abuse (HCC) 06/06/2018     Ulcer of leg, chronic, left (HCC) 02/27/2015     ADD (attention deficit disorder) without hyperactivity 01/29/2015     Bipolar disorder (HCC) 01/10/2014     Insomnia 01/21/2013     History of hepatitis C 01/14/2013     Allergic rhinitis 01/04/2013     Generalized anxiety disorder 01/04/2013     Macular degeneration 01/04/2013     Spinal stenosis 01/04/2013     Vitamin D deficiency 01/04/2013     Benign essential hypertension 09/14/2012     Type 2 diabetes mellitus with ophthalmic complication, with long-term current use of insulin (HCC) 09/14/2012     Atrial fibrillation (HCC) 05/15/2012     Complex medical condition 05/15/2012       LOS (days): 1  Geometric Mean LOS (GMLOS) (days):   Days to GMLOS:     OBJECTIVE:    Risk of Unplanned Readmission Score: 37.03         Current admission status: Inpatient       Preferred Pharmacy:   [id number] - [place], [place] - [address]  [address]  [place] [address]  Phone: [phone] Fax: [phone]    [id number] - [place] - [address]  [address]  [place] [address]  Phone: [phone] Fax: [phone]    [facility] [place] - [place] - [address]  [address]  [place] [address]  Phone: [phone] Fax: [phone]    [facility] [place] - [address]  [address]  [place] [address]  Phone: [phone] Fax: [phone]    UNKNOWN - FOLLOW UP PRIOR TO DISCHARGE TO E-PRESCRIBE  No address on file      [id number] - [place], [place] - [address]  [address]  [place] [address]  Phone: [phone] Fax: [phone]    Primary Care Provider: Casey Mckeon MD    Primary Insurance: [facility]  Secondary Insurance:     ASSESSMENT:  Active Health Care Proxies       [facility] Representative - Sister   Primary Phone: [phone] (Mobile)  Home Phone: [phone]                           Readmission Root Cause  30 Day Readmission: No    Patient Information  Admitted from:: Home  Mental Status: Alert  During Assessment patient was accompanied by: Not accompanied during assessment  Assessment information provided by:: Patient  Primary Caregiver: Other (Comment)  Caregiver's Name:: Waiver services caregiver [id number]  Caregiver's Relationship to Patient:: Other (Specify) (Waiver services aides.)  Support Systems: Self, Family members, Home care staff  County of Residence: [place]  [facility] do you live in?: [place]  Home entry access options. Select all that apply.: No steps to enter home  Type of Current Residence: Apartment  Floor Level: 4  Upon entering residence, is there a bedroom on the main floor (no further steps)?: Yes  Upon entering residence, is there a bathroom on the main floor (no further steps)?: Yes  Living Arrangements: Lives Alone  Is patient a ?: No    Activities of Daily Living Prior to Admission  Functional Status: Independent  Completes ADLs  independently?: Yes  Ambulates independently?: Yes  Does patient use assisted devices?: Yes  Assisted Devices (DME) used: Walker  Does patient currently own DME?: Yes  What DME does the patient currently own?: Walker  Does the patient have a history of Short-Term Rehab?: No  Does patient have a history of HHC?: Yes  Does patient currently have HHC?: Yes    Current Home Health Care  Type of Current Home Care Services: Home health aide, Nurse visit, Meals on Wheels,   Home Health Agency Name:: [facility]  Current Home Health Follow-Up Provider:: PCP    Patient Information Continued  Income Source: SSI/SSD  Does patient have prescription coverage?: Yes  Can the patient afford their medications and any related supplies (such as glucometers or test strips)?: Yes  Does patient receive dialysis treatments?: No  Does patient have a history of substance abuse?: Yes  Historical substance use preference: Heroin  History of Withdrawal Symptoms: Denies past symptoms  Is patient currently in treatment for substance abuse?: Yes (Methadone clinic in [place])  Does patient have a history of Mental Health Diagnosis?: Yes (Anxiety and depression.)  Is patient receiving treatment for mental health?: Yes (Med management through PCP.)  Has patient received inpatient treatment related to mental health in the last 2 years?: No         Means of Transportation  Means of Transport to [facility]:: Other (Comment) (Lyft/uber transport through insurance.)          DISCHARGE DETAILS:    Discharge planning discussed with:: Pt at bedside.  Freedom of Choice: Yes  Comments - Freedom of Choice: HHC rec. Pt active with [facility]. PETER referral sent in [facility].  CM contacted family/caregiver?: Yes  Were Treatment Team discharge recommendations reviewed with patient/caregiver?: Yes  Did patient/caregiver verbalize understanding of patient care needs?: Yes  Were patient/caregiver advised of the risks associated with not following Treatment Team  discharge recommendations?: Yes    Contacts  Patient Contacts: Vandana Matos  Relationship to Patient:: Family  Contact Method: In Person  Reason/Outcome: Discharge Planning, Continuity of Care    Requested Home Health Care         Is the patient interested in HHC at discharge?: Yes  Home Health Discipline requested:: Occupational Therapy, Physical Therapy, Nursing, Medical Social Work, Home Health Aide  Home Health Agency Name:: [facility]  HHA External Referral Reason (only applicable if external HHA name selected): Patient has established relationship with provider  Home Health Follow-Up Provider:: PCP  Home Health Services Needed:: Strengthening/Theraputic Exercises to Improve Function, Evaluate Functional Status and Safety, Gait/ADL Training  Homebound Criteria Met:: Uses an Assist Device (i.e. cane, walker, etc), Requires the Assistance of Another Person for Safe Ambulation or to Leave the Home  Supporting Clincal Findings:: Limited Endurance, Fatigues Easliy in Short Distances  Other Referral/Resources/Interventions Provided:  Interventions: HHC  Referral Comments: PETER referral sent to [facility].    Treatment Team Recommendation: Home with Home Health Care  Discharge Destination Plan:: Home with Home Health Care        Additional Comments: CM met with pt at bedside and introduced self and role. Pt resides alone in a 4th floor apartment with no [address]. Pt has home health aids M-F 8am-2pm through waiver services. Pt was also active with [facility] for HHC prior to admission. Per [facility], they are able to accept back, but reported issues contacting pt as he didn't answer his phone when they called. CM will inform pt of the importance of answering his phone when [facility]. Pt inquired about getting his waiver service hours increased. CM informed pt he would need to contact his  to request an increase in waiver hours. Patient gets methadone maintanence through a clinic in [place]. Pt reports he is  looking to get a an HHA through waiver that can transport him so he doesn't have to take lyft. Pt does get lyft rides paid for through his insurance. CM department to remain available for any discharge planning needs throughout this admission. Pt will need lyft transport home on discharge.

## 2025-04-14 NOTE — PROGRESS NOTES
Patient:  RICHAR CASTELLANOS    MRN:  [id number]    Nikita Request ID:  [id number]    Level of care reserved:  Home Health Agency    Partner Reserved:  [facility] [place] [address] [phone]    Clinical needs requested:    Geography searched:  10 miles around [id number]    Start of Service:    Request sent:  10:47am EDT on 4/14/2025 by Heena San    Partner reserved:  1:22pm EDT on 4/14/2025 by Heena San    Choice list shared:  1:22pm EDT on 4/14/2025 by Heena San

## 2025-04-14 NOTE — NURSING NOTE
"Patient demanding and screaming out that providers order gabapentin 800 mg for him now. RN reached out to [facility] Resident (Partha Friedman MD) to inform him about patient's request. Per Provider's response, this will be addressed during AM rounds shortly. RN offered ordered tylenol and patient refusing and screaming \"that doesn't work, it needs to be gabapentin 800 mg and I need it now.\" RN reiterated that provider will address this shortly during AM rounds when they come around as a team.   "

## 2025-04-15 VITALS
TEMPERATURE: 97.5 F | DIASTOLIC BLOOD PRESSURE: 68 MMHG | SYSTOLIC BLOOD PRESSURE: 159 MMHG | RESPIRATION RATE: 17 BRPM | OXYGEN SATURATION: 98 % | HEART RATE: 70 BPM

## 2025-04-15 PROBLEM — E43 SEVERE PROTEIN-CALORIE MALNUTRITION (HCC): Status: ACTIVE | Noted: 2025-04-15

## 2025-04-15 LAB
GLUCOSE SERPL-MCNC: 141 MG/DL (ref 65–140)
GLUCOSE SERPL-MCNC: 272 MG/DL (ref 65–140)
GLUCOSE SERPL-MCNC: 349 MG/DL (ref 65–140)
GLUCOSE SERPL-MCNC: 63 MG/DL (ref 65–140)

## 2025-04-15 PROCEDURE — 99239 HOSP IP/OBS DSCHRG MGMT >30: CPT | Performed by: INTERNAL MEDICINE

## 2025-04-15 PROCEDURE — 82948 REAGENT STRIP/BLOOD GLUCOSE: CPT

## 2025-04-15 RX ORDER — LORAZEPAM 1 MG/1
1 TABLET ORAL ONCE
Status: COMPLETED | OUTPATIENT
Start: 2025-04-15 | End: 2025-04-15

## 2025-04-15 RX ORDER — GABAPENTIN 400 MG/1
400 CAPSULE ORAL ONCE
Status: DISCONTINUED | OUTPATIENT
Start: 2025-04-15 | End: 2025-04-15

## 2025-04-15 RX ORDER — CLONAZEPAM 1 MG/1
1 TABLET ORAL 2 TIMES DAILY
Qty: 17 TABLET | Refills: 0 | Status: SHIPPED | OUTPATIENT
Start: 2025-04-15 | End: 2025-04-24

## 2025-04-15 RX ORDER — GABAPENTIN 400 MG/1
400 CAPSULE ORAL ONCE
Status: COMPLETED | OUTPATIENT
Start: 2025-04-15 | End: 2025-04-15

## 2025-04-15 RX ORDER — GABAPENTIN 100 MG/1
100 CAPSULE ORAL ONCE
Status: DISCONTINUED | OUTPATIENT
Start: 2025-04-15 | End: 2025-04-15

## 2025-04-15 RX ORDER — LORAZEPAM 0.5 MG/1
0.5 TABLET ORAL ONCE
Status: DISCONTINUED | OUTPATIENT
Start: 2025-04-15 | End: 2025-04-15

## 2025-04-15 RX ORDER — INSULIN LISPRO 100 [IU]/ML
5 INJECTION, SOLUTION INTRAVENOUS; SUBCUTANEOUS ONCE
Status: COMPLETED | OUTPATIENT
Start: 2025-04-15 | End: 2025-04-15

## 2025-04-15 RX ORDER — INSULIN LISPRO 100 [IU]/ML
1-5 INJECTION, SOLUTION INTRAVENOUS; SUBCUTANEOUS
Status: DISCONTINUED | OUTPATIENT
Start: 2025-04-15 | End: 2025-04-15 | Stop reason: HOSPADM

## 2025-04-15 RX ADMIN — INSULIN LISPRO 5 UNITS: 100 INJECTION, SOLUTION INTRAVENOUS; SUBCUTANEOUS at 01:17

## 2025-04-15 RX ADMIN — INSULIN LISPRO 3 UNITS: 100 INJECTION, SOLUTION INTRAVENOUS; SUBCUTANEOUS at 11:24

## 2025-04-15 RX ADMIN — CLONAZEPAM 1 MG: 1 TABLET ORAL at 09:44

## 2025-04-15 RX ADMIN — FINASTERIDE 5 MG: 5 TABLET, FILM COATED ORAL at 09:44

## 2025-04-15 RX ADMIN — DOCUSATE SODIUM 100 MG: 100 CAPSULE, LIQUID FILLED ORAL at 09:43

## 2025-04-15 RX ADMIN — FOLIC ACID 1 MG: 1 TABLET ORAL at 09:44

## 2025-04-15 RX ADMIN — Medication 100 MG: at 09:44

## 2025-04-15 RX ADMIN — CLOPIDOGREL 75 MG: 75 TABLET ORAL at 09:44

## 2025-04-15 RX ADMIN — GABAPENTIN 400 MG: 400 CAPSULE ORAL at 01:00

## 2025-04-15 RX ADMIN — METHADONE HYDROCHLORIDE 150 MG: 10 CONCENTRATE ORAL at 07:14

## 2025-04-15 RX ADMIN — LORAZEPAM 1 MG: 1 TABLET ORAL at 01:00

## 2025-04-15 RX ADMIN — FLUTICASONE PROPIONATE 1 SPRAY: 50 SPRAY, METERED NASAL at 09:46

## 2025-04-15 RX ADMIN — HEPARIN SODIUM 5000 UNITS: 5000 INJECTION INTRAVENOUS; SUBCUTANEOUS at 07:16

## 2025-04-15 RX ADMIN — LIDOCAINE 1 PATCH: 700 PATCH TOPICAL at 09:42

## 2025-04-15 NOTE — INCIDENTAL FINDINGS
The following findings require follow up:  Radiographic finding   Finding: CT chest abdomen pelvis w contrast: 1. No findings of acute traumatic injury in the chest, abdomen or pelvis., 2. Stable pulmonary nodules including an 8 mm right upper lobe groundglass nodule. Based on current Fleischner Society 2017 Guidelines on incidental pulmonary nodule, follow-up CT is recommended for every 2 years until 5 years of stability is , demonstrated    Follow up required: CT scan   Follow up should be done within 2 years    Please notify the following clinician to assist with the follow up:   Primary care physician    Incidental finding results were discussed with the Patient by Partha Friedman MD on 04/15/25.   They expressed understanding and all questions answered.

## 2025-04-15 NOTE — PLAN OF CARE
Problem: SAFETY ADULT  Goal: Patient will remain free of falls  Description: INTERVENTIONS:- Educate patient/family on patient safety including physical limitations- Instruct patient to call for assistance with activity - Consult OT/PT to assist with strengthening/mobility - Keep Call bell within reach- Keep bed low and locked with side rails adjusted as appropriate- Keep care items and personal belongings within reach- Initiate and maintain comfort rounds- Make Fall Risk Sign visible to staff- Offer Toileting every 2 Hours, in advance of need- Initiate/Maintain alarm- Obtain necessary fall risk management equipment: - Apply yellow socks and bracelet for high fall risk patients- Consider moving patient to room near nurses station  Outcome: Progressing     Problem: Knowledge Deficit  Goal: Patient/family/caregiver demonstrates understanding of disease process, treatment plan, medications, and discharge instructions  Description: Complete learning assessment and assess knowledge base.Interventions:- Provide teaching at level of understanding- Provide teaching via preferred learning methods  Outcome: Progressing

## 2025-04-15 NOTE — ASSESSMENT & PLAN NOTE
H/O opioid abuse  Methadone clinic [facility] (phone number [phone]). Patient is on 140 mg daily of methadone.   Continue methadone while inpatient

## 2025-04-15 NOTE — DISCHARGE INSTR - AVS FIRST PAGE
Dear David Skelton Jr.,     It was our pleasure to care for you here at [facility].  It is our hope that we were always able to exceed the expected standards for your care during your stay.  You were hospitalized due to a fall.  You were cared for on the medical floor by Partha Friedman MD under the service of Meenu Gould MD with the [facility] who covers for your primary care physician (PCP), Casey Mckeon MD, while you were hospitalized.  If you have any questions or concerns related to this hospitalization, you may contact us at .  For follow up as well as any medication refills, we recommend that you follow up with your primary care physician.  A registered nurse will reach out to you by phone within a few days after your discharge to answer any additional questions that you may have after going home.  However, at this time we provide for you here, the most important instructions / recommendations at discharge:     Notable Medication Adjustments -   none  Testing Required after Discharge -   none  Important follow up information -   A stable pulmonary nodule was found as an incidental finding on your CT chest/abdomen/pelvis scan. It is recommended to follow up with a CT scan in 2 years to re-evaluate this nodule. Please notify your PCP of this finding. This must be followed up on to exclude malignancy.  Please make an appointment with your primary care physician within 1 week of discharge as soon as possible.   Other Instructions -   You are given a refill of your klonopin until your next behavioral health appointment on the 24th of April. You are given 9 days of this prescription.  Please review this entire after visit summary as additional general instructions including medication list, appointments, activity, diet, any pertinent wound care, and other additional recommendations from your care team that may be provided for  you.      Sincerely,     Partha Friedman MD

## 2025-04-15 NOTE — ASSESSMENT & PLAN NOTE
Secondary to head strike s/p fall  Had a previous admission on 3/11 for drowsiness/encephalopathy.  Suspected polypharmacy use, stroke was ruled out  GCS 14 on admission  CT head without intracranial normalities  CT C-spine without traumatic injury  Coma Panel unremarkable   Patient developed acute onset of drowsiness in setting of pills identified as Xanax and hydroxyzine on his person  Telemetry renewed    Plan:  Continue to monitor  Avoid sedating agents  Resolved on discharge

## 2025-04-15 NOTE — ASSESSMENT & PLAN NOTE
Lab Results   Component Value Date    EGFR 83 04/14/2025    EGFR 64 04/13/2025    EGFR 101 03/13/2025    CREATININE 0.97 04/14/2025    CREATININE 1.20 04/13/2025    CREATININE 0.69 03/13/2025   Creatinine baseline of 0.7-0.9, elevated at time of admission to 1.20  Possibly related to poor oral intake versus obstructive nephropathy from severe constipation  Avoid nephrotoxins  Fluids discontinued  Trend BMP

## 2025-04-15 NOTE — ASSESSMENT & PLAN NOTE
"Patient states he was very constipated about 2 days ago, took a \"bunch of stuff\" to help facilitate a BM.  Yesterday he had a very massive BM and since has developed abdominal pain/cramping and nausea.  CT A/P without acute findings, wall thickening in the rectosigmoid  Start inpatient bowel regimen  Monitor Bms  Patient had large bowel movement today with some remittance of abdominal pain    Plan:  Monitor for recurrence of abdominal pain in next 24 hours  Patient denying abdominal pain prior to discharge  "

## 2025-04-15 NOTE — ASSESSMENT & PLAN NOTE
Lab Results   Component Value Date    HGBA1C 10.3 (A) 04/07/2025     Recent Labs     04/14/25  2311 04/15/25  0104 04/15/25  0705 04/15/25  0857   POCGLU 373* 349* 63* 141*   Blood Sugar Average: Last 72 hrs:  (P) 190.6131227203877766  Last seen by endocrinology on 4/7/2025  Home regimen: Lantus 16 units at bedtime, metformin 1000 mg twice a day with meals  Inpatient regimen: Lantus 16 units at bedtime, SSI, carb controlled diet  Hypoglycemia protocol

## 2025-04-15 NOTE — ASSESSMENT & PLAN NOTE
Consider etiology from hypoglycemia as patient has poor compliance with glucose monitoring and was started on a new regimen, orthostatic hypotension from dehydration given extensive BM from constipation, polypharmacy use, ambulatory dysfunction  PT evaluation complete, recommending level 3

## 2025-04-15 NOTE — DISCHARGE SUMMARY
"Discharge Summary - Hospitalist   Name: David Skelton Jr. 64 y.o. male I MRN: [id number]  Unit/Bed#: S -01 I Date of Admission: 4/13/2025   Date of Service: 4/15/2025 I Hospital Day: 2     Assessment & Plan  Constipation  Patient states he was very constipated about 2 days ago, took a \"bunch of stuff\" to help facilitate a BM.  Yesterday he had a very massive BM and since has developed abdominal pain/cramping and nausea.  CT A/P without acute findings, wall thickening in the rectosigmoid  Start inpatient bowel regimen  Monitor Bms  Patient had large bowel movement today with some remittance of abdominal pain    Plan:  Monitor for recurrence of abdominal pain in next 24 hours  Patient denying abdominal pain prior to discharge  Drowsiness  Secondary to head strike s/p fall  Had a previous admission on 3/11 for drowsiness/encephalopathy.  Suspected polypharmacy use, stroke was ruled out  GCS 14 on admission  CT head without intracranial normalities  CT C-spine without traumatic injury  Coma Panel unremarkable   Patient developed acute onset of drowsiness in setting of pills identified as Xanax and hydroxyzine on his person  Telemetry renewed    Plan:  Continue to monitor  Avoid sedating agents  Resolved on discharge  Fall  Consider etiology from hypoglycemia as patient has poor compliance with glucose monitoring and was started on a new regimen, orthostatic hypotension from dehydration given extensive BM from constipation, polypharmacy use, ambulatory dysfunction  PT evaluation complete, recommending level 3  Benign essential hypertension  Continue clonidine 0.1 mg at bedtime  On Lasix 20 mg and HCTZ 12.5 mg as needed for lower extremity edema--monitor volume status, currently on hold given elevated SrCr  Type 2 diabetes mellitus with ophthalmic complication, with long-term current use of insulin ([facility])  Lab Results   Component Value Date    HGBA1C 10.3 (A) 04/07/2025     Recent Labs     04/14/25  2311 " 04/15/25  0104 04/15/25  0705 04/15/25  0857   POCGLU 373* 349* 63* 141*   Blood Sugar Average: Last 72 hrs:  (P) 190.8768811886629233  Last seen by endocrinology on 4/7/2025  Home regimen: Lantus 16 units at bedtime, metformin 1000 mg twice a day with meals  Inpatient regimen: Lantus 16 units at bedtime, SSI, carb controlled diet  Hypoglycemia protocol  Methadone dependence (HCC)  H/O opioid abuse  Methadone clinic [facility] (phone number [phone]). Patient is on 140 mg daily of methadone.   Continue methadone while inpatient  CKD (chronic kidney disease), stage II  Lab Results   Component Value Date    EGFR 83 04/14/2025    EGFR 64 04/13/2025    EGFR 101 03/13/2025    CREATININE 0.97 04/14/2025    CREATININE 1.20 04/13/2025    CREATININE 0.69 03/13/2025   Creatinine baseline of 0.7-0.9, elevated at time of admission to 1.20  Possibly related to poor oral intake versus obstructive nephropathy from severe constipation  Avoid nephrotoxins  Fluids discontinued  Trend BMP  History of stroke  Has chronic dysarthria at baseline secondary to CVA history as seen on MRI  Continue to follow-up outpatient  Started on Plavix 75 mg daily by neurology last admission given chronic stroke seen on MRI  CT without evidence of CVA, if patient's mental status does not improve consider MRI     Medical Problems       Resolved Problems  Date Reviewed: 4/13/2025   None       Discharging Physician / Practitioner: Partha Friedman MD  PCP: Casey Mckeon MD  Admission Date:   Admission Orders (From admission, onward)       Ordered        04/13/25 1310  INPATIENT ADMISSION  Once                          Discharge Date: 04/15/25    Consultations During Hospital Stay:  none    Procedures Performed:   none    Significant Findings / Test Results:   none    Incidental Findings:   Finding: CT chest abdomen pelvis w contrast: 1. No findings of acute traumatic injury in the chest, abdomen or pelvis., 2. Stable  pulmonary nodules including an 8 mm right upper lobe groundglass nodule. Based on current Fleischner Society 2017 Guidelines on incidental pulmonary nodule, follow-up CT is recommended for every 2 years until 5 years of stability is , demonstrated               Follow up required: CT scan              Follow up should be done within 2 years    Test Results Pending at Discharge (will require follow up):   none     Outpatient Tests Requested:  none    Complications:  none    Reason for Admission: fall    Hospital Course:   David Skelton Jr. is a 64 y.o. male patient with a past medical history of hypertension, type 2 diabetes, opioid dependence on methadone, CKD stage 2, history of stroke, history of a-fib who presents with a fall. Per patient, he had been constipated for mulitple days and took an unspecified quantity of unidentified laxatives, resulting in a large bowel movement. However, following this bowel movement the patient started experiencing abdominal cramping, nausea, and vomiting. Reports that because of this, he had a mechanical fall. He denied loss of consciousness but did strike his head. In the ED he received a CT scan of head and C-spine that was negative for acute findings. CT CAP was also obtained that found stable pulmonary nodules and follow-up CT in 2 years was recommended. Cholelithiasis was also demonstrated. His initial labs in the ED were unremarkable. He was also endorsing knee pain after landing on his knee while falling. XR knee was obtained that did not show evidence of fracture.     During hospitalization, the patient was noted to be lethargic upon administration of his home regimen of medications including klonopin, methadone, and gabapentin. While removing his telemetry, a PCA found two bags of pills on the patient, one of which was identified as Xanax and the other as Atarax via pill code. Due to concern for ingestion the patient was placed back on telemetry. He subsequently developed  stable bradycardia that improved with monitoring. On the day of discharge the patient felt well and was ready to return home.    Please see above list of diagnoses and related plan for additional information.     Condition at Discharge: stable    Discharge Day Visit / Exam:   Subjective:  The patient did not sleep well overnight. He frequently requested medications that were given, however they were not at the typical dosages for his home medications due to his level of sedation earlier in the day. This morning, he denied abdominal pain but reports he feels sleepy due to not sleeping overnight. He denies chest pain, fevers, chills, constipation, diarrhea, nausea, and vomiting at present.  Vitals: Blood Pressure: 159/68 (04/15/25 0706)  Pulse: 70 (04/15/25 0706)  Temperature: 97.5 °F (36.4 °C) (04/15/25 0706)  Temp Source: Oral (04/15/25 0706)  Respirations: 17 (04/15/25 0706)  SpO2: 98 % (04/15/25 0706)  Physical Exam  Constitutional:       General: He is not in acute distress.     Comments: drowsy   HENT:      Mouth/Throat:      Mouth: Mucous membranes are moist.   Eyes:      Extraocular Movements: Extraocular movements intact.      Conjunctiva/sclera: Conjunctivae normal.   Cardiovascular:      Rate and Rhythm: Normal rate and regular rhythm.      Heart sounds: Normal heart sounds.   Pulmonary:      Effort: Pulmonary effort is normal. No respiratory distress.      Breath sounds: Normal breath sounds.   Abdominal:      General: Abdomen is flat. Bowel sounds are normal. There is no distension.      Palpations: Abdomen is soft.      Tenderness: There is no abdominal tenderness.   Musculoskeletal:      Right lower leg: No edema.      Left lower leg: No edema.   Neurological:      General: No focal deficit present.   Psychiatric:         Mood and Affect: Mood normal.         Behavior: Behavior normal.          Discussion with Family: Patient declined call to .     Discharge instructions/Information to  patient and family:   See after visit summary for information provided to patient and family.      Provisions for Follow-Up Care:  See after visit summary for information related to follow-up care and any pertinent home health orders.      Mobility at time of Discharge:   Basic Mobility Inpatient Raw Score: 18  JH-HLM Goal: 6: Walk 10 steps or more  JH-HLM Achieved: 6: Walk 10 steps or more  HLM Goal achieved. Continue to encourage appropriate mobility.     Disposition:   Home with VNA Services (Reminder: Complete face to face encounter)    Planned Readmission: no    Discharge Medications:  See after visit summary for reconciled discharge medications provided to patient and/or family.      Administrative Statements   Discharge Statement:  I have spent a total time of 45 minutes in caring for this patient on the day of the visit/encounter. >30 minutes of time was spent on: Impressions, Counseling / Coordination of care, Documenting in the medical record, Reviewing / ordering tests, medicine, procedures  , and Communicating with other healthcare professionals .    **Please Note: This note may have been constructed using a voice recognition system**

## 2025-04-16 ENCOUNTER — TELEPHONE (OUTPATIENT)
Age: 65
End: 2025-04-16

## 2025-04-16 ENCOUNTER — TRANSITIONAL CARE MANAGEMENT (OUTPATIENT)
Dept: FAMILY MEDICINE CLINIC | Facility: CLINIC | Age: 65
End: 2025-04-16

## 2025-04-16 LAB
ATRIAL RATE: 52 BPM
P AXIS: 58 DEGREES
PR INTERVAL: 140 MS
QRS AXIS: 56 DEGREES
QRSD INTERVAL: 90 MS
QT INTERVAL: 486 MS
QTC INTERVAL: 452 MS
T WAVE AXIS: 77 DEGREES
VENTRICULAR RATE: 52 BPM

## 2025-04-16 PROCEDURE — 93010 ELECTROCARDIOGRAM REPORT: CPT | Performed by: INTERNAL MEDICINE

## 2025-04-16 NOTE — UTILIZATION REVIEW
NOTIFICATION OF ADMISSION DISCHARGE   This is a Notification of Discharge from [facility]. Please be advised that this patient has been discharge from our facility. Below you will find the admission and discharge date and time including the patient’s disposition.   UTILIZATION REVIEW CONTACT:  Utilization Review Assistants  Network Utilization Review Department  Phone: [phone] x carefully listen to the prompts. All voicemails are confidential.  Email: [email or web address]     ADMISSION INFORMATION  PRESENTATION DATE: 4/13/2025 10:37 AM  OBERVATION ADMISSION DATE: N/A  INPATIENT ADMISSION DATE: 4/13/25  1:10 PM   DISCHARGE DATE: 4/15/2025  1:33 PM   DISPOSITION:Home/Self Care    Network Utilization Review Department  ATTENTION: Please call with any questions or concerns to [phone] and carefully listen to the prompts so that you are directed to the right person. All voicemails are confidential.   For Discharge needs, contact Care Management DC Support Team at [phone] opt. 2  Send all requests for admission clinical reviews, approved or denied determinations and any other requests to dedicated fax number below belonging to the campus where the patient is receiving treatment. List of dedicated fax numbers for the Facilities:  FACILITY NAME UR FAX NUMBER   ADMISSION DENIALS (Administrative/Medical Necessity) [phone]   DISCHARGE SUPPORT TEAM ([facility]) [phone]   PARENT CHILD HEALTH (Maternity/NICU/Pediatrics) [phone]   [facility] [phone]   [facility] [phone]   [facility] [phone]   [facility] [phone]   [facility] [phone]   [facility] [phone]   [facility] [phone]   [facility] [phone]   [facility]  [facility] [phone]   [facility] [phone]   [facility] [phone]   [facility] [phone]

## 2025-04-16 NOTE — TELEPHONE ENCOUNTER
Pts care manager arnoldo calling to schedule CGM training.    pt has his equipment     please. arnoldo to schedule    call [phone]    Could not establish connection please call arnoldo

## 2025-04-17 ENCOUNTER — APPOINTMENT (OUTPATIENT)
Dept: LAB | Facility: CLINIC | Age: 65
End: 2025-04-17
Attending: FAMILY MEDICINE
Payer: COMMERCIAL

## 2025-04-17 DIAGNOSIS — F11.20 OPIOID TYPE DEPENDENCE, CONTINUOUS (HCC): ICD-10-CM

## 2025-04-17 DIAGNOSIS — R74.8 ELEVATED ALKALINE PHOSPHATASE LEVEL: ICD-10-CM

## 2025-04-17 DIAGNOSIS — F31.32 MODERATE DEPRESSED BIPOLAR I DISORDER (HCC): ICD-10-CM

## 2025-04-17 DIAGNOSIS — D50.8 OTHER IRON DEFICIENCY ANEMIA: ICD-10-CM

## 2025-04-17 LAB
ALBUMIN SERPL BCG-MCNC: 3.5 G/DL (ref 3.5–5)
ALP SERPL-CCNC: 107 U/L (ref 34–104)
ALT SERPL W P-5'-P-CCNC: 31 U/L (ref 7–52)
ANION GAP SERPL CALCULATED.3IONS-SCNC: 5 MMOL/L (ref 4–13)
AST SERPL W P-5'-P-CCNC: 18 U/L (ref 13–39)
BASOPHILS # BLD AUTO: 0.04 THOUSANDS/ÂΜL (ref 0–0.1)
BASOPHILS NFR BLD AUTO: 1 % (ref 0–1)
BILIRUB SERPL-MCNC: 0.3 MG/DL (ref 0.2–1)
BUN SERPL-MCNC: 17 MG/DL (ref 5–25)
CALCIUM SERPL-MCNC: 8.7 MG/DL (ref 8.4–10.2)
CHLORIDE SERPL-SCNC: 102 MMOL/L (ref 96–108)
CHOLEST SERPL-MCNC: 83 MG/DL (ref ?–200)
CO2 SERPL-SCNC: 30 MMOL/L (ref 21–32)
CREAT SERPL-MCNC: 1.28 MG/DL (ref 0.6–1.3)
EOSINOPHIL # BLD AUTO: 0.82 THOUSAND/ÂΜL (ref 0–0.61)
EOSINOPHIL NFR BLD AUTO: 11 % (ref 0–6)
ERYTHROCYTE [DISTWIDTH] IN BLOOD BY AUTOMATED COUNT: 13.3 % (ref 11.6–15.1)
FERRITIN SERPL-MCNC: 71 NG/ML (ref 30–336)
GFR SERPL CREATININE-BSD FRML MDRD: 59 ML/MIN/1.73SQ M
GLUCOSE P FAST SERPL-MCNC: 225 MG/DL (ref 65–99)
HCT VFR BLD AUTO: 30.5 % (ref 36.5–49.3)
HDLC SERPL-MCNC: 50 MG/DL
HGB BLD-MCNC: 9.3 G/DL (ref 12–17)
IMM GRANULOCYTES # BLD AUTO: 0.02 THOUSAND/UL (ref 0–0.2)
IMM GRANULOCYTES NFR BLD AUTO: 0 % (ref 0–2)
IRON SATN MFR SERPL: 20 % (ref 15–50)
IRON SERPL-MCNC: 57 UG/DL (ref 50–212)
LDLC SERPL CALC-MCNC: 28 MG/DL (ref 0–100)
LYMPHOCYTES # BLD AUTO: 1.29 THOUSANDS/ÂΜL (ref 0.6–4.47)
LYMPHOCYTES NFR BLD AUTO: 18 % (ref 14–44)
MCH RBC QN AUTO: 27.7 PG (ref 26.8–34.3)
MCHC RBC AUTO-ENTMCNC: 30.5 G/DL (ref 31.4–37.4)
MCV RBC AUTO: 91 FL (ref 82–98)
MONOCYTES # BLD AUTO: 0.6 THOUSAND/ÂΜL (ref 0.17–1.22)
MONOCYTES NFR BLD AUTO: 8 % (ref 4–12)
NEUTROPHILS # BLD AUTO: 4.47 THOUSANDS/ÂΜL (ref 1.85–7.62)
NEUTS SEG NFR BLD AUTO: 62 % (ref 43–75)
NONHDLC SERPL-MCNC: 33 MG/DL
NRBC BLD AUTO-RTO: 0 /100 WBCS
PLATELET # BLD AUTO: 264 THOUSANDS/UL (ref 149–390)
PMV BLD AUTO: 11.2 FL (ref 8.9–12.7)
POTASSIUM SERPL-SCNC: 4.8 MMOL/L (ref 3.5–5.3)
PROT SERPL-MCNC: 6.2 G/DL (ref 6.4–8.4)
RBC # BLD AUTO: 3.36 MILLION/UL (ref 3.88–5.62)
SODIUM SERPL-SCNC: 137 MMOL/L (ref 135–147)
TIBC SERPL-MCNC: 289.8 UG/DL (ref 250–450)
TRANSFERRIN SERPL-MCNC: 207 MG/DL (ref 203–362)
TRIGL SERPL-MCNC: 26 MG/DL (ref ?–150)
TSH SERPL DL<=0.05 MIU/L-ACNC: 1.01 UIU/ML (ref 0.45–4.5)
UIBC SERPL-MCNC: 233 UG/DL (ref 155–355)
WBC # BLD AUTO: 7.24 THOUSAND/UL (ref 4.31–10.16)

## 2025-04-17 PROCEDURE — 80307 DRUG TEST PRSMV CHEM ANLYZR: CPT

## 2025-04-17 PROCEDURE — 84443 ASSAY THYROID STIM HORMONE: CPT

## 2025-04-17 PROCEDURE — 80061 LIPID PANEL: CPT

## 2025-04-17 PROCEDURE — 36415 COLL VENOUS BLD VENIPUNCTURE: CPT

## 2025-04-17 PROCEDURE — 83540 ASSAY OF IRON: CPT

## 2025-04-17 PROCEDURE — 82728 ASSAY OF FERRITIN: CPT

## 2025-04-17 PROCEDURE — 84075 ASSAY ALKALINE PHOSPHATASE: CPT

## 2025-04-17 PROCEDURE — 85025 COMPLETE CBC W/AUTO DIFF WBC: CPT

## 2025-04-17 PROCEDURE — 80053 COMPREHEN METABOLIC PANEL: CPT

## 2025-04-17 PROCEDURE — 83550 IRON BINDING TEST: CPT

## 2025-04-17 PROCEDURE — 84080 ASSAY ALKALINE PHOSPHATASES: CPT

## 2025-04-18 ENCOUNTER — APPOINTMENT (EMERGENCY)
Dept: CT IMAGING | Facility: HOSPITAL | Age: 65
End: 2025-04-18
Payer: COMMERCIAL

## 2025-04-18 ENCOUNTER — HOSPITAL ENCOUNTER (EMERGENCY)
Facility: HOSPITAL | Age: 65
Discharge: HOME | End: 2025-04-18
Attending: EMERGENCY MEDICINE
Payer: COMMERCIAL

## 2025-04-18 VITALS
SYSTOLIC BLOOD PRESSURE: 136 MMHG | RESPIRATION RATE: 16 BRPM | TEMPERATURE: 97.7 F | OXYGEN SATURATION: 99 % | HEART RATE: 55 BPM | DIASTOLIC BLOOD PRESSURE: 81 MMHG

## 2025-04-18 DIAGNOSIS — R53.83 LETHARGY: Primary | ICD-10-CM

## 2025-04-18 LAB
ALBUMIN SERPL BCG-MCNC: 3.4 G/DL (ref 3.5–5)
ALP SERPL-CCNC: 101 U/L (ref 34–104)
ALT SERPL W P-5'-P-CCNC: 25 U/L (ref 7–52)
ANION GAP SERPL CALCULATED.3IONS-SCNC: 0 MMOL/L (ref 4–13)
APAP SERPL-MCNC: <2 UG/ML (ref 10–20)
AST SERPL W P-5'-P-CCNC: 14 U/L (ref 13–39)
ATRIAL RATE: 57 BPM
BASOPHILS # BLD AUTO: 0.04 THOUSANDS/ÂΜL (ref 0–0.1)
BASOPHILS NFR BLD AUTO: 1 % (ref 0–1)
BILIRUB SERPL-MCNC: 0.35 MG/DL (ref 0.2–1)
BUN SERPL-MCNC: 15 MG/DL (ref 5–25)
CALCIUM ALBUM COR SERPL-MCNC: 9.4 MG/DL (ref 8.3–10.1)
CALCIUM SERPL-MCNC: 8.9 MG/DL (ref 8.4–10.2)
CHLORIDE SERPL-SCNC: 105 MMOL/L (ref 96–108)
CO2 SERPL-SCNC: 35 MMOL/L (ref 21–32)
CREAT SERPL-MCNC: 0.91 MG/DL (ref 0.6–1.3)
EOSINOPHIL # BLD AUTO: 0.95 THOUSAND/ÂΜL (ref 0–0.61)
EOSINOPHIL NFR BLD AUTO: 14 % (ref 0–6)
ERYTHROCYTE [DISTWIDTH] IN BLOOD BY AUTOMATED COUNT: 13.2 % (ref 11.6–15.1)
ETHANOL SERPL-MCNC: <10 MG/DL
GFR SERPL CREATININE-BSD FRML MDRD: 90 ML/MIN/1.73SQ M
GLUCOSE SERPL-MCNC: 84 MG/DL (ref 65–140)
HCT VFR BLD AUTO: 32 % (ref 36.5–49.3)
HGB BLD-MCNC: 10.2 G/DL (ref 12–17)
IMM GRANULOCYTES # BLD AUTO: 0.02 THOUSAND/UL (ref 0–0.2)
IMM GRANULOCYTES NFR BLD AUTO: 0 % (ref 0–2)
LYMPHOCYTES # BLD AUTO: 1.6 THOUSANDS/ÂΜL (ref 0.6–4.47)
LYMPHOCYTES NFR BLD AUTO: 23 % (ref 14–44)
MCH RBC QN AUTO: 27.6 PG (ref 26.8–34.3)
MCHC RBC AUTO-ENTMCNC: 31.9 G/DL (ref 31.4–37.4)
MCV RBC AUTO: 87 FL (ref 82–98)
MONOCYTES # BLD AUTO: 0.6 THOUSAND/ÂΜL (ref 0.17–1.22)
MONOCYTES NFR BLD AUTO: 9 % (ref 4–12)
NEUTROPHILS # BLD AUTO: 3.69 THOUSANDS/ÂΜL (ref 1.85–7.62)
NEUTS SEG NFR BLD AUTO: 53 % (ref 43–75)
NRBC BLD AUTO-RTO: 0 /100 WBCS
P AXIS: 78 DEGREES
PLATELET # BLD AUTO: 281 THOUSANDS/UL (ref 149–390)
PMV BLD AUTO: 10.9 FL (ref 8.9–12.7)
POTASSIUM SERPL-SCNC: 4.8 MMOL/L (ref 3.5–5.3)
PR INTERVAL: 146 MS
PROT SERPL-MCNC: 6 G/DL (ref 6.4–8.4)
QRS AXIS: 77 DEGREES
QRSD INTERVAL: 92 MS
QT INTERVAL: 472 MS
QTC INTERVAL: 459 MS
RBC # BLD AUTO: 3.69 MILLION/UL (ref 3.88–5.62)
SALICYLATES SERPL-MCNC: <5 MG/DL (ref 3–20)
SODIUM SERPL-SCNC: 140 MMOL/L (ref 135–147)
T WAVE AXIS: 79 DEGREES
VENTRICULAR RATE: 57 BPM
WBC # BLD AUTO: 6.9 THOUSAND/UL (ref 4.31–10.16)

## 2025-04-18 PROCEDURE — 80346 BENZODIAZEPINES1-12: CPT

## 2025-04-18 PROCEDURE — 99284 EMERGENCY DEPT VISIT MOD MDM: CPT

## 2025-04-18 PROCEDURE — 93005 ELECTROCARDIOGRAM TRACING: CPT

## 2025-04-18 PROCEDURE — 93010 ELECTROCARDIOGRAM REPORT: CPT | Performed by: STUDENT IN AN ORGANIZED HEALTH CARE EDUCATION/TRAINING PROGRAM

## 2025-04-18 PROCEDURE — 96365 THER/PROPH/DIAG IV INF INIT: CPT

## 2025-04-18 PROCEDURE — 80366 DRUG SCREENING PREGABALIN: CPT

## 2025-04-18 PROCEDURE — 80358 DRUG SCREENING METHADONE: CPT

## 2025-04-18 PROCEDURE — 80179 DRUG ASSAY SALICYLATE: CPT

## 2025-04-18 PROCEDURE — 70450 CT HEAD/BRAIN W/O DYE: CPT

## 2025-04-18 PROCEDURE — 80307 DRUG TEST PRSMV CHEM ANLYZR: CPT

## 2025-04-18 PROCEDURE — 36415 COLL VENOUS BLD VENIPUNCTURE: CPT

## 2025-04-18 PROCEDURE — 80355 GABAPENTIN NON-BLOOD: CPT

## 2025-04-18 PROCEDURE — 99285 EMERGENCY DEPT VISIT HI MDM: CPT | Performed by: EMERGENCY MEDICINE

## 2025-04-18 PROCEDURE — 80143 DRUG ASSAY ACETAMINOPHEN: CPT

## 2025-04-18 PROCEDURE — 80053 COMPREHEN METABOLIC PANEL: CPT

## 2025-04-18 PROCEDURE — 85025 COMPLETE CBC W/AUTO DIFF WBC: CPT

## 2025-04-18 PROCEDURE — 82077 ASSAY SPEC XCP UR&BREATH IA: CPT

## 2025-04-18 RX ADMIN — SODIUM CHLORIDE, SODIUM LACTATE, POTASSIUM CHLORIDE, AND CALCIUM CHLORIDE 1000 ML: .6; .31; .03; .02 INJECTION, SOLUTION INTRAVENOUS at 09:39

## 2025-04-18 NOTE — ED PROVIDER NOTES
Time reflects when diagnosis was documented in both MDM as applicable and the Disposition within this note       Time User Action Codes Description Comment    4/18/2025 10:22 AM Vitaliy Gamboa Add [R53.83] Lethargy           ED Disposition       ED Disposition   AMA    Condition   --    Date/Time   Fri Apr 18, 2025 10:29 AM    Comment   Date: 4/18/2025  Patient: David Skelton Jr.  Admitted: 4/18/2025  8:46 AM  Attending Provider: Bebeto Byrd*    David Skelton Jr. or his authorized caregiver has made the decision for the patient to leave the emergency department against th e advice of his attending physician. He or his authorized caregiver has been informed and understands the inherent risks, including death, electrolyte abnormalities, recurrence of symptoms.  He or his authorized caregiver has decided to accept the re sponsibility for this decision. David Skelton Jr. and all necessary parties have been advised that he may return for further evaluation or treatment. His condition at time of discharge was stable.  David Skelton Jr. had current vital signs as follows:   /81 (BP Location: Right arm)   Pulse 55   Temp 97.7 °F (36.5 °C) (Oral)   Resp 16                Assessment & Plan       Medical Decision Making  DDx including but not limited to: metabolic abnormality, intracranial etiology, substance abuse, overmedication.     CT head was normal. ECG showed no arrhythmias. Pt is not anemic and WBC normal, not concerned for any infection at this time. Pt did request to leave prior to CMP and coma panel resulting however CMP did result shortly after he left and it was normal. He did ambulate here without any difficulty. I am suspecting that his drowsiness may have been due to methadone dose vs other substance abuse.     Pt expressed understanding about signing out AMA. His caregiver came to pick him up and caregiver reported he was at his baseline.     Patient ambulated off the department.  Extensive  return to emergency department precautions were discussed.  Follow up with appropriate providers including primary care physician was discussed.  Patient and/or their  primary decision maker expressed understanding.  Patient remained stable during entire emergency department stay.      Amount and/or Complexity of Data Reviewed  Labs: ordered. Decision-making details documented in ED Course.  Radiology: ordered. Decision-making details documented in ED Course.        ED Course as of 04/18/25 1039   Fri Apr 18, 2025   0946 Hemoglobin(!): 10.2  9.3 on 4/17   1017 CT head without contrast  No acute intracranial abnormality. Stable chronic lacunar infarcts and microangiopathic changes.   1018 Pt was able to ambulate without any difficulty or assistance. Denied any symptoms    1028 The patient insists on leaving Against Medical Advice, despite my recommendation to remain for ongoing treatment.    1: Capacity: I have determined that the patient has capacity to make the decision to leave against medical advice based on the following:    A. Ability to express a choice: The patient is able to express his or her choice and communicate that choice.   B. Ability to understand relevant information: The patient is able to verbalize their diagnosis, understand information about the purpose of treatment, remember the information, and show that he or she can be part of the decision-making process.    C. Ability to appreciate the significance of the information and its consequences: The patient understands the consequences of treatment refusal and the risks and benefits of accepting or refusing treatment.    D. Ability to manipulate information: The patient is able to engage in reasoning as it applies to making treatment decisions   2: Psychiatric Consultation: There is not an indication to call psychiatry consultation to determine capacity    3. Alternative Treatment: I have discussed the recommended course of treatment and available  alternatives.  4. Risks: I have discussed the specific risks of that patient refusing treatment   5. Follow-up Care: I have discussed the follow-up care and advised to see patient's PCP immediately or return here for worsening.   6. ED Option: I have emphasized that the patient has the option to return to the ED. Reviewed that we can have continuation of the workup at any time and that we are always open.          Medications   folic acid 1 mg, thiamine (VITAMIN B1) 100 mg in sodium chloride 0.9 % 100 mL IV piggyback (has no administration in time range)   lactated ringers bolus 1,000 mL (0 mL Intravenous Stopped 4/18/25 1031)       ED Risk Strat Scores                    No data recorded                            History of Present Illness       Chief Complaint   Patient presents with    Lethargy     Pt came in via EMS from the [facility], per staff pt lethargic/drowsy and trying to sell pills at the clinic. Pt sleepy during triage but able to answer questions, denies any complaints at this time.        Past Medical History:   Diagnosis Date    Anxiety     Depression     Diabetes mellitus (HCC)     Drug use     Hemorrhoids, internal 9/23/2019    Hypertension       Past Surgical History:   Procedure Laterality Date    FEMUR FRACTURE SURGERY Right     GASTRIC BYPASS  11/08/2011    Managed by: Braden Luna (General Surgery)    HERNIA REPAIR  02/13/2013    Incisional hernia repair Managed by: Braden Luna (General Surgery)    MO OPTX FEM SHFT FX W/INSJ IMED IMPLT W/WO SCREW Right 7/31/2024    Procedure: INSERTION NAIL IM FEMUR ANTEGRADE (TROCHANTERIC);  Surgeon: Meng Hill MD;  Location: [facility];  Service: Orthopedics    TONSILLECTOMY  2010      Family History   Problem Relation Age of Onset    Diabetes Mother     Hypertension Mother     Hypertension Father     Autoimmune disease Son       Social History     Tobacco Use    Smoking status: Never    Smokeless tobacco: Never   Vaping Use     "Vaping status: Never Used   Substance Use Topics    Alcohol use: Never    Drug use: Yes     Comment: Methadone clinic      E-Cigarette/Vaping    E-Cigarette Use Never User       E-Cigarette/Vaping Substances    Nicotine No     THC No     CBD No     Flavoring No     Other No     Unknown No       I have reviewed and agree with the history as documented.     64 YOM with PMH DM, HTN, anxiety, polysubstance abuse, afib, prior stroke with residual slurred speech, seizure disorder presents today via EMS. Per EMS they were called because pt was at the methadone clinic and \"was not acting himself\" and trying to sell pills outside of the clinic. On arrival pt is alert and oriented, slurring his speech but this appears to be his baseline. Pt denies trying to sell pills. Reports that he has no complaints other than being constipated a few days ago and taking \"a bunch of stuff\" to relieve it but did end up having a large bowel movement. Pt is demanding that we get him to his mental health appt at 930am.         Review of Systems   Constitutional:  Negative for fatigue and fever.   Gastrointestinal:  Negative for diarrhea, nausea and vomiting.   Neurological:  Negative for dizziness, light-headedness and headaches.   Psychiatric/Behavioral:  Positive for agitation. Negative for hallucinations. The patient is not nervous/anxious and is not hyperactive.            Objective       ED Triage Vitals   Temperature Pulse Blood Pressure Respirations SpO2 Patient Position - Orthostatic VS   04/18/25 0857 04/18/25 0854 04/18/25 0854 04/18/25 0854 04/18/25 0854 04/18/25 0854   97.7 °F (36.5 °C) (!) 50 146/68 12 98 % Lying      Temp Source Heart Rate Source BP Location FiO2 (%) Pain Score    04/18/25 0857 04/18/25 0854 04/18/25 0854 -- 04/18/25 0854    Oral Monitor Right arm  No Pain      Vitals      Date and Time Temp Pulse SpO2 Resp BP Pain Score FACES Pain Rating User   04/18/25 1000 -- 55 99 % 16 136/81 -- -- CO   04/18/25 0930 -- 57 98 " % 16 137/67 -- -- CO   04/18/25 0921 -- -- -- -- -- No Pain -- CO   04/18/25 0857 97.7 °F (36.5 °C) -- -- -- -- -- -- CO   04/18/25 0854 -- 50 98 % 12 146/68 No Pain -- CO            Physical Exam  Vitals and nursing note reviewed.   Constitutional:       General: He is not in acute distress.     Appearance: Normal appearance. He is well-developed.   HENT:      Head: Normocephalic and atraumatic.   Eyes:      Conjunctiva/sclera: Conjunctivae normal.      Comments: Pinpoint pupils   Cardiovascular:      Rate and Rhythm: Normal rate and regular rhythm.      Heart sounds: No murmur heard.  Pulmonary:      Effort: Pulmonary effort is normal. No respiratory distress.      Breath sounds: Normal breath sounds.   Abdominal:      Palpations: Abdomen is soft.      Tenderness: There is no abdominal tenderness.   Musculoskeletal:         General: No swelling. Normal range of motion.      Cervical back: Normal range of motion and neck supple.   Skin:     General: Skin is warm and dry.      Capillary Refill: Capillary refill takes less than 2 seconds.   Neurological:      Mental Status: He is alert.      GCS: GCS eye subscore is 4. GCS verbal subscore is 5. GCS motor subscore is 6.      Cranial Nerves: Dysarthria present.      Motor: No tremor or pronator drift.      Coordination: Finger-Nose-Finger Test normal.   Psychiatric:         Mood and Affect: Mood normal.         Behavior: Behavior normal.         Results Reviewed       Procedure Component Value Units Date/Time    Comprehensive metabolic panel [[id number]]  (Abnormal) Collected: 04/18/25 0917    Lab Status: Final result Specimen: Blood from Arm, Right Updated: 04/18/25 1030     Sodium 140 mmol/L      Potassium 4.8 mmol/L      Chloride 105 mmol/L      CO2 35 mmol/L      ANION GAP 0 mmol/L      BUN 15 mg/dL      Creatinine 0.91 mg/dL      Glucose 84 mg/dL      Calcium 8.9 mg/dL      Corrected Calcium 9.4 mg/dL      AST 14 U/L      ALT 25 U/L      Alkaline Phosphatase 101  U/L      Total Protein 6.0 g/dL      Albumin 3.4 g/dL      Total Bilirubin 0.35 mg/dL      eGFR 90 ml/min/1.73sq m     Narrative:      Unable to calculate concentration. Result is lower than the dynamic range.  National Kid[employer] Foundation guidelines for Chronic Kidney Disease (CKD):     Stage 1 with normal or high GFR (GFR > 90 mL/min/1.73 square meters)    Stage 2 Mild CKD (GFR = 60-89 mL/min/1.73 square meters)    Stage 3A Moderate CKD (GFR = 45-59 mL/min/1.73 square meters)    Stage 3B Moderate CKD (GFR = 30-44 mL/min/1.73 square meters)    Stage 4 Severe CKD (GFR = 15-29 mL/min/1.73 square meters)    Stage 5 End Stage CKD (GFR <15 mL/min/1.73 square meters)  Note: GFR calculation is accurate only with a steady state creatinine    Salicylate level [[id number]]  (Normal) Collected: 04/18/25 0934    Lab Status: Final result Specimen: Blood from Arm, Right Updated: 04/18/25 1030     Salicylate Lvl <5 mg/dL     Narrative:      Unable to calculate concentration. Result is lower than the dynamic range.    Acetaminophen level-If concentration is detectable, please discuss with medical  on call. [[id number]]  (Abnormal) Collected: 04/18/25 0934    Lab Status: Final result Specimen: Blood from Arm, Right Updated: 04/18/25 1030     Acetaminophen Level <2 ug/mL     Narrative:      Unable to calculate concentration. Result is lower than the dynamic range.    Ethanol [[id number]]  (Normal) Collected: 04/18/25 0934    Lab Status: Final result Specimen: Blood from Arm, Right Updated: 04/18/25 1006     Ethanol Lvl <10 mg/dL     CBC and differential [[id number]]  (Abnormal) Collected: 04/18/25 0934    Lab Status: Final result Specimen: Blood from Arm, Right Updated: 04/18/25 0945     WBC 6.90 Thousand/uL      RBC 3.69 Million/uL      Hemoglobin 10.2 g/dL      Hematocrit 32.0 %      MCV 87 fL      MCH 27.6 pg      MCHC 31.9 g/dL      RDW 13.2 %      MPV 10.9 fL      Platelets 281 Thousands/uL      nRBC 0 /100 WBCs       Segmented % 53 %      Immature Grans % 0 %      Lymphocytes % 23 %      Monocytes % 9 %      Eosinophils Relative 14 %      Basophils Relative 1 %      Absolute Neutrophils 3.69 Thousands/µL      Absolute Immature Grans 0.02 Thousand/uL      Absolute Lymphocytes 1.60 Thousands/µL      Absolute Monocytes 0.60 Thousand/µL      Eosinophils Absolute 0.95 Thousand/µL      Basophils Absolute 0.04 Thousands/µL     Rapid drug screen, urine [[id number]]     Lab Status: No result Specimen: Urine             CT head without contrast   Final Interpretation by Byron Vanessa MD (04/18 1011)      No acute intracranial abnormality. Stable chronic lacunar infarcts and microangiopathic changes.                  Resident: Alberto Rosales I, the attending radiologist, have reviewed the images and agree with the final report above.      Workstation performed: [id number]             ECG 12 Lead Documentation Only    Date/Time: 4/18/2025 9:41 AM    Performed by: Vitaliy Gamboa PA-C  Authorized by: Vitaliy Gamboa PA-C    Indications / Diagnosis:  AMS, lethargic  ECG reviewed by me, the ED Provider: yes    Patient location:  ED  Previous ECG:     Previous ECG:  Compared to current    Similarity:  No change  Interpretation:     Interpretation: normal    Rate:     ECG rate:  57    ECG rate assessment: bradycardic    Rhythm:     Rhythm: sinus rhythm    Ectopy:     Ectopy: none    QRS:     QRS axis:  Normal    QRS intervals:  Normal  Conduction:     Conduction: normal    ST segments:     ST segments:  Normal  T waves:     T waves: normal        ED Medication and Procedure Management   Prior to Admission Medications   Prescriptions Last Dose Informant Patient Reported? Taking?   Alcohol Swabs 70 % PADS  Self No No   Sig: May substitute brand based on insurance coverage. Check glucose BID.   Blood Glucose Monitoring Suppl (OneTouch Verio Reflect) w/Device KIT  Self No No   Sig: May substitute brand based on insurance  coverage. Check glucose BID.   Cholecalciferol (VITAMIN D3) 1,000 units tablet  Self No No   Sig: Take 2 tablets (2,000 Units total) by mouth daily   Continuous Glucose  (FreeStyle Grace 3 [place]) STEVE   No No   Sig: Please dispense 1 reader   Continuous Glucose Sensor (FreeStyle Grace 3 Plus Sensor) MISC   No No   Sig: Use 1 each every 15 (fifteen) days   Insulin Glargine Solostar (Lantus SoloStar) 100 UNIT/ML SOPN   No No   Sig: Inject 16 units at bedtime   Insulin Pen Needle (BD Pen Needle Aminta U/F) 32G X 4 MM MISC   No No   Sig: Use daily as directed with insulin pen   Misc. Devices (CANE) MISC  Self No No   Sig: by Does not apply route daily Dx:  Frequent falls   Multiple Vitamin (Daily-Laura Multivitamin) TABS   Yes No   Sig: Take 1 tablet by mouth in the morning   Multiple Vitamin (multivitamin) capsule   No No   Sig: Take 1 capsule by mouth daily   Trazodone HCl POWD   Yes No   Sig: Take by mouth   Wound Dressings (Dermagran Hydrogel Wound) GEL   No No   Sig: Apply 1 Application topically every other day To applied every other day to small red developing ulcer on the left lateral shin area   amphetamine-dextroamphetamine (ADDERALL XR) 30 MG 24 hr capsule   Yes No   Sig: TAKE 1 CAPSULE BY MOUTH TWICE A DAY (8AM AND 1PM)   atorvastatin (LIPITOR) 40 mg tablet   No No   Sig: Take 1 tablet (40 mg total) by mouth daily with dinner   cloNIDine (CATAPRES) 0.1 mg tablet   Yes No   Sig: Take 0.1 mg by mouth daily at bedtime   clonazePAM (KlonoPIN) 1 mg tablet   No No   Sig: Take 1 tablet (1 mg total) by mouth 2 (two) times a day for 9 days   clopidogrel (PLAVIX) 75 mg tablet   No No   Sig: Take 1 tablet (75 mg total) by mouth daily   finasteride (PROSCAR) 5 mg tablet   No No   Sig: Take 1 tablet (5 mg total) by mouth daily   fluticasone (FLONASE) 50 mcg/act nasal spray   No No   Si spray into each nostril daily   folic acid (FOLVITE) 1 mg tablet   No No   Sig: Take 1 tablet (1 mg total) by mouth daily    furosemide (LASIX) 20 mg tablet  Self No No   Sig: Take 1 tablet (20 mg total) by mouth daily as needed (leg swelling)   gabapentin (NEURONTIN) 800 mg tablet   Yes No   Sig: TAKE 1 TABLET BY MOUTH 3 TIMES A DAY AND TAKE 1 TABLET BY MOUTH AT BEDTIME   gabapentin (Neurontin) 600 MG tablet  Self No No   Sig: Take 0.5 tablets (300 mg total) by mouth 2 (two) times a day   glucose blood (OneTouch Verio) test strip  Self No No   Sig: May substitute brand based on insurance coverage. Check glucose BID.   hydroCHLOROthiazide 12.5 mg tablet   No No   Sig: TAKE 1 TABLET (12.5 MG TOTAL) BY MOUTH DAILY AS NEEDED EDEMA   metFORMIN (GLUCOPHAGE-XR) 500 mg 24 hr tablet   No No   Sig: Take 2 tablets (1,000 mg total) by mouth 2 (two) times a day with meals   tamsulosin (FLOMAX) 0.4 mg   No No   Sig: Take 1 capsule (0.4 mg total) by mouth daily with dinner   thiamine 100 MG tablet   No No   Sig: Take 1 tablet (100 mg total) by mouth daily   triamcinolone (KENALOG) 0.1 % cream   No No   Sig: Apply topically 2 (two) times a day Applied to right posterior thigh      Facility-Administered Medications: None     Discharge Medication List as of 4/18/2025 10:29 AM        CONTINUE these medications which have NOT CHANGED    Details   Alcohol Swabs 70 % PADS May substitute brand based on insurance coverage. Check glucose BID., Normal      amphetamine-dextroamphetamine (ADDERALL XR) 30 MG 24 hr capsule TAKE 1 CAPSULE BY MOUTH TWICE A DAY (8AM AND 1PM), Historical Med      atorvastatin (LIPITOR) 40 mg tablet Take 1 tablet (40 mg total) by mouth daily with dinner, Starting Thu 3/13/2025, Normal      Blood Glucose Monitoring Suppl (OneTouch Verio Reflect) w/Device KIT May substitute brand based on insurance coverage. Check glucose BID., Normal      Cholecalciferol (VITAMIN D3) 1,000 units tablet Take 2 tablets (2,000 Units total) by mouth daily, Starting Mon 10/21/2024, Normal      clonazePAM (KlonoPIN) 1 mg tablet Take 1 tablet (1 mg total) by  mouth 2 (two) times a day for 9 days, Starting Tue 4/15/2025, Until Thu 4/24/2025, Normal      cloNIDine (CATAPRES) 0.1 mg tablet Take 0.1 mg by mouth daily at bedtime, Starting Mon 2/10/2025, Historical Med      clopidogrel (PLAVIX) 75 mg tablet Take 1 tablet (75 mg total) by mouth daily, Starting Fri 3/14/2025, Normal      Continuous Glucose  (FreeStyle Grace 3 [place]) STEVE Please dispense 1 reader, Normal      Continuous Glucose Sensor (FreeStyle Grace 3 Plus Sensor) MISC Use 1 each every 15 (fifteen) days, Starting Mon 4/7/2025, Normal      finasteride (PROSCAR) 5 mg tablet Take 1 tablet (5 mg total) by mouth daily, Starting Fri 3/14/2025, Normal      fluticasone (FLONASE) 50 mcg/act nasal spray 1 spray into each nostril daily, Starting Wed 3/19/2025, Normal      folic acid (FOLVITE) 1 mg tablet Take 1 tablet (1 mg total) by mouth daily, Starting Fri 3/14/2025, Normal      furosemide (LASIX) 20 mg tablet Take 1 tablet (20 mg total) by mouth daily as needed (leg swelling), Starting Fri 9/27/2024, Normal      !! gabapentin (Neurontin) 600 MG tablet Take 0.5 tablets (300 mg total) by mouth 2 (two) times a day, Starting Mon 10/21/2024, Normal      !! gabapentin (NEURONTIN) 800 mg tablet TAKE 1 TABLET BY MOUTH 3 TIMES A DAY AND TAKE 1 TABLET BY MOUTH AT BEDTIME, Historical Med      glucose blood (OneTouch Verio) test strip May substitute brand based on insurance coverage. Check glucose BID., Normal      hydroCHLOROthiazide 12.5 mg tablet TAKE 1 TABLET (12.5 MG TOTAL) BY MOUTH DAILY AS NEEDED EDEMA, Normal      Insulin Glargine Solostar (Lantus SoloStar) 100 UNIT/ML SOPN Inject 16 units at bedtime, No Print      Insulin Pen Needle (BD Pen Needle Aminta U/F) 32G X 4 MM MISC Use daily as directed with insulin pen, Normal      metFORMIN (GLUCOPHAGE-XR) 500 mg 24 hr tablet Take 2 tablets (1,000 mg total) by mouth 2 (two) times a day with meals, Starting Mon 4/7/2025, Normal      Misc. Devices (CANE) MISC by Does not  apply route daily Dx:  Frequent falls, Starting Mon 7/29/2019, Print      Multiple Vitamin (Daily-Laura Multivitamin) TABS Take 1 tablet by mouth in the morning, Starting Thu 3/13/2025, Historical Med      Multiple Vitamin (multivitamin) capsule Take 1 capsule by mouth daily, Starting Thu 3/13/2025, Normal      tamsulosin (FLOMAX) 0.4 mg Take 1 capsule (0.4 mg total) by mouth daily with dinner, Starting Tue 12/3/2024, Normal      thiamine 100 MG tablet Take 1 tablet (100 mg total) by mouth daily, Starting Fri 3/14/2025, Normal      Trazodone HCl POWD Take by mouth, Historical Med      triamcinolone (KENALOG) 0.1 % cream Apply topically 2 (two) times a day Applied to right posterior thigh, Starting Wed 2/12/2025, Normal      Wound Dressings (Dermagran Hydrogel Wound) GEL Apply 1 Application topically every other day To applied every other day to small red developing ulcer on the left lateral shin area, Starting Wed 2/12/2025, Normal       !! - Potential duplicate medications found. Please discuss with provider.        No discharge procedures on file.  ED SEPSIS DOCUMENTATION   Time reflects when diagnosis was documented in both MDM as applicable and the Disposition within this note       Time User Action Codes Description Comment    4/18/2025 10:22 AM Vitaliy Gamboa Add [R53.83] Lethargy                  Vitaliy Gamboa PA-C  04/18/25 3223

## 2025-04-18 NOTE — ED ATTENDING ATTESTATION
"4/18/2025  I, Bebeto Nichols MD, saw and evaluated the patient. I have discussed the patient with the resident/non-physician practitioner and agree with the resident's/non-physician practitioner's findings, Plan of Care, and MDM as documented in the resident's/non-physician practitioner's note, except where noted. All available labs and Radiology studies were reviewed.  I was present for key portions of any procedure(s) performed by the resident/non-physician practitioner and I was immediately available to provide assistance.       At this point I agree with the current assessment done in the Emergency Department.  I have conducted an independent evaluation of this patient a history and physical is as follows:  Patient sent over from the methadone clinic because of \"lethargy\" and apparently he was also trying to sell pills.  Patient is awake alert arousable.  He has had a prior stroke and so has slurred speech as a result which has not changed.  He is not complaining of weakness.  He did not have a headache.  He notes no new focal neurological symptoms.  He admits to me that when he takes methadone he gets sleepy and that he was not trying to take any pills.  The patient is requesting to leave because he has to get to some type of meeting which involves his ankle bracelet or else he is getting go back to detention.  He is ambulatory and aware that not all the results of his tests are completed.  Suspect part of his lethargy could be due to to methadone use however cannot rule out drug ingestion.  Does not appear to be a CVA.  Patient will be leaving AMA.  ED Course         Critical Care Time  Procedures      "

## 2025-04-19 ENCOUNTER — HOSPITAL ENCOUNTER (EMERGENCY)
Facility: HOSPITAL | Age: 65
Discharge: HOME | End: 2025-04-19
Attending: EMERGENCY MEDICINE
Payer: COMMERCIAL

## 2025-04-19 VITALS
OXYGEN SATURATION: 97 % | RESPIRATION RATE: 18 BRPM | DIASTOLIC BLOOD PRESSURE: 66 MMHG | HEART RATE: 79 BPM | SYSTOLIC BLOOD PRESSURE: 131 MMHG | TEMPERATURE: 98 F

## 2025-04-19 DIAGNOSIS — R19.7 DIARRHEA: Primary | ICD-10-CM

## 2025-04-19 PROCEDURE — 99283 EMERGENCY DEPT VISIT LOW MDM: CPT

## 2025-04-19 PROCEDURE — 93005 ELECTROCARDIOGRAM TRACING: CPT

## 2025-04-19 PROCEDURE — 99284 EMERGENCY DEPT VISIT MOD MDM: CPT | Performed by: EMERGENCY MEDICINE

## 2025-04-19 RX ORDER — SODIUM CHLORIDE, SODIUM GLUCONATE, SODIUM ACETATE, POTASSIUM CHLORIDE, MAGNESIUM CHLORIDE, SODIUM PHOSPHATE, DIBASIC, AND POTASSIUM PHOSPHATE .53; .5; .37; .037; .03; .012; .00082 G/100ML; G/100ML; G/100ML; G/100ML; G/100ML; G/100ML; G/100ML
500 INJECTION, SOLUTION INTRAVENOUS ONCE
Status: DISCONTINUED | OUTPATIENT
Start: 2025-04-19 | End: 2025-04-19 | Stop reason: HOSPADM

## 2025-04-19 NOTE — ED NOTES
Pt refusing IV, labs and fluids. Only requesting methadone at this time. Resident at bedside with RN.     Anastasiya Hall RN  04/19/25 [id number]

## 2025-04-19 NOTE — ED ATTENDING ATTESTATION
4/19/2025  I, Jermaine Mcarthur MD, saw and evaluated the patient. I have discussed the patient with the resident/non-physician practitioner and agree with the resident's/non-physician practitioner's findings, Plan of Care, and MDM as documented in the resident's/non-physician practitioner's note, except where noted. All available labs and Radiology studies were reviewed.  I was present for key portions of any procedure(s) performed by the resident/non-physician practitioner and I was immediately available to provide assistance.       At this point I agree with the current assessment done in the Emergency Department.  I have conducted an independent evaluation of this patient a history and physical is as follows: Patient presents with complaints of abdominal pain and 1 loose stool he states that he feels like he is withdrawing from a decreased dose of methadone  No vomiting no fever no chest pain no trauma  Past medical history stroke   Chronic methadone therapy  on methadone    recent taper from 1 90  -1 50-  1 30   had his dose this morning    Patient claims that he is withdrawing although he is comfortable appearing his pupils are pinpoint he is easily arousable he is able to answer questions and follow commands  Patient has chronic dysarthria from his prior stroke  Exam  normal vital signs afebrile  Pupils are very small but reactive he is awake and alert and oriented x 3  Patient has dysarthria  Neck no JVD supple lungs clear heart regular with no murmurs gallops or rubs abdomen soft nondistended positive bowel sounds mild diffuse tenderness without rebound or guarding  Extremities patient has excoriations on his legs but no evidence of cellulitis  Moves all extremities purposefully      I have reviewed his old records  he was at [place] yesterday and according to EMS and nursing note he was allegedly selling pills outside of the methadone clinic   the patient denies that he was selling pills  Patient had a  CAT scan of his head yesterday and blood workup which were unremarkable  White count was normal CMP unremarkable  CT of head showed chronic lacunar infarct  No bleed  Impression: Abdominal pain  The patient will have lab workup he will have a CT of his abdomen pelvis  To rule out intra-abdominal pathology  The patient was advised that he will have to go back to his methadone clinic for further methadone doses     ED Course   Patient became upset after plan and workup were explained to him he states that he is leaving and wants an Uber ride home    Patient left AMA      Critical Care Time  Procedures

## 2025-04-19 NOTE — DISCHARGE INSTRUCTIONS
Hello you were seen today for diarrhea    You are welcome back at the ER at any time    Please follow-up with the methadone clinic to get your methadone    Please return to the ER if you develop any worsening abdominal pain, fever, chills, blood in your diarrhea, black stools, vomiting, any new symptoms   [FreeTextEntry1] : cerumen cleared au\par exam pharynx unremarkable\par rec fu gi for further eval lower esopagus

## 2025-04-21 LAB
ATRIAL RATE: 73 BPM
P AXIS: 63 DEGREES
PR INTERVAL: 144 MS
QRS AXIS: 72 DEGREES
QRSD INTERVAL: 90 MS
QT INTERVAL: 414 MS
QTC INTERVAL: 456 MS
T WAVE AXIS: 67 DEGREES
VENTRICULAR RATE: 73 BPM

## 2025-04-21 PROCEDURE — 93010 ELECTROCARDIOGRAM REPORT: CPT | Performed by: INTERNAL MEDICINE

## 2025-04-22 ENCOUNTER — OFFICE VISIT (OUTPATIENT)
Dept: FAMILY MEDICINE CLINIC | Facility: CLINIC | Age: 65
End: 2025-04-22
Payer: COMMERCIAL

## 2025-04-22 ENCOUNTER — TELEPHONE (OUTPATIENT)
Age: 65
End: 2025-04-22

## 2025-04-22 ENCOUNTER — NURSE TRIAGE (OUTPATIENT)
Dept: OTHER | Facility: OTHER | Age: 65
End: 2025-04-22

## 2025-04-22 ENCOUNTER — HOSPITAL ENCOUNTER (EMERGENCY)
Facility: HOSPITAL | Age: 65
Discharge: HOME | End: 2025-04-22
Attending: EMERGENCY MEDICINE
Payer: COMMERCIAL

## 2025-04-22 VITALS
HEART RATE: 88 BPM | DIASTOLIC BLOOD PRESSURE: 65 MMHG | RESPIRATION RATE: 19 BRPM | SYSTOLIC BLOOD PRESSURE: 138 MMHG | TEMPERATURE: 98 F | WEIGHT: 180 LBS | OXYGEN SATURATION: 97 % | BODY MASS INDEX: 27.37 KG/M2

## 2025-04-22 VITALS
TEMPERATURE: 97.9 F | RESPIRATION RATE: 20 BRPM | HEART RATE: 89 BPM | DIASTOLIC BLOOD PRESSURE: 74 MMHG | OXYGEN SATURATION: 99 % | SYSTOLIC BLOOD PRESSURE: 141 MMHG
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
## 2025-04-22 DIAGNOSIS — R60.9 EDEMA, UNSPECIFIED TYPE: ICD-10-CM

## 2025-04-22 DIAGNOSIS — M54.50 CHRONIC MIDLINE LOW BACK PAIN WITHOUT SCIATICA: ICD-10-CM

## 2025-04-22 DIAGNOSIS — F11.20 METHADONE DEPENDENCE (HCC): ICD-10-CM

## 2025-04-22 DIAGNOSIS — M17.0 PRIMARY OSTEOARTHRITIS OF BOTH KNEES: ICD-10-CM

## 2025-04-22 DIAGNOSIS — G89.29 CHRONIC MIDLINE LOW BACK PAIN WITHOUT SCIATICA: ICD-10-CM

## 2025-04-22 DIAGNOSIS — F11.10 OPIOID ABUSE (HCC): ICD-10-CM

## 2025-04-22 DIAGNOSIS — R10.9 ABDOMINAL PAIN: Primary | ICD-10-CM

## 2025-04-22 DIAGNOSIS — M79.89 SWELLING OF BOTH LOWER EXTREMITIES: ICD-10-CM

## 2025-04-22 PROBLEM — Z01.818 PRE-OPERATIVE GENERAL PHYSICAL EXAMINATION: Status: RESOLVED | Noted: 2019-12-31 | Resolved: 2025-04-22

## 2025-04-22 LAB
ALBUMIN SERPL BCG-MCNC: 3.1 G/DL (ref 3.5–5)
ALP BONE CFR SERPL: 42 % (ref 12–68)
ALP INTEST CFR SERPL: 7 % (ref 0–18)
ALP LIVER CFR SERPL: 51 % (ref 13–88)
ALP SERPL-CCNC: 119 IU/L (ref 44–121)
ALP SERPL-CCNC: 93 U/L (ref 34–104)
ALT SERPL W P-5'-P-CCNC: 23 U/L (ref 7–52)
ANION GAP SERPL CALCULATED.3IONS-SCNC: 4 MMOL/L (ref 4–13)
AST SERPL W P-5'-P-CCNC: 14 U/L (ref 13–39)
BASOPHILS # BLD AUTO: 0.04 THOUSANDS/ÂΜL (ref 0–0.1)
BASOPHILS NFR BLD AUTO: 1 % (ref 0–1)
BILIRUB SERPL-MCNC: 0.25 MG/DL (ref 0.2–1)
BUN SERPL-MCNC: 14 MG/DL (ref 5–25)
CALCIUM ALBUM COR SERPL-MCNC: 9.1 MG/DL (ref 8.3–10.1)
CALCIUM SERPL-MCNC: 8.4 MG/DL (ref 8.4–10.2)
CHLORIDE SERPL-SCNC: 102 MMOL/L (ref 96–108)
CO2 SERPL-SCNC: 30 MMOL/L (ref 21–32)
CREAT SERPL-MCNC: 0.98 MG/DL (ref 0.6–1.3)
EOSINOPHIL # BLD AUTO: 0.86 THOUSAND/ÂΜL (ref 0–0.61)
EOSINOPHIL NFR BLD AUTO: 13 % (ref 0–6)
ERYTHROCYTE [DISTWIDTH] IN BLOOD BY AUTOMATED COUNT: 13.2 % (ref 11.6–15.1)
GFR SERPL CREATININE-BSD FRML MDRD: 82 ML/MIN/1.73SQ M
GLUCOSE SERPL-MCNC: 286 MG/DL (ref 65–140)
HCT VFR BLD AUTO: 28.5 % (ref 36.5–49.3)
HGB BLD-MCNC: 9.1 G/DL (ref 12–17)
IMM GRANULOCYTES # BLD AUTO: 0.02 THOUSAND/UL (ref 0–0.2)
IMM GRANULOCYTES NFR BLD AUTO: 0 % (ref 0–2)
LIPASE SERPL-CCNC: 19 U/L (ref 11–82)
LYMPHOCYTES # BLD AUTO: 1.33 THOUSANDS/ÂΜL (ref 0.6–4.47)
LYMPHOCYTES NFR BLD AUTO: 20 % (ref 14–44)
MCH RBC QN AUTO: 27.7 PG (ref 26.8–34.3)
MCHC RBC AUTO-ENTMCNC: 31.9 G/DL (ref 31.4–37.4)
MCV RBC AUTO: 87 FL (ref 82–98)
METHADONE SERPL-MCNC: 778 NG/ML (ref 100–400)
MONOCYTES # BLD AUTO: 0.47 THOUSAND/ÂΜL (ref 0.17–1.22)
MONOCYTES NFR BLD AUTO: 7 % (ref 4–12)
NEUTROPHILS # BLD AUTO: 4 THOUSANDS/ÂΜL (ref 1.85–7.62)
NEUTS SEG NFR BLD AUTO: 59 % (ref 43–75)
NRBC BLD AUTO-RTO: 0 /100 WBCS
PLATELET # BLD AUTO: 258 THOUSANDS/UL (ref 149–390)
PMV BLD AUTO: 10.3 FL (ref 8.9–12.7)
POTASSIUM SERPL-SCNC: 4.3 MMOL/L (ref 3.5–5.3)
PROT SERPL-MCNC: 6 G/DL (ref 6.4–8.4)
RBC # BLD AUTO: 3.28 MILLION/UL (ref 3.88–5.62)
SODIUM SERPL-SCNC: 136 MMOL/L (ref 135–147)
WBC # BLD AUTO: 6.72 THOUSAND/UL (ref 4.31–10.16)

## 2025-04-22 PROCEDURE — 80053 COMPREHEN METABOLIC PANEL: CPT

## 2025-04-22 PROCEDURE — 99284 EMERGENCY DEPT VISIT MOD MDM: CPT

## 2025-04-22 PROCEDURE — 85025 COMPLETE CBC W/AUTO DIFF WBC: CPT

## 2025-04-22 PROCEDURE — 36415 COLL VENOUS BLD VENIPUNCTURE: CPT

## 2025-04-22 PROCEDURE — 99214 OFFICE O/P EST MOD 30 MIN: CPT | Performed by: FAMILY MEDICINE

## 2025-04-22 PROCEDURE — 96375 TX/PRO/DX INJ NEW DRUG ADDON: CPT

## 2025-04-22 PROCEDURE — 83690 ASSAY OF LIPASE: CPT

## 2025-04-22 PROCEDURE — 99285 EMERGENCY DEPT VISIT HI MDM: CPT | Performed by: EMERGENCY MEDICINE

## 2025-04-22 PROCEDURE — 96365 THER/PROPH/DIAG IV INF INIT: CPT

## 2025-04-22 RX ORDER — FUROSEMIDE 20 MG/1
20 TABLET ORAL DAILY PRN
Qty: 30 TABLET | Refills: 1 | Status: SHIPPED | OUTPATIENT
Start: 2025-04-22

## 2025-04-22 RX ORDER — CLONIDINE HYDROCHLORIDE 0.1 MG/1
0.1 TABLET ORAL ONCE
Status: COMPLETED | OUTPATIENT
Start: 2025-04-22 | End: 2025-04-22

## 2025-04-22 RX ORDER — ACETAMINOPHEN 10 MG/ML
1000 INJECTION, SOLUTION INTRAVENOUS ONCE
Status: COMPLETED | OUTPATIENT
Start: 2025-04-22 | End: 2025-04-22

## 2025-04-22 RX ORDER — LIDOCAINE 50 MG/G
1 PATCH TOPICAL DAILY
Qty: 10 PATCH | Refills: 0 | Status: SHIPPED | OUTPATIENT
Start: 2025-04-22

## 2025-04-22 RX ORDER — ONDANSETRON 2 MG/ML
4 INJECTION INTRAMUSCULAR; INTRAVENOUS ONCE
Status: COMPLETED | OUTPATIENT
Start: 2025-04-22 | End: 2025-04-22

## 2025-04-22 RX ORDER — METHOCARBAMOL 500 MG/1
500 TABLET, FILM COATED ORAL 4 TIMES DAILY
Qty: 20 TABLET | Refills: 0 | Status: SHIPPED | OUTPATIENT
Start: 2025-04-22 | End: 2025-04-27

## 2025-04-22 RX ADMIN — SODIUM CHLORIDE 1000 ML: 0.9 INJECTION, SOLUTION INTRAVENOUS at 19:08

## 2025-04-22 RX ADMIN — ACETAMINOPHEN 1000 MG: 10 INJECTION INTRAVENOUS at 19:08

## 2025-04-22 RX ADMIN — CLONIDINE HYDROCHLORIDE 0.1 MG: 0.1 TABLET ORAL at 19:47

## 2025-04-22 RX ADMIN — ONDANSETRON 4 MG: 2 INJECTION INTRAMUSCULAR; INTRAVENOUS at 19:06

## 2025-04-22 NOTE — ED ATTENDING ATTESTATION
4/22/2025  I, Nabeel Larry MD, saw and evaluated the patient. I have discussed the patient with the resident/non-physician practitioner and agree with the resident's/non-physician practitioner's findings, Plan of Care, and MDM as documented in the resident's/non-physician practitioner's note, except where noted. All available labs and Radiology studies were reviewed.  I was present for key portions of any procedure(s) performed by the resident/non-physician practitioner and I was immediately available to provide assistance.       At this point I agree with the current assessment done in the Emergency Department.  I have conducted an independent evaluation of this patient a history and physical is as follows: Diffuse abdominal discomfort.  Laboratory studies within normal limits.  Multiple previous presentations for same.  Patient at his baseline.  Hemodynamically stable.  Requesting methadone.  Patient states he got a dose of methadone this morning.  Will not redose methadone.  Also requesting benzodiazepine, Ativan by name.  No indication for Ativan in the emergency department.  Offer clonidine for mild withdrawal symptoms although patient shows no overt evidence of withdrawal.  Will discharge from the emergency department with instructions to follow-up with his outpatient methadone clinic.    ED Course  ED Course as of 04/22/25 1948 Tue Apr 22, 2025 1939 Patient showing no active signs of withdrawal.  He states he still does not feel right, offer clonidine, patient upset and requesting Ativan instead.  Reviewed previous notes, will not give additional controlled substances in the emergency department for chronic issues.  Patient stable for discharge in the ER.         Critical Care Time  Procedures

## 2025-04-22 NOTE — TELEPHONE ENCOUNTER
David called in and said that he is in extreme pain. He asked for Dr. Rios to write him a script for the pain. David said that Dr. Rios knows what is going and would like it asap because it is too painful. He did not want to speak with a nurse. He asked for the medication to be send to [facility] pharmacy on [address]

## 2025-04-22 NOTE — ASSESSMENT & PLAN NOTE
-Patient presented clinic for follow-up after recent fall patient reportedly hit his head  - Patient with multiple recent ED visits due to AMS, lethargy, diarrhea-recent CT scan of the head obtained few days prior no acute pathology detected; imaging obtained given patient's slurred speech  - Patient reportedly recently kicked out of methadone clinic due to drug possession which she reports was just a speaker attached to his 3 hip  - At this time patient complaining of chronic ongoing neck, back, knee pain as well as chronic ongoing swelling of bilateral lower extremities  - Patient requesting benzodiazepines at this time specifically Valium    Plan  - Follow-up labs  - Follow-up pain management  - Very limited pain control options at this time given patient's history and obvious drug-seeking behavior concerns per chart review

## 2025-04-22 NOTE — ED PROVIDER NOTES
Time reflects when diagnosis was documented in both MDM as applicable and the Disposition within this note       Time User Action Codes Description Comment    4/22/2025  7:37 PM Dillon Bowling [R10.9] Abdominal pain     4/22/2025  7:38 PM Dillon Bowling [F11.10] Opioid abuse (HCC)     4/22/2025  7:38 PM Dillon Bowling [F11.20] Methadone dependence (HCC)           ED Disposition       ED Disposition   Discharge    Condition   Stable    Date/Time   Tue Apr 22, 2025  7:39 PM    Comment   David Costa Osorio discharge to home/self care.                   Assessment & Plan       Medical Decision Making  HPI  Pt is a 64 y.o. male who presents to the ED on April 22, 2025. Patient presents with persistent neck, back, diffuse abdominal pain with associated nonbloody nonbilious emesis and 3 episodes of diarrhea today.  Patient unclear about duration of symptoms.  PMH remarkable for Bipolar disorder, generalized anxiety disorder, opioid use disorder, thoracic degenerative disc disease, and chronic methadone usage.  Patient also states that approximately 9 days ago, he had a fall, which she was evaluated by his PCP for.  Upon my independent chart review, patient has had multiple visits to the ED for similar symptoms, each of which resulted in him leaving AMA, occurring over the past week.  Patient also, reportedly was removed from his methadone clinic for behavior.  Patient however states that he was able to be seen in methadone clinic today ([facility] methadone clinic), and he was unhappy that he received 63 mg of methadone, stating that his normal dosages of 150 mg. Called methadone regional clinic at [phone], who reported that patient has not had any dosages within the last week. ROS otherwise negative.    MDM  This patient with nausea and vomiting and diffuse abdominal pain which is likely secondary to sequelae of his methadone weaning. Considered but low risk for SBO (normal BM, passing flatus, no  abdominal surgeries), no signs of DKA in labs. Patient BMP with normal electrolytes and no sign of dehydration causing prerenal OSEAS. Low suspicion for gastric or esophageal dysmotility as cause. Patient with no chest pain, unremarkable EKG so low suspicion for ACS. Based on history, exam, and work up low suspicion for pancreatitis, appendicitis, biliary pathology, or other emergent problem. Patient given zofran with symptom improvement.  Instructed to follow-up with his outpatient methadone clinic for further dosing and management.  Patient displeased with medical management, stating that he is still persistently anxious and having diffuse pain, however workup otherwise negative.  Patient provided clonidine for symptom relief, however still requesting medication for pain specifically Ativan.  After further reeducation patient was discharged with instructions to continue outpatient management.    Amount and/or Complexity of Data Reviewed  Labs: ordered.    Risk  Prescription drug management.        ED Course as of 04/22/25 1951   Tue Apr 22, 2025 1921 EKG Interpretation    Rate:  73  BPM  Rhythm:  Normal Sinus Rhythm   Axis:  Normal   Intervals: Normal, no blocks, QTc  456 ms  Q waves:  No pathologic Q waves   T waves:  Normal   ST segments:  No significant elevations or depressions     Impression:  Normal sinus rhythm without evidence of acute ischemia or significant arrhythmia      EKG for comparison: 4-    EKG interpreted by me.            Medications   sodium chloride 0.9 % bolus 1,000 mL (1,000 mL Intravenous New Bag 4/22/25 1908)   acetaminophen (Ofirmev) injection 1,000 mg (1,000 mg Intravenous New Bag 4/22/25 1908)   ondansetron (ZOFRAN) injection 4 mg (4 mg Intravenous Given 4/22/25 1906)   cloNIDine (CATAPRES) tablet 0.1 mg (0.1 mg Oral Given 4/22/25 1947)       ED Risk Strat Scores                    No data recorded                            History of Present Illness       Chief Complaint    Patient presents with    Pain     Pt reports neck, back, and abdominal pain. +vomiting. Having trouble getting methadone        Past Medical History:   Diagnosis Date    Anxiety     Depression     Diabetes mellitus (HCC)     Drug use     Hemorrhoids, internal 9/23/2019    Hypertension       Past Surgical History:   Procedure Laterality Date    FEMUR FRACTURE SURGERY Right     GASTRIC BYPASS  11/08/2011    Managed by: Braden Luna (General Surgery)    HERNIA REPAIR  02/13/2013    Incisional hernia repair Managed by: Braden Luna (General Surgery)    IL OPTX FEM SHFT FX W/INSJ IMED IMPLT W/WO SCREW Right 7/31/2024    Procedure: INSERTION NAIL IM FEMUR ANTEGRADE (TROCHANTERIC);  Surgeon: Meng Hill MD;  Location: [facility];  Service: Orthopedics    TONSILLECTOMY  2010      Family History   Problem Relation Age of Onset    Diabetes Mother     Hypertension Mother     Hypertension Father     Autoimmune disease Son       Social History     Tobacco Use    Smoking status: Never    Smokeless tobacco: Never   Vaping Use    Vaping status: Never Used   Substance Use Topics    Alcohol use: Never    Drug use: Yes     Comment: Methadone clinic      E-Cigarette/Vaping    E-Cigarette Use Never User       E-Cigarette/Vaping Substances    Nicotine No     THC No     CBD No     Flavoring No     Other No     Unknown No       I have reviewed and agree with the history as documented.     HPI    Review of Systems        Objective       ED Triage Vitals   Temperature Pulse Blood Pressure Respirations SpO2 Patient Position - Orthostatic VS   04/22/25 1745 04/22/25 1746 04/22/25 1746 04/22/25 1745 04/22/25 1746 --   97.9 °F (36.6 °C) 89 (!) 183/84 20 99 %       Temp Source Heart Rate Source BP Location FiO2 (%) Pain Score    04/22/25 1745 04/22/25 1745 -- -- --    Oral Monitor         Vitals      Date and Time Temp Pulse SpO2 Resp BP Pain Score FACES Pain Rating User   04/22/25 1947 -- -- -- -- 141/74 -- -- AW   04/22/25  1746 -- 89 99 % -- 183/84 -- -- AMAURY   04/22/25 1745 97.9 °F (36.6 °C) -- -- 20 -- -- -- AMAURY            Physical Exam  Vitals and nursing note reviewed.   Constitutional:       General: He is not in acute distress.     Appearance: He is well-developed.   HENT:      Head: Normocephalic and atraumatic.   Eyes:      Conjunctiva/sclera: Conjunctivae normal.   Cardiovascular:      Rate and Rhythm: Normal rate and regular rhythm.      Heart sounds: No murmur heard.     No friction rub. No gallop.   Pulmonary:      Effort: Pulmonary effort is normal. No respiratory distress.      Breath sounds: Normal breath sounds. No wheezing, rhonchi or rales.   Abdominal:      General: There is no distension.      Palpations: Abdomen is soft.      Tenderness: There is abdominal tenderness. There is no right CVA tenderness, left CVA tenderness, guarding or rebound.   Musculoskeletal:         General: No swelling.      Cervical back: Normal and neck supple.      Thoracic back: Tenderness present. No deformity, signs of trauma, lacerations or spasms.      Lumbar back: Tenderness present. No deformity, signs of trauma, lacerations or spasms.        Back:       Right lower leg: Edema present.      Left lower leg: Edema present.   Skin:     General: Skin is warm and dry.      Capillary Refill: Capillary refill takes less than 2 seconds.   Neurological:      Mental Status: He is alert.   Psychiatric:         Mood and Affect: Mood normal.         Results Reviewed       Procedure Component Value Units Date/Time    Comprehensive metabolic panel [[id number]]  (Abnormal) Collected: 04/22/25 1908    Lab Status: Final result Specimen: Blood from Arm, Right Updated: 04/22/25 1929     Sodium 136 mmol/L      Potassium 4.3 mmol/L      Chloride 102 mmol/L      CO2 30 mmol/L      ANION GAP 4 mmol/L      BUN 14 mg/dL      Creatinine 0.98 mg/dL      Glucose 286 mg/dL      Calcium 8.4 mg/dL      Corrected Calcium 9.1 mg/dL      AST 14 U/L      ALT 23 U/L       Alkaline Phosphatase 93 U/L      Total Protein 6.0 g/dL      Albumin 3.1 g/dL      Total Bilirubin 0.25 mg/dL      eGFR 82 ml/min/1.73sq m     Narrative:      National Kidney Disease Foundation guidelines for Chronic Kidney Disease (CKD):     Stage 1 with normal or high GFR (GFR > 90 mL/min/1.73 square meters)    Stage 2 Mild CKD (GFR = 60-89 mL/min/1.73 square meters)    Stage 3A Moderate CKD (GFR = 45-59 mL/min/1.73 square meters)    Stage 3B Moderate CKD (GFR = 30-44 mL/min/1.73 square meters)    Stage 4 Severe CKD (GFR = 15-29 mL/min/1.73 square meters)    Stage 5 End Stage CKD (GFR <15 mL/min/1.73 square meters)  Note: GFR calculation is accurate only with a steady state creatinine    Lipase [[id number]]  (Normal) Collected: 04/22/25 1908    Lab Status: Final result Specimen: Blood from Arm, Right Updated: 04/22/25 1929     Lipase 19 u/L     CBC and differential [[id number]]  (Abnormal) Collected: 04/22/25 1908    Lab Status: Final result Specimen: Blood from Arm, Right Updated: 04/22/25 1915     WBC 6.72 Thousand/uL      RBC 3.28 Million/uL      Hemoglobin 9.1 g/dL      Hematocrit 28.5 %      MCV 87 fL      MCH 27.7 pg      MCHC 31.9 g/dL      RDW 13.2 %      MPV 10.3 fL      Platelets 258 Thousands/uL      nRBC 0 /100 WBCs      Segmented % 59 %      Immature Grans % 0 %      Lymphocytes % 20 %      Monocytes % 7 %      Eosinophils Relative 13 %      Basophils Relative 1 %      Absolute Neutrophils 4.00 Thousands/µL      Absolute Immature Grans 0.02 Thousand/uL      Absolute Lymphocytes 1.33 Thousands/µL      Absolute Monocytes 0.47 Thousand/µL      Eosinophils Absolute 0.86 Thousand/µL      Basophils Absolute 0.04 Thousands/µL             No orders to display       Procedures    ED Medication and Procedure Management   Prior to Admission Medications   Prescriptions Last Dose Informant Patient Reported? Taking?   Alcohol Swabs 70 % PADS  Self No No   Sig: May substitute brand based on insurance coverage. Check  glucose BID.   Blood Glucose Monitoring Suppl (OneTouch Verio Reflect) w/Device KIT  Self No No   Sig: May substitute brand based on insurance coverage. Check glucose BID.   Cholecalciferol (VITAMIN D3) 1,000 units tablet  Self No No   Sig: Take 2 tablets (2,000 Units total) by mouth daily   Continuous Glucose  (FreeStyle Grace 3 [place]) STEVE   No No   Sig: Please dispense 1 reader   Continuous Glucose Sensor (FreeStyle Grace 3 Plus Sensor) MISC   No No   Sig: Use 1 each every 15 (fifteen) days   Diclofenac Sodium (VOLTAREN) 1 %   No No   Sig: Apply 2 g topically 4 (four) times a day To be applied over knees bilaterally as needed for pain   Insulin Glargine Solostar (Lantus SoloStar) 100 UNIT/ML SOPN   No No   Sig: Inject 16 units at bedtime   Insulin Pen Needle (BD Pen Needle Aminta U/F) 32G X 4 MM MISC   No No   Sig: Use daily as directed with insulin pen   Misc. Devices (CANE) MISC  Self No No   Sig: by Does not apply route daily Dx:  Frequent falls   Multiple Vitamin (Daily-Laura Multivitamin) TABS   Yes No   Sig: Take 1 tablet by mouth in the morning   Multiple Vitamin (multivitamin) capsule   No No   Sig: Take 1 capsule by mouth daily   Trazodone HCl POWD   Yes No   Sig: Take by mouth   Wound Dressings (Dermagran Hydrogel Wound) GEL   No No   Sig: Apply 1 Application topically every other day To applied every other day to small red developing ulcer on the left lateral shin area   amphetamine-dextroamphetamine (ADDERALL XR) 30 MG 24 hr capsule   Yes No   Sig: TAKE 1 CAPSULE BY MOUTH TWICE A DAY (8AM AND 1PM)   atorvastatin (LIPITOR) 40 mg tablet   No No   Sig: Take 1 tablet (40 mg total) by mouth daily with dinner   cloNIDine (CATAPRES) 0.1 mg tablet   Yes No   Sig: Take 0.1 mg by mouth daily at bedtime   clonazePAM (KlonoPIN) 1 mg tablet   No No   Sig: Take 1 tablet (1 mg total) by mouth 2 (two) times a day for 9 days   clopidogrel (PLAVIX) 75 mg tablet   No No   Sig: Take 1 tablet (75 mg total) by mouth  daily   finasteride (PROSCAR) 5 mg tablet   No No   Sig: Take 1 tablet (5 mg total) by mouth daily   fluticasone (FLONASE) 50 mcg/act nasal spray   No No   Si spray into each nostril daily   folic acid (FOLVITE) 1 mg tablet   No No   Sig: Take 1 tablet (1 mg total) by mouth daily   furosemide (LASIX) 20 mg tablet   No No   Sig: Take 1 tablet (20 mg total) by mouth daily as needed (leg swelling)   gabapentin (NEURONTIN) 800 mg tablet   Yes No   Sig: TAKE 1 TABLET BY MOUTH 3 TIMES A DAY AND TAKE 1 TABLET BY MOUTH AT BEDTIME   gabapentin (Neurontin) 600 MG tablet  Self No No   Sig: Take 0.5 tablets (300 mg total) by mouth 2 (two) times a day   glucose blood (OneTouch Verio) test strip  Self No No   Sig: May substitute brand based on insurance coverage. Check glucose BID.   hydroCHLOROthiazide 12.5 mg tablet   No No   Sig: TAKE 1 TABLET (12.5 MG TOTAL) BY MOUTH DAILY AS NEEDED EDEMA   lidocaine (Lidoderm) 5 %   No No   Sig: Apply 1 patch topically over 12 hours daily To be applied to back as needed for pain   metFORMIN (GLUCOPHAGE-XR) 500 mg 24 hr tablet   No No   Sig: Take 2 tablets (1,000 mg total) by mouth 2 (two) times a day with meals   methocarbamol (ROBAXIN) 500 mg tablet   No No   Sig: Take 1 tablet (500 mg total) by mouth 4 (four) times a day for 5 days   tamsulosin (FLOMAX) 0.4 mg   No No   Sig: Take 1 capsule (0.4 mg total) by mouth daily with dinner   thiamine 100 MG tablet   No No   Sig: Take 1 tablet (100 mg total) by mouth daily   triamcinolone (KENALOG) 0.1 % cream   No No   Sig: Apply topically 2 (two) times a day Applied to right posterior thigh      Facility-Administered Medications: None     Patient's Medications   Discharge Prescriptions    DICLOFENAC SODIUM (VOLTAREN) 1 %    Apply 2 g topically 4 (four) times a day To be applied over knees bilaterally as needed for pain       Start Date: 2025 End Date: --       Order Dose: 2 g       Quantity: 50 g    Refills: 0    LIDOCAINE (LIDODERM) 5 %     Apply 1 patch topically over 12 hours daily To be applied to back as needed for pain       Start Date: 4/22/2025 End Date: --       Order Dose: 1 patch       Quantity: 10 patch    Refills: 0    METHOCARBAMOL (ROBAXIN) 500 MG TABLET    Take 1 tablet (500 mg total) by mouth 4 (four) times a day for 5 days       Start Date: 4/22/2025 End Date: 4/27/2025       Order Dose: 500 mg       Quantity: 20 tablet    Refills: 0     No discharge procedures on file.  ED SEPSIS DOCUMENTATION   Time reflects when diagnosis was documented in both MDM as applicable and the Disposition within this note       Time User Action Codes Description Comment    4/22/2025  7:37 PM Dillon Bowling [R10.9] Abdominal pain     4/22/2025  7:38 PM Dillon Bowling [F11.10] Opioid abuse (HCC)     4/22/2025  7:38 PM Dillon Bowling [F11.20] Methadone dependence (HCC)                  Dillon Bowling MD  04/22/25 1951

## 2025-04-22 NOTE — TELEPHONE ENCOUNTER
Patient called following up on earlier message. Patient stated Dr. Rios was supposed to prescribe pain medication for him at his appointment today. Cannot locate this in office visit note. Patient requesting pain medication be ordered. Please review and advise.

## 2025-04-22 NOTE — PROGRESS NOTES
Name: David Skelton Jr.      : 1960      MRN: [id number]  Encounter Provider: Gatito Rios DO  Encounter Date: 2025   Encounter department: [facility]    Assessment & Plan  Fall, initial encounter  -Patient presented clinic for follow-up after recent fall patient reportedly hit his head  - Patient with multiple recent ED visits due to AMS, lethargy, diarrhea-recent CT scan of the head obtained few days prior no acute pathology detected; imaging obtained given patient's slurred speech  - Patient reportedly recently kicked out of methadone clinic due to drug possession which she reports was just a speaker attached to his 3 hip  - At this time patient complaining of chronic ongoing neck, back, knee pain as well as chronic ongoing swelling of bilateral lower extremities  - Patient requesting benzodiazepines at this time specifically Valium    Plan  - Follow-up labs  - Follow-up pain management  - Very limited pain control options at this time given patient's history and obvious drug-seeking behavior concerns per chart review       Swelling of both lower extremities  -Currently +2 bilaterally  - This appears to be a chronic ongoing condition  - Most recent echo obtained in 2024 with noted EF of 60%, normal systolic function, normal wall motion  Orders:  •  Albumin / creatinine urine ratio; Future    Edema, unspecified type  -Detailed above  Orders:  •  furosemide (LASIX) 20 mg tablet; Take 1 tablet (20 mg total) by mouth daily as needed (leg swelling)    Chronic midline low back pain without sciatica  -Patient reports chronic ongoing back pain  - Last imaging obtained in 2024 of the lumbar spine with noted stable T12 and L1 compression deformities, no other acute pathological diseases noted  Orders:  •  Ambulatory referral to Spine & Pain Management; Future  •  lidocaine (Lidoderm) 5 %; Apply 1 patch topically over 12 hours daily To be applied to back as  needed for pain  •  methocarbamol (ROBAXIN) 500 mg tablet; Take 1 tablet (500 mg total) by mouth 4 (four) times a day for 5 days    Primary osteoarthritis of both knees  -Patient reports chronic ongoing bilateral knee pain  - Recent imaging obtained just 1 month prior through bilateral knee x-rays  - Recent knee x-ray showing moderate tricompartmental osteoarthritis, evidenced by articular cartilage loss, marginal osteophytes, and subchondral sclerosis    Plan  - Discussed with patient steroid injection options versus Ortho referral  - Discussed possible PT follow-up; not presently interested  - Trial patient on topical Voltaren gel  Orders:  •  Diclofenac Sodium (VOLTAREN) 1 %; Apply 2 g topically 4 (four) times a day To be applied over knees bilaterally as needed for pain           History of Present Illness   Patient had a fall recently, reportedly hit his head.   Notes he was kicked out of methadone clinic recently due to concerns he had drugs on him; reports it was a speaker attached to his hip.   Patient notes pain in his knees, back and right side of face. Reports he lost consciousness from the fall.   Patient noting chronic ongoing pain in the b/l knees, neck and back.  Notes history of back and knee injections which didn't seem to help.   Swelling in the legs is ongoing issue, ongoing for a couple of months       Review of Systems   Constitutional:  Negative for chills and fever.   HENT:  Negative for congestion and rhinorrhea.    Respiratory:  Negative for cough, chest tightness, shortness of breath and wheezing.    Cardiovascular:  Positive for leg swelling. Negative for chest pain and palpitations.   Gastrointestinal:  Positive for abdominal pain. Negative for nausea and vomiting.   Musculoskeletal:  Positive for arthralgias, myalgias, neck pain and neck stiffness.   Neurological:  Negative for dizziness, light-headedness and headaches.       Objective   /65 (BP Location: Left arm, Patient  Position: Sitting, Cuff Size: Standard)   Pulse 88   Temp 98 °F (36.7 °C)   Resp 19   Wt 81.6 kg (180 lb)   SpO2 97%   BMI 27.37 kg/m²      Physical Exam  Constitutional:       General: He is not in acute distress.     Appearance: Normal appearance. He is not ill-appearing.   Cardiovascular:      Rate and Rhythm: Normal rate and regular rhythm.      Pulses: Normal pulses.      Heart sounds: Normal heart sounds.   Musculoskeletal:      Right lower leg: Edema (2+) present.      Left lower leg: Edema (2+) present.   Neurological:      Mental Status: He is alert.

## 2025-04-23 LAB
1OH-MIDAZOLAM UR CFM-MCNC: NOT DETECTED NG/MG CREAT
6MAM UR QL SCN: NEGATIVE NG/ML
7AMINOCLONAZEPAM UR CFM-MCNC: 495 NG/MG CREAT
A-OH ALPRAZ UR QL CFM: 254 NG/MG CREAT
ACCEPTABLE CREAT UR QL: 37 MG/DL
ALPRAZ/CREAT UR CFM: NOT DETECTED NG/MG CREAT
AMPHET UR QL SCN: NEGATIVE NG/ML
ANTICONVULSANTS: ABNORMAL
BARBITURATES UR QL SCN: NEGATIVE NG/ML
BENZODIAZ UR QL SCN: NORMAL NG/ML
BENZODIAZEPINES: ABNORMAL
BUPRENORPHINE UR QL CFM: NEGATIVE NG/ML
CANNABINOIDS UR QL SCN: NEGATIVE NG/ML
CARISOPRODOL UR QL: NEGATIVE NG/ML
CLONAZEPAM/CREAT UR CFM: NOT DETECTED NG/MG CREAT
COCAINE+BZE UR QL SCN: NEGATIVE NG/ML
DIAZEPAM/CREAT UR: NOT DETECTED NG/MG CREAT
EDDP UR QL CFM: ABNORMAL NG/MG CREAT
ETHYL GLUCURONIDE UR QL SCN: NEGATIVE NG/ML
FENTANYL UR QL SCN: NEGATIVE NG/ML
FLUNITRAZEPAM UR-MCNC: NOT DETECTED NG/MG CREAT
GABAPENTIN SERPLBLD QL SCN: NORMAL UG/ML
GABAPENTIN UR QL CFM: PRESENT
LORAZEPAM UR CFM-MCNC: 62 NG/MG CREAT
METHADONE UR QL CFM: ABNORMAL NG/MG CREAT
METHADONE UR QL SCN: NORMAL NG/ML
METHADONE: ABNORMAL
MIDAZOLAM/CREAT UR CFM: NOT DETECTED NG/MG CREAT
NITRITE UR QL STRIP: NEGATIVE UG/ML
NORDIAZEPAM UR CFM-MCNC: NOT DETECTED NG/MG CREAT
NORFLUNITRAZEPAM UR-MCNC: NOT DETECTED NG/MG CREAT
OH-ETHYLFLURAZ UR CFM-MCNC: NOT DETECTED NG/MG CREAT
OH-TRIAZOLAM UR CFM-MCNC: NOT DETECTED NG/MG CREAT
OPIATES UR QL SCN: NEGATIVE NG/ML
OXAZEPAM CTO UR CFM-MCNC: NOT DETECTED NG/MG CREAT
OXYCODONE+OXYMORPHONE UR QL SCN: NEGATIVE NG/ML
PCP UR QL SCN: NEGATIVE NG/ML
PREGABALIN UR QL CFM: NOT DETECTED
PROPOXYPH UR QL SCN: NEGATIVE NG/ML
SPECIMEN PH ACCEPTABLE UR: 5.1 (ref 4.5–8.9)
TAPENTADOL UR QL SCN: NEGATIVE NG/ML
TEMAZEPAM UR CFM-MCNC: NOT DETECTED NG/MG CREAT
TRAMADOL UR QL SCN: NEGATIVE NG/ML

## 2025-04-23 NOTE — ED PROVIDER NOTES
Time reflects when diagnosis was documented in both MDM as applicable and the Disposition within this note       Time User Action Codes Description Comment    4/19/2025  5:58 PM Arash Leblanc Add [R10.9] Abdominal pain     4/19/2025  5:58 PM Arash Leblanc Remove [R10.9] Abdominal pain     4/19/2025  5:58 PM Arash Leblanc Add [R19.7] Diarrhea           ED Disposition       ED Disposition   AMA    Condition   --    Date/Time   Sat Apr 19, 2025  5:59 PM    Comment   Date: 4/19/2025  Patient: David Skelton Jr.  Admitted: 4/19/2025  4:55 PM  Attending Provider: Jermaine Mcarthur MD    David Skelton Jr. or his authorized caregiver has made the decision for the patient to leave the emergency department against the adv ice of his attending physician. He or his authorized caregiver has been informed and understands the inherent risks, including death, sepsis, bowel resection, abscess, diverticulitis, seizure, paralysis, other undiagnosed medical complications.  He o r his authorized caregiver has decided to accept the responsibility for this decision. David Skelton Jr. and all necessary parties have been advised that he may return for further evaluation or treatment. His condition at time of discharge was stable.   David Skelton Jr. had current vital signs as follows:  /66 (BP Location: Right arm)   Pulse 79   Temp 98 °F (36.7 °C) (Tympanic)   Resp 18                Assessment & Plan       Medical Decision Making  Patient is well-appearing on exam GCS 15, AO x 4.  Mild generalized abdominal tenderness on exam.  I did witness him have a bowel movement in the room, it was light brown diarrhea.  No blood or black.  Throughout my interview patient made multiple request for IV opioid pain medication in addition to methadone.  When informed that I would not be able to give him methadone as he has proper follow-up with the methadone clinic and received his methadone dose today already, patient became irate and started yelling.  " I informed him that I will treat his pain but with not opioid pain medications and that I plan for IV fluids, laboratory work, CT of his abdomen.  Patient says if I do not give him methadone he will leave the ER.  Again I informed him that we will be unable to give him methadone or IV opioid pain medication.  He requested to leave the emergency department AGAINST MEDICAL ADVICE.  Described the risk to him of him leaving the hospital in the ER as documented in disposition and on AMA form.  I informed him that he would always be welcome back to the ER to be evaluated if needed.  Patient proceeded to leave the emergency department.    Differential diagnosis includes but is not limited to: Gastroenteritis, bowel obstruction, ileus, request for methadone    Based on patient's clinical history and physical exam there are no red flag signs or symptoms     Patient denies any additional symptoms on direct questioning except those explicitly noted in the HPI and ROS.     Triage note was reviewed and patient asked directly about concerns mentioned in triage note.     I will order appropriate testing to narrow my differential    Unless otherwise noted:  - There is no language barrier  - Chart was reviewed   - Labs and imaging were reviewed             Medications - No data to display    ED Risk Strat Scores                    No data recorded                            History of Present Illness       Chief Complaint   Patient presents with    Anxiety     Pt states he was taking 155mg methadone. Today was first dose of 138mg. Staes he believes he is in withdrawal because he is \"pooping his pants\". States he needs to be admitted because [facility] is tomorrow and he may not be able to get an additional methadone dose if needed       Past Medical History:   Diagnosis Date    Anxiety     Depression     Diabetes mellitus (HCC)     Drug use     Hemorrhoids, internal 9/23/2019    Hypertension       Past Surgical History:   Procedure " Laterality Date    FEMUR FRACTURE SURGERY Right     GASTRIC BYPASS  11/08/2011    Managed by: Braden Luna (General Surgery)    HERNIA REPAIR  02/13/2013    Incisional hernia repair Managed by: Braden Luna (General Surgery)    IA OPTX FEM SHFT FX W/INSJ IMED IMPLT W/WO SCREW Right 7/31/2024    Procedure: INSERTION NAIL IM FEMUR ANTEGRADE (TROCHANTERIC);  Surgeon: Meng Hill MD;  Location: [facility];  Service: Orthopedics    TONSILLECTOMY  2010      Family History   Problem Relation Age of Onset    Diabetes Mother     Hypertension Mother     Hypertension Father     Autoimmune disease Son       Social History     Tobacco Use    Smoking status: Never    Smokeless tobacco: Never   Vaping Use    Vaping status: Never Used   Substance Use Topics    Alcohol use: Never    Drug use: Yes     Comment: Methadone clinic      E-Cigarette/Vaping    E-Cigarette Use Never User       E-Cigarette/Vaping Substances    Nicotine No     THC No     CBD No     Flavoring No     Other No     Unknown No       I have reviewed and agree with the history as documented.     Patient is a 64-year-old male past medical history diabetes opioid use disorder.  Presents with chief complaint of methadone withdrawal.  Says several weeks ago he was on 190 mg of methadone then transition to 155 and now on 130.  He says he is having diarrhea for the past couple of days.  Does not endorse any other symptoms of withdrawal.  Denies chest pain, shortness of breath, fever, chills, numbness or weakness or ambulatory dysfunction.  No nausea or vomiting.  Denies any bloody or black stools.  No abdominal pain.        Review of Systems   Constitutional:  Negative for chills, fatigue and fever.   Respiratory:  Negative for cough and shortness of breath.    Cardiovascular:  Negative for chest pain.   Gastrointestinal:  Negative for abdominal pain.   Neurological:  Negative for seizures and syncope.   All other systems reviewed and are  negative.          Objective       ED Triage Vitals [04/19/25 1658]   Temperature Pulse Blood Pressure Respirations SpO2 Patient Position - Orthostatic VS   98 °F (36.7 °C) 79 131/66 18 97 % --      Temp Source Heart Rate Source BP Location FiO2 (%) Pain Score    Tympanic Monitor Right arm -- --      Vitals      Date and Time Temp Pulse SpO2 Resp BP Pain Score FACES Pain Rating User   04/19/25 1658 98 °F (36.7 °C) 79 97 % 18 131/66 -- -- SERA            Physical Exam  Constitutional:       General: He is not in acute distress.     Appearance: Normal appearance. He is normal weight. He is not ill-appearing, toxic-appearing or diaphoretic.   HENT:      Head: Normocephalic and atraumatic.      Nose: Nose normal.      Mouth/Throat:      Mouth: Mucous membranes are moist.      Pharynx: Oropharynx is clear. No oropharyngeal exudate or posterior oropharyngeal erythema.   Eyes:      General: No scleral icterus.        Right eye: No discharge.         Left eye: No discharge.      Extraocular Movements: Extraocular movements intact.      Conjunctiva/sclera: Conjunctivae normal.      Pupils: Pupils are equal, round, and reactive to light.   Cardiovascular:      Rate and Rhythm: Normal rate and regular rhythm.      Pulses: Normal pulses.      Heart sounds: Normal heart sounds. No murmur heard.     No friction rub. No gallop.   Pulmonary:      Effort: Pulmonary effort is normal. No respiratory distress.      Breath sounds: Normal breath sounds. No stridor. No wheezing, rhonchi or rales.   Chest:      Chest wall: No tenderness.   Abdominal:      General: Abdomen is flat. Bowel sounds are normal. There is no distension.      Palpations: Abdomen is soft. There is no mass.      Tenderness: There is abdominal tenderness (Generalized). There is no guarding or rebound.      Hernia: No hernia is present.   Musculoskeletal:      Cervical back: Normal range of motion and neck supple. No rigidity.   Lymphadenopathy:      Cervical: No  cervical adenopathy.   Skin:     General: Skin is warm and dry.      Capillary Refill: Capillary refill takes less than 2 seconds.   Neurological:      General: No focal deficit present.      Mental Status: He is alert and oriented to person, place, and time.         Results Reviewed       None            No orders to display       Procedures    ED Medication and Procedure Management   Prior to Admission Medications   Prescriptions Last Dose Informant Patient Reported? Taking?   Alcohol Swabs 70 % PADS  Self No No   Sig: May substitute brand based on insurance coverage. Check glucose BID.   Blood Glucose Monitoring Suppl (OneTouch Verio Reflect) w/Device KIT  Self No No   Sig: May substitute brand based on insurance coverage. Check glucose BID.   Cholecalciferol (VITAMIN D3) 1,000 units tablet  Self No No   Sig: Take 2 tablets (2,000 Units total) by mouth daily   Continuous Glucose  (FreeStyle Grace 3 [place]) STEVE   No No   Sig: Please dispense 1 reader   Continuous Glucose Sensor (FreeStyle Grace 3 Plus Sensor) MISC   No No   Sig: Use 1 each every 15 (fifteen) days   Insulin Glargine Solostar (Lantus SoloStar) 100 UNIT/ML SOPN   No No   Sig: Inject 16 units at bedtime   Insulin Pen Needle (BD Pen Needle Aminta U/F) 32G X 4 MM MISC   No No   Sig: Use daily as directed with insulin pen   Misc. Devices (CANE) MISC  Self No No   Sig: by Does not apply route daily Dx:  Frequent falls   Multiple Vitamin (Daily-Laura Multivitamin) TABS   Yes No   Sig: Take 1 tablet by mouth in the morning   Multiple Vitamin (multivitamin) capsule   No No   Sig: Take 1 capsule by mouth daily   Trazodone HCl POWD   Yes No   Sig: Take by mouth   Wound Dressings (Dermagran Hydrogel Wound) GEL   No No   Sig: Apply 1 Application topically every other day To applied every other day to small red developing ulcer on the left lateral shin area   amphetamine-dextroamphetamine (ADDERALL XR) 30 MG 24 hr capsule   Yes No   Sig: TAKE 1 CAPSULE BY  MOUTH TWICE A DAY (8AM AND 1PM)   atorvastatin (LIPITOR) 40 mg tablet   No No   Sig: Take 1 tablet (40 mg total) by mouth daily with dinner   cloNIDine (CATAPRES) 0.1 mg tablet   Yes No   Sig: Take 0.1 mg by mouth daily at bedtime   clonazePAM (KlonoPIN) 1 mg tablet   No No   Sig: Take 1 tablet (1 mg total) by mouth 2 (two) times a day for 9 days   clopidogrel (PLAVIX) 75 mg tablet   No No   Sig: Take 1 tablet (75 mg total) by mouth daily   finasteride (PROSCAR) 5 mg tablet   No No   Sig: Take 1 tablet (5 mg total) by mouth daily   fluticasone (FLONASE) 50 mcg/act nasal spray   No No   Si spray into each nostril daily   folic acid (FOLVITE) 1 mg tablet   No No   Sig: Take 1 tablet (1 mg total) by mouth daily   gabapentin (NEURONTIN) 800 mg tablet   Yes No   Sig: TAKE 1 TABLET BY MOUTH 3 TIMES A DAY AND TAKE 1 TABLET BY MOUTH AT BEDTIME   gabapentin (Neurontin) 600 MG tablet  Self No No   Sig: Take 0.5 tablets (300 mg total) by mouth 2 (two) times a day   glucose blood (OneTouch Verio) test strip  Self No No   Sig: May substitute brand based on insurance coverage. Check glucose BID.   hydroCHLOROthiazide 12.5 mg tablet   No No   Sig: TAKE 1 TABLET (12.5 MG TOTAL) BY MOUTH DAILY AS NEEDED EDEMA   metFORMIN (GLUCOPHAGE-XR) 500 mg 24 hr tablet   No No   Sig: Take 2 tablets (1,000 mg total) by mouth 2 (two) times a day with meals   tamsulosin (FLOMAX) 0.4 mg   No No   Sig: Take 1 capsule (0.4 mg total) by mouth daily with dinner   thiamine 100 MG tablet   No No   Sig: Take 1 tablet (100 mg total) by mouth daily   triamcinolone (KENALOG) 0.1 % cream   No No   Sig: Apply topically 2 (two) times a day Applied to right posterior thigh      Facility-Administered Medications: None     Discharge Medication List as of 2025  5:59 PM        CONTINUE these medications which have NOT CHANGED    Details   Alcohol Swabs 70 % PADS May substitute brand based on insurance coverage. Check glucose BID., Normal       amphetamine-dextroamphetamine (ADDERALL XR) 30 MG 24 hr capsule TAKE 1 CAPSULE BY MOUTH TWICE A DAY (8AM AND 1PM), Historical Med      atorvastatin (LIPITOR) 40 mg tablet Take 1 tablet (40 mg total) by mouth daily with dinner, Starting Thu 3/13/2025, Normal      Blood Glucose Monitoring Suppl (OneTouch Verio Reflect) w/Device KIT May substitute brand based on insurance coverage. Check glucose BID., Normal      Cholecalciferol (VITAMIN D3) 1,000 units tablet Take 2 tablets (2,000 Units total) by mouth daily, Starting Mon 10/21/2024, Normal      clonazePAM (KlonoPIN) 1 mg tablet Take 1 tablet (1 mg total) by mouth 2 (two) times a day for 9 days, Starting Tue 4/15/2025, Until Thu 4/24/2025, Normal      cloNIDine (CATAPRES) 0.1 mg tablet Take 0.1 mg by mouth daily at bedtime, Starting Mon 2/10/2025, Historical Med      clopidogrel (PLAVIX) 75 mg tablet Take 1 tablet (75 mg total) by mouth daily, Starting Fri 3/14/2025, Normal      Continuous Glucose  (FreeStyle Grace 3 [place]) STEVE Please dispense 1 reader, Normal      Continuous Glucose Sensor (FreeStyle Grace 3 Plus Sensor) MISC Use 1 each every 15 (fifteen) days, Starting Mon 4/7/2025, Normal      finasteride (PROSCAR) 5 mg tablet Take 1 tablet (5 mg total) by mouth daily, Starting Fri 3/14/2025, Normal      fluticasone (FLONASE) 50 mcg/act nasal spray 1 spray into each nostril daily, Starting Wed 3/19/2025, Normal      folic acid (FOLVITE) 1 mg tablet Take 1 tablet (1 mg total) by mouth daily, Starting Fri 3/14/2025, Normal      !! gabapentin (Neurontin) 600 MG tablet Take 0.5 tablets (300 mg total) by mouth 2 (two) times a day, Starting Mon 10/21/2024, Normal      !! gabapentin (NEURONTIN) 800 mg tablet TAKE 1 TABLET BY MOUTH 3 TIMES A DAY AND TAKE 1 TABLET BY MOUTH AT BEDTIME, Historical Med      glucose blood (OneTouch Verio) test strip May substitute brand based on insurance coverage. Check glucose BID., Normal      hydroCHLOROthiazide 12.5 mg tablet  TAKE 1 TABLET (12.5 MG TOTAL) BY MOUTH DAILY AS NEEDED EDEMA, Normal      Insulin Glargine Solostar (Lantus SoloStar) 100 UNIT/ML SOPN Inject 16 units at bedtime, No Print      Insulin Pen Needle (BD Pen Needle Aminta U/F) 32G X 4 MM MISC Use daily as directed with insulin pen, Normal      metFORMIN (GLUCOPHAGE-XR) 500 mg 24 hr tablet Take 2 tablets (1,000 mg total) by mouth 2 (two) times a day with meals, Starting Mon 4/7/2025, Normal      Misc. Devices (CANE) MISC by Does not apply route daily Dx:  Frequent falls, Starting Mon 7/29/2019, Print      Multiple Vitamin (Daily-Laura Multivitamin) TABS Take 1 tablet by mouth in the morning, Starting Thu 3/13/2025, Historical Med      Multiple Vitamin (multivitamin) capsule Take 1 capsule by mouth daily, Starting Thu 3/13/2025, Normal      tamsulosin (FLOMAX) 0.4 mg Take 1 capsule (0.4 mg total) by mouth daily with dinner, Starting Tue 12/3/2024, Normal      thiamine 100 MG tablet Take 1 tablet (100 mg total) by mouth daily, Starting Fri 3/14/2025, Normal      Trazodone HCl POWD Take by mouth, Historical Med      triamcinolone (KENALOG) 0.1 % cream Apply topically 2 (two) times a day Applied to right posterior thigh, Starting Wed 2/12/2025, Normal      Wound Dressings (Dermagran Hydrogel Wound) GEL Apply 1 Application topically every other day To applied every other day to small red developing ulcer on the left lateral shin area, Starting Wed 2/12/2025, Normal      furosemide (LASIX) 20 mg tablet Take 1 tablet (20 mg total) by mouth daily as needed (leg swelling), Starting Fri 9/27/2024, Normal       !! - Potential duplicate medications found. Please discuss with provider.        No discharge procedures on file.  ED SEPSIS DOCUMENTATION   Time reflects when diagnosis was documented in both MDM as applicable and the Disposition within this note       Time User Action Codes Description Comment    4/19/2025  5:58 PM Arash Leblanc Add [R10.9] Abdominal pain     4/19/2025  5:58  PM Arash Leblanc Remove [R10.9] Abdominal pain     4/19/2025  5:58 PM Arash Leblanc Add [R19.7] Diarrhea                  Arash Leblanc MD  04/23/25 6965

## 2025-04-23 NOTE — TELEPHONE ENCOUNTER
FOLLOW UP: per clinic    REASON FOR CONVERSATION: Opiate Withdrawal    SYMPTOMS: patient called in asking what medications Dr Rios had sent in for him- when advised robaxin, voltaren patient reported those medications are not helpful for him and requested ativan. Was also discharged from [facility] [place] ED a little over an hour ago- patient dissatisfied with care as well.    OTHER: Caller disconnected before I could triage symptoms. 3 attempts made but no answer. Left VM advising patient call us back if he needs further assistance.    DISPOSITION: Home Care (Information or Advice Only Call)    Reason for Disposition   Caller hangs up    Answer Assessment - Initial Assessment Questions  1. SITUATION:  Document reason for call.      Requesting different medications    2. BACKGROUND: Document any background information (e.g., prior calls, known psychiatric history)      Seen in clinic today, as well as ER discharged about an hour ago    3. ASSESSMENT: Document your nursing assessment.      Unable to complete triage due to caller hanging up    4. RESPONSE: Document what your response or recommendation was.      Attempted to call patient back x3 but no answer.    Protocols used: Difficult Call-Adult-

## 2025-04-24 ENCOUNTER — TELEPHONE (OUTPATIENT)
Age: 65
End: 2025-04-24

## 2025-04-24 NOTE — TELEPHONE ENCOUNTER
Jocelyn Ludwig from [facility] called asking if the patient passed away from a  potential drug overdoes?  She stated they are medicaid and would need to know if it was a possible overdose and start with the family doctor because they might know first.  Her number is [phone].  Please advise.

## 2025-05-12 ENCOUNTER — TELEPHONE (OUTPATIENT)
Dept: NEUROLOGY | Facility: CLINIC | Age: 65
End: 2025-05-12

## 2025-05-13 ENCOUNTER — TELEPHONE (OUTPATIENT)
Dept: NEUROLOGY | Facility: CLINIC | Age: 65
End: 2025-05-13

## 2025-05-13 NOTE — TELEPHONE ENCOUNTER
Pt phone not working.  If pt calls please verify his insurance and any other pre-registration.  Thank you

## 2025-07-07 ENCOUNTER — TELEPHONE (OUTPATIENT)
Dept: ENDOCRINOLOGY | Facility: CLINIC | Age: 65
End: 2025-07-07

## 2025-07-07 NOTE — TELEPHONE ENCOUNTER
I called to updated the pt insurance info , his sister picked up the phone and stated that , the pt passed away in April 2025, thank you.

## 2025-07-28 ENCOUNTER — TELEPHONE (OUTPATIENT)
Age: 65
End: 2025-07-28

## 2025-08-08 ENCOUNTER — TELEPHONE (OUTPATIENT)
Dept: DIABETES SERVICES | Facility: CLINIC | Age: 65
End: 2025-08-08

## (undated) DEVICE — DRESSING MEPILEX AG BORDER 4 X 4 IN

## (undated) DEVICE — 4.2MM THREE-FLUTED DRILL BIT QC/330MM/100MM CALIBRATION

## (undated) DEVICE — SPONGE SCRUB 4 PCT CHLORHEXIDINE

## (undated) DEVICE — DRAPE C-ARMOUR

## (undated) DEVICE — 6617 IOBAN II PATIENT ISOLATION DRAPE 5/BX,4BX/CS: Brand: STERI-DRAPE™ IOBAN™ 2

## (undated) DEVICE — GLOVE SRG BIOGEL ECLIPSE 7.5

## (undated) DEVICE — 3.2MM GUIDE WIRE 400MM

## (undated) DEVICE — PROXIMATE PLUS MD MULTI-DIRECTIONAL RELEASE SKIN STAPLERS CONTAINS 35 STAINLESS STEEL STAPLES APPROXIMATE CLOSED DIMENSIONS: 6.9MM X 3.9MM WIDE: Brand: PROXIMATE

## (undated) DEVICE — DRAPE C-ARM X-RAY

## (undated) DEVICE — BETHLEHEM UNIV MAJOR ORTHO,KIT: Brand: CARDINAL HEALTH

## (undated) DEVICE — CHLORAPREP HI-LITE 26ML ORANGE

## (undated) DEVICE — ARTHROSCOPY FLOOR MAT

## (undated) DEVICE — SUT VICRYL PLUS 0 CTB-1 27 IN VCPB260H

## (undated) DEVICE — GLOVE SRG LF STRL BGL SKNSNS 7.5 PF

## (undated) DEVICE — SUT VICRYL PLUS 2-0 CTB-1 27 IN VCPB259H